# Patient Record
Sex: MALE | Race: BLACK OR AFRICAN AMERICAN | NOT HISPANIC OR LATINO | Employment: OTHER | URBAN - METROPOLITAN AREA
[De-identification: names, ages, dates, MRNs, and addresses within clinical notes are randomized per-mention and may not be internally consistent; named-entity substitution may affect disease eponyms.]

---

## 2017-01-04 ENCOUNTER — OFFICE VISIT (OUTPATIENT)
Dept: OPHTHALMOLOGY | Facility: CLINIC | Age: 72
End: 2017-01-04
Attending: OPHTHALMOLOGY
Payer: COMMERCIAL

## 2017-01-04 DIAGNOSIS — H26.9 CATARACTS, BILATERAL: ICD-10-CM

## 2017-01-04 DIAGNOSIS — H52.203 HYPEROPIC ASTIGMATISM OF BOTH EYES: Primary | ICD-10-CM

## 2017-01-04 DIAGNOSIS — H52.4 PRESBYOPIA: ICD-10-CM

## 2017-01-04 PROCEDURE — 99214 OFFICE O/P EST MOD 30 MIN: CPT | Mod: ZF

## 2017-01-04 PROCEDURE — 92015 DETERMINE REFRACTIVE STATE: CPT | Mod: ZF

## 2017-01-04 ASSESSMENT — TONOMETRY
OD_IOP_MMHG: 17
IOP_METHOD: TONOPEN
OS_IOP_MMHG: 18

## 2017-01-04 ASSESSMENT — REFRACTION_MANIFEST
OD_AXIS: 165
OS_ADD: +2.50
OD_SPHERE: +0.75
OD_ADD: +2.50
OS_SPHERE: +0.75
OS_CYLINDER: +0.50
OS_AXIS: 160
OD_CYLINDER: +0.75

## 2017-01-04 ASSESSMENT — VISUAL ACUITY
OS_SC: 20/25
OD_SC: 20/20
OS_SC+: -2
OS_PH_SC: 20/20-3
METHOD: SNELLEN - LINEAR
OD_SC+: -1

## 2017-01-04 ASSESSMENT — CUP TO DISC RATIO
OD_RATIO: 0.1
OS_RATIO: 0.2

## 2017-01-04 ASSESSMENT — EXTERNAL EXAM - LEFT EYE: OS_EXAM: NORMAL

## 2017-01-04 ASSESSMENT — SLIT LAMP EXAM - LIDS
COMMENTS: NORMAL
COMMENTS: NORMAL

## 2017-01-04 ASSESSMENT — CONF VISUAL FIELD
OS_NORMAL: 1
METHOD: COUNTING FINGERS
OD_NORMAL: 1

## 2017-01-04 ASSESSMENT — EXTERNAL EXAM - RIGHT EYE: OD_EXAM: NORMAL

## 2017-01-04 NOTE — MR AVS SNAPSHOT
After Visit Summary   2017    Jonathan Bishop    MRN: [id number]           Patient Information     Date Of Birth          1945        Visit Information        Provider Department      2017 9:15 AM [facility] Clark [facility]        Today's Diagnoses     Hyperopic astigmatism of both eyes    -  1     Presbyopia         Cataracts, bilateral            Follow-ups after your visit        Follow-up notes from your care team     Return in about 1 year (around 2018).      Who to contact     Please call your clinic at [phone] to:    Ask questions about your health    Make or cancel appointments    Discuss your medicines    Learn about your test results    Speak to your doctor   If you have compliments or concerns about an experience at your clinic, or if you wish to file a complaint, please contact [facility] Patient Relations at [phone] or email us at [email or web address]         Additional Information About Your Visit        MyChart Information     Vertical Acuityt is an electronic gateway that provides easy, online access to your medical records. With Data3Sixty, you can request a clinic appointment, read your test results, renew a prescription or communicate with your care team.     To sign up for Vertical Acuityt visit the website at [email or web address]   You will be asked to enter the access code listed below, as well as some personal information. Please follow the directions to create your username and password.     Your access code is: [email or web address]  Expires: 2017  9:00 AM     Your access code will  in 90 days. If you need help or a new code, please contact your [facility] or call [phone] for assistance.        Care EveryWhere ID     This is your Care EveryWhere ID. This could be used by other organizations to access your [facility] medical records  [id number]         Blood Pressure from Last 3  Encounters:   12/09/16 138/82   12/03/16 144/66   07/15/16 159/76    Weight from Last 3 Encounters:   12/09/16 104.826 kg (231 lb 1.6 oz)   11/30/16 104.327 kg (230 lb)   07/15/16 110.451 kg (243 lb 8 oz)              Today, you had the following     No orders found for display       Primary Care Provider Office Phone # Fax #    Buck Nascimento [phone]        PHYSICIANS [address] [facility] [address]  [facility] [address]        Thank you!     Thank you for choosing EYE CLINIC  for your care. Our goal is always to provide you with excellent care. Hearing back from our patients is one way we can continue to improve our services. Please take a few minutes to complete the written survey that you may receive in the mail after your visit with us. Thank you!             Your Updated Medication List - Protect others around you: Learn how to safely use, store and throw away your medicines at www.disposemymeds.org.          This list is accurate as of: 1/4/17 10:31 AM.  Always use your most recent med list.                   Brand Name Dispense Instructions for use    aspirin 325 MG EC tablet     90 tablet    Take 1 tablet (325 mg) by mouth daily       atorvastatin 40 MG tablet    LIPITOR    90 tablet    Take 1 tablet (40 mg) by mouth daily       docusate sodium 100 MG capsule    COLACE    60 capsule    Take 1 capsule (100 mg) by mouth 2 times daily       hydrochlorothiazide 25 MG tablet    HYDRODIURIL    90 tablet    Take 1 tablet (25 mg) by mouth every morning       ibuprofen 600 MG tablet    ADVIL/MOTRIN    90 tablet    Take 1 tablet (600 mg) by mouth 3 times daily as needed MUST BE SEEN IN CLINIC FOR ANY FURTHER REFILLS       multivitamin, therapeutic with minerals Tabs tablet     100 tablet    Take 1 tablet by mouth daily.       order for DME     1 Units    Equipment being ordered: Walker () Treatment Diagnosis: stroke       other medical supplies     4 each    Dispense knee high, medium compression,  medium size       oxyCODONE-acetaminophen 5-325 MG per tablet   Start taking on:  1/28/2017    PERCOCET    124 tablet    Take 1 tablet by mouth every 6 hours as needed for pain       sildenafil 100 MG cap/tab    VIAGRA    6 tablet    Take 1 tablet (100 mg) by mouth daily as needed for erectile dysfunction Take 30 min to 4 hours before intercourse.

## 2017-01-04 NOTE — PROGRESS NOTES
HPI  Jonathan Bishop is a 71 year old male here for full eye exam. He feels his vision is overall stable in both eyes at near and distance. He has occasional tearing when using the computer for long periods and uses ATs when needed which help some. No eye pain, redness, discharge. He has mild stable floaters. No flashes.    He had a small stroke on November 9th, 2016, and was hospitalized for 4 days. He states he has recovered well from the left leg weakness and denies vision changes related to the stroke.    Assessment & Plan       (H26.9) Cataracts, bilateral  (primary encounter diagnosis)  Comment: Mild, Not visually significant.  Plan: Observe.    (H52.213) Hyperopic astigmatism of both eyes/(H52.4) Presbyopia  Comment: Good vision with updated refraction  Plan: Ok to continue with OTC readers.      -----------------------------------------------------------------------------------    Patient disposition:   Return in about 1 year (around 1/4/2018). or sooner as needed.     I have confirmed the content of the chief complaint, history of present illness, review of systems, and past medical/surgical history sections and edited the information as needed.    Pascale Clark MD  Comprehensive Ophthalmology & Ocular Pathology  Department of Ophthalmology and Visual Neurosciences  [email or web address]  Pager [phone]

## 2017-01-04 NOTE — NURSING NOTE
Chief Complaints and History of Present Illnesses   Patient presents with     COMPREHENSIVE EYE EXAM     Routine eye exam.     HPI    Affected eye(s):  Both   Symptoms:     Decreased vision (Comment: Vision is down little distance BE for last 6 months.)   Floaters (Comment: floaters in both eyes for several years, unchanged.)   No flashes   No redness   Tearing (Comment: watering a lot BE.)   No itching         Do you have eye pain now?:  No      Comments:  Routine eye exam.    Michael GAUTHIER  8:48 AM01/04/2017

## 2017-01-27 ENCOUNTER — DOCUMENTATION ONLY (OUTPATIENT)
Dept: CARE COORDINATION | Facility: CLINIC | Age: 72
End: 2017-01-27

## 2017-01-27 NOTE — PROGRESS NOTES
[facility] now requests orders and shares plan of care/discharge summaries for some patients through [employer].  Please REPLY TO THIS MESSAGE in order to give authorization for orders when needed.  This is considered a verbal order, you will still receive a faxed copy of orders for signature.  Thank you for your assistance in improving collaboration for our patients.    ORDER  OT lymphedema therapy 1w1    MD SUMMARY/PLAN OF CARE  Plan to reduce BLE edema, fit into compression garment and provide long term management education        Addendum:  I agree with the above orders.  Buck Nascimento MD, FACP

## 2017-01-31 ENCOUNTER — DOCUMENTATION ONLY (OUTPATIENT)
Dept: CARE COORDINATION | Facility: CLINIC | Age: 72
End: 2017-01-31

## 2017-01-31 NOTE — PROGRESS NOTES
[facility] now requests orders and shares plan of care/discharge summaries for some patients through [employer].  Please REPLY TO THIS MESSAGE in order to give authorization for orders when needed.  This is considered a verbal order, you will still receive a faxed copy of orders for signature.  Thank you for your assistance in improving collaboration for our patients.    Requesting OK to continue homecare OT  5 visits,  for left UE strengthening, coordination and education on equipment     Addendum:  I agree with the above orders.  Buck Nascimento MD, FACP

## 2017-02-01 ENCOUNTER — DOCUMENTATION ONLY (OUTPATIENT)
Dept: CARE COORDINATION | Facility: CLINIC | Age: 72
End: 2017-02-01

## 2017-02-01 DIAGNOSIS — M54.50 ACUTE MIDLINE LOW BACK PAIN WITHOUT SCIATICA: Primary | ICD-10-CM

## 2017-02-01 NOTE — TELEPHONE ENCOUNTER
Reason For Call:   Chief Complaint   Patient presents with     Opioid Refill     percocet       Medication Name, Dose and Monthly Quantity:   Oxycodone with APAP (Percocet): Dose 5-325 Schedule 1 tablet by mouth every 6 hours prn Monthly Quantity 124   Diagnosis requiring opiates:   Acute midline low back pain without sciatica [M54.5]  - Primary     Problem List Updated:   No    Opioid Agreement On File - [facility] PAIN CONTRACT ID# [id number]:  No    Last Urine Drug Screen (at least once every 12 months) Date:   n/a  Unexpected Results:   n/a    [place]  Data Reviewed (at least once every 3 months) Date:   target organ damage     Unexpected Results:    No.    Last Fill Date:   1/28/2017    Due Date:   2/27/17    Last Visit with PCP:   12/9/16    Future Visits with PCP:   No.    Processing:   Mail to patient.    Jessica Hutchison LPN

## 2017-02-02 ENCOUNTER — MEDICAL CORRESPONDENCE (OUTPATIENT)
Dept: HEALTH INFORMATION MANAGEMENT | Facility: CLINIC | Age: 72
End: 2017-02-02

## 2017-02-06 RX ORDER — OXYCODONE AND ACETAMINOPHEN 5; 325 MG/1; MG/1
1 TABLET ORAL EVERY 6 HOURS PRN
Qty: 124 TABLET | Refills: 0 | Status: SHIPPED | OUTPATIENT
Start: 2017-02-27 | End: 2017-03-03

## 2017-02-27 ENCOUNTER — DOCUMENTATION ONLY (OUTPATIENT)
Dept: CARE COORDINATION | Facility: CLINIC | Age: 72
End: 2017-02-27

## 2017-02-27 ENCOUNTER — MEDICAL CORRESPONDENCE (OUTPATIENT)
Dept: HEALTH INFORMATION MANAGEMENT | Facility: CLINIC | Age: 72
End: 2017-02-27

## 2017-02-27 NOTE — PROGRESS NOTES
[facility] now requests orders and shares plan of care/discharge summaries for some patients through [facility].  Please REPLY TO THIS MESSAGE in order to give authorization for orders when needed.  This is considered a verbal order, you will still receive a faxed copy of orders for signature.  Thank you for your assistance in improving collaboration for our patients.    Planning to transition Jonathan from homecare to outpt therapy, PT and OT, requesting orders for this, if aproved  Please enter in EPIC 2 separate orders    PT outpt evaluation, tx due to CVA I63.521, Muscle weakness M62.81  OT outpt evaluation, tx due to CVA I63.542, Muscle weakness M62.81    He plans to attend outpt therapy in [place] at [facility]  Thank you    Addendum:  I agree with the above orders.  Buck Nascimento MD, FACP

## 2017-03-02 ENCOUNTER — TELEPHONE (OUTPATIENT)
Dept: INTERNAL MEDICINE | Facility: CLINIC | Age: 72
End: 2017-03-02

## 2017-03-02 ENCOUNTER — PRE VISIT (OUTPATIENT)
Dept: ORTHOPEDICS | Facility: CLINIC | Age: 72
End: 2017-03-02

## 2017-03-02 DIAGNOSIS — L60.0 INGROWN TOENAIL: Primary | ICD-10-CM

## 2017-03-02 NOTE — TELEPHONE ENCOUNTER
Pt is requesting podiatry referral for ingrown toenail on left big toe. Referral placed, pt will call for an appt.

## 2017-03-02 NOTE — TELEPHONE ENCOUNTER
1.  Date/reason for appt: 3/15/17 - Ingrown Toenails  2.  Referring provider: Dr. Nascimento  3.  Call to patient (Yes / No - short description): no, pt is referred  4.  Previous care at / records requested from: [facility] -- Records and referral in Epic

## 2017-03-03 ENCOUNTER — DOCUMENTATION ONLY (OUTPATIENT)
Dept: CARE COORDINATION | Facility: CLINIC | Age: 72
End: 2017-03-03

## 2017-03-03 DIAGNOSIS — M54.50 ACUTE MIDLINE LOW BACK PAIN WITHOUT SCIATICA: ICD-10-CM

## 2017-03-03 NOTE — TELEPHONE ENCOUNTER
Reason For Call:   Chief Complaint   Patient presents with     Opioid Refill     percocet       Medication Name, Dose and Monthly Quantity:   Oxycodone with APAP (Percocet): Dose 5-325 Schedule 1 tablet by mouth every 6 hours prn Monthly Quantity 124   Diagnosis requiring opiates:   Acute midline low back pain without sciatica [M54.5]  - Primary     Problem List Updated:   No    Opioid Agreement On File - [facility] PAIN CONTRACT ID# [id number]:  No    Last Urine Drug Screen (at least once every 12 months) Date:   n/a  Unexpected Results:   n/a    [place]  Data Reviewed (at least once every 3 months) Date:   target organ damage     Unexpected Results:    No.    Last Fill Date:   2/27/17  Due Date:   3/29/17    Last Visit with PCP:   12/9/16    Future Visits with PCP:   No.    Processing:   Mail to patient.    Jessica Hutchison LPN

## 2017-03-03 NOTE — PROGRESS NOTES
[facility] now requests orders and shares plan of care/discharge summaries for some patients through [employer].  Please REPLY TO THIS MESSAGE in order to give authorization for orders when needed.  This is considered a verbal order, you will still receive a faxed copy of orders for signature.  Thank you for your assistance in improving collaboration for our patients.    ORDER  OT lymphedema therapy 2w3    MD SUMMARY/PLAN OF CARE  Continued treatment to maintain/reduce BLE edema, transition into compression stockings and provide long term management education        Addendum:  I agree with the above orders.  Buck Nascimento MD, FACP

## 2017-03-06 ENCOUNTER — OFFICE VISIT (OUTPATIENT)
Dept: INTERNAL MEDICINE | Facility: CLINIC | Age: 72
End: 2017-03-06

## 2017-03-06 ENCOUNTER — TELEPHONE (OUTPATIENT)
Dept: INTERNAL MEDICINE | Facility: CLINIC | Age: 72
End: 2017-03-06

## 2017-03-06 VITALS — DIASTOLIC BLOOD PRESSURE: 77 MMHG | HEART RATE: 64 BPM | SYSTOLIC BLOOD PRESSURE: 127 MMHG

## 2017-03-06 DIAGNOSIS — E78.5 HYPERLIPIDEMIA LDL GOAL <130: ICD-10-CM

## 2017-03-06 DIAGNOSIS — H90.3 BILATERAL SENSORINEURAL HEARING LOSS: ICD-10-CM

## 2017-03-06 DIAGNOSIS — I10 ESSENTIAL HYPERTENSION, BENIGN: Primary | ICD-10-CM

## 2017-03-06 DIAGNOSIS — L60.0 INGROWN TOENAIL: Primary | ICD-10-CM

## 2017-03-06 DIAGNOSIS — Z12.11 COLON CANCER SCREENING: ICD-10-CM

## 2017-03-06 DIAGNOSIS — I10 ESSENTIAL HYPERTENSION, BENIGN: ICD-10-CM

## 2017-03-06 LAB
ANION GAP SERPL CALCULATED.3IONS-SCNC: 9 MMOL/L (ref 3–14)
BUN SERPL-MCNC: 14 MG/DL (ref 7–30)
CALCIUM SERPL-MCNC: 9.5 MG/DL (ref 8.5–10.1)
CHLORIDE SERPL-SCNC: 102 MMOL/L (ref 94–109)
CHOLEST SERPL-MCNC: 109 MG/DL
CO2 SERPL-SCNC: 30 MMOL/L (ref 20–32)
CREAT SERPL-MCNC: 0.66 MG/DL (ref 0.66–1.25)
GFR SERPL CREATININE-BSD FRML MDRD: NORMAL ML/MIN/1.7M2
GLUCOSE SERPL-MCNC: 94 MG/DL (ref 70–99)
HDLC SERPL-MCNC: 43 MG/DL
LDLC SERPL CALC-MCNC: 57 MG/DL
NONHDLC SERPL-MCNC: 66 MG/DL
POTASSIUM SERPL-SCNC: 3.8 MMOL/L (ref 3.4–5.3)
SODIUM SERPL-SCNC: 140 MMOL/L (ref 133–144)
TRIGL SERPL-MCNC: 48 MG/DL

## 2017-03-06 RX ORDER — HYDROCHLOROTHIAZIDE 12.5 MG/1
12.5 TABLET ORAL DAILY
Qty: 30 TABLET | Refills: 11 | Status: SHIPPED | OUTPATIENT
Start: 2017-03-06 | End: 2018-03-22

## 2017-03-06 RX ORDER — OXYCODONE AND ACETAMINOPHEN 5; 325 MG/1; MG/1
1 TABLET ORAL EVERY 6 HOURS PRN
Qty: 124 TABLET | Refills: 0 | Status: SHIPPED | OUTPATIENT
Start: 2017-03-29 | End: 2017-03-29

## 2017-03-06 ASSESSMENT — PAIN SCALES - GENERAL: PAINLEVEL: SEVERE PAIN (7)

## 2017-03-06 NOTE — MR AVS SNAPSHOT
After Visit Summary   3/6/2017    Jonathan Bishop    MRN: [id number]           Patient Information     Date Of Birth          1945        Visit Information        Provider Department      3/6/2017 8:30 AM Buck Nascimento MD [facility]        Today's Diagnoses     Essential hypertension, benign    -  1    Hyperlipidemia LDL goal <130        Bilateral sensorineural hearing loss        Colon cancer screening          Care Instructions    Primary Care Center Medication Refill Request Information:  * Please contact your pharmacy regarding ANY request for medication refills.  ** [facility] Prescription Fax = [phone]  * Please allow 3 business days for routine medication refills.  * Please allow 5 business days for controlled substance medication refills.     Primary Care Center Test Result notification information:  *You will be notified with in 7-10 days of your appointment day regarding the results of your test.  If you are on MyChart you will be notified as soon as the provider has reviewed the results and signed off on them.          Follow-ups after your visit        Additional Services     AUDIOLOGY ADULT REFERRAL       Your provider has referred you to: [facility]: Audiology and Aural Rehab Services - [place] [phone]   [email or web address]    Specialty Testing:  Audiogram w/Tymps and Reflexes (Comprehensive Audiology Evaluation)            GI Procedure Referral       Last Lab Result: Creatinine (mg/dL)       Date                     Value                 12/03/2016               0.59 (L)         ----------  There is no height or weight on file to calculate BMI.     Needed:  No  Language:  English    Patient will be contacted to schedule procedure.     Please be aware that coverage of these services is subject to the terms and limitations of your health insurance plan.  Call member services at your health plan  with any benefit or coverage questions.  Any procedures must be performed at a [facility] facility OR coordinated by your clinic's referral office.    Please bring the following with you to your appointment:    (1) Any X-Rays, CTs or MRIs which have been performed.  Contact the facility where they were done to arrange for  prior to your scheduled appointment.    (2) List of current medications   (3) This referral request   (4) Any documents/labs given to you for this referral                  Your next 10 appointments already scheduled     Mar 06, 2017 10:15 AM CST   LAB with  LAB   [facility] ([facility])    [address] [place] [address] [id number] [phone]           Patient must bring picture ID.  Patient should be prepared to give a urine specimen  Please do not eat 10-12 hours before your appointment if you are coming in fasting for labs on lipids, cholesterol, or glucose (sugar).  Pregnant women should follow their Care Team instructions. Water with medications is okay. Do not drink coffee or other fluids.   If you have concerns about taking  your medications, please ask at office or if scheduling via Shotlsthart, send a message by clicking on Secure Messaging, Message Your Care Team.            Mar 15, 2017  7:00 AM CDT   (Arrive by 6:45 AM)   NEW FOOT/ANKLE with Mamadou Gary DPM   [facility] ([facility])    [address] [place] [id number] [phone]            Apr 14, 2017  8:00 AM CDT   Walk In From ENT with Benjamin Paredes   [facility] ([facility])    [address] [place] [address] [id number] [phone]            Apr 14, 2017  9:00 AM CDT   (Arrive by 8:45 AM)   New Patient Visit with Tristen Reed MD   [facility] ([facility])    [address] [place] [address]  Floor  [facility] [address] [phone]            2017   Procedure with Toby Irvin MD   [facility] [place] ([facility])    [address] [id number] [phone]           The [facility] is located on the corner of [facility] and [address] [facility] on the [facility]. It is easily accessible from virtually any point in the [facility] area, via [address] and [address].              Future tests that were ordered for you today     Open Future Orders        Priority Expected Expires Ordered    Lipid Profile Routine 3/6/2017 3/6/2018 3/6/2017    Basic metabolic panel Routine 3/6/2017 3/20/2017 3/6/2017            Who to contact     Please call your clinic at [phone] to:    Ask questions about your health    Make or cancel appointments    Discuss your medicines    Learn about your test results    Speak to your doctor   If you have compliments or concerns about an experience at your clinic, or if you wish to file a complaint, please contact [facility] Patient Relations at [phone] or email us at [email or web address]         Additional Information About Your Visit        [employer]ad[employer] Information     Car Guy Nationt is an electronic gateway that provides easy, online access to your medical records. With SIPphone, you can request a clinic appointment, read your test results, renew a prescription or communicate with your care team.     To sign up for Car Guy Nationt visit the website at [email or web address]   You will be asked to enter the access code listed below, as well as some personal information. Please follow the directions to create your username and password.     Your access code is: [email or web address]  Expires: 2017  6:31 AM     Your access code will  in 90 days. If you need help or a new code, please contact your [facility] or  call [phone] for assistance.        Care EveryWhere ID     This is your Care EveryWhere ID. This could be used by other organizations to access your [facility] medical records  [id number]        Your Vitals Were     Pulse                   64            Blood Pressure from Last 3 Encounters:   03/06/17 127/77   12/09/16 138/82   12/03/16 144/66    Weight from Last 3 Encounters:   12/09/16 104.8 kg (231 lb 1.6 oz)   11/30/16 104.3 kg (230 lb)   07/15/16 110.5 kg (243 lb 8 oz)              We Performed the Following     AUDIOLOGY ADULT REFERRAL     GI Procedure Referral          Today's Medication Changes          These changes are accurate as of: 3/6/17 10:14 AM.  If you have any questions, ask your nurse or doctor.               These medicines have changed or have updated prescriptions.        Dose/Directions    hydrochlorothiazide 12.5 MG Tabs tablet   This may have changed:    - medication strength  - how much to take  - when to take this   Used for:  Essential hypertension, benign   Changed by:  Buck Nascimento MD        Dose:  12.5 mg   Take 1 tablet (12.5 mg) by mouth daily   Quantity:  30 tablet   Refills:  11            Where to get your medicines      These medications were sent to [facility] [address] [place] [address] [address] [address]     Phone:  [phone]     hydrochlorothiazide 12.5 MG Tabs tablet                Primary Care Provider Office Phone # Fax #    Buck Nascimento -[phone]        PHYSICIANS [address] [place] [address] [address]  [facility] [address]        Thank you!     Thank you for choosing [facility]  for your care. Our goal is always to provide you with excellent care. Hearing back from our patients is one way we can continue to improve our services. Please take a few minutes to complete the written survey that you may receive in the mail after your visit with us. Thank you!             Your Updated  Medication List - Protect others around you: Learn how to safely use, store and throw away your medicines at www.disposemymeds.org.          This list is accurate as of: 3/6/17 10:14 AM.  Always use your most recent med list.                   Brand Name Dispense Instructions for use    aspirin 325 MG EC tablet     90 tablet    Take 1 tablet (325 mg) by mouth daily       atorvastatin 40 MG tablet    LIPITOR    90 tablet    Take 1 tablet (40 mg) by mouth daily       docusate sodium 100 MG capsule    COLACE    60 capsule    Take 1 capsule (100 mg) by mouth 2 times daily       hydrochlorothiazide 12.5 MG Tabs tablet     30 tablet    Take 1 tablet (12.5 mg) by mouth daily       ibuprofen 600 MG tablet    ADVIL/MOTRIN    90 tablet    Take 1 tablet (600 mg) by mouth 3 times daily as needed MUST BE SEEN IN CLINIC FOR ANY FURTHER REFILLS       multivitamin, therapeutic with minerals Tabs tablet     100 tablet    Take 1 tablet by mouth daily.       order for DME     1 Units    Equipment being ordered: Walker () Treatment Diagnosis: stroke       other medical supplies     4 each    Dispense knee high, medium compression, medium size       oxyCODONE-acetaminophen 5-325 MG per tablet   Start taking on:  3/29/2017    PERCOCET    124 tablet    Take 1 tablet by mouth every 6 hours as needed for pain       sildenafil 100 MG cap/tab    VIAGRA    6 tablet    Take 1 tablet (100 mg) by mouth daily as needed for erectile dysfunction Take 30 min to 4 hours before intercourse.

## 2017-03-06 NOTE — PATIENT INSTRUCTIONS
Primary Care Center Medication Refill Request Information:  * Please contact your pharmacy regarding ANY request for medication refills.  ** [facility] Prescription Fax = [phone]  * Please allow 3 business days for routine medication refills.  * Please allow 5 business days for controlled substance medication refills.     Primary Care Center Test Result notification information:  *You will be notified with in 7-10 days of your appointment day regarding the results of your test.  If you are on MyChart you will be notified as soon as the provider has reviewed the results and signed off on them.

## 2017-03-06 NOTE — PROGRESS NOTES
ASSESSMENT/PLAN:  1. Essential hypertension  Patient's home readings are quite good, 110s-125/60s-70s. We will get a BMP today to check electrolytes, as this has not been done in about 3 months. We will also trial him on a lower dose of HCTZ. He should record his blood pressures for 2 weeks and then send them to us to make sure it doesn't get too high on the lower dose.   - HCTZ 12.5mg daily  - BMP    2. Hyperlipidemia LDL goal < 130  Will re-check lipids today now that patient has been taking his statin for 3 months.   - Lipid panel    3. Bilateral SNHL  - Audiology referral    4. Colon Cancer Screening  - GI procedure referral       I, Pretty Chacon, MS3 am acting as scribe for Dr. Buck Nascimento MD.    The student did the history and exam as above and then acted as scribe as I then completely performed the history and physical documented above again myself.  Supervising MD Buck Benjamin MD, FACP      Chief complaint:  Jonathan Bishop is a 71 year old male presents for   Chief Complaint   Patient presents with     Neurologic Problem     patient states he is here for a check on his stroke        SUBJECTIVE:  Patient has been taking blood pressures at home daily, usually 110s-125 over 60s-70s. No symptoms of being low. Has been taking HCTZ in the evening due to Jewish fasting. With this, has had more urination during the night. He often wakes 2 times per night. Feels like stream is slower during the night, like it takes longer to get everything out. No hematuria or dysuria.    Has been working with PT and OT at home since his stroke. Feels like he has better function in his left hand now. Does still have difficulty with fine motor movements in this hand. He has also been getting leg wrapping for lymphedema which has been helpful. Is planned to see podiatry later this month for ingrown toenails.     Back pain has been mostly stable, however some increased pain in left hip since stroke.     Patient has  had decreased hearing and ringing in his left ear for some time. He worked as a  for 40 years and has friends who have the same symptoms from occupational exposure. He was planned to see audiology in December, however this was postponed d/t his stroke.    Needs colonoscopy. Was scheduled for December, but again postponed d/t his stroke.         Medications and allergies were reviewed by me today.     Review Of Systems  Constitutional: no fevers, chills, night sweats or unintentional weight change   Eyes: no vision change, diplopia or red eyes   Ears, Nose, Mouth, Throat: ears- see HPI, no epistaxis or nasal discharge, no oral lesions, throat clear   Cardiovascular: no chest pain, palpitations, or pain with walking, no orthopnea or PND   Respiratory: no dyspnea, cough, shortness of breath or wheezing   GI: no nausea, vomiting, diarrhea or constipation, no abdominal pain   : see HPI, no dysuria or hematuria  Musculoskeletal: see HPI  Integumentary: no concerning lesions or moles   Neuro: see HPI  Endo: no polyuria or polydipsia, no temperature intolerance   Heme/Lymph: no concerning bumps, no bleeding problems   Psych: no depression or anxiety, no sleep problems    Patient Active Problem List    Diagnosis Date Noted     Cerebrovascular accident involving anterior circulation, right (H) 12/09/2016     Priority: Medium     Stroke (H) 11/30/2016     Priority: Medium     Medication refill- do not delete  11/13/2013     Priority: Medium     Low back pain 05/14/2014     Osteoarthritis of knee 05/14/2014     Essential hypertension, benign 03/09/2012     Hyperlipidemia with target LDL less than 130 03/09/2012     Diagnosis updated by automated process. Provider to review and confirm.       Anemia 03/09/2012     Problem list name updated by automated process. Provider to review       Wound, surgical, infected 06/10/2011     hernia         PHYSICAL EXAM:  /77  Pulse 64  Constitutional: no distress,  comfortable, pleasant. Mucous membranes moist.   Eyes: anicteric, normal extra-ocular movements   Ears, Nose and Throat: nose clear and free of lesions, throat clear, neck supple with full range of motion  Cardiovascular: regular rate and rhythm, normal S1 and S2, faint 1/6 systolic ejection murmur heard at right sternal border, rubs or gallops, peripheral pulses full and symmetric   Respiratory: clear to auscultation, no wheezes or crackles, normal breath sounds   Gastrointestinal: positive bowel sounds, nontender   Musculoskeletal: wearing compression stockings. trace edema   Skin: no concerning lesions, no jaundice   Neurological: speech is clear. Strength 5/5 on , biceps, and hips bilaterally. cranial nerves intact, normal strength and sensation. Mild dysmetria with finger-to-nose on left hand.   Psychological: appropriate mood

## 2017-03-06 NOTE — NURSING NOTE
Chief Complaint   Patient presents with     Neurologic Problem     patient states he is here for a check on his stroke     STEPH JACK at 8:26 AM on 3/6/2017.

## 2017-03-09 ENCOUNTER — DOCUMENTATION ONLY (OUTPATIENT)
Dept: CARE COORDINATION | Facility: CLINIC | Age: 72
End: 2017-03-09

## 2017-03-09 NOTE — PROGRESS NOTES
[facility] now requests orders and shares plan of care/discharge summaries for some patients through [employer].  Please REPLY TO THIS MESSAGE in order to give authorization for orders when needed.  This is considered a verbal order, you will still receive a faxed copy of orders for signature.  Thank you for your assistance in improving collaboration for our patients.    ORDER  OT lymphedema therapy 2w2    MD SUMMARY/PLAN OF CARE  Continued treatment to maintain/reduce BLE edema, transition into compression stockings and provide long term management education    Addendum:  I agree with the above orders.  Buck Nascimento MD, FACP

## 2017-03-10 ENCOUNTER — MEDICAL CORRESPONDENCE (OUTPATIENT)
Dept: HEALTH INFORMATION MANAGEMENT | Facility: CLINIC | Age: 72
End: 2017-03-10

## 2017-03-13 ENCOUNTER — MEDICAL CORRESPONDENCE (OUTPATIENT)
Dept: HEALTH INFORMATION MANAGEMENT | Facility: CLINIC | Age: 72
End: 2017-03-13

## 2017-03-27 ENCOUNTER — TELEPHONE (OUTPATIENT)
Dept: INTERNAL MEDICINE | Facility: CLINIC | Age: 72
End: 2017-03-27

## 2017-03-27 NOTE — TELEPHONE ENCOUNTER
----- Message from Warren Francisco sent at 3/27/2017  8:58 AM CDT -----  Regarding: HOME CARE ORDERS  Contact: [phone]  Home Care Nurse Arlene called regarding an order for Home Care Skilled Nursing service.    1 time per week for one week  1 as needed      Thank you!    Call Center

## 2017-03-29 DIAGNOSIS — M54.50 ACUTE MIDLINE LOW BACK PAIN WITHOUT SCIATICA: ICD-10-CM

## 2017-03-29 NOTE — TELEPHONE ENCOUNTER
Reason For Call:   Chief Complaint   Patient presents with     Opioid Refill     percocoet       Medication Name, Dose and Monthly Quantity:   Oxycodone with APAP (Percocet): Dose 5-325 Schedule 1 tablet by mouth every 6 hours prn Monthly Quantity 124   Diagnosis requiring opiates:   Acute midline low back pain without sciatica [M54.5]  - Primary     Problem List Updated:   No    Opioid Agreement On File - [facility] PAIN CONTRACT ID# [id number]:  No    Last Urine Drug Screen (at least once every 12 months) Date:   n/a  Unexpected Results:   n/a    [place]  Data Reviewed (at least once every 3 months) Date:   target organ damage     Unexpected Results:    No.    Last Fill Date:   3/29/17  Due Date:   4/28/17    Last Visit with PCP:   3/6/17    Future Visits with PCP:   No.    Processing:   Mail to patient.    Jessica Hutchison LPN

## 2017-03-30 ENCOUNTER — PRE VISIT (OUTPATIENT)
Dept: OTOLARYNGOLOGY | Facility: CLINIC | Age: 72
End: 2017-03-30

## 2017-03-30 NOTE — TELEPHONE ENCOUNTER
1. Date/reason for appt: 4/14/17 - hearing loss (audio prior)  2. Referring provider: Dr. Nascimento   3. Call to patient (Yes / No - short description): no   4. Previous care at:    - None for issue however PCP (Dr. Nascimento) records are in epic.

## 2017-04-03 RX ORDER — OXYCODONE AND ACETAMINOPHEN 5; 325 MG/1; MG/1
1 TABLET ORAL EVERY 6 HOURS PRN
Qty: 124 TABLET | Refills: 0 | Status: SHIPPED | OUTPATIENT
Start: 2017-04-28 | End: 2017-04-27

## 2017-04-05 ENCOUNTER — OFFICE VISIT (OUTPATIENT)
Dept: ORTHOPEDICS | Facility: CLINIC | Age: 72
End: 2017-04-05

## 2017-04-05 DIAGNOSIS — I73.9 PVD (PERIPHERAL VASCULAR DISEASE) (H): ICD-10-CM

## 2017-04-05 DIAGNOSIS — B35.1 DERMATOPHYTOSIS OF NAIL: Primary | ICD-10-CM

## 2017-04-05 NOTE — MR AVS SNAPSHOT
After Visit Summary   4/5/2017    Jonathan Bishop    MRN: [id number]           Patient Information     Date Of Birth          1945        Visit Information        Provider Department      4/5/2017 7:40 AM Mamadou Gary DPM [facility]        Today's Diagnoses     Dermatophytosis of nail    -  1    PVD (peripheral vascular disease) (H)           Follow-ups after your visit        Your next 10 appointments already scheduled     Apr 14, 2017  8:00 AM CDT   Walk In From ENT with Benjamin Paredes   [facility] Audiology ([facility])    [address] [place] [id number]   [phone]            Apr 14, 2017  9:00 AM CDT   (Arrive by 8:45 AM)   New Patient Visit with Tristen Reed MD   [facility] Ear Nose and Throat ([facility])    [address] [place] [id number] [phone]            Apr 14, 2017   Procedure with Toby Irvin MD   [facility] [place] Endoscopy ([facility] [place])    [address] [address]   [phone]           [facility] is located on the corner of [address] and [address] on the [facility]. It is easily accessible from virtually any point in the [facility] [place] area, via [address] and [address].            Jul 05, 2017  7:00 AM CDT   (Arrive by 6:45 AM)   RETURN FOOT/ANKLE with Mamadou Gary DPM   [facility] ([facility])    [address] [place] [id number] [phone]              Who to contact     Please call your clinic at [phone] to:    Ask questions about your health    Make or cancel appointments    Discuss your medicines    Learn about your test results    Speak to your doctor   If you have compliments or concerns about an experience at your clinic, or if  you wish to file a complaint, please contact [facility] Patient Relations at [phone] or email us at [email or web address]         Additional Information About Your Visit        [employer] Information     [employer] is an electronic gateway that provides easy, online access to your medical records. With [employer], you can request a clinic appointment, read your test results, renew a prescription or communicate with your care team.     To sign up for [employer] visit the website at [email or web address]   You will be asked to enter the access code listed below, as well as some personal information. Please follow the directions to create your username and password.     Your access code is: [email or web address]  Expires: 2017  7:31 AM     Your access code will  in 90 days. If you need help or a new code, please contact your [facility] or call [phone] for assistance.        Care EveryWhere ID     This is your Care EveryWhere ID. This could be used by other organizations to access your [place] medical records  [id number]         Blood Pressure from Last 3 Encounters:   17 127/77   16 138/82   16 144/66    Weight from Last 3 Encounters:   16 104.8 kg (231 lb 1.6 oz)   16 104.3 kg (230 lb)   07/15/16 110.5 kg (243 lb 8 oz)              We Performed the Following     DEBRIDEMENT OF NAILS, 6 OR MORE        Primary Care Provider Office Phone # Fax #    Buck Nascimento [phone]        [facility] [address] [place] [address] [phone]  [facility] [address]        Thank you!     Thank you for choosing [facility]  for your care. Our goal is always to provide you with excellent care. Hearing back from our patients is one way we can continue to improve our services. Please take a few minutes to complete the written survey that you may receive in the mail after your visit with us. Thank you!              Your Updated Medication List - Protect others around you: Learn how to safely use, store and throw away your medicines at www.disposemymeds.org.          This list is accurate as of: 4/5/17  7:54 AM.  Always use your most recent med list.                   Brand Name Dispense Instructions for use    aspirin 325 MG EC tablet     90 tablet    Take 1 tablet (325 mg) by mouth daily       atorvastatin 40 MG tablet    LIPITOR    90 tablet    Take 1 tablet (40 mg) by mouth daily       docusate sodium 100 MG capsule    COLACE    60 capsule    Take 1 capsule (100 mg) by mouth 2 times daily       hydrochlorothiazide 12.5 MG Tabs tablet     30 tablet    Take 1 tablet (12.5 mg) by mouth daily       ibuprofen 600 MG tablet    ADVIL/MOTRIN    90 tablet    Take 1 tablet (600 mg) by mouth 3 times daily as needed MUST BE SEEN IN CLINIC FOR ANY FURTHER REFILLS       multivitamin, therapeutic with minerals Tabs tablet     100 tablet    Take 1 tablet by mouth daily.       order for DME     1 Units    Equipment being ordered: Walker () Treatment Diagnosis: stroke       other medical supplies     4 each    Dispense knee high, medium compression, medium size       oxyCODONE-acetaminophen 5-325 MG per tablet   Start taking on:  4/28/2017    PERCOCET    124 tablet    Take 1 tablet by mouth every 6 hours as needed for pain       sildenafil 100 MG cap/tab    VIAGRA    6 tablet    Take 1 tablet (100 mg) by mouth daily as needed for erectile dysfunction Take 30 min to 4 hours before intercourse.

## 2017-04-05 NOTE — LETTER
4/5/2017       RE: Jonathan Bishop  [address]  [place] [address]     Dear Colleague,    Thank you for referring your patient, Jonathan Bishop, to the [facility] at [facility]. Please see a copy of my visit note below.    Date of Service: 4/5/2017    Chief Complaint:   Chief Complaint   Patient presents with     Consult     Ingrown toenail, Bilateral great toes. Pt stated that he had a stroke so he cant get down there to work on them.         HPI: Jonathan is a 71 year old male who presents today for further evaluation of elongated and painful toenails. He relates that he had a stroke 11/30/16 and was hospitalized at [facility]. He is recovering from the deficits that he exhibited during that stay. He is a very pleasant gentleman with long painful nails that haven't been cut since he was hospitalized. 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     Review of Systems: No N/V/D/F/C/NS/SOB/CP    PMH:   Past Medical History:   Diagnosis Date     Acute rheumatic carditis 1950     Coronary artery disease     murmur     Hip pain, bilateral      Hypercholesteremia      Hypertension      Low back pain      Nonsenile cataract      Obesity      Renal cyst      Stroke (cerebrum) (H) 11/30/16     Umbilical hernia 6/10/2011    s/p surgical repair     Wound, surgical, infected 6/10/2011    hernia       PSxH:   Past Surgical History:   Procedure Laterality Date     ARTHROPLASTY KNEE BILATERAL  7/22/2010     FUSION LUMBAR ANTERIOR TWO LEVELS       HERNIORRHAPHY UMBILICAL  6/10/2011    Procedure:HERNIORRHAPHY UMBILICAL; Open, with mesh placement; Surgeon:HO PARHAM; Location:[facility] OR     LAMINECTOMY LUMBAR TWO LEVELS         Allergies: Review of patient's allergies indicates no known allergies.    SH:   Social History     Social History     Marital status:      Spouse name: N/A     Number of children: N/A     Years of education: N/A      Occupational History     Not on file.     Social History Main Topics     Smoking status: Former Smoker     Quit date: 1/1/2005     Smokeless tobacco: Never Used      Comment: Non smoker. No 2nd hand exposure. CCX, RMA [facility] 7/28/2011     Alcohol use No     Drug use: No     Sexual activity: Not on file     Other Topics Concern     Not on file     Social History Narrative    He lives by himself in an apt in [place].  He has 2 goldfish, Lai and [place].       FH:   Family History   Problem Relation Age of Onset     C.A.D. Mother      Unknown/Adopted Father      Glaucoma No family hx of      Macular Degeneration No family hx of        Objective:      PT and DP pulses are non-palpable bilaterally. CRT is >3 seconds. Diminished pedal hair. Varicosities and edema are noted to BL LE with R>L.  Gross sensation is slightly decreased bilaterally.   Equinus is noted and mild bilaterally. No pain with active or passive ROM of the ankle, MTJ, 1st ray, or halluces bilaterally,.   Nails are thickened, discolored, dystrophic, elongated, painful and the left hallux is pincer bilaterally x 10. Nails have subungual debris consistent with onychomycosis. No open lesions are noted.     Assessment: PVD  Onychomycosis x 10    Plan:  - Pt seen and evaluated  - Nails were debrided x 10  - He has a pair of compression stockings, however they are very difficult for him to put on. I wrapped his legs in ACE with about 15 mmHg of pressure. I then aracely a line down the leg. He can wrap his legs in the ACE and match up the lines to achieve the right amount of compression.   - See again in 3 months.           Again, thank you for allowing me to participate in the care of your patient.      Sincerely,    Mamadou Gary DPM

## 2017-04-05 NOTE — PROGRESS NOTES
Date of Service: 4/5/2017    Chief Complaint:   Chief Complaint   Patient presents with     Consult     Ingrown toenail, Bilateral great toes. Pt stated that he had a stroke so he cant get down there to work on them.         HPI: Jonathan is a 71 year old male who presents today for further evaluation of elongated and painful toenails. He relates that he had a stroke 11/30/16 and was hospitalized at [facility]. He is recovering from the deficits that he exhibited during that stay. He is a very pleasant gentleman with long painful nails that haven't been cut since he was hospitalized. 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     Review of Systems: No N/V/D/F/C/NS/SOB/CP    PMH:   Past Medical History:   Diagnosis Date     Acute rheumatic carditis 1950     Coronary artery disease     murmur     Hip pain, bilateral      Hypercholesteremia      Hypertension      Low back pain      Nonsenile cataract      Obesity      Renal cyst      Stroke (cerebrum) (H) 11/30/16     Umbilical hernia 6/10/2011    s/p surgical repair     Wound, surgical, infected 6/10/2011    hernia       PSxH:   Past Surgical History:   Procedure Laterality Date     ARTHROPLASTY KNEE BILATERAL  7/22/2010     FUSION LUMBAR ANTERIOR TWO LEVELS       HERNIORRHAPHY UMBILICAL  6/10/2011    Procedure:HERNIORRHAPHY UMBILICAL; Open, with mesh placement; Surgeon:HO PARHAM; Location:[facility] OR     LAMINECTOMY LUMBAR TWO LEVELS         Allergies: Review of patient's allergies indicates no known allergies.    SH:   Social History     Social History     Marital status:      Spouse name: N/A     Number of children: N/A     Years of education: N/A     Occupational History     Not on file.     Social History Main Topics     Smoking status: Former Smoker     Quit date: 1/1/2005     Smokeless tobacco: Never Used      Comment: Non smoker. No 2nd hand exposure. CCX, RMA [facility] 7/28/2011     Alcohol use No     Drug use: No     Sexual activity:  Not on file     Other Topics Concern     Not on file     Social History Narrative    He lives by himself in an apt in [place].  He has 2 goldfish, Lai and [place].       FH:   Family History   Problem Relation Age of Onset     C.A.D. Mother      Unknown/Adopted Father      Glaucoma No family hx of      Macular Degeneration No family hx of        Objective:      PT and DP pulses are non-palpable bilaterally. CRT is >3 seconds. Diminished pedal hair. Varicosities and edema are noted to BL LE with R>L.  Gross sensation is slightly decreased bilaterally.   Equinus is noted and mild bilaterally. No pain with active or passive ROM of the ankle, MTJ, 1st ray, or halluces bilaterally,.   Nails are thickened, discolored, dystrophic, elongated, painful and the left hallux is pincer bilaterally x 10. Nails have subungual debris consistent with onychomycosis. No open lesions are noted.     Assessment: PVD  Onychomycosis x 10    Plan:  - Pt seen and evaluated  - Nails were debrided x 10  - He has a pair of compression stockings, however they are very difficult for him to put on. I wrapped his legs in ACE with about 15 mmHg of pressure. I then aracely a line down the leg. He can wrap his legs in the ACE and match up the lines to achieve the right amount of compression.   - See again in 3 months.

## 2017-04-05 NOTE — NURSING NOTE
Reason For Visit:   Chief Complaint   Patient presents with     Consult     Ingrown toenail, Bilateral great toes. Pt stated that he had a stroke so he cant get down there to work on them.        Pain Assessment  Patient Currently in Pain: Denies     Referring:   RAGHAV VELZAQUEZ ([phone])          Current Outpatient Prescriptions   Medication Sig Dispense Refill     [START ON 4/28/2017] oxyCODONE-acetaminophen (PERCOCET) 5-325 MG per tablet Take 1 tablet by mouth every 6 hours as needed for pain 124 tablet 0     hydrochlorothiazide 12.5 MG TABS tablet Take 1 tablet (12.5 mg) by mouth daily 30 tablet 11     docusate sodium (COLACE) 100 MG capsule Take 1 capsule (100 mg) by mouth 2 times daily 60 capsule 11     atorvastatin (LIPITOR) 40 MG tablet Take 1 tablet (40 mg) by mouth daily 90 tablet 3     aspirin 325 MG EC tablet Take 1 tablet (325 mg) by mouth daily 90 tablet 3     order for DME Equipment being ordered: Walker ()  Treatment Diagnosis: stroke 1 Units 0     ibuprofen (ADVIL,MOTRIN) 600 MG tablet Take 1 tablet (600 mg) by mouth 3 times daily as needed MUST BE SEEN IN CLINIC FOR ANY FURTHER REFILLS 90 tablet 1     sildenafil (VIAGRA) 100 MG tablet Take 1 tablet (100 mg) by mouth daily as needed for erectile dysfunction Take 30 min to 4 hours before intercourse. 6 tablet 11     other medical supplies Dispense knee high, medium compression, medium size 4 each 0     multivitamin, therapeutic with minerals (MULTI-VITAMIN) TABS Take 1 tablet by mouth daily. 100 tablet 3        No Known Allergies

## 2017-04-07 ENCOUNTER — TELEPHONE (OUTPATIENT)
Dept: GASTROENTEROLOGY | Facility: CLINIC | Age: 72
End: 2017-04-07

## 2017-04-07 DIAGNOSIS — Z12.11 ENCOUNTER FOR SCREENING COLONOSCOPY: Primary | ICD-10-CM

## 2017-04-07 NOTE — TELEPHONE ENCOUNTER
Patient scheduled for Colonoscopy    Indication for procedure. Colon cancer screening    Referring Provider. Buck Nascimento MD     ? Not Needed    Arrival time verified? Yes, 1000    Facility location verified? Yes, [address] [facility]    Instructions given regarding prep and procedure    Prep Type Golytely    Are you taking any anticoagulants or blood thinners? Aspirin    Instructions given? Stopped    Electronic implanted devices? No    Pre procedure teaching completed? Yes    Transportation from procedure? Nephew, Nephew will stay with patient after procedure    H&P / Pre op physical completed? N/A

## 2017-04-08 DIAGNOSIS — M54.50 LUMBAGO: ICD-10-CM

## 2017-04-08 RX ORDER — IBUPROFEN 600 MG/1
600 TABLET, FILM COATED ORAL 3 TIMES DAILY PRN
Qty: 90 TABLET | Refills: 10 | Status: SHIPPED | OUTPATIENT
Start: 2017-04-08 | End: 2017-07-05

## 2017-04-08 NOTE — TELEPHONE ENCOUNTER
ibuprofen (ADVIL,MOTRIN) 600 MG tablet  Last Written Prescription Date:  11/14/16  Last Fill Quantity: 90,   # refills: 1  Last Office Visit with G, P or [facility] prescribing provider: 3/16/17  Future Office visit:  None    BP Readings from Last 3 Encounters:   03/06/17 127/77   12/09/16 138/82   12/03/16 144/66     Creatinine   Date Value Ref Range Status   03/06/2017 0.66 0.66 - 1.25 mg/dL Final   ]

## 2017-04-13 DIAGNOSIS — H90.5 SNHL (SENSORINEURAL HEARING LOSS): Primary | ICD-10-CM

## 2017-04-14 ENCOUNTER — HOSPITAL ENCOUNTER (OUTPATIENT)
Facility: CLINIC | Age: 72
Discharge: HOME | End: 2017-04-14
Attending: INTERNAL MEDICINE | Admitting: INTERNAL MEDICINE
Payer: COMMERCIAL

## 2017-04-14 ENCOUNTER — OFFICE VISIT (OUTPATIENT)
Dept: OTOLARYNGOLOGY | Facility: CLINIC | Age: 72
End: 2017-04-14

## 2017-04-14 ENCOUNTER — OFFICE VISIT (OUTPATIENT)
Dept: AUDIOLOGY | Facility: CLINIC | Age: 72
End: 2017-04-14

## 2017-04-14 ENCOUNTER — SURGERY (OUTPATIENT)
Age: 72
End: 2017-04-14

## 2017-04-14 VITALS — BODY MASS INDEX: 42.51 KG/M2 | WEIGHT: 231 LBS | HEIGHT: 62 IN

## 2017-04-14 VITALS
DIASTOLIC BLOOD PRESSURE: 66 MMHG | RESPIRATION RATE: 7 BRPM | OXYGEN SATURATION: 96 % | SYSTOLIC BLOOD PRESSURE: 135 MMHG

## 2017-04-14 DIAGNOSIS — H90.5 SNHL (SENSORINEURAL HEARING LOSS): Primary | ICD-10-CM

## 2017-04-14 DIAGNOSIS — H90.3 SENSORY HEARING LOSS, BILATERAL: Primary | ICD-10-CM

## 2017-04-14 LAB — COLONOSCOPY: NORMAL

## 2017-04-14 PROCEDURE — 45378 DIAGNOSTIC COLONOSCOPY: CPT | Performed by: INTERNAL MEDICINE

## 2017-04-14 PROCEDURE — 88305 TISSUE EXAM BY PATHOLOGIST: CPT | Performed by: INTERNAL MEDICINE

## 2017-04-14 PROCEDURE — 25000125 ZZHC RX 250: Performed by: INTERNAL MEDICINE

## 2017-04-14 PROCEDURE — 45380 COLONOSCOPY AND BIOPSY: CPT | Mod: PT | Performed by: INTERNAL MEDICINE

## 2017-04-14 PROCEDURE — 99153 MOD SED SAME PHYS/QHP EA: CPT | Performed by: INTERNAL MEDICINE

## 2017-04-14 PROCEDURE — G0500 MOD SEDAT ENDO SERVICE >5YRS: HCPCS | Performed by: INTERNAL MEDICINE

## 2017-04-14 PROCEDURE — 25000128 H RX IP 250 OP 636: Performed by: INTERNAL MEDICINE

## 2017-04-14 PROCEDURE — 99152 MOD SED SAME PHYS/QHP 5/>YRS: CPT | Performed by: INTERNAL MEDICINE

## 2017-04-14 RX ORDER — LIDOCAINE 40 MG/G
CREAM TOPICAL
Status: DISCONTINUED | OUTPATIENT
Start: 2017-04-14 | End: 2017-04-14 | Stop reason: HOSPADM

## 2017-04-14 RX ORDER — FENTANYL CITRATE 50 UG/ML
INJECTION, SOLUTION INTRAMUSCULAR; INTRAVENOUS PRN
Status: DISCONTINUED | OUTPATIENT
Start: 2017-04-14 | End: 2017-04-14 | Stop reason: HOSPADM

## 2017-04-14 RX ORDER — ONDANSETRON 2 MG/ML
4 INJECTION INTRAMUSCULAR; INTRAVENOUS
Status: DISCONTINUED | OUTPATIENT
Start: 2017-04-14 | End: 2017-04-14 | Stop reason: HOSPADM

## 2017-04-14 RX ADMIN — MIDAZOLAM HYDROCHLORIDE 2 MG: 1 INJECTION, SOLUTION INTRAMUSCULAR; INTRAVENOUS at 10:36

## 2017-04-14 RX ADMIN — FENTANYL CITRATE 100 MCG: 50 INJECTION, SOLUTION INTRAMUSCULAR; INTRAVENOUS at 10:36

## 2017-04-14 ASSESSMENT — PAIN SCALES - GENERAL: PAINLEVEL: NO PAIN (0)

## 2017-04-14 NOTE — PATIENT INSTRUCTIONS
The patient presents with a history of bilateral sensorineural hearing loss. We have discussed options of hearing amplification. He will be seen again in one year for a repeat Audiogram and Tympanogram.

## 2017-04-14 NOTE — PROGRESS NOTES
AUDIOLOGY REPORT    SUMMARY: Audiology visit completed. See audiogram for results.      RECOMMENDATIONS: Follow-up with ENT.    Sekou Baugh.  Licensed Audiologist  [place] #[id number]

## 2017-04-14 NOTE — OR NURSING
Colonoscopy with polypectomies via biopsy forceps and pt tolerated well with conscious sedation and on 2L nc oxygen.

## 2017-04-14 NOTE — NURSING NOTE
Chief Complaint   Patient presents with     Consult     Bialteral hearing loss     Eb Robertson LPN

## 2017-04-14 NOTE — PROGRESS NOTES
The patient presents with a history of hearing loss.  The patient reports a progressive hearing loss in both ears over at least the past few years.  The patient denies ear infections, otalgia, otorrhea or dizziness, but he reports bilateral tinnitus. The patient denies sinusitis, rhinitis, facial pain, nasal obstruction or purulent nasal discharge. The patient denies chronic or recurrent tonsillitis, chronic or recurrent pharyngitis. The patient's Audiogram and Tympanogram are reviewed with him and they demonstrate bilateral symmetric sensorineural hearing loss that is sloping and moderate to severe. His word recognition scores are 100% bilaterally and his tympanograms are normal.     This patient is seen in consultation at the request of Dr. Buck Nascimento.    All other systems were reviewed and they are either negative or they are not directly pertinent to this Otolaryngology examination.      Past Medical History:    Past Medical History:   Diagnosis Date     Acute rheumatic carditis 1950     Coronary artery disease     murmur     Hip pain, bilateral      Hypercholesteremia      Hypertension      Low back pain      Nonsenile cataract      Obesity      Renal cyst      Stroke (cerebrum) (H) 11/30/16     Umbilical hernia 6/10/2011    s/p surgical repair     Wound, surgical, infected 6/10/2011    hernia       Past Surgical History:    Past Surgical History:   Procedure Laterality Date     ARTHROPLASTY KNEE BILATERAL  7/22/2010     FUSION LUMBAR ANTERIOR TWO LEVELS       HERNIORRHAPHY UMBILICAL  6/10/2011    Procedure:HERNIORRHAPHY UMBILICAL; Open, with mesh placement; Surgeon:HO PARHAM; Location:[facility] OR     LAMINECTOMY LUMBAR TWO LEVELS         Medications:      Current Outpatient Prescriptions:      ibuprofen (ADVIL/MOTRIN) 600 MG tablet, Take 1 tablet (600 mg) by mouth 3 times daily as needed, Disp: 90 tablet, Rfl: 10     [START ON 4/28/2017] oxyCODONE-acetaminophen (PERCOCET) 5-325 MG per tablet, Take 1  tablet by mouth every 6 hours as needed for pain, Disp: 124 tablet, Rfl: 0     hydrochlorothiazide 12.5 MG TABS tablet, Take 1 tablet (12.5 mg) by mouth daily, Disp: 30 tablet, Rfl: 11     docusate sodium (COLACE) 100 MG capsule, Take 1 capsule (100 mg) by mouth 2 times daily, Disp: 60 capsule, Rfl: 11     atorvastatin (LIPITOR) 40 MG tablet, Take 1 tablet (40 mg) by mouth daily, Disp: 90 tablet, Rfl: 3     aspirin 325 MG EC tablet, Take 1 tablet (325 mg) by mouth daily, Disp: 90 tablet, Rfl: 3     order for DME, Equipment being ordered: Walker () Treatment Diagnosis: stroke, Disp: 1 Units, Rfl: 0     sildenafil (VIAGRA) 100 MG tablet, Take 1 tablet (100 mg) by mouth daily as needed for erectile dysfunction Take 30 min to 4 hours before intercourse., Disp: 6 tablet, Rfl: 11     other medical supplies, Dispense knee high, medium compression, medium size, Disp: 4 each, Rfl: 0     multivitamin, therapeutic with minerals (MULTI-VITAMIN) TABS, Take 1 tablet by mouth daily., Disp: 100 tablet, Rfl: 3    Allergies:    Review of patient's allergies indicates no known allergies.    Physical Examination:    The patient is a well developed, well nourished male in no apparent distress.  He is normocephalic, atraumatic with pupils equally round and reactive to light.    Oral Cavity Examination:  Normal mucosa with no masses or lesions  Nasal Examination: Normal mucosa with no masses or lesions  Ear Examination: Ear canals clear, tympanic membranes and middle ear spaces normal  Neurological Examination: Facial nerve function intact and symmetric  Integumentary Examination: No lesions on the skin of the head and neck  Neck Examination: No masses or lesions, no lymphadenopathy  Endocrine Examination: Normal thyroid examination    Assessment and Plan:    The patient presents with a history of bilateral sensorineural hearing loss. We have discussed options of hearing amplification. He will be seen again in one year for a repeat  Audiogram and Tympanogram.     CC: Dr. Buck Nascimento

## 2017-04-14 NOTE — DISCHARGE INSTRUCTIONS
Discharge Instructions after Colonoscopy with polyp removal by Dr Howard    Activity and Diet  You were given medicine for pain. You may be dizzy or sleepy.  For 24 hours:    Do not drive or use heavy equipment.    Do not make important decisions.    Do not drink any alcohol.  You may return to your normal diet and medicines.    Discomfort    Air was placed in your colon during the exam in order to see it. Walking helps to pass the air.    You may take Tylenol (acetaminophen) for pain unless your doctor has told you not to.  Do not take aspirin or ibuprofen (Advil, Motrin, or other anti-inflammatory  Drugs). But may start tomorrow.    Follow-up  __X__ We took small polyps to study. Your doctor will send your information to MY CHART or send you a letter with the results  within two weeks.    When to call:    Call right away if you have:    Unusual pain in belly or chest pain not relieved with passing air.    More than 1 to 2 Tablespoons of bleeding from your rectum.    Fever above 100.6  F (37.5  C).    If you have severe pain, bleeding, or shortness of breath, go to an emergency room.    If you have questions, call:  Monday to Friday, 7 a.m. to 4:30 p.m.  Endoscopy: [phone] (We may have to call you back)    After hours  Hospital: [phone] (Ask for the GI fellow on call)

## 2017-04-14 NOTE — IP AVS SNAPSHOT
MRN:[id number]                      After Visit Summary   4/14/2017    Jonathan Bishop    MRN: [id number]           Thank you!     Thank you for choosing [facility] for your care. Our goal is always to provide you with excellent care. Hearing back from our patients is one way we can continue to improve our services. Please take a few minutes to complete the written survey that you may receive in the mail after you visit with us. Thank you!        Patient Information     Date Of Birth          1945        About your hospital stay     You were admitted on:  April 14, 2017 You last received care in the:  [facility], Endoscopy    You were discharged on:  April 14, 2017       Who to Call     For medical emergencies, please call 911.  For non-urgent questions about your medical care, please call your primary care provider or clinic, [phone]  For questions related to your surgery, please call your surgery clinic        Attending Provider     Provider Toby Lynne MD Gastroenterology       Primary Care Provider Office Phone # Fax #    Buck Nascimento [phone]        [facility] [address] [place] [address] [address]  [facility] [address]        Your next 10 appointments already scheduled     Jul 05, 2017  7:00 AM CDT   (Arrive by 6:45 AM)   RETURN FOOT/ANKLE with Mamadou Gary DPM   [facility] ([facility])    [address]  4th [address]  [facility] [address] [phone]              Further instructions from your care team       Discharge Instructions after Colonoscopy with polyp removal by Dr Howard    Activity and Diet  You were given medicine for pain. You may be dizzy or sleepy.  For 24 hours:    Do not drive or use heavy equipment.    Do not make important decisions.    Do not drink any alcohol.  You may return to your normal diet and medicines.    Discomfort    Air was placed in your colon during  the exam in order to see it. Walking helps to pass the air.    You may take Tylenol (acetaminophen) for pain unless your doctor has told you not to.  Do not take aspirin or ibuprofen (Advil, Motrin, or other anti-inflammatory  Drugs). But may start tomorrow.    Follow-up  __X__ We took small polyps to study. Your doctor will send your information to MY CHART or send you a letter with the results  within two weeks.    When to call:    Call right away if you have:    Unusual pain in belly or chest pain not relieved with passing air.    More than 1 to 2 Tablespoons of bleeding from your rectum.    Fever above 100.6  F (37.5  C).    If you have severe pain, bleeding, or shortness of breath, go to an emergency room.    If you have questions, call:  Monday to Friday, 7 a.m. to 4:30 p.m.  Endoscopy: [phone] (We may have to call you back)    After hours  Hospital: [phone] (Ask for the GI fellow on call)    Pending Results     No orders found from 4/12/2017 to 4/15/2017.            Admission Information     Date & Time Provider Department Dept. Phone    4/14/2017 Toby Bills MD [facility], [place], Endoscopy [phone]      Your Vitals Were     Blood Pressure Respirations Pulse Oximetry             124/57 12 94%         MyChart Information     "[employer] gives you secure access to your electronic health record. If you see a primary care provider, you can also send messages to your care team and make appointments. If you have questions, please call your primary care clinic.  If you do not have a primary care provider, please call [phone] and they will assist you.        Care EveryWhere ID     This is your Care EveryWhere ID. This could be used by other organizations to access your [facility] medical records  [id number]           Review of your medicines      UNREVIEWED medicines. Ask your doctor about these medicines        Dose / Directions    aspirin 325 MG EC tablet   Used for:  Cerebrovascular  accident (CVA) due to occlusion of right anterior cerebral artery (H)        Dose:  325 mg   Take 1 tablet (325 mg) by mouth daily   Quantity:  90 tablet   Refills:  3       atorvastatin 40 MG tablet   Commonly known as:  LIPITOR   Used for:  Cerebrovascular accident (CVA) due to occlusion of right anterior cerebral artery (H)        Dose:  40 mg   Take 1 tablet (40 mg) by mouth daily   Quantity:  90 tablet   Refills:  3       docusate sodium 100 MG capsule   Commonly known as:  COLACE   Used for:  Other constipation        Dose:  100 mg   Take 1 capsule (100 mg) by mouth 2 times daily   Quantity:  60 capsule   Refills:  11       hydrochlorothiazide 12.5 MG Tabs tablet   Used for:  Essential hypertension, benign        Dose:  12.5 mg   Take 1 tablet (12.5 mg) by mouth daily   Quantity:  30 tablet   Refills:  11       ibuprofen 600 MG tablet   Commonly known as:  ADVIL/MOTRIN   Used for:  Lumbago        Dose:  600 mg   Take 1 tablet (600 mg) by mouth 3 times daily as needed   Quantity:  90 tablet   Refills:  10       multivitamin, therapeutic with minerals Tabs tablet   Used for:  Routine general medical examination at a health care facility        Dose:  1 tablet   Take 1 tablet by mouth daily.   Quantity:  100 tablet   Refills:  3       oxyCODONE-acetaminophen 5-325 MG per tablet   Commonly known as:  PERCOCET   Used for:  Acute midline low back pain without sciatica        Dose:  1 tablet   Start taking on:  4/28/2017   Take 1 tablet by mouth every 6 hours as needed for pain   Quantity:  124 tablet   Refills:  0       sildenafil 100 MG cap/tab   Commonly known as:  VIAGRA   Used for:  Erectile dysfunction, unspecified erectile dysfunction type        Dose:  100 mg   Take 1 tablet (100 mg) by mouth daily as needed for erectile dysfunction Take 30 min to 4 hours before intercourse.   Quantity:  6 tablet   Refills:  11         CONTINUE these medicines which have NOT CHANGED        Dose / Directions    order for DME    Used for:  Cerebrovascular accident (CVA) due to occlusion of right anterior cerebral artery (H)        Equipment being ordered: Walker () Treatment Diagnosis: stroke   Quantity:  1 Units   Refills:  0       other medical supplies   Used for:  Peripheral edema        Dispense knee high, medium compression, medium size   Quantity:  4 each   Refills:  0                Protect others around you: Learn how to safely use, store and throw away your medicines at www.disposemymeds.org.             Medication List: This is a list of all your medications and when to take them. Check marks below indicate your daily home schedule. Keep this list as a reference.      Medications           Morning Afternoon Evening Bedtime As Needed    aspirin 325 MG EC tablet   Take 1 tablet (325 mg) by mouth daily                                atorvastatin 40 MG tablet   Commonly known as:  LIPITOR   Take 1 tablet (40 mg) by mouth daily                                docusate sodium 100 MG capsule   Commonly known as:  COLACE   Take 1 capsule (100 mg) by mouth 2 times daily                                hydrochlorothiazide 12.5 MG Tabs tablet   Take 1 tablet (12.5 mg) by mouth daily                                ibuprofen 600 MG tablet   Commonly known as:  ADVIL/MOTRIN   Take 1 tablet (600 mg) by mouth 3 times daily as needed                                multivitamin, therapeutic with minerals Tabs tablet   Take 1 tablet by mouth daily.                                order for DME   Equipment being ordered: Walker () Treatment Diagnosis: stroke                                other medical supplies   Dispense knee high, medium compression, medium size                                oxyCODONE-acetaminophen 5-325 MG per tablet   Commonly known as:  PERCOCET   Take 1 tablet by mouth every 6 hours as needed for pain   Start taking on:  4/28/2017                                sildenafil 100 MG cap/tab   Commonly known as:  VIAGRA    Take 1 tablet (100 mg) by mouth daily as needed for erectile dysfunction Take 30 min to 4 hours before intercourse.

## 2017-04-14 NOTE — IP AVS SNAPSHOT
[facility], [place], Endoscopy    [address] [address]    Phone:  [phone]                                       After Visit Summary   4/14/2017    Jonathan Bishop    MRN: [id number]           After Visit Summary Signature Page     I have received my discharge instructions, and my questions have been answered. I have discussed any challenges I see with this plan with the nurse or doctor.    ..........................................................................................................................................  Patient/Patient Representative Signature      ..........................................................................................................................................  Patient Representative Print Name and Relationship to Patient    ..................................................               ................................................  Date                                            Time    ..........................................................................................................................................  Reviewed by Signature/Title    ...................................................              ..............................................  Date                                                            Time

## 2017-04-14 NOTE — MR AVS SNAPSHOT
After Visit Summary   4/14/2017    Jonathan Bishop    MRN: [id number]           Patient Information     Date Of Birth          1945        Visit Information        Provider Department      4/14/2017 9:00 AM Tristen Reed MD [facility]        Today's Diagnoses     SNHL (sensorineural hearing loss)    -  1      Care Instructions    The patient presents with a history of bilateral sensorineural hearing loss. We have discussed options of hearing amplification. He will be seen again in one year for a repeat Audiogram and Tympanogram.         Follow-ups after your visit        Your next 10 appointments already scheduled     Apr 14, 2017   Procedure with Toby Irvin MD   [facility], [place], Endoscopy ([facility])    [address] [id number] [phone]           The [facility] is located on the corner of [address] and [address] on the [address]. It is easily accessible from virtually any point in the [place] area, via [address] and [address].            Jul 05, 2017  7:00 AM CDT   (Arrive by 6:45 AM)   RETURN FOOT/ANKLE with Mamadou Gary DPM   [facility] ([facility])    [address]  [address] [facility] [address] [phone]              Who to contact     Please call your clinic at [phone] to:    Ask questions about your health    Make or cancel appointments    Discuss your medicines    Learn about your test results    Speak to your doctor   If you have compliments or concerns about an experience at your clinic, or if you wish to file a complaint, please contact [facility] Patient Relations at [phone] or email us at [email or web address]         Additional Information About Your Visit        MyChart Information     [employer] gives you secure access  "to your electronic health record. If you see a primary care provider, you can also send messages to your care team and make appointments. If you have questions, please call your primary care clinic.  If you do not have a primary care provider, please call [phone] and they will assist you.      [employer] is an electronic gateway that provides easy, online access to your medical records. With [employer], you can request a clinic appointment, read your test results, renew a prescription or communicate with your care team.     To access your existing account, please contact your [facility] or call [phone] for assistance.        Care EveryWhere ID     This is your Care EveryWhere ID. This could be used by other organizations to access your [facility] medical records  [id number]        Your Vitals Were     Height BMI (Body Mass Index)                1.58 m (5' 2.21\") 41.97 kg/m2           Blood Pressure from Last 3 Encounters:   03/06/17 127/77   12/09/16 138/82   12/03/16 144/66    Weight from Last 3 Encounters:   04/14/17 104.8 kg (231 lb)   12/09/16 104.8 kg (231 lb 1.6 oz)   11/30/16 104.3 kg (230 lb)              We Performed the Following     AUDIO REVIEW/CONSULT        Primary Care Provider Office Phone # Fax #    Buck Nascimento [phone]        [facility] [address]  [facility] [address]        Thank you!     Thank you for choosing [facility]  for your care. Our goal is always to provide you with excellent care. Hearing back from our patients is one way we can continue to improve our services. Please take a few minutes to complete the written survey that you may receive in the mail after your visit with us. Thank you!             Your Updated Medication List - Protect others around you: Learn how to safely use, store and throw away your medicines at www.disposemymeds.org.          This list is accurate as of: 4/14/17  9:09 AM.  " Always use your most recent med list.                   Brand Name Dispense Instructions for use    aspirin 325 MG EC tablet     90 tablet    Take 1 tablet (325 mg) by mouth daily       atorvastatin 40 MG tablet    LIPITOR    90 tablet    Take 1 tablet (40 mg) by mouth daily       docusate sodium 100 MG capsule    COLACE    60 capsule    Take 1 capsule (100 mg) by mouth 2 times daily       hydrochlorothiazide 12.5 MG Tabs tablet     30 tablet    Take 1 tablet (12.5 mg) by mouth daily       ibuprofen 600 MG tablet    ADVIL/MOTRIN    90 tablet    Take 1 tablet (600 mg) by mouth 3 times daily as needed       multivitamin, therapeutic with minerals Tabs tablet     100 tablet    Take 1 tablet by mouth daily.       order for DME     1 Units    Equipment being ordered: Walker () Treatment Diagnosis: stroke       other medical supplies     4 each    Dispense knee high, medium compression, medium size       oxyCODONE-acetaminophen 5-325 MG per tablet   Start taking on:  4/28/2017    PERCOCET    124 tablet    Take 1 tablet by mouth every 6 hours as needed for pain       sildenafil 100 MG cap/tab    VIAGRA    6 tablet    Take 1 tablet (100 mg) by mouth daily as needed for erectile dysfunction Take 30 min to 4 hours before intercourse.

## 2017-04-14 NOTE — MR AVS SNAPSHOT
After Visit Summary   4/14/2017    Jonathan Bishop    MRN: [id number]           Patient Information     Date Of Birth          1945        Visit Information        Provider Department      4/14/2017 8:00 AM Madonna Bauer AuD [facility]        Today's Diagnoses     Sensory hearing loss, bilateral    -  1       Follow-ups after your visit        Your next 10 appointments already scheduled     Apr 14, 2017  9:00 AM CDT   (Arrive by 8:45 AM)   New Patient Visit with Tristen Reed MD   [facility] ([facility] [place])    [address] [place] [id number] [phone]            Apr 14, 2017   Procedure with Toby Irvin MD   [facility] [place], Endoscopy ([facility], [facility])    [address] [id number] [phone]           The [facility] is located on the corner of [address] and [address] on the [facility]. It is easily accessible from virtually any point in the [place] area, via [address] and [address].            Jul 05, 2017  7:00 AM CDT   (Arrive by 6:45 AM)   RETURN FOOT/ANKLE with Mamadou Gary DPM   [facility] ([facility] [place])    [address] [place] [address] [phone]              Who to contact     Please call your clinic at [phone] to:    Ask questions about your health    Make or cancel appointments    Discuss your medicines    Learn about your test results    Speak to your doctor   If you have compliments or concerns about an experience at your clinic, or if you wish to file a complaint, please contact [facility] Patient Relations at [phone] or email us at [email or web address]         Additional Information About Your Visit        MyChart Information     [employer] gives you secure  access to your electronic health record. If you see a primary care provider, you can also send messages to your care team and make appointments. If you have questions, please call your primary care clinic.  If you do not have a primary care provider, please call [phone] and they will assist you.      [email or web address] is an electronic gateway that provides easy, online access to your medical records. With [email or web address], you can request a clinic appointment, read your test results, renew a prescription or communicate with your care team.     To access your existing account, please contact your [facility] or call [phone] for assistance.        Care EveryWhere ID     This is your Care EveryWhere ID. This could be used by other organizations to access your [place] medical records  [id number]         Blood Pressure from Last 3 Encounters:   03/06/17 127/77   12/09/16 138/82   12/03/16 144/66    Weight from Last 3 Encounters:   12/09/16 104.8 kg (231 lb 1.6 oz)   11/30/16 104.3 kg (230 lb)   07/15/16 110.5 kg (243 lb 8 oz)              We Performed the Following     [facility] Audiometry Thrshld Eval & Speech Recog ([address])     Caron   ([id number])     Tymps / Reflex   ([id number])        Primary Care Provider Office Phone # Fax #    Buck Nascimento [phone]        PHYSICIANS [address] [place] [address] 741  [facility] [address]        Thank you!     Thank you for choosing [facility]  for your care. Our goal is always to provide you with excellent care. Hearing back from our patients is one way we can continue to improve our services. Please take a few minutes to complete the written survey that you may receive in the mail after your visit with us. Thank you!             Your Updated Medication List - Protect others around you: Learn how to safely use, store and throw away your medicines at www.disposemymeds.org.          This list is accurate as of: 4/14/17  8:42 AM.  Always use your  most recent med list.                   Brand Name Dispense Instructions for use    aspirin 325 MG EC tablet     90 tablet    Take 1 tablet (325 mg) by mouth daily       atorvastatin 40 MG tablet    LIPITOR    90 tablet    Take 1 tablet (40 mg) by mouth daily       docusate sodium 100 MG capsule    COLACE    60 capsule    Take 1 capsule (100 mg) by mouth 2 times daily       hydrochlorothiazide 12.5 MG Tabs tablet     30 tablet    Take 1 tablet (12.5 mg) by mouth daily       ibuprofen 600 MG tablet    ADVIL/MOTRIN    90 tablet    Take 1 tablet (600 mg) by mouth 3 times daily as needed       multivitamin, therapeutic with minerals Tabs tablet     100 tablet    Take 1 tablet by mouth daily.       order for DME     1 Units    Equipment being ordered: Walker () Treatment Diagnosis: stroke       other medical supplies     4 each    Dispense knee high, medium compression, medium size       oxyCODONE-acetaminophen 5-325 MG per tablet   Start taking on:  4/28/2017    PERCOCET    124 tablet    Take 1 tablet by mouth every 6 hours as needed for pain       sildenafil 100 MG cap/tab    VIAGRA    6 tablet    Take 1 tablet (100 mg) by mouth daily as needed for erectile dysfunction Take 30 min to 4 hours before intercourse.

## 2017-04-14 NOTE — LETTER
4/14/2017       RE: Jonathan Bishop  [address]  [place] [address]     Dear Colleague,    Thank you for referring your patient, Jonathan Bishop, to the [facility] at [facility]. Please see a copy of my visit note below.    The patient presents with a history of hearing loss.  The patient reports a progressive hearing loss in both ears over at least the past few years.  The patient denies ear infections, otalgia, otorrhea or dizziness, but he reports bilateral tinnitus. The patient denies sinusitis, rhinitis, facial pain, nasal obstruction or purulent nasal discharge. The patient denies chronic or recurrent tonsillitis, chronic or recurrent pharyngitis. The patient's Audiogram and Tympanogram are reviewed with him and they demonstrate bilateral symmetric sensorineural hearing loss that is sloping and moderate to severe. His word recognition scores are 100% bilaterally and his tympanograms are normal.     This patient is seen in consultation at the request of Dr. Buck Nascimento.    All other systems were reviewed and they are either negative or they are not directly pertinent to this Otolaryngology examination.      Past Medical History:    Past Medical History:   Diagnosis Date     Acute rheumatic carditis 1950     Coronary artery disease     murmur     Hip pain, bilateral      Hypercholesteremia      Hypertension      Low back pain      Nonsenile cataract      Obesity      Renal cyst      Stroke (cerebrum) (H) 11/30/16     Umbilical hernia 6/10/2011    s/p surgical repair     Wound, surgical, infected 6/10/2011    hernia       Past Surgical History:    Past Surgical History:   Procedure Laterality Date     ARTHROPLASTY KNEE BILATERAL  7/22/2010     FUSION LUMBAR ANTERIOR TWO LEVELS       HERNIORRHAPHY UMBILICAL  6/10/2011    Procedure:HERNIORRHAPHY UMBILICAL; Open, with mesh placement; Surgeon:HO PARHAM; Location:[facility] OR     LAMINECTOMY  LUMBAR TWO LEVELS         Medications:      Current Outpatient Prescriptions:      ibuprofen (ADVIL/MOTRIN) 600 MG tablet, Take 1 tablet (600 mg) by mouth 3 times daily as needed, Disp: 90 tablet, Rfl: 10     [START ON 4/28/2017] oxyCODONE-acetaminophen (PERCOCET) 5-325 MG per tablet, Take 1 tablet by mouth every 6 hours as needed for pain, Disp: 124 tablet, Rfl: 0     hydrochlorothiazide 12.5 MG TABS tablet, Take 1 tablet (12.5 mg) by mouth daily, Disp: 30 tablet, Rfl: 11     docusate sodium (COLACE) 100 MG capsule, Take 1 capsule (100 mg) by mouth 2 times daily, Disp: 60 capsule, Rfl: 11     atorvastatin (LIPITOR) 40 MG tablet, Take 1 tablet (40 mg) by mouth daily, Disp: 90 tablet, Rfl: 3     aspirin 325 MG EC tablet, Take 1 tablet (325 mg) by mouth daily, Disp: 90 tablet, Rfl: 3     order for DME, Equipment being ordered: Walker () Treatment Diagnosis: stroke, Disp: 1 Units, Rfl: 0     sildenafil (VIAGRA) 100 MG tablet, Take 1 tablet (100 mg) by mouth daily as needed for erectile dysfunction Take 30 min to 4 hours before intercourse., Disp: 6 tablet, Rfl: 11     other medical supplies, Dispense knee high, medium compression, medium size, Disp: 4 each, Rfl: 0     multivitamin, therapeutic with minerals (MULTI-VITAMIN) TABS, Take 1 tablet by mouth daily., Disp: 100 tablet, Rfl: 3    Allergies:    Review of patient's allergies indicates no known allergies.    Physical Examination:    The patient is a well developed, well nourished male in no apparent distress.  He is normocephalic, atraumatic with pupils equally round and reactive to light.    Oral Cavity Examination:  Normal mucosa with no masses or lesions  Nasal Examination: Normal mucosa with no masses or lesions  Ear Examination: Ear canals clear, tympanic membranes and middle ear spaces normal  Neurological Examination: Facial nerve function intact and symmetric  Integumentary Examination: No lesions on the skin of the head and neck  Neck Examination: No  masses or lesions, no lymphadenopathy  Endocrine Examination: Normal thyroid examination    Assessment and Plan:    The patient presents with a history of bilateral sensorineural hearing loss. We have discussed options of hearing amplification. He will be seen again in one year for a repeat Audiogram and Tympanogram.     CC: Dr. Buck Nascimento    Again, thank you for allowing me to participate in the care of your patient.      Sincerely,    Tristen Reed MD

## 2017-04-18 LAB — COPATH REPORT: NORMAL

## 2017-04-27 DIAGNOSIS — M54.50 ACUTE MIDLINE LOW BACK PAIN WITHOUT SCIATICA: ICD-10-CM

## 2017-04-27 RX ORDER — OXYCODONE AND ACETAMINOPHEN 5; 325 MG/1; MG/1
1 TABLET ORAL EVERY 6 HOURS PRN
Qty: 124 TABLET | Refills: 0 | Status: SHIPPED | OUTPATIENT
Start: 2017-05-28 | End: 2017-05-31

## 2017-04-27 NOTE — TELEPHONE ENCOUNTER
Reason For Call:   Chief Complaint   Patient presents with     Refill Request     percocoet       Medication Name, Dose and Monthly Quantity:   Oxycodone with APAP (Percocet): Dose 5-325 Schedule 1 tablet by mouth every 6 hours prn Monthly Quantity 124   Diagnosis requiring opiates:   Acute midline low back pain without sciatica [M54.5]  - Primary     Problem List Updated:   No    Opioid Agreement On File - [facility] PAIN CONTRACT ID# [id number]:  No    Last Urine Drug Screen (at least once every 12 months) Date:   n/a  Unexpected Results:   n/a    [place]  Data Reviewed (at least once every 3 months) Date:   target organ damage     Unexpected Results:    No.    Last Fill Date:   4/28/17    Due Date:   5/28/17    Last Visit with PCP:   3/6/17    Future Visits with PCP:   No.    Processing:   Mail to patient.    Jessica Hutchison LPN

## 2017-05-15 DIAGNOSIS — I10 ESSENTIAL HYPERTENSION, BENIGN: ICD-10-CM

## 2017-05-15 DIAGNOSIS — M54.50 LUMBAGO: ICD-10-CM

## 2017-05-15 DIAGNOSIS — R60.0 EDEMA, PERIPHERAL: ICD-10-CM

## 2017-05-17 RX ORDER — HYDROCHLOROTHIAZIDE 25 MG/1
TABLET ORAL
Status: SHIPPED
Start: 2017-05-17 | End: 2017-07-05

## 2017-05-17 RX ORDER — IBUPROFEN 600 MG/1
600 TABLET, FILM COATED ORAL 3 TIMES DAILY PRN
Qty: 180 TABLET | Refills: 2 | Status: SHIPPED | OUTPATIENT
Start: 2017-05-17 | End: 2018-02-05

## 2017-05-31 DIAGNOSIS — M54.50 ACUTE MIDLINE LOW BACK PAIN WITHOUT SCIATICA: ICD-10-CM

## 2017-05-31 NOTE — TELEPHONE ENCOUNTER
Reason For Call:   Chief Complaint   Patient presents with     Refill Request     percocoet       Medication Name, Dose and Monthly Quantity:   Oxycodone with APAP (Percocet): Dose 5-325 Schedule 1 tablet by mouth every 6 hours prn Monthly Quantity 124   Diagnosis requiring opiates:   Acute midline low back pain without sciatica [M54.5]  - Primary     Problem List Updated:   No    Opioid Agreement On File - [facility] PAIN CONTRACT ID# [id number]:  No    Last Urine Drug Screen (at least once every 12 months) Date:   n/a  Unexpected Results:   n/a    [place]  Data Reviewed (at least once every 3 months) Date:   target organ damage     Unexpected Results:    No.    Last Fill Date:   5/28/17  Due Date:   6/27/17    Last Visit with PCP:   3/6/17    Future Visits with PCP:   No.    Processing:   Mail to patient.    Jessica Hutchison LPN

## 2017-06-05 RX ORDER — OXYCODONE AND ACETAMINOPHEN 5; 325 MG/1; MG/1
1 TABLET ORAL EVERY 6 HOURS PRN
Qty: 124 TABLET | Refills: 0 | Status: SHIPPED | OUTPATIENT
Start: 2017-06-27 | End: 2017-06-30

## 2017-06-30 DIAGNOSIS — M54.50 ACUTE MIDLINE LOW BACK PAIN WITHOUT SCIATICA: ICD-10-CM

## 2017-06-30 NOTE — TELEPHONE ENCOUNTER
Reason For Call:   Chief Complaint   Patient presents with     Refill Request     oxycodone       Medication Name, Dose and Monthly Quantity:   Oxycodone with APAP (Percocet): Dose 5-325 Schedule 1 tablet by mouth every 6 hours prn Monthly Quantity 124   Diagnosis requiring opiates:   Acute midline low back pain without sciatica [M54.5]  - Primary     Problem List Updated:   No    Opioid Agreement On File - [facility] PAIN CONTRACT ID# [id number]:  No    Last Urine Drug Screen (at least once every 12 months) Date:   n/a  Unexpected Results:   n/a    [place]  Data Reviewed (at least once every 3 months) Date:   target organ damage     Unexpected Results:    No.    Last Fill Date:   6/27/17  Due Date:   7/27/17    Last Visit with PCP:   3/6/17    Future Visits with PCP:   No.    Processing:   Mail to patient.    Jessica Hutchison LPN

## 2017-07-01 RX ORDER — OXYCODONE AND ACETAMINOPHEN 5; 325 MG/1; MG/1
1 TABLET ORAL EVERY 6 HOURS PRN
Qty: 124 TABLET | Refills: 0 | Status: SHIPPED | OUTPATIENT
Start: 2017-07-27 | End: 2017-08-03

## 2017-07-05 ENCOUNTER — OFFICE VISIT (OUTPATIENT)
Dept: ORTHOPEDICS | Facility: CLINIC | Age: 72
End: 2017-07-05

## 2017-07-05 DIAGNOSIS — I73.9 PVD (PERIPHERAL VASCULAR DISEASE) (H): ICD-10-CM

## 2017-07-05 DIAGNOSIS — B35.1 DERMATOPHYTOSIS OF NAIL: Primary | ICD-10-CM

## 2017-07-05 NOTE — LETTER
7/5/2017       RE: Jonathan Bishop  [address]  [place] [address]     Dear Colleague,    Thank you for referring your patient, Jonathan Bishop, to the [facility] at [facility]. Please see a copy of my visit note below.    Chief Complaint:   Chief Complaint   Patient presents with     RECHECK     3 month follow up. Pt stated that he had swelling in his feet but it has moved into his ankles. Pt stated he feels good.        No Known Allergies    Subjective: Jonathan is a 71 year old male who presents to the clinic today for a follow up of elongated toenails and LE swelling. Jonathan is doing well, except is still concerned about the amount of swelling in his feet, ankles and lower legs. He notes that the right is usually worse than the left. He tries to elevate his legs when possible, which helps moderately. He tried using the ACE wraps given to him at last visit, but they were difficult to apply to the right compression. Wondering about strategies for compression stocking application. Would like his nails trimmed today. No other concerns. Denies skin concerns, foot pain, burning, tingling or numbness, and open sores.    Objective  PT and DP pulses are non-palpable bilaterally. CRT is >3 seconds. Diminished pedal hair. Moderate non-pitting edema noted to BL LE with R slightly greater than L.  Gross sensation is slightly decreased bilaterally.   Nails are thickened, discolored, dystrophic and elongated bilaterall y to varying degrees. Nails have subungual debris consistent with onychomycosis. No open lesions are noted. Skin is normal.     Assessment: PVD  Onychomycosis x 10     Plan:  - Pt seen and evaluated  - Nails were debrided x 10  - Suggested patient try applying compression stockings first thing in the morning before getting out of bed in a sequential manner (forefoot, heel, ankle, calf). He agreed to this plan and will attempt.   - Discussed  consult to vascular surgery and further testing if patient would like to pursue this route of therapy. He would like to hold off at this time.  - See again in 3 months.           Again, thank you for allowing me to participate in the care of your patient.      Sincerely,    Mamadou Gary DPM

## 2017-07-05 NOTE — LETTER
7/5/2017      RE: Jonathan Washington  [address]  [place] [address]       Chief Complaint:   Chief Complaint   Patient presents with     RECHECK     3 month follow up. Pt stated that he had swelling in his feet but it has moved into his ankles. Pt stated he feels good.        No Known Allergies    Subjective: Jonathan is a 71 year old male who presents to the clinic today for a follow up of elongated toenails and LE swelling. Jonathan is doing well, except is still concerned about the amount of swelling in his feet, ankles and lower legs. He notes that the right is usually worse than the left. He tries to elevate his legs when possible, which helps moderately. He tried using the ACE wraps given to him at last visit, but they were difficult to apply to the right compression. Wondering about strategies for compression stocking application. Would like his nails trimmed today. No other concerns. Denies skin concerns, foot pain, burning, tingling or numbness, and open sores.    Objective  PT and DP pulses are non-palpable bilaterally. CRT is >3 seconds. Diminished pedal hair. Moderate non-pitting edema noted to BL LE with R slightly greater than L.  Gross sensation is slightly decreased bilaterally.   Nails are thickened, discolored, dystrophic and elongated bilaterall y to varying degrees. Nails have subungual debris consistent with onychomycosis. No open lesions are noted. Skin is normal.     Assessment: PVD  Onychomycosis x 10     Plan:  - Pt seen and evaluated  - Nails were debrided x 10  - Suggested patient try applying compression stockings first thing in the morning before getting out of bed in a sequential manner (forefoot, heel, ankle, calf). He agreed to this plan and will attempt.   - Discussed consult to vascular surgery and further testing if patient would like to pursue this route of therapy. He would like to hold off at this time.  - See again in 3 months.           Mamadou Gary, ANASTASIYA

## 2017-07-05 NOTE — MR AVS SNAPSHOT
After Visit Summary   7/5/2017    Jonathan Bishop    MRN: [id number]           Patient Information     Date Of Birth          1945        Visit Information        Provider Department      7/5/2017 7:00 AM Mamadou Gary DPM [facility]        Today's Diagnoses     Dermatophytosis of nail    -  1    PVD (peripheral vascular disease) (H)           Follow-ups after your visit        Your next 10 appointments already scheduled     Oct 13, 2017  8:00 AM CDT   (Arrive by 7:45 AM)   RETURN FOOT/ANKLE with Mamadou Gary DPM   [facility] ([facility] [place])    [address] [facility] [address]  [address] [facility] [address] [phone]              Who to contact     Please call your clinic at [phone] to:    Ask questions about your health    Make or cancel appointments    Discuss your medicines    Learn about your test results    Speak to your doctor   If you have compliments or concerns about an experience at your clinic, or if you wish to file a complaint, please contact [facility] Patient Relations at [phone] or email us at [email or web address]         Additional Information About Your Visit        MyChart Information     [employer] gives you secure access to your electronic health record. If you see a primary care provider, you can also send messages to your care team and make appointments. If you have questions, please call your primary care clinic.  If you do not have a primary care provider, please call [phone] and they will assist you.      [employer] is an electronic gateway that provides easy, online access to your medical records. With [employer], you can request a clinic appointment, read your test results, renew a prescription or communicate with your care team.     To access your existing account, please contact your [facility] or call [phone]  for assistance.        Care EveryWhere ID     This is your Care EveryWhere ID. This could be used by other organizations to access your [place] medical records  [id number]         Blood Pressure from Last 3 Encounters:   04/14/17 135/66   03/06/17 127/77   12/09/16 138/82    Weight from Last 3 Encounters:   04/14/17 104.8 kg (231 lb)   12/09/16 104.8 kg (231 lb 1.6 oz)   11/30/16 104.3 kg (230 lb)              We Performed the Following     DEBRIDEMENT OF NAILS, 6 OR MORE        Primary Care Provider Office Phone # Fax #    Buck Nascimento [phone]        PHYSICIANS [address] [place] [address] 741  [facility] [address]        Equal Access to Services     TIP GALLEGOS : Hadii florencia tuttle hadasho Somarlee, waaxda luqadaha, qaybta kaalmada adeegyada, hans zapata . So [facility] [phone].    ATENCIÓN: Si habla español, tiene a aguilar disposición servicios gratuitos de asistencia lingüística. LlSt. [facility] [phone].    We comply with applicable federal civil rights laws and [place] laws. We do not discriminate on the basis of race, color, national origin, age, disability sex, sexual orientation or gender identity.            Thank you!     Thank you for choosing [facility]  for your care. Our goal is always to provide you with excellent care. Hearing back from our patients is one way we can continue to improve our services. Please take a few minutes to complete the written survey that you may receive in the mail after your visit with us. Thank you!             Your Updated Medication List - Protect others around you: Learn how to safely use, store and throw away your medicines at www.disposemymeds.org.          This list is accurate as of: 7/5/17  7:41 AM.  Always use your most recent med list.                   Brand Name Dispense Instructions for use Diagnosis    aspirin 325 MG EC tablet     90 tablet    Take 1 tablet (325 mg) by mouth daily    Cerebrovascular accident  (CVA) due to occlusion of right anterior cerebral artery (H)       atorvastatin 40 MG tablet    LIPITOR    90 tablet    Take 1 tablet (40 mg) by mouth daily    Cerebrovascular accident (CVA) due to occlusion of right anterior cerebral artery (H)       hydrochlorothiazide 12.5 MG Tabs tablet     30 tablet    Take 1 tablet (12.5 mg) by mouth daily    Essential hypertension, benign       ibuprofen 600 MG tablet    ADVIL/MOTRIN    180 tablet    Take 1 tablet (600 mg) by mouth 3 times daily as needed    Lumbago       multivitamin, therapeutic with minerals Tabs tablet     100 tablet    Take 1 tablet by mouth daily.    Routine general medical examination at a health care facility       order for DME     1 Units    Equipment being ordered: Walker () Treatment Diagnosis: stroke    Cerebrovascular accident (CVA) due to occlusion of right anterior cerebral artery (H)       other medical supplies     4 each    Dispense knee high, medium compression, medium size    Peripheral edema       oxyCODONE-acetaminophen 5-325 MG per tablet   Start taking on:  7/27/2017    PERCOCET    124 tablet    Take 1 tablet by mouth every 6 hours as needed for pain    Acute midline low back pain without sciatica       sildenafil 100 MG cap/tab    VIAGRA    6 tablet    Take 1 tablet (100 mg) by mouth daily as needed for erectile dysfunction Take 30 min to 4 hours before intercourse.    Erectile dysfunction, unspecified erectile dysfunction type

## 2017-07-05 NOTE — NURSING NOTE
Reason For Visit:   Chief Complaint   Patient presents with     RECHECK     3 month follow up. Pt stated that he had swelling in his feet but it has moved into his ankles. Pt stated he feels good.       Pain Assessment  Patient Currently in Pain: Yes  0-10 Pain Scale:  (3.75)  Primary Pain Location: Back  Pain Orientation: Lower  Other Pain Locations: Pt stated that he sometimes has pain in his heels.            Current Outpatient Prescriptions   Medication Sig Dispense Refill     [START ON 7/27/2017] oxyCODONE-acetaminophen (PERCOCET) 5-325 MG per tablet Take 1 tablet by mouth every 6 hours as needed for pain 124 tablet 0     ibuprofen (ADVIL/MOTRIN) 600 MG tablet Take 1 tablet (600 mg) by mouth 3 times daily as needed 180 tablet 2     hydrochlorothiazide 12.5 MG TABS tablet Take 1 tablet (12.5 mg) by mouth daily 30 tablet 11     atorvastatin (LIPITOR) 40 MG tablet Take 1 tablet (40 mg) by mouth daily 90 tablet 3     aspirin 325 MG EC tablet Take 1 tablet (325 mg) by mouth daily 90 tablet 3     order for DME Equipment being ordered: Walker ()  Treatment Diagnosis: stroke 1 Units 0     sildenafil (VIAGRA) 100 MG tablet Take 1 tablet (100 mg) by mouth daily as needed for erectile dysfunction Take 30 min to 4 hours before intercourse. 6 tablet 11     multivitamin, therapeutic with minerals (MULTI-VITAMIN) TABS Take 1 tablet by mouth daily. 100 tablet 3     [DISCONTINUED] hydrochlorothiazide (HYDRODIURIL) 25 MG tablet TAKE ONE TABLET BY MOUTH ONCE DAILY IN THE MORNING       other medical supplies Dispense knee high, medium compression, medium size (Patient not taking: Reported on 7/5/2017) 4 each 0        No Known Allergies

## 2017-07-05 NOTE — PROGRESS NOTES
Chief Complaint:   Chief Complaint   Patient presents with     RECHECK     3 month follow up. Pt stated that he had swelling in his feet but it has moved into his ankles. Pt stated he feels good.        No Known Allergies    Subjective: Jonathan is a 71 year old male who presents to the clinic today for a follow up of elongated toenails and LE swelling. Jonathan is doing well, except is still concerned about the amount of swelling in his feet, ankles and lower legs. He notes that the right is usually worse than the left. He tries to elevate his legs when possible, which helps moderately. He tried using the ACE wraps given to him at last visit, but they were difficult to apply to the right compression. Wondering about strategies for compression stocking application. Would like his nails trimmed today. No other concerns. Denies skin concerns, foot pain, burning, tingling or numbness, and open sores.    Objective  PT and DP pulses are non-palpable bilaterally. CRT is >3 seconds. Diminished pedal hair. Moderate non-pitting edema noted to BL LE with R slightly greater than L.  Gross sensation is slightly decreased bilaterally.   Nails are thickened, discolored, dystrophic and elongated bilaterally to varying degrees. Nails have subungual debris consistent with onychomycosis. No open lesions are noted. Skin is normal.     Assessment: PVD  Onychomycosis x 10     Plan:  - Pt seen and evaluated  - Nails were debrided x 10  - Suggested patient try applying compression stockings first thing in the morning before getting out of bed in a sequential manner (forefoot, heel, ankle, calf). He agreed to this plan and will attempt.   - Discussed consult to vascular surgery and further testing if patient would like to pursue this route of therapy. He would like to hold off at this time.  - See again in 3 months.

## 2017-08-03 DIAGNOSIS — M54.50 ACUTE MIDLINE LOW BACK PAIN WITHOUT SCIATICA: ICD-10-CM

## 2017-08-03 NOTE — TELEPHONE ENCOUNTER
Reason For Call:   Chief Complaint   Patient presents with     Refill Request     percocet       Medication Name, Dose and Monthly Quantity:   Oxycodone with APAP (Percocet): Dose 5-325 Schedule 1 tablet by mouth every 6 hours prn Monthly Quantity 124   Diagnosis requiring opiates:   Acute midline low back pain without sciatica [M54.5]  - Primary     Problem List Updated:   No    Opioid Agreement On File - [facility] PAIN CONTRACT ID# [id number]:  No    Last Urine Drug Screen (at least once every 12 months) Date:   n/a  Unexpected Results:   n/a    [place]  Data Reviewed (at least once every 3 months) Date:   target organ damage     Unexpected Results:    No.    Last Fill Date:   7/27/17  Due Date:   8/26/17    Last Visit with PCP:   3/6/17    Future Visits with PCP:   No.    Processing:   Mail to patient.    Jessica Hutchison LPN

## 2017-08-04 RX ORDER — OXYCODONE AND ACETAMINOPHEN 5; 325 MG/1; MG/1
1 TABLET ORAL EVERY 6 HOURS PRN
Qty: 124 TABLET | Refills: 0 | Status: SHIPPED | OUTPATIENT
Start: 2017-08-26 | End: 2017-08-30

## 2017-08-30 DIAGNOSIS — M54.50 ACUTE MIDLINE LOW BACK PAIN WITHOUT SCIATICA: ICD-10-CM

## 2017-08-30 NOTE — TELEPHONE ENCOUNTER
Reason For Call:   Chief Complaint   Patient presents with     Refill Request     percocet       Medication Name, Dose and Monthly Quantity:   Oxycodone with APAP (Percocet): Dose 5-325 Schedule 1 tablet by mouth every 6 hours prn Monthly Quantity 124   Diagnosis requiring opiates:   Acute midline low back pain without sciatica [M54.5]  - Primary     Problem List Updated:   No    Opioid Agreement On File - [facility] PAIN CONTRACT ID# [id number]:  No    Last Urine Drug Screen (at least once every 12 months) Date:   n/a  Unexpected Results:   n/a    [place]  Data Reviewed (at least once every 3 months) Date:   8/30/2017     Unexpected Results:    No.    Last Fill Date:   8/26/17  Due Date:   9/26/17    Last Visit with PCP:   3/6/17    Future Visits with PCP:   No.    Processing:   Mail to patient.    Jessica Hutchison LPN

## 2017-09-06 RX ORDER — OXYCODONE AND ACETAMINOPHEN 5; 325 MG/1; MG/1
1 TABLET ORAL EVERY 6 HOURS PRN
Qty: 124 TABLET | Refills: 0 | Status: SHIPPED | OUTPATIENT
Start: 2017-09-25 | End: 2017-09-27

## 2017-09-27 DIAGNOSIS — M54.50 ACUTE MIDLINE LOW BACK PAIN WITHOUT SCIATICA: ICD-10-CM

## 2017-09-27 NOTE — TELEPHONE ENCOUNTER
Reason For Call:   Chief Complaint   Patient presents with     Refill Request     percocet       Medication Name, Dose and Monthly Quantity:   Oxycodone with APAP (Percocet): Dose 5-325 Schedule 1 tablet by mouth every 6 hours prn Monthly Quantity 124   Diagnosis requiring opiates:   Acute midline low back pain without sciatica [M54.5]  - Primary     Problem List Updated:   No    Opioid Agreement On File - [facility] PAIN CONTRACT ID# [id number]:  No    Last Urine Drug Screen (at least once every 12 months) Date:   n/a  Unexpected Results:   n/a    [place]  Data Reviewed (at least once every 3 months) Date:   8/30/2017     Unexpected Results:    No.    Last Fill Date:   9/26/17  Due Date:   10/26/17    Last Visit with PCP:   3/6/17    Future Visits with PCP:   No.    Processing:   Mail to patient.    Jessica Hutchison LPN

## 2017-09-28 RX ORDER — OXYCODONE AND ACETAMINOPHEN 5; 325 MG/1; MG/1
1 TABLET ORAL EVERY 6 HOURS PRN
Qty: 124 TABLET | Refills: 0 | Status: SHIPPED | OUTPATIENT
Start: 2017-10-25 | End: 2017-10-19

## 2017-10-19 DIAGNOSIS — M54.50 ACUTE MIDLINE LOW BACK PAIN WITHOUT SCIATICA: ICD-10-CM

## 2017-10-19 NOTE — TELEPHONE ENCOUNTER
Reason For Call:   Chief Complaint   Patient presents with     Refill Request     oxycodone       Medication Name, Dose and Monthly Quantity:   Oxycodone with APAP (Percocet): Dose 5-325 Schedule 1 tablet by mouth every 6 hours prn Monthly Quantity 124   Diagnosis requiring opiates:   Acute midline low back pain without sciatica [M54.5]  - Primary     Problem List Updated:   No    Opioid Agreement On File - [facility] PAIN CONTRACT ID# [id number]:  No    Last Urine Drug Screen (at least once every 12 months) Date:   n/a  Unexpected Results:   n/a    [place]  Data Reviewed (at least once every 3 months) Date:   8/30/2017     Unexpected Results:    No.    Last Fill Date:   10/26/17  Due Date:   11/26/17    Last Visit with PCP:   3/6/17    Future Visits with PCP:   No.    Processing:   Mail to patient.    Jessica Hutchison LPN

## 2017-10-20 RX ORDER — OXYCODONE AND ACETAMINOPHEN 5; 325 MG/1; MG/1
1 TABLET ORAL EVERY 6 HOURS PRN
Qty: 124 TABLET | Refills: 0 | Status: SHIPPED | OUTPATIENT
Start: 2017-11-24 | End: 2017-11-24

## 2017-10-25 ENCOUNTER — OFFICE VISIT (OUTPATIENT)
Dept: ORTHOPEDICS | Facility: CLINIC | Age: 72
End: 2017-10-25

## 2017-10-25 DIAGNOSIS — I73.9 PVD (PERIPHERAL VASCULAR DISEASE) (H): ICD-10-CM

## 2017-10-25 DIAGNOSIS — B35.1 DERMATOPHYTOSIS OF NAIL: Primary | ICD-10-CM

## 2017-10-25 NOTE — MR AVS SNAPSHOT
After Visit Summary   10/25/2017    Jonathan Bishop    MRN: [id number]           Patient Information     Date Of Birth          1945        Visit Information        Provider Department      10/25/2017 7:00 AM Mamadou Gary DPM [facility]        Today's Diagnoses     Dermatophytosis of nail    -  1    PVD (peripheral vascular disease) (H)           Follow-ups after your visit        Your next 10 appointments already scheduled     Jan 24, 2018  7:00 AM CST   (Arrive by 6:45 AM)   RETURN FOOT/ANKLE with Mamadou Gary DPM   [facility] ([facility])    [address]  [address] [place] [address] [phone]              Who to contact     Please call your clinic at [phone] to:    Ask questions about your health    Make or cancel appointments    Discuss your medicines    Learn about your test results    Speak to your doctor   If you have compliments or concerns about an experience at your clinic, or if you wish to file a complaint, please contact [facility] Patient Relations at [phone] or email us at [email or web address]         Additional Information About Your Visit        MyChart Information     [email or web address] gives you secure access to your electronic health record. If you see a primary care provider, you can also send messages to your care team and make appointments. If you have questions, please call your primary care clinic.  If you do not have a primary care provider, please call [phone] and they will assist you.      C[employer]ia is an electronic gateway that provides easy, online access to your medical records. With [employer], you can request a clinic appointment, read your test results, renew a prescription or communicate with your care team.     To access your existing account, please contact your [facility] or call  [phone] for assistance.        Care EveryWhere ID     This is your Care EveryWhere ID. This could be used by other organizations to access your [facility] medical records  [id number]         Blood Pressure from Last 3 Encounters:   04/14/17 135/66   03/06/17 127/77   12/09/16 138/82    Weight from Last 3 Encounters:   04/14/17 104.8 kg (231 lb)   12/09/16 104.8 kg (231 lb 1.6 oz)   11/30/16 104.3 kg (230 lb)              We Performed the Following     DEBRIDEMENT OF NAILS, 6 OR MORE        Primary Care Provider    Buck Nascimento MD       PWB  MD [id number]        Equal Access to Services     [facility]: Hadii aad parag hadasho Somarlee, waaxda luqadaha, qaybta kaalmada adekunalyada, hans zapata . So [facility] [phone].    ATENCIÓN: Si habla español, tiene a aguilar disposición servicios gratuitos de asistencia lingüística. Llame al [phone].    We comply with applicable federal civil rights laws and [place] laws. We do not discriminate on the basis of race, color, national origin, age, disability, sex, sexual orientation, or gender identity.            Thank you!     Thank you for choosing [facility]  for your care. Our goal is always to provide you with excellent care. Hearing back from our patients is one way we can continue to improve our services. Please take a few minutes to complete the written survey that you may receive in the mail after your visit with us. Thank you!             Your Updated Medication List - Protect others around you: Learn how to safely use, store and throw away your medicines at www.disposemymeds.org.          This list is accurate as of: 10/25/17  7:24 AM.  Always use your most recent med list.                   Brand Name Dispense Instructions for use Diagnosis    aspirin 325 MG EC tablet     90 tablet    Take 1 tablet (325 mg) by mouth daily    Cerebrovascular accident (CVA) due to occlusion of right anterior cerebral artery (H)        atorvastatin 40 MG tablet    LIPITOR    90 tablet    Take 1 tablet (40 mg) by mouth daily    Cerebrovascular accident (CVA) due to occlusion of right anterior cerebral artery (H)       hydrochlorothiazide 12.5 MG Tabs tablet     30 tablet    Take 1 tablet (12.5 mg) by mouth daily    Essential hypertension, benign       ibuprofen 600 MG tablet    ADVIL/MOTRIN    180 tablet    Take 1 tablet (600 mg) by mouth 3 times daily as needed    Lumbago       multivitamin, therapeutic with minerals Tabs tablet     100 tablet    Take 1 tablet by mouth daily.    Routine general medical examination at a health care facility       order for DME     1 Units    Equipment being ordered: Walker () Treatment Diagnosis: stroke    Cerebrovascular accident (CVA) due to occlusion of right anterior cerebral artery (H)       other medical supplies     4 each    Dispense knee high, medium compression, medium size    Peripheral edema       oxyCODONE-acetaminophen 5-325 MG per tablet   Start taking on:  11/24/2017    PERCOCET    124 tablet    Take 1 tablet by mouth every 6 hours as needed for pain    Acute midline low back pain without sciatica       sildenafil 100 MG tablet    VIAGRA    6 tablet    Take 1 tablet (100 mg) by mouth daily as needed for erectile dysfunction Take 30 min to 4 hours before intercourse.    Erectile dysfunction, unspecified erectile dysfunction type

## 2017-10-25 NOTE — NURSING NOTE
Reason For Visit:   Chief Complaint   Patient presents with     RECHECK     3 month follow up.        Pain Assessment  Patient Currently in Pain: Yes (Pt stated that he has intermittent swelling in his feet. )  Primary Pain Location: Foot  Pain Orientation: Right, Left               Current Outpatient Prescriptions   Medication Sig Dispense Refill     [START ON 11/24/2017] oxyCODONE-acetaminophen (PERCOCET) 5-325 MG per tablet Take 1 tablet by mouth every 6 hours as needed for pain 124 tablet 0     ibuprofen (ADVIL/MOTRIN) 600 MG tablet Take 1 tablet (600 mg) by mouth 3 times daily as needed 180 tablet 2     hydrochlorothiazide 12.5 MG TABS tablet Take 1 tablet (12.5 mg) by mouth daily 30 tablet 11     atorvastatin (LIPITOR) 40 MG tablet Take 1 tablet (40 mg) by mouth daily 90 tablet 3     aspirin 325 MG EC tablet Take 1 tablet (325 mg) by mouth daily 90 tablet 3     order for DME Equipment being ordered: Walker ()  Treatment Diagnosis: stroke 1 Units 0     sildenafil (VIAGRA) 100 MG tablet Take 1 tablet (100 mg) by mouth daily as needed for erectile dysfunction Take 30 min to 4 hours before intercourse. 6 tablet 11     other medical supplies Dispense knee high, medium compression, medium size (Patient not taking: Reported on 7/5/2017) 4 each 0     multivitamin, therapeutic with minerals (MULTI-VITAMIN) TABS Take 1 tablet by mouth daily. 100 tablet 3        No Known Allergies

## 2017-10-25 NOTE — PROGRESS NOTES
Chief Complaints and History of Present Illnesses   Patient presents with     RECHECK     3 month follow up.           No Known Allergies      Subjective: Jonathan is a 72 year old male who presents to the clinic today for a follow up of elongated nails and swelling in his legs.   He relates that he can get the compression socks on a little better now. Relates that he has tried to eliminate all salt from his diet. No other complaints today.    Objective  PT and DP pulses are non-palpable bilaterally. CRT is >3 seconds. Diminished pedal hair. Mild non-pitting edema noted to BL LE.  Gross sensation is slightly decreased bilaterally.   Nails are thickened, discolored, dystrophic and elongated bilaterally to varying degrees. Nails have subungual debris consistent with onychomycosis. No open lesions are noted. Skin is normal.      Assessment: PVD  Onychomycosis x 10      Plan:  - Pt seen and evaluated.  - Nails were debrided x 10.  - Continue with the compression socks.   - See again in 3 months.

## 2017-10-25 NOTE — LETTER
10/25/2017       RE: Jonathan Bishop  [address]  [place] [address]     Dear Colleague,    Thank you for referring your patient, Jonathan Bishop, to the [facility] at [facility]. Please see a copy of my visit note below.    Chief Complaints and History of Present Illnesses   Patient presents with     RECHECK     3 month follow up.           No Known Allergies      Subjective: Jonathan is a 72 year old male who presents to the clinic today for a follow up of elongated nails and swelling in his legs.   He relates that he can get the compression socks on a little better now. Relates that he has tried to eliminate all salt from his diet. No other complaints today.    Objective  PT and DP pulses are non-palpable bilaterally. CRT is >3 seconds. Diminished pedal hair. Mild non-pitting edema noted to BL LE.  Gross sensation is slightly decreased bilaterally.   Nails are thickened, discolored, dystrophic and elongated bilaterally to varying degrees. Nails have subungual debris consistent with onychomycosis. No open lesions are noted. Skin is normal.      Assessment: PVD  Onychomycosis x 10      Plan:  - Pt seen and evaluated.  - Nails were debrided x 10.  - Continue with the compression socks.   - See again in 3 months.       Again, thank you for allowing me to participate in the care of your patient.      Sincerely,    Mamadou Gary DPM

## 2017-11-20 ENCOUNTER — MEDICAL CORRESPONDENCE (OUTPATIENT)
Dept: HEALTH INFORMATION MANAGEMENT | Facility: CLINIC | Age: 72
End: 2017-11-20

## 2017-11-24 DIAGNOSIS — M54.50 ACUTE MIDLINE LOW BACK PAIN WITHOUT SCIATICA: ICD-10-CM

## 2017-11-24 NOTE — TELEPHONE ENCOUNTER
Reason For Call:   Chief Complaint   Patient presents with     Refill Request     percocet       Medication Name, Dose and Monthly Quantity:   Oxycodone with APAP (Percocet): Dose 5-325 Schedule 1 tablet by mouth every 6 hours prn Monthly Quantity 124   Diagnosis requiring opiates:   Acute midline low back pain without sciatica [M54.5]  - Primary     Problem List Updated:   No    Opioid Agreement On File - [facility] PAIN CONTRACT ID# [id number]:  No    Last Urine Drug Screen (at least once every 12 months) Date:   n/a  Unexpected Results:   n/a    [place]  Data Reviewed (at least once every 3 months) Date:   11/24/2017     Unexpected Results:    No.    Last Fill Date:   11/26/17  Due Date:   12/26/17    Last Visit with PCP:   3/6/17    Future Visits with PCP:   No.    Processing:   Mail to patient.    Jessica Hutchison LPN

## 2017-11-27 RX ORDER — OXYCODONE AND ACETAMINOPHEN 5; 325 MG/1; MG/1
1 TABLET ORAL EVERY 6 HOURS PRN
Qty: 124 TABLET | Refills: 0 | Status: SHIPPED | OUTPATIENT
Start: 2017-12-24 | End: 2017-12-20

## 2017-12-20 DIAGNOSIS — M54.50 ACUTE MIDLINE LOW BACK PAIN WITHOUT SCIATICA: ICD-10-CM

## 2017-12-20 NOTE — TELEPHONE ENCOUNTER
Reason For Call:   Chief Complaint   Patient presents with     Refill Request     percocoet       Medication Name, Dose and Monthly Quantity:   Oxycodone with APAP (Percocet): Dose 5-325 Schedule 1 tablet by mouth every 6 hours prn Monthly Quantity 124   Diagnosis requiring opiates:   Acute midline low back pain without sciatica [M54.5]  - Primary     Problem List Updated:   No    Opioid Agreement On File - [facility] PAIN CONTRACT ID# [id number]:  No    Last Urine Drug Screen (at least once every 12 months) Date:   n/a  Unexpected Results:   n/a    [place]  Data Reviewed (at least once every 3 months) Date:   11/24/2017     Unexpected Results:    No.    Last Fill Date:   12/26/17  Due Date:   1/25/18    Last Visit with PCP:   3/6/17    Future Visits with PCP:   No.    Processing:   Mail to patient.    Jessica Hutchison LPN

## 2017-12-21 RX ORDER — OXYCODONE AND ACETAMINOPHEN 5; 325 MG/1; MG/1
1 TABLET ORAL EVERY 6 HOURS PRN
Qty: 124 TABLET | Refills: 0 | Status: SHIPPED | OUTPATIENT
Start: 2018-01-23 | End: 2018-02-01

## 2018-01-24 ENCOUNTER — OFFICE VISIT (OUTPATIENT)
Dept: ORTHOPEDICS | Facility: CLINIC | Age: 73
End: 2018-01-24
Payer: COMMERCIAL

## 2018-01-24 DIAGNOSIS — B35.1 DERMATOPHYTOSIS OF NAIL: ICD-10-CM

## 2018-01-24 DIAGNOSIS — I73.9 PVD (PERIPHERAL VASCULAR DISEASE) (H): Primary | ICD-10-CM

## 2018-01-24 NOTE — LETTER
1/24/2018       RE: Jonathan Bishop  [address]  [place] [address]     Dear Colleague,    Thank you for referring your patient, Jonathan Bishop, to the [facility] at [facility]. Please see a copy of my visit note below.    Chief Complaint:   Chief Complaint   Patient presents with     RECHECK     3 month follow up.         No Known Allergies      Subjective: Jonathan is a 72 year old male who presents to the clinic today for a follow up of bilateral fungal nails and swelling.  He relates that his nails are elongated today.  His swelling is much better.  He continues to use a compression socks as directed.    Objective  PT and DP pulses are non-palpable bilaterally. CRT is >3 seconds. Diminished pedal hair. Mild non-pitting edema noted to BL LE.  Gross sensation is slightly decreased bilaterally.   Nails are thickened, discolored, dystrophic and elongated bilaterally to varying degrees. Nails have subungual debris consistent with onychomycosis. No open lesions are noted. Skin is normal.      Assessment: PVD  Onychomycosis x 10      Plan:  - Pt seen and evaluated.  - Nails were debrided x 10.  - Continue with the compression socks.   - See again in 3 months.     Again, thank you for allowing me to participate in the care of your patient.      Sincerely,    Mamadou Gary DPM

## 2018-01-24 NOTE — PROGRESS NOTES
Chief Complaint:   Chief Complaint   Patient presents with     RECHECK     3 month follow up.         No Known Allergies      Subjective: Jonathan is a 72 year old male who presents to the clinic today for a follow up of bilateral fungal nails and swelling.  He relates that his nails are elongated today.  His swelling is much better.  He continues to use a compression socks as directed.    Objective  PT and DP pulses are non-palpable bilaterally. CRT is >3 seconds. Diminished pedal hair. Mild non-pitting edema noted to BL LE.  Gross sensation is slightly decreased bilaterally.   Nails are thickened, discolored, dystrophic and elongated bilaterally to varying degrees. Nails have subungual debris consistent with onychomycosis. No open lesions are noted. Skin is normal.      Assessment: PVD  Onychomycosis x 10      Plan:  - Pt seen and evaluated.  - Nails were debrided x 10.  - Continue with the compression socks.   - See again in 3 months.

## 2018-01-24 NOTE — MR AVS SNAPSHOT
After Visit Summary   1/24/2018    Jonathan Bishop    MRN: [id number]           Patient Information     Date Of Birth          1945        Visit Information        Provider Department      1/24/2018 7:00 AM Mamadou Gary DPM [facility]        Today's Diagnoses     PVD (peripheral vascular disease) (H)    -  1    Dermatophytosis of nail           Follow-ups after your visit        Your next 10 appointments already scheduled     Apr 30, 2018  7:00 AM CDT   (Arrive by 6:45 AM)   RETURN FOOT/ANKLE with Mamadou Gary DPM   [facility] ([facility] [place])    [address]  [address] [facility] [id number] [phone]              Who to contact     Please call your clinic at [phone] to:    Ask questions about your health    Make or cancel appointments    Discuss your medicines    Learn about your test results    Speak to your doctor   If you have compliments or concerns about an experience at your clinic, or if you wish to file a complaint, please contact [facility] Patient Relations at [phone] or email us at [email or web address]         Additional Information About Your Visit        MyChart Information     [employer] gives you secure access to your electronic health record. If you see a primary care provider, you can also send messages to your care team and make appointments. If you have questions, please call your primary care clinic.  If you do not have a primary care provider, please call [phone] and they will assist you.      [employer] is an electronic gateway that provides easy, online access to your medical records. With [employer], you can request a clinic appointment, read your test results, renew a prescription or communicate with your care team.     To access your existing account, please contact your [facility] or call [phone]  for assistance.        Care EveryWhere ID     This is your Care EveryWhere ID. This could be used by other organizations to access your [place] medical records  [id number]         Blood Pressure from Last 3 Encounters:   04/14/17 135/66   03/06/17 127/77   12/09/16 138/82    Weight from Last 3 Encounters:   04/14/17 104.8 kg (231 lb)   12/09/16 104.8 kg (231 lb 1.6 oz)   11/30/16 104.3 kg (230 lb)              We Performed the Following     DEBRIDEMENT OF NAILS, 6 OR MORE        Primary Care Provider Office Phone # Fax #    Buck Nascimento [phone]       [address] [place] [address] [address] [place] [address]        Equal Access to Services     TIP GALLEGOS : Pineda thomaso Somarlee, waaxda luqadaha, qaybta kaalmada adeegyada, hans zapata . So [facility] [phone].    ATENCIÓN: Si habla español, tiene a aguilar disposición servicios gratuitos de asistencia lingüística. [facility] [phone].    We comply with applicable federal civil rights laws and [place] laws. We do not discriminate on the basis of race, color, national origin, age, disability, sex, sexual orientation, or gender identity.            Thank you!     Thank you for choosing [facility]  for your care. Our goal is always to provide you with excellent care. Hearing back from our patients is one way we can continue to improve our services. Please take a few minutes to complete the written survey that you may receive in the mail after your visit with us. Thank you!             Your Updated Medication List - Protect others around you: Learn how to safely use, store and throw away your medicines at www.disposemymeds.org.          This list is accurate as of 1/24/18  7:22 AM.  Always use your most recent med list.                   Brand Name Dispense Instructions for use Diagnosis    aspirin 325 MG EC tablet     90 tablet    Take 1 tablet (325 mg) by mouth daily    Cerebrovascular accident (CVA) due  to occlusion of right anterior cerebral artery (H)       atorvastatin 40 MG tablet    LIPITOR    90 tablet    Take 1 tablet (40 mg) by mouth daily    Cerebrovascular accident (CVA) due to occlusion of right anterior cerebral artery (H)       hydrochlorothiazide 12.5 MG Tabs tablet     30 tablet    Take 1 tablet (12.5 mg) by mouth daily    Essential hypertension, benign       ibuprofen 600 MG tablet    ADVIL/MOTRIN    180 tablet    Take 1 tablet (600 mg) by mouth 3 times daily as needed    Lumbago       multivitamin, therapeutic with minerals Tabs tablet     100 tablet    Take 1 tablet by mouth daily.    Routine general medical examination at a health care facility       order for DME     1 Units    Equipment being ordered: Walker () Treatment Diagnosis: stroke    Cerebrovascular accident (CVA) due to occlusion of right anterior cerebral artery (H)       other medical supplies     4 each    Dispense knee high, medium compression, medium size    Peripheral edema       oxyCODONE-acetaminophen 5-325 MG per tablet    PERCOCET    124 tablet    Take 1 tablet by mouth every 6 hours as needed for pain    Acute midline low back pain without sciatica       sildenafil 100 MG tablet    VIAGRA    6 tablet    Take 1 tablet (100 mg) by mouth daily as needed for erectile dysfunction Take 30 min to 4 hours before intercourse.    Erectile dysfunction, unspecified erectile dysfunction type

## 2018-01-24 NOTE — NURSING NOTE
Reason For Visit:   Chief Complaint   Patient presents with     RECHECK     3 month follow up.        Pain Assessment  Patient Currently in Pain: Yes  Primary Pain Location: Foot  Pain Orientation: Right, Left  Pain Descriptors: Tingling               Current Outpatient Prescriptions   Medication Sig Dispense Refill     oxyCODONE-acetaminophen (PERCOCET) 5-325 MG per tablet Take 1 tablet by mouth every 6 hours as needed for pain 124 tablet 0     ibuprofen (ADVIL/MOTRIN) 600 MG tablet Take 1 tablet (600 mg) by mouth 3 times daily as needed 180 tablet 2     hydrochlorothiazide 12.5 MG TABS tablet Take 1 tablet (12.5 mg) by mouth daily 30 tablet 11     atorvastatin (LIPITOR) 40 MG tablet Take 1 tablet (40 mg) by mouth daily 90 tablet 3     aspirin 325 MG EC tablet Take 1 tablet (325 mg) by mouth daily 90 tablet 3     order for DME Equipment being ordered: Walker ()  Treatment Diagnosis: stroke 1 Units 0     sildenafil (VIAGRA) 100 MG tablet Take 1 tablet (100 mg) by mouth daily as needed for erectile dysfunction Take 30 min to 4 hours before intercourse. 6 tablet 11     other medical supplies Dispense knee high, medium compression, medium size (Patient not taking: Reported on 7/5/2017) 4 each 0     multivitamin, therapeutic with minerals (MULTI-VITAMIN) TABS Take 1 tablet by mouth daily. 100 tablet 3        No Known Allergies

## 2018-02-01 DIAGNOSIS — M54.50 ACUTE MIDLINE LOW BACK PAIN WITHOUT SCIATICA: ICD-10-CM

## 2018-02-01 NOTE — TELEPHONE ENCOUNTER
Reason For Call:   Chief Complaint   Patient presents with     Refill Request     percocet       Medication Name, Dose and Monthly Quantity:   Oxycodone with APAP (Percocet): Dose 5-325 Schedule 1 tablet by mouth every 6 hours prn Monthly Quantity 124   Diagnosis requiring opiates:   Acute midline low back pain without sciatica [M54.5]  - Primary     Problem List Updated:   No    Opioid Agreement On File - [facility] PAIN CONTRACT ID# [id number]:  No    Last Urine Drug Screen (at least once every 12 months) Date:   n/a  Unexpected Results:   n/a    [place]  Data Reviewed (at least once every 3 months) Date:   11/24/2017     Unexpected Results:    No.    Last Fill Date:   1/25/18  Due Date:   2/24/18    Last Visit with PCP:   3/6/17    Future Visits with PCP:   No.    Processing:   Mail to patient.    Jessica Hutchison LPN

## 2018-02-02 RX ORDER — OXYCODONE AND ACETAMINOPHEN 5; 325 MG/1; MG/1
1 TABLET ORAL EVERY 6 HOURS PRN
Qty: 124 TABLET | Refills: 0 | Status: SHIPPED | OUTPATIENT
Start: 2018-02-22 | End: 2018-03-01

## 2018-02-05 DIAGNOSIS — M54.50 LUMBAGO: ICD-10-CM

## 2018-02-05 NOTE — TELEPHONE ENCOUNTER
ibuprofen (ADVIL/MOTRIN   Last Written Prescription Date:  5/17/17  Last Fill Quantity: 180,   # refills: 2  Last Office Visit : 3/6/17  Future Office visit:  None

## 2018-02-07 RX ORDER — IBUPROFEN 600 MG/1
600 TABLET, FILM COATED ORAL 3 TIMES DAILY PRN
Qty: 90 TABLET | Refills: 0 | Status: SHIPPED | OUTPATIENT
Start: 2018-02-07 | End: 2018-04-08

## 2018-03-01 DIAGNOSIS — M54.50 ACUTE MIDLINE LOW BACK PAIN WITHOUT SCIATICA: ICD-10-CM

## 2018-03-01 NOTE — TELEPHONE ENCOUNTER
Reason For Call:   Chief Complaint   Patient presents with     Refill Request     percocet       Medication Name, Dose and Monthly Quantity:   Oxycodone with APAP (Percocet): Dose 5-325 Schedule 1 tablet by mouth every 6 hours prn Monthly Quantity 124   Diagnosis requiring opiates:   Acute midline low back pain without sciatica [M54.5]  - Primary     Problem List Updated:   No    Opioid Agreement On File - [facility] PAIN CONTRACT ID# [id number]:  No    Last Urine Drug Screen (at least once every 12 months) Date:   n/a  Unexpected Results:   n/a    [place]  Data Reviewed (at least once every 3 months) Date:   11/24/2017     Unexpected Results:    No.    Last Fill Date:   2/24/18  Due Date:   3/26/18    Last Visit with PCP:   3/6/17    Future Visits with PCP:   No.    Processing:   Mail to patient.    Jessica Hutchison LPN

## 2018-03-02 RX ORDER — OXYCODONE AND ACETAMINOPHEN 5; 325 MG/1; MG/1
1 TABLET ORAL EVERY 6 HOURS PRN
Qty: 124 TABLET | Refills: 0 | Status: SHIPPED | OUTPATIENT
Start: 2018-03-24 | End: 2018-03-30

## 2018-03-22 DIAGNOSIS — I10 ESSENTIAL HYPERTENSION, BENIGN: ICD-10-CM

## 2018-03-23 RX ORDER — HYDROCHLOROTHIAZIDE 12.5 MG/1
12.5 TABLET ORAL DAILY
Qty: 30 TABLET | Refills: 0 | Status: SHIPPED | OUTPATIENT
Start: 2018-03-23 | End: 2018-06-11

## 2018-03-23 NOTE — TELEPHONE ENCOUNTER
hydrochlorothiazide 12.5 MG TABS tablet  Last Written Prescription Date:  3/6/17  Last Fill Quantity: 30,   # refills: 11  Last Office Visit : 3/6/17  Future Office visit:  None    Scheduling has been notified to contact the pt for appointment.      Routing  Because: labs, appt

## 2018-03-30 DIAGNOSIS — M54.50 ACUTE MIDLINE LOW BACK PAIN WITHOUT SCIATICA: ICD-10-CM

## 2018-03-30 NOTE — TELEPHONE ENCOUNTER
Reason For Call:   Chief Complaint   Patient presents with     Refill Request     percocet       Medication Name, Dose and Monthly Quantity:   Oxycodone with APAP (Percocet): Dose 5-325 Schedule 1 tablet by mouth every 6 hours prn Monthly Quantity 124   Diagnosis requiring opiates:   Acute midline low back pain without sciatica [M54.5]  - Primary     Problem List Updated:   No    Opioid Agreement On File - [facility] PAIN CONTRACT ID# [id number]:  No    Last Urine Drug Screen (at least once every 12 months) Date:   n/a  Unexpected Results:   n/a    [place]  Data Reviewed (at least once every 3 months) Date:   11/24/2017     Unexpected Results:    No.    Last Fill Date:   3/26/18  Due Date:   4/25/18    Last Visit with PCP:   4/13/18    Future Visits with PCP:   No.    Processing:   Mail to patient.    Jessica Hutchison LPN

## 2018-04-02 RX ORDER — OXYCODONE AND ACETAMINOPHEN 5; 325 MG/1; MG/1
1 TABLET ORAL EVERY 6 HOURS PRN
Qty: 124 TABLET | Refills: 0 | Status: SHIPPED | OUTPATIENT
Start: 2018-04-23 | End: 2018-04-13

## 2018-04-08 DIAGNOSIS — M54.50 LUMBAGO: ICD-10-CM

## 2018-04-09 DIAGNOSIS — I63.521 CEREBROVASCULAR ACCIDENT (CVA) DUE TO OCCLUSION OF RIGHT ANTERIOR CEREBRAL ARTERY (H): ICD-10-CM

## 2018-04-09 NOTE — TELEPHONE ENCOUNTER
ibuprofen (ADVIL/MOTRIN) 600 MG    Last Written Prescription Date:  2/7/18  Last Fill Quantity: 90,   # refills: 0  Last Office Visit : 3/6/17  Future Office visit:  4/13/18    Routing refill request to provider for review/approval because:  LABS  OVERDUE

## 2018-04-10 RX ORDER — IBUPROFEN 600 MG/1
600 TABLET, FILM COATED ORAL 3 TIMES DAILY PRN
Qty: 90 TABLET | Refills: 0 | Status: SHIPPED | OUTPATIENT
Start: 2018-04-10 | End: 2018-05-23

## 2018-04-10 RX ORDER — ATORVASTATIN CALCIUM 40 MG/1
40 TABLET, FILM COATED ORAL DAILY
Qty: 90 TABLET | Refills: 0 | Status: SHIPPED | OUTPATIENT
Start: 2018-04-10 | End: 2018-04-13

## 2018-04-10 NOTE — TELEPHONE ENCOUNTER
Last Clinic Visit: 3/6/2017  [facility]  Future Visit: 4/13/18  Lab past due- LDL.  90 day odilia refill and FYI to clinic nurse.

## 2018-04-13 ENCOUNTER — OFFICE VISIT (OUTPATIENT)
Dept: INTERNAL MEDICINE | Facility: CLINIC | Age: 73
End: 2018-04-13
Payer: COMMERCIAL

## 2018-04-13 VITALS
RESPIRATION RATE: 20 BRPM | HEART RATE: 92 BPM | DIASTOLIC BLOOD PRESSURE: 72 MMHG | SYSTOLIC BLOOD PRESSURE: 152 MMHG | OXYGEN SATURATION: 97 % | BODY MASS INDEX: 43.97 KG/M2 | WEIGHT: 242 LBS

## 2018-04-13 DIAGNOSIS — E78.5 HYPERLIPIDEMIA WITH TARGET LDL LESS THAN 130: ICD-10-CM

## 2018-04-13 DIAGNOSIS — Z00.00 ROUTINE GENERAL MEDICAL EXAMINATION AT A HEALTH CARE FACILITY: Primary | ICD-10-CM

## 2018-04-13 DIAGNOSIS — M54.50 ACUTE MIDLINE LOW BACK PAIN WITHOUT SCIATICA: ICD-10-CM

## 2018-04-13 DIAGNOSIS — I10 ESSENTIAL HYPERTENSION, BENIGN: ICD-10-CM

## 2018-04-13 DIAGNOSIS — I63.521 CEREBROVASCULAR ACCIDENT (CVA) DUE TO OCCLUSION OF RIGHT ANTERIOR CEREBRAL ARTERY (H): ICD-10-CM

## 2018-04-13 LAB
ANION GAP SERPL CALCULATED.3IONS-SCNC: 6 MMOL/L (ref 3–14)
BUN SERPL-MCNC: 13 MG/DL (ref 7–30)
CALCIUM SERPL-MCNC: 9.4 MG/DL (ref 8.5–10.1)
CHLORIDE SERPL-SCNC: 105 MMOL/L (ref 94–109)
CHOLEST SERPL-MCNC: 119 MG/DL
CO2 SERPL-SCNC: 30 MMOL/L (ref 20–32)
CREAT SERPL-MCNC: 0.58 MG/DL (ref 0.66–1.25)
GFR SERPL CREATININE-BSD FRML MDRD: >90 ML/MIN/1.7M2
GLUCOSE SERPL-MCNC: 94 MG/DL (ref 70–99)
HDLC SERPL-MCNC: 46 MG/DL
LDLC SERPL CALC-MCNC: 63 MG/DL
NONHDLC SERPL-MCNC: 73 MG/DL
POTASSIUM SERPL-SCNC: 3.8 MMOL/L (ref 3.4–5.3)
SODIUM SERPL-SCNC: 140 MMOL/L (ref 133–144)
TRIGL SERPL-MCNC: 52 MG/DL

## 2018-04-13 RX ORDER — OXYCODONE AND ACETAMINOPHEN 5; 325 MG/1; MG/1
1 TABLET ORAL EVERY 6 HOURS PRN
Qty: 150 TABLET | Refills: 0 | Status: SHIPPED | OUTPATIENT
Start: 2018-04-23 | End: 2018-05-01

## 2018-04-13 RX ORDER — HYDROCHLOROTHIAZIDE 25 MG/1
25 TABLET ORAL DAILY
Qty: 90 TABLET | Refills: 3 | Status: SHIPPED | OUTPATIENT
Start: 2018-04-13 | End: 2019-06-16

## 2018-04-13 RX ORDER — ATORVASTATIN CALCIUM 40 MG/1
40 TABLET, FILM COATED ORAL DAILY
Qty: 90 TABLET | Refills: 3 | Status: SHIPPED | OUTPATIENT
Start: 2018-04-13 | End: 2019-05-27

## 2018-04-13 ASSESSMENT — PAIN SCALES - GENERAL: PAINLEVEL: EXTREME PAIN (9)

## 2018-04-13 NOTE — PROGRESS NOTES
"ASSESSMENT/PLAN:  Preventative - up to date per the Health Maintenance    Low back and hip pain - I worry that he has lumbar stenosis given the stooped forward posture by report (grocery store leaning over a cart) and his posture in clinic.  He has had surgery and would not consider surgery unless it was emergent.  I want to check the alignment of his spine post surgery, so I will have him get a lumbar x-ray.  I will also increase the oxycodone from 20mg to 25mg per day.  I warned him of constipation and respiratory depression and of development of tolerance.  I think this dose will give him a good balance of functionality and pain control with the side effects of pain medications.    Hypertension - last year we went from 25mg to 12.5mg but now BP is higher.  I will increase back to 25mg.  Check BMP.    Cholesterol - check lipids, continue atorvastatin    Buck Nascimento MD, FACP      SUBJECTIVE:    Preventative    He says that his back to hip to leg pain is worse it is mostly the low back to the back of the hip to the thigh.  It is excruciating pain especially in the morning.  To get out of the bed is painful.  He needs a cane or walker to get around in the morning.  It will interfere with him getting to the bathroom.  He gets up at 2am to go to the bathroom and then takes his oxycodone so that he can get up out of bed.  The intensity is worse but the quality and location are the same.  He has fallen a couple of times around Jan 2018 and wonder is he \"loosened up some screws\".  He says that the medication was not as effective as it was.    He accidentally went off of the lipitor - thought he was out but had some - so restarted a couple of months ago.    He denied all symptoms as I started listing them:  Gen: no fevers, night sweats or weight change  Eyes: no vision change, diplopia or red eyes  Ears, Noses, Mouth, Throat: no ringing in ears or hearing change, no epistaxis or nasal discharge, no oral lesions, throat " "clear  Cardiac: no chest pain, palpitations, or pain with walking  Lungs: no dyspnea, cough, or shortness of breath  GI: no nausea, vomiting, diarrhea or constipation, no abdominal pain  : no change in urine, hematuria  Musculoskeletal: he stands and leans forward to \"take off of the pressure\"  Skin: no concerning lesions or moles  Neuro: see above, no loss of strength or numbness  Endo: no polyuria or polydipsia, no temperature intolerance  Heme/Lymph: no concerning bumps, no bleeding problems  Allergy: no environmental or drug allergies  Psych: no depression or anxiety    Patient Active Problem List   Diagnosis     Wound, surgical, infected     Essential hypertension, benign     Hyperlipidemia with target LDL less than 130     Anemia     Medication refill- do not delete      Low back pain     Osteoarthritis of knee     Stroke (H)     Cerebrovascular accident involving anterior circulation, right (H)     Dermatophytosis of nail     PVD (peripheral vascular disease) (H)       Past Medical History:   Diagnosis Date     Acute rheumatic carditis 1950     Coronary artery disease     murmur     Hip pain, bilateral      Hypercholesteremia      Hypertension      Low back pain      Nonsenile cataract      Obesity      Renal cyst      Stroke (cerebrum) (H) 11/30/16     Umbilical hernia 6/10/2011    s/p surgical repair     Wound, surgical, infected 6/10/2011    hernia       Past Surgical History:   Procedure Laterality Date     ARTHROPLASTY KNEE BILATERAL  7/22/2010     COLONOSCOPY N/A 4/14/2017    Procedure: COLONOSCOPY;  Surgeon: Toby Bills MD;  Location: [facility]     FUSION LUMBAR ANTERIOR TWO LEVELS       HERNIORRHAPHY UMBILICAL  6/10/2011    Procedure:HERNIORRHAPHY UMBILICAL; Open, with mesh placement; Surgeon:HO PARHAM; Location:[facility]     LAMINECTOMY LUMBAR TWO LEVELS         Family History   Problem Relation Age of Onset     C.A.D. Mother      Unknown/Adopted Father      Glaucoma No family hx of  "     Macular Degeneration No family hx of        Social History     Social History     Marital status:      Spouse name: N/A     Number of children: N/A     Years of education: N/A     Occupational History     Not on file.     Social History Main Topics     Smoking status: Former Smoker     Quit date: 1/1/2005     Smokeless tobacco: Never Used      Comment: Non smoker. No 2nd hand exposure. CADE DEE PCC 7/28/2011     Alcohol use No     Drug use: No     Sexual activity: Not on file     Other Topics Concern     Not on file     Social History Narrative    He lives by himself in an apt in [place].  He has 2 goldfish, Lai and Benton.       Health Maintenance   Topic Date Due     ADVANCE DIRECTIVE PLANNING Q5 YRS  10/22/2000     FALL RISK ASSESSMENT  10/22/2010     TETANUS IMMUNIZATION (SYSTEM ASSIGNED)  04/22/2021     LIPID SCREEN Q5 YR MALE (SYSTEM ASSIGNED)  03/06/2022     COLONOSCOPY Q5 YR  04/14/2022     PNEUMOCOCCAL  Completed     AORTIC ANEURYSM SCREENING (SYSTEM ASSIGNED)  Completed     HEPATITIS C SCREENING  Completed       OBJECTIVE:  Physical Exam:  /82 (BP Location: Right arm, Patient Position: Sitting, Cuff Size: Adult Large)  Pulse 92  Resp 20  Wt 109.8 kg (242 lb)  SpO2 97%  BMI 43.97 kg/m2  Constitutional: no distress, comfortable, pleasant   Eyes: anicteric, normal extra-ocular movements   Cardiovascular: regular rate and rhythm, normal S1 and S2, 3/6 systolic ejection murmur at the right 2nd intercostal  Respiratory: clear to auscultation, no wheezes or crackles, normal breath sounds   Gastrointestinal: positive bowel sounds, nontender  Musculoskeletal: when he stands he tips forward naturally which he says helps, he has some pain with palpation of the lower back and the paraspinal muscles are very firm with some pain to palpation  Skin: no concerning lesions - back and chest seb keratosis  Neurological: normal light touch on the legs, 4/5 strength on the legs  bilaterally  Psychological: appropriate mood   Lymphatic: no cervical lymphadenopathy    MACI on 11/30/16:  The visual ejection fraction is estimated at 60-65%.  There is mild to moderate concentric left ventricular hypertrophy.  There is no atrial shunt seen.  A contrast injection (Bubble Study) was performed that was negative for flow  across the interatrial septum.  Aortic Valve  The aortic valve is trileaflet. No aortic regurgitation is present. Mild  valvular aortic stenosis.

## 2018-04-13 NOTE — MR AVS SNAPSHOT
After Visit Summary   4/13/2018    Jonathan Bishop    MRN: [id number]           Patient Information     Date Of Birth          1945        Visit Information        Provider Department      4/13/2018 7:00 AM Buck Nascimento MD [facility]        Today's Diagnoses     Hyperlipidemia with target LDL less than 130    -  1    Acute midline low back pain without sciatica        Cerebrovascular accident (CVA) due to occlusion of right anterior cerebral artery (H)        Essential hypertension, benign          Care Instructions    Primary Care Center: [phone]     Primary Care Center Medication Refill Request Information:  * Please contact your pharmacy regarding ANY request for medication refills.  ** [facility] Prescription Fax = [phone]  * Please allow 3 business days for routine medication refills.  * Please allow 5 business days for controlled substance medication refills.     Primary Care Center Test Result notification information:  *You will be notified with in 7-10 days of your appointment day regarding the results of your test.  If you are on MyChart you will be notified as soon as the provider has reviewed the results and signed off on them.          Follow-ups after your visit        Your next 10 appointments already scheduled     Apr 30, 2018  7:00 AM CDT   (Arrive by 6:45 AM)   RETURN FOOT/ANKLE with Mamadou Gary DPM   [facility] ([facility])    [address] [place] [address] [phone]              Future tests that were ordered for you today     Open Future Orders        Priority Expected Expires Ordered    XR Lumbar Spine G/E 4 Views Routine 4/13/2018 4/13/2019 4/13/2018    Basic metabolic panel Routine 4/13/2018 4/27/2018 4/13/2018    Lipid panel reflex to direct LDL Fasting Routine 4/13/2018 4/13/2019 4/13/2018            Who to contact     Please call your clinic at [phone]  to:    Ask questions about your health    Make or cancel appointments    Discuss your medicines    Learn about your test results    Speak to your doctor            Additional Information About Your Visit        [email or web address] Information     [employer] gives you secure access to your electronic health record. If you see a primary care provider, you can also send messages to your care team and make appointments. If you have questions, please call your primary care clinic.  If you do not have a primary care provider, please call [phone] and they will assist you.      [employer] is an electronic gateway that provides easy, online access to your medical records. With [employer], you can request a clinic appointment, read your test results, renew a prescription or communicate with your care team.     To access your existing account, please contact your [facility] or call [phone] for assistance.        Care EveryWhere ID     This is your Care EveryWhere ID. This could be used by other organizations to access your [place] medical records  [id number]        Your Vitals Were     Pulse Respirations Pulse Oximetry BMI (Body Mass Index)          92 20 97% 43.97 kg/m2         Blood Pressure from Last 3 Encounters:   04/13/18 143/82   04/14/17 135/66   03/06/17 127/77    Weight from Last 3 Encounters:   04/13/18 109.8 kg (242 lb)   04/14/17 104.8 kg (231 lb)   12/09/16 104.8 kg (231 lb 1.6 oz)                 Today's Medication Changes          These changes are accurate as of 4/13/18  7:30 AM.  If you have any questions, ask your nurse or doctor.               These medicines have changed or have updated prescriptions.        Dose/Directions    * hydrochlorothiazide 12.5 MG Tabs tablet   This may have changed:  Another medication with the same name was added. Make sure you understand how and when to take each.   Used for:  Essential hypertension, benign   Changed by:  Buck Nascimento MD        Dose:   12.5 mg   Take 1 tablet (12.5 mg) by mouth daily   Quantity:  30 tablet   Refills:  0       * hydrochlorothiazide 25 MG tablet   Commonly known as:  HYDRODIURIL   This may have changed:  You were already taking a medication with the same name, and this prescription was added. Make sure you understand how and when to take each.   Used for:  Essential hypertension, benign   Changed by:  Buck Nascimento MD        Dose:  25 mg   Take 1 tablet (25 mg) by mouth daily   Quantity:  90 tablet   Refills:  3       * Notice:  This list has 2 medication(s) that are the same as other medications prescribed for you. Read the directions carefully, and ask your doctor or other care provider to review them with you.         Where to get your medicines      These medications were sent to [facility] [address] [place] [address]     Phone:  [phone]     atorvastatin 40 MG tablet    hydrochlorothiazide 25 MG tablet         Some of these will need a paper prescription and others can be bought over the counter.  Ask your nurse if you have questions.     Bring a paper prescription for each of these medications     oxyCODONE-acetaminophen 5-325 MG per tablet                Primary Care Provider Office Phone # Fax #    Buck Nascimento [phone]       [address] [place] [address] [address] [place] [address]        Equal Access to Services     TIP GALLEGOS AH: Pineda ttutle hadasho Soomaali, waaxda luqadaha, qaybta kaalmada adeegyada, hans gabrielin haymarilynn gurinder flores. So [facility] [phone].    ATENCIÓN: Si habla español, tiene a aguilar disposición servicios gratuitos de asistencia lingüística. Llame al [phone].    We comply with applicable federal civil rights laws and [place] laws. We do not discriminate on the basis of race, color, national origin, age, disability, sex, sexual orientation, or gender identity.            Thank you!     Thank you for  [facility]  for your care. Our goal is always to provide you with excellent care. Hearing back from our patients is one way we can continue to improve our services. Please take a few minutes to complete the written survey that you may receive in the mail after your visit with us. Thank you!             Your Updated Medication List - Protect others around you: Learn how to safely use, store and throw away your medicines at www.disposemymeds.org.          This list is accurate as of 4/13/18  7:30 AM.  Always use your most recent med list.                   Brand Name Dispense Instructions for use Diagnosis    aspirin 325 MG EC tablet     90 tablet    Take 1 tablet (325 mg) by mouth daily    Cerebrovascular accident (CVA) due to occlusion of right anterior cerebral artery (H)       atorvastatin 40 MG tablet    LIPITOR    90 tablet    Take 1 tablet (40 mg) by mouth daily    Cerebrovascular accident (CVA) due to occlusion of right anterior cerebral artery (H)       * hydrochlorothiazide 12.5 MG Tabs tablet     30 tablet    Take 1 tablet (12.5 mg) by mouth daily    Essential hypertension, benign       * hydrochlorothiazide 25 MG tablet    HYDRODIURIL    90 tablet    Take 1 tablet (25 mg) by mouth daily    Essential hypertension, benign       ibuprofen 600 MG tablet    ADVIL/MOTRIN    90 tablet    Take 1 tablet (600 mg) by mouth 3 times daily as needed    Lumbago       multivitamin, therapeutic with minerals Tabs tablet     100 tablet    Take 1 tablet by mouth daily.    Routine general medical examination at a health care facility       order for DME     1 Units    Equipment being ordered: Walker () Treatment Diagnosis: stroke    Cerebrovascular accident (CVA) due to occlusion of right anterior cerebral artery (H)       other medical supplies     4 each    Dispense knee high, medium compression, medium size    Peripheral edema       oxyCODONE-acetaminophen 5-325 MG per tablet   Start taking  on:  4/23/2018    PERCOCET    150 tablet    Take 1 tablet by mouth every 6 hours as needed for pain    Acute midline low back pain without sciatica       sildenafil 100 MG tablet    VIAGRA    6 tablet    Take 1 tablet (100 mg) by mouth daily as needed for erectile dysfunction Take 30 min to 4 hours before intercourse.    Erectile dysfunction, unspecified erectile dysfunction type       * Notice:  This list has 2 medication(s) that are the same as other medications prescribed for you. Read the directions carefully, and ask your doctor or other care provider to review them with you.

## 2018-04-13 NOTE — NURSING NOTE
Chief Complaint   Patient presents with     Physical     established physical      Abdominal Pain     back, hips, legs   Kelly Yadav LPN 6:54 AM on 4/13/2018  Rooming Note  Health Maintenance   Health Maintenance Due   Topic Date Due     ADVANCE DIRECTIVE PLANNING Q5 YRS  10/22/2000     FALL RISK ASSESSMENT  10/22/2010    All health maintenance items discussed and pended.  Kelly Yadav LPN 6:56 AM on 4/13/2018  Rooming Note  Blood Pressure   BP Readings from Last 1 Encounters:   04/13/18 153/78    Single BP recheck started, 6:57 AM (4 minutes)

## 2018-04-13 NOTE — PATIENT INSTRUCTIONS
[facility]: [phone]     [facility] Medication Refill Request Information:  * Please contact your pharmacy regarding ANY request for medication refills.  ** [facility] Prescription Fax = [phone]  * Please allow 3 business days for routine medication refills.  * Please allow 5 business days for controlled substance medication refills.     [facility] Test Result notification information:  *You will be notified with in 7-10 days of your appointment day regarding the results of your test.  If you are on MyChart you will be notified as soon as the provider has reviewed the results and signed off on them.

## 2018-04-30 ENCOUNTER — OFFICE VISIT (OUTPATIENT)
Dept: ORTHOPEDICS | Facility: CLINIC | Age: 73
End: 2018-04-30
Payer: COMMERCIAL

## 2018-04-30 ENCOUNTER — RADIANT APPOINTMENT (OUTPATIENT)
Dept: GENERAL RADIOLOGY | Facility: CLINIC | Age: 73
End: 2018-04-30
Attending: INTERNAL MEDICINE
Payer: COMMERCIAL

## 2018-04-30 DIAGNOSIS — M54.50 ACUTE MIDLINE LOW BACK PAIN WITHOUT SCIATICA: ICD-10-CM

## 2018-04-30 DIAGNOSIS — I73.9 PVD (PERIPHERAL VASCULAR DISEASE) (H): Primary | ICD-10-CM

## 2018-04-30 DIAGNOSIS — B35.1 DERMATOPHYTOSIS OF NAIL: ICD-10-CM

## 2018-04-30 NOTE — NURSING NOTE
"Reason For Visit:   Chief Complaint   Patient presents with     RECHECK     3 month foot check.        Pain Assessment  Patient Currently in Pain: Yes  0-10 Pain Scale:  (\"8.374\")  Primary Pain Location: Back  Pain Orientation: Lower  Alleviating Factors:  (Percocet. )               Current Outpatient Prescriptions   Medication Sig Dispense Refill     aspirin 325 MG EC tablet Take 1 tablet (325 mg) by mouth daily 90 tablet 3     atorvastatin (LIPITOR) 40 MG tablet Take 1 tablet (40 mg) by mouth daily 90 tablet 3     hydrochlorothiazide (HYDRODIURIL) 25 MG tablet Take 1 tablet (25 mg) by mouth daily 90 tablet 3     hydrochlorothiazide 12.5 MG TABS tablet Take 1 tablet (12.5 mg) by mouth daily 30 tablet 0     ibuprofen (ADVIL/MOTRIN) 600 MG tablet Take 1 tablet (600 mg) by mouth 3 times daily as needed 90 tablet 0     multivitamin, therapeutic with minerals (MULTI-VITAMIN) TABS Take 1 tablet by mouth daily. 100 tablet 3     order for DME Equipment being ordered: Walker ()  Treatment Diagnosis: stroke (Patient not taking: Reported on 4/13/2018) 1 Units 0     other medical supplies Dispense knee high, medium compression, medium size (Patient not taking: Reported on 7/5/2017) 4 each 0     oxyCODONE-acetaminophen (PERCOCET) 5-325 MG per tablet Take 1 tablet by mouth every 6 hours as needed for pain 150 tablet 0     sildenafil (VIAGRA) 100 MG tablet Take 1 tablet (100 mg) by mouth daily as needed for erectile dysfunction Take 30 min to 4 hours before intercourse. (Patient not taking: Reported on 4/13/2018) 6 tablet 11        No Known Allergies            "

## 2018-04-30 NOTE — LETTER
4/30/2018       RE: Jonathan Bishop  [address]  [facility] [address]     Dear Colleague,    Thank you for referring your patient, Jonathan Bishop, to the [facility] at [facility]. Please see a copy of my visit note below.    Chief Complaints and History of Present Illnesses   Patient presents with     RECHECK     3 month foot check.           No Known Allergies      Subjective: Jonathan is a 72 year old male who presents to the clinic today for a follow up of bilateral fungal nails and swelling.  He relates that his nails are elongated today. They can get painful in shoes when they are long. He continues to use a compression socks as directed.     Objective  PT and DP pulses are non-palpable bilaterally. CRT is >3 seconds. Diminished pedal hair. Mild non-pitting edema noted to BL LE.  Gross sensation is slightly decreased bilaterally.   Nails are thickened, discolored, dystrophic and elongated bilaterally to varying degrees. Nails have subungual debris consistent with onychomycosis. No open lesions are noted. Skin is normal.      Assessment: PVD  Onychomycosis x 10      Plan:  - Pt seen and evaluated.  - Nails were debrided x 10.  - Continue with the compression socks.   - See again in 3 months.       Again, thank you for allowing me to participate in the care of your patient.      Sincerely,    Mamadou Gary DPM

## 2018-04-30 NOTE — PROGRESS NOTES
Chief Complaints and History of Present Illnesses   Patient presents with     RECHECK     3 month foot check.           No Known Allergies      Subjective: Jonathan is a 72 year old male who presents to the clinic today for a follow up of bilateral fungal nails and swelling.  He relates that his nails are elongated today. They can get painful in shoes when they are long. He continues to use a compression socks as directed.     Objective  PT and DP pulses are non-palpable bilaterally. CRT is >3 seconds. Diminished pedal hair. Mild non-pitting edema noted to BL LE.  Gross sensation is slightly decreased bilaterally.   Nails are thickened, discolored, dystrophic and elongated bilaterally to varying degrees. Nails have subungual debris consistent with onychomycosis. No open lesions are noted. Skin is normal.      Assessment: PVD  Onychomycosis x 10      Plan:  - Pt seen and evaluated.  - Nails were debrided x 10.  - Continue with the compression socks.   - See again in 3 months.

## 2018-04-30 NOTE — MR AVS SNAPSHOT
After Visit Summary   4/30/2018    Jonathan Bishop    MRN: [id number]           Patient Information     Date Of Birth          1945        Visit Information        Provider Department      4/30/2018 7:00 AM Mamadou Gary DPM [facility]        Today's Diagnoses     PVD (peripheral vascular disease) (H)    -  1    Dermatophytosis of nail           Follow-ups after your visit        Your next 10 appointments already scheduled     Apr 30, 2018  8:30 AM CDT   (Arrive by 8:15 AM)   XR LUMBAR SPINE G/E 4 VIEWS with UCXR1   [facility] ([facility])    [address]  1st Floor  [facility] [id number] [phone]           Please bring a list of your current medicines to your exam. (Include vitamins, minerals and over-thecounter medicines.) Leave your valuables at home.  Tell your doctor if there is a chance you may be pregnant.  You do not need to do anything special for this exam.            Jul 30, 2018  7:00 AM CDT   (Arrive by 6:45 AM)   RETURN FOOT/ANKLE with Mamadou Gary DPM   [facility] ([facility])    [address]  [address] [facility] [address] [phone]              Who to contact     Please call your clinic at [phone] to:    Ask questions about your health    Make or cancel appointments    Discuss your medicines    Learn about your test results    Speak to your doctor            Additional Information About Your Visit        [employer] Information     [employer] gives you secure access to your electronic health record. If you see a primary care provider, you can also send messages to your care team and make appointments. If you have questions, please call your primary care clinic.  If you do not have a primary care provider, please call [phone] and they will assist you.      [employer] is an electronic gateway that provides easy, online  access to your medical records. With [employer], you can request a clinic appointment, read your test results, renew a prescription or communicate with your care team.     To access your existing account, please contact your [facility] or call [phone] for assistance.        Care EveryWhere ID     This is your Care EveryWhere ID. This could be used by other organizations to access your [place] medical records  [id number]         Blood Pressure from Last 3 Encounters:   04/13/18 152/72   04/14/17 135/66   03/06/17 127/77    Weight from Last 3 Encounters:   04/13/18 109.8 kg (242 lb)   04/14/17 104.8 kg (231 lb)   12/09/16 104.8 kg (231 lb 1.6 oz)              We Performed the Following     DEBRIDEMENT OF NAILS, 6 OR MORE        Primary Care Provider Office Phone # Fax #    Buck Nascimento [phone]       [address] [place] [address] [address] [place] [address]        Equal Access to Services     TIP GALLEGOS : Hadii aad ku hadasho Soomaali, waaxda luqadaha, qaybta kaalmada adeegyada, waxay idiin hayaan gurinder zapata . So [facility] [phone].    ATENCIÓN: Si habla español, tiene a aguilar disposición servicios gratuitos de asistencia lingüística. Llame al [phone].    We comply with applicable federal civil rights laws and [place] laws. We do not discriminate on the basis of race, color, national origin, age, disability, sex, sexual orientation, or gender identity.            Thank you!     Thank you for choosing [facility]  for your care. Our goal is always to provide you with excellent care. Hearing back from our patients is one way we can continue to improve our services. Please take a few minutes to complete the written survey that you may receive in the mail after your visit with us. Thank you!             Your Updated Medication List - Protect others around you: Learn how to safely use, store and throw away your medicines at www.disposemymeds.org.           This list is accurate as of 4/30/18  7:19 AM.  Always use your most recent med list.                   Brand Name Dispense Instructions for use Diagnosis    aspirin 325 MG EC tablet     90 tablet    Take 1 tablet (325 mg) by mouth daily    Cerebrovascular accident (CVA) due to occlusion of right anterior cerebral artery (H)       atorvastatin 40 MG tablet    LIPITOR    90 tablet    Take 1 tablet (40 mg) by mouth daily    Cerebrovascular accident (CVA) due to occlusion of right anterior cerebral artery (H)       * hydrochlorothiazide 12.5 MG Tabs tablet     30 tablet    Take 1 tablet (12.5 mg) by mouth daily    Essential hypertension, benign       * hydrochlorothiazide 25 MG tablet    HYDRODIURIL    90 tablet    Take 1 tablet (25 mg) by mouth daily    Essential hypertension, benign       ibuprofen 600 MG tablet    ADVIL/MOTRIN    90 tablet    Take 1 tablet (600 mg) by mouth 3 times daily as needed    Lumbago       multivitamin, therapeutic with minerals Tabs tablet     100 tablet    Take 1 tablet by mouth daily.    Routine general medical examination at a health care facility       order for DME     1 Units    Equipment being ordered: Walker () Treatment Diagnosis: stroke    Cerebrovascular accident (CVA) due to occlusion of right anterior cerebral artery (H)       other medical supplies     4 each    Dispense knee high, medium compression, medium size    Peripheral edema       oxyCODONE-acetaminophen 5-325 MG per tablet    PERCOCET    150 tablet    Take 1 tablet by mouth every 6 hours as needed for pain    Acute midline low back pain without sciatica       sildenafil 100 MG tablet    VIAGRA    6 tablet    Take 1 tablet (100 mg) by mouth daily as needed for erectile dysfunction Take 30 min to 4 hours before intercourse.    Erectile dysfunction, unspecified erectile dysfunction type       * Notice:  This list has 2 medication(s) that are the same as other medications prescribed for you. Read the  directions carefully, and ask your doctor or other care provider to review them with you.

## 2018-05-01 DIAGNOSIS — M54.50 ACUTE MIDLINE LOW BACK PAIN WITHOUT SCIATICA: ICD-10-CM

## 2018-05-01 NOTE — TELEPHONE ENCOUNTER
Reason For Call:   Chief Complaint   Patient presents with     Refill Request     oxycodone       Medication Name, Dose and Monthly Quantity:   Oxycodone with APAP (Percocet): Dose 5-325 Schedule 1 tablet by mouth every 6 hours prn Monthly Quantity 124   Diagnosis requiring opiates:   Acute midline low back pain without sciatica [M54.5]  - Primary     Problem List Updated:   No    Opioid Agreement On File - [facility] PAIN CONTRACT ID# [id number]:  No    Last Urine Drug Screen (at least once every 12 months) Date:   n/a  Unexpected Results:   n/a    [place]  Data Reviewed (at least once every 3 months) Date:   11/24/2017     Unexpected Results:    No.    Last Fill Date:   4/26/18  Due Date:   5/26/18    Last Visit with PCP:   4/13/18    Future Visits with PCP:   No.    Processing:   Mail to patient.    Jessica Hutchison LPN

## 2018-05-02 RX ORDER — OXYCODONE AND ACETAMINOPHEN 5; 325 MG/1; MG/1
1 TABLET ORAL EVERY 6 HOURS PRN
Qty: 150 TABLET | Refills: 0 | Status: SHIPPED | OUTPATIENT
Start: 2018-05-23 | End: 2018-05-30

## 2018-05-07 ENCOUNTER — TELEPHONE (OUTPATIENT)
Dept: INTERNAL MEDICINE | Facility: CLINIC | Age: 73
End: 2018-05-07

## 2018-05-07 NOTE — TELEPHONE ENCOUNTER
[facility] Call Center    Phone Message    May a detailed message be left on voicemail: yes    Reason for Call: Other: PT is requesting a call back regarding questions about test results.     Action Taken: Message routed to:  [facility] ARASELI

## 2018-05-09 NOTE — TELEPHONE ENCOUNTER
I called him and talked to him about the narrowing of the disc spaces found on the x-ray of his low spine.  I explained that this is not surprising given he had surgery and bad arthritis.  I would not recommend surgery.  However, this probably does explain the pain he has and the need for the pain meds.  Buck Nascimento MD, FACP

## 2018-05-16 NOTE — TELEPHONE ENCOUNTER
Percocet mailed to [address]  [facility] [address]    Jessica Hutchison LPN

## 2018-05-23 ENCOUNTER — TELEPHONE (OUTPATIENT)
Dept: INTERNAL MEDICINE | Facility: CLINIC | Age: 73
End: 2018-05-23

## 2018-05-23 DIAGNOSIS — M54.50 LUMBAGO: ICD-10-CM

## 2018-05-23 NOTE — TELEPHONE ENCOUNTER
Health Call Center    Phone Message    May a detailed message be left on voicemail: yes    Reason for Call: Medication Question or concern regarding medication   Prescription Clarification  Name of Medication: oxyCODONE-acetaminophen (PERCOCET) 5-325 MG per tablet  Prescribing Provider: Dr. Nascimento   Pharmacy: [facility]   What on the order needs clarification? Change quanity or change the direction for oxyCODONE-acetaminophen (PERCOCET) 5-325 MG per tablet to match what insurance will pay for.          Action Taken: Message routed to:  [facility] ([facility]  pcc      Directions need to state-pt can take up to 5 tablets per day for RX to be ok'd for 30 day supply.  Dr Nascimento notified.  Marlys Lo RN 11:30 AM on 5/23/2018.

## 2018-05-25 NOTE — TELEPHONE ENCOUNTER
ibuprofen (ADVIL/MOTRIN) 600 MG tablet  Last Written Prescription Date:  4/10/18  Last Fill Quantity: 90,   # refills: 0  Last Office Visit : 4/13/18  Future Office visit: none    Routing because:  Labs. bp

## 2018-05-29 RX ORDER — IBUPROFEN 600 MG/1
600 TABLET, FILM COATED ORAL 3 TIMES DAILY PRN
Qty: 90 TABLET | Refills: 1 | Status: SHIPPED | OUTPATIENT
Start: 2018-05-29 | End: 2018-08-09

## 2018-05-30 ENCOUNTER — TELEPHONE (OUTPATIENT)
Dept: INTERNAL MEDICINE | Facility: CLINIC | Age: 73
End: 2018-05-30

## 2018-05-30 DIAGNOSIS — M54.50 ACUTE MIDLINE LOW BACK PAIN WITHOUT SCIATICA: ICD-10-CM

## 2018-05-30 NOTE — TELEPHONE ENCOUNTER
Reason For Call:   Chief Complaint   Patient presents with     Refill Request       Medication Name, Dose and Monthly Quantity:   Oxycodone with APAP (Percocet): Dose 5-325 Schedule 1 tablet by mouth every 6 hours prn Monthly Quantity 124   Diagnosis requiring opiates:   Acute midline low back pain without sciatica [M54.5]  - Primary     Problem List Updated:   No    Opioid Agreement On File - [facility] PAIN CONTRACT ID# [id number]:  No    Last Urine Drug Screen (at least once every 12 months) Date:   n/a  Unexpected Results:   n/a    [place]  Data Reviewed (at least once every 3 months) Date:   5/30/18     Unexpected Results:    No.    Last Fill Date:   5/23/18  Due Date:   6/22/18    Last Visit with PCP:   4/13/18    Future Visits with PCP:   No.    Processing:   Mail to patient.    Jessica Hutchison LPN

## 2018-06-04 ENCOUNTER — TELEPHONE (OUTPATIENT)
Dept: INTERNAL MEDICINE | Facility: CLINIC | Age: 73
End: 2018-06-04

## 2018-06-04 RX ORDER — OXYCODONE AND ACETAMINOPHEN 5; 325 MG/1; MG/1
1 TABLET ORAL EVERY 6 HOURS PRN
Qty: 150 TABLET | Refills: 0 | Status: SHIPPED | OUTPATIENT
Start: 2018-06-22 | End: 2018-06-29

## 2018-06-04 NOTE — TELEPHONE ENCOUNTER
[facility]    Phone Message    May a detailed message be left on voicemail: yes    Reason for Call: Other: Please follow up with pt, he has a medical question (pt didnt want to state what it was)     Action Taken: Message routed to:  [facility] claire [facility]

## 2018-06-05 ENCOUNTER — TELEPHONE (OUTPATIENT)
Dept: INTERNAL MEDICINE | Facility: CLINIC | Age: 73
End: 2018-06-05

## 2018-06-05 NOTE — TELEPHONE ENCOUNTER
[facility]    Phone Message    May a detailed message be left on voicemail: yes    Reason for Call: Other: Concerned about his prostate     Action Taken: Message routed to:  [facility] giovanni

## 2018-06-05 NOTE — TELEPHONE ENCOUNTER
Patient is having issue with low urine flow.  Denies pain, fever, odor.  He has been using an over the counter medication that has been working, but has been advised by a friend to update his primary care doctor.  Scheduled next available with Dr. Nascimento.  Patient verbalizes understanding.  Jessica Hutchison LPN

## 2018-06-11 ENCOUNTER — OFFICE VISIT (OUTPATIENT)
Dept: INTERNAL MEDICINE | Facility: CLINIC | Age: 73
End: 2018-06-11
Payer: COMMERCIAL

## 2018-06-11 VITALS
HEIGHT: 63 IN | OXYGEN SATURATION: 97 % | HEART RATE: 80 BPM | DIASTOLIC BLOOD PRESSURE: 76 MMHG | SYSTOLIC BLOOD PRESSURE: 134 MMHG | BODY MASS INDEX: 43.87 KG/M2 | WEIGHT: 247.6 LBS

## 2018-06-11 DIAGNOSIS — Z12.5 SPECIAL SCREENING FOR MALIGNANT NEOPLASM OF PROSTATE: ICD-10-CM

## 2018-06-11 DIAGNOSIS — N40.1 BENIGN PROSTATIC HYPERPLASIA WITH URINARY FREQUENCY: Primary | ICD-10-CM

## 2018-06-11 DIAGNOSIS — R35.0 BENIGN PROSTATIC HYPERPLASIA WITH URINARY FREQUENCY: Primary | ICD-10-CM

## 2018-06-11 LAB — PSA SERPL-ACNC: 4.28 UG/L (ref 0–4)

## 2018-06-11 ASSESSMENT — PAIN SCALES - GENERAL: PAINLEVEL: EXTREME PAIN (9)

## 2018-06-11 NOTE — MR AVS SNAPSHOT
After Visit Summary   6/11/2018    Jonathan Bishop    MRN: [id number]           Patient Information     Date Of Birth          1945        Visit Information        Provider Department      6/11/2018 9:30 AM Buck Nascimento MD [facility]        Today's Diagnoses     Benign prostatic hyperplasia with urinary frequency    -  1    Special screening for malignant neoplasm of prostate          Care Instructions    Please go to the lab on the first floor before you leave today.             Follow-ups after your visit        Your next 10 appointments already scheduled     Jun 11, 2018 10:45 AM CDT   LAB with  LAB   [facility] ([facility])    [address] [place] [address]  [address] [facility] [id number] [phone]           Please do not eat 10-12 hours before your appointment if you are coming in fasting for labs on lipids, cholesterol, or glucose (sugar). This does not apply to pregnant women. Water, hot tea and black coffee (with nothing added) are okay. Do not drink other fluids, diet soda or chew gum.            Jul 30, 2018  7:00 AM CDT   (Arrive by 6:45 AM)   RETURN FOOT/ANKLE with Mamadou Gary DPM   [facility] ([facility])    [address] [place] [address]  [address] [facility] [address] [phone]              Future tests that were ordered for you today     Open Future Orders        Priority Expected Expires Ordered    PSA screen Routine 6/11/2018 6/11/2019 6/11/2018            Who to contact     Please call your clinic at [phone] to:    Ask questions about your health    Make or cancel appointments    Discuss your medicines    Learn about your test results    Speak to your doctor            Additional Information About Your Visit        MyChart Information     [employer] gives you secure access to your electronic health record. If you see a primary care provider, you can also send messages to  "your care team and make appointments. If you have questions, please call your primary care clinic.  If you do not have a primary care provider, please call [phone] and they will assist you.      [employer] is an electronic gateway that provides easy, online access to your medical records. With [employer], you can request a clinic appointment, read your test results, renew a prescription or communicate with your care team.     To access your existing account, please contact your [facility] or call [phone] for assistance.        Care EveryWhere ID     This is your Care EveryWhere ID. This could be used by other organizations to access your [place] medical records  [id number]        Your Vitals Were     Pulse Height Pulse Oximetry BMI (Body Mass Index)          80 1.588 m (5' 2.5\") 97% 44.56 kg/m2         Blood Pressure from Last 3 Encounters:   06/11/18 134/76   04/13/18 152/72   04/14/17 135/66    Weight from Last 3 Encounters:   06/11/18 112.3 kg (247 lb 9.6 oz)   04/13/18 109.8 kg (242 lb)   04/14/17 104.8 kg (231 lb)                 Today's Medication Changes          These changes are accurate as of 6/11/18  9:50 AM.  If you have any questions, ask your nurse or doctor.               These medicines have changed or have updated prescriptions.        Dose/Directions    hydrochlorothiazide 25 MG tablet   Commonly known as:  HYDRODIURIL   This may have changed:  Another medication with the same name was removed. Continue taking this medication, and follow the directions you see here.   Used for:  Essential hypertension, benign   Changed by:  Buck Nascimento MD        Dose:  25 mg   Take 1 tablet (25 mg) by mouth daily   Quantity:  90 tablet   Refills:  3                Primary Care Provider Office Phone # Fax #    Buck Nascimento [phone]       [address] [place] [address]  [facility] [id number]        Equal Access to Services     TIP GALLEGOS AH: Pineda peacock " Somarlee, wafelyda luqadaha, qaybta kaalran ppoe, hans harveyin hayaan vineetkunal nehaopal lajosefcee mark. So [facility] [phone].    ATENCIÓN: Si gavino [place], tiene a aguilar disposición servicios gratuitos de asistencia lingüística. Amber al [phone].    We comply with applicable federal civil rights laws and [place] laws. We do not discriminate on the basis of race, color, national origin, age, disability, sex, sexual orientation, or gender identity.            Thank you!     Thank you for choosing [facility]  for your care. Our goal is always to provide you with excellent care. Hearing back from our patients is one way we can continue to improve our services. Please take a few minutes to complete the written survey that you may receive in the mail after your visit with us. Thank you!             Your Updated Medication List - Protect others around you: Learn how to safely use, store and throw away your medicines at www.disposemymeds.org.          This list is accurate as of 6/11/18  9:50 AM.  Always use your most recent med list.                   Brand Name Dispense Instructions for use Diagnosis    aspirin 325 MG EC tablet     90 tablet    Take 1 tablet (325 mg) by mouth daily    Cerebrovascular accident (CVA) due to occlusion of right anterior cerebral artery (H)       atorvastatin 40 MG tablet    LIPITOR    90 tablet    Take 1 tablet (40 mg) by mouth daily    Cerebrovascular accident (CVA) due to occlusion of right anterior cerebral artery (H)       hydrochlorothiazide 25 MG tablet    HYDRODIURIL    90 tablet    Take 1 tablet (25 mg) by mouth daily    Essential hypertension, benign       ibuprofen 600 MG tablet    ADVIL/MOTRIN    90 tablet    Take 1 tablet (600 mg) by mouth 3 times daily as needed for moderate pain    Lumbago       multivitamin, therapeutic with minerals Tabs tablet     100 tablet    Take 1 tablet by mouth daily.    Routine general medical examination at a health care facility        order for DME     1 Units    Equipment being ordered: Walker () Treatment Diagnosis: stroke    Cerebrovascular accident (CVA) due to occlusion of right anterior cerebral artery (H)       other medical supplies     4 each    Dispense knee high, medium compression, medium size    Peripheral edema       oxyCODONE-acetaminophen 5-325 MG per tablet   Start taking on:  6/22/2018    PERCOCET    150 tablet    Take 1 tablet by mouth every 6 hours as needed for pain    Acute midline low back pain without sciatica       sildenafil 100 MG tablet    VIAGRA    6 tablet    Take 1 tablet (100 mg) by mouth daily as needed for erectile dysfunction Take 30 min to 4 hours before intercourse.    Erectile dysfunction, unspecified erectile dysfunction type

## 2018-06-11 NOTE — NURSING NOTE
Rooming Note  Blood Pressure   BP Readings from Last 1 Encounters:   06/11/18 142/77    Single BP recheck started, 9:29 AM (2 minutes)

## 2018-06-11 NOTE — PROGRESS NOTES
ASSESSMENT/PLAN:  Likely BPH but have not checked the PSA.  If PSA normal, then send tamsulosin 0.4mg to [facility] in [place]  The low back pain is better with the change from 20mg to 25mg daily of oxycodone.  He has been very stable on this for years.    Total time spent 15 minutes.  More than 50% of the time spent with Mr. Bishop on counseling / coordinating his care    Buck Nascimento MD, FACP      Chief complaint:  Jonathan Bishop is a 72 year old male presents for   Chief Complaint   Patient presents with     Prostate Problem        SUBJECTIVE:  He has been problems with constant urination.  He will urinate every 2 hours with a lot that comes out and sometimes he could go more times (5 hours).  This will get in the way of his fishing.  No hesitancy.  He wonders about incomplete emptying - sometimes very soon afterwards he will have to go again.  The stream will be less too.    Medications and allergies were reviewed by me today.     Patient Active Problem List    Diagnosis Date Noted     Dermatophytosis of nail 04/05/2017     Priority: Medium     PVD (peripheral vascular disease) (H) 04/05/2017     Priority: Medium     Cerebrovascular accident involving anterior circulation, right (H) 12/09/2016     Priority: Medium     Stroke (H) 11/30/2016     Priority: Medium     Low back pain 05/14/2014     Priority: Medium     Osteoarthritis of knee 05/14/2014     Priority: Medium     Medication refill- do not delete  11/13/2013     Priority: Medium     Essential hypertension, benign 03/09/2012     Priority: Medium     Hyperlipidemia with target LDL less than 130 03/09/2012     Priority: Medium     Diagnosis updated by automated process. Provider to review and confirm.       Anemia 03/09/2012     Priority: Medium     Problem list name updated by automated process. Provider to review       Wound, surgical, infected 06/10/2011     Priority: Medium     hernia         PHYSICAL EXAM:  /76  Pulse 80  Ht 1.588 m (5'  "2.5\")  Wt 112.3 kg (247 lb 9.6 oz)  SpO2 97%  BMI 44.56 kg/m2  "

## 2018-06-14 DIAGNOSIS — N40.1 HYPERPLASIA OF PROSTATE WITH LOWER URINARY TRACT SYMPTOMS (LUTS): Primary | ICD-10-CM

## 2018-06-14 RX ORDER — TAMSULOSIN HYDROCHLORIDE 0.4 MG/1
0.4 CAPSULE ORAL DAILY
Qty: 90 CAPSULE | Refills: 3 | Status: SHIPPED | OUTPATIENT
Start: 2018-06-14 | End: 2019-06-16

## 2018-06-22 ENCOUNTER — TELEPHONE (OUTPATIENT)
Dept: INTERNAL MEDICINE | Facility: CLINIC | Age: 73
End: 2018-06-22

## 2018-06-22 NOTE — TELEPHONE ENCOUNTER
[facility] Call [facility]    Phone Message    May a detailed message be left on voicemail: yes    Reason for Call: Other: The [facility] pharmacist is calling about how the RX is written for the percocet. The pt's insurance allows a max of 30 days per RX, the way the RX is written it is for 38 days. The rx should be written for a max of 5 daily. Right now it is a max of 4 per day. The pharmacist states he called last month for this also to get a verbal approval.  Please call the pharmacy at [phone]. The pt is there are the pharmacy now. Thanks!     Action Taken: Message routed to:  [facility] claire [facility] med

## 2018-06-22 NOTE — TELEPHONE ENCOUNTER
[facility]    Phone Message    May a detailed message be left on voicemail: yes    Reason for Call: Medication Question or concern regarding medication   Prescription Clarification  Name of Medication: Percocet  Prescribing Provider: Dr. Buck Nascimento   Pharmacy: na   What on the order needs clarification? Insurance is limiting Rx to a 30 day supply.  Please call pt to discuss suggested quantity.          Action Taken: Message routed to:  [facility]

## 2018-06-29 DIAGNOSIS — M54.50 ACUTE MIDLINE LOW BACK PAIN WITHOUT SCIATICA: ICD-10-CM

## 2018-06-29 NOTE — TELEPHONE ENCOUNTER
Reason For Call:   Chief Complaint   Patient presents with     Refill Request       Medication Name, Dose and Monthly Quantity:   Oxycodone with APAP (Percocet): Dose 5-325 Schedule 1 tablet by mouth every 6 hours prn Monthly Quantity 124   Diagnosis requiring opiates:   Acute midline low back pain without sciatica [M54.5]  - Primary     Problem List Updated:   No    Opioid Agreement On File - [facility] PAIN CONTRACT ID# [id number]:  No    Last Urine Drug Screen (at least once every 12 months) Date:   n/a  Unexpected Results:   n/a    [place]  Data Reviewed (at least once every 3 months) Date:   5/30/18     Unexpected Results:    No.    Last Fill Date:   6/22/18  Due Date:   7/22/18    Last Visit with PCP:   4/13/18    Future Visits with PCP:   No.    Processing:   Mail to patient.    Jessica Hutchison LPN

## 2018-07-02 RX ORDER — OXYCODONE AND ACETAMINOPHEN 5; 325 MG/1; MG/1
1-2 TABLET ORAL EVERY 6 HOURS PRN
Qty: 150 TABLET | Refills: 0 | Status: SHIPPED | OUTPATIENT
Start: 2018-07-22 | End: 2018-08-08

## 2018-08-08 DIAGNOSIS — M54.50 ACUTE MIDLINE LOW BACK PAIN WITHOUT SCIATICA: ICD-10-CM

## 2018-08-08 NOTE — TELEPHONE ENCOUNTER
Reason For Call:   Chief Complaint   Patient presents with     Refill Request       Medication Name, Dose and Monthly Quantity:   Oxycodone with APAP (Percocet): Dose 5-325 Schedule 1 tablet by mouth every 6 hours prn Monthly Quantity 124   Diagnosis requiring opiates:   Acute midline low back pain without sciatica [M54.5]  - Primary     Problem List Updated:   No    Opioid Agreement On File - [facility] PAIN CONTRACT ID# [id number]:  No    Last Urine Drug Screen (at least once every 12 months) Date:   n/a  Unexpected Results:   n/a    [place]  Data Reviewed (at least once every 3 months) Date:   5/30/18     Unexpected Results:    No.    Last Fill Date:   07/22/18  Due Date:   08/21/2018    Last Visit with PCP:   4/13/18    Future Visits with PCP:   No.    Processing:   Mail to patient.    STEPH JACK [facility]

## 2018-08-09 DIAGNOSIS — M54.50 LUMBAGO: ICD-10-CM

## 2018-08-10 RX ORDER — IBUPROFEN 600 MG/1
600 TABLET, FILM COATED ORAL 3 TIMES DAILY PRN
Qty: 90 TABLET | Refills: 1 | Status: SHIPPED | OUTPATIENT
Start: 2018-08-10 | End: 2018-10-26

## 2018-08-10 RX ORDER — IBUPROFEN 600 MG/1
600 TABLET, FILM COATED ORAL 3 TIMES DAILY PRN
Qty: 270 TABLET | Refills: 3 | Status: SHIPPED | OUTPATIENT
Start: 2018-08-10 | End: 2018-08-10

## 2018-08-10 NOTE — TELEPHONE ENCOUNTER
IBUPROFEN 600MG TAB  Last Written Prescription Date:  5/29/2018  Last Fill Quantity: 90,   # refills: 1  Last Office Visit : 6/11/2018  Future Office visit:  None    Routing refill request to provider for review/approval because:  Labs per protocol: within one year: AST, ALT, CBC, CR

## 2018-08-13 RX ORDER — OXYCODONE AND ACETAMINOPHEN 5; 325 MG/1; MG/1
1-2 TABLET ORAL EVERY 6 HOURS PRN
Qty: 150 TABLET | Refills: 0 | Status: SHIPPED | OUTPATIENT
Start: 2018-08-21 | End: 2018-09-07

## 2018-08-14 ENCOUNTER — DOCUMENTATION ONLY (OUTPATIENT)
Dept: INTERNAL MEDICINE | Facility: CLINIC | Age: 73
End: 2018-08-14

## 2018-09-05 ENCOUNTER — TELEPHONE (OUTPATIENT)
Dept: ORTHOPEDICS | Facility: CLINIC | Age: 73
End: 2018-09-05

## 2018-09-05 DIAGNOSIS — M54.50 CHRONIC BILATERAL LOW BACK PAIN WITHOUT SCIATICA: ICD-10-CM

## 2018-09-05 DIAGNOSIS — G89.29 CHRONIC BILATERAL LOW BACK PAIN WITHOUT SCIATICA: ICD-10-CM

## 2018-09-05 DIAGNOSIS — M17.0 PRIMARY OSTEOARTHRITIS OF BOTH KNEES: Primary | ICD-10-CM

## 2018-09-05 NOTE — TELEPHONE ENCOUNTER
Patient has been using cane and has pain in hips, is getting harder to use only the cane. States that if he had the walker he would be able to sit and rest if he needed to. Order pended, awaiting provider approval. Patient will call back will where he would like DME sent to. Argentina Boyd LPN 9/5/2018 10:41 AM

## 2018-09-05 NOTE — TELEPHONE ENCOUNTER
[facility]    Phone Message    May a detailed message be left on voicemail: yes    Reason for Call: Other: Pt is requesting an order for a walker witha seat. Pt stated he is having trouble with walking. Please call pt back regarding this.     Action Taken: Message routed to:  [facility] ARASELI

## 2018-09-07 DIAGNOSIS — M54.50 ACUTE MIDLINE LOW BACK PAIN WITHOUT SCIATICA: ICD-10-CM

## 2018-09-07 NOTE — TELEPHONE ENCOUNTER
Reason For Call:   Chief Complaint   Patient presents with     Refill Request       Medication Name, Dose and Monthly Quantity:   Oxycodone with APAP (Percocet): Dose 5-325 Schedule 1 tablet by mouth every 6 hours prn Monthly Quantity 124   Diagnosis requiring opiates:   Acute midline low back pain without sciatica [M54.5]  - Primary     Problem List Updated:   No    Opioid Agreement On File - [facility] PAIN CONTRACT ID# [id number]:  No    Last Urine Drug Screen (at least once every 12 months) Date:   n/a  Unexpected Results:   n/a    [place]  Data Reviewed (at least once every 3 months) Date:   09/07/18    Unexpected Results:    No.    Last Fill Date:   08/21/18  Due Date:   09/20/18    Last Visit with PCP:   4/13/18    Future Visits with PCP:   No.    Processing:   Mail to patient.    STEPH JACK [facility]

## 2018-09-10 RX ORDER — OXYCODONE AND ACETAMINOPHEN 5; 325 MG/1; MG/1
1-2 TABLET ORAL EVERY 6 HOURS PRN
Qty: 150 TABLET | Refills: 0 | Status: SHIPPED | OUTPATIENT
Start: 2018-09-20 | End: 2018-10-03

## 2018-09-10 NOTE — TELEPHONE ENCOUNTER
Patient would like order sent to his home. Order was placed in mail. Argentina Boyd LPN 9/10/2018 10:10 AM

## 2018-09-14 ENCOUNTER — DOCUMENTATION ONLY (OUTPATIENT)
Dept: INTERNAL MEDICINE | Facility: CLINIC | Age: 73
End: 2018-09-14

## 2018-09-14 NOTE — PROGRESS NOTES
Percocet RX placed in mail.  My chart message sent notifying patient    STEPH JACK at 7:28 AM on 9/14/2018.

## 2018-10-03 DIAGNOSIS — M54.50 ACUTE MIDLINE LOW BACK PAIN WITHOUT SCIATICA: ICD-10-CM

## 2018-10-05 RX ORDER — OXYCODONE AND ACETAMINOPHEN 5; 325 MG/1; MG/1
1-2 TABLET ORAL EVERY 6 HOURS PRN
Qty: 150 TABLET | Refills: 0 | Status: SHIPPED | OUTPATIENT
Start: 2018-10-20 | End: 2018-11-02

## 2018-10-26 DIAGNOSIS — M54.50 LUMBAGO: ICD-10-CM

## 2018-10-29 NOTE — TELEPHONE ENCOUNTER
ibuprofen (ADVIL/MOTRIN) 600 MG tablet    Last Written Prescription Date:  8/10/18   Last Fill Quantity: 90,   # refills: 1  Last Office Visit : 6/11/18  Future Office visit:  None scheduled    Routing refill request to provider for review/approval because:  Failed protocol.  Due to age.  Missing labs in last year.    FYI also requesting 90 day supply if possible.

## 2018-10-31 RX ORDER — IBUPROFEN 600 MG/1
TABLET, FILM COATED ORAL
Qty: 270 TABLET | Refills: 1 | Status: SHIPPED | OUTPATIENT
Start: 2018-10-31 | End: 2019-08-07

## 2018-11-02 DIAGNOSIS — M54.50 ACUTE MIDLINE LOW BACK PAIN WITHOUT SCIATICA: ICD-10-CM

## 2018-11-02 NOTE — TELEPHONE ENCOUNTER
Reason For Call:   Chief Complaint   Patient presents with     Refill Request       Medication Name, Dose and Monthly Quantity:   Oxycodone with APAP (Percocet): Dose 5-325 Schedule 1 tablet by mouth every 6 hours prn Monthly Quantity 124   Diagnosis requiring opiates:   Acute midline low back pain without sciatica [M54.5]  - Primary     Problem List Updated:   No    Opioid Agreement On File - [facility] PAIN CONTRACT ID# [id number]:  No    Last Urine Drug Screen (at least once every 12 months) Date:   n/a  Unexpected Results:   n/a    [place]  Data Reviewed (at least once every 3 months) Date:   11/02/18    Unexpected Results:    No.    Last Fill Date:   10/20/18  Due Date:   11/19/18    Last Visit with PCP:   06/11/18    Future Visits with PCP:   No.    Processing:   Mail to patient.    STEPH JACK CMA      ----------------------------------

## 2018-11-06 RX ORDER — OXYCODONE AND ACETAMINOPHEN 5; 325 MG/1; MG/1
1-2 TABLET ORAL EVERY 6 HOURS PRN
Qty: 150 TABLET | Refills: 0 | Status: SHIPPED | OUTPATIENT
Start: 2018-11-19 | End: 2018-11-29

## 2018-11-29 DIAGNOSIS — M54.50 ACUTE MIDLINE LOW BACK PAIN WITHOUT SCIATICA: ICD-10-CM

## 2018-11-29 NOTE — TELEPHONE ENCOUNTER
-- Percocet RX mailed to patients home address.    STEPH JACK at 8:19 AM on 12/12/2018.          Reason For Call:   Chief Complaint   Patient presents with     Refill Request       Medication Name, Dose and Monthly Quantity:   Oxycodone with APAP (Percocet): Dose 5-325 Schedule 1 tablet by mouth every 6 hours prn Monthly Quantity 124   Diagnosis requiring opiates:   Acute midline low back pain without sciatica [M54.5]  - Primary     Problem List Updated:   No    Opioid Agreement On File - [facility] PAIN CONTRACT ID# [id number]:  No    Last Urine Drug Screen (at least once every 12 months) Date:   n/a  Unexpected Results:   n/a    [place]  Data Reviewed (at least once every 3 months) Date:   11/29/18    Unexpected Results:    No.    Last Fill Date:   11/19/18  Due Date:   12/19/18    Last Visit with PCP:   06/11/18    Future Visits with PCP:   No.    Processing:   Mail to patient.    STEPH JACK [facility]      ----------------------------------

## 2018-11-30 RX ORDER — OXYCODONE AND ACETAMINOPHEN 5; 325 MG/1; MG/1
1-2 TABLET ORAL EVERY 6 HOURS PRN
Qty: 150 TABLET | Refills: 0 | Status: SHIPPED | OUTPATIENT
Start: 2018-12-19 | End: 2019-01-02

## 2018-12-17 ENCOUNTER — OFFICE VISIT (OUTPATIENT)
Dept: ORTHOPEDICS | Facility: CLINIC | Age: 73
End: 2018-12-17
Payer: COMMERCIAL

## 2018-12-17 DIAGNOSIS — I73.9 PVD (PERIPHERAL VASCULAR DISEASE) (H): Primary | ICD-10-CM

## 2018-12-17 DIAGNOSIS — B35.1 DERMATOPHYTOSIS OF NAIL: ICD-10-CM

## 2018-12-17 NOTE — LETTER
12/17/2018       RE: Jonathan Bishop  [address]  [place] [facility] [place] [facility] [place] [address]     Dear Colleague,    Thank you for referring your patient, Jonathan Bishop, [facility]. Please see a copy of my visit note below.    Chief Complaint:   Chief Complaint   Patient presents with     RECHECK     6 month follow up.         No Known Allergies      Subjective: Jonathan is a 73 year old male who presents to the clinic today for a follow up of elongated nails. He relates that the nails are long and painful as he has not been able to get to clinic in 6 months.     Objective  PT and DP pulses are non-palpable bilaterally. CRT is >3 seconds. Diminished pedal hair. Mild non-pitting edema noted to BL LE.  Gross sensation is slightly decreased bilaterally.   Nails are thickened, discolored, dystrophic and elongated bilaterally to varying degrees. Nails have subungual debris consistent with onychomycosis. No open lesions are noted. Skin is normal.      Assessment: PVD  Onychomycosis x 10      Plan:  - Pt seen and evaluated.  - Nails were debrided x 10.  - Continue with the compression socks.   - See again in 3 months.     Again, thank you for allowing me to participate in the care of your patient.      Sincerely,    Mamadou Gary DPM

## 2018-12-17 NOTE — NURSING NOTE
Reason For Visit:   Chief Complaint   Patient presents with     RECHECK     6 month follow up.        Pain Assessment  Patient Currently in Pain: Yes  Primary Pain Location: Back  Pain Descriptors: Constant            Current Outpatient Medications   Medication Sig Dispense Refill     aspirin 325 MG EC tablet Take 1 tablet (325 mg) by mouth daily 90 tablet 3     atorvastatin (LIPITOR) 40 MG tablet Take 1 tablet (40 mg) by mouth daily 90 tablet 3     hydrochlorothiazide (HYDRODIURIL) 25 MG tablet Take 1 tablet (25 mg) by mouth daily 90 tablet 3     ibuprofen (ADVIL/MOTRIN) 600 MG tablet TAKE 1 TABLET BY MOUTH THREE TIMES DAILY AS NEEDED FOR MODERATE PAIN 270 tablet 1     multivitamin, therapeutic with minerals (MULTI-VITAMIN) TABS Take 1 tablet by mouth daily. 100 tablet 3     order for DME Equipment being ordered: Walker ()  Treatment Diagnosis: stroke (Patient not taking: Reported on 4/13/2018) 1 Units 0     other medical supplies Dispense knee high, medium compression, medium size (Patient not taking: Reported on 7/5/2017) 4 each 0     [START ON 12/19/2018] oxyCODONE-acetaminophen (PERCOCET) 5-325 MG tablet Take 1-2 tablets by mouth every 6 hours as needed for pain Max 5 per day. 150 tablet 0     sildenafil (VIAGRA) 100 MG tablet Take 1 tablet (100 mg) by mouth daily as needed for erectile dysfunction Take 30 min to 4 hours before intercourse. (Patient not taking: Reported on 4/13/2018) 6 tablet 11     tamsulosin (FLOMAX) 0.4 MG capsule Take 1 capsule (0.4 mg) by mouth daily 90 capsule 3        No Known Allergies

## 2018-12-17 NOTE — PROGRESS NOTES
Chief Complaint:   Chief Complaint   Patient presents with     RECHECK     6 month follow up.         No Known Allergies      Subjective: Jonathan is a 73 year old male who presents to the clinic today for a follow up of elongated nails. He relates that the nails are long and painful as he has not been able to get to clinic in 6 months.     Objective  PT and DP pulses are non-palpable bilaterally. CRT is >3 seconds. Diminished pedal hair. Mild non-pitting edema noted to BL LE.  Gross sensation is slightly decreased bilaterally.   Nails are thickened, discolored, dystrophic and elongated bilaterally to varying degrees. Nails have subungual debris consistent with onychomycosis. No open lesions are noted. Skin is normal.      Assessment: PVD  Onychomycosis x 10      Plan:  - Pt seen and evaluated.  - Nails were debrided x 10.  - Continue with the compression socks.   - See again in 3 months.

## 2019-01-02 DIAGNOSIS — M54.50 ACUTE MIDLINE LOW BACK PAIN WITHOUT SCIATICA: ICD-10-CM

## 2019-01-02 NOTE — TELEPHONE ENCOUNTER
Percocet mailed to patient home.    STEPH JACK at 7:44 AM on 1/14/2019.      Reason For Call:   Chief Complaint   Patient presents with     Refill Request       Medication Name, Dose and Monthly Quantity:   Oxycodone with APAP (Percocet): Dose 5-325 Schedule 1 tablet by mouth every 6 hours prn Monthly Quantity 124   Diagnosis requiring opiates:   Acute midline low back pain without sciatica [M54.5]  - Primary     Problem List Updated:   No    Opioid Agreement On File - [facility] PAIN CONTRACT ID# [id number]:  No    Last Urine Drug Screen (at least once every 12 months) Date:   n/a  Unexpected Results:   n/a    [place]  Data Reviewed (at least once every 3 months) Date:   01/18/19    Unexpected Results:    No.    Last Fill Date:   12/19/18  Due Date:   01/18/19    Last Visit with PCP:   06/11/18    Future Visits with PCP:   No.    Processing:   Mail to patient.    STEPH JACK [facility]      ----------------------------------

## 2019-01-07 RX ORDER — OXYCODONE AND ACETAMINOPHEN 5; 325 MG/1; MG/1
1-2 TABLET ORAL EVERY 6 HOURS PRN
Qty: 150 TABLET | Refills: 0 | Status: SHIPPED | OUTPATIENT
Start: 2019-01-18 | End: 2019-02-07

## 2019-02-07 DIAGNOSIS — M54.50 ACUTE MIDLINE LOW BACK PAIN WITHOUT SCIATICA: ICD-10-CM

## 2019-02-07 NOTE — TELEPHONE ENCOUNTER
Percocet RX mailed to patient's home address.    STEPH JACK at 10:33 AM on 2/11/2019.        Reason For Call:   Chief Complaint   Patient presents with     Refill Request       Medication Name, Dose and Monthly Quantity:   Oxycodone with APAP (Percocet): Dose 5-325 Schedule 1 tablet by mouth every 6 hours prn Monthly Quantity 124   Diagnosis requiring opiates:   Acute midline low back pain without sciatica [M54.5]  - Primary     Problem List Updated:   No    Opioid Agreement On File - [facility] PAIN CONTRACT ID# [id number]:  No    Last Urine Drug Screen (at least once every 12 months) Date:   n/a  Unexpected Results:   n/a    [place]  Data Reviewed (at least once every 3 months) Date:   02/07/2019    Unexpected Results:    No.    Last Fill Date:   01/18/19  Due Date:   02/17/19    Last Visit with PCP:   06/11/18    Future Visits with PCP:   No.    Processing:   Mail to patient.    STEPH JACK [facility]      ----------------------------------

## 2019-02-08 RX ORDER — OXYCODONE AND ACETAMINOPHEN 5; 325 MG/1; MG/1
1-2 TABLET ORAL EVERY 6 HOURS PRN
Qty: 150 TABLET | Refills: 0 | Status: SHIPPED | OUTPATIENT
Start: 2019-02-17 | End: 2019-03-04

## 2019-03-04 DIAGNOSIS — M54.50 ACUTE MIDLINE LOW BACK PAIN WITHOUT SCIATICA: ICD-10-CM

## 2019-03-04 RX ORDER — OXYCODONE AND ACETAMINOPHEN 5; 325 MG/1; MG/1
1-2 TABLET ORAL EVERY 6 HOURS PRN
Qty: 150 TABLET | Refills: 0 | Status: SHIPPED | OUTPATIENT
Start: 2019-03-20 | End: 2019-04-08

## 2019-03-04 NOTE — TELEPHONE ENCOUNTER
Reason For Call:   Chief Complaint   Patient presents with     Refill Request       Medication Name, Dose and Monthly Quantity:   Oxycodone with APAP (Percocet): Dose 5-325 Schedule 1 tablet by mouth every 6 hours prn Monthly Quantity 124   Diagnosis requiring opiates:   Acute midline low back pain without sciatica [M54.5]  - Primary     Problem List Updated:   No    Opioid Agreement On File - [facility] PAIN CONTRACT ID# [id number]:  No    Last Urine Drug Screen (at least once every 12 months) Date:   n/a  Unexpected Results:   n/a    [place]  Data Reviewed (at least once every 3 months) Date:   03/04/19    Unexpected Results:    No.    Last Fill Date:   02/17/19  Due Date:   03/20/19    Last Visit with PCP:   06/11/18    Future Visits with PCP:   No.    Processing:   Mail to patient.    STEPH JACK CMA      ----------------------------------

## 2019-03-25 ENCOUNTER — TELEPHONE (OUTPATIENT)
Dept: INTERNAL MEDICINE | Facility: CLINIC | Age: 74
End: 2019-03-25

## 2019-03-25 NOTE — TELEPHONE ENCOUNTER
[facility] Call Center    Phone Message    May a detailed message be left on voicemail: yes    Reason for Call: Form or Letter   Type or form/letter needing completion: Order for Lumbar and bilateral Knee support  Provider: RAGHAV VELAZQUEZ  Date form needed: ASAP  Once completed: Fax form to: [phone]      Action Taken: Message routed to:  [facility] ([facility]

## 2019-03-26 ENCOUNTER — MEDICAL CORRESPONDENCE (OUTPATIENT)
Dept: HEALTH INFORMATION MANAGEMENT | Facility: CLINIC | Age: 74
End: 2019-03-26

## 2019-04-03 ENCOUNTER — HOSPITAL ENCOUNTER (EMERGENCY)
Facility: CLINIC | Age: 74
Discharge: HOME | End: 2019-04-03
Attending: NURSE PRACTITIONER | Admitting: NURSE PRACTITIONER
Payer: COMMERCIAL

## 2019-04-03 ENCOUNTER — APPOINTMENT (OUTPATIENT)
Dept: GENERAL RADIOLOGY | Facility: CLINIC | Age: 74
End: 2019-04-03
Attending: NURSE PRACTITIONER
Payer: COMMERCIAL

## 2019-04-03 VITALS
BODY MASS INDEX: 40.18 KG/M2 | DIASTOLIC BLOOD PRESSURE: 67 MMHG | SYSTOLIC BLOOD PRESSURE: 129 MMHG | HEART RATE: 89 BPM | TEMPERATURE: 98.1 F | RESPIRATION RATE: 14 BRPM | HEIGHT: 66 IN | OXYGEN SATURATION: 95 % | WEIGHT: 250 LBS

## 2019-04-03 DIAGNOSIS — M25.551 HIP PAIN, RIGHT: ICD-10-CM

## 2019-04-03 PROCEDURE — 99284 EMERGENCY DEPT VISIT MOD MDM: CPT

## 2019-04-03 PROCEDURE — 72100 X-RAY EXAM L-S SPINE 2/3 VWS: CPT

## 2019-04-03 PROCEDURE — 73502 X-RAY EXAM HIP UNI 2-3 VIEWS: CPT

## 2019-04-03 PROCEDURE — 25000132 ZZH RX MED GY IP 250 OP 250 PS 637: Performed by: NURSE PRACTITIONER

## 2019-04-03 RX ORDER — ACETAMINOPHEN 500 MG
1000 TABLET ORAL ONCE
Status: COMPLETED | OUTPATIENT
Start: 2019-04-03 | End: 2019-04-03

## 2019-04-03 RX ORDER — LIDOCAINE 50 MG/G
1 PATCH TOPICAL EVERY 24 HOURS
Qty: 10 PATCH | Refills: 0 | Status: SHIPPED | OUTPATIENT
Start: 2019-04-03 | End: 2019-09-19

## 2019-04-03 RX ORDER — ACETAMINOPHEN 500 MG
500 TABLET ORAL EVERY 8 HOURS PRN
Qty: 60 TABLET | Refills: 0 | Status: SHIPPED | OUTPATIENT
Start: 2019-04-03 | End: 2019-09-19

## 2019-04-03 RX ADMIN — ACETAMINOPHEN 1000 MG: 500 TABLET, FILM COATED ORAL at 12:25

## 2019-04-03 ASSESSMENT — ENCOUNTER SYMPTOMS
ARTHRALGIAS: 1
NUMBNESS: 0
WEAKNESS: 0
SHORTNESS OF BREATH: 1
BACK PAIN: 0

## 2019-04-03 ASSESSMENT — MIFFLIN-ST. JEOR: SCORE: 1821.74

## 2019-04-03 NOTE — ED PROVIDER NOTES
"  History     Chief Complaint:  Right Hip Pain    HPI   Jonathan Bishop is a 73 year old male who presents for evaluation of right hip pain after a fall. Three weeks ago, he reports slipping on ice in a parking lot and falling onto his right side. Initially, he thought he was fine, however the pain in the right hip progressively worsened in the interim. Last night, he states the pain was \"excruciating\" and prompted today's presentation. Here, he states the pain is worse with movement of the leg. For pain management, he has been taking \"what he has been prescribed for his back;\" per chart review, this appears to be Percocet and ibuprofen. His last dose of medication was around 0800 today. He also notes bilateral lower extremity edema as well as lower back pain, however these are reportedly chronic concerns that he has discussed with his primary provider; he reports none of these concerns are worse or changed today. He denies any chest pain, shortness of breath, fever, abdominal or back pain, or other new concerns.     Allergies:  NKDA    Medications:    Aspirin (325 mg)  Atorvastatin  Hydrochlorothiazide  Flomax  Percocet    Past Medical History:    CAD  Hypercholesteremia  HTN  CVA  Umbilical hernia  PVD  Anemia    Past Surgical History:    Bilateral knee arthroplasty  Lumbar fusion  Umbilical herniorrhaphy  Lumbar laminectomy    Family History:    CAD    Social History:  Marital Status:   [4]  Former smoker.   Negative for alcohol use.     Review of Systems   Respiratory: Positive for shortness of breath.    Cardiovascular: Negative for chest pain and leg swelling.   Musculoskeletal: Positive for arthralgias (right hip). Negative for back pain (no change from chronic).   Neurological: Negative for weakness (no change from chronic) and numbness (no change from chronic).   All other systems reviewed and are negative.    Physical Exam     Patient Vitals for the past 24 hrs:   BP Temp Temp src Pulse Heart " "Rate Resp SpO2 Height Weight   04/03/19 1151 129/67 98.1  F (36.7  C) Temporal 89 89 14 95 % 1.676 m (5' 6\") 113.4 kg (250 lb)      Physical Exam  Nursing notes reviewed. Vitals reviewed.  General: Alert. Well kept.  Eyes:  Conjunctiva non-injected, non-icteric.  Neck/Throat: Moist mucous membranes. Normal voice.  Cardiac: Regular rhythm. Normal heart sounds.  Pulmonary: Clear and equal breath sounds bilaterally.   Abdomen: soft and non-tender.  Musculoskeletal:  No midline tenderness to the spine.  Tenderness over the lumbar paraspinal muscles to palapation without rash.  Tenderness to the right hip to palpation. Full strength flexion and extension at hip.  Minimal pain with varus and valgus stressing at the hip.  Normal movement at the knee/ankles. Bilateral knee replacements.   Skin:  Warm and dry without rashes.  Neuro:  Normal sensation throughout the legs.  5/5 strength at the hips/knees/ankles.  Normal gait.  Psych:  Normal affect.    Emergency Department Course   Imaging:  Radiographic findings were communicated with the patient who voiced understanding of the findings.  XR Pelvis w/ Hip Right, 1 view:   No fracture or dislocation. Minimal degenerative change.  Lumbosacral fusion hardware noted, as per radiology.     XR Lumbar Spine, 2-3 views:   Prior posterior fusion involving L4, L5, and S1.  Degenerative changes at L2-L3 and L3-L4 are similar to prior. No acute  osseous or alignment abnormality, as per radiology.    Interventions:  1225 Tylenol, 1,000 mg, PO    Emergency Department Course:  Nursing notes and vitals reviewed.   (1207) I performed an exam of the patient as documented above.      Medicine administered as documented above.      The patient was sent for lumbar spine and right hip x-rays while in the emergency department, findings above.      (1329) I rechecked the patient and discussed the results of his workup thus far.      Findings and plan explained to the Patient. Patient discharged home " with instructions regarding supportive care, medications, and reasons to return. The importance of close follow-up was reviewed. The patient was prescribed Tylenol and Lidoderm patches.      I personally reviewed the imaging results with the Patient and answered all related questions prior to discharge.     Impression & Plan      Medical Decision Making:  Jonathan Bishop is a 73 year old male who presents for evaluation after a fall. The patient notes his fall was approximately 3 weeks ago and he has had gradually worsening right hip pain, he has no focal neurologic deficits, but is tender to palpation over the right hip. There is no rash and he has 5/5 strength in the bilateral lower extremities. He does have bilateral lumbar spinal tenderness, but notes this is without change. X-ray of the lumbar spine shows degenerative changes with a prior posterior fusion with no acute osseous or alignment abnormality. X-ray of the right hip and pelvis shows no acute fracture or dislocation. The patient does take Percocet for chronic low back pain, and notes that his symptoms have not been dramatically improving with that. I will also encourage the patient to take Tylenol in appropriate dosing, in addition to the Percocet, and will give the patient Lidoderm patches. I will as lo have him follow-up with primary care in regards to these symptoms. Patient amenable to this plan and discharged home.     Diagnosis:    ICD-10-CM    1. Hip pain, right M25.551      Disposition:  discharged to home    Discharge Medications:     Medication List      Started    acetaminophen 500 MG tablet  Commonly known as:  TYLENOL  500 mg, Oral, EVERY 8 HOURS PRN     lidocaine 5 % patch  Commonly known as:  LIDODERM  1 patch, Transdermal, EVERY 24 HOURS          Scribe Disclosure:  I, Vandana Lopez, am serving as a scribe on 4/3/2019 at 12:07 PM to personally document services performed by Breann Uribe CNP based on my observations and the provider's  statements to me.     Vandana Lopez  4/3/2019    EMERGENCY DEPARTMENT       Breann Uribe, CNP  04/03/19 9594

## 2019-04-03 NOTE — ED AVS SNAPSHOT
Emergency Department  [address] [place] [id number]  Phone:  [phone]  Fax:  [phone]                                    Jonathan Bishop   MRN: [id number]    Department:   Emergency Department   Date of Visit:  4/3/2019           After Visit Summary Signature Page    I have received my discharge instructions, and my questions have been answered. I have discussed any challenges I see with this plan with the nurse or doctor.    ..........................................................................................................................................  Patient/Patient Representative Signature      ..........................................................................................................................................  Patient Representative Print Name and Relationship to Patient    ..................................................               ................................................  Date                                   Time    ..........................................................................................................................................  Reviewed by Signature/Title    ...................................................              ..............................................  Date                                               Time          22EPIC Rev 08/18

## 2019-04-08 DIAGNOSIS — M54.50 ACUTE MIDLINE LOW BACK PAIN WITHOUT SCIATICA: ICD-10-CM

## 2019-04-08 RX ORDER — OXYCODONE AND ACETAMINOPHEN 5; 325 MG/1; MG/1
1-2 TABLET ORAL EVERY 6 HOURS PRN
Qty: 150 TABLET | Refills: 0 | Status: SHIPPED | OUTPATIENT
Start: 2019-04-19 | End: 2019-05-06

## 2019-04-08 NOTE — TELEPHONE ENCOUNTER
RX for Percocet mailed to patient home address.    STEPH JACK at 10:27 AM on 4/12/2019.      Reason For Call:   Chief Complaint   Patient presents with     Refill Request       Medication Name, Dose and Monthly Quantity:   Oxycodone with APAP (Percocet): Dose 5-325 Schedule 1 tablet by mouth every 6 hours prn Monthly Quantity 124   Diagnosis requiring opiates:   Acute midline low back pain without sciatica [M54.5]  - Primary     Problem List Updated:   No    Opioid Agreement On File - [facility] PAIN CONTRACT ID# [id number]:  No    Last Urine Drug Screen (at least once every 12 months) Date:   n/a  Unexpected Results:   n/a    [place]  Data Reviewed (at least once every 3 months) Date:   04/08/19    Unexpected Results:    No.    Last Fill Date:   03/20/19  Due Date:   04/19/19    Last Visit with PCP:   06/11/18    Future Visits with PCP:   No.    Processing:   Mail to patient.    STEPH JACK [facility]      ----------------------------------

## 2019-05-06 DIAGNOSIS — M54.50 ACUTE MIDLINE LOW BACK PAIN WITHOUT SCIATICA: ICD-10-CM

## 2019-05-06 RX ORDER — OXYCODONE AND ACETAMINOPHEN 5; 325 MG/1; MG/1
1-2 TABLET ORAL EVERY 6 HOURS PRN
Qty: 150 TABLET | Refills: 0 | Status: SHIPPED | OUTPATIENT
Start: 2019-05-18 | End: 2019-06-07

## 2019-05-06 NOTE — TELEPHONE ENCOUNTER
RX for Percocet mailed to patient's home address.    STEPH JACK at 8:31 AM on 5/9/2019.      Reason For Call:   Chief Complaint   Patient presents with     Refill Request       Medication Name, Dose and Monthly Quantity:   Oxycodone with APAP (Percocet): Dose 5-325 Schedule 1 tablet by mouth every 6 hours prn Monthly Quantity 124   Diagnosis requiring opiates:   Acute midline low back pain without sciatica [M54.5]  - Primary     Problem List Updated:   No    Opioid Agreement On File - [facility] PAIN CONTRACT ID# [id number]:  No    Last Urine Drug Screen (at least once every 12 months) Date:   n/a  Unexpected Results:   n/a    [place]  Data Reviewed (at least once every 3 months) Date:   05/06/19    Unexpected Results:    No.    Last Fill Date:   04/19/19  Due Date:   05/19/19    Last Visit with PCP:   06/11/18    Future Visits with PCP:   No.    Processing:   Mail to patient.    STEPH JACK [facility]      ----------------------------------

## 2019-05-27 DIAGNOSIS — I63.521 CEREBROVASCULAR ACCIDENT (CVA) DUE TO OCCLUSION OF RIGHT ANTERIOR CEREBRAL ARTERY (H): ICD-10-CM

## 2019-05-28 RX ORDER — ATORVASTATIN CALCIUM 40 MG/1
TABLET, FILM COATED ORAL
Qty: 90 TABLET | Refills: 0 | Status: SHIPPED | OUTPATIENT
Start: 2019-05-28 | End: 2019-07-01

## 2019-05-28 NOTE — TELEPHONE ENCOUNTER
Last Clinic Visit: 6/11/2018 [facility]  Failed protocol for overdue LDL. Message to clinic CMA. No future apt scheduled, message to pharmacy and CMA to forward to clinic coordinators.

## 2019-06-07 DIAGNOSIS — M54.50 ACUTE MIDLINE LOW BACK PAIN WITHOUT SCIATICA: ICD-10-CM

## 2019-06-07 NOTE — TELEPHONE ENCOUNTER
RX for Percocet mailed to patient's home address.    STEPH JACK at 7:55 AM on 6/11/2019.        Reason For Call:   Chief Complaint   Patient presents with     Refill Request       Medication Name, Dose and Monthly Quantity:   Oxycodone with APAP (Percocet): Dose 5-325 Schedule 1 tablet by mouth every 6 hours prn Monthly Quantity 124   Diagnosis requiring opiates:   Acute midline low back pain without sciatica [M54.5]  - Primary     Problem List Updated:   No    Opioid Agreement On File - [facility] PAIN CONTRACT ID# [id number]:  No    Last Urine Drug Screen (at least once every 12 months) Date:   n/a  Unexpected Results:   n/a    [place]  Data Reviewed (at least once every 3 months) Date:   06/07/19    Unexpected Results:    No.    Last Fill Date:   05/18/19  Due Date:   06/17/19    Last Visit with PCP:   06/11/18    Future Visits with PCP:   No.    Processing:   Mail to patient.    STEPH JACK [facility]      ----------------------------------

## 2019-06-10 RX ORDER — OXYCODONE AND ACETAMINOPHEN 5; 325 MG/1; MG/1
1-2 TABLET ORAL EVERY 6 HOURS PRN
Qty: 150 TABLET | Refills: 0 | Status: SHIPPED | OUTPATIENT
Start: 2019-06-17 | End: 2019-07-08

## 2019-06-16 DIAGNOSIS — N40.1 HYPERPLASIA OF PROSTATE WITH LOWER URINARY TRACT SYMPTOMS (LUTS): ICD-10-CM

## 2019-06-16 DIAGNOSIS — I10 ESSENTIAL HYPERTENSION, BENIGN: ICD-10-CM

## 2019-06-18 RX ORDER — HYDROCHLOROTHIAZIDE 25 MG/1
TABLET ORAL
Qty: 90 TABLET | Refills: 0 | Status: SHIPPED | OUTPATIENT
Start: 2019-06-18 | End: 2019-07-01

## 2019-06-18 NOTE — TELEPHONE ENCOUNTER
tamsulosin (FLOMAX) 0.4 MG capsule      Last Written Prescription Date:  6/14/18  Last Fill Quantity: 90,   # refills: 3  Last Office Visit : 6/11/18  Future Office visit:  none    Routing refill request to provider for review/approval because:  Failed protocol.     taking sildenafil    Overdue appt.      hydrochlorothiazide (HYDRODIURIL) 25 MG tablet      Last Written Prescription Date:  4/13/18  Last Fill Quantity: 90,   # refills: 3    90 day to pharmacy. Patient instructed to call clinic to schedule appointment/labs.     FYI to [facility] for labs: creatinine, sodium and potassium (BMP)    Scheduling has been notified to contact the pt for appointment.

## 2019-06-19 RX ORDER — TAMSULOSIN HYDROCHLORIDE 0.4 MG/1
CAPSULE ORAL
Qty: 90 CAPSULE | Refills: 0 | Status: SHIPPED | OUTPATIENT
Start: 2019-06-19 | End: 2019-07-01

## 2019-07-01 ENCOUNTER — OFFICE VISIT (OUTPATIENT)
Dept: INTERNAL MEDICINE | Facility: CLINIC | Age: 74
End: 2019-07-01
Payer: COMMERCIAL

## 2019-07-01 VITALS
SYSTOLIC BLOOD PRESSURE: 127 MMHG | HEART RATE: 86 BPM | DIASTOLIC BLOOD PRESSURE: 83 MMHG | BODY MASS INDEX: 40.14 KG/M2 | WEIGHT: 248.7 LBS | RESPIRATION RATE: 16 BRPM

## 2019-07-01 DIAGNOSIS — I63.521 CEREBROVASCULAR ACCIDENT (CVA) DUE TO OCCLUSION OF RIGHT ANTERIOR CEREBRAL ARTERY (H): ICD-10-CM

## 2019-07-01 DIAGNOSIS — I35.0 NONRHEUMATIC AORTIC VALVE STENOSIS: ICD-10-CM

## 2019-07-01 DIAGNOSIS — N52.9 ERECTILE DYSFUNCTION, UNSPECIFIED ERECTILE DYSFUNCTION TYPE: ICD-10-CM

## 2019-07-01 DIAGNOSIS — N40.1 HYPERPLASIA OF PROSTATE WITH LOWER URINARY TRACT SYMPTOMS (LUTS): ICD-10-CM

## 2019-07-01 DIAGNOSIS — R60.0 PERIPHERAL EDEMA: ICD-10-CM

## 2019-07-01 DIAGNOSIS — I10 ESSENTIAL HYPERTENSION, BENIGN: ICD-10-CM

## 2019-07-01 DIAGNOSIS — Z00.01 ENCOUNTER FOR ROUTINE ADULT MEDICAL EXAM WITH ABNORMAL FINDINGS: Primary | ICD-10-CM

## 2019-07-01 DIAGNOSIS — M54.50 CHRONIC BILATERAL LOW BACK PAIN WITHOUT SCIATICA: ICD-10-CM

## 2019-07-01 DIAGNOSIS — Z12.5 SPECIAL SCREENING FOR MALIGNANT NEOPLASM OF PROSTATE: ICD-10-CM

## 2019-07-01 DIAGNOSIS — R01.1 SYSTOLIC MURMUR: ICD-10-CM

## 2019-07-01 DIAGNOSIS — G89.29 CHRONIC BILATERAL LOW BACK PAIN WITHOUT SCIATICA: ICD-10-CM

## 2019-07-01 LAB
ANION GAP SERPL CALCULATED.3IONS-SCNC: 5 MMOL/L (ref 3–14)
BUN SERPL-MCNC: 14 MG/DL (ref 7–30)
CALCIUM SERPL-MCNC: 9.1 MG/DL (ref 8.5–10.1)
CHLORIDE SERPL-SCNC: 104 MMOL/L (ref 94–109)
CHOLEST SERPL-MCNC: 112 MG/DL
CO2 SERPL-SCNC: 32 MMOL/L (ref 20–32)
CREAT SERPL-MCNC: 0.71 MG/DL (ref 0.66–1.25)
GFR SERPL CREATININE-BSD FRML MDRD: >90 ML/MIN/{1.73_M2}
GLUCOSE SERPL-MCNC: 101 MG/DL (ref 70–99)
HDLC SERPL-MCNC: 47 MG/DL
LDLC SERPL CALC-MCNC: 51 MG/DL
NONHDLC SERPL-MCNC: 65 MG/DL
POTASSIUM SERPL-SCNC: 3.2 MMOL/L (ref 3.4–5.3)
PSA SERPL-ACNC: 4.8 UG/L (ref 0–4)
SODIUM SERPL-SCNC: 141 MMOL/L (ref 133–144)
TRIGL SERPL-MCNC: 70 MG/DL

## 2019-07-01 RX ORDER — HYDROCHLOROTHIAZIDE 25 MG/1
25 TABLET ORAL DAILY
Qty: 90 TABLET | Refills: 3 | Status: SHIPPED | OUTPATIENT
Start: 2019-07-01 | End: 2020-11-09

## 2019-07-01 RX ORDER — SILDENAFIL 100 MG/1
100 TABLET, FILM COATED ORAL DAILY PRN
Qty: 6 TABLET | Refills: 11 | Status: SHIPPED | OUTPATIENT
Start: 2019-07-01 | End: 2019-09-11

## 2019-07-01 RX ORDER — TAMSULOSIN HYDROCHLORIDE 0.4 MG/1
CAPSULE ORAL
Qty: 90 CAPSULE | Refills: 3 | Status: SHIPPED | OUTPATIENT
Start: 2019-07-01 | End: 2020-11-09

## 2019-07-01 RX ORDER — ATORVASTATIN CALCIUM 40 MG/1
40 TABLET, FILM COATED ORAL DAILY
Qty: 90 TABLET | Refills: 3 | Status: SHIPPED | OUTPATIENT
Start: 2019-07-01 | End: 2020-11-09

## 2019-07-01 ASSESSMENT — PAIN SCALES - GENERAL: PAINLEVEL: EXTREME PAIN (9)

## 2019-07-01 NOTE — NURSING NOTE
"Chief Complaint   Patient presents with     Physical     pt is here for an annual physical       Vital signs:      BP: 127/83 Pulse: 86   Resp: 16         Weight: 112.8 kg (248 lb 11.2 oz)  Estimated body mass index is 40.14 kg/m  as calculated from the following:    Height as of 4/3/19: 1.676 m (5' 6\").    Weight as of this encounter: 112.8 kg (248 lb 11.2 oz).       Glory Andrea CMA at 6:53 AM on 7/1/2019  "

## 2019-07-01 NOTE — PROGRESS NOTES
ASSESSMENT/PLAN:  Jonathan was seen today for physical.    Diagnoses and all orders for this visit:    Encounter for routine adult medical exam with abnormal findings    Cerebrovascular accident (CVA) due to occlusion of right anterior cerebral artery (H)  History of CVA on statin.  No side effects.  Due to lipids, though he is not fasting today.  -     atorvastatin (LIPITOR) 40 MG tablet; Take 1 tablet (40 mg) by mouth daily  -     Lipid panel reflex to direct LDL Fasting; Future    Essential hypertension, benign  Systolic murmur  Aortic stenosis  His blood pressure appears to be managed on th current regimen, so we will continue this and check kidney function.  He reports he has had a murmur since he was a child.  The murmur is a S1 crescendo murmur and he has known mild aortic stenosis on ECHO.  -     hydrochlorothiazide (HYDRODIURIL) 25 MG tablet; Take 1 tablet (25 mg) by mouth daily  -     Basic metabolic panel; Future    Erectile dysfunction, unspecified erectile dysfunction type  He reports that he is not currently sexually active as he ran out of his Viagra and would like a refill.  -     sildenafil (VIAGRA) 100 MG tablet; Take 1 tablet (100 mg) by mouth daily as needed Take 30 min to 4 hours before intercourse as needed for erectile dysfunction    Hyperplasia of prostate with lower urinary tract symptoms (LUTS)  -     tamsulosin (FLOMAX) 0.4 MG capsule; TAKE 1 CAPSULE BY MOUTH ONCE DAILY    Peripheral edema   He was a device at home to help get the TEDs on, but he believes the old pair is too small.  I have provided a paper script for him to bring back to the [employer] to be refitted for which he was agreeable to doing.  His edema is 2+ and pitting in the lower extremities.  He and his friend report that it varies.  He is on a thiazide diuretic for HTN.  He was encouraged to walk, elevate legs, and utilize his compression socks once obtained.  -     other medical supplies; Dispense knee high,  medium compression, large size    Special screening for malignant neoplasm of prostate  His PSA was 4.28 in June 2018 and he was to have a follow up lab drawn, but did not.  We will recheck this today.  -     Prostate spec antigen screen; Future    Chronic bilateral low back pain without sciatica  Continues on Percocet    Pedro Hays RN, AGNP student, reviewed with Dr. Pily HIRSCH, Buck Nascimento, was present with the NP student who participated in the service and in the documentation of the note.  I have verified the history and personally performed the physical exam and medical decision making.  I agree with the assessment and plan of care as documented in the note.      Buck Nascimento MD, FACP      Chief complaint:  Jonathan Bishop is a 73 year old male presents for   Chief Complaint   Patient presents with     Physical     pt is here for an annual physical        SUBJECTIVE:  Conner presents with his friend for an annual physical.  His interval history includes:    Constant pain with resultant decreased motivation.  He has been dealing with chronic back, bilateral hip, and leg pain s/p laminectomy and fusion.  His pain is 8.75/10 at the worst, 3/10 at best best, and 8/10 right now.  He is taking Percocet 1-2 tabs Q6H and finds that it is helpful.    He endorses environmental allergies including hay fever and cotton wood and he gets watery eyes, stuffy head, and difficulty breathing as a result.  This started at the end of June and he found OTC Allegra helpful.    He hasn't been needing the walker.  He has not been wearing his compression socks because he can't get them on.  He does elevate his legs during the day.  If he goes for a walk, the swelling improves.    Medications and allergies were reviewed by me today.     Review Of Systems  Constitutional: no fevers, chills, night sweats or unintentional weight change   Eyes: no vision change, diplopia or red eyes   Ears, Nose, Mouth, Throat: no hearing change, no  epistaxis or nasal discharge, no oral lesions, throat clear and POSITIVE for bilateral tinnitus  Cardiovascular: no chest pain, palpitations, or pain with walking, no orthopnea or PND and POSITIVE for edema  Respiratory: no dyspnea, cough, shortness of breath or wheezing   GI: no nausea, vomiting, diarrhea or constipation, no abdominal pain   : no change in urine, no dysuria or hematuria  Musculoskeletal: no muscle pain or swelling and POSITIVE for arthralgias of back, hips, and legs  Integumentary: no concerning lesions or moles and POSITIVE for recent leg blisters from edema  Neuro: no loss of strength or sensation, no numbness or tingling, no tremor, no dizziness, no headache   Endo: no polydipsia, no temperature intolerance and POSITIVE for polyuria on diuretics  Heme/Lymph: no concerning bumps, no bleeding problems   Allergy: POSITIVE for environmental allergies to rag weed, cottonwood  Psych: no depression or anxiety, no sleep problems    Patient Active Problem List    Diagnosis Date Noted     Systolic murmur 07/01/2019     Priority: Medium     Peripheral edema 07/01/2019     Priority: Medium     Hyperplasia of prostate with lower urinary tract symptoms (LUTS) 07/01/2019     Priority: Medium     Erectile dysfunction, unspecified erectile dysfunction type 07/01/2019     Priority: Medium     Nonrheumatic aortic valve stenosis 07/01/2019     Priority: Medium     Dermatophytosis of nail 04/05/2017     Priority: Medium     PVD (peripheral vascular disease) (H) 04/05/2017     Priority: Medium     Cerebrovascular accident involving anterior circulation, right (H) 12/09/2016     Priority: Medium     Stroke (H) 11/30/2016     Priority: Medium     Low back pain 05/14/2014     Priority: Medium     Osteoarthritis of knee 05/14/2014     Priority: Medium     Medication refill- do not delete  11/13/2013     Priority: Medium     Essential hypertension, benign 03/09/2012     Priority: Medium     Hyperlipidemia with target  LDL less than 130 03/09/2012     Priority: Medium     Diagnosis updated by automated process. Provider to review and confirm.       Anemia 03/09/2012     Priority: Medium     Problem list name updated by automated process. Provider to review       PHYSICAL EXAM:  /83   Pulse 86   Resp 16   Wt 112.8 kg (248 lb 11.2 oz)   BMI 40.14 kg/m    Constitutional: no distress, comfortable, pleasant   Eyes: anicteric, normal extra-ocular movements   Ears, Nose and Throat: throat clear, neck supple with full range of motion, no thyromegaly.   Cardiovascular: regular rate and rhythm, S1 crescendo murmur, no rubs or gallops, 2+ pitting peripheral edema of bilateral lower extremities  Respiratory: clear to auscultation, no wheezes or crackles, normal breath sounds   Gastrointestinal: positive bowel sounds, nontender, obese  Musculoskeletal: movement is slow and purposeful  Skin: no concerning lesions, no jaundice   Neurological: cranial nerves intact, normal strength and sensation, slow gait and intentional gait, no tremor   Psychological: appropriate mood   Lymphatic: no cervical lymphadenopathy

## 2019-07-01 NOTE — PATIENT INSTRUCTIONS
Primary Care Center Phone Number [phone]  Primary Care Center Medication Refill Request Information:  * Please contact your pharmacy regarding ANY request for medication refills.  ** [facility] Prescription Fax = [phone]  * Please allow 3 business days for routine medication refills.  * Please allow 5 business days for controlled substance medication refills.     Primary Care Center Test Result notification information:  *You will be notified with in 7-10 days of your appointment day regarding the results of your test.  If you are on MyChart you will be notified as soon as the provider has reviewed the results and signed off on them.               [facility]         Internal Medicine Resident                   Continuity Clinic    Who We Are    Resident Continuity Clinic is a part of the [facility].  Resident physicians see patients independently and establish a relationship with them over the course of their three-year residency program.  As with the Primary Care Clinic, our Resident Continuity Clinic models a group practice.  If your doctor is not available, you will be able to see another resident physician.  At the end of a resident s training, patients will be transitioned to a new resident physician for ongoing care.     We treat patients with a wide array of medical needs from routine physicals, to acute illnesses, to diabetes and blood pressure management, to complex medical illness.  What is a Resident Physician?    Resident physicians hold medical degrees and are doctors. They are training to become specialists in Internal Medicine. They work under the supervision of board-certified faculty physicians.  Expectations for Your Care    We strive to provide accessible, quality care at all times.    In order to provide this care, it is best to see your primary care resident doctor consistently rather switching between providers.  In the event you do see another physician, you  should schedule a follow-up visit with your usual primary care doctor.    If you are transitioning your care from another clinic, it is helpful to have your records available for your doctor to review.    We do not prescribe controlled substances, such as ADD medications or narcotic pain medications, on your first visit.  We will review your health records and concerns prior to devising a treatment plan with you in order to provide the best care.      Clinic Services     Extended clinic hours; patient  to help navigate your visit;  parking; laboratory and imaging services with evening and weekend hours    Multiple medical and surgical specialties in one building    Complementary services, including Nutrition, Integrative Medicine, Pharmacy consultations, Mental and Behavioral Health, Sports Medicine and Physical Therapy    Thank You    We would like to thank you for choosing the [facility] Internal Medicine Resident Continuity Clinic for your primary care. You are making a priceless contribution to the training of the next generation of health care practitioners.     Contact us at [phone] for appointments or questions.    Resident Clinic Hours are Tuesdays and Thursdays, 7:30am-5:00pm    Residents   Shawn Toledo MD  (Male)   Bing Munson MD (Female)  Glory Resendiz MD   (Female)   Jessica Duckworth MD   (Female)   Bill Harmon MD  (Male)   Tristen Lopez MD  (Male)   Shanice Marquez MD    (Female)   Luis Castelan MD  (Male)   Cesar Vázquez MD  (Male)    Nori Lopez MD  (Female)   Neri Dumont MD  (Male)   Melissa Martin MD  (Female)   Miriam Scruggs MD   (Female)   Darrian Isbell MD  (Male)   Gilbert Sam MD  (Male)   Tristen Perry MD (Male)   Tony Rodriguez MD  (Male)   Ana Lilia Ni MD (Female)    Yesenia Burk MD (Female)   Yi Sheriff MD  (Male)   Gricelda Yoder MD    (Female)   Becky Wild MD  (Female)    Supervising  Physicians   MD Moira Burrell, MD Samy Lopez, MD Moe Cooley, MD Saray Campbell, MD Senia Ovalle, MD Augustus Coates, MD Carissa Garibay MD

## 2019-07-08 DIAGNOSIS — M54.50 ACUTE MIDLINE LOW BACK PAIN WITHOUT SCIATICA: ICD-10-CM

## 2019-07-08 NOTE — TELEPHONE ENCOUNTER
RX for Oxycodone mailed to patient's home address.    STEPH JACK at 11:11 AM on 7/10/2019.    Reason For Call:   Chief Complaint   Patient presents with     Refill Request       Medication Name, Dose and Monthly Quantity:   Oxycodone with APAP (Percocet): Dose 5-325 Schedule 1 tablet by mouth every 6 hours prn Monthly Quantity 124   Diagnosis requiring opiates:   Acute midline low back pain without sciatica [M54.5]  - Primary     Problem List Updated:   No    Opioid Agreement On File - [facility] PAIN CONTRACT ID# [id number]:  No    Last Urine Drug Screen (at least once every 12 months) Date:   n/a  Unexpected Results:   n/a    [place]  Data Reviewed (at least once every 3 months) Date:   07/08/2019    Unexpected Results:    No.    Last Fill Date:   06/17/2019  Due Date:   07/17/2019  Last Visit with PCP:   07/01/2019    Future Visits with PCP:   No.    Processing:   Mail to patient.    STEPH JACK [facility]      ----------------------------------

## 2019-07-09 RX ORDER — OXYCODONE AND ACETAMINOPHEN 5; 325 MG/1; MG/1
1-2 TABLET ORAL EVERY 6 HOURS PRN
Qty: 150 TABLET | Refills: 0 | Status: SHIPPED | OUTPATIENT
Start: 2019-07-17 | End: 2019-08-06

## 2019-07-18 ENCOUNTER — TELEPHONE (OUTPATIENT)
Dept: INTERNAL MEDICINE | Facility: CLINIC | Age: 74
End: 2019-07-18

## 2019-07-18 ENCOUNTER — TELEPHONE (OUTPATIENT)
Dept: CARDIOLOGY | Facility: CLINIC | Age: 74
End: 2019-07-18

## 2019-07-18 NOTE — TELEPHONE ENCOUNTER
Diana from Cardiology and the [facility] heart care in [place] called me and asked if we could maybe get him to be seen for his shortness of breath.  I will reach out to him and offer him an appointment to be seen for his SOB.

## 2019-07-18 NOTE — TELEPHONE ENCOUNTER
"Received patient on phone toTriage nurse via transfer from scheduling, to speak to him regarding complaints of shortness of breath. Pt has not been seen in cardiology previously. He states has had on and off shortness of breath for the past 2-3 mths. He denies having chest pain, or chest pressure at time of call, states it is just harder for him to walk and move around without having shortness of breath, but says I'm overweight and its been so hot outside. He states he saw his PCP (Dr Nascimento) approx 2 mths ago, and he had the same shortness of breath then along with edema in his right leg, but then states his leg is not swollen today, which he has had on and off for the past 5 yrs. Pt states he wants to see someone, and that's why he called here. He then states he is not happy about having to wait for an appt on 8/1/19 he was given prior to being transferred. Advised pt that appt was not finalized and offered to transfer him back to sched to get it sched, pt then stated \"just forget it, forget it. I'm tired of being transferred around and dont need to keep speaking to all you people. Patient then hung up. This nurse called scheduling back and asked them to contact pt to confirm appt they previously offered him 8/1/19.         "

## 2019-07-19 NOTE — TELEPHONE ENCOUNTER
Unable to contact him this am, I will reach out again later today.   Will route to CC to ask if they could reach out to schedule with any provider first available.  Lena Guadarrama Paramedic on 7/19/2019 at 4:56 PM

## 2019-08-06 DIAGNOSIS — M54.50 ACUTE MIDLINE LOW BACK PAIN WITHOUT SCIATICA: ICD-10-CM

## 2019-08-07 DIAGNOSIS — M54.50 LUMBAGO: ICD-10-CM

## 2019-08-07 NOTE — TELEPHONE ENCOUNTER
Reason For Call:   Chief Complaint   Patient presents with     Refill Request       Medication Name, Dose and Monthly Quantity:   Oxycodone with APAP (Percocet): Dose 5-325 Schedule 1 tablet by mouth every 6 hours prn Monthly Quantity 124   Diagnosis requiring opiates:   Acute midline low back pain without sciatica [M54.5]  - Primary     Problem List Updated:   No    Opioid Agreement On File - [facility] PAIN CONTRACT ID# [id number]:  No    Last Urine Drug Screen (at least once every 12 months) Date:   n/a  Unexpected Results:   n/a    [place]  Data Reviewed (at least once every 3 months) Date:   07/08/2019    Unexpected Results:    No.    Last Fill Date:   07/17/219  Due Date:   08/16/2019  Last Visit with PCP:   07/01/2019    Future Visits with PCP:   No.    Processing:   [facility] [place]    STEPH JACK CMA      ----------------------------------

## 2019-08-08 RX ORDER — OXYCODONE AND ACETAMINOPHEN 5; 325 MG/1; MG/1
1-2 TABLET ORAL EVERY 6 HOURS PRN
Qty: 150 TABLET | Refills: 0 | Status: SHIPPED | OUTPATIENT
Start: 2019-08-16 | End: 2019-09-11

## 2019-08-08 NOTE — TELEPHONE ENCOUNTER
ibuprofen (ADVIL/MOTRIN) 600 MG tablet   Last Written Prescription Date:  10/31/18  Last Fill Quantity: 270,   # refills: 1  Last Office Visit : 7/1/19  Future Office visit:  none  FYI to [facility] for labs.    Routing refill request to provider for review/approval because:  Failed protocol.    Overdue labs - ALT, AST and CBC    Age > 64 yrs

## 2019-08-12 RX ORDER — IBUPROFEN 600 MG/1
TABLET, FILM COATED ORAL
Qty: 270 TABLET | Refills: 1 | Status: SHIPPED | OUTPATIENT
Start: 2019-08-12 | End: 2020-03-20

## 2019-08-19 ENCOUNTER — TELEPHONE (OUTPATIENT)
Dept: INTERNAL MEDICINE | Facility: CLINIC | Age: 74
End: 2019-08-19

## 2019-08-19 NOTE — TELEPHONE ENCOUNTER
Checking on controled substance Rx Lena Guadarrama Paramedic on 8/19/2019 at 12:02 PM   Call center called regarding pt as it usually gets mailed to him. It was e prescribed. Lena Guadarrama Paramedic on 8/19/2019 at 12:02 PM

## 2019-08-19 NOTE — TELEPHONE ENCOUNTER
I tried to return patient's call and the number is disconnected.  Patient is not due for an appointment regarding refills of his narcotics, he is overdue for blood work for ibuprofen rx.    STEPH JACK at 12:48 PM on 8/19/2019.      [facility] Call Center    Phone Message    May a detailed message be left on voicemail: yes    Reason for Call: Other: The pt is picking up the percocet today. I told him he has to be seen before more refills but he said he just saw Dr Nascimento 7.17.19. Does he really need to come back in? Please call the pt to discuss. Thanks.     Action Taken: Message routed to:  [facility] ([facility] claire [facility] med

## 2019-09-11 ENCOUNTER — OFFICE VISIT (OUTPATIENT)
Dept: INTERNAL MEDICINE | Facility: CLINIC | Age: 74
End: 2019-09-11
Payer: COMMERCIAL

## 2019-09-11 VITALS
BODY MASS INDEX: 39.11 KG/M2 | WEIGHT: 242.3 LBS | OXYGEN SATURATION: 96 % | SYSTOLIC BLOOD PRESSURE: 144 MMHG | TEMPERATURE: 98.1 F | DIASTOLIC BLOOD PRESSURE: 78 MMHG | HEART RATE: 82 BPM

## 2019-09-11 DIAGNOSIS — D64.9 ANEMIA, UNSPECIFIED TYPE: Primary | ICD-10-CM

## 2019-09-11 DIAGNOSIS — M17.0 PRIMARY OSTEOARTHRITIS OF BOTH KNEES: ICD-10-CM

## 2019-09-11 DIAGNOSIS — N52.9 ERECTILE DYSFUNCTION, UNSPECIFIED ERECTILE DYSFUNCTION TYPE: ICD-10-CM

## 2019-09-11 DIAGNOSIS — M54.50 ACUTE MIDLINE LOW BACK PAIN WITHOUT SCIATICA: ICD-10-CM

## 2019-09-11 RX ORDER — SILDENAFIL 100 MG/1
100 TABLET, FILM COATED ORAL DAILY PRN
Qty: 25 TABLET | Refills: 11 | Status: SHIPPED | OUTPATIENT
Start: 2019-09-11 | End: 2020-09-21

## 2019-09-11 ASSESSMENT — PAIN SCALES - GENERAL: PAINLEVEL: EXTREME PAIN (8)

## 2019-09-11 NOTE — PROGRESS NOTES
ASSESSMENT/PLAN:  Erectile dysfunction - this is working well but he needs more in a month.  I have sent a refill for 25 per month.    Knee pain - he is taking his meds as prescribed with good maintenance of function on his chronic pain medications.  No changes needed given the functionality he has been able to achieve.    Total time spent 15 minutes.  More than 50% of the time spent with Mr. Bishop on counseling / coordinating his care      Buck Nascimento MD, FACP      Chief complaint:  Jonathan Bishop is a 73 year old male presents for   Chief Complaint   Patient presents with     Recheck Medication     pt here for medication check        SUBJECTIVE:  He was called to come in for a med check.    He is on sildenafil 100mg - takes 1/2 and then waits for the other 1/2.  He gets 6 per month but would like more - 25.  No headache.  No long duration of erection.   It works well    For his knee pain - he takes the ibuprofen as needed when the there is breakthrough pain.  He takes percocet 5 times a day which will moderate the pain to keep it a low level.  He uses a walker for long walks.      Medications and allergies were reviewed by me today.       Patient Active Problem List    Diagnosis Date Noted     Systolic murmur 07/01/2019     Priority: Medium     Peripheral edema 07/01/2019     Priority: Medium     Hyperplasia of prostate with lower urinary tract symptoms (LUTS) 07/01/2019     Priority: Medium     Erectile dysfunction, unspecified erectile dysfunction type 07/01/2019     Priority: Medium     Nonrheumatic aortic valve stenosis 07/01/2019     Priority: Medium     Dermatophytosis of nail 04/05/2017     Priority: Medium     PVD (peripheral vascular disease) (H) 04/05/2017     Priority: Medium     Cerebrovascular accident involving anterior circulation, right (H) 12/09/2016     Priority: Medium     Stroke (H) 11/30/2016     Priority: Medium     Low back pain 05/14/2014     Priority: Medium     Osteoarthritis of  knee 05/14/2014     Priority: Medium     Medication refill- do not delete  11/13/2013     Priority: Medium     Essential hypertension, benign 03/09/2012     Priority: Medium     Hyperlipidemia with target LDL less than 130 03/09/2012     Priority: Medium     Diagnosis updated by automated process. Provider to review and confirm.       Anemia 03/09/2012     Priority: Medium     Problem list name updated by automated process. Provider to review         PHYSICAL EXAM:  BP (P) 131/74 (BP Location: Right arm, Patient Position: Sitting, Cuff Size: Adult Large)   Pulse (P) 82   Temp 98.1  F (36.7  C) (Oral)   Wt 109.9 kg (242 lb 4.8 oz)   SpO2 96%   BMI 39.11 kg/m    Constitutional: no distress, comfortable, pleasant

## 2019-09-11 NOTE — TELEPHONE ENCOUNTER
Reason For Call:   Chief Complaint   Patient presents with     Refill Request       Medication Name, Dose and Monthly Quantity:   Oxycodone with APAP (Percocet): Dose 5-325 Schedule 1 tablet by mouth every 6 hours prn Monthly Quantity 124   Diagnosis requiring opiates:   Acute midline low back pain without sciatica [M54.5]  - Primary     Problem List Updated:   No    Opioid Agreement On File - [facility] PAIN CONTRACT ID# [id number]:  No    Last Urine Drug Screen (at least once every 12 months) Date:   n/a  Unexpected Results:   n/a    [place]  Data Reviewed (at least once every 3 months) Date:   07/08/2019    Unexpected Results:    No.    Last Fill Date:   08/19/2019  Due Date:   09/18/2019  Last Visit with PCP:   07/01/2019    Future Visits with PCP:   No.    Processing:   [facility] [place]    STEPH JACK CMA      ----------------------------------

## 2019-09-11 NOTE — PATIENT INSTRUCTIONS
[facility] Medication Refill Request Information:  * Please contact your pharmacy regarding ANY request for medication refills.  ** [facility] Prescription Fax = [phone]  * Please allow 3 business days for routine medication refills.  * Please allow 5 business days for controlled substance medication refills.     [facility] Test Result notification information:  *You will be notified with in 7-10 days of your appointment day regarding the results of your test.  If you are on MyChart you will be notified as soon as the provider has reviewed the results and signed off on them.    [facility]: [phone]

## 2019-09-11 NOTE — NURSING NOTE
Chief Complaint   Patient presents with     Recheck Medication     pt here for medication check       Luisa Martinez CMA at 2:02 PM on 9/11/2019.

## 2019-09-15 RX ORDER — OXYCODONE AND ACETAMINOPHEN 5; 325 MG/1; MG/1
1-2 TABLET ORAL EVERY 6 HOURS PRN
Qty: 150 TABLET | Refills: 0 | Status: SHIPPED | OUTPATIENT
Start: 2019-09-18 | End: 2019-10-19

## 2019-09-17 ENCOUNTER — TELEPHONE (OUTPATIENT)
Dept: INTERNAL MEDICINE | Facility: CLINIC | Age: 74
End: 2019-09-17

## 2019-09-19 ENCOUNTER — OFFICE VISIT (OUTPATIENT)
Dept: CARDIOLOGY | Facility: CLINIC | Age: 74
End: 2019-09-19
Payer: COMMERCIAL

## 2019-09-19 VITALS
DIASTOLIC BLOOD PRESSURE: 77 MMHG | SYSTOLIC BLOOD PRESSURE: 122 MMHG | BODY MASS INDEX: 42.89 KG/M2 | HEART RATE: 76 BPM | HEIGHT: 63 IN | WEIGHT: 242.1 LBS

## 2019-09-19 DIAGNOSIS — R01.1 SYSTOLIC MURMUR: ICD-10-CM

## 2019-09-19 DIAGNOSIS — R06.02 SOB (SHORTNESS OF BREATH): ICD-10-CM

## 2019-09-19 DIAGNOSIS — I35.0 NONRHEUMATIC AORTIC VALVE STENOSIS: ICD-10-CM

## 2019-09-19 DIAGNOSIS — I63.521 CEREBROVASCULAR ACCIDENT INVOLVING ANTERIOR CIRCULATION, RIGHT (H): ICD-10-CM

## 2019-09-19 DIAGNOSIS — R01.1 SYSTOLIC MURMUR: Primary | ICD-10-CM

## 2019-09-19 LAB
ALBUMIN SERPL-MCNC: 3.7 G/DL (ref 3.4–5)
ALP SERPL-CCNC: 100 U/L (ref 40–150)
ALT SERPL W P-5'-P-CCNC: 30 U/L (ref 0–70)
ANION GAP SERPL CALCULATED.3IONS-SCNC: 5 MMOL/L (ref 3–14)
AST SERPL W P-5'-P-CCNC: 22 U/L (ref 0–45)
BASOPHILS # BLD AUTO: 0 10E9/L (ref 0–0.2)
BASOPHILS NFR BLD AUTO: 0.4 %
BILIRUB SERPL-MCNC: 0.5 MG/DL (ref 0.2–1.3)
BUN SERPL-MCNC: 14 MG/DL (ref 7–30)
CALCIUM SERPL-MCNC: 9.1 MG/DL (ref 8.5–10.1)
CHLORIDE SERPL-SCNC: 106 MMOL/L (ref 94–109)
CO2 SERPL-SCNC: 28 MMOL/L (ref 20–32)
CREAT SERPL-MCNC: 0.7 MG/DL (ref 0.66–1.25)
DIFFERENTIAL METHOD BLD: ABNORMAL
EOSINOPHIL # BLD AUTO: 0.3 10E9/L (ref 0–0.7)
EOSINOPHIL NFR BLD AUTO: 4 %
ERYTHROCYTE [DISTWIDTH] IN BLOOD BY AUTOMATED COUNT: 16.3 % (ref 10–15)
GFR SERPL CREATININE-BSD FRML MDRD: >90 ML/MIN/{1.73_M2}
GLUCOSE SERPL-MCNC: 100 MG/DL (ref 70–99)
HCT VFR BLD AUTO: 37 % (ref 40–53)
HGB BLD-MCNC: 11.3 G/DL (ref 13.3–17.7)
IMM GRANULOCYTES # BLD: 0 10E9/L (ref 0–0.4)
IMM GRANULOCYTES NFR BLD: 0.1 %
LYMPHOCYTES # BLD AUTO: 2.1 10E9/L (ref 0.8–5.3)
LYMPHOCYTES NFR BLD AUTO: 28 %
MCH RBC QN AUTO: 24.8 PG (ref 26.5–33)
MCHC RBC AUTO-ENTMCNC: 30.5 G/DL (ref 31.5–36.5)
MCV RBC AUTO: 81 FL (ref 78–100)
MONOCYTES # BLD AUTO: 0.7 10E9/L (ref 0–1.3)
MONOCYTES NFR BLD AUTO: 9.5 %
NEUTROPHILS # BLD AUTO: 4.3 10E9/L (ref 1.6–8.3)
NEUTROPHILS NFR BLD AUTO: 58 %
NRBC # BLD AUTO: 0 10*3/UL
NRBC BLD AUTO-RTO: 0 /100
NT-PROBNP SERPL-MCNC: 104 PG/ML (ref 0–125)
PLATELET # BLD AUTO: 272 10E9/L (ref 150–450)
POTASSIUM SERPL-SCNC: 4.1 MMOL/L (ref 3.4–5.3)
PROT SERPL-MCNC: 7.3 G/DL (ref 6.8–8.8)
RBC # BLD AUTO: 4.55 10E12/L (ref 4.4–5.9)
SODIUM SERPL-SCNC: 139 MMOL/L (ref 133–144)
WBC # BLD AUTO: 7.4 10E9/L (ref 4–11)

## 2019-09-19 PROCEDURE — 83880 ASSAY OF NATRIURETIC PEPTIDE: CPT | Performed by: INTERNAL MEDICINE

## 2019-09-19 PROCEDURE — 36415 COLL VENOUS BLD VENIPUNCTURE: CPT | Performed by: INTERNAL MEDICINE

## 2019-09-19 PROCEDURE — 93000 ELECTROCARDIOGRAM COMPLETE: CPT | Performed by: INTERNAL MEDICINE

## 2019-09-19 PROCEDURE — 99205 OFFICE O/P NEW HI 60 MIN: CPT | Performed by: INTERNAL MEDICINE

## 2019-09-19 PROCEDURE — 85025 COMPLETE CBC W/AUTO DIFF WBC: CPT | Performed by: INTERNAL MEDICINE

## 2019-09-19 PROCEDURE — 80053 COMPREHEN METABOLIC PANEL: CPT | Performed by: INTERNAL MEDICINE

## 2019-09-19 RX ORDER — FUROSEMIDE 40 MG
40 TABLET ORAL DAILY
Qty: 30 TABLET | Refills: 3 | Status: SHIPPED | OUTPATIENT
Start: 2019-09-19 | End: 2019-12-02

## 2019-09-19 RX ORDER — POTASSIUM CHLORIDE 1500 MG/1
40 TABLET, EXTENDED RELEASE ORAL DAILY
Qty: 60 TABLET | Refills: 3 | Status: SHIPPED | OUTPATIENT
Start: 2019-09-19 | End: 2019-12-02

## 2019-09-19 ASSESSMENT — MIFFLIN-ST. JEOR: SCORE: 1730.35

## 2019-09-19 NOTE — LETTER
9/19/2019    Buck Nascimento MD  [address]  [place] [address]    RE: Jonathan Bishop       Dear Colleague,    I had the pleasure of seeing Jonathan Bishop in the [facility].    HPI and Plan:   See dictation [id number]    Orders Placed This Encounter   Procedures     Comprehensive metabolic panel     N terminal pro BNP outpatient     CBC with platelets differential     Follow-Up with [facility]- MARK Visit     Follow-Up with [facility] - Return MD visit     EKG 12-lead complete w/read - Clinics (performed today)     Zio Patch Holter Adult Pediatric Greater than 48 hrs     Echocardiogram Complete     Orders Placed This Encounter   Medications     furosemide (LASIX) 40 MG tablet     Sig: Take 1 tablet (40 mg) by mouth daily     Dispense:  30 tablet     Refill:  3     potassium chloride ER (K-DUR/KLOR-CON M) 20 MEQ CR tablet     Sig: Take 2 tablets (40 mEq) by mouth daily     Dispense:  60 tablet     Refill:  3     Medications Discontinued During This Encounter   Medication Reason     lidocaine (LIDODERM) 5 % patch Therapy completed     acetaminophen (TYLENOL) 500 MG tablet Stopped by Patient         Encounter Diagnoses   Name Primary?     Systolic murmur Yes     Cerebrovascular accident involving anterior circulation, right (H)      Nonrheumatic aortic valve stenosis      SOB (shortness of breath)        CURRENT MEDICATIONS:  Current Outpatient Medications   Medication Sig Dispense Refill     aspirin 325 MG EC tablet Take 1 tablet (325 mg) by mouth daily 90 tablet 3     atorvastatin (LIPITOR) 40 MG tablet Take 1 tablet (40 mg) by mouth daily 90 tablet 3     furosemide (LASIX) 40 MG tablet Take 1 tablet (40 mg) by mouth daily 30 tablet 3     hydrochlorothiazide (HYDRODIURIL) 25 MG tablet Take 1 tablet (25 mg) by mouth daily 90 tablet 3     ibuprofen (ADVIL/MOTRIN) 600 MG tablet TAKE 1 TABLET BY MOUTH THREE TIMES DAILY AS NEEDED FOR MODERATE PAIN 270 tablet 1      multivitamin, therapeutic with minerals (MULTI-VITAMIN) TABS Take 1 tablet by mouth daily. 100 tablet 3     oxyCODONE-acetaminophen (PERCOCET) 5-325 MG tablet Take 1-2 tablets by mouth every 6 hours as needed for pain Max 5 per day.  Must be seen before additional refills. 150 tablet 0     potassium chloride ER (K-DUR/KLOR-CON M) 20 MEQ CR tablet Take 2 tablets (40 mEq) by mouth daily 60 tablet 3     sildenafil (VIAGRA) 100 MG tablet Take 1 tablet (100 mg) by mouth daily as needed (intercourse) Take 30 min to 4 hours before intercourse as needed for erectile dysfunction 25 tablet 11     tamsulosin (FLOMAX) 0.4 MG capsule TAKE 1 CAPSULE BY MOUTH ONCE DAILY 90 capsule 3     order for DME Equipment being ordered: Walker ()  Treatment Diagnosis: stroke 1 Units 0     other medical supplies Dispense knee high, medium compression, large size 4 each 0       ALLERGIES   No Known Allergies    PAST MEDICAL HISTORY:  Past Medical History:   Diagnosis Date     Acute rheumatic carditis 1950     Coronary artery disease     murmur     Hip pain, bilateral      Hypercholesteremia      Hypertension      Low back pain      Nonsenile cataract      Obesity      Renal cyst      Stroke (cerebrum) (H) 11/30/16     Umbilical hernia 6/10/2011    s/p surgical repair     Wound, surgical, infected 6/10/2011    hernia       PAST SURGICAL HISTORY:  Past Surgical History:   Procedure Laterality Date     ARTHROPLASTY KNEE BILATERAL  7/22/2010     COLONOSCOPY N/A 4/14/2017    Procedure: COLONOSCOPY;  Surgeon: Toby Bills MD;  Location: [facility]     FUSION LUMBAR ANTERIOR TWO LEVELS       HERNIORRHAPHY UMBILICAL  6/10/2011    Procedure:HERNIORRHAPHY UMBILICAL; Open, with mesh placement; Surgeon:HO PARHAM; Location:[facility] OR     LAMINECTOMY LUMBAR TWO LEVELS         FAMILY HISTORY:  Family History   Problem Relation Age of Onset     C.A.D. Mother      Unknown/Adopted Father      Heart Failure Sister      Heart Failure Sister       Glaucoma No family hx of      Macular Degeneration No family hx of        SOCIAL HISTORY:  Social History     Socioeconomic History     Marital status:      Spouse name: None     Number of children: None     Years of education: None     Highest education level: None   Occupational History     None   Social Needs     Financial resource strain: None     Food insecurity:     Worry: None     Inability: None     Transportation needs:     Medical: None     Non-medical: None   Tobacco Use     Smoking status: Former Smoker     Last attempt to quit: 2005     Years since quittin.7     Smokeless tobacco: Never Used     Tobacco comment: Non smoker. No 2nd hand exposure. CCX, [facility] 2011   Substance and Sexual Activity     Alcohol use: No     Drug use: No     Sexual activity: Not Currently   Lifestyle     Physical activity:     Days per week: None     Minutes per session: None     Stress: None   Relationships     Social connections:     Talks on phone: None     Gets together: None     Attends Worship service: None     Active member of club or organization: None     Attends meetings of clubs or organizations: None     Relationship status: None     Intimate partner violence:     Fear of current or ex partner: None     Emotionally abused: None     Physically abused: None     Forced sexual activity: None   Other Topics Concern     Parent/sibling w/ CABG, MI or angioplasty before 65F 55M? Not Asked   Social History Narrative    He lives by himself in an apt in [place].  He has 2 goldfish, Lai and Ubaldo.       Review of Systems:  Skin:  Negative     Eyes:  Positive for glasses  ENT:  Negative    Respiratory:  Positive for shortness of breath;wheezing;cough  Cardiovascular:    Positive for;edema  Gastroenterology: Negative    Genitourinary:  Negative    Musculoskeletal:  Positive for joint pain  Neurologic:  Positive for headaches;stroke  Psychiatric:  Positive for excessive  "stress;anxiety;depression  Heme/Lymph/Imm:  Negative    Endocrine:  Negative      Physical Exam:  Vitals: /77   Pulse 76   Ht 1.588 m (5' 2.5\")   Wt 109.8 kg (242 lb 1.6 oz)   BMI 43.58 kg/m       Recent Lab Results:  LIPID RESULTS:  Lab Results   Component Value Date    CHOL 112 07/01/2019    HDL 47 07/01/2019    LDL 51 07/01/2019    TRIG 70 07/01/2019    CHOLHDLRATIO 4.5 10/07/2014       LIVER ENZYME RESULTS:  Lab Results   Component Value Date    AST 26 07/15/2016    ALT 35 07/15/2016       CBC RESULTS:  Lab Results   Component Value Date    WBC 7.2 11/30/2016    RBC 5.05 11/30/2016    HGB 12.9 (L) 11/30/2016    HCT 39.5 (L) 11/30/2016    MCV 78 11/30/2016    MCH 25.5 (L) 11/30/2016    MCHC 32.7 11/30/2016    RDW 15.7 (H) 11/30/2016     11/30/2016       BMP RESULTS:  Lab Results   Component Value Date     07/01/2019    POTASSIUM 3.2 (L) 07/01/2019    CHLORIDE 104 07/01/2019    CO2 32 07/01/2019    ANIONGAP 5 07/01/2019     (H) 07/01/2019    BUN 14 07/01/2019    CR 0.71 07/01/2019    GFRESTIMATED >90 07/01/2019    GFRESTBLACK >90 07/01/2019    WARREN 9.1 07/01/2019        A1C RESULTS:  Lab Results   Component Value Date    A1C 6.3 (H) 11/30/2016       INR RESULTS:  Lab Results   Component Value Date    INR 0.99 11/30/2016    INR 4.95 (H) 08/02/2010           CC  Buck Nascimento MD  [address] [facility] [address] [place] [id number]                    Thank you for allowing me to participate in the care of your patient.      Sincerely,     Jonny Mock MD     [facility]    cc:   Buck Nascimento MD  [address] [facility] [address]  [place] [address]        "

## 2019-09-19 NOTE — PROGRESS NOTES
HPI and Plan:   See dictation [id number]    Orders Placed This Encounter   Procedures     Comprehensive metabolic panel     N terminal pro BNP outpatient     CBC with platelets differential     [facility] with [facility]- MARK Visit     Follow-Up with [facility] - Return MD visit     EKG 12-lead complete w/read - Clinics (performed today)     Zio Patch Holter Adult Pediatric Greater than 48 hrs     Echocardiogram Complete     Orders Placed This Encounter   Medications     furosemide (LASIX) 40 MG tablet     Sig: Take 1 tablet (40 mg) by mouth daily     Dispense:  30 tablet     Refill:  3     potassium chloride ER (K-DUR/KLOR-CON M) 20 MEQ CR tablet     Sig: Take 2 tablets (40 mEq) by mouth daily     Dispense:  60 tablet     Refill:  3     Medications Discontinued During This Encounter   Medication Reason     lidocaine (LIDODERM) 5 % patch Therapy completed     acetaminophen (TYLENOL) 500 MG tablet Stopped by Patient         Encounter Diagnoses   Name Primary?     Systolic murmur Yes     Cerebrovascular accident involving anterior circulation, right (H)      Nonrheumatic aortic valve stenosis      SOB (shortness of breath)        CURRENT MEDICATIONS:  Current Outpatient Medications   Medication Sig Dispense Refill     aspirin 325 MG EC tablet Take 1 tablet (325 mg) by mouth daily 90 tablet 3     atorvastatin (LIPITOR) 40 MG tablet Take 1 tablet (40 mg) by mouth daily 90 tablet 3     furosemide (LASIX) 40 MG tablet Take 1 tablet (40 mg) by mouth daily 30 tablet 3     hydrochlorothiazide (HYDRODIURIL) 25 MG tablet Take 1 tablet (25 mg) by mouth daily 90 tablet 3     ibuprofen (ADVIL/MOTRIN) 600 MG tablet TAKE 1 TABLET BY MOUTH THREE TIMES DAILY AS NEEDED FOR MODERATE PAIN 270 tablet 1     multivitamin, therapeutic with minerals (MULTI-VITAMIN) TABS Take 1 tablet by mouth daily. 100 tablet 3     oxyCODONE-acetaminophen (PERCOCET) 5-325 MG tablet Take 1-2 tablets by mouth every 6 hours as needed for pain Max 5 per day.  Must  be seen before additional refills. 150 tablet 0     potassium chloride ER (K-DUR/KLOR-CON M) 20 MEQ CR tablet Take 2 tablets (40 mEq) by mouth daily 60 tablet 3     sildenafil (VIAGRA) 100 MG tablet Take 1 tablet (100 mg) by mouth daily as needed (intercourse) Take 30 min to 4 hours before intercourse as needed for erectile dysfunction 25 tablet 11     tamsulosin (FLOMAX) 0.4 MG capsule TAKE 1 CAPSULE BY MOUTH ONCE DAILY 90 capsule 3     order for DME Equipment being ordered: Walker ()  Treatment Diagnosis: stroke 1 Units 0     other medical supplies Dispense knee high, medium compression, large size 4 each 0       ALLERGIES   No Known Allergies    PAST MEDICAL HISTORY:  Past Medical History:   Diagnosis Date     Acute rheumatic carditis 1950     Coronary artery disease     murmur     Hip pain, bilateral      Hypercholesteremia      Hypertension      Low back pain      Nonsenile cataract      Obesity      Renal cyst      Stroke (cerebrum) (H) 11/30/16     Umbilical hernia 6/10/2011    s/p surgical repair     Wound, surgical, infected 6/10/2011    hernia       PAST SURGICAL HISTORY:  Past Surgical History:   Procedure Laterality Date     ARTHROPLASTY KNEE BILATERAL  7/22/2010     COLONOSCOPY N/A 4/14/2017    Procedure: COLONOSCOPY;  Surgeon: Toby Bills MD;  Location: [facility]     FUSION LUMBAR ANTERIOR TWO LEVELS       HERNIORRHAPHY UMBILICAL  6/10/2011    Procedure:HERNIORRHAPHY UMBILICAL; Open, with mesh placement; Surgeon:HO PARHAM; Location:[facility]     LAMINECTOMY LUMBAR TWO LEVELS         FAMILY HISTORY:  Family History   Problem Relation Age of Onset     C.A.D. Mother      Unknown/Adopted Father      Heart Failure Sister      Heart Failure Sister      Glaucoma No family hx of      Macular Degeneration No family hx of        SOCIAL HISTORY:  Social History     Socioeconomic History     Marital status:      Spouse name: None     Number of children: None     Years of education: None  "    Highest education level: None   Occupational History     None   Social Needs     Financial resource strain: None     Food insecurity:     Worry: None     Inability: None     Transportation needs:     Medical: None     Non-medical: None   Tobacco Use     Smoking status: Former Smoker     Last attempt to quit: 2005     Years since quittin.7     Smokeless tobacco: Never Used     Tobacco comment: Non smoker. No 2nd hand exposure. CCX, R[facility] 2011   Substance and Sexual Activity     Alcohol use: No     Drug use: No     Sexual activity: Not Currently   Lifestyle     Physical activity:     Days per week: None     Minutes per session: None     Stress: None   Relationships     Social connections:     Talks on phone: None     Gets together: None     Attends [employer] service: None     Active member of club or organization: None     Attends meetings of clubs or organizations: None     Relationship status: None     Intimate partner violence:     Fear of current or ex partner: None     Emotionally abused: None     Physically abused: None     Forced sexual activity: None   Other Topics Concern     Parent/sibling w/ CABG, MI or angioplasty before 65F 55M? Not Asked   Social History Narrative    He lives by himself in an apt in [place].  He has 2 goldfish, Lai and Ubaldo.       Review of Systems:  Skin:  Negative     Eyes:  Positive for glasses  ENT:  Negative    Respiratory:  Positive for shortness of breath;wheezing;cough  Cardiovascular:    Positive for;edema  Gastroenterology: Negative    Genitourinary:  Negative    Musculoskeletal:  Positive for joint pain  Neurologic:  Positive for headaches;stroke  Psychiatric:  Positive for excessive stress;anxiety;depression  Heme/Lymph/Imm:  Negative    Endocrine:  Negative      Physical Exam:  Vitals: /77   Pulse 76   Ht 1.588 m (5' 2.5\")   Wt 109.8 kg (242 lb 1.6 oz)   BMI 43.58 kg/m      Recent Lab Results:  LIPID RESULTS:  Lab Results   Component " Value Date    CHOL 112 07/01/2019    HDL 47 07/01/2019    LDL 51 07/01/2019    TRIG 70 07/01/2019    CHOLHDLRATIO 4.5 10/07/2014       LIVER ENZYME RESULTS:  Lab Results   Component Value Date    AST 26 07/15/2016    ALT 35 07/15/2016       CBC RESULTS:  Lab Results   Component Value Date    WBC 7.2 11/30/2016    RBC 5.05 11/30/2016    HGB 12.9 (L) 11/30/2016    HCT 39.5 (L) 11/30/2016    MCV 78 11/30/2016    MCH 25.5 (L) 11/30/2016    MCHC 32.7 11/30/2016    RDW 15.7 (H) 11/30/2016     11/30/2016       BMP RESULTS:  Lab Results   Component Value Date     07/01/2019    POTASSIUM 3.2 (L) 07/01/2019    CHLORIDE 104 07/01/2019    CO2 32 07/01/2019    ANIONGAP 5 07/01/2019     (H) 07/01/2019    BUN 14 07/01/2019    CR 0.71 07/01/2019    GFRESTIMATED >90 07/01/2019    GFRESTBLACK >90 07/01/2019    WARREN 9.1 07/01/2019        A1C RESULTS:  Lab Results   Component Value Date    A1C 6.3 (H) 11/30/2016       INR RESULTS:  Lab Results   Component Value Date    INR 0.99 11/30/2016    INR 4.95 (H) 08/02/2010           CC  Buck Nascimento MD  [address] [facility] [address]  [place] [address]

## 2019-09-19 NOTE — LETTER
9/19/2019      Buck Nascimento MD  [address] [facility] [address]  [facility] [address]      RE: Jonathan Bishop       Dear Colleague,    I had the pleasure of seeing Jonathan Bishop in the [facility].    Service Date: 09/19/2019      REASON FOR VISIT:  Shortness of breath.      HISTORY OF PRESENT ILLNESS:  This is a delightful 73-year-old gentleman who is here with his patient care assistant.  The patient has a history of hyperlipidemia and around 2016 his LDLs were in the 160s-170 range with a total cholesterol of 220s.  He has a history of a stroke many years ago without significant residual neurologic deficits.  He has a lot of orthopedic issues and knee surgeries.  He has a history of hypertension and peripheral vascular disease and a history of mild aortic stenosis back in 2016 that was not followed up subsequently.  He denies any cardiac history besides the ones mentioned above.      He is seeking consultation because of shortness of breath that started about 6 months ago that is worsening.  This is associated with bilateral lower extremity edema that is slowly getting worse.  He says that he was told that his lower extremity edema is because of immobility.  His breathing has gotten to a point where it is significantly worse, he is not dyspneic at rest but complains of NYHA class III symptoms.  He denies chest pain, but again he is not able to walk too much.  No arm pain, jaw pain or other classic symptoms of angina.  No syncope, presyncope.      CURRENT MEDICATIONS:   1.  Aspirin 325.   2.  Atorvastatin 40.   3.  Hydrochlorothiazide 25.   4.  Ibuprofen as needed.   5.  Viagra.   6.  Flomax 0.4.   7.  Lasix 40 mg daily starting today.   8.  Potassium chloride 40 mEq daily starting today.      PHYSICAL EXAMINATION:   VITAL SIGNS:  Blood pressure is 122/77 with a pulse of 76.   GENERAL:  Alert and oriented x3, in no acute distress.   NECK:  Supple.  Neck is thick, and JVP is difficult  to see but elevated to about 10-12 cm.   LUNGS:  Clear.   CARDIAC:  Cardiac sounds are regular.  There is a harsh systolic murmur.  I cannot hear an S2.   EXTREMITIES:  Warm.  There is 2+ bilateral pitting edema.   ABDOMEN:  Seems to be a little tight.  I do not see obvious signs of ascites, but it is possible.   NEUROLOGIC:  Deferred.   PSYCHIATRIC:  Appropriate affect.   HEENT:  No icterus, pallor.      PERTINENT DATA:  He had last labs in July of this year.  His potassium was 3.2 and creatinine is 0.7.  Last hemoglobin A1c was checked in 2016 at 6.3.  He has not had that checked after that.   PERTINENT DATA:  ECG from today shows sinus rhythm.  There is artifact but no obvious signs of ischemia or infarction.  Some nonspecific ST-T changes.  Last echocardiogram was done in 2016 that showed a normal LV size and function and a negative bubble study.  There was significant aortic sclerosis, and mild aortic stenosis was reported.  However, the Vmax on that was 2.9, almost nearing the moderate range.      ASSESSMENT AND PLAN:  Mr. Bishop is a very pleasant 73-year-old gentleman who is complaining of 6 months of lower extremity edema and shortness of breath.  Physical exam is consistent with aortic stenosis, potentially severe.  We will start off with getting a transthoracic echocardiogram and a Zio Patch for his symptoms.  My guess is he has severe aortic stenosis.  In either case, I would be interested in seeing if he has concomitant LV dysfunction as well.  He is going to need ischemic workup moving forward, but a lot will depend on what we see on the echocardiogram.  Given that he is markedly volume overloaded on exam, I will start him on 40 mg daily of Lasix.  I will continue his hydrochlorothiazide at baseline that he is taking.  Given his potassium was low, we will start 40 mEq of potassium.  Close followup is warranted.  I will have him set up with a C.O.R.E. MARK in the next 1-2 weeks, and I will personally  see him back in about 4-6 weeks depending on my clinic availability, but we will act peripherally once the echo results come back.  If indeed he has severe aortic stenosis, I recommend coronary angiography and have a referral.  I have told him to watch for signs and symptoms of lightheadedness, dizziness.  I have told him to cut down salt and water to 2 grams and 2 liters of liquid per day, respectively.  I have told him to write down his weights and get it at the next visit.      cc:   Buck Nascimento MD    [facility]    [address] [facility] [facility] [address]    [place]  [address]         RUBY TEE MD             D: 2019   T: 2019   MT: ALBERTO      Name:     RISA ROSAS   MRN:      [id number]        Account:      [id number]   :      1945           Service Date: 2019      Document: [id number]         Outpatient Encounter Medications as of 2019   Medication Sig Dispense Refill     aspirin 325 MG EC tablet Take 1 tablet (325 mg) by mouth daily 90 tablet 3     atorvastatin (LIPITOR) 40 MG tablet Take 1 tablet (40 mg) by mouth daily 90 tablet 3     furosemide (LASIX) 40 MG tablet Take 1 tablet (40 mg) by mouth daily 30 tablet 3     hydrochlorothiazide (HYDRODIURIL) 25 MG tablet Take 1 tablet (25 mg) by mouth daily 90 tablet 3     ibuprofen (ADVIL/MOTRIN) 600 MG tablet TAKE 1 TABLET BY MOUTH THREE TIMES DAILY AS NEEDED FOR MODERATE PAIN 270 tablet 1     multivitamin, therapeutic with minerals (MULTI-VITAMIN) TABS Take 1 tablet by mouth daily. 100 tablet 3     oxyCODONE-acetaminophen (PERCOCET) 5-325 MG tablet Take 1-2 tablets by mouth every 6 hours as needed for pain Max 5 per day.  Must be seen before additional refills. 150 tablet 0     potassium chloride ER (K-DUR/KLOR-CON M) 20 MEQ CR tablet Take 2 tablets (40 mEq) by mouth daily 60 tablet 3     sildenafil (VIAGRA) 100 MG tablet Take 1 tablet (100 mg) by mouth daily as needed (intercourse) Take 30 min to 4  hours before intercourse as needed for erectile dysfunction 25 tablet 11     tamsulosin (FLOMAX) 0.4 MG capsule TAKE 1 CAPSULE BY MOUTH ONCE DAILY 90 capsule 3     order for DME Equipment being ordered: Walker ()  Treatment Diagnosis: stroke 1 Units 0     other medical supplies Dispense knee high, medium compression, large size 4 each 0     [DISCONTINUED] acetaminophen (TYLENOL) 500 MG tablet Take 1 tablet (500 mg) by mouth every 8 hours as needed for fever or pain (DO NOT take more than 3000mg in a 24 hour period-may take one tablet with percocet up to 3 times daily) 60 tablet 0     [DISCONTINUED] lidocaine (LIDODERM) 5 % patch Place 1 patch onto the skin every 24 hours for 10 days 10 patch 0     No facility-administered encounter medications on file as of 9/19/2019.        Again, thank you for allowing me to participate in the care of your patient.      Sincerely,    Jonny Mock MD     [facility]

## 2019-09-19 NOTE — PROGRESS NOTES
Service Date: 09/19/2019      REASON FOR VISIT:  Shortness of breath.      HISTORY OF PRESENT ILLNESS:  This is a delightful 73-year-old gentleman who is here with his patient care assistant.  The patient has a history of hyperlipidemia and around 2016 his LDLs were in the 160s-170 range with a total cholesterol of 220s.  He has a history of a stroke many years ago without significant residual neurologic deficits.  He has a lot of orthopedic issues and knee surgeries.  He has a history of hypertension and peripheral vascular disease and a history of mild aortic stenosis back in 2016 that was not followed up subsequently.  He denies any cardiac history besides the ones mentioned above.      He is seeking consultation because of shortness of breath that started about 6 months ago that is worsening.  This is associated with bilateral lower extremity edema that is slowly getting worse.  He says that he was told that his lower extremity edema is because of immobility.  His breathing has gotten to a point where it is significantly worse, he is not dyspneic at rest but complains of NYHA class III symptoms.  He denies chest pain, but again he is not able to walk too much.  No arm pain, jaw pain or other classic symptoms of angina.  No syncope, presyncope.      CURRENT MEDICATIONS:   1.  Aspirin 325.   2.  Atorvastatin 40.   3.  Hydrochlorothiazide 25.   4.  Ibuprofen as needed.   5.  Viagra.   6.  Flomax 0.4.   7.  Lasix 40 mg daily starting today.   8.  Potassium chloride 40 mEq daily starting today.      PHYSICAL EXAMINATION:   VITAL SIGNS:  Blood pressure is 122/77 with a pulse of 76.   GENERAL:  Alert and oriented x3, in no acute distress.   NECK:  Supple.  Neck is thick, and JVP is difficult to see but elevated to about 10-12 cm.   LUNGS:  Clear.   CARDIAC:  Cardiac sounds are regular.  There is a harsh systolic murmur.  I cannot hear an S2.   EXTREMITIES:  Warm.  There is 2+ bilateral pitting edema.   ABDOMEN:  Seems  to be a little tight.  I do not see obvious signs of ascites, but it is possible.   NEUROLOGIC:  Deferred.   PSYCHIATRIC:  Appropriate affect.   HEENT:  No icterus, pallor.      PERTINENT DATA:  He had last labs in July of this year.  His potassium was 3.2 and creatinine is 0.7.  Last hemoglobin A1c was checked in 2016 at 6.3.  He has not had that checked after that.   PERTINENT DATA:  ECG from today shows sinus rhythm.  There is artifact but no obvious signs of ischemia or infarction.  Some nonspecific ST-T changes.  Last echocardiogram was done in 2016 that showed a normal LV size and function and a negative bubble study.  There was significant aortic sclerosis, and mild aortic stenosis was reported.  However, the Vmax on that was 2.9, almost nearing the moderate range.      ASSESSMENT AND PLAN:  Mr. Bishop is a very pleasant 73-year-old gentleman who is complaining of 6 months of lower extremity edema and shortness of breath.  Physical exam is consistent with aortic stenosis, potentially severe.  We will start off with getting a transthoracic echocardiogram and a Zio Patch for his symptoms.  My guess is he has severe aortic stenosis.  In either case, I would be interested in seeing if he has concomitant LV dysfunction as well.  He is going to need ischemic workup moving forward, but a lot will depend on what we see on the echocardiogram.  Given that he is markedly volume overloaded on exam, I will start him on 40 mg daily of Lasix.  I will continue his hydrochlorothiazide at baseline that he is taking.  Given his potassium was low, we will start 40 mEq of potassium.  Close followup is warranted.  I will have him set up with a C.O.R.E. MARK in the next 1-2 weeks, and I will personally see him back in about 4-6 weeks depending on my clinic availability, but we will act peripherally once the echo results come back.  If indeed he has severe aortic stenosis, I recommend coronary angiography and have a referral.  I  have told him to watch for signs and symptoms of lightheadedness, dizziness.  I have told him to cut down salt and water to 2 grams and 2 liters of liquid per day, respectively.  I have told him to write down his weights and get it at the next visit.      cc:   Buck Nascimento MD    [facility]    [address] [place] [address], [address]  [address]         RUBY TEE MD             D: 2019   T: 2019   MT: ALBERTO      Name:     RISA ROSAS   MRN:      [id number]        Account:      [id number]   :      1945           Service Date: 2019      Document: [id number]

## 2019-09-20 ENCOUNTER — CARE COORDINATION (OUTPATIENT)
Dept: CARDIOLOGY | Facility: CLINIC | Age: 74
End: 2019-09-20

## 2019-09-20 NOTE — PROGRESS NOTES
I placed pt on shadow schedule for echo on 9/25 @ 4:00 PM. I tried to call pt again to discuss, but could not reach him. His cell phone number isn't working. I sent pt a my chart message requesting a call back to move up his echo and labs. If he can come 9/25@ 4:00, will need to let Mary Carmen or Latisha in scheduling know. Leslie LARKIN September 20, 2019, 10:38 AM

## 2019-09-23 NOTE — PROGRESS NOTES
I called pt back and reviewed that  would like him to come earlier for his echo, labs, and follow up. I also got an update on his weight and symptoms.  Pt said his weight 9/#, 9/#, 9/#, and 9/#    Pt said that his LE edema has improved. He still notices that he feels winded with activity. Pt reports that he feels more energy today. I moved pt's echo up to 9/25@ 4:00, labs @ 3:40 prior to echo, and [facility] enrollment with Shelby on 9/26 @ 2:30, and mirella 9/26@ 1:30. I told pt that the timing of his next follow up appt will depend on his echo and lab results, and how he is doing at his visit with Shelby. Pt had no further questions at this time. I told him to continue to weigh himself, and bring his list of weights to his visit with Shelby. Leslie LARKIN September 23, 2019, 9:55 AM

## 2019-09-24 ENCOUNTER — CARE COORDINATION (OUTPATIENT)
Dept: CARDIOLOGY | Facility: CLINIC | Age: 74
End: 2019-09-24

## 2019-09-24 DIAGNOSIS — I50.9 CHF (CONGESTIVE HEART FAILURE) (H): Primary | ICD-10-CM

## 2019-09-25 ENCOUNTER — HOSPITAL ENCOUNTER (OUTPATIENT)
Dept: CARDIOLOGY | Facility: CLINIC | Age: 74
Discharge: HOME | End: 2019-09-25
Attending: INTERNAL MEDICINE | Admitting: INTERNAL MEDICINE
Payer: COMMERCIAL

## 2019-09-25 DIAGNOSIS — R06.02 SOB (SHORTNESS OF BREATH): ICD-10-CM

## 2019-09-25 DIAGNOSIS — I50.9 CHF (CONGESTIVE HEART FAILURE) (H): ICD-10-CM

## 2019-09-25 DIAGNOSIS — R01.1 SYSTOLIC MURMUR: ICD-10-CM

## 2019-09-25 DIAGNOSIS — I35.0 NONRHEUMATIC AORTIC VALVE STENOSIS: ICD-10-CM

## 2019-09-25 DIAGNOSIS — I63.521 CEREBROVASCULAR ACCIDENT INVOLVING ANTERIOR CIRCULATION, RIGHT (H): ICD-10-CM

## 2019-09-25 LAB
ANION GAP SERPL CALCULATED.3IONS-SCNC: 14.5 MMOL/L (ref 6–17)
BUN SERPL-MCNC: 16 MG/DL (ref 7–30)
CALCIUM SERPL-MCNC: 10.4 MG/DL (ref 8.5–10.5)
CHLORIDE SERPL-SCNC: 100 MMOL/L (ref 98–107)
CO2 SERPL-SCNC: 27 MMOL/L (ref 23–29)
CREAT SERPL-MCNC: 0.83 MG/DL (ref 0.7–1.3)
GFR SERPL CREATININE-BSD FRML MDRD: >90 ML/MIN/{1.73_M2}
GLUCOSE SERPL-MCNC: 98 MG/DL (ref 70–105)
POTASSIUM SERPL-SCNC: 4.5 MMOL/L (ref 3.5–5.1)
SODIUM SERPL-SCNC: 137 MMOL/L (ref 136–145)

## 2019-09-25 PROCEDURE — 80048 BASIC METABOLIC PNL TOTAL CA: CPT | Performed by: INTERNAL MEDICINE

## 2019-09-25 PROCEDURE — 40000264 ECHOCARDIOGRAM COMPLETE

## 2019-09-25 PROCEDURE — 36415 COLL VENOUS BLD VENIPUNCTURE: CPT | Performed by: INTERNAL MEDICINE

## 2019-09-25 PROCEDURE — 93306 TTE W/DOPPLER COMPLETE: CPT | Mod: 26 | Performed by: INTERNAL MEDICINE

## 2019-09-25 PROCEDURE — 25500064 ZZH RX 255 OP 636: Performed by: INTERNAL MEDICINE

## 2019-09-25 RX ADMIN — HUMAN ALBUMIN MICROSPHERES AND PERFLUTREN 9 ML: 10; .22 INJECTION, SOLUTION INTRAVENOUS at 17:00

## 2019-09-26 ENCOUNTER — HOSPITAL ENCOUNTER (OUTPATIENT)
Dept: CARDIOLOGY | Facility: CLINIC | Age: 74
Discharge: HOME | End: 2019-09-26
Attending: INTERNAL MEDICINE | Admitting: INTERNAL MEDICINE
Payer: COMMERCIAL

## 2019-09-26 ENCOUNTER — OFFICE VISIT (OUTPATIENT)
Dept: CARDIOLOGY | Facility: CLINIC | Age: 74
End: 2019-09-26
Payer: COMMERCIAL

## 2019-09-26 VITALS
WEIGHT: 237 LBS | SYSTOLIC BLOOD PRESSURE: 136 MMHG | HEIGHT: 63 IN | DIASTOLIC BLOOD PRESSURE: 76 MMHG | HEART RATE: 72 BPM | BODY MASS INDEX: 41.99 KG/M2 | OXYGEN SATURATION: 95 %

## 2019-09-26 DIAGNOSIS — I63.521 CEREBROVASCULAR ACCIDENT INVOLVING ANTERIOR CIRCULATION, RIGHT (H): ICD-10-CM

## 2019-09-26 DIAGNOSIS — I35.0 NONRHEUMATIC AORTIC VALVE STENOSIS: ICD-10-CM

## 2019-09-26 DIAGNOSIS — I35.0 SEVERE AORTIC STENOSIS: Primary | ICD-10-CM

## 2019-09-26 DIAGNOSIS — R01.1 SYSTOLIC MURMUR: ICD-10-CM

## 2019-09-26 DIAGNOSIS — R06.02 SOB (SHORTNESS OF BREATH): ICD-10-CM

## 2019-09-26 PROCEDURE — 0298T ZIO PATCH HOLTER ADULT PEDIATRIC GREATER THAN 48 HRS: CPT | Performed by: INTERNAL MEDICINE

## 2019-09-26 PROCEDURE — 0296T ZIO PATCH HOLTER ADULT PEDIATRIC GREATER THAN 48 HRS: CPT

## 2019-09-26 PROCEDURE — 99214 OFFICE O/P EST MOD 30 MIN: CPT | Performed by: PHYSICIAN ASSISTANT

## 2019-09-26 ASSESSMENT — MIFFLIN-ST. JEOR: SCORE: 1707.21

## 2019-09-26 NOTE — LETTER
9/26/2019    Buck Nascimento MD  [address] [facility] [address]  [facility] [address]    RE: Jonathan Bishop       Dear Colleague,    I had the pleasure of seeing Jonathan Bishop in the [facility].      Cardiology Progress Note    Date of Service: 09/27/2019  Patient seen today in follow up of: CORE enrollment  Primary cardiologist: Dr. 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 This has not been followed up since then.    He recently was evaluated by Dr. Mock due to complaints of worsening shortness of breath.  This started about 6 months ago and has been progressive since then.  He also has associated peripheral edema which is slowly getting worse.  He has been dyspneic with little exertion.  He denies chest discomfort but his activity has been limited.  Dr. Mock was concerned about progression of his aortic stenosis based on his exam and recommended getting a transthoracic echocardiogram as well as a Ziopatch for further evaluation of his symptoms.  He was started on Lasix 40 mg daily as well as a potassium supplement in addition to the hydrochlorthiazide he has been on given his volume overload.    He is back today for follow-up with his PCA Ana.  He had an echocardiogram done yesterday which showed a preserved LV function with an LVEF of 60 to 65%.  There was in fact severe aortic valve stenosis with an aortic valve area of 0.91 cm .  The mean aortic valve gradient was 40 mmHg.  Grade 2 diastolic dysfunction was noted.  His Ziopatch monitor was placed at this afternoon.  Follow-up labs done yesterday showed stable renal function and normal electrolytes despite the initiation of Lasix.    He notes improvement in his peripheral edema since starting the Lasix.  His  exercise tolerance is a bit better as well.  His weight has trended down about 5 pounds since his visit with Dr. Mock.  Despite this, he still has significant pitting edema.  He denies trip shortness of breath but reports more decreased activity tolerance.  He continues to deny chest discomfort.  No dizziness, lightheadedness, syncope or syncope.      He lives alone but has help from his PCA, Ana.  They appear to have a very good relationship.  He cooks his meals at home and has been cautious with his sodium.  He lives to spend his time fishing and does so almost every day that he can.  His goal is to be able to continue to do this.    ASSESSMENT/PLAN:  1.  Severe aortic stenosis. As noted by recent echocardiogram. His echo shows preserved LV function. I discussed this result with him today in detail.  We reviewed aortic stenosis. As recommended by Dr. Mock, we reviewed proceeding with left and right heart catheterizations and referral to the structural heart team for consideration of TAVR.  He is agreeable to proceed with both of these things.  The risks and benefits of right and left heart catheterization today including, bleeding, bruising, infection, allergic reaction, kidney damage (including need for dialysis), stroke, heart attack, vascular damage, need for transfusion, pneumothorax, heart block, emergency open heart surgery, up to and including death.  He understands and wishes to proceed.    2.  HFpEF. In the setting of #1.  He is currently on 40 mg of Lasix daily in addition to his usual hydrochlorothiazide 25 mg daily.  He is down 5 pounds since his last visit and his symptoms have improved although on exam he still remains to be hypervolemic.  I suggested we cautiously titrate his Lasix a little bit further to 60 mg daily but he prefers to discontinue on the 40 for now and see how things continue to go.  We agreed to leave things the same for now and I will have the core nurses follow-up with him in  next week.  If his weights have not continue to trend down, then I would increase his Lasix further to 60 mg daily.  He seems to be tolerating diuresis thus far but will be cautious given his severe aortic stenosis.  Labs today show stable renal function, normal electrolytes and BUN. We again reviewed sodium and fluid restriction today.   3.  History of prior CVA.  4.  Hypertension.  Well-controlled.  5.  Peripheral vascular disease.    I will have Conner return for CORE clinic follow-up in 2 weeks.  As noted above, we will follow-up by phone next week to ensure he has continued to diuresis.  Follow-up in the structural heart clinic and coronary on angiography will be arranged in the near future as well.    Orders this Visit:  Orders Placed This Encounter   Procedures     Basic metabolic panel     Cardiac Structural Heart Clinic     Follow-Up with CORE Clinic - MARK visit     No orders of the defined types were placed in this encounter.    There are no discontinued medications.    CURRENT MEDICATIONS:  Current Outpatient Medications   Medication Sig Dispense Refill     aspirin 325 MG EC tablet Take 1 tablet (325 mg) by mouth daily 90 tablet 3     atorvastatin (LIPITOR) 40 MG tablet Take 1 tablet (40 mg) by mouth daily 90 tablet 3     furosemide (LASIX) 40 MG tablet Take 1 tablet (40 mg) by mouth daily 30 tablet 3     hydrochlorothiazide (HYDRODIURIL) 25 MG tablet Take 1 tablet (25 mg) by mouth daily 90 tablet 3     ibuprofen (ADVIL/MOTRIN) 600 MG tablet TAKE 1 TABLET BY MOUTH THREE TIMES DAILY AS NEEDED FOR MODERATE PAIN 270 tablet 1     multivitamin, therapeutic with minerals (MULTI-VITAMIN) TABS Take 1 tablet by mouth daily. 100 tablet 3     order for DME Equipment being ordered: Walker ()  Treatment Diagnosis: stroke 1 Units 0     other medical supplies Dispense knee high, medium compression, large size 4 each 0     oxyCODONE-acetaminophen (PERCOCET) 5-325 MG tablet Take 1-2 tablets by mouth every 6 hours as  needed for pain Max 5 per day.  Must be seen before additional refills. 150 tablet 0     potassium chloride ER (K-DUR/KLOR-CON M) 20 MEQ CR tablet Take 2 tablets (40 mEq) by mouth daily 60 tablet 3     sildenafil (VIAGRA) 100 MG tablet Take 1 tablet (100 mg) by mouth daily as needed (intercourse) Take 30 min to 4 hours before intercourse as needed for erectile dysfunction 25 tablet 11     tamsulosin (FLOMAX) 0.4 MG capsule TAKE 1 CAPSULE BY MOUTH ONCE DAILY 90 capsule 3     ALLERGIES  No Known Allergies  PAST MEDICAL HISTORY:  Past Medical History:   Diagnosis Date     Acute rheumatic carditis 1950     Coronary artery disease     murmur     Hip pain, bilateral      Hypercholesteremia      Hypertension      Low back pain      Nonsenile cataract      Obesity      Renal cyst      Stroke (cerebrum) (H) 11/30/16     Umbilical hernia 6/10/2011    s/p surgical repair     Wound, surgical, infected 6/10/2011    hernia     PAST SURGICAL HISTORY:  Past Surgical History:   Procedure Laterality Date     ARTHROPLASTY KNEE BILATERAL  7/22/2010     COLONOSCOPY N/A 4/14/2017    Procedure: COLONOSCOPY;  Surgeon: Toby Bills MD;  Location: [facility]     FUSION LUMBAR ANTERIOR TWO LEVELS       HERNIORRHAPHY UMBILICAL  6/10/2011    Procedure:HERNIORRHAPHY UMBILICAL; Open, with mesh placement; Surgeon:HO PARHAM; Location:[facility]     LAMINECTOMY LUMBAR TWO LEVELS       FAMILY HISTORY:  Family History   Problem Relation Age of Onset     C.A.D. Mother      Unknown/Adopted Father      Heart Failure Sister      Heart Failure Sister      Glaucoma No family hx of      Macular Degeneration No family hx of      SOCIAL HISTORY:  Social History     Socioeconomic History     Marital status:      Spouse name: None     Number of children: None     Years of education: None     Highest education level: None   Occupational History     None   Social Needs     Financial resource strain: None     Food insecurity:     Worry: None      "Inability: None     Transportation needs:     Medical: None     Non-medical: None   Tobacco Use     Smoking status: Former Smoker     Last attempt to quit: 2005     Years since quittin.7     Smokeless tobacco: Never Used     Tobacco comment: Non smoker. No 2nd hand exposure. CCX [facility] 2011   Substance and Sexual Activity     Alcohol use: No     Drug use: No     Sexual activity: Not Currently   Lifestyle     Physical activity:     Days per week: None     Minutes per session: None     Stress: None   Relationships     Social connections:     Talks on phone: None     Gets together: None     Attends [employer] service: None     Active member of club or organization: None     Attends meetings of clubs or organizations: None     Relationship status: None     Intimate partner violence:     Fear of current or ex partner: None     Emotionally abused: None     Physically abused: None     Forced sexual activity: None   Other Topics Concern     Parent/sibling w/ CABG, MI or angioplasty before 65F 55M? Not Asked   Social History Narrative    He lives by himself in an apt in [place].  He has 2 goldfish, Lai and Ubaldo.     Review of Systems:  Skin:  Negative     Eyes:  Positive for glasses  ENT:  Negative    Respiratory:  Positive for dyspnea on exertion;cough;wheezing  Cardiovascular:    Positive for;edema  Gastroenterology: Negative    Genitourinary:  Negative    Musculoskeletal:  Positive for joint pain  Neurologic:  Positive for headaches;stroke;numbness or tingling of feet  Psychiatric:  Positive for excessive stress;anxiety;depression  Heme/Lymph/Imm:  Negative    Endocrine:  Negative       Physical Exam:  Vitals: /76   Pulse 72   Ht 1.588 m (5' 2.5\")   Wt 107.5 kg (237 lb)   SpO2 95%   BMI 42.66 kg/m      Wt Readings from Last 4 Encounters:   19 107.5 kg (237 lb)   19 109.8 kg (242 lb 1.6 oz)   19 109.9 kg (242 lb 4.8 oz)   19 112.8 kg (248 lb 11.2 oz)     GEN: " well nourished, in no acute distress.  HEENT:  Pupils equal, round. Sclerae nonicteric.   C/V:  Regular rate and rhythm, III/VI ARACELI  RESP: Respirations are unlabored. Clear to auscultation bilaterally without wheezing, rales, or rhonchi.  GI: Abdomen soft, nontender.  EXTREM: 2+ pitting edema to the shin  NEURO: Alert and oriented, cooperative.  SKIN: Warm and dry.    Recent Lab Results:  LIPID RESULTS:  Lab Results   Component Value Date    CHOL 112 07/01/2019    HDL 47 07/01/2019    LDL 51 07/01/2019    TRIG 70 07/01/2019    CHOLHDLRATIO 4.5 10/07/2014     LIVER ENZYME RESULTS:  Lab Results   Component Value Date    AST 22 09/19/2019    ALT 30 09/19/2019     CBC RESULTS:  Lab Results   Component Value Date    WBC 7.4 09/19/2019    RBC 4.55 09/19/2019    HGB 11.3 (L) 09/19/2019    HCT 37.0 (L) 09/19/2019    MCV 81 09/19/2019    MCH 24.8 (L) 09/19/2019    MCHC 30.5 (L) 09/19/2019    RDW 16.3 (H) 09/19/2019     09/19/2019     BMP RESULTS:  Lab Results   Component Value Date     09/25/2019    POTASSIUM 4.5 09/25/2019    CHLORIDE 100 09/25/2019    CO2 27 09/25/2019    ANIONGAP 14.5 09/25/2019    GLC 98 09/25/2019    BUN 16 09/25/2019    CR 0.83 09/25/2019    GFRESTIMATED >90 09/25/2019    GFRESTBLACK >90 09/25/2019    WARREN 10.4 09/25/2019      A1C RESULTS:  Lab Results   Component Value Date    A1C 6.3 (H) 11/30/2016     INR RESULTS:  Lab Results   Component Value Date    INR 0.99 11/30/2016    INR 4.95 (H) 08/02/2010       New/Pertinent imaging results since last visit:  Echocardiogram 9/25/19:  Interpretation Summary     Severe valvular aortic stenosis.  There has beeen significant progression of aortic stenosis since 2016.  The visual ejection fraction is estimated at 60-65%.  Left ventricular systolic function is normal.  The study was technically difficult.    Cristal Jean PA-C  [facility]      Thank you for allowing me to participate in the care of your patient.    Sincerely,     Cristal Jean,  PAJacquelineC     [facility]

## 2019-09-26 NOTE — PROGRESS NOTES
Cardiology Progress Note    Date of Service: 09/27/2019  Patient seen today in follow up of: CORE enrollment  Primary cardiologist: Dr. 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 This has not been followed up since then.    He recently was evaluated by Dr. Mock due to complaints of worsening shortness of breath.  This started about 6 months ago and has been progressive since then.  He also has associated peripheral edema which is slowly getting worse.  He has been dyspneic with little exertion.  He denies chest discomfort but his activity has been limited.  Dr. Mock was concerned about progression of his aortic stenosis based on his exam and recommended getting a transthoracic echocardiogram as well as a Ziopatch for further evaluation of his symptoms.  He was started on Lasix 40 mg daily as well as a potassium supplement in addition to the hydrochlorthiazide he has been on given his volume overload.    He is back today for follow-up with his PCA Hamneymar.  He had an echocardiogram done yesterday which showed a preserved LV function with an LVEF of 60 to 65%.  There was in fact severe aortic valve stenosis with an aortic valve area of 0.91 cm .  The mean aortic valve gradient was 40 mmHg.  Grade 2 diastolic dysfunction was noted.  His Ziopatch monitor was placed at this afternoon.  Follow-up labs done yesterday showed stable renal function and normal electrolytes despite the initiation of Lasix.    He notes improvement in his peripheral edema since starting the Lasix.  His exercise tolerance is a bit better as well.  His weight has trended down about 5 pounds since his visit with Dr. Mock.  Despite this, he still has significant pitting edema.  He denies trip shortness of breath but reports  more decreased activity tolerance.  He continues to deny chest discomfort.  No dizziness, lightheadedness, syncope or syncope.      He lives alone but has help from his PCA, Ana.  They appear to have a very good relationship.  He cooks his meals at home and has been cautious with his sodium.  He lives to spend his time fishing and does so almost every day that he can.  His goal is to be able to continue to do this.    ASSESSMENT/PLAN:  1.  Severe aortic stenosis. As noted by recent echocardiogram. His echo shows preserved LV function. I discussed this result with him today in detail.  We reviewed aortic stenosis. As recommended by Dr. Mock, we reviewed proceeding with left and right heart catheterizations and referral to the structural heart team for consideration of TAVR.  He is agreeable to proceed with both of these things.  The risks and benefits of right and left heart catheterization today including, bleeding, bruising, infection, allergic reaction, kidney damage (including need for dialysis), stroke, heart attack, vascular damage, need for transfusion, pneumothorax, heart block, emergency open heart surgery, up to and including death.  He understands and wishes to proceed.    2.  HFpEF. In the setting of #1.  He is currently on 40 mg of Lasix daily in addition to his usual hydrochlorothiazide 25 mg daily.  He is down 5 pounds since his last visit and his symptoms have improved although on exam he still remains to be hypervolemic.  I suggested we cautiously titrate his Lasix a little bit further to 60 mg daily but he prefers to discontinue on the 40 for now and see how things continue to go.  We agreed to leave things the same for now and I will have the core nurses follow-up with him in next week.  If his weights have not continue to trend down, then I would increase his Lasix further to 60 mg daily.  He seems to be tolerating diuresis thus far but will be cautious given his severe aortic stenosis.  Labs  today show stable renal function, normal electrolytes and BUN. We again reviewed sodium and fluid restriction today.   3.  History of prior CVA.  4.  Hypertension.  Well-controlled.  5.  Peripheral vascular disease.    I will have Conner return for CORE clinic follow-up in 2 weeks.  As noted above, we will follow-up by phone next week to ensure he has continued to diuresis.  Follow-up in the structural heart clinic and coronary on angiography will be arranged in the near future as well.    Orders this Visit:  Orders Placed This Encounter   Procedures     Basic metabolic panel     Cardiac Structural Heart Clinic     Follow-Up with CORE Clinic - MARK visit     No orders of the defined types were placed in this encounter.    There are no discontinued medications.    CURRENT MEDICATIONS:  Current Outpatient Medications   Medication Sig Dispense Refill     aspirin 325 MG EC tablet Take 1 tablet (325 mg) by mouth daily 90 tablet 3     atorvastatin (LIPITOR) 40 MG tablet Take 1 tablet (40 mg) by mouth daily 90 tablet 3     furosemide (LASIX) 40 MG tablet Take 1 tablet (40 mg) by mouth daily 30 tablet 3     hydrochlorothiazide (HYDRODIURIL) 25 MG tablet Take 1 tablet (25 mg) by mouth daily 90 tablet 3     ibuprofen (ADVIL/MOTRIN) 600 MG tablet TAKE 1 TABLET BY MOUTH THREE TIMES DAILY AS NEEDED FOR MODERATE PAIN 270 tablet 1     multivitamin, therapeutic with minerals (MULTI-VITAMIN) TABS Take 1 tablet by mouth daily. 100 tablet 3     order for DME Equipment being ordered: Walker ()  Treatment Diagnosis: stroke 1 Units 0     other medical supplies Dispense knee high, medium compression, large size 4 each 0     oxyCODONE-acetaminophen (PERCOCET) 5-325 MG tablet Take 1-2 tablets by mouth every 6 hours as needed for pain Max 5 per day.  Must be seen before additional refills. 150 tablet 0     potassium chloride ER (K-DUR/KLOR-CON M) 20 MEQ CR tablet Take 2 tablets (40 mEq) by mouth daily 60 tablet 3     sildenafil (VIAGRA) 100  MG tablet Take 1 tablet (100 mg) by mouth daily as needed (intercourse) Take 30 min to 4 hours before intercourse as needed for erectile dysfunction 25 tablet 11     tamsulosin (FLOMAX) 0.4 MG capsule TAKE 1 CAPSULE BY MOUTH ONCE DAILY 90 capsule 3     ALLERGIES  No Known Allergies  PAST MEDICAL HISTORY:  Past Medical History:   Diagnosis Date     Acute rheumatic carditis 1950     Coronary artery disease     murmur     Hip pain, bilateral      Hypercholesteremia      Hypertension      Low back pain      Nonsenile cataract      Obesity      Renal cyst      Stroke (cerebrum) (H) 16     Umbilical hernia 6/10/2011    s/p surgical repair     Wound, surgical, infected 6/10/2011    hernia     PAST SURGICAL HISTORY:  Past Surgical History:   Procedure Laterality Date     ARTHROPLASTY KNEE BILATERAL  2010     COLONOSCOPY N/A 2017    Procedure: COLONOSCOPY;  Surgeon: Toby Bills MD;  Location: [facility]     FUSION LUMBAR ANTERIOR TWO LEVELS       HERNIORRHAPHY UMBILICAL  6/10/2011    Procedure:HERNIORRHAPHY UMBILICAL; Open, with mesh placement; Surgeon:HO PARHAM; Location:[facility]     LAMINECTOMY LUMBAR TWO LEVELS       FAMILY HISTORY:  Family History   Problem Relation Age of Onset     C.A.D. Mother      Unknown/Adopted Father      Heart Failure Sister      Heart Failure Sister      Glaucoma No family hx of      Macular Degeneration No family hx of      SOCIAL HISTORY:  Social History     Socioeconomic History     Marital status:      Spouse name: None     Number of children: None     Years of education: None     Highest education level: None   Occupational History     None   Social Needs     Financial resource strain: None     Food insecurity:     Worry: None     Inability: None     Transportation needs:     Medical: None     Non-medical: None   Tobacco Use     Smoking status: Former Smoker     Last attempt to quit: 2005     Years since quittin.7     Smokeless tobacco: Never  "Used     Tobacco comment: Non smoker. No 2nd hand exposure. CCX, [facility] 7/28/2011   Substance and Sexual Activity     Alcohol use: No     Drug use: No     Sexual activity: Not Currently   Lifestyle     Physical activity:     Days per week: None     Minutes per session: None     Stress: None   Relationships     Social connections:     Talks on phone: None     Gets together: None     Attends Qu[employer] service: None     Active member of club or organization: None     Attends meetings of clubs or organizations: None     Relationship status: None     Intimate partner violence:     Fear of current or ex partner: None     Emotionally abused: None     Physically abused: None     Forced sexual activity: None   Other Topics Concern     Parent/sibling w/ CABG, MI or angioplasty before 65F 55M? Not Asked   Social History Narrative    He lives by himself in an apt in [place].  He has 2 goldfish, Lai and Ubaldo.     Review of Systems:  Skin:  Negative     Eyes:  Positive for glasses  ENT:  Negative    Respiratory:  Positive for dyspnea on exertion;cough;wheezing  Cardiovascular:    Positive for;edema  Gastroenterology: Negative    Genitourinary:  Negative    Musculoskeletal:  Positive for joint pain  Neurologic:  Positive for headaches;stroke;numbness or tingling of feet  Psychiatric:  Positive for excessive stress;anxiety;depression  Heme/Lymph/Imm:  Negative    Endocrine:  Negative       Physical Exam:  Vitals: /76   Pulse 72   Ht 1.588 m (5' 2.5\")   Wt 107.5 kg (237 lb)   SpO2 95%   BMI 42.66 kg/m     Wt Readings from Last 4 Encounters:   09/26/19 107.5 kg (237 lb)   09/19/19 109.8 kg (242 lb 1.6 oz)   09/11/19 109.9 kg (242 lb 4.8 oz)   07/01/19 112.8 kg (248 lb 11.2 oz)     GEN: well nourished, in no acute distress.  HEENT:  Pupils equal, round. Sclerae nonicteric.   C/V:  Regular rate and rhythm, III/VI ARACELI  RESP: Respirations are unlabored. Clear to auscultation bilaterally without wheezing, rales, or " rhonchi.  GI: Abdomen soft, nontender.  EXTREM: 2+ pitting edema to the shin  NEURO: Alert and oriented, cooperative.  SKIN: Warm and dry.    Recent Lab Results:  LIPID RESULTS:  Lab Results   Component Value Date    CHOL 112 07/01/2019    HDL 47 07/01/2019    LDL 51 07/01/2019    TRIG 70 07/01/2019    CHOLHDLRATIO 4.5 10/07/2014     LIVER ENZYME RESULTS:  Lab Results   Component Value Date    AST 22 09/19/2019    ALT 30 09/19/2019     CBC RESULTS:  Lab Results   Component Value Date    WBC 7.4 09/19/2019    RBC 4.55 09/19/2019    HGB 11.3 (L) 09/19/2019    HCT 37.0 (L) 09/19/2019    MCV 81 09/19/2019    MCH 24.8 (L) 09/19/2019    MCHC 30.5 (L) 09/19/2019    RDW 16.3 (H) 09/19/2019     09/19/2019     BMP RESULTS:  Lab Results   Component Value Date     09/25/2019    POTASSIUM 4.5 09/25/2019    CHLORIDE 100 09/25/2019    CO2 27 09/25/2019    ANIONGAP 14.5 09/25/2019    GLC 98 09/25/2019    BUN 16 09/25/2019    CR 0.83 09/25/2019    GFRESTIMATED >90 09/25/2019    GFRESTBLACK >90 09/25/2019    WARREN 10.4 09/25/2019      A1C RESULTS:  Lab Results   Component Value Date    A1C 6.3 (H) 11/30/2016     INR RESULTS:  Lab Results   Component Value Date    INR 0.99 11/30/2016    INR 4.95 (H) 08/02/2010       New/Pertinent imaging results since last visit:  Echocardiogram 9/25/19:  Interpretation Summary     Severe valvular aortic stenosis.  There has beeen significant progression of aortic stenosis since 2016.  The visual ejection fraction is estimated at 60-65%.  Left ventricular systolic function is normal.  The study was technically difficult.    Cristal Jean PA-C  [facility]

## 2019-09-26 NOTE — PATIENT INSTRUCTIONS
Call [facility] nurse for any questions or concerns Mon-Fri 8am-4pm:                                                #[phone]                                       For concerns after hours:                                               #[phone]     1: Medication changes: no medication changes today. If your weight is not continuing to go down or is going up - please call us!  2: Plan from today: We'll schedule the heart catheterization and follow up with the clinic to discuss possibly replacing your valve.  3: Lab results: see attached; labs today looks great.  Component      Latest Ref Rng & Units 9/19/2019 9/25/2019   Sodium      136 - 145 mmol/L 139 137   Potassium      3.5 - 5.1 mmol/L 4.1 4.5   Chloride      98 - 107 mmol/L 106 100   Carbon Dioxide      23 - 29 mmol/L 28 27   Anion Gap      6 - 17 mmol/L 5 14.5   Glucose      70 - 105 mg/dL 100 (H) 98   Urea Nitrogen      7 - 30 mg/dL 14 16   Creatinine      0.70 - 1.30 mg/dL 0.70 0.83   GFR Estimate      >60 mL/min/1.73:m2 >90 >90   GFR Estimate If Black      >60 mL/min/1.73:m2 >90 >90   Calcium      8.5 - 10.5 mg/dL 9.1 10.4   Bilirubin Total      0.2 - 1.3 mg/dL 0.5    Albumin      3.4 - 5.0 g/dL 3.7    Protein Total      6.8 - 8.8 g/dL 7.3    Alkaline Phosphatase      40 - 150 U/L 100    ALT      0 - 70 U/L 30    AST      0 - 45 U/L 22        Patient Education     Understanding Transcatheter Aortic Valve Replacement (TAVR)    Transcatheter aortic valve replacement (TAVR) is a procedure to replace a diseased aortic valve with a new tissue or biologic valve. The old heart valve is not removed but acts like an anchor for the new heart valve. This procedure is done through small incisions using a long thin tube (catheter), X-rays, and ultrasound.  The aortic valve controls blood flow from the heart to the body. In some people, the valve becomes scarred and stiff and has trouble opening. This is a condition called aortic stenosis. The heart then has to  work harder to push blood through the narrowed heart valve to the rest of the body. Over time, the extra work can cause the heart muscle to weaken. This may lead to symptoms such as tiredness, shortness of breath, chest pain, and fainting. It can lead to heart failure.  In TAVR, the catheter is usually placed in the femoral artery in your groin. Sometimes, the catheter is placed through a small incision in your chest underneath the collarbone or an incision between the ribs. The doctor uses the catheter to bring the new valve to your heart. The new valve is made of cow or pig heart tissue and is mounted on a metal frame. The new valve helps improve blood flow from the heart to the rest of the body.  Reasons for TAVR  TAVR is an option if you are at higher risk traditional open-heart surgery to replace a valve. TAVR may be an option if you have:    Advanced age    A weaker heart    Previous heart surgery    A history of stroke    Chronic obstructive lung disease    Coronary artery disease    Kidney disease    Diabetes    Previous radiation treatment to the chest    Porcelain aorta, extensive calcium around the aorta that would make open heart surgery impossible or difficult    Frailty or physical disability that would make recovery from an open heart surgery challenging  TAVR may also be a choice for you for other reasons. A team of doctors makes the decision to do this surgery on a case-by-case basis.  Risks and possible complications  TAVR is very effective in most people. However, all medical procedures carry some risks and possible complications. Some common risks of TAVR include:    Bleeding or the need for a transfusion    Anemia (not enough red blood cells in the blood)    Blood clots    Infection    Collection of fluid around your heart    Confusion or memory problems    Damage to the heart    Damage to your blood vessels    Failure of the new valve    Heart attack    Heart rhythm problems that may require a  pacemaker    Kidney damage or failure    Lung puncture    Stroke    Risks of anesthesia (including death)    Rarely, the new heart valve can move out of position after it has been implanted    Leaking of blood in or around the new heart valve  You may have other risks, depending on your medical condition. Be sure to talk with your doctor if you have any concerns before the procedure.  Life after a heart valve replacement    A new heart valve can help ease symptoms you may have had. These include pain or pressure in your chest, shortness of breath, and tiredness.    After the surgery, you will need to take aspirin or other blood-thinning medicine every day. You will likely also need to take an anti-platelet medication such as clopidogrel  for a certain period of time. These medicines help prevent blood clots in your new valve.    You will need to take antibiotics before you have dental work, as prescribed by your healthcare provider. This is to help prevent bacteria from harming your new heart valve.    Your healthcare provider may tell you to make some lifestyle changes to protect your heart and make it stronger. These include exercise, quitting smoking, and keeping a healthy weight.    After your recovery from TAVR, you may be able to return to regular activities with a noticeable improvement in the symptoms from your heart valve disease. Be sure to talk to your doctor before starting any exercise program.  Date Last Reviewed: 5/1/2016 2000-2018 [employer]. [address] [place] [address], [place] [address]. All rights reserved. This information is not intended as a substitute for professional medical care. Always follow your healthcare professional's instructions.

## 2019-09-30 ENCOUNTER — CARE COORDINATION (OUTPATIENT)
Dept: CARDIOLOGY | Facility: CLINIC | Age: 74
End: 2019-09-30

## 2019-09-30 NOTE — PROGRESS NOTES
Called patient to follow up after diuretic increase last week. Left voice mail for call back.     TRAE Beach September 30, 2019 11:04 AM

## 2019-10-01 NOTE — PROGRESS NOTES
Called and spoke with patient after recent visit with Rishabh on . Patient is continuing to take 40 mg of Furosemide daily plus 25 mg of hydrochlorothiazide. His weights are followin-28: 233.2  : 230  : 231  10-1: 231.8    Patient states that his swelling in his LE have gone down considerably. He's not sleeping well during the night, up frequently to use the bathroom. He also states that he doesn't sleep well unless he's sitting in a reclining chair. He feels fatigued, but unsure if it's due to HF symptoms or the fact that he's not sleeping much at night. He has been trying to abide by a low salt diet and not going overboard on the fluids during the day. He does have enough energy to perform normal activities around his home, but he does note he needs to take frequent breaks. He notes that he does feel considerably better and has more energy.     Future Appointments   Date Time Provider Department Center   10/11/2019  7:30 AM Cristal Jean PA-C [facility]   2019  8:30 AM Jonny Mock MD [facility]   2019  9:15 AM Camila Begum MD [facility]            TRAE Beach 2019 2:29 PM

## 2019-10-01 NOTE — PROGRESS NOTES
Thanks for the update. If he feels things are improving, he can continue on his current dose of lasix. We'll reassess at his next visit. Thanks. Laura

## 2019-10-01 NOTE — PROGRESS NOTES
Called patient back and told him ALiegl's recommendations. Instructed patient to call if he experiences symptoms or his weight increases. TRAE Beach October 1, 2019 2:53 PM

## 2019-10-03 ENCOUNTER — HEALTH MAINTENANCE LETTER (OUTPATIENT)
Age: 74
End: 2019-10-03

## 2019-10-09 ENCOUNTER — CARE COORDINATION (OUTPATIENT)
Dept: CARDIOLOGY | Facility: CLINIC | Age: 74
End: 2019-10-09

## 2019-10-09 ENCOUNTER — OFFICE VISIT (OUTPATIENT)
Dept: CARDIOLOGY | Facility: CLINIC | Age: 74
End: 2019-10-09
Payer: COMMERCIAL

## 2019-10-09 VITALS
SYSTOLIC BLOOD PRESSURE: 111 MMHG | HEIGHT: 63 IN | BODY MASS INDEX: 42.17 KG/M2 | WEIGHT: 238 LBS | HEART RATE: 80 BPM | DIASTOLIC BLOOD PRESSURE: 70 MMHG

## 2019-10-09 DIAGNOSIS — I35.0 SEVERE AORTIC STENOSIS: Primary | ICD-10-CM

## 2019-10-09 PROCEDURE — 99214 OFFICE O/P EST MOD 30 MIN: CPT | Performed by: INTERNAL MEDICINE

## 2019-10-09 ASSESSMENT — MIFFLIN-ST. JEOR: SCORE: 1711.75

## 2019-10-09 NOTE — LETTER
10/9/2019    Buck Nascimento MD  [address] [facility] [address]  [facility] [address]    RE: Jonathan Bishop       Dear Colleague,    I had the pleasure of seeing Jonathan Bishop in the [facility].    HPI and Plan:   See dictation  [id number]  No orders of the defined types were placed in this encounter.      No orders of the defined types were placed in this encounter.      There are no discontinued medications.      No diagnosis found.    CURRENT MEDICATIONS:  Current Outpatient Medications   Medication Sig Dispense Refill     aspirin 325 MG EC tablet Take 1 tablet (325 mg) by mouth daily 90 tablet 3     atorvastatin (LIPITOR) 40 MG tablet Take 1 tablet (40 mg) by mouth daily 90 tablet 3     furosemide (LASIX) 40 MG tablet Take 1 tablet (40 mg) by mouth daily 30 tablet 3     hydrochlorothiazide (HYDRODIURIL) 25 MG tablet Take 1 tablet (25 mg) by mouth daily 90 tablet 3     ibuprofen (ADVIL/MOTRIN) 600 MG tablet TAKE 1 TABLET BY MOUTH THREE TIMES DAILY AS NEEDED FOR MODERATE PAIN 270 tablet 1     multivitamin, therapeutic with minerals (MULTI-VITAMIN) TABS Take 1 tablet by mouth daily. 100 tablet 3     order for DME Equipment being ordered: Walker ()  Treatment Diagnosis: stroke 1 Units 0     other medical supplies Dispense knee high, medium compression, large size 4 each 0     oxyCODONE-acetaminophen (PERCOCET) 5-325 MG tablet Take 1-2 tablets by mouth every 6 hours as needed for pain Max 5 per day.  Must be seen before additional refills. 150 tablet 0     potassium chloride ER (K-DUR/KLOR-CON M) 20 MEQ CR tablet Take 2 tablets (40 mEq) by mouth daily 60 tablet 3     sildenafil (VIAGRA) 100 MG tablet Take 1 tablet (100 mg) by mouth daily as needed (intercourse) Take 30 min to 4 hours before intercourse as needed for erectile dysfunction 25 tablet 11     tamsulosin (FLOMAX) 0.4 MG capsule TAKE 1 CAPSULE BY MOUTH ONCE DAILY 90 capsule 3       ALLERGIES   No Known Allergies    PAST MEDICAL  HISTORY:  Past Medical History:   Diagnosis Date     Acute rheumatic carditis 1950     Coronary artery disease     murmur     Hip pain, bilateral      Hypercholesteremia      Hypertension      Low back pain      Nonsenile cataract      Obesity      Renal cyst      Stroke (cerebrum) (H) 16     Umbilical hernia 6/10/2011    s/p surgical repair     Wound, surgical, infected 6/10/2011    hernia       PAST SURGICAL HISTORY:  Past Surgical History:   Procedure Laterality Date     ARTHROPLASTY KNEE BILATERAL  2010     COLONOSCOPY N/A 2017    Procedure: COLONOSCOPY;  Surgeon: Toby Bills MD;  Location: [facility]     FUSION LUMBAR ANTERIOR TWO LEVELS       HERNIORRHAPHY UMBILICAL  6/10/2011    Procedure:HERNIORRHAPHY UMBILICAL; Open, with mesh placement; Surgeon:HO PARHAM; Location:[facility]     LAMINECTOMY LUMBAR TWO LEVELS         FAMILY HISTORY:  Family History   Problem Relation Age of Onset     C.A.D. Mother      Unknown/Adopted Father      Heart Failure Sister      Heart Failure Sister      Glaucoma No family hx of      Macular Degeneration No family hx of        SOCIAL HISTORY:  Social History     Socioeconomic History     Marital status:      Spouse name: None     Number of children: None     Years of education: None     Highest education level: None   Occupational History     None   Social Needs     Financial resource strain: None     Food insecurity:     Worry: None     Inability: None     Transportation needs:     Medical: None     Non-medical: None   Tobacco Use     Smoking status: Former Smoker     Last attempt to quit: 2005     Years since quittin.7     Smokeless tobacco: Never Used     Tobacco comment: Non smoker. No 2nd hand exposure. CCX, RMA PCC 2011   Substance and Sexual Activity     Alcohol use: No     Drug use: No     Sexual activity: Not Currently   Lifestyle     Physical activity:     Days per week: None     Minutes per session: None     Stress:  "None   Relationships     Social connections:     Talks on phone: None     Gets together: None     Attends Confucianist service: None     Active member of club or organization: None     Attends meetings of clubs or organizations: None     Relationship status: None     Intimate partner violence:     Fear of current or ex partner: None     Emotionally abused: None     Physically abused: None     Forced sexual activity: None   Other Topics Concern     Parent/sibling w/ CABG, MI or angioplasty before 65F 55M? Not Asked   Social History Narrative    He lives by himself in an apt in [place].  He has 2 goldfish, Lai and [place].       Review of Systems:  Skin:  Negative       Eyes:  Positive for glasses    ENT:  Negative      Respiratory:  Positive for dyspnea on exertion;cough;wheezing;shortness of breath improved from LOV.  Dry cough on Occ   Cardiovascular:    Positive for    Gastroenterology: Negative      Genitourinary:  Negative      Musculoskeletal:  Positive for joint pain;back pain constant back pain pt. rates at an 8  Neurologic:  Positive for stroke;numbness or tingling of feet both legs, states this isn't new  Psychiatric:  Positive for      Heme/Lymph/Imm:  Negative      Endocrine:  Negative        Physical Exam:  Vitals: /70   Pulse 80   Ht 1.588 m (5' 2.5\")   Wt 108 kg (238 lb)   BMI 42.84 kg/m       Constitutional:  cooperative, alert and oriented, well developed, well nourished, in no acute distress        Skin:  warm and dry to the touch, no apparent skin lesions or masses noted          Head:  normocephalic, no masses or lesions        Eyes:  pupils equal and round, conjunctivae and lids unremarkable, sclera white, no xanthalasma, EOMS intact, no nystagmus        Lymph:No Cervical lymphadenopathy present     ENT:  no pallor or cyanosis, dentition good        Neck:  carotid pulses are full and equal bilaterally, JVP normal, no carotid bruit        Respiratory:            Cardiac: regular " rhythm;normal S1 and S2       systolic murmur;radiation to the carotid                                                 GI:  abdomen soft, non-tender, BS normoactive, no mass, no HSM, no bruits        Extremities and Muscular Skeletal:  no deformities, clubbing, cyanosis, erythema observed              Neurological:  no gross motor deficits        Psych:  Alert and Oriented x 3        CC  No referring provider defined for this encounter.                Thank you for allowing me to participate in the care of your patient.      Sincerely,     Venkatesh Altamirano MD     [facility]    cc:   No referring provider defined for this encounter.

## 2019-10-09 NOTE — LETTER
10/9/2019      Buck Nascimento MD  [address] [facility] [address]  [facility] [address]      RE: Jonathan Bishop       Dear Colleague,    I had the pleasure of seeing Jonathan Bishop in the [facility].    Service Date: 10/09/2019      HISTORY OF PRESENT ILLNESS:  Thank you for allowing me to participate in the care of this very delightful patient.  As you know, Conner is a 73-year-old gentleman with history of rheumatic fever as a child, according to the patient, and a remote history of CVA of unclear cause without neurological deficits who recently saw my partner, Dr. Mock, for evaluation of worsening shortness of breath.  The patient was found to have severe aortic stenosis with a valve area of 0.9 cm2 and a mean gradient about 40.  He is scheduled to see a TAVR team soon.  In the meantime, as part of evaluation, Dr. Mock did recommend a Zio Patch monitor and the patient is here to discuss its findings.      Since last visit with Dr. Mock and Shelby, one of our advanced practice providers, the patient feels much better with less shortness of breath.  He stated, for example, today he went out fishing.  He did not have to take a break while walking because of breathing difficulties, whereas before he had to take multiple breaks prior to reaching his destination.     I did review the Zio Patch monitor, for which the patient did not have any symptoms.  In particular, I noted that he had some sinus pauses with the longest run about 4.7 seconds around 6:30 in the morning.  The patient is unsure whether he was sleeping at that time or was awake because usually he would wake up around 6:00, but he denies having near-syncope or syncope or lightheadedness.  There was also a transient nonconducted sinus P-wave around 2:00 a.m. when he was sleeping as well.  He is not on any AV eric blocking agent at this point in time.  The patient is known to have preserved LV systolic function, and baseline EKG  showed no evidence of AV conduction abnormalities aside from a very mild first-degree AV block with the FL measured about 206 milliseconds.      The patient denies having any symptoms suggestive of sleep apnea.  The sinus pause noted is not too uncommon, especially at night when a patient is sleeping or with untreated sleep apnea.  Having said that, he had no symptoms, and the fact that in the waking hours there were no pauses noted aside from some PACs, a pacemaker is not indicated at this point in time.  Even though the patient reports no symptoms suggestive of sleep apnea, I think it is not unreasonable to consider a sleep study in the future.  I did explain to the patient should he experience near-syncope or syncope in the waking hours, then a pacemaker may be needed at that point in time.     cc:   Buck Nascimento MD    [facility]    [address] [facility], [address]  [address]         SAILAJA COPE MD             D: 10/09/2019   T: 10/09/2019   MT: ALBERTO      Name:     RISA ROSAS   MRN:      [id number]        Account:      [id number]   :      1945           Service Date: 10/09/2019      Document: [id number]           Outpatient Encounter Medications as of 10/9/2019   Medication Sig Dispense Refill     aspirin 325 MG EC tablet Take 1 tablet (325 mg) by mouth daily 90 tablet 3     atorvastatin (LIPITOR) 40 MG tablet Take 1 tablet (40 mg) by mouth daily 90 tablet 3     furosemide (LASIX) 40 MG tablet Take 1 tablet (40 mg) by mouth daily 30 tablet 3     hydrochlorothiazide (HYDRODIURIL) 25 MG tablet Take 1 tablet (25 mg) by mouth daily 90 tablet 3     ibuprofen (ADVIL/MOTRIN) 600 MG tablet TAKE 1 TABLET BY MOUTH THREE TIMES DAILY AS NEEDED FOR MODERATE PAIN 270 tablet 1     multivitamin, therapeutic with minerals (MULTI-VITAMIN) TABS Take 1 tablet by mouth daily. 100 tablet 3     order for DME Equipment being ordered: Walker ()  Treatment Diagnosis: stroke 1  Units 0     other medical supplies Dispense knee high, medium compression, large size 4 each 0     oxyCODONE-acetaminophen (PERCOCET) 5-325 MG tablet Take 1-2 tablets by mouth every 6 hours as needed for pain Max 5 per day.  Must be seen before additional refills. 150 tablet 0     potassium chloride ER (K-DUR/KLOR-CON M) 20 MEQ CR tablet Take 2 tablets (40 mEq) by mouth daily 60 tablet 3     sildenafil (VIAGRA) 100 MG tablet Take 1 tablet (100 mg) by mouth daily as needed (intercourse) Take 30 min to 4 hours before intercourse as needed for erectile dysfunction 25 tablet 11     tamsulosin (FLOMAX) 0.4 MG capsule TAKE 1 CAPSULE BY MOUTH ONCE DAILY 90 capsule 3     No facility-administered encounter medications on file as of 10/9/2019.        Again, thank you for allowing me to participate in the care of your patient.      Sincerely,    Venkatesh Altamirano MD     [facility]

## 2019-10-09 NOTE — PROGRESS NOTES
HPI and Plan:   See dictation  [id number]  No orders of the defined types were placed in this encounter.      No orders of the defined types were placed in this encounter.      There are no discontinued medications.      No diagnosis found.    CURRENT MEDICATIONS:  Current Outpatient Medications   Medication Sig Dispense Refill     aspirin 325 MG EC tablet Take 1 tablet (325 mg) by mouth daily 90 tablet 3     atorvastatin (LIPITOR) 40 MG tablet Take 1 tablet (40 mg) by mouth daily 90 tablet 3     furosemide (LASIX) 40 MG tablet Take 1 tablet (40 mg) by mouth daily 30 tablet 3     hydrochlorothiazide (HYDRODIURIL) 25 MG tablet Take 1 tablet (25 mg) by mouth daily 90 tablet 3     ibuprofen (ADVIL/MOTRIN) 600 MG tablet TAKE 1 TABLET BY MOUTH THREE TIMES DAILY AS NEEDED FOR MODERATE PAIN 270 tablet 1     multivitamin, therapeutic with minerals (MULTI-VITAMIN) TABS Take 1 tablet by mouth daily. 100 tablet 3     order for DME Equipment being ordered: Walker ()  Treatment Diagnosis: stroke 1 Units 0     other medical supplies Dispense knee high, medium compression, large size 4 each 0     oxyCODONE-acetaminophen (PERCOCET) 5-325 MG tablet Take 1-2 tablets by mouth every 6 hours as needed for pain Max 5 per day.  Must be seen before additional refills. 150 tablet 0     potassium chloride ER (K-DUR/KLOR-CON M) 20 MEQ CR tablet Take 2 tablets (40 mEq) by mouth daily 60 tablet 3     sildenafil (VIAGRA) 100 MG tablet Take 1 tablet (100 mg) by mouth daily as needed (intercourse) Take 30 min to 4 hours before intercourse as needed for erectile dysfunction 25 tablet 11     tamsulosin (FLOMAX) 0.4 MG capsule TAKE 1 CAPSULE BY MOUTH ONCE DAILY 90 capsule 3       ALLERGIES   No Known Allergies    PAST MEDICAL HISTORY:  Past Medical History:   Diagnosis Date     Acute rheumatic carditis 1950     Coronary artery disease     murmur     Hip pain, bilateral      Hypercholesteremia      Hypertension      Low back pain      Nonsenile  cataract      Obesity      Renal cyst      Stroke (cerebrum) (H) 16     Umbilical hernia 6/10/2011    s/p surgical repair     Wound, surgical, infected 6/10/2011    hernia       PAST SURGICAL HISTORY:  Past Surgical History:   Procedure Laterality Date     ARTHROPLASTY KNEE BILATERAL  2010     COLONOSCOPY N/A 2017    Procedure: COLONOSCOPY;  Surgeon: Toby Bills MD;  Location: [facility]     FUSION LUMBAR ANTERIOR TWO LEVELS       HERNIORRHAPHY UMBILICAL  6/10/2011    Procedure:HERNIORRHAPHY UMBILICAL; Open, with mesh placement; Surgeon:HO PARHAM; Location:[facility]     LAMINECTOMY LUMBAR TWO LEVELS         FAMILY HISTORY:  Family History   Problem Relation Age of Onset     C.A.D. Mother      Unknown/Adopted Father      Heart Failure Sister      Heart Failure Sister      Glaucoma No family hx of      Macular Degeneration No family hx of        SOCIAL HISTORY:  Social History     Socioeconomic History     Marital status:      Spouse name: None     Number of children: None     Years of education: None     Highest education level: None   Occupational History     None   Social Needs     Financial resource strain: None     Food insecurity:     Worry: None     Inability: None     Transportation needs:     Medical: None     Non-medical: None   Tobacco Use     Smoking status: Former Smoker     Last attempt to quit: 2005     Years since quittin.7     Smokeless tobacco: Never Used     Tobacco comment: Non smoker. No 2nd hand exposure. CCX, RMA PCC 2011   Substance and Sexual Activity     Alcohol use: No     Drug use: No     Sexual activity: Not Currently   Lifestyle     Physical activity:     Days per week: None     Minutes per session: None     Stress: None   Relationships     Social connections:     Talks on phone: None     Gets together: None     Attends Yazidism service: None     Active member of club or organization: None     Attends meetings of clubs or organizations:  "None     Relationship status: None     Intimate partner violence:     Fear of current or ex partner: None     Emotionally abused: None     Physically abused: None     Forced sexual activity: None   Other Topics Concern     Parent/sibling w/ CABG, MI or angioplasty before 65F 55M? Not Asked   Social History Narrative    He lives by himself in an apt in [place].  He has 2 goldfish, Lai and Ubaldo.       Review of Systems:  Skin:  Negative       Eyes:  Positive for glasses    ENT:  Negative      Respiratory:  Positive for dyspnea on exertion;cough;wheezing;shortness of breath improved from LOV.  Dry cough on Occ   Cardiovascular:    Positive for    Gastroenterology: Negative      Genitourinary:  Negative      Musculoskeletal:  Positive for joint pain;back pain constant back pain pt. rates at an 8  Neurologic:  Positive for stroke;numbness or tingling of feet both legs, states this isn't new  Psychiatric:  Positive for      Heme/Lymph/Imm:  Negative      Endocrine:  Negative        Physical Exam:  Vitals: /70   Pulse 80   Ht 1.588 m (5' 2.5\")   Wt 108 kg (238 lb)   BMI 42.84 kg/m      Constitutional:  cooperative, alert and oriented, well developed, well nourished, in no acute distress        Skin:  warm and dry to the touch, no apparent skin lesions or masses noted          Head:  normocephalic, no masses or lesions        Eyes:  pupils equal and round, conjunctivae and lids unremarkable, sclera white, no xanthalasma, EOMS intact, no nystagmus        Lymph:No Cervical lymphadenopathy present     ENT:  no pallor or cyanosis, dentition good        Neck:  carotid pulses are full and equal bilaterally, JVP normal, no carotid bruit        Respiratory:            Cardiac: regular rhythm;normal S1 and S2       systolic murmur;radiation to the carotid                                                 GI:  abdomen soft, non-tender, BS normoactive, no mass, no HSM, no bruits        Extremities and Muscular " Skeletal:  no deformities, clubbing, cyanosis, erythema observed              Neurological:  no gross motor deficits        Psych:  Alert and Oriented x 3        CC  No referring provider defined for this encounter.

## 2019-10-09 NOTE — PROGRESS NOTES
Service Date: 10/09/2019      HISTORY OF PRESENT ILLNESS:  Thank you for allowing me to participate in the care of this very delightful patient.  As you know, Conner is a 73-year-old gentleman with history of rheumatic fever as a child, according to the patient, and a remote history of CVA of unclear cause without neurological deficits who recently saw my partner, Dr. Mock, for evaluation of worsening shortness of breath.  The patient was found to have severe aortic stenosis with a valve area of 0.9 cm2 and a mean gradient about 40.  He is scheduled to see a TAVR team soon.  In the meantime, as part of evaluation, Dr. Mock did recommend a Zio Patch monitor and the patient is here to discuss its findings.      Since last visit with Dr. Mock and Shelby, one of our advanced practice providers, the patient feels much better with less shortness of breath.  He stated, for example, today he went out fishing.  He did not have to take a break while walking because of breathing difficulties, whereas before he had to take multiple breaks prior to reaching his destination.     I did review the Zio Patch monitor, for which the patient did not have any symptoms.  In particular, I noted that he had some sinus pauses with the longest run about 4.7 seconds around 6:30 in the morning.  The patient is unsure whether he was sleeping at that time or was awake because usually he would wake up around 6:00, but he denies having near-syncope or syncope or lightheadedness.  There was also a transient nonconducted sinus P-wave around 2:00 a.m. when he was sleeping as well.  He is not on any AV reic blocking agent at this point in time.  The patient is known to have preserved LV systolic function, and baseline EKG showed no evidence of AV conduction abnormalities aside from a very mild first-degree AV block with the NC measured about 206 milliseconds.      The patient denies having any symptoms suggestive of sleep apnea.  The sinus pause noted  is not too uncommon, especially at night when a patient is sleeping or with untreated sleep apnea.  Having said that, he had no symptoms, and the fact that in the waking hours there were no pauses noted aside from some PACs, a pacemaker is not indicated at this point in time.  Even though the patient reports no symptoms suggestive of sleep apnea, I think it is not unreasonable to consider a sleep study in the future.  I did explain to the patient should he experience near-syncope or syncope in the waking hours, then a pacemaker may be needed at that point in time.     cc:   Buck Nascimento MD    [facility]    [address] [place] [address], [address]  [id number]         SAILAJA COPE MD             D: 10/09/2019   T: 10/09/2019   MT: ALBERTO      Name:     RISA ROSAS   MRN:      [id number]        Account:      [id number]   :      1945           Service Date: 10/09/2019      Document: [id number]

## 2019-10-09 NOTE — PROGRESS NOTES
I reviewed Shelby's most recent OV note, and it sounds like pt is supposed to be set up for right and left heart cath. It looks like order is in [facility], but pt has not been scheduled yet. I reviewed with  and he said pt needs to have a right/left heart cath soon. I called pt and he said he does not remember having a discussion about right/left heart cath. Pt has 10/11 OV with Shelby, so risks/benefits of cath will need to be discussed at that time. Will watch to see if  recommends procedure such as pacemaker as his CORE follow up may be canceled if that is the case. Leslie LARKIN October 9, 2019, 10:49 AM

## 2019-10-09 NOTE — PROGRESS NOTES
updated on ziopatch results. He recommended pt have EP MD appt ASAP. I called pt again this morning to discuss results and get an update on his symptoms. Pt updated on results. I told pt that I will speak to EP team to get pt scheduled for an appt.     Pt denied issues with lightheadedness, pre-syncopal, or syncopal spells. Pt said he has been weighing himself daily, and today his weight was 227#. Pt said he does still get some shortness of breath with activity, but his LE edema has improved quite a bit. I will call pt back once I hear back from the EP team. Leslie LARKIN October 9, 2019, 8:37 AM      I received call from TRAE arciniega.  can see pt @ 2:45 today. I called pt and confirmed appt time and location with him. Leslie LARKIN October 9, 2019, 8:43 AM

## 2019-10-11 ENCOUNTER — OFFICE VISIT (OUTPATIENT)
Dept: CARDIOLOGY | Facility: CLINIC | Age: 74
End: 2019-10-11
Payer: COMMERCIAL

## 2019-10-11 ENCOUNTER — CARE COORDINATION (OUTPATIENT)
Dept: CARDIOLOGY | Facility: CLINIC | Age: 74
End: 2019-10-11

## 2019-10-11 VITALS
SYSTOLIC BLOOD PRESSURE: 134 MMHG | HEIGHT: 63 IN | BODY MASS INDEX: 42.17 KG/M2 | OXYGEN SATURATION: 95 % | WEIGHT: 238 LBS | DIASTOLIC BLOOD PRESSURE: 79 MMHG | HEART RATE: 78 BPM

## 2019-10-11 DIAGNOSIS — E78.5 HYPERLIPIDEMIA WITH TARGET LDL LESS THAN 130: ICD-10-CM

## 2019-10-11 DIAGNOSIS — F11.90 CHRONIC, CONTINUOUS USE OF OPIOIDS: Primary | ICD-10-CM

## 2019-10-11 DIAGNOSIS — I35.0 SEVERE AORTIC STENOSIS: Primary | ICD-10-CM

## 2019-10-11 DIAGNOSIS — I73.9 PVD (PERIPHERAL VASCULAR DISEASE) (H): ICD-10-CM

## 2019-10-11 DIAGNOSIS — R01.1 SYSTOLIC MURMUR: ICD-10-CM

## 2019-10-11 DIAGNOSIS — I35.0 SEVERE AORTIC STENOSIS: ICD-10-CM

## 2019-10-11 DIAGNOSIS — I10 ESSENTIAL HYPERTENSION, BENIGN: ICD-10-CM

## 2019-10-11 DIAGNOSIS — M54.50 ACUTE MIDLINE LOW BACK PAIN WITHOUT SCIATICA: ICD-10-CM

## 2019-10-11 DIAGNOSIS — I63.521 CEREBROVASCULAR ACCIDENT INVOLVING ANTERIOR CIRCULATION, RIGHT (H): ICD-10-CM

## 2019-10-11 LAB
ANION GAP SERPL CALCULATED.3IONS-SCNC: 11.6 MMOL/L (ref 6–17)
BUN SERPL-MCNC: 28 MG/DL (ref 7–30)
CALCIUM SERPL-MCNC: 10.1 MG/DL (ref 8.5–10.5)
CHLORIDE SERPL-SCNC: 104 MMOL/L (ref 98–107)
CO2 SERPL-SCNC: 31 MMOL/L (ref 23–29)
CREAT SERPL-MCNC: 1.12 MG/DL (ref 0.7–1.3)
GFR SERPL CREATININE-BSD FRML MDRD: 64 ML/MIN/{1.73_M2}
GLUCOSE SERPL-MCNC: 109 MG/DL (ref 70–105)
POTASSIUM SERPL-SCNC: 3.6 MMOL/L (ref 3.5–5.1)
SODIUM SERPL-SCNC: 143 MMOL/L (ref 136–145)

## 2019-10-11 PROCEDURE — 80048 BASIC METABOLIC PNL TOTAL CA: CPT | Performed by: INTERNAL MEDICINE

## 2019-10-11 PROCEDURE — 36415 COLL VENOUS BLD VENIPUNCTURE: CPT | Performed by: INTERNAL MEDICINE

## 2019-10-11 PROCEDURE — 99214 OFFICE O/P EST MOD 30 MIN: CPT | Performed by: PHYSICIAN ASSISTANT

## 2019-10-11 ASSESSMENT — MIFFLIN-ST. JEOR: SCORE: 1711.75

## 2019-10-11 NOTE — PATIENT INSTRUCTIONS
Thank you for your [facility] visit today. Your provider has recommended the following:    Medication Changes:  No medication changes for now.  Recommendations:  We'll schedule the right and left heart catheterizations to look at your heart arteries.  Labs today to recheck your kidney function.   Reminder:  Please bring in all current medications, over the counter supplements and vitamin bottles to your next appointment.            [facility]

## 2019-10-11 NOTE — PROGRESS NOTES
"I confirmed that pt was scheduled for a \"heart cath with possible intervention\" on 10/21/19. I called scheduling to confirm that this has been set up as a right and left heart cath and was told that it was. Leslie LARKIN October 11, 2019, 10:39 AM        "

## 2019-10-11 NOTE — TELEPHONE ENCOUNTER
Reason For Call:   Chief Complaint   Patient presents with     Refill Request       Medication Name, Dose and Monthly Quantity:   Oxycodone with APAP (Percocet): Dose 5-325 Schedule 1 tablet by mouth every 6 hours prn Monthly Quantity 124   Diagnosis requiring opiates:   Acute midline low back pain without sciatica [M54.5]  - Primary     Problem List Updated:   No    Opioid Agreement On File - [facility] PAIN CONTRACT ID# [id number]:  No    Last Urine Drug Screen (at least once every 12 months) Date:   n/a  Unexpected Results:   n/a    [place]  Data Reviewed (at least once every 3 months) Date:   10/11/2019      Unexpected Results:    No.    Last Fill Date:   09/18/2019  Due Date:   10/18/2019  Last Visit with PCP:   07/01/2019    Future Visits with PCP:   No.    Processing:   [facility] [place]    STEPH JACK CMA      ----------------------------------

## 2019-10-11 NOTE — PROGRESS NOTES
Cardiology Progress Note    Date of Service: 10/11/2019  Patient seen today in follow up of: CORE enrollment  Primary cardiologist: Dr. 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 He has mild aortic stenosis in 2016 which was not follow up on until recently.    He was evaluated by Dr. Mock on 9/19 with a 6 month history of progressive dyspnea on exertion and peripheral edema. An echocardiogram was done which showed severe aortic stenosis with an aortic valve area of 0.91 cm  and a mean aortic gradient of 40 mmHg. He was started on lasix 40 mg daily in addition to his usual hydrochlorothiazide for diuresis.     He is here today for CORE clinic follow up with a his PCA, Ana. He still has some residual peripheral edema but overall this is much better than when he saw Dr. Mock. His dyspnea on exertion has improved, but he is still limited with his activity and can only walk about 50 years. He continues to deny chest discomfort, lightheadedness, presyncope or syncope.     He recently wore a Ziopatch which showed some almost 5 second long pauses. He saw Dr. Melara for this. No further work up was recommended as he was asymptomatic other than perhaps a sleep study.     ASSESSMENT/PLAN:  1.  Severe symptomatic aortic stenosis.   2.  Acute on chronic heart failure with preserved LVEF. In the setting of #1.  3.  Sinus pauses. Noted on recent Ziopatch.  4.  Hx CVA.    Conner is here today for CORE clinic follow up. He was recently found to have severe symptomatic aortic stenosis. Fortunately, his LV function is preserved at this point. Today, we again reviewed aortic stenosis and it's usual course. We discussed TAVR and he has been referred to the TAVR clinic. He is scheduled to see Dr. Begum in December. Today, we discussed  proceeding with right and left heart catheterization. Risks and benefits of this was discussed today including, bleeding, bruising, infection, allergic reaction, kidney damage (including need for dialysis), stroke, heart attack, vascular damage, emergency open heart surgery, up to and including death. He understands and is willing to proceed. We will schedule this in the next week or two.     He continues to have dyspnea on exertion which overall has improved the last few weeks. He remains volume overloaded on exam but has been hesitant to increase his diuretic further. We will see what his right heart catheterization shows and go from there. I will see him back for follow up after that. I did ask him to get labs done today to re-evaluate his renal function. In the meantime, I asked him to contact us and seek evaluation for symptoms of chest pain, worsening dyspnea, presyncope or syncope.     Orders this Visit:  Orders Placed This Encounter   Procedures     Basic metabolic panel     Follow-Up with [facility] Clinic - MARK visit     No orders of the defined types were placed in this encounter.    There are no discontinued medications.    CURRENT MEDICATIONS:  Current Outpatient Medications   Medication Sig Dispense Refill     aspirin 325 MG EC tablet Take 1 tablet (325 mg) by mouth daily 90 tablet 3     atorvastatin (LIPITOR) 40 MG tablet Take 1 tablet (40 mg) by mouth daily 90 tablet 3     furosemide (LASIX) 40 MG tablet Take 1 tablet (40 mg) by mouth daily 30 tablet 3     hydrochlorothiazide (HYDRODIURIL) 25 MG tablet Take 1 tablet (25 mg) by mouth daily 90 tablet 3     ibuprofen (ADVIL/MOTRIN) 600 MG tablet TAKE 1 TABLET BY MOUTH THREE TIMES DAILY AS NEEDED FOR MODERATE PAIN 270 tablet 1     multivitamin, therapeutic with minerals (MULTI-VITAMIN) TABS Take 1 tablet by mouth daily. 100 tablet 3     oxyCODONE-acetaminophen (PERCOCET) 5-325 MG tablet Take 1-2 tablets by mouth every 6 hours as needed for pain Max 5 per day.   Must be seen before additional refills. 150 tablet 0     potassium chloride ER (K-DUR/KLOR-CON M) 20 MEQ CR tablet Take 2 tablets (40 mEq) by mouth daily 60 tablet 3     sildenafil (VIAGRA) 100 MG tablet Take 1 tablet (100 mg) by mouth daily as needed (intercourse) Take 30 min to 4 hours before intercourse as needed for erectile dysfunction 25 tablet 11     tamsulosin (FLOMAX) 0.4 MG capsule TAKE 1 CAPSULE BY MOUTH ONCE DAILY 90 capsule 3     order for DME Equipment being ordered: Walker ()  Treatment Diagnosis: stroke 1 Units 0     other medical supplies Dispense knee high, medium compression, large size 4 each 0     ALLERGIES  No Known Allergies  PAST MEDICAL HISTORY:  Past Medical History:   Diagnosis Date     Acute rheumatic carditis 1950     Coronary artery disease     murmur     Hip pain, bilateral      Hypercholesteremia      Hypertension      Low back pain      Nonsenile cataract      Obesity      Renal cyst      Stroke (cerebrum) (H) 11/30/16     Umbilical hernia 6/10/2011    s/p surgical repair     Wound, surgical, infected 6/10/2011    hernia     PAST SURGICAL HISTORY:  Past Surgical History:   Procedure Laterality Date     ARTHROPLASTY KNEE BILATERAL  7/22/2010     COLONOSCOPY N/A 4/14/2017    Procedure: COLONOSCOPY;  Surgeon: Toby Bills MD;  Location: [facility]     FUSION LUMBAR ANTERIOR TWO LEVELS       HERNIORRHAPHY UMBILICAL  6/10/2011    Procedure:HERNIORRHAPHY UMBILICAL; Open, with mesh placement; Surgeon:HO PARHAM; Location:[facility]     LAMINECTOMY LUMBAR TWO LEVELS       FAMILY HISTORY:  Family History   Problem Relation Age of Onset     C.A.D. Mother      Unknown/Adopted Father      Heart Failure Sister      Heart Failure Sister      Glaucoma No family hx of      Macular Degeneration No family hx of      SOCIAL HISTORY:  Social History     Socioeconomic History     Marital status:      Spouse name: None     Number of children: None     Years of education: None      "Highest education level: None   Occupational History     None   Social Needs     Financial resource strain: None     Food insecurity:     Worry: None     Inability: None     Transportation needs:     Medical: None     Non-medical: None   Tobacco Use     Smoking status: Former Smoker     Last attempt to quit: 2005     Years since quittin.7     Smokeless tobacco: Never Used     Tobacco comment: Non smoker. No 2nd hand exposure. CCX, RMA PCC 2011   Substance and Sexual Activity     Alcohol use: No     Drug use: No     Sexual activity: Not Currently   Lifestyle     Physical activity:     Days per week: None     Minutes per session: None     Stress: None   Relationships     Social connections:     Talks on phone: None     Gets together: None     Attends Orthodox service: None     Active member of club or organization: None     Attends meetings of clubs or organizations: None     Relationship status: None     Intimate partner violence:     Fear of current or ex partner: None     Emotionally abused: None     Physically abused: None     Forced sexual activity: None   Other Topics Concern     Parent/sibling w/ CABG, MI or angioplasty before 65F 55M? Not Asked   Social History Narrative    He lives by himself in an apt in [place].  He has 2 goldfish, Lai and Ubaldo.     Review of Systems:  Skin:  Negative     Eyes:  Positive for glasses  ENT:  Negative    Respiratory:  Positive for dyspnea on exertion;cough;wheezing;shortness of breath  Cardiovascular:    Positive for  Gastroenterology: Negative    Genitourinary:  not assessed    Musculoskeletal:  Positive for joint pain;back pain  Neurologic:  Positive for stroke;numbness or tingling of feet  Psychiatric:  Positive for excessive stress;anxiety;depression  Heme/Lymph/Imm:  Negative    Endocrine:  Negative       Physical Exam:  Vitals: /79   Pulse 78   Ht 1.588 m (5' 2.5\")   Wt 108 kg (238 lb)   SpO2 95%   BMI 42.84 kg/m     Wt Readings from " Last 4 Encounters:   10/11/19 108 kg (238 lb)   10/09/19 108 kg (238 lb)   09/26/19 107.5 kg (237 lb)   09/19/19 109.8 kg (242 lb 1.6 oz)     GEN: well nourished, in no acute distress.  HEENT:  Pupils equal, round. Sclerae nonicteric.   C/V:  Regular rate and rhythm, II/VI systolic murmur heard best at the RUSB  RESP: Respirations are unlabored. Clear to auscultation bilaterally without wheezing, rales, or rhonchi.  GI: Abdomen soft, nontender.  EXTREM: 1-+ LE edema to the mid shin.  NEURO: Alert and oriented, cooperative.  SKIN: Warm and dry.     Recent Lab Results:  LIPID RESULTS:  Lab Results   Component Value Date    CHOL 112 07/01/2019    HDL 47 07/01/2019    LDL 51 07/01/2019    TRIG 70 07/01/2019    CHOLHDLRATIO 4.5 10/07/2014     LIVER ENZYME RESULTS:  Lab Results   Component Value Date    AST 22 09/19/2019    ALT 30 09/19/2019     CBC RESULTS:  Lab Results   Component Value Date    WBC 7.4 09/19/2019    RBC 4.55 09/19/2019    HGB 11.3 (L) 09/19/2019    HCT 37.0 (L) 09/19/2019    MCV 81 09/19/2019    MCH 24.8 (L) 09/19/2019    MCHC 30.5 (L) 09/19/2019    RDW 16.3 (H) 09/19/2019     09/19/2019     BMP RESULTS:  Lab Results   Component Value Date     09/25/2019    POTASSIUM 4.5 09/25/2019    CHLORIDE 100 09/25/2019    CO2 27 09/25/2019    ANIONGAP 14.5 09/25/2019    GLC 98 09/25/2019    BUN 16 09/25/2019    CR 0.83 09/25/2019    GFRESTIMATED >90 09/25/2019    GFRESTBLACK >90 09/25/2019    WARREN 10.4 09/25/2019      A1C RESULTS:  Lab Results   Component Value Date    A1C 6.3 (H) 11/30/2016     INR RESULTS:  Lab Results   Component Value Date    INR 0.99 11/30/2016    INR 4.95 (H) 08/02/2010       New/Pertinent imaging results since last visit:  None    Cristal Liegl, PA-C  [facility]

## 2019-10-11 NOTE — LETTER
10/11/2019    Buck Nascimento MD  [address] [facility] [place] [address]  [facility] [address]    RE: Jonathan Bishop       Dear Colleague,    I had the pleasure of seeing Jonathan Bishop in the [facility].      Cardiology Progress Note    Date of Service: 10/11/2019  Patient seen today in follow up of: CORE enrollment  Primary cardiologist: Dr. 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 He has mild aortic stenosis in 2016 which was not follow up on until recently.    He was evaluated by Dr. Mock on 9/19 with a 6 month history of progressive dyspnea on exertion and peripheral edema. An echocardiogram was done which showed severe aortic stenosis with an aortic valve area of 0.91 cm   and a mean aortic gradient of 40 mmHg. He was started on lasix 40 mg daily in addition to his usual hydrochlorothiazide for diuresis.     He is here today for CORE clinic follow up with a his PCA, Ana. He still has some residual peripheral edema but overall this is much better than when he saw Dr. Mock. His dyspnea on exertion has improved, but he is still limited with his activity and can only walk about 50 years. He continues to deny chest discomfort, lightheadedness, presyncope or syncope.     He recently wore a Ziopatch which showed some almost 5 second long pauses. He saw Dr. Melara for this. No further work up was recommended as he was asymptomatic other than perhaps a sleep study.     ASSESSMENT/PLAN:  1.  Severe symptomatic aortic stenosis.   2.  Acute on chronic heart failure with preserved LVEF. In the setting of #1.  3.  Sinus pauses. Noted on recent Ziopatch.  4.  Hx CVA.    Conner is here today for CORE clinic follow up. He was recently found to have severe symptomatic aortic stenosis. Fortunately, his LV function  is preserved at this point. Today, we again reviewed aortic stenosis and it's usual course. We discussed TAVR and he has been referred to the TAVR clinic. He is scheduled to see Dr. Begum in December. Today, we discussed proceeding with right and left heart catheterization. Risks and benefits of this was discussed today including, bleeding, bruising, infection, allergic reaction, kidney damage (including need for dialysis), stroke, heart attack, vascular damage, emergency open heart surgery, up to and including death. He understands and is willing to proceed. We will schedule this in the next week or two.     He continues to have dyspnea on exertion which overall has improved the last few weeks. He remains volume overloaded on exam but has been hesitant to increase his diuretic further. We will see what his right heart catheterization shows and go from there. I will see him back for follow up after that. I did ask him to get labs done today to re-evaluate his renal function. In the meantime, I asked him to contact us and seek evaluation for symptoms of chest pain, worsening dyspnea, presyncope or syncope.     Orders this Visit:  Orders Placed This Encounter   Procedures     Basic metabolic panel     Follow-Up with [facility] Clinic - MARK visit     No orders of the defined types were placed in this encounter.    There are no discontinued medications.    CURRENT MEDICATIONS:  Current Outpatient Medications   Medication Sig Dispense Refill     aspirin 325 MG EC tablet Take 1 tablet (325 mg) by mouth daily 90 tablet 3     atorvastatin (LIPITOR) 40 MG tablet Take 1 tablet (40 mg) by mouth daily 90 tablet 3     furosemide (LASIX) 40 MG tablet Take 1 tablet (40 mg) by mouth daily 30 tablet 3     hydrochlorothiazide (HYDRODIURIL) 25 MG tablet Take 1 tablet (25 mg) by mouth daily 90 tablet 3     ibuprofen (ADVIL/MOTRIN) 600 MG tablet TAKE 1 TABLET BY MOUTH THREE TIMES DAILY AS NEEDED FOR MODERATE PAIN 270 tablet 1      multivitamin, therapeutic with minerals (MULTI-VITAMIN) TABS Take 1 tablet by mouth daily. 100 tablet 3     oxyCODONE-acetaminophen (PERCOCET) 5-325 MG tablet Take 1-2 tablets by mouth every 6 hours as needed for pain Max 5 per day.  Must be seen before additional refills. 150 tablet 0     potassium chloride ER (K-DUR/KLOR-CON M) 20 MEQ CR tablet Take 2 tablets (40 mEq) by mouth daily 60 tablet 3     sildenafil (VIAGRA) 100 MG tablet Take 1 tablet (100 mg) by mouth daily as needed (intercourse) Take 30 min to 4 hours before intercourse as needed for erectile dysfunction 25 tablet 11     tamsulosin (FLOMAX) 0.4 MG capsule TAKE 1 CAPSULE BY MOUTH ONCE DAILY 90 capsule 3     order for DME Equipment being ordered: Walker ()  Treatment Diagnosis: stroke 1 Units 0     other medical supplies Dispense knee high, medium compression, large size 4 each 0     ALLERGIES  No Known Allergies  PAST MEDICAL HISTORY:  Past Medical History:   Diagnosis Date     Acute rheumatic carditis 1950     Coronary artery disease     murmur     Hip pain, bilateral      Hypercholesteremia      Hypertension      Low back pain      Nonsenile cataract      Obesity      Renal cyst      Stroke (cerebrum) (H) 11/30/16     Umbilical hernia 6/10/2011    s/p surgical repair     Wound, surgical, infected 6/10/2011    hernia     PAST SURGICAL HISTORY:  Past Surgical History:   Procedure Laterality Date     ARTHROPLASTY KNEE BILATERAL  7/22/2010     COLONOSCOPY N/A 4/14/2017    Procedure: COLONOSCOPY;  Surgeon: Toby Bills MD;  Location: [facility]     FUSION LUMBAR ANTERIOR TWO LEVELS       HERNIORRHAPHY UMBILICAL  6/10/2011    Procedure:HERNIORRHAPHY UMBILICAL; Open, with mesh placement; Surgeon:HO PARHAM; Location:[facility]     LAMINECTOMY LUMBAR TWO LEVELS       FAMILY HISTORY:  Family History   Problem Relation Age of Onset     C.A.D. Mother      Unknown/Adopted Father      Heart Failure Sister      Heart Failure Sister      Glaucoma  No family hx of      Macular Degeneration No family hx of      SOCIAL HISTORY:  Social History     Socioeconomic History     Marital status:      Spouse name: None     Number of children: None     Years of education: None     Highest education level: None   Occupational History     None   Social Needs     Financial resource strain: None     Food insecurity:     Worry: None     Inability: None     Transportation needs:     Medical: None     Non-medical: None   Tobacco Use     Smoking status: Former Smoker     Last attempt to quit: 2005     Years since quittin.7     Smokeless tobacco: Never Used     Tobacco comment: Non smoker. No 2nd hand exposure. CCX, RMA [facility] 2011   Substance and Sexual Activity     Alcohol use: No     Drug use: No     Sexual activity: Not Currently   Lifestyle     Physical activity:     Days per week: None     Minutes per session: None     Stress: None   Relationships     Social connections:     Talks on phone: None     Gets together: None     Attends Zoroastrianism service: None     Active member of club or organization: None     Attends meetings of clubs or organizations: None     Relationship status: None     Intimate partner violence:     Fear of current or ex partner: None     Emotionally abused: None     Physically abused: None     Forced sexual activity: None   Other Topics Concern     Parent/sibling w/ CABG, MI or angioplasty before 65F 55M? Not Asked   Social History Narrative    He lives by himself in an apt in [place].  He has 2 goldfish, Lai and Ubaldo.     Review of Systems:  Skin:  Negative     Eyes:  Positive for glasses  ENT:  Negative    Respiratory:  Positive for dyspnea on exertion;cough;wheezing;shortness of breath  Cardiovascular:    Positive for  Gastroenterology: Negative    Genitourinary:  not assessed    Musculoskeletal:  Positive for joint pain;back pain  Neurologic:  Positive for stroke;numbness or tingling of feet  Psychiatric:  Positive for  "excessive stress;anxiety;depression  Heme/Lymph/Imm:  Negative    Endocrine:  Negative       Physical Exam:  Vitals: /79   Pulse 78   Ht 1.588 m (5' 2.5\")   Wt 108 kg (238 lb)   SpO2 95%   BMI 42.84 kg/m      Wt Readings from Last 4 Encounters:   10/11/19 108 kg (238 lb)   10/09/19 108 kg (238 lb)   09/26/19 107.5 kg (237 lb)   09/19/19 109.8 kg (242 lb 1.6 oz)     GEN: well nourished, in no acute distress.  HEENT:  Pupils equal, round. Sclerae nonicteric.   C/V:  Regular rate and rhythm, II/VI systolic murmur heard best at the RUSB  RESP: Respirations are unlabored. Clear to auscultation bilaterally without wheezing, rales, or rhonchi.  GI: Abdomen soft, nontender.  EXTREM: 1-+ LE edema to the mid shin.  NEURO: Alert and oriented, cooperative.  SKIN: Warm and dry.     Recent Lab Results:  LIPID RESULTS:  Lab Results   Component Value Date    CHOL 112 07/01/2019    HDL 47 07/01/2019    LDL 51 07/01/2019    TRIG 70 07/01/2019    CHOLHDLRATIO 4.5 10/07/2014     LIVER ENZYME RESULTS:  Lab Results   Component Value Date    AST 22 09/19/2019    ALT 30 09/19/2019     CBC RESULTS:  Lab Results   Component Value Date    WBC 7.4 09/19/2019    RBC 4.55 09/19/2019    HGB 11.3 (L) 09/19/2019    HCT 37.0 (L) 09/19/2019    MCV 81 09/19/2019    MCH 24.8 (L) 09/19/2019    MCHC 30.5 (L) 09/19/2019    RDW 16.3 (H) 09/19/2019     09/19/2019     BMP RESULTS:  Lab Results   Component Value Date     09/25/2019    POTASSIUM 4.5 09/25/2019    CHLORIDE 100 09/25/2019    CO2 27 09/25/2019    ANIONGAP 14.5 09/25/2019    GLC 98 09/25/2019    BUN 16 09/25/2019    CR 0.83 09/25/2019    GFRESTIMATED >90 09/25/2019    GFRESTBLACK >90 09/25/2019    WARREN 10.4 09/25/2019      A1C RESULTS:  Lab Results   Component Value Date    A1C 6.3 (H) 11/30/2016     INR RESULTS:  Lab Results   Component Value Date    INR 0.99 11/30/2016    INR 4.95 (H) 08/02/2010       New/Pertinent imaging results since last visit:  None        Thank you " for allowing me to participate in the care of your patient.    Sincerely,     Cristal Jean PA-C     [facility]

## 2019-10-18 NOTE — TELEPHONE ENCOUNTER
Health Call Center    Phone Message    May a detailed message be left on voicemail: yes    Reason for Call: Other: Patient requesting a call back TODAY.  He is OUT of medication now and needs this prior to the weekend.       Action Taken: Message routed to:  [facility] ARASELI

## 2019-10-18 NOTE — TELEPHONE ENCOUNTER
Health Call Center    Phone Message    May a detailed message be left on voicemail: yes    Reason for Call: Medication Question or concern regarding medication   Prescription Clarification  Name of Medication: oxyCODONE-acetaminophen (PERCOCET) 5-325 MG tablet     Prescribing Provider: Buck Nascimento MD   Pharmacy: [facility] [address] [place] [address]   What on the order needs clarification?   The patient wants to speak to care team about this Rx, he's confused as to why its not ready for  yet I told him the Turn Around Time Frame for this medication, we wants a nurse to call him with updates its urgent.          Action Taken: Message routed to:  [facility] pcc

## 2019-10-19 RX ORDER — OXYCODONE AND ACETAMINOPHEN 5; 325 MG/1; MG/1
1-2 TABLET ORAL EVERY 6 HOURS PRN
Qty: 150 TABLET | Refills: 0 | Status: SHIPPED | OUTPATIENT
Start: 2019-10-19 | End: 2019-11-13

## 2019-10-21 ENCOUNTER — TELEPHONE (OUTPATIENT)
Dept: INTERNAL MEDICINE | Facility: CLINIC | Age: 74
End: 2019-10-21

## 2019-10-21 NOTE — TELEPHONE ENCOUNTER
Health Call Center    Phone Message    May a detailed message be left on voicemail: yes    Reason for Call: Medication Question or concern regarding medication   Prescription Clarification  Name of Medication: oxyCODONE-acetaminophen (PERCOCET) 5-325 MG tablet  Prescribing Provider: Pily Jane   Pharmacy: [facility] [address] [place] [address]   What on the order needs clarification? Per Pt is wanting to get a call back in regards to what is going on with his medication. Pt states he is out of medication and has been since 10/19/2019. Pt states the Pharmacy is waiting on the MD.           Action Taken: Message routed to:  [facility]

## 2019-10-25 ENCOUNTER — CARE COORDINATION (OUTPATIENT)
Dept: CARDIOLOGY | Facility: CLINIC | Age: 74
End: 2019-10-25

## 2019-10-25 DIAGNOSIS — I35.0 SEVERE AORTIC STENOSIS: ICD-10-CM

## 2019-10-25 DIAGNOSIS — I35.0 SEVERE AORTIC STENOSIS: Primary | ICD-10-CM

## 2019-10-25 LAB
ANION GAP SERPL CALCULATED.3IONS-SCNC: 9.3 MMOL/L (ref 6–17)
BUN SERPL-MCNC: 20 MG/DL (ref 7–30)
CALCIUM SERPL-MCNC: 9.7 MG/DL (ref 8.5–10.5)
CHLORIDE SERPL-SCNC: 102 MMOL/L (ref 98–107)
CO2 SERPL-SCNC: 34 MMOL/L (ref 23–29)
CREAT SERPL-MCNC: 0.92 MG/DL (ref 0.7–1.3)
GFR SERPL CREATININE-BSD FRML MDRD: 80 ML/MIN/{1.73_M2}
GLUCOSE SERPL-MCNC: 112 MG/DL (ref 70–105)
POTASSIUM SERPL-SCNC: 3.3 MMOL/L (ref 3.5–5.1)
SODIUM SERPL-SCNC: 142 MMOL/L (ref 136–145)

## 2019-10-25 PROCEDURE — 36415 COLL VENOUS BLD VENIPUNCTURE: CPT | Performed by: INTERNAL MEDICINE

## 2019-10-25 PROCEDURE — 80048 BASIC METABOLIC PNL TOTAL CA: CPT | Performed by: INTERNAL MEDICINE

## 2019-10-25 RX ORDER — POTASSIUM CHLORIDE 1500 MG/1
20 TABLET, EXTENDED RELEASE ORAL
Status: CANCELLED | OUTPATIENT
Start: 2019-10-25

## 2019-10-25 RX ORDER — LIDOCAINE 40 MG/G
CREAM TOPICAL
Status: CANCELLED | OUTPATIENT
Start: 2019-10-25

## 2019-10-25 RX ORDER — SODIUM CHLORIDE 9 MG/ML
INJECTION, SOLUTION INTRAVENOUS CONTINUOUS
Status: CANCELLED | OUTPATIENT
Start: 2019-10-25

## 2019-10-25 NOTE — PROGRESS NOTES
Pt to have left/right heart cath 10/28/19 per Dr. Mock. Call to pt to review pre-procedure instructions. Reviewed meds. Pt states he cannot find potassium chloride supplement. Pt reports he does not think he is taking this. Pt's last BMP was 10/11/19 and K+ was  3.6 mmol/L that day. Pt reports he has been experiencing severe cramps in his feet over the past couple of days. He also thinks possibly experiencing some tingling. I added pt onto lab schedule to do a BMP today at 12:20pm and re-sent his potassium Rx to his pharmacy. Pt will  his potassium after the lab is drawn. Will call pt once lab results received today. Update sent to Dr. Mock. Cath orders entered into Epic. Pt verbalized understanding of instructions. Tiera Coffman RN on 10/25/2019 at 11:43 AM      [facility]  Arrival Time: 0730   to/from procedure? Yes  Responsible adult with patient 24hrs post procedure? Yes  NPO starting at midnight (except allowable meds morning of procedure with small sip of water) reviewed with patient? Yes  On Anticoagulant? No  Diabetic? No  On Aspirin? Yes, pt instructed to continue same daily dose including the morning of the procedure.  Any other Meds to Hold (Such as phosphodiesterase type 5 inhibitors (Viagra/Cialis/Levitra)? YES - pt instructed not to take any Viagra x 48 hours prior to cath. Pt verbalized understanding.  Contrast Dye Allergy? NKDA

## 2019-10-28 ENCOUNTER — HOSPITAL ENCOUNTER (OUTPATIENT)
Facility: CLINIC | Age: 74
Discharge: HOME | End: 2019-10-28
Admitting: INTERNAL MEDICINE
Payer: COMMERCIAL

## 2019-10-28 ENCOUNTER — SURGERY (OUTPATIENT)
Age: 74
End: 2019-10-28
Payer: COMMERCIAL

## 2019-10-28 VITALS
DIASTOLIC BLOOD PRESSURE: 72 MMHG | WEIGHT: 242 LBS | SYSTOLIC BLOOD PRESSURE: 129 MMHG | RESPIRATION RATE: 16 BRPM | HEIGHT: 63 IN | HEART RATE: 81 BPM | BODY MASS INDEX: 42.88 KG/M2 | OXYGEN SATURATION: 97 % | TEMPERATURE: 97.6 F

## 2019-10-28 DIAGNOSIS — I35.0 SEVERE AORTIC STENOSIS: ICD-10-CM

## 2019-10-28 PROBLEM — Z98.890 STATUS POST CORONARY ANGIOGRAM: Status: ACTIVE | Noted: 2019-10-28

## 2019-10-28 LAB
ANION GAP SERPL CALCULATED.3IONS-SCNC: 5 MMOL/L (ref 3–14)
APTT PPP: 27 SEC (ref 22–37)
BUN SERPL-MCNC: 21 MG/DL (ref 7–30)
CALCIUM SERPL-MCNC: 9.1 MG/DL (ref 8.5–10.1)
CHLORIDE SERPL-SCNC: 106 MMOL/L (ref 94–109)
CO2 BLD-SCNC: 30 MMOL/L (ref 21–28)
CO2 BLDCOV-SCNC: 31 MMOL/L (ref 21–28)
CO2 SERPL-SCNC: 32 MMOL/L (ref 20–32)
CREAT SERPL-MCNC: 0.79 MG/DL (ref 0.66–1.25)
ERYTHROCYTE [DISTWIDTH] IN BLOOD BY AUTOMATED COUNT: 16.2 % (ref 10–15)
GFR SERPL CREATININE-BSD FRML MDRD: 88 ML/MIN/{1.73_M2}
GLUCOSE SERPL-MCNC: 93 MG/DL (ref 70–99)
HCT VFR BLD AUTO: 37.4 % (ref 40–53)
HGB BLD-MCNC: 11.4 G/DL (ref 13.3–17.7)
INR PPP: 1.01 (ref 0.86–1.14)
MCH RBC QN AUTO: 24.9 PG (ref 26.5–33)
MCHC RBC AUTO-ENTMCNC: 30.5 G/DL (ref 31.5–36.5)
MCV RBC AUTO: 82 FL (ref 78–100)
PCO2 BLD: 43 MM HG (ref 35–45)
PCO2 BLDV: 48 MM HG (ref 40–50)
PH BLD: 7.45 PH (ref 7.35–7.45)
PH BLDV: 7.42 PH (ref 7.32–7.43)
PLATELET # BLD AUTO: 259 10E9/L (ref 150–450)
PO2 BLD: 67 MM HG (ref 80–105)
PO2 BLDV: 39 MM HG (ref 25–47)
POTASSIUM SERPL-SCNC: 3.6 MMOL/L (ref 3.4–5.3)
RBC # BLD AUTO: 4.57 10E12/L (ref 4.4–5.9)
SAO2 % BLDA FROM PO2: 94 % (ref 92–100)
SAO2 % BLDV FROM PO2: 74 %
SODIUM SERPL-SCNC: 143 MMOL/L (ref 133–144)
WBC # BLD AUTO: 6.8 10E9/L (ref 4–11)

## 2019-10-28 PROCEDURE — 93456 R HRT CORONARY ARTERY ANGIO: CPT | Performed by: INTERNAL MEDICINE

## 2019-10-28 PROCEDURE — 25000125 ZZHC RX 250: Performed by: INTERNAL MEDICINE

## 2019-10-28 PROCEDURE — 82803 BLOOD GASES ANY COMBINATION: CPT | Mod: 91

## 2019-10-28 PROCEDURE — 99152 MOD SED SAME PHYS/QHP 5/>YRS: CPT | Mod: GC | Performed by: INTERNAL MEDICINE

## 2019-10-28 PROCEDURE — C1769 GUIDE WIRE: HCPCS | Performed by: INTERNAL MEDICINE

## 2019-10-28 PROCEDURE — 85730 THROMBOPLASTIN TIME PARTIAL: CPT | Performed by: INTERNAL MEDICINE

## 2019-10-28 PROCEDURE — 93010 ELECTROCARDIOGRAM REPORT: CPT | Performed by: INTERNAL MEDICINE

## 2019-10-28 PROCEDURE — 40000852 ZZH STATISTIC HEART CATH LAB OR EP LAB

## 2019-10-28 PROCEDURE — 99152 MOD SED SAME PHYS/QHP 5/>YRS: CPT | Performed by: INTERNAL MEDICINE

## 2019-10-28 PROCEDURE — 25000128 H RX IP 250 OP 636: Performed by: INTERNAL MEDICINE

## 2019-10-28 PROCEDURE — 85610 PROTHROMBIN TIME: CPT | Performed by: INTERNAL MEDICINE

## 2019-10-28 PROCEDURE — 25000132 ZZH RX MED GY IP 250 OP 250 PS 637: Performed by: INTERNAL MEDICINE

## 2019-10-28 PROCEDURE — 93456 R HRT CORONARY ARTERY ANGIO: CPT | Mod: 26 | Performed by: INTERNAL MEDICINE

## 2019-10-28 PROCEDURE — C1894 INTRO/SHEATH, NON-LASER: HCPCS | Performed by: INTERNAL MEDICINE

## 2019-10-28 PROCEDURE — 40000065 ZZH STATISTIC EKG NON-CHARGEABLE

## 2019-10-28 PROCEDURE — 85027 COMPLETE CBC AUTOMATED: CPT | Performed by: INTERNAL MEDICINE

## 2019-10-28 PROCEDURE — 93005 ELECTROCARDIOGRAM TRACING: CPT

## 2019-10-28 PROCEDURE — C1887 CATHETER, GUIDING: HCPCS | Performed by: INTERNAL MEDICINE

## 2019-10-28 PROCEDURE — 40000235 ZZH STATISTIC TELEMETRY

## 2019-10-28 PROCEDURE — 99153 MOD SED SAME PHYS/QHP EA: CPT | Performed by: INTERNAL MEDICINE

## 2019-10-28 PROCEDURE — 80048 BASIC METABOLIC PNL TOTAL CA: CPT | Performed by: INTERNAL MEDICINE

## 2019-10-28 PROCEDURE — 25800030 ZZH RX IP 258 OP 636: Performed by: INTERNAL MEDICINE

## 2019-10-28 PROCEDURE — 27210794 ZZH OR GENERAL SUPPLY STERILE: Performed by: INTERNAL MEDICINE

## 2019-10-28 RX ORDER — METOPROLOL TARTRATE 1 MG/ML
INJECTION, SOLUTION INTRAVENOUS
Status: DISCONTINUED | OUTPATIENT
Start: 2019-10-28 | End: 2019-10-28 | Stop reason: HOSPADM

## 2019-10-28 RX ORDER — ATROPINE SULFATE 0.1 MG/ML
0.5 INJECTION INTRAVENOUS EVERY 5 MIN PRN
Status: DISCONTINUED | OUTPATIENT
Start: 2019-10-28 | End: 2019-10-28 | Stop reason: HOSPADM

## 2019-10-28 RX ORDER — NALOXONE HYDROCHLORIDE 0.4 MG/ML
.2-.4 INJECTION, SOLUTION INTRAMUSCULAR; INTRAVENOUS; SUBCUTANEOUS
Status: DISCONTINUED | OUTPATIENT
Start: 2019-10-28 | End: 2019-10-28 | Stop reason: HOSPADM

## 2019-10-28 RX ORDER — ACETAMINOPHEN 325 MG/1
650 TABLET ORAL EVERY 4 HOURS PRN
Status: DISCONTINUED | OUTPATIENT
Start: 2019-10-28 | End: 2019-10-28 | Stop reason: HOSPADM

## 2019-10-28 RX ORDER — HEPARIN SODIUM 1000 [USP'U]/ML
INJECTION, SOLUTION INTRAVENOUS; SUBCUTANEOUS
Status: DISCONTINUED | OUTPATIENT
Start: 2019-10-28 | End: 2019-10-28 | Stop reason: HOSPADM

## 2019-10-28 RX ORDER — VERAPAMIL HYDROCHLORIDE 2.5 MG/ML
INJECTION, SOLUTION INTRAVENOUS
Status: DISCONTINUED | OUTPATIENT
Start: 2019-10-28 | End: 2019-10-28 | Stop reason: HOSPADM

## 2019-10-28 RX ORDER — HYDRALAZINE HYDROCHLORIDE 20 MG/ML
INJECTION INTRAMUSCULAR; INTRAVENOUS
Status: DISCONTINUED | OUTPATIENT
Start: 2019-10-28 | End: 2019-10-28 | Stop reason: HOSPADM

## 2019-10-28 RX ORDER — FLUMAZENIL 0.1 MG/ML
0.2 INJECTION, SOLUTION INTRAVENOUS
Status: DISCONTINUED | OUTPATIENT
Start: 2019-10-28 | End: 2019-10-28 | Stop reason: HOSPADM

## 2019-10-28 RX ORDER — SODIUM CHLORIDE 9 MG/ML
INJECTION, SOLUTION INTRAVENOUS CONTINUOUS
Status: DISCONTINUED | OUTPATIENT
Start: 2019-10-28 | End: 2019-10-28 | Stop reason: HOSPADM

## 2019-10-28 RX ORDER — NALOXONE HYDROCHLORIDE 0.4 MG/ML
.1-.4 INJECTION, SOLUTION INTRAMUSCULAR; INTRAVENOUS; SUBCUTANEOUS
Status: DISCONTINUED | OUTPATIENT
Start: 2019-10-28 | End: 2019-10-28 | Stop reason: HOSPADM

## 2019-10-28 RX ORDER — NITROGLYCERIN 5 MG/ML
VIAL (ML) INTRAVENOUS
Status: DISCONTINUED | OUTPATIENT
Start: 2019-10-28 | End: 2019-10-28 | Stop reason: HOSPADM

## 2019-10-28 RX ORDER — IOPAMIDOL 755 MG/ML
INJECTION, SOLUTION INTRAVASCULAR
Status: DISCONTINUED | OUTPATIENT
Start: 2019-10-28 | End: 2019-10-28 | Stop reason: HOSPADM

## 2019-10-28 RX ORDER — FENTANYL CITRATE 50 UG/ML
INJECTION, SOLUTION INTRAMUSCULAR; INTRAVENOUS
Status: DISCONTINUED | OUTPATIENT
Start: 2019-10-28 | End: 2019-10-28 | Stop reason: HOSPADM

## 2019-10-28 RX ORDER — LIDOCAINE 40 MG/G
CREAM TOPICAL
Status: DISCONTINUED | OUTPATIENT
Start: 2019-10-28 | End: 2019-10-28 | Stop reason: HOSPADM

## 2019-10-28 RX ORDER — FENTANYL CITRATE 50 UG/ML
25-50 INJECTION, SOLUTION INTRAMUSCULAR; INTRAVENOUS
Status: DISCONTINUED | OUTPATIENT
Start: 2019-10-28 | End: 2019-10-28 | Stop reason: HOSPADM

## 2019-10-28 RX ORDER — POTASSIUM CHLORIDE 1500 MG/1
20 TABLET, EXTENDED RELEASE ORAL
Status: COMPLETED | OUTPATIENT
Start: 2019-10-28 | End: 2019-10-28

## 2019-10-28 RX ADMIN — SODIUM CHLORIDE: 9 INJECTION, SOLUTION INTRAVENOUS at 09:06

## 2019-10-28 RX ADMIN — FENTANYL CITRATE 50 MCG: 50 INJECTION, SOLUTION INTRAMUSCULAR; INTRAVENOUS at 11:46

## 2019-10-28 RX ADMIN — VERAPAMIL HYDROCHLORIDE 2.5 MG: 2.5 INJECTION, SOLUTION INTRAVENOUS at 11:31

## 2019-10-28 RX ADMIN — FENTANYL CITRATE 25 MCG: 50 INJECTION, SOLUTION INTRAMUSCULAR; INTRAVENOUS at 11:04

## 2019-10-28 RX ADMIN — FENTANYL CITRATE 50 MCG: 50 INJECTION, SOLUTION INTRAMUSCULAR; INTRAVENOUS at 11:30

## 2019-10-28 RX ADMIN — MIDAZOLAM 1 MG: 1 INJECTION INTRAMUSCULAR; INTRAVENOUS at 11:16

## 2019-10-28 RX ADMIN — MIDAZOLAM 1 MG: 1 INJECTION INTRAMUSCULAR; INTRAVENOUS at 11:46

## 2019-10-28 RX ADMIN — HYDRALAZINE HYDROCHLORIDE 10 MG: 20 INJECTION INTRAMUSCULAR; INTRAVENOUS at 11:15

## 2019-10-28 RX ADMIN — HEPARIN SODIUM 5000 UNITS: 1000 INJECTION, SOLUTION INTRAVENOUS; SUBCUTANEOUS at 11:34

## 2019-10-28 RX ADMIN — FENTANYL CITRATE 25 MCG: 50 INJECTION, SOLUTION INTRAMUSCULAR; INTRAVENOUS at 11:16

## 2019-10-28 RX ADMIN — POTASSIUM CHLORIDE 20 MEQ: 1500 TABLET, EXTENDED RELEASE ORAL at 10:19

## 2019-10-28 RX ADMIN — LIDOCAINE HYDROCHLORIDE 6 ML: 10 INJECTION, SOLUTION EPIDURAL; INFILTRATION; INTRACAUDAL; PERINEURAL at 11:20

## 2019-10-28 RX ADMIN — IOPAMIDOL 110 ML: 755 INJECTION, SOLUTION INTRAVENOUS at 11:47

## 2019-10-28 RX ADMIN — LIDOCAINE HYDROCHLORIDE 2 ML: 10 INJECTION, SOLUTION EPIDURAL; INFILTRATION; INTRACAUDAL; PERINEURAL at 11:30

## 2019-10-28 RX ADMIN — METOPROLOL TARTRATE 5 MG: 1 INJECTION, SOLUTION INTRAVENOUS at 11:09

## 2019-10-28 RX ADMIN — NITROGLYCERIN 200 MCG: 5 INJECTION, SOLUTION INTRAVENOUS at 11:32

## 2019-10-28 RX ADMIN — MIDAZOLAM 1 MG: 1 INJECTION INTRAMUSCULAR; INTRAVENOUS at 11:30

## 2019-10-28 ASSESSMENT — MIFFLIN-ST. JEOR: SCORE: 1732.83

## 2019-10-28 NOTE — PROGRESS NOTES
Care Suites Admission Nursing Note    Reason for admission: Right and left heart cath. Denies pain. PIV started in left hand. Labs drawn. IV fluids begun infusing. Pt voiding without issue. Was told to take two of his normal lasix doses today. Patient denies skin issues.  CS arrival time: 0755  Accompanied by: PCA  Name/phone of DC : Ana [phone]  Medications held: no  Consent signed: with MD prior to ambulating to cath lab  Abnormal assessment/labs: Potassium noted to be below ordered parameter, replacement dose given.  If abnormal, provider notified: N/A  Education/questions answered: Contrast D/C instructions reviewed with pt with verbal understanding received. All questions & concerns addressed.   Plan: Pt resting in bed, denies additional needs at this time, call light within reach. PCA at bedside. Dr Huitron here to explain procedure to patient and PCA and obtain consent. Pt up to void then taken ambulatory to cath lab with RN

## 2019-10-28 NOTE — PROGRESS NOTES
Care Suites Post-Procedure Note    Procedure: post heart cath  CS arrival time: 1200  Accompanied by: cath lab staff  Concerns/abnormal assessment after procedure: Denies pain. VSS. Right internal jugular D/I. Right tr band on with 10ml air. No bleeding/swelling at site. Instructed on activity restrictions. Using uninal.   Plan: continue to monitor.

## 2019-10-28 NOTE — DISCHARGE INSTRUCTIONS
Cardiac Angiogram Discharge Instructions - Neck & Radial    After you go home:      Have an adult stay with you until tomorrow.    Drink extra fluids for 2 days.    You may resume your normal diet.    No smoking       For 24 hours - due to the sedation you received:    Relax and take it easy.    Do NOT make any important or legal decisions.    Do NOT drive or operate machines at home or at work.    Do NOT drink alcohol.    Care of Neck & Wrist Puncture Sites:      For the first 24 hrs - check the puncture site every 1-2 hours while awake.    It is normal to have soreness at the puncture site and mild tingling in your hand for up to 3 days.    Remove the bandaid after 24 hours. If there is minor oozing, apply another bandaid and remove it after 12 hours.    You may shower tomorrow. Do NOT take a bath, or use a hot tub or pool for at least 3 days. Do NOT scrub the site. Do not use lotion or powder near the puncture site.           Activity - For 2 days:    Neck site:    Avoid heavy lifting or the overuse of your shoulder.        Wrist site:    do not use your hand or arm to support your weight (such as rising from a chair)     do not bend your wrist (such as lifting a garage door).    do not lift more than 5 pounds or exercise your arm (such as tennis, golf or bowling).    Do NOT do any heavy activity such as exercise, lifting, or straining.     Bleeding:     Neck site:    If you start bleeding from the site in your neck, sit down and press gently on the site for 10 minutes.     Once bleeding stops, sit still for 2 hours.     Call [facility] as soon as you can    Wrist site:    If you start bleeding from the site in your wrist, sit down and press firmly on/above the site for 10 minutes.     Once bleeding stops, keep arm still for 2 hours.     Call [facility] as soon as you can.    Call 911 right away if you have heavy bleeding or bleeding that does not stop.      Medicines:          Take your medications, including  blood thinners, unless your provider tells you not to.      If you have stopped any medicines, check with your provider about when to restart them.    Follow Up Appointments:      Follow up with [facility] Nurse Practitioner at [facility] of patient preference in 7-10 days.    Call the clinic if:      You have increased pain or a large or growing hard lump around the site.    The site is red, swollen, hot or tender.    Blood or fluid is draining from the site.    You have chills or a fever greater than 101 F (38 C).    Your arm feels numb, cool or changes color.    You have hives, a rash or unusual itching.    Any questions or concerns.      [facility] [place]:    [phone] [facility] (7 days a week)

## 2019-10-28 NOTE — PROGRESS NOTES
Care Suites Discharge Nursing Note    Education/questions answered: Discharge instructions reviewed with pt and caregiver, all questions answered.   Patient DC location: home  Accompanied by:  Caregiver Ana RODRIGUEZ discharge time: 3917

## 2019-10-31 LAB — INTERPRETATION ECG - MUSE: NORMAL

## 2019-11-08 ENCOUNTER — OFFICE VISIT (OUTPATIENT)
Dept: CARDIOLOGY | Facility: CLINIC | Age: 74
End: 2019-11-08
Attending: PHYSICIAN ASSISTANT
Payer: COMMERCIAL

## 2019-11-08 VITALS
SYSTOLIC BLOOD PRESSURE: 132 MMHG | HEIGHT: 63 IN | HEART RATE: 66 BPM | DIASTOLIC BLOOD PRESSURE: 72 MMHG | OXYGEN SATURATION: 94 % | WEIGHT: 241.6 LBS | BODY MASS INDEX: 42.81 KG/M2

## 2019-11-08 DIAGNOSIS — R01.1 SYSTOLIC MURMUR: ICD-10-CM

## 2019-11-08 DIAGNOSIS — I35.0 SEVERE AORTIC STENOSIS: ICD-10-CM

## 2019-11-08 LAB
ANION GAP SERPL CALCULATED.3IONS-SCNC: 12.8 MMOL/L (ref 6–17)
BUN SERPL-MCNC: 21 MG/DL (ref 7–30)
CALCIUM SERPL-MCNC: 9.6 MG/DL (ref 8.5–10.5)
CHLORIDE SERPL-SCNC: 104 MMOL/L (ref 98–107)
CO2 SERPL-SCNC: 28 MMOL/L (ref 23–29)
CREAT SERPL-MCNC: 0.92 MG/DL (ref 0.7–1.3)
GFR SERPL CREATININE-BSD FRML MDRD: 80 ML/MIN/{1.73_M2}
GLUCOSE SERPL-MCNC: 106 MG/DL (ref 70–105)
POTASSIUM SERPL-SCNC: 3.8 MMOL/L (ref 3.5–5.1)
SODIUM SERPL-SCNC: 141 MMOL/L (ref 136–145)

## 2019-11-08 PROCEDURE — 99214 OFFICE O/P EST MOD 30 MIN: CPT | Performed by: PHYSICIAN ASSISTANT

## 2019-11-08 PROCEDURE — 80048 BASIC METABOLIC PNL TOTAL CA: CPT | Performed by: INTERNAL MEDICINE

## 2019-11-08 PROCEDURE — 36415 COLL VENOUS BLD VENIPUNCTURE: CPT | Performed by: INTERNAL MEDICINE

## 2019-11-08 ASSESSMENT — MIFFLIN-ST. JEOR: SCORE: 1723.08

## 2019-11-08 NOTE — LETTER
[employer] Customer Service  [facility]  [address] [place] [address], [address]   [phone]  Fax: [phone]  Phone: [phone]      2019      Conner Washington  [address] [place] [id number]        Dear Conner,    Thank you for your interest in becoming a [employer] user!    Your access code is: [email or web address]  Expires: 2020  8:11 AM     Please access the [employer] website at [email or web address].  Below the ID and password fields, select the  Sign Up Now  as New User.  You will be prompted to enter the access code listed above as well as additional personal information.  Please follow the directions carefully when creating your username and password.    If you allow your access code to , or if you have any questions please call a [employer] Representative at [phone] during normal clinic hours.     Sincerely,      [employer]  [facility]

## 2019-11-08 NOTE — PATIENT INSTRUCTIONS
Call CORE nurse for any questions or concerns Mon-Fri 8am-4pm:                                                #[phone]                                       For concerns after hours:                                               #[phone]     1: Medication changes: INCREASE lasix to 60 mg daily (1 and 1/2 tablets) for the next four days then resume 40 mg daily.   2: Plan from today: See Dr. Begum next month as scheduled about the TAVR procedure. I'll talk to Dr. Mock about whether or not he'd like to see you before you see Dr. Begum as scheduled.   -  Please call us with chest pain, worsening shortness of breath, leg swelling, or feeling like you are doing to pass our or passing out.   3: Lab results: labs are pending. We'll call you with the results.

## 2019-11-08 NOTE — LETTER
11/8/2019    Buck Nascimento MD  [address]  [facility] [address]    RE: Jonathan Bishop       Dear Colleague,    I had the pleasure of seeing Jonathan Bishop in the [facility].      Cardiology Progress Note    Date of Service: 11/12/2019  Patient seen today in follow up of: CORE follow up  Primary cardiologist: Dr. 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 He has mild aortic stenosis in 2016 which was not follow up on until this year.      He was evaluated by Dr. Mock on 9/19 with a 6 month history of progressive dyspnea on exertion and peripheral edema. An echocardiogram was done which showed severe aortic stenosis with an aortic valve area of 0.91 cm  and a mean aortic gradient of 40 mmHg. He was started on lasix 40 mg daily in addition to his usual hydrochlorothiazide for diuresis. He has been referred to the TAVR clinic for evaluation.    He recently wore a Ziopatch which showed some almost 5 second long pauses. He saw Dr. Melara for this. No further work up was recommended as he was asymptomatic other than perhaps a sleep study.     He is here today for CORE clinic follow up. He had a right and left heart cath done on 10/28/19 which showed minimal non-obstructive coronary artery disease. Right and left sided pressures were mildly elevated.  Mean PCWP was 22 mmHg. Mean PA pressure was 34 mmHg. RA pressure was 9 mmHg. Cardiac output was normal.     He is back today for follow up. He continues to report stable dyspnea on exertion. He otherwise denies chest discomfort, orthopnea, PND, lightheadedness, presyncope or syncope. His radial access site is well healed. His weight has home was 234 lbs today.     ASSESSMENT/PLAN:  1.  Severe symptomatic aortic stenosis.  2.  Heart failure with  preserved ejection fraction.  In the setting of 1.  3.  Hypertension.  4.  History of CVA.  5.  Peripheral vascular disease.    Conner is here today for routine CORE clinic follow up. He was recently found to have severe symptomatic aortic stenosis and is scheduled to meet with the TAVR team next month. He has symptoms of dyspnea on exertion but for now continues to deny chest pain, presyncope or syncope. His recent right and left heart catheterization showed minimal coronary disease. Right and left sided pressures were mildly elevated. He continues to appear hypervolemic today and his weight is up a few pounds in clinic today. Labs show stable renal function and normal electrolytes. He is currently on lasix 40 mg daily and hydrochlorothiazide 25 mg daily.  I am going to cautiously increase his Lasix and have him take 60 mg for the next few days and return to his usual dose.    He is scheduled to see Dr. Mock on 12/2 and will see Dr. Begum to discuss TAVR on 12/12. In the meantime, I asked him to contact the clinic with weight gain, worsening swelling, shortness of breath, chest pain or presyncope. We also discussed signs/symptoms that would require emergent evaluation.      Orders this Visit:  No orders of the defined types were placed in this encounter.    No orders of the defined types were placed in this encounter.    There are no discontinued medications.    CURRENT MEDICATIONS:  Current Outpatient Medications   Medication Sig Dispense Refill     aspirin 325 MG EC tablet Take 1 tablet (325 mg) by mouth daily 90 tablet 3     atorvastatin (LIPITOR) 40 MG tablet Take 1 tablet (40 mg) by mouth daily 90 tablet 3     furosemide (LASIX) 40 MG tablet Take 1 tablet (40 mg) by mouth daily 30 tablet 3     hydrochlorothiazide (HYDRODIURIL) 25 MG tablet Take 1 tablet (25 mg) by mouth daily 90 tablet 3     ibuprofen (ADVIL/MOTRIN) 600 MG tablet TAKE 1 TABLET BY MOUTH THREE TIMES DAILY AS NEEDED FOR MODERATE PAIN 270 tablet 1      multivitamin, therapeutic with minerals (MULTI-VITAMIN) TABS Take 1 tablet by mouth daily. 100 tablet 3     naloxone (NARCAN) 4 MG/0.1ML nasal spray Spray 1 spray (4 mg) into one nostril alternating nostrils once as needed for opioid reversal Every 2-3 min until responsive or EMS arrives 0.2 mL 0     order for DME Equipment being ordered: Walker ()  Treatment Diagnosis: stroke 1 Units 0     other medical supplies Dispense knee high, medium compression, large size 4 each 0     oxyCODONE-acetaminophen (PERCOCET) 5-325 MG tablet Take 1-2 tablets by mouth every 6 hours as needed for pain Max 5 per day.  Must be seen before additional refills. 150 tablet 0     potassium chloride ER (K-DUR/KLOR-CON M) 20 MEQ CR tablet Take 2 tablets (40 mEq) by mouth daily 60 tablet 3     sildenafil (VIAGRA) 100 MG tablet Take 1 tablet (100 mg) by mouth daily as needed (intercourse) Take 30 min to 4 hours before intercourse as needed for erectile dysfunction 25 tablet 11     tamsulosin (FLOMAX) 0.4 MG capsule TAKE 1 CAPSULE BY MOUTH ONCE DAILY 90 capsule 3     ALLERGIES  No Known Allergies  PAST MEDICAL HISTORY:  Past Medical History:   Diagnosis Date     Acute rheumatic carditis 1950     Coronary artery disease     murmur     Hip pain, bilateral      Hypercholesteremia      Hypertension      Low back pain      Nonsenile cataract      Obesity      Renal cyst      Stroke (cerebrum) (H) 11/30/16     Umbilical hernia 6/10/2011    s/p surgical repair     Wound, surgical, infected 6/10/2011    hernia     PAST SURGICAL HISTORY:  Past Surgical History:   Procedure Laterality Date     ARTHROPLASTY KNEE BILATERAL  7/22/2010     COLONOSCOPY N/A 4/14/2017    Procedure: COLONOSCOPY;  Surgeon: Toby Bills MD;  Location: [facility]     CV CORONARY ANGIOGRAM N/A 10/28/2019    Procedure: Coronary Angiogram;  Surgeon: Guerrero Huitron MD;  Location:  HEART CARDIAC CATH LAB     CV RIGHT HEART CATH N/A 10/28/2019    Procedure: Right Heart  Cath;  Surgeon: Guerrero Huitron MD;  Location:  HEART CARDIAC CATH LAB     FUSION LUMBAR ANTERIOR TWO LEVELS       HERNIORRHAPHY UMBILICAL  6/10/2011    Procedure:HERNIORRHAPHY UMBILICAL; Open, with mesh placement; Surgeon:HO PARHAM; Location:[facility]     LAMINECTOMY LUMBAR TWO LEVELS       FAMILY HISTORY:  Family History   Problem Relation Age of Onset     C.A.D. Mother      Unknown/Adopted Father      Heart Failure Sister      Heart Failure Sister      Glaucoma No family hx of      Macular Degeneration No family hx of      SOCIAL HISTORY:  Social History     Socioeconomic History     Marital status:      Spouse name: None     Number of children: None     Years of education: None     Highest education level: None   Occupational History     None   Social Needs     Financial resource strain: None     Food insecurity:     Worry: None     Inability: None     Transportation needs:     Medical: None     Non-medical: None   Tobacco Use     Smoking status: Former Smoker     Last attempt to quit: 2005     Years since quittin.8     Smokeless tobacco: Never Used     Tobacco comment: Non smoker. No 2nd hand exposure. CCX, RMA PCC 2011   Substance and Sexual Activity     Alcohol use: No     Drug use: No     Sexual activity: Not Currently   Lifestyle     Physical activity:     Days per week: None     Minutes per session: None     Stress: None   Relationships     Social connections:     Talks on phone: None     Gets together: None     Attends Synagogue service: None     Active member of club or organization: None     Attends meetings of clubs or organizations: None     Relationship status: None     Intimate partner violence:     Fear of current or ex partner: None     Emotionally abused: None     Physically abused: None     Forced sexual activity: None   Other Topics Concern     Parent/sibling w/ CABG, MI or angioplasty before 65F 55M? Not Asked   Social History Narrative    He lives by himself  "in an apt in [place].  He has 2 goldfish, Lai and [place].     Review of Systems:  Skin:  Negative     Eyes:       ENT:       Respiratory:       Cardiovascular:       Gastroenterology:      Genitourinary:       Musculoskeletal:       Neurologic:       Psychiatric:       Heme/Lymph/Imm:       Endocrine:          Physical Exam:  Vitals: /72   Pulse 66   Ht 1.588 m (5' 2.5\")   Wt 109.6 kg (241 lb 9.6 oz)   SpO2 94%   BMI 43.49 kg/m      Wt Readings from Last 4 Encounters:   11/08/19 109.6 kg (241 lb 9.6 oz)   10/28/19 109.8 kg (242 lb)   10/11/19 108 kg (238 lb)   10/09/19 108 kg (238 lb)     GEN: well nourished, in no acute distress.  HEENT:  Pupils equal, round. Sclerae nonicteric.   Neck: JVP ~ 10 cm  C/V:  Regular rate and rhythm, + ARACELI  RESP: Respirations are unlabored.  GI: Abdomen soft, nontender.  EXTREM: 2+ bilateral LE edema   NEURO: Alert and oriented, cooperative.  SKIN: Warm and dry.     Recent Lab Results:  LIPID RESULTS:  Lab Results   Component Value Date    CHOL 112 07/01/2019    HDL 47 07/01/2019    LDL 51 07/01/2019    TRIG 70 07/01/2019    CHOLHDLRATIO 4.5 10/07/2014     LIVER ENZYME RESULTS:  Lab Results   Component Value Date    AST 22 09/19/2019    ALT 30 09/19/2019     CBC RESULTS:  Lab Results   Component Value Date    WBC 6.8 10/28/2019    RBC 4.57 10/28/2019    HGB 11.4 (L) 10/28/2019    HCT 37.4 (L) 10/28/2019    MCV 82 10/28/2019    MCH 24.9 (L) 10/28/2019    MCHC 30.5 (L) 10/28/2019    RDW 16.2 (H) 10/28/2019     10/28/2019     BMP RESULTS:  Lab Results   Component Value Date     11/08/2019    POTASSIUM 3.8 11/08/2019    CHLORIDE 104 11/08/2019    CO2 28 11/08/2019    ANIONGAP 12.8 11/08/2019     (H) 11/08/2019    BUN 21 11/08/2019    CR 0.92 11/08/2019    GFRESTIMATED 80 11/08/2019    GFRESTBLACK >90 11/08/2019    WARREN 9.6 11/08/2019      A1C RESULTS:  Lab Results   Component Value Date    A1C 6.3 (H) 11/30/2016     INR RESULTS:  Lab Results "   Component Value Date    INR 1.01 10/28/2019    INR 0.99 11/30/2016       New/Pertinent imaging results since last visit:  Right and left heart cath 10/28/19:    Left-dominant system with minimal non-obstructive coronary artery disease    Right sided filling pressures are mildly elevated.    Mild pulmonary artery hypertension.    Left sided filling pressures are mildly elevated.    Normal cardiac output level.      Thank you for allowing me to participate in the care of your patient.    Sincerely,     Cristal Jean PA-C     [facility]

## 2019-11-08 NOTE — RESULT ENCOUNTER NOTE
Called pt, no answer. LVM letting him know BMP today 11/8 shows stable kidney function and electrolytes, keep plan set at visit today with RENEE Keating and follow up as scheduled per RENEE Keating. Left call back number for questions.

## 2019-11-08 NOTE — PROGRESS NOTES
Cardiology Progress Note    Date of Service: 11/12/2019  Patient seen today in follow up of: CORE follow up  Primary cardiologist: Dr. 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 He has mild aortic stenosis in 2016 which was not follow up on until this year.      He was evaluated by Dr. Mock on 9/19 with a 6 month history of progressive dyspnea on exertion and peripheral edema. An echocardiogram was done which showed severe aortic stenosis with an aortic valve area of 0.91 cm  and a mean aortic gradient of 40 mmHg. He was started on lasix 40 mg daily in addition to his usual hydrochlorothiazide for diuresis. He has been referred to the TAVR clinic for evaluation.    He recently wore a Ziopatch which showed some almost 5 second long pauses. He saw Dr. Melara for this. No further work up was recommended as he was asymptomatic other than perhaps a sleep study.     He is here today for CORE clinic follow up. He had a right and left heart cath done on 10/28/19 which showed minimal non-obstructive coronary artery disease. Right and left sided pressures were mildly elevated.  Mean PCWP was 22 mmHg. Mean PA pressure was 34 mmHg. RA pressure was 9 mmHg. Cardiac output was normal.     He is back today for follow up. He continues to report stable dyspnea on exertion. He otherwise denies chest discomfort, orthopnea, PND, lightheadedness, presyncope or syncope. His radial access site is well healed. His weight has home was 234 lbs today.     ASSESSMENT/PLAN:  1.  Severe symptomatic aortic stenosis.  2.  Heart failure with preserved ejection fraction.  In the setting of 1.  3.  Hypertension.  4.  History of CVA.  5.  Peripheral vascular disease.    Conner is here today for routine CORE clinic follow up. He was recently found to have severe symptomatic  aortic stenosis and is scheduled to meet with the TAVR team next month. He has symptoms of dyspnea on exertion but for now continues to deny chest pain, presyncope or syncope. His recent right and left heart catheterization showed minimal coronary disease. Right and left sided pressures were mildly elevated. He continues to appear hypervolemic today and his weight is up a few pounds in clinic today. Labs show stable renal function and normal electrolytes. He is currently on lasix 40 mg daily and hydrochlorothiazide 25 mg daily.  I am going to cautiously increase his Lasix and have him take 60 mg for the next few days and return to his usual dose.    He is scheduled to see Dr. Mock on 12/2 and will see Dr. Begum to discuss TAVR on 12/12. In the meantime, I asked him to contact the clinic with weight gain, worsening swelling, shortness of breath, chest pain or presyncope. We also discussed signs/symptoms that would require emergent evaluation.      Orders this Visit:  No orders of the defined types were placed in this encounter.    No orders of the defined types were placed in this encounter.    There are no discontinued medications.    CURRENT MEDICATIONS:  Current Outpatient Medications   Medication Sig Dispense Refill     aspirin 325 MG EC tablet Take 1 tablet (325 mg) by mouth daily 90 tablet 3     atorvastatin (LIPITOR) 40 MG tablet Take 1 tablet (40 mg) by mouth daily 90 tablet 3     furosemide (LASIX) 40 MG tablet Take 1 tablet (40 mg) by mouth daily 30 tablet 3     hydrochlorothiazide (HYDRODIURIL) 25 MG tablet Take 1 tablet (25 mg) by mouth daily 90 tablet 3     ibuprofen (ADVIL/MOTRIN) 600 MG tablet TAKE 1 TABLET BY MOUTH THREE TIMES DAILY AS NEEDED FOR MODERATE PAIN 270 tablet 1     multivitamin, therapeutic with minerals (MULTI-VITAMIN) TABS Take 1 tablet by mouth daily. 100 tablet 3     naloxone (NARCAN) 4 MG/0.1ML nasal spray Spray 1 spray (4 mg) into one nostril alternating nostrils once as needed for  opioid reversal Every 2-3 min until responsive or EMS arrives 0.2 mL 0     order for DME Equipment being ordered: Walker ()  Treatment Diagnosis: stroke 1 Units 0     other medical supplies Dispense knee high, medium compression, large size 4 each 0     oxyCODONE-acetaminophen (PERCOCET) 5-325 MG tablet Take 1-2 tablets by mouth every 6 hours as needed for pain Max 5 per day.  Must be seen before additional refills. 150 tablet 0     potassium chloride ER (K-DUR/KLOR-CON M) 20 MEQ CR tablet Take 2 tablets (40 mEq) by mouth daily 60 tablet 3     sildenafil (VIAGRA) 100 MG tablet Take 1 tablet (100 mg) by mouth daily as needed (intercourse) Take 30 min to 4 hours before intercourse as needed for erectile dysfunction 25 tablet 11     tamsulosin (FLOMAX) 0.4 MG capsule TAKE 1 CAPSULE BY MOUTH ONCE DAILY 90 capsule 3     ALLERGIES  No Known Allergies  PAST MEDICAL HISTORY:  Past Medical History:   Diagnosis Date     Acute rheumatic carditis 1950     Coronary artery disease     murmur     Hip pain, bilateral      Hypercholesteremia      Hypertension      Low back pain      Nonsenile cataract      Obesity      Renal cyst      Stroke (cerebrum) (H) 11/30/16     Umbilical hernia 6/10/2011    s/p surgical repair     Wound, surgical, infected 6/10/2011    hernia     PAST SURGICAL HISTORY:  Past Surgical History:   Procedure Laterality Date     ARTHROPLASTY KNEE BILATERAL  7/22/2010     COLONOSCOPY N/A 4/14/2017    Procedure: COLONOSCOPY;  Surgeon: Toby Bills MD;  Location:  GI     CV CORONARY ANGIOGRAM N/A 10/28/2019    Procedure: Coronary Angiogram;  Surgeon: Guerrero Huitron MD;  Location:  HEART CARDIAC CATH LAB     CV RIGHT HEART CATH N/A 10/28/2019    Procedure: Right Heart Cath;  Surgeon: Guerrero Huitron MD;  Location:  HEART CARDIAC CATH LAB     FUSION LUMBAR ANTERIOR TWO LEVELS       HERNIORRHAPHY UMBILICAL  6/10/2011    Procedure:HERNIORRHAPHY UMBILICAL; Open, with mesh placement;  Surgeon:HO PARHAM; Location:[facility] OR     LAMINECTOMY LUMBAR TWO LEVELS       FAMILY HISTORY:  Family History   Problem Relation Age of Onset     C.A.D. Mother      Unknown/Adopted Father      Heart Failure Sister      Heart Failure Sister      Glaucoma No family hx of      Macular Degeneration No family hx of      SOCIAL HISTORY:  Social History     Socioeconomic History     Marital status:      Spouse name: None     Number of children: None     Years of education: None     Highest education level: None   Occupational History     None   Social Needs     Financial resource strain: None     Food insecurity:     Worry: None     Inability: None     Transportation needs:     Medical: None     Non-medical: None   Tobacco Use     Smoking status: Former Smoker     Last attempt to quit: 2005     Years since quittin.8     Smokeless tobacco: Never Used     Tobacco comment: Non smoker. No 2nd hand exposure. CCX, RMA PCC 2011   Substance and Sexual Activity     Alcohol use: No     Drug use: No     Sexual activity: Not Currently   Lifestyle     Physical activity:     Days per week: None     Minutes per session: None     Stress: None   Relationships     Social connections:     Talks on phone: None     Gets together: None     Attends Bahai service: None     Active member of club or organization: None     Attends meetings of clubs or organizations: None     Relationship status: None     Intimate partner violence:     Fear of current or ex partner: None     Emotionally abused: None     Physically abused: None     Forced sexual activity: None   Other Topics Concern     Parent/sibling w/ CABG, MI or angioplasty before 65F 55M? Not Asked   Social History Narrative    He lives by himself in an apt in [place].  He has 2 goldfish, Lai and [place].     Review of Systems:  Skin:  Negative     Eyes:       ENT:       Respiratory:       Cardiovascular:       Gastroenterology:      Genitourinary:      "  Musculoskeletal:       Neurologic:       Psychiatric:       Heme/Lymph/Imm:       Endocrine:          Physical Exam:  Vitals: /72   Pulse 66   Ht 1.588 m (5' 2.5\")   Wt 109.6 kg (241 lb 9.6 oz)   SpO2 94%   BMI 43.49 kg/m     Wt Readings from Last 4 Encounters:   11/08/19 109.6 kg (241 lb 9.6 oz)   10/28/19 109.8 kg (242 lb)   10/11/19 108 kg (238 lb)   10/09/19 108 kg (238 lb)     GEN: well nourished, in no acute distress.  HEENT:  Pupils equal, round. Sclerae nonicteric.   Neck: JVP ~ 10 cm  C/V:  Regular rate and rhythm, + ARACELI  RESP: Respirations are unlabored.  GI: Abdomen soft, nontender.  EXTREM: 2+ bilateral LE edema   NEURO: Alert and oriented, cooperative.  SKIN: Warm and dry.     Recent Lab Results:  LIPID RESULTS:  Lab Results   Component Value Date    CHOL 112 07/01/2019    HDL 47 07/01/2019    LDL 51 07/01/2019    TRIG 70 07/01/2019    CHOLHDLRATIO 4.5 10/07/2014     LIVER ENZYME RESULTS:  Lab Results   Component Value Date    AST 22 09/19/2019    ALT 30 09/19/2019     CBC RESULTS:  Lab Results   Component Value Date    WBC 6.8 10/28/2019    RBC 4.57 10/28/2019    HGB 11.4 (L) 10/28/2019    HCT 37.4 (L) 10/28/2019    MCV 82 10/28/2019    MCH 24.9 (L) 10/28/2019    MCHC 30.5 (L) 10/28/2019    RDW 16.2 (H) 10/28/2019     10/28/2019     BMP RESULTS:  Lab Results   Component Value Date     11/08/2019    POTASSIUM 3.8 11/08/2019    CHLORIDE 104 11/08/2019    CO2 28 11/08/2019    ANIONGAP 12.8 11/08/2019     (H) 11/08/2019    BUN 21 11/08/2019    CR 0.92 11/08/2019    GFRESTIMATED 80 11/08/2019    GFRESTBLACK >90 11/08/2019    WARREN 9.6 11/08/2019      A1C RESULTS:  Lab Results   Component Value Date    A1C 6.3 (H) 11/30/2016     INR RESULTS:  Lab Results   Component Value Date    INR 1.01 10/28/2019    INR 0.99 11/30/2016       New/Pertinent imaging results since last visit:  Right and left heart cath 10/28/19:    Left-dominant system with minimal non-obstructive coronary " artery disease    Right sided filling pressures are mildly elevated.    Mild pulmonary artery hypertension.    Left sided filling pressures are mildly elevated.    Normal cardiac output level.  Cristal PHU Jean  [facility]

## 2019-11-13 DIAGNOSIS — M54.50 ACUTE MIDLINE LOW BACK PAIN WITHOUT SCIATICA: ICD-10-CM

## 2019-11-13 RX ORDER — OXYCODONE AND ACETAMINOPHEN 5; 325 MG/1; MG/1
1-2 TABLET ORAL EVERY 6 HOURS PRN
Qty: 150 TABLET | Refills: 0 | Status: SHIPPED | OUTPATIENT
Start: 2019-11-18 | End: 2019-12-20

## 2019-11-13 NOTE — TELEPHONE ENCOUNTER
Reason For Call:   Chief Complaint   Patient presents with     Refill Request       Medication Name, Dose and Monthly Quantity:   Oxycodone with APAP (Percocet): Dose 5-325 Schedule 1 tablet by mouth every 6 hours prn Monthly Quantity 124   Diagnosis requiring opiates:   Acute midline low back pain without sciatica [M54.5]  - Primary     Problem List Updated:   No    Opioid Agreement On File - [facility] PAIN CONTRACT ID# [id number]:  No    Last Urine Drug Screen (at least once every 12 months) Date:   n/a  Unexpected Results:   n/a    [place]  Data Reviewed (at least once every 3 months) Date:   11/13/2019      Unexpected Results:    No.    Last Fill Date:   10/19/2019  Due Date:   11/18/2019  Last Visit with PCP:   09/11/2019    Future Visits with PCP:   No.    Processing:   [facility] [place]    STEPH JACK CMA      ----------------------------------

## 2019-11-22 ENCOUNTER — PRE VISIT (OUTPATIENT)
Dept: CARDIOLOGY | Facility: CLINIC | Age: 74
End: 2019-11-22

## 2019-11-22 DIAGNOSIS — R09.89 ABNORMAL CHEST SOUNDS: ICD-10-CM

## 2019-11-22 DIAGNOSIS — I63.521 CEREBROVASCULAR ACCIDENT INVOLVING ANTERIOR CIRCULATION, RIGHT (H): ICD-10-CM

## 2019-11-22 DIAGNOSIS — I35.0 SEVERE AORTIC STENOSIS: Primary | ICD-10-CM

## 2019-11-22 NOTE — TELEPHONE ENCOUNTER
"Telephoned patient to introduce self and valve team. Explained work-up process and testing. Pt not familiar with TAVR, education about aortic stenosis and TAVR provided. Inquired about dental cleanings, pt stated he has dentures, but \"I have two bad teeth, the two that hold the partials in.\" When asked about getting those treated, pt states he does not yet have an appointment and he working with his  to secure funds for dental treatment. Testing reviewed with patient, denies contrast allergy. Scheduling to call patient to set up. Pt verbalized understanding. Contact information provided. Olivia Pro RN     "

## 2019-12-02 ENCOUNTER — OFFICE VISIT (OUTPATIENT)
Dept: CARDIOLOGY | Facility: CLINIC | Age: 74
End: 2019-12-02
Attending: INTERNAL MEDICINE
Payer: COMMERCIAL

## 2019-12-02 VITALS
BODY MASS INDEX: 46.6 KG/M2 | WEIGHT: 263 LBS | HEART RATE: 75 BPM | DIASTOLIC BLOOD PRESSURE: 78 MMHG | SYSTOLIC BLOOD PRESSURE: 132 MMHG | HEIGHT: 63 IN

## 2019-12-02 DIAGNOSIS — R01.1 SYSTOLIC MURMUR: ICD-10-CM

## 2019-12-02 DIAGNOSIS — I35.0 NONRHEUMATIC AORTIC VALVE STENOSIS: ICD-10-CM

## 2019-12-02 DIAGNOSIS — R06.02 SOB (SHORTNESS OF BREATH): ICD-10-CM

## 2019-12-02 DIAGNOSIS — I63.521 CEREBROVASCULAR ACCIDENT INVOLVING ANTERIOR CIRCULATION, RIGHT (H): ICD-10-CM

## 2019-12-02 PROCEDURE — 99214 OFFICE O/P EST MOD 30 MIN: CPT | Performed by: INTERNAL MEDICINE

## 2019-12-02 RX ORDER — POTASSIUM CHLORIDE 1500 MG/1
40 TABLET, EXTENDED RELEASE ORAL DAILY
Qty: 60 TABLET | Refills: 3 | Status: SHIPPED | OUTPATIENT
Start: 2019-12-02 | End: 2020-07-22

## 2019-12-02 RX ORDER — FUROSEMIDE 40 MG
40 TABLET ORAL DAILY
Qty: 30 TABLET | Refills: 3 | Status: SHIPPED | OUTPATIENT
Start: 2019-12-02 | End: 2020-07-27

## 2019-12-02 ASSESSMENT — MIFFLIN-ST. JEOR: SCORE: 1820.15

## 2019-12-02 NOTE — PROGRESS NOTES
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 He had mild aortic stenosis in 2016 which was not follow up on until this year. He was evaluated by me on 9/19 with a 6 month history of progressive dyspnea on exertion and peripheral edema. An echocardiogram was done which showed severe aortic stenosis with an aortic valve area of 0.91 cm  and a mean aortic gradient of 40 mmHg. He was started on lasix 40 mg daily in addition to his usual hydrochlorothiazide for diuresis. He had a right and left heart cath done on 10/28/19 which showed minimal non-obstructive coronary artery disease. Right and left sided pressures were mildly elevated.  Mean PCWP was 22 mmHg. Mean PA pressure was 34 mmHg. RA pressure was 9 mmHg. Cardiac output was normal. He has been referred to the TAVR clinic for evaluation and going to see Dr. Begum next week.      He recently wore a Ziopatch which showed some almost 5 second long pauses. He saw Dr. Melara for this. No further work up was recommended as he was asymptomatic other than perhaps a sleep study. He has not done that yet.      He is here today for CORE clinic follow up.  He continues to report stable dyspnea on exertion. He otherwise denies chest discomfort, orthopnea, PND, lightheadedness, presyncope or syncope. He ran out of lasix as such not taken this am.    PAST MEDICAL HISTORY:  Past Medical History:   Diagnosis Date     Acute rheumatic carditis 1950     Coronary artery disease     murmur     Hip pain, bilateral      Hypercholesteremia      Hypertension      Low back pain      Nonsenile cataract      Obesity      Renal cyst      Stroke (cerebrum) (H) 11/30/16     Umbilical hernia  6/10/2011    s/p surgical repair     Wound, surgical, infected 6/10/2011    hernia       MEDICATIONS:  Current Outpatient Medications   Medication     aspirin 325 MG EC tablet     atorvastatin (LIPITOR) 40 MG tablet     furosemide (LASIX) 40 MG tablet     hydrochlorothiazide (HYDRODIURIL) 25 MG tablet     ibuprofen (ADVIL/MOTRIN) 600 MG tablet     order for DME     other medical supplies     oxyCODONE-acetaminophen (PERCOCET) 5-325 MG tablet     potassium chloride ER (K-DUR/KLOR-CON M) 20 MEQ CR tablet     sildenafil (VIAGRA) 100 MG tablet     tamsulosin (FLOMAX) 0.4 MG capsule     multivitamin, therapeutic with minerals (MULTI-VITAMIN) TABS     naloxone (NARCAN) 4 MG/0.1ML nasal spray     No current facility-administered medications for this visit.        ALLERGIES:  No Known Allergies    SOCIAL HISTORY:  I have reviewed this patient's social history and updated it with pertinent information if needed. Jonathan Bishop  reports that he quit smoking about 14 years ago. He has never used smokeless tobacco. He reports that he does not drink alcohol or use drugs.    FAMILY HISTORY:  I have reviewed this patient's family history and updated it with pertinent information if needed.   Family History   Problem Relation Age of Onset     C.A.D. Mother      Unknown/Adopted Father      Heart Failure Sister      Heart Failure Sister      Glaucoma No family hx of      Macular Degeneration No family hx of        REVIEW OF SYSTEMS:  Skin:  Negative     Eyes:  Positive for glasses  ENT:  Negative    Respiratory:  Positive for dyspnea on exertion;cough;wheezing;shortness of breath  Cardiovascular:    Positive for;edema  Gastroenterology: Negative    Genitourinary:  Negative    Musculoskeletal:  Positive for joint pain;back pain  Neurologic:  Positive for stroke;numbness or tingling of feet  Psychiatric:  Positive for excessive stress;anxiety;depression  Heme/Lymph/Imm:  Negative    Endocrine:  Negative        PHYSICAL EXAM:       BP: 132/78 Pulse: 75            Vital Signs with Ranges  Pulse:  [75] 75  BP: (132)/(78) 132/78  263 lbs 0 oz    Constitutional: awake, alert, no distress  Eyes: PERRL, sclera nonicteric  ENT: trachea midline  Respiratory: CTAB  Cardiovascular: Harsh ESM, diminished S2  GI: nondistended, nontender, bowel sounds present  Lymph/Hematologic: no bleeding signs  Skin: dry, no rash    DATA:  Labs: Pertinent cardiac labs reviewed    ASSESSMENT:  1.  Severe symptomatic aortic stenosis.  2.  Heart failure with preserved ejection fraction.  In the setting of 1.  3.  Hypertension.  4.  History of CVA.  5.  Peripheral vascular disease.    RECOMMENDATIONS:  1. Follow up in TAVR clinic this month with CT  2. No change to medications.  3. See me back in 6 months. Informed to not over exert and immediate medical attention for any change in symptoms especially syncope.   4. Refilled lasix and K  5. PCP apt for sleep study. He will call and scheduled  6. Dental work    VINCENZO Faye, [facility]  Cardiology - [facility]  December 2, 2019

## 2019-12-02 NOTE — LETTER
12/2/2019    Buck Nascimento MD  [address]  [facility] [address]    RE: Jonathan Bishop       Dear Colleague,    I had the pleasure of seeing Jonathan Bishop in the [facility].    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 He had mild aortic stenosis in 2016 which was not follow up on until this year. He was evaluated by me on 9/19 with a 6 month history of progressive dyspnea on exertion and peripheral edema. An echocardiogram was done which showed severe aortic stenosis with an aortic valve area of 0.91 cm  and a mean aortic gradient of 40 mmHg. He was started on lasix 40 mg daily in addition to his usual hydrochlorothiazide for diuresis. He had a right and left heart cath done on 10/28/19 which showed minimal non-obstructive coronary artery disease. Right and left sided pressures were mildly elevated.  Mean PCWP was 22 mmHg. Mean PA pressure was 34 mmHg. RA pressure was 9 mmHg. Cardiac output was normal. He has been referred to the TAVR clinic for evaluation and going to see Dr. Begum next week.      He recently wore a Ziopatch which showed some almost 5 second long pauses. He saw Dr. Melara for this. No further work up was recommended as he was asymptomatic other than perhaps a sleep study. He has not done that yet.      He is here today for CORE clinic follow up.  He continues to report stable dyspnea on exertion. He otherwise denies chest discomfort, orthopnea, PND, lightheadedness, presyncope or syncope. He ran out of lasix as such not taken this am.    PAST MEDICAL HISTORY:  Past Medical History:   Diagnosis Date     Acute rheumatic carditis 1950      Coronary artery disease     murmur     Hip pain, bilateral      Hypercholesteremia      Hypertension      Low back pain      Nonsenile cataract      Obesity      Renal cyst      Stroke (cerebrum) (H) 11/30/16     Umbilical hernia 6/10/2011    s/p surgical repair     Wound, surgical, infected 6/10/2011    hernia       MEDICATIONS:  Current Outpatient Medications   Medication     aspirin 325 MG EC tablet     atorvastatin (LIPITOR) 40 MG tablet     furosemide (LASIX) 40 MG tablet     hydrochlorothiazide (HYDRODIURIL) 25 MG tablet     ibuprofen (ADVIL/MOTRIN) 600 MG tablet     order for DME     other medical supplies     oxyCODONE-acetaminophen (PERCOCET) 5-325 MG tablet     potassium chloride ER (K-DUR/KLOR-CON M) 20 MEQ CR tablet     sildenafil (VIAGRA) 100 MG tablet     tamsulosin (FLOMAX) 0.4 MG capsule     multivitamin, therapeutic with minerals (MULTI-VITAMIN) TABS     naloxone (NARCAN) 4 MG/0.1ML nasal spray     No current facility-administered medications for this visit.        ALLERGIES:  No Known Allergies    SOCIAL HISTORY:  I have reviewed this patient's social history and updated it with pertinent information if needed. Jonathan Bishop  reports that he quit smoking about 14 years ago. He has never used smokeless tobacco. He reports that he does not drink alcohol or use drugs.    FAMILY HISTORY:  I have reviewed this patient's family history and updated it with pertinent information if needed.   Family History   Problem Relation Age of Onset     C.A.D. Mother      Unknown/Adopted Father      Heart Failure Sister      Heart Failure Sister      Glaucoma No family hx of      Macular Degeneration No family hx of        REVIEW OF SYSTEMS:  Skin:  Negative     Eyes:  Positive for glasses  ENT:  Negative    Respiratory:  Positive for dyspnea on exertion;cough;wheezing;shortness of breath  Cardiovascular:    Positive for;edema  Gastroenterology: Negative    Genitourinary:  Negative    Musculoskeletal:  Positive  for joint pain;back pain  Neurologic:  Positive for stroke;numbness or tingling of feet  Psychiatric:  Positive for excessive stress;anxiety;depression  Heme/Lymph/Imm:  Negative    Endocrine:  Negative        PHYSICAL EXAM:      BP: 132/78 Pulse: 75            Vital Signs with Ranges  Pulse:  [75] 75  BP: (132)/(78) 132/78  263 lbs 0 oz    Constitutional: awake, alert, no distress  Eyes: PERRL, sclera nonicteric  ENT: trachea midline  Respiratory: CTAB  Cardiovascular: Harsh ESM, diminished S2  GI: nondistended, nontender, bowel sounds present  Lymph/Hematologic: no bleeding signs  Skin: dry, no rash    DATA:  Labs: Pertinent cardiac labs reviewed    ASSESSMENT:  1.  Severe symptomatic aortic stenosis.  2.  Heart failure with preserved ejection fraction.  In the setting of 1.  3.  Hypertension.  4.  History of CVA.  5.  Peripheral vascular disease.    RECOMMENDATIONS:  1. Follow up in TAVR clinic this month with CT  2. No change to medications.  3. See me back in 6 months. Informed to not over exert and immediate medical attention for any change in symptoms especially syncope.   4. Refilled lasix and K  5. PCP apt for sleep study. He will call and scheduled  6. Dental work    VINCENZO Faye, [facility]  Cardiology - [facility] Heart  December 2, 2019      Thank you for allowing me to participate in the care of your patient.    Sincerely,     Jonny Mock MD     [facility]

## 2019-12-12 ENCOUNTER — OFFICE VISIT (OUTPATIENT)
Dept: CARDIOLOGY | Facility: CLINIC | Age: 74
End: 2019-12-12
Attending: PHYSICIAN ASSISTANT
Payer: COMMERCIAL

## 2019-12-12 VITALS
OXYGEN SATURATION: 95 % | BODY MASS INDEX: 42.7 KG/M2 | SYSTOLIC BLOOD PRESSURE: 128 MMHG | HEIGHT: 63 IN | HEART RATE: 84 BPM | DIASTOLIC BLOOD PRESSURE: 72 MMHG | WEIGHT: 241 LBS

## 2019-12-12 DIAGNOSIS — I35.0 SEVERE AORTIC STENOSIS: ICD-10-CM

## 2019-12-12 PROCEDURE — 99214 OFFICE O/P EST MOD 30 MIN: CPT | Performed by: INTERNAL MEDICINE

## 2019-12-12 ASSESSMENT — MIFFLIN-ST. JEOR: SCORE: 1720.36

## 2019-12-12 NOTE — PROGRESS NOTES
TAVR Cardiology Consultation       Assessment & Plan     Severe symptomatic aortic stenosis  Remote history of CVA with residual weakness  PAD  Hyperlipidemia  Hypertension     Recommendations    We discussed the pathophysiology of aortic stenosis and its implications.  Following that we discussed the risk, benefits and potential complications of the procedure.  Patient would like to proceed with work-up and a TAVR directed CT has been ordered.  Given his recovery and BMI and prior CVA I feel that he would be a better TAVR candidate provided the anatomy is suitable.  Following that he will be presented in our TAVR conference for a consensus opinion.    Thank you kindly for consult I look forward to following up with him shortly.      Camila Begum MD      HPI:    Patient is a 74-year-old male who had the pleasure meeting in the TAVR clinic.  He is seen by my colleague Dr. Roy and initially presented to him in September 2019.  At that time he had noticed progressive shortness of breath that occurred over a 6-month timeframe.  During this period he also noticed lower extremity edema.  He was classified as NYHA class III on evaluation.  A cardiac murmur was auscultated and an echocardiogram subsequently demonstrated severe aortic stenosis.  He was placed on diuretic therapy with improvement in his symptoms and underwent coronary angiography.  This did not demonstrate any significant obstructive disease.  He has a TAVR directed CT ordered however pending at time of my dictation.  Since that timeframe he reports of some improvement although still has shortness of breath with activities.  Here to discuss findings and plan for possible TAVR procedure if indicated.  We discussed the pathophysiology of aortic stenosis and      Primary Care Physician   Buck OLIVIA Sick      Patient Active Problem List   Diagnosis     Essential hypertension, benign     Hyperlipidemia with target LDL less than 130     Anemia      Medication refill- do not delete      Low back pain     Osteoarthritis of knee     Stroke (H)     Cerebrovascular accident involving anterior circulation, right (H)     Dermatophytosis of nail     PVD (peripheral vascular disease) (H)     Systolic murmur     Peripheral edema     Hyperplasia of prostate with lower urinary tract symptoms (LUTS)     Erectile dysfunction, unspecified erectile dysfunction type     Severe aortic stenosis     Status post coronary angiogram       Past Medical History   I have reviewed this patient's medical history and updated it with pertinent information if needed.   Past Medical History:   Diagnosis Date     Acute rheumatic carditis 1950     Coronary artery disease     murmur     Hip pain, bilateral      Hypercholesteremia      Hypertension      Low back pain      Nonsenile cataract      Obesity      Renal cyst      Stroke (cerebrum) (H) 11/30/16     Umbilical hernia 6/10/2011    s/p surgical repair     Wound, surgical, infected 6/10/2011    hernia       Past Surgical History   I have reviewed this patient's surgical history and updated it with pertinent information if needed.  Past Surgical History:   Procedure Laterality Date     ARTHROPLASTY KNEE BILATERAL  7/22/2010     COLONOSCOPY N/A 4/14/2017    Procedure: COLONOSCOPY;  Surgeon: Toby Bills MD;  Location: [facility] GI     CV CORONARY ANGIOGRAM N/A 10/28/2019    Procedure: Coronary Angiogram;  Surgeon: Guerrero Huitron MD;  Location: [facility] CARDIAC CATH LAB     CV RIGHT HEART CATH N/A 10/28/2019    Procedure: Right Heart Cath;  Surgeon: Guerrero Huitron MD;  Location:  HEART CARDIAC CATH LAB     FUSION LUMBAR ANTERIOR TWO LEVELS       HERNIORRHAPHY UMBILICAL  6/10/2011    Procedure:HERNIORRHAPHY UMBILICAL; Open, with mesh placement; Surgeon:HO PARHAM; Location:[facility] OR     LAMINECTOMY LUMBAR TWO LEVELS         Prior to Admission Medications   Cannot display prior to admission medications because the patient has  "not been admitted in this contact.     [unfilled]  [unfilled]  Allergies   No Known Allergies    Social History    reports that he quit smoking about 14 years ago. He has never used smokeless tobacco. He reports that he does not drink alcohol or use drugs.    Family History   Family History   Problem Relation Age of Onset     C.A.D. Mother      Unknown/Adopted Father      Heart Failure Sister      Heart Failure Sister      Glaucoma No family hx of      Macular Degeneration No family hx of        Review of Systems   The comprehensive 10 point Review of Systems is negative other than noted in the HPI or here.     Physical Exam   Vital Signs with Ranges  Pulse:  [84] 84  BP: (128)/(72) 128/72  SpO2:  [95 %] 95 %  Wt Readings from Last 4 Encounters:   12/12/19 109.3 kg (241 lb)   12/02/19 119.3 kg (263 lb)   11/08/19 109.6 kg (241 lb 9.6 oz)   10/28/19 109.8 kg (242 lb)     [unfilled]      Vitals: /72   Pulse 84   Ht 1.588 m (5' 2.5\")   Wt 109.3 kg (241 lb)   SpO2 95%   BMI 43.38 kg/m      GENERAL:  Alert and oriented x3, in no acute distress.   NECK:  Supple.  Neck is thick, and JVP is difficult to see but elevated to about 10-12 cm.   LUNGS:  Clear.   CARDIAC:  Cardiac sounds are regular.  There is a harsh systolic murmur.  I cannot hear an S2.   EXTREMITIES:  Warm.  There is 2+ bilateral pitting edema.   ABDOMEN:  Seems to be a little tight.  I do not see obvious signs of ascites, but it is possible.   NEUROLOGIC:  Deferred.   PSYCHIATRIC:  Appropriate affect.   HEENT:  No icterus, pallor.     @LABRCNTIPR(tropi:5,troponinies:5)@    @LABRCNTIPR(wbc:3,hgb:3,mcv:3,plt:3,inr:3,na:3,potassium:3,chloride:3,co2:3,bun:3,cr:3,gfrestimated:3,gfrestblack:3,aniongap:3,shanel:3,glc:3,albumin:2,prottotal:2,bilitotal:2,alkphos:2,alt:2,ast:2,lipase:2,tropi:3)@  Recent Labs   Lab Test 07/01/19  0805 04/13/18  0755  10/07/14  0849 05/15/13  0759   CHOL 112 119   < > 204* 237*   HDL 47 46   < > 45 41   LDL 51 63   < > " 142* 180*   TRIG 70 52   < > 86 79   CHOLHDLRATIO  --   --   --  4.5 5.7*    < > = values in this interval not displayed.     @LABRCNTIP(wbc:3,hgb:3,hct:3,mcv:3,plt:3,iron:3,ironsat:3,reticabsct:3,retp:3,feb:3,miri:3,b12:3,folic:3,epoe:3,morph:3)@  @LABRCNTIP(PH:3,PHV:3,PO2:3,PO2V:3,sat:3,PCO2:3,PCO2V:3,HCO3:3,HCO3V:3)@  @LABRCNTIP(NTBNPI:3,NTBNP:3)@  @LABRCNTIP(DD:1)@  @LABRCNTIP(sed:3,crp:3)@  @LABRCNTIP(PLT:3)@  @LABRCNTIP(TSH:3)@  @LABRCNTIP(color:1,appearance:1,urineg,urinebili:1,urineketone:1,s,ubld:1,urineph:1,protein:1,urobilinogen:1,nitrite:1,leukest:1,rbcu:1,wbcu:1)@    Imaging:  No results found for this or any previous visit (from the past 48 hour(s)).    Echo:  No results found for this or any previous visit (from the past 4320 hour(s)).

## 2019-12-12 NOTE — LETTER
12/12/2019    Buck Nascimento MD  [address]  [facility] [address]    RE: Jonathan Bishop       Dear Colleague,    I had the pleasure of seeing Jonathan Bishop in the [facility].      TAVR Cardiology Consultation       Assessment & Plan     Severe symptomatic aortic stenosis  Remote history of CVA with residual weakness  PAD  Hyperlipidemia  Hypertension     Recommendations    We discussed the pathophysiology of aortic stenosis and its implications.  Following that we discussed the risk, benefits and potential complications of the procedure.  Patient would like to proceed with work-up and a TAVR directed CT has been ordered.  Given his recovery and BMI and prior CVA I feel that he would be a better TAVR candidate provided the anatomy is suitable.  Following that he will be presented in our TAVR conference for a consensus opinion.    Thank you kindly for consult I look forward to following up with him shortly.      Camila Begum MD      HPI:    Patient is a 74-year-old male who had the pleasure meeting in the TAVR clinic.  He is seen by my colleague Dr. Roy and initially presented to him in September 2019.  At that time he had noticed progressive shortness of breath that occurred over a 6-month timeframe.  During this period he also noticed lower extremity edema.  He was classified as NYHA class III on evaluation.  A cardiac murmur was auscultated and an echocardiogram subsequently demonstrated severe aortic stenosis.  He was placed on diuretic therapy with improvement in his symptoms and underwent coronary angiography.  This did not demonstrate any significant obstructive disease.  He has a TAVR directed CT ordered however pending at time of my dictation.  Since that timeframe he reports of some improvement although still has shortness of breath with activities.  Here to discuss findings and plan for possible TAVR procedure if indicated.  We discussed the  pathophysiology of aortic stenosis and      Primary Care Physician   Buck OLIVIA Sick      Patient Active Problem List   Diagnosis     Essential hypertension, benign     Hyperlipidemia with target LDL less than 130     Anemia     Medication refill- do not delete      Low back pain     Osteoarthritis of knee     Stroke (H)     Cerebrovascular accident involving anterior circulation, right (H)     Dermatophytosis of nail     PVD (peripheral vascular disease) (H)     Systolic murmur     Peripheral edema     Hyperplasia of prostate with lower urinary tract symptoms (LUTS)     Erectile dysfunction, unspecified erectile dysfunction type     Severe aortic stenosis     Status post coronary angiogram       Past Medical History   I have reviewed this patient's medical history and updated it with pertinent information if needed.   Past Medical History:   Diagnosis Date     Acute rheumatic carditis 1950     Coronary artery disease     murmur     Hip pain, bilateral      Hypercholesteremia      Hypertension      Low back pain      Nonsenile cataract      Obesity      Renal cyst      Stroke (cerebrum) (H) 11/30/16     Umbilical hernia 6/10/2011    s/p surgical repair     Wound, surgical, infected 6/10/2011    hernia       Past Surgical History   I have reviewed this patient's surgical history and updated it with pertinent information if needed.  Past Surgical History:   Procedure Laterality Date     ARTHROPLASTY KNEE BILATERAL  7/22/2010     COLONOSCOPY N/A 4/14/2017    Procedure: COLONOSCOPY;  Surgeon: Toby Bills MD;  [facility]     CV CORONARY ANGIOGRAM N/A 10/28/2019    Procedure: Coronary Angiogram;  Surgeon: Guerrero Huitron MD;  Location:  HEART CARDIAC CATH LAB     CV RIGHT HEART CATH N/A 10/28/2019    Procedure: Right Heart Cath;  Surgeon: Guerrero Huitron MD;  Location:  HEART CARDIAC CATH LAB     FUSION LUMBAR ANTERIOR TWO LEVELS       HERNIORRHAPHY UMBILICAL  6/10/2011    Procedure:HERNIORRHAPHY  "UMBILICAL; Open, with mesh placement; Surgeon:HO PARHAM; [facility]     LAMINECTOMY LUMBAR TWO LEVELS         Prior to Admission Medications   Cannot display prior to admission medications because the patient has not been admitted in this contact.     [unfilled]  [unfilled]  Allergies   No Known Allergies    Social History    reports that he quit smoking about 14 years ago. He has never used smokeless tobacco. He reports that he does not drink alcohol or use drugs.    Family History   Family History   Problem Relation Age of Onset     C.A.D. Mother      Unknown/Adopted Father      Heart Failure Sister      Heart Failure Sister      Glaucoma No family hx of      Macular Degeneration No family hx of        Review of Systems   The comprehensive 10 point Review of Systems is negative other than noted in the HPI or here.     Physical Exam   Vital Signs with Ranges  Pulse:  [84] 84  BP: (128)/(72) 128/72  SpO2:  [95 %] 95 %  Wt Readings from Last 4 Encounters:   12/12/19 109.3 kg (241 lb)   12/02/19 119.3 kg (263 lb)   11/08/19 109.6 kg (241 lb 9.6 oz)   10/28/19 109.8 kg (242 lb)     [unfilled]      Vitals: /72   Pulse 84   Ht 1.588 m (5' 2.5\")   Wt 109.3 kg (241 lb)   SpO2 95%   BMI 43.38 kg/m       GENERAL:  Alert and oriented x3, in no acute distress.   NECK:  Supple.  Neck is thick, and JVP is difficult to see but elevated to about 10-12 cm.   LUNGS:  Clear.   CARDIAC:  Cardiac sounds are regular.  There is a harsh systolic murmur.  I cannot hear an S2.   EXTREMITIES:  Warm.  There is 2+ bilateral pitting edema.   ABDOMEN:  Seems to be a little tight.  I do not see obvious signs of ascites, but it is possible.   NEUROLOGIC:  Deferred.   PSYCHIATRIC:  Appropriate affect.   HEENT:  No icterus, pallor. "     @LABRCNTIPR(tropi:5,troponinies:5)@    @LABRCNTIPR(wbc:3,hgb:3,mcv:3,plt:3,inr:3,na:3,potassium:3,chloride:3,co2:3,bun:3,cr:3,gfrestimated:3,gfrestblack:3,aniongap:3,shanel:3,glc:3,albumin:2,prottotal:2,bilitotal:2,alkphos:2,alt:2,ast:2,lipase:2,tropi:3)@  Recent Labs   Lab Test 19  0805 18  0755  10/07/14  0849 05/15/13  0759   CHOL 112 119   < > 204* 237*   HDL 47 46   < > 45 41   LDL 51 63   < > 142* 180*   TRIG 70 52   < > 86 79   CHOLHDLRATIO  --   --   --  4.5 5.7*    < > = values in this interval not displayed.     @LABRCNTIP(wbc:3,hgb:3,hct:3,mcv:3,plt:3,iron:3,ironsat:3,reticabsct:3,retp:3,feb:3,miri:3,b12:3,folic:3,epoe:3,morph:3)@  @LABRCNTIP(PH:3,PHV:3,PO2:3,PO2V:3,sat:3,PCO2:3,PCO2V:3,HCO3:3,HCO3V:3)@  @LABRCNTIP(NTBNPI:3,NTBNP:3)@  @LABRCNTIP(DD:1)@  @LABRCNTIP(sed:3,crp:3)@  @LABRCNTIP(PLT:3)@  @LABRCNTIP(TSH:3)@  @LABRCNTIP(color:1,appearance:1,urineg,urinebili:1,urineketone:1,s,ubld:1,urineph:1,protein:1,urobilinogen:1,nitrite:1,leukest:1,rbcu:1,wbcu:1)@    Imaging:  No results found for this or any previous visit (from the past 48 hour(s)).    Echo:  No results found for this or any previous visit (from the past 4320 hour(s)).             TAVR Coordinator visit:  Provided additional education regarding TAVR procedure, after being present for discussion with physician. Explained the work-up process and next steps for patient. Patient provided our direct contact number and instructed to call with any questions. Completed frailty testing and KCCQ.     5 meter walk: 8.0, 8.5    KCCQ Results:   1a. 4  1b. 3  1c. 3  2. 1  3. 4  4. 4  5. 3  6. 4  7. 3  8a. 4  8b. 4  8c. 4  Pt only as his CTA left to be done next week and we will present and forward with getting his aortic valve replaced.Diane Nelson RN        Thank you for allowing me to participate in the care of your patient.      Sincerely,     Camila Begum MD     [facility]    cc:   Buck CARY  MD Pily  [address]  [place] [address]

## 2019-12-12 NOTE — LETTER
12/12/2019    Buck Nascimento MD  [address]  [facility] [address]    RE: Jonathan Bishop       Dear Colleague,    I had the pleasure of seeing Jonathan Bishop in the [facility].      TAVR Cardiology Consultation       Assessment & Plan     Severe symptomatic aortic stenosis  Remote history of CVA with residual weakness  PAD  Hyperlipidemia  Hypertension     Recommendations    We discussed the pathophysiology of aortic stenosis and its implications.  Following that we discussed the risk, benefits and potential complications of the procedure.  Patient would like to proceed with work-up and a TAVR directed CT has been ordered.  Given his recovery and BMI and prior CVA I feel that he would be a better TAVR candidate provided the anatomy is suitable.  Following that he will be presented in our TAVR conference for a consensus opinion.    Thank you kindly for consult I look forward to following up with him shortly.      Camila Begum MD      HPI:    Patient is a 74-year-old male who had the pleasure meeting in the TAVR clinic.  He is seen by my colleague Dr. Roy and initially presented to him in September 2019.  At that time he had noticed progressive shortness of breath that occurred over a 6-month timeframe.  During this period he also noticed lower extremity edema.  He was classified as NYHA class III on evaluation.  A cardiac murmur was auscultated and an echocardiogram subsequently demonstrated severe aortic stenosis.  He was placed on diuretic therapy with improvement in his symptoms and underwent coronary angiography.  This did not demonstrate any significant obstructive disease.  He has a TAVR directed CT ordered however pending at time of my dictation.  Since that timeframe he reports of some improvement although still has shortness of breath with activities.  Here to discuss findings and plan for possible TAVR procedure if indicated.  We discussed the  pathophysiology of aortic stenosis and      Primary Care Physician   Buck OLIVIA Sick      Patient Active Problem List   Diagnosis     Essential hypertension, benign     Hyperlipidemia with target LDL less than 130     Anemia     Medication refill- do not delete      Low back pain     Osteoarthritis of knee     Stroke (H)     Cerebrovascular accident involving anterior circulation, right (H)     Dermatophytosis of nail     PVD (peripheral vascular disease) (H)     Systolic murmur     Peripheral edema     Hyperplasia of prostate with lower urinary tract symptoms (LUTS)     Erectile dysfunction, unspecified erectile dysfunction type     Severe aortic stenosis     Status post coronary angiogram       Past Medical History   I have reviewed this patient's medical history and updated it with pertinent information if needed.   Past Medical History:   Diagnosis Date     Acute rheumatic carditis 1950     Coronary artery disease     murmur     Hip pain, bilateral      Hypercholesteremia      Hypertension      Low back pain      Nonsenile cataract      Obesity      Renal cyst      Stroke (cerebrum) (H) 11/30/16     Umbilical hernia 6/10/2011    s/p surgical repair     Wound, surgical, infected 6/10/2011    hernia       Past Surgical History   I have reviewed this patient's surgical history and updated it with pertinent information if needed.  Past Surgical History:   Procedure Laterality Date     ARTHROPLASTY KNEE BILATERAL  7/22/2010     COLONOSCOPY N/A 4/14/2017    Procedure: COLONOSCOPY;  Surgeon: Toby Bills MD;  [facility]     CV CORONARY ANGIOGRAM N/A 10/28/2019    Procedure: Coronary Angiogram;  Surgeon: Guerrero Huitron MD;  Location:  HEART CARDIAC CATH LAB     CV RIGHT HEART CATH N/A 10/28/2019    Procedure: Right Heart Cath;  Surgeon: Guerrero Huitron MD;  Location:  HEART CARDIAC CATH LAB     FUSION LUMBAR ANTERIOR TWO LEVELS       HERNIORRHAPHY UMBILICAL  6/10/2011    Procedure:HERNIORRHAPHY  "UMBILICAL; Open, with mesh placement; Surgeon:HO PARHAM; Location:[facility]     LAMINECTOMY LUMBAR TWO LEVELS         Prior to Admission Medications   Cannot display prior to admission medications because the patient has not been admitted in this contact.     [unfilled]  [unfilled]  Allergies   No Known Allergies    Social History    reports that he quit smoking about 14 years ago. He has never used smokeless tobacco. He reports that he does not drink alcohol or use drugs.    Family History   Family History   Problem Relation Age of Onset     C.A.D. Mother      Unknown/Adopted Father      Heart Failure Sister      Heart Failure Sister      Glaucoma No family hx of      Macular Degeneration No family hx of        Review of Systems   The comprehensive 10 point Review of Systems is negative other than noted in the HPI or here.     Physical Exam   Vital Signs with Ranges  Pulse:  [84] 84  BP: (128)/(72) 128/72  SpO2:  [95 %] 95 %  Wt Readings from Last 4 Encounters:   12/12/19 109.3 kg (241 lb)   12/02/19 119.3 kg (263 lb)   11/08/19 109.6 kg (241 lb 9.6 oz)   10/28/19 109.8 kg (242 lb)     [unfilled]      Vitals: /72   Pulse 84   Ht 1.588 m (5' 2.5\")   Wt 109.3 kg (241 lb)   SpO2 95%   BMI 43.38 kg/m       GENERAL:  Alert and oriented x3, in no acute distress.   NECK:  Supple.  Neck is thick, and JVP is difficult to see but elevated to about 10-12 cm.   LUNGS:  Clear.   CARDIAC:  Cardiac sounds are regular.  There is a harsh systolic murmur.  I cannot hear an S2.   EXTREMITIES:  Warm.  There is 2+ bilateral pitting edema.   ABDOMEN:  Seems to be a little tight.  I do not see obvious signs of ascites, but it is possible.   NEUROLOGIC:  Deferred.   PSYCHIATRIC:  Appropriate affect.   HEENT:  No icterus, pallor. "     @LABRCNTIPR(tropi:5,troponinies:5)@    @LABRCNTIPR(wbc:3,hgb:3,mcv:3,plt:3,inr:3,na:3,potassium:3,chloride:3,co2:3,bun:3,cr:3,gfrestimated:3,gfrestblack:3,aniongap:3,shanel:3,glc:3,albumin:2,prottotal:2,bilitotal:2,alkphos:2,alt:2,ast:2,lipase:2,tropi:3)@  Recent Labs   Lab Test 19  0805 18  0755  10/07/14  0849 05/15/13  0759   CHOL 112 119   < > 204* 237*   HDL 47 46   < > 45 41   LDL 51 63   < > 142* 180*   TRIG 70 52   < > 86 79   CHOLHDLRATIO  --   --   --  4.5 5.7*    < > = values in this interval not displayed.     @LABRCNTIP(wbc:3,hgb:3,hct:3,mcv:3,plt:3,iron:3,ironsat:3,reticabsct:3,retp:3,feb:3,miri:3,b12:3,folic:3,epoe:3,morph:3)@  @LABRCNTIP(PH:3,PHV:3,PO2:3,PO2V:3,sat:3,PCO2:3,PCO2V:3,HCO3:3,HCO3V:3)@  @LABRCNTIP(NTBNPI:3,NTBNP:3)@  @LABRCNTIP(DD:1)@  @LABRCNTIP(sed:3,crp:3)@  @LABRCNTIP(PLT:3)@  @LABRCNTIP(TSH:3)@  @LABRCNTIP(color:1,appearance:1,urineg,urinebili:1,urineketone:1,s,ubld:1,urineph:1,protein:1,urobilinogen:1,nitrite:1,leukest:1,rbcu:1,wbcu:1)@    Imaging:  No results found for this or any previous visit (from the past 48 hour(s)).    Echo:  No results found for this or any previous visit (from the past 4320 hour(s)).             TAVR Coordinator visit:  Provided additional education regarding TAVR procedure, after being present for discussion with physician. Explained the work-up process and next steps for patient. Patient provided our direct contact number and instructed to call with any questions. Completed frailty testing and KCCQ.     5 meter walk: 8.0, 8.5    KCCQ Results:   1a. 4  1b. 3  1c. 3  2. 1  3. 4  4. 4  5. 3  6. 4  7. 3  8a. 4  8b. 4  8c. 4  Pt only as his CTA left to be done next week and we will present and forward with getting his aortic valve replaced.Diane Nelson RN        Thank you for allowing me to participate in the care of your patient.    Sincerely,     Camila Begum MD     [facility]

## 2019-12-12 NOTE — PROGRESS NOTES
TAVR Coordinator visit:  Provided additional education regarding TAVR procedure, after being present for discussion with physician. Explained the work-up process and next steps for patient. Patient provided our direct contact number and instructed to call with any questions. Completed frailty testing and KCCQ.     5 meter walk: 8.0, 8.5    KCCQ Results:   1a. 4  1b. 3  1c. 3  2. 1  3. 4  4. 4  5. 3  6. 4  7. 3  8a. 4  8b. 4  8c. 4  Pt only as his CTA left to be done next week and we will present and forward with getting his aortic valve replaced.Diane Nelson RN

## 2019-12-17 ENCOUNTER — HOSPITAL ENCOUNTER (OUTPATIENT)
Dept: ULTRASOUND IMAGING | Facility: CLINIC | Age: 74
Discharge: HOME | End: 2019-12-17
Attending: INTERNAL MEDICINE | Admitting: INTERNAL MEDICINE
Payer: COMMERCIAL

## 2019-12-17 ENCOUNTER — HOSPITAL ENCOUNTER (OUTPATIENT)
Dept: CARDIOLOGY | Facility: CLINIC | Age: 74
Discharge: HOME | End: 2019-12-17
Attending: INTERNAL MEDICINE | Admitting: INTERNAL MEDICINE
Payer: COMMERCIAL

## 2019-12-17 VITALS — DIASTOLIC BLOOD PRESSURE: 66 MMHG | SYSTOLIC BLOOD PRESSURE: 137 MMHG | HEART RATE: 77 BPM

## 2019-12-17 DIAGNOSIS — R09.89 ABNORMAL CHEST SOUNDS: ICD-10-CM

## 2019-12-17 DIAGNOSIS — I35.0 SEVERE AORTIC STENOSIS: ICD-10-CM

## 2019-12-17 LAB
CREAT BLD-MCNC: 0.7 MG/DL (ref 0.66–1.25)
GFR SERPL CREATININE-BSD FRML MDRD: >90 ML/MIN/{1.73_M2}

## 2019-12-17 PROCEDURE — 93880 EXTRACRANIAL BILAT STUDY: CPT | Mod: 26 | Performed by: INTERNAL MEDICINE

## 2019-12-17 PROCEDURE — 25000128 H RX IP 250 OP 636: Performed by: INTERNAL MEDICINE

## 2019-12-17 PROCEDURE — 74174 CTA ABD&PLVS W/CONTRAST: CPT

## 2019-12-17 PROCEDURE — 82565 ASSAY OF CREATININE: CPT

## 2019-12-17 PROCEDURE — 93880 EXTRACRANIAL BILAT STUDY: CPT

## 2019-12-17 PROCEDURE — 74174 CTA ABD&PLVS W/CONTRAST: CPT | Mod: 26 | Performed by: INTERNAL MEDICINE

## 2019-12-17 PROCEDURE — 71275 CT ANGIOGRAPHY CHEST: CPT | Mod: 26 | Performed by: INTERNAL MEDICINE

## 2019-12-17 RX ORDER — IOPAMIDOL 755 MG/ML
500 INJECTION, SOLUTION INTRAVASCULAR ONCE
Status: COMPLETED | OUTPATIENT
Start: 2019-12-17 | End: 2019-12-17

## 2019-12-17 RX ORDER — IOPAMIDOL 755 MG/ML
500 INJECTION, SOLUTION INTRAVASCULAR ONCE
Status: DISCONTINUED | OUTPATIENT
Start: 2019-12-17 | End: 2019-12-17

## 2019-12-17 RX ADMIN — IOPAMIDOL 115 ML: 755 INJECTION, SOLUTION INTRAVENOUS at 11:14

## 2019-12-20 DIAGNOSIS — M54.50 ACUTE MIDLINE LOW BACK PAIN WITHOUT SCIATICA: ICD-10-CM

## 2019-12-20 RX ORDER — OXYCODONE AND ACETAMINOPHEN 5; 325 MG/1; MG/1
1-2 TABLET ORAL EVERY 6 HOURS PRN
Qty: 150 TABLET | Refills: 0 | Status: SHIPPED | OUTPATIENT
Start: 2019-12-20 | End: 2020-01-10

## 2019-12-20 NOTE — TELEPHONE ENCOUNTER
Reason For Call:   Chief Complaint   Patient presents with     Refill Request     Medication        Medication Name, Dose and Monthly Quantity:   Oxycodone with APAP (Percocet): Dose 5-325 Schedule 1 tablet by mouth every 6 hours prn Monthly Quantity 124   Diagnosis requiring opiates:   Acute midline low back pain without sciatica [M54.5]  - Primary     Problem List Updated:   No    Opioid Agreement On File - [facility] PAIN CONTRACT ID# [id number]:  No    Last Urine Drug Screen (at least once every 12 months) Date:   n/a  Unexpected Results:   n/a    [place]  Data Reviewed (at least once every 3 months) Date:   12/20/2019      Unexpected Results:    No.    Last Fill Date:   11/19/2019  Due Date:   12/20/2019  Last Visit with PCP:   09/11/2019    Future Visits with PCP:   No.    Processing:   [facility] [place]    STEPH JACK CMA      ----------------------------------

## 2019-12-20 NOTE — TELEPHONE ENCOUNTER
[facility] Call Center    Phone Message    May a detailed message be left on voicemail: yes    Reason for Call: Medication Refill Request    Has the patient contacted the pharmacy for the refill? Yes   Name of medication being requested: oxyCODONE-acetaminophen (PERCOCET) 5-325 MG tablet  Provider who prescribed the medication: Buck Nascimento  Pharmacy: [facility] [address] [place] [address]  Date medication is needed: 12/202019, Pt is out of medication, Needing ASAP          Action Taken: Message routed to:  [facility]

## 2019-12-26 ENCOUNTER — TELEPHONE (OUTPATIENT)
Dept: CARDIOLOGY | Facility: CLINIC | Age: 74
End: 2019-12-26

## 2019-12-26 NOTE — TELEPHONE ENCOUNTER
Called and spoke with Conner and gave him an update on when can get his valve replaced. I told him that it would probably be the middle of February before we could get him scheduled. He stated that he is leaving for [facility] on the 18th of February and would be back on the 26th and he would like to wait until after that to get his valve replaced. He will call  If anything changes.Diane Nelson RN

## 2020-01-10 DIAGNOSIS — M54.50 ACUTE MIDLINE LOW BACK PAIN WITHOUT SCIATICA: ICD-10-CM

## 2020-01-10 RX ORDER — OXYCODONE AND ACETAMINOPHEN 5; 325 MG/1; MG/1
1-2 TABLET ORAL EVERY 6 HOURS PRN
Qty: 150 TABLET | Refills: 0 | Status: SHIPPED | OUTPATIENT
Start: 2020-01-18 | End: 2020-02-10

## 2020-01-10 NOTE — TELEPHONE ENCOUNTER
Reason For Call:   Chief Complaint   Patient presents with     Refill Request       Medication Name, Dose and Monthly Quantity:   Oxycodone with APAP (Percocet): Dose 5-325 Schedule 1 tablet by mouth every 6 hours prn Monthly Quantity 124   Diagnosis requiring opiates:   Acute midline low back pain without sciatica [M54.5]  - Primary     Problem List Updated:   No    Opioid Agreement On File - [facility] PAIN CONTRACT ID# [id number]:  No    Last Urine Drug Screen (at least once every 12 months) Date:   n/a  Unexpected Results:   n/a    [place]  Data Reviewed (at least once every 3 months) Date:   01/10/2020      Unexpected Results:    No.    Last Fill Date:   12/20/2019  Due Date:   01/19/2020  Last Visit with PCP:   09/11/2019    Future Visits with PCP:   No.    Processing:   [facility] [place]    STEPH JACK CMA      ----------------------------------

## 2020-02-07 ENCOUNTER — TELEPHONE (OUTPATIENT)
Dept: INTERNAL MEDICINE | Facility: CLINIC | Age: 75
End: 2020-02-07

## 2020-02-07 NOTE — TELEPHONE ENCOUNTER
[facility]    Phone Message    May a detailed message be left on voicemail: yes     Reason for Call: Medication Question or concern regarding medication   Prescription Clarification  Name of Medication: oxyCODONE-acetaminophen (PERCOCET) 5-325 MG tablet  Prescribing Provider: Buck Nascimento   Pharmacy: [facility] [address] [place] [address]   What on the order needs clarification? PT is wondering if he can  his RX earlier (on 2/17/20) because he is going on vacation.  Please follow up with the PT.           Action Taken: Message routed to:  [facility]    Travel Screening: Not Applicable

## 2020-02-10 DIAGNOSIS — M54.50 ACUTE MIDLINE LOW BACK PAIN WITHOUT SCIATICA: ICD-10-CM

## 2020-02-10 NOTE — TELEPHONE ENCOUNTER
Message left for patient that 02/17/2020 is okay but next fill will be 03/21/2020.    STEPH JACK CMA at 11:59 AM on 2/10/2020.

## 2020-02-10 NOTE — TELEPHONE ENCOUNTER
Reason For Call:   No chief complaint on file.      Medication Name, Dose and Monthly Quantity:   Oxycodone with APAP (Percocet): Dose 5-325 Schedule 1 tablet by mouth every 6 hours prn Monthly Quantity 124   Diagnosis requiring opiates:   Acute midline low back pain without sciatica [M54.5]  - Primary     Problem List Updated:   No    Opioid Agreement On File - [facility] PAIN CONTRACT ID# [id number]:  No    Last Urine Drug Screen (at least once every 12 months) Date:   n/a  Unexpected Results:   n/a    [place]  Data Reviewed (at least once every 3 months) Date:   02/10/2020      Unexpected Results:    No.    Last Fill Date:   01/21/2020  Due Date:   02/17/2020 (Next fill March 21st)  Last Visit with PCP:   09/11/2019    Future Visits with PCP:   No.    Processing:   [facility] [place]    STEPH JACK CMA      ----------------------------------

## 2020-02-12 ENCOUNTER — TELEPHONE (OUTPATIENT)
Dept: CARDIOLOGY | Facility: CLINIC | Age: 75
End: 2020-02-12

## 2020-02-12 NOTE — TELEPHONE ENCOUNTER
Ron called to inquire about TAVR. Conner is going to the [facility] next week. We discussed possible TAVR date in late March with Dr. Begum. Pt agreeable to this. We will speak after he returns from the [facility]. Olivia Pro RN

## 2020-02-14 RX ORDER — OXYCODONE AND ACETAMINOPHEN 5; 325 MG/1; MG/1
1-2 TABLET ORAL EVERY 6 HOURS PRN
Qty: 150 TABLET | Refills: 0 | Status: SHIPPED | OUTPATIENT
Start: 2020-02-17 | End: 2020-03-18

## 2020-02-18 ENCOUNTER — DOCUMENTATION ONLY (OUTPATIENT)
Dept: CARDIOLOGY | Facility: CLINIC | Age: 75
End: 2020-02-18

## 2020-02-20 ENCOUNTER — CARE COORDINATION (OUTPATIENT)
Dept: CARDIOLOGY | Facility: CLINIC | Age: 75
End: 2020-02-20

## 2020-02-20 NOTE — PROGRESS NOTES
Patient was presented this morning at the multidisciplinary TAVR conference..   Plan is to proceed with TAVR procedure.    Valve: Anton  Approach: TF  [place]: Yes  Sedation: MAC    Above information was called out to the patient. Diane Nelson RN

## 2020-03-09 ENCOUNTER — OFFICE VISIT (OUTPATIENT)
Dept: CARDIOLOGY | Facility: CLINIC | Age: 75
End: 2020-03-09
Payer: MEDICAID

## 2020-03-09 VITALS
SYSTOLIC BLOOD PRESSURE: 160 MMHG | DIASTOLIC BLOOD PRESSURE: 81 MMHG | WEIGHT: 231 LBS | BODY MASS INDEX: 41.58 KG/M2 | HEART RATE: 76 BPM

## 2020-03-09 DIAGNOSIS — I35.0 SEVERE AORTIC STENOSIS: Primary | ICD-10-CM

## 2020-03-09 NOTE — LETTER
3/9/2020      RE: Jonathan Bishop  [address]  [facility] [address]       Dear Colleague,    Thank you for the opportunity to participate in the care of your patient, Jonathan Bishop, at the [facility] at [facility]. Please see a copy of my visit note below.    CV Surgery    Patient seen, clinic note dictated #[id number].    Alyssa Gimenez MD    Please do not hesitate to contact me if you have any questions/concerns.     Sincerely,     Alyssa Gimenez MD

## 2020-03-09 NOTE — LETTER
3/9/2020       RE: Jonathan Bishop  [address]  [facility] [address]     Dear Colleague,    Thank you for referring your patient, Jonathan Bishop, to the [facility] CARDIOTHORACIC at [facility]. Please see a copy of my visit note below.    CV Surgery    Patient seen, clinic note dictated #[id number].    Alyssa Gimenez MD    Service Date: 03/09/2020      REFERRING CARDIOLOGIST:  Camila Begum MD      REASON FOR CONSULTATION:  Evaluation for severe symptomatic aortic stenosis.      HISTORY OF PRESENT ILLNESS:  Mr. Bishop is a very pleasant 74-year-old gentleman with multiple medical comorbidities who was recently seen in the Structural [facility] to be evaluated for transcatheter aortic valve replacement by my Interventional Cardiology colleague, Dr. Luis Begum.  This was back in December.  I was asked to evaluate the patient from a surgical standpoint as well.      PAST MEDICAL HISTORY:  Hypertension, previous acute rheumatic carditis, coronary artery disease, hypertension, hyperlipidemia, lower back pain, obesity, renal cyst, prior stroke.      PAST SURGICAL HISTORY:  Laminectomy, umbilical hernia repair, bilateral knee surgeries.      ALLERGIES:  No known drug allergies.      CURRENT MEDICATIONS:  Reviewed in Epic chart.      FAMILY HISTORY:  Significant for coronary artery disease, heart failure.      SOCIAL HISTORY:  He quit smoking about 14 years ago.  No history of alcoholism.      REVIEW OF SYSTEMS:  As per HPI.  All other 10-point review of systems are completed and were otherwise negative unless stated above.      PHYSICAL EXAMINATION:   VITAL SIGNS:  Blood pressure is 160/81, pulse is 76, 105 kg, BMI of 41, BSA 2.15.   GENERAL:  He appears well, in no acute distress.   HEENT:  Within normal limits.   NECK:  Supple, no lymphadenopathy.   CARDIOVASCULAR:  Regular rate and rhythm, normal S1, S2, grade 3/6 systolic murmur at the right upper sternal  border.   LUNGS:  Clear bilaterally.   ABDOMEN:  Soft, nontender, nondistended.   EXTREMITIES:  Positive for 1+ bilateral lower extremity edema.   NEUROLOGIC:  Fully intact.      IMPRESSION AND PLAN:  Mr. Rosas is a very pleasant 74-year-old gentleman with severe symptomatic aortic stenosis.  Based on the imaging studies, he appears to be a good transcatheter aortic valve replacement candidate via transfemoral approach.  Pros and cons of TAVR versus surgical AVR were discussed and all questions were answered.  The case will be discussed at our upcoming multidisciplinary TAVR conference for group consensus.      It was a pleasure meeting Mr. Rosas.         ALYSSA SAHU MD             D: 2020   T: 2020   MT: justin      Name:     RISA ROSAS   MRN:      [id number]        Account:      [id number]   :      1945           Service Date: 2020      Document: [id number]       Again, thank you for allowing me to participate in the care of your patient.      Sincerely,    Alyssa Sahu MD

## 2020-03-11 DIAGNOSIS — I35.0 SEVERE AORTIC STENOSIS: Primary | ICD-10-CM

## 2020-03-16 NOTE — PROGRESS NOTES
Service Date: 03/09/2020      REFERRING CARDIOLOGIST:  Camila Begum MD      REASON FOR CONSULTATION:  Evaluation for severe symptomatic aortic stenosis.      HISTORY OF PRESENT ILLNESS:  Mr. Bishop is a very pleasant 74-year-old gentleman with multiple medical comorbidities who was recently seen in the [facility] to be evaluated for transcatheter aortic valve replacement by my Interventional Cardiology colleague, Dr. Luis Begum.  This was back in December.  I was asked to evaluate the patient from a surgical standpoint as well.      PAST MEDICAL HISTORY:  Hypertension, previous acute rheumatic carditis, coronary artery disease, hypertension, hyperlipidemia, lower back pain, obesity, renal cyst, prior stroke.      PAST SURGICAL HISTORY:  Laminectomy, umbilical hernia repair, bilateral knee surgeries.      ALLERGIES:  No known drug allergies.      CURRENT MEDICATIONS:  Reviewed in Epic chart.      FAMILY HISTORY:  Significant for coronary artery disease, heart failure.      SOCIAL HISTORY:  He quit smoking about 14 years ago.  No history of alcoholism.      REVIEW OF SYSTEMS:  As per HPI.  All other 10-point review of systems are completed and were otherwise negative unless stated above.      PHYSICAL EXAMINATION:   VITAL SIGNS:  Blood pressure is 160/81, pulse is 76, 105 kg, BMI of 41, BSA 2.15.   GENERAL:  He appears well, in no acute distress.   HEENT:  Within normal limits.   NECK:  Supple, no lymphadenopathy.   CARDIOVASCULAR:  Regular rate and rhythm, normal S1, S2, grade 3/6 systolic murmur at the right upper sternal border.   LUNGS:  Clear bilaterally.   ABDOMEN:  Soft, nontender, nondistended.   EXTREMITIES:  Positive for 1+ bilateral lower extremity edema.   NEUROLOGIC:  Fully intact.      IMPRESSION AND PLAN:  Mr. Bishop is a very pleasant 74-year-old gentleman with severe symptomatic aortic stenosis.  Based on the imaging studies, he appears to be a good transcatheter aortic  valve replacement candidate via transfemoral approach.  Pros and cons of TAVR versus surgical AVR were discussed and all questions were answered.  The case will be discussed at our upcoming multidisciplinary TAVR conference for group consensus.      It was a pleasure meeting  [place].         SHASTA SAHU MD             D: 2020   T: 2020   MT: justin      Name:     RISA ROSAS   MRN:      [id number]        Account:      [id number]   :      1945           Service Date: 2020      Document: [id number]

## 2020-03-17 ENCOUNTER — TELEPHONE (OUTPATIENT)
Dept: CARDIOLOGY | Facility: CLINIC | Age: 75
End: 2020-03-17

## 2020-03-17 NOTE — TELEPHONE ENCOUNTER
Called Conner to let him now of our plan with his TAVR next week and the ever changing developments with the Covid 19. I informed him that we are going to cancel all elective and non-urgent cases at this time. Dr Begum has reviewed his chart and feels he is stable enough that we do not need to bring him in and expose him to any unneeded illnesses. He was very disappointed by understands and I told him we will keep him informed as we move forward. I also encouraged him to call if anything with him changes.Diane Nelson RN

## 2020-03-18 DIAGNOSIS — M54.50 ACUTE MIDLINE LOW BACK PAIN WITHOUT SCIATICA: ICD-10-CM

## 2020-03-18 RX ORDER — OXYCODONE AND ACETAMINOPHEN 5; 325 MG/1; MG/1
1-2 TABLET ORAL EVERY 6 HOURS PRN
Qty: 150 TABLET | Refills: 0 | Status: SHIPPED | OUTPATIENT
Start: 2020-03-21 | End: 2020-04-16

## 2020-03-18 NOTE — TELEPHONE ENCOUNTER
Reason For Call:   No chief complaint on file.      Medication Name, Dose and Monthly Quantity:   Oxycodone with APAP (Percocet): Dose 5-325 Schedule 1 tablet by mouth every 6 hours prn Monthly Quantity 124   Diagnosis requiring opiates:   Acute midline low back pain without sciatica [M54.5]  - Primary     Problem List Updated:   No    Opioid Agreement On File - [facility] PAIN CONTRACT ID# [id number]:  No    Last Urine Drug Screen (at least once every 12 months) Date:   n/a  Unexpected Results:   n/a    [place]  Data Reviewed (at least once every 3 months) Date:   03/18/2020      Unexpected Results:    No.    Last Fill Date:   02/17/2020  Due Date:   03/21/2020  Last Visit with PCP:   09/11/2019    Future Visits with PCP:   No.    Processing:   [facility] [place]    STEPH JACK CMA      ----------------------------------

## 2020-03-20 DIAGNOSIS — M54.50 LUMBAGO: ICD-10-CM

## 2020-03-22 NOTE — TELEPHONE ENCOUNTER
ibuprofen (ADVIL/MOTRIN) 600 MG tablet       Last Written Prescription Date:  8-12-19  Last Fill Quantity: 270,   # refills: 1  Last Office Visit : 9-11-19  Future Office visit:  none    Routing refill request to provider for review/approval because:  Failed protocol: Age  FYI abnormal CBC- not ordered/reviewed by PCP

## 2020-03-24 RX ORDER — IBUPROFEN 600 MG/1
TABLET, FILM COATED ORAL
Qty: 270 TABLET | Refills: 0 | Status: SHIPPED | OUTPATIENT
Start: 2020-03-24 | End: 2020-09-22

## 2020-03-31 ENCOUNTER — TELEPHONE (OUTPATIENT)
Dept: CARDIOLOGY | Facility: CLINIC | Age: 75
End: 2020-03-31

## 2020-03-31 NOTE — TELEPHONE ENCOUNTER
Called Conner to check in on how is was during. He states he is getting along fine, but is anxious for us to start back up doing our TAVRs. I assured him that we will be in touch with him and he will call me if anything changes with him.Diane Nelson RN

## 2020-04-15 ENCOUNTER — TELEPHONE (OUTPATIENT)
Dept: CARDIOLOGY | Facility: CLINIC | Age: 75
End: 2020-04-15

## 2020-04-15 NOTE — TELEPHONE ENCOUNTER
Called to check in on Conner and I let him know that we are back to getting TAVRs done. He now states that he is fine and doesn't want to have his TAVR done. He states that he just doesn't have a good feeling about it and doesn't want to talk about it. I did not press him and made sure he has my number if he needs anything or if he changes his mind. Diane Nelson RN

## 2020-04-16 DIAGNOSIS — M54.50 ACUTE MIDLINE LOW BACK PAIN WITHOUT SCIATICA: ICD-10-CM

## 2020-04-16 NOTE — TELEPHONE ENCOUNTER
Reason For Call:   No chief complaint on file.      Medication Name, Dose and Monthly Quantity:   Oxycodone with APAP (Percocet): Dose 5-325 Schedule 1 tablet by mouth every 6 hours prn Monthly Quantity 124   Diagnosis requiring opiates:   Acute midline low back pain without sciatica [M54.5]  - Primary     Problem List Updated:   No    Opioid Agreement On File - [facility] PAIN CONTRACT ID# [id number]:  No    Last Urine Drug Screen (at least once every 12 months) Date:   n/a  Unexpected Results:   n/a    [place]  Data Reviewed (at least once every 3 months) Date:   04/16/2020      Unexpected Results:    No.    Last Fill Date:   03/21/2020  Due Date:   04/20/2020  Last Visit with PCP:   09/11/2019    Future Visits with PCP:   No.    Processing:   [facility] [place]    STEPH JACK CMA      ----------------------------------

## 2020-04-17 RX ORDER — OXYCODONE AND ACETAMINOPHEN 5; 325 MG/1; MG/1
1-2 TABLET ORAL EVERY 6 HOURS PRN
Qty: 150 TABLET | Refills: 0 | Status: SHIPPED | OUTPATIENT
Start: 2020-04-20 | End: 2020-05-13

## 2020-04-23 ENCOUNTER — VIRTUAL VISIT (OUTPATIENT)
Dept: CARDIOLOGY | Facility: CLINIC | Age: 75
End: 2020-04-23
Attending: INTERNAL MEDICINE
Payer: COMMERCIAL

## 2020-04-23 VITALS — WEIGHT: 223 LBS | BODY MASS INDEX: 40.14 KG/M2 | SYSTOLIC BLOOD PRESSURE: 134 MMHG | DIASTOLIC BLOOD PRESSURE: 78 MMHG

## 2020-04-23 DIAGNOSIS — I35.0 NONRHEUMATIC AORTIC VALVE STENOSIS: Primary | ICD-10-CM

## 2020-04-23 PROCEDURE — 99213 OFFICE O/P EST LOW 20 MIN: CPT | Mod: 95 | Performed by: INTERNAL MEDICINE

## 2020-04-23 NOTE — PROGRESS NOTES
Service Date: 04/23/2020      TELEPHONE VISIT       REASON FOR VISIT:  Aortic stenosis and heart failure with preserved ejection fraction.      HISTORY OF PRESENTING ILLNESS:  I am doing this telephone interview visit with Mr. Conner Bishop in the setting of the coronavirus pandemic.  He has been seeing me since the fall of 2019 when he presented with progressive heart failure with preserved EF-like symptoms and volume overload and was noted to have progressive severe symptomatic aortic stenosis with a valve area of 0.9.  He underwent coronary angiography that showed mild nonobstructive luminal irregularities.  He was then seen in consultation by Dr. Begum and Dr. Gimenez and was scheduled for a TAVR in 03/2020, which had to be held off electively because of the COVID-19 issue.  Subsequently, last week the TAVR coordinators called the patient to get the TAVR scheduled; however, he had been hesitant about it because of the virus issue in terms of coming into the hospital, but he eventually does want to get it done, he says.      Today he says he feels great.  From a heart failure standpoint, he has still NYHA class III symptoms.  He gets winded after walking 2 blocks.  He denies any lower extremity edema, syncope, presyncope or angina.  He says overall compared to 6 months ago, he feels way better.  He is compliant with his medications and denies any bleeding problems or syncope.      PHYSICAL EXAMINATION:  Deferred.      ASSESSMENT AND PLAN:  Mr. Conner Bishop is a very pleasant gentleman with heart failure, preserved EF, minimal nonobstructive coronary artery disease and severe symptomatic aortic stenosis.  At this point, he is willing to get the TAVR done, but he would like to wait about 3-4 months once this virus issue settles up.  So far he is doing okay, and I am okay with waiting, but I have told him to seek immediate medical attention should things get worse.  I plan to see him back in clinic in about 3  months with an echocardiogram, and we will have further discussions then.  Hopefully, we can get the TAVR scheduled before the fall of this year.  From a cardiac standpoint, baby aspirin is okay.  Continue statin.  LDL goal less than 100 for minimal coronary artery disease.      cc:   Buck Nascimento MD   [facility]    [address] [place] [address]       Camila Begum MD   [facility]   [address], [address] [place] [address]         RUBY TEE MD             D: 2020   T: 2020   MT: jay      Name:     RISA ROSAS   MRN:      [id number]        Account:      [id number]   :      1945           Service Date: 2020      Document: [id number]

## 2020-04-23 NOTE — PROGRESS NOTES
"Jonathan Bishop is a 74 year old male who is being evaluated via a billable telephone visit.      The patient has been notified of following:     \"This telephone visit will be conducted via a call between you and your physician/provider. We have found that certain health care needs can be provided without the need for a physical exam.  This service lets us provide the care you need with a short phone conversation.  If a prescription is necessary we can send it directly to your pharmacy.  If lab work is needed we can place an order for that and you can then stop by our lab to have the test done at a later time.    Telephone visits are billed at different rates depending on your insurance coverage. During this emergency period, for some insurers they may be billed the same as an in-person visit.  Please reach out to your insurance provider with any questions.    If during the course of the call the physician/provider feels a telephone visit is not appropriate, you will not be charged for this service.\"    Patient has given verbal consent for Telephone visit?  Yes    How would you like to obtain your AVS? E-Mail (inform patient AVS not encrypted)  Email:  [email or web address]    Phone call duration: 10 minutes    Jonny Mock MD    HPI and Plan:   See dictation [id number]    No orders of the defined types were placed in this encounter.    No orders of the defined types were placed in this encounter.    There are no discontinued medications.      No diagnosis found.    CURRENT MEDICATIONS:  Current Outpatient Medications   Medication Sig Dispense Refill     aspirin 325 MG EC tablet Take 1 tablet (325 mg) by mouth daily 90 tablet 3     atorvastatin (LIPITOR) 40 MG tablet Take 1 tablet (40 mg) by mouth daily 90 tablet 3     furosemide (LASIX) 40 MG tablet Take 1 tablet (40 mg) by mouth daily 30 tablet 3     hydrochlorothiazide (HYDRODIURIL) 25 MG tablet Take 1 tablet (25 mg) by mouth daily 90 tablet 3     ibuprofen " (ADVIL/MOTRIN) 600 MG tablet TAKE 1 TABLET BY MOUTH THREE TIMES DAILY AS NEEDED FOR MODERATE PAIN 270 tablet 0     multivitamin, therapeutic with minerals (MULTI-VITAMIN) TABS Take 1 tablet by mouth daily. 100 tablet 3     oxyCODONE-acetaminophen (PERCOCET) 5-325 MG tablet Take 1-2 tablets by mouth every 6 hours as needed for pain Max 5 per day. Next Fill 03/21/2020 150 tablet 0     potassium chloride ER (K-DUR/KLOR-CON M) 20 MEQ CR tablet Take 2 tablets (40 mEq) by mouth daily 60 tablet 3     sildenafil (VIAGRA) 100 MG tablet Take 1 tablet (100 mg) by mouth daily as needed (intercourse) Take 30 min to 4 hours before intercourse as needed for erectile dysfunction 25 tablet 11     naloxone (NARCAN) 4 MG/0.1ML nasal spray Spray 1 spray (4 mg) into one nostril alternating nostrils once as needed for opioid reversal Every 2-3 min until responsive or EMS arrives (Patient not taking: Reported on 12/2/2019) 0.2 mL 0     order for DME Equipment being ordered: Walker ()  Treatment Diagnosis: stroke 1 Units 0     other medical supplies Dispense knee high, medium compression, large size 4 each 0     tamsulosin (FLOMAX) 0.4 MG capsule TAKE 1 CAPSULE BY MOUTH ONCE DAILY 90 capsule 3       ALLERGIES   No Known Allergies    PAST MEDICAL HISTORY:  Past Medical History:   Diagnosis Date     Acute rheumatic carditis 1950     Coronary artery disease     murmur     Erectile dysfunction     Sildenafil     Hip pain, bilateral      Hypercholesteremia      Hypertension      Low back pain      Nonsenile cataract      Obesity      Renal cyst      Stroke (cerebrum) (H) 11/30/16     Umbilical hernia 6/10/2011    s/p surgical repair     Wound, surgical, infected 6/10/2011    hernia       PAST SURGICAL HISTORY:  Past Surgical History:   Procedure Laterality Date     ARTHROPLASTY KNEE BILATERAL  7/22/2010     COLONOSCOPY N/A 4/14/2017    Procedure: COLONOSCOPY;  Surgeon: Toby Bills MD;  Location: [facility]     CV CORONARY ANGIOGRAM  N/A 10/28/2019    Procedure: Coronary Angiogram;  Surgeon: Guerrero Huitron MD;  Location:  HEART CARDIAC CATH LAB     CV RIGHT HEART CATH N/A 10/28/2019    Procedure: Right Heart Cath;  Surgeon: Guerrero Huitron MD;  Location:  HEART CARDIAC CATH LAB     FUSION LUMBAR ANTERIOR TWO LEVELS       HERNIORRHAPHY UMBILICAL  6/10/2011    Procedure:HERNIORRHAPHY UMBILICAL; Open, with mesh placement; Surgeon:HO PARHAM; Location:[facility] OR     LAMINECTOMY LUMBAR TWO LEVELS         FAMILY HISTORY:  Family History   Problem Relation Age of Onset     C.A.D. Mother      Unknown/Adopted Father      Heart Failure Sister      Heart Failure Sister      Glaucoma No family hx of      Macular Degeneration No family hx of        SOCIAL HISTORY:  Social History     Socioeconomic History     Marital status:      Spouse name: Not on file     Number of children: Not on file     Years of education: Not on file     Highest education level: Not on file   Occupational History     Not on file   Social Needs     Financial resource strain: Not on file     Food insecurity     Worry: Not on file     Inability: Not on file     Transportation needs     Medical: Not on file     Non-medical: Not on file   Tobacco Use     Smoking status: Former Smoker     Last attempt to quit: 1/1/2005     Years since quitting: 15.3     Smokeless tobacco: Never Used     Tobacco comment: Non smoker. No 2nd hand exposure. CCX, RMA [facility] 7/28/2011   Substance and Sexual Activity     Alcohol use: No     Drug use: No     Sexual activity: Not Currently   Lifestyle     Physical activity     Days per week: Not on file     Minutes per session: Not on file     Stress: Not on file   Relationships     Social connections     Talks on phone: Not on file     Gets together: Not on file     Attends Restoration service: Not on file     Active member of club or organization: Not on file     Attends meetings of clubs or organizations: Not on file     Relationship status: Not  on file     Intimate partner violence     Fear of current or ex partner: Not on file     Emotionally abused: Not on file     Physically abused: Not on file     Forced sexual activity: Not on file   Other Topics Concern     Parent/sibling w/ CABG, MI or angioplasty before 65F 55M? Not Asked   Social History Narrative    He lives by himself in an apt in [place].  He has 2 goldfish, Lai and Union.       Review of Systems:  Skin:        Eyes:       ENT:       Respiratory:       Cardiovascular:       Gastroenterology:      Genitourinary:       Musculoskeletal:       Neurologic:       Psychiatric:       Heme/Lymph/Imm:       Endocrine:         Physical Exam:  Vitals: /78   Wt 101.2 kg (223 lb)   BMI 40.14 kg/m      Recent Lab Results:  LIPID RESULTS:  Lab Results   Component Value Date    CHOL 112 07/01/2019    HDL 47 07/01/2019    LDL 51 07/01/2019    TRIG 70 07/01/2019    CHOLHDLRATIO 4.5 10/07/2014       LIVER ENZYME RESULTS:  Lab Results   Component Value Date    AST 22 09/19/2019    ALT 30 09/19/2019       CBC RESULTS:  Lab Results   Component Value Date    WBC 6.8 10/28/2019    RBC 4.57 10/28/2019    HGB 11.4 (L) 10/28/2019    HCT 37.4 (L) 10/28/2019    MCV 82 10/28/2019    MCH 24.9 (L) 10/28/2019    MCHC 30.5 (L) 10/28/2019    RDW 16.2 (H) 10/28/2019     10/28/2019       BMP RESULTS:  Lab Results   Component Value Date     11/08/2019    POTASSIUM 3.8 11/08/2019    CHLORIDE 104 11/08/2019    CO2 28 11/08/2019    ANIONGAP 12.8 11/08/2019     (H) 11/08/2019    BUN 21 11/08/2019    CR 0.92 11/08/2019    GFRESTIMATED >90 12/17/2019    GFRESTBLACK >90 12/17/2019    WARREN 9.6 11/08/2019        A1C RESULTS:  Lab Results   Component Value Date    A1C 6.3 (H) 11/30/2016       INR RESULTS:  Lab Results   Component Value Date    INR 1.01 10/28/2019    INR 0.99 11/30/2016           CC  Jonny Mock MD  [address]  [place] [id number]

## 2020-05-13 DIAGNOSIS — M54.50 ACUTE MIDLINE LOW BACK PAIN WITHOUT SCIATICA: ICD-10-CM

## 2020-05-13 RX ORDER — OXYCODONE AND ACETAMINOPHEN 5; 325 MG/1; MG/1
1-2 TABLET ORAL EVERY 6 HOURS PRN
Qty: 150 TABLET | Refills: 0 | Status: SHIPPED | OUTPATIENT
Start: 2020-05-20 | End: 2020-06-19

## 2020-05-13 NOTE — TELEPHONE ENCOUNTER
Reason For Call:   No chief complaint on file.      Medication Name, Dose and Monthly Quantity:   Oxycodone with APAP (Percocet): Dose 5-325 Schedule 1 tablet by mouth every 6 hours prn Monthly Quantity 124   Diagnosis requiring opiates:   Acute midline low back pain without sciatica [M54.5]  - Primary     Problem List Updated:   No    Opioid Agreement On File - [facility] PAIN CONTRACT ID# [id number]:  No    Last Urine Drug Screen (at least once every 12 months) Date:   n/a  Unexpected Results:   n/a    [place]  Data Reviewed (at least once every 3 months) Date:   05/13/2020      Unexpected Results:    No.    Last Fill Date:   04/20/2020  Due Date:   05/20/2020  Last Visit with PCP:   09/11/2019    Future Visits with PCP:   No.    Processing:   [facility] [place]    STEPH JACK CMA      ----------------------------------

## 2020-06-15 DIAGNOSIS — M54.50 ACUTE MIDLINE LOW BACK PAIN WITHOUT SCIATICA: ICD-10-CM

## 2020-06-15 RX ORDER — OXYCODONE AND ACETAMINOPHEN 5; 325 MG/1; MG/1
1-2 TABLET ORAL EVERY 6 HOURS PRN
Qty: 150 TABLET | Refills: 0 | Status: CANCELLED | OUTPATIENT
Start: 2020-06-15

## 2020-06-15 NOTE — TELEPHONE ENCOUNTER
[facility] Call Center    Phone Message    May a detailed message be left on voicemail: yes     Reason for Call: Medication Refill Request    Has the patient contacted the pharmacy for the refill? Yes   Name of medication being requested: oxyCODONE-acetaminophen (PERCOCET) 5-325 MG tablet  Provider who prescribed the medication: Dr. Buck Nascimento  Pharmacy: [facility] [address] [place] [address]  Date medication is needed: 06/15/20. Pt states that he fell and is in a lot of pain.      Action Taken: Message routed to:  [facility]  pcc    Travel Screening: Not Applicable

## 2020-06-17 ENCOUNTER — OFFICE VISIT (OUTPATIENT)
Dept: OPHTHALMOLOGY | Facility: CLINIC | Age: 75
End: 2020-06-17
Attending: OPHTHALMOLOGY
Payer: COMMERCIAL

## 2020-06-17 DIAGNOSIS — H52.203 HYPEROPIC ASTIGMATISM OF BOTH EYES: ICD-10-CM

## 2020-06-17 DIAGNOSIS — H25.13 NUCLEAR SCLEROTIC CATARACT OF BOTH EYES: Primary | ICD-10-CM

## 2020-06-17 DIAGNOSIS — M54.50 ACUTE MIDLINE LOW BACK PAIN WITHOUT SCIATICA: ICD-10-CM

## 2020-06-17 DIAGNOSIS — H52.4 PRESBYOPIA: ICD-10-CM

## 2020-06-17 DIAGNOSIS — Z86.73 HISTORY OF STROKE: ICD-10-CM

## 2020-06-17 PROCEDURE — G0463 HOSPITAL OUTPT CLINIC VISIT: HCPCS | Mod: ZF

## 2020-06-17 ASSESSMENT — EXTERNAL EXAM - RIGHT EYE: OD_EXAM: NORMAL

## 2020-06-17 ASSESSMENT — CUP TO DISC RATIO
OS_RATIO: 0.2
OD_RATIO: 0.1

## 2020-06-17 ASSESSMENT — VISUAL ACUITY
METHOD: SNELLEN - LINEAR
OS_SC+: -2
OD_CC: J1+
OS_SC: 20/20
OS_CC: J1+
OD_SC+: -2
OD_SC: 20/20
METHOD_MR: DEFERRED BY PT

## 2020-06-17 ASSESSMENT — CONF VISUAL FIELD
OS_NORMAL: 1
METHOD: COUNTING FINGERS
OD_NORMAL: 1

## 2020-06-17 ASSESSMENT — SLIT LAMP EXAM - LIDS
COMMENTS: NORMAL
COMMENTS: NORMAL

## 2020-06-17 ASSESSMENT — EXTERNAL EXAM - LEFT EYE: OS_EXAM: NORMAL

## 2020-06-17 ASSESSMENT — TONOMETRY
OS_IOP_MMHG: 17
OD_IOP_MMHG: 16
IOP_METHOD: ICARE

## 2020-06-17 NOTE — NURSING NOTE
"Chief Complaints and History of Present Illnesses   Patient presents with     Annual Eye Exam     Chief Complaint(s) and History of Present Illness(es)     Annual Eye Exam     Laterality: both eyes    Severity: moderate    Course: stable    Associated symptoms: discharge, tearing, itching and eye pain.  Negative for floaters, flashes, glare and haloes    Treatments tried: no treatments    Response to treatment: mild improvement    Pain scale: 0/10              Comments     Comprehensive Eye Examination    Vision \"mostly stable\" since LV.     C/o seasonal (summer) allergies  w/ocular involvement of itchiness/mattering/tearing/redness/irritation/burning. Symptoms start the \"1st of June\" and typically are done by early/mid July.     Tried otc allergy gtts for symptom mgmt but \"had a hard time getting the gtts in.\" Does cold compresses. Would like additional tx options.     PMH: small stroke on 11/09/2016    Debbie Herrmann COT 9:58 AM June 17, 2020                   "

## 2020-06-17 NOTE — TELEPHONE ENCOUNTER
Reason For Call:   No chief complaint on file.      Medication Name, Dose and Monthly Quantity:   Oxycodone with APAP (Percocet): Dose 5-325 Schedule 1 tablet by mouth every 6 hours prn Monthly Quantity 124   Diagnosis requiring opiates:   Acute midline low back pain without sciatica [M54.5]  - Primary     Problem List Updated:   No    Opioid Agreement On File - [facility] PAIN CONTRACT ID# [id number]:  No    Last Urine Drug Screen (at least once every 12 months) Date:   n/a  Unexpected Results:   n/a    [place]  Data Reviewed (at least once every 3 months) Date:   06/17/2020      Unexpected Results:    No.    Last Fill Date:   05/20/2020  Due Date:   06/19/2020  Last Visit with PCP:   09/11/2019    Future Visits with PCP:   No.    Processing:   [facility] [place]    STEPH JACK CMA      ----------------------------------

## 2020-06-17 NOTE — PROGRESS NOTES
HPI  Jonathan Bishop is a 74 year old male here for full eye exam. He feels his vision is overall stable in both eyes at near and distance. He has some itching and tearing of the eyes which he attributes to allergies. He uses OTC allergy eyedrops and cool compresses which help with his symptoms. When his allergies are really bad, he takes oral allergy medications. No other concerns.      PMH: He had a small stroke on November 9th, 2016, and was hospitalized for 4 days. He recovered well from the left leg weakness and did not have vision changes related to the stroke.     Assessment & Plan   Assessment & Plan      (H25.13) Nuclear sclerotic cataract of both eyes  (primary encounter diagnosis)  Comment: Mild, Not visually significant.  Plan: Observe.    (Z86.73) History of stroke  Comment: History of small stroke Nov 9, 2016 with left leg weakness which has now recovered. No vision changes related to the stroke.  Plan: Monitor.    (H52.203) Hyperopic astigmatism of both eyes  (H52.4) Presbyopia  Comment: Good distance vision without correction  Plan: Ok to continue with OTC readers.      -----------------------------------------------------------------------------------    Return to clinic in on eye for annual exam or sooner LAWANDA Hooper MD  Ophthalmology PGY-2    Teaching statement:  Complete documentation of historical and exam elements from today's encounter can be found in the full encounter summary report (not reduplicated in this progress note). I personally obtained the chief complaint(s) and history of present illness.  I confirmed and edited as necessary the review of systems, past medical/surgical history, family history, social history, and examination findings as documented by others; and I examined the patient myself. I personally reviewed the relevant tests, images, and reports as documented above.     I formulated and edited as necessary the assessment and plan and discussed the findings and  management plan with the patient and family.    Pascale Clark MD  Comprehensive Ophthalmology & Ocular Pathology  Department of Ophthalmology and Visual Neurosciences  [email or web address]  Pager [phone]

## 2020-06-19 RX ORDER — OXYCODONE AND ACETAMINOPHEN 5; 325 MG/1; MG/1
1-2 TABLET ORAL EVERY 6 HOURS PRN
Qty: 150 TABLET | Refills: 0 | Status: SHIPPED | OUTPATIENT
Start: 2020-06-19 | End: 2020-07-15

## 2020-06-19 NOTE — TELEPHONE ENCOUNTER
[facility] Call Center    Phone Message    May a detailed message be left on voicemail: yes     Reason for Call: Other: Pt called checking on status of refill     Action Taken: Message routed to:  [facility] ([facility]    Travel Screening: Not Applicable

## 2020-07-15 DIAGNOSIS — M54.50 ACUTE MIDLINE LOW BACK PAIN WITHOUT SCIATICA: ICD-10-CM

## 2020-07-15 RX ORDER — OXYCODONE AND ACETAMINOPHEN 5; 325 MG/1; MG/1
1-2 TABLET ORAL EVERY 6 HOURS PRN
Qty: 150 TABLET | Refills: 0 | Status: SHIPPED | OUTPATIENT
Start: 2020-07-18 | End: 2020-08-14

## 2020-07-15 NOTE — TELEPHONE ENCOUNTER
Reason For Call:   No chief complaint on file.      Medication Name, Dose and Monthly Quantity:   Oxycodone with APAP (Percocet): Dose 5-325 Schedule 1 tablet by mouth every 6 hours prn Monthly Quantity 124   Diagnosis requiring opiates:   Acute midline low back pain without sciatica [M54.5]  - Primary     Problem List Updated:   No    Opioid Agreement On File - [facility] PAIN CONTRACT ID# [id number]:  No    Last Urine Drug Screen (at least once every 12 months) Date:   n/a  Unexpected Results:   n/a    [place]  Data Reviewed (at least once every 3 months) Date:   07/15/2020      Unexpected Results:    No.    Last Fill Date:   06/19/2020  Due Date:   07/19/2020  Last Visit with PCP:   09/11/2019    Future Visits with PCP:   No.    Processing:   [facility] [place]    STEPH JACK CMA      ----------------------------------

## 2020-07-20 ENCOUNTER — CARE COORDINATION (OUTPATIENT)
Dept: CARDIOLOGY | Facility: CLINIC | Age: 75
End: 2020-07-20

## 2020-07-20 ENCOUNTER — OFFICE VISIT (OUTPATIENT)
Dept: ORTHOPEDICS | Facility: CLINIC | Age: 75
End: 2020-07-20
Payer: MEDICAID

## 2020-07-20 ENCOUNTER — TELEPHONE (OUTPATIENT)
Dept: CARDIOLOGY | Facility: CLINIC | Age: 75
End: 2020-07-20

## 2020-07-20 DIAGNOSIS — I73.9 PVD (PERIPHERAL VASCULAR DISEASE) (H): ICD-10-CM

## 2020-07-20 DIAGNOSIS — I73.89 OTHER SPECIFIED PERIPHERAL VASCULAR DISEASES (H): Primary | ICD-10-CM

## 2020-07-20 DIAGNOSIS — B35.1 OM (ONYCHOMYCOSIS): ICD-10-CM

## 2020-07-20 NOTE — PROGRESS NOTES
Saw note from Conner's last visit with Dr. Mock and it states that Conner wants to move ahead with TAVR. Scheduling was able to get a hold of Conner and set up an echo in October and a repeat visit with Dr. Mock. Conner does not want to come anywhere near the hospital until Covid has slowed down. Will follow up with Conner and Dr. Mock after his next echo and visit.Diane Nelson RN

## 2020-07-20 NOTE — NURSING NOTE
Reason For Visit:   Chief Complaint   Patient presents with     Follow Up     Toenail trimming. Swelling, bilateral lower extremities. Patient stated that the swelling is doiwn today.        Pain Assessment  Patient Currently in Pain: Denies        No Known Allergies        Blanca Davis LPN

## 2020-07-20 NOTE — TELEPHONE ENCOUNTER
----- Message from Jonny Mock MD sent at 7/20/2020  2:13 PM CDT -----  He may need more diuresis I think. Let us set him up for a visit with us soon.     Team, could he see me or CORE MARK soon please. Thanks  K  ----- Message -----  From: Mamadou Gary DPM  Sent: 7/20/2020   7:31 AM CDT  To: Jonny Mock MD    Hi there -    I'm the podiatrist treating Conner. He is going to have a TAVR with you in a few months.He has a preserved EF but severe aortic stenosis.     He is having LE edema. He's on 40mg of Lasix. Cannot get his compression socks on at home by himself.     Wondering your thought on me ordering him pneumatic compression pumps s/p TAVR?     Thanks for your input!    AC

## 2020-07-20 NOTE — LETTER
[employer]  [facility]  [address] , [address]  [place] [address]  Fax: [phone]  Phone: [phone]      2020      Conner Bishop  [address] [place] [address] [address]   [facility] [address]        Dear Conner,    Thank you for your interest in becoming a [employer] user!    Your access code is: Activation code not generated  Current [employer] Status: Active     Please access the [employer] website at [email or web address].  Below the ID and password fields, select the  Sign Up Now  as New User.  You will be prompted to enter the access code listed above as well as additional personal information.  Please follow the directions carefully when creating your username and password.    If you allow your access code to , or if you have any questions please call a [employer] Representative at [phone] during normal clinic hours.     Sincerely,      [employer]  [facility]

## 2020-07-20 NOTE — TELEPHONE ENCOUNTER
I called pt to discuss his symptoms and set him up for a CORE MARK video visit. Pt said he does have LE edema, but thinks it has improved. He said he has not seen his podiatrist in awhile so that may be why he thought his legs looked more swollen. Pt said he has occasional SOB w/exertion, but it has not worsened recently. He has not been weighing himself daily, but thinks he has lost weight. Pt did report occasional pain/numbness in his legs, and wonders if he has circulation issues. Pt said he would be ok with having a virtual visit with Gertrudis. It looks like pt has deferred his TAVR work up at this time as he is fearful of getting COVID. Pt scheduled for CORE video visit on 7/22 @ @ 2:30 w/Gertrudis. I recommended pt start weighing himself daily, and to check BP/HR prior to visit. Leslie LARKIN July 20, 2020, 4:48 PM

## 2020-07-20 NOTE — LETTER
7/20/2020         RE: Jonathan Bishop  [address]  [facility] [address]        Dear Colleague,    Thank you for referring your patient, Jonathan Bishop, to the [facility]. Please see a copy of my visit note below.    Chief Complaint   Patient presents with     Follow Up     Toenail trimming. Swelling, bilateral lower extremities. Patient stated that the swelling is doiwn today.           No Known Allergies      Subjective: Jonathan is a 73 year old male who presents to the clinic today for a follow up of elongated nails. He relates that the nails are long and painful. He was last seen in December.  Relates that he does get swelling in his legs, but he cannot get compression socks on. He is on a diuretic. He is currently being worked up for TAVR.     Objective  PT and DP pulses are non-palpable bilaterally. CRT is >3 seconds. Diminished pedal hair. Moderate  non-pitting edema noted to BL LE.  Gross sensation is slightly decreased bilaterally.   Nails are thickened, discolored, dystrophic and elongated bilaterally to varying degrees. Nails have subungual debris consistent with onychomycosis. No open lesions are noted. Skin is normal.      Assessment: PVD  Onychomycosis x 10      Plan:  - Pt seen and evaluated.  - Nails were debrided x 10.  - He cannot get his compression socks on, as he does not have help at home. I think that compression pumps at this point may be too much for him. I will touch base with Dr. Mock in cardio to discuss. For now, he can try tubigrip.   - See again in 3 months.       Mamadou Gary, ANASTASIYA

## 2020-07-20 NOTE — PROGRESS NOTES
Chief Complaint   Patient presents with     Follow Up     Toenail trimming. Swelling, bilateral lower extremities. Patient stated that the swelling is doiwn today.           No Known Allergies      Subjective: Jonathan is a 73 year old male who presents to the clinic today for a follow up of elongated nails. He relates that the nails are long and painful. He was last seen in December.  Relates that he does get swelling in his legs, but he cannot get compression socks on. He is on a diuretic. He is currently being worked up for TAVR.     Objective  PT and DP pulses are non-palpable bilaterally. CRT is >3 seconds. Diminished pedal hair. Moderate  non-pitting edema noted to BL LE.  Gross sensation is slightly decreased bilaterally.   Nails are thickened, discolored, dystrophic and elongated bilaterally to varying degrees. Nails have subungual debris consistent with onychomycosis. No open lesions are noted. Skin is normal.      Assessment: PVD  Onychomycosis x 10      Plan:  - Pt seen and evaluated.  - Nails were debrided x 10.  - He cannot get his compression socks on, as he does not have help at home. I think that compression pumps at this point may be too much for him. I will touch base with Dr. Mock in cardio to discuss. For now, he can try tubigrip.   - See again in 3 months.

## 2020-07-22 ENCOUNTER — VIRTUAL VISIT (OUTPATIENT)
Dept: CARDIOLOGY | Facility: CLINIC | Age: 75
End: 2020-07-22
Payer: COMMERCIAL

## 2020-07-22 DIAGNOSIS — R06.02 SOB (SHORTNESS OF BREATH): ICD-10-CM

## 2020-07-22 DIAGNOSIS — I35.0 NONRHEUMATIC AORTIC VALVE STENOSIS: ICD-10-CM

## 2020-07-22 PROCEDURE — 99442 ZZC PHYSICIAN TELEPHONE EVALUATION 11-20 MIN: CPT | Performed by: PHYSICIAN ASSISTANT

## 2020-07-22 RX ORDER — POTASSIUM CHLORIDE 1500 MG/1
40 TABLET, EXTENDED RELEASE ORAL DAILY
Qty: 60 TABLET | Refills: 3 | Status: SHIPPED | OUTPATIENT
Start: 2020-07-22 | End: 2020-11-09

## 2020-07-22 NOTE — PATIENT INSTRUCTIONS
Call CORE nurse for any questions or concerns Mon-Fri 8am-4pm:                                                 #[phone]                                       For concerns after hours:                                               #[phone], option 2     1: Medication changes: take 60 mg of lasix (1.5 tablets ) of lasix for the next two or three days.   2: Plan from today: follow up in October of with Dr. Mock.

## 2020-07-22 NOTE — PROGRESS NOTES
"Jonathan Bishop is a 74 year old male who is being evaluated via a billable video visit.      The patient has been notified of following:     \"This video visit will be conducted via a call between you and your physician/provider. We have found that certain health care needs can be provided without the need for an in-person physical exam.  This service lets us provide the care you need with a video conversation.  If a prescription is necessary we can send it directly to your pharmacy.  If lab work is needed we can place an order for that and you can then stop by our lab to have the test done at a later time.    Video visits are billed at different rates depending on your insurance coverage.  Please reach out to your insurance provider with any questions.    If during the course of the call the physician/provider feels a video visit is not appropriate, you will not be charged for this service.\"    Patient has given verbal consent for Video visit? Yes  How would you like to obtain your AVS? MyChart  If you are dropped from the video visit, the video invite should be resent to: Text to cell phone: [phone]  Will anyone else be joining your video visit? 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     He was noted to have mild aortic stenosis in 2016 which was not followed up on until last year.  He had several months of progressive dyspnea on exertion " and peripheral edema and eventually was evaluated by Dr. Mock in September 2019.  An echocardiogram showed severe aortic stenosis with an aortic valve area of 0.91 cm  and a mean aortic gradient of 40 mmHg.  He was started on Lasix 40 mg daily and additionally to his usual hydrochlorothiazide for diuresis.    He had a left and right heart catheterization in October 2019 which showed minimal nonobstructive coronary disease.  A right and left-sided filling pressures were mildly elevated.  Mean pulmonary capillary wedge pressure was 22 mmHg and mean PA pressure was 34 mmHg.  His cardiac output was normal.  He was referred to the TAVR clinic for further work-up and was evaluated by Dr. Begum.  He was felt to be a good candidate for TAVR.  This was scheduled in March but unfortunately was delayed due to the COVID-19 pandemic. The TAVR coordinators then followed up with him to get his TAVR subsequently scheduled but he has declined to schedule for now as he does not want to be in the hospital with the ongoing COVID-19 pandemic.    He had a visit with Dr. Mock in April and had ongoing NYHA class III symptoms at that time.  He noted a he plan to proceed with TAVR but wanted to wait until the fall.  It appears the TAVR team recently did touch base with Conner.  He has an echo and repeat visit with Dr. Mock scheduled in October and the plan is to wait to do his TAVR until after that visit as he does not want to come to the hospital.    A visit was made with me today as his podiatrist recently reached out to Dr. Mock.  He recently saw Conner and noted he was having more lower extremity edema.     I spoke with him today for follow-up.  Overall, he notes that he has been fairly stable in the last several months.  He does have some ongoing leg swelling which he tells me fluctuates at times.  At this point, he is mainly having pedal edema.  He has trouble getting on his shoes due to the swelling.    He has chronic dyspnea on  exertion in the setting of a severe aortic stenosis but this has not changed or worsened in the last few weeks.  He does not have orthopnea or PND.  His weight if anything has trended down since this spring.  Today he was 215 pounds.  He has been compliant with his diuretics.     Assessment/plan:  Jonathan Bishop is a delightful 74-year-old male with a history of severe aortic stenosis awaiting TAVR, prior CVA, hypertension, and hyperlipidemia.    He was scheduled to undergo TAVR earlier this year but this was delayed in March due to the COVID-19 pandemic.  He does not want to come into the hospital at this point admits to ongoing COVID-19 pandemic and has deferred his TAVR until this fall per his preference.    At this point, he seems to be relatively stable.  He has chronic shortness of breath which I suspect is related to his his severe aortic stenosis and this has not changed.  He does have some fluctuating lower extremity edema.  Unfortunately, he was unable to connect to our video visit today thus I could not assess his legs at all.  He currently is taking Lasix 40 mg daily in addition to hydrochlorothiazide 25 mg daily.  We discussed having him take a little extra Lasix for a few days and he was agreeable.  He will take 60 mg of Lasix for the next 2 or 3 days and will send me an update via my chart on how he does with that.  It certainly does not sound at this point like he needs ongoing aggressive diuresis.    He has an appointment with Dr. Mock set up in October as well as an echocardiogram and will consider proceeding with TAVR at that time.  I asked him to contact the TAVR team in the meantime if he decides he would like to move forward sooner.  I am certainly happy to see him prior to his appointment in October if needed as well.    Of note, this was a scheduled visit video visit but he was unable to join our visit today and thus this was changed to a telephone visit.    Total call time: 13  vito Jean PA-C

## 2020-07-22 NOTE — PROGRESS NOTES
"Jonathan Bishop is a 74 year old male who is being evaluated via a billable video visit.      The patient has been notified of following:     \"This video visit will be conducted via a call between you and your physician/provider. We have found that certain health care needs can be provided without the need for an in-person physical exam.  This service lets us provide the care you need with a video conversation.  If a prescription is necessary we can send it directly to your pharmacy.  If lab work is needed we can place an order for that and you can then stop by our lab to have the test done at a later time.    Video visits are billed at different rates depending on your insurance coverage.  Please reach out to your insurance provider with any questions.    If during the course of the call the physician/provider feels a video visit is not appropriate, you will not be charged for this service.\"    Patient has given verbal consent for Video visit? Yes  How would you like to obtain your AVS? MyChart  If you are dropped from the video visit, the video invite should be resent to: Text to cell phone: [phone]  Will anyone else be joining your video visit? No  {If patient encounters technical issues they should call [phone] :906610}  Pt reported vitals 126/69 p89  97.75 kg  Review Of Systems  Skin:   Eyes: glasses  Ears/Nose/Throat:   Respiratory: SOb with exertion  Cardiovascular: edema is getting better   Gastrointestinal:   Genitourinary:   Musculoskeletal: back pain   Neurologic: stroke, numbness in feet   Psychiatric: sleep issues  Hematologic/Lymphatic/Immunologic:neg  Endo neg   CADE Hung          Video-Visit Details    Type of service:  Video Visit    Video Start Time: {video visit start/end time:152948}  Video End Time: {video visit start/end time:152948}    Originating Location (pt. 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     He was noted to have mild aortic stenosis in 2016 which was not followed up on until last year.  He had several months of progressive dyspnea on exertion and peripheral edema and eventually was evaluated by Dr. Mock in September 2019.  An echocardiogram showed severe aortic stenosis with an aortic valve area of 0.91 cm  and a mean aortic gradient of 40 mmHg.  He was started on Lasix 40 mg daily and additionally to his usual hydrochlorothiazide for diuresis.    He had a left and right heart catheterization in October 2019 which showed minimal nonobstructive coronary disease.  A right and left-sided filling pressures were mildly elevated.  Mean pulmonary capillary wedge pressure was 22 mmHg and mean PA pressure was 34 mmHg.  His cardiac output was normal.  He was referred to the TAVR clinic for further work-up and was evaluated by Dr. Begum.  He was felt to be a good candidate for TAVR.  This was scheduled in March but unfortunately was delayed due to the COVID-19 pandemic. The TAVR coordinators then followed up with him to get his TAVR subsequently scheduled but he has declined to schedule for now as he does not want to be in the hospital with the ongoing COVID-19 pandemic.    He had a visit with Dr. Mock in April and had ongoing NYHA class III symptoms at that time.  He noted a he plan to proceed with TAVR but wanted to wait until the fall.  It appears the TAVR team recently did touch base with Conner.  He has an echo and repeat visit with Dr. Mock scheduled in October and the plan is to wait to do his TAVR until after that visit as he does not want to come " to the hospital.    A visit was made with me today as his podiatrist recently reached out to Dr. Mock.  He recently saw Conner and noted he was having more lower extremity edema.     I spoke with him today for follow-up.

## 2020-07-22 NOTE — LETTER
7/22/2020    Buck Nascimento MD  [address]  [facility] [address]    RE: Jonathan Bishop       Dear Colleague,    I had the pleasure of seeing Jonathan Bishop in the [facility].    Jonathan Bishop is a 74 year old male who is being evaluated via a billable video visit.      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     He was noted to have mild aortic stenosis in 2016 which was not followed up on until last year.  He had several months of progressive dyspnea on exertion and peripheral edema and eventually was evaluated by Dr. Mock in September 2019.  An echocardiogram showed severe aortic stenosis with an aortic valve area of 0.91 cm  and a mean aortic gradient of 40 mmHg.  He was started on Lasix 40 mg daily and additionally to his usual hydrochlorothiazide for diuresis.    He had a left and right heart catheterization in October 2019 which showed minimal nonobstructive coronary disease.  A right and left-sided filling pressures were mildly elevated.  Mean pulmonary capillary wedge pressure was 22 mmHg and mean PA pressure was 34 mmHg.  His cardiac output was normal.  He was referred to the TAVR clinic for further work-up and was evaluated by Dr. Begum.  He was felt to be a good candidate for TAVR.  This was scheduled in March but unfortunately was delayed due to the COVID-19 pandemic. The TAVR coordinators then followed up with him to get his TAVR subsequently scheduled but he has declined to schedule for now as he does not want to be in the hospital with the ongoing COVID-19 pandemic.    He had a visit with Dr. Mock in April and had ongoing NYHA class III symptoms at that time.  He noted a he plan to proceed with TAVR but wanted to wait until the fall.  It appears the TAVR team  recently did touch base with Conner.  He has an echo and repeat visit with Dr. Mock scheduled in October and the plan is to wait to do his TAVR until after that visit as he does not want to come to the hospital.    A visit was made with me today as his podiatrist recently reached out to Dr. Mock.  He recently saw Conner and noted he was having more lower extremity edema.     I spoke with him today for follow-up.  Overall, he notes that he has been fairly stable in the last several months.  He does have some ongoing leg swelling which he tells me fluctuates at times.  At this point, he is mainly having pedal edema.  He has trouble getting on his shoes due to the swelling.    He has chronic dyspnea on exertion in the setting of a severe aortic stenosis but this has not changed or worsened in the last few weeks.  He does not have orthopnea or PND.  His weight if anything has trended down since this spring.  Today he was 215 pounds.  He has been compliant with his diuretics.     Assessment/plan:  Jonathan Bishop is a delightful 74-year-old male with a history of severe aortic stenosis awaiting TAVR, prior CVA, hypertension, and hyperlipidemia.    He was scheduled to undergo TAVR earlier this year but this was delayed in March due to the COVID-19 pandemic.  He does not want to come into the hospital at this point admits to ongoing COVID-19 pandemic and has deferred his TAVR until this fall per his preference.    At this point, he seems to be relatively stable.  He has chronic shortness of breath which I suspect is related to his his severe aortic stenosis and this has not changed.  He does have some fluctuating lower extremity edema.  Unfortunately, he was unable to connect to our video visit today thus I could not assess his legs at all.  He currently is taking Lasix 40 mg daily in addition to hydrochlorothiazide 25 mg daily.  We discussed having him take a little extra Lasix for a few days and he was agreeable.  He will  take 60 mg of Lasix for the next 2 or 3 days and will send me an update via my chart on how he does with that.  It certainly does not sound at this point like he needs ongoing aggressive diuresis.    He has an appointment with Dr. Mock set up in October as well as an echocardiogram and will consider proceeding with TAVR at that time.  I asked him to contact the TAVR team in the meantime if he decides he would like to move forward sooner.  I am certainly happy to see him prior to his appointment in October if needed as well.    Of note, this was a scheduled visit video visit but he was unable to join our visit today and thus this was changed to a telephone visit.    Total call time: 13 minutes    Thank you for allowing me to participate in the care of your patient.    Sincerely,     Cristal Jean PA-C     [facility]

## 2020-07-27 RX ORDER — FUROSEMIDE 40 MG
40 TABLET ORAL DAILY
Qty: 30 TABLET | Refills: 6 | Status: SHIPPED | OUTPATIENT
Start: 2020-07-27 | End: 2021-01-07

## 2020-08-14 DIAGNOSIS — M54.50 ACUTE MIDLINE LOW BACK PAIN WITHOUT SCIATICA: ICD-10-CM

## 2020-08-14 NOTE — TELEPHONE ENCOUNTER
Reason For Call:   No chief complaint on file.      Medication Name, Dose and Monthly Quantity:   Oxycodone with APAP (Percocet): Dose 5-325 Schedule 1 tablet by mouth every 6 hours prn Monthly Quantity 124   Diagnosis requiring opiates:   Acute midline low back pain without sciatica [M54.5]  - Primary     Problem List Updated:   No    Opioid Agreement On File - [facility] PAIN CONTRACT ID# [id number]:  No    Last Urine Drug Screen (at least once every 12 months) Date:   n/a  Unexpected Results:   n/a    [place]  Data Reviewed (at least once every 3 months) Date:   08/14/2020      Unexpected Results:    No.    Last Fill Date:   07/19/2020  Due Date:   08/18/2020  Last Visit with PCP:   09/11/2019    Future Visits with PCP:   No.    Processing:   [facility] [place]    STEPH JACK CMA      ----------------------------------

## 2020-08-16 RX ORDER — OXYCODONE AND ACETAMINOPHEN 5; 325 MG/1; MG/1
1-2 TABLET ORAL EVERY 6 HOURS PRN
Qty: 150 TABLET | Refills: 0 | Status: SHIPPED | OUTPATIENT
Start: 2020-08-18 | End: 2020-09-11

## 2020-09-11 DIAGNOSIS — M54.50 ACUTE MIDLINE LOW BACK PAIN WITHOUT SCIATICA: ICD-10-CM

## 2020-09-11 RX ORDER — OXYCODONE AND ACETAMINOPHEN 5; 325 MG/1; MG/1
1-2 TABLET ORAL EVERY 6 HOURS PRN
Qty: 150 TABLET | Refills: 0 | Status: SHIPPED | OUTPATIENT
Start: 2020-09-17 | End: 2020-10-15

## 2020-09-11 NOTE — TELEPHONE ENCOUNTER
Reason For Call:   No chief complaint on file.      Medication Name, Dose and Monthly Quantity:   Oxycodone with APAP (Percocet): Dose 5-325 Schedule 1 tablet by mouth every 6 hours prn Monthly Quantity 124   Diagnosis requiring opiates:   Acute midline low back pain without sciatica [M54.5]  - Primary     Problem List Updated:   No    Opioid Agreement On File - [facility] PAIN CONTRACT ID# [id number]:  No    Last Urine Drug Screen (at least once every 12 months) Date:   n/a  Unexpected Results:   n/a    [place]  Data Reviewed (at least once every 3 months) Date:   09/11/2020      Unexpected Results:    No.    Last Fill Date:   08/18/2020  Due Date:   09/17/2020  Last Visit with PCP:   09/11/2019    Future Visits with PCP:   No.    Processing:   [facility] [place]    STEPH JACK CMA      ----------------------------------

## 2020-09-18 DIAGNOSIS — M54.50 LUMBAGO: ICD-10-CM

## 2020-09-18 DIAGNOSIS — N52.9 ERECTILE DYSFUNCTION, UNSPECIFIED ERECTILE DYSFUNCTION TYPE: ICD-10-CM

## 2020-09-21 NOTE — TELEPHONE ENCOUNTER
sildenafil (VIAGRA) 100 MG tablet       Last Written Prescription Date:  9/11/19  Last Fill Quantity: 25,   # refills: 11  Last Office Visit : 9/11/19  Future Office visit:  None scheduled    Routing refill request to provider for review/approval because:  Erectile Dysfuction Protocol Failed   Absence of Alpha Blockers on Med list

## 2020-09-22 NOTE — TELEPHONE ENCOUNTER
ibuprofen (ADVIL/MOTRIN) 600 MG tablet      Last Written Prescription Date:  3/24/2020  Last Fill Quantity: 270 tab,   # refills: 0  Last Office Visit : 9/11/2019  Future Office visit:  11/09/2020    Routing refill request to provider for review/approval because:  Failed PCC medication protocol: age and abnormal lab results.  - > 64 years old.  -  CBC  CBC RESULTS:   Recent Labs   Lab Test 10/28/19  0900   WBC 6.8   RBC 4.57   HGB 11.4*   HCT 37.4*   MCV 82   MCH 24.9*   MCHC 30.5*   RDW 16.2*

## 2020-09-22 NOTE — TELEPHONE ENCOUNTER
Spoke to patient to schedule annual physical with PCP Dr Nascimento on Monday November 9 at 8:00 AM confirmed by patient.

## 2020-09-24 RX ORDER — SILDENAFIL 100 MG/1
TABLET, FILM COATED ORAL
Qty: 25 TABLET | Refills: 2 | Status: SHIPPED | OUTPATIENT
Start: 2020-09-24 | End: 2020-09-25

## 2020-09-24 RX ORDER — IBUPROFEN 600 MG/1
600 TABLET, FILM COATED ORAL 3 TIMES DAILY PRN
Qty: 270 TABLET | Refills: 0 | Status: ON HOLD | OUTPATIENT
Start: 2020-09-24 | End: 2020-12-09

## 2020-09-25 DIAGNOSIS — N52.9 ERECTILE DYSFUNCTION, UNSPECIFIED ERECTILE DYSFUNCTION TYPE: ICD-10-CM

## 2020-09-25 RX ORDER — SILDENAFIL 100 MG/1
TABLET, FILM COATED ORAL
Qty: 25 TABLET | Refills: 2 | Status: SHIPPED | OUTPATIENT
Start: 2020-09-25 | End: 2021-07-20

## 2020-10-15 DIAGNOSIS — M54.50 ACUTE MIDLINE LOW BACK PAIN WITHOUT SCIATICA: ICD-10-CM

## 2020-10-15 RX ORDER — OXYCODONE AND ACETAMINOPHEN 5; 325 MG/1; MG/1
1-2 TABLET ORAL EVERY 6 HOURS PRN
Qty: 150 TABLET | Refills: 0 | Status: SHIPPED | OUTPATIENT
Start: 2020-10-17 | End: 2020-11-09

## 2020-10-15 NOTE — TELEPHONE ENCOUNTER
Reason For Call:   No chief complaint on file.      Medication Name, Dose and Monthly Quantity:   Oxycodone with APAP (Percocet): Dose 5-325 Schedule 1 tablet by mouth every 6 hours prn Monthly Quantity 124   Diagnosis requiring opiates:   Acute midline low back pain without sciatica [M54.5]  - Primary     Problem List Updated:   No    Opioid Agreement On File - [facility] PAIN CONTRACT ID# [id number]:  No    Last Urine Drug Screen (at least once every 12 months) Date:   n/a  Unexpected Results:   n/a    [place]  Data Reviewed (at least once every 3 months) Date:   10/15/2020      Unexpected Results:    No.    Last Fill Date:   09/17/2020  Due Date:   10/17/2020  Last Visit with PCP:   09/11/2019    Future Visits with PCP:   No.    Processing:   [facility] [place]    STEPH JACK CMA      ----------------------------------

## 2020-10-27 ENCOUNTER — HOSPITAL ENCOUNTER (OUTPATIENT)
Dept: CARDIOLOGY | Facility: CLINIC | Age: 75
Discharge: HOME | End: 2020-10-27
Attending: INTERNAL MEDICINE | Admitting: INTERNAL MEDICINE
Payer: COMMERCIAL

## 2020-10-27 DIAGNOSIS — I35.0 NONRHEUMATIC AORTIC VALVE STENOSIS: ICD-10-CM

## 2020-10-27 PROCEDURE — 93306 TTE W/DOPPLER COMPLETE: CPT | Mod: 26 | Performed by: INTERNAL MEDICINE

## 2020-10-27 PROCEDURE — 93306 TTE W/DOPPLER COMPLETE: CPT

## 2020-10-28 DIAGNOSIS — I35.0 SEVERE AORTIC STENOSIS: ICD-10-CM

## 2020-10-28 DIAGNOSIS — I35.0 NONRHEUMATIC AORTIC VALVE STENOSIS: ICD-10-CM

## 2020-10-28 LAB
ALBUMIN SERPL-MCNC: 3.7 G/DL (ref 3.4–5)
ALP SERPL-CCNC: 99 U/L (ref 40–150)
ALT SERPL W P-5'-P-CCNC: 32 U/L (ref 0–70)
ANION GAP SERPL CALCULATED.3IONS-SCNC: 7 MMOL/L (ref 3–14)
AST SERPL W P-5'-P-CCNC: 24 U/L (ref 0–45)
BASOPHILS # BLD AUTO: 0 10E9/L (ref 0–0.2)
BASOPHILS NFR BLD AUTO: 0.3 %
BILIRUB SERPL-MCNC: 0.4 MG/DL (ref 0.2–1.3)
BUN SERPL-MCNC: 21 MG/DL (ref 7–30)
CALCIUM SERPL-MCNC: 9.1 MG/DL (ref 8.5–10.1)
CHLORIDE SERPL-SCNC: 106 MMOL/L (ref 94–109)
CO2 SERPL-SCNC: 27 MMOL/L (ref 20–32)
CREAT SERPL-MCNC: 0.87 MG/DL (ref 0.66–1.25)
DIFFERENTIAL METHOD BLD: ABNORMAL
EOSINOPHIL # BLD AUTO: 0.3 10E9/L (ref 0–0.7)
EOSINOPHIL NFR BLD AUTO: 3.5 %
ERYTHROCYTE [DISTWIDTH] IN BLOOD BY AUTOMATED COUNT: 16.6 % (ref 10–15)
GFR SERPL CREATININE-BSD FRML MDRD: 85 ML/MIN/{1.73_M2}
GLUCOSE SERPL-MCNC: 91 MG/DL (ref 70–99)
HCT VFR BLD AUTO: 38.3 % (ref 40–53)
HGB BLD-MCNC: 11.9 G/DL (ref 13.3–17.7)
IMM GRANULOCYTES # BLD: 0 10E9/L (ref 0–0.4)
IMM GRANULOCYTES NFR BLD: 0.1 %
LYMPHOCYTES # BLD AUTO: 2.6 10E9/L (ref 0.8–5.3)
LYMPHOCYTES NFR BLD AUTO: 34.3 %
MCH RBC QN AUTO: 25.4 PG (ref 26.5–33)
MCHC RBC AUTO-ENTMCNC: 31.1 G/DL (ref 31.5–36.5)
MCV RBC AUTO: 82 FL (ref 78–100)
MONOCYTES # BLD AUTO: 0.7 10E9/L (ref 0–1.3)
MONOCYTES NFR BLD AUTO: 9.9 %
NEUTROPHILS # BLD AUTO: 3.9 10E9/L (ref 1.6–8.3)
NEUTROPHILS NFR BLD AUTO: 51.9 %
NRBC # BLD AUTO: 0 10*3/UL
NRBC BLD AUTO-RTO: 0 /100
PLATELET # BLD AUTO: 298 10E9/L (ref 150–450)
POTASSIUM SERPL-SCNC: 3.7 MMOL/L (ref 3.4–5.3)
PROT SERPL-MCNC: 7.5 G/DL (ref 6.8–8.8)
RBC # BLD AUTO: 4.69 10E12/L (ref 4.4–5.9)
SODIUM SERPL-SCNC: 140 MMOL/L (ref 133–144)
WBC # BLD AUTO: 7.5 10E9/L (ref 4–11)

## 2020-10-28 PROCEDURE — 85025 COMPLETE CBC W/AUTO DIFF WBC: CPT | Performed by: INTERNAL MEDICINE

## 2020-10-28 PROCEDURE — 36415 COLL VENOUS BLD VENIPUNCTURE: CPT | Performed by: INTERNAL MEDICINE

## 2020-10-28 PROCEDURE — 80053 COMPREHEN METABOLIC PANEL: CPT | Performed by: INTERNAL MEDICINE

## 2020-10-30 ENCOUNTER — VIRTUAL VISIT (OUTPATIENT)
Dept: CARDIOLOGY | Facility: CLINIC | Age: 75
End: 2020-10-30
Payer: COMMERCIAL

## 2020-10-30 DIAGNOSIS — I35.0 NONRHEUMATIC AORTIC VALVE STENOSIS: Primary | ICD-10-CM

## 2020-10-30 PROCEDURE — 99213 OFFICE O/P EST LOW 20 MIN: CPT | Mod: 95 | Performed by: INTERNAL MEDICINE

## 2020-10-30 NOTE — LETTER
"10/30/2020    Buck Nascimento MD  [address]  [facility] [address]    RE: Jonathan Bishop       Dear Colleague,    I had the pleasure of seeing Jonathan Bishop in the [facility].    Jonathan Bishop is a 75 year old male who is being evaluated via a billable video visit.      The patient has been notified of following:     \"This video visit will be conducted via a call between you and your physician/provider. We have found that certain health care needs can be provided without the need for an in-person physical exam.  This service lets us provide the care you need with a video conversation.  If a prescription is necessary we can send it directly to your pharmacy.  If lab work is needed we can place an order for that and you can then stop by our lab to have the test done at a later time.    Video visits are billed at different rates depending on your insurance coverage.  Please reach out to your insurance provider with any questions.    If during the course of the call the physician/provider feels a video visit is not appropriate, you will not be charged for this service.\"  Vitals - Patient Reported  Systolic (Patient Reported): 124  Diastolic (Patient Reported): 63  Weight (Patient Reported): 97.1 kg (214 lb)  Height (Patient Reported): 64.5 cm (2' 1.39\")  BMI (Based on Pt Reported Ht/Wt): 233.32  Pulse (Patient Reported): 85    Review Of Systems  Skin: NEGATIVE  Eyes:Ears/Nose/Throat: NEGATIVE  Respiratory: NEGATIVE  Cardiovascular: energy level low, edema in LE, palpitations  Gastrointestinal: NEGATIVE  Genitourinary:NEGATIVE   Musculoskeletal: back pain  Neurologic: NEGATIVE  Psychiatric: NEGATIVE  Hematologic/Lymphatic/Immunologic: NEGATIVE  Endocrine:  NEGATIVE    Rochelle Boles LPN    Patient has given verbal consent for Video visit? Yes  How would you like to obtain your AVS? Mail a copy  If you are dropped from the video visit, the video invite should be " resent to: [phone]  Will anyone else be joining your video visit? No        Video-Visit Details    Type of service:  Video Visit  Could not connect, had to change to phone. Total time 10 minutes    Originating Location (pt. Location): Home    Distant Location (provider location):  [facility]     Platform used for Video Visit:TashiPollfish    Jonny Mock MD    HPI and Plan:   See dictation    No orders of the defined types were placed in this encounter.    No orders of the defined types were placed in this encounter.    There are no discontinued medications.      No diagnosis found.    CURRENT MEDICATIONS:  Current Outpatient Medications   Medication Sig Dispense Refill     aspirin 325 MG EC tablet Take 1 tablet (325 mg) by mouth daily 90 tablet 3     atorvastatin (LIPITOR) 40 MG tablet Take 1 tablet (40 mg) by mouth daily 90 tablet 3     furosemide (LASIX) 40 MG tablet Take 1 tablet (40 mg) by mouth daily 30 tablet 6     hydrochlorothiazide (HYDRODIURIL) 25 MG tablet Take 1 tablet (25 mg) by mouth daily 90 tablet 3     ibuprofen (ADVIL/MOTRIN) 600 MG tablet Take 1 tablet (600 mg) by mouth 3 times daily as needed for moderate pain 270 tablet 0     multivitamin, therapeutic with minerals (MULTI-VITAMIN) TABS Take 1 tablet by mouth daily. 100 tablet 3     naloxone (NARCAN) 4 MG/0.1ML nasal spray Spray 1 spray (4 mg) into one nostril alternating nostrils once as needed for opioid reversal Every 2-3 min until responsive or EMS arrives 0.2 mL 0     order for DME Equipment being ordered: Walker ()  Treatment Diagnosis: stroke 1 Units 0     other medical supplies Dispense knee high, medium compression, large size 4 each 0     oxyCODONE-acetaminophen (PERCOCET) 5-325 MG tablet Take 1-2 tablets by mouth every 6 hours as needed for pain Max 5 per day. 150 tablet 0     potassium chloride ER (KLOR-CON M) 20 MEQ CR tablet Take 2 tablets (40 mEq) by mouth daily 60 tablet 3     sildenafil (VIAGRA)  100 MG tablet Take 30 min to 4 hours before intercourse as needed for erectile dysfunction Has appt with Dr Nascimento 11/9/2020 25 tablet 2     tamsulosin (FLOMAX) 0.4 MG capsule TAKE 1 CAPSULE BY MOUTH ONCE DAILY 90 capsule 3       ALLERGIES   No Known Allergies    PAST MEDICAL HISTORY:  Past Medical History:   Diagnosis Date     Acute rheumatic carditis 1950     Coronary artery disease     murmur     Erectile dysfunction     Sildenafil     Hip pain, bilateral      Hypercholesteremia      Hypertension      Low back pain      Nonsenile cataract      Obesity      Renal cyst      Stroke (cerebrum) (H) 11/30/16     Umbilical hernia 6/10/2011    s/p surgical repair     Wound, surgical, infected 6/10/2011    hernia       PAST SURGICAL HISTORY:  Past Surgical History:   Procedure Laterality Date     ARTHROPLASTY KNEE BILATERAL  7/22/2010     COLONOSCOPY N/A 4/14/2017    Procedure: COLONOSCOPY;  Surgeon: Toby Bills MD;  Location: [facility]     CV CORONARY ANGIOGRAM N/A 10/28/2019    Procedure: Coronary Angiogram;  Surgeon: Guerrero Huitron MD;  Location:  HEART CARDIAC CATH LAB     CV RIGHT HEART CATH N/A 10/28/2019    Procedure: Right Heart Cath;  Surgeon: Guerrero Huitron MD;  Location:  HEART CARDIAC CATH LAB     FUSION LUMBAR ANTERIOR TWO LEVELS       HERNIORRHAPHY UMBILICAL  6/10/2011    Procedure:HERNIORRHAPHY UMBILICAL; Open, with mesh placement; Surgeon:HO PARHAM; Location:[facility]     LAMINECTOMY LUMBAR TWO LEVELS         FAMILY HISTORY:  Family History   Problem Relation Age of Onset     C.A.D. Mother      Unknown/Adopted Father      Heart Failure Sister      Heart Failure Sister      Glaucoma No family hx of      Macular Degeneration No family hx of        SOCIAL HISTORY:  Social History     Socioeconomic History     Marital status:      Spouse name: None     Number of children: None     Years of education: None     Highest education level: None   Occupational History     None   Social  Needs     Financial resource strain: None     Food insecurity     Worry: None     Inability: None     Transportation needs     Medical: None     Non-medical: None   Tobacco Use     Smoking status: Former Smoker     Quit date: 1/1/2005     Years since quitting: 15.8     Smokeless tobacco: Never Used     Tobacco comment: Non smoker. No 2nd hand exposure. CCX, RMA [facility] 7/28/2011   Substance and Sexual Activity     Alcohol use: No     Drug use: No     Sexual activity: Not Currently   Lifestyle     Physical activity     Days per week: None     Minutes per session: None     Stress: None   Relationships     Social connections     Talks on phone: None     Gets together: None     Attends [employer] service: None     Active member of club or organization: None     Attends meetings of clubs or organizations: None     Relationship status: None     Intimate partner violence     Fear of current or ex partner: None     Emotionally abused: None     Physically abused: None     Forced sexual activity: None   Other Topics Concern     Parent/sibling w/ CABG, MI or angioplasty before 65F 55M? Not Asked   Social History Narrative    He lives by himself in an apt in [place].  He has 2 goldfish, Lai and Telfair.       Review of Systems:  Skin:        Eyes:       ENT:       Respiratory:       Cardiovascular:       Gastroenterology:      Genitourinary:       Musculoskeletal:       Neurologic:       Psychiatric:       Heme/Lymph/Imm:       Endocrine:         Physical Exam:  Vitals: There were no vitals taken for this visit.    Constitutional:           Skin:             Head:           Eyes:           Lymph:      ENT:           Neck:           Respiratory:            Cardiac:                                                           GI:           Extremities and Muscular Skeletal:                 Neurological:           Psych:           Recent Lab Results:  LIPID RESULTS:  Lab Results   Component Value Date    CHOL 112 07/01/2019    HDL  47 07/01/2019    LDL 51 07/01/2019    TRIG 70 07/01/2019    CHOLHDLRATIO 4.5 10/07/2014       LIVER ENZYME RESULTS:  Lab Results   Component Value Date    AST 24 10/28/2020    ALT 32 10/28/2020       CBC RESULTS:  Lab Results   Component Value Date    WBC 7.5 10/28/2020    RBC 4.69 10/28/2020    HGB 11.9 (L) 10/28/2020    HCT 38.3 (L) 10/28/2020    MCV 82 10/28/2020    MCH 25.4 (L) 10/28/2020    MCHC 31.1 (L) 10/28/2020    RDW 16.6 (H) 10/28/2020     10/28/2020       BMP RESULTS:  Lab Results   Component Value Date     10/28/2020    POTASSIUM 3.7 10/28/2020    CHLORIDE 106 10/28/2020    CO2 27 10/28/2020    ANIONGAP 7 10/28/2020    GLC 91 10/28/2020    BUN 21 10/28/2020    CR 0.87 10/28/2020    GFRESTIMATED 85 10/28/2020    GFRESTBLACK >90 10/28/2020    WARREN 9.1 10/28/2020        A1C RESULTS:  Lab Results   Component Value Date    A1C 6.3 (H) 11/30/2016       INR RESULTS:  Lab Results   Component Value Date    INR 1.01 10/28/2019    INR 0.99 11/30/2016           REASON FOR VISIT:  Aortic stenosis and heart failure with preserved ejection fraction.      HISTORY OF PRESENTING ILLNESS:  I am doing this telephone interview visit with Mr. Conner Bishop in the setting of the coronavirus pandemic.  He has been seeing me since the fall of 2019 when he presented with progressive heart failure with preserved EF-like symptoms and volume overload and was noted to have progressive severe symptomatic aortic stenosis with a valve area of 0.9.  He underwent coronary angiography that showed mild nonobstructive luminal irregularities.  He was then seen in consultation by Dr. Begum and Dr. Gimneez and was scheduled for a TAVR in 03/2020, which had to be held off electively because of the COVID-19 issue.  He has continued to push this procedure out, but now edema is worsening and he feels he is slowing down too.      PHYSICAL EXAMINATION:  Deferred.      ASSESSMENT AND PLAN:  Mr. Conner Bishop is a very pleasant  gentleman with heart failure, preserved EF, minimal nonobstructive coronary artery disease and severe symptomatic aortic stenosis.  At this point, he is willing to get the TAVR done and his echo is showing significant progression. I have alerted Apryl with TAVR team to contact the patient so we can proceed.     From a cardiac standpoint, baby aspirin is okay.  Continue statin.  LDL goal less than 100 for minimal coronary artery disease.     I have informed him to seek medical attention with any clinical changes.     RTC in 6 months      Thank you for allowing me to participate in the care of your patient.    Sincerely,     Jonny Mock MD     [facility]

## 2020-10-30 NOTE — PROGRESS NOTES
"Jonathan Bishop is a 75 year old male who is being evaluated via a billable video visit.      The patient has been notified of following:     \"This video visit will be conducted via a call between you and your physician/provider. We have found that certain health care needs can be provided without the need for an in-person physical exam.  This service lets us provide the care you need with a video conversation.  If a prescription is necessary we can send it directly to your pharmacy.  If lab work is needed we can place an order for that and you can then stop by our lab to have the test done at a later time.    Video visits are billed at different rates depending on your insurance coverage.  Please reach out to your insurance provider with any questions.    If during the course of the call the physician/provider feels a video visit is not appropriate, you will not be charged for this service.\"  Vitals - Patient Reported  Systolic (Patient Reported): 124  Diastolic (Patient Reported): 63  Weight (Patient Reported): 97.1 kg (214 lb)  Height (Patient Reported): 64.5 cm (2' 1.39\")  BMI (Based on Pt Reported Ht/Wt): 233.32  Pulse (Patient Reported): 85    Review Of Systems  Skin: NEGATIVE  Eyes:Ears/Nose/Throat: NEGATIVE  Respiratory: NEGATIVE  Cardiovascular: energy level low, edema in LE, palpitations  Gastrointestinal: NEGATIVE  Genitourinary:NEGATIVE   Musculoskeletal: back pain  Neurologic: NEGATIVE  Psychiatric: NEGATIVE  Hematologic/Lymphatic/Immunologic: NEGATIVE  Endocrine:  NEGATIVE    Rochelle Boles LPN    Patient has given verbal consent for Video visit? Yes  How would you like to obtain your AVS? Mail a copy  If you are dropped from the video visit, the video invite should be resent to: [phone]  Will anyone else be joining your video visit? No        Video-Visit Details    Type of service:  Video Visit  Could not connect, had to change to phone. Total time 10 minutes    Originating Location (pt. " Location): Home    Distant Location (provider location):  [facility]     Platform used for Video Visit:Alondra Mock MD    HPI and Plan:   See dictation    No orders of the defined types were placed in this encounter.    No orders of the defined types were placed in this encounter.    There are no discontinued medications.      No diagnosis found.    CURRENT MEDICATIONS:  Current Outpatient Medications   Medication Sig Dispense Refill     aspirin 325 MG EC tablet Take 1 tablet (325 mg) by mouth daily 90 tablet 3     atorvastatin (LIPITOR) 40 MG tablet Take 1 tablet (40 mg) by mouth daily 90 tablet 3     furosemide (LASIX) 40 MG tablet Take 1 tablet (40 mg) by mouth daily 30 tablet 6     hydrochlorothiazide (HYDRODIURIL) 25 MG tablet Take 1 tablet (25 mg) by mouth daily 90 tablet 3     ibuprofen (ADVIL/MOTRIN) 600 MG tablet Take 1 tablet (600 mg) by mouth 3 times daily as needed for moderate pain 270 tablet 0     multivitamin, therapeutic with minerals (MULTI-VITAMIN) TABS Take 1 tablet by mouth daily. 100 tablet 3     naloxone (NARCAN) 4 MG/0.1ML nasal spray Spray 1 spray (4 mg) into one nostril alternating nostrils once as needed for opioid reversal Every 2-3 min until responsive or EMS arrives 0.2 mL 0     order for DME Equipment being ordered: Walker ()  Treatment Diagnosis: stroke 1 Units 0     other medical supplies Dispense knee high, medium compression, large size 4 each 0     oxyCODONE-acetaminophen (PERCOCET) 5-325 MG tablet Take 1-2 tablets by mouth every 6 hours as needed for pain Max 5 per day. 150 tablet 0     potassium chloride ER (KLOR-CON M) 20 MEQ CR tablet Take 2 tablets (40 mEq) by mouth daily 60 tablet 3     sildenafil (VIAGRA) 100 MG tablet Take 30 min to 4 hours before intercourse as needed for erectile dysfunction Has appt with Dr Nascimento 11/9/2020 25 tablet 2     tamsulosin (FLOMAX) 0.4 MG capsule TAKE 1 CAPSULE BY MOUTH ONCE DAILY 90 capsule 3        ALLERGIES   No Known Allergies    PAST MEDICAL HISTORY:  Past Medical History:   Diagnosis Date     Acute rheumatic carditis 1950     Coronary artery disease     murmur     Erectile dysfunction     Sildenafil     Hip pain, bilateral      Hypercholesteremia      Hypertension      Low back pain      Nonsenile cataract      Obesity      Renal cyst      Stroke (cerebrum) (H) 11/30/16     Umbilical hernia 6/10/2011    s/p surgical repair     Wound, surgical, infected 6/10/2011    hernia       PAST SURGICAL HISTORY:  Past Surgical History:   Procedure Laterality Date     ARTHROPLASTY KNEE BILATERAL  7/22/2010     COLONOSCOPY N/A 4/14/2017    Procedure: COLONOSCOPY;  Surgeon: Toby Bills MD;  Location: [facility]     CV CORONARY ANGIOGRAM N/A 10/28/2019    Procedure: Coronary Angiogram;  Surgeon: Guerrero Huitron MD;  Location:  HEART CARDIAC CATH LAB     CV RIGHT HEART CATH N/A 10/28/2019    Procedure: Right Heart Cath;  Surgeon: Guerrero Huitron MD;  Location:  HEART CARDIAC CATH LAB     FUSION LUMBAR ANTERIOR TWO LEVELS       HERNIORRHAPHY UMBILICAL  6/10/2011    Procedure:HERNIORRHAPHY UMBILICAL; Open, with mesh placement; Surgeon:HO PARHAM; Location:[facility]     LAMINECTOMY LUMBAR TWO LEVELS         FAMILY HISTORY:  Family History   Problem Relation Age of Onset     C.A.D. Mother      Unknown/Adopted Father      Heart Failure Sister      Heart Failure Sister      Glaucoma No family hx of      Macular Degeneration No family hx of        SOCIAL HISTORY:  Social History     Socioeconomic History     Marital status:      Spouse name: None     Number of children: None     Years of education: None     Highest education level: None   Occupational History     None   Social Needs     Financial resource strain: None     Food insecurity     Worry: None     Inability: None     Transportation needs     Medical: None     Non-medical: None   Tobacco Use     Smoking status: Former Smoker     Quit  date: 1/1/2005     Years since quitting: 15.8     Smokeless tobacco: Never Used     Tobacco comment: Non smoker. No 2nd hand exposure. CCX, [facility] 7/28/2011   Substance and Sexual Activity     Alcohol use: No     Drug use: No     Sexual activity: Not Currently   Lifestyle     Physical activity     Days per week: None     Minutes per session: None     Stress: None   Relationships     Social connections     Talks on phone: None     Gets together: None     Attends Anglican service: None     Active member of club or organization: None     Attends meetings of clubs or organizations: None     Relationship status: None     Intimate partner violence     Fear of current or ex partner: None     Emotionally abused: None     Physically abused: None     Forced sexual activity: None   Other Topics Concern     Parent/sibling w/ CABG, MI or angioplasty before 65F 55M? Not Asked   Social History Narrative    He lives by himself in an apt in [place].  He has 2 goldfish, Lai and Ubaldo.       Review of Systems:  Skin:        Eyes:       ENT:       Respiratory:       Cardiovascular:       Gastroenterology:      Genitourinary:       Musculoskeletal:       Neurologic:       Psychiatric:       Heme/Lymph/Imm:       Endocrine:         Physical Exam:  Vitals: There were no vitals taken for this visit.    Constitutional:           Skin:             Head:           Eyes:           Lymph:      ENT:           Neck:           Respiratory:            Cardiac:                                                           GI:           Extremities and Muscular Skeletal:                 Neurological:           Psych:           Recent Lab Results:  LIPID RESULTS:  Lab Results   Component Value Date    CHOL 112 07/01/2019    HDL 47 07/01/2019    LDL 51 07/01/2019    TRIG 70 07/01/2019    CHOLHDLRATIO 4.5 10/07/2014       LIVER ENZYME RESULTS:  Lab Results   Component Value Date    AST 24 10/28/2020    ALT 32 10/28/2020       CBC RESULTS:  Lab  Results   Component Value Date    WBC 7.5 10/28/2020    RBC 4.69 10/28/2020    HGB 11.9 (L) 10/28/2020    HCT 38.3 (L) 10/28/2020    MCV 82 10/28/2020    MCH 25.4 (L) 10/28/2020    MCHC 31.1 (L) 10/28/2020    RDW 16.6 (H) 10/28/2020     10/28/2020       BMP RESULTS:  Lab Results   Component Value Date     10/28/2020    POTASSIUM 3.7 10/28/2020    CHLORIDE 106 10/28/2020    CO2 27 10/28/2020    ANIONGAP 7 10/28/2020    GLC 91 10/28/2020    BUN 21 10/28/2020    CR 0.87 10/28/2020    GFRESTIMATED 85 10/28/2020    GFRESTBLACK >90 10/28/2020    WARREN 9.1 10/28/2020        A1C RESULTS:  Lab Results   Component Value Date    A1C 6.3 (H) 11/30/2016       INR RESULTS:  Lab Results   Component Value Date    INR 1.01 10/28/2019    INR 0.99 11/30/2016           CC  No referring provider defined for this encounter.      REASON FOR VISIT:  Aortic stenosis and heart failure with preserved ejection fraction.      HISTORY OF PRESENTING ILLNESS:  I am doing this telephone interview visit with Mr. Conner Bishop in the setting of the coronavirus pandemic.  He has been seeing me since the fall of 2019 when he presented with progressive heart failure with preserved EF-like symptoms and volume overload and was noted to have progressive severe symptomatic aortic stenosis with a valve area of 0.9.  He underwent coronary angiography that showed mild nonobstructive luminal irregularities.  He was then seen in consultation by Dr. Begum and Dr. Gimenez and was scheduled for a TAVR in 03/2020, which had to be held off electively because of the COVID-19 issue.  He has continued to push this procedure out, but now edema is worsening and he feels he is slowing down too.      PHYSICAL EXAMINATION:  Deferred.      ASSESSMENT AND PLAN:  Mr. Conner Bishop is a very pleasant gentleman with heart failure, preserved EF, minimal nonobstructive coronary artery disease and severe symptomatic aortic stenosis.  At this point, he is willing to get  the TAVR done and his echo is showing significant progression. I have alerted Apryl with TAVR team to contact the patient so we can proceed.     From a cardiac standpoint, baby aspirin is okay.  Continue statin.  LDL goal less than 100 for minimal coronary artery disease.     I have informed him to seek medical attention with any clinical changes.     RTC in 6 months

## 2020-11-07 ENCOUNTER — HEALTH MAINTENANCE LETTER (OUTPATIENT)
Age: 75
End: 2020-11-07

## 2020-11-09 ENCOUNTER — OFFICE VISIT (OUTPATIENT)
Dept: INTERNAL MEDICINE | Facility: CLINIC | Age: 75
End: 2020-11-09
Payer: COMMERCIAL

## 2020-11-09 VITALS
HEART RATE: 89 BPM | HEIGHT: 63 IN | RESPIRATION RATE: 16 BRPM | SYSTOLIC BLOOD PRESSURE: 126 MMHG | TEMPERATURE: 98.1 F | WEIGHT: 228.1 LBS | DIASTOLIC BLOOD PRESSURE: 74 MMHG | BODY MASS INDEX: 40.41 KG/M2 | OXYGEN SATURATION: 98 %

## 2020-11-09 DIAGNOSIS — Z12.5 SPECIAL SCREENING, PROSTATE CANCER: ICD-10-CM

## 2020-11-09 DIAGNOSIS — E78.5 HYPERLIPIDEMIA WITH TARGET LDL LESS THAN 130: ICD-10-CM

## 2020-11-09 DIAGNOSIS — M54.50 ACUTE MIDLINE LOW BACK PAIN WITHOUT SCIATICA: ICD-10-CM

## 2020-11-09 DIAGNOSIS — N40.1 HYPERPLASIA OF PROSTATE WITH LOWER URINARY TRACT SYMPTOMS (LUTS): ICD-10-CM

## 2020-11-09 DIAGNOSIS — I35.0 NONRHEUMATIC AORTIC VALVE STENOSIS: ICD-10-CM

## 2020-11-09 DIAGNOSIS — I35.0 SEVERE AORTIC STENOSIS: ICD-10-CM

## 2020-11-09 DIAGNOSIS — I10 ESSENTIAL HYPERTENSION, BENIGN: ICD-10-CM

## 2020-11-09 DIAGNOSIS — I63.521 CEREBROVASCULAR ACCIDENT (CVA) DUE TO OCCLUSION OF RIGHT ANTERIOR CEREBRAL ARTERY (H): ICD-10-CM

## 2020-11-09 DIAGNOSIS — E66.01 MORBID OBESITY (H): ICD-10-CM

## 2020-11-09 DIAGNOSIS — R45.84 ANHEDONIA: ICD-10-CM

## 2020-11-09 DIAGNOSIS — R06.02 SOB (SHORTNESS OF BREATH): ICD-10-CM

## 2020-11-09 PROCEDURE — 99214 OFFICE O/P EST MOD 30 MIN: CPT | Performed by: INTERNAL MEDICINE

## 2020-11-09 RX ORDER — TAMSULOSIN HYDROCHLORIDE 0.4 MG/1
CAPSULE ORAL
Qty: 90 CAPSULE | Refills: 3 | Status: SHIPPED | OUTPATIENT
Start: 2020-11-09 | End: 2021-11-29

## 2020-11-09 RX ORDER — HYDROCHLOROTHIAZIDE 25 MG/1
25 TABLET ORAL DAILY
Qty: 90 TABLET | Refills: 3 | Status: ON HOLD | OUTPATIENT
Start: 2020-11-09 | End: 2020-12-09

## 2020-11-09 RX ORDER — ATORVASTATIN CALCIUM 40 MG/1
40 TABLET, FILM COATED ORAL DAILY
Qty: 90 TABLET | Refills: 3 | Status: SHIPPED | OUTPATIENT
Start: 2020-11-09 | End: 2021-11-29

## 2020-11-09 RX ORDER — OXYCODONE AND ACETAMINOPHEN 5; 325 MG/1; MG/1
1-2 TABLET ORAL EVERY 6 HOURS PRN
Qty: 150 TABLET | Refills: 0 | Status: SHIPPED | OUTPATIENT
Start: 2020-11-16 | End: 2020-12-15

## 2020-11-09 RX ORDER — POTASSIUM CHLORIDE 1500 MG/1
40 TABLET, EXTENDED RELEASE ORAL DAILY
Qty: 60 TABLET | Refills: 3 | Status: SHIPPED | OUTPATIENT
Start: 2020-11-09 | End: 2021-04-19

## 2020-11-09 ASSESSMENT — MIFFLIN-ST. JEOR: SCORE: 1656.84

## 2020-11-09 ASSESSMENT — PAIN SCALES - GENERAL: PAINLEVEL: NO PAIN (0)

## 2020-11-09 NOTE — PROGRESS NOTES
SUBJECTIVE:  Conner is a 75 year old male with past history of CVA, hypertension, hyperlipidemia, severe aortic stenosis, BPH, and hyperlipidemia who presents today for an annual visit and medication follow-up.     Conner presents with the following concerns:     1. Decreased Mood   Conner states that he has been really struggling with his mood since the start of the pandemic. He lives in an elderly living facility and has mostly been confined to his room where he lives alone. In the past, he was an avid fisherman, but has been unable to do this. Furthermore, he has a strong Oriental orthodox community, but has not been able to attend. He does attend virtually. He endorses passive suicidal ideation but states he would never harm himself. To help with his mood, Conner states he will pray. To occupy his time, he reads, play games, and watches TV. He has never been on medications for depression or seen a therapist. At this time, he is interested in a support animal for companionship. He needs a note from Dr. Nascimento, as animals are not usually allowed at his complex.     2. Aortic Valve   Conner has severe aortic stenosis. He endorses severely decreased energy. He has SOB that occurs at rest, but is worse with exertion. Conner has chest pain at rest as well, which he describes as a pressure. Due to the chest pain and shortness of breath, Conner states he has been having trouble sleeping and often sleeps upright in a recliner. Conner also endorses exertional syncope and lower extremity edema. His most recent echo showed an aortic valve area of 0.73 cm squared with a mean graident of 60 mmmHg, which is much worsened from his echo one year earlier. He has surgery scheduled at [facility] on 12/08 for a TAVR.     Medications and allergies were reviewed by me today.     Review Of Systems  Constitutional: fatigue   Cardiovascular: chest pain, exertional syncope, orthopnea, bilateral lower extremity edema   Respiratory: exertional dyspnea, cough   Psych:  "depression and increased stress     Patient Active Problem List    Diagnosis Date Noted     Morbid obesity (H) 11/09/2020     Priority: Medium     Status post coronary angiogram 10/28/2019     Priority: Medium     Systolic murmur 07/01/2019     Priority: Medium     Peripheral edema 07/01/2019     Priority: Medium     Hyperplasia of prostate with lower urinary tract symptoms (LUTS) 07/01/2019     Priority: Medium     Erectile dysfunction, unspecified erectile dysfunction type 07/01/2019     Priority: Medium     Severe aortic stenosis 07/01/2019     Priority: Medium     Dermatophytosis of nail 04/05/2017     Priority: Medium     PVD (peripheral vascular disease) (H) 04/05/2017     Priority: Medium     Cerebrovascular accident involving anterior circulation, right (H) 12/09/2016     Priority: Medium     Stroke (H) 11/30/2016     Priority: Medium     Low back pain 05/14/2014     Priority: Medium     Osteoarthritis of knee 05/14/2014     Priority: Medium     Medication refill- do not delete  11/13/2013     Priority: Medium     Essential hypertension, benign 03/09/2012     Priority: Medium     Hyperlipidemia with target LDL less than 130 03/09/2012     Priority: Medium     Diagnosis updated by automated process. Provider to review and confirm.       Anemia 03/09/2012     Priority: Medium     Problem list name updated by automated process. Provider to review         PHYSICAL EXAM:  /74 (BP Location: Right arm, Patient Position: Sitting, Cuff Size: Adult Large)   Pulse 89   Temp 98.1  F (36.7  C) (Oral)   Resp 16   Ht 1.588 m (5' 2.5\")   Wt 103.5 kg (228 lb 1.6 oz)   SpO2 98%   BMI 41.06 kg/m    Constitutional: no distress, comfortable, pleasant   Cardiovascular: systolic crescendo-decrscendo murmur, regular rate and rhythm, no carotid bruit   Respiratory: clear to auscultation, no wheezes or crackles, normal breath sounds   Musculoskeletal: 3+ bilateral lower extremity edema up to level of knee on right and " mid-shin on right    Skin: no concerning lesions, no jaundice   Psychological: appropriate mood     ASSESSMENT/PLAN:  Jonathan was seen today for physical.    Diagnoses and all orders for this visit:    Decreased Mood   At this time, Conner's depressed mood is most likely the result of adjustment disorder secondary to isolation from COVID. He appears safe at home, as he denies any self-harm or potential of self-harm. Wrote letter to apartment complex to support getting an animal for companionship.     Severe aortic stenosis  His history is concerning for unstable angina, secondary for his severe aortic stenosis. At this time, do not feel he requires urgent evaluation due to the chronic nature of his symptoms and known etiology. Surgery should improve his symptoms of SOB, chest pain, and LE edema.  Discussed discontinuing Viagra until after surgery due to contraindication with nitrates.   -PAC Visit Referral (For [facility] Only); Future  Addendum: I received notice that he will have preop done at [facility].    Health Maintenance   Advised flu shot today, but Conner declined despite our strong recommendation that he get it. Encouraged to maintain social distancing, especially from people who are ill.     Hyperplasia of prostate with lower urinary tract symptoms (LUTS)  -     tamsulosin (FLOMAX) 0.4 MG capsule; TAKE 1 CAPSULE BY MOUTH ONCE DAILY     -        PSA, screen; Future    Hyperlipidemia with target LDL less than 130  -     Lipid panel reflex to direct LDL Fasting; Future    Essential hypertension, benign   -     hydrochlorothiazide (HYDRODIURIL) 25 MG  tablet; Take 1 tablet (25 mg) by mouth daily   -     potassium chloride ER (KLOR-CON M) 20  MEQ CR tablet; Take 2 tablets (40 mEq) by  mouth daily    Cerebrovascular accident (CVA) due to occlusion of right anterior cerebral artery (H)  -     atorvastatin (LIPITOR) 40 MG tablet; Take 1 tablet (40 mg) by mouth daily       Svetlana Guerra, MS3     I, Buck Nascimento, was  present with the medical student who participated in the service and in the documentation of the note.  I have verified the history and personally performed the physical exam and medical decision making.  I agree with the assessment and plan of care as documented in the note.      Buck Nascimento MD, FACP

## 2020-11-09 NOTE — NURSING NOTE
Chief Complaint   Patient presents with     Physical     Patient comes in for an annual exam.          Miguel Tavares MA on 11/9/2020 at 7:51 AM

## 2020-11-09 NOTE — LETTER
Jonathan Bishop  [address]   [place] [address]  : 1945  MRN:  [id number]      2020          To Whom It May Concern,    Jonathan Bishop is a patient at the [facility].  I suggest that he get a  animal for his mental and physical wellbeing.      If you have any questions, please do not hesitate to contact me.    Sincerely,            Buck Nascimento MD

## 2020-11-09 NOTE — TELEPHONE ENCOUNTER
Reason For Call:   No chief complaint on file.      Medication Name, Dose and Monthly Quantity:   Oxycodone with APAP (Percocet): Dose 5-325 Schedule 1 tablet by mouth every 6 hours prn Monthly Quantity 124   Diagnosis requiring opiates:   Acute midline low back pain without sciatica [M54.5]  - Primary     Problem List Updated:   No    Opioid Agreement On File - [facility] PAIN CONTRACT ID# [id number]:  No    Last Urine Drug Screen (at least once every 12 months) Date:   n/a  Unexpected Results:   n/a    [place]  Data Reviewed (at least once every 3 months) Date:   11/09/2020      Unexpected Results:    No.    Last Fill Date:   10/17/2020  Due Date:   11/16/2020  Last Visit with PCP:   09/11/2019    Future Visits with PCP:   No.    Processing:   [facility]    STEPH JACK CMA      ----------------------------------

## 2020-11-09 NOTE — PATIENT INSTRUCTIONS
[facility] Medication Refill Request Information:  * Please contact your pharmacy regarding ANY request for medication refills.  ** [facility] Prescription Fax = [phone]  * Please allow 3 business days for routine medication refills.  * Please allow 5 business days for controlled substance medication refills.     [facility] Test Result notification information:  *You will be notified with in 7-10 days of your appointment day regarding the results of your test.  If you are on MyChart you will be notified as soon as the provider has reviewed the results and signed off on them.    [facility]: [phone]

## 2020-11-10 NOTE — TELEPHONE ENCOUNTER
FUTURE VISIT INFORMATION      SURGERY INFORMATION:    Heart valve replacement.    Dr. Jonny Mock.    12/8/20    [facility].    Consult: virtual visit 10/30    RECORDS REQUESTED FROM:       Primary Care Provider: Buck Nascimento MD- [facility]     Most recent ECHO: 10/27/20    Most recent Coronary Angiogram: 10/28/19

## 2020-11-16 ENCOUNTER — OFFICE VISIT (OUTPATIENT)
Dept: ORTHOPEDICS | Facility: CLINIC | Age: 75
End: 2020-11-16
Payer: COMMERCIAL

## 2020-11-16 DIAGNOSIS — I73.9 PVD (PERIPHERAL VASCULAR DISEASE) (H): ICD-10-CM

## 2020-11-16 DIAGNOSIS — I73.89 OTHER SPECIFIED PERIPHERAL VASCULAR DISEASES (H): Primary | ICD-10-CM

## 2020-11-16 DIAGNOSIS — B35.1 OM (ONYCHOMYCOSIS): ICD-10-CM

## 2020-11-16 PROCEDURE — 99213 OFFICE O/P EST LOW 20 MIN: CPT | Performed by: PODIATRIST

## 2020-11-16 NOTE — LETTER
11/16/2020         RE: Jonathan Bishop  [address]  [place] [address]        Dear Colleague,    Thank you for referring your patient, Jonathan Bishop, to the [facility] [place]. Please see a copy of my visit note below.    Chief Complaint   Patient presents with     RECHECK     nails   '         No Known Allergies      Subjective: Jonathan is a 73 year old male who presents to the clinic today for a follow up of elongated nails. He relates that the nails are long and painful. He is having TAVR 12/8/20    Objective  PT and DP pulses are non-palpable bilaterally. CRT is >3 seconds. Diminished pedal hair. Moderate  non-pitting edema noted to BL LE.  Gross sensation is slightly decreased bilaterally.   Nails are thickened, discolored, dystrophic and elongated bilaterally to varying degrees. Nails have subungual debris consistent with onychomycosis. No open lesions are noted. Skin is normal.      Assessment: PVD  Onychomycosis x 10      Plan:  - Pt seen and evaluated.  - Nails were debrided x 10.  - See again in 3 months.       Mamadou Gary DPM

## 2020-11-16 NOTE — PROGRESS NOTES
Chief Complaint   Patient presents with     RECHECK     nails   '         No Known Allergies      Subjective: Jonathan is a 73 year old male who presents to the clinic today for a follow up of elongated nails. He relates that the nails are long and painful. He is having TAVR 12/8/20    Objective  PT and DP pulses are non-palpable bilaterally. CRT is >3 seconds. Diminished pedal hair. Moderate  non-pitting edema noted to BL LE.  Gross sensation is slightly decreased bilaterally.   Nails are thickened, discolored, dystrophic and elongated bilaterally to varying degrees. Nails have subungual debris consistent with onychomycosis. No open lesions are noted. Skin is normal.      Assessment: PVD  Onychomycosis x 10      Plan:  - Pt seen and evaluated.  - Nails were debrided x 10.  - See again in 3 months.

## 2020-11-19 ENCOUNTER — PRE VISIT (OUTPATIENT)
Dept: SURGERY | Facility: CLINIC | Age: 75
End: 2020-11-19

## 2020-11-30 DIAGNOSIS — I35.0 AORTIC VALVE STENOSIS, ETIOLOGY OF CARDIAC VALVE DISEASE UNSPECIFIED: Primary | ICD-10-CM

## 2020-12-02 ENCOUNTER — OFFICE VISIT (OUTPATIENT)
Dept: CARDIOLOGY | Facility: CLINIC | Age: 75
End: 2020-12-02
Payer: COMMERCIAL

## 2020-12-02 ENCOUNTER — HOSPITAL ENCOUNTER (OUTPATIENT)
Dept: LAB | Facility: CLINIC | Age: 75
Discharge: HOME | End: 2020-12-02
Attending: INTERNAL MEDICINE | Admitting: INTERNAL MEDICINE
Payer: COMMERCIAL

## 2020-12-02 ENCOUNTER — CARE COORDINATION (OUTPATIENT)
Dept: CARDIOLOGY | Facility: CLINIC | Age: 75
End: 2020-12-02

## 2020-12-02 VITALS
HEART RATE: 80 BPM | DIASTOLIC BLOOD PRESSURE: 69 MMHG | SYSTOLIC BLOOD PRESSURE: 111 MMHG | BODY MASS INDEX: 38.41 KG/M2 | HEIGHT: 64 IN | WEIGHT: 225 LBS

## 2020-12-02 DIAGNOSIS — E78.5 HYPERLIPIDEMIA WITH TARGET LDL LESS THAN 130: ICD-10-CM

## 2020-12-02 DIAGNOSIS — I35.0 AORTIC VALVE STENOSIS, ETIOLOGY OF CARDIAC VALVE DISEASE UNSPECIFIED: Primary | ICD-10-CM

## 2020-12-02 DIAGNOSIS — I10 ESSENTIAL HYPERTENSION: ICD-10-CM

## 2020-12-02 DIAGNOSIS — I35.0 SEVERE AORTIC STENOSIS: Primary | ICD-10-CM

## 2020-12-02 DIAGNOSIS — I63.521 CEREBROVASCULAR ACCIDENT (CVA) DUE TO OCCLUSION OF RIGHT ANTERIOR CEREBRAL ARTERY (H): ICD-10-CM

## 2020-12-02 DIAGNOSIS — I35.0 AORTIC VALVE STENOSIS, ETIOLOGY OF CARDIAC VALVE DISEASE UNSPECIFIED: ICD-10-CM

## 2020-12-02 LAB
ALBUMIN SERPL-MCNC: 3.7 G/DL (ref 3.4–5)
ALP SERPL-CCNC: 105 U/L (ref 40–150)
ALT SERPL W P-5'-P-CCNC: 38 U/L (ref 0–70)
ANION GAP SERPL CALCULATED.3IONS-SCNC: 4 MMOL/L (ref 3–14)
AST SERPL W P-5'-P-CCNC: 26 U/L (ref 0–45)
BILIRUB SERPL-MCNC: 0.4 MG/DL (ref 0.2–1.3)
BUN SERPL-MCNC: 22 MG/DL (ref 7–30)
CALCIUM SERPL-MCNC: 9.5 MG/DL (ref 8.5–10.1)
CHLORIDE SERPL-SCNC: 104 MMOL/L (ref 94–109)
CO2 SERPL-SCNC: 32 MMOL/L (ref 20–32)
CREAT SERPL-MCNC: 0.85 MG/DL (ref 0.66–1.25)
ERYTHROCYTE [DISTWIDTH] IN BLOOD BY AUTOMATED COUNT: 16.5 % (ref 10–15)
GFR SERPL CREATININE-BSD FRML MDRD: 85 ML/MIN/{1.73_M2}
GLUCOSE SERPL-MCNC: 85 MG/DL (ref 70–99)
HCT VFR BLD AUTO: 39.6 % (ref 40–53)
HGB BLD-MCNC: 12 G/DL (ref 13.3–17.7)
MCH RBC QN AUTO: 25.1 PG (ref 26.5–33)
MCHC RBC AUTO-ENTMCNC: 30.3 G/DL (ref 31.5–36.5)
MCV RBC AUTO: 83 FL (ref 78–100)
PLATELET # BLD AUTO: 269 10E9/L (ref 150–450)
POTASSIUM SERPL-SCNC: 3.6 MMOL/L (ref 3.4–5.3)
PROT SERPL-MCNC: 7.5 G/DL (ref 6.8–8.8)
RBC # BLD AUTO: 4.79 10E12/L (ref 4.4–5.9)
SODIUM SERPL-SCNC: 140 MMOL/L (ref 133–144)
WBC # BLD AUTO: 7.5 10E9/L (ref 4–11)

## 2020-12-02 PROCEDURE — 86900 BLOOD TYPING SEROLOGIC ABO: CPT | Performed by: INTERNAL MEDICINE

## 2020-12-02 PROCEDURE — 86850 RBC ANTIBODY SCREEN: CPT | Performed by: INTERNAL MEDICINE

## 2020-12-02 PROCEDURE — 80053 COMPREHEN METABOLIC PANEL: CPT | Performed by: INTERNAL MEDICINE

## 2020-12-02 PROCEDURE — 99214 OFFICE O/P EST MOD 30 MIN: CPT | Performed by: NURSE PRACTITIONER

## 2020-12-02 PROCEDURE — 86923 COMPATIBILITY TEST ELECTRIC: CPT | Performed by: INTERNAL MEDICINE

## 2020-12-02 PROCEDURE — 36415 COLL VENOUS BLD VENIPUNCTURE: CPT | Performed by: INTERNAL MEDICINE

## 2020-12-02 PROCEDURE — 85027 COMPLETE CBC AUTOMATED: CPT | Performed by: INTERNAL MEDICINE

## 2020-12-02 PROCEDURE — 86901 BLOOD TYPING SEROLOGIC RH(D): CPT | Performed by: INTERNAL MEDICINE

## 2020-12-02 RX ORDER — ASPIRIN 81 MG/1
81 TABLET ORAL DAILY
Status: CANCELLED | OUTPATIENT
Start: 2020-12-02

## 2020-12-02 RX ORDER — LIDOCAINE 40 MG/G
CREAM TOPICAL
Status: CANCELLED | OUTPATIENT
Start: 2020-12-02

## 2020-12-02 RX ORDER — SODIUM CHLORIDE 9 MG/ML
INJECTION, SOLUTION INTRAVENOUS CONTINUOUS
Status: CANCELLED | OUTPATIENT
Start: 2020-12-02

## 2020-12-02 RX ORDER — CEFAZOLIN SODIUM 2 G/100ML
2 INJECTION, SOLUTION INTRAVENOUS
Status: CANCELLED | OUTPATIENT
Start: 2020-12-02

## 2020-12-02 ASSESSMENT — MIFFLIN-ST. JEOR: SCORE: 1658.65

## 2020-12-02 NOTE — PATIENT INSTRUCTIONS
Thanks for participating in a office visit with the [facility] clinic today.    Scheduled for elective TAVR on 12/8/2020  Reviewed risks and benefits of procedure with verbal understanding   Hold hydrochlorothiazide and lasix the morning of procedure.  Nothing to eat or drink after midnight the evening prior to procedure.   Continue on all medications  Seek ER evaluation if symptoms become severe.     Please call my nurse at [phone] with any questions or concerns.    Scheduling phone number: [phone]  Reminder: Please bring in all current medications, over the counter supplements and vitamin bottles to your next appointment.

## 2020-12-02 NOTE — PROGRESS NOTES
STRUCTURAL HEART CARE   pre-TAVR H&P     Primary Cardiologist: Dr. Nish CLARK   Jonathan Bsihop is a very pleasant 75 year old male who presents for pre-operative H&P in preparation for elective TAVR on 12/8/2020 at [facility].     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    He was initially evaluated in structural heart clinic in December 2019 for symptoms of progressive dyspnea, chest pain, and lower extremity edema.  Echocardiogram at that time showed severe aortic stenosis with a calculated aortic valve area of 0.91 cm  and a mean AOV pressure gradient of 40.3.  He subsequently underwent completion of TAVR work-up and felt to be an acceptable candidate to undergo elective TAVR.  Unfortunately, due to the COVID-19 pandemic this was deferred per patient request. He is now showing worsening dyspnea and lower extremity edema. Follow up echocardiogram on 10/27/2020 shows a normal EF estimated at 60-65%, grade II diastolic dysfunction, normal RV size and function, and severe aortic stenosis with a Vmax 4.9 m/s, mean gradient of 60 mmHg, COLIN of 0.73 cm2, and DI of 0.23.     Today he reports exertional shortness of breath, orthopnea ( now sleeping in a recliner), occasional chest pain, and bilateral leg edema. He denies lightheadedness, dizziness, palpitations, or syncope.  Upon exam, lungs are clear bilaterally, heart rate and rhythm are regular, audible systolic murmur, difficult to assess JVD, obese abdomen, and 1 + bilateral LE edema.      Blood pressure is well controlled at 111/69, managed on hydrochlorothiazide and Lasix.  Labs today showed a sodium of 140, potassium 3.6, BUN 22, creatinine 0.85, GFR 85  Hemoglobin 12.0, WBC 7.5 and platelet 269  BMI 39.23    Activity is limited.  Walks with the assistance of a cane  Eating and sleeping well  Compliant with all  medications.    Assessment and Plan     1.  Severe Aortic Stenosis -symptomatic with worsening exertional shortness of breath, chest discomfort, and bilateral lower extremity edema.  Echocardiogram on 10/27/2020 shows a normal EF estimated at 60-65%, grade II diastolic dysfunction, normal RV size and function, and severe aortic stenosis with a Vmax 4.9 m/s, mean gradient of 60 mmHg, COLIN of 0.73 cm2, and DI of 0.23.  TAVR work-up is complete.  He was presented in our multidisciplinary TAVR conference and felt to be in an acceptable candidate to undergo elective TAVR. Risks and benefits of transcatheter aortic valve replacement procedure were discussed today including, bleeding at access site, bruising, infection, allergic reaction, kidney damage (including need for dialysis), stroke (3-4%), heart attack, vascular damage, arrhythmic abnormalities including high degree AV block (requiring Pacemaker implant- 10%) or atrial fibrillation,  emergency open heart surgery, up to and including death.  All questions were answered with verbal understanding.  He wishes to proceed with scheduled procedure.  N.p.o. after midnight the evening prior to procedure.  Hold Lasix and hydrochlorothiazide on the day of procedure.  Further instructions per valve coordinator.    History of blood transfusions/reactions: No  History of abnormal bleeding/anti-platelet use: No  Steroid use in the last year: No  Personal or family history with difficulty with anesthesia: No     Type and screen orders completed. Supplies for scrubbing provided. Patient is cleared by their dentist.   Patient has no fever, chills, or urinary symptoms. Patient does not have contrast dye allergy.   Patient is optimized and is acceptable candidate for the proposed procedure.  No further diagnostic evaluation is needed. Pre-TAVR instructions provided in written format.     2.  Diastolic heart failure -1+ pitting bilateral lower extremity edema, weight stable at 225  pounds.  Lasix and hydrochlorothiazide.  3. Hypertension - Well controlled   4. Hyperlipidemia - On statin  5. CVA- On statin and Aspirin       PORFIRIO Mena, CNP  12/2/2020    Current Medications:  Current Outpatient Medications   Medication Sig Dispense Refill     aspirin 325 MG EC tablet Take 1 tablet (325 mg) by mouth daily 90 tablet 3     atorvastatin (LIPITOR) 40 MG tablet Take 1 tablet (40 mg) by mouth daily 90 tablet 3     furosemide (LASIX) 40 MG tablet Take 1 tablet (40 mg) by mouth daily 30 tablet 6     hydrochlorothiazide (HYDRODIURIL) 25 MG tablet Take 1 tablet (25 mg) by mouth daily 90 tablet 3     ibuprofen (ADVIL/MOTRIN) 600 MG tablet Take 1 tablet (600 mg) by mouth 3 times daily as needed for moderate pain 270 tablet 0     multivitamin, therapeutic with minerals (MULTI-VITAMIN) TABS Take 1 tablet by mouth daily. 100 tablet 3     naloxone (NARCAN) 4 MG/0.1ML nasal spray Spray 1 spray (4 mg) into one nostril alternating nostrils once as needed for opioid reversal Every 2-3 min until responsive or EMS arrives 0.2 mL 0     order for DME Equipment being ordered: Walker ()  Treatment Diagnosis: stroke 1 Units 0     other medical supplies Dispense knee high, medium compression, large size 4 each 0     oxyCODONE-acetaminophen (PERCOCET) 5-325 MG tablet Take 1-2 tablets by mouth every 6 hours as needed for pain Max 5 per day. 150 tablet 0     potassium chloride ER (KLOR-CON M) 20 MEQ CR tablet Take 2 tablets (40 mEq) by mouth daily 60 tablet 3     sildenafil (VIAGRA) 100 MG tablet Take 30 min to 4 hours before intercourse as needed for erectile dysfunction Has appt with Dr Nascimento 11/9/2020 25 tablet 2     tamsulosin (FLOMAX) 0.4 MG capsule TAKE 1 CAPSULE BY MOUTH ONCE DAILY 90 capsule 3       Allergies:   No Known Allergies    Past Medical History:  Past Medical History:   Diagnosis Date     Acute rheumatic carditis 1950     Coronary artery disease     murmur     Erectile dysfunction      Sildenafil     Hip pain, bilateral      Hypercholesteremia      Hypertension      Low back pain      Nonsenile cataract      Obesity      Renal cyst      Stroke (cerebrum) (H) 11/30/16     Umbilical hernia 6/10/2011    s/p surgical repair     Wound, surgical, infected 6/10/2011    hernia       Past Surgical History:  Past Surgical History:   Procedure Laterality Date     ARTHROPLASTY KNEE BILATERAL  7/22/2010     COLONOSCOPY N/A 4/14/2017    Procedure: COLONOSCOPY;  Surgeon: Toby Bills MD;  Location: [facility]     CV CORONARY ANGIOGRAM N/A 10/28/2019    Procedure: Coronary Angiogram;  Surgeon: Guerrero Huitron MD;  Location:  HEART CARDIAC CATH LAB     CV RIGHT HEART CATH N/A 10/28/2019    Procedure: Right Heart Cath;  Surgeon: Guerrero Huitron MD;  Location:  HEART CARDIAC CATH LAB     FUSION LUMBAR ANTERIOR TWO LEVELS       HERNIORRHAPHY UMBILICAL  6/10/2011    Procedure:HERNIORRHAPHY UMBILICAL; Open, with mesh placement; Surgeon:HO PARHAM; Location:[facility]     LAMINECTOMY LUMBAR TWO LEVELS         Family History:  Family History   Problem Relation Age of Onset     C.A.D. Mother      Unknown/Adopted Father      Heart Failure Sister      Heart Failure Sister      Glaucoma No family hx of      Macular Degeneration No family hx of        Social History:  Social History     Socioeconomic History     Marital status:      Spouse name: None     Number of children: None     Years of education: None     Highest education level: None   Occupational History     None   Social Needs     Financial resource strain: None     Food insecurity     Worry: None     Inability: None     Transportation needs     Medical: None     Non-medical: None   Tobacco Use     Smoking status: Former Smoker     Quit date: 1/1/2005     Years since quitting: 15.9     Smokeless tobacco: Never Used     Tobacco comment: Non smoker. No 2nd hand exposure. CCX, RMA [facility] 7/28/2011   Substance and Sexual Activity     Alcohol use:  "No     Drug use: No     Sexual activity: Not Currently   Lifestyle     Physical activity     Days per week: None     Minutes per session: None     Stress: None   Relationships     Social connections     Talks on phone: None     Gets together: None     Attends Druze service: None     Active member of club or organization: None     Attends meetings of clubs or organizations: None     Relationship status: None     Intimate partner violence     Fear of current or ex partner: None     Emotionally abused: None     Physically abused: None     Forced sexual activity: None   Other Topics Concern     Parent/sibling w/ CABG, MI or angioplasty before 65F 55M? Not Asked   Social History Narrative    He lives by himself in an apt in [place].  He has 2 goldfish, Lai and [place].       Review of Systems:  Constitutional: No fever, chills, or sweats. No weight gain/loss   ENT: No visual disturbance, ear ache, epistaxis, sore throat  Allergies/Immunologic: Negative.   Respiratory: No cough, hemoptysia  Cardiovascular: As per HPI  GI: No nausea, vomiting, hematemesis, melena, or hematochezia  : No urinary frequency, dysuria, or hematuria  Integument: Negative  Psychiatric: Negative  Neuro: Negative  Endocrinology: Negative   Musculoskeletal: Negative      Physical Exam:  Vitals: /69   Pulse 80   Ht 1.613 m (5' 3.5\")   Wt 102.1 kg (225 lb)   BMI 39.23 kg/m     In general, the patient is a pleasant male in no apparent distress.    HEENT: NC/AT.  PERRLA.  EOMI.  Sclerae white, not injected.  Nares clear.  Pharynx without erythema or exudate.  Dentition intact.    Neck: No adenopathy.  No thyromegaly. Carotids +4/4 bilaterally without bruits.  Difficult to assess jugular venous distension.   Heart: RRR. Normal S1, S2 splits physiologically. Systolic murmur, rub, click, or gallop. The PMI is in the 5th ICS in the midclavicular line. There is no heave.    Lungs: CTA.  No ronchi, wheezes, rales.  No dullness to " percussion.   Abdomen: Obese, Soft, nontender, nondistended. No organomegaly.  No bruits.   Extremities: No clubbing, cyanosis, 1+ bilateral pitting edema.  The pulses are +4/4 at the radial, brachial, femoral, popliteal, DP, and PT sites bilaterally.  No bruits are noted.  Neurologic: Alert and oriented to person/place/time, normal speech. Left sided weakness. Walks with the assistance of a cane.   Skin: No petechiae, purpura or rash.      Laboratory Studies:  LIPID RESULTS:  Lab Results   Component Value Date    CHOL 112 07/01/2019    HDL 47 07/01/2019    LDL 51 07/01/2019    TRIG 70 07/01/2019    CHOLHDLRATIO 4.5 10/07/2014       LIVER ENZYME RESULTS:  Lab Results   Component Value Date    AST 26 12/02/2020    ALT 38 12/02/2020       CBC RESULTS:  Lab Results   Component Value Date    WBC 7.5 12/02/2020    RBC 4.79 12/02/2020    HGB 12.0 (L) 12/02/2020    HCT 39.6 (L) 12/02/2020    MCV 83 12/02/2020    MCH 25.1 (L) 12/02/2020    MCHC 30.3 (L) 12/02/2020    RDW 16.5 (H) 12/02/2020     12/02/2020       BMP RESULTS:  Lab Results   Component Value Date     12/02/2020    POTASSIUM 3.6 12/02/2020    CHLORIDE 104 12/02/2020    CO2 32 12/02/2020    ANIONGAP 4 12/02/2020    GLC 85 12/02/2020    BUN 22 12/02/2020    CR 0.85 12/02/2020    GFRESTIMATED 85 12/02/2020    GFRESTBLACK >90 12/02/2020    WARREN 9.5 12/02/2020        A1C RESULTS:  Lab Results   Component Value Date    A1C 6.3 (H) 11/30/2016       INR RESULTS:  Lab Results   Component Value Date    INR 1.01 10/28/2019    INR 0.99 11/30/2016

## 2020-12-02 NOTE — LETTER
12/2/2020    Buck Nascimento MD  [address]  [facility] [address]    RE: Jonathan Bishop       Dear Colleague,    I had the pleasure of seeing Jonathan Bishop in the [facility].      STRUCTURAL HEART CARE   pre-TAVR H&P     Primary Cardiologist: Dr. Nish CLARK   Jonathan Bishop is a very pleasant 75 year old male who presents for pre-operative H&P in preparation for elective TAVR on 12/8/2020 at [facility].     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    He was initially evaluated in structural heart clinic in December 2019 for symptoms of progressive dyspnea, chest pain, and lower extremity edema.  Echocardiogram at that time showed severe aortic stenosis with a calculated aortic valve area of 0.91 cm  and a mean AOV pressure gradient of 40.3.  He subsequently underwent completion of TAVR work-up and felt to be an acceptable candidate to undergo elective TAVR.  Unfortunately, due to the COVID-19 pandemic this was deferred per patient request. He is now showing worsening dyspnea and lower extremity edema. Follow up echocardiogram on 10/27/2020 shows a normal EF estimated at 60-65%, grade II diastolic dysfunction, normal RV size and function, and severe aortic stenosis with a Vmax 4.9 m/s, mean gradient of 60 mmHg, COLIN of 0.73 cm2, and DI of 0.23.     Today he reports exertional shortness of breath, orthopnea ( now sleeping in a recliner), occasional chest pain, and bilateral leg edema. He denies lightheadedness, dizziness, palpitations, or syncope.  Upon exam, lungs are clear bilaterally, heart rate and rhythm are regular, audible systolic murmur, difficult to assess JVD, obese abdomen, and 1 + bilateral LE edema.      Blood pressure is well controlled at 111/69, managed on hydrochlorothiazide and Lasix.  Labs today  showed a sodium of 140, potassium 3.6, BUN 22, creatinine 0.85, GFR 85  Hemoglobin 12.0, WBC 7.5 and platelet 269  BMI 39.23    Activity is limited.  Walks with the assistance of a cane  Eating and sleeping well  Compliant with all medications.    Assessment and Plan     1.  Severe Aortic Stenosis -symptomatic with worsening exertional shortness of breath, chest discomfort, and bilateral lower extremity edema.  Echocardiogram on 10/27/2020 shows a normal EF estimated at 60-65%, grade II diastolic dysfunction, normal RV size and function, and severe aortic stenosis with a Vmax 4.9 m/s, mean gradient of 60 mmHg, COLIN of 0.73 cm2, and DI of 0.23.  TAVR work-up is complete.  He was presented in our multidisciplinary TAVR conference and felt to be in an acceptable candidate to undergo elective TAVR. Risks and benefits of transcatheter aortic valve replacement procedure were discussed today including, bleeding at access site, bruising, infection, allergic reaction, kidney damage (including need for dialysis), stroke (3-4%), heart attack, vascular damage, arrhythmic abnormalities including high degree AV block (requiring Pacemaker implant- 10%) or atrial fibrillation,  emergency open heart surgery, up to and including death.  All questions were answered with verbal understanding.  He wishes to proceed with scheduled procedure.  N.p.o. after midnight the evening prior to procedure.  Hold Lasix and hydrochlorothiazide on the day of procedure.  Further instructions per valve coordinator.    History of blood transfusions/reactions: No  History of abnormal bleeding/anti-platelet use: No  Steroid use in the last year: No  Personal or family history with difficulty with anesthesia: No     Type and screen orders completed. Supplies for scrubbing provided. Patient is cleared by their dentist.   Patient has no fever, chills, or urinary symptoms. Patient does not have contrast dye allergy.   Patient is optimized and is acceptable  candidate for the proposed procedure.  No further diagnostic evaluation is needed. Pre-TAVR instructions provided in written format.     2.  Diastolic heart failure -1+ pitting bilateral lower extremity edema, weight stable at 225 pounds.  Lasix and hydrochlorothiazide.  3. Hypertension - Well controlled   4. Hyperlipidemia - On statin  5. CVA- On statin and Aspirin       PORFIRIO Mena, CNP  12/2/2020    Current Medications:  Current Outpatient Medications   Medication Sig Dispense Refill     aspirin 325 MG EC tablet Take 1 tablet (325 mg) by mouth daily 90 tablet 3     atorvastatin (LIPITOR) 40 MG tablet Take 1 tablet (40 mg) by mouth daily 90 tablet 3     furosemide (LASIX) 40 MG tablet Take 1 tablet (40 mg) by mouth daily 30 tablet 6     hydrochlorothiazide (HYDRODIURIL) 25 MG tablet Take 1 tablet (25 mg) by mouth daily 90 tablet 3     ibuprofen (ADVIL/MOTRIN) 600 MG tablet Take 1 tablet (600 mg) by mouth 3 times daily as needed for moderate pain 270 tablet 0     multivitamin, therapeutic with minerals (MULTI-VITAMIN) TABS Take 1 tablet by mouth daily. 100 tablet 3     naloxone (NARCAN) 4 MG/0.1ML nasal spray Spray 1 spray (4 mg) into one nostril alternating nostrils once as needed for opioid reversal Every 2-3 min until responsive or EMS arrives 0.2 mL 0     order for DME Equipment being ordered: Walker ()  Treatment Diagnosis: stroke 1 Units 0     other medical supplies Dispense knee high, medium compression, large size 4 each 0     oxyCODONE-acetaminophen (PERCOCET) 5-325 MG tablet Take 1-2 tablets by mouth every 6 hours as needed for pain Max 5 per day. 150 tablet 0     potassium chloride ER (KLOR-CON M) 20 MEQ CR tablet Take 2 tablets (40 mEq) by mouth daily 60 tablet 3     sildenafil (VIAGRA) 100 MG tablet Take 30 min to 4 hours before intercourse as needed for erectile dysfunction Has appt with Dr Nascimento 11/9/2020 25 tablet 2     tamsulosin (FLOMAX) 0.4 MG capsule TAKE 1 CAPSULE BY MOUTH ONCE  DAILY 90 capsule 3       Allergies:   No Known Allergies    Past Medical History:  Past Medical History:   Diagnosis Date     Acute rheumatic carditis 1950     Coronary artery disease     murmur     Erectile dysfunction     Sildenafil     Hip pain, bilateral      Hypercholesteremia      Hypertension      Low back pain      Nonsenile cataract      Obesity      Renal cyst      Stroke (cerebrum) (H) 11/30/16     Umbilical hernia 6/10/2011    s/p surgical repair     Wound, surgical, infected 6/10/2011    hernia       Past Surgical History:  Past Surgical History:   Procedure Laterality Date     ARTHROPLASTY KNEE BILATERAL  7/22/2010     COLONOSCOPY N/A 4/14/2017    Procedure: COLONOSCOPY;  Surgeon: Toby Bills MD;  [facility]     CV CORONARY ANGIOGRAM N/A 10/28/2019    Procedure: Coronary Angiogram;  Surgeon: Guerrero Huitron MD;  Location:  HEART CARDIAC CATH LAB     CV RIGHT HEART CATH N/A 10/28/2019    Procedure: Right Heart Cath;  Surgeon: Guerrero Huitron MD;  Location:  HEART CARDIAC CATH LAB     FUSION LUMBAR ANTERIOR TWO LEVELS       HERNIORRHAPHY UMBILICAL  6/10/2011    Procedure:HERNIORRHAPHY UMBILICAL; Open, with mesh placement; Surgeon:HO PARHAM; Location:[facility]     LAMINECTOMY LUMBAR TWO LEVELS         Family History:  Family History   Problem Relation Age of Onset     C.A.D. Mother      Unknown/Adopted Father      Heart Failure Sister      Heart Failure Sister      Glaucoma No family hx of      Macular Degeneration No family hx of        Social History:  Social History     Socioeconomic History     Marital status:      Spouse name: None     Number of children: None     Years of education: None     Highest education level: None   Occupational History     None   Social Needs     Financial resource strain: None     Food insecurity     Worry: None     Inability: None     Transportation needs     Medical: None     Non-medical: None   Tobacco Use     Smoking status:  "Former Smoker     Quit date: 1/1/2005     Years since quitting: 15.9     Smokeless tobacco: Never Used     Tobacco comment: Non smoker. No 2nd hand exposure. CCX, RMA [facility] 7/28/2011   Substance and Sexual Activity     Alcohol use: No     Drug use: No     Sexual activity: Not Currently   Lifestyle     Physical activity     Days per week: None     Minutes per session: None     Stress: None   Relationships     Social connections     Talks on phone: None     Gets together: None     Attends Druze service: None     Active member of club or organization: None     Attends meetings of clubs or organizations: None     Relationship status: None     Intimate partner violence     Fear of current or ex partner: None     Emotionally abused: None     Physically abused: None     Forced sexual activity: None   Other Topics Concern     Parent/sibling w/ CABG, MI or angioplasty before 65F 55M? Not Asked   Social History Narrative    He lives by himself in an apt in [place].  He has 2 goldfish, Lai and [place].       Review of Systems:  Constitutional: No fever, chills, or sweats. No weight gain/loss   ENT: No visual disturbance, ear ache, epistaxis, sore throat  Allergies/Immunologic: Negative.   Respiratory: No cough, hemoptysia  Cardiovascular: As per HPI  GI: No nausea, vomiting, hematemesis, melena, or hematochezia  : No urinary frequency, dysuria, or hematuria  Integument: Negative  Psychiatric: Negative  Neuro: Negative  Endocrinology: Negative   Musculoskeletal: Negative      Physical Exam:  Vitals: /69   Pulse 80   Ht 1.613 m (5' 3.5\")   Wt 102.1 kg (225 lb)   BMI 39.23 kg/m     In general, the patient is a pleasant male in no apparent distress.    HEENT: NC/AT.  PERRLA.  EOMI.  Sclerae white, not injected.  Nares clear.  Pharynx without erythema or exudate.  Dentition intact.    Neck: No adenopathy.  No thyromegaly. Carotids +4/4 bilaterally without bruits.  Difficult to assess jugular venous distension. "   Heart: RRR. Normal S1, S2 splits physiologically. Systolic murmur, rub, click, or gallop. The PMI is in the 5th ICS in the midclavicular line. There is no heave.    Lungs: CTA.  No ronchi, wheezes, rales.  No dullness to percussion.   Abdomen: Obese, Soft, nontender, nondistended. No organomegaly.  No bruits.   Extremities: No clubbing, cyanosis, 1+ bilateral pitting edema.  The pulses are +4/4 at the radial, brachial, femoral, popliteal, DP, and PT sites bilaterally.  No bruits are noted.  Neurologic: Alert and oriented to person/place/time, normal speech. Left sided weakness. Walks with the assistance of a cane.   Skin: No petechiae, purpura or rash.      Laboratory Studies:  LIPID RESULTS:  Lab Results   Component Value Date    CHOL 112 07/01/2019    HDL 47 07/01/2019    LDL 51 07/01/2019    TRIG 70 07/01/2019    CHOLHDLRATIO 4.5 10/07/2014       LIVER ENZYME RESULTS:  Lab Results   Component Value Date    AST 26 12/02/2020    ALT 38 12/02/2020       CBC RESULTS:  Lab Results   Component Value Date    WBC 7.5 12/02/2020    RBC 4.79 12/02/2020    HGB 12.0 (L) 12/02/2020    HCT 39.6 (L) 12/02/2020    MCV 83 12/02/2020    MCH 25.1 (L) 12/02/2020    MCHC 30.3 (L) 12/02/2020    RDW 16.5 (H) 12/02/2020     12/02/2020       BMP RESULTS:  Lab Results   Component Value Date     12/02/2020    POTASSIUM 3.6 12/02/2020    CHLORIDE 104 12/02/2020    CO2 32 12/02/2020    ANIONGAP 4 12/02/2020    GLC 85 12/02/2020    BUN 22 12/02/2020    CR 0.85 12/02/2020    GFRESTIMATED 85 12/02/2020    GFRESTBLACK >90 12/02/2020    WARREN 9.5 12/02/2020        A1C RESULTS:  Lab Results   Component Value Date    A1C 6.3 (H) 11/30/2016       INR RESULTS:  Lab Results   Component Value Date    INR 1.01 10/28/2019    INR 0.99 11/30/2016         Thank you for allowing me to participate in the care of your patient.    Sincerely,     PORFIRIO Mena [facility]

## 2020-12-04 ENCOUNTER — TELEPHONE (OUTPATIENT)
Dept: CARDIOLOGY | Facility: CLINIC | Age: 75
End: 2020-12-04

## 2020-12-04 NOTE — TELEPHONE ENCOUNTER
Called to update Conner on our plans for Tuesday. He is going to be the only TAVR we do that day due to the bed situation so we will need him to be here at 6 am for a 8 am start time. He agrees and we will see him Tuesday.Diane Nelson RN

## 2020-12-05 DIAGNOSIS — I35.0 AORTIC VALVE STENOSIS, ETIOLOGY OF CARDIAC VALVE DISEASE UNSPECIFIED: ICD-10-CM

## 2020-12-05 LAB
SARS-COV-2 RNA SPEC QL NAA+PROBE: NORMAL
SPECIMEN SOURCE: NORMAL

## 2020-12-05 PROCEDURE — U0003 INFECTIOUS AGENT DETECTION BY NUCLEIC ACID (DNA OR RNA); SEVERE ACUTE RESPIRATORY SYNDROME CORONAVIRUS 2 (SARS-COV-2) (CORONAVIRUS DISEASE [COVID-19]), AMPLIFIED PROBE TECHNIQUE, MAKING USE OF HIGH THROUGHPUT TECHNOLOGIES AS DESCRIBED BY CMS-2020-01-R: HCPCS | Performed by: INTERNAL MEDICINE

## 2020-12-06 LAB
LABORATORY COMMENT REPORT: NORMAL
SARS-COV-2 RNA SPEC QL NAA+PROBE: NEGATIVE
SPECIMEN SOURCE: NORMAL

## 2020-12-07 ENCOUNTER — ANESTHESIA EVENT (OUTPATIENT)
Dept: CARDIOLOGY | Facility: CLINIC | Age: 75
DRG: 267 | End: 2020-12-07
Payer: COMMERCIAL

## 2020-12-07 RX ORDER — CHOLECALCIFEROL (VITAMIN D3) 50 MCG
1 TABLET ORAL DAILY
COMMUNITY

## 2020-12-07 NOTE — ANESTHESIA PREPROCEDURE EVALUATION
Anesthesia Pre-Procedure Evaluation    Patient: Jonathan Bishop   MRN: [id number] : 1945          Preoperative Diagnosis: heart valve condition    Procedure(s):  Transcatheter Aortic Valve Replacement    Past Medical History:   Diagnosis Date     Acute rheumatic carditis 1950     Coronary artery disease     murmur     Erectile dysfunction     Sildenafil     Hip pain, bilateral      Hypercholesteremia      Hypertension      Low back pain      Nonsenile cataract      Obesity      Renal cyst      Stroke (cerebrum) (H) 16     Umbilical hernia 6/10/2011    s/p surgical repair     Wound, surgical, infected 6/10/2011    hernia     Past Surgical History:   Procedure Laterality Date     ARTHROPLASTY KNEE BILATERAL  2010     COLONOSCOPY N/A 2017    Procedure: COLONOSCOPY;  Surgeon: Toby Bills MD;  Location: [facility]     CV CORONARY ANGIOGRAM N/A 10/28/2019    Procedure: Coronary Angiogram;  Surgeon: Guerrero Huitron MD;  Location:  [facility]     CV RIGHT HEART CATH N/A 10/28/2019    Procedure: Right Heart Cath;  Surgeon: Guerrero Huitron MD;  Location:  [facility]ATH [facility]     FUSION LUMBAR ANTERIOR TWO LEVELS       HERNIORRHAPHY UMBILICAL  6/10/2011    Procedure:HERNIORRHAPHY UMBILICAL; Open, with mesh placement; Surgeon:HO PARHAM; Location:[facility]     LAMINECTOMY LUMBAR TWO LEVELS         Anesthesia Evaluation     . Pt has had prior anesthetic.     No history of anesthetic complications          ROS/MED HX    ENT/Pulmonary:      (-) sleep apnea   Neurologic:     (+)CVA with deficits- Left sided weakness,     Cardiovascular:     (+) Dyslipidemia, hypertension--CAD, --. : . CHF etiology: Diastolic Last EF: 60-65% . . :. valvular problems/murmurs type: AS .       METS/Exercise Tolerance:     Hematologic:         Musculoskeletal:         GI/Hepatic:        (-) GERD   Renal/Genitourinary:         Endo:     (+) Obesity, .      Psychiatric:         Infectious  "Disease:         Malignancy:         Other:                          Physical Exam  Normal systems: cardiovascular, pulmonary and dental    Airway   Mallampati: I  TM distance: >3 FB  Neck ROM: full    Dental     Cardiovascular   Rhythm and rate: regular and normal      Pulmonary    breath sounds clear to auscultation            Lab Results   Component Value Date    WBC 7.5 12/02/2020    HGB 12.0 (L) 12/02/2020    HCT 39.6 (L) 12/02/2020     12/02/2020    CRP 7.2 11/30/2016    SED 17 08/05/2011     12/02/2020    POTASSIUM 3.6 12/02/2020    CHLORIDE 104 12/02/2020    CO2 32 12/02/2020    BUN 22 12/02/2020    CR 0.85 12/02/2020    GLC 85 12/02/2020    WARREN 9.5 12/02/2020    ALBUMIN 3.7 12/02/2020    PROTTOTAL 7.5 12/02/2020    ALT 38 12/02/2020    AST 26 12/02/2020    ALKPHOS 105 12/02/2020    BILITOTAL 0.4 12/02/2020    PTT 27 10/28/2019    INR 1.01 10/28/2019    TSH 0.90 11/30/2016       Preop Vitals  BP Readings from Last 3 Encounters:   12/02/20 111/69   11/09/20 126/74   04/23/20 134/78    Pulse Readings from Last 3 Encounters:   12/02/20 80   11/09/20 89   03/09/20 76      Resp Readings from Last 3 Encounters:   11/09/20 16   10/28/19 16   07/01/19 16    SpO2 Readings from Last 3 Encounters:   11/09/20 98%   12/12/19 95%   11/08/19 94%      Temp Readings from Last 1 Encounters:   11/09/20 36.7  C (98.1  F) (Oral)    Ht Readings from Last 1 Encounters:   12/02/20 1.613 m (5' 3.5\")      Wt Readings from Last 1 Encounters:   12/02/20 102.1 kg (225 lb)    Estimated body mass index is 39.23 kg/m  as calculated from the following:    Height as of 12/2/20: 1.613 m (5' 3.5\").    Weight as of 12/2/20: 102.1 kg (225 lb).       Anesthesia Plan      History & Physical Review  History and physical reviewed and following examination; no interval change.    ASA Status:  4 .    NPO Status:  > 8 hours    Plan for MAC Reason for MAC:  Deep or markedly invasive procedure (G8)  PONV prophylaxis:  Ondansetron (or other " 5HT-3) and Dexamethasone or Solumedrol  Propofol and precedex gtt        Postoperative Care  Postoperative pain management:  Multi-modal analgesia.      Consents  Anesthetic plan, risks, benefits and alternatives discussed with:  Patient..                 Nori Buitrago MD, MD

## 2020-12-07 NOTE — PROGRESS NOTES
PTA medications updated by Medication Scribe prior to surgery via phone call with patient      -LAST DOSES ENTERED BY NURSE-    Comments:    Medication history sources: Patient, Surescripts and H&P  Medication history source reliability: Good  Adherence assessment: N/A Not Observed    Significant changes made to the medication list:  None      Additional medication history information:   None        Prior to Admission medications    Medication Sig Last Dose Taking? Auth Provider   aspirin 325 MG EC tablet Take 1 tablet (325 mg) by mouth daily 12/7/2020 at AM Yes Buck Nascimento MD   atorvastatin (LIPITOR) 40 MG tablet Take 1 tablet (40 mg) by mouth daily 12/7/2020 at AM Yes Buck Nascimento MD   furosemide (LASIX) 40 MG tablet Take 1 tablet (40 mg) by mouth daily 12/7/2020 at AM Yes Cristal Jean PA-C   hydrochlorothiazide (HYDRODIURIL) 25 MG tablet Take 1 tablet (25 mg) by mouth daily 12/7/2020 at AM Yes Buck Nascimento MD   ibuprofen (ADVIL/MOTRIN) 600 MG tablet Take 1 tablet (600 mg) by mouth 3 times daily as needed for moderate pain  at PRN Yes Buck Nascimento MD   multivitamin, therapeutic with minerals (MULTI-VITAMIN) TABS Take 1 tablet by mouth daily. 12/6/2020 at AM Yes Buck Nascimento MD   naloxone (NARCAN) 4 MG/0.1ML nasal spray Spray 1 spray (4 mg) into one nostril alternating nostrils once as needed for opioid reversal Every 2-3 min until responsive or EMS arrives  at PRN Yes Buck Nascimento MD   oxyCODONE-acetaminophen (PERCOCET) 5-325 MG tablet Take 1-2 tablets by mouth every 6 hours as needed for pain Max 5 per day.  at PRN Yes Buck Nascimento MD   potassium chloride ER (KLOR-CON M) 20 MEQ CR tablet Take 2 tablets (40 mEq) by mouth daily 12/7/2020 at AM Yes Buck Nascimento MD   sildenafil (VIAGRA) 100 MG tablet Take 30 min to 4 hours before intercourse as needed for erectile dysfunction Has appt with Dr Nascimento 11/9/2020  at PRN Yes Buck Nascimento MD   tamsulosin (FLOMAX) 0.4 MG capsule TAKE 1 CAPSULE  BY MOUTH ONCE DAILY  Patient taking differently: Take 0.4 mg by mouth daily TAKE 1 CAPSULE BY MOUTH ONCE DAILY 12/7/2020 at AM Yes Buck Nascimento MD   vitamin D3 (CHOLECALCIFEROL) 50 mcg (2000 units) tablet Take 1 tablet by mouth daily 12/6/2020 at AM Yes Reported, Patient   order for DME Equipment being ordered: Walker ()  Treatment Diagnosis: stroke   Vlad Rajput MD   other medical supplies Dispense knee high, medium compression, large size   Buck Nascimento MD

## 2020-12-08 ENCOUNTER — HOSPITAL ENCOUNTER (OUTPATIENT)
Dept: CARDIOLOGY | Facility: CLINIC | Age: 75
DRG: 267 | End: 2020-12-08
Attending: INTERNAL MEDICINE | Admitting: INTERNAL MEDICINE
Payer: COMMERCIAL

## 2020-12-08 ENCOUNTER — ANESTHESIA (OUTPATIENT)
Dept: CARDIOLOGY | Facility: CLINIC | Age: 75
DRG: 267 | End: 2020-12-08
Payer: COMMERCIAL

## 2020-12-08 ENCOUNTER — HOSPITAL ENCOUNTER (INPATIENT)
Facility: CLINIC | Age: 75
LOS: 1 days | Discharge: HOME | DRG: 267 | End: 2020-12-09
Attending: INTERNAL MEDICINE | Admitting: THORACIC SURGERY (CARDIOTHORACIC VASCULAR SURGERY)
Payer: COMMERCIAL

## 2020-12-08 DIAGNOSIS — I35.0 AORTIC VALVE STENOSIS, ETIOLOGY OF CARDIAC VALVE DISEASE UNSPECIFIED: ICD-10-CM

## 2020-12-08 DIAGNOSIS — I35.0 AORTIC VALVE STENOSIS, ETIOLOGY OF CARDIAC VALVE DISEASE UNSPECIFIED: Primary | ICD-10-CM

## 2020-12-08 LAB
ABO + RH BLD: NORMAL
ABO + RH BLD: NORMAL
ALBUMIN SERPL-MCNC: 3 G/DL (ref 3.4–5)
ALP SERPL-CCNC: 92 U/L (ref 40–150)
ALT SERPL W P-5'-P-CCNC: 28 U/L (ref 0–70)
ANION GAP SERPL CALCULATED.3IONS-SCNC: 4 MMOL/L (ref 3–14)
AST SERPL W P-5'-P-CCNC: 27 U/L (ref 0–45)
BILIRUB SERPL-MCNC: 0.3 MG/DL (ref 0.2–1.3)
BLD GP AB SCN SERPL QL: NORMAL
BLD PROD TYP BPU: NORMAL
BLD UNIT ID BPU: 0
BLD UNIT ID BPU: 0
BLOOD BANK CMNT PATIENT-IMP: NORMAL
BLOOD PRODUCT CODE: NORMAL
BLOOD PRODUCT CODE: NORMAL
BPU ID: NORMAL
BPU ID: NORMAL
BUN SERPL-MCNC: 23 MG/DL (ref 7–30)
CALCIUM SERPL-MCNC: 8.3 MG/DL (ref 8.5–10.1)
CHLORIDE SERPL-SCNC: 107 MMOL/L (ref 94–109)
CO2 SERPL-SCNC: 29 MMOL/L (ref 20–32)
CREAT SERPL-MCNC: 0.69 MG/DL (ref 0.66–1.25)
ERYTHROCYTE [DISTWIDTH] IN BLOOD BY AUTOMATED COUNT: 16.1 % (ref 10–15)
GFR SERPL CREATININE-BSD FRML MDRD: >90 ML/MIN/{1.73_M2}
GLUCOSE SERPL-MCNC: 96 MG/DL (ref 70–99)
HCT VFR BLD AUTO: 33.7 % (ref 40–53)
HGB BLD-MCNC: 10.3 G/DL (ref 13.3–17.7)
KCT BLD-ACNC: 241 SEC (ref 75–150)
KCT BLD-ACNC: 270 SEC (ref 75–150)
KCT BLD-ACNC: 95 SEC (ref 75–150)
MAGNESIUM SERPL-MCNC: 2.1 MG/DL (ref 1.6–2.3)
MCH RBC QN AUTO: 25.4 PG (ref 26.5–33)
MCHC RBC AUTO-ENTMCNC: 30.6 G/DL (ref 31.5–36.5)
MCV RBC AUTO: 83 FL (ref 78–100)
NUM BPU REQUESTED: 2
PHOSPHATE SERPL-MCNC: 3.9 MG/DL (ref 2.5–4.5)
PLATELET # BLD AUTO: 101 10E9/L (ref 150–450)
POTASSIUM SERPL-SCNC: 3 MMOL/L (ref 3.4–5.3)
POTASSIUM SERPL-SCNC: 3.9 MMOL/L (ref 3.4–5.3)
PROT SERPL-MCNC: 6.3 G/DL (ref 6.8–8.8)
RBC # BLD AUTO: 4.06 10E12/L (ref 4.4–5.9)
SODIUM SERPL-SCNC: 140 MMOL/L (ref 133–144)
SPECIMEN EXP DATE BLD: NORMAL
TRANSFUSION STATUS PATIENT QL: NORMAL
WBC # BLD AUTO: 8 10E9/L (ref 4–11)

## 2020-12-08 PROCEDURE — 93308 TTE F-UP OR LMTD: CPT | Mod: 26 | Performed by: INTERNAL MEDICINE

## 2020-12-08 PROCEDURE — C1760 CLOSURE DEV, VASC: HCPCS | Performed by: INTERNAL MEDICINE

## 2020-12-08 PROCEDURE — 33361 REPLACE AORTIC VALVE PERQ: CPT | Mod: 62 | Performed by: INTERNAL MEDICINE

## 2020-12-08 PROCEDURE — 84132 ASSAY OF SERUM POTASSIUM: CPT | Performed by: ANESTHESIOLOGY

## 2020-12-08 PROCEDURE — C1769 GUIDE WIRE: HCPCS | Performed by: INTERNAL MEDICINE

## 2020-12-08 PROCEDURE — X2A5312 CEREBRAL EMBOLIC FILTRATION, DUAL FILTER IN INNOMINATE ARTERY AND LEFT COMMON CAROTID ARTERY, PERCUTANEOUS APPROACH, NEW TECHNOLOGY GROUP 2: ICD-10-PCS | Performed by: INTERNAL MEDICINE

## 2020-12-08 PROCEDURE — 99223 1ST HOSP IP/OBS HIGH 75: CPT | Performed by: PHYSICIAN ASSISTANT

## 2020-12-08 PROCEDURE — 36415 COLL VENOUS BLD VENIPUNCTURE: CPT | Performed by: INTERNAL MEDICINE

## 2020-12-08 PROCEDURE — 250N000011 HC RX IP 250 OP 636: Performed by: NURSE ANESTHETIST, CERTIFIED REGISTERED

## 2020-12-08 PROCEDURE — 80053 COMPREHEN METABOLIC PANEL: CPT | Performed by: INTERNAL MEDICINE

## 2020-12-08 PROCEDURE — 258N000003 HC RX IP 258 OP 636: Performed by: INTERNAL MEDICINE

## 2020-12-08 PROCEDURE — 93005 ELECTROCARDIOGRAM TRACING: CPT

## 2020-12-08 PROCEDURE — 33361 REPLACE AORTIC VALVE PERQ: CPT | Mod: 62 | Performed by: THORACIC SURGERY (CARDIOTHORACIC VASCULAR SURGERY)

## 2020-12-08 PROCEDURE — 250N000009 HC RX 250: Performed by: INTERNAL MEDICINE

## 2020-12-08 PROCEDURE — 02RF38Z REPLACEMENT OF AORTIC VALVE WITH ZOOPLASTIC TISSUE, PERCUTANEOUS APPROACH: ICD-10-PCS | Performed by: INTERNAL MEDICINE

## 2020-12-08 PROCEDURE — 250N000013 HC RX MED GY IP 250 OP 250 PS 637: Performed by: PHYSICIAN ASSISTANT

## 2020-12-08 PROCEDURE — 33361 REPLACE AORTIC VALVE PERQ: CPT | Mod: Q0 | Performed by: INTERNAL MEDICINE

## 2020-12-08 PROCEDURE — 250N000011 HC RX IP 250 OP 636: Performed by: ANESTHESIOLOGY

## 2020-12-08 PROCEDURE — 84100 ASSAY OF PHOSPHORUS: CPT | Performed by: INTERNAL MEDICINE

## 2020-12-08 PROCEDURE — 250N000009 HC RX 250: Performed by: NURSE ANESTHETIST, CERTIFIED REGISTERED

## 2020-12-08 PROCEDURE — C1894 INTRO/SHEATH, NON-LASER: HCPCS | Performed by: INTERNAL MEDICINE

## 2020-12-08 PROCEDURE — C1730 CATH, EP, 19 OR FEW ELECT: HCPCS | Performed by: INTERNAL MEDICINE

## 2020-12-08 PROCEDURE — 36415 COLL VENOUS BLD VENIPUNCTURE: CPT | Performed by: ANESTHESIOLOGY

## 2020-12-08 PROCEDURE — 250N000011 HC RX IP 250 OP 636: Performed by: INTERNAL MEDICINE

## 2020-12-08 PROCEDURE — 250N000013 HC RX MED GY IP 250 OP 250 PS 637: Performed by: INTERNAL MEDICINE

## 2020-12-08 PROCEDURE — 258N000003 HC RX IP 258 OP 636: Performed by: NURSE ANESTHETIST, CERTIFIED REGISTERED

## 2020-12-08 PROCEDURE — 85027 COMPLETE CBC AUTOMATED: CPT | Performed by: INTERNAL MEDICINE

## 2020-12-08 PROCEDURE — 210N000001 HC R&B IMCU HEART CARE

## 2020-12-08 PROCEDURE — 93321 DOPPLER ECHO F-UP/LMTD STD: CPT | Mod: 26 | Performed by: INTERNAL MEDICINE

## 2020-12-08 PROCEDURE — 93308 TTE F-UP OR LMTD: CPT

## 2020-12-08 PROCEDURE — 370N000001 HC ANESTHESIA TECHNICAL FEE, 1ST 30 MIN: Performed by: INTERNAL MEDICINE

## 2020-12-08 PROCEDURE — 85347 COAGULATION TIME ACTIVATED: CPT

## 2020-12-08 PROCEDURE — 370N000002 HC ANESTHESIA TECHNICAL FEE, EACH ADDTL 15 MIN: Performed by: INTERNAL MEDICINE

## 2020-12-08 PROCEDURE — 93325 DOPPLER ECHO COLOR FLOW MAPG: CPT | Mod: 26 | Performed by: INTERNAL MEDICINE

## 2020-12-08 PROCEDURE — 272N000001 HC OR GENERAL SUPPLY STERILE: Performed by: INTERNAL MEDICINE

## 2020-12-08 PROCEDURE — 83735 ASSAY OF MAGNESIUM: CPT | Performed by: INTERNAL MEDICINE

## 2020-12-08 PROCEDURE — 93010 ELECTROCARDIOGRAM REPORT: CPT | Mod: 76 | Performed by: INTERNAL MEDICINE

## 2020-12-08 RX ORDER — HYDRALAZINE HYDROCHLORIDE 20 MG/ML
2.5-5 INJECTION INTRAMUSCULAR; INTRAVENOUS EVERY 10 MIN PRN
Status: DISCONTINUED | OUTPATIENT
Start: 2020-12-08 | End: 2020-12-08

## 2020-12-08 RX ORDER — ONDANSETRON 2 MG/ML
4 INJECTION INTRAMUSCULAR; INTRAVENOUS EVERY 30 MIN PRN
Status: DISCONTINUED | OUTPATIENT
Start: 2020-12-08 | End: 2020-12-08

## 2020-12-08 RX ORDER — LIDOCAINE 40 MG/G
CREAM TOPICAL
Status: DISCONTINUED | OUTPATIENT
Start: 2020-12-08 | End: 2020-12-08 | Stop reason: HOSPADM

## 2020-12-08 RX ORDER — CEFAZOLIN SODIUM 2 G/100ML
2 INJECTION, SOLUTION INTRAVENOUS
Status: COMPLETED | OUTPATIENT
Start: 2020-12-08 | End: 2020-12-08

## 2020-12-08 RX ORDER — NALOXONE HYDROCHLORIDE 0.4 MG/ML
0.4 INJECTION, SOLUTION INTRAMUSCULAR; INTRAVENOUS; SUBCUTANEOUS
Status: ACTIVE | OUTPATIENT
Start: 2020-12-08 | End: 2020-12-09

## 2020-12-08 RX ORDER — SODIUM CHLORIDE, SODIUM LACTATE, POTASSIUM CHLORIDE, CALCIUM CHLORIDE 600; 310; 30; 20 MG/100ML; MG/100ML; MG/100ML; MG/100ML
INJECTION, SOLUTION INTRAVENOUS CONTINUOUS PRN
Status: DISCONTINUED | OUTPATIENT
Start: 2020-12-08 | End: 2020-12-08

## 2020-12-08 RX ORDER — NALOXONE HYDROCHLORIDE 0.4 MG/ML
0.2 INJECTION, SOLUTION INTRAMUSCULAR; INTRAVENOUS; SUBCUTANEOUS
Status: ACTIVE | OUTPATIENT
Start: 2020-12-08 | End: 2020-12-09

## 2020-12-08 RX ORDER — HYDROMORPHONE HYDROCHLORIDE 1 MG/ML
.3-.5 INJECTION, SOLUTION INTRAMUSCULAR; INTRAVENOUS; SUBCUTANEOUS EVERY 5 MIN PRN
Status: DISCONTINUED | OUTPATIENT
Start: 2020-12-08 | End: 2020-12-08

## 2020-12-08 RX ORDER — DEXAMETHASONE SODIUM PHOSPHATE 10 MG/ML
4 INJECTION, SOLUTION INTRAMUSCULAR; INTRAVENOUS
Status: DISCONTINUED | OUTPATIENT
Start: 2020-12-08 | End: 2020-12-08

## 2020-12-08 RX ORDER — POTASSIUM CHLORIDE 7.45 MG/ML
INJECTION INTRAVENOUS PRN
Status: DISCONTINUED | OUTPATIENT
Start: 2020-12-08 | End: 2020-12-08

## 2020-12-08 RX ORDER — SODIUM CHLORIDE, SODIUM LACTATE, POTASSIUM CHLORIDE, CALCIUM CHLORIDE 600; 310; 30; 20 MG/100ML; MG/100ML; MG/100ML; MG/100ML
INJECTION, SOLUTION INTRAVENOUS CONTINUOUS
Status: DISCONTINUED | OUTPATIENT
Start: 2020-12-08 | End: 2020-12-09 | Stop reason: HOSPADM

## 2020-12-08 RX ORDER — ONDANSETRON 4 MG/1
4 TABLET, ORALLY DISINTEGRATING ORAL EVERY 30 MIN PRN
Status: DISCONTINUED | OUTPATIENT
Start: 2020-12-08 | End: 2020-12-08

## 2020-12-08 RX ORDER — FENTANYL CITRATE 50 UG/ML
INJECTION, SOLUTION INTRAMUSCULAR; INTRAVENOUS PRN
Status: DISCONTINUED | OUTPATIENT
Start: 2020-12-08 | End: 2020-12-08

## 2020-12-08 RX ORDER — METOCLOPRAMIDE HYDROCHLORIDE 5 MG/ML
5 INJECTION INTRAMUSCULAR; INTRAVENOUS EVERY 6 HOURS PRN
Status: DISCONTINUED | OUTPATIENT
Start: 2020-12-08 | End: 2020-12-09 | Stop reason: HOSPADM

## 2020-12-08 RX ORDER — PROPOFOL 10 MG/ML
INJECTION, EMULSION INTRAVENOUS PRN
Status: DISCONTINUED | OUTPATIENT
Start: 2020-12-08 | End: 2020-12-08

## 2020-12-08 RX ORDER — LORAZEPAM 2 MG/ML
.5-1 INJECTION INTRAMUSCULAR
Status: DISCONTINUED | OUTPATIENT
Start: 2020-12-08 | End: 2020-12-08

## 2020-12-08 RX ORDER — ASPIRIN 81 MG/1
81 TABLET ORAL DAILY
Status: DISCONTINUED | OUTPATIENT
Start: 2020-12-09 | End: 2020-12-09 | Stop reason: HOSPADM

## 2020-12-08 RX ORDER — METOCLOPRAMIDE 5 MG/1
5 TABLET ORAL EVERY 6 HOURS PRN
Status: DISCONTINUED | OUTPATIENT
Start: 2020-12-08 | End: 2020-12-09 | Stop reason: HOSPADM

## 2020-12-08 RX ORDER — HYDRALAZINE HYDROCHLORIDE 20 MG/ML
10 INJECTION INTRAMUSCULAR; INTRAVENOUS
Status: DISCONTINUED | OUTPATIENT
Start: 2020-12-08 | End: 2020-12-09 | Stop reason: HOSPADM

## 2020-12-08 RX ORDER — ACETAMINOPHEN 325 MG/1
325-650 TABLET ORAL EVERY 4 HOURS PRN
Status: DISCONTINUED | OUTPATIENT
Start: 2020-12-08 | End: 2020-12-09 | Stop reason: HOSPADM

## 2020-12-08 RX ORDER — ATORVASTATIN CALCIUM 40 MG/1
40 TABLET, FILM COATED ORAL DAILY
Status: DISCONTINUED | OUTPATIENT
Start: 2020-12-08 | End: 2020-12-09 | Stop reason: HOSPADM

## 2020-12-08 RX ORDER — MEPERIDINE HYDROCHLORIDE 25 MG/ML
12.5 INJECTION INTRAMUSCULAR; INTRAVENOUS; SUBCUTANEOUS EVERY 5 MIN PRN
Status: DISCONTINUED | OUTPATIENT
Start: 2020-12-08 | End: 2020-12-08

## 2020-12-08 RX ORDER — TAMSULOSIN HYDROCHLORIDE 0.4 MG/1
0.4 CAPSULE ORAL DAILY
Status: DISCONTINUED | OUTPATIENT
Start: 2020-12-08 | End: 2020-12-09 | Stop reason: HOSPADM

## 2020-12-08 RX ORDER — PROPOFOL 10 MG/ML
INJECTION, EMULSION INTRAVENOUS CONTINUOUS PRN
Status: DISCONTINUED | OUTPATIENT
Start: 2020-12-08 | End: 2020-12-08

## 2020-12-08 RX ORDER — PROTAMINE SULFATE 10 MG/ML
INJECTION, SOLUTION INTRAVENOUS PRN
Status: DISCONTINUED | OUTPATIENT
Start: 2020-12-08 | End: 2020-12-08

## 2020-12-08 RX ORDER — ALBUTEROL SULFATE 0.83 MG/ML
2.5 SOLUTION RESPIRATORY (INHALATION) EVERY 4 HOURS PRN
Status: DISCONTINUED | OUTPATIENT
Start: 2020-12-08 | End: 2020-12-08

## 2020-12-08 RX ORDER — FENTANYL CITRATE 50 UG/ML
25-50 INJECTION, SOLUTION INTRAMUSCULAR; INTRAVENOUS
Status: DISCONTINUED | OUTPATIENT
Start: 2020-12-08 | End: 2020-12-08

## 2020-12-08 RX ORDER — OXYCODONE AND ACETAMINOPHEN 5; 325 MG/1; MG/1
1-2 TABLET ORAL EVERY 6 HOURS PRN
Status: DISCONTINUED | OUTPATIENT
Start: 2020-12-08 | End: 2020-12-09 | Stop reason: HOSPADM

## 2020-12-08 RX ORDER — OXYCODONE HCL 5 MG/5 ML
5 SOLUTION, ORAL ORAL EVERY 4 HOURS PRN
Status: DISCONTINUED | OUTPATIENT
Start: 2020-12-08 | End: 2020-12-08

## 2020-12-08 RX ORDER — LIDOCAINE HYDROCHLORIDE 20 MG/ML
INJECTION, SOLUTION INFILTRATION; PERINEURAL PRN
Status: DISCONTINUED | OUTPATIENT
Start: 2020-12-08 | End: 2020-12-08

## 2020-12-08 RX ORDER — NITROGLYCERIN 0.4 MG/1
0.4 TABLET SUBLINGUAL EVERY 5 MIN PRN
Status: DISCONTINUED | OUTPATIENT
Start: 2020-12-08 | End: 2020-12-09 | Stop reason: HOSPADM

## 2020-12-08 RX ORDER — SODIUM CHLORIDE 9 MG/ML
INJECTION, SOLUTION INTRAVENOUS CONTINUOUS
Status: DISCONTINUED | OUTPATIENT
Start: 2020-12-08 | End: 2020-12-08 | Stop reason: HOSPADM

## 2020-12-08 RX ORDER — HEPARIN SODIUM 1000 [USP'U]/ML
INJECTION, SOLUTION INTRAVENOUS; SUBCUTANEOUS PRN
Status: DISCONTINUED | OUTPATIENT
Start: 2020-12-08 | End: 2020-12-08

## 2020-12-08 RX ORDER — CLOPIDOGREL BISULFATE 75 MG/1
75 TABLET ORAL DAILY
Status: DISCONTINUED | OUTPATIENT
Start: 2020-12-09 | End: 2020-12-09 | Stop reason: HOSPADM

## 2020-12-08 RX ORDER — ASPIRIN 81 MG/1
81 TABLET ORAL DAILY
Status: DISCONTINUED | OUTPATIENT
Start: 2020-12-08 | End: 2020-12-08 | Stop reason: HOSPADM

## 2020-12-08 RX ORDER — POTASSIUM CHLORIDE 7.45 MG/ML
10 INJECTION INTRAVENOUS
Status: DISPENSED | OUTPATIENT
Start: 2020-12-08 | End: 2020-12-08

## 2020-12-08 RX ORDER — NITROGLYCERIN 20 MG/100ML
INJECTION INTRAVENOUS PRN
Status: DISCONTINUED | OUTPATIENT
Start: 2020-12-08 | End: 2020-12-08

## 2020-12-08 RX ORDER — METOPROLOL TARTRATE 1 MG/ML
1-2 INJECTION, SOLUTION INTRAVENOUS EVERY 5 MIN PRN
Status: DISCONTINUED | OUTPATIENT
Start: 2020-12-08 | End: 2020-12-08

## 2020-12-08 RX ADMIN — PROPOFOL: 10 INJECTION, EMULSION INTRAVENOUS at 09:54

## 2020-12-08 RX ADMIN — OXYCODONE HYDROCHLORIDE AND ACETAMINOPHEN 1 TABLET: 5; 325 TABLET ORAL at 20:15

## 2020-12-08 RX ADMIN — Medication 1 LOZENGE: at 17:48

## 2020-12-08 RX ADMIN — MIDAZOLAM 1 MG: 1 INJECTION INTRAMUSCULAR; INTRAVENOUS at 09:03

## 2020-12-08 RX ADMIN — POTASSIUM CHLORIDE 10 MEQ: 7.46 INJECTION, SOLUTION INTRAVENOUS at 09:24

## 2020-12-08 RX ADMIN — SODIUM CHLORIDE: 9 INJECTION, SOLUTION INTRAVENOUS at 08:22

## 2020-12-08 RX ADMIN — PHENYLEPHRINE HYDROCHLORIDE 0.25 MCG/KG/MIN: 10 INJECTION INTRAVENOUS at 09:15

## 2020-12-08 RX ADMIN — HEPARIN SODIUM 15000 UNITS: 1000 INJECTION INTRAVENOUS; SUBCUTANEOUS at 09:45

## 2020-12-08 RX ADMIN — LIDOCAINE HYDROCHLORIDE 60 MG: 20 INJECTION, SOLUTION INFILTRATION; PERINEURAL at 09:05

## 2020-12-08 RX ADMIN — NITROGLYCERIN 20 MCG: 20 INJECTION INTRAVENOUS at 10:12

## 2020-12-08 RX ADMIN — FENTANYL CITRATE 25 MCG: 50 INJECTION, SOLUTION INTRAMUSCULAR; INTRAVENOUS at 09:28

## 2020-12-08 RX ADMIN — HEPARIN SODIUM 3000 UNITS: 1000 INJECTION INTRAVENOUS; SUBCUTANEOUS at 10:00

## 2020-12-08 RX ADMIN — DEXMEDETOMIDINE HYDROCHLORIDE 1 MCG/KG/HR: 100 INJECTION, SOLUTION INTRAVENOUS at 08:23

## 2020-12-08 RX ADMIN — PHENYLEPHRINE HYDROCHLORIDE 50 MCG: 10 INJECTION INTRAVENOUS at 10:06

## 2020-12-08 RX ADMIN — PROTAMINE SULFATE 110 MG: 10 INJECTION, SOLUTION INTRAVENOUS at 10:22

## 2020-12-08 RX ADMIN — PROPOFOL 20 MCG/KG/MIN: 10 INJECTION, EMULSION INTRAVENOUS at 08:23

## 2020-12-08 RX ADMIN — PROPOFOL 40 MG: 10 INJECTION, EMULSION INTRAVENOUS at 09:06

## 2020-12-08 RX ADMIN — SODIUM CHLORIDE: 9 INJECTION, SOLUTION INTRAVENOUS at 07:47

## 2020-12-08 RX ADMIN — PHENYLEPHRINE HYDROCHLORIDE 100 MCG: 10 INJECTION INTRAVENOUS at 10:09

## 2020-12-08 RX ADMIN — CEFAZOLIN SODIUM 2 G: 2 INJECTION, SOLUTION INTRAVENOUS at 08:30

## 2020-12-08 RX ADMIN — SODIUM CHLORIDE: 9 INJECTION, SOLUTION INTRAVENOUS at 10:10

## 2020-12-08 RX ADMIN — ATORVASTATIN CALCIUM 40 MG: 40 TABLET, FILM COATED ORAL at 17:48

## 2020-12-08 RX ADMIN — NITROGLYCERIN 20 MCG: 20 INJECTION INTRAVENOUS at 10:13

## 2020-12-08 RX ADMIN — POTASSIUM CHLORIDE 10 MEQ: 7.46 INJECTION, SOLUTION INTRAVENOUS at 08:02

## 2020-12-08 RX ADMIN — PHENYLEPHRINE HYDROCHLORIDE 50 MCG: 10 INJECTION INTRAVENOUS at 10:07

## 2020-12-08 RX ADMIN — HYDROMORPHONE HYDROCHLORIDE 0.5 MG: 1 INJECTION, SOLUTION INTRAMUSCULAR; INTRAVENOUS; SUBCUTANEOUS at 12:19

## 2020-12-08 RX ADMIN — TAMSULOSIN HYDROCHLORIDE 0.4 MG: 0.4 CAPSULE ORAL at 17:48

## 2020-12-08 RX ADMIN — POTASSIUM CHLORIDE 10 MEQ: 7.46 INJECTION, SOLUTION INTRAVENOUS at 10:18

## 2020-12-08 ASSESSMENT — MIFFLIN-ST. JEOR: SCORE: 1678.61

## 2020-12-08 NOTE — PROGRESS NOTES
CVTS Staff Note    75 M here today for TAVR. Felt to be low risk surgical candidate when seen by Dr Gimenez.    I discussed the very low incidence of surgical bailout, and the attendant substantial increase in anticipated morbidity/mortality in the setting of an emergent surgery. Given his risk profile, I explained that bailout would be our default plan unless he desired otherwise. All of his questions were answered, and he states that he DOES desire emergency open heart surgery if necessary.      Tristen Moscoso MD

## 2020-12-08 NOTE — OP NOTE
Procedure Date: 12/08/2020      PREOPERATIVE DIAGNOSES:   1.  Symptomatic severe aortic valve stenosis.   2.  Hypertension.   3.  Hyperlipidemia.   4.  Obesity.   5.  History of cerebrovascular accident with residual left-sided weakness.      POSTOPERATIVE DIAGNOSES:   1.  Symptomatic severe aortic valve stenosis.   2.  Hypertension.   3.  Hyperlipidemia.   4.  Obesity  5.  History of cerebrovascular accident with residual left-sided weakness.      OPERATION:  Right transfemoral implantation of a 26 mm Anton Tawana 3 Ultra bioprosthesis.      INTERVENTIONAL CARDIOLOGIST:  Luis Begum MD; Dayana Redd MD      CARDIAC SURGEON:  Tristen Moscoso MD      ANESTHESIA:  Monitored anesthesia care with local.      INDICATIONS FOR PROCEDURE:  The patient is a 75-year-old gentleman with known aortic stenosis that has progressed into the severe range over the past year.  He has been having increasing heart failure symptoms with lower extremity edema and dyspnea and was worked up in the spring of this year.  Given the COVID pandemic, he elected to delay the procedure for several months; however, his symptoms have worsened and his valve disease has progressed further.  He is deemed to be at probably low risk for surgical valve replacement; however, given his comorbid conditions and age, he preferred to proceed with TAVR.  Following discussion of risks and benefits, he elected to proceed.      DESCRIPTION OF PROCEDURE:  The patient was brought to the Cath Lab and placed supine on the table.  Appropriate monitoring lines were placed and monitored anesthesia care was delivered.  The patient was sterilely prepped and draped in the usual fashion and timeout was performed to confirm patient identity, procedure to be performed and administration of preoperative antibiotics.      Combination of ultrasound and fluoroscopy was utilized to obtain bilateral common femoral arterial access and left common femoral venous access.  This was  exchanged for sheath access.  Temporary pacemaker was advanced in the right ventricle.  The pigtail catheter was advanced from the left-sided arterial access into the aortic root.  The large bore sheath was then placed on the right over a stiff wire.  The patient was systemically heparinized.      Right radial access was obtained and exchanged for a sheath.  The sentinel device was advanced from that position with the proximal filter in the innominate artery and the distal filter in the left common carotid.      The native valve was crossed with an AL1 catheter and a straight wire.  This was exchanged for a J wire followed by a pigtail followed by a Safari.  The valve delivery system was advanced over the Safari wire into position within the aortic root.  Under rapid pacing, the valve was deployed with excellent fluoroscopic result.  Upon cessation of pacing, the patient's native rhythm and hemodynamics recovered promptly.      The valve delivery system was withdrawn from within the aortic root and removed.  Root aortography and echocardiography revealed excellent valve position without any perivalvular insufficiency.      Safari wire was recaptured within the pigtail removed.  The left-sided pigtail was withdrawn to the level of the iliac bifurcation.  The sentinel device was recaptured and removed.  Protamine was administered to reverse the patient's heparinization.  The temporary pacemaker was removed.      The large bore sheath was then removed over an access wire and the existing Perclose tightened.  There appeared to be appropriate hemostasis and this wire was removed.  The Perclose were fully deployed and cut and manual pressure maintained at that site.  Completion iliofemoral angiography revealed no extravasation or stenosis.      The left-sided arterial access was managed with an Angio-Seal.  Left-sided venous access site was managed with manual pressure.  The right radial access site was managed with a TR  band.      At the end of the procedure, all counts were correct.  The patient was taken to recovery in stable condition.         CARLOS BULLOCK MD             D: 2020   T: 2020   MT: GRACE      Name:     RISA ROSAS   MRN:      [id number]        Account:        [id number]   :      1945           Procedure Date: 2020      Document: [id number]

## 2020-12-08 NOTE — PLAN OF CARE
VSS. Tele SR 1AVB. Bedrest until 1630 s/p TAVR. Bilateral groin sites CDI, no bruit. Right radial site CDI, CMS intact to all extremeties. Off bedrest, able to eat per TAVR team. Neuros intact. Pulses palpable. Denies CP, SOB. SaO2 % on RA. Plan for probable discharge home tomorrow.

## 2020-12-08 NOTE — H&P
[facility]    History and Physical - Hospitalist Service       Date of Admission:  12/8/2020  PRIMARY CARE PROVIDER:    Buck Nascimento    Assessment & Plan   Jonathan Bishop is a 75 year old male admitted on 12/8/2020.    Past medical history significant for severe aortic stenosis, HTN, HLP, HFpEF, Iatrogenic Hypokalemia, PVC's, BPH, ED, PVD, Chronic low back pain on chronic opioids, Obesity and history of CVA with residual left sided weakness who underwent an elective TAVR today.      Severe symptomatic aortic stenosis s/p TAVR:  Noted worsening shortness of breath, chest discomfort and bilateral lower extremity edema.  Underwent TAVR work-up that included an echocardiogram (10/27/2020) with a preserved EF (60-65%), grade II diastolic dysfunction, normal RV size and function, severe aortic stenosis with a Vmax 4.9 m/s, mean gradient of 60 mmhg, COLIN of 0.73 cm2, and DI of 0.23.  --Appreciate Cardiology consult.    --Continue ASA therapy; decrease to 81 mg daily as patient will be starting Plavix.    --Start Plavix 75 mg/d per Cardiology.    --Telemetry.    --Echocardiogram.      HTN:  PTA medications include hydrochlorothiazide 25 mg/d and Lasix 40 mg/d.   --Hold PTA medications at this time.  Likely resume tomorrow pending BMP and assessment of renal function.    --PRN IV hydralazine available (to maintain SBP < 140 and DD+BP < 90).      HLP:  --Resume PTA atorvastatin 40 mg/d.      HFpEF:  ECHO from 10/2020 with preserved EF and grade II diastolic dysfunction.    --Hold PTA Lasix 40 mg/d and hydrochlorothiazide 25 mg/d  --Gentle IVF.    --Monitor daily weights and I's & O's.  --When diet cn advance recommend cardiac low salt and low fat diet.        Iatrogenic Hypokalemia:  Likely due to diuretic therapy and on replacement PTA.    --Hold PTA replacement.    --Monitor and replete per protocol.      PVC's:  --Monitor on telemetry.      BPH:  --Resume PTA Flomax 0.4 mg/d.      ED:  --Hold  PTA PRN sildenafil and resume at discharge.      Chronic low back pain on chronic opioids:  Noted opioid agreement with PCP (Dr. Nascimento) for ongoing use of PRN Percocet.    --Resume PTA PRN Percocet.      Obesity:  BMI calculated at 40.      History of CVA with residual left sided weakness  PVD (peripheral vascular disease)  --ASA therapy and statin continued as above.      Asymptomatic COVID-19 screening:  Performed pre-operatively.  Negative PCR on 12/5/2020.         Diet: NPO for Medical/Clinical Reasons Except for: Other; Specify: NPO for 6 hours then release Advance Diet as Tolerated AFTER patient passes Dysphagia Screen performed by the nurse.  Advance Diet as Tolerated: Clear Liquid Diet    DVT Prophylaxis: Pneumatic Compression Devices  Onofre Catheter: not present  Code Status: Full Code       Disposition Plan   Expected discharge: 1-2 days, recommended to prior living arrangement once recovered from TAVR and tolerated any adjustment of medications.  Entered: Toribio Vargas PA-C 12/08/2020, 1:47 PM     The patient's care was discussed with the Bedside Nurse and Patient.    The patient has been discussed with Dr. Weiss, who agrees with the assessment and plan at this time. Dr. Weiss will evaluate the patient independently.     Toribio Vargas PA-C  [facility]    ______________________________________________________________________    Chief Complaint   Elective TAVR.    History is obtained from the patient and EMR.      History of Present Illness   Jonathan Bishop is a 75 year old male with a past medical history significant for severe aortic stenosis, HTN, HLP, HFpEF, Iatrogenic Hypokalemia, PVC's, BPH, ED, PVD, Chronic low back pain on chronic opioids, Obesity, and history of CVA with residual left sided weakness who underwent an elective TAVR today.      Patient was seen by Cardiology (Structural heart clinic) as an outpatient on 12/2/2020.  Initial work-up for  TAVR was started in 12/2019 and was felt to be a candidate for TAVR procedure.  Unfortunately, due to COVID-19 pandemic this procedure was deferred per patient's request.  Patient has since undergone ongoing TAVR work-up with repeat ECHO (10/2020) and would like to proceed with TAVR.  Per Cardiology documentation on 12/2/2020  he was reporting experiencing exertional shortness of breath, orthopnea (was having to sleep in a recliner), occasional chest pain, and bilateral leg edema.      Patient was resting in bed upon arrival.  He indicated that he has been feeling weaker lately.  He has had on-off chest pain for the last few weeks.  Patient stated his legs and feet are always swollen.  He has been dealing with shortness of breath that worsens with activity.  He can not lay flat (orthopnic).  Patient fell about 8 months ago but no injuries recently.  He has chronic back and bilateral knee pain.  He has chronic numbness in his legs bilaterally from his hips to his feet and believes it is due to his chronic back pain.      Review of Systems    The 10 point Review of Systems is negative other than noted in the HPI.      Past Medical History    I have reviewed this patient's medical history and updated it with pertinent information if needed.   Past Medical History:   Diagnosis Date     Acute rheumatic carditis 1950     Complication of anesthesia     pt once woke up during back surgery     Coronary artery disease     murmur     Erectile dysfunction     Sildenafil     Hip pain, bilateral      Hypercholesteremia      Hypertension      Low back pain      Nonsenile cataract      Obesity      Renal cyst      Stroke (cerebrum) (H) 11/30/16     Umbilical hernia 6/10/2011    s/p surgical repair     Wound, surgical, infected 6/10/2011    hernia   severe aortic stenosis, HTN, HLP, HFpEF, Iatrogenic Hypokalemia, PVC's, BPH, ED, PVD, Chronic low back pain on chronic opioids, Obesity, and history of CVA with residual left sided  weakness    Past Surgical History   I have reviewed this patient's surgical history and updated it with pertinent information if needed.  Past Surgical History:   Procedure Laterality Date     ARTHROPLASTY KNEE BILATERAL  7/22/2010     COLONOSCOPY N/A 4/14/2017    Procedure: COLONOSCOPY;  Surgeon: Toby Bills MD;  Location: [facility]     CV CORONARY ANGIOGRAM N/A 10/28/2019    Procedure: Coronary Angiogram;  Surgeon: Guerrero Huitron MD;  Location:  HEART CARDIAC CATH LAB     CV RIGHT HEART CATH N/A 10/28/2019    Procedure: Right Heart Cath;  Surgeon: Guerrero Huitron MD;  Location:  HEART CARDIAC CATH LAB     FUSION LUMBAR ANTERIOR TWO LEVELS       HERNIORRHAPHY UMBILICAL  6/10/2011    Procedure:HERNIORRHAPHY UMBILICAL; Open, with mesh placement; Surgeon:HO PARHAM; Location:[facility]     LAMINECTOMY LUMBAR TWO LEVELS     Bilateral TKA, 2 level lumbar laminectomy and fusion, Umbilical hernia repair, per patient he had head surgery to remove blood.      Social History   I have reviewed this patient's social history and updated it with pertinent information if needed.  Patient resides in an apartment in [place].  Patient is a former smoker who quit in 2005.  He denied alcohol consumption.  He does not use illicit drugs.  He ambulates with the aid of both a cane and walker.    Social History     Tobacco Use     Smoking status: Former Smoker     Quit date: 1/1/2005     Years since quitting: 15.9     Smokeless tobacco: Never Used     Tobacco comment: Non smoker. No 2nd hand exposure. CCX, RMA PCC 7/28/2011   Substance Use Topics     Alcohol use: No     Drug use: No        Family History   I have reviewed this patient's family history and updated it with pertinent information if needed.   Family History   Problem Relation Age of Onset     C.A.D. Mother      Unknown/Adopted Father      Heart Failure Sister      Heart Failure Sister      Glaucoma No family hx of      Macular  Degeneration No family hx of        Prior to Admission Medications   Prior to Admission Medications   Prescriptions Last Dose Informant Patient Reported? Taking?   aspirin 325 MG EC tablet 12/7/2020 at AM Self No Yes   Sig: Take 1 tablet (325 mg) by mouth daily   atorvastatin (LIPITOR) 40 MG tablet 12/7/2020 at AM Self No Yes   Sig: Take 1 tablet (40 mg) by mouth daily   furosemide (LASIX) 40 MG tablet 12/7/2020 at AM Self No Yes   Sig: Take 1 tablet (40 mg) by mouth daily   hydrochlorothiazide (HYDRODIURIL) 25 MG tablet 12/7/2020 at AM Self No Yes   Sig: Take 1 tablet (25 mg) by mouth daily   ibuprofen (ADVIL/MOTRIN) 600 MG tablet  at PRN Self No Yes   Sig: Take 1 tablet (600 mg) by mouth 3 times daily as needed for moderate pain   multivitamin, therapeutic with minerals (MULTI-VITAMIN) TABS 12/6/2020 at AM Self No Yes   Sig: Take 1 tablet by mouth daily.   naloxone (NARCAN) 4 MG/0.1ML nasal spray  at PRN Self No Yes   Sig: Spray 1 spray (4 mg) into one nostril alternating nostrils once as needed for opioid reversal Every 2-3 min until responsive or EMS arrives   order for DME   No No   Sig: Equipment being ordered: Walker ()  Treatment Diagnosis: stroke   other medical supplies   No No   Sig: Dispense knee high, medium compression, large size   oxyCODONE-acetaminophen (PERCOCET) 5-325 MG tablet  at PRN Self No Yes   Sig: Take 1-2 tablets by mouth every 6 hours as needed for pain Max 5 per day.   potassium chloride ER (KLOR-CON M) 20 MEQ CR tablet 12/7/2020 at AM Self No Yes   Sig: Take 2 tablets (40 mEq) by mouth daily   sildenafil (VIAGRA) 100 MG tablet  at PRN Self No Yes   Sig: Take 30 min to 4 hours before intercourse as needed for erectile dysfunction Has appt with Dr Nascimento 11/9/2020   tamsulosin (FLOMAX) 0.4 MG capsule 12/7/2020 at AM Self No Yes   Sig: TAKE 1 CAPSULE BY MOUTH ONCE DAILY   Patient taking differently: Take 0.4 mg by mouth daily TAKE 1 CAPSULE BY MOUTH ONCE DAILY   vitamin D3  (CHOLECALCIFEROL) 50 mcg (2000 units) tablet 12/6/2020 at AM Self Yes Yes   Sig: Take 1 tablet by mouth daily      Facility-Administered Medications: None     Allergies   No Known Allergies    Physical Exam   Vital Signs: Temp: 97.9  F (36.6  C) Temp src: Temporal BP: 138/74 Pulse: 87   Resp: 18 SpO2: 95 % O2 Device: Simple face mask    Weight: 229 lbs 6.4 oz    Constitutional: Awake, alert, cooperative, no apparent distress.    ENT: Normocephalic, without obvious abnormality, atraumatic, oral pharynx with moist mucus membranes, tonsils without erythema or exudates.  Eyes pupils are equal, round and reactive to light; extra occular movements intact.  Normal sclera.    Neck: Supple, symmetrical, trachea midline, no adenopathy.  Pulmonary: No increased work of breathing, good air exchange, clear to auscultation bilaterally, no crackles or wheezing.  Cardiovascular: Regular rate and rhythm, normal S1 and S2, no S3 or S4, and no murmur noted.  GI: Normal bowel sounds, soft, non-distended, non-tender.  Obese.  Skin/Integumen: Clear.  Neuro: CN II-XII grossly intact.  Upper and lower extremities strength, coordination and sensation intact bilaterally.    Psych:  Alert and oriented x 3. Normal affect.  Extremities: 1+ lower extremity edema noted, and calves are non-tender to palpation bilaterally.     Data   Data reviewed today: I reviewed all medications, new labs and imaging results over the last 24 hours. I personally reviewed no images or EKG's today.    Recent Labs   Lab 12/08/20  1137 12/08/20  0657 12/02/20  1246   WBC 8.0  --  7.5   HGB 10.3*  --  12.0*   MCV 83  --  83   *  --  269     --  140   POTASSIUM 3.9 3.0* 3.6   CHLORIDE 107  --  104   CO2 29  --  32   BUN 23  --  22   CR 0.69  --  0.85   ANIONGAP 4  --  4   WARREN 8.3*  --  9.5   GLC 96  --  85   ALBUMIN 3.0*  --  3.7   PROTTOTAL 6.3*  --  7.5   BILITOTAL 0.3  --  0.4   ALKPHOS 92  --  105   ALT 28  --  38   AST 27  --  26     Recent Results  (from the past 24 hour(s))   Cardiac Catheterization    Narrative    1.  Continue post recovery care per protocol  2.  Aspirin and Plavix, bedrest per protocol   Echocardiogram Limited    Narrative    [id number]  [id number]  [id number]  [id number]^REUBEN^VARSHA^JA           [facility]  Echocardiography Laboratory  [address] [place] [address]        Name: RISA ROSAS  MRN: [id number]  : 1945  Study Date: 2020 07:08 AM  Age: 75 yrs  Gender: Male  Patient Location: [facility]  Reason For Study: 26 mm MENJIVAR TIO 3 TAVR  Ordering Physician: VARSHA ALEXIS  Referring Physician: Buck Nascimento  Performed By: Blake Rossi RDCS     BSA: 2.1 m2  Height: 64 in  Weight: 229 lb  HR: 82  BP: 138/74 mmHg  _____________________________________________________________________________  __        Procedure  Limited Portable Echo Adult.  _____________________________________________________________________________  __        Interpretation Summary     This was a procedural TTE to aid TAVR.     Preprocedure  1. Normal LV function  2. Normal RV function  3. Critical AS with MG of 63 mm Hg  4. No pericardial effusion.     Post procedure  1. Normal LV and RV function  2. Mild MS with MG of 4 mm Hg  3. Successful TAVR with 26 mm ES3 valve with no AI and MG of 6-8 mm Hg.  4. No pericardial effusion.  _____________________________________________________________________________  __        Left Ventricle  The left ventricle is normal in size. Left ventricular systolic function is  normal. The visual ejection fraction is estimated at 60-65%.     Right Ventricle  The right ventricle is normal in size and function.     Atria  The left atrium is mildly dilated. Right atrial size is normal.     Mitral Valve  Thickened mitral valve posterior leaflet. There is moderate to severe mitral  annular calcification.        Tricuspid Valve  The tricuspid valve is not well visualized. Right ventricular  systolic  pressure could not be approximated due to inadequate tricuspid regurgitation.     Aortic Valve  The aortic valve is trileaflet. No aortic regurgitation is present. Severe  valvular aortic stenosis.     Pulmonic Valve  The pulmonic valve is not well visualized.     Vessels  The aortic root is not well visualized.     Pericardium  There is no pericardial effusion.     _____________________________________________________________________________  __  MMode/2D Measurements & Calculations  LVOT diam: 2.1 cm  LVOT area: 3.5 cm2        Doppler Measurements & Calculations  MV max PG: 10.1 mmHg  MV mean P.3 mmHg  MV V2 VTI: 33.3 cm  MVA(VTI): 2.8 cm2     Ao V2 max: 213.9 cm/sec  Ao max P.0 mmHg  Ao V2 mean: 129.6 cm/sec  Ao mean P.2 mmHg  Ao V2 VTI: 40.2 cm  COLIN(I,D): 2.3 cm2  COLIN(V,D): 2.1 cm2  LV V1 max P.7 mmHg  LV V1 max: 129.1 cm/sec  LV V1 VTI: 26.3 cm  SV(LVOT): 92.0 ml  SI(LVOT): 44.4 ml/m2  AV Neville Ratio (DI): 0.60  COLIN Index (cm2/m2): 1.1           _____________________________________________________________________________  __           Report approved by: Andria Faye 2020 11:39 AM

## 2020-12-08 NOTE — PROGRESS NOTES
Checked in on Conner along with Dr. Begum and he looks great. His sites look clean, dry and intact. His rhythm is a stabel SR and a BP in the 130's sys. He is now off bedrest and the Rn was going to get him up and see how he does. I let Conner know that we are starting him on plavix and he will be on it for 6 months. I also reminded him that he will need antibiotics before any dental work. I let him know that we will await his echo results in the morning and hopefully we will discharge him tomorrow afternoon. I will go over his discharge instructions at that time.Diane Nelson RN

## 2020-12-08 NOTE — DISCHARGE INSTRUCTIONS
TAVR Discharge Instructions  You have just had your aortic valve replaced with a new biological tissue valve. You should feel better after your surgery, but complete recovery may take several weeks. Please follow these instructions carefully and please call the [facility] ([phone]) with any questions or concerns.      AFTER YOU GO HOME:    Drink extra fluids for 2 days.    You may resume your normal diet.    Do not smoke.    Do not drink alcohol.    Relax and take it easy.    Do not make any important or legal decisions.    FOR 1 WEEK AFTER TAVR:    Do not drive or operate machines at home or at work. No driving until you return for your 1 week follow-up appointment. You and the provider will discuss this at the appointment.     Refrain from sexual intercourse for 1 week.    You may shower the day after your procedure. Do NOT take a bath, or use a hot tub or pool for at least 1 week. Do NOT scrub the site. Do not use lotion or powder near the puncture site.    Do not lift more than 10 pounds.     Do not do any heavy activity such as exercise, lifting, or straining.  No housework, yard work, or any activities that make you sweat.    CARE OF WRIST AND GROIN SITES:    For 2 days, when you cough, sneeze, laugh or move your bowels, hold your hand over the puncture site and press firmly on/above the site.    Remove the bandage after 24 hours. If there is minor oozing, apply another bandage and remove it after 12 hours.    It is normal to have bruising or pea size lump at the site.    If you have a wrist site puncture: Do not use your hand or arm to support your weight (such as rising from a chair)or bend your wrist (such as lifting a garage door) for 1 week.    No stooping or squatting for 1 week.     BLEEDING:  If you start bleeding from the site in your wrist:    Sit down and press firmly on/above the site with your fingers for 10 minutes.     Once bleeding stops, keep your arm still  for 2 hours.     Call [facility] ([phone]) as soon as you can.  If you start bleeding from the site in your groin:    Lie down flat and press firmly on/above the site for 10 minutes.    Once bleeding stops lay flat for 2 hours.     Call [facility] ([phone]) as soon as you can.  Call 911 right away if you have heavy bleeding or bleeding that does not stop.    MEDICINES:    You will likely be started on an anti-platelet medication, such as Plavix. Please call the [facility] with any questions regarding this medication.    If you have stopped any medicines, check with your provider about when to restart them.    You will require lifetime prophylactic antibiotics for dental cleanings and dental work after your TAVR, please inquire with your provider prior to your scheduled cleanings.     FOLLOW-UP APPOINTMENTS:    Follow-up with a Structural Heart Nurse Practitioner at [facility] in 7-10 days after your procedure.    You will also follow-up at [facility] 1 month after your procedure which will include a visit with a nurse practitioner, an echocardiogram (Echo), and electrocardiogram (ECG), and lab work. We will also want to see you back in the clinic 1 year after your procedure.    Cardiac Rehab will contact you for follow up care. You can enroll at a site convenient to you.     CALL THE CLINIC IF:     You have increased pain or a large or growing hard lump around the site.    The site is red, swollen, hot, or tender.    Blood or fluid is draining from the site.    You have chills or a fever greater than 101 F (38 C).    Your arm or leg feels numb, cool, or changes color.    You have hives, a rash, or unusual itching.    New pain in the back or belly that you cannot control with Tylenol.    Any questions or concerns.    OTHER INSTRUCTIONS:    You will receive a card with your new valve information in the  mail, directly from the .     CONTACT INFORMATION:   [facility]:  [phone] (7 days a week)  Valve Coordinators (Apryl RN, Olivia RN, and Diane LARKIN) can be reached at:  [phone]

## 2020-12-08 NOTE — ANESTHESIA CARE TRANSFER NOTE
Patient: Jonathan Bishop    Procedure(s):  Transcatheter Aortic Valve Replacement    Diagnosis: heart valve condition  Diagnosis Additional Information: No value filed.    Anesthesia Type:   MAC     Note:  Airway :Face Mask  Patient transferred to:PACU  Comments: VSS. Airway and IV patent. Patient comfortable. Report to RN. Stable care transfer.Handoff Report: Identifed the Patient, Identified the Reponsible Provider, Reviewed the pertinent medical history, Discussed the surgical course, Reviewed Intra-OP anesthesia mangement and issues during anesthesia, Set expectations for post-procedure period and Allowed opportunity for questions and acknowledgement of understanding      Vitals: (Last set prior to Anesthesia Care Transfer)    CRNA VITALS  12/8/2020 1020 - 12/8/2020 1107      12/8/2020             Ht Rate:  82    Resp Rate (set):  10                Electronically Signed By: PORFIRIO Diego CRNA  December 8, 2020  11:07 AM

## 2020-12-08 NOTE — OR NURSING
Potassium IV stopped at 11:30 due to severe IV pain, K  3.9     Sign out from Dr Buitrago to floor

## 2020-12-08 NOTE — ANESTHESIA POSTPROCEDURE EVALUATION
Patient: Jonathan Bishop    Procedure(s):  Transcatheter Aortic Valve Replacement    Diagnosis:heart valve condition  Diagnosis Additional Information: No value filed.    Anesthesia Type:  MAC    Note:  Anesthesia Post Evaluation    Patient location during evaluation: PACU  Patient participation: Able to fully participate in evaluation  Level of consciousness: awake and alert  Pain management: adequate  Airway patency: patent  Cardiovascular status: acceptable  Respiratory status: acceptable  Hydration status: acceptable  PONV: none     Anesthetic complications: None          Last vitals:  Vitals:    12/08/20 1305 12/08/20 1330 12/08/20 1400   BP: 120/63 125/67 (!) 124/98   Pulse: 72 71 71   Resp: 14 14 12   Temp:      SpO2:  100% 99%         Electronically Signed By: Nori Buitrago MD, MD  December 8, 2020  2:31 PM

## 2020-12-09 ENCOUNTER — APPOINTMENT (OUTPATIENT)
Dept: CARDIOLOGY | Facility: CLINIC | Age: 75
DRG: 267 | End: 2020-12-09
Attending: INTERNAL MEDICINE
Payer: COMMERCIAL

## 2020-12-09 ENCOUNTER — APPOINTMENT (OUTPATIENT)
Dept: GENERAL RADIOLOGY | Facility: CLINIC | Age: 75
DRG: 267 | End: 2020-12-09
Attending: INTERNAL MEDICINE
Payer: COMMERCIAL

## 2020-12-09 ENCOUNTER — APPOINTMENT (OUTPATIENT)
Dept: PHYSICAL THERAPY | Facility: CLINIC | Age: 75
DRG: 267 | End: 2020-12-09
Attending: INTERNAL MEDICINE
Payer: COMMERCIAL

## 2020-12-09 VITALS
BODY MASS INDEX: 39.05 KG/M2 | HEIGHT: 64 IN | TEMPERATURE: 98.6 F | HEART RATE: 96 BPM | DIASTOLIC BLOOD PRESSURE: 62 MMHG | OXYGEN SATURATION: 96 % | SYSTOLIC BLOOD PRESSURE: 129 MMHG | RESPIRATION RATE: 18 BRPM | WEIGHT: 228.7 LBS

## 2020-12-09 LAB
ANION GAP SERPL CALCULATED.3IONS-SCNC: 4 MMOL/L (ref 3–14)
BUN SERPL-MCNC: 20 MG/DL (ref 7–30)
CALCIUM SERPL-MCNC: 8.4 MG/DL (ref 8.5–10.1)
CHLORIDE SERPL-SCNC: 106 MMOL/L (ref 94–109)
CO2 SERPL-SCNC: 31 MMOL/L (ref 20–32)
CREAT SERPL-MCNC: 0.71 MG/DL (ref 0.66–1.25)
ERYTHROCYTE [DISTWIDTH] IN BLOOD BY AUTOMATED COUNT: 15.9 % (ref 10–15)
GFR SERPL CREATININE-BSD FRML MDRD: >90 ML/MIN/{1.73_M2}
GLUCOSE SERPL-MCNC: 106 MG/DL (ref 70–99)
HCT VFR BLD AUTO: 32.8 % (ref 40–53)
HGB BLD-MCNC: 9.9 G/DL (ref 13.3–17.7)
MAGNESIUM SERPL-MCNC: 2 MG/DL (ref 1.6–2.3)
MCH RBC QN AUTO: 24.7 PG (ref 26.5–33)
MCHC RBC AUTO-ENTMCNC: 30.2 G/DL (ref 31.5–36.5)
MCV RBC AUTO: 82 FL (ref 78–100)
PHOSPHATE SERPL-MCNC: 2.4 MG/DL (ref 2.5–4.5)
PLATELET # BLD AUTO: 184 10E9/L (ref 150–450)
POTASSIUM SERPL-SCNC: 3.2 MMOL/L (ref 3.4–5.3)
POTASSIUM SERPL-SCNC: 3.6 MMOL/L (ref 3.4–5.3)
RBC # BLD AUTO: 4.01 10E12/L (ref 4.4–5.9)
SODIUM SERPL-SCNC: 141 MMOL/L (ref 133–144)
WBC # BLD AUTO: 10.4 10E9/L (ref 4–11)

## 2020-12-09 PROCEDURE — 36415 COLL VENOUS BLD VENIPUNCTURE: CPT | Performed by: INTERNAL MEDICINE

## 2020-12-09 PROCEDURE — 97530 THERAPEUTIC ACTIVITIES: CPT | Mod: GP

## 2020-12-09 PROCEDURE — 97161 PT EVAL LOW COMPLEX 20 MIN: CPT | Mod: GP

## 2020-12-09 PROCEDURE — 93321 DOPPLER ECHO F-UP/LMTD STD: CPT | Mod: 26 | Performed by: INTERNAL MEDICINE

## 2020-12-09 PROCEDURE — 84100 ASSAY OF PHOSPHORUS: CPT | Performed by: INTERNAL MEDICINE

## 2020-12-09 PROCEDURE — 80048 BASIC METABOLIC PNL TOTAL CA: CPT | Performed by: INTERNAL MEDICINE

## 2020-12-09 PROCEDURE — 250N000013 HC RX MED GY IP 250 OP 250 PS 637: Performed by: INTERNAL MEDICINE

## 2020-12-09 PROCEDURE — 71045 X-RAY EXAM CHEST 1 VIEW: CPT

## 2020-12-09 PROCEDURE — 83735 ASSAY OF MAGNESIUM: CPT | Performed by: INTERNAL MEDICINE

## 2020-12-09 PROCEDURE — 99238 HOSP IP/OBS DSCHRG MGMT 30/<: CPT | Performed by: INTERNAL MEDICINE

## 2020-12-09 PROCEDURE — 250N000013 HC RX MED GY IP 250 OP 250 PS 637: Performed by: PHYSICIAN ASSISTANT

## 2020-12-09 PROCEDURE — 99222 1ST HOSP IP/OBS MODERATE 55: CPT | Performed by: INTERNAL MEDICINE

## 2020-12-09 PROCEDURE — 85027 COMPLETE CBC AUTOMATED: CPT | Performed by: INTERNAL MEDICINE

## 2020-12-09 PROCEDURE — 93005 ELECTROCARDIOGRAM TRACING: CPT

## 2020-12-09 PROCEDURE — 93325 DOPPLER ECHO COLOR FLOW MAPG: CPT | Mod: 26 | Performed by: INTERNAL MEDICINE

## 2020-12-09 PROCEDURE — 84132 ASSAY OF SERUM POTASSIUM: CPT | Performed by: INTERNAL MEDICINE

## 2020-12-09 PROCEDURE — 255N000002 HC RX 255 OP 636: Performed by: INTERNAL MEDICINE

## 2020-12-09 PROCEDURE — 93010 ELECTROCARDIOGRAM REPORT: CPT | Performed by: INTERNAL MEDICINE

## 2020-12-09 PROCEDURE — 93325 DOPPLER ECHO COLOR FLOW MAPG: CPT

## 2020-12-09 PROCEDURE — 97110 THERAPEUTIC EXERCISES: CPT | Mod: GP

## 2020-12-09 PROCEDURE — 93308 TTE F-UP OR LMTD: CPT | Mod: 26 | Performed by: INTERNAL MEDICINE

## 2020-12-09 RX ORDER — CLOPIDOGREL BISULFATE 75 MG/1
75 TABLET ORAL DAILY
Qty: 30 TABLET | Refills: 0 | Status: SHIPPED | OUTPATIENT
Start: 2020-12-10 | End: 2021-01-18

## 2020-12-09 RX ORDER — ACETAMINOPHEN 325 MG/1
325-650 TABLET ORAL EVERY 4 HOURS PRN
Start: 2020-12-09 | End: 2023-02-28

## 2020-12-09 RX ORDER — POTASSIUM CHLORIDE 1500 MG/1
40 TABLET, EXTENDED RELEASE ORAL ONCE
Status: COMPLETED | OUTPATIENT
Start: 2020-12-09 | End: 2020-12-09

## 2020-12-09 RX ORDER — FUROSEMIDE 40 MG
40 TABLET ORAL DAILY
Status: DISCONTINUED | OUTPATIENT
Start: 2020-12-09 | End: 2020-12-09 | Stop reason: HOSPADM

## 2020-12-09 RX ORDER — POTASSIUM CHLORIDE 1500 MG/1
40 TABLET, EXTENDED RELEASE ORAL DAILY
Status: DISCONTINUED | OUTPATIENT
Start: 2020-12-10 | End: 2020-12-09 | Stop reason: HOSPADM

## 2020-12-09 RX ORDER — HYDROCHLOROTHIAZIDE 12.5 MG/1
25 CAPSULE ORAL DAILY
Status: DISCONTINUED | OUTPATIENT
Start: 2020-12-09 | End: 2020-12-09

## 2020-12-09 RX ADMIN — TAMSULOSIN HYDROCHLORIDE 0.4 MG: 0.4 CAPSULE ORAL at 08:11

## 2020-12-09 RX ADMIN — CLOPIDOGREL BISULFATE 75 MG: 75 TABLET, FILM COATED ORAL at 08:11

## 2020-12-09 RX ADMIN — ASPIRIN 81 MG: 81 TABLET, DELAYED RELEASE ORAL at 08:11

## 2020-12-09 RX ADMIN — ATORVASTATIN CALCIUM 40 MG: 40 TABLET, FILM COATED ORAL at 08:11

## 2020-12-09 RX ADMIN — OXYCODONE HYDROCHLORIDE AND ACETAMINOPHEN 1 TABLET: 5; 325 TABLET ORAL at 08:18

## 2020-12-09 RX ADMIN — HUMAN ALBUMIN MICROSPHERES AND PERFLUTREN 9 ML: 10; .22 INJECTION, SOLUTION INTRAVENOUS at 09:00

## 2020-12-09 RX ADMIN — POTASSIUM CHLORIDE 40 MEQ: 1500 TABLET, EXTENDED RELEASE ORAL at 08:11

## 2020-12-09 ASSESSMENT — MIFFLIN-ST. JEOR: SCORE: 1675.44

## 2020-12-09 NOTE — PLAN OF CARE
A/Ox4. VSS. Denies pain. Lung sounds are clear. +2 edema to BLE. Up with assist x1. Right groin site, soft, with dried drainage. Left groin CDI, soft with CMS intact. Dorsal and Tibial pulses doppler. Plan for cardiac rehab and possible discharge tomorrow afternoon.

## 2020-12-09 NOTE — PROGRESS NOTES
"[facility]- TAVR Progress Note     Date of Service: 12/09/20     HPI  Jonathan Bishop is a 75 year old male who was admitted on 12/8/2020 for elective Transfemoral transcatheter Aortic Valve Replacement (TAVR). 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 AM limited echo                        pending.    - Bilateral groin access site, SFD w/ no bruit or swelling present.    - Brief neuro check normal    - Plavix and aspirin x 6 months uninterrupted.    - K 3.2, replaced per protocol. Rest of BMP unremarkable              - Hgb stable 9.9 (10.3 PTA), Plt 184, WBC 10.4              - Low grade temp 99.6   - Voiding, + BM   - Cardiac rehab/PT/OT today              - If echo ok, anticipate discharge today.               - Follow up with TAVR MARK in 1 week as scheduled     2.  Diastolic heart failure - EF normal.  Weight 228 ( baseline 225). Restart PTA Lasix 40 mg daily  and hydrochlorothiazide 25 mg daily. PTA potassium 40 meq daily. Follow up BMP in 1 week.   3. Hypertension - Well controlled   4. Hyperlipidemia - On statin  5. CVA- On statin and Aspirin        Interval History:   Up at the side with bed without cardiac complaint \" Best I've felt in 2 year\". Denies chest pain, shortness of breath, palpitations, PND, orthopnea, presyncope, syncope or LE edema. Euvolemic upon exam. Post deployment echo LVEDP 23, restart PTA Lasix and hydrochlorothiazide. Vitals and EKG stable. K mildly low, replaced per protocol. Echo and cardiac rehab today, if stable, " likely discharge today.       Physical Exam   Temp: 99.6  F (37.6  C) Temp src: Oral BP: 129/62 Pulse: 96   Resp: 18 SpO2: 96 % O2 Device: None (Room air) Oxygen Delivery: 2 LPM  Vitals:    12/08/20 0650 12/09/20 0305   Weight: 104.1 kg (229 lb 6.4 oz) 103.7 kg (228 lb 11.2 oz)     Vital Signs with Ranges  Temp:  [96.8  F (36  C)-99.6  F (37.6  C)] 99.6  F (37.6  C)  Pulse:  [66-96] 96  Resp:  [8-25] 18  BP: (105-139)/(52-98) 129/62  SpO2:  [91 %-100 %] 96 %  I/O last 3 completed shifts:  In: 1860 [P.O.:360; I.V.:1500]  Out: 475 [Urine:475]    Constitutional: awake, alert, cooperative, no apparent distress, and appears stated age  ENT: normocepalic, without obvious abnormality  Hematologic / Lymphatic: no cervical lymphadenopathy  Respiratory: No increased work of breathing, good air exchange, clear to auscultation bilaterally, no crackles or wheezing  Cardiovascular: regular rate and rhythm, normal S1 and S2 and Systolic murmur  GI: No scars, normal bowel sounds, soft, non-distended, non-tender, no masses palpated, no hepatosplenomegally  Genitounirinary: Voiding   Skin: no bruising or bleeding  Musculoskeletal: no lower extremity pitting edema present  Neurologic: Awake, alert, oriented to name, place and time.  Residual left sided weakness, remote CVA  Neuropsychiatric: General: normal, calm and normal eye contact    Medications     lactated ringers         aspirin  81 mg Oral Daily     atorvastatin  40 mg Oral Daily     clopidogrel  75 mg Oral Daily     potassium chloride  40 mEq Oral Once     tamsulosin  0.4 mg Oral Daily       Data   Reviewed        Ariadna Wing, APRN, CNP  Pager [phone]  12/9/2020

## 2020-12-09 NOTE — CONSULTS
SW aware of consult. However, there are no social work needs at this time. SW  Will still review chart, should needs arise.    MUSTAPHA Mathews

## 2020-12-09 NOTE — DISCHARGE SUMMARY
[facility]  Hospitalist Discharge Summary      Date of Admission:  12/8/2020  Date of Discharge:  12/9/2020  Discharging Provider: Roderick Millan,       Discharge Diagnoses   Severe symptomatic aortic stenosis s/p TAVR   HTN  HLP   HFpEF, not decompensated   Iatrogenic Hypokalemia   PVC's  BPH  Chronic low back pain on chronic opioids   Obesity  History of CVA with residual left sided weakness  PVD (peripheral vascular disease)    Follow-ups Needed After Discharge   Follow-up Appointments     Follow-up and recommended labs and tests       Follow up with primary care provider, Buck Nascimento, within 2-4 weeks,   for hospital follow- up. No follow up labs or test are needed.  Follow up with cardiology as planned           Unresulted Labs Ordered in the Past 30 Days of this Admission     Date and Time Order Name Status Description    12/9/2020 1213 Potassium In process       These results will be followed up by PCP     Discharge Disposition   Discharged to home  Condition at discharge: Stable    Hospital Course   Severe symptomatic aortic stenosis s/p TAVR:  Noted worsening shortness of breath, chest discomfort and bilateral lower extremity edema.  Underwent TAVR work-up that included an echocardiogram (10/27/2020) with a preserved EF (60-65%), grade II diastolic dysfunction, normal RV size and function, severe aortic stenosis with a Vmax 4.9 m/s, mean gradient of 60 mmhg, COLIN of 0.73 cm2, and DI of 0.23.  --Appreciate Cardiology consult.    --Continue ASA therapy; decrease to 81 mg daily as patient will be starting Plavix.    --Start Plavix 75 mg/d per Cardiology.    --Telemetry.    --Post TAVR echocardiogram is unremarkable per cardiology and they are okay for discharge      HTN:  PTA medications include hydrochlorothiazide 25 mg/d and Lasix 40 mg/d.   --Cardiology stopped PTA hydrochlorothiazide for now.  May resume at follow up   --PRN IV hydralazine available (to maintain SBP < 140  and DD+BP < 90).       HLP:  --Resume PTA atorvastatin 40 mg/d.       HFpEF:  ECHO from 10/2020 with preserved EF and grade II diastolic dysfunction.    --Hold PTA Lasix 40 mg/d and hydrochlorothiazide 25 mg/d  --Gentle IVF.    --Monitor daily weights and I's & O's.  --When diet cn advance recommend cardiac low salt and low fat diet.         Iatrogenic Hypokalemia:  Likely due to diuretic therapy and on replacement PTA.    --Hold PTA replacement.    --Monitor and replete per protocol.       PVC's:  --Monitor on telemetry.       BPH:  --Resume PTA Flomax 0.4 mg/d.       ED:  --Hold PTA PRN sildenafil and resume at discharge.       Chronic low back pain on chronic opioids:  Noted opioid agreement with PCP (Dr. Nascimento) for ongoing use of PRN Percocet.    --Resume PTA PRN Percocet.       Obesity:  BMI calculated at 40.         Consultations This Hospital Stay   CARDIAC REHAB IP CONSULT  SOCIAL WORK IP CONSULT  HOSPITALIST IP CONSULT    Code Status   Full Code    Time Spent on this Encounter   IRoderick DO, personally saw the patient today and spent less than or equal to 30 minutes discharging this patient.       Roderick Millan DO  [facility]  [address]  [facility] [address]  Phone: [phone]  ______________________________________________________________________    Physical Exam   Vital Signs: Temp: 98.6  F (37  C) Temp src: Oral BP: 129/62 Pulse: 96   Resp: 18 SpO2: 96 % O2 Device: None (Room air) Oxygen Delivery: 2 LPM  Weight: 228 lbs 11.2 oz  General Appearance: Resting comfortably. NAD   Respiratory: Clear to auscultation.  No respiratory distress  Cardiovascular: RRR.  No obvious murmurs  GI: Bowel sounds present.  Non-tender  Skin: No rashes.  No cyanosis  Other: No edema. No calf tenderness         Primary Care Physician   Buck T. Sick    Discharge Orders      Basic metabolic panel     Discharge Order: F/U with Cardiac  MARK      Reason for  your hospital stay    TAVR     Follow-up and recommended labs and tests     Follow up with primary care provider, Buck Nascimento, within 2-4 weeks, for hospital follow- up. No follow up labs or test are needed.  Follow up with cardiology as planned     Activity    Your activity upon discharge: activity as tolerated     Diet    Follow this diet upon discharge: Orders Placed This Encounter      Low Saturated Fat Na <2400 mg       Significant Results and Procedures   Most Recent 3 CBC's:  Recent Labs   Lab Test 20  0603 20  1137 20  1246   WBC 10.4 8.0 7.5   HGB 9.9* 10.3* 12.0*   MCV 82 83 83    101* 269     Most Recent 3 BMP's:  Recent Labs   Lab Test 20  1309 20  0603 20  1137 20  1246 20  1246   NA  --  141 140  --  140   POTASSIUM 3.6 3.2* 3.9   < > 3.6   CHLORIDE  --  106 107  --  104   CO2  --  31 29  --  32   BUN  --  20 23  --  22   CR  --  0.71 0.69  --  0.85   ANIONGAP  --  4 4  --  4   WARREN  --  8.4* 8.3*  --  9.5   GLC  --  106* 96  --  85    < > = values in this interval not displayed.   ,   Results for orders placed or performed during the hospital encounter of 20   XR Chest Port 1 View    Narrative    CHEST ONE VIEW  2020 5:36 AM     HISTORY: Status post TAVR.    COMPARISON: 2013      Impression    IMPRESSION: The lungs remain clear. No consolidation or effusion.  Cardiac silhouette is enlarged and increased from the prior  examination. Central pulmonary vasculature is prominent without  significant interstitial edema. New TAVR valve. Osseous structures and  soft tissues appear unremarkable.    ABBEY FINNEY MD   Echo Limited    Narrative    [id number]  [id number]  [id number]  [id number]^DANY^SHIRA^TATYANA           [facility]  Echocardiography Laboratory  [address] [place] [id number]        Name: RISA ROSAS  MRN: [id number]  : 1945  Study Date: 2020 08:11 AM  Age: 75 yrs  Gender:  Male  Patient Location: [facility]  Reason For Study: Aortic Valve Replacement  Ordering Physician: SHIRA SAENZ  Referring Physician: VARSHA ALEXIS  Performed By: Emani Moffett     BSA: 2.0 m2  Height: 63 in  Weight: 228 lb  HR: 92  BP: 129/62 mmHg  _____________________________________________________________________________  __        Procedure  Limited Portable Echo Adult. Optison (NDC #[id number]) given intravenously.  _____________________________________________________________________________  __        Interpretation Summary     1. The left ventricle is normal in structure, function and size. The visual  ejection fraction is estimated at 65%.  2. The right ventricle is normal in structure, function and size.  3. s/p TAVR (26mm Edward Tawana 3, implanted 12-8-20). Mean 17mmHg, Vmax  2.8m/s, DI 0.71. No AI or PVL.     Echo from 12-8-20 showed EF 60%, TAVR with mean 8mmHg.  _____________________________________________________________________________  __        Left Ventricle  The left ventricle is normal in structure, function and size. The visual  ejection fraction is estimated at 65%. Normal left ventricular wall motion.     Right Ventricle  The right ventricle is normal in structure, function and size.     Mitral Valve  There is moderate mitral annular calcification. There is no mitral  regurgitation noted.     Aortic Valve  s/p TAVR (26mm Edward Tawana 3, implanted 12-8-20). Mean 17mmHg, Vmax 2.8m/s,  DI 0.71. There is a bioprosthetic aortic valve.        Pulmonic Valve  The pulmonic valve is normal in structure and function.     Vessels  The inferior vena cava was normal in size with preserved respiratory  variability.     Pericardium  There is no pericardial effusion.     Rhythm  Sinus rhythm was noted. Possible 1st degree AV block.     _____________________________________________________________________________  __  MMode/2D Measurements & Calculations  asc Aorta Diam: 3.5 cm  LVOT diam: 2.1  cm  LVOT area: 3.4 cm2        Doppler Measurements & Calculations  Ao V2 max: 285.0 cm/sec  Ao max P.0 mmHg  Ao V2 mean: 186.6 cm/sec  Ao mean P.8 mmHg  Ao V2 VTI: 48.0 cm  COLIN(I,D): 1.4 cm2  COLIN(V,D): 1.3 cm2  LV V1 max P.9 mmHg  LV V1 max: 110.6 cm/sec  LV V1 VTI: 20.6 cm  SV(LVOT): 69.5 ml  SI(LVOT): 34.0 ml/m2     AV Neville Ratio (DI): 0.39  COLIN Index (cm2/m2): 0.71           _____________________________________________________________________________  __           Report approved by: Andria Chatman 2020 01:53 PM      Cardiac Catheterization    Narrative    1.  Continue post recovery care per protocol  2.  Aspirin and Plavix, bedrest per protocol       Discharge Medications   Current Discharge Medication List      START taking these medications    Details   acetaminophen (TYLENOL) 325 MG tablet Take 1-2 tablets (325-650 mg) by mouth every 4 hours as needed for mild pain or headaches  Qty:      Associated Diagnoses: Aortic valve stenosis, etiology of cardiac valve disease unspecified      clopidogrel (PLAVIX) 75 MG tablet Take 1 tablet (75 mg) by mouth daily  Qty: 30 tablet, Refills: 0    Comments: Future refills by PCP Dr. Buck Nascimento with phone number [phone].  Associated Diagnoses: Aortic valve stenosis, etiology of cardiac valve disease unspecified         CONTINUE these medications which have CHANGED    Details   aspirin (ASA) 81 MG EC tablet Take 1 tablet (81 mg) by mouth daily  Qty: 30 tablet, Refills: 0    Comments: Future refills by PCP Dr. Buck Nascimento with phone number [phone].  Associated Diagnoses: Aortic valve stenosis, etiology of cardiac valve disease unspecified         CONTINUE these medications which have NOT CHANGED    Details   atorvastatin (LIPITOR) 40 MG tablet Take 1 tablet (40 mg) by mouth daily  Qty: 90 tablet, Refills: 3    Associated Diagnoses: Cerebrovascular accident (CVA) due to occlusion of right anterior cerebral artery (H)      furosemide  (LASIX) 40 MG tablet Take 1 tablet (40 mg) by mouth daily  Qty: 30 tablet, Refills: 6    Associated Diagnoses: Nonrheumatic aortic valve stenosis; SOB (shortness of breath)      multivitamin, therapeutic with minerals (MULTI-VITAMIN) TABS Take 1 tablet by mouth daily.  Qty: 100 tablet, Refills: 3    Associated Diagnoses: Routine general medical examination at a health care facility      naloxone (NARCAN) 4 MG/0.1ML nasal spray Spray 1 spray (4 mg) into one nostril alternating nostrils once as needed for opioid reversal Every 2-3 min until responsive or EMS arrives  Qty: 0.2 mL, Refills: 0    Associated Diagnoses: Acute midline low back pain without sciatica      oxyCODONE-acetaminophen (PERCOCET) 5-325 MG tablet Take 1-2 tablets by mouth every 6 hours as needed for pain Max 5 per day.  Qty: 150 tablet, Refills: 0    Associated Diagnoses: Acute midline low back pain without sciatica      potassium chloride ER (KLOR-CON M) 20 MEQ CR tablet Take 2 tablets (40 mEq) by mouth daily  Qty: 60 tablet, Refills: 3    Associated Diagnoses: Nonrheumatic aortic valve stenosis; SOB (shortness of breath)      sildenafil (VIAGRA) 100 MG tablet Take 30 min to 4 hours before intercourse as needed for erectile dysfunction Has appt with Dr Nascimento 11/9/2020  Qty: 25 tablet, Refills: 2    Comments: ** DUPLICATE**  ORIG RF 9/24/20  25:2  Associated Diagnoses: Erectile dysfunction, unspecified erectile dysfunction type      tamsulosin (FLOMAX) 0.4 MG capsule TAKE 1 CAPSULE BY MOUTH ONCE DAILY  Qty: 90 capsule, Refills: 3    Associated Diagnoses: Hyperplasia of prostate with lower urinary tract symptoms (LUTS)      vitamin D3 (CHOLECALCIFEROL) 50 mcg (2000 units) tablet Take 1 tablet by mouth daily      order for DME Equipment being ordered: Walker ()  Treatment Diagnosis: stroke  Qty: 1 Units, Refills: 0    Associated Diagnoses: Cerebrovascular accident (CVA) due to occlusion of right anterior cerebral artery (H)      other medical  supplies Dispense knee high, medium compression, large size  Qty: 4 each, Refills: 0    Associated Diagnoses: Peripheral edema         STOP taking these medications       hydrochlorothiazide (HYDRODIURIL) 25 MG tablet Comments:   Reason for Stopping:         ibuprofen (ADVIL/MOTRIN) 600 MG tablet Comments:   Reason for Stopping:             Allergies   No Known Allergies

## 2020-12-09 NOTE — PLAN OF CARE
"TAVR yesterday. R wrist and R and L groins wdl, cms intact. Neuros  Intact; slight left sided facial droop when patient smiles: baseline, hx of stroke. Pt also has bilateral upper extremity tremors: \"because I'm cold\" per patient; tremors lasted all night. LE pulses with a doppler. 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 VSS on RA. Tele SR. Up with assist of 1, walker. Chest XR  done this  am. Will continue to monitor.   "

## 2020-12-09 NOTE — PROGRESS NOTES
12/09/20 1144   Quick Adds   Type of Visit Initial PT Evaluation   Living Environment   People in home alone   Current Living Arrangements apartment   Home Accessibility no concerns   Transportation Anticipated family or friend will provide   Living Environment Comments Pt lives in 5th floor apartment, has elevator access.   Self-Care   Usual Activity Tolerance moderate   Current Activity Tolerance fair   Regular Exercise No   Equipment Currently Used at Home cane, straight;walker, rolling   Activity/Exercise/Self-Care Comment Pt reports he uses a cane or 4WW prior to admission. Pt reports decreasing activity tolerance, had difficulty ambulating more than 50' at a time due to SOB/fatigue. Pt does have PCA care at home.   Disability/Function   Fall history within last six months yes   Number of times patient has fallen within last six months 1   General Information   Onset of Illness/Injury or Date of Surgery 12/08/20   Referring Physician Camila Begum MD   Patient/Family Therapy Goals Statement (PT) To get back to fishing   Pertinent History of Current Problem (include personal factors and/or comorbidities that impact the POC) Pt is a 75 year old male who was admitted on 12/8/2020 for elective Transfemoral transcatheter Aortic Valve Replacement (TAVR). 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), hypertension, PVCs, and hyperlipidemia   Existing Precautions/Restrictions fall   Cognition   Orientation Status (Cognition) oriented x 3   Affect/Mental Status (Cognition) WNL   Follows Commands (Cognition) WNL   Pain Assessment   Patient Currently in Pain No   Posture    Posture Forward head position   Range of Motion (ROM)   ROM Comment B LE ROM WFL   Strength   Strength Comments B LE strength WFL, L LE is weaker than R from previous CVA but not impacting functional mobility at this time   Bed Mobility   Comment (Bed Mobility)  Independent   Transfers   Transfer Safety Comments Mod I with FWW   Gait/Stairs (Locomotion)   Comment (Gait/Stairs) SBA with FWW   Balance   Balance Comments Slightly unsteady but no overt LOB, anticipate this is pt's baseline.   Sensory Examination   Sensory Perception Comments C/o decreased sensation L hand due to previous CVA   Clinical Impression   Criteria for Skilled Therapeutic Intervention yes, treatment indicated   PT Diagnosis (PT) Impaired activity tolerance   Influenced by the following impairments Decreased strength, decreased activity tolerance   Functional limitations due to impairments Decreased ability to participate in daily tasks   Clinical Presentation Stable/Uncomplicated   Clinical Presentation Rationale Current presentation, [facility]   Clinical Decision Making (Complexity) low complexity   Therapy Frequency (PT) 2x/day   Predicted Duration of Therapy Intervention (days/wks) 2 days   Planned Therapy Interventions (PT) balance training;gait training;home exercise program;strengthening;patient/family education   Risk & Benefits of therapy have been explained evaluation/treatment results reviewed;care plan/treatment goals reviewed;risks/benefits reviewed;current/potential barriers reviewed;participants voiced agreement with care plan;participants included;patient   Clinical Impression Comments Pt appears to be near baseline level of function, anticipate discharge to home with increased PCA support initially   PT Discharge Planning    PT Discharge Recommendation (DC Rec) home with assist;home with outpatient cardiac rehab   PT Rationale for DC Rec Pt appears to be near baseline level of function but does demonstrate decreased activity tolerance, would benefit from increased support initially which pt states he already has arranged. Would benefit from outpatient cardiac rehab phase II to continue to increase aerobic activity tolerance in monitored setting and for education to promote optimal heart  health.   PT Brief overview of current status  Independent with bed mobility, mod I with transfers using FWW, amb with SBA x400' no overt LOB.   Total Evaluation Time   Total Evaluation Time (Minutes) 10

## 2020-12-09 NOTE — PLAN OF CARE
Cardiac Rehab Discharge Summary    Reason for therapy discharge:    Discharged to home.    Progress towards therapy goal(s). See goals on Care Plan in [facility] electronic health record for goal details.  Goals not met.  Barriers to achieving goals:   discharge from facility and discharge on same date as initial evaluation.    Therapy recommendation(s):    Continued therapy is recommended.  Rationale/Recommendations:  outpatient cardiac rehab phase II.

## 2020-12-09 NOTE — PLAN OF CARE
A/Ox4. VSS. C/o chronic low back pain. PRN oxycodone given with relief. Up with SBA using walker and gait belt. Right and left groin site CDI, soft with CMS intact. Right radial site CDI, with known cyst and CMS intact. Tele SR with 1st deg AVB. Echo and xray completed. Potassium replaced. Discharge instructions given to patient along with discharge medications. All questions and concerns addressed. Patient verbalized understanding. Patient discharged home

## 2020-12-11 LAB
INTERPRETATION ECG - MUSE: NORMAL

## 2020-12-15 DIAGNOSIS — M54.50 ACUTE MIDLINE LOW BACK PAIN WITHOUT SCIATICA: ICD-10-CM

## 2020-12-15 DIAGNOSIS — I35.0 AORTIC VALVE STENOSIS, ETIOLOGY OF CARDIAC VALVE DISEASE UNSPECIFIED: ICD-10-CM

## 2020-12-15 LAB
ANION GAP SERPL CALCULATED.3IONS-SCNC: 3 MMOL/L (ref 3–14)
BUN SERPL-MCNC: 10 MG/DL (ref 7–30)
CALCIUM SERPL-MCNC: 9 MG/DL (ref 8.5–10.1)
CHLORIDE SERPL-SCNC: 110 MMOL/L (ref 94–109)
CO2 SERPL-SCNC: 31 MMOL/L (ref 20–32)
CREAT SERPL-MCNC: 0.73 MG/DL (ref 0.66–1.25)
GFR SERPL CREATININE-BSD FRML MDRD: >90 ML/MIN/{1.73_M2}
GLUCOSE SERPL-MCNC: 89 MG/DL (ref 70–99)
POTASSIUM SERPL-SCNC: 3.8 MMOL/L (ref 3.4–5.3)
SODIUM SERPL-SCNC: 144 MMOL/L (ref 133–144)

## 2020-12-15 PROCEDURE — 80048 BASIC METABOLIC PNL TOTAL CA: CPT | Performed by: NURSE PRACTITIONER

## 2020-12-15 PROCEDURE — 36415 COLL VENOUS BLD VENIPUNCTURE: CPT | Performed by: NURSE PRACTITIONER

## 2020-12-15 RX ORDER — OXYCODONE AND ACETAMINOPHEN 5; 325 MG/1; MG/1
1-2 TABLET ORAL EVERY 6 HOURS PRN
Qty: 150 TABLET | Refills: 0 | Status: SHIPPED | OUTPATIENT
Start: 2020-12-15 | End: 2021-01-15

## 2020-12-15 NOTE — TELEPHONE ENCOUNTER
Reason For Call:   No chief complaint on file.      Medication Name, Dose and Monthly Quantity:   Oxycodone with APAP (Percocet): Dose 5-325 Schedule 1 tablet by mouth every 6 hours prn Monthly Quantity 124   Diagnosis requiring opiates:   Acute midline low back pain without sciatica [M54.5]  - Primary     Problem List Updated:   No    Opioid Agreement On File - [facility] PAIN CONTRACT ID# [id number]:  No    Last Urine Drug Screen (at least once every 12 months) Date:   n/a  Unexpected Results:   n/a    [place]  Data Reviewed (at least once every 3 months) Date:   12/15/2020      Unexpected Results:    No.    Last Fill Date:   11/16/2020  Due Date:   12/16/2020  Last Visit with PCP:   09/11/2019    Future Visits with PCP:   No.    Processing:   [facility] [place]    STEPH JACK CMA      ----------------------------------

## 2020-12-16 ENCOUNTER — TELEPHONE (OUTPATIENT)
Dept: CARDIOLOGY | Facility: CLINIC | Age: 75
End: 2020-12-16

## 2020-12-16 NOTE — TELEPHONE ENCOUNTER
Got a call from [facility] and they had cut his lasix back to once a day when he was in the hospital and his legs have swollen up again. I told him it is ok to take his second dose today as his kidney function looks good and his potassium is normal. He has follow up tomorrow with Ariadna so it can be further discussed then.Diane Nelson RN

## 2020-12-17 ENCOUNTER — VIRTUAL VISIT (OUTPATIENT)
Dept: INTERNAL MEDICINE | Facility: CLINIC | Age: 75
End: 2020-12-17
Payer: COMMERCIAL

## 2020-12-17 ENCOUNTER — VIRTUAL VISIT (OUTPATIENT)
Dept: CARDIOLOGY | Facility: CLINIC | Age: 75
End: 2020-12-17
Attending: INTERNAL MEDICINE
Payer: COMMERCIAL

## 2020-12-17 DIAGNOSIS — R60.0 LOCALIZED EDEMA: ICD-10-CM

## 2020-12-17 DIAGNOSIS — I10 ESSENTIAL HYPERTENSION, BENIGN: ICD-10-CM

## 2020-12-17 DIAGNOSIS — I63.521 CEREBROVASCULAR ACCIDENT INVOLVING ANTERIOR CIRCULATION, RIGHT (H): ICD-10-CM

## 2020-12-17 DIAGNOSIS — I35.0 AORTIC VALVE STENOSIS, ETIOLOGY OF CARDIAC VALVE DISEASE UNSPECIFIED: Primary | ICD-10-CM

## 2020-12-17 DIAGNOSIS — I35.0 SEVERE AORTIC STENOSIS: Primary | ICD-10-CM

## 2020-12-17 DIAGNOSIS — E78.5 HYPERLIPIDEMIA WITH TARGET LDL LESS THAN 130: ICD-10-CM

## 2020-12-17 PROCEDURE — 99213 OFFICE O/P EST LOW 20 MIN: CPT | Mod: 95 | Performed by: INTERNAL MEDICINE

## 2020-12-17 PROCEDURE — 99214 OFFICE O/P EST MOD 30 MIN: CPT | Mod: 95 | Performed by: NURSE PRACTITIONER

## 2020-12-17 RX ORDER — HYDROCHLOROTHIAZIDE 25 MG/1
25 TABLET ORAL DAILY
Qty: 90 TABLET | Refills: 1 | Status: SHIPPED | OUTPATIENT
Start: 2020-12-17 | End: 2021-04-19

## 2020-12-17 NOTE — PROGRESS NOTES
"Jonathan Bishop is a 75 year old male who is being evaluated via a billable video visit.      The patient has been notified of following:     \"This video visit will be conducted via a call between you and your physician/provider. We have found that certain health care needs can be provided without the need for an in-person physical exam.  This service lets us provide the care you need with a video conversation.  If a prescription is necessary we can send it directly to your pharmacy.  If lab work is needed we can place an order for that and you can then stop by our lab to have the test done at a later time.    Video visits are billed at different rates depending on your insurance coverage.  Please reach out to your insurance provider with any questions.    If during the course of the call the physician/provider feels a video visit is not appropriate, you will not be charged for this service.\"    Patient has given verbal consent for Video visit? Yes  How would you like to obtain your AVS? MyChart  If you are dropped from the video visit, the video invite should be resent to: Text to cell phone: [phone]  Will anyone else be joining your video visit? No      Vitals - Patient Reported 7/22/2020 10/30/2020 12/17/2020   Height (Patient Reported) - 2' 1.394\" 5' 3.5\"   Weight (Patient Reported) 215 lb 8 oz 214 lb 222 lb   BMI (Based on Pt Reported Ht/Wt) - 233.32 kg/m2 38.71 kg/m2   Systolic (Patient Reported) 126 124 116   Diastolic (Patient Reported) 69 63 68   Pulse (Patient Reported) 89 85 107         Review Of Systems  Skin: NEGATIVE  Eyes:Ears/Nose/Throat: Glasses  Respiratory: NEGATIVE  Cardiovascular:Edema  Gastrointestinal: NEGATIVE  Genitourinary:NEGATIVE   Musculoskeletal: Arthritis  Neurologic: NEGATIVE  Psychiatric: NEGATIVE  Hematologic/Lymphatic/Immunologic: NEGATIVE  Endocrine:  NEGATIVE    Madelyn MOHAMUD     CARDIOLOGY CLINIC VIDEO VISIT    I have reviewed and updated the patient's Past Medical " History, Social History, Family History and Medication List.    MEDICATIONS:  Current Outpatient Medications   Medication Sig Dispense Refill     acetaminophen (TYLENOL) 325 MG tablet Take 1-2 tablets (325-650 mg) by mouth every 4 hours as needed for mild pain or headaches       aspirin (ASA) 81 MG EC tablet Take 1 tablet (81 mg) by mouth daily 30 tablet 0     atorvastatin (LIPITOR) 40 MG tablet Take 1 tablet (40 mg) by mouth daily 90 tablet 3     clopidogrel (PLAVIX) 75 MG tablet Take 1 tablet (75 mg) by mouth daily 30 tablet 0     furosemide (LASIX) 40 MG tablet Take 1 tablet (40 mg) by mouth daily 30 tablet 6     multivitamin, therapeutic with minerals (MULTI-VITAMIN) TABS Take 1 tablet by mouth daily. 100 tablet 3     naloxone (NARCAN) 4 MG/0.1ML nasal spray Spray 1 spray (4 mg) into one nostril alternating nostrils once as needed for opioid reversal Every 2-3 min until responsive or EMS arrives 0.2 mL 0     order for DME Equipment being ordered: Walker ()  Treatment Diagnosis: stroke 1 Units 0     other medical supplies Dispense knee high, medium compression, large size 4 each 0     oxyCODONE-acetaminophen (PERCOCET) 5-325 MG tablet Take 1-2 tablets by mouth every 6 hours as needed for pain Max 5 per day. 150 tablet 0     potassium chloride ER (KLOR-CON M) 20 MEQ CR tablet Take 2 tablets (40 mEq) by mouth daily 60 tablet 3     tamsulosin (FLOMAX) 0.4 MG capsule TAKE 1 CAPSULE BY MOUTH ONCE DAILY (Patient taking differently: Take 0.4 mg by mouth daily TAKE 1 CAPSULE BY MOUTH ONCE DAILY) 90 capsule 3     vitamin D3 (CHOLECALCIFEROL) 50 mcg (2000 units) tablet Take 1 tablet by mouth daily       sildenafil (VIAGRA) 100 MG tablet Take 30 min to 4 hours before intercourse as needed for erectile dysfunction Has appt with Dr Nascimento 11/9/2020 (Patient not taking: Reported on 12/17/2020) 25 tablet 2       ALLERGIES  Patient has no known allergies.    Brief physical exam:  General: In no acute distress, upright and  calm.  Eyes: No apparent redness or discharge.   Chest: No labored breathing, no cough during exam or audible wheezing.   Neuro: No obvious focal defects or tremors.   Psych: Alert and oriented. Does not appear anxious.     The rest of a comprehensive physical examination is deferred due to public health emergency video visit restrictions.     Primary cardiologist: Dr. Mock      HPI:  Jonathan Bishop is a very pleasant 75 year old male who carries a PMH significant for severe aortic stenosis, CVA with residual left sided weakness, hypertension, PVC's and hyperlipidemia.     On 12/8/2020, he underwent elective transcatheter aortic valve replacement using a 26 mm Anton S3 valve. Post procedural echocardiogram demonstrated a normal LV function estimated at 65%, normal RV size and function, and a well seated TAVR valve with a mean AoV pressure gradient of 17 mmHg, Vmax of 2.8 m/s, and no AI or PVL. His admission was uncomplicated and he was discharged the following day on Plavix, aspirin, statin, and lasix. PTA hydrochlorothiazide was held at the time of discharge due to mildly reduced potassium level and restarted a few days post op after reporting recurrent leg swelling. Today he is participating in a 1 week post TAVR telephone visit. Due to the inability to connect to video, this visit was converted to telephone.     He is feeling well on a cardiac standpoint with a noticeable improvement in shortness of breath immediately following TAVR implant. He denies chest pain, shortness of breath, palpitations, PND, orthopnea, presyncope or syncope.  He has noticed a return in leg swelling since discharge, likely due to the discontinuation of hydrochlorothiazide at the time of discharge. He has since resumed hydrochlorothiazide and increased Lasix to 40 mg daily. Leg edema is near baseline. Weight today is 222 lbs, down 6 lbs from discharge.     Blood pressures is well controlled at 116/68  Follow up BMP showed a sodium  of 144, potassium 3.8, BUN 10, Creatinine 0.73, and GFR > 90  Hgb 9.9, WBC 10.4, Plt 184, denies any evidence of bleeding on DAPT.     He plans to enroll in cardiac rehab in the next week.   Follows a heart healthy diet  Compliant with all medications.     Assessment/Plan:    1. Severe Aortic Stenosis - s/p TAVR using a 26 mm Naton S3 valve. Post procedure echocardiogram demonstrated a normal LV function estimated at 65%, normal RV size and function, and a well seated TAVR valve with a mean AoV pressure gradient of 17 mmHg, Vmax of 2.8 m/s, and no AI or PVL. Plavix and aspirin x 6 months uninterrupted. Encourage cardiac rehab, referral sent. Follow up echocardiogram, labs, and EKG in 3 weeks.     2. Diastolic heart failure - Weights stable at 222 lbs, down 6 lbs since discharge. Leg edema improving after resuming lasix 40 mg daily and hydrochlorothiazide 25 mg daily. Monitor weights daily and notify office with a weight gain of 3 lbs overnight or 5 lbs in a week. Strict low sodium diet.     3. Hypertension - Well controlled  4. Hyperlipidemia - On statin  5. CVA - on DAPT and statin.       Follow up plan: Follow up in 3 weeks with TAVR MARK with echo, labs, and EKG prior.     Telephone time - 20 min    PORFIRIO Mena, CNP  Pager [phone]  12/17/2020

## 2020-12-17 NOTE — PROGRESS NOTES
"Video Visit Technology for this patient: No video technology available to patient, please call patient over the phone     Jonathan Bishop is a 75 year old male who is being evaluated via a billable telephone visit.      The patient has been notified of following:     \"This telephone visit will be conducted via a call between you and your physician/provider. We have found that certain health care needs can be provided without the need for a physical exam.  This service lets us provide the care you need with a short phone conversation.  If a prescription is necessary we can send it directly to your pharmacy.  If lab work is needed we can place an order for that and you can then stop by our lab to have the test done at a later time.    Telephone visits are billed at different rates depending on your insurance coverage. During this emergency period, for some insurers they may be billed the same as an in-person visit.  Please reach out to your insurance provider with any questions.    If during the course of the call the physician/provider feels a telephone visit is not appropriate, you will not be charged for this service.\"    Patient has given verbal consent for Telephone visit?  Yes    What phone number would you like to be contacted at? [phone]    How would you like to obtain your AVS? MyChart    Subjective     Jonathan Bishop is a 75 year old male who presents via phone visit today for the following health issues:    AMBER Prieto is a 75 year old male with history of stroke, hypertension, hyperlipidemia, and severe aortic stenosis who presents for hospital follow-up post  TAVR procedure. Conner states that since discharge, his health has been \"wonderful.\" He denies any orthopnea or chest pain. His energy is much better and he endorses increased exertional capacity. He has not had any post-op fevers. Taking Plavix as prescribed. Recent labs within normal limits. Denies any mucosal bleeding.  He had follow-up with " cardiology today and will follow-up again in about one month. Plans to start cardiac rehab next week.       Review of Systems   Constitutional, HEENT, cardiovascular, pulmonary, gi and gu systems are negative, except as otherwise noted.     Objective      PSYCH: Alert and oriented times 3; coherent speech, normal   rate and volume, able to articulate logical thoughts, able   to abstract reason, no tangential thoughts, no hallucinations   or delusions  His affect is pleasant  RESP: No cough, no audible wheezing, able to talk in full sentences  Remainder of exam unable to be completed due to telephone visits      Assessment/Plan:  Jonathan was seen today for hospital f/u.    Diagnoses and all orders for this visit:    Severe aortic stenosis  Conner is doing extremely well post TAVR. Many of his symptoms secondary to severe aortic stenosis have resolved. Will begin cardiac rehab in the next week and follow-up as cardiology as needed.       Svetlana Guerra, MS3     I, Buck Nascimento, was present with the medical student who participated in the service and in the documentation of the note.  I have verified the history and personally performed the physical exam and medical decision making.  I agree with the assessment and plan of care as documented in the note.        Phone call duration: 12 minutes

## 2020-12-17 NOTE — PATIENT INSTRUCTIONS
Thanks for participating in a telephone visit with the [facility] today.    Doing well 1 week post TAVR  Swelling returned a few days post TAVR, now improving on Lasix and hydrochlorothiazide.   Continue to monitor weights daily, call office with a weight gain of 3 lbs overnight or 5 lbs in a week.   Plavix and aspirin 6 months uninterrupted.   Cardiac rehab- referral sent.   Encourage exercise, weight loss, and low sodium diet      Follow up in 3 weeks with TAVR MARK with echo and labs prior.     Please call my nurse at [phone] with any questions or concerns.    Scheduling phone number: [phone]  Reminder: Please bring in all current medications, over the counter supplements and vitamin bottles to your next appointment.

## 2020-12-17 NOTE — LETTER
"12/17/2020    Buck Nascimento MD  [address]  [place] [address]    RE: Jonathan Bishop       Dear Colleague,    I had the pleasure of seeing Jonathan Bishop in the [facility].    Jonathan Bishop is a 75 year old male who is being evaluated via a billable video visit.      The patient has been notified of following:     \"This video visit will be conducted via a call between you and your physician/provider. We have found that certain health care needs can be provided without the need for an in-person physical exam.  This service lets us provide the care you need with a video conversation.  If a prescription is necessary we can send it directly to your pharmacy.  If lab work is needed we can place an order for that and you can then stop by our lab to have the test done at a later time.    Video visits are billed at different rates depending on your insurance coverage.  Please reach out to your insurance provider with any questions.    If during the course of the call the physician/provider feels a video visit is not appropriate, you will not be charged for this service.\"    Patient has given verbal consent for Video visit? Yes  How would you like to obtain your AVS? MyChart  If you are dropped from the video visit, the video invite should be resent to: Text to cell phone: [phone]  Will anyone else be joining your video visit? No      Vitals - Patient Reported 7/22/2020 10/30/2020 12/17/2020   Height (Patient Reported) - 2' 1.394\" 5' 3.5\"   Weight (Patient Reported) 215 lb 8 oz 214 lb 222 lb   BMI (Based on Pt Reported Ht/Wt) - 233.32 kg/m2 38.71 kg/m2   Systolic (Patient Reported) 126 124 116   Diastolic (Patient Reported) 69 63 68   Pulse (Patient Reported) 89 85 107         Review Of Systems  Skin: NEGATIVE  Eyes:Ears/Nose/Throat: Glasses  Respiratory: NEGATIVE  Cardiovascular:Edema  Gastrointestinal: NEGATIVE  Genitourinary:NEGATIVE   Musculoskeletal: " Arthritis  Neurologic: NEGATIVE  Psychiatric: NEGATIVE  Hematologic/Lymphatic/Immunologic: NEGATIVE  Endocrine:  NEGATIVE    Madelyn Kothari SAIDA     CARDIOLOGY CLINIC VIDEO VISIT    I have reviewed and updated the patient's Past Medical History, Social History, Family History and Medication List.    MEDICATIONS:  Current Outpatient Medications   Medication Sig Dispense Refill     acetaminophen (TYLENOL) 325 MG tablet Take 1-2 tablets (325-650 mg) by mouth every 4 hours as needed for mild pain or headaches       aspirin (ASA) 81 MG EC tablet Take 1 tablet (81 mg) by mouth daily 30 tablet 0     atorvastatin (LIPITOR) 40 MG tablet Take 1 tablet (40 mg) by mouth daily 90 tablet 3     clopidogrel (PLAVIX) 75 MG tablet Take 1 tablet (75 mg) by mouth daily 30 tablet 0     furosemide (LASIX) 40 MG tablet Take 1 tablet (40 mg) by mouth daily 30 tablet 6     multivitamin, therapeutic with minerals (MULTI-VITAMIN) TABS Take 1 tablet by mouth daily. 100 tablet 3     naloxone (NARCAN) 4 MG/0.1ML nasal spray Spray 1 spray (4 mg) into one nostril alternating nostrils once as needed for opioid reversal Every 2-3 min until responsive or EMS arrives 0.2 mL 0     order for DME Equipment being ordered: Walker ()  Treatment Diagnosis: stroke 1 Units 0     other medical supplies Dispense knee high, medium compression, large size 4 each 0     oxyCODONE-acetaminophen (PERCOCET) 5-325 MG tablet Take 1-2 tablets by mouth every 6 hours as needed for pain Max 5 per day. 150 tablet 0     potassium chloride ER (KLOR-CON M) 20 MEQ CR tablet Take 2 tablets (40 mEq) by mouth daily 60 tablet 3     tamsulosin (FLOMAX) 0.4 MG capsule TAKE 1 CAPSULE BY MOUTH ONCE DAILY (Patient taking differently: Take 0.4 mg by mouth daily TAKE 1 CAPSULE BY MOUTH ONCE DAILY) 90 capsule 3     vitamin D3 (CHOLECALCIFEROL) 50 mcg (2000 units) tablet Take 1 tablet by mouth daily       sildenafil (VIAGRA) 100 MG tablet Take 30 min to 4 hours before intercourse as  needed for erectile dysfunction Has appt with Dr Nascimento 11/9/2020 (Patient not taking: Reported on 12/17/2020) 25 tablet 2       ALLERGIES  Patient has no known allergies.    Brief physical exam:  General: In no acute distress, upright and calm.  Eyes: No apparent redness or discharge.   Chest: No labored breathing, no cough during exam or audible wheezing.   Neuro: No obvious focal defects or tremors.   Psych: Alert and oriented. Does not appear anxious.     The rest of a comprehensive physical examination is deferred due to public health emergency video visit restrictions.     Primary cardiologist: Dr. Mock      HPI:  Jonathan Bishop is a very pleasant 75 year old male who carries a PMH significant for severe aortic stenosis, CVA with residual left sided weakness, hypertension, PVC's and hyperlipidemia.     On 12/8/2020, he underwent elective transcatheter aortic valve replacement using a 26 mm Anton S3 valve. Post procedural echocardiogram demonstrated a normal LV function estimated at 65%, normal RV size and function, and a well seated TAVR valve with a mean AoV pressure gradient of 17 mmHg, Vmax of 2.8 m/s, and no AI or PVL. His admission was uncomplicated and he was discharged the following day on Plavix, aspirin, statin, and lasix. PTA hydrochlorothiazide was held at the time of discharge due to mildly reduced potassium level and restarted a few days post op after reporting recurrent leg swelling. Today he is participating in a 1 week post TAVR telephone visit. Due to the inability to connect to video, this visit was converted to telephone.     He is feeling well on a cardiac standpoint with a noticeable improvement in shortness of breath immediately following TAVR implant. He denies chest pain, shortness of breath, palpitations, PND, orthopnea, presyncope or syncope.  He has noticed a return in leg swelling since discharge, likely due to the discontinuation of hydrochlorothiazide at the time of discharge.  He has since resumed hydrochlorothiazide and increased Lasix to 40 mg daily. Leg edema is near baseline. Weight today is 222 lbs, down 6 lbs from discharge.     Blood pressures is well controlled at 116/68  Follow up BMP showed a sodium of 144, potassium 3.8, BUN 10, Creatinine 0.73, and GFR > 90  Hgb 9.9, WBC 10.4, Plt 184, denies any evidence of bleeding on DAPT.     He plans to enroll in cardiac rehab in the next week.   Follows a heart healthy diet  Compliant with all medications.     Assessment/Plan:    1. Severe Aortic Stenosis - s/p TAVR using a 26 mm Anton S3 valve. Post procedure echocardiogram demonstrated a normal LV function estimated at 65%, normal RV size and function, and a well seated TAVR valve with a mean AoV pressure gradient of 17 mmHg, Vmax of 2.8 m/s, and no AI or PVL. Plavix and aspirin x 6 months uninterrupted. Encourage cardiac rehab, referral sent. Follow up echocardiogram, labs, and EKG in 3 weeks.     2. Diastolic heart failure - Weights stable at 222 lbs, down 6 lbs since discharge. Leg edema improving after resuming lasix 40 mg daily and hydrochlorothiazide 25 mg daily. Monitor weights daily and notify office with a weight gain of 3 lbs overnight or 5 lbs in a week. Strict low sodium diet.     3. Hypertension - Well controlled  4. Hyperlipidemia - On statin  5. CVA - on DAPT and statin.       Follow up plan: Follow up in 3 weeks with TAVR MARK with echo, labs, and EKG prior.     Telephone time - 20 min    PORFIRIO Mena, CNP  Pager [phone]  12/17/2020         Thank you for allowing me to participate in the care of your patient.    Sincerely,     PORFIRIO Mena CNP     [facility]

## 2020-12-17 NOTE — NURSING NOTE
Chief Complaint   Patient presents with     Hospital F/U     Pt would like to discuss hospital follow up      Sarah Kulkarni EMT at 1:54 PM sign on 12/17/2020

## 2020-12-28 ENCOUNTER — TELEPHONE (OUTPATIENT)
Dept: CARDIOLOGY | Facility: CLINIC | Age: 75
End: 2020-12-28

## 2020-12-28 DIAGNOSIS — I35.0 AORTIC VALVE STENOSIS, ETIOLOGY OF CARDIAC VALVE DISEASE UNSPECIFIED: Primary | ICD-10-CM

## 2020-12-28 NOTE — TELEPHONE ENCOUNTER
Got a call from Conner that he has been having double vision since Saturday. I told him he needs to go to either an urgent care or an ER to get it checked out. He agreed and will head in.Diane Nelson RN

## 2020-12-30 NOTE — ADDENDUM NOTE
Addendum  created 12/30/20 1654 by Iftikhar, Nori Peguero MD    Clinical Note Signed, Intraprocedure Event edited, Intraprocedure Staff edited

## 2021-01-05 ENCOUNTER — HOSPITAL ENCOUNTER (OUTPATIENT)
Dept: CARDIOLOGY | Facility: CLINIC | Age: 76
Discharge: HOME | End: 2021-01-05
Attending: INTERNAL MEDICINE | Admitting: INTERNAL MEDICINE
Payer: COMMERCIAL

## 2021-01-05 DIAGNOSIS — I35.0 AORTIC VALVE STENOSIS, ETIOLOGY OF CARDIAC VALVE DISEASE UNSPECIFIED: ICD-10-CM

## 2021-01-05 LAB
ANION GAP SERPL CALCULATED.3IONS-SCNC: 2 MMOL/L (ref 3–14)
BUN SERPL-MCNC: 13 MG/DL (ref 7–30)
CALCIUM SERPL-MCNC: 9.1 MG/DL (ref 8.5–10.1)
CHLORIDE SERPL-SCNC: 111 MMOL/L (ref 94–109)
CO2 SERPL-SCNC: 27 MMOL/L (ref 20–32)
CREAT SERPL-MCNC: 0.65 MG/DL (ref 0.66–1.25)
ERYTHROCYTE [DISTWIDTH] IN BLOOD BY AUTOMATED COUNT: 15.7 % (ref 10–15)
GFR SERPL CREATININE-BSD FRML MDRD: >90 ML/MIN/{1.73_M2}
GLUCOSE SERPL-MCNC: 86 MG/DL (ref 70–99)
HCT VFR BLD AUTO: 38 % (ref 40–53)
HGB BLD-MCNC: 11.1 G/DL (ref 13.3–17.7)
MCH RBC QN AUTO: 24.7 PG (ref 26.5–33)
MCHC RBC AUTO-ENTMCNC: 29.2 G/DL (ref 31.5–36.5)
MCV RBC AUTO: 84 FL (ref 78–100)
PLATELET # BLD AUTO: 214 10E9/L (ref 150–450)
POTASSIUM SERPL-SCNC: 3.7 MMOL/L (ref 3.4–5.3)
RBC # BLD AUTO: 4.5 10E12/L (ref 4.4–5.9)
SODIUM SERPL-SCNC: 140 MMOL/L (ref 133–144)
WBC # BLD AUTO: 7.2 10E9/L (ref 4–11)

## 2021-01-05 PROCEDURE — 85027 COMPLETE CBC AUTOMATED: CPT | Performed by: INTERNAL MEDICINE

## 2021-01-05 PROCEDURE — 36415 COLL VENOUS BLD VENIPUNCTURE: CPT | Performed by: INTERNAL MEDICINE

## 2021-01-05 PROCEDURE — 93308 TTE F-UP OR LMTD: CPT | Mod: 26 | Performed by: INTERNAL MEDICINE

## 2021-01-05 PROCEDURE — 80048 BASIC METABOLIC PNL TOTAL CA: CPT | Performed by: INTERNAL MEDICINE

## 2021-01-05 PROCEDURE — 93321 DOPPLER ECHO F-UP/LMTD STD: CPT | Mod: 26 | Performed by: INTERNAL MEDICINE

## 2021-01-05 PROCEDURE — 93325 DOPPLER ECHO COLOR FLOW MAPG: CPT | Mod: 26 | Performed by: INTERNAL MEDICINE

## 2021-01-05 PROCEDURE — 93325 DOPPLER ECHO COLOR FLOW MAPG: CPT

## 2021-01-07 ENCOUNTER — OFFICE VISIT (OUTPATIENT)
Dept: CARDIOLOGY | Facility: CLINIC | Age: 76
End: 2021-01-07
Attending: INTERNAL MEDICINE
Payer: COMMERCIAL

## 2021-01-07 VITALS
DIASTOLIC BLOOD PRESSURE: 69 MMHG | WEIGHT: 234 LBS | HEART RATE: 82 BPM | HEIGHT: 64 IN | BODY MASS INDEX: 39.95 KG/M2 | SYSTOLIC BLOOD PRESSURE: 129 MMHG

## 2021-01-07 DIAGNOSIS — I63.521 CEREBROVASCULAR ACCIDENT INVOLVING ANTERIOR CIRCULATION, RIGHT (H): ICD-10-CM

## 2021-01-07 DIAGNOSIS — I50.33 ACUTE ON CHRONIC DIASTOLIC HEART FAILURE (H): ICD-10-CM

## 2021-01-07 DIAGNOSIS — I10 ESSENTIAL HYPERTENSION, BENIGN: ICD-10-CM

## 2021-01-07 DIAGNOSIS — E78.5 HYPERLIPIDEMIA WITH TARGET LDL LESS THAN 130: ICD-10-CM

## 2021-01-07 DIAGNOSIS — I35.0 AORTIC VALVE STENOSIS, ETIOLOGY OF CARDIAC VALVE DISEASE UNSPECIFIED: Primary | ICD-10-CM

## 2021-01-07 DIAGNOSIS — Z95.2 S/P TAVR (TRANSCATHETER AORTIC VALVE REPLACEMENT): ICD-10-CM

## 2021-01-07 DIAGNOSIS — R06.02 SOB (SHORTNESS OF BREATH): ICD-10-CM

## 2021-01-07 PROBLEM — I50.30 DIASTOLIC HEART FAILURE (H): Status: ACTIVE | Noted: 2021-01-07

## 2021-01-07 PROCEDURE — 93000 ELECTROCARDIOGRAM COMPLETE: CPT | Performed by: NURSE PRACTITIONER

## 2021-01-07 PROCEDURE — 99214 OFFICE O/P EST MOD 30 MIN: CPT | Performed by: NURSE PRACTITIONER

## 2021-01-07 RX ORDER — FUROSEMIDE 40 MG
40 TABLET ORAL DAILY
Qty: 90 TABLET | Refills: 1 | Status: SHIPPED | OUTPATIENT
Start: 2021-01-07 | End: 2021-01-21

## 2021-01-07 ASSESSMENT — MIFFLIN-ST. JEOR: SCORE: 1699.48

## 2021-01-07 NOTE — LETTER
1/7/2021    Buck Nascimento MD  [address] [facility] [address]  [facility] [address]    RE: Jonathan Bishop       Dear Colleague,    I had the pleasure of seeing Jonathan Bishop in the [facility].    STRUCTURAL HEART CARE  Post-TAVR Clinic Visit      HPI  Jonathan Bishop is a very pleasant 75 year old male who carries a PMH significant for severe aortic stenosis, CVA with residual left sided weakness, hypertension, PVC's and hyperlipidemia.      On 12/8/2020, he underwent elective transcatheter aortic valve replacement using a 26 mm Anton S3 valve.  He returns to the office today for a 1 month post TAVR follow-up.    He reports worsening shortness of breath, weight gain, and bilateral lower extremity edema over the last week. He states he ran out of Lasix ~ 5 days ago and admits to dietary indiscretions over the holiday.  He is up 12 lbs over the last 3 weeks. He denies chest pain, palpitations, PND, orthopnea, presyncope, or syncope.  NYHA CLASS: Class 3    One month post TAVR echocardiogram demonstrates a well-seated 26 mm Anton S3 valve with no paravalvular regurgitation.  Mean systolic gradient is 18 mmHg (similar to previous study), LV function is normal with EF estimated at 60 to 65%, and normal RV size and function.    EKG demonstrates sinus rhythm with first-degree AV block  Blood pressures well controlled at 129/69, managed on hydrochlorothiazide.  Recent BMP showed a sodium of 140, potassium 3.7, BUN 13, creatinine 0.65, GFR greater than 90  Hemoglobin 11.1, WBC 7.2, and platelet 214    He is currently not enrolled in cardiac rehab due to change of insurance.  He is scheduled to begin February 1.    Assessment and Plan  1. Severe Aortic Stenosis - s/p TAVR using a 26 mm Anton S3 valve.  1 month post TAVR echo demonstrates a well-seated TAVR valve with no paravalvular regurgitation, mean AOV pressure gradient of 18 mmHg, similar to previous study, normal LV function  estimated at 60 to 65%, and normal RV size and function.  Plavix and aspirin x 6 months uninterrupted.  Encourage cardiac rehab.    2.  Diastolic heart failure -worsening shortness of breath over the last week.  Up approximately 12 pounds in the last 3 weeks, likely due to running out of Lasix and dietary indiscretions.  Resume Lasix 40 mg twice daily x3 days then reduce to 40 mg daily.  Daily weights.  Call office with a weight gain of 3 pounds overnight or 5 pounds in 1 week.  Strict low-sodium diet.    3.  Hypertension - Well controlled on hydrochlorothiazide  4. Hyperlipidemia - On statin  5. H/O CVA - on DAPT and statin.     Follow up plan - RN to call patient on Monday 1/11/2021 for update in symptoms. Follow up with MARK in 1 month or sooner if needed.     Ariadna Wing APRN, CNP  Pager [phone]  1/7/2021      PAST MEDICAL HISTORY:  Past Medical History:   Diagnosis Date     * * * SBE PROPHYLAXIS * * *     s/p TAVR     Acute rheumatic carditis 1950     Complication of anesthesia     pt once woke up during back surgery     Coronary artery disease     murmur     Erectile dysfunction     Sildenafil     Hip pain, bilateral      Hypercholesteremia      Hypertension      Low back pain      Nonsenile cataract      Obesity      Renal cyst      S/P 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     Stroke (cerebrum) (H) 11/30/2016     Umbilical hernia 06/10/2011    s/p surgical repair     Wound, surgical, infected 06/10/2011    hernia       CURRENT MEDICATIONS:  Current Outpatient Medications   Medication Sig Dispense Refill     acetaminophen (TYLENOL) 325 MG tablet Take 1-2 tablets (325-650 mg) by mouth every 4 hours as needed for mild pain or headaches       aspirin (ASA) 81 MG EC tablet Take 1 tablet (81 mg) by mouth daily 30 tablet 0     atorvastatin (LIPITOR) 40 MG tablet Take 1 tablet (40 mg) by mouth daily 90 tablet 3     clopidogrel (PLAVIX) 75 MG tablet Take 1  tablet (75 mg) by mouth daily 30 tablet 0     furosemide (LASIX) 40 MG tablet Take 1 tablet (40 mg) by mouth daily 90 tablet 1     hydrochlorothiazide (HYDRODIURIL) 25 MG tablet Take 1 tablet (25 mg) by mouth daily 90 tablet 1     multivitamin, therapeutic with minerals (MULTI-VITAMIN) TABS Take 1 tablet by mouth daily. 100 tablet 3     naloxone (NARCAN) 4 MG/0.1ML nasal spray Spray 1 spray (4 mg) into one nostril alternating nostrils once as needed for opioid reversal Every 2-3 min until responsive or EMS arrives 0.2 mL 0     order for DME Equipment being ordered: Walker ()  Treatment Diagnosis: stroke 1 Units 0     other medical supplies Dispense knee high, medium compression, large size 4 each 0     oxyCODONE-acetaminophen (PERCOCET) 5-325 MG tablet Take 1-2 tablets by mouth every 6 hours as needed for pain Max 5 per day. 150 tablet 0     potassium chloride ER (KLOR-CON M) 20 MEQ CR tablet Take 2 tablets (40 mEq) by mouth daily 60 tablet 3     sildenafil (VIAGRA) 100 MG tablet Take 30 min to 4 hours before intercourse as needed for erectile dysfunction Has appt with Dr Nascimento 11/9/2020 25 tablet 2     tamsulosin (FLOMAX) 0.4 MG capsule TAKE 1 CAPSULE BY MOUTH ONCE DAILY (Patient taking differently: Take 0.4 mg by mouth daily TAKE 1 CAPSULE BY MOUTH ONCE DAILY) 90 capsule 3     vitamin D3 (CHOLECALCIFEROL) 50 mcg (2000 units) tablet Take 1 tablet by mouth daily         PAST SURGICAL HISTORY:  Past Surgical History:   Procedure Laterality Date     ARTHROPLASTY KNEE BILATERAL  7/22/2010     COLONOSCOPY N/A 4/14/2017    Procedure: COLONOSCOPY;  Surgeon: Toby Bills MD;  Location:  GI     CV CORONARY ANGIOGRAM N/A 10/28/2019    Procedure: Coronary Angiogram;  Surgeon: Guerrero Huitron MD;  Location:  HEART CARDIAC CATH LAB     CV RIGHT HEART CATH N/A 10/28/2019    Procedure: Right Heart Cath;  Surgeon: Guerrero Huitron MD;  Location:  HEART CARDIAC CATH LAB     CV TRANSCATHETER AORTIC VALVE  REPLACEMENT N/A 2020    Procedure: Transcatheter Aortic Valve Replacement;  Surgeon: Camila Begum MD;  Location:  HEART CARDIAC CATH LAB     FUSION LUMBAR ANTERIOR TWO LEVELS       HERNIORRHAPHY UMBILICAL  6/10/2011    Procedure:HERNIORRHAPHY UMBILICAL; Open, with mesh placement; Surgeon:HO PARHAM; Location:[facility] OR     LAMINECTOMY LUMBAR TWO LEVELS         ALLERGIES   No Known Allergies    FAMILY HISTORY:  Family History   Problem Relation Age of Onset     C.A.D. Mother      Unknown/Adopted Father      Heart Failure Sister      Heart Failure Sister      Glaucoma No family hx of      Macular Degeneration No family hx of        SOCIAL HISTORY:  Social History     Socioeconomic History     Marital status:      Spouse name: None     Number of children: None     Years of education: None     Highest education level: None   Occupational History     None   Social Needs     Financial resource strain: None     Food insecurity     Worry: None     Inability: None     Transportation needs     Medical: None     Non-medical: None   Tobacco Use     Smoking status: Former Smoker     Quit date: 2005     Years since quittin.0     Smokeless tobacco: Never Used     Tobacco comment: Non smoker. No 2nd hand exposure. CCX, RMA PCC 2011   Substance and Sexual Activity     Alcohol use: No     Drug use: No     Sexual activity: Not Currently   Lifestyle     Physical activity     Days per week: None     Minutes per session: None     Stress: None   Relationships     Social connections     Talks on phone: None     Gets together: None     Attends Jewish service: None     Active member of club or organization: None     Attends meetings of clubs or organizations: None     Relationship status: None     Intimate partner violence     Fear of current or ex partner: None     Emotionally abused: None     Physically abused: None     Forced sexual activity: None   Other Topics Concern     Parent/sibling w/  "CABG, MI or angioplasty before 65F 55M? Not Asked   Social History Narrative    He lives by himself in an apt in [place].  He has 2 goldfish, Lai and Ubaldo.         Review of Systems:  Skin:  Negative     Eyes:  Positive for glasses  ENT:  Negative    Respiratory:  Positive for dyspnea on exertion;cough;wheezing;shortness of breath  Cardiovascular:    Positive for;edema;lightheadedness;dizziness;fatigue  Gastroenterology: Negative    Genitourinary:  Negative    Musculoskeletal:  Positive for joint pain  Neurologic:  Positive for stroke;numbness or tingling of feet;numbness or tingling of hands  Psychiatric:  Negative    Heme/Lymph/Imm:  Negative    Endocrine:  Negative      EXAM:  /69   Pulse 82   Ht 1.613 m (5' 3.5\")   Wt 106.1 kg (234 lb)   BMI 40.80 kg/m    Physical Exam:  Vitals: /69   Pulse 82   Ht 1.613 m (5' 3.5\")   Wt 106.1 kg (234 lb)   BMI 40.80 kg/m      Constitutional:  cooperative, alert and oriented, well developed, well nourished, in no acute distress obese      Skin:  warm and dry to the touch, no apparent skin lesions or masses noted        Head:  normocephalic, no masses or lesions        Eyes:  pupils equal and round        ENT:  no pallor or cyanosis, dentition good        Neck:  carotid pulses are full and equal bilaterally        Chest:    diminished breath sounds bilaterally      Cardiac: normal S1 and S2       systolic murmur          Abdomen:    obese      Extremities and Back:       Bilateral pitting LE edema 1+    Neurological:    limb weakness;ataxia          Labs:  LIPID RESULTS:  Lab Results   Component Value Date    CHOL 112 07/01/2019    HDL 47 07/01/2019    LDL 51 07/01/2019    TRIG 70 07/01/2019    CHOLHDLRATIO 4.5 10/07/2014       LIVER ENZYME RESULTS:  Lab Results   Component Value Date    AST 27 12/08/2020    ALT 28 12/08/2020       CBC RESULTS:  Lab Results   Component Value Date    WBC 7.2 01/05/2021    RBC 4.50 01/05/2021    HGB 11.1 (L) " 01/05/2021    HCT 38.0 (L) 01/05/2021    MCV 84 01/05/2021    MCH 24.7 (L) 01/05/2021    MCHC 29.2 (L) 01/05/2021    RDW 15.7 (H) 01/05/2021     01/05/2021       BMP RESULTS:  Lab Results   Component Value Date     01/05/2021    POTASSIUM 3.7 01/05/2021    CHLORIDE 111 (H) 01/05/2021    CO2 27 01/05/2021    ANIONGAP 2 (L) 01/05/2021    GLC 86 01/05/2021    BUN 13 01/05/2021    CR 0.65 (L) 01/05/2021    GFRESTIMATED >90 01/05/2021    GFRESTBLACK >90 01/05/2021    WARREN 9.1 01/05/2021        A1C RESULTS:  Lab Results   Component Value Date    A1C 6.3 (H) 11/30/2016       INR RESULTS:  Lab Results   Component Value Date    INR 1.01 10/28/2019    INR 0.99 11/30/2016       KCCQ  Q1A 5  Q1B 3  Q1C 6  Q2 1  Q3 1  Q4 3  Q5 5  Q6 1  Q7 2  Q8A 1  Q8B 1  Q8C 1         Thank you for allowing me to participate in the care of your patient.    Sincerely,     PORFIRIO Mena [facility]

## 2021-01-07 NOTE — PATIENT INSTRUCTIONS
Thanks for participating in a office visit with the [facility] clinic today.    Shortness of breath over the last week. Ran out of lasix.   Resume Lasix today. Take 40 mg twice a day x 3 days, then reduce to 40 mg daily.   Follow up call by our office on Monday for update.   Weight daily and call office with a weight gain of 3 lbs overnight or 5 lbs in a week.   Continue with Plavix x 6 months.   Strict low sodium diet  Encourage cardiac rehab, scheduled to begin on February 1st     Follow up in 1 month with MARK    Please call my nurse at [phone] with any questions or concerns.    Scheduling phone number: [phone]  Reminder: Please bring in all current medications, over the counter supplements and vitamin bottles to your next appointment.

## 2021-01-07 NOTE — PROGRESS NOTES
STRUCTURAL HEART CARE  Post-TAVR Clinic Visit      HPI  Jonathan Bishop is a very pleasant 75 year old male who carries a PMH significant for severe aortic stenosis, CVA with residual left sided weakness, hypertension, PVC's and hyperlipidemia.      On 12/8/2020, he underwent elective transcatheter aortic valve replacement using a 26 mm Anton S3 valve.  He returns to the office today for a 1 month post TAVR follow-up.    He reports worsening shortness of breath, weight gain, and bilateral lower extremity edema over the last week. He states he ran out of Lasix ~ 5 days ago and admits to dietary indiscretions over the holiday.  He is up 12 lbs over the last 3 weeks. He denies chest pain, palpitations, PND, orthopnea, presyncope, or syncope.  NYHA CLASS: Class 3    One month post TAVR echocardiogram demonstrates a well-seated 26 mm Anton S3 valve with no paravalvular regurgitation.  Mean systolic gradient is 18 mmHg (similar to previous study), LV function is normal with EF estimated at 60 to 65%, and normal RV size and function.    EKG demonstrates sinus rhythm with first-degree AV block  Blood pressures well controlled at 129/69, managed on hydrochlorothiazide.  Recent BMP showed a sodium of 140, potassium 3.7, BUN 13, creatinine 0.65, GFR greater than 90  Hemoglobin 11.1, WBC 7.2, and platelet 214    He is currently not enrolled in cardiac rehab due to change of insurance.  He is scheduled to begin February 1.    Assessment and Plan  1. Severe Aortic Stenosis - s/p TAVR using a 26 mm Anton S3 valve.  1 month post TAVR echo demonstrates a well-seated TAVR valve with no paravalvular regurgitation, mean AOV pressure gradient of 18 mmHg, similar to previous study, normal LV function estimated at 60 to 65%, and normal RV size and function.  Plavix and aspirin x 6 months uninterrupted.  Encourage cardiac rehab.    2.  Diastolic heart failure -worsening shortness of breath over the last week.  Up approximately  12 pounds in the last 3 weeks, likely due to running out of Lasix and dietary indiscretions.  Resume Lasix 40 mg twice daily x3 days then reduce to 40 mg daily.  Daily weights.  Call office with a weight gain of 3 pounds overnight or 5 pounds in 1 week.  Strict low-sodium diet.    3.  Hypertension - Well controlled on hydrochlorothiazide  4. Hyperlipidemia - On statin  5. H/O CVA - on DAPT and statin.     Follow up plan - RN to call patient on Monday 1/11/2021 for update in symptoms. Follow up with MARK in 1 month or sooner if needed.     Ariadna Wing, APRN, CNP  Pager [phone]  1/7/2021      PAST MEDICAL HISTORY:  Past Medical History:   Diagnosis Date     * * * SBE PROPHYLAXIS * * *     s/p TAVR     Acute rheumatic carditis 1950     Complication of anesthesia     pt once woke up during back surgery     Coronary artery disease     murmur     Erectile dysfunction     Sildenafil     Hip pain, bilateral      Hypercholesteremia      Hypertension      Low back pain      Nonsenile cataract      Obesity      Renal cyst      S/P 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     Stroke (cerebrum) (H) 11/30/2016     Umbilical hernia 06/10/2011    s/p surgical repair     Wound, surgical, infected 06/10/2011    hernia       CURRENT MEDICATIONS:  Current Outpatient Medications   Medication Sig Dispense Refill     acetaminophen (TYLENOL) 325 MG tablet Take 1-2 tablets (325-650 mg) by mouth every 4 hours as needed for mild pain or headaches       aspirin (ASA) 81 MG EC tablet Take 1 tablet (81 mg) by mouth daily 30 tablet 0     atorvastatin (LIPITOR) 40 MG tablet Take 1 tablet (40 mg) by mouth daily 90 tablet 3     clopidogrel (PLAVIX) 75 MG tablet Take 1 tablet (75 mg) by mouth daily 30 tablet 0     furosemide (LASIX) 40 MG tablet Take 1 tablet (40 mg) by mouth daily 90 tablet 1     hydrochlorothiazide (HYDRODIURIL) 25 MG tablet Take 1 tablet (25 mg) by mouth daily 90 tablet 1      multivitamin, therapeutic with minerals (MULTI-VITAMIN) TABS Take 1 tablet by mouth daily. 100 tablet 3     naloxone (NARCAN) 4 MG/0.1ML nasal spray Spray 1 spray (4 mg) into one nostril alternating nostrils once as needed for opioid reversal Every 2-3 min until responsive or EMS arrives 0.2 mL 0     order for DME Equipment being ordered: Walker ()  Treatment Diagnosis: stroke 1 Units 0     other medical supplies Dispense knee high, medium compression, large size 4 each 0     oxyCODONE-acetaminophen (PERCOCET) 5-325 MG tablet Take 1-2 tablets by mouth every 6 hours as needed for pain Max 5 per day. 150 tablet 0     potassium chloride ER (KLOR-CON M) 20 MEQ CR tablet Take 2 tablets (40 mEq) by mouth daily 60 tablet 3     sildenafil (VIAGRA) 100 MG tablet Take 30 min to 4 hours before intercourse as needed for erectile dysfunction Has appt with Dr Nascimento 11/9/2020 25 tablet 2     tamsulosin (FLOMAX) 0.4 MG capsule TAKE 1 CAPSULE BY MOUTH ONCE DAILY (Patient taking differently: Take 0.4 mg by mouth daily TAKE 1 CAPSULE BY MOUTH ONCE DAILY) 90 capsule 3     vitamin D3 (CHOLECALCIFEROL) 50 mcg (2000 units) tablet Take 1 tablet by mouth daily         PAST SURGICAL HISTORY:  Past Surgical History:   Procedure Laterality Date     ARTHROPLASTY KNEE BILATERAL  7/22/2010     COLONOSCOPY N/A 4/14/2017    Procedure: COLONOSCOPY;  Surgeon: Toby Bills MD;  Location:  GI     CV CORONARY ANGIOGRAM N/A 10/28/2019    Procedure: Coronary Angiogram;  Surgeon: Guerrero Huitron MD;  Location:  HEART CARDIAC CATH LAB     CV RIGHT HEART CATH N/A 10/28/2019    Procedure: Right Heart Cath;  Surgeon: Guerrero Huitron MD;  Location:  HEART CARDIAC CATH LAB     CV TRANSCATHETER AORTIC VALVE REPLACEMENT N/A 12/8/2020    Procedure: Transcatheter Aortic Valve Replacement;  Surgeon: Camila Begum MD;  Location:  HEART CARDIAC CATH LAB     FUSION LUMBAR ANTERIOR TWO LEVELS       HERNIORRHAPHY UMBILICAL   6/10/2011    Procedure:HERNIORRHAPHY UMBILICAL; Open, with mesh placement; Surgeon:HO PARHAM; Location:[facility] OR     LAMINECTOMY LUMBAR TWO LEVELS         ALLERGIES   No Known Allergies    FAMILY HISTORY:  Family History   Problem Relation Age of Onset     C.A.D. Mother      Unknown/Adopted Father      Heart Failure Sister      Heart Failure Sister      Glaucoma No family hx of      Macular Degeneration No family hx of        SOCIAL HISTORY:  Social History     Socioeconomic History     Marital status:      Spouse name: None     Number of children: None     Years of education: None     Highest education level: None   Occupational History     None   Social Needs     Financial resource strain: None     Food insecurity     Worry: None     Inability: None     Transportation needs     Medical: None     Non-medical: None   Tobacco Use     Smoking status: Former Smoker     Quit date: 2005     Years since quittin.0     Smokeless tobacco: Never Used     Tobacco comment: Non smoker. No 2nd hand exposure. CCX, RMA PCC 2011   Substance and Sexual Activity     Alcohol use: No     Drug use: No     Sexual activity: Not Currently   Lifestyle     Physical activity     Days per week: None     Minutes per session: None     Stress: None   Relationships     Social connections     Talks on phone: None     Gets together: None     Attends Religion service: None     Active member of club or organization: None     Attends meetings of clubs or organizations: None     Relationship status: None     Intimate partner violence     Fear of current or ex partner: None     Emotionally abused: None     Physically abused: None     Forced sexual activity: None   Other Topics Concern     Parent/sibling w/ CABG, MI or angioplasty before 65F 55M? Not Asked   Social History Narrative    He lives by himself in an apt in [place].  He has 2 goldfish, Lai and Ubaldo.         Review of Systems:  Skin:  Negative     Eyes:   "Positive for glasses  ENT:  Negative    Respiratory:  Positive for dyspnea on exertion;cough;wheezing;shortness of breath  Cardiovascular:    Positive for;edema;lightheadedness;dizziness;fatigue  Gastroenterology: Negative    Genitourinary:  Negative    Musculoskeletal:  Positive for joint pain  Neurologic:  Positive for stroke;numbness or tingling of feet;numbness or tingling of hands  Psychiatric:  Negative    Heme/Lymph/Imm:  Negative    Endocrine:  Negative      EXAM:  /69   Pulse 82   Ht 1.613 m (5' 3.5\")   Wt 106.1 kg (234 lb)   BMI 40.80 kg/m    Physical Exam:  Vitals: /69   Pulse 82   Ht 1.613 m (5' 3.5\")   Wt 106.1 kg (234 lb)   BMI 40.80 kg/m      Constitutional:  cooperative, alert and oriented, well developed, well nourished, in no acute distress obese      Skin:  warm and dry to the touch, no apparent skin lesions or masses noted        Head:  normocephalic, no masses or lesions        Eyes:  pupils equal and round        ENT:  no pallor or cyanosis, dentition good        Neck:  carotid pulses are full and equal bilaterally        Chest:    diminished breath sounds bilaterally      Cardiac: normal S1 and S2       systolic murmur          Abdomen:    obese      Extremities and Back:       Bilateral pitting LE edema 1+    Neurological:    limb weakness;ataxia          Labs:  LIPID RESULTS:  Lab Results   Component Value Date    CHOL 112 07/01/2019    HDL 47 07/01/2019    LDL 51 07/01/2019    TRIG 70 07/01/2019    CHOLHDLRATIO 4.5 10/07/2014       LIVER ENZYME RESULTS:  Lab Results   Component Value Date    AST 27 12/08/2020    ALT 28 12/08/2020       CBC RESULTS:  Lab Results   Component Value Date    WBC 7.2 01/05/2021    RBC 4.50 01/05/2021    HGB 11.1 (L) 01/05/2021    HCT 38.0 (L) 01/05/2021    MCV 84 01/05/2021    MCH 24.7 (L) 01/05/2021    MCHC 29.2 (L) 01/05/2021    RDW 15.7 (H) 01/05/2021     01/05/2021       BMP RESULTS:  Lab Results   Component Value Date     " 01/05/2021    POTASSIUM 3.7 01/05/2021    CHLORIDE 111 (H) 01/05/2021    CO2 27 01/05/2021    ANIONGAP 2 (L) 01/05/2021    GLC 86 01/05/2021    BUN 13 01/05/2021    CR 0.65 (L) 01/05/2021    GFRESTIMATED >90 01/05/2021    GFRESTBLACK >90 01/05/2021    WARREN 9.1 01/05/2021        A1C RESULTS:  Lab Results   Component Value Date    A1C 6.3 (H) 11/30/2016       INR RESULTS:  Lab Results   Component Value Date    INR 1.01 10/28/2019    INR 0.99 11/30/2016       KCCQ  Q1A 5  Q1B 3  Q1C 6  Q2 1  Q3 1  Q4 3  Q5 5  Q6 1  Q7 2  Q8A 1  Q8B 1  Q8C 1

## 2021-01-11 ENCOUNTER — TELEPHONE (OUTPATIENT)
Dept: CARDIOLOGY | Facility: CLINIC | Age: 76
End: 2021-01-11

## 2021-01-11 DIAGNOSIS — I50.33 ACUTE ON CHRONIC DIASTOLIC HEART FAILURE (H): ICD-10-CM

## 2021-01-11 DIAGNOSIS — R06.02 SOB (SHORTNESS OF BREATH): Primary | ICD-10-CM

## 2021-01-11 NOTE — TELEPHONE ENCOUNTER
Recommend he continue on Lasix 40 mg twice daily. Follow up in 1 week for clinical reassessment with BMP prior.

## 2021-01-11 NOTE — TELEPHONE ENCOUNTER
Called pt to assess symptoms as requested by Ariadna Wing NP. Pt states his shortness of breath is a little better, he has not weighed himself yet but thinks his weight is down some and he states the swelling in his ankles is about the same as it was when he saw Ariadna Wing NP but the swelling in his legs is better. Pt advised he should weigh himself first thing in the morning after using the restroom. Pt verbalized understanding. Pt states he has more questions but wanted to write them out first so pt advised he could sent a My Chart message to Ariadna Wing NP or call me back. Pt was given my direct number to call back. Will update Ariadna Wing NP. Ariane LARKIN

## 2021-01-11 NOTE — TELEPHONE ENCOUNTER
----- Message from PORFIRIO Mena CNP sent at 1/7/2021  4:13 PM CST -----  Hi,   I placed a RN follow up order for Conner to be called on Monday for update in his symptoms.   Just want you aware so he doesn't fall thru the cracks. Sometimes I order that and it doesn't get scheduled.     Thanks,   Ariadna

## 2021-01-14 DIAGNOSIS — M54.50 ACUTE MIDLINE LOW BACK PAIN WITHOUT SCIATICA: ICD-10-CM

## 2021-01-14 NOTE — TELEPHONE ENCOUNTER
Reason For Call:   No chief complaint on file.      Medication Name, Dose and Monthly Quantity:   Oxycodone with APAP (Percocet): Dose 5-325 Schedule 1 tablet by mouth every 6 hours prn Monthly Quantity 124   Diagnosis requiring opiates:   Acute midline low back pain without sciatica [M54.5]  - Primary     Problem List Updated:   No    Opioid Agreement On File - [facility] PAIN CONTRACT ID# [id number]:  No    Last Urine Drug Screen (at least once every 12 months) Date:   n/a  Unexpected Results:   n/a    [place]  Data Reviewed (at least once every 3 months) Date:   01/14/2021      Unexpected Results:    No.    Last Fill Date:   12/15/2020  Due Date:   01/15/2021  Last Visit with PCP:   09/11/2019    Future Visits with PCP:   No.    Processing:   E-scribe [facility] [place]    STEPH JACK CMA      ----------------------------------

## 2021-01-15 RX ORDER — OXYCODONE AND ACETAMINOPHEN 5; 325 MG/1; MG/1
1-2 TABLET ORAL EVERY 6 HOURS PRN
Qty: 150 TABLET | Refills: 0 | Status: SHIPPED | OUTPATIENT
Start: 2021-01-15 | End: 2021-02-05

## 2021-01-15 NOTE — TELEPHONE ENCOUNTER
[facility]    Phone Message    May a detailed message be left on voicemail: yes     Reason for Call: Medication Refill Request    Has the patient contacted the pharmacy for the refill? Yes     Name of medication being requested:   * oxyCODONE-acetaminophen (PERCOCET) 5-325 MG tablet    Provider who prescribed the medication: Buck Nascimento    Pharmacy: [facility] [address] [place] [address]    Date medication is needed: 1/15/2021, Patient is out of medication.          Action Taken: Message routed to:  [facility] Pcc    Travel Screening: Not Applicable

## 2021-01-18 DIAGNOSIS — I35.0 AORTIC VALVE STENOSIS, ETIOLOGY OF CARDIAC VALVE DISEASE UNSPECIFIED: ICD-10-CM

## 2021-01-18 RX ORDER — CLOPIDOGREL BISULFATE 75 MG/1
75 TABLET ORAL DAILY
Qty: 30 TABLET | Refills: 3 | Status: SHIPPED | OUTPATIENT
Start: 2021-01-18 | End: 2021-02-17

## 2021-01-21 ENCOUNTER — OFFICE VISIT (OUTPATIENT)
Dept: CARDIOLOGY | Facility: CLINIC | Age: 76
End: 2021-01-21
Payer: COMMERCIAL

## 2021-01-21 VITALS
BODY MASS INDEX: 39.49 KG/M2 | HEIGHT: 64 IN | WEIGHT: 231.3 LBS | HEART RATE: 66 BPM | DIASTOLIC BLOOD PRESSURE: 71 MMHG | SYSTOLIC BLOOD PRESSURE: 131 MMHG

## 2021-01-21 DIAGNOSIS — I35.0 AORTIC VALVE STENOSIS, ETIOLOGY OF CARDIAC VALVE DISEASE UNSPECIFIED: ICD-10-CM

## 2021-01-21 DIAGNOSIS — I50.33 ACUTE ON CHRONIC DIASTOLIC HEART FAILURE (H): ICD-10-CM

## 2021-01-21 DIAGNOSIS — Z95.2 S/P TAVR (TRANSCATHETER AORTIC VALVE REPLACEMENT): ICD-10-CM

## 2021-01-21 DIAGNOSIS — R06.02 SOB (SHORTNESS OF BREATH): ICD-10-CM

## 2021-01-21 DIAGNOSIS — I10 ESSENTIAL HYPERTENSION, BENIGN: ICD-10-CM

## 2021-01-21 DIAGNOSIS — R06.02 SOB (SHORTNESS OF BREATH): Primary | ICD-10-CM

## 2021-01-21 DIAGNOSIS — R60.0 BILATERAL LEG EDEMA: ICD-10-CM

## 2021-01-21 LAB
ANION GAP SERPL CALCULATED.3IONS-SCNC: 6 MMOL/L (ref 3–14)
BUN SERPL-MCNC: 25 MG/DL (ref 7–30)
CALCIUM SERPL-MCNC: 9.7 MG/DL (ref 8.5–10.1)
CHLORIDE SERPL-SCNC: 106 MMOL/L (ref 94–109)
CO2 SERPL-SCNC: 30 MMOL/L (ref 20–32)
CREAT SERPL-MCNC: 0.96 MG/DL (ref 0.66–1.25)
GFR SERPL CREATININE-BSD FRML MDRD: 76 ML/MIN/{1.73_M2}
GLUCOSE SERPL-MCNC: 103 MG/DL (ref 70–99)
POTASSIUM SERPL-SCNC: 3.5 MMOL/L (ref 3.4–5.3)
SODIUM SERPL-SCNC: 142 MMOL/L (ref 133–144)

## 2021-01-21 PROCEDURE — 80048 BASIC METABOLIC PNL TOTAL CA: CPT | Performed by: NURSE PRACTITIONER

## 2021-01-21 PROCEDURE — 36415 COLL VENOUS BLD VENIPUNCTURE: CPT | Performed by: NURSE PRACTITIONER

## 2021-01-21 PROCEDURE — 99213 OFFICE O/P EST LOW 20 MIN: CPT | Performed by: NURSE PRACTITIONER

## 2021-01-21 RX ORDER — FUROSEMIDE 40 MG
40 TABLET ORAL 2 TIMES DAILY
Qty: 180 TABLET | Refills: 1 | Status: SHIPPED | OUTPATIENT
Start: 2021-01-21 | End: 2021-04-19

## 2021-01-21 ASSESSMENT — MIFFLIN-ST. JEOR: SCORE: 1687.23

## 2021-01-21 NOTE — PROGRESS NOTES
Cardiology Clinic Progress Note  Jonathan Bishop MRN# [id number]   YOB: 1945 Age: 75 year old     Reason For Visit:  2 week follow up   Primary Cardiologist:   Dr. Mock           History of Presenting Illness:      Jonathan Bishop is a pleasant 75 year old patient who carries a past medical history significant for  severe aortic stenosis s/p TAVR using a 26 mm Anton S3 valve, CVA with residual left sided weakness, hypertension, PVC's, Diastolic heart failure and hyperlipidemia.     He was last seen on 1/7/2021 for a 1 month post TAVR follow up. Please refer to that office visit for a more complete HPI. He was visibly fluid overloaded (12 lbs up, LE edema) due to running out of Lasix and dietary indiscretions over the holidays. He was restarted on diuretics and returns to the office today for a 2 week clinical evaluation.      He reports significant improvement in shortness of breath after restarting diuretics. He denies chest pain, shortness of breath, PND, orthopnea, presyncope, syncope, heart racing, or palpitations. Upon exam, lungs are clear bilaterally, heart rate and rhythm are regular, no JVD, obese abdomen and 1+ bilateral LE edema. He is down ~ 3 lbs. BMI 40.33.     Blood pressure is well controlled at 131/71, heart rate 66, managed on Lasix and hydrochlorothiazide,  Last BMP showed a sodium of 142, potassium 3.5, BUN 25, creatinine 0.96, GFR 76    Activity consists of walking and ice fishing.  Order has been sent over for cardiac rehab now that his insurance would not cover.  Follows a low-sodium diet  Remains compliant with all medications.                   Assessment and Plan:       Jonathan Bishop is a pleasant 75 year old patient who carries a past medical history significant for  severe aortic stenosis s/p TAVR using a 26 mm Anton S3 valve, CVA with residual left sided weakness, hypertension, PVC's, Diastolic heart failure and hyperlipidemia.  He has shown a noticeable  improvement in symptoms over the last 2 weeks after restarting Lasix.  Upon exam, diuresis is not complete showing 1+ bilateral lower extremity edema, up 10 lbs from dry weight. Creatinine stable.   I will continue on current medical therapy.  I have asked him to weigh himself daily and call office with a 3 pound weight gain overnight or 5 pound weight gain in a week.  Monitor for worsening shortness of breath, abdominal distention, or worsening lower extremity edema.  We discussed strict low-sodium diet.  He is now ready to start cardiac rehab due to a change in insurance, referral sent.  I will have him follow-up in 1 month for clinical evaluation.                Thank you for allowing me to participate in this delightful patient's care.        Ariadna Wing, APRN CNP         Review of Systems:     Review of Systems:  Skin:  Negative     Eyes:  Negative    ENT:  Negative    Respiratory:  Negative    Cardiovascular:    Positive for;edema  Gastroenterology: Negative    Genitourinary:  Negative    Musculoskeletal:  Negative    Neurologic:  Negative    Psychiatric:  Negative    Heme/Lymph/Imm:  Negative    Endocrine:  Negative                Physical Exam:     GEN:  In general, this is a obese male in no acute distress.  HEENT:  Pupils equal, round. Sclerae nonicteric. Clear oropharynx. Mucous membranes moist.  NECK: Supple, no masses appreciated. Trachea midline.  No JVD   C/V:  Regular rate and rhythm, soft systolic murmur, no rub or gallop. No S3 or RV heave.   RESP: Respirations are unlabored. No use of accessory muscles. Clear to auscultation bilaterally without wheezing, rales, or rhonchi.  GI: Obese abdomen soft, nontender, nondistended. No HSM appreciated.   EXTREM: 1+ pitting bilateral LE edema. No cyanosis or clubbing.  NEURO: Alert and oriented, cooperative. Gait steady with wheeled walker. No obvious focal deficits.   PSYCH: Normal affect.  SKIN: Warm and dry. No rashes or petechiae appreciated.           Past Medical History:     Past Medical History:   Diagnosis Date     * * * SBE PROPHYLAXIS * * *     s/p TAVR     Acute rheumatic carditis 1950     Complication of anesthesia     pt once woke up during back surgery     Coronary artery disease     murmur     Erectile dysfunction     Sildenafil     Hip pain, bilateral      Hypercholesteremia      Hypertension      Low back pain      Nonsenile cataract      Obesity      Renal cyst      S/P 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     Stroke (cerebrum) (H) 11/30/2016     Umbilical hernia 06/10/2011    s/p surgical repair     Wound, surgical, infected 06/10/2011    hernia              Past Surgical History:     Past Surgical History:   Procedure Laterality Date     ARTHROPLASTY KNEE BILATERAL  7/22/2010     COLONOSCOPY N/A 4/14/2017    Procedure: COLONOSCOPY;  Surgeon: Toby Bills MD;  [facility]     CV CORONARY ANGIOGRAM N/A 10/28/2019    Procedure: Coronary Angiogram;  Surgeon: Guerrero Huitron MD;  Location:  HEART CARDIAC CATH LAB     CV RIGHT HEART CATH MEASUREMENTS RECORDED N/A 10/28/2019    Procedure: Right Heart Cath;  Surgeon: Guerrero Huitron MD;  Location:  HEART CARDIAC CATH LAB     CV TRANSCATHETER AORTIC VALVE REPLACEMENT N/A 12/8/2020    Procedure: Transcatheter Aortic Valve Replacement;  Surgeon: Camila Begum MD;  Location:  HEART CARDIAC CATH LAB     FUSION LUMBAR ANTERIOR TWO LEVELS       HERNIORRHAPHY UMBILICAL  6/10/2011    Procedure:HERNIORRHAPHY UMBILICAL; Open, with mesh placement; Surgeon:HO PARHAM; Location:[facility]     LAMINECTOMY LUMBAR TWO LEVELS                Allergies:   Patient has no known allergies.       Data:   All laboratory data reviewed:    LAST CHOLESTEROL:  Lab Results   Component Value Date    CHOL 112 07/01/2019     Lab Results   Component Value Date    HDL 47 07/01/2019     Lab Results   Component Value Date    LDL 51 07/01/2019     Lab  Results   Component Value Date    TRIG 70 07/01/2019     Lab Results   Component Value Date    CHOLHDLRATIO 4.5 10/07/2014       LAST BMP:  Lab Results   Component Value Date     01/05/2021      Lab Results   Component Value Date    POTASSIUM 3.7 01/05/2021     Lab Results   Component Value Date    CHLORIDE 111 01/05/2021     Lab Results   Component Value Date    WARREN 9.1 01/05/2021     Lab Results   Component Value Date    CO2 27 01/05/2021     Lab Results   Component Value Date    BUN 13 01/05/2021     Lab Results   Component Value Date    CR 0.65 01/05/2021     Lab Results   Component Value Date    GLC 86 01/05/2021       LAST CBC:  Lab Results   Component Value Date    WBC 7.2 01/05/2021     Lab Results   Component Value Date    RBC 4.50 01/05/2021     Lab Results   Component Value Date    HGB 11.1 01/05/2021     Lab Results   Component Value Date    HCT 38.0 01/05/2021     Lab Results   Component Value Date    MCV 84 01/05/2021     Lab Results   Component Value Date    MCH 24.7 01/05/2021     Lab Results   Component Value Date    MCHC 29.2 01/05/2021     Lab Results   Component Value Date    RDW 15.7 01/05/2021     Lab Results   Component Value Date     01/05/2021

## 2021-01-21 NOTE — LETTER
1/21/2021    Buck Nascimento MD  [address] [facility] [address] [facility] [address]  [facility] [address]    RE: Jonathan Bishop       Dear Colleague,    I had the [facility] of seeing [facility] in [facility].    Cardiology Clinic Progress Note  Jonathan Bishop MRN# [id number]   YOB: 1945 Age: 75 year old     Reason For Visit:  2 week follow up   Primary Cardiologist:   Dr. Mock           History of Presenting Illness:      Jonathan Bishop is a pleasant 75 year old patient who carries a past medical history significant for  severe aortic stenosis s/p TAVR using a 26 mm Anton S3 valve, CVA with residual left sided weakness, hypertension, PVC's, Diastolic heart failure and hyperlipidemia.     He was last seen on 1/7/2021 for a 1 month post TAVR follow up. Please refer to that office visit for a more complete HPI. He was visibly fluid overloaded (12 lbs up, LE edema) due to running out of Lasix and dietary indiscretions over the holidays. He was restarted on diuretics and returns to the office today for a 2 week clinical evaluation.      He reports significant improvement in shortness of breath after restarting diuretics. He denies chest pain, shortness of breath, PND, orthopnea, presyncope, syncope, heart racing, or palpitations. Upon exam, lungs are clear bilaterally, heart rate and rhythm are regular, no JVD, obese abdomen and 1+ bilateral LE edema. He is down ~ 3 lbs. BMI 40.33.     Blood pressure is well controlled at 131/71, heart rate 66, managed on Lasix and hydrochlorothiazide,  Last BMP showed a sodium of 142, potassium 3.5, BUN 25, creatinine 0.96, GFR 76    Activity consists of walking and ice fishing.  Order has been sent over for cardiac rehab now that his insurance would not cover.  Follows a low-sodium diet  Remains compliant with all medications.                   Assessment and Plan:       Jonathan Bishop is a pleasant 75 year old patient who carries a past  medical history significant for  severe aortic stenosis s/p TAVR using a 26 mm Anton S3 valve, CVA with residual left sided weakness, hypertension, PVC's, Diastolic heart failure and hyperlipidemia.  He has shown a noticeable improvement in symptoms over the last 2 weeks after restarting Lasix.  Upon exam, diuresis is not complete showing 1+ bilateral lower extremity edema, up 10 lbs from dry weight. Creatinine stable.   I will continue on current medical therapy.  I have asked him to weigh himself daily and call office with a 3 pound weight gain overnight or 5 pound weight gain in a week.  Monitor for worsening shortness of breath, abdominal distention, or worsening lower extremity edema.  We discussed strict low-sodium diet.  He is now ready to start cardiac rehab due to a change in insurance, referral sent.  I will have him follow-up in 1 month for clinical evaluation.                Thank you for allowing me to participate in this delightful patient's care.        Ariadna Wing, PORFIRIO CNP         Review of Systems:     Review of Systems:  Skin:  Negative     Eyes:  Negative    ENT:  Negative    Respiratory:  Negative    Cardiovascular:    Positive for;edema  Gastroenterology: Negative    Genitourinary:  Negative    Musculoskeletal:  Negative    Neurologic:  Negative    Psychiatric:  Negative    Heme/Lymph/Imm:  Negative    Endocrine:  Negative                Physical Exam:     GEN:  In general, this is a obese male in no acute distress.  HEENT:  Pupils equal, round. Sclerae nonicteric. Clear oropharynx. Mucous membranes moist.  NECK: Supple, no masses appreciated. Trachea midline.  No JVD   C/V:  Regular rate and rhythm, soft systolic murmur, no rub or gallop. No S3 or RV heave.   RESP: Respirations are unlabored. No use of accessory muscles. Clear to auscultation bilaterally without wheezing, rales, or rhonchi.  GI: Obese abdomen soft, nontender, nondistended. No HSM appreciated.   EXTREM: 1+ pitting  bilateral LE edema. No cyanosis or clubbing.  NEURO: Alert and oriented, cooperative. Gait steady with wheeled walker. No obvious focal deficits.   PSYCH: Normal affect.  SKIN: Warm and dry. No rashes or petechiae appreciated.          Past Medical History:     Past Medical History:   Diagnosis Date     * * * SBE PROPHYLAXIS * * *     s/p TAVR     Acute rheumatic carditis 1950     Complication of anesthesia     pt once woke up during back surgery     Coronary artery disease     murmur     Erectile dysfunction     Sildenafil     Hip pain, bilateral      Hypercholesteremia      Hypertension      Low back pain      Nonsenile cataract      Obesity      Renal cyst      S/P 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     Stroke (cerebrum) (H) 11/30/2016     Umbilical hernia 06/10/2011    s/p surgical repair     Wound, surgical, infected 06/10/2011    hernia              Past Surgical History:     Past Surgical History:   Procedure Laterality Date     ARTHROPLASTY KNEE BILATERAL  7/22/2010     COLONOSCOPY N/A 4/14/2017    Procedure: COLONOSCOPY;  Surgeon: Toby Bills MD;  Location: [facility]     CV CORONARY ANGIOGRAM N/A 10/28/2019    Procedure: Coronary Angiogram;  Surgeon: Guerrero Huitron MD;  Location:  HEART CARDIAC CATH LAB     CV RIGHT HEART CATH MEASUREMENTS RECORDED N/A 10/28/2019    Procedure: Right Heart Cath;  Surgeon: Guerrero Huitron MD;  Location: [facility] CARDIAC CATH LAB     CV TRANSCATHETER AORTIC VALVE REPLACEMENT N/A 12/8/2020    Procedure: Transcatheter Aortic Valve Replacement;  Surgeon: Camila Beugm MD;  Location:  HEART CARDIAC CATH LAB     FUSION LUMBAR ANTERIOR TWO LEVELS       HERNIORRHAPHY UMBILICAL  6/10/2011    Procedure:HERNIORRHAPHY UMBILICAL; Open, with mesh placement; Surgeon:HO PARHAM; Location:[facility]     LAMINECTOMY LUMBAR TWO LEVELS                Allergies:   Patient has no known allergies.       Data:    All laboratory data reviewed:    LAST CHOLESTEROL:  Lab Results   Component Value Date    CHOL 112 07/01/2019     Lab Results   Component Value Date    HDL 47 07/01/2019     Lab Results   Component Value Date    LDL 51 07/01/2019     Lab Results   Component Value Date    TRIG 70 07/01/2019     Lab Results   Component Value Date    CHOLHDLRATIO 4.5 10/07/2014       LAST BMP:  Lab Results   Component Value Date     01/05/2021      Lab Results   Component Value Date    POTASSIUM 3.7 01/05/2021     Lab Results   Component Value Date    CHLORIDE 111 01/05/2021     Lab Results   Component Value Date    WARREN 9.1 01/05/2021     Lab Results   Component Value Date    CO2 27 01/05/2021     Lab Results   Component Value Date    BUN 13 01/05/2021     Lab Results   Component Value Date    CR 0.65 01/05/2021     Lab Results   Component Value Date    GLC 86 01/05/2021       LAST CBC:  Lab Results   Component Value Date    WBC 7.2 01/05/2021     Lab Results   Component Value Date    RBC 4.50 01/05/2021     Lab Results   Component Value Date    HGB 11.1 01/05/2021     Lab Results   Component Value Date    HCT 38.0 01/05/2021     Lab Results   Component Value Date    MCV 84 01/05/2021     Lab Results   Component Value Date    MCH 24.7 01/05/2021     Lab Results   Component Value Date    MCHC 29.2 01/05/2021     Lab Results   Component Value Date    RDW 15.7 01/05/2021     Lab Results   Component Value Date     01/05/2021       Thank you for allowing me to participate in the care of your patient.    Sincerely,     PORFIRIO Mena [facility]

## 2021-01-21 NOTE — PATIENT INSTRUCTIONS
Thanks for participating in a office visit with the [facility] today.    Doing well after twice a day dosing of lasix, continue on current medial therapy  Dry weight 220 lb, currently 230 lbs. Weigh daily and call office with a weight gain of 3 lbs overnight or 5 lb in a week.   Encourage exercise, weight loss, and strict low sodium diet.   Start cardiac rehab.      Follow up in 1 month with MARK     Please call my nurse at [phone] with any questions or concerns.    Scheduling phone number: [phone]  Reminder: Please bring in all current medications, over the counter supplements and vitamin bottles to your next appointment.

## 2021-01-21 NOTE — LETTER
1/21/2021    Buck Nascimento MD  [address] [facility] [address]  [facility] [address]    RE: Jonathan Bishop       Dear Colleague,    I had the pleasure of seeing Jonathan Bishop in the [facility].    Cardiology Clinic Progress Note  Jonathan Bishop MRN# [id number]   YOB: 1945 Age: 75 year old     Reason For Visit:  2 week follow up   Primary Cardiologist:   Dr. Mock           History of Presenting Illness:      Jonathan Bishop is a pleasant 75 year old patient who carries a past medical history significant for  severe aortic stenosis s/p TAVR using a 26 mm Anton S3 valve, CVA with residual left sided weakness, hypertension, PVC's, Diastolic heart failure and hyperlipidemia.     He was last seen on 1/7/2021 for a 1 month post TAVR follow up. Please refer to that office visit for a more complete HPI. He was visibly fluid overloaded (12 lbs up, LE edema) due to running out of Lasix and dietary indiscretions over the holidays. He was restarted on diuretics and returns to the office today for a 2 week clinical evaluation.      He reports significant improvement in shortness of breath after restarting diuretics. He denies chest pain, shortness of breath, PND, orthopnea, presyncope, syncope, heart racing, or palpitations. Upon exam, lungs are clear bilaterally, heart rate and rhythm are regular, no JVD, obese abdomen and 1+ bilateral LE edema. He is down ~ 3 lbs. BMI 40.33.     Blood pressure is well controlled at 131/71, heart rate 66, managed on Lasix and hydrochlorothiazide,  Last BMP showed a sodium of 142, potassium 3.5, BUN 25, creatinine 0.96, GFR 76    Activity consists of walking and ice fishing.  Order has been sent over for cardiac rehab now that his insurance would not cover.  Follows a low-sodium diet  Remains compliant with all medications.                   Assessment and Plan:       Jonathan Bishop is a pleasant 75 year old patient who carries a past  medical history significant for  severe aortic stenosis s/p TAVR using a 26 mm Anton S3 valve, CVA with residual left sided weakness, hypertension, PVC's, Diastolic heart failure and hyperlipidemia.  He has shown a noticeable improvement in symptoms over the last 2 weeks after restarting Lasix.  Upon exam, diuresis is not complete showing 1+ bilateral lower extremity edema, up 10 lbs from dry weight. Creatinine stable.   I will continue on current medical therapy.  I have asked him to weigh himself daily and call office with a 3 pound weight gain overnight or 5 pound weight gain in a week.  Monitor for worsening shortness of breath, abdominal distention, or worsening lower extremity edema.  We discussed strict low-sodium diet.  He is now ready to start cardiac rehab due to a change in insurance, referral sent.  I will have him follow-up in 1 month for clinical evaluation.                Thank you for allowing me to participate in this delightful patient's care.        Ariadna Wing, PORFIRIO CNP         Review of Systems:     Review of Systems:  Skin:  Negative     Eyes:  Negative    ENT:  Negative    Respiratory:  Negative    Cardiovascular:    Positive for;edema  Gastroenterology: Negative    Genitourinary:  Negative    Musculoskeletal:  Negative    Neurologic:  Negative    Psychiatric:  Negative    Heme/Lymph/Imm:  Negative    Endocrine:  Negative                Physical Exam:     GEN:  In general, this is a obese male in no acute distress.  HEENT:  Pupils equal, round. Sclerae nonicteric. Clear oropharynx. Mucous membranes moist.  NECK: Supple, no masses appreciated. Trachea midline.  No JVD   C/V:  Regular rate and rhythm, soft systolic murmur, no rub or gallop. No S3 or RV heave.   RESP: Respirations are unlabored. No use of accessory muscles. Clear to auscultation bilaterally without wheezing, rales, or rhonchi.  GI: Obese abdomen soft, nontender, nondistended. No HSM appreciated.   EXTREM: 1+ pitting  bilateral LE edema. No cyanosis or clubbing.  NEURO: Alert and oriented, cooperative. Gait steady with wheeled walker. No obvious focal deficits.   PSYCH: Normal affect.  SKIN: Warm and dry. No rashes or petechiae appreciated.          Past Medical History:     Past Medical History:   Diagnosis Date     * * * SBE PROPHYLAXIS * * *     s/p TAVR     Acute rheumatic carditis 1950     Complication of anesthesia     pt once woke up during back surgery     Coronary artery disease     murmur     Erectile dysfunction     Sildenafil     Hip pain, bilateral      Hypercholesteremia      Hypertension      Low back pain      Nonsenile cataract      Obesity      Renal cyst      S/P 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     Stroke (cerebrum) (H) 11/30/2016     Umbilical hernia 06/10/2011    s/p surgical repair     Wound, surgical, infected 06/10/2011    hernia              Past Surgical History:     Past Surgical History:   Procedure Laterality Date     ARTHROPLASTY KNEE BILATERAL  7/22/2010     COLONOSCOPY N/A 4/14/2017    Procedure: COLONOSCOPY;  Surgeon: Toby Bills MD;  Location: [facility]     CV CORONARY ANGIOGRAM N/A 10/28/2019    Procedure: Coronary Angiogram;  Surgeon: Guerrero Huitron MD;  Location:  HEART CARDIAC CATH LAB     CV RIGHT HEART CATH MEASUREMENTS RECORDED N/A 10/28/2019    Procedure: Right Heart Cath;  Surgeon: Guerrero Huitron MD;  Location: [facility] CARDIAC C[facility]     CV TRANSCATHETER AORTIC VALVE REPLACEMENT N/A 12/8/2020    Procedure: Transcatheter Aortic Valve Replacement;  Surgeon: Camila Begum MD;  Location:  HEART CARDIAC CATH LAB     FUSION LUMBAR ANTERIOR TWO LEVELS       HERNIORRHAPHY UMBILICAL  6/10/2011    Procedure:HERNIORRHAPHY UMBILICAL; Open, with mesh placement; Surgeon:HO PARHAM; Location:[facility]     LAMINECTOMY LUMBAR TWO LEVELS                Allergies:   Patient has no known allergies.       Data:    All laboratory data reviewed:    LAST CHOLESTEROL:  Lab Results   Component Value Date    CHOL 112 07/01/2019     Lab Results   Component Value Date    HDL 47 07/01/2019     Lab Results   Component Value Date    LDL 51 07/01/2019     Lab Results   Component Value Date    TRIG 70 07/01/2019     Lab Results   Component Value Date    CHOLHDLRATIO 4.5 10/07/2014       LAST BMP:  Lab Results   Component Value Date     01/05/2021      Lab Results   Component Value Date    POTASSIUM 3.7 01/05/2021     Lab Results   Component Value Date    CHLORIDE 111 01/05/2021     Lab Results   Component Value Date    WARREN 9.1 01/05/2021     Lab Results   Component Value Date    CO2 27 01/05/2021     Lab Results   Component Value Date    BUN 13 01/05/2021     Lab Results   Component Value Date    CR 0.65 01/05/2021     Lab Results   Component Value Date    GLC 86 01/05/2021       LAST CBC:  Lab Results   Component Value Date    WBC 7.2 01/05/2021     Lab Results   Component Value Date    RBC 4.50 01/05/2021     Lab Results   Component Value Date    HGB 11.1 01/05/2021     Lab Results   Component Value Date    HCT 38.0 01/05/2021     Lab Results   Component Value Date    MCV 84 01/05/2021     Lab Results   Component Value Date    MCH 24.7 01/05/2021     Lab Results   Component Value Date    MCHC 29.2 01/05/2021     Lab Results   Component Value Date    RDW 15.7 01/05/2021     Lab Results   Component Value Date     01/05/2021               Thank you for allowing me to participate in the care of your patient.      Sincerely,     PORFIRIO Mena [facility]    cc:   No referring provider defined for this encounter.

## 2021-01-28 ENCOUNTER — HOSPITAL ENCOUNTER (OUTPATIENT)
Dept: CARDIAC REHAB | Facility: CLINIC | Age: 76
End: 2021-01-28
Attending: NURSE PRACTITIONER
Payer: COMMERCIAL

## 2021-01-28 DIAGNOSIS — R06.02 SOB (SHORTNESS OF BREATH): ICD-10-CM

## 2021-01-28 DIAGNOSIS — I50.33 ACUTE ON CHRONIC DIASTOLIC HEART FAILURE (H): ICD-10-CM

## 2021-01-28 DIAGNOSIS — I35.0 AORTIC VALVE STENOSIS, ETIOLOGY OF CARDIAC VALVE DISEASE UNSPECIFIED: ICD-10-CM

## 2021-01-28 DIAGNOSIS — I10 ESSENTIAL HYPERTENSION, BENIGN: ICD-10-CM

## 2021-01-28 DIAGNOSIS — Z95.2 S/P TAVR (TRANSCATHETER AORTIC VALVE REPLACEMENT): ICD-10-CM

## 2021-01-28 PROCEDURE — 999N000108 HC STATISTIC OP CARDIAC VISIT #2

## 2021-01-28 PROCEDURE — 93797 PHYS/QHP OP CAR RHAB WO ECG: CPT | Mod: 59

## 2021-01-28 PROCEDURE — 999N000109 HC STATISTIC OP CR VISIT

## 2021-01-28 PROCEDURE — 93798 PHYS/QHP OP CAR RHAB W/ECG: CPT

## 2021-02-01 ENCOUNTER — HOSPITAL ENCOUNTER (OUTPATIENT)
Dept: CARDIAC REHAB | Facility: CLINIC | Age: 76
End: 2021-02-01
Attending: INTERNAL MEDICINE
Payer: COMMERCIAL

## 2021-02-01 PROCEDURE — 999N000109 HC STATISTIC OP CR VISIT: Performed by: OCCUPATIONAL THERAPIST

## 2021-02-01 PROCEDURE — 93798 PHYS/QHP OP CAR RHAB W/ECG: CPT | Performed by: OCCUPATIONAL THERAPIST

## 2021-02-05 DIAGNOSIS — M54.50 ACUTE MIDLINE LOW BACK PAIN WITHOUT SCIATICA: ICD-10-CM

## 2021-02-05 NOTE — TELEPHONE ENCOUNTER
Reason For Call:   No chief complaint on file.      Medication Name, Dose and Monthly Quantity:   Oxycodone with APAP (Percocet): Dose 5-325 Schedule 1 tablet by mouth every 6 hours prn Monthly Quantity 124   Diagnosis requiring opiates:   Acute midline low back pain without sciatica [M54.5]  - Primary     Problem List Updated:   No    Opioid Agreement On File - [facility] PAIN CONTRACT ID# [id number]:  No    Last Urine Drug Screen (at least once every 12 months) Date:   n/a  Unexpected Results:   n/a    [place]  Data Reviewed (at least once every 3 months) Date:   02/05/2021      Unexpected Results:    No.    Last Fill Date:   01/15/2021  Due Date:   02/13/2021  Last Visit with PCP:   12/17/2020    Future Visits with PCP:   No.    [facility]      ----------------------------------

## 2021-02-08 RX ORDER — OXYCODONE AND ACETAMINOPHEN 5; 325 MG/1; MG/1
1-2 TABLET ORAL EVERY 6 HOURS PRN
Qty: 150 TABLET | Refills: 0 | Status: SHIPPED | OUTPATIENT
Start: 2021-02-13 | End: 2021-02-17

## 2021-02-15 ENCOUNTER — OFFICE VISIT (OUTPATIENT)
Dept: ORTHOPEDICS | Facility: CLINIC | Age: 76
End: 2021-02-15
Payer: COMMERCIAL

## 2021-02-15 ENCOUNTER — TELEPHONE (OUTPATIENT)
Dept: INTERNAL MEDICINE | Facility: CLINIC | Age: 76
End: 2021-02-15

## 2021-02-15 DIAGNOSIS — B35.1 OM (ONYCHOMYCOSIS): ICD-10-CM

## 2021-02-15 DIAGNOSIS — I73.9 PVD (PERIPHERAL VASCULAR DISEASE) (H): ICD-10-CM

## 2021-02-15 DIAGNOSIS — I73.89 OTHER SPECIFIED PERIPHERAL VASCULAR DISEASES (H): Primary | ICD-10-CM

## 2021-02-15 PROCEDURE — 99212 OFFICE O/P EST SF 10 MIN: CPT | Performed by: PODIATRIST

## 2021-02-15 NOTE — PROGRESS NOTES
Chief Complaint   Patient presents with     Follow Up              No Known Allergies      Subjective: Jonathan is a 73 year old male who presents to the clinic today for a follow up of elongated nails. He relates that the nails are long and painful. He had TAVR 12/8/20. Relates that he can get around much easier now. Edema in the legs and feet has improved.     Objective  PT and DP pulses are non-palpable bilaterally. CRT is >3 seconds. Diminished pedal hair. Mild non-pitting edema noted to BL LE.  Gross sensation is slightly decreased bilaterally.   Nails are thickened, discolored, dystrophic and elongated bilaterally to varying degrees. Nails have subungual debris consistent with onychomycosis. No open lesions are noted. Skin is normal.      Assessment: PVD  Onychomycosis x 10      Plan:  - Pt seen and evaluated.  - Nails were debrided x 10.  - See again in 3 months.

## 2021-02-15 NOTE — TELEPHONE ENCOUNTER
[facility] Call Center    Phone Message    May a detailed message be left on voicemail: yes     Reason for Call: Other: Patient called to say that since he changed from [employer] to [employer], his oxyCODONE-acetaminophen (PERCOCET) 5-325 MG tablet is not being covered without doctor approval. Patient is in pain and needs the refill asap. Please follow up with patient with a phone call per patient request. Thank you!      Action Taken: Message routed to:  [facility] ([facility]    Travel Screening: Not Applicable

## 2021-02-15 NOTE — LETTER
2/15/2021       RE: Jonathan Bishop  [address]  [facility] [address]    Dear Colleague,    Thank you for referring your patient, Jonathan Bishop, to the [facility] [place]. Please see a copy of my visit note below.    Chief Complaint   Patient presents with     Follow Up        No Known Allergies    Subjective: Jonathan is a 73 year old male who presents to the clinic today for a follow up of elongated nails. He relates that the nails are long and painful. He had TAVR 12/8/20. Relates that he can get around much easier now. Edema in the legs and feet has improved.     Objective  PT and DP pulses are non-palpable bilaterally. CRT is >3 seconds. Diminished pedal hair. Mild non-pitting edema noted to BL LE.  Gross sensation is slightly decreased bilaterally.   Nails are thickened, discolored, dystrophic and elongated bilaterally to varying degrees. Nails have subungual debris consistent with onychomycosis. No open lesions are noted. Skin is normal.      Assessment: PVD  Onychomycosis x 10      Plan:  - Pt seen and evaluated.  - Nails were debrided x 10.  - See again in 3 months.       Mamadou Gary DPM

## 2021-02-15 NOTE — TELEPHONE ENCOUNTER
Prior Authorization Retail Medication Request    Medication/Dose: oxyCODONE-acetaminophen (PERCOCET) 5-325 MG tablet  ICD code (if different than what is on RX):  Acute midline low back pain without sciatica [M54.5]  Previously Tried and Failed:    Rationale:      Insurance Name:  [facility]  Insurance ID:  [id number]    Pharmacy Information (if different than what is on RX)  Name:  [facility] [address] [place] [address]  Phone:  [phone]

## 2021-02-16 DIAGNOSIS — M54.50 ACUTE MIDLINE LOW BACK PAIN WITHOUT SCIATICA: ICD-10-CM

## 2021-02-17 ENCOUNTER — OFFICE VISIT (OUTPATIENT)
Dept: CARDIOLOGY | Facility: CLINIC | Age: 76
End: 2021-02-17
Attending: NURSE PRACTITIONER
Payer: COMMERCIAL

## 2021-02-17 VITALS
BODY MASS INDEX: 40.63 KG/M2 | WEIGHT: 233 LBS | DIASTOLIC BLOOD PRESSURE: 75 MMHG | OXYGEN SATURATION: 100 % | HEART RATE: 86 BPM | SYSTOLIC BLOOD PRESSURE: 145 MMHG

## 2021-02-17 DIAGNOSIS — E66.01 MORBID OBESITY (H): ICD-10-CM

## 2021-02-17 DIAGNOSIS — I50.32 CHRONIC DIASTOLIC HEART FAILURE (H): Primary | ICD-10-CM

## 2021-02-17 DIAGNOSIS — I10 ESSENTIAL HYPERTENSION, BENIGN: ICD-10-CM

## 2021-02-17 DIAGNOSIS — Z95.2 S/P TAVR (TRANSCATHETER AORTIC VALVE REPLACEMENT): ICD-10-CM

## 2021-02-17 DIAGNOSIS — I35.0 AORTIC VALVE STENOSIS, ETIOLOGY OF CARDIAC VALVE DISEASE UNSPECIFIED: ICD-10-CM

## 2021-02-17 PROCEDURE — 99214 OFFICE O/P EST MOD 30 MIN: CPT | Performed by: NURSE PRACTITIONER

## 2021-02-17 RX ORDER — OXYCODONE AND ACETAMINOPHEN 5; 325 MG/1; MG/1
1-2 TABLET ORAL EVERY 6 HOURS PRN
Qty: 150 TABLET | Refills: 0 | Status: SHIPPED | OUTPATIENT
Start: 2021-02-17 | End: 2021-03-11

## 2021-02-17 RX ORDER — CLOPIDOGREL BISULFATE 75 MG/1
75 TABLET ORAL DAILY
Qty: 90 TABLET | Refills: 0 | Status: SHIPPED | OUTPATIENT
Start: 2021-02-17 | End: 2023-02-28

## 2021-02-17 NOTE — PATIENT INSTRUCTIONS
Thanks for participating in a office visit with the [facility] today.    Doing well on a cardiac standpoint  Breathing and leg swelling better controlled with higher dose lasix  Weight still up ~8 lbs from baseline  Weight daily, should you have a 3 lb weight gain overnight or 5 lb weight gain in 1 week call office  Continue on all other medications  Encourage exercise, weight loss, and heart healthy diet.    Follow up in [facility] 1-2 months     Please call my nurse at [phone] with any questions or concerns.    Scheduling phone number: [phone]  Reminder: Please bring in all current medications, over the counter supplements and vitamin bottles to your next appointment.

## 2021-02-17 NOTE — PROGRESS NOTES
Cardiology Clinic Progress Note  Jonathan Bishop MRN# [id number]   YOB: 1945 Age: 75 year old     Reason For Visit:  1 month follow up   Primary Cardiologist:   Dr. Mock           History of Presenting Illness:      Jonathan Bishop is a pleasant 75 year old patient who carries a past medical history significant for  severe aortic stenosis s/p TAVR using a 26 mm Anton S3 valve, CVA with residual left sided weakness, hypertension, PVC's, Diastolic heart failure and hyperlipidemia. He returns to the office today for a 1 month follow up     He is feeling well on a cardiac standpoint, denies chest pain, PND, orthopnea, presyncope, syncope, heart racing, or palpitations. Shortness of breath and LE edema well managed on higher dose lasix. Upon exam, lungs are clear bilaterally, heart rate and rhythm are regular, no JVD, obese abdomen and trace bilateral LE edema. Weight is up ~ 2 lbs over the last month. Weight today 233, Dry weight 225 lbs.     Last echocardiogram demonstrated a well seated TAVR valve with a mean systolic gradient of 18 mmHg, no paravalvular regurgitation. EF normal at 60-65%, normal RV size and function.     Blood pressure is mildly elevated today at 145/75, home pressures are consistently in the low 130's systolic.   Last BMP showed a sodium of 142, potassium 3.5, BUN 25, creatinine 0.96, GFR 76    He was enrolled in cardiac rehab. However, stopped due to the cold. He remains active walking in the house with the assistance of a cane.    Follows a strict low-sodium diet  Remains compliant with all medications.                   Assessment and Plan:       Jonathan Bishop is a pleasant 75 year old patient who carries a past medical history significant for  severe aortic stenosis s/p TAVR using a 26 mm Anton S3 valve, CVA with residual left sided weakness, hypertension, PVC's, Diastolic heart failure and hyperlipidemia.  Overall, he is doing much better on a cardiac standpoint.  Shortness of breath and LE edema have significantly improved on higher dose diuretics.  Upon exam, legs have trace to 1+ pitting LE edema. Weight is up ~ 2 lbs, still up 8 lbs from baseline. I will continue on current medical therapy. Creatinine remains stable at 0.96. Weight daily and notify office with a weight gain of 3 lbs overnight or 5 lbs in a week. Encourage resuming cardiac rehab now that the weather is getting warmer, exercise, weight loss, and strict low sodium diet.            Thank you for allowing me to participate in this delightful patient's care. I recommend he follow up with [facility] in 2 months and 4 month follow up with Dr. Nish Wing, APRN CNP         Review of Systems:     Review of Systems:  Skin:  Negative     Eyes:  Negative    ENT:  Negative    Respiratory:  Positive for dyspnea on exertion  Cardiovascular:    Positive for;edema  Gastroenterology: Negative    Genitourinary:  Negative    Musculoskeletal:  Negative    Neurologic:  Negative    Psychiatric:  Negative    Heme/Lymph/Imm:  Negative    Endocrine:  Negative                Physical Exam:     GEN:  In general, this is a obese male in no acute distress.  HEENT:  Pupils equal, round. Sclerae nonicteric. Clear oropharynx. Mucous membranes moist.  NECK: Supple, no masses appreciated. Trachea midline.  No JVD   C/V:  Regular rate and rhythm, soft systolic murmur, no rub or gallop. No S3 or RV heave.   RESP: Respirations are unlabored. No use of accessory muscles. Clear to auscultation bilaterally without wheezing, rales, or rhonchi.  GI: Obese abdomen soft, nontender, nondistended. No HSM appreciated.   EXTREM: Trace- 1+ pitting bilateral LE edema. No cyanosis or clubbing.  NEURO: Alert and oriented, cooperative. Gait steady with wheeled walker. No obvious focal deficits.   PSYCH: Normal affect.  SKIN: Warm and dry. No rashes or petechiae appreciated.          Past Medical History:     Past Medical History:   Diagnosis  Date     * * * SBE PROPHYLAXIS * * *     s/p TAVR     Acute rheumatic carditis 1950     Complication of anesthesia     pt once woke up during back surgery     Coronary artery disease     murmur     Erectile dysfunction     Sildenafil     Hip pain, bilateral      Hypercholesteremia      Hypertension      Low back pain      Nonsenile cataract      Obesity      Renal cyst      S/P 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     Stroke (cerebrum) (H) 11/30/2016     Umbilical hernia 06/10/2011    s/p surgical repair     Wound, surgical, infected 06/10/2011    hernia              Past Surgical History:     Past Surgical History:   Procedure Laterality Date     ARTHROPLASTY KNEE BILATERAL  7/22/2010     COLONOSCOPY N/A 4/14/2017    Procedure: COLONOSCOPY;  Surgeon: Toby Bills MD;  [facility]     CV CORONARY ANGIOGRAM N/A 10/28/2019    Procedure: Coronary Angiogram;  Surgeon: Guerrero Huitron MD;  Location:  HEART CARDIAC CATH LAB     CV RIGHT HEART CATH MEASUREMENTS RECORDED N/A 10/28/2019    Procedure: Right Heart Cath;  Surgeon: Guerrero Huitron MD;  Location:  HEART CARDIAC CATH LAB     CV TRANSCATHETER AORTIC VALVE REPLACEMENT N/A 12/8/2020    Procedure: Transcatheter Aortic Valve Replacement;  Surgeon: Camila Begum MD;  Location:  HEART CARDIAC CATH LAB     FUSION LUMBAR ANTERIOR TWO LEVELS       HERNIORRHAPHY UMBILICAL  6/10/2011    Procedure:HERNIORRHAPHY UMBILICAL; Open, with mesh placement; Surgeon:HO PARHAM; Location:[facility]     LAMINECTOMY LUMBAR TWO LEVELS                Allergies:   Patient has no known allergies.       Data:   All laboratory data reviewed:    LAST CHOLESTEROL:  Lab Results   Component Value Date    CHOL 112 07/01/2019     Lab Results   Component Value Date    HDL 47 07/01/2019     Lab Results   Component Value Date    LDL 51 07/01/2019     Lab Results   Component Value Date    TRIG 70 07/01/2019     Lab  Results   Component Value Date    CHOLHDLRATIO 4.5 10/07/2014       LAST BMP:  Lab Results   Component Value Date     01/05/2021      Lab Results   Component Value Date    POTASSIUM 3.7 01/05/2021     Lab Results   Component Value Date    CHLORIDE 111 01/05/2021     Lab Results   Component Value Date    WARREN 9.1 01/05/2021     Lab Results   Component Value Date    CO2 27 01/05/2021     Lab Results   Component Value Date    BUN 13 01/05/2021     Lab Results   Component Value Date    CR 0.65 01/05/2021     Lab Results   Component Value Date    GLC 86 01/05/2021       LAST CBC:  Lab Results   Component Value Date    WBC 7.2 01/05/2021     Lab Results   Component Value Date    RBC 4.50 01/05/2021     Lab Results   Component Value Date    HGB 11.1 01/05/2021     Lab Results   Component Value Date    HCT 38.0 01/05/2021     Lab Results   Component Value Date    MCV 84 01/05/2021     Lab Results   Component Value Date    MCH 24.7 01/05/2021     Lab Results   Component Value Date    MCHC 29.2 01/05/2021     Lab Results   Component Value Date    RDW 15.7 01/05/2021     Lab Results   Component Value Date     01/05/2021

## 2021-02-17 NOTE — LETTER
2/17/2021    Buck Nascimento MD  [address]  [facility] [address]    RE: Jonathan Bishop       Dear Colleague,    I had the pleasure of seeing Jonathan Bishop in the [facility].    Cardiology Clinic Progress Note  Jonathan Bishop MRN# [id number]   YOB: 1945 Age: 75 year old     Reason For Visit:  1 month follow up   Primary Cardiologist:   Dr. Mock           History of Presenting Illness:      Jonathan Bishop is a pleasant 75 year old patient who carries a past medical history significant for  severe aortic stenosis s/p TAVR using a 26 mm Anton S3 valve, CVA with residual left sided weakness, hypertension, PVC's, Diastolic heart failure and hyperlipidemia. He returns to the office today for a 1 month follow up     He is feeling well on a cardiac standpoint, denies chest pain, PND, orthopnea, presyncope, syncope, heart racing, or palpitations. Shortness of breath and LE edema well managed on higher dose lasix. Upon exam, lungs are clear bilaterally, heart rate and rhythm are regular, no JVD, obese abdomen and trace bilateral LE edema. Weight is up ~ 2 lbs over the last month. Weight today 233, Dry weight 225 lbs.     Last echocardiogram demonstrated a well seated TAVR valve with a mean systolic gradient of 18 mmHg, no paravalvular regurgitation. EF normal at 60-65%, normal RV size and function.     Blood pressure is mildly elevated today at 145/75, home pressures are consistently in the low 130's systolic.   Last BMP showed a sodium of 142, potassium 3.5, BUN 25, creatinine 0.96, GFR 76    He was enrolled in cardiac rehab. However, stopped due to the cold. He remains active walking in the house with the assistance of a cane.    Follows a strict low-sodium diet  Remains compliant with all medications.                   Assessment and Plan:       Jonathan Bishop is a pleasant 75 year old patient who carries a past medical  history significant for  severe aortic stenosis s/p TAVR using a 26 mm Anton S3 valve, CVA with residual left sided weakness, hypertension, PVC's, Diastolic heart failure and hyperlipidemia.  Overall, he is doing much better on a cardiac standpoint. Shortness of breath and LE edema have significantly improved on higher dose diuretics.  Upon exam, legs have trace to 1+ pitting LE edema. Weight is up ~ 2 lbs, still up 8 lbs from baseline. I will continue on current medical therapy. Creatinine remains stable at 0.96. Weight daily and notify office with a weight gain of 3 lbs overnight or 5 lbs in a week. Encourage resuming cardiac rehab now that the weather is getting warmer, exercise, weight loss, and strict low sodium diet.            Thank you for allowing me to participate in this delightful patient's care. I recommend he follow up with [facility] in 2 months and 4 month follow up with Dr. Nish Wing, APRN CNP         Review of Systems:     Review of Systems:  Skin:  Negative     Eyes:  Negative    ENT:  Negative    Respiratory:  Positive for dyspnea on exertion  Cardiovascular:    Positive for;edema  Gastroenterology: Negative    Genitourinary:  Negative    Musculoskeletal:  Negative    Neurologic:  Negative    Psychiatric:  Negative    Heme/Lymph/Imm:  Negative    Endocrine:  Negative                Physical Exam:     GEN:  In general, this is a obese male in no acute distress.  HEENT:  Pupils equal, round. Sclerae nonicteric. Clear oropharynx. Mucous membranes moist.  NECK: Supple, no masses appreciated. Trachea midline.  No JVD   C/V:  Regular rate and rhythm, soft systolic murmur, no rub or gallop. No S3 or RV heave.   RESP: Respirations are unlabored. No use of accessory muscles. Clear to auscultation bilaterally without wheezing, rales, or rhonchi.  GI: Obese abdomen soft, nontender, nondistended. No HSM appreciated.   EXTREM: Trace- 1+ pitting bilateral LE edema. No cyanosis or  clubbing.  NEURO: Alert and oriented, cooperative. Gait steady with wheeled walker. No obvious focal deficits.   PSYCH: Normal affect.  SKIN: Warm and dry. No rashes or petechiae appreciated.          Past Medical History:     Past Medical History:   Diagnosis Date     * * * SBE PROPHYLAXIS * * *     s/p TAVR     Acute rheumatic carditis 1950     Complication of anesthesia     pt once woke up during back surgery     Coronary artery disease     murmur     Erectile dysfunction     Sildenafil     Hip pain, bilateral      Hypercholesteremia      Hypertension      Low back pain      Nonsenile cataract      Obesity      Renal cyst      S/P 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     Stroke (cerebrum) (H) 11/30/2016     Umbilical hernia 06/10/2011    s/p surgical repair     Wound, surgical, infected 06/10/2011    hernia              Past Surgical History:     Past Surgical History:   Procedure Laterality Date     ARTHROPLASTY KNEE BILATERAL  7/22/2010     COLONOSCOPY N/A 4/14/2017    Procedure: COLONOSCOPY;  Surgeon: Toby Bills MD;  [facility]     CV CORONARY ANGIOGRAM N/A 10/28/2019    Procedure: Coronary Angiogram;  Surgeon: Guerrero Huitron MD;  Location:  HEART CARDIAC CATH LAB     CV RIGHT HEART CATH MEASUREMENTS RECORDED N/A 10/28/2019    Procedure: Right Heart Cath;  Surgeon: Guerrero Huitron MD;  Location:  HEART CARDIAC CATH LAB     CV TRANSCATHETER AORTIC VALVE REPLACEMENT N/A 12/8/2020    Procedure: Transcatheter Aortic Valve Replacement;  Surgeon: Camila Begum MD;  Location:  HEART CARDIAC CATH LAB     FUSION LUMBAR ANTERIOR TWO LEVELS       HERNIORRHAPHY UMBILICAL  6/10/2011    Procedure:HERNIORRHAPHY UMBILICAL; Open, with mesh placement; Surgeon:HO PARHAM; Location[facility]     LAMINECTOMY LUMBAR TWO LEVELS                Allergies:   Patient has no known allergies.       Data:   All laboratory data reviewed:    LAST  CHOLESTEROL:  Lab Results   Component Value Date    CHOL 112 07/01/2019     Lab Results   Component Value Date    HDL 47 07/01/2019     Lab Results   Component Value Date    LDL 51 07/01/2019     Lab Results   Component Value Date    TRIG 70 07/01/2019     Lab Results   Component Value Date    CHOLHDLRATIO 4.5 10/07/2014       LAST BMP:  Lab Results   Component Value Date     01/05/2021      Lab Results   Component Value Date    POTASSIUM 3.7 01/05/2021     Lab Results   Component Value Date    CHLORIDE 111 01/05/2021     Lab Results   Component Value Date    WARREN 9.1 01/05/2021     Lab Results   Component Value Date    CO2 27 01/05/2021     Lab Results   Component Value Date    BUN 13 01/05/2021     Lab Results   Component Value Date    CR 0.65 01/05/2021     Lab Results   Component Value Date    GLC 86 01/05/2021       LAST CBC:  Lab Results   Component Value Date    WBC 7.2 01/05/2021     Lab Results   Component Value Date    RBC 4.50 01/05/2021     Lab Results   Component Value Date    HGB 11.1 01/05/2021     Lab Results   Component Value Date    HCT 38.0 01/05/2021     Lab Results   Component Value Date    MCV 84 01/05/2021     Lab Results   Component Value Date    MCH 24.7 01/05/2021     Lab Results   Component Value Date    MCHC 29.2 01/05/2021     Lab Results   Component Value Date    RDW 15.7 01/05/2021     Lab Results   Component Value Date     01/05/2021           Thank you for allowing me to participate in the care of your patient.    Sincerely,     PORFIRIO Mena CNP     [facility]

## 2021-02-18 NOTE — TELEPHONE ENCOUNTER
Prior Authorization Approval    Authorization Effective Date: 2/1/2021  Authorization Expiration Date: 2/18/2022  Medication: oxyCODONE-acetaminophen (PERCOCET) 5-325 MG tablet - APPROVED  Insurance Company: [employer] [place] - Phone [phone] Fax [phone]  Which Pharmacy is filling the prescription (Not needed for infusion/clinic administered): [facility] [address] [place] [address]  Pharmacy Notified: Yes  Patient Notified: Yes

## 2021-02-18 NOTE — TELEPHONE ENCOUNTER
PA Initiation    Medication: oxyCODONE-acetaminophen (PERCOCET) 5-325 MG tablet -   Insurance Company: [employer] - Phone [phone] Fax [phone]  Pharmacy Filling the Rx: [facility] [address] [place] [address]  Filling Pharmacy Phone: [phone]  Filling Pharmacy Fax: [phone]  Start Date: 2/18/2021

## 2021-02-23 NOTE — TELEPHONE ENCOUNTER
Prior Authorization Approval    Medication: oxyCODONE-acetaminophen (PERCOCET) 5-325 MG tablet - APPROVED was approved on 2/19/2021  Effective: 2/1/2021 to 2/18/2022  Reference #:    Approved Dose/Quantity:   Insurance Company: [employer] - Phone [phone] Fax [phone]  Expected CoPay:    Pharmacy Filling the Rx: [address]  Pharmacy Notified: Yes  Patient Notified: Comment:  Pt already picked up

## 2021-03-11 DIAGNOSIS — M54.50 ACUTE MIDLINE LOW BACK PAIN WITHOUT SCIATICA: ICD-10-CM

## 2021-03-11 RX ORDER — OXYCODONE AND ACETAMINOPHEN 5; 325 MG/1; MG/1
1-2 TABLET ORAL EVERY 6 HOURS PRN
Qty: 150 TABLET | Refills: 0 | Status: SHIPPED | OUTPATIENT
Start: 2021-03-23 | End: 2021-04-19

## 2021-03-11 NOTE — TELEPHONE ENCOUNTER
Reason For Call:   No chief complaint on file.      Medication Name, Dose and Monthly Quantity:   Oxycodone with APAP (Percocet): Dose 5-325 Schedule 1 tablet by mouth every 6 hours prn Monthly Quantity 124   Diagnosis requiring opiates:   Acute midline low back pain without sciatica [M54.5]  - Primary     Problem List Updated:   No    Opioid Agreement On File - [facility] PAIN CONTRACT ID# [id number]:  No    Last Urine Drug Screen (at least once every 12 months) Date:   n/a  Unexpected Results:   n/a    [place]  Data Reviewed (at least once every 3 months) Date:   03/11/2021      Unexpected Results:    No.    Last Fill Date:   02/21/2021  Due Date:   03/23/2021  Last Visit with PCP:   12/17/2020    Future Visits with PCP:   No.    [facility]      ----------------------------------

## 2021-03-19 DIAGNOSIS — M54.50 LUMBAGO: ICD-10-CM

## 2021-03-24 NOTE — TELEPHONE ENCOUNTER
Ibuprofen 600 MG Oral Tablet    Last Written Prescription Date:  9/24/20  Last Fill Quantity: 270,   # refills: 0  Last Office Visit : 12/17/20  Future Office visit:  none    Routing refill request to provider for review/approval because:  Drug not active on patient's medication list  Discontinued on 12/9/20 at hospital discharge  Thank-you, Saray ALLISON RN Medication Refill Team

## 2021-03-29 RX ORDER — IBUPROFEN 600 MG/1
600 TABLET, FILM COATED ORAL EVERY 8 HOURS PRN
Qty: 270 TABLET | Refills: 3 | Status: SHIPPED | OUTPATIENT
Start: 2021-03-29 | End: 2022-10-10

## 2021-04-13 DIAGNOSIS — I50.32 CHRONIC DIASTOLIC HEART FAILURE (H): Primary | ICD-10-CM

## 2021-04-16 DIAGNOSIS — I50.32 CHRONIC DIASTOLIC HEART FAILURE (H): ICD-10-CM

## 2021-04-16 LAB
ANION GAP SERPL CALCULATED.3IONS-SCNC: 5 MMOL/L (ref 3–14)
BUN SERPL-MCNC: 24 MG/DL (ref 7–30)
CALCIUM SERPL-MCNC: 9.5 MG/DL (ref 8.5–10.1)
CHLORIDE SERPL-SCNC: 106 MMOL/L (ref 94–109)
CO2 SERPL-SCNC: 32 MMOL/L (ref 20–32)
CREAT SERPL-MCNC: 0.96 MG/DL (ref 0.66–1.25)
GFR SERPL CREATININE-BSD FRML MDRD: 77 ML/MIN/{1.73_M2}
GLUCOSE SERPL-MCNC: 86 MG/DL (ref 70–99)
HGB BLD-MCNC: 10.9 G/DL (ref 13.3–17.7)
NT-PROBNP SERPL-MCNC: 52 PG/ML (ref 0–450)
POTASSIUM SERPL-SCNC: 3.7 MMOL/L (ref 3.4–5.3)
SODIUM SERPL-SCNC: 143 MMOL/L (ref 133–144)
TSH SERPL DL<=0.005 MIU/L-ACNC: 1.41 MU/L (ref 0.4–4)

## 2021-04-16 PROCEDURE — 83880 ASSAY OF NATRIURETIC PEPTIDE: CPT | Performed by: PHYSICIAN ASSISTANT

## 2021-04-16 PROCEDURE — 84443 ASSAY THYROID STIM HORMONE: CPT | Performed by: PHYSICIAN ASSISTANT

## 2021-04-16 PROCEDURE — 80048 BASIC METABOLIC PNL TOTAL CA: CPT | Performed by: PHYSICIAN ASSISTANT

## 2021-04-16 PROCEDURE — 36415 COLL VENOUS BLD VENIPUNCTURE: CPT | Performed by: PHYSICIAN ASSISTANT

## 2021-04-16 PROCEDURE — 85018 HEMOGLOBIN: CPT | Performed by: PHYSICIAN ASSISTANT

## 2021-04-18 NOTE — PROGRESS NOTES
Cardiology Clinic Progress Note    Jonathan Bishop MRN# [id number]   YOB: 1945 Age: 75 year old   Primary cardiologist: Dr. Mock         Assessment and Plan:     In summary, Jonathan Bishop presents today for a CORE clinic follow up visit.    1. Chronic HFpEF, with issues in volume fluctuation prior to TAVR in December 2020. Current dry weight felt to be around 225-230 pounds.  He currently appears volume up at a weight of 239 pounds, with bothersome peripheral edema.  This is in the setting of diastolic dysfunction and sedentary nature.  2. Severe aortic stenosis status post TAVR summer 2020.  Most recent echocardiogram in January 2021 shows a mean systolic gradient of 18.  Gradient is elevated from 8 immediately postop, however unchanged compared with 1 day postop.  This is followed by the TAVR team and Dr. Begum.  3. Longstanding history of anemia.  Unclear if this is been worked up sufficiently.  The patient himself states he is unaware of this diagnosis.    Plan:  -Stop furosemide 40 twice daily, hydrochlorothiazide 25, and potassium supplementation.  Start torsemide 40 mg daily, and spironolactone 25 mg daily.  -I recommended he see his PCP when able for work-up and evaluation of persistent mild anemia.  -I encouraged him to call if he does not begin to note improvement in his symptoms in about a week. He will return to see me in the core clinic in 2 to 3 weeks, with a repeat BMP prior.  -I encouraged him to get back to the [facility], I think that reinitiation of routine exercise will do a lot for his peripheral edema.    It was a pleasure meeting Conner today.        History of Presenting Illness:      Jonathan Bishop is a pleasant 75 year old patient who presents today for a CORE clinic follow up.    The patient has a history of the following - severe aortic stenosis s/p TAVR using a 26 mm Anton S3 valve, CVA with residual left sided weakness, hypertension, PVC's, Diastolic heart failure  and hyperlipidemia.     He had issues with volume overload prior to his TAVR and was last seen in the CORE clinic by my colleague Shelby Jean in July of last year.  Since that time, he has been followed closely by the TAVR team including my colleague Ariadna Howell.  He did develop some fluid overload post TAVR, after he ran out of his Lasix back in January, but after reinitiating it and escalating his dose to 40 mg twice daily, his weight has been stable around 230 lbs. His dry wt is felt to be around 225-230 lbs.     Last echocardiogram in Janiary demonstrated a well seated TAVR valve with a mean systolic gradient of 18 mmHg, no paravalvular regurgitation. EF normal at 60-65%, normal RV size and function.     Today, I am meeting Conner for the first time in clinic. He uses a cane to walk in to clinic. He tells me he feels miserable. His legs are becoming swollen and uncomfortable again (always R>L) and are very edematous in clinic. He is confused as to why this is still bothering him after his valve was fixed, and why he is still needing to take diuretics. He also really dislikes taking the potassium pills. We discussed the etiology of diastolic dysfunction, which inevitably forms in the setting of longstanding aortic stenosis.  I explained that, even though the valve has been fixed, an element of his diastolic dysfunction persists, and therefore he may still require diuretics for some time.  Fortunately, he tells me his breathing feels fine.  His ability to exercise is mainly limited by his leg discomfort.  His weight is up 6pounds to 239 pounds, compared with 233 pounds back in February.  We reviewed his recent labs which show normal renal function, no evidence of thyroid dysfunction, and stable anemia with hemoglobin around 11.  It appears he had some evidence of anemia since 2010.  He has been on iron supplementation in the past.  I do not see that there is been any substantial work-up for this however, since  "back in 2010 is felt to be acute blood loss anemia related to orthopedic surgery however as I mentioned it has persisted.  Conner tells me he is not aware of this diagnosis.  His blood pressure is well controlled. He mostly cooks, eats out rarely. He admits that he's been much more sedentary lately. His insurance didn't cover cardiac rehab, unfortunately. He used to go to the  routinely, doing mostly pool exercises, and generally felt a lot better when he had a routine exercise regimen.         Review of Systems:     12-pt ROS is negative except for as noted in the HPI.          Physical Exam:     Vitals: /69   Pulse 87   Ht 1.613 m (5' 3.5\")   Wt 108.8 kg (239 lb 14.4 oz)   SpO2 96%   BMI 41.82 kg/m    Wt Readings from Last 10 Encounters:   04/19/21 108.8 kg (239 lb 14.4 oz)   02/17/21 105.7 kg (233 lb)   01/21/21 104.9 kg (231 lb 4.8 oz)   01/07/21 106.1 kg (234 lb)   12/09/20 103.7 kg (228 lb 11.2 oz)   12/02/20 102.1 kg (225 lb)   11/09/20 103.5 kg (228 lb 1.6 oz)   04/23/20 101.2 kg (223 lb)   03/09/20 104.8 kg (231 lb)   12/12/19 109.3 kg (241 lb)       Constitutional:  Patient is pleasant, alert, cooperative, and in NAD.  HEENT:  NCAT. PERRLA. EOM's intact.   Neck:  CVP appears normal. No carotid bruits.   Pulmonary: Normal respiratory effort. CTAB.   Cardiac: RRR, normal S1/S2, no S3/S4, no murmur or rub.   Abdomen:  Non-tender abdomen, no hepatosplenomegaly appreciated.   Vascular: Pulses in the upper and lower extremities are 2+ and equal bilaterally.  Extremities: 2+ pitting edema from the pedal to pretibial region, greater on the right than the left.  No erythema, cyanosis or tenderness appreciated.  Skin:  No rashes or lesions appreciated.   Neurological:  No gross motor or sensory deficits.   Psych: Appropriate affect.        Data:     Labs reviewed:  Recent Labs   Lab Test 04/16/21  1317 09/19/19  0858 07/01/19  0805 04/13/18  0755 03/06/17  1100 11/30/16  1930 11/30/16  1930 10/07/14  0849 " 10/07/14  0849   LDL  --   --  51 63 57   < > 168*   < > 142*   HDL  --   --  47 46 43   < > 46   < > 45   NHDL  --   --  65 73 66   < > 182*   < >  --    CHOL  --   --  112 119 109   < > 228*   < > 204*   TRIG  --   --  70 52 48   < > 71   < > 86   TSH 1.41  --   --   --   --   --  0.90  --   --    NTBNP 52 104  --   --   --   --   --   --   --    IRON  --   --   --   --   --   --   --   --  103   FEB  --   --   --   --   --   --   --   --  362   IRONSAT  --   --   --   --   --   --   --   --  28   YASMANI  --   --   --   --   --   --   --   --  114    < > = values in this interval not displayed.       Lab Results   Component Value Date    WBC 7.2 01/05/2021    RBC 4.50 01/05/2021    HGB 10.9 (L) 04/16/2021    HCT 38.0 (L) 01/05/2021    MCV 84 01/05/2021    MCH 24.7 (L) 01/05/2021    MCHC 29.2 (L) 01/05/2021    RDW 15.7 (H) 01/05/2021     01/05/2021       Lab Results   Component Value Date     04/16/2021    POTASSIUM 3.7 04/16/2021    CHLORIDE 106 04/16/2021    CO2 32 04/16/2021    ANIONGAP 5 04/16/2021    GLC 86 04/16/2021    BUN 24 04/16/2021    CR 0.96 04/16/2021    GFRESTIMATED 77 04/16/2021    GFRESTBLACK 89 04/16/2021    WARREN 9.5 04/16/2021      Lab Results   Component Value Date    AST 27 12/08/2020    ALT 28 12/08/2020       Lab Results   Component Value Date    A1C 6.3 (H) 11/30/2016       Lab Results   Component Value Date    INR 1.01 10/28/2019    INR 0.99 11/30/2016           Problem List:     Patient Active Problem List   Diagnosis     Essential hypertension, benign     Hyperlipidemia with target LDL less than 130     Anemia     Medication refill- do not delete      Low back pain     Osteoarthritis of knee     Stroke (H)     Cerebrovascular accident involving anterior circulation, right (H)     Dermatophytosis of nail     PVD (peripheral vascular disease) (H)     Systolic murmur     Peripheral edema     Hyperplasia of prostate with lower urinary tract symptoms (LUTS)     Erectile dysfunction,  unspecified erectile dysfunction type     Severe aortic stenosis     Status post coronary angiogram     Morbid obesity (H)     Aortic valve stenosis, etiology of cardiac valve disease unspecified     Aortic stenosis, severe     Diastolic heart failure (H)     S/P TAVR (transcatheter aortic valve replacement)     Bilateral leg edema           Medications:     Current Outpatient Medications   Medication Sig Dispense Refill     acetaminophen (TYLENOL) 325 MG tablet Take 1-2 tablets (325-650 mg) by mouth every 4 hours as needed for mild pain or headaches       atorvastatin (LIPITOR) 40 MG tablet Take 1 tablet (40 mg) by mouth daily 90 tablet 3     clopidogrel (PLAVIX) 75 MG tablet Take 1 tablet (75 mg) by mouth daily 90 tablet 0     furosemide (LASIX) 40 MG tablet Take 1 tablet (40 mg) by mouth 2 times daily 180 tablet 1     hydrochlorothiazide (HYDRODIURIL) 25 MG tablet Take 1 tablet (25 mg) by mouth daily 90 tablet 1     ibuprofen (ADVIL/MOTRIN) 600 MG tablet Take 1 tablet (600 mg) by mouth every 8 hours as needed for moderate pain 270 tablet 3     multivitamin, therapeutic with minerals (MULTI-VITAMIN) TABS Take 1 tablet by mouth daily. 100 tablet 3     naloxone (NARCAN) 4 MG/0.1ML nasal spray Spray 1 spray (4 mg) into one nostril alternating nostrils once as needed for opioid reversal Every 2-3 min until responsive or EMS arrives 0.2 mL 0     potassium chloride ER (KLOR-CON M) 20 MEQ CR tablet Take 2 tablets (40 mEq) by mouth daily 60 tablet 3     sildenafil (VIAGRA) 100 MG tablet Take 30 min to 4 hours before intercourse as needed for erectile dysfunction Has appt with Dr Nascimento 11/9/2020 25 tablet 2     tamsulosin (FLOMAX) 0.4 MG capsule TAKE 1 CAPSULE BY MOUTH ONCE DAILY (Patient taking differently: Take 0.4 mg by mouth daily TAKE 1 CAPSULE BY MOUTH ONCE DAILY) 90 capsule 3     vitamin D3 (CHOLECALCIFEROL) 50 mcg (2000 units) tablet Take 1 tablet by mouth daily       aspirin (ASA) 81 MG EC tablet Take 1 tablet (81  mg) by mouth daily (Patient not taking: Reported on 4/19/2021) 30 tablet 0     order for DME Equipment being ordered: Walker ()  Treatment Diagnosis: stroke 1 Units 0     other medical supplies Dispense knee high, medium compression, large size 4 each 0     oxyCODONE-acetaminophen (PERCOCET) 5-325 MG tablet Take 1-2 tablets by mouth every 6 hours as needed for pain Max 5 per day. 150 tablet 0           Past Medical History:     Past Medical History:   Diagnosis Date     * * * SBE PROPHYLAXIS * * *     s/p TAVR     Acute rheumatic carditis 1950     Complication of anesthesia     pt once woke up during back surgery     Coronary artery disease     murmur     Erectile dysfunction     Sildenafil     Hip pain, bilateral      Hypercholesteremia      Hypertension      Low back pain      Nonsenile cataract      Obesity      Renal cyst      S/P 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     Stroke (cerebrum) (H) 11/30/2016     Umbilical hernia 06/10/2011    s/p surgical repair     Wound, surgical, infected 06/10/2011    hernia     Past Surgical History:   Procedure Laterality Date     ARTHROPLASTY KNEE BILATERAL  7/22/2010     COLONOSCOPY N/A 4/14/2017    Procedure: COLONOSCOPY;  Surgeon: Toby Bills MD;  Location:  GI     CV CORONARY ANGIOGRAM N/A 10/28/2019    Procedure: Coronary Angiogram;  Surgeon: Guerrero Huitron MD;  Location:  HEART CARDIAC CATH LAB     CV RIGHT HEART CATH MEASUREMENTS RECORDED N/A 10/28/2019    Procedure: Right Heart Cath;  Surgeon: Guerrero Huitron MD;  Location: [facility] CARDIAC CATH LAB     CV TRANSCATHETER AORTIC VALVE REPLACEMENT N/A 12/8/2020    Procedure: Transcatheter Aortic Valve Replacement;  Surgeon: Camila Begum MD;  Location:  HEART CARDIAC CATH LAB     FUSION LUMBAR ANTERIOR TWO LEVELS       HERNIORRHAPHY UMBILICAL  6/10/2011    Procedure:HERNIORRHAPHY UMBILICAL; Open, with mesh placement; Surgeon:DIONE  HO MICHELLE; Location:[facility] OR     LAMINECTOMY LUMBAR TWO LEVELS       Family History   Problem Relation Age of Onset     C.A.D. Mother      Unknown/Adopted Father      Heart Failure Sister      Heart Failure Sister      Glaucoma No family hx of      Macular Degeneration No family hx of      Social History     Socioeconomic History     Marital status:      Spouse name: Not on file     Number of children: Not on file     Years of education: Not on file     Highest education level: Not on file   Occupational History     Not on file   Social Needs     Financial resource strain: Not on file     Food insecurity     Worry: Not on file     Inability: Not on file     Transportation needs     Medical: Not on file     Non-medical: Not on file   Tobacco Use     Smoking status: Former Smoker     Quit date: 2005     Years since quittin.3     Smokeless tobacco: Never Used     Tobacco comment: Non smoker. No 2nd hand exposure. CCX, RMA PCC 2011   Substance and Sexual Activity     Alcohol use: No     Drug use: No     Sexual activity: Not Currently   Lifestyle     Physical activity     Days per week: Not on file     Minutes per session: Not on file     Stress: Not on file   Relationships     Social connections     Talks on phone: Not on file     Gets together: Not on file     Attends [employer] service: Not on file     Active member of club or organization: Not on file     Attends meetings of clubs or organizations: Not on file     Relationship status: Not on file     Intimate partner violence     Fear of current or ex partner: Not on file     Emotionally abused: Not on file     Physically abused: Not on file     Forced sexual activity: Not on file   Other Topics Concern     Parent/sibling w/ CABG, MI or angioplasty before 65F 55M? Not Asked   Social History Narrative    He lives by himself in an apt in [place].  He has 2 goldfish, Lai and Ubaldo.           Allergies:   Patient has no known  allergies.      Vandana Fournier PA-C  [facility] - Heart Clinic  Pager: [phone]

## 2021-04-19 ENCOUNTER — OFFICE VISIT (OUTPATIENT)
Dept: CARDIOLOGY | Facility: CLINIC | Age: 76
End: 2021-04-19
Attending: NURSE PRACTITIONER
Payer: COMMERCIAL

## 2021-04-19 VITALS
DIASTOLIC BLOOD PRESSURE: 69 MMHG | SYSTOLIC BLOOD PRESSURE: 122 MMHG | OXYGEN SATURATION: 96 % | BODY MASS INDEX: 40.96 KG/M2 | WEIGHT: 239.9 LBS | HEART RATE: 87 BPM | HEIGHT: 64 IN

## 2021-04-19 DIAGNOSIS — I50.32 CHRONIC DIASTOLIC HEART FAILURE (H): ICD-10-CM

## 2021-04-19 DIAGNOSIS — M54.50 ACUTE MIDLINE LOW BACK PAIN WITHOUT SCIATICA: ICD-10-CM

## 2021-04-19 DIAGNOSIS — I35.0 AORTIC VALVE STENOSIS, ETIOLOGY OF CARDIAC VALVE DISEASE UNSPECIFIED: ICD-10-CM

## 2021-04-19 PROCEDURE — 99214 OFFICE O/P EST MOD 30 MIN: CPT | Performed by: PHYSICIAN ASSISTANT

## 2021-04-19 RX ORDER — OXYCODONE AND ACETAMINOPHEN 5; 325 MG/1; MG/1
1-2 TABLET ORAL EVERY 6 HOURS PRN
Qty: 150 TABLET | Refills: 0 | Status: SHIPPED | OUTPATIENT
Start: 2021-04-19 | End: 2021-05-11

## 2021-04-19 RX ORDER — TORSEMIDE 20 MG/1
40 TABLET ORAL DAILY
Qty: 180 TABLET | Refills: 3 | Status: SHIPPED | OUTPATIENT
Start: 2021-04-19 | End: 2022-08-01

## 2021-04-19 RX ORDER — SPIRONOLACTONE 25 MG/1
25 TABLET ORAL DAILY
Qty: 90 TABLET | Refills: 3 | Status: SHIPPED | OUTPATIENT
Start: 2021-04-19 | End: 2021-05-04

## 2021-04-19 ASSESSMENT — MIFFLIN-ST. JEOR: SCORE: 1726.31

## 2021-04-19 NOTE — TELEPHONE ENCOUNTER
Reason For Call:   No chief complaint on file.      Medication Name, Dose and Monthly Quantity:   Oxycodone with APAP (Percocet): Dose 5-325 Schedule 1 tablet by mouth every 6 hours prn Monthly Quantity 124   Diagnosis requiring opiates:   Acute midline low back pain without sciatica [M54.5]  - Primary     Problem List Updated:   No    Opioid Agreement On File - [facility] PAIN CONTRACT ID# [id number]:  No    Last Urine Drug Screen (at least once every 12 months) Date:   n/a  Unexpected Results:   n/a    [place]  Data Reviewed (at least once every 3 months) Date:   04/19/2021      Unexpected Results:    No.    Last Fill Date:   03/23/2021  Due Date:   04/22/2021  Last Visit with PCP:   12/17/2020    Future Visits with PCP:   No.    Processing:   [facility] C[facility]      ----------------------------------

## 2021-04-19 NOTE — PATIENT INSTRUCTIONS
Today, we discussed the following:   - Results: Your recent blood tests show that the kidney function is stable, and your thyroid levels are normal.  You do have chronic anemia, which I think warrants further work-up by your primary care doctor.  Please call him this week to arrange a visit to discuss this further.  - Medication changes:     STOP furosemide (Lasix), hydrochlorothiazide and potassium.   START torsemide 40 mg daily and Spironolactone 25 mg daily. Take these together in the morning.   - Follow up:    With me in 2-3 weeks with repeat labs beforehand.    With Dr. Mock in 3 months.      [facility]    Please, remember to continue the followin.  Weigh yourself daily. Call if your weight is up > than 2 pounds in one day, or 5 pounds in one week; if you feel more short of breath or have worsening swelling in your legs or abdomen.  2.  Eat a low sodium diet (less than 2,000mg or 2g daily). If you eat less salt, you will retain less fluid.   3.  Avoid alcohol, as this can worsen heart failure.   4.  Avoid NSAIDs as able (For example, Ibuprofen / aleve / advil / naprosen / diclofenac).    Please call CORE nurse (Shae) for any questions or concerns Mon-Fri 8am-4pm:   [phone]  For concerns after hours:   [phone] option 2   Scheduling phone number:   [phone]    Thank you for visiting with us today.   Vandana Fournier PA-C  ______________________________________________________________

## 2021-04-19 NOTE — LETTER
4/19/2021    Buck Nascimento MD  [address]  [facility] [address]    RE: Jonathan Bishop       Dear Colleague,    I had the pleasure of seeing Jonathan Bishop in the [facility].  Cardiology Clinic Progress Note    Jonathan Bishop MRN# [id number]   YOB: 1945 Age: 75 year old   Primary cardiologist: Dr. Mock         Assessment and Plan:     In summary, Jonathan Bishop presents today for a CORE clinic follow up visit.    1. Chronic HFpEF, with issues in volume fluctuation prior to TAVR in December 2020. Current dry weight felt to be around 225-230 pounds.  He currently appears volume up at a weight of 239 pounds, with bothersome peripheral edema.  This is in the setting of diastolic dysfunction and sedentary nature.  2. Severe aortic stenosis status post TAVR summer 2020.  Most recent echocardiogram in January 2021 shows a mean systolic gradient of 18.  Gradient is elevated from 8 immediately postop, however unchanged compared with 1 day postop.  This is followed by the TAVR team and Dr. Begum.  3. Longstanding history of anemia.  Unclear if this is been worked up sufficiently.  The patient himself states he is unaware of this diagnosis.    Plan:  -Stop furosemide 40 twice daily, hydrochlorothiazide 25, and potassium supplementation.  Start torsemide 40 mg daily, and spironolactone 25 mg daily.  -I recommended he see his PCP when able for work-up and evaluation of persistent mild anemia.  -I encouraged him to call if he does not begin to note improvement in his symptoms in about a week. He will return to see me in the core clinic in 2 to 3 weeks, with a repeat BMP prior.  -I encouraged him to get back to the [facility], I think that reinitiation of routine exercise will do a lot for his peripheral edema.    It was a pleasure meeting Conner today.        History of Presenting Illness:      Jonathan Bishop is a pleasant 75 year old  patient who presents today for a CORE clinic follow up.    The patient has a history of the following - severe aortic stenosis s/p TAVR using a 26 mm Anton S3 valve, CVA with residual left sided weakness, hypertension, PVC's, Diastolic heart failure and hyperlipidemia.     He had issues with volume overload prior to his TAVR and was last seen in the CORE clinic by my colleague Shelby Jean in July of last year.  Since that time, he has been followed closely by the TAVR team including my colleague Ariadna Howell.  He did develop some fluid overload post TAVR, after he ran out of his Lasix back in January, but after reinitiating it and escalating his dose to 40 mg twice daily, his weight has been stable around 230 lbs. His dry wt is felt to be around 225-230 lbs.     Last echocardiogram in Janiary demonstrated a well seated TAVR valve with a mean systolic gradient of 18 mmHg, no paravalvular regurgitation. EF normal at 60-65%, normal RV size and function.     Today, I am meeting Conner for the first time in clinic. He uses a cane to walk in to clinic. He tells me he feels miserable. His legs are becoming swollen and uncomfortable again (always R>L) and are very edematous in clinic. He is confused as to why this is still bothering him after his valve was fixed, and why he is still needing to take diuretics. He also really dislikes taking the potassium pills. We discussed the etiology of diastolic dysfunction, which inevitably forms in the setting of longstanding aortic stenosis.  I explained that, even though the valve has been fixed, an element of his diastolic dysfunction persists, and therefore he may still require diuretics for some time.  Fortunately, he tells me his breathing feels fine.  His ability to exercise is mainly limited by his leg discomfort.  His weight is up 6pounds to 239 pounds, compared with 233 pounds back in February.  We reviewed his recent labs which show normal renal function, no evidence of  "thyroid dysfunction, and stable anemia with hemoglobin around 11.  It appears he had some evidence of anemia since 2010.  He has been on iron supplementation in the past.  I do not see that there is been any substantial work-up for this however, since back in 2010 is felt to be acute blood loss anemia related to orthopedic surgery however as I mentioned it has persisted.  Conner tells me he is not aware of this diagnosis.  His blood pressure is well controlled. He mostly cooks, eats out rarely. He admits that he's been much more sedentary lately. His insurance didn't cover cardiac rehab, unfortunately. He used to go to the  routinely, doing mostly pool exercises, and generally felt a lot better when he had a routine exercise regimen.         Review of Systems:     12-pt ROS is negative except for as noted in the HPI.          Physical Exam:     Vitals: /69   Pulse 87   Ht 1.613 m (5' 3.5\")   Wt 108.8 kg (239 lb 14.4 oz)   SpO2 96%   BMI 41.82 kg/m    Wt Readings from Last 10 Encounters:   04/19/21 108.8 kg (239 lb 14.4 oz)   02/17/21 105.7 kg (233 lb)   01/21/21 104.9 kg (231 lb 4.8 oz)   01/07/21 106.1 kg (234 lb)   12/09/20 103.7 kg (228 lb 11.2 oz)   12/02/20 102.1 kg (225 lb)   11/09/20 103.5 kg (228 lb 1.6 oz)   04/23/20 101.2 kg (223 lb)   03/09/20 104.8 kg (231 lb)   12/12/19 109.3 kg (241 lb)       Constitutional:  Patient is pleasant, alert, cooperative, and in NAD.  HEENT:  NCAT. PERRLA. EOM's intact.   Neck:  CVP appears normal. No carotid bruits.   Pulmonary: Normal respiratory effort. CTAB.   Cardiac: RRR, normal S1/S2, no S3/S4, no murmur or rub.   Abdomen:  Non-tender abdomen, no hepatosplenomegaly appreciated.   Vascular: Pulses in the upper and lower extremities are 2+ and equal bilaterally.  Extremities: 2+ pitting edema from the pedal to pretibial region, greater on the right than the left.  No erythema, cyanosis or tenderness appreciated.  Skin:  No rashes or lesions appreciated. "   Neurological:  No gross motor or sensory deficits.   Psych: Appropriate affect.        Data:     Labs reviewed:  Recent Labs   Lab Test 04/16/21  1317 09/19/19  0858 07/01/19  0805 04/13/18  0755 03/06/17  1100 11/30/16  1930 11/30/16  1930 10/07/14  0849 10/07/14  0849   LDL  --   --  51 63 57   < > 168*   < > 142*   HDL  --   --  47 46 43   < > 46   < > 45   NHDL  --   --  65 73 66   < > 182*   < >  --    CHOL  --   --  112 119 109   < > 228*   < > 204*   TRIG  --   --  70 52 48   < > 71   < > 86   TSH 1.41  --   --   --   --   --  0.90  --   --    NTBNP 52 104  --   --   --   --   --   --   --    IRON  --   --   --   --   --   --   --   --  103   FEB  --   --   --   --   --   --   --   --  362   IRONSAT  --   --   --   --   --   --   --   --  28   YASMANI  --   --   --   --   --   --   --   --  114    < > = values in this interval not displayed.       Lab Results   Component Value Date    WBC 7.2 01/05/2021    RBC 4.50 01/05/2021    HGB 10.9 (L) 04/16/2021    HCT 38.0 (L) 01/05/2021    MCV 84 01/05/2021    MCH 24.7 (L) 01/05/2021    MCHC 29.2 (L) 01/05/2021    RDW 15.7 (H) 01/05/2021     01/05/2021       Lab Results   Component Value Date     04/16/2021    POTASSIUM 3.7 04/16/2021    CHLORIDE 106 04/16/2021    CO2 32 04/16/2021    ANIONGAP 5 04/16/2021    GLC 86 04/16/2021    BUN 24 04/16/2021    CR 0.96 04/16/2021    GFRESTIMATED 77 04/16/2021    GFRESTBLACK 89 04/16/2021    WARREN 9.5 04/16/2021      Lab Results   Component Value Date    AST 27 12/08/2020    ALT 28 12/08/2020       Lab Results   Component Value Date    A1C 6.3 (H) 11/30/2016       Lab Results   Component Value Date    INR 1.01 10/28/2019    INR 0.99 11/30/2016           Problem List:     Patient Active Problem List   Diagnosis     Essential hypertension, benign     Hyperlipidemia with target LDL less than 130     Anemia     Medication refill- do not delete      Low back pain     Osteoarthritis of knee     Stroke (H)     Cerebrovascular  accident involving anterior circulation, right (H)     Dermatophytosis of nail     PVD (peripheral vascular disease) (H)     Systolic murmur     Peripheral edema     Hyperplasia of prostate with lower urinary tract symptoms (LUTS)     Erectile dysfunction, unspecified erectile dysfunction type     Severe aortic stenosis     Status post coronary angiogram     Morbid obesity (H)     Aortic valve stenosis, etiology of cardiac valve disease unspecified     Aortic stenosis, severe     Diastolic heart failure (H)     S/P TAVR (transcatheter aortic valve replacement)     Bilateral leg edema           Medications:     Current Outpatient Medications   Medication Sig Dispense Refill     acetaminophen (TYLENOL) 325 MG tablet Take 1-2 tablets (325-650 mg) by mouth every 4 hours as needed for mild pain or headaches       atorvastatin (LIPITOR) 40 MG tablet Take 1 tablet (40 mg) by mouth daily 90 tablet 3     clopidogrel (PLAVIX) 75 MG tablet Take 1 tablet (75 mg) by mouth daily 90 tablet 0     furosemide (LASIX) 40 MG tablet Take 1 tablet (40 mg) by mouth 2 times daily 180 tablet 1     hydrochlorothiazide (HYDRODIURIL) 25 MG tablet Take 1 tablet (25 mg) by mouth daily 90 tablet 1     ibuprofen (ADVIL/MOTRIN) 600 MG tablet Take 1 tablet (600 mg) by mouth every 8 hours as needed for moderate pain 270 tablet 3     multivitamin, therapeutic with minerals (MULTI-VITAMIN) TABS Take 1 tablet by mouth daily. 100 tablet 3     naloxone (NARCAN) 4 MG/0.1ML nasal spray Spray 1 spray (4 mg) into one nostril alternating nostrils once as needed for opioid reversal Every 2-3 min until responsive or EMS arrives 0.2 mL 0     potassium chloride ER (KLOR-CON M) 20 MEQ CR tablet Take 2 tablets (40 mEq) by mouth daily 60 tablet 3     sildenafil (VIAGRA) 100 MG tablet Take 30 min to 4 hours before intercourse as needed for erectile dysfunction Has appt with Dr Nascimento 11/9/2020 25 tablet 2     tamsulosin (FLOMAX) 0.4 MG capsule TAKE 1 CAPSULE BY MOUTH  ONCE DAILY (Patient taking differently: Take 0.4 mg by mouth daily TAKE 1 CAPSULE BY MOUTH ONCE DAILY) 90 capsule 3     vitamin D3 (CHOLECALCIFEROL) 50 mcg (2000 units) tablet Take 1 tablet by mouth daily       aspirin (ASA) 81 MG EC tablet Take 1 tablet (81 mg) by mouth daily (Patient not taking: Reported on 4/19/2021) 30 tablet 0     order for DME Equipment being ordered: Walker ()  Treatment Diagnosis: stroke 1 Units 0     other medical supplies Dispense knee high, medium compression, large size 4 each 0     oxyCODONE-acetaminophen (PERCOCET) 5-325 MG tablet Take 1-2 tablets by mouth every 6 hours as needed for pain Max 5 per day. 150 tablet 0           Past Medical History:     Past Medical History:   Diagnosis Date     * * * SBE PROPHYLAXIS * * *     s/p TAVR     Acute rheumatic carditis 1950     Complication of anesthesia     pt once woke up during back surgery     Coronary artery disease     murmur     Erectile dysfunction     Sildenafil     Hip pain, bilateral      Hypercholesteremia      Hypertension      Low back pain      Nonsenile cataract      Obesity      Renal cyst      S/P 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     Stroke (cerebrum) (H) 11/30/2016     Umbilical hernia 06/10/2011    s/p surgical repair     Wound, surgical, infected 06/10/2011    hernia     Past Surgical History:   Procedure Laterality Date     ARTHROPLASTY KNEE BILATERAL  7/22/2010     COLONOSCOPY N/A 4/14/2017    Procedure: COLONOSCOPY;  Surgeon: Toby Bills MD;  Location:  GI     CV CORONARY ANGIOGRAM N/A 10/28/2019    Procedure: Coronary Angiogram;  Surgeon: Guerrero Huitron MD;  Location:  HEART CARDIAC CATH LAB     CV RIGHT HEART CATH MEASUREMENTS RECORDED N/A 10/28/2019    Procedure: Right Heart Cath;  Surgeon: Guerrero Huitron MD;  Location:  HEART CARDIAC CATH LAB     CV TRANSCATHETER AORTIC VALVE REPLACEMENT N/A 12/8/2020    Procedure: Transcatheter  Aortic Valve Replacement;  Surgeon: Camila Begum MD;  Location:  HEART CARDIAC CATH LAB     FUSION LUMBAR ANTERIOR TWO LEVELS       HERNIORRHAPHY UMBILICAL  6/10/2011    Procedure:HERNIORRHAPHY UMBILICAL; Open, with mesh placement; Surgeon:HO PARHAM; Location:[facility] OR     LAMINECTOMY LUMBAR TWO LEVELS       Family History   Problem Relation Age of Onset     C.A.D. Mother      Unknown/Adopted Father      Heart Failure Sister      Heart Failure Sister      Glaucoma No family hx of      Macular Degeneration No family hx of      Social History     Socioeconomic History     Marital status:      Spouse name: Not on file     Number of children: Not on file     Years of education: Not on file     Highest education level: Not on file   Occupational History     Not on file   Social Needs     Financial resource strain: Not on file     Food insecurity     Worry: Not on file     Inability: Not on file     Transportation needs     Medical: Not on file     Non-medical: Not on file   Tobacco Use     Smoking status: Former Smoker     Quit date: 2005     Years since quittin.3     Smokeless tobacco: Never Used     Tobacco comment: Non smoker. No 2nd hand exposure. CCX, RMA PCC 2011   Substance and Sexual Activity     Alcohol use: No     Drug use: No     Sexual activity: Not Currently   Lifestyle     Physical activity     Days per week: Not on file     Minutes per session: Not on file     Stress: Not on file   Relationships     Social connections     Talks on phone: Not on file     Gets together: Not on file     Attends Rastafarian service: Not on file     Active member of club or organization: Not on file     Attends meetings of clubs or organizations: Not on file     Relationship status: Not on file     Intimate partner violence     Fear of current or ex partner: Not on file     Emotionally abused: Not on file     Physically abused: Not on file     Forced sexual activity: Not on file   Other  Topics Concern     Parent/sibling w/ CABG, MI or angioplasty before 65F 55M? Not Asked   Social History Narrative    He lives by himself in an apt in [place].  He has 2 goldfish, Lai and Ubaldo.           Allergies:   Patient has no known allergies.      Vandana Fournier PA-C  [facility]  Pager: [phone]    cc:   Buck Nascimento MD  [address] [place] [address] [id number]  [place] [address]

## 2021-04-30 ENCOUNTER — CARE COORDINATION (OUTPATIENT)
Dept: CARDIOLOGY | Facility: CLINIC | Age: 76
End: 2021-04-30

## 2021-04-30 DIAGNOSIS — I50.32 CHRONIC DIASTOLIC HEART FAILURE (H): ICD-10-CM

## 2021-04-30 DIAGNOSIS — I50.32 CHRONIC DIASTOLIC HEART FAILURE (H): Primary | ICD-10-CM

## 2021-04-30 LAB
ANION GAP SERPL CALCULATED.3IONS-SCNC: 4 MMOL/L (ref 3–14)
BUN SERPL-MCNC: 21 MG/DL (ref 7–30)
CALCIUM SERPL-MCNC: 8.7 MG/DL (ref 8.5–10.1)
CHLORIDE SERPL-SCNC: 105 MMOL/L (ref 94–109)
CO2 SERPL-SCNC: 32 MMOL/L (ref 20–32)
CREAT SERPL-MCNC: 0.84 MG/DL (ref 0.66–1.25)
GFR SERPL CREATININE-BSD FRML MDRD: 85 ML/MIN/{1.73_M2}
GLUCOSE SERPL-MCNC: 110 MG/DL (ref 70–99)
POTASSIUM SERPL-SCNC: 3.2 MMOL/L (ref 3.4–5.3)
SODIUM SERPL-SCNC: 141 MMOL/L (ref 133–144)

## 2021-04-30 PROCEDURE — 80048 BASIC METABOLIC PNL TOTAL CA: CPT | Performed by: PHYSICIAN ASSISTANT

## 2021-04-30 PROCEDURE — 36415 COLL VENOUS BLD VENIPUNCTURE: CPT | Performed by: PHYSICIAN ASSISTANT

## 2021-04-30 RX ORDER — POTASSIUM CHLORIDE 1500 MG/1
40 TABLET, EXTENDED RELEASE ORAL DAILY
Qty: 180 TABLET | Refills: 1 | Status: SHIPPED | OUTPATIENT
Start: 2021-04-30 | End: 2021-05-04

## 2021-04-30 NOTE — PROGRESS NOTES
[facility]  Vandana Fournier PA-C Mueller, Tiffany M RN             Unfortunately, we're going to have to put him back on a little potassium supplementation. Let's do 40 Meq daily for now. Thanks.      Component      Latest Ref Rng & Units 4/30/2021   Sodium      133 - 144 mmol/L 141   Potassium      3.4 - 5.3 mmol/L 3.2 (L)   Chloride      94 - 109 mmol/L 105   Carbon Dioxide      20 - 32 mmol/L 32   Anion Gap      3 - 14 mmol/L 4   Glucose      70 - 99 mg/dL 110 (H)   Urea Nitrogen      7 - 30 mg/dL 21   Creatinine      0.66 - 1.25 mg/dL 0.84   GFR Estimate      >60 mL/min/1.73:m2 85   GFR Estimate If Black      >60 mL/min/1.73:m2 >90   Calcium      8.5 - 10.1 mg/dL 8.7       Called pt, reviewed BMP results and recommendations to restart KCL at 40meq daily. Pt stated understanding. Confirmed pt stopped Lasix and hydrochlorothiazide, and KCL at last visit and started Torsemide and Spironolactone as instructed. He has KCL at home, but will also send in new Rx. Pt asked if we checked his iron level. Reviewed we did not draw iron today, pt asked why we wouldn't check iron level if he is anemic and why we wouldn't put him on something. Reviewed RENEE Hutton's note:  1. Longstanding history of anemia.  Unclear if this is been worked up sufficiently.  The patient himself states he is unaware of this diagnosis.    Recommended pt see and discuss with PCP about anemia diagnosis and also discuss further with RENEE Hutton at visit next week. Pt stated understanding. He will follow up as planned on 5/4.     Shae Garrison, RN, BSN, CHFN  04/30/21 at 2:45 PM

## 2021-05-04 ENCOUNTER — OFFICE VISIT (OUTPATIENT)
Dept: CARDIOLOGY | Facility: CLINIC | Age: 76
End: 2021-05-04
Attending: PHYSICIAN ASSISTANT
Payer: COMMERCIAL

## 2021-05-04 VITALS
BODY MASS INDEX: 39.64 KG/M2 | OXYGEN SATURATION: 97 % | DIASTOLIC BLOOD PRESSURE: 70 MMHG | HEIGHT: 64 IN | HEART RATE: 80 BPM | SYSTOLIC BLOOD PRESSURE: 134 MMHG | WEIGHT: 232.2 LBS

## 2021-05-04 DIAGNOSIS — I50.32 CHRONIC DIASTOLIC HEART FAILURE (H): ICD-10-CM

## 2021-05-04 DIAGNOSIS — D64.9 ANEMIA, UNSPECIFIED TYPE: Primary | ICD-10-CM

## 2021-05-04 PROCEDURE — 99214 OFFICE O/P EST MOD 30 MIN: CPT | Performed by: PHYSICIAN ASSISTANT

## 2021-05-04 RX ORDER — SPIRONOLACTONE 50 MG/1
50 TABLET, FILM COATED ORAL DAILY
Qty: 90 TABLET | Refills: 3 | Status: SHIPPED | OUTPATIENT
Start: 2021-05-04 | End: 2022-08-01

## 2021-05-04 ASSESSMENT — MIFFLIN-ST. JEOR: SCORE: 1691.31

## 2021-05-04 NOTE — PATIENT INSTRUCTIONS
Today, we discussed the following:   - Medication changes:     Increase the spironolactone to 2 tabs (50 mg) every morning.   Eat two bananas every day.   You can set aside the potassium supplement.   - Follow up:    Blood draw for your potassium level in 2 weeks.    Come back to see me in 6 weeks.    I placed a hematology consult today - someone should call you to arrange this.    Ask Dr. Nascimento about the imbalance issue.     Please, remember to continue the followin.  Weigh yourself daily. Call if your weight is up > than 2 pounds in one day, or 5 pounds in one week; if you feel more short of breath or have worsening swelling in your legs or abdomen.  2.  Eat a low sodium diet (less than 2,000mg or 2g daily). If you eat less salt, you will retain less fluid.   3.  Avoid alcohol, as this can worsen heart failure.   4.  Avoid NSAIDs as able (For example, Ibuprofen / aleve / advil / naprosen / diclofenac).    Please call CORE nurse (Shae) for any questions or concerns Mon-Fri 8am-4pm:   [phone]  For concerns after hours:   [phone] option 2   Scheduling phone number:   [phone]    Thank you for visiting with us today.   Vandana Fournier PA-C  ______________________________________________________________    Some foods that are high in potassium  Fruits Vegetables Proteins Other   Avocado Artichokes Black beans Chocolate   Bananas Baked beans Clams Dairy products   Coconut Beets Ground beef Granola   Cantaloupe and honeydew melons Broccoli Kidney beans Milk   Dates [place] sprouts Lobster Peanut butter   Dried fruits Cabbage (raw) [place] beans Soups that are salt-free or low-sodium   Figs Carrots (raw) Cano beans Soy milk   Kiwi Chard [place] Sports drinks   [place] Olives Sardines Tomato sauce   Nectarines Potatoes (white and sweet) Scallops Wheat bran and bran products   Oranges and orange juice Pickles Steak Whole-grain bread   Prunes and prune juice Pumpkin [place] Yogurt   John Rucker          Squash (acorn, butternut, hodges)      Tomatoes and tomato juice

## 2021-05-04 NOTE — LETTER
5/4/2021    Buck Nascimento MD  [address] [facility] [address]  [facility] [address]    RE: Jonathan Bishop       Dear Colleague,    I had the pleasure of seeing Jonathan Bishop in the [facility].  Cardiology Clinic Progress Note    Jonathan Bishop MRN# [id number]   YOB: 1945 Age: 75 year old   Primary cardiologist: Dr. Mock         Assessment and Plan:     In summary, Jonathan Bishop presents today for a CORE clinic follow up visit.    1. Chronic HFpEF, with issues in volume fluctuation prior to TAVR in December 2020. Last visit was volume-up at 239 lbs, in the setting of diastolic dysfunction and sedentary nature. Furosemide and hydrochlorothiazide were stopped, torsemide and spironolactone were initiated with a 7 lb wt loss and symptomatic improvement. Still notable peripheral edema. Current dry weight felt to be around 225-230 pounds.   2. Hypokalemia, difficulty with taking potassium supplements.  3. Severe aortic stenosis status post TAVR summer 2020.  Most recent echocardiogram in January 2021 shows a mean systolic gradient of 18.  Gradient is elevated from 8 immediately postop, however unchanged compared with 1 day postop.  This is followed by the TAVR team and Dr. Begum.  4. Longstanding history of anemia.  Unclear if this is been worked up sufficiently.  The patient himself was unaware of this diagnosis prior to our meeting. Notes some new issues with equilibrium.    Plan:  - Increase Hannibal to 50 mg daily. Eat 2 bananas daily. We'll try stopping the potassium supplement.  - BMP + Fe studies in 2 weeks.   - Hematology referral.   - Encouraged him to discuss imbalance issues with Dr. Nascimento.   - Encouraged to start exercise (Plans to start back at [facility] later this week).  - Return to clinic with me in six weeks.   - Dr. Mock in 3 months.         History of Presenting Illness:      Jonathan Bishop is a pleasant 75 year old patient who  presents today for a CORE clinic follow up.    The patient has a history of the following - severe aortic stenosis s/p TAVR using a 26 mm Anton S3 valve, CVA with residual left sided weakness, hypertension, PVC's, Diastolic heart failure and hyperlipidemia.     He had issues with volume overload prior to his TAVR and was last seen in the CORE clinic by my colleague Shelby Jean in July of last year.  Since that time, he has been followed closely by the TAVR team including my colleague Ariadna Howell.  He did develop some fluid overload post TAVR, after he ran out of his Lasix back in January, but after reinitiating it and escalating his dose to 40 mg twice daily, his weight has been stable around 230 lbs. His dry wt is felt to be around 225-230 lbs.     Last echocardiogram in Janiary demonstrated a well seated TAVR valve with a mean systolic gradient of 18 mmHg, no paravalvular regurgitation. EF normal at 60-65%, normal RV size and function.     I met Conner for the first time in April: He uses a cane to walk in to clinic. He tells me he feels miserable. His legs are becoming swollen and uncomfortable again (always R>L) and are very edematous in clinic. He is confused as to why this is still bothering him after his valve was fixed, and why he is still needing to take diuretics. He also really dislikes taking the potassium pills. We discussed the etiology of diastolic dysfunction, which inevitably forms in the setting of longstanding aortic stenosis.  I explained that, even though the valve has been fixed, an element of his diastolic dysfunction persists, and therefore he may still require diuretics for some time.  Fortunately, he tells me his breathing feels fine.  His ability to exercise is mainly limited by his leg discomfort.  His weight is up 6pounds to 239 pounds, compared with 233 pounds back in February.  We reviewed his recent labs which show normal renal function, no evidence of thyroid dysfunction, and  "stable anemia with hemoglobin around 11.  It appears he had some evidence of anemia since 2010.  He has been on iron supplementation in the past.  I do not see that there is been any substantial work-up for this however, since back in 2010 is felt to be acute blood loss anemia related to orthopedic surgery however as I mentioned it has persisted.  Conner tells me he is not aware of this diagnosis.  His blood pressure is well controlled. He mostly cooks, eats out rarely. He admits that he's been much more sedentary lately. His insurance didn't cover cardiac rehab, unfortunately. He used to go to the  routinely, doing mostly pool exercises, and generally felt a lot better when he had a routine exercise regimen.  -Stop furosemide 40 twice daily, hydrochlorothiazide 25, and potassium supplementation.  Start torsemide 40 mg daily, and spironolactone 25 mg daily.  -I recommended he see his PCP when able for work-up and evaluation of persistent mild anemia.  -I encouraged him to call if he does not begin to note improvement in his symptoms in about a week. He will return to see me in the core clinic in 2 to 3 weeks, with a repeat BMP prior.  -I encouraged him to get back to the [facility], I think that reinitiation of routine exercise will do a lot for his peripheral edema.    Today, weight is down 7 lbs. His breathing and swelling feels significantly better. Still 1+ pitting edema bilaterally but he's thrilled with the improvement. Starts back at the [facility] this week. His BMP last week showed hypokelamia (3.2) and I had him re-start potassium supplementation but he tells me he wants to discontinue it again - tells me it \"burns his skin.\" Feels his equilibrium is off since the TAVR and wonders why this is. No lightheadedness or near-syncope.          Review of Systems:     12-pt ROS is negative except for as noted in the HPI.          Physical Exam:     Vitals: /70   Pulse 80   Ht 1.613 m (5' 3.5\")   Wt 105.3 kg (232 lb 3.2 " oz)   SpO2 97%   BMI 40.49 kg/m    Wt Readings from Last 10 Encounters:   05/04/21 105.3 kg (232 lb 3.2 oz)   04/19/21 108.8 kg (239 lb 14.4 oz)   02/17/21 105.7 kg (233 lb)   01/21/21 104.9 kg (231 lb 4.8 oz)   01/07/21 106.1 kg (234 lb)   12/09/20 103.7 kg (228 lb 11.2 oz)   12/02/20 102.1 kg (225 lb)   11/09/20 103.5 kg (228 lb 1.6 oz)   04/23/20 101.2 kg (223 lb)   03/09/20 104.8 kg (231 lb)       Constitutional:  Patient is pleasant, alert, cooperative, and in NAD.  HEENT:  NCAT. PERRLA. EOM's intact.   Neck:  CVP appears normal. No carotid bruits.   Pulmonary: Normal respiratory effort. CTAB.   Cardiac: RRR, normal S1/S2, no S3/S4, no murmur or rub.   Abdomen:  Non-tender abdomen, no hepatosplenomegaly appreciated.   Vascular: Pulses in the upper and lower extremities are 2+ and equal bilaterally.  Extremities: 1+ pitting edema from the pedal to pretibial region, greater on the right than the left.  No erythema, cyanosis or tenderness appreciated.  Skin:  No rashes or lesions appreciated.   Neurological:  No gross motor or sensory deficits.   Psych: Appropriate affect.        Data:     Labs reviewed:  Recent Labs   Lab Test 04/16/21  1317 09/19/19  0858 07/01/19  0805 04/13/18  0755 03/06/17  1100 11/30/16  1930 11/30/16  1930 10/07/14  0849 10/07/14  0849   LDL  --   --  51 63 57   < > 168*   < > 142*   HDL  --   --  47 46 43   < > 46   < > 45   NHDL  --   --  65 73 66   < > 182*   < >  --    CHOL  --   --  112 119 109   < > 228*   < > 204*   TRIG  --   --  70 52 48   < > 71   < > 86   TSH 1.41  --   --   --   --   --  0.90  --   --    NTBNP 52 104  --   --   --   --   --   --   --    IRON  --   --   --   --   --   --   --   --  103   FEB  --   --   --   --   --   --   --   --  362   IRONSAT  --   --   --   --   --   --   --   --  28   YASMANI  --   --   --   --   --   --   --   --  114    < > = values in this interval not displayed.       Lab Results   Component Value Date    WBC 7.2 01/05/2021    RBC 4.50  01/05/2021    HGB 10.9 (L) 04/16/2021    HCT 38.0 (L) 01/05/2021    MCV 84 01/05/2021    MCH 24.7 (L) 01/05/2021    MCHC 29.2 (L) 01/05/2021    RDW 15.7 (H) 01/05/2021     01/05/2021       Lab Results   Component Value Date     04/30/2021    POTASSIUM 3.2 (L) 04/30/2021    CHLORIDE 105 04/30/2021    CO2 32 04/30/2021    ANIONGAP 4 04/30/2021     (H) 04/30/2021    BUN 21 04/30/2021    CR 0.84 04/30/2021    GFRESTIMATED 85 04/30/2021    GFRESTBLACK >90 04/30/2021    WARREN 8.7 04/30/2021      Lab Results   Component Value Date    AST 27 12/08/2020    ALT 28 12/08/2020       Lab Results   Component Value Date    A1C 6.3 (H) 11/30/2016       Lab Results   Component Value Date    INR 1.01 10/28/2019    INR 0.99 11/30/2016           Problem List:     Patient Active Problem List   Diagnosis     Essential hypertension, benign     Hyperlipidemia with target LDL less than 130     Anemia     Medication refill- do not delete      Low back pain     Osteoarthritis of knee     Stroke (H)     Cerebrovascular accident involving anterior circulation, right (H)     Dermatophytosis of nail     PVD (peripheral vascular disease) (H)     Systolic murmur     Peripheral edema     Hyperplasia of prostate with lower urinary tract symptoms (LUTS)     Erectile dysfunction, unspecified erectile dysfunction type     Severe aortic stenosis     Status post coronary angiogram     Morbid obesity (H)     Aortic valve stenosis, etiology of cardiac valve disease unspecified     Aortic stenosis, severe     Diastolic heart failure (H)     S/P TAVR (transcatheter aortic valve replacement)     Bilateral leg edema           Medications:     Current Outpatient Medications   Medication Sig Dispense Refill     acetaminophen (TYLENOL) 325 MG tablet Take 1-2 tablets (325-650 mg) by mouth every 4 hours as needed for mild pain or headaches       atorvastatin (LIPITOR) 40 MG tablet Take 1 tablet (40 mg) by mouth daily 90 tablet 3     clopidogrel  (PLAVIX) 75 MG tablet Take 1 tablet (75 mg) by mouth daily 90 tablet 0     ibuprofen (ADVIL/MOTRIN) 600 MG tablet Take 1 tablet (600 mg) by mouth every 8 hours as needed for moderate pain 270 tablet 3     multivitamin, therapeutic with minerals (MULTI-VITAMIN) TABS Take 1 tablet by mouth daily. 100 tablet 3     order for DME Equipment being ordered: Walker ()  Treatment Diagnosis: stroke 1 Units 0     other medical supplies Dispense knee high, medium compression, large size 4 each 0     oxyCODONE-acetaminophen (PERCOCET) 5-325 MG tablet Take 1-2 tablets by mouth every 6 hours as needed for pain Max 5 per day. 150 tablet 0     potassium chloride ER (K-TAB) 20 MEQ CR tablet Take 2 tablets (40 mEq) by mouth daily (Patient taking differently: Take 20 mEq by mouth daily ) 180 tablet 1     sildenafil (VIAGRA) 100 MG tablet Take 30 min to 4 hours before intercourse as needed for erectile dysfunction Has appt with Dr Nascimento 11/9/2020 25 tablet 2     spironolactone (ALDACTONE) 25 MG tablet Take 1 tablet (25 mg) by mouth daily 90 tablet 3     tamsulosin (FLOMAX) 0.4 MG capsule TAKE 1 CAPSULE BY MOUTH ONCE DAILY (Patient taking differently: Take 0.4 mg by mouth daily TAKE 1 CAPSULE BY MOUTH ONCE DAILY) 90 capsule 3     torsemide (DEMADEX) 20 MG tablet Take 2 tablets (40 mg) by mouth daily 180 tablet 3     vitamin D3 (CHOLECALCIFEROL) 50 mcg (2000 units) tablet Take 1 tablet by mouth daily       aspirin (ASA) 81 MG EC tablet Take 1 tablet (81 mg) by mouth daily (Patient not taking: Reported on 4/19/2021) 30 tablet 0     naloxone (NARCAN) 4 MG/0.1ML nasal spray Spray 1 spray (4 mg) into one nostril alternating nostrils once as needed for opioid reversal Every 2-3 min until responsive or EMS arrives (Patient not taking: Reported on 5/4/2021) 0.2 mL 0           Past Medical History:     Past Medical History:   Diagnosis Date     * * * SBE PROPHYLAXIS * * *     s/p TAVR     Acute rheumatic carditis 1950     Complication of  anesthesia     pt once woke up during back surgery     Coronary artery disease     murmur     Erectile dysfunction     Sildenafil     Hip pain, bilateral      Hypercholesteremia      Hypertension      Low back pain      Nonsenile cataract      Obesity      Renal cyst      S/P 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     Stroke (cerebrum) (H) 11/30/2016     Umbilical hernia 06/10/2011    s/p surgical repair     Wound, surgical, infected 06/10/2011    hernia     Past Surgical History:   Procedure Laterality Date     ARTHROPLASTY KNEE BILATERAL  7/22/2010     COLONOSCOPY N/A 4/14/2017    Procedure: COLONOSCOPY;  Surgeon: Toby Bills MD;  Location: [facility]     CV CORONARY ANGIOGRAM N/A 10/28/2019    Procedure: Coronary Angiogram;  Surgeon: Guerrero Huitron MD;  Location:  HEART CARDIAC CATH LAB     CV RIGHT HEART CATH MEASUREMENTS RECORDED N/A 10/28/2019    Procedure: Right Heart Cath;  Surgeon: Guerrero Huitron MD;  Location:  HEART CARDIAC CATH LAB     CV TRANSCATHETER AORTIC VALVE REPLACEMENT N/A 12/8/2020    Procedure: Transcatheter Aortic Valve Replacement;  Surgeon: Camila Begum MD;  Location:  HEART CARDIAC CATH LAB     FUSION LUMBAR ANTERIOR TWO LEVELS       HERNIORRHAPHY UMBILICAL  6/10/2011    Procedure:HERNIORRHAPHY UMBILICAL; Open, with mesh placement; Surgeon:HO PARHAM; Location:[facility]     LAMINECTOMY LUMBAR TWO LEVELS       Family History   Problem Relation Age of Onset     C.A.D. Mother      Unknown/Adopted Father      Heart Failure Sister      Heart Failure Sister      Glaucoma No family hx of      Macular Degeneration No family hx of      Social History     Socioeconomic History     Marital status:      Spouse name: Not on file     Number of children: Not on file     Years of education: Not on file     Highest education level: Not on file   Occupational History     Not on file   Social Needs     Financial  resource strain: Not on file     Food insecurity     Worry: Not on file     Inability: Not on file     Transportation needs     Medical: Not on file     Non-medical: Not on file   Tobacco Use     Smoking status: Former Smoker     Quit date: 2005     Years since quittin.3     Smokeless tobacco: Never Used     Tobacco comment: Non smoker. No 2nd hand exposure. LUIZ ALHAJISAIDA [facility] 2011   Substance and Sexual Activity     Alcohol use: No     Drug use: No     Sexual activity: Not Currently   Lifestyle     Physical activity     Days per week: Not on file     Minutes per session: Not on file     Stress: Not on file   Relationships     Social connections     Talks on phone: Not on file     Gets together: Not on file     Attends Christian service: Not on file     Active member of club or organization: Not on file     Attends meetings of clubs or organizations: Not on file     Relationship status: Not on file     Intimate partner violence     Fear of current or ex partner: Not on file     Emotionally abused: Not on file     Physically abused: Not on file     Forced sexual activity: Not on file   Other Topics Concern     Parent/sibling w/ CABG, MI or angioplasty before 65F 55M? Not Asked   Social History Narrative    He lives by himself in an apt in [place].  He has 2 goldfish, Lai and Ubaldo.           Allergies:   Patient has no known allergies.      Vandana Fournier PA-C  [facility]  Pager: [phone]    cc:   Vandana Fournier PA-C  [address] [address]

## 2021-05-04 NOTE — PROGRESS NOTES
Cardiology Clinic Progress Note    Jonathan Bishop MRN# [id number]   YOB: 1945 Age: 75 year old   Primary cardiologist: Dr. Mock         Assessment and Plan:     In summary, Jonathan Bishop presents today for a CORE clinic follow up visit.    1. Chronic HFpEF, with issues in volume fluctuation prior to TAVR in December 2020. Last visit was volume-up at 239 lbs, in the setting of diastolic dysfunction and sedentary nature. Furosemide and hydrochlorothiazide were stopped, torsemide and spironolactone were initiated with a 7 lb wt loss and symptomatic improvement. Still notable peripheral edema. Current dry weight felt to be around 225-230 pounds.   2. Hypokalemia, difficulty with taking potassium supplements.  3. Severe aortic stenosis status post TAVR summer 2020.  Most recent echocardiogram in January 2021 shows a mean systolic gradient of 18.  Gradient is elevated from 8 immediately postop, however unchanged compared with 1 day postop.  This is followed by the TAVR team and Dr. Begum.  4. Longstanding history of anemia.  Unclear if this is been worked up sufficiently.  The patient himself was unaware of this diagnosis prior to our meeting. Notes some new issues with equilibrium.    Plan:  - Increase Ivan to 50 mg daily. Eat 2 bananas daily. We'll try stopping the potassium supplement.  - BMP + Fe studies in 2 weeks.   - Hematology referral.   - Encouraged him to discuss imbalance issues with Dr. Nascimento.   - Encouraged to start exercise (Plans to start back at [facility] later this week).  - Return to clinic with me in six weeks.   - Dr. Mock in 3 months.         History of Presenting Illness:      Jonathan Bishop is a pleasant 75 year old patient who presents today for a CORE clinic follow up.    The patient has a history of the following - severe aortic stenosis s/p TAVR using a 26 mm Anton S3 valve, CVA with residual left sided weakness, hypertension, PVC's, Diastolic heart failure and  hyperlipidemia.     He had issues with volume overload prior to his TAVR and was last seen in the CORE clinic by my colleague Shelby Jean in July of last year.  Since that time, he has been followed closely by the TAVR team including my colleague Ariadna Howell.  He did develop some fluid overload post TAVR, after he ran out of his Lasix back in January, but after reinitiating it and escalating his dose to 40 mg twice daily, his weight has been stable around 230 lbs. His dry wt is felt to be around 225-230 lbs.     Last echocardiogram in Janiary demonstrated a well seated TAVR valve with a mean systolic gradient of 18 mmHg, no paravalvular regurgitation. EF normal at 60-65%, normal RV size and function.     I met Conner for the first time in April: He uses a cane to walk in to clinic. He tells me he feels miserable. His legs are becoming swollen and uncomfortable again (always R>L) and are very edematous in clinic. He is confused as to why this is still bothering him after his valve was fixed, and why he is still needing to take diuretics. He also really dislikes taking the potassium pills. We discussed the etiology of diastolic dysfunction, which inevitably forms in the setting of longstanding aortic stenosis.  I explained that, even though the valve has been fixed, an element of his diastolic dysfunction persists, and therefore he may still require diuretics for some time.  Fortunately, he tells me his breathing feels fine.  His ability to exercise is mainly limited by his leg discomfort.  His weight is up 6pounds to 239 pounds, compared with 233 pounds back in February.  We reviewed his recent labs which show normal renal function, no evidence of thyroid dysfunction, and stable anemia with hemoglobin around 11.  It appears he had some evidence of anemia since 2010.  He has been on iron supplementation in the past.  I do not see that there is been any substantial work-up for this however, since back in 2010 is felt  "to be acute blood loss anemia related to orthopedic surgery however as I mentioned it has persisted.  Conner tells me he is not aware of this diagnosis.  His blood pressure is well controlled. He mostly cooks, eats out rarely. He admits that he's been much more sedentary lately. His insurance didn't cover cardiac rehab, unfortunately. He used to go to the  routinely, doing mostly pool exercises, and generally felt a lot better when he had a routine exercise regimen.  -Stop furosemide 40 twice daily, hydrochlorothiazide 25, and potassium supplementation.  Start torsemide 40 mg daily, and spironolactone 25 mg daily.  -I recommended he see his PCP when able for work-up and evaluation of persistent mild anemia.  -I encouraged him to call if he does not begin to note improvement in his symptoms in about a week. He will return to see me in the core clinic in 2 to 3 weeks, with a repeat BMP prior.  -I encouraged him to get back to the [facility], I think that reinitiation of routine exercise will do a lot for his peripheral edema.    Today, weight is down 7 lbs. His breathing and swelling feels significantly better. Still 1+ pitting edema bilaterally but he's thrilled with the improvement. Starts back at the [facility] this week. His BMP last week showed hypokelamia (3.2) and I had him re-start potassium supplementation but he tells me he wants to discontinue it again - tells me it \"burns his skin.\" Feels his equilibrium is off since the TAVR and wonders why this is. No lightheadedness or near-syncope.          Review of Systems:     12-pt ROS is negative except for as noted in the HPI.          Physical Exam:     Vitals: /70   Pulse 80   Ht 1.613 m (5' 3.5\")   Wt 105.3 kg (232 lb 3.2 oz)   SpO2 97%   BMI 40.49 kg/m    Wt Readings from Last 10 Encounters:   05/04/21 105.3 kg (232 lb 3.2 oz)   04/19/21 108.8 kg (239 lb 14.4 oz)   02/17/21 105.7 kg (233 lb)   01/21/21 104.9 kg (231 lb 4.8 oz)   01/07/21 106.1 kg (234 lb) "   12/09/20 103.7 kg (228 lb 11.2 oz)   12/02/20 102.1 kg (225 lb)   11/09/20 103.5 kg (228 lb 1.6 oz)   04/23/20 101.2 kg (223 lb)   03/09/20 104.8 kg (231 lb)       Constitutional:  Patient is pleasant, alert, cooperative, and in NAD.  HEENT:  NCAT. PERRLA. EOM's intact.   Neck:  CVP appears normal. No carotid bruits.   Pulmonary: Normal respiratory effort. CTAB.   Cardiac: RRR, normal S1/S2, no S3/S4, no murmur or rub.   Abdomen:  Non-tender abdomen, no hepatosplenomegaly appreciated.   Vascular: Pulses in the upper and lower extremities are 2+ and equal bilaterally.  Extremities: 1+ pitting edema from the pedal to pretibial region, greater on the right than the left.  No erythema, cyanosis or tenderness appreciated.  Skin:  No rashes or lesions appreciated.   Neurological:  No gross motor or sensory deficits.   Psych: Appropriate affect.        Data:     Labs reviewed:  Recent Labs   Lab Test 04/16/21  1317 09/19/19  0858 07/01/19  0805 04/13/18  0755 03/06/17  1100 11/30/16  1930 11/30/16  1930 10/07/14  0849 10/07/14  0849   LDL  --   --  51 63 57   < > 168*   < > 142*   HDL  --   --  47 46 43   < > 46   < > 45   NHDL  --   --  65 73 66   < > 182*   < >  --    CHOL  --   --  112 119 109   < > 228*   < > 204*   TRIG  --   --  70 52 48   < > 71   < > 86   TSH 1.41  --   --   --   --   --  0.90  --   --    NTBNP 52 104  --   --   --   --   --   --   --    IRON  --   --   --   --   --   --   --   --  103   FEB  --   --   --   --   --   --   --   --  362   IRONSAT  --   --   --   --   --   --   --   --  28   YASMANI  --   --   --   --   --   --   --   --  114    < > = values in this interval not displayed.       Lab Results   Component Value Date    WBC 7.2 01/05/2021    RBC 4.50 01/05/2021    HGB 10.9 (L) 04/16/2021    HCT 38.0 (L) 01/05/2021    MCV 84 01/05/2021    MCH 24.7 (L) 01/05/2021    MCHC 29.2 (L) 01/05/2021    RDW 15.7 (H) 01/05/2021     01/05/2021       Lab Results   Component Value Date      04/30/2021    POTASSIUM 3.2 (L) 04/30/2021    CHLORIDE 105 04/30/2021    CO2 32 04/30/2021    ANIONGAP 4 04/30/2021     (H) 04/30/2021    BUN 21 04/30/2021    CR 0.84 04/30/2021    GFRESTIMATED 85 04/30/2021    GFRESTBLACK >90 04/30/2021    WARREN 8.7 04/30/2021      Lab Results   Component Value Date    AST 27 12/08/2020    ALT 28 12/08/2020       Lab Results   Component Value Date    A1C 6.3 (H) 11/30/2016       Lab Results   Component Value Date    INR 1.01 10/28/2019    INR 0.99 11/30/2016           Problem List:     Patient Active Problem List   Diagnosis     Essential hypertension, benign     Hyperlipidemia with target LDL less than 130     Anemia     Medication refill- do not delete      Low back pain     Osteoarthritis of knee     Stroke (H)     Cerebrovascular accident involving anterior circulation, right (H)     Dermatophytosis of nail     PVD (peripheral vascular disease) (H)     Systolic murmur     Peripheral edema     Hyperplasia of prostate with lower urinary tract symptoms (LUTS)     Erectile dysfunction, unspecified erectile dysfunction type     Severe aortic stenosis     Status post coronary angiogram     Morbid obesity (H)     Aortic valve stenosis, etiology of cardiac valve disease unspecified     Aortic stenosis, severe     Diastolic heart failure (H)     S/P TAVR (transcatheter aortic valve replacement)     Bilateral leg edema           Medications:     Current Outpatient Medications   Medication Sig Dispense Refill     acetaminophen (TYLENOL) 325 MG tablet Take 1-2 tablets (325-650 mg) by mouth every 4 hours as needed for mild pain or headaches       atorvastatin (LIPITOR) 40 MG tablet Take 1 tablet (40 mg) by mouth daily 90 tablet 3     clopidogrel (PLAVIX) 75 MG tablet Take 1 tablet (75 mg) by mouth daily 90 tablet 0     ibuprofen (ADVIL/MOTRIN) 600 MG tablet Take 1 tablet (600 mg) by mouth every 8 hours as needed for moderate pain 270 tablet 3     multivitamin, therapeutic with minerals  (MULTI-VITAMIN) TABS Take 1 tablet by mouth daily. 100 tablet 3     order for DME Equipment being ordered: Walker ()  Treatment Diagnosis: stroke 1 Units 0     other medical supplies Dispense knee high, medium compression, large size 4 each 0     oxyCODONE-acetaminophen (PERCOCET) 5-325 MG tablet Take 1-2 tablets by mouth every 6 hours as needed for pain Max 5 per day. 150 tablet 0     potassium chloride ER (K-TAB) 20 MEQ CR tablet Take 2 tablets (40 mEq) by mouth daily (Patient taking differently: Take 20 mEq by mouth daily ) 180 tablet 1     sildenafil (VIAGRA) 100 MG tablet Take 30 min to 4 hours before intercourse as needed for erectile dysfunction Has appt with Dr Nascimento 11/9/2020 25 tablet 2     spironolactone (ALDACTONE) 25 MG tablet Take 1 tablet (25 mg) by mouth daily 90 tablet 3     tamsulosin (FLOMAX) 0.4 MG capsule TAKE 1 CAPSULE BY MOUTH ONCE DAILY (Patient taking differently: Take 0.4 mg by mouth daily TAKE 1 CAPSULE BY MOUTH ONCE DAILY) 90 capsule 3     torsemide (DEMADEX) 20 MG tablet Take 2 tablets (40 mg) by mouth daily 180 tablet 3     vitamin D3 (CHOLECALCIFEROL) 50 mcg (2000 units) tablet Take 1 tablet by mouth daily       aspirin (ASA) 81 MG EC tablet Take 1 tablet (81 mg) by mouth daily (Patient not taking: Reported on 4/19/2021) 30 tablet 0     naloxone (NARCAN) 4 MG/0.1ML nasal spray Spray 1 spray (4 mg) into one nostril alternating nostrils once as needed for opioid reversal Every 2-3 min until responsive or EMS arrives (Patient not taking: Reported on 5/4/2021) 0.2 mL 0           Past Medical History:     Past Medical History:   Diagnosis Date     * * * SBE PROPHYLAXIS * * *     s/p TAVR     Acute rheumatic carditis 1950     Complication of anesthesia     pt once woke up during back surgery     Coronary artery disease     murmur     Erectile dysfunction     Sildenafil     Hip pain, bilateral      Hypercholesteremia      Hypertension      Low back pain      Nonsenile cataract       Obesity      Renal cyst      S/P 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     Stroke (cerebrum) (H) 11/30/2016     Umbilical hernia 06/10/2011    s/p surgical repair     Wound, surgical, infected 06/10/2011    hernia     Past Surgical History:   Procedure Laterality Date     ARTHROPLASTY KNEE BILATERAL  7/22/2010     COLONOSCOPY N/A 4/14/2017    Procedure: COLONOSCOPY;  Surgeon: Toby Bills MD;  Location: [facility]     CV CORONARY ANGIOGRAM N/A 10/28/2019    Procedure: Coronary Angiogram;  Surgeon: Guerrero Huitron MD;  Location:  HEART CARDIAC CATH LAB     CV RIGHT HEART CATH MEASUREMENTS RECORDED N/A 10/28/2019    Procedure: Right Heart Cath;  Surgeon: Guerrero Huitron MD;  Location:  HEART CARDIAC CATH LAB     CV TRANSCATHETER AORTIC VALVE REPLACEMENT N/A 12/8/2020    Procedure: Transcatheter Aortic Valve Replacement;  Surgeon: Camila Begum MD;  Location:  HEART CARDIAC CATH LAB     FUSION LUMBAR ANTERIOR TWO LEVELS       HERNIORRHAPHY UMBILICAL  6/10/2011    Procedure:HERNIORRHAPHY UMBILICAL; Open, with mesh placement; Surgeon:HO PARHAM; Location:[facility]     LAMINECTOMY LUMBAR TWO LEVELS       Family History   Problem Relation Age of Onset     C.A.D. Mother      Unknown/Adopted Father      Heart Failure Sister      Heart Failure Sister      Glaucoma No family hx of      Macular Degeneration No family hx of      Social History     Socioeconomic History     Marital status:      Spouse name: Not on file     Number of children: Not on file     Years of education: Not on file     Highest education level: Not on file   Occupational History     Not on file   Social Needs     Financial resource strain: Not on file     Food insecurity     Worry: Not on file     Inability: Not on file     Transportation needs     Medical: Not on file     Non-medical: Not on file   Tobacco Use     Smoking status: Former Smoker     Quit date:  2005     Years since quittin.3     Smokeless tobacco: Never Used     Tobacco comment: Non smoker. No 2nd hand exposure. CCX, RMA PCC 2011   Substance and Sexual Activity     Alcohol use: No     Drug use: No     Sexual activity: Not Currently   Lifestyle     Physical activity     Days per week: Not on file     Minutes per session: Not on file     Stress: Not on file   Relationships     Social connections     Talks on phone: Not on file     Gets together: Not on file     Attends Mandaeism service: Not on file     Active member of club or organization: Not on file     Attends meetings of clubs or organizations: Not on file     Relationship status: Not on file     Intimate partner violence     Fear of current or ex partner: Not on file     Emotionally abused: Not on file     Physically abused: Not on file     Forced sexual activity: Not on file   Other Topics Concern     Parent/sibling w/ CABG, MI or angioplasty before 65F 55M? Not Asked   Social History Narrative    He lives by himself in an apt in [place].  He has 2 goldfish, Lai and [place].           Allergies:   Patient has no known allergies.      Vandana Fournier PA-C  [facility]  Pager: [phone]

## 2021-05-07 ENCOUNTER — TELEPHONE (OUTPATIENT)
Dept: ONCOLOGY | Facility: CLINIC | Age: 76
End: 2021-05-07

## 2021-05-11 DIAGNOSIS — M54.50 ACUTE MIDLINE LOW BACK PAIN WITHOUT SCIATICA: ICD-10-CM

## 2021-05-11 RX ORDER — OXYCODONE AND ACETAMINOPHEN 5; 325 MG/1; MG/1
1-2 TABLET ORAL EVERY 6 HOURS PRN
Qty: 150 TABLET | Refills: 0 | Status: SHIPPED | OUTPATIENT
Start: 2021-05-19 | End: 2021-06-17

## 2021-05-11 NOTE — TELEPHONE ENCOUNTER
Reason For Call:   No chief complaint on file.      Medication Name, Dose and Monthly Quantity:   Oxycodone with APAP (Percocet): Dose 5-325 Schedule 1 tablet by mouth every 6 hours prn Monthly Quantity 124   Diagnosis requiring opiates:   Acute midline low back pain without sciatica [M54.5]  - Primary     Problem List Updated:   No    Opioid Agreement On File - [facility] PAIN CONTRACT ID# [id number]:  No    Last Urine Drug Screen (at least once every 12 months) Date:   n/a  Unexpected Results:   n/a    [place]  Data Reviewed (at least once every 3 months) Date:   05/11/2021      Unexpected Results:    No.    Last Fill Date:   04/19/2021  Due Date:   05/19/2021  Last Visit with PCP:   12/17/2020    Future Visits with PCP:   No.    Processing:   [facility]      ----------------------------------

## 2021-05-17 ENCOUNTER — OFFICE VISIT (OUTPATIENT)
Dept: ORTHOPEDICS | Facility: CLINIC | Age: 76
End: 2021-05-17
Payer: COMMERCIAL

## 2021-05-17 DIAGNOSIS — I73.89 OTHER SPECIFIED PERIPHERAL VASCULAR DISEASES (H): Primary | ICD-10-CM

## 2021-05-17 DIAGNOSIS — B35.1 OM (ONYCHOMYCOSIS): ICD-10-CM

## 2021-05-17 DIAGNOSIS — I73.9 PVD (PERIPHERAL VASCULAR DISEASE) (H): ICD-10-CM

## 2021-05-17 PROCEDURE — 99213 OFFICE O/P EST LOW 20 MIN: CPT | Performed by: PODIATRIST

## 2021-05-17 NOTE — PROGRESS NOTES
Chief Complaint   Patient presents with     RECHECK     3 months followup toe nail trimming             No Known Allergies      Subjective: Jonathan is a 73 year old male who presents to the clinic today for a follow up of elongated nails. He relates that the nails are long and painful. Edema in the legs and feet continues to improve.    Objective  PT and DP pulses are non-palpable bilaterally. CRT is >3 seconds. Diminished pedal hair. Mild non-pitting edema noted to BL LE.  Gross sensation is slightly decreased bilaterally.   Nails are thickened, discolored, dystrophic and elongated bilaterally to varying degrees. Nails have subungual debris consistent with onychomycosis. No open lesions are noted. Skin is normal.      Assessment: PVD  Onychomycosis x 10      Plan:  - Pt seen and evaluated.  - Nails were debrided x 10.  - See again in 3 months.

## 2021-05-17 NOTE — NURSING NOTE
Reason For Visit:   Chief Complaint   Patient presents with     RECHECK     3 months followup toe nail trimming        There were no vitals taken for this visit.    Pain Assessment  Patient Currently in Pain: Yes  0-10 Pain Scale: 9  Primary Pain Location: Back(back and legs)    Sarai Leal ATC

## 2021-05-17 NOTE — LETTER
5/17/2021         RE: Jonathan Bishop  [address]  [place] [facility] [place] [facility] [address]        Dear Colleague,    Thank you for referring your patient, Jonathan Bishop, to the [facility] [place]. Please see a copy of my visit note below.    Chief Complaint   Patient presents with     RECHECK     3 months followup toe nail trimming             No Known Allergies      Subjective: Jonathan is a 73 year old male who presents to the clinic today for a follow up of elongated nails. He relates that the nails are long and painful. Edema in the legs and feet continues to improve.    Objective  PT and DP pulses are non-palpable bilaterally. CRT is >3 seconds. Diminished pedal hair. Mild non-pitting edema noted to BL LE.  Gross sensation is slightly decreased bilaterally.   Nails are thickened, discolored, dystrophic and elongated bilaterally to varying degrees. Nails have subungual debris consistent with onychomycosis. No open lesions are noted. Skin is normal.      Assessment: PVD  Onychomycosis x 10      Plan:  - Pt seen and evaluated.  - Nails were debrided x 10.  - See again in 3 months.         Mamadou Gary, ANASTASIYA

## 2021-05-18 DIAGNOSIS — I50.32 CHRONIC DIASTOLIC HEART FAILURE (H): Primary | ICD-10-CM

## 2021-06-15 DIAGNOSIS — M54.50 ACUTE MIDLINE LOW BACK PAIN WITHOUT SCIATICA: ICD-10-CM

## 2021-06-17 RX ORDER — OXYCODONE AND ACETAMINOPHEN 5; 325 MG/1; MG/1
1-2 TABLET ORAL EVERY 6 HOURS PRN
Qty: 150 TABLET | Refills: 0 | Status: SHIPPED | OUTPATIENT
Start: 2021-06-17 | End: 2021-07-15

## 2021-06-21 ENCOUNTER — OFFICE VISIT (OUTPATIENT)
Dept: CARDIOLOGY | Facility: CLINIC | Age: 76
End: 2021-06-21
Attending: PHYSICIAN ASSISTANT
Payer: COMMERCIAL

## 2021-06-21 VITALS
HEIGHT: 64 IN | DIASTOLIC BLOOD PRESSURE: 80 MMHG | WEIGHT: 224.3 LBS | OXYGEN SATURATION: 97 % | BODY MASS INDEX: 38.29 KG/M2 | HEART RATE: 68 BPM | SYSTOLIC BLOOD PRESSURE: 120 MMHG

## 2021-06-21 DIAGNOSIS — I50.32 CHRONIC DIASTOLIC HEART FAILURE (H): ICD-10-CM

## 2021-06-21 LAB
ANION GAP SERPL CALCULATED.3IONS-SCNC: 3 MMOL/L (ref 3–14)
BUN SERPL-MCNC: 24 MG/DL (ref 7–30)
CALCIUM SERPL-MCNC: 9.7 MG/DL (ref 8.5–10.1)
CHLORIDE SERPL-SCNC: 106 MMOL/L (ref 94–109)
CO2 SERPL-SCNC: 29 MMOL/L (ref 20–32)
CREAT SERPL-MCNC: 1.08 MG/DL (ref 0.66–1.25)
FERRITIN SERPL-MCNC: 136 NG/ML (ref 26–388)
GFR SERPL CREATININE-BSD FRML MDRD: 66 ML/MIN/{1.73_M2}
GLUCOSE SERPL-MCNC: 111 MG/DL (ref 70–99)
IRON SATN MFR SERPL: 24 % (ref 15–46)
IRON SERPL-MCNC: 89 UG/DL (ref 35–180)
POTASSIUM SERPL-SCNC: 4.4 MMOL/L (ref 3.4–5.3)
SODIUM SERPL-SCNC: 138 MMOL/L (ref 133–144)
TIBC SERPL-MCNC: 369 UG/DL (ref 240–430)

## 2021-06-21 PROCEDURE — 80048 BASIC METABOLIC PNL TOTAL CA: CPT | Performed by: PHYSICIAN ASSISTANT

## 2021-06-21 PROCEDURE — 82728 ASSAY OF FERRITIN: CPT | Performed by: PHYSICIAN ASSISTANT

## 2021-06-21 PROCEDURE — 36415 COLL VENOUS BLD VENIPUNCTURE: CPT | Performed by: PHYSICIAN ASSISTANT

## 2021-06-21 PROCEDURE — 83540 ASSAY OF IRON: CPT | Performed by: PHYSICIAN ASSISTANT

## 2021-06-21 PROCEDURE — 99214 OFFICE O/P EST MOD 30 MIN: CPT | Performed by: PHYSICIAN ASSISTANT

## 2021-06-21 PROCEDURE — 83550 IRON BINDING TEST: CPT | Performed by: PHYSICIAN ASSISTANT

## 2021-06-21 ASSESSMENT — MIFFLIN-ST. JEOR: SCORE: 1655.48

## 2021-06-21 NOTE — PROGRESS NOTES
Cardiology Clinic Progress Note    Jonathan Bishop MRN# [id number]   YOB: 1945 Age: 75 year old   Primary cardiologist: Dr. Mock         Assessment and Plan:     In summary, Jonathan Bishop presents today for a [facility] clinic follow up visit.    1. Chronic HFpEF, with issues in volume fluctuation prior to TAVR in December 2020.  Significant symptomatic improvement with diuretic adjustment and a 15 pound weight loss over the past 2 months.    2. Hypokalemia, difficulty with taking potassium supplements. Still taking 40 meq daily erroneously.   3. Severe aortic stenosis status post TAVR summer 2020.  Most recent echocardiogram in January 2021 shows a mean systolic gradient of 18.  Gradient is elevated from 8 immediately postop, however unchanged compared with 1 day postop.   4. Longstanding history of anemia.  Referred to heme/onc, whom he sees in August.  5. Hypertension, controlled.     Plan:  - Labs today including BMP and iron studies pending - will review and let Conner know what changes are needed based on this.   - Encouraged continued monitoring of weights and BP's at home, notify us with fluctuations.  - Maintain follow-up as scheduled with Dr. Mock in August.     It was a pleasure seeing Conner again!        History of Presenting Illness:      Jonathan Bishop is a pleasant 75 year old patient who presents today for a [facility] clinic follow up.    The patient has a history of the following - severe aortic stenosis s/p TAVR using a 26 mm Anton S3 valve, CVA with residual left sided weakness, hypertension, PVC's, chronic diastolic heart failure and hyperlipidemia.     He had issues with volume overload prior to his TAVR and was last seen in the [facility] clinic by my colleague Shelby Jean in July of last year.  Since that time, he has been followed closely by the TAVR team including my colleague Ariadna Howell.  He did develop some fluid overload post TAVR, after he ran out of his Lasix back in  January, but after reinitiating it and escalating his dose to 40 mg twice daily, his weight has been stable around 230 lbs. His dry wt is felt to be around 225-230 lbs.     Last echocardiogram in Janiary demonstrated a well seated TAVR valve with a mean systolic gradient of 18 mmHg, no paravalvular regurgitation. EF normal at 60-65%, normal RV size and function.     I met with him for the first time in April.  He was feeling fairly uncomfortable, with notable peripheral edema and a weight of 239 pounds.  Fortunately, his breathing felt okay.  His anemia was stable.  We reviewed his history of anemia, which appeared to initially be related to orthopedic surgery back in 2010, however his hemoglobin never really normalized.  His blood pressure is well controlled.  We discussed his diet, which was not bad, as he mostly cooked and would eat out rarely.  Does admit to being more sedentary lately.  Unfortunately, his insurance did not cover cardiac rehab, and he was hoping to get back to the [facility] in the near future, but at that point was just too limited by leg swelling.  I stopped his furosemide 40 twice daily, and hydrochlorothiazide 25, and potassium supplementation.  I initiated torsemide 40 mg daily and spironolactone 25 mg daily.  I referred him to hematology for work-up of anemia.  He sees them in August.     When I saw him back in May, he was down 7 pounds, and reported that his breathing and swelling felt significantly better.  It was still 1+ bilaterally on exam, however he was thrilled with the improvement.  He was hypokalemic, and strongly wished to stay off of his potassium supplementation.  I increased his spironolactone to 50 mg daily and we also discussed increasing his dietary intake.    Today, Conner returns to clinic with his PCA and tells me he feels great - even better than when I saw him last. Edema has improved further, and no dyspnea. He goes fishing in [place] and typically has to rest at 3  "different benches when he walks from the parking lot to the dock. This past week he could walk the whole way without stopping. Still has chronic issues with imbalance following his CVA. Weight is down another 8 lbs. BP slightly elevated at 145/80 mmHg. On my re-check it is lower, at 120/80 mmHg. Labs today are pending. Tells me he misunderstood last directions and is still taking K-dur 40 daily but still wishes to stop it.          Review of Systems:     12-pt ROS is negative except for as noted in the HPI.          Physical Exam:     Vitals: BP (!) 145/80   Pulse 68   Ht 1.613 m (5' 3.5\")   Wt 101.7 kg (224 lb 4.8 oz)   SpO2 97%   BMI 39.11 kg/m    Wt Readings from Last 10 Encounters:   06/21/21 101.7 kg (224 lb 4.8 oz)   05/04/21 105.3 kg (232 lb 3.2 oz)   04/19/21 108.8 kg (239 lb 14.4 oz)   02/17/21 105.7 kg (233 lb)   01/21/21 104.9 kg (231 lb 4.8 oz)   01/07/21 106.1 kg (234 lb)   12/09/20 103.7 kg (228 lb 11.2 oz)   12/02/20 102.1 kg (225 lb)   11/09/20 103.5 kg (228 lb 1.6 oz)   04/23/20 101.2 kg (223 lb)       Constitutional:  Patient is pleasant, alert, cooperative, and in NAD.  HEENT:  NCAT. PERRLA. EOM's intact.   Neck:  CVP appears normal. No carotid bruits.   Pulmonary: Normal respiratory effort. CTAB.   Cardiac: RRR, normal S1/S2, no S3/S4, no murmur or rub.   Abdomen:  Non-tender abdomen, no hepatosplenomegaly appreciated.   Vascular: Pulses in the upper and lower extremities are 2+ and equal bilaterally.  Extremities: 1+ pitting ankle edema bilaterally, R>L.  No erythema, cyanosis or tenderness appreciated.  Skin:  No rashes or lesions appreciated.   Neurological:  No gross motor or sensory deficits.   Psych: Appropriate affect.        Data:     Labs reviewed:  Recent Labs   Lab Test 04/16/21  1317 09/19/19  0858 07/01/19  0805 04/13/18  0755 03/06/17  1100 11/30/16  1930 11/30/16  1930 10/07/14  0849 10/07/14  0849   LDL  --   --  51 63 57   < > 168*   < > 142*   HDL  --   --  47 46 43   < > " 46   < > 45   NHDL  --   --  65 73 66   < > 182*   < >  --    CHOL  --   --  112 119 109   < > 228*   < > 204*   TRIG  --   --  70 52 48   < > 71   < > 86   TSH 1.41  --   --   --   --   --  0.90  --   --    NTBNP 52 104  --   --   --   --   --   --   --    IRON  --   --   --   --   --   --   --   --  103   FEB  --   --   --   --   --   --   --   --  362   IRONSAT  --   --   --   --   --   --   --   --  28   YASMANI  --   --   --   --   --   --   --   --  114    < > = values in this interval not displayed.       Lab Results   Component Value Date    WBC 7.2 01/05/2021    RBC 4.50 01/05/2021    HGB 10.9 (L) 04/16/2021    HCT 38.0 (L) 01/05/2021    MCV 84 01/05/2021    MCH 24.7 (L) 01/05/2021    MCHC 29.2 (L) 01/05/2021    RDW 15.7 (H) 01/05/2021     01/05/2021       Lab Results   Component Value Date     04/30/2021    POTASSIUM 3.2 (L) 04/30/2021    CHLORIDE 105 04/30/2021    CO2 32 04/30/2021    ANIONGAP 4 04/30/2021     (H) 04/30/2021    BUN 21 04/30/2021    CR 0.84 04/30/2021    GFRESTIMATED 85 04/30/2021    GFRESTBLACK >90 04/30/2021    WARREN 8.7 04/30/2021      Lab Results   Component Value Date    AST 27 12/08/2020    ALT 28 12/08/2020       Lab Results   Component Value Date    A1C 6.3 (H) 11/30/2016       Lab Results   Component Value Date    INR 1.01 10/28/2019    INR 0.99 11/30/2016           Problem List:     Patient Active Problem List   Diagnosis     Essential hypertension, benign     Hyperlipidemia with target LDL less than 130     Anemia     Medication refill- do not delete      Low back pain     Osteoarthritis of knee     Stroke (H)     Cerebrovascular accident involving anterior circulation, right (H)     Dermatophytosis of nail     PVD (peripheral vascular disease) (H)     Systolic murmur     Peripheral edema     Hyperplasia of prostate with lower urinary tract symptoms (LUTS)     Erectile dysfunction, unspecified erectile dysfunction type     Severe aortic stenosis     Status post  coronary angiogram     Morbid obesity (H)     Aortic valve stenosis, etiology of cardiac valve disease unspecified     Aortic stenosis, severe     Diastolic heart failure (H)     S/P TAVR (transcatheter aortic valve replacement)     Bilateral leg edema           Medications:     Current Outpatient Medications   Medication Sig Dispense Refill     acetaminophen (TYLENOL) 325 MG tablet Take 1-2 tablets (325-650 mg) by mouth every 4 hours as needed for mild pain or headaches       aspirin (ASA) 81 MG EC tablet Take 1 tablet (81 mg) by mouth daily 30 tablet 0     atorvastatin (LIPITOR) 40 MG tablet Take 1 tablet (40 mg) by mouth daily 90 tablet 3     clopidogrel (PLAVIX) 75 MG tablet Take 1 tablet (75 mg) by mouth daily 90 tablet 0     ibuprofen (ADVIL/MOTRIN) 600 MG tablet Take 1 tablet (600 mg) by mouth every 8 hours as needed for moderate pain 270 tablet 3     multivitamin, therapeutic with minerals (MULTI-VITAMIN) TABS Take 1 tablet by mouth daily. 100 tablet 3     naloxone (NARCAN) 4 MG/0.1ML nasal spray Spray 1 spray (4 mg) into one nostril alternating nostrils once as needed for opioid reversal Every 2-3 min until responsive or EMS arrives 0.2 mL 0     order for DME Equipment being ordered: Walker ()  Treatment Diagnosis: stroke 1 Units 0     other medical supplies Dispense knee high, medium compression, large size 4 each 0     oxyCODONE-acetaminophen (PERCOCET) 5-325 MG tablet Take 1-2 tablets by mouth every 6 hours as needed for pain Max 5 per day. 150 tablet 0     sildenafil (VIAGRA) 100 MG tablet Take 30 min to 4 hours before intercourse as needed for erectile dysfunction Has appt with Dr Nascimento 11/9/2020 25 tablet 2     spironolactone (ALDACTONE) 50 MG tablet Take 1 tablet (50 mg) by mouth daily 90 tablet 3     tamsulosin (FLOMAX) 0.4 MG capsule TAKE 1 CAPSULE BY MOUTH ONCE DAILY (Patient taking differently: Take 0.4 mg by mouth daily TAKE 1 CAPSULE BY MOUTH ONCE DAILY) 90 capsule 3     torsemide (DEMADEX)  20 MG tablet Take 2 tablets (40 mg) by mouth daily 180 tablet 3     vitamin D3 (CHOLECALCIFEROL) 50 mcg (2000 units) tablet Take 1 tablet by mouth daily             Past Medical History:     Past Medical History:   Diagnosis Date     * * * SBE PROPHYLAXIS * * *     s/p TAVR     Acute rheumatic carditis 1950     Complication of anesthesia     pt once woke up during back surgery     Coronary artery disease     murmur     Erectile dysfunction     Sildenafil     Hip pain, bilateral      Hypercholesteremia      Hypertension      Low back pain      Nonsenile cataract      Obesity      Renal cyst      S/P 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     Stroke (cerebrum) (H) 11/30/2016     Umbilical hernia 06/10/2011    s/p surgical repair     Wound, surgical, infected 06/10/2011    hernia     Past Surgical History:   Procedure Laterality Date     ARTHROPLASTY KNEE BILATERAL  7/22/2010     COLONOSCOPY N/A 4/14/2017    Procedure: COLONOSCOPY;  Surgeon: Toby Bills MD;  Location: [facility]     CV CORONARY ANGIOGRAM N/A 10/28/2019    Procedure: Coronary Angiogram;  Surgeon: Guerrero Huitron MD;  Location:  HEART CARDIAC CATH LAB     CV RIGHT HEART CATH MEASUREMENTS RECORDED N/A 10/28/2019    Procedure: Right Heart Cath;  Surgeon: Guerrero Huitron MD;  Location:  HE[facility] CARD[facility] CATH LAB     CV TRANSCATHETER AORTIC VALVE REPLACEMENT N/A 12/8/2020    Procedure: Transcatheter Aortic Valve Replacement;  Surgeon: Camila Begum MD;  Location:  HEART CARDIAC CATH LAB     FUSION LUMBAR ANTERIOR TWO LEVELS       HERNIORRHAPHY UMBILICAL  6/10/2011    Procedure:HERNIORRHAPHY UMBILICAL; Open, with mesh placement; Surgeon:HO PARHAM; Location:[facility]     LAMINECTOMY LUMBAR TWO LEVELS       Family History   Problem Relation Age of Onset     C.A.D. Mother      Unknown/Adopted Father      Heart Failure Sister      Heart Failure Sister      Glaucoma No family hx of       Macular Degeneration No family hx of      Social History     Socioeconomic History     Marital status:      Spouse name: Not on file     Number of children: Not on file     Years of education: Not on file     Highest education level: Not on file   Occupational History     Not on file   Social Needs     Financial resource strain: Not on file     Food insecurity     Worry: Not on file     Inability: Not on file     Transportation needs     Medical: Not on file     Non-medical: Not on file   Tobacco Use     Smoking status: Former Smoker     Quit date: 2005     Years since quittin.4     Smokeless tobacco: Never Used     Tobacco comment: Non smoker. No 2nd hand exposure. CCX, RMA PCC 2011   Substance and Sexual Activity     Alcohol use: No     Drug use: No     Sexual activity: Not Currently   Lifestyle     Physical activity     Days per week: Not on file     Minutes per session: Not on file     Stress: Not on file   Relationships     Social connections     Talks on phone: Not on file     Gets together: Not on file     Attends [employer] service: Not on file     Active member of club or organization: Not on file     Attends meetings of clubs or organizations: Not on file     Relationship status: Not on file     Intimate partner violence     Fear of current or ex partner: Not on file     Emotionally abused: Not on file     Physically abused: Not on file     Forced sexual activity: Not on file   Other Topics Concern     Parent/sibling w/ CABG, MI or angioplasty before 65F 55M? Not Asked   Social History Narrative    He lives by himself in an apt in [place].  He has 2 goldfish, Lai and Ubaldo.           Allergies:   Patient has no known allergies.      [facility]  Pager: [phone]

## 2021-06-21 NOTE — PATIENT INSTRUCTIONS
Today, we discussed the following:   - Results: Your lab results from today are still in process - I will let you know if any changes are needed based on those.   - Follow up: With Dr. Mock as scheduled in August. Please let me know in the meantime if you experience any further weight loss - I will need to cut back on your torsemide in that case.     Please, remember to continue the followin.  Weigh yourself daily. Call if your weight is up > than 2 pounds in one day, or 5 pounds in one week; if you feel more short of breath or have worsening swelling in your legs or abdomen.  2.  Eat a low sodium diet (less than 2,000mg or 2g daily). If you eat less salt, you will retain less fluid.   3.  Avoid alcohol, as this can worsen heart failure.   4.  Avoid NSAIDs as able (For example, Ibuprofen / aleve / advil / naprosen / diclofenac).    Please call [facility] nurse for any questions or concerns Mon-Fri 8am-4pm:   [phone]  For concerns after hours:   [phone] option 2   Scheduling phone number:   [phone]    Thank you for visiting with us today.   Vandana Fournier PA-C  ______________________________________________________________

## 2021-06-21 NOTE — LETTER
6/21/2021    Buck Nascimento MD  [address] [facility] [facility] [address]  [facility] [address]    RE: Jonathan Bishop       Dear Colleague,    I had the pleasure of seeing Jonathan Bishop in the [facility].  Cardiology Clinic Progress Note    Jonathan Bishop MRN# [id number]   YOB: 1945 Age: 75 year old   Primary cardiologist: Dr. Mock         Assessment and Plan:     In summary, Jonathan Bishop presents today for a CORE clinic follow up visit.    1. Chronic HFpEF, with issues in volume fluctuation prior to TAVR in December 2020.  Significant symptomatic improvement with diuretic adjustment and a 15 pound weight loss over the past 2 months.    2. Hypokalemia, difficulty with taking potassium supplements. Still taking 40 meq daily erroneously.   3. Severe aortic stenosis status post TAVR summer 2020.  Most recent echocardiogram in January 2021 shows a mean systolic gradient of 18.  Gradient is elevated from 8 immediately postop, however unchanged compared with 1 day postop.   4. Longstanding history of anemia.  Referred to heme/onc, whom he sees in August.  5. Hypertension, controlled.     Plan:  - Labs today including BMP and iron studies pending - will review and let Conner know what changes are needed based on this.   - Encouraged continued monitoring of weights and BP's at home, notify us with fluctuations.  - Maintain follow-up as scheduled with Dr. Mock in August.     It was a pleasure seeing Conner again!        History of Presenting Illness:      Jonathan Bishop is a pleasant 75 year old patient who presents today for a CORE clinic follow up.    The patient has a history of the following - severe aortic stenosis s/p TAVR using a 26 mm Anton S3 valve, CVA with residual left sided weakness, hypertension, PVC's, chronic diastolic heart failure and hyperlipidemia.     He had issues with volume overload prior to his TAVR and was last seen in the CORE  clinic by my colleague Shelby Jean in July of last year.  Since that time, he has been followed closely by the TAVR team including my colleague Ariadna Howell.  He did develop some fluid overload post TAVR, after he ran out of his Lasix back in January, but after reinitiating it and escalating his dose to 40 mg twice daily, his weight has been stable around 230 lbs. His dry wt is felt to be around 225-230 lbs.     Last echocardiogram in Janiary demonstrated a well seated TAVR valve with a mean systolic gradient of 18 mmHg, no paravalvular regurgitation. EF normal at 60-65%, normal RV size and function.     I met with him for the first time in April.  He was feeling fairly uncomfortable, with notable peripheral edema and a weight of 239 pounds.  Fortunately, his breathing felt okay.  His anemia was stable.  We reviewed his history of anemia, which appeared to initially be related to orthopedic surgery back in 2010, however his hemoglobin never really normalized.  His blood pressure is well controlled.  We discussed his diet, which was not bad, as he mostly cooked and would eat out rarely.  Does admit to being more sedentary lately.  Unfortunately, his insurance did not cover cardiac rehab, and he was hoping to get back to the [facility] in the near future, but at that point was just too limited by leg swelling.  I stopped his furosemide 40 twice daily, and hydrochlorothiazide 25, and potassium supplementation.  I initiated torsemide 40 mg daily and spironolactone 25 mg daily.  I referred him to hematology for work-up of anemia.  He sees them in August.     When I saw him back in May, he was down 7 pounds, and reported that his breathing and swelling felt significantly better.  It was still 1+ bilaterally on exam, however he was thrilled with the improvement.  He was hypokalemic, and strongly wished to stay off of his potassium supplementation.  I increased his spironolactone to 50 mg daily and we also discussed increasing  "his dietary intake.    Today, Conner returns to clinic with his PCA and tells me he feels great - even better than when I saw him last. Edema has improved further, and no dyspnea. He goes fishing in [place] and typically has to rest at 3 different benches when he walks from the parking lot to the dock. This past week he could walk the whole way without stopping. Still has chronic issues with imbalance following his CVA. Weight is down another 8 lbs. BP slightly elevated at 145/80 mmHg. On my re-check it is lower, at 120/80 mmHg. Labs today are pending. Tells me he misunderstood last directions and is still taking K-dur 40 daily but still wishes to stop it.          Review of Systems:     12-pt ROS is negative except for as noted in the HPI.          Physical Exam:     Vitals: BP (!) 145/80   Pulse 68   Ht 1.613 m (5' 3.5\")   Wt 101.7 kg (224 lb 4.8 oz)   SpO2 97%   BMI 39.11 kg/m    Wt Readings from Last 10 Encounters:   06/21/21 101.7 kg (224 lb 4.8 oz)   05/04/21 105.3 kg (232 lb 3.2 oz)   04/19/21 108.8 kg (239 lb 14.4 oz)   02/17/21 105.7 kg (233 lb)   01/21/21 104.9 kg (231 lb 4.8 oz)   01/07/21 106.1 kg (234 lb)   12/09/20 103.7 kg (228 lb 11.2 oz)   12/02/20 102.1 kg (225 lb)   11/09/20 103.5 kg (228 lb 1.6 oz)   04/23/20 101.2 kg (223 lb)       Constitutional:  Patient is pleasant, alert, cooperative, and in NAD.  HEENT:  NCAT. PERRLA. EOM's intact.   Neck:  CVP appears normal. No carotid bruits.   Pulmonary: Normal respiratory effort. CTAB.   Cardiac: RRR, normal S1/S2, no S3/S4, no murmur or rub.   Abdomen:  Non-tender abdomen, no hepatosplenomegaly appreciated.   Vascular: Pulses in the upper and lower extremities are 2+ and equal bilaterally.  Extremities: 1+ pitting ankle edema bilaterally, R>L.  No erythema, cyanosis or tenderness appreciated.  Skin:  No rashes or lesions appreciated.   Neurological:  No gross motor or sensory deficits.   Psych: Appropriate affect.        Data:     Labs " reviewed:  Recent Labs   Lab Test 04/16/21  1317 09/19/19  0858 07/01/19  0805 04/13/18  0755 03/06/17  1100 11/30/16  1930 11/30/16  1930 10/07/14  0849 10/07/14  0849   LDL  --   --  51 63 57   < > 168*   < > 142*   HDL  --   --  47 46 43   < > 46   < > 45   NHDL  --   --  65 73 66   < > 182*   < >  --    CHOL  --   --  112 119 109   < > 228*   < > 204*   TRIG  --   --  70 52 48   < > 71   < > 86   TSH 1.41  --   --   --   --   --  0.90  --   --    NTBNP 52 104  --   --   --   --   --   --   --    IRON  --   --   --   --   --   --   --   --  103   FEB  --   --   --   --   --   --   --   --  362   IRONSAT  --   --   --   --   --   --   --   --  28   YASMANI  --   --   --   --   --   --   --   --  114    < > = values in this interval not displayed.       Lab Results   Component Value Date    WBC 7.2 01/05/2021    RBC 4.50 01/05/2021    HGB 10.9 (L) 04/16/2021    HCT 38.0 (L) 01/05/2021    MCV 84 01/05/2021    MCH 24.7 (L) 01/05/2021    MCHC 29.2 (L) 01/05/2021    RDW 15.7 (H) 01/05/2021     01/05/2021       Lab Results   Component Value Date     04/30/2021    POTASSIUM 3.2 (L) 04/30/2021    CHLORIDE 105 04/30/2021    CO2 32 04/30/2021    ANIONGAP 4 04/30/2021     (H) 04/30/2021    BUN 21 04/30/2021    CR 0.84 04/30/2021    GFRESTIMATED 85 04/30/2021    GFRESTBLACK >90 04/30/2021    WARREN 8.7 04/30/2021      Lab Results   Component Value Date    AST 27 12/08/2020    ALT 28 12/08/2020       Lab Results   Component Value Date    A1C 6.3 (H) 11/30/2016       Lab Results   Component Value Date    INR 1.01 10/28/2019    INR 0.99 11/30/2016           Problem List:     Patient Active Problem List   Diagnosis     Essential hypertension, benign     Hyperlipidemia with target LDL less than 130     Anemia     Medication refill- do not delete      Low back pain     Osteoarthritis of knee     Stroke (H)     Cerebrovascular accident involving anterior circulation, right (H)     Dermatophytosis of nail     PVD  (peripheral vascular disease) (H)     Systolic murmur     Peripheral edema     Hyperplasia of prostate with lower urinary tract symptoms (LUTS)     Erectile dysfunction, unspecified erectile dysfunction type     Severe aortic stenosis     Status post coronary angiogram     Morbid obesity (H)     Aortic valve stenosis, etiology of cardiac valve disease unspecified     Aortic stenosis, severe     Diastolic heart failure (H)     S/P TAVR (transcatheter aortic valve replacement)     Bilateral leg edema           Medications:     Current Outpatient Medications   Medication Sig Dispense Refill     acetaminophen (TYLENOL) 325 MG tablet Take 1-2 tablets (325-650 mg) by mouth every 4 hours as needed for mild pain or headaches       aspirin (ASA) 81 MG EC tablet Take 1 tablet (81 mg) by mouth daily 30 tablet 0     atorvastatin (LIPITOR) 40 MG tablet Take 1 tablet (40 mg) by mouth daily 90 tablet 3     clopidogrel (PLAVIX) 75 MG tablet Take 1 tablet (75 mg) by mouth daily 90 tablet 0     ibuprofen (ADVIL/MOTRIN) 600 MG tablet Take 1 tablet (600 mg) by mouth every 8 hours as needed for moderate pain 270 tablet 3     multivitamin, therapeutic with minerals (MULTI-VITAMIN) TABS Take 1 tablet by mouth daily. 100 tablet 3     naloxone (NARCAN) 4 MG/0.1ML nasal spray Spray 1 spray (4 mg) into one nostril alternating nostrils once as needed for opioid reversal Every 2-3 min until responsive or EMS arrives 0.2 mL 0     order for DME Equipment being ordered: Walker ()  Treatment Diagnosis: stroke 1 Units 0     other medical supplies Dispense knee high, medium compression, large size 4 each 0     oxyCODONE-acetaminophen (PERCOCET) 5-325 MG tablet Take 1-2 tablets by mouth every 6 hours as needed for pain Max 5 per day. 150 tablet 0     sildenafil (VIAGRA) 100 MG tablet Take 30 min to 4 hours before intercourse as needed for erectile dysfunction Has appt with Dr Nascimento 11/9/2020 25 tablet 2     spironolactone (ALDACTONE) 50 MG tablet  Take 1 tablet (50 mg) by mouth daily 90 tablet 3     tamsulosin (FLOMAX) 0.4 MG capsule TAKE 1 CAPSULE BY MOUTH ONCE DAILY (Patient taking differently: Take 0.4 mg by mouth daily TAKE 1 CAPSULE BY MOUTH ONCE DAILY) 90 capsule 3     torsemide (DEMADEX) 20 MG tablet Take 2 tablets (40 mg) by mouth daily 180 tablet 3     vitamin D3 (CHOLECALCIFEROL) 50 mcg (2000 units) tablet Take 1 tablet by mouth daily             Past Medical History:     Past Medical History:   Diagnosis Date     * * * SBE PROPHYLAXIS * * *     s/p TAVR     Acute rheumatic carditis 1950     Complication of anesthesia     pt once woke up during back surgery     Coronary artery disease     murmur     Erectile dysfunction     Sildenafil     Hip pain, bilateral      Hypercholesteremia      Hypertension      Low back pain      Nonsenile cataract      Obesity      Renal cyst      S/P 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     Stroke (cerebrum) (H) 11/30/2016     Umbilical hernia 06/10/2011    s/p surgical repair     Wound, surgical, infected 06/10/2011    hernia     Past Surgical History:   Procedure Laterality Date     ARTHROPLASTY KNEE BILATERAL  7/22/2010     COLONOSCOPY N/A 4/14/2017    Procedure: COLONOSCOPY;  Surgeon: Toby Bills MD;  Location:  GI     CV CORONARY ANGIOGRAM N/A 10/28/2019    Procedure: Coronary Angiogram;  Surgeon: Guerrero Huitron MD;  Location:  HEART CARDIAC CATH LAB     CV RIGHT HEART CATH MEASUREMENTS RECORDED N/A 10/28/2019    Procedure: Right Heart Cath;  Surgeon: Guerrero Huitron MD;  Location: [facility] CARDIAC CATH LAB     CV TRANSCATHETER AORTIC VALVE REPLACEMENT N/A 12/8/2020    Procedure: Transcatheter Aortic Valve Replacement;  Surgeon: Camila Begum MD;  Location:  HEART CARDIAC CATH LAB     FUSION LUMBAR ANTERIOR TWO LEVELS       HERNIORRHAPHY UMBILICAL  6/10/2011    Procedure:HERNIORRHAPHY UMBILICAL; Open, with mesh placement;  Surgeon:HO PARHAM; Location:[facility] OR     LAMINECTOMY LUMBAR TWO LEVELS       Family History   Problem Relation Age of Onset     C.A.D. Mother      Unknown/Adopted Father      Heart Failure Sister      Heart Failure Sister      Glaucoma No family hx of      Macular Degeneration No family hx of      Social History     Socioeconomic History     Marital status:      Spouse name: Not on file     Number of children: Not on file     Years of education: Not on file     Highest education level: Not on file   Occupational History     Not on file   Social Needs     Financial resource strain: Not on file     Food insecurity     Worry: Not on file     Inability: Not on file     Transportation needs     Medical: Not on file     Non-medical: Not on file   Tobacco Use     Smoking status: Former Smoker     Quit date: 2005     Years since quittin.4     Smokeless tobacco: Never Used     Tobacco comment: Non smoker. No 2nd hand exposure. CCX, RMA [facility] 2011   Substance and Sexual Activity     Alcohol use: No     Drug use: No     Sexual activity: Not Currently   Lifestyle     Physical activity     Days per week: Not on file     Minutes per session: Not on file     Stress: Not on file   Relationships     Social connections     Talks on phone: Not on file     Gets together: Not on file     Attends Faith service: Not on file     Active member of club or organization: Not on file     Attends meetings of clubs or organizations: Not on file     Relationship status: Not on file     Intimate partner violence     Fear of current or ex partner: Not on file     Emotionally abused: Not on file     Physically abused: Not on file     Forced sexual activity: Not on file   Other Topics Concern     Parent/sibling w/ CABG, MI or angioplasty before 65F 55M? Not Asked   Social History Narrative    He lives by himself in an apt in [place].  He has 2 goldfish, Lai and Cidra.           Allergies:   Patient has no  known allergies.      Vandana Fournier PA-C  [facility]  Pager: [phone]    cc:   Vandana Fournier PA-C  [address]  [place] [address]

## 2021-06-22 RX ORDER — POTASSIUM CHLORIDE 1500 MG/1
20 TABLET, EXTENDED RELEASE ORAL DAILY
Qty: 90 TABLET | Refills: 3
Start: 2021-06-22 | End: 2022-08-01

## 2021-06-22 NOTE — PROGRESS NOTES
[facility]        Called and reviewed above message with Conner.    Epic med list does not show potassium supplement, however, he and Vandana both noted he's been taking 40 meq daily.    He agreed to decrease dose to 20 meq daily.    He has follow-up with Dr. Mock arranged. I asked him to call nurse line with questions/concerns in the interim.    Future Appointments   Date Time Provider Department Center   8/2/2021  9:15 AM Jonny Mock MD [facility] CLIN   8/20/2021 11:00 AM Gaviota Ac MD [facility]   8/23/2021  7:00 AM Mamadou Gary DPM [facility]     Marielena Jhaveri RN BSN   11:23 AM 06/22/21

## 2021-07-15 DIAGNOSIS — M54.50 ACUTE MIDLINE LOW BACK PAIN WITHOUT SCIATICA: ICD-10-CM

## 2021-07-15 RX ORDER — OXYCODONE AND ACETAMINOPHEN 5; 325 MG/1; MG/1
1-2 TABLET ORAL EVERY 6 HOURS PRN
Qty: 150 TABLET | Refills: 0 | Status: SHIPPED | OUTPATIENT
Start: 2021-07-17 | End: 2021-08-13

## 2021-07-15 NOTE — TELEPHONE ENCOUNTER
Reason For Call:   No chief complaint on file.      Medication Name, Dose and Monthly Quantity:   Oxycodone with APAP (Percocet): Dose 5-325 Schedule 1 tablet by mouth every 6 hours prn Monthly Quantity 124   Diagnosis requiring opiates:   Acute midline low back pain without sciatica [M54.5]  - Primary     Problem List Updated:   No    Opioid Agreement On File - [facility] PAIN CONTRACT ID# [id number]:  No    Last Urine Drug Screen (at least once every 12 months) Date:   n/a  Unexpected Results:   n/a    [place]  Data Reviewed (at least once every 3 months) Date:   07/15/2021      Unexpected Results:    No.    Last Fill Date:   06/18/2021  Due Date:   07/17/2021    Last Visit with PCP:   12/17/2020    Future Visits with PCP:   No.    [facility]      ----------------------------------

## 2021-07-16 DIAGNOSIS — N52.9 ERECTILE DYSFUNCTION, UNSPECIFIED ERECTILE DYSFUNCTION TYPE: ICD-10-CM

## 2021-07-20 NOTE — TELEPHONE ENCOUNTER
sildenafil (VIAGRA) 100 MG tablet      Last Written Prescription Date:  9/25/2020  Last Fill Quantity: 25 tab,   # refills: 2  Last Office Visit : 12/17/2020  Future Office visit:  none    Routing refill request to provider for review/approval because:  Failed medication protocol: alpha blocker medication on medication list

## 2021-07-21 RX ORDER — SILDENAFIL 100 MG/1
TABLET, FILM COATED ORAL
Qty: 30 TABLET | Refills: 5 | Status: SHIPPED | OUTPATIENT
Start: 2021-07-21 | End: 2022-08-01

## 2021-07-27 NOTE — PROGRESS NOTES
RECORDS STATUS - ALL OTHER DIAGNOSIS      RECORDS RECEIVED FROM: [facility]   DATE RECEIVED: 8/20/2021   NOTES STATUS DETAILS   OFFICE NOTE from referring provider Complete Vandana Fournier PA-C   OFFICE NOTE from medical oncologist N/A    DISCHARGE SUMMARY from hospital N/A    DISCHARGE REPORT from the ER     OPERATIVE REPORT N/A    MEDICATION LIST Complete [facility]   CLINICAL TRIAL TREATMENTS TO DATE     LABS     PATHOLOGY REPORTS NA    ANYTHING RELATED TO DIAGNOSIS Complete Labs last updated on 6/21/2021    GENONOMIC TESTING     TYPE:     IMAGING (NEED IMAGES & REPORT)     CT SCANS     MRI     MAMMO     ULTRASOUND     PET

## 2021-08-02 ENCOUNTER — VIRTUAL VISIT (OUTPATIENT)
Dept: CARDIOLOGY | Facility: CLINIC | Age: 76
End: 2021-08-02
Attending: PHYSICIAN ASSISTANT
Payer: COMMERCIAL

## 2021-08-02 VITALS
BODY MASS INDEX: 37.94 KG/M2 | WEIGHT: 222.2 LBS | HEART RATE: 97 BPM | SYSTOLIC BLOOD PRESSURE: 128 MMHG | DIASTOLIC BLOOD PRESSURE: 74 MMHG | HEIGHT: 64 IN

## 2021-08-02 DIAGNOSIS — E78.5 HYPERLIPIDEMIA WITH TARGET LDL LESS THAN 130: ICD-10-CM

## 2021-08-02 DIAGNOSIS — I10 ESSENTIAL HYPERTENSION, BENIGN: ICD-10-CM

## 2021-08-02 DIAGNOSIS — I50.32 CHRONIC DIASTOLIC HEART FAILURE (H): Primary | ICD-10-CM

## 2021-08-02 DIAGNOSIS — I35.0 AORTIC VALVE STENOSIS, ETIOLOGY OF CARDIAC VALVE DISEASE UNSPECIFIED: ICD-10-CM

## 2021-08-02 PROCEDURE — 99214 OFFICE O/P EST MOD 30 MIN: CPT | Mod: 95 | Performed by: INTERNAL MEDICINE

## 2021-08-02 ASSESSMENT — MIFFLIN-ST. JEOR: SCORE: 1645.95

## 2021-08-02 NOTE — PROGRESS NOTES
Connre is a 75 year old who is being evaluated via a billable telephone visit.      What phone number would you like to be contacted at? [phone]    How would you like to obtain your AVS? MyChart     Review Of Systems  Skin: NEGATIVE  Eyes:Ears/Nose/Throat: NEGATIVE  Respiratory: NEGATIVE  Cardiovascular: feels off balance  Gastrointestinal: NEGATIVE  Genitourinary:NEGATIVE  Musculoskeletal: joint pain -maybe arthritis  Neurologic: numbness and tingling in the feet  Psychiatric: anxiety  Hematologic/Lymphatic/Immunologic: NEGATIVE  Endocrine:  NEGATIVE      Vitals - Patient Reported  Systolic (Patient Reported): 128  Diastolic (Patient Reported): 74  Weight (Patient Reported): 100.8 kg (222 lb 3.2 oz)  SpO2 (Patient Reported): 97    Patient is not taking ASA, Atorvastatin or Clopidogrel as he states Vandana Fournier PA-C took him off of them.      CADE Pedraza    Telephone number of patient: [phone]  Phone call duration: 10 minutes    REASON FOR VISIT:  Aortic stenosis and heart failure with preserved ejection fraction.      HISTORY OF PRESENTING ILLNESS:  I am doing this telephone interview visit with Mr. Conner Bishop in the setting of the coronavirus pandemic.  He has been seeing me since the fall of 2019 when he presented with progressive heart failure with preserved EF-like symptoms and volume overload and was noted to have progressive severe symptomatic aortic stenosis.  He underwent coronary angiography that showed mild nonobstructive luminal irregularities.  He was then seen in consultation by Dr. Begum and Dr. Gimenez and underwent 26 mm ES3 in Dec 2020.  Since the TAVR, he has been feeling well. He says his edema has completely resolved. He has no orthopnea or PND. He has no CP. No syncope presyncope. He is limited with much walking due to MSK issues he says. He has been having some mild cramping in his muscles of late. Lastly, he has stopped taking his DAPT and statin because he says he was  'told to do so'.      PHYSICAL EXAMINATION:  Deferred.      ASSESSMENT AND PLAN:  Mr. Conner Bishop is a very pleasant gentleman with heart failure, preserved EF, minimal nonobstructive coronary artery disease and severe symptomatic aortic stenosis now s/p TAVR.      He denies any cardiovascular symptoms. His home weight is stable at 222 he says today.     Given that he has some cramping, I am going to add on CBC and BMP for next week to ensure he is not worse on his K Cr and Hgb. Please alert me if labs are worsening.      From a cardiac standpoint, baby aspirin is okay.  Continue statin.  LDL goal less than 100 for minimal coronary artery disease.      I have informed him to seek medical attention with any clinical changes. Otherwise I will see him in person in clinic in 6 months from in the heart clinic. I think he can discharge from CORE clinic.

## 2021-08-02 NOTE — LETTER
8/2/2021    Buck Nascimento MD  [address]  [facility] [address]    RE: Jonathan Bishop       Dear Colleague,    I had the pleasure of seeing Jonathan Bishop in the [facility].    Conner is a 75 year old who is being evaluated via a billable telephone visit.      What phone number would you like to be contacted at? [phone]    How would you like to obtain your AVS? MyChart     Review Of Systems  Skin: NEGATIVE  Eyes:Ears/Nose/Throat: NEGATIVE  Respiratory: NEGATIVE  Cardiovascular: feels off balance  Gastrointestinal: NEGATIVE  Genitourinary:NEGATIVE  Musculoskeletal: joint pain -maybe arthritis  Neurologic: numbness and tingling in the feet  Psychiatric: anxiety  Hematologic/Lymphatic/Immunologic: NEGATIVE  Endocrine:  NEGATIVE      Vitals - Patient Reported  Systolic (Patient Reported): 128  Diastolic (Patient Reported): 74  Weight (Patient Reported): 100.8 kg (222 lb 3.2 oz)  SpO2 (Patient Reported): 97    Patient is not taking ASA, Atorvastatin or Clopidogrel as he states Vandana Fournier PA-C took him off of them.      ACDE Pedraza    Telephone number of patient: [phone]  Phone call duration: 10 minutes    REASON FOR VISIT:  Aortic stenosis and heart failure with preserved ejection fraction.      HISTORY OF PRESENTING ILLNESS:  I am doing this telephone interview visit with Mr. Conner Bishop in the setting of the coronavirus pandemic.  He has been seeing me since the fall of 2019 when he presented with progressive heart failure with preserved EF-like symptoms and volume overload and was noted to have progressive severe symptomatic aortic stenosis.  He underwent coronary angiography that showed mild nonobstructive luminal irregularities.  He was then seen in consultation by Dr. Begum and Dr. Gimenez and underwent 26 mm ES3 in Dec 2020.  Since the TAVR, he has been feeling well. He says his edema has completely resolved. He has  no orthopnea or PND. He has no CP. No syncope presyncope. He is limited with much walking due to MSK issues he says. He has been having some mild cramping in his muscles of late. Lastly, he has stopped taking his DAPT and statin because he says he was 'told to do so'.      PHYSICAL EXAMINATION:  Deferred.      ASSESSMENT AND PLAN:  Mr. Conner Bishop is a very pleasant gentleman with heart failure, preserved EF, minimal nonobstructive coronary artery disease and severe symptomatic aortic stenosis now s/p TAVR.      He denies any cardiovascular symptoms. His home weight is stable at 222 he says today.     Given that he has some cramping, I am going to add on CBC and BMP for next week to ensure he is not worse on his K Cr and Hgb. Please alert me if labs are worsening.      From a cardiac standpoint, baby aspirin is okay.  Continue statin.  LDL goal less than 100 for minimal coronary artery disease.      I have informed him to seek medical attention with any clinical changes. Otherwise I will see him in person in clinic in 6 months from in the heart clinic. I think he can discharge from CORE clinic.       Thank you for allowing me to participate in the care of your patient.      Sincerely,     Jonny Mock MD     [facility]  cc:   Vandana Fournier PA-C  [address]  [place] [address]

## 2021-08-13 DIAGNOSIS — M54.50 ACUTE MIDLINE LOW BACK PAIN WITHOUT SCIATICA: ICD-10-CM

## 2021-08-13 NOTE — TELEPHONE ENCOUNTER
Reason For Call:   No chief complaint on file.      Medication Name, Dose and Monthly Quantity:   Oxycodone with APAP (Percocet): Dose 5-325 Schedule 1 tablet by mouth every 6 hours prn Monthly Quantity 124   Diagnosis requiring opiates:   Acute midline low back pain without sciatica [M54.5]  - Primary     Problem List Updated:   No    Opioid Agreement On File - [facility] PAIN CONTRACT ID# [id number]:  No    Last Urine Drug Screen (at least once every 12 months) Date:   n/a  Unexpected Results:   n/a    [place]  Data Reviewed (at least once every 3 months) Date:   08/13/2021      Unexpected Results:    No.    Last Fill Date:   07/17/2021  Due Date:   08/16/2021    Last Visit with PCP:   12/17/2020    Future Visits with PCP:   No.    Processing:   [facility] candy Perez RN      ----------------------------------

## 2021-08-15 RX ORDER — OXYCODONE AND ACETAMINOPHEN 5; 325 MG/1; MG/1
1-2 TABLET ORAL EVERY 6 HOURS PRN
Qty: 150 TABLET | Refills: 0 | Status: SHIPPED | OUTPATIENT
Start: 2021-08-16 | End: 2021-09-13

## 2021-08-20 ENCOUNTER — PRE VISIT (OUTPATIENT)
Dept: ONCOLOGY | Facility: CLINIC | Age: 76
End: 2021-08-20

## 2021-09-13 DIAGNOSIS — M54.50 ACUTE MIDLINE LOW BACK PAIN WITHOUT SCIATICA: ICD-10-CM

## 2021-09-13 RX ORDER — OXYCODONE AND ACETAMINOPHEN 5; 325 MG/1; MG/1
1-2 TABLET ORAL EVERY 6 HOURS PRN
Qty: 150 TABLET | Refills: 0 | Status: SHIPPED | OUTPATIENT
Start: 2021-09-15 | End: 2021-10-08

## 2021-09-13 NOTE — TELEPHONE ENCOUNTER
Reason For Call:   No chief complaint on file.      Medication Name, Dose and Monthly Quantity:   Oxycodone with APAP (Percocet): Dose 5-325 Schedule 1 tablet by mouth every 6 hours prn Monthly Quantity 124   Diagnosis requiring opiates:   Acute midline low back pain without sciatica [M54.5]  - Primary     Problem List Updated:   No    Opioid Agreement On File - [facility] PAIN CONTRACT ID# [id number]:  No    Last Urine Drug Screen (at least once every 12 months) Date:   n/a  Unexpected Results:   n/a    [place]  Data Reviewed (at least once every 3 months) Date:   09/13/2021      Unexpected Results:    No.    Last Fill Date:   08/16/2021  Due Date:   09/15/2021    Last Visit with PCP:   12/17/2020    Future Visits with PCP:   No.    Processing:   [facility] candy Perez RN      ----------------------------------

## 2021-10-08 DIAGNOSIS — M54.50 ACUTE MIDLINE LOW BACK PAIN WITHOUT SCIATICA: ICD-10-CM

## 2021-10-08 NOTE — TELEPHONE ENCOUNTER
Reason For Call:   No chief complaint on file.      Medication Name, Dose and Monthly Quantity:   Oxycodone with APAP (Percocet): Dose 5-325 Schedule 1 tablet by mouth every 6 hours prn Monthly Quantity 124   Diagnosis requiring opiates:   Acute midline low back pain without sciatica [M54.5]  - Primary     Problem List Updated:   No    Opioid Agreement On File - [facility] PAIN CONTRACT ID# [id number]:  No    Last Urine Drug Screen (at least once every 12 months) Date:   n/a  Unexpected Results:   n/a    [place]  Data Reviewed (at least once every 3 months) Date:   10/08/2021      Unexpected Results:    No.    Last Fill Date:   09/15/2021  Due Date:   10/15/2021    Last Visit with PCP:   12/17/2020    Future Visits with PCP:   No.    Processing:   [facility] candy Perez RN      ----------------------------------

## 2021-10-11 RX ORDER — OXYCODONE AND ACETAMINOPHEN 5; 325 MG/1; MG/1
1-2 TABLET ORAL EVERY 6 HOURS PRN
Qty: 150 TABLET | Refills: 0 | Status: SHIPPED | OUTPATIENT
Start: 2021-10-15 | End: 2021-11-03

## 2021-11-03 DIAGNOSIS — M54.50 ACUTE MIDLINE LOW BACK PAIN WITHOUT SCIATICA: ICD-10-CM

## 2021-11-03 RX ORDER — OXYCODONE AND ACETAMINOPHEN 5; 325 MG/1; MG/1
1-2 TABLET ORAL EVERY 6 HOURS PRN
Qty: 150 TABLET | Refills: 0 | Status: SHIPPED | OUTPATIENT
Start: 2021-11-13 | End: 2021-12-13

## 2021-11-29 ENCOUNTER — OFFICE VISIT (OUTPATIENT)
Dept: INTERNAL MEDICINE | Facility: CLINIC | Age: 76
End: 2021-11-29
Payer: COMMERCIAL

## 2021-11-29 ENCOUNTER — LAB (OUTPATIENT)
Dept: LAB | Facility: CLINIC | Age: 76
End: 2021-11-29
Payer: COMMERCIAL

## 2021-11-29 VITALS
DIASTOLIC BLOOD PRESSURE: 75 MMHG | BODY MASS INDEX: 39.61 KG/M2 | SYSTOLIC BLOOD PRESSURE: 153 MMHG | OXYGEN SATURATION: 96 % | RESPIRATION RATE: 16 BRPM | HEART RATE: 70 BPM | WEIGHT: 232 LBS | HEIGHT: 64 IN

## 2021-11-29 DIAGNOSIS — N40.1 HYPERPLASIA OF PROSTATE WITH LOWER URINARY TRACT SYMPTOMS (LUTS): ICD-10-CM

## 2021-11-29 DIAGNOSIS — I35.0 AORTIC VALVE STENOSIS, ETIOLOGY OF CARDIAC VALVE DISEASE UNSPECIFIED: ICD-10-CM

## 2021-11-29 DIAGNOSIS — G89.29 CHRONIC BILATERAL LOW BACK PAIN WITHOUT SCIATICA: Primary | ICD-10-CM

## 2021-11-29 DIAGNOSIS — I63.521 CEREBROVASCULAR ACCIDENT (CVA) DUE TO OCCLUSION OF RIGHT ANTERIOR CEREBRAL ARTERY (H): ICD-10-CM

## 2021-11-29 DIAGNOSIS — Z00.00 ROUTINE ADULT HEALTH MAINTENANCE: ICD-10-CM

## 2021-11-29 DIAGNOSIS — I50.32 CHRONIC DIASTOLIC HEART FAILURE (H): ICD-10-CM

## 2021-11-29 DIAGNOSIS — I10 ESSENTIAL HYPERTENSION, BENIGN: ICD-10-CM

## 2021-11-29 DIAGNOSIS — E78.5 HYPERLIPIDEMIA WITH TARGET LDL LESS THAN 130: ICD-10-CM

## 2021-11-29 DIAGNOSIS — M54.50 CHRONIC BILATERAL LOW BACK PAIN WITHOUT SCIATICA: Primary | ICD-10-CM

## 2021-11-29 LAB
ALBUMIN SERPL-MCNC: 3.6 G/DL (ref 3.4–5)
ALP SERPL-CCNC: 108 U/L (ref 40–150)
ALT SERPL W P-5'-P-CCNC: 34 U/L (ref 0–70)
ANION GAP SERPL CALCULATED.3IONS-SCNC: 8 MMOL/L (ref 3–14)
AST SERPL W P-5'-P-CCNC: 23 U/L (ref 0–45)
BILIRUB SERPL-MCNC: 0.4 MG/DL (ref 0.2–1.3)
BUN SERPL-MCNC: 29 MG/DL (ref 7–30)
CALCIUM SERPL-MCNC: 9.8 MG/DL (ref 8.5–10.1)
CHLORIDE BLD-SCNC: 106 MMOL/L (ref 94–109)
CHOLEST SERPL-MCNC: 220 MG/DL
CO2 SERPL-SCNC: 29 MMOL/L (ref 20–32)
CREAT SERPL-MCNC: 1.08 MG/DL (ref 0.66–1.25)
ERYTHROCYTE [DISTWIDTH] IN BLOOD BY AUTOMATED COUNT: 15.7 % (ref 10–15)
FASTING STATUS PATIENT QL REPORTED: YES
GFR SERPL CREATININE-BSD FRML MDRD: 66 ML/MIN/1.73M2
GLUCOSE BLD-MCNC: 97 MG/DL (ref 70–99)
HBA1C MFR BLD: 6 % (ref 0–5.6)
HCT VFR BLD AUTO: 35.9 % (ref 40–53)
HDLC SERPL-MCNC: 39 MG/DL
HGB BLD-MCNC: 11 G/DL (ref 13.3–17.7)
LDLC SERPL CALC-MCNC: 153 MG/DL
MCH RBC QN AUTO: 24.7 PG (ref 26.5–33)
MCHC RBC AUTO-ENTMCNC: 30.6 G/DL (ref 31.5–36.5)
MCV RBC AUTO: 81 FL (ref 78–100)
NONHDLC SERPL-MCNC: 181 MG/DL
NT-PROBNP SERPL-MCNC: 46 PG/ML (ref 0–450)
PLATELET # BLD AUTO: 250 10E3/UL (ref 150–450)
POTASSIUM BLD-SCNC: 3.7 MMOL/L (ref 3.4–5.3)
PROT SERPL-MCNC: 7.6 G/DL (ref 6.8–8.8)
RBC # BLD AUTO: 4.46 10E6/UL (ref 4.4–5.9)
SODIUM SERPL-SCNC: 143 MMOL/L (ref 133–144)
TRIGL SERPL-MCNC: 141 MG/DL
WBC # BLD AUTO: 8.8 10E3/UL (ref 4–11)

## 2021-11-29 PROCEDURE — 80061 LIPID PANEL: CPT | Performed by: PATHOLOGY

## 2021-11-29 PROCEDURE — 83880 ASSAY OF NATRIURETIC PEPTIDE: CPT | Performed by: PATHOLOGY

## 2021-11-29 PROCEDURE — 80053 COMPREHEN METABOLIC PANEL: CPT | Performed by: PATHOLOGY

## 2021-11-29 PROCEDURE — 36415 COLL VENOUS BLD VENIPUNCTURE: CPT | Performed by: PATHOLOGY

## 2021-11-29 PROCEDURE — 83036 HEMOGLOBIN GLYCOSYLATED A1C: CPT | Performed by: PATHOLOGY

## 2021-11-29 PROCEDURE — 99214 OFFICE O/P EST MOD 30 MIN: CPT | Performed by: INTERNAL MEDICINE

## 2021-11-29 PROCEDURE — 85027 COMPLETE CBC AUTOMATED: CPT | Performed by: PATHOLOGY

## 2021-11-29 RX ORDER — TAMSULOSIN HYDROCHLORIDE 0.4 MG/1
CAPSULE ORAL
Qty: 90 CAPSULE | Refills: 3 | Status: SHIPPED | OUTPATIENT
Start: 2021-11-29 | End: 2022-03-29

## 2021-11-29 RX ORDER — ATORVASTATIN CALCIUM 40 MG/1
40 TABLET, FILM COATED ORAL DAILY
Qty: 90 TABLET | Refills: 3 | Status: SHIPPED | OUTPATIENT
Start: 2021-11-29 | End: 2022-08-01

## 2021-11-29 ASSESSMENT — MIFFLIN-ST. JEOR: SCORE: 1693.35

## 2021-11-29 ASSESSMENT — PAIN SCALES - GENERAL: PAINLEVEL: EXTREME PAIN (8)

## 2021-11-29 NOTE — PROGRESS NOTES
Medicare Wellness Health Risk Assessment Questionnaire    What is your marital status?  Other    Who lives in your household?  Myself    Does your home have loose rugs in the hallway:     No    Does your home have grab bars in the bathroom:    Yes     Does your home have handrails on the stairs?  Yes     Does your home have poorly lit areas?    No    How many times have you fallen in the last year?  3    How many times have you been to the Emergency Room in the last year?  1    How many times have you been hospitalized in the last year?  0    Do you feel threatened or controlled by a partner, ex-partner or anyone in your life?   No    Has anyone hurt you physically, for example by pushing, hitting, slapping or kicking you   or forcing you to have sex?   No    Are you sexually active?    Yes     If yes, with men, women, or both?  Women    If yes, do you more than one current partner?   N/A    If yes, are you using condoms?    No    Have you had any sexually transmitted infections in the last year?   No    Do you have any sexual concerns?    No    Do you need help with the phone, transportation, shopping, preparing meals, housework, laundry, medications or managing money?   Yes     Have you noticed any hearing difficulties?   Yes    Do you wear hearing aids?   No    Have you seen a hearing professional such as an audiologist in the last 1 year?   No    Do you have vision difficulty?    No    Do you wear glasses or contacts?   No    Have you seen an eye doctor in the last 1 year?   No    How many servings of fruits and vegetables do you eat a day?  1 cup    How often do you exercise in a week?  N/A    What kind of exercise do you do?  Walk    Do you wear a seatbelt when driving or riding in a vehicle?     Yes     Do you use tobacco?    No    Do you use e-cigarettes?    No    Do you use any other drugs?   No         Do you drink alcohol?   No    If you drink alcohol, how many drinks per week?  0    Over the last 2  weeks, how often have you been bothered by feeling down, depressed, or hopeless?  N/A    Over the last 2 weeks, how often have you had little interest or pleasure in doing things?  N/A    Have you completed an Advance Directives document?  No    If yes, have you given a copy to the clinic?   No    Do you need information on Advance Directives?   N/A      DORCAS Kingsley, at 10:35 AM on 11/29/2021.

## 2021-11-29 NOTE — PROGRESS NOTES
ASSESSMENT/PLAN:  Conner is a 76 year old male with history of CVA, HF, aortic stenosis s/p TAVR 12/2020, BPH and osteoarthritis who presents for annual physical however the majority of the visit was about his pain.    1. Routine adult health maintenance  Colonoscopy due in 2022.   Previously received Tdap vaccine in 2021. Patient declined flu, covid booster, shingrix vaccines today.    - Hemoglobin A1c; Future  - Lipid panel reflex to direct LDL Non-fasting; Future  - Comprehensive metabolic panel; Future    2. Chronic bilateral low back pain without sciatica   Pain likely due to osteoarthritis of hips, back, and knees. Encouraged patient to only take percocet when he is in pain. Try taking 4 if he is not having much pain and can take 6 if he is having a lot of pain. No changes to pain medication today. Pain is impacting his mobility and making it difficult for him to get around. Placed wheelchair order, he is hoping for electric scooter. According to patient, [employer] worker believes he may be able to pay with waiver. Sent patient with letter of need. Patient will reach out if he needs additional documentation,   - Wheelchair Order for DME - ONLY FOR DME    3. Refill of Medications   Will restart atorvastatin today based on cardiologist recommendations. Did not experience any side effects with this medication.   - tamsulosin (FLOMAX) 0.4 MG capsule; TAKE 1 CAPSULE BY MOUTH ONCE DAILY  Dispense: 90 capsule; Refill: 3  - atorvastatin (LIPITOR) 40 MG tablet; Take 1 tablet (40 mg) by mouth daily  Dispense: 90 tablet; Refill: 3    Number and complexity of problems:  1 unstable chronic illness or with exacerbation (low back pain)  1 stable chronic illnesses (BPH, heart disease)    Amount and Complexity of Data Reviewed/Analyzed:  1 Note reviewed  1 Unique test ordered    Risk of complication/morbidity of patient management:  Patient receiving prescription management    Patient seen and discussed with attending physician   "Pily Perasud   MS3, [facility]     I, Buck Nascimento, was present with the medical student who participated in the service and in the documentation of the note.  I have verified the history and personally performed the physical exam and medical decision making.  I agree with the assessment and plan of care as documented in the note.         Chief complaint:  Jonathan Bishop is a 76 year old male presents for   Chief Complaint   Patient presents with     Physical     No specific concerns        SUBJECTIVE:  Conner's main concern today is pain management. He has pain in his legs, back, and hips due to osteoarthritis. Worst in the morning, improves by about noon each day. Prescribed percocet q6rs. Takes 4-6 percocet per day. Feels that 6 percocet improves pain compared to 4. Usially runs out of percocet by the end of the month and has to take tylenol for a few days. Does not feel that tylenol helps with pain at all.   Also reports increasing difficulty with mobility. He has fallen 3 times in the last year. All happened outside of his home. He did not injure himself during any of these falls. Uses cane and walker. Does not have trouble getting around his home. Is interested in an electric scooter to help him get around outside his home. He talked to this  from the [facility], who said he may be able to use a waiver to pay for scooter.     Following TAVR procedure about 1 year ago, Conner feels much better. He can walk down five flights of stairs, across the street, and to the grocery store without any shortness of breath. No chest pain. LE edema has improved following surgery and with use of compression stockings. He reports not taking atorvastatin recommended by cardiologist, possibly because he ran out.     Not having symptoms of BPH after starting flomax.     Reports that his mood is \"not good\". Worse mood in winter, he belives because he cannot go fishing. Plays chess in the winter instead. " No suicidal ideation or thoughts of self harm at this time.   Reports some problem with short term memory. Especially in the last year, he notices that hs forgets pin numbers, forgets where he puts things, forgets what he was planning to do when we went into another room or why he went on the computer.     Received Tdap earlier this year while in [place].   Not interested in flu , covid booster, shingrix vaccines.   Is eating a balanced diet. [facility] but is has been closed recently. Thinks it should be open again soon.  He did take his BP at home one day before his clinic appointment. At home, BP around 120/70.    Medications and allergies were reviewed by me today.     Review Of Systems  Constitutional: no fevers, chills, night sweats or unintentional weight change   Eyes: no vision change  Ears, Nose, Mouth, Throat: no tinnitus or hearing change  Cardiovascular: no chest pain, palpitations, or pain with walking, no orthopnea or PND   Respiratory: no dyspnea, cough, shortness of breath or wheezing   GI: no nausea, vomiting, diarrhea or constipation, no abdominal pain   : no change in urine,  Musculoskeletal: Joint pain as above.    Psych: Low mood as above    Patient Active Problem List    Diagnosis Date Noted     Bilateral leg edema 01/21/2021     Priority: Medium     Diastolic heart failure (H) 01/07/2021     Priority: Medium     S/P TAVR (transcatheter aortic valve replacement) 01/07/2021     Priority: Medium     Aortic stenosis, severe 12/08/2020     Priority: Medium     Aortic valve stenosis, etiology of cardiac valve disease unspecified 12/02/2020     Priority: Medium     Added automatically from request for surgery [id number]       Morbid obesity (H) 11/09/2020     Priority: Medium     Status post coronary angiogram 10/28/2019     Priority: Medium     Systolic murmur 07/01/2019     Priority: Medium     Peripheral edema 07/01/2019     Priority: Medium     Hyperplasia of prostate with lower  "urinary tract symptoms (LUTS) 07/01/2019     Priority: Medium     Erectile dysfunction, unspecified erectile dysfunction type 07/01/2019     Priority: Medium     Severe aortic stenosis 07/01/2019     Priority: Medium     Dermatophytosis of nail 04/05/2017     Priority: Medium     PVD (peripheral vascular disease) (H) 04/05/2017     Priority: Medium     Cerebrovascular accident involving anterior circulation, right (H) 12/09/2016     Priority: Medium     Stroke (H) 11/30/2016     Priority: Medium     Low back pain 05/14/2014     Priority: Medium     Osteoarthritis of knee 05/14/2014     Priority: Medium     Medication refill- do not delete  11/13/2013     Priority: Medium     Essential hypertension, benign 03/09/2012     Priority: Medium     Hyperlipidemia with target LDL less than 130 03/09/2012     Priority: Medium     Diagnosis updated by automated process. Provider to review and confirm.       Anemia 03/09/2012     Priority: Medium     Problem list name updated by automated process. Provider to review         PHYSICAL EXAM:  BP (!) 153/75 (BP Location: Right arm, Patient Position: Sitting, Cuff Size: Adult Regular)   Pulse 70   Resp 16   Ht 1.626 m (5' 4\")   Wt 105.2 kg (232 lb)   SpO2 96%   BMI 39.82 kg/m    Constitutional: no distress, comfortable, pleasant   Eyes: anicteric, normal extra-ocular movements   Ears, Nose and Throat: tympanic membranes clear, nose clear and free of lesions, throat clear, neck supple.    Cardiovascular: regular rate and rhythm, normal S1 and S2, Systolic murmur. no rubs or gallops, peripheral pulses full and symmetric   Respiratory: clear to auscultation, no wheezes or crackles, normal breath sounds   Gastrointestinal: positive bowel sounds, nontender, no hepatosplenomegaly, no masses   Musculoskeletal: full range of motion. 1+ edema to mid shin bilaterally.   Skin: no concerning lesions, no jaundice   Neurological: cranial nerves intact, slow gait, walking with cane in " office, Tremor of left arm and hand.   Psychological: appropriate mood

## 2021-11-29 NOTE — NURSING NOTE
Jonathan Bishop is a 76 year old male patient that presents today in clinic for the following:    Chief Complaint   Patient presents with     Physical     No specific concerns     The patient's allergies and medications were reviewed as noted. A set of vitals were recorded as noted without incident. The patient does not have any other questions for the provider.    Stacey Quinn, EMT at 8:20 AM on 11/29/2021

## 2021-11-29 NOTE — LETTER
Jonathan Bishop  [address]   [place] [address]  : 1945      2021          To Whom It May Concern,    Jonathan Bishop is a patient at the [facility].  He has had both of his knees replaced and therefore has metal in his body.  Therefore, please take this into consideration with screening at the airport.      If you have any questions, please do not hesitate to contact me.    Sincerely,          Buck Nascimento MD

## 2021-12-08 ENCOUNTER — LAB (OUTPATIENT)
Dept: LAB | Facility: CLINIC | Age: 76
End: 2021-12-08
Attending: INTERNAL MEDICINE
Payer: COMMERCIAL

## 2021-12-08 ENCOUNTER — HOSPITAL ENCOUNTER (OUTPATIENT)
Dept: CARDIOLOGY | Facility: CLINIC | Age: 76
Discharge: HOME | End: 2021-12-08
Attending: INTERNAL MEDICINE | Admitting: INTERNAL MEDICINE
Payer: COMMERCIAL

## 2021-12-08 DIAGNOSIS — I35.0 AORTIC VALVE STENOSIS, ETIOLOGY OF CARDIAC VALVE DISEASE UNSPECIFIED: Primary | ICD-10-CM

## 2021-12-08 DIAGNOSIS — I35.0 AORTIC VALVE STENOSIS, ETIOLOGY OF CARDIAC VALVE DISEASE UNSPECIFIED: ICD-10-CM

## 2021-12-08 LAB
ANION GAP SERPL CALCULATED.3IONS-SCNC: 6 MMOL/L (ref 3–14)
BUN SERPL-MCNC: 20 MG/DL (ref 7–30)
CALCIUM SERPL-MCNC: 9.5 MG/DL (ref 8.5–10.1)
CHLORIDE BLD-SCNC: 110 MMOL/L (ref 94–109)
CO2 SERPL-SCNC: 26 MMOL/L (ref 20–32)
CREAT SERPL-MCNC: 0.77 MG/DL (ref 0.66–1.25)
ERYTHROCYTE [DISTWIDTH] IN BLOOD BY AUTOMATED COUNT: 15.8 % (ref 10–15)
GFR SERPL CREATININE-BSD FRML MDRD: 88 ML/MIN/1.73M2
GLUCOSE BLD-MCNC: 104 MG/DL (ref 70–99)
HCT VFR BLD AUTO: 37.7 % (ref 40–53)
HGB BLD-MCNC: 11 G/DL (ref 13.3–17.7)
LVEF ECHO: NORMAL
MCH RBC QN AUTO: 24.4 PG (ref 26.5–33)
MCHC RBC AUTO-ENTMCNC: 29.2 G/DL (ref 31.5–36.5)
MCV RBC AUTO: 84 FL (ref 78–100)
PLATELET # BLD AUTO: 280 10E3/UL (ref 150–450)
POTASSIUM BLD-SCNC: 4.4 MMOL/L (ref 3.4–5.3)
RBC # BLD AUTO: 4.51 10E6/UL (ref 4.4–5.9)
SODIUM SERPL-SCNC: 142 MMOL/L (ref 133–144)
WBC # BLD AUTO: 8.9 10E3/UL (ref 4–11)

## 2021-12-08 PROCEDURE — 80048 BASIC METABOLIC PNL TOTAL CA: CPT | Performed by: INTERNAL MEDICINE

## 2021-12-08 PROCEDURE — 36415 COLL VENOUS BLD VENIPUNCTURE: CPT | Performed by: INTERNAL MEDICINE

## 2021-12-08 PROCEDURE — 85027 COMPLETE CBC AUTOMATED: CPT | Performed by: INTERNAL MEDICINE

## 2021-12-08 PROCEDURE — 93306 TTE W/DOPPLER COMPLETE: CPT | Mod: 26 | Performed by: INTERNAL MEDICINE

## 2021-12-08 PROCEDURE — 999N000208 ECHOCARDIOGRAM COMPLETE

## 2021-12-08 PROCEDURE — 255N000002 HC RX 255 OP 636: Performed by: INTERNAL MEDICINE

## 2021-12-08 RX ADMIN — HUMAN ALBUMIN MICROSPHERES AND PERFLUTREN 3 ML: 10; .22 INJECTION, SOLUTION INTRAVENOUS at 09:29

## 2021-12-13 DIAGNOSIS — M54.50 ACUTE MIDLINE LOW BACK PAIN WITHOUT SCIATICA: ICD-10-CM

## 2021-12-13 NOTE — TELEPHONE ENCOUNTER
Reason For Call:   Refill Of Medication    Medication Name, Dose and Monthly Quantity:   oxyCODONE-acetaminophen (PERCOCET) 5-325 MG tablet Take 1-2 tablets by mouth every 6 hours as needed for pain Max 5 per day.      Diagnosis requiring opiates:   Acute midline low back pain without sciatica [M54.5]  - Primary     Problem List Updated:   Yes    Opioid Agreement On File - [facility] PAIN CONTRACT ID# [id number]:  No    Last Urine Drug Screen (at least once every 12 months) Date:   N/A  Unexpected Results:   N/A    [place]  Data Reviewed (at least once every 3 months) Date:   12/13/21   Unexpected Results:    No.    Last Fill Date:   11/16/2021    Due Date:   12/16/21    Last Visit with PCP:   11/29/2021    Future Visits with PCP:   No.    Processing:   Emmett Oconnor LPN

## 2021-12-14 RX ORDER — OXYCODONE AND ACETAMINOPHEN 5; 325 MG/1; MG/1
1-2 TABLET ORAL EVERY 6 HOURS PRN
Qty: 150 TABLET | Refills: 0 | Status: SHIPPED | OUTPATIENT
Start: 2021-12-16 | End: 2022-01-17

## 2021-12-26 ENCOUNTER — HEALTH MAINTENANCE LETTER (OUTPATIENT)
Age: 76
End: 2021-12-26

## 2022-01-17 ENCOUNTER — TELEPHONE (OUTPATIENT)
Dept: INTERNAL MEDICINE | Facility: CLINIC | Age: 77
End: 2022-01-17
Payer: COMMERCIAL

## 2022-01-17 DIAGNOSIS — M54.50 ACUTE MIDLINE LOW BACK PAIN WITHOUT SCIATICA: ICD-10-CM

## 2022-01-17 RX ORDER — OXYCODONE AND ACETAMINOPHEN 5; 325 MG/1; MG/1
1-2 TABLET ORAL EVERY 6 HOURS PRN
Qty: 150 TABLET | Refills: 0 | Status: SHIPPED | OUTPATIENT
Start: 2022-01-17 | End: 2022-02-21

## 2022-01-17 NOTE — TELEPHONE ENCOUNTER
Duplicate encounter.  Med refill request sent to Dr. Nascimento today.      Please allow up to 5 business day for controlled med refills.      Michael Weldon CMA ([facility]) at 3:50 PM on 1/17/2022

## 2022-01-17 NOTE — TELEPHONE ENCOUNTER
[facility] Call Center    Phone Message    May a detailed message be left on voicemail: yes     Reason for Call: Medication Question or concern regarding medication   Prescription Clarification  Name of Medication: oxyCODONE-acetaminophen (PERCOCET) 5-325 MG tablet   Prescribing Provider: Buck Nascimento MD   Pharmacy: [facility] [address] [place] [address]   What on the order needs clarification? Patient requesting a call back from Dr. Nascimento's care team to provide a status update on his meds. Patient reported he needs them today.    Please call back to discuss.          Action Taken: Message routed to:  [facility]    Travel Screening: Not Applicable

## 2022-01-17 NOTE — TELEPHONE ENCOUNTER
Health Call Center    Phone Message    May a detailed message be left on voicemail: yes     Reason for Call: Medication Refill Request    Has the patient contacted the pharmacy for the refill? Yes   Name of medication being requested: oxyCODONE-acetaminophen (PERCOCET) 5-325 MG tablet     Provider who prescribed the medication: Dr. Sick  Pharmacy: [facility]  Date medication is needed: 1/17/22   Pt called and went to his pharmacy and this script was not there, requesting script asap      Action Taken: Message routed to:  [facility] ([facility]): giovanni LINCOLN  12/17/2021.  Marlys Lo RN 2:11 PM on 1/17/2022.

## 2022-02-20 DIAGNOSIS — M54.50 ACUTE MIDLINE LOW BACK PAIN WITHOUT SCIATICA: ICD-10-CM

## 2022-02-20 RX ORDER — OXYCODONE AND ACETAMINOPHEN 5; 325 MG/1; MG/1
1-2 TABLET ORAL EVERY 6 HOURS PRN
Qty: 150 TABLET | Refills: 0 | Status: CANCELLED | OUTPATIENT
Start: 2022-02-20

## 2022-02-21 DIAGNOSIS — M54.50 ACUTE MIDLINE LOW BACK PAIN WITHOUT SCIATICA: ICD-10-CM

## 2022-02-21 RX ORDER — OXYCODONE AND ACETAMINOPHEN 5; 325 MG/1; MG/1
1-2 TABLET ORAL EVERY 6 HOURS PRN
Qty: 150 TABLET | Refills: 0 | Status: SHIPPED | OUTPATIENT
Start: 2022-02-21 | End: 2022-03-28

## 2022-02-21 NOTE — TELEPHONE ENCOUNTER
[facility] Call Center    Phone Message    May a detailed message be left on voicemail: yes     Reason for Call: Medication Refill Request    Has the patient contacted the pharmacy for the refill? Yes   Name of medication being requested: oxyCODONE-acetaminophen (PERCOCET) 5-325 MG tablet   Provider who prescribed the medication: Buck Nascimento MD  Pharmacy: [facility] [address] [place] [address]  Date medication is needed: asap    Lianne from Pharmacy reporting they have not received Rx yet. Requesting meds are filled.    Action Taken: Message routed to:  [facility]    Travel Screening: Not Applicable

## 2022-03-23 DIAGNOSIS — M54.50 ACUTE MIDLINE LOW BACK PAIN WITHOUT SCIATICA: ICD-10-CM

## 2022-03-24 NOTE — TELEPHONE ENCOUNTER
[facility] Call Center    Phone Message    May a detailed message be left on voicemail: yes     Reason for Call: Other: Patient is wondering the status on his medication. Please call when this has been sent over.      Action Taken: Message routed to:  [facility]    Travel Screening: Not Applicable

## 2022-03-25 DIAGNOSIS — N40.1 HYPERPLASIA OF PROSTATE WITH LOWER URINARY TRACT SYMPTOMS (LUTS): ICD-10-CM

## 2022-03-25 NOTE — TELEPHONE ENCOUNTER
[facility] Call Center    Phone Message    May a detailed message be left on voicemail: yes     Reason for Call: Other: yoel and patient on speaker phone was calling on the status of his medications. Please let patient know when it is complete.      Action Taken: Message routed to:  [facility]): [facility]    Travel Screening: Not Applicable

## 2022-03-28 RX ORDER — OXYCODONE AND ACETAMINOPHEN 5; 325 MG/1; MG/1
1-2 TABLET ORAL EVERY 6 HOURS PRN
Qty: 150 TABLET | Refills: 0 | Status: SHIPPED | OUTPATIENT
Start: 2022-03-28 | End: 2022-04-19

## 2022-03-28 NOTE — TELEPHONE ENCOUNTER
[facility] Call Center    Phone Message    May a detailed message be left on voicemail: yes     Reason for Call: Medication Question or concern regarding medication   Prescription Clarification  Name of Medication: oxyCODONE-acetaminophen (PERCOCET) 5-325 MG tablet   Prescribing Provider: Buck Nascimento MD     Pharmacy: [facility] [address] [place] [address]   What on the order needs clarification?   The patient would like a call back from the care team to understand when the refill request will be complete thank you.          Action Taken: Message routed to:  [facility]): [facility]    Travel Screening: Not Applicable                      02/21/2022 02/21/2022 02/22/2022 1   Oxycodone-Acetaminophen 5-325  150.00 30 [facility] [id number] [facility] ([facility]) 0/0 37.50 [place]  01/17/2022 01/17/2022 01/18/2022 1   Oxycodone-Acetaminophen 5-325  150.00 30 Br Sic [id number] [facility] ([facility]127) 0/0 37.50 [place]  12/16/2021 12/14/2021 12/17/2021 1   Oxycodone-Acetaminophen 5-325  150.00 30 Br Sic [id number] [facility] ([facility]127) 0/0 37.50 [place]  11/16/2021 11/03/2021 11/16/2021 1   Oxycodone-Acetaminophen 5-325  150.00 30 Br Sic    Last clinic visit-11/29/2021  Next clinic visit-none   Marlys Lo RN 12:24 PM on 3/28/2022.

## 2022-03-29 NOTE — TELEPHONE ENCOUNTER
Tamsulosin HCl 0.4 MG Oral Capsule   Last Written Prescription Date:  11/29/2021  Last Fill Quantity: 90,   # refills: 3  Last Office Visit :  11/29/2021  Future Office visit:  None    Routing refill request to provider for review/approval because:  Abnormal B/P   Change med?  Change dose?  Add med?   Follow up B/P check?  Refer to clinic for review   BP Readings from Last 3 Encounters:   11/29/21 (!) 153/75   08/02/21 128/74   06/21/21 120/80         Mayda Holland RN  Central Triage Red Flags/Med Refills

## 2022-03-30 RX ORDER — TAMSULOSIN HYDROCHLORIDE 0.4 MG/1
CAPSULE ORAL
Qty: 90 CAPSULE | Refills: 0 | Status: SHIPPED | OUTPATIENT
Start: 2022-03-30 | End: 2022-08-01

## 2022-04-19 ENCOUNTER — TELEPHONE (OUTPATIENT)
Dept: INTERNAL MEDICINE | Facility: CLINIC | Age: 77
End: 2022-04-19
Payer: COMMERCIAL

## 2022-04-19 DIAGNOSIS — M54.50 ACUTE MIDLINE LOW BACK PAIN WITHOUT SCIATICA: ICD-10-CM

## 2022-04-19 RX ORDER — OXYCODONE AND ACETAMINOPHEN 5; 325 MG/1; MG/1
1-2 TABLET ORAL EVERY 6 HOURS PRN
Qty: 150 TABLET | Refills: 0 | Status: SHIPPED | OUTPATIENT
Start: 2022-04-19 | End: 2022-05-18

## 2022-04-19 NOTE — TELEPHONE ENCOUNTER
[facility] Call Center    Phone Message    May a detailed message be left on voicemail: yes     Reason for Call: Medication Question or concern regarding medication   Prescription Clarification  Name of Medication: oxyCODONE-acetaminophen (PERCOCET) 5-325 MG tablet  Prescribing Provider: Buck Nascimento MD   Pharmacy: [facility] [address] [place] [address]   What on the order needs clarification? Patient wondering about when he should call in for his meds. Informed patient that controlled Rx requires calling in 5-7 business days prior to fill date. Patient is wondering about upcoming months and the dates he will need to call in by so he does not run out while waiting. Patient went without meds for 11 days recently because he didn't know he had to contact the clinic. Patient wants to shay call in dates on his calender and is requesting a call back to discuss.          Action Taken: Message routed to:  [facility]    Travel Screening: Not Applicable                      03/28/2022 03/28/2022 03/29/2022 1   Oxycodone-Acetaminophen 5-325  150.00 30 Re Mar                      Last clinic visit-1945  Future clinic visit-none

## 2022-04-19 NOTE — TELEPHONE ENCOUNTER
Health Call Center    Phone Message    May a detailed message be left on voicemail: yes     Reason for Call: Medication Refill Request    Has the patient contacted the pharmacy for the refill? Yes   Name of medication being requested: oxyCODONE-acetaminophen (PERCOCET) 5-325 MG tablet  Provider who prescribed the medication: Buck Nascimento MD  Pharmacy: [facility] [address] [place] [address]  Date medication is needed: asap    Patient is requesting meds to be filled.      Action Taken: Message routed to:  [facility]    Travel Screening: Not Applicable     Filled  Written  Sold  ID  Drug  QTY  Days  Prescriber  RX #  Dispenser  Refill  Daily Dose*  Pymt Type     03/28/2022 03/28/2022 03/29/2022 1   Oxycodone-Acetaminophen 5-325  150.00 30 Re Mar [id number] M[facility]ed with [facility] (2127) 0/0 37.50 [place]    Pt called and mailbox is full.  Marlys Lo RN 12:38 PM on 4/21/2022.

## 2022-06-20 NOTE — TELEPHONE ENCOUNTER
oxyCODONE-acetaminophen (PERCOCET) 5-325 MG tablet          The original prescription was discontinued on 2/21/2022 by Buck Nascimento MD for the following reason: Reorder. Renewing this prescription may not be appropriate.

## 2022-07-25 DIAGNOSIS — M54.50 ACUTE MIDLINE LOW BACK PAIN WITHOUT SCIATICA: ICD-10-CM

## 2022-07-25 NOTE — TELEPHONE ENCOUNTER
Health Call Center    Phone Message    May a detailed message be left on voicemail: yes     Reason for Call: Medication Refill Request    Has the patient contacted the pharmacy for the refill? Yes   Name of medication being requested: oxyCODONE-acetaminophen (PERCOCET) 5-325 MG tablet   Provider who prescribed the medication: Dr. Buck Nascimento  Pharmacy: [facility] [address] [place] [address]  Date medication is needed: asap    Patient requesting meds to be filled. Patient went to the pharmacy to  meds and they were not there. Requesting call back from Dr. Nascimento s nurse about status of meds.      Action Taken: Message routed to:  [facility]): [facility]    Travel Screening: Not Applicable

## 2022-07-26 RX ORDER — OXYCODONE AND ACETAMINOPHEN 5; 325 MG/1; MG/1
1-2 TABLET ORAL EVERY 6 HOURS PRN
Qty: 150 TABLET | Refills: 0 | Status: SHIPPED | OUTPATIENT
Start: 2022-07-26 | End: 2022-08-26

## 2022-08-01 ENCOUNTER — OFFICE VISIT (OUTPATIENT)
Dept: INTERNAL MEDICINE | Facility: CLINIC | Age: 77
End: 2022-08-01
Payer: COMMERCIAL

## 2022-08-01 VITALS
HEART RATE: 81 BPM | HEIGHT: 64 IN | OXYGEN SATURATION: 96 % | SYSTOLIC BLOOD PRESSURE: 133 MMHG | DIASTOLIC BLOOD PRESSURE: 77 MMHG | RESPIRATION RATE: 18 BRPM | BODY MASS INDEX: 39.82 KG/M2

## 2022-08-01 DIAGNOSIS — N40.1 HYPERPLASIA OF PROSTATE WITH LOWER URINARY TRACT SYMPTOMS (LUTS): ICD-10-CM

## 2022-08-01 DIAGNOSIS — Z00.00 ROUTINE GENERAL MEDICAL EXAMINATION AT A HEALTH CARE FACILITY: ICD-10-CM

## 2022-08-01 DIAGNOSIS — N52.9 ERECTILE DYSFUNCTION, UNSPECIFIED ERECTILE DYSFUNCTION TYPE: ICD-10-CM

## 2022-08-01 DIAGNOSIS — I50.32 CHRONIC DIASTOLIC HEART FAILURE (H): ICD-10-CM

## 2022-08-01 DIAGNOSIS — I63.521 CEREBROVASCULAR ACCIDENT (CVA) DUE TO OCCLUSION OF RIGHT ANTERIOR CEREBRAL ARTERY (H): ICD-10-CM

## 2022-08-01 DIAGNOSIS — Z23 NEED FOR VACCINATION: ICD-10-CM

## 2022-08-01 DIAGNOSIS — I35.0 AORTIC VALVE STENOSIS, ETIOLOGY OF CARDIAC VALVE DISEASE UNSPECIFIED: ICD-10-CM

## 2022-08-01 DIAGNOSIS — E66.01 MORBID OBESITY (H): ICD-10-CM

## 2022-08-01 DIAGNOSIS — Z12.11 SCREENING FOR COLON CANCER: ICD-10-CM

## 2022-08-01 DIAGNOSIS — I10 ESSENTIAL HYPERTENSION, BENIGN: ICD-10-CM

## 2022-08-01 DIAGNOSIS — M17.0 PRIMARY OSTEOARTHRITIS OF BOTH KNEES: ICD-10-CM

## 2022-08-01 DIAGNOSIS — I63.521 CEREBROVASCULAR ACCIDENT INVOLVING ANTERIOR CIRCULATION, RIGHT (H): ICD-10-CM

## 2022-08-01 DIAGNOSIS — D64.9 ANEMIA, UNSPECIFIED TYPE: ICD-10-CM

## 2022-08-01 DIAGNOSIS — I73.9 PVD (PERIPHERAL VASCULAR DISEASE) (H): ICD-10-CM

## 2022-08-01 DIAGNOSIS — Z95.2 S/P TAVR (TRANSCATHETER AORTIC VALVE REPLACEMENT): ICD-10-CM

## 2022-08-01 DIAGNOSIS — G89.29 CHRONIC BILATERAL LOW BACK PAIN WITHOUT SCIATICA: Primary | ICD-10-CM

## 2022-08-01 DIAGNOSIS — E78.5 HYPERLIPIDEMIA WITH TARGET LDL LESS THAN 130: ICD-10-CM

## 2022-08-01 DIAGNOSIS — M54.50 CHRONIC BILATERAL LOW BACK PAIN WITHOUT SCIATICA: Primary | ICD-10-CM

## 2022-08-01 DIAGNOSIS — H53.9 VISION CHANGES: ICD-10-CM

## 2022-08-01 PROCEDURE — 90715 TDAP VACCINE 7 YRS/> IM: CPT | Performed by: INTERNAL MEDICINE

## 2022-08-01 PROCEDURE — 90471 IMMUNIZATION ADMIN: CPT | Performed by: INTERNAL MEDICINE

## 2022-08-01 PROCEDURE — 99397 PER PM REEVAL EST PAT 65+ YR: CPT | Mod: 25 | Performed by: INTERNAL MEDICINE

## 2022-08-01 RX ORDER — TAMSULOSIN HYDROCHLORIDE 0.4 MG/1
CAPSULE ORAL
Qty: 90 CAPSULE | Refills: 4 | Status: SHIPPED | OUTPATIENT
Start: 2022-08-01 | End: 2023-08-16

## 2022-08-01 RX ORDER — SILDENAFIL 100 MG/1
TABLET, FILM COATED ORAL
Qty: 30 TABLET | Refills: 5 | Status: SHIPPED | OUTPATIENT
Start: 2022-08-01 | End: 2023-02-28

## 2022-08-01 RX ORDER — SPIRONOLACTONE 50 MG/1
50 TABLET, FILM COATED ORAL DAILY
Qty: 90 TABLET | Refills: 3 | Status: SHIPPED | OUTPATIENT
Start: 2022-08-01 | End: 2023-08-24

## 2022-08-01 RX ORDER — TORSEMIDE 20 MG/1
40 TABLET ORAL DAILY
Qty: 180 TABLET | Refills: 3 | Status: SHIPPED | OUTPATIENT
Start: 2022-08-01 | End: 2023-02-28

## 2022-08-01 RX ORDER — POTASSIUM CHLORIDE 1500 MG/1
20 TABLET, EXTENDED RELEASE ORAL DAILY
Qty: 90 TABLET | Refills: 3 | Status: SHIPPED | OUTPATIENT
Start: 2022-08-01 | End: 2023-02-28

## 2022-08-01 RX ORDER — ATORVASTATIN CALCIUM 40 MG/1
40 TABLET, FILM COATED ORAL DAILY
Qty: 90 TABLET | Refills: 3 | Status: ON HOLD | OUTPATIENT
Start: 2022-08-01 | End: 2023-03-03

## 2022-08-01 NOTE — LETTER
Jonathan Bishop  [address]   [place] [address]  : 1945      2022          To Whom It May Concern,    Jonathan Bishop is a patient at the [facility] the [facility].  He has had both of his knees replaced and therefore has metal in his body.  Therefore, please take this into consideration with screening at the airport.      If you have any questions, please do not hesitate to contact me.    Sincerely,          Buck Nascimento MD

## 2022-08-01 NOTE — NURSING NOTE
Chief Complaint   Patient presents with     Physical     Annual visit per patient       DORCAS Clark at 7:02 AM on 8/1/2022.

## 2022-08-01 NOTE — PROGRESS NOTES
ASSESSMENT/PLAN:    Chronic bilateral low back pain without sciatica  Primary osteoarthritis of both knees  -     Occupational Therapy Referral; Future  Pain likely due to osteoarthritis of hips, back, and knees. Encouraged patient to only take percocet when he is in pain. Try taking 4 if he is not having much pain and can take 6 if he is having a lot of pain. No changes to pain medication today. Pain is impacting his mobility and making it difficult for him to get around. Placed wheelchair order, he is hoping for electric [employer]. According to patient, [employer] worker believes he may be able to pay with waiver. Sent patient with letter of need. Patient will reach out if he needs additional documentation,   - Wheelchair Order for DME - ONLY FOR DME    Routine general medical examination at a health care facility  Need for vaccination  -     TDAP VACCINE (Adacel, Boostrix)  [[id number]]  Screening for colon cancer  -     Colonscopy Screening  Referral; Future    S/P TAVR (transcatheter aortic valve replacement) -he has not followed up with them in more than a year so would be good for him to be seen  Hyperlipidemia with target LDL less than 130  -     Adult Cardiology Eval  Referral    Anemia, unspecified type  -     CBC with Platelets & Differential; Future  -     Iron and iron binding capacity; Future  -     Ferritin; Future    Cerebrovascular accident involving anterior circulation, right (H)  -     Lipid panel reflex to direct LDL Fasting; Future    Essential hypertension, benign  -     Comprehensive metabolic panel; Future    Hyperplasia of prostate with lower urinary tract symptoms (LUTS)  -     tamsulosin (FLOMAX) 0.4 MG capsule; TAKE 1 CAPSULE BY MOUTH ONCE DAILY    Cerebrovascular accident (CVA) due to occlusion of right anterior cerebral artery (H)  -     atorvastatin (LIPITOR) 40 MG tablet; Take 1 tablet (40 mg) by mouth daily    Erectile dysfunction, unspecified erectile dysfunction type  -      sildenafil (VIAGRA) 100 MG tablet; TAKE 30MIN TO 4 HOURS BEFORE INTERCOURSE AS NEEDED FOR ERECTILE DYSFUNCTION    Chronic diastolic heart failure (H)  -     potassium chloride ER (KLOR-CON M) 20 MEQ CR tablet; Take 1 tablet (20 mEq) by mouth daily  -     spironolactone (ALDACTONE) 50 MG tablet; Take 1 tablet (50 mg) by mouth daily  -     torsemide (DEMADEX) 20 MG tablet; Take 2 tablets (40 mg) by mouth daily    Vision changes  -     Adult Eye Referral; Future        Number and complexity of problems:  3 unstable chronic illness or with exacerbation (osteoarthrisi, CHF, anemia)  2 stable chronic illnesses (BPH, CVA)    Amount and Complexity of Data Reviewed/Analyzed:  1 External notes reviewed  >3 Unique test ordered    Risk of complication/morbidity of patient management:  Patient receiving prescription management    Buck Nascimento MD, FACP      Chief complaint:  Jonathan Bishop is a 76 year old male presents for   Chief Complaint   Patient presents with     Physical     Annual visit per patient        SUBJECTIVE:  Here for his annual review of conditions.  He has minimal concerns but does want to review his health.      He needs a wheeled mobility device because of all of the pain he has with his back and legs.    He has been told he has anemia before.      Medications and allergies were reviewed by me today.     Patient Active Problem List    Diagnosis Date Noted     Bilateral leg edema 01/21/2021     Priority: Medium     Diastolic heart failure (H) 01/07/2021     Priority: Medium     S/P TAVR (transcatheter aortic valve replacement) 01/07/2021     Priority: Medium     Aortic stenosis, severe 12/08/2020     Priority: Medium     Aortic valve stenosis, etiology of cardiac valve disease unspecified 12/02/2020     Priority: Medium     Added automatically from request for surgery [id number]       Morbid obesity (H) 11/09/2020     Priority: Medium     Status post coronary angiogram 10/28/2019     Priority: Medium      "Systolic murmur 07/01/2019     Priority: Medium     Peripheral edema 07/01/2019     Priority: Medium     Hyperplasia of prostate with lower urinary tract symptoms (LUTS) 07/01/2019     Priority: Medium     Erectile dysfunction, unspecified erectile dysfunction type 07/01/2019     Priority: Medium     Severe aortic stenosis 07/01/2019     Priority: Medium     Dermatophytosis of nail 04/05/2017     Priority: Medium     PVD (peripheral vascular disease) (H) 04/05/2017     Priority: Medium     Cerebrovascular accident involving anterior circulation, right (H) 12/09/2016     Priority: Medium     Stroke (H) 11/30/2016     Priority: Medium     Low back pain 05/14/2014     Priority: Medium     Osteoarthritis of knee 05/14/2014     Priority: Medium     Medication refill- do not delete  11/13/2013     Priority: Medium     Essential hypertension, benign 03/09/2012     Priority: Medium     Hyperlipidemia with target LDL less than 130 03/09/2012     Priority: Medium     Diagnosis updated by automated process. Provider to review and confirm.       Anemia 03/09/2012     Priority: Medium     Problem list name updated by automated process. Provider to review         PHYSICAL EXAM:  /77 (BP Location: Right arm, Patient Position: Sitting, Cuff Size: Adult Regular)   Pulse 81   Resp 18   Ht 1.626 m (5' 4\")   SpO2 96%   BMI 39.82 kg/m    Constitutional: no distress, comfortable, pleasant   Cardiovascular: regular rate and rhythm, normal S1 and S2, soft sounds with 1/6 systolic murmur, rubs or gallops, peripheral pulses full and symmetric   Respiratory: clear to auscultation, no wheezes or crackles, normal breath sounds   Musculoskeletal: 3+ pitting edema to below the knee            "

## 2022-08-02 ENCOUNTER — TELEPHONE (OUTPATIENT)
Dept: GASTROENTEROLOGY | Facility: CLINIC | Age: 77
End: 2022-08-02

## 2022-08-02 ENCOUNTER — HOSPITAL ENCOUNTER (OUTPATIENT)
Facility: AMBULATORY SURGERY CENTER | Age: 77
End: 2022-08-02
Attending: INTERNAL MEDICINE
Payer: COMMERCIAL

## 2022-08-02 NOTE — TELEPHONE ENCOUNTER
Screening Questions    BlueKIND OF PREP RedLOCATION [review exclusion criteria] GreenSEDATION TYPE    Have you had a positive covid test in the last 90 days? N  If yes, what date?     Do you have a legal guardian or medical Power of ?  Are you able to give consent for your medical care?Y (Sedation review/consideration needed)    1. Are you active on mychart? Y    2. What insurance is in the chart? BCBS MEDICARE     3.   Ordering/Referring Provider: RAGHAV VELAZQUEZ    4. BMI 39.8 [BMI OVER 40-EXTENDED PREP]  If greater than 40 review exclusion criteria [PAC APPT IF (MAC) @ [facility]]      5.  Respiratory Screening:  [If yes to any of the following HOSPITAL setting only]     Do you use daily home oxygen? N    Do you have mod to severe Obstructive Sleep Apnea? N   [OKAY @ [facility] SH PH RI]   Do you have Pulmonary Hypertension? N     Do you have UNCONTROLLED asthma? N        6.   Have you had a heart or lung transplant? N      7.   Are you currently on dialysis? N [ If yes, G-PREP & HOSPITAL setting only]     8.   Do you have chronic kidney disease? N [ If yes, G-PREP ]    9.   Have you had a stroke or Transient ischemic attack (TIA - aka  mini stroke ) within 6 months?  N (If yes, please review exclusion criteria)         In the past 6 months, have you had any heart related issues including cardiomyopathy or heart attack? N           If yes, did it require cardiac stenting or other implantable device? N      10   Do you have any implantable devices in your body (pacemaker, defib, LVAD)? N (If yes, please review exclusion criteria)    11.   Do you take nitroglycerin? N            If yes, how often? N  (if yes, HOSPITAL setting ONLY)    12.   Are you currently taking any blood thinners? N           [IF YES, INFORM PATIENT TO FOLLOW UP W/ ORDERING PROVIDER FOR BRIDGING INSTRUCTIONS]     13.  22.  Do you take Phentermine? N     Yes-> Hold for 7 days before procedure.  Please consult your prescribing provider if you  have questions about holding this medication.    14.   Do you have a diagnosis of diabetes? N   [ If yes, G-PREP ]    15.   [FEMALES] Are you currently pregnant?     If yes, how many weeks?     16.   Are you taking any prescription pain medications on a routine schedule?  Y  [ If yes, EXTENDED PREP.] [If yes, MAC]    17.   Do you have any chemical dependencies such as alcohol, street drugs, or methadone?  N [If yes, MAC]    18.   Do you have any history of post-traumatic stress syndrome, severe anxiety or history of psychosis?  N  [If yes, MAC]    19.   Do you transfer independently? (If NO, please HOSPITAL setting  only)  Y    20.  On a regular basis do you go 3-5 days between bowel movements? N   [ If yes, EXTENDED PREP.]    21.   Preferred LOCAL Pharmacy for Pre Prescription:                         [facility] [address] [place] [address]    Scheduling Details      Caller:   (Please ask for phone number if not scheduled by patient)    Type of Procedure Scheduled: Lower Endoscopy [Colonoscopy]    Which Colonoscopy Prep was Sent?: EXTENDED  KHORUTS CF PATIENTS & GROEN'S PATIENTS NEEDS EXTENDED PREP    Surgeon: OJ  Date of Procedure: 9/27  Location: [facility]    Sedation Type: MAC    Conscious Sedation- Needs  for 6 hours after the procedure  MAC/General-Needs  for 24 hours after procedure    Pre-op Required at [facility] [place] and OR for MAC sedation:  N  (advise patient they will need a pre-op WITH IN 30 DAYS prior to procedure -)      Informed patient they will need an adult  Y  Cannot take any type of public or medical transportation alone    Pre-Procedure Cov[facility] or Externally: HOME    Confirmed Nurse will call to complete assessment Y    Additional comments:

## 2022-08-03 ENCOUNTER — LAB (OUTPATIENT)
Dept: LAB | Facility: CLINIC | Age: 77
End: 2022-08-03
Payer: COMMERCIAL

## 2022-08-03 DIAGNOSIS — I10 ESSENTIAL HYPERTENSION, BENIGN: ICD-10-CM

## 2022-08-03 DIAGNOSIS — I63.521 CEREBROVASCULAR ACCIDENT INVOLVING ANTERIOR CIRCULATION, RIGHT (H): ICD-10-CM

## 2022-08-03 DIAGNOSIS — D64.9 ANEMIA, UNSPECIFIED TYPE: ICD-10-CM

## 2022-08-03 LAB
ALBUMIN SERPL-MCNC: 3.5 G/DL (ref 3.4–5)
ALP SERPL-CCNC: 100 U/L (ref 40–150)
ALT SERPL W P-5'-P-CCNC: 22 U/L (ref 0–70)
ANION GAP SERPL CALCULATED.3IONS-SCNC: 6 MMOL/L (ref 3–14)
AST SERPL W P-5'-P-CCNC: 18 U/L (ref 0–45)
BASOPHILS # BLD AUTO: 0 10E3/UL (ref 0–0.2)
BASOPHILS NFR BLD AUTO: 0 %
BILIRUB SERPL-MCNC: 0.3 MG/DL (ref 0.2–1.3)
BUN SERPL-MCNC: 21 MG/DL (ref 7–30)
CALCIUM SERPL-MCNC: 9.3 MG/DL (ref 8.5–10.1)
CHLORIDE BLD-SCNC: 107 MMOL/L (ref 94–109)
CHOLEST SERPL-MCNC: 193 MG/DL
CO2 SERPL-SCNC: 27 MMOL/L (ref 20–32)
CREAT SERPL-MCNC: 0.88 MG/DL (ref 0.66–1.25)
EOSINOPHIL # BLD AUTO: 0.5 10E3/UL (ref 0–0.7)
EOSINOPHIL NFR BLD AUTO: 6 %
ERYTHROCYTE [DISTWIDTH] IN BLOOD BY AUTOMATED COUNT: 15.9 % (ref 10–15)
FASTING STATUS PATIENT QL REPORTED: YES
FERRITIN SERPL-MCNC: 85 NG/ML (ref 26–388)
GFR SERPL CREATININE-BSD FRML MDRD: 89 ML/MIN/1.73M2
GLUCOSE BLD-MCNC: 96 MG/DL (ref 70–99)
HCT VFR BLD AUTO: 35.8 % (ref 40–53)
HDLC SERPL-MCNC: 40 MG/DL
HGB BLD-MCNC: 10.6 G/DL (ref 13.3–17.7)
IMM GRANULOCYTES # BLD: 0 10E3/UL
IMM GRANULOCYTES NFR BLD: 0 %
IRON SATN MFR SERPL: 22 % (ref 15–46)
IRON SERPL-MCNC: 74 UG/DL (ref 35–180)
LDLC SERPL CALC-MCNC: 136 MG/DL
LYMPHOCYTES # BLD AUTO: 2.5 10E3/UL (ref 0.8–5.3)
LYMPHOCYTES NFR BLD AUTO: 32 %
MCH RBC QN AUTO: 24.7 PG (ref 26.5–33)
MCHC RBC AUTO-ENTMCNC: 29.6 G/DL (ref 31.5–36.5)
MCV RBC AUTO: 83 FL (ref 78–100)
MONOCYTES # BLD AUTO: 0.8 10E3/UL (ref 0–1.3)
MONOCYTES NFR BLD AUTO: 10 %
NEUTROPHILS # BLD AUTO: 4 10E3/UL (ref 1.6–8.3)
NEUTROPHILS NFR BLD AUTO: 52 %
NONHDLC SERPL-MCNC: 153 MG/DL
NRBC # BLD AUTO: 0 10E3/UL
NRBC BLD AUTO-RTO: 0 /100
PLATELET # BLD AUTO: 227 10E3/UL (ref 150–450)
POTASSIUM BLD-SCNC: 3.5 MMOL/L (ref 3.4–5.3)
PROT SERPL-MCNC: 7.4 G/DL (ref 6.8–8.8)
RBC # BLD AUTO: 4.3 10E6/UL (ref 4.4–5.9)
SODIUM SERPL-SCNC: 140 MMOL/L (ref 133–144)
TIBC SERPL-MCNC: 336 UG/DL (ref 240–430)
TRIGL SERPL-MCNC: 83 MG/DL
WBC # BLD AUTO: 7.7 10E3/UL (ref 4–11)

## 2022-08-03 PROCEDURE — 83550 IRON BINDING TEST: CPT

## 2022-08-03 PROCEDURE — 82040 ASSAY OF SERUM ALBUMIN: CPT

## 2022-08-03 PROCEDURE — 80053 COMPREHEN METABOLIC PANEL: CPT

## 2022-08-03 PROCEDURE — 36415 COLL VENOUS BLD VENIPUNCTURE: CPT

## 2022-08-03 PROCEDURE — 85025 COMPLETE CBC W/AUTO DIFF WBC: CPT

## 2022-08-03 PROCEDURE — 80061 LIPID PANEL: CPT

## 2022-08-03 PROCEDURE — 82728 ASSAY OF FERRITIN: CPT

## 2022-08-19 ENCOUNTER — OFFICE VISIT (OUTPATIENT)
Dept: OPHTHALMOLOGY | Facility: CLINIC | Age: 77
End: 2022-08-19
Attending: OPTOMETRIST
Payer: COMMERCIAL

## 2022-08-19 DIAGNOSIS — H25.13 AGE-RELATED NUCLEAR CATARACT OF BOTH EYES: ICD-10-CM

## 2022-08-19 DIAGNOSIS — H52.03 HYPEROPIA OF BOTH EYES WITH REGULAR ASTIGMATISM AND PRESBYOPIA: Primary | ICD-10-CM

## 2022-08-19 DIAGNOSIS — H53.9 VISION CHANGES: ICD-10-CM

## 2022-08-19 DIAGNOSIS — H52.223 HYPEROPIA OF BOTH EYES WITH REGULAR ASTIGMATISM AND PRESBYOPIA: Primary | ICD-10-CM

## 2022-08-19 DIAGNOSIS — H52.4 HYPEROPIA OF BOTH EYES WITH REGULAR ASTIGMATISM AND PRESBYOPIA: Primary | ICD-10-CM

## 2022-08-19 PROCEDURE — 92015 DETERMINE REFRACTIVE STATE: CPT

## 2022-08-19 PROCEDURE — G0463 HOSPITAL OUTPT CLINIC VISIT: HCPCS | Mod: 25

## 2022-08-19 PROCEDURE — 92004 COMPRE OPH EXAM NEW PT 1/>: CPT | Performed by: OPTOMETRIST

## 2022-08-19 ASSESSMENT — REFRACTION_MANIFEST
OD_CYLINDER: +1.00
OD_ADD: +2.75
OS_SPHERE: -1.25
OS_ADD: +2.75
OS_AXIS: 135
OD_SPHERE: -0.50
OS_CYLINDER: +1.50
OD_AXIS: 173

## 2022-08-19 ASSESSMENT — VISUAL ACUITY
OS_SC: 20/40
OD_PH_SC+: -1
OD_SC: 20/40
OS_PH_SC+: +2
OS_PH_SC: 20/40
METHOD: SNELLEN - LINEAR
OS_SC+: -2
OD_SC+: -2
OD_PH_SC: 20/30

## 2022-08-19 ASSESSMENT — TONOMETRY
IOP_METHOD: ICARE
OS_IOP_MMHG: 17
OD_IOP_MMHG: 18

## 2022-08-19 ASSESSMENT — CUP TO DISC RATIO
OD_RATIO: 0.2
OS_RATIO: 0.2

## 2022-08-19 ASSESSMENT — SLIT LAMP EXAM - LIDS
COMMENTS: NORMAL
COMMENTS: NORMAL

## 2022-08-19 ASSESSMENT — CONF VISUAL FIELD
OD_NORMAL: 1
OS_NORMAL: 1
METHOD: COUNTING FINGERS

## 2022-08-19 ASSESSMENT — EXTERNAL EXAM - LEFT EYE: OS_EXAM: NORMAL

## 2022-08-19 ASSESSMENT — EXTERNAL EXAM - RIGHT EYE: OD_EXAM: NORMAL

## 2022-08-19 NOTE — PROGRESS NOTES
Assessment & Plan       Jonathan Bishop is a 76 year old male with the following diagnoses:   1. 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 Vision changes - Both Eyes    3. Age-related nuclear cataract of both eyes - Both Eyes          New prescription for glasses   cataracts follow   Talked about glare at night  Yearly exam     Patient disposition:   No follow-ups on file.          Complete documentation of historical and exam elements from today's encounter can be found in the full encounter summary report (not reduplicated in this progress note). I personally obtained the chief complaint(s) and history of present illness.  I confirmed and edited as necessary the review of systems, past medical/surgical history, family history, social history, and examination findings as documented by others; and I examined the patient myself. I personally reviewed the relevant tests, images, and reports as documented above. I formulated and edited as necessary the assessment and plan and discussed the findings and management plan with the patient and family.  Dr. Maurice Kilgore

## 2022-08-19 NOTE — NURSING NOTE
Chief Complaints and History of Present Illnesses   Patient presents with     COMPREHENSIVE EYE EXAM     Pt here for annual CEE.     Chief Complaint(s) and History of Present Illness(es)     COMPREHENSIVE EYE EXAM     Laterality: both eyes    Onset: unknown    Onset: years ago    Course: stable    Associated symptoms: glare, haloes, tearing and floaters.  Negative for dryness, eye pain, photophobia and flashes    Treatments tried: no treatments    Pain scale: 0/10    Comments: Pt here for annual CEE.              Comments     Pt states vision has gotten a little worse.  No change to floaters.  No ocular medications.    SEAN Muñoz August 19, 2022 9:16 AM

## 2022-08-22 ENCOUNTER — APPOINTMENT (OUTPATIENT)
Dept: OPTOMETRY | Facility: CLINIC | Age: 77
End: 2022-08-22
Payer: COMMERCIAL

## 2022-08-22 PROCEDURE — V2020 VISION SVCS FRAMES PURCHASES: HCPCS | Performed by: OPTOMETRIST

## 2022-08-22 PROCEDURE — V2200 LENS SPHER BIFOC PLANO 4.00D: HCPCS | Mod: LT | Performed by: OPTOMETRIST

## 2022-08-26 DIAGNOSIS — M54.50 ACUTE MIDLINE LOW BACK PAIN WITHOUT SCIATICA: ICD-10-CM

## 2022-08-26 RX ORDER — OXYCODONE AND ACETAMINOPHEN 5; 325 MG/1; MG/1
1-2 TABLET ORAL EVERY 6 HOURS PRN
Qty: 150 TABLET | Refills: 0 | Status: SHIPPED | OUTPATIENT
Start: 2022-08-26 | End: 2022-09-27

## 2022-08-26 NOTE — TELEPHONE ENCOUNTER
[facility] Call Center    Phone Message    May a detailed message be left on voicemail: yes     Reason for Call: Other: Please call the pt back about this Rx, he said he has sent multiple messages and really needs this filled before the weekend     Action Taken: Message routed to:  [facility]    Travel Screening: Not Applicable

## 2022-08-26 NOTE — TELEPHONE ENCOUNTER
[facility] Call Center    Phone Message    May a detailed message be left on voicemail: yes     Reason for Call: Medication Refill Request    Has the patient contacted the pharmacy for the refill? Yes   Name of medication being requested: oxyCODONE-acetaminophen (PERCOCET) 5-325 MG tablet     Provider who prescribed the medication: Dr. Sick  Pharmacy: [facility]  Date medication is needed: 8/26/22   Pt needing this today

## 2022-09-06 ENCOUNTER — APPOINTMENT (OUTPATIENT)
Dept: OPTOMETRY | Facility: CLINIC | Age: 77
End: 2022-09-06
Payer: COMMERCIAL

## 2022-09-06 PROCEDURE — 92341 FIT SPECTACLES BIFOCAL: CPT | Performed by: OPTOMETRIST

## 2022-09-12 ENCOUNTER — MYC MEDICAL ADVICE (OUTPATIENT)
Dept: INTERNAL MEDICINE | Facility: CLINIC | Age: 77
End: 2022-09-12

## 2022-09-13 NOTE — TELEPHONE ENCOUNTER
Referral order with contact emailed to patient.        Michael Weldon CMA ([facility]) at 6:15 AM on 9/13/2022

## 2022-09-19 ENCOUNTER — TELEPHONE (OUTPATIENT)
Dept: GASTROENTEROLOGY | Facility: CLINIC | Age: 77
End: 2022-09-19

## 2022-09-19 DIAGNOSIS — Z12.11 ENCOUNTER FOR SCREENING COLONOSCOPY: Primary | ICD-10-CM

## 2022-09-19 RX ORDER — BISACODYL 5 MG/1
TABLET, DELAYED RELEASE ORAL
Qty: 4 TABLET | Refills: 0 | Status: SHIPPED | OUTPATIENT
Start: 2022-09-19 | End: 2023-02-28

## 2022-09-19 NOTE — TELEPHONE ENCOUNTER
----- Message from Tati Lezama RN sent at 9/19/2022  2:11 PM CDT -----  Regarding: reschedule  Patient scheduled for colonoscopy on 9/27/22 at [facility]    Attempted to complete pre assessment but patient stated that they would need to be rescheduled as Tuesdays do not work for them. Was about to offer to transfer call to scheduling but patient ended the call.     1) Can someone reach out to patient regarding rescheduling colonoscopy. I sent out Extended prep to pharmacy due to fair prep in previous colonoscopy.     2) Please verify if needs assistance with transfers. Upon review patient with decreasing mobility. If they answer yes - please schedule at hospital setting.     3) Patient is also on Plavix. Ensure if rescheduling to allow for holding interval (typically 5 days prior)    Thank you!  Manju

## 2022-09-19 NOTE — TELEPHONE ENCOUNTER
Outbound Call made to: Jonathan Washington     Procedure: COLONOSCOPY     Date, Location, and Surgeon of Procedure Cancelled:   09/27/2022 - [facility]       Reason for call (please be detailed, any staff messages or encounters to note?):      PER PT -- TUESDAYS DO NOT WORK WELL.         Rescheduled:     NO -- UNABLE TO LEAVE VM -- MAILBOX WAS FULL -- WILL DEFER AND CALL AGAIN IN (48 HOURS0     NH

## 2022-09-21 ENCOUNTER — TELEPHONE (OUTPATIENT)
Dept: GASTROENTEROLOGY | Facility: CLINIC | Age: 77
End: 2022-09-21

## 2022-09-21 NOTE — CONFIDENTIAL NOTE
2nd att to R/S: 09/21/2022    Outbound Call made to: Jonathan Bishop      Procedure: COLONOSCOPY      Date, Location, and Surgeon of Procedure Cancelled:   09/27/2022 - [facility]         Reason for call (please be detailed, any staff messages or encounters to note?):       PER PT -- TUESDAYS DO NOT WORK WELL.            Rescheduled:      NO -- UNABLE TO LEAVE  -- MAILBOX WAS FULL -- WILL CX & (OPEN) order for return to [facility].       NH

## 2022-09-21 NOTE — TELEPHONE ENCOUNTER
----- Message from Maryan Singh sent at 9/19/2022  4:30 PM CDT -----  Regarding: FW: reschedule  NEED TO CALL AGAIN TO R/S   ----- Message -----  From: Tati Lezama RN  Sent: 9/19/2022   2:16 PM CDT  To: Endoscopy Scheduling Pool  Subject: reschedule                                       Patient scheduled for colonoscopy on 9/27/22 at [facility]    Attempted to complete pre assessment but patient stated that they would need to be rescheduled as Tuesdays do not work for them. Was about to offer to transfer call to scheduling but patient ended the call.     1) Can someone reach out to patient regarding rescheduling colonoscopy. I sent out Extended prep to pharmacy due to fair prep in previous colonoscopy.     2) Please verify if needs assistance with transfers. Upon review patient with decreasing mobility. If they answer yes - please schedule at hospital setting.     3) Patient is also on Plavix. Ensure if rescheduling to allow for holding interval (typically 5 days prior)    Thank you!  Manju

## 2022-09-27 ENCOUNTER — TELEPHONE (OUTPATIENT)
Dept: INTERNAL MEDICINE | Facility: CLINIC | Age: 77
End: 2022-09-27

## 2022-09-27 DIAGNOSIS — M54.50 ACUTE MIDLINE LOW BACK PAIN WITHOUT SCIATICA: ICD-10-CM

## 2022-09-27 RX ORDER — OXYCODONE AND ACETAMINOPHEN 5; 325 MG/1; MG/1
1-2 TABLET ORAL EVERY 6 HOURS PRN
Qty: 150 TABLET | Refills: 0 | Status: SHIPPED | OUTPATIENT
Start: 2022-09-27 | End: 2022-10-28

## 2022-09-27 NOTE — TELEPHONE ENCOUNTER
oxyCODONE-acetaminophen (PERCOCET) 5-325 MG tablet 150 tablet 0 8/26/2022         Last Written Prescription Date:  8-  Last Fill Quantity: 150,   # refills: 0  Last Office Visit : 8-1-2022  Future Office visit:  none    Routing refill request to provider for review/approval because:  CONTROLLED MEDICATION      Kathleen M Doege RN

## 2022-09-27 NOTE — TELEPHONE ENCOUNTER
[facility] Call [facility]    Phone Message    May a detailed message be left on voicemail: yes     Reason for Call: Medication Refill Request    Has the patient contacted the pharmacy for the refill? Yes   Name of medication being requested: oxyCODONE-acetaminophen (PERCOCET) 5-325 MG tablet  Provider who prescribed the medication: Dr. Sick  Pharmacy: [facility] [address] [place] [address]  Date medication is needed: as soon as possible per patient he is out         Action Taken: Message routed to:  [facility] med refill    Travel Screening: Not Applicable

## 2022-10-04 DIAGNOSIS — M54.50 LUMBAGO: Primary | ICD-10-CM

## 2022-10-10 RX ORDER — IBUPROFEN 600 MG/1
600 TABLET, FILM COATED ORAL EVERY 8 HOURS PRN
Qty: 270 TABLET | Refills: 3 | Status: SHIPPED | OUTPATIENT
Start: 2022-10-10 | End: 2023-02-28

## 2022-10-21 ENCOUNTER — TELEPHONE (OUTPATIENT)
Dept: INTERNAL MEDICINE | Facility: CLINIC | Age: 77
End: 2022-10-21

## 2022-10-21 DIAGNOSIS — R60.0 PERIPHERAL EDEMA: ICD-10-CM

## 2022-10-21 DIAGNOSIS — I50.32 CHRONIC DIASTOLIC HEART FAILURE (H): Primary | ICD-10-CM

## 2022-10-21 NOTE — TELEPHONE ENCOUNTER
Patient would like home care services/ PCA services renewed to help with adl's.     Would also like all of his meds sent over to [facility] when they are next filled.     Darien Jang RN on 10/21/2022 at 3:57 PM

## 2022-10-21 NOTE — TELEPHONE ENCOUNTER
[facility] Call Center    Phone Message    May a detailed message be left on voicemail: yes     Reason for Call: Other: Patient calling needs help with putting his compression stockings on and help washing his feet and oil on his feet. His feet are starting to blister and crack. His feet and legs swell up and he can't put his shoes on or walk.  Please call to discuss further.     Action Taken: Message routed to:  [facility] PCC    Travel Screening: Not Applicable

## 2022-10-26 ENCOUNTER — TELEPHONE (OUTPATIENT)
Dept: INTERNAL MEDICINE | Facility: CLINIC | Age: 77
End: 2022-10-26

## 2022-10-26 DIAGNOSIS — M54.50 ACUTE MIDLINE LOW BACK PAIN WITHOUT SCIATICA: ICD-10-CM

## 2022-10-26 NOTE — TELEPHONE ENCOUNTER
[facility]    Phone Message    May a detailed message be left on voicemail: yes     Reason for Call: Order(s): Home Care Orders: Skilled Nursing:   [facility] calling stating that referral for skilled nursing and home health care was placed on the 23rd, they are needing a new referral since it is over the 48hr shay, Patient is set to get these services tomorrow, 10/27.   Action Taken: Message routed to:  [facility] pcp    Travel Screening: Not Applicable                                                                              Extension [phone]

## 2022-10-26 NOTE — TELEPHONE ENCOUNTER
[facility] Call [facility]    Phone Message    May a detailed message be left on voicemail: yes     Reason for Call: Medication Refill Request    Has the patient contacted the pharmacy for the refill? Yes   Name of medication being requested: oxyCODONE-acetaminophen (PERCOCET) 5-325 MG tablet  Provider who prescribed the medication: Buck Nascimento MD  Pharmacy: [facility] [address] [place] [address]  Date medication is needed: asap   The patient would also like a call from the nurse to discuss other matters, patient didn't want to provide details at the time of our call, please review and follow up thank you.      Action Taken: Message routed to:  [facility] pcc    Travel Screening: Not Applicable

## 2022-10-27 NOTE — TELEPHONE ENCOUNTER
"Patient would like refill on his oxy. He is out and expressed to the nurse he has trouble getting things filled on time due to memory issues. RN recommended that if he knows when he needs meds filled, he can request a week in advance as long as the fill date is correct and we can \"pend\" the order. (Have correct fill date)     Darien Jang RN on 10/27/2022 at 1:43 PM    "

## 2022-10-27 NOTE — TELEPHONE ENCOUNTER
Verbal orders given to [facility]na from [facility], per Dr. Nascimento, for Order(s): Home Care Orders: Skilled Nursing:   [facility] calling stating that referral for skilled nursing and home health care was placed on the 23rd, they are needing a new referral since it is over the 48hr shay, Patient is set to get these services tomorrow, 10/27. Argentina Oconnor LPN 10/27/2022 8:03 AM

## 2022-10-27 NOTE — TELEPHONE ENCOUNTER
[facility] is calling stating that they are not able to give him the skilled nursing, and that the orders for skilled nursing should be made for elsewhere. Please call Henna #[phone] Ex [phone] to get kaylie of Henna

## 2022-10-27 NOTE — TELEPHONE ENCOUNTER
Called Carolee Aguillon determined that patient needs more PCA services vs skilled services. Alarmingly, patient had his compression stockings on for 3 weeks when nurse got there. She recommended [employer] for a PCA. Gave this info to patient.    Darien Jang RN on 10/27/2022 at 2:27 PM

## 2022-10-28 RX ORDER — OXYCODONE AND ACETAMINOPHEN 5; 325 MG/1; MG/1
1-2 TABLET ORAL EVERY 6 HOURS PRN
Qty: 150 TABLET | Refills: 0 | Status: SHIPPED | OUTPATIENT
Start: 2022-10-28 | End: 2022-11-28

## 2022-11-12 ENCOUNTER — OFFICE VISIT (OUTPATIENT)
Dept: ORTHOPEDICS | Facility: CLINIC | Age: 77
End: 2022-11-12
Payer: COMMERCIAL

## 2022-11-12 DIAGNOSIS — I73.9 PVD (PERIPHERAL VASCULAR DISEASE) (H): ICD-10-CM

## 2022-11-12 DIAGNOSIS — I73.89 OTHER SPECIFIED PERIPHERAL VASCULAR DISEASES (H): Primary | ICD-10-CM

## 2022-11-12 DIAGNOSIS — B35.1 OM (ONYCHOMYCOSIS): ICD-10-CM

## 2022-11-12 PROCEDURE — 99213 OFFICE O/P EST LOW 20 MIN: CPT | Performed by: PODIATRIST

## 2022-11-12 NOTE — LETTER
11/12/2022         RE: Jonathan Bishop  [address]  [place] [address]        Dear Colleague,    Thank you for referring your patient, Jonathan Bishop, to the [facility]. Please see a copy of my visit note below.    Chief Complaint   Patient presents with     Follow Up     Toe nail trimming.       No Known Allergies    Subjective: Jonathan is a 77 year old male who presents to the clinic today for a follow up of elongated nails. He relates that the nails are long and painful. Edema in the legs and feet continues to improve.  He is now wearing compression socks.  He was last seen by me May 17, 2021.    Objective  PT and DP pulses are non-palpable bilaterally. CRT is >3 seconds. Diminished pedal hair. Mild non-pitting edema noted to BL LE.  Gross sensation is slightly decreased bilaterally.   Nails are thickened, discolored, dystrophic and elongated bilaterally to varying degrees. Nails have subungual debris consistent with onychomycosis. No open lesions are noted. Skin is normal.      Assessment: PVD  Onychomycosis x 10      Plan:  - Pt seen and evaluated.  - Nails were debrided x 10..  -Continue compression socks.  - See again in 3 months.   Mamadou Gary DPM  
stated

## 2022-11-12 NOTE — NURSING NOTE
Reason For Visit:   Chief Complaint   Patient presents with     Follow Up     Toe nail trimming.        Pain Assessment  Patient Currently in Pain: Yes  Primary Pain Location: Foot (Toe pain)        No Known Allergies        Blanca Davis LPN

## 2022-11-12 NOTE — PROGRESS NOTES
Chief Complaint   Patient presents with     Follow Up     Toe nail trimming.             No Known Allergies      Subjective: Jonathan is a 77 year old male who presents to the clinic today for a follow up of elongated nails. He relates that the nails are long and painful. Edema in the legs and feet continues to improve.  He is now wearing compression socks.  He was last seen by me May 17, 2021.    Objective  PT and DP pulses are non-palpable bilaterally. CRT is >3 seconds. Diminished pedal hair. Mild non-pitting edema noted to BL LE.  Gross sensation is slightly decreased bilaterally.   Nails are thickened, discolored, dystrophic and elongated bilaterally to varying degrees. Nails have subungual debris consistent with onychomycosis. No open lesions are noted. Skin is normal.      Assessment: PVD  Onychomycosis x 10      Plan:  - Pt seen and evaluated.  - Nails were debrided x 10..  -Continue compression socks.  - See again in 3 months.

## 2022-11-26 DIAGNOSIS — M54.50 ACUTE MIDLINE LOW BACK PAIN WITHOUT SCIATICA: ICD-10-CM

## 2022-11-28 NOTE — TELEPHONE ENCOUNTER
oxyCODONE-acetaminophen (PERCOCET) 5-325 MG tablet 150 tablet 0 10/28/2022         Last Written Prescription Date:  10-  Last Fill Quantity: 150,   # refills: 0  Last Office Visit : 8-1-2022  Future Office visit:  NONE    Routing refill request to provider for review/approval because:  CONTROLLED MEDICATION      Kathleen M Doege RN

## 2022-11-29 ENCOUNTER — NURSE TRIAGE (OUTPATIENT)
Dept: NURSING | Facility: CLINIC | Age: 77
End: 2022-11-29

## 2022-11-29 NOTE — TELEPHONE ENCOUNTER
Called patient- his legs are feeling numb and compression stockings have been on for another month. Gave patient info again on pca services since he never set that up, lvm with his  Kasia @ [phone] to call back to see if she needs assistance. Told patient to call EMS if he does not hear from us this afternoon.    Darien Jang RN on 11/29/2022 at 11:27 AM

## 2022-11-29 NOTE — TELEPHONE ENCOUNTER
"Nurse Triage SBAR    Is this a 2nd Level Triage? YES, LICENSED PRACTITIONER REVIEW IS REQUIRED    Situation: Patient has had compression socks onx 3 weeks  without removal or assitance.  -\"The agency put them on,she never came back, she quit.\"  -County worker is trying to find home care per patient.   -he does not have transport to come into clinic or urgent care for assistance  -the socks are up to his knee.No edema beyond knee.  Socks do not have a hole at toe  -\"My legs are getting numb\" this began about a week ago and is now continuous.  Hx back surgeries- doesn't have range of motion so cannot remove independently.  He lives alone in his apartment, not assisted living.  He has no one to help him remove or replace the stockings. His county worker asked him to call his doctor.  He was last seen in Podiatry for nail trim 11/12/22  Last Dr Nascimento 8/1/22    Background: PMH includes anemia, osteoarthritis,CVA, HTN, heart failure    Assessment: patient with urgent need to remove compression socks , requesting home care visit today.  He does not want to call emergency services, he would like help removing his compression socks which he has had on for 3 weeks.       Recommendation: Welfare check and  Home health referral was in place for [facility] home Care  with services to begin 10/27/22 per chart. Patient has had compression socks on x 21 days, States home care\" quit, did not return\"    Routed to provider team    Does the patient meet one of the following criteria for ADS visit consideration? No  "

## 2022-11-30 ENCOUNTER — TELEPHONE (OUTPATIENT)
Dept: INTERNAL MEDICINE | Facility: CLINIC | Age: 77
End: 2022-11-30

## 2022-11-30 ENCOUNTER — MYC MEDICAL ADVICE (OUTPATIENT)
Dept: INTERNAL MEDICINE | Facility: CLINIC | Age: 77
End: 2022-11-30

## 2022-11-30 RX ORDER — OXYCODONE AND ACETAMINOPHEN 5; 325 MG/1; MG/1
1-2 TABLET ORAL EVERY 6 HOURS PRN
Qty: 150 TABLET | Refills: 0 | Status: SHIPPED | OUTPATIENT
Start: 2022-11-30 | End: 2022-12-11

## 2022-11-30 NOTE — TELEPHONE ENCOUNTER
[facility] Call Center    Phone Message    May a detailed message be left on voicemail: yes     Reason for Call: Other: patient is still waiting for the prescription. Please call when the prescription has been sent over to the pharmacy.     Action Taken: Message routed to:  [facility]    Travel Screening: Not Applicable

## 2022-11-30 NOTE — TELEPHONE ENCOUNTER
[facility] Call Center    Phone Message    May a detailed message be left on voicemail: yes     Reason for Call: Other: Pt requesting call back from Dr. Nascimento's nurse. Pt stated he is needing to discuss his compression stockings and his pain medication

## 2022-11-30 NOTE — TELEPHONE ENCOUNTER
[facility] Call [facility]    Phone Message    May a detailed message be left on voicemail: yes     Reason for Call: Other: patient is having pain his legs and would like a call back to discuss further along with the compression stockings.     Action Taken: Message routed to:  [facility] ARASELI    Travel Screening: Not Applicable

## 2022-11-30 NOTE — TELEPHONE ENCOUNTER
Called patient, gave him same info for PCA service, recommended he call ems to help if his legs are going numb. Called  again, rogelio Jang RN on 11/30/2022 at 1:10 PM

## 2022-12-02 RX ORDER — OXYCODONE AND ACETAMINOPHEN 5; 325 MG/1; MG/1
1-2 TABLET ORAL EVERY 6 HOURS PRN
Qty: 150 TABLET | Refills: 0 | Status: CANCELLED | OUTPATIENT
Start: 2022-12-02

## 2022-12-02 NOTE — TELEPHONE ENCOUNTER
Called patient- home care service is currently out there to assess him, he will give them our number if they need referrals. Picked up his oxycodone but in the future needs it from [facility].     Darien Jang RN on 12/2/2022 at 12:02 PM

## 2022-12-11 DIAGNOSIS — M54.50 ACUTE MIDLINE LOW BACK PAIN WITHOUT SCIATICA: ICD-10-CM

## 2022-12-11 RX ORDER — OXYCODONE AND ACETAMINOPHEN 5; 325 MG/1; MG/1
1-2 TABLET ORAL EVERY 6 HOURS PRN
Qty: 150 TABLET | Refills: 0 | Status: SHIPPED | OUTPATIENT
Start: 2022-12-30 | End: 2023-01-26

## 2023-01-09 ENCOUNTER — TELEPHONE (OUTPATIENT)
Dept: INTERNAL MEDICINE | Facility: CLINIC | Age: 78
End: 2023-01-09

## 2023-01-09 NOTE — TELEPHONE ENCOUNTER
"Got a call from patient thru call center that he has coughed up some blood 3 times last night- first time was with muscus or a \"glob\", second it was more liquid and last time was mucousy. Is not throwing up or feels nauseous. No other symptoms. Last event was 2 hours ago, patient recommended to visit ED or UC if this happens again. Patient will call 911 if dizzy, sob, or vomiting blood. Patient wondering if it could be an M &M he thought might have gotten stuck a month ago.     Darien Jang RN on 1/9/2023 at 9:31 AM            "

## 2023-01-16 ENCOUNTER — OFFICE VISIT (OUTPATIENT)
Dept: FAMILY MEDICINE | Facility: CLINIC | Age: 78
End: 2023-01-16
Payer: COMMERCIAL

## 2023-01-16 VITALS
RESPIRATION RATE: 18 BRPM | HEART RATE: 85 BPM | SYSTOLIC BLOOD PRESSURE: 148 MMHG | DIASTOLIC BLOOD PRESSURE: 56 MMHG | TEMPERATURE: 97.9 F | OXYGEN SATURATION: 97 %

## 2023-01-16 DIAGNOSIS — E66.01 MORBID OBESITY (H): ICD-10-CM

## 2023-01-16 DIAGNOSIS — J39.2 THROAT MASS: Primary | ICD-10-CM

## 2023-01-16 DIAGNOSIS — I73.9 PVD (PERIPHERAL VASCULAR DISEASE) (H): ICD-10-CM

## 2023-01-16 DIAGNOSIS — Z79.899 ENCOUNTER FOR LONG-TERM (CURRENT) USE OF MEDICATIONS: ICD-10-CM

## 2023-01-16 DIAGNOSIS — J02.9 SORE THROAT: ICD-10-CM

## 2023-01-16 DIAGNOSIS — I50.32 CHRONIC DIASTOLIC HEART FAILURE (H): ICD-10-CM

## 2023-01-16 LAB
DEPRECATED S PYO AG THROAT QL EIA: NEGATIVE
GROUP A STREP BY PCR: NOT DETECTED

## 2023-01-16 PROCEDURE — 87651 STREP A DNA AMP PROBE: CPT | Performed by: FAMILY MEDICINE

## 2023-01-16 PROCEDURE — 99204 OFFICE O/P NEW MOD 45 MIN: CPT | Performed by: FAMILY MEDICINE

## 2023-01-16 ASSESSMENT — PAIN SCALES - GENERAL: PAINLEVEL: NO PAIN (0)

## 2023-01-16 NOTE — TELEPHONE ENCOUNTER
FUTURE VISIT INFORMATION      FUTURE VISIT INFORMATION:    Date:   1/30/23    Time:  8:20 AM    Location: [facility]  REFERRAL INFORMATION:    Referring provider:   Jaya Quiñones MD    Referring providers clinic:  [facility]    Reason for visit/diagnosis  Throat mass [J39.2] referred by Jaya Quiñones MD    RECORDS REQUESTED FROM:       Clinic name Comments Records Status Imaging Status   [facility] 1/16/23 OV with  Jaya Quiñones MD [facility]    Imaging MRA neck + head 11/30/16 [facility] Pacs   [facility] ENT -[facility] 4/14/17 Ov with Tristen Reed MD Epic

## 2023-01-16 NOTE — PROGRESS NOTES
Assessment & Plan     Encounter for long-term (current) use of medications      Sore throat  RST is negative   - Streptococcus A Rapid Screen w/Reflex to PCR - Clinic Collect  - Group A Streptococcus PCR Throat Swab    Chronic diastolic heart failure (H)      PVD (peripheral vascular disease) (H)      Morbid obesity (H)      Throat mass  Has huge mass like lesion on uvula, has swelling cervical glands, based on the clinical finding, will have him to see ENT for further evaluation   - Adult ENT  Referral; Future           FUTURE APPOINTMENTS:       - Follow-up visit as needed with PCP    No follow-ups on file.    Jaya Quiñones MD  [facility] Prieto is a 77 year old presenting for the following health issues:  No chief complaint on file.      History of Present Illness       Reason for visit:  Sore throat  Symptom onset:  3-4 weeks ago  Symptoms include:  Sourness  Symptom intensity:  Moderate  Symptom progression:  Staying the same  Had these symptoms before:  No  What makes it worse:  Eating  What makes it better:  Water    He eats 0-1 servings of fruits and vegetables daily.He consumes 1 sweetened beverage(s) daily.He exercises with enough effort to increase his heart rate 9 or less minutes per day.  He exercises with enough effort to increase his heart rate 3 or less days per week.        Review of Systems   Constitutional, HEENT, cardiovascular, pulmonary, gi and gu systems are negative, except as otherwise noted.      Objective    BP (!) 148/56   Pulse 85   Temp 97.9  F (36.6  C) (Temporal)   Resp 18   SpO2 97%   There is no height or weight on file to calculate BMI.  Physical Exam   GENERAL: healthy, alert and no distress  HENT: normal cephalic/atraumatic, ear canals and TM's normal and mass like area on uvula(3 x 3 cm), anterior cervical lymphadenopathy on left side   NECK: no adenopathy, no asymmetry, masses, or scars and thyroid normal to palpation  RESP:  lungs clear to auscultation - no rales, rhonchi or wheezes  CV: regular rate and rhythm, normal S1 S2, no S3 or S4, no murmur, click or rub, no peripheral edema and peripheral pulses strong  ABDOMEN: soft, nontender, no hepatosplenomegaly, no masses and bowel sounds normal  MS: no gross musculoskeletal defects noted, no edema

## 2023-01-25 DIAGNOSIS — M54.50 ACUTE MIDLINE LOW BACK PAIN WITHOUT SCIATICA: ICD-10-CM

## 2023-01-25 NOTE — TELEPHONE ENCOUNTER
oxyCODONE-acetaminophen (PERCOCET) 5-325 MG tablet    Last Written Prescription Date:  12/11/22  Last Fill Quantity: 150,   # refills: 0  Last Office Visit : 8/1/22  Future Office visit:  None    Routing refill request to provider for review/approval because:  Controlled substance     Provider who prescribed the medication: Dr. Sick  Pharmacy: [facility] [address] [place] [id number] [facility] sent to patient : Your refill request has been entered and sent to your physician for authorization.

## 2023-01-25 NOTE — TELEPHONE ENCOUNTER
[facility] Call Center    Phone Message    May a detailed message be left on voicemail: yes     Reason for Call: Medication Refill Request    Has the patient contacted the pharmacy for the refill? Yes   Name of medication being requested:    oxyCODONE-acetaminophen (PERCOCET) 5-325 MG tablet     Provider who prescribed the medication: Dr. Sick  Pharmacy: [facility] [address] [place] [id number] [facility]  Date medication is needed: as soon as possible per patient,          Action Taken: Message routed to:  [facility]    Travel Screening: Not Applicable

## 2023-01-26 RX ORDER — OXYCODONE AND ACETAMINOPHEN 5; 325 MG/1; MG/1
1-2 TABLET ORAL EVERY 6 HOURS PRN
Qty: 150 TABLET | Refills: 0 | Status: SHIPPED | OUTPATIENT
Start: 2023-01-29 | End: 2023-02-23

## 2023-02-04 NOTE — PROGRESS NOTES
Dear Dr. Quiñones:    I had the pleasure of meeting Jonathan Bishop in consultation today at the [facility] at your request.     History of Present Illness:   Jonathan Bishop is a 77 year old man referred for evaluation of an oropharyngeal lesion. He saw his PCP on 1/16/2023 with a 3-4 week history of sore throat. On exam at that time he was noted to have a 3 cm mass on the uvula and cervical lymphadenopathy. He was referred here for further evaluation.    He says that he was eating some peanut m&ms and felt like he got a cut on his throat causing some swelling about a month ago. He thought the area would heal but then he started noticing some hemoptysis. He saw his PCP. He has throat pain that is 8.75/10. The pain comes and goes. He says at times it is better, helped when he drinks tea. He describes it as a cut healing type of pain. He says the pain interferes with his eating, has pain with swallowing. The pain is right in the middle. He tries to limit his eating because of the pain. He avoids things that are not soft. He feels like things get stuck with swallowing. He has a hard time with swallowing pills. He can get them down with water. He has no weight loss. He has pain in his ears for a few weeks, left side primarily. The hemoptysis is intermittent. He has noticed some changes in his voice in the last 6 weeks.       Past medical history: hyperlipidemia, heart failure, peripheral vascular disease, hypertension, history of CVA    Past surgical history: TAVR, drainage of hematoma ?scalp    Social history: Uses a walker. No smoking. No chewing tobacco use. No alcohol use. Retired - previously a cook. Live alone.     Family history: Negative for H&N cancer    MEDICATIONS:     Current Outpatient Medications   Medication Sig Dispense Refill     acetaminophen (TYLENOL) 325 MG tablet Take 1-2 tablets (325-650 mg) by mouth every 4 hours as needed for mild pain or headaches        aspirin (ASA) 81 MG EC tablet Take 1 tablet (81 mg) by mouth daily 30 tablet 0     atorvastatin (LIPITOR) 40 MG tablet Take 1 tablet (40 mg) by mouth daily 90 tablet 3     bisacodyl (DULCOLAX) 5 MG EC tablet Two days prior to exam take two (2) tablets at 4pm. One day prior to exam take two (2) tablets at 4pm 4 tablet 0     clopidogrel (PLAVIX) 75 MG tablet Take 1 tablet (75 mg) by mouth daily 90 tablet 0     ibuprofen (ADVIL/MOTRIN) 600 MG tablet Take 1 tablet (600 mg) by mouth every 8 hours as needed for moderate pain 270 tablet 3     multivitamin, therapeutic with minerals (MULTI-VITAMIN) TABS Take 1 tablet by mouth daily. 100 tablet 3     naloxone (NARCAN) 4 MG/0.1ML nasal spray Spray 1 spray (4 mg) into one nostril alternating nostrils once as needed for opioid reversal Every 2-3 min until responsive or EMS arrives 0.2 mL 0     order for DME Equipment being ordered: Walker ()  Treatment Diagnosis: stroke 1 Units 0     other medical supplies Dispense knee high, medium compression, large size 4 each 0     oxyCODONE-acetaminophen (PERCOCET) 5-325 MG tablet Take 1-2 tablets by mouth every 6 hours as needed for pain Max 5 per day. 150 tablet 0     polyethylene glycol (GOLYTELY) 236 g suspension Take as directed. Two days before your exam fill the first container with water. Cover and shake until mixed well. At 5:00pm drink one 8oz glass every 10-15 minutes until half (1/2) of the first container is empty. Store the remainder in the refrigerator. One day before your exam at 5:00pm drink the second half of the first container until it is gone. Before you go to bed mix the second container with water and put in refrigerator. Six hours before your check in time drink one 8oz glass every 10-15 minutes until half of container is empty. Discard the remainder of solution. 8000 mL 0     potassium chloride ER (KLOR-CON M) 20 MEQ CR tablet Take 1 tablet (20 mEq) by mouth daily 90 tablet 3     sildenafil (VIAGRA) 100 MG  tablet TAKE 30MIN TO 4 HOURS BEFORE INTERCOURSE AS NEEDED FOR ERECTILE DYSFUNCTION 30 tablet 5     spironolactone (ALDACTONE) 50 MG tablet Take 1 tablet (50 mg) by mouth daily 90 tablet 3     tamsulosin (FLOMAX) 0.4 MG capsule TAKE 1 CAPSULE BY MOUTH ONCE DAILY 90 capsule 4     torsemide (DEMADEX) 20 MG tablet Take 2 tablets (40 mg) by mouth daily 180 tablet 3     vitamin D3 (CHOLECALCIFEROL) 50 mcg (2000 units) tablet Take 1 tablet by mouth daily         ALLERGIES:  No Known Allergies    HABITS/SOCIAL HISTORY:     Social History     Socioeconomic History     Marital status:      Spouse name: Not on file     Number of children: Not on file     Years of education: Not on file     Highest education level: Not on file   Occupational History     Not on file   Tobacco Use     Smoking status: Former     Types: Cigarettes     Quit date: 2005     Years since quittin.1     Smokeless tobacco: Never     Tobacco comments:     Non smoker. No 2nd hand exposure. CCX, [facility]MA [facility] 2011   Substance and Sexual Activity     Alcohol use: No     Drug use: No     Sexual activity: Not Currently   Other Topics Concern     Parent/sibling w/ CABG, MI or angioplasty before 65F 55M? Not Asked   Social History Narrative    He lives by himself in an apt in [place].  He has 2 goldfish, Lai and Ubaldo.     Social Determinants of Health     Financial Resource Strain: Not on file   Food Insecurity: Not on file   Transportation Needs: Not on file   Physical Activity: Not on file   Stress: Not on file   Social Connections: Not on file   Intimate Partner Violence: Not on file   Housing Stability: Not on file       PAST MEDICAL HISTORY:   Past Medical History:   Diagnosis Date     * * * SBE PROPHYLAXIS * * *     s/p TAVR     Acute rheumatic carditis 1950     Complication of anesthesia     pt once woke up during back surgery     Coronary artery disease     murmur     Erectile dysfunction     Sildenafil     Hip pain, bilateral       Hypercholesteremia      Hypertension      Low back pain      Nonsenile cataract      Obesity      Renal cyst      S/P 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     Stroke (cerebrum) (H) 11/30/2016     Umbilical hernia 06/10/2011    s/p surgical repair     Wound, surgical, infected 06/10/2011    hernia        PAST SURGICAL HISTORY:   Past Surgical History:   Procedure Laterality Date     ARTHROPLASTY KNEE BILATERAL  7/22/2010     COLONOSCOPY N/A 4/14/2017    Procedure: COLONOSCOPY;  Surgeon: Toby Bills MD;  Location: [facility]     CV CORONARY ANGIOGRAM N/A 10/28/2019    Procedure: Coronary Angiogram;  Surgeon: Guerrero Huitron MD;  Location:  HEART CARDIAC CATH LAB     CV RIGHT HEART CATH MEASUREMENTS RECORDED N/A 10/28/2019    Procedure: Right Heart Cath;  Surgeon: Guerrero Huitron MD;  Location:  HEART CARDIAC CATH LAB     CV TRANSCATHETER AORTIC VALVE REPLACEMENT N/A 12/8/2020    Procedure: Transcatheter Aortic Valve Replacement;  Surgeon: Camila Begum MD;  Location:  HEART CARDIAC CATH LAB     FUSION LUMBAR ANTERIOR TWO LEVELS       HERNIORRHAPHY UMBILICAL  6/10/2011    Procedure:HERNIORRHAPHY UMBILICAL; Open, with mesh placement; Surgeon:HO PARHAM; Location:[facility]     LAMINECTOMY LUMBAR TWO LEVELS         FAMILY HISTORY:    Family History   Problem Relation Age of Onset     C.A.D. Mother      Unknown/Adopted Father      Heart Failure Sister      Heart Failure Sister      Glaucoma No family hx of      Macular Degeneration No family hx of      Diabetes No family hx of      Hypertension No family hx of        REVIEW OF SYSTEMS:  12 point ROS was negative other than the symptoms noted above in the HPI.  Patient Supplied Answers to Review of Systems   ENT ROS 2/6/2023   Constitutional: Problems with sleep   Neurology: Numbness   Psychology: Frequently feeling depressed or sad   Ears, Nose, Throat: Ear pain, Sore throat, Trouble  "swallowing, Coughing up blood   Musculoskeletal: Sore or stiff joints, Swollen joints, Back pain, Swollen legs/feet         PHYSICAL EXAMINATION:   /77 (BP Location: Right arm, Patient Position: Sitting, Cuff Size: Adult Large)   Pulse 79   Temp 97.6  F (36.4  C) (Temporal)   Ht 1.626 m (5' 4\")   Wt 105.5 kg (232 lb 9.6 oz)   SpO2 97%   BMI 39.93 kg/m     Appearance:   normal; NAD, age-appropriate appearance, well-developed, obese   Communication:   normal; communicates verbally, normal voice quality   Head/Face:   inspection -  Normal; no scars or visible lesions   Salivary glands -  Normal size, no tenderness, swelling, or palpable masses   Facial strength -  Normal and symmetric    Skin:  normal, no rash   Ears:  auricle (AD) -  normal  EAC (AD) -  normal  TM (AD) -  Normal, no effusion  auricle (AS) -  normal  EAC (AS) -  normal  TM (AS) -  Normal, no effusion  Normal clinical speech reception   Nose:  Ext. inspection -  Normal  Internal Inspection -  Normal mucosa, septum, and turbinates   Nasopharynx:  submucosal tumor extending to inferior left nasopharynx/posterior soft palate   Oral Cavity:  lips -  Normal mucosa, oral competence, and stoma size   No trismus   Edentulous, healthy gingival mucosa   Hard palate, buccal, floor of mouth mucosa normal   Tongue - normal movement, no lesions   Oropharynx:  large about 3 cm tumor of the left tonsil, extends to the soft palate/hard palate junction, crosses midline, deviates uvula, extends to the posterior soft palate, involves left lateral pharyngeal wall, does not appear to involve vallecula, left base of tongue cannot be visualized due to tumor; significant narrowing of superior posterior oropharynx   Hypopharynx:  Normal pyriform sinus and pharyngeal wall mucosa   No pooled secretions   Larynx:  Epiglottis, AE folds, false vocal cords, true vocal cords, arytenoids normal in appearance, bilaterally mobile cords    Neck: No visible mass or asymmetry "   Normal palpation  Normal range of motion   Lymphatic:  no abnormal nodes   Cardiovascular:  warm, pink, well-perfused extremities without swelling, tenderness, or edema   Respiratory:  Normal respiratory effort, no stridor   Neuro/Psych.:  mood/affect -  normal  mental status -  normal        PROCEDURES:   Flexible fiberoptic laryngoscopy: Scope exam was indicated due to oropharyngeal cancer. Verbal consent was obtained. The nasal cavity was prepped with an aerosolized solution of topical anesthetic and vasoconstrictive agent. The scope was passed through the anterior nasal cavity and advanced. Inspection of the nasopharynx revealed no gross abnormality. There is submucosal tumor of the left posterior soft palate, left lateral pharyngeal wall, significantly narrows the posterior oropharynx. Obscures view of the base of tongue on left. Vallecula appears clear. The epiglottis, AE folds, false cords, true cords, arytenoids are normal. Inspection of the larynx revealed bilaterally mobile vocal cords. Pyriform sinuses are symmetric. The airway is patent. Procedure tolerated well with no immediate complications noted.    Left tonsil biopsy: After consent was obtained, the left tonsil mass was injected with 1% lidocaine with 1:100,000 epinephrine. A cup forceps was used to sample the superior aspect of the tumor. Specimen was placed in formalin. Hemostasis was achieved with silver nitrate. Patient tolerated the procedure with no immediate complications.                        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     We performed a biopsy today to verify the diagnosis. However, I discussed with him that based on history and exam this is certainly a cancer.     We discussed that the next step would be a PET scan once we have a confirmed diagnosis. We will get  this scheduled once the pathology is finalized.    We discussed that treatment of this cancer will be with chemoradiation pending the PET scan results. We discussed the time course of the radiation and chemotherapy. We discussed some of the side effects of treatment.     He was seen by SLP today. We will order a baseline video swallow study.    I discussed PEG tube placement with him, will consider prophylactic vs reactive PEG pending his swallow study.     I discussed possible port with him, not a hard stick per his report.     If he requires port/PEG he should be done at the [facility], slightly difficult airway given the bulky obstructive tumor anteriorly along the tonsil and lateral pharyngeal wall, distally the airway is clear. Tumor is friable and care should be taken when manipulating around the tumor to avoid causing bleed.     He is edentulous, has no teeth.    Will need audiogram pending need for chemotherapy.    We will review his case at tumor board once imaging is completed and refer appropriately to med onc and rad onc.    A social work referral was placed since he lives alone, drives himself and anticipate that he will need assistance particularly with rides during treatment.    He asked about no treatment. We discussed that the tumor will grow, anticipate need for trachesotomy for airway obstruction, worsening swallowing, worsening hemoptysis.    I will see him back 6 weeks following completion of treatment.       Thank you very much for the opportunity to participate in the care of your patient.      Karishma Eng MD  Otolaryngology- Head & Neck Surgery      This note was dictated with voice recognition software and then edited. Please excuse any unintentional errors.

## 2023-02-06 ENCOUNTER — THERAPY VISIT (OUTPATIENT)
Dept: SPEECH THERAPY | Facility: CLINIC | Age: 78
End: 2023-02-06
Payer: COMMERCIAL

## 2023-02-06 ENCOUNTER — PRE VISIT (OUTPATIENT)
Dept: OTOLARYNGOLOGY | Facility: CLINIC | Age: 78
End: 2023-02-06

## 2023-02-06 ENCOUNTER — OFFICE VISIT (OUTPATIENT)
Dept: OTOLARYNGOLOGY | Facility: CLINIC | Age: 78
End: 2023-02-06
Payer: COMMERCIAL

## 2023-02-06 VITALS
TEMPERATURE: 97.6 F | SYSTOLIC BLOOD PRESSURE: 136 MMHG | BODY MASS INDEX: 39.71 KG/M2 | HEIGHT: 64 IN | OXYGEN SATURATION: 97 % | HEART RATE: 79 BPM | DIASTOLIC BLOOD PRESSURE: 77 MMHG | WEIGHT: 232.6 LBS

## 2023-02-06 DIAGNOSIS — J39.2 OROPHARYNGEAL LESION: Primary | ICD-10-CM

## 2023-02-06 DIAGNOSIS — C10.9 MALIGNANT NEOPLASM OF OROPHARYNX (H): ICD-10-CM

## 2023-02-06 PROCEDURE — 99207 PR NO CHARGE LOS: CPT | Performed by: SPEECH-LANGUAGE PATHOLOGIST

## 2023-02-06 PROCEDURE — 88341 IMHCHEM/IMCYTCHM EA ADD ANTB: CPT | Mod: 26 | Performed by: PATHOLOGY

## 2023-02-06 PROCEDURE — 31575 DIAGNOSTIC LARYNGOSCOPY: CPT | Mod: 51 | Performed by: OTOLARYNGOLOGY

## 2023-02-06 PROCEDURE — 88305 TISSUE EXAM BY PATHOLOGIST: CPT | Mod: 26 | Performed by: PATHOLOGY

## 2023-02-06 PROCEDURE — 99205 OFFICE O/P NEW HI 60 MIN: CPT | Mod: 25 | Performed by: OTOLARYNGOLOGY

## 2023-02-06 PROCEDURE — 42800 BIOPSY OF THROAT: CPT | Performed by: OTOLARYNGOLOGY

## 2023-02-06 PROCEDURE — 88305 TISSUE EXAM BY PATHOLOGIST: CPT | Mod: TC | Performed by: OTOLARYNGOLOGY

## 2023-02-06 PROCEDURE — 88342 IMHCHEM/IMCYTCHM 1ST ANTB: CPT | Mod: 26 | Performed by: PATHOLOGY

## 2023-02-06 ASSESSMENT — PAIN SCALES - GENERAL: PAINLEVEL: EXTREME PAIN (9)

## 2023-02-06 NOTE — NURSING NOTE
"Blood pressure 136/77, pulse 79, temperature 97.6  F (36.4  C), temperature source Temporal, height 1.626 m (5' 4\"), weight 105.5 kg (232 lb 9.6 oz), SpO2 97 %.    Katlin Tan LPN    "

## 2023-02-06 NOTE — PROGRESS NOTES
Jonathan Bishop was seen for allied health care provider visit for introduction of self and SLP services. Pt has a newly found left oropharyngeal mass, bx pending. Pt will likely undergo chemoXRT. Provided information on trajectory of care and benefits of SLP services during treatment.. Speaking is WFL. Swallowing is impaired; pt reports difficulty swallowing pills. He is edentulous and has dentures in place. Formal baseline swallow evaluation indicated before beginning treatment. He will schedule this on the way out today. All questions answered within scope of practice for pt. Encouraged pt to reach out with any questions or concerns. Time spent with patient 6 minutes.       BITA Freed MA (music), CCC-SLP   Speech Language Pathologist  [facility] Trained Vocologist   [facility]  Dept. of Otolaryngology  Department of Rehabilitation Services  [address] [place] [id number]  Email: [email or web address]   Phone: [phone]  Pronouns: she/her/hers

## 2023-02-06 NOTE — LETTER
2/6/2023       RE: Jonathan Bishop  [address]  [facility] [address]     Dear Colleague,    Thank you for referring your patient, Jonathan Bishop, to the [facility] [place] at [facility]. Please see a copy of my visit note below.    Dear Dr. Quiñones:    I had the pleasure of meeting Jonathan Bishop in consultation today at the [facility] at your request.     History of Present Illness:   Jonathan Bishop is a 77 year old man referred for evaluation of an oropharyngeal lesion. He saw his PCP on 1/16/2023 with a 3-4 week history of sore throat. On exam at that time he was noted to have a 3 cm mass on the uvula and cervical lymphadenopathy. He was referred here for further evaluation.    He says that he was eating some peanut m&ms and felt like he got a cut on his throat causing some swelling about a month ago. He thought the area would heal but then he started noticing some hemoptysis. He saw his PCP. He has throat pain that is 8.75/10. The pain comes and goes. He says at times it is better, helped when he drinks tea. He describes it as a cut healing type of pain. He says the pain interferes with his eating, has pain with swallowing. The pain is right in the middle. He tries to limit his eating because of the pain. He avoids things that are not soft. He feels like things get stuck with swallowing. He has a hard time with swallowing pills. He can get them down with water. He has no weight loss. He has pain in his ears for a few weeks, left side primarily. The hemoptysis is intermittent. He has noticed some changes in his voice in the last 6 weeks.       Past medical history: hyperlipidemia, heart failure, peripheral vascular disease, hypertension, history of CVA    Past surgical history: TAVR, drainage of hematoma ?scalp    Social history: Uses a walker. No smoking. No chewing  tobacco use. No alcohol use. Retired - previously a cook. Live alone.     Family history: Negative for H&N cancer    MEDICATIONS:     Current Outpatient Medications   Medication Sig Dispense Refill     acetaminophen (TYLENOL) 325 MG tablet Take 1-2 tablets (325-650 mg) by mouth every 4 hours as needed for mild pain or headaches       aspirin (ASA) 81 MG EC tablet Take 1 tablet (81 mg) by mouth daily 30 tablet 0     atorvastatin (LIPITOR) 40 MG tablet Take 1 tablet (40 mg) by mouth daily 90 tablet 3     bisacodyl (DULCOLAX) 5 MG EC tablet Two days prior to exam take two (2) tablets at 4pm. One day prior to exam take two (2) tablets at 4pm 4 tablet 0     clopidogrel (PLAVIX) 75 MG tablet Take 1 tablet (75 mg) by mouth daily 90 tablet 0     ibuprofen (ADVIL/MOTRIN) 600 MG tablet Take 1 tablet (600 mg) by mouth every 8 hours as needed for moderate pain 270 tablet 3     multivitamin, therapeutic with minerals (MULTI-VITAMIN) TABS Take 1 tablet by mouth daily. 100 tablet 3     naloxone (NARCAN) 4 MG/0.1ML nasal spray Spray 1 spray (4 mg) into one nostril alternating nostrils once as needed for opioid reversal Every 2-3 min until responsive or EMS arrives 0.2 mL 0     order for DME Equipment being ordered: Walker ()  Treatment Diagnosis: stroke 1 Units 0     other medical supplies Dispense knee high, medium compression, large size 4 each 0     oxyCODONE-acetaminophen (PERCOCET) 5-325 MG tablet Take 1-2 tablets by mouth every 6 hours as needed for pain Max 5 per day. 150 tablet 0     polyethylene glycol (GOLYTELY) 236 g suspension Take as directed. Two days before your exam fill the first container with water. Cover and shake until mixed well. At 5:00pm drink one 8oz glass every 10-15 minutes until half (1/2) of the first container is empty. Store the remainder in the refrigerator. One day before your exam at 5:00pm drink the second half of the first container until it is gone. Before you go to bed mix the second  container with water and put in refrigerator. Six hours before your check in time drink one 8oz glass every 10-15 minutes until half of container is empty. Discard the remainder of solution. 8000 mL 0     potassium chloride ER (KLOR-CON M) 20 MEQ CR tablet Take 1 tablet (20 mEq) by mouth daily 90 tablet 3     sildenafil (VIAGRA) 100 MG tablet TAKE 30MIN TO 4 HOURS BEFORE INTERCOURSE AS NEEDED FOR ERECTILE DYSFUNCTION 30 tablet 5     spironolactone (ALDACTONE) 50 MG tablet Take 1 tablet (50 mg) by mouth daily 90 tablet 3     tamsulosin (FLOMAX) 0.4 MG capsule TAKE 1 CAPSULE BY MOUTH ONCE DAILY 90 capsule 4     torsemide (DEMADEX) 20 MG tablet Take 2 tablets (40 mg) by mouth daily 180 tablet 3     vitamin D3 (CHOLECALCIFEROL) 50 mcg (2000 units) tablet Take 1 tablet by mouth daily         ALLERGIES:  No Known Allergies    HABITS/SOCIAL HISTORY:     Social History     Socioeconomic History     Marital status:      Spouse name: Not on file     Number of children: Not on file     Years of education: Not on file     Highest education level: Not on file   Occupational History     Not on file   Tobacco Use     Smoking status: Former     Types: Cigarettes     Quit date: 2005     Years since quittin.1     Smokeless tobacco: Never     Tobacco comments:     Non smoker. No 2nd hand exposure. CCX, RMA PCC 2011   Substance and Sexual Activity     Alcohol use: No     Drug use: No     Sexual activity: Not Currently   Other Topics Concern     Parent/sibling w/ CABG, MI or angioplasty before 65F 55M? Not Asked   Social History Narrative    He lives by himself in an apt in [place].  He has 2 goldfish, Lai and [place].     Social Determinants of Health     Financial Resource Strain: Not on file   Food Insecurity: Not on file   Transportation Needs: Not on file   Physical Activity: Not on file   Stress: Not on file   Social Connections: Not on file   Intimate Partner Violence: Not on file   Housing  Stability: Not on file       PAST MEDICAL HISTORY:   Past Medical History:   Diagnosis Date     * * * SBE PROPHYLAXIS * * *     s/p TAVR     Acute rheumatic carditis 1950     Complication of anesthesia     pt once woke up during back surgery     Coronary artery disease     murmur     Erectile dysfunction     Sildenafil     Hip pain, bilateral      Hypercholesteremia      Hypertension      Low back pain      Nonsenile cataract      Obesity      Renal cyst      S/P 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     Stroke (cerebrum) (H) 11/30/2016     Umbilical hernia 06/10/2011    s/p surgical repair     Wound, surgical, infected 06/10/2011    hernia        PAST SURGICAL HISTORY:   Past Surgical History:   Procedure Laterality Date     ARTHROPLASTY KNEE BILATERAL  7/22/2010     COLONOSCOPY N/A 4/14/2017    Procedure: COLONOSCOPY;  Surgeon: Toby Bills MD;  Location:  GI     CV CORONARY ANGIOGRAM N/A 10/28/2019    Procedure: Coronary Angiogram;  Surgeon: Guerrero Huitron MD;  Location:  HEART CARDIAC CATH LAB     CV RIGHT HEART CATH MEASUREMENTS RECORDED N/A 10/28/2019    Procedure: Right Heart Cath;  Surgeon: Guerrero Huitron MD;  Location:  HEART CARDIAC CATH LAB     CV TRANSCATHETER AORTIC VALVE REPLACEMENT N/A 12/8/2020    Procedure: Transcatheter Aortic Valve Replacement;  Surgeon: Camila Begum MD;  Location:  HEART CARDIAC CATH LAB     FUSION LUMBAR ANTERIOR TWO LEVELS       HERNIORRHAPHY UMBILICAL  6/10/2011    Procedure:HERNIORRHAPHY UMBILICAL; Open, with mesh placement; Surgeon:HO PARHAM; Location:[facility] OR     LAMINECTOMY LUMBAR TWO LEVELS         FAMILY HISTORY:    Family History   Problem Relation Age of Onset     C.A.D. Mother      Unknown/Adopted Father      Heart Failure Sister      Heart Failure Sister      Glaucoma No family hx of      Macular Degeneration No family hx of      Diabetes No family hx of      Hypertension  "No family hx of        REVIEW OF SYSTEMS:  12 point ROS was negative other than the symptoms noted above in the HPI.  Patient Supplied Answers to Review of Systems  [facility] ROS 2/6/2023   Constitutional: Problems with sleep   Neurology: Numbness   Psychology: Frequently feeling depressed or sad   Ears, Nose, Throat: Ear pain, Sore throat, Trouble swallowing, Coughing up blood   Musculoskeletal: Sore or stiff joints, Swollen joints, Back pain, Swollen legs/feet         PHYSICAL EXAMINATION:   /77 (BP Location: Right arm, Patient Position: Sitting, Cuff Size: Adult Large)   Pulse 79   Temp 97.6  F (36.4  C) (Temporal)   Ht 1.626 m (5' 4\")   Wt 105.5 kg (232 lb 9.6 oz)   SpO2 97%   BMI 39.93 kg/m     Appearance:   normal; NAD, age-appropriate appearance, well-developed, obese   Communication:   normal; communicates verbally, normal voice quality   Head/Face:   inspection -  Normal; no scars or visible lesions   Salivary glands -  Normal size, no tenderness, swelling, or palpable masses   Facial strength -  Normal and symmetric    Skin:  normal, no rash   Ears:  auricle (AD) -  normal  EAC (AD) -  normal  TM (AD) -  Normal, no effusion  auricle (AS) -  normal  EAC (AS) -  normal  TM (AS) -  Normal, no effusion  Normal clinical speech reception   Nose:  Ext. inspection -  Normal  Internal Inspection -  Normal mucosa, septum, and turbinates   Nasopharynx:  submucosal tumor extending to inferior left nasopharynx/posterior soft palate   Oral Cavity:  lips -  Normal mucosa, oral competence, and stoma size   No trismus   Edentulous, healthy gingival mucosa   Hard palate, buccal, floor of mouth mucosa normal   Tongue - normal movement, no lesions   Oropharynx:  large about 3 cm tumor of the left tonsil, extends to the soft palate/hard palate junction, crosses midline, deviates uvula, extends to the posterior soft palate, involves left lateral pharyngeal wall, does not appear to involve vallecula, left base of tongue " cannot be visualized due to tumor; significant narrowing of superior posterior oropharynx   Hypopharynx:  Normal pyriform sinus and pharyngeal wall mucosa   No pooled secretions   Larynx:  Epiglottis, AE folds, false vocal cords, true vocal cords, arytenoids normal in appearance, bilaterally mobile cords    Neck: No visible mass or asymmetry   Normal palpation  Normal range of motion   Lymphatic:  no abnormal nodes   Cardiovascular:  warm, pink, well-perfused extremities without swelling, tenderness, or edema   Respiratory:  Normal respiratory effort, no stridor   Neuro/Psych.:  mood/affect -  normal  mental status -  normal        PROCEDURES:   Flexible fiberoptic laryngoscopy: Scope exam was indicated due to oropharyngeal cancer. Verbal consent was obtained. The nasal cavity was prepped with an aerosolized solution of topical anesthetic and vasoconstrictive agent. The scope was passed through the anterior nasal cavity and advanced. Inspection of the nasopharynx revealed no gross abnormality. There is submucosal tumor of the left posterior soft palate, left lateral pharyngeal wall, significantly narrows the posterior oropharynx. Obscures view of the base of tongue on left. Vallecula appears clear. The epiglottis, AE folds, false cords, true cords, arytenoids are normal. Inspection of the larynx revealed bilaterally mobile vocal cords. Pyriform sinuses are symmetric. The airway is patent. Procedure tolerated well with no immediate complications noted.    Left tonsil biopsy: After consent was obtained, the left tonsil mass was injected with 1% lidocaine with 1:100,000 epinephrine. A cup forceps was used to sample the superior aspect of the tumor. Specimen was placed in formalin. Hemostasis was achieved with silver nitrate. Patient tolerated the procedure with no immediate complications.                        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     We performed a biopsy today to verify the diagnosis. However, I discussed with him that based on history and exam this is certainly a cancer.     We discussed that the next step would be a PET scan once we have a confirmed diagnosis. We will get this scheduled once the pathology is finalized.    We discussed that treatment of this cancer will be with chemoradiation pending the PET scan results. We discussed the time course of the radiation and chemotherapy. We discussed some of the side effects of treatment.     He was seen by SLP today. We will order a baseline video swallow study.    I discussed PEG tube placement with him, will consider prophylactic vs reactive PEG pending his swallow study.     I discussed possible port with him, not a hard stick per his report.     If he requires port/PEG he should be done at the [place], slightly difficult airway given the bulky obstructive tumor anteriorly along the tonsil and lateral pharyngeal wall, distally the airway is clear. Tumor is friable and care should be taken when manipulating around the tumor to avoid causing bleed.     He is edentulous, has no teeth.    Will need audiogram pending need for chemotherapy.    We will review his case at tumor board once imaging is completed and refer appropriately to med onc and rad onc.    A social work referral was placed since he lives alone, drives himself and anticipate that he will need assistance particularly with rides during treatment.    He asked about no treatment. We discussed that the tumor will grow, anticipate need for trachesotomy for airway obstruction, worsening swallowing, worsening hemoptysis.    I will see him back 6 weeks following completion of treatment.       Thank you very much for the opportunity to participate in the care of your patient.      Karishma Eng MD  Otolaryngology- Head & Neck  Surgery      This note was dictated with voice recognition software and then edited. Please excuse any unintentional errors.

## 2023-02-07 ENCOUNTER — DOCUMENTATION ONLY (OUTPATIENT)
Dept: CARE COORDINATION | Facility: CLINIC | Age: 78
End: 2023-02-07
Payer: COMMERCIAL

## 2023-02-07 NOTE — PROGRESS NOTES
Social Work Follow-Up  [facility]    Data/Intervention:  Patient Name:  Jonathan Bishop  /Age:  1945 (77 year old)    Chart opened in review of referral. After review of chart and conversation with Oncology SW Earlene Hou, it was determined this referral is an Oncology SW referral. Referral was forwarded to Earlene Hou.     SASHA Luna, [facility]    [facility]  Ph: [phone], Pgr: [phone]  2023

## 2023-02-10 ENCOUNTER — PATIENT OUTREACH (OUTPATIENT)
Dept: OTOLARYNGOLOGY | Facility: CLINIC | Age: 78
End: 2023-02-10
Payer: COMMERCIAL

## 2023-02-10 DIAGNOSIS — C09.9 SQUAMOUS CELL CARCINOMA OF TONSIL (H): ICD-10-CM

## 2023-02-10 DIAGNOSIS — C10.9 MALIGNANT NEOPLASM OF OROPHARYNX (H): Primary | ICD-10-CM

## 2023-02-10 NOTE — PROGRESS NOTES
Called patient to review results from biopsy.    Final Diagnosis   A.  LEFT TONSIL, BIOPSY:  -HPV-ASSOCIATED SQUAMOUS CELL CARCINOMA.       Plan for PET scan. Patient verbalized understanding. All questions answered. Patient will call with further questions or concerns.

## 2023-02-13 ENCOUNTER — MYC MEDICAL ADVICE (OUTPATIENT)
Dept: INTERNAL MEDICINE | Facility: CLINIC | Age: 78
End: 2023-02-13
Payer: COMMERCIAL

## 2023-02-13 ENCOUNTER — PATIENT OUTREACH (OUTPATIENT)
Dept: CARE COORDINATION | Facility: CLINIC | Age: 78
End: 2023-02-13
Payer: COMMERCIAL

## 2023-02-13 ENCOUNTER — TELEPHONE (OUTPATIENT)
Dept: OTOLARYNGOLOGY | Facility: CLINIC | Age: 78
End: 2023-02-13
Payer: COMMERCIAL

## 2023-02-13 ENCOUNTER — TRANSFERRED RECORDS (OUTPATIENT)
Dept: HEALTH INFORMATION MANAGEMENT | Facility: CLINIC | Age: 78
End: 2023-02-13
Payer: COMMERCIAL

## 2023-02-13 NOTE — TELEPHONE ENCOUNTER
[facility] Call Center    Phone Message    May a detailed message be left on voicemail: yes     Reason for Call: Other: Marialuisa , pts home care nurse calling in regards to getting more info on pts plan of care and what all he needs ect ., looks like they are not to sure of what his next steps are. Please reach out to Marialuisa to discuss. Pt gave verbal permission to call her . Thank you      Action Taken: Message routed to:  [facility] ENT     Travel Screening: Not Applicable

## 2023-02-13 NOTE — TELEPHONE ENCOUNTER
Returned call to patient's home care nurse. Reviewed plan for PET scan and will have next steps after imaging is reviewed. Verbalized understanding and will discuss with patient.

## 2023-02-15 ENCOUNTER — MYC MEDICAL ADVICE (OUTPATIENT)
Dept: INTERNAL MEDICINE | Facility: CLINIC | Age: 78
End: 2023-02-15

## 2023-02-20 ENCOUNTER — HOSPITAL ENCOUNTER (OUTPATIENT)
Dept: PET IMAGING | Facility: CLINIC | Age: 78
Discharge: HOME | End: 2023-02-20
Attending: OTOLARYNGOLOGY
Payer: COMMERCIAL

## 2023-02-20 DIAGNOSIS — C10.9 MALIGNANT NEOPLASM OF OROPHARYNX (H): ICD-10-CM

## 2023-02-20 DIAGNOSIS — C09.9 SQUAMOUS CELL CARCINOMA OF TONSIL (H): ICD-10-CM

## 2023-02-20 PROCEDURE — 78815 PET IMAGE W/CT SKULL-THIGH: CPT | Mod: 26 | Performed by: RADIOLOGY

## 2023-02-20 PROCEDURE — 70491 CT SOFT TISSUE NECK W/DYE: CPT

## 2023-02-20 PROCEDURE — 250N000011 HC RX IP 250 OP 636: Performed by: OTOLARYNGOLOGY

## 2023-02-20 PROCEDURE — 71260 CT THORAX DX C+: CPT | Mod: 26 | Performed by: RADIOLOGY

## 2023-02-20 PROCEDURE — 74177 CT ABD & PELVIS W/CONTRAST: CPT

## 2023-02-20 PROCEDURE — 343N000001 HC RX 343: Performed by: OTOLARYNGOLOGY

## 2023-02-20 PROCEDURE — A9552 F18 FDG: HCPCS | Performed by: OTOLARYNGOLOGY

## 2023-02-20 PROCEDURE — 74177 CT ABD & PELVIS W/CONTRAST: CPT | Mod: 26 | Performed by: RADIOLOGY

## 2023-02-20 PROCEDURE — 70491 CT SOFT TISSUE NECK W/DYE: CPT | Mod: 26 | Performed by: RADIOLOGY

## 2023-02-20 PROCEDURE — 78815 PET IMAGE W/CT SKULL-THIGH: CPT | Mod: PI

## 2023-02-20 RX ORDER — IOPAMIDOL 755 MG/ML
0-135 INJECTION, SOLUTION INTRAVASCULAR ONCE
Status: COMPLETED | OUTPATIENT
Start: 2023-02-20 | End: 2023-02-20

## 2023-02-20 RX ADMIN — IOPAMIDOL 123 ML: 755 INJECTION, SOLUTION INTRAVENOUS at 11:11

## 2023-02-20 RX ADMIN — FLUDEOXYGLUCOSE F-18 13.81 MILLICURIE: 500 INJECTION, SOLUTION INTRAVENOUS at 10:10

## 2023-02-22 DIAGNOSIS — M54.50 ACUTE MIDLINE LOW BACK PAIN WITHOUT SCIATICA: ICD-10-CM

## 2023-02-22 NOTE — TELEPHONE ENCOUNTER
[facility] Call Center    Phone Message    May a detailed message be left on voicemail: yes     Reason for Call: Medication Refill Request    Has the patient contacted the pharmacy for the refill? Yes   Name of medication being requested: oxyCODONE-acetaminophen (PERCOCET) 5-325 MG tablet  Provider who prescribed the medication: Dr. Sick   Pharmacy: [facility] [address] [place] [id number] [facility]  Date medication is needed: asap       Action Taken: Message routed to:  [facility]    Travel Screening: Not Applicable

## 2023-02-22 NOTE — TELEPHONE ENCOUNTER
oxyCODONE-acetaminophen (PERCOCET) 5-325 MG tablet  Last Written Prescription Date:  1/29/23  Last Fill Quantity: 150,   # refills: 0   Last Office Visit : 8/1/22  Future Office visit:  None     Routing refill request to provider for review/approval because:  Controlled substance     Provider who prescribed the medication: Dr. Sick   Pharmacy: [facility] [address] [place] [id number] [facility]

## 2023-02-23 RX ORDER — OXYCODONE AND ACETAMINOPHEN 5; 325 MG/1; MG/1
1-2 TABLET ORAL EVERY 6 HOURS PRN
Qty: 150 TABLET | Refills: 0 | Status: ON HOLD | OUTPATIENT
Start: 2023-02-23 | End: 2023-03-03

## 2023-02-24 ENCOUNTER — THERAPY VISIT (OUTPATIENT)
Dept: SPEECH THERAPY | Facility: CLINIC | Age: 78
End: 2023-02-24
Payer: COMMERCIAL

## 2023-02-24 ENCOUNTER — TUMOR CONFERENCE (OUTPATIENT)
Dept: ONCOLOGY | Facility: CLINIC | Age: 78
End: 2023-02-24
Payer: COMMERCIAL

## 2023-02-24 ENCOUNTER — ANCILLARY PROCEDURE (OUTPATIENT)
Dept: GENERAL RADIOLOGY | Facility: CLINIC | Age: 78
End: 2023-02-24
Attending: OTOLARYNGOLOGY
Payer: COMMERCIAL

## 2023-02-24 ENCOUNTER — PATIENT OUTREACH (OUTPATIENT)
Dept: ONCOLOGY | Facility: CLINIC | Age: 78
End: 2023-02-24

## 2023-02-24 DIAGNOSIS — R91.8 PULMONARY NODULES: ICD-10-CM

## 2023-02-24 DIAGNOSIS — R13.12 OROPHARYNGEAL DYSPHAGIA: ICD-10-CM

## 2023-02-24 DIAGNOSIS — C10.9 SQUAMOUS CELL CARCINOMA OF OROPHARYNX (H): Primary | ICD-10-CM

## 2023-02-24 DIAGNOSIS — C10.9 MALIGNANT NEOPLASM OF OROPHARYNX (H): ICD-10-CM

## 2023-02-24 DIAGNOSIS — J39.2 OROPHARYNGEAL LESION: Primary | ICD-10-CM

## 2023-02-24 DIAGNOSIS — R91.8 PULMONARY NODULES: Primary | ICD-10-CM

## 2023-02-24 PROCEDURE — 74230 X-RAY XM SWLNG FUNCJ C+: CPT | Mod: GC | Performed by: RADIOLOGY

## 2023-02-24 PROCEDURE — 92611 MOTION FLUOROSCOPY/SWALLOW: CPT | Mod: GN | Performed by: SPEECH-LANGUAGE PATHOLOGIST

## 2023-02-24 RX ORDER — BARIUM SULFATE 400 MG/ML
SUSPENSION ORAL ONCE
Status: COMPLETED | OUTPATIENT
Start: 2023-02-24 | End: 2023-02-24

## 2023-02-24 RX ADMIN — BARIUM SULFATE: 400 SUSPENSION ORAL at 10:12

## 2023-02-24 NOTE — TUMOR CONFERENCE
Called patient to review recommendations from tumor conference. Reviewed plan for med onc, rad onc, MRI spine and thoracic. Patient verbalized understanding of plan of care. Patient denied additional questions at this time.

## 2023-02-24 NOTE — PROGRESS NOTES
New Patient Oncology Nurse Navigator Note     Referring provider: Dr. Karishma Eng    Referring Clinic/Organization: [facility]     Referred to: Medical Oncology    Requested provider (if applicable): First available - did not specify     Referral Received: 02/24/23       Evaluation for :   Diagnosis   C10.9 (ICD-10-CM) - Squamous cell carcinoma of oropharynx (H)     Clinical History (per Nurse review of records provided):      02/20/23 PET (bookmarked) showed:   IMPRESSION:      1. Hypermetabolic 0.8 cm solid right lower lobe pulmonary nodule is  highly concerning for metastasis.     2. Mild focal uptake in the right iliac bone and L1 vertebral body  without definite CT correlate is suspicious for metastasis. Consider  further characterization with MRI at clinical discretion.     3. Refer to separately dictated neuroradiology PET/CT for findings in  the head and neck.    02/06/2023 Surgical pathology (bookmarked) showed:   Final Diagnosis   A.  LEFT TONSIL, BIOPSY:  -HPV-ASSOCIATED SQUAMOUS CELL CARCINOMA.     Case discussed at Tumor Conference 2/24/23 (bookmarked)    Records Location: [facility]     Referral updates and Plan:   Writer called Conner and discussed the referral with him. He agreed to an in person appt with Dr. Majano in [facility] on 2/28. He will look on My Chart once the appt has been made for the clinic address and phone number. All questions answered.     Addendum 2/24/23: Thoracic surgery referral also received for Conner. Conner agreed to an in person appt with Dr. Jones at [facility] on Monday 2/27. PFTs needed prior or shortly after appt.    RONNIE Khalil, TAWANNAN, RN, LAc, OCN  [facility] Oncology Nurse Navigator  [phone] / [phone]

## 2023-02-24 NOTE — TUMOR CONFERENCE
Head & Neck Tumor Conference Note   Status: New  Staff: Dr. Eng     Tumor Site: left oropharynx   Tumor Pathology: p16+ SCC   Tumor Stage: cT3N1  Tumor Treatment: TBD    Reason for Review: Review imaging, path, and POC    Brief History: This is a 77 year old male referred for evaluation of an oropharyngeal lesion. He saw his PCP on 1/16/2023 with a 3-4 week history of sore throat. On exam at that time he was noted to have a 3 cm mass on the uvula and cervical lymphadenopathy. He says that he was eating some peanut m&ms and felt like he got a cut on his throat causing some swelling about a month ago. He thought the area would heal but then he started noticing some hemoptysis. He saw his PCP. He has throat pain that is 8.75/10. The pain comes and goes. He says at times it is better, helped when he drinks tea. He describes it as a cut healing type of pain. He says the pain interferes with his eating, has pain with swallowing. The pain is right in the middle. He tries to limit his eating because of the pain. He avoids things that are not soft. He feels like things get stuck with swallowing. He has a hard time with swallowing pills. He can get them down with water. He has no weight loss. He has pain in his ears for a few weeks, left side primarily. The hemoptysis is intermittent. He has noticed some changes in his voice in the last 6 weeks.       Here to review PET scan and discuss POC     Past medical history: hyperlipidemia, heart failure, peripheral vascular disease, hypertension, history of CVA  Social history: Uses a walker. No smoking. No chewing tobacco use. No alcohol use. Retired - previously a cook. Live alone.     Imaging:   PET/CT 2/20/2023  Impression:  1. F-18 FDG PET/CT demonstrates a 3.7 x 4.0 x 5.1 cm FDG avid enhancing mass centered in the left palatine tonsil compatible with primary squamosal cancer. This causes narrowing of the oropharyngeal lumen.    2. Left level 2 FDG avid 2 lymphadenopathy.  An FDG avid left retropharyngeal nodular density might represent posterior extension of the primary mass versus a retropharyngeal node.  1. Hypermetabolic 0.8 cm solid right lower lobe pulmonary nodule is highly concerning for metastasis.  2. Mild focal uptake in the right iliac bone and L1 vertebral body without definite CT correlate is suspicious for metastasis. Consider further characterization with MRI at clinical discretion.    Pathology:   Left tonsil biopsy 2/6/2023  A.  LEFT TONSIL, BIOPSY:  -HPV-ASSOCIATED SQUAMOUS CELL CARCINOMA.    Tumor Board Recommendation:   Reviewed recent PET/CT demonstrating a large primary oropharyngeal mass involving the left tonsil and lateral pharyngeal wall. There is some posterior extension into the retropharyngeal space that correlates with a fingerlike tumor extension appreciated on physical exam. Within the left neck there are multiple necrotic lymph nodes in level 2 suspicious for metastases. On the whole body PET/CT he was noted to have FDG avid lesions in the lumbar spine, right iliac bone as well as right lower lobe. With the chest there is a single, solid nodule in the right lower lobe that is FDG avid and suspicious for malignancy. The FDG avidity of the lumbar spine and iliac bone are less intense and thus less suspicious for malignancy. However, may be worthwhile to get spine MRI to further evaluate these areas as multifocal metastases would . From a oropharynx treatment standpoint, recommended nonsurgical therapy with definitive chemoradiation. Addressed impact of lung nodule biopsy and treatment timing, particularly inquiring his chemotherapy regimen would change if evidence of pulmonary metastasis. Given the multiple imaging studies and consultations that need to be arranged, would favor biopsying lung nodule now and re-evaluate once all information has been obtained.     - MRI of lumbar spine  - Referral to thoracic surgery, radiation oncology,  medical oncology   - Complete work-up and then will re-discuss to further delineate POC.       Belen Dick MD  Otolaryngology Head and Neck Surgery Resident, PGY-3    Documentation / Disclaimer Cancer Tumor Board Note: Cancer tumor board recommendations do not override what is determined to be reasonable care and treatment, which is dependent on the circumstances of a patient's case; the patient's medical, social, and personal concerns; and the clinical judgment of the oncologist [physician].

## 2023-02-27 ENCOUNTER — ONCOLOGY VISIT (OUTPATIENT)
Dept: ONCOLOGY | Facility: CLINIC | Age: 78
End: 2023-02-27
Attending: CLINICAL NURSE SPECIALIST
Payer: COMMERCIAL

## 2023-02-27 ENCOUNTER — PRE VISIT (OUTPATIENT)
Dept: ONCOLOGY | Facility: CLINIC | Age: 78
End: 2023-02-27
Payer: COMMERCIAL

## 2023-02-27 ENCOUNTER — PREP FOR PROCEDURE (OUTPATIENT)
Dept: SURGERY | Facility: CLINIC | Age: 78
End: 2023-02-27
Payer: COMMERCIAL

## 2023-02-27 ENCOUNTER — TELEPHONE (OUTPATIENT)
Dept: SURGERY | Facility: CLINIC | Age: 78
End: 2023-02-27

## 2023-02-27 ENCOUNTER — TELEPHONE (OUTPATIENT)
Dept: OTOLARYNGOLOGY | Facility: CLINIC | Age: 78
End: 2023-02-27
Payer: COMMERCIAL

## 2023-02-27 VITALS
RESPIRATION RATE: 16 BRPM | HEIGHT: 64 IN | HEART RATE: 89 BPM | BODY MASS INDEX: 41.04 KG/M2 | SYSTOLIC BLOOD PRESSURE: 136 MMHG | WEIGHT: 240.4 LBS | DIASTOLIC BLOOD PRESSURE: 77 MMHG | OXYGEN SATURATION: 97 %

## 2023-02-27 DIAGNOSIS — R91.8 PULMONARY NODULES: Primary | ICD-10-CM

## 2023-02-27 DIAGNOSIS — Z01.818 PRE-OP EXAM: ICD-10-CM

## 2023-02-27 DIAGNOSIS — C10.9 MALIGNANT NEOPLASM OF OROPHARYNX (H): ICD-10-CM

## 2023-02-27 DIAGNOSIS — R91.1 LUNG NODULE: Primary | ICD-10-CM

## 2023-02-27 LAB
ABO/RH(D): NORMAL
ALBUMIN SERPL BCG-MCNC: 4 G/DL (ref 3.5–5.2)
ALP SERPL-CCNC: 105 U/L (ref 40–129)
ALT SERPL W P-5'-P-CCNC: 15 U/L (ref 10–50)
ANION GAP SERPL CALCULATED.3IONS-SCNC: 10 MMOL/L (ref 7–15)
ANTIBODY SCREEN: NEGATIVE
AST SERPL W P-5'-P-CCNC: 27 U/L (ref 10–50)
BASOPHILS # BLD AUTO: 0 10E3/UL (ref 0–0.2)
BASOPHILS NFR BLD AUTO: 1 %
BILIRUB SERPL-MCNC: 0.2 MG/DL
BUN SERPL-MCNC: 19.2 MG/DL (ref 8–23)
CALCIUM SERPL-MCNC: 9.6 MG/DL (ref 8.8–10.2)
CHLORIDE SERPL-SCNC: 106 MMOL/L (ref 98–107)
CREAT SERPL-MCNC: 0.94 MG/DL (ref 0.67–1.17)
DEPRECATED HCO3 PLAS-SCNC: 25 MMOL/L (ref 22–29)
EOSINOPHIL # BLD AUTO: 0.4 10E3/UL (ref 0–0.7)
EOSINOPHIL NFR BLD AUTO: 4 %
ERYTHROCYTE [DISTWIDTH] IN BLOOD BY AUTOMATED COUNT: 16 % (ref 10–15)
GFR SERPL CREATININE-BSD FRML MDRD: 83 ML/MIN/1.73M2
GLUCOSE SERPL-MCNC: 92 MG/DL (ref 70–99)
HCT VFR BLD AUTO: 30.8 % (ref 40–53)
HGB BLD-MCNC: 9.4 G/DL (ref 13.3–17.7)
IMM GRANULOCYTES # BLD: 0 10E3/UL
IMM GRANULOCYTES NFR BLD: 0 %
LYMPHOCYTES # BLD AUTO: 2 10E3/UL (ref 0.8–5.3)
LYMPHOCYTES NFR BLD AUTO: 23 %
MCH RBC QN AUTO: 24.8 PG (ref 26.5–33)
MCHC RBC AUTO-ENTMCNC: 30.5 G/DL (ref 31.5–36.5)
MCV RBC AUTO: 81 FL (ref 78–100)
MONOCYTES # BLD AUTO: 0.8 10E3/UL (ref 0–1.3)
MONOCYTES NFR BLD AUTO: 9 %
NEUTROPHILS # BLD AUTO: 5.6 10E3/UL (ref 1.6–8.3)
NEUTROPHILS NFR BLD AUTO: 63 %
NRBC # BLD AUTO: 0 10E3/UL
NRBC BLD AUTO-RTO: 0 /100
PLATELET # BLD AUTO: 226 10E3/UL (ref 150–450)
POTASSIUM SERPL-SCNC: 4.2 MMOL/L (ref 3.4–5.3)
PROT SERPL-MCNC: 7 G/DL (ref 6.4–8.3)
RBC # BLD AUTO: 3.79 10E6/UL (ref 4.4–5.9)
SARS-COV-2 RNA RESP QL NAA+PROBE: NEGATIVE
SODIUM SERPL-SCNC: 141 MMOL/L (ref 136–145)
SPECIMEN EXPIRATION DATE: NORMAL
WBC # BLD AUTO: 8.7 10E3/UL (ref 4–11)

## 2023-02-27 PROCEDURE — 85025 COMPLETE CBC W/AUTO DIFF WBC: CPT | Performed by: THORACIC SURGERY (CARDIOTHORACIC VASCULAR SURGERY)

## 2023-02-27 PROCEDURE — 99204 OFFICE O/P NEW MOD 45 MIN: CPT | Performed by: THORACIC SURGERY (CARDIOTHORACIC VASCULAR SURGERY)

## 2023-02-27 PROCEDURE — 86850 RBC ANTIBODY SCREEN: CPT | Performed by: THORACIC SURGERY (CARDIOTHORACIC VASCULAR SURGERY)

## 2023-02-27 PROCEDURE — 80053 COMPREHEN METABOLIC PANEL: CPT | Performed by: THORACIC SURGERY (CARDIOTHORACIC VASCULAR SURGERY)

## 2023-02-27 PROCEDURE — G0463 HOSPITAL OUTPT CLINIC VISIT: HCPCS | Performed by: THORACIC SURGERY (CARDIOTHORACIC VASCULAR SURGERY)

## 2023-02-27 PROCEDURE — 86901 BLOOD TYPING SEROLOGIC RH(D): CPT | Performed by: THORACIC SURGERY (CARDIOTHORACIC VASCULAR SURGERY)

## 2023-02-27 PROCEDURE — U0003 INFECTIOUS AGENT DETECTION BY NUCLEIC ACID (DNA OR RNA); SEVERE ACUTE RESPIRATORY SYNDROME CORONAVIRUS 2 (SARS-COV-2) (CORONAVIRUS DISEASE [COVID-19]), AMPLIFIED PROBE TECHNIQUE, MAKING USE OF HIGH THROUGHPUT TECHNOLOGIES AS DESCRIBED BY CMS-2020-01-R: HCPCS | Performed by: THORACIC SURGERY (CARDIOTHORACIC VASCULAR SURGERY)

## 2023-02-27 PROCEDURE — 36415 COLL VENOUS BLD VENIPUNCTURE: CPT | Performed by: THORACIC SURGERY (CARDIOTHORACIC VASCULAR SURGERY)

## 2023-02-27 RX ORDER — HEPARIN SODIUM 5000 [USP'U]/.5ML
5000 INJECTION, SOLUTION INTRAVENOUS; SUBCUTANEOUS
Status: CANCELLED | OUTPATIENT
Start: 2023-02-27

## 2023-02-27 RX ORDER — CEFAZOLIN SODIUM 2 G/50ML
2 SOLUTION INTRAVENOUS
Status: CANCELLED | OUTPATIENT
Start: 2023-02-27

## 2023-02-27 RX ORDER — CEFAZOLIN SODIUM 2 G/50ML
2 SOLUTION INTRAVENOUS SEE ADMIN INSTRUCTIONS
Status: CANCELLED | OUTPATIENT
Start: 2023-02-27

## 2023-02-27 ASSESSMENT — PAIN SCALES - GENERAL: PAINLEVEL: EXTREME PAIN (8)

## 2023-02-27 NOTE — PROGRESS NOTES
"Oncology Rooming Note    February 27, 2023 3:08 PM   Jonathan Bishop is a 77 year old male who presents for:    Chief Complaint   Patient presents with     Oncology Clinic Visit     New Patient     Initial Vitals: /77   Pulse 89   Resp 16   Ht 1.626 m (5' 4\")   Wt 109 kg (240 lb 6.4 oz)   SpO2 97%   BMI 41.26 kg/m   Estimated body mass index is 41.26 kg/m  as calculated from the following:    Height as of this encounter: 1.626 m (5' 4\").    Weight as of this encounter: 109 kg (240 lb 6.4 oz). Body surface area is 2.22 meters squared.  Extreme Pain (8) Comment: Data Unavailable   No LMP for male patient.  Allergies reviewed: Yes  Medications reviewed: Yes    Medications: Medication refills not needed today.  Pharmacy name entered into [facility]: [id number] - [place], [place] - [address] [address]    Clinical concerns: Patient in pain in neck, back and both knees.       Jenni Carmona MA            "

## 2023-02-27 NOTE — PROGRESS NOTES
THORACIC SURGERY - NEW PATIENT OFFICE VISIT      Dear Dr. Nascimento,    I saw Conner Bishop at Dr. nEg's request in consultation for the evaluation and treatment of a nodule of the RIGHT lower lobe.    HPI  Conner Bishop is a 77 year old male patient who presents with a hypermetabolic right lower lobe lung nodule in the setting of squamous cell carcinoma of the left tonsil.     ECOG performance status  1- Mild physical restriction, sedentary  He uses a walker.              Previsit Tests   Pathology (2/62023): Left tonsil biopsy: HPV-associated carcinoma    PET scan (2/20/2023): FDG avid 0.8 cm RIGHT lower lobe Nodule (Max SUV 5.3). 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     Stroke (cerebrum) (H) 11/30/2016     Umbilical hernia 06/10/2011    s/p surgical repair     Wound, surgical, infected 06/10/2011    hernia        PSH  Reviewed, as below    Past Surgical History:   Procedure Laterality Date     ARTHROPLASTY KNEE BILATERAL  7/22/2010     COLONOSCOPY N/A 4/14/2017    Procedure: COLONOSCOPY;  Surgeon: Toby Bills MD;  Location: [facility]     CV CORONARY ANGIOGRAM N/A 10/28/2019    Procedure: Coronary Angiogram;  Surgeon: Guerrero Huitron MD;  Location:  HEART CARDIAC CATH LAB     CV RIGHT HEART CATH MEASUREMENTS RECORDED N/A 10/28/2019    Procedure: Right Heart Cath;  Surgeon: Guerrero Huitron MD;  Location:  HEART CARDIAC CATH  "LAB     CV TRANSCATHETER AORTIC VALVE REPLACEMENT N/A 12/8/2020    Procedure: Transcatheter Aortic Valve Replacement;  Surgeon: Camila Begum MD;  Location:  HEART CARDIAC CATH LAB     FUSION LUMBAR ANTERIOR TWO LEVELS       HERNIORRHAPHY UMBILICAL  6/10/2011    Procedure:HERNIORRHAPHY UMBILICAL; Open, with mesh placement; Surgeon:HO PARHAM; Location:[facility]     LAMINECTOMY LUMBAR TWO LEVELS          No Known Allergies    Current Outpatient Medications   Medication     acetaminophen (TYLENOL) 325 MG tablet     aspirin (ASA) 81 MG EC tablet     atorvastatin (LIPITOR) 40 MG tablet     bisacodyl (DULCOLAX) 5 MG EC tablet     clopidogrel (PLAVIX) 75 MG tablet     diazepam (VALIUM) 5 MG tablet     ibuprofen (ADVIL/MOTRIN) 600 MG tablet     multivitamin, therapeutic with minerals (MULTI-VITAMIN) TABS     naloxone (NARCAN) 4 MG/0.1ML nasal spray     order for DME     other medical supplies     oxyCODONE-acetaminophen (PERCOCET) 5-325 MG tablet     polyethylene glycol (GOLYTELY) 236 g suspension     potassium chloride ER (KLOR-CON M) 20 MEQ CR tablet     sildenafil (VIAGRA) 100 MG tablet     spironolactone (ALDACTONE) 50 MG tablet     tamsulosin (FLOMAX) 0.4 MG capsule     torsemide (DEMADEX) 20 MG tablet     vitamin D3 (CHOLECALCIFEROL) 50 mcg (2000 units) tablet     No current facility-administered medications for this visit.       ETOH: Rare  TOBACCO: age 30 - 47. 1 pack per day. Quit 30 years ago.   OTHER DRUGS:  None    Physical examination  /77   Pulse 89   Resp 16   Ht 1.626 m (5' 4\")   Wt 109 kg (240 lb 6.4 oz)   SpO2 97%   BMI 41.26 kg/m     Physical Exam  Constitutional:       Appearance: Normal appearance. He is obese.   Eyes:      Conjunctiva/sclera: Conjunctivae normal.   Cardiovascular:      Rate and Rhythm: Normal rate.   Pulmonary:      Effort: Pulmonary effort is normal.   Skin:     General: Skin is warm and dry.   Neurological:      Mental Status: He is alert and oriented " to person, place, and time.   Psychiatric:         Mood and Affect: Mood normal.         Behavior: Behavior normal.         Thought Content: Thought content normal.         Judgment: Judgment normal.          From a personal perspective, he lives alone in an apartment. 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     IMPRESSION   77 year old male patient with RIGHT lower lobe nodule.    Stage: Indeterminate pulmonary lesion, IF this were a cancer, clinical stage IV    PLAN  I spent 32 min on the date of the encounter in chart review, patient visit, review of tests, documentation and/or discussion with other providers about the issues documented above. I reviewed the plan as follows:  Procedure planned: Right thoracoscopic wedge resection  I discussed the risks and benefits of the operation, including obtaining a diagnosis, resecting and staging the cancer. The risks include bleeding, infection, prolonged air leak, deep venous thrombosis and pulmonary embolism, and death.  There is also a risk of prolonged pain, which could require further treatment.  Prolonged air leak could be treated with bronchoscopy and endobronchial valve insertion  or going home with a chest tube.  Postoperative bleeding (rare) could require a return to the OR. .  Necessary Preop Tests & Appointments: Preoperative assessment clinic and Pulmonary function testing  Regional Anesthesia Plan: Intraoperative intercostal nerve block  Anticoagulation Plan: Prophylactic Heparin     I appreciate the opportunity to participate in the care of your patient and will keep you updated.  Sincerely,    Hebert Jones MD

## 2023-02-27 NOTE — TELEPHONE ENCOUNTER
Spoke with patient to schedule procedure with Dr. Jones   Procedure was scheduled on 03/02/23 at  [facility]  Patient will have H&P with Pre op nurse clinic    Patient is aware a COVID-19 test is needed before their procedure.   (Patient is aware IP/Thoracic are still requiring COVID-19 test)     Patient will complete a PCR COVID-19 test due to inpatient status, patient will schedule at:     Patient is aware a / is needed day of surgery.   Surgery Letter was sent via [employer],     Patient has my direct contact information for any further questions.     Feb 28, 2023 10:30 AM  (Arrive by 10:15 AM)  FULL PULMONARY FUNCTION with UC PFL B  [facility] [place] ([facility] ) [phone]   Feb 28, 2023  1:40 PM  (Arrive by 1:25 PM)  PRE-OP with Barb Lees NP  [facility] ([facility]) [phone]      Mar 27, 2023  2:00 PM  Return Patient with Hebert Jones MD  [facility] ([facility] ) [phone]

## 2023-02-27 NOTE — PROGRESS NOTES
RECORDS STATUS - ALL OTHER DIAGNOSIS    Malignant neoplasm of oropharynx (H), Pulmonary nodules  RECORDS RECEIVED FROM:[facility]   DATE RECEIVED: 2/28/2023   NOTES STATUS DETAILS   OFFICE NOTE from referring provider Complete   ref by PORFIRIO Saenz   OFFICE NOTE from medical oncologist Complete 2/24/2023 Tumor Conference    OPERATIVE REPORT Complete See Biopsy in EPIC   MEDICATION LIST Complete [facility]   CLINICAL TRIAL TREATMENTS TO DATE     LABS     PATHOLOGY REPORTS See internal biopsy in [facility] 2/6/2023   A.  LEFT TONSIL, BIOPSY:  -HPV-ASSOCIATED SQUAMOUS CELL CARCINOMA   ANYTHING RELATED TO DIAGNOSIS     GENONOMIC TESTING     TYPE:     IMAGING (NEED IMAGES & REPORT)     CT SCANS Complete CT Soft Tissue Neck 2/20/2023    CT Chest Abdomen Pelvis 2/20/2023   MRI     Xray Video Swallow  Complete  2/24/2023   Xray Chest Complete 12/9/2020    MAMMO     ULTRASOUND     PET Complete 2/20/2023

## 2023-02-27 NOTE — PROGRESS NOTES
RECORDS STATUS - ALL OTHER DIAGNOSIS    Malignant neoplasm of oropharynx (H), Pulmonary nodules  RECORDS RECEIVED FROM:[facility]   DATE RECEIVED: 2/27/2023   NOTES STATUS DETAILS   OFFICE NOTE from referring provider Complete   ref by PORFIRIO Saenz   OFFICE NOTE from medical oncologist Complete 2/24/2023 Tumor Conference    OPERATIVE REPORT Complete See Biopsy in EPIC   MEDICATION LIST Complete [facility]   CLINICAL TRIAL TREATMENTS TO DATE     LABS     PATHOLOGY REPORTS See internal biopsy in [facility] 2/6/2023   A.  LEFT TONSIL, BIOPSY:  -HPV-ASSOCIATED SQUAMOUS CELL CARCINOMA   ANYTHING RELATED TO DIAGNOSIS     GENONOMIC TESTING     TYPE:     IMAGING (NEED IMAGES & REPORT)     CT SCANS Complete CT Soft Tissue Neck 2/20/2023    CT Chest Abdomen Pelvis 2/20/2023   MRI     Xray Video Swallow  Complete  2/24/2023   Xray Chest Complete 12/9/2020    MAMMO     ULTRASOUND     PET Complete 2/20/2023

## 2023-02-27 NOTE — LETTER
2/27/2023         RE: Jonathan Bishop  [address]  [place] [address]        Dear Colleague,    Thank you for referring your patient, Jonathan Bishop, to the [facility]. Please see a copy of my visit note below.    THORACIC SURGERY - NEW PATIENT OFFICE VISIT      Dear Dr. Nascimento,    I saw Conner Bishop at Dr. Eng's request in consultation for the evaluation and treatment of a nodule of the RIGHT lower lobe.    HPI  Conner Bishop is a 77 year old male patient who presents with a hypermetabolic right lower lobe lung nodule in the setting of squamous cell carcinoma of the left tonsil.     ECOG performance status  1- Mild physical restriction, sedentary  He uses a walker.              Previsit Tests   Pathology (2/62023): Left tonsil biopsy: HPV-associated carcinoma    PET scan (2/20/2023): FDG avid 0.8 cm RIGHT lower lobe Nodule (Max SUV 5.3). 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     Stroke (cerebrum) (H) 11/30/2016     Umbilical hernia 06/10/2011    s/p surgical repair     Wound, surgical, infected 06/10/2011    hernia        PSH  Reviewed, as below    Past Surgical History:   Procedure Laterality Date     ARTHROPLASTY KNEE BILATERAL  7/22/2010     COLONOSCOPY N/A 4/14/2017    Procedure: COLONOSCOPY;  Surgeon: Toby Bills MD;  Location: [facility]     CV CORONARY ANGIOGRAM N/A  "10/28/2019    Procedure: Coronary Angiogram;  Surgeon: Guerrero Huitron MD;  Location:  HEART CARDIAC CATH LAB     CV RIGHT HEART CATH MEASUREMENTS RECORDED N/A 10/28/2019    Procedure: Right Heart Cath;  Surgeon: Guerrero Huitron MD;  Location:  HEART CARDIAC CATH LAB     CV TRANSCATHETER AORTIC VALVE REPLACEMENT N/A 12/8/2020    Procedure: Transcatheter Aortic Valve Replacement;  Surgeon: Camila Begum MD;  Location:  HEART CARDIAC CATH LAB     FUSION LUMBAR ANTERIOR TWO LEVELS       HERNIORRHAPHY UMBILICAL  6/10/2011    Procedure:HERNIORRHAPHY UMBILICAL; Open, with mesh placement; Surgeon:HO PARHAM; Location:[facility]     LAMINECTOMY LUMBAR TWO LEVELS          No Known Allergies    Current Outpatient Medications   Medication     acetaminophen (TYLENOL) 325 MG tablet     aspirin (ASA) 81 MG EC tablet     atorvastatin (LIPITOR) 40 MG tablet     bisacodyl (DULCOLAX) 5 MG EC tablet     clopidogrel (PLAVIX) 75 MG tablet     diazepam (VALIUM) 5 MG tablet     ibuprofen (ADVIL/MOTRIN) 600 MG tablet     multivitamin, therapeutic with minerals (MULTI-VITAMIN) TABS     naloxone (NARCAN) 4 MG/0.1ML nasal spray     order for DME     other medical supplies     oxyCODONE-acetaminophen (PERCOCET) 5-325 MG tablet     polyethylene glycol (GOLYTELY) 236 g suspension     potassium chloride ER (KLOR-CON M) 20 MEQ CR tablet     sildenafil (VIAGRA) 100 MG tablet     spironolactone (ALDACTONE) 50 MG tablet     tamsulosin (FLOMAX) 0.4 MG capsule     torsemide (DEMADEX) 20 MG tablet     vitamin D3 (CHOLECALCIFEROL) 50 mcg (2000 units) tablet     No current facility-administered medications for this visit.       ETOH: Rare  TOBACCO: age 30 - 47. 1 pack per day. Quit 30 years ago.   OTHER DRUGS:  None    Physical examination  /77   Pulse 89   Resp 16   Ht 1.626 m (5' 4\")   Wt 109 kg (240 lb 6.4 oz)   SpO2 97%   BMI 41.26 kg/m     Physical Exam  Constitutional:       Appearance: Normal appearance. He is " obese.   Eyes:      Conjunctiva/sclera: Conjunctivae normal.   Cardiovascular:      Rate and Rhythm: Normal rate.   Pulmonary:      Effort: Pulmonary effort is normal.   Skin:     General: Skin is warm and dry.   Neurological:      Mental Status: He is alert and oriented to person, place, and time.   Psychiatric:         Mood and Affect: Mood normal.         Behavior: Behavior normal.         Thought Content: Thought content normal.         Judgment: Judgment normal.          From a personal perspective, he lives alone in an apartment. 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     IMPRESSION   77 year old male patient with RIGHT lower lobe nodule.    Stage: Indeterminate pulmonary lesion, IF this were a cancer, clinical stage IV    PLAN  I spent 32 min on the date of the encounter in chart review, patient visit, review of tests, documentation and/or discussion with other providers about the issues documented above. I reviewed the plan as follows:  Procedure planned: Right thoracoscopic wedge resection  I discussed the risks and benefits of the operation, including obtaining a diagnosis, resecting and staging the cancer. The risks include bleeding, infection, prolonged air leak, deep venous thrombosis and pulmonary embolism, and death.  There is also a risk of prolonged pain, which could require further treatment.  Prolonged air leak could be treated with bronchoscopy and endobronchial valve insertion  or going home with a chest tube.  Postoperative bleeding (rare) could require a return to the OR. .  Necessary Preop Tests & Appointments: Preoperative assessment clinic and Pulmonary function testing  Regional Anesthesia Plan: Intraoperative intercostal nerve block  Anticoagulation Plan: Prophylactic Heparin     I appreciate the opportunity to participate in the care of your patient and will keep you updated.  Sincerely,    Hebert Jones MD          Oncology Rooming  "Note    February 27, 2023 3:08 PM   Jonathan Bishop is a 77 year old male who presents for:    Chief Complaint   Patient presents with     Oncology Clinic Visit     New Patient     Initial Vitals: /77   Pulse 89   Resp 16   Ht 1.626 m (5' 4\")   Wt 109 kg (240 lb 6.4 oz)   SpO2 97%   BMI 41.26 kg/m   Estimated body mass index is 41.26 kg/m  as calculated from the following:    Height as of this encounter: 1.626 m (5' 4\").    Weight as of this encounter: 109 kg (240 lb 6.4 oz). Body surface area is 2.22 meters squared.  Extreme Pain (8) Comment: Data Unavailable   No LMP for male patient.  Allergies reviewed: Yes  Medications reviewed: Yes    Medications: Medication refills not needed today.  Pharmacy name entered into [facility]: [id number] - [place], [place] - [address] [facility]    Clinical concerns: Patient in pain in neck, back and both knees.       Jenni Carmona MA                Again, thank you for allowing me to participate in the care of your patient.        Sincerely,        Hebert Jones MD    "

## 2023-02-27 NOTE — PROGRESS NOTES
[facility]          OUTPATIENT SWALLOW  EVALUATION  PLAN OF TREATMENT FOR OUTPATIENT REHABILITATION  (COMPLETE FOR INITIAL CLAIMS ONLY)  Patient's Last Name, First Name, M.I.  YOB: 1945  Jonathan Bishop        Provider's Name   [facility]   Medical Record No.  [id number]     Start of Care Date:  02/24/23   Onset Date:   2/24/23   Type:     ___PT   ____OT  ___X_SLP Medical Diagnosis:   SCC oropharynx     Treatment Diagnosis:  Oropharyngeal swallow within functional limits however will likely become severely impaired due to radiation therapy. Pt also at high risk for long term dysphagia from radiation induced fibrosis. Visits from SOC:  1     _________________________________________________________________________________  Plan of Treatment/Functional Goals:  Planned Therapy Interventions: Dysphagia Treatment  Dysphagia treatment: Oropharyngeal exercise training, Modified diet education, Instruction of safe swallow strategies, Compensatory strategies for swallowing           Goals   1. Goal Identifier: PO       Goal Description: Pt will tolerate least restrictive diet while adhering to safe swallow precautions independently 100% of the time.       Target Date: 05/25/23           2. Goal Identifier: Exericses       Goal Description: Pt will improve and/or maintain ROM and strength of BOT, jaw, and pharynx by completing 10 repetitions of 5/5 exercises 3 times daily with minimal written or verbal cues.       Target Date: 05/25/23                               Predicted Duration of Therapy Intervention (days/wks): 2-4x/month x 6 months    Sherri Carrillo, SLP       I CERTIFY THE NEED FOR THESE SERVICES FURNISHED UNDER        THIS PLAN OF TREATMENT AND WHILE UNDER MY CARE     (Physician attestation of this document indicates review and certification of the therapy plan).                                Referring Physician: Dr. Karishma Eng    Initial Assessment        See Epic Evaluation Start Of Care Date: 02/24/23

## 2023-02-27 NOTE — PROGRESS NOTES
Speech-Language Pathology Department   EVALUATION  [facility]  Video swallow study      02/24/23 1000   General Information   Type Of Visit Initial   Start Of Care Date 02/24/23   Referring Physician Dr. 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 He was recommended to undergo chemoradiation therapy and thus referred to participate in baseline video swallow study. He reports today trouble with pain when swallowing. He denies coughing/choking when eating/drinking. He does take pills with applesauce as they are difficult for him to swallow.   Respiratory Status Room air   Prior Level Of Function Swallowing   Prior Level Of Function Comment Regular solids and thin liquids   Home/community Accessibility Comments (flowsheet Row) Pt uses 4 wheel walker to ambulate   General Observations Pt highly pleasant and cooperative throughout evaluation   Patient/family Goals Pt would like to continue eating and drinking.   Clinical Swallow Evaluation   Oral Musculature generally intact   Structural Abnormalities   (large oropharynx tumor visable when pt opens his mouth wide)   Dentition upper and lower dentures   Mucosal Quality good   Mandibular Strength and Mobility intact   Oral Labial Strength and Mobility WFL   Lingual Strength and Mobility WFL   Velar Elevation intact   Buccal Strength and Mobility intact   Laryngeal Function Cough;Throat clear;Swallow;Voicing initiated   VFSS Eval: Radiology   Radiologist Resident   Views Taken left lateral;A/P   Physical Location of Procedure [facility]   VFSS Eval: Thin Liquid Texture  Trial   Mode of Presentation, Thin Liquid cup;self-fed   Order of Presentation Series 1, 2, 12 (A/P)   Preparatory Phase WFL   Oral Phase, Thin Liquid WFL   Pharyngeal Phase, Thin Liquid WFL   Rosenbek's Penetration Aspiration Scale: Thin Liquid Trial Results 1 - no aspiration, contrast does not enter airway   Diagnostic Statement No aspriation/penetration noted on thin liquid trials.   VFSS Eval: Slightly Thick Liquids   Mode of Presentation cup;self-fed   Order of Presentation Series 3, 4   Preparatory Phase WFL   Oral Phase WFL   Pharyngeal Phase WFL   Rosenbek's Penetration Aspiration Scale 1 - no aspiration, contrast does not enter airway   Diagnostic Statement No aspiration/penetration noted on slightly thick liquid trials. No pharyngeal residue after completed swallow.   VFSS Eval: Mildly Thick Liquids    Mode of Presentation self-fed;cup   Order of Presentation Series 5, 6   Preparatory Phase WFL   Oral Phase WFL   Pharyngeal Phase WFL   Rosenbek's Penetration Aspiration Scale 1 - no aspiration, contrast does not enter airway   Diagnostic Statement No aspiration/penetration noted on mildly thick liquid trials. No pharyngeal residue after the completed swallow. Timely swallow.   VFSS Eval: Puree Solid Texture Trial   Mode of Presentation, Puree self-fed;spoon   Order of Presentation Series 7, 8, 12 (A/P)   Preparatory Phase WFL   Oral Phase, Puree WFL   Pharyngeal Phase, Puree WFL   Rosenbek's Penetration Aspiration Scale: Puree Food Trial Results 1 - no aspiration, contrast does not enter airway   Diagnostic Statement No aspiration/penetration noted on puree consistency trials. No pharyngeal residue noted. Adequate oral manipulation demonstrated. Pt given barium tablet in applesauce which passed through oral and pharyngeal phases without difficulty. Unfortunately, this series wasn't recorded.   VFSS Eval: Regular Texture Trial (Solid)   Mode of Presentation self-fed;spoon   Order of Presentation Series 9, 10    Preparatory Phase WFL   Oral Phase WFL   Pharyngeal Phase Residue in valleculae   Rosenbek's Penetration Aspiration Scale 1 - no aspiration, contrast does not enter airway   Diagnostic Statement No aspiration/penetration noted. Pt demonstrates bolus fractionation. He swallowed all of the initial amount but then passed the rest from his mouth into his pharynx. He did not trigger a swallow on this smaller amount until after a long pause.   Swallow Compensations   Swallow Compensations No compensations were used   Educational Assessment   Barriers to Learning No barriers   Esophageal Phase of Swallow   Patient reports or presents with symptoms of esophageal dysphagia No   Esophageal sweep performed during today s vidofluoroscopic exam  No  (Unable to complete as pt had to remain sitting during exam.)   General Therapy Interventions   Planned Therapy Interventions Dysphagia Treatment   Dysphagia treatment Oropharyngeal exercise training;Modified diet education;Instruction of safe swallow strategies;Compensatory strategies for swallowing   Swallow Eval: Clinical Impressions   Skilled Criteria for Therapy Intervention Skilled criteria met.  Treatment indicated.   Dysphagia Outcome Severity Scale (MOHINDER) Level 6 - MOHINDER   Treatment Diagnosis Oropharyngeal swallow within functional limits however will likely become severely impaired due to radiation therapy. Pt also at high risk for long term dysphagia from radiation induced fibrosis.   Diet texture recommendations Regular diet;Thin liquids (level 0)   Recommended Feeding/Eating Techniques maintain upright posture during/after eating for 30 mins;small sips/bites   Rehab Potential good, to achieve stated therapy goals   Predicted Duration of Therapy Intervention (days/wks) 2-4x/month x 6 months   Anticipated Discharge Disposition home w/ outpatient services   Risks and Benefits of Treatment have been explained. Yes   Patient, family and/or staff in agreement with Plan of  Care Yes   Clinical Impression Comments Overall, pt demonstrates safe functional oropharyngeal swallow. 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 There is no residual in the oral or pharyngeal phases when swallow is elicited. He does not have any episodes of aspiration/penetration. Adequate hyolaryngeal excursion and epiglottic inversion noted. UES opening is functional. Pt demonstrates bolus fractionation with cookie consistency. He swallowed the initial portion easily then advanced the remainder into his valleculae with very delayed second swallow. Once he triggers a swallow, the valleculae is cleared. He was able to swallow barium tablet in applesauce without difficulty. Unfortunately, this image wasn't saved. Recommend pt continue with regular consistency solids and thin liquids. He will eat slowly and alternate bites/sips. He will benefit from speech pathology services during his probable chemoradiation therapy.   Swallow Goal 1   Goal Identifier PO   Goal Description Pt will tolerate least restrictive diet while adhering to safe swallow precautions independently 100% of the time.   Target Date 05/25/23   Swallow Goal 2   Goal Identifier Exericses   Goal Description Pt will improve and/or maintain ROM and strength of BOT, jaw, and pharynx by completing 10 repetitions of 5/5 exercises 3 times daily with minimal written or verbal cues.   Target Date 05/25/23   Total Session Time   SLP Eval: VideoFluoroscopic Swallow function Minutes (04755) 20   Total Evaluation Time 20     Thank you for the referral of Jonathan Bishop. If you have any questions about this report, please contact me using the information below.      Sherri Carrillo MS, CCC-SLP  Speech-Language Pathology  [facility]  Department of Otolaryngology/D&T - 4th floor  Phone: [phone]  Email: [email or web address]

## 2023-02-28 ENCOUNTER — PATIENT OUTREACH (OUTPATIENT)
Dept: ONCOLOGY | Facility: CLINIC | Age: 78
End: 2023-02-28

## 2023-02-28 ENCOUNTER — OFFICE VISIT (OUTPATIENT)
Dept: FAMILY MEDICINE | Facility: CLINIC | Age: 78
End: 2023-02-28

## 2023-02-28 ENCOUNTER — PRE VISIT (OUTPATIENT)
Dept: ONCOLOGY | Facility: CLINIC | Age: 78
End: 2023-02-28
Payer: COMMERCIAL

## 2023-02-28 VITALS
WEIGHT: 240 LBS | SYSTOLIC BLOOD PRESSURE: 134 MMHG | TEMPERATURE: 98.1 F | BODY MASS INDEX: 44.16 KG/M2 | DIASTOLIC BLOOD PRESSURE: 75 MMHG | OXYGEN SATURATION: 94 % | HEIGHT: 62 IN | HEART RATE: 90 BPM

## 2023-02-28 DIAGNOSIS — R91.8 PULMONARY NODULES: ICD-10-CM

## 2023-02-28 DIAGNOSIS — R91.8 PULMONARY NODULES: Primary | ICD-10-CM

## 2023-02-28 DIAGNOSIS — Z01.818 PRE-OP EXAM: Primary | ICD-10-CM

## 2023-02-28 LAB — HBA1C MFR BLD: 6 %

## 2023-02-28 RX ORDER — IBUPROFEN 600 MG/1
600 TABLET, FILM COATED ORAL EVERY 8 HOURS PRN
Qty: 270 TABLET | Refills: 3 | Status: ON HOLD | OUTPATIENT
Start: 2023-02-28 | End: 2024-03-17

## 2023-02-28 ASSESSMENT — ENCOUNTER SYMPTOMS
ENDOCRINE NEGATIVE: 1
WEAKNESS: 1
ALLERGIC/IMMUNOLOGIC NEGATIVE: 1
COUGH: 1
ACTIVITY CHANGE: 1
PSYCHIATRIC NEGATIVE: 1
FATIGUE: 1
SORE THROAT: 1
TROUBLE SWALLOWING: 1
GASTROINTESTINAL NEGATIVE: 1
SHORTNESS OF BREATH: 1
PARESTHESIAS: 1
ARTHRALGIAS: 1

## 2023-02-28 NOTE — PROGRESS NOTES
[facility]  [address] [place] [id number]  Phone: [phone]  Fax: [phone]  Primary Provider: Buck Nascimento  Pre-op Performing Provider: MANGO RODRÍGUEZ   :[id number]}  PREOPERATIVE EVALUATION:  Today's date: 2/28/2023    Jonathan Bishop is a 77 year old male who presents for a preoperative evaluation.    Surgical Information:  Surgery/Procedure: Right thoracoscopic wedge resection  Surgery Location:  OR  Surgeon: Hebert Jones MD  Surgery Date: 3/2/2023  Time of Surgery: 3:00 PM  Where patient plans to recover: At home with family  Fax number for surgical facility: Note does not need to be faxed, will be available electronically in Epic.    Type of Anesthesia Anticipated: General      Subjective     HPI related to upcoming procedure: Mr. Bishop is here, accompanied by his daughter Fanny), for pre-op clearance for the above procedure.  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  A PET scan was performed, the report:      Hypermetabolic 0.8 cm solid right lower lobe pulmonary nodule is  highly concerning for metastasis.    According to Mr. Bishop and his daughter, the decision was made to resect this area of the lung to determine if this is a metastatic nodule vs other, then treatment for his cancer will be based on this information.     Mr. Bishop has multiple health issues well documented in his patient chart.         Preop Questions 2/28/2023   1. Have you ever had a heart attack or stroke? YES: CVA 17-18 years ago, left sided weakness   2. Have you ever had surgery on your heart or blood vessels, such as a stent placement, a coronary artery bypass, or surgery on an artery in your head, neck, heart, or legs? YES - S/P TAVR (transcatherter aortic valve replacement 12/08/2020   3. Do you have chest pain with activity? No   4. Do you have a history of  heart failure? No   5. Do you currently have a cold, bronchitis or symptoms of other  infection? No   6. Do you have a cough, shortness of breath, or wheezing? YES - this is recent and associated with current condition being treated.  PFT's scheduled for 2/28/23.   7. Do you or anyone in your family have previous history of blood clots? No   8. Do you or does anyone in your family have a serious bleeding problem such as prolonged bleeding following surgeries or cuts? No   9. Have you ever had problems with anemia or been told to take iron pills? No   10. Have you had any abnormal blood loss such as black, tarry or bloody stools? No   11. Have you ever had a blood transfusion? No   12. Are you willing to have a blood transfusion if it is medically needed before, during, or after your surgery? NO- patient stated DNR vs no transfusion, was asked multiple times but was not clear if he meant transfusion vs DNR   13. Have you or any of your relatives ever had problems with anesthesia? No   14. Do you have sleep apnea, excessive snoring or daytime drowsiness? No   15. Do you have any artifical heart valves or other implanted medical devices like a pacemaker, defibrillator, or continuous glucose monitor? No   16. Do you have artificial joints? YES - both knees   17. Are you allergic to latex? No     Health Care Directive:  Patient does not have a Health Care Directive or Living Will: Discussed advance care planning with patient; information given to patient to review.  Mr. Bishop stated to daughter that he wanted to be DNR.  There is no other documentation for that.    Preoperative Review of :   reviewed - controlled substances reflected in medication list.     Chronic health concerns:  See PMH in chart.      Review of Systems   Constitutional: Positive for activity change and fatigue.        More fatigued then usual.     HENT: Positive for sore throat and trouble swallowing.         Symptoms associated with squamous cell carcinoma.  Coughs up blood at times.   Respiratory: Positive for cough and  shortness of breath.         As noted above.  Hemoptysis    Cardiovascular:        As above     Gastrointestinal: Negative.    Endocrine: Negative.    Genitourinary:        Denies but has a history of using Viagra which he does not use now   Musculoskeletal: Positive for arthralgias and gait problem.   Skin: Negative.    Allergic/Immunologic: Negative.    Neurological: Positive for weakness and paresthesias.        Left sided weakness post CVA of 17-18 years ago   Psychiatric/Behavioral: Negative.         Alert, oriented, in good spirits, good memory of events.       Patient Active Problem List    Diagnosis Date Noted     Bilateral leg edema 01/21/2021     Priority: Medium     Diastolic heart failure (H) 01/07/2021     Priority: Medium     S/P TAVR (transcatheter aortic valve replacement) 01/07/2021     Priority: Medium     Aortic stenosis, severe 12/08/2020     Priority: Medium     Aortic valve stenosis, etiology of cardiac valve disease unspecified 12/02/2020     Priority: Medium     Added automatically from request for surgery [id number]       Morbid obesity (H) 11/09/2020     Priority: Medium     Status post coronary angiogram 10/28/2019     Priority: Medium     Systolic murmur 07/01/2019     Priority: Medium     Peripheral edema 07/01/2019     Priority: Medium     Hyperplasia of prostate with lower urinary tract symptoms (LUTS) 07/01/2019     Priority: Medium     Erectile dysfunction, unspecified erectile dysfunction type 07/01/2019     Priority: Medium     Severe aortic stenosis 07/01/2019     Priority: Medium     Dermatophytosis of nail 04/05/2017     Priority: Medium     PVD (peripheral vascular disease) (H) 04/05/2017     Priority: Medium     Cerebrovascular accident involving anterior circulation, right (H) 12/09/2016     Priority: Medium     Stroke (H) 11/30/2016     Priority: Medium     Low back pain 05/14/2014     Priority: Medium     Osteoarthritis of knee 05/14/2014     Priority: Medium     Medication  refill- do not delete  11/13/2013     Priority: Medium     Essential hypertension, benign 03/09/2012     Priority: Medium     Hyperlipidemia with target LDL less than 130 03/09/2012     Priority: Medium     Diagnosis updated by automated process. Provider to review and confirm.       Anemia 03/09/2012     Priority: Medium     Problem list name updated by automated process. Provider to review        Past Medical History:   Diagnosis Date     * * * SBE PROPHYLAXIS * * *     s/p TAVR     Acute rheumatic carditis 1950     Complication of anesthesia     pt once woke up during back surgery     Coronary artery disease     murmur     Erectile dysfunction     Sildenafil     Hip pain, bilateral      Hypercholesteremia      Hypertension      Low back pain      Nonsenile cataract      Obesity      Renal cyst      S/P 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     Stroke (cerebrum) (H) 11/30/2016     Umbilical hernia 06/10/2011    s/p surgical repair     Wound, surgical, infected 06/10/2011    hernia     Past Surgical History:   Procedure Laterality Date     ARTHROPLASTY KNEE BILATERAL  7/22/2010     COLONOSCOPY N/A 4/14/2017    Procedure: COLONOSCOPY;  Surgeon: Toby Bills MD;  Location: [facility]     CV CORONARY ANGIOGRAM N/A 10/28/2019    Procedure: Coronary Angiogram;  Surgeon: Guerrero Huitron MD;  Location:  HEART CARDIAC CATH LAB     CV RIGHT HEART CATH MEASUREMENTS RECORDED N/A 10/28/2019    Procedure: Right Heart Cath;  Surgeon: Guerrero Huitron MD;  Location:  HEART CARDIAC CATH LAB     CV TRANSCATHETER AORTIC VALVE REPLACEMENT N/A 12/8/2020    Procedure: Transcatheter Aortic Valve Replacement;  Surgeon: Camila Begum MD;  Location:  HEART CARDIAC CATH LAB     FUSION LUMBAR ANTERIOR TWO LEVELS       HERNIORRHAPHY UMBILICAL  6/10/2011    Procedure:HERNIORRHAPHY UMBILICAL; Open, with mesh placement; Surgeon:HO PARHAM; Location:[facility]      LAMINECTOMY LUMBAR TWO LEVELS       Current Outpatient Medications   Medication Sig Dispense Refill     acetaminophen (TYLENOL) 325 MG tablet Take 1-2 tablets (325-650 mg) by mouth every 4 hours as needed for mild pain or headaches       aspirin (ASA) 81 MG EC tablet Take 1 tablet (81 mg) by mouth daily 30 tablet 0     atorvastatin (LIPITOR) 40 MG tablet Take 1 tablet (40 mg) by mouth daily 90 tablet 3     bisacodyl (DULCOLAX) 5 MG EC tablet Two days prior to exam take two (2) tablets at 4pm. One day prior to exam take two (2) tablets at 4pm 4 tablet 0     clopidogrel (PLAVIX) 75 MG tablet Take 1 tablet (75 mg) by mouth daily 90 tablet 0     diazepam (VALIUM) 5 MG tablet Take 1 tablet (5 mg) by mouth once as needed for anxiety May repeat one time if needed. 2 tablet 0     ibuprofen (ADVIL/MOTRIN) 600 MG tablet Take 1 tablet (600 mg) by mouth every 8 hours as needed for moderate pain 270 tablet 3     multivitamin, therapeutic with minerals (MULTI-VITAMIN) TABS Take 1 tablet by mouth daily. 100 tablet 3     naloxone (NARCAN) 4 MG/0.1ML nasal spray Spray 1 spray (4 mg) into one nostril alternating nostrils once as needed for opioid reversal Every 2-3 min until responsive or EMS arrives 0.2 mL 0     order for DME Equipment being ordered: Walker ()  Treatment Diagnosis: stroke 1 Units 0     other medical supplies Dispense knee high, medium compression, large size 4 each 0     oxyCODONE-acetaminophen (PERCOCET) 5-325 MG tablet Take 1-2 tablets by mouth every 6 hours as needed for pain Max 5 per day. 150 tablet 0     polyethylene glycol (GOLYTELY) 236 g suspension Take as directed. Two days before your exam fill the first container with water. Cover and shake until mixed well. At 5:00pm drink one 8oz glass every 10-15 minutes until half (1/2) of the first container is empty. Store the remainder in the refrigerator. One day before your exam at 5:00pm drink the second half of the first container until it is gone. Before  you go to bed mix the second container with water and put in refrigerator. Six hours before your check in time drink one 8oz glass every 10-15 minutes until half of container is empty. Discard the remainder of solution. 8000 mL 0     potassium chloride ER (KLOR-CON M) 20 MEQ CR tablet Take 1 tablet (20 mEq) by mouth daily 90 tablet 3     sildenafil (VIAGRA) 100 MG tablet TAKE 30MIN TO 4 HOURS BEFORE INTERCOURSE AS NEEDED FOR ERECTILE DYSFUNCTION 30 tablet 5     spironolactone (ALDACTONE) 50 MG tablet Take 1 tablet (50 mg) by mouth daily 90 tablet 3     tamsulosin (FLOMAX) 0.4 MG capsule TAKE 1 CAPSULE BY MOUTH ONCE DAILY 90 capsule 4     torsemide (DEMADEX) 20 MG tablet Take 2 tablets (40 mg) by mouth daily 180 tablet 3     vitamin D3 (CHOLECALCIFEROL) 50 mcg (2000 units) tablet Take 1 tablet by mouth daily         No Known Allergies     Social History     Tobacco Use     Smoking status: Former     Types: Cigarettes     Quit date: 2005     Years since quittin.1     Smokeless tobacco: Never     Tobacco comments:     Non smoker. No 2nd hand exposure. CCX, RMA PCC 2011   Substance Use Topics     Alcohol use: No     Family History   Problem Relation Age of Onset     C.A.D. Mother      Unknown/Adopted Father      Heart Failure Sister      Heart Failure Sister      Glaucoma No family hx of      Macular Degeneration No family hx of      Diabetes No family hx of      Hypertension No family hx of      History   Drug Use No         Objective     There were no vitals taken for this visit.    Physical Exam  Constitutional:       Appearance: Normal appearance. He is obese.   HENT:      Head: Normocephalic and atraumatic.      Right Ear: Tympanic membrane, ear canal and external ear normal.      Left Ear: Tympanic membrane, ear canal and external ear normal.      Nose: Nose normal.      Mouth/Throat:      Mouth: Mucous membranes are moist.      Pharynx: Oropharynx is clear.      Comments: UPPER and LOWER  DENTURES  Eyes:      Extraocular Movements: Extraocular movements intact.   Cardiovascular:      Rate and Rhythm: Normal rate and regular rhythm.      Heart sounds: Murmur heard.      Comments: EKG:  FIRST DEGREE HEART BLOCK  Pulmonary:      Comments: Breath sounds distant bilaterally  Abdominal:      General: Bowel sounds are normal. There is distension.      Palpations: Abdomen is soft.   Musculoskeletal:      Cervical back: Normal range of motion and neck supple.      Comments: Uses walker for mobility, gait slow   Skin:     General: Skin is warm and dry.      Capillary Refill: Capillary refill takes less than 2 seconds.   Neurological:      Mental Status: He is alert and oriented to person, place, and time. Mental status is at baseline.      Motor: Weakness present.      Gait: Gait abnormal.      Comments: Weakness left side post CVA (17-18 years ago.)   Psychiatric:         Mood and Affect: Mood normal.         Behavior: Behavior normal.         Thought Content: Thought content normal.         Judgment: Judgment normal.         Recent Labs   Lab Test 02/27/23  1603 08/03/22  1240 12/08/21  0942 11/29/21  1019   HGB 9.4* 10.6*   < > 11.0*    227   < > 250    140   < > 143   POTASSIUM 4.2 3.5   < > 3.7   CR 0.94 0.88   < > 1.08   A1C  --   --   --  6.0*    < > = values in this interval not displayed.        Diagnostics:  Recent Results (from the past 168 hour(s))   Asymptomatic COVID-19 Virus (Coronavirus) by PCR Nose    Collection Time: 02/27/23  4:03 PM    Specimen: Nose; Swab   Result Value Ref Range    SARS CoV2 PCR Negative Negative   Comprehensive metabolic panel    Collection Time: 02/27/23  4:03 PM   Result Value Ref Range    Sodium 141 136 - 145 mmol/L    Potassium 4.2 3.4 - 5.3 mmol/L    Chloride 106 98 - 107 mmol/L    Carbon Dioxide (CO2) 25 22 - 29 mmol/L    Anion Gap 10 7 - 15 mmol/L    Urea Nitrogen 19.2 8.0 - 23.0 mg/dL    Creatinine 0.94 0.67 - 1.17 mg/dL    Calcium 9.6 8.8 - 10.2  mg/dL    Glucose 92 70 - 99 mg/dL    Alkaline Phosphatase 105 40 - 129 U/L    AST 27 10 - 50 U/L    ALT 15 10 - 50 U/L    Protein Total 7.0 6.4 - 8.3 g/dL    Albumin 4.0 3.5 - 5.2 g/dL    Bilirubin Total 0.2 <=1.2 mg/dL    GFR Estimate 83 >60 mL/min/1.73m2   CBC with platelets and differential    Collection Time: 02/27/23  4:03 PM   Result Value Ref Range    WBC Count 8.7 4.0 - 11.0 10e3/uL    RBC Count 3.79 (L) 4.40 - 5.90 10e6/uL    Hemoglobin 9.4 (L) 13.3 - 17.7 g/dL    Hematocrit 30.8 (L) 40.0 - 53.0 %    MCV 81 78 - 100 fL    MCH 24.8 (L) 26.5 - 33.0 pg    MCHC 30.5 (L) 31.5 - 36.5 g/dL    RDW 16.0 (H) 10.0 - 15.0 %    Platelet Count 226 150 - 450 10e3/uL    % Neutrophils 63 %    % Lymphocytes 23 %    % Monocytes 9 %    % Eosinophils 4 %    % Basophils 1 %    % Immature Granulocytes 0 %    NRBCs per 100 WBC 0 <1 /100    Absolute Neutrophils 5.6 1.6 - 8.3 10e3/uL    Absolute Lymphocytes 2.0 0.8 - 5.3 10e3/uL    Absolute Monocytes 0.8 0.0 - 1.3 10e3/uL    Absolute Eosinophils 0.4 0.0 - 0.7 10e3/uL    Absolute Basophils 0.0 0.0 - 0.2 10e3/uL    Absolute Immature Granulocytes 0.0 <=0.4 10e3/uL    Absolute NRBCs 0.0 10e3/uL   Adult Type and Screen    Collection Time: 02/27/23  4:03 PM   Result Value Ref Range    ABO/RH(D) O POS     Antibody Screen Negative Negative    SPECIMEN EXPIRATION DATE [id number]    Hemoglobin A1c    Collection Time: 02/27/23  4:03 PM   Result Value Ref Range    Hemoglobin A1C 6.0 (H) <5.7 %      EKG:  First degree AV block- compared to EKG from 1/7/2021, no change    Revised Cardiac Risk Index (RCRI):  The patient has the following serious cardiovascular risks for perioperative complications:   - Coronary Artery Disease (MI, positive stress test, angina, Qs on EKG) = 1 point   - Cerebrovascular Disease (TIA or CVA) = 1 point     RCRI Interpretation: 2 points: Class III (moderate risk - 6.6% complication rate)     Estimated Functional Capacity: CANNOT perform 4 METS without symptoms   Related to mobility issues as well         Signed Electronically by: Barb Lees NP  Copy of this evaluation report is provided to requesting physician.

## 2023-02-28 NOTE — H&P (VIEW-ONLY)
[facility]  [address] [place] [id number]  Phone: [phone]  Fax: [phone]  Primary Provider: Buck Nascimento  Pre-op Performing Provider: MANGO RODRÍGUEZ   :[id number]}  PREOPERATIVE EVALUATION:  Today's date: 2/28/2023    Jonathan Bishop is a 77 year old male who presents for a preoperative evaluation.    Surgical Information:  Surgery/Procedure: Right thoracoscopic wedge resection  Surgery Location:  OR  Surgeon: Hebert Jones MD  Surgery Date: 3/2/2023  Time of Surgery: 3:00 PM  Where patient plans to recover: At home with family  Fax number for surgical facility: Note does not need to be faxed, will be available electronically in Epic.    Type of Anesthesia Anticipated: General      Subjective     HPI related to upcoming procedure: Mr. Bishop is here, accompanied by his daughter Fanny), for pre-op clearance for the above procedure.  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  A PET scan was performed, the report:      Hypermetabolic 0.8 cm solid right lower lobe pulmonary nodule is  highly concerning for metastasis.    According to Mr. Bishop and his daughter, the decision was made to resect this area of the lung to determine if this is a metastatic nodule vs other, then treatment for his cancer will be based on this information.     Mr. Bishop has multiple health issues well documented in his patient chart.         Preop Questions 2/28/2023   1. Have you ever had a heart attack or stroke? YES: CVA 17-18 years ago, left sided weakness   2. Have you ever had surgery on your heart or blood vessels, such as a stent placement, a coronary artery bypass, or surgery on an artery in your head, neck, heart, or legs? YES - S/P TAVR (transcatherter aortic valve replacement 12/08/2020   3. Do you have chest pain with activity? No   4. Do you have a history of  heart failure? No   5. Do you currently have a cold, bronchitis or symptoms of other  infection? No   6. Do you have a cough, shortness of breath, or wheezing? YES - this is recent and associated with current condition being treated.  PFT's scheduled for 2/28/23.   7. Do you or anyone in your family have previous history of blood clots? No   8. Do you or does anyone in your family have a serious bleeding problem such as prolonged bleeding following surgeries or cuts? No   9. Have you ever had problems with anemia or been told to take iron pills? No   10. Have you had any abnormal blood loss such as black, tarry or bloody stools? No   11. Have you ever had a blood transfusion? No   12. Are you willing to have a blood transfusion if it is medically needed before, during, or after your surgery? NO- patient stated DNR vs no transfusion, was asked multiple times but was not clear if he meant transfusion vs DNR   13. Have you or any of your relatives ever had problems with anesthesia? No   14. Do you have sleep apnea, excessive snoring or daytime drowsiness? No   15. Do you have any artifical heart valves or other implanted medical devices like a pacemaker, defibrillator, or continuous glucose monitor? No   16. Do you have artificial joints? YES - both knees   17. Are you allergic to latex? No     Health Care Directive:  Patient does not have a Health Care Directive or Living Will: Discussed advance care planning with patient; information given to patient to review.  Mr. Bishop stated to daughter that he wanted to be DNR.  There is no other documentation for that.    Preoperative Review of :   reviewed - controlled substances reflected in medication list.     Chronic health concerns:  See PMH in chart.      Review of Systems   Constitutional: Positive for activity change and fatigue.        More fatigued then usual.     HENT: Positive for sore throat and trouble swallowing.         Symptoms associated with squamous cell carcinoma.  Coughs up blood at times.   Respiratory: Positive for cough and  shortness of breath.         As noted above.  Hemoptysis    Cardiovascular:        As above     Gastrointestinal: Negative.    Endocrine: Negative.    Genitourinary:        Denies but has a history of using Viagra which he does not use now   Musculoskeletal: Positive for arthralgias and gait problem.   Skin: Negative.    Allergic/Immunologic: Negative.    Neurological: Positive for weakness and paresthesias.        Left sided weakness post CVA of 17-18 years ago   Psychiatric/Behavioral: Negative.         Alert, oriented, in good spirits, good memory of events.       Patient Active Problem List    Diagnosis Date Noted     Bilateral leg edema 01/21/2021     Priority: Medium     Diastolic heart failure (H) 01/07/2021     Priority: Medium     S/P TAVR (transcatheter aortic valve replacement) 01/07/2021     Priority: Medium     Aortic stenosis, severe 12/08/2020     Priority: Medium     Aortic valve stenosis, etiology of cardiac valve disease unspecified 12/02/2020     Priority: Medium     Added automatically from request for surgery [id number]       Morbid obesity (H) 11/09/2020     Priority: Medium     Status post coronary angiogram 10/28/2019     Priority: Medium     Systolic murmur 07/01/2019     Priority: Medium     Peripheral edema 07/01/2019     Priority: Medium     Hyperplasia of prostate with lower urinary tract symptoms (LUTS) 07/01/2019     Priority: Medium     Erectile dysfunction, unspecified erectile dysfunction type 07/01/2019     Priority: Medium     Severe aortic stenosis 07/01/2019     Priority: Medium     Dermatophytosis of nail 04/05/2017     Priority: Medium     PVD (peripheral vascular disease) (H) 04/05/2017     Priority: Medium     Cerebrovascular accident involving anterior circulation, right (H) 12/09/2016     Priority: Medium     Stroke (H) 11/30/2016     Priority: Medium     Low back pain 05/14/2014     Priority: Medium     Osteoarthritis of knee 05/14/2014     Priority: Medium     Medication  refill- do not delete  11/13/2013     Priority: Medium     Essential hypertension, benign 03/09/2012     Priority: Medium     Hyperlipidemia with target LDL less than 130 03/09/2012     Priority: Medium     Diagnosis updated by automated process. Provider to review and confirm.       Anemia 03/09/2012     Priority: Medium     Problem list name updated by automated process. Provider to review        Past Medical History:   Diagnosis Date     * * * SBE PROPHYLAXIS * * *     s/p TAVR     Acute rheumatic carditis 1950     Complication of anesthesia     pt once woke up during back surgery     Coronary artery disease     murmur     Erectile dysfunction     Sildenafil     Hip pain, bilateral      Hypercholesteremia      Hypertension      Low back pain      Nonsenile cataract      Obesity      Renal cyst      S/P 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     Stroke (cerebrum) (H) 11/30/2016     Umbilical hernia 06/10/2011    s/p surgical repair     Wound, surgical, infected 06/10/2011    hernia     Past Surgical History:   Procedure Laterality Date     ARTHROPLASTY KNEE BILATERAL  7/22/2010     COLONOSCOPY N/A 4/14/2017    Procedure: COLONOSCOPY;  Surgeon: Toby Bills MD;  Location: [facility]     CV CORONARY ANGIOGRAM N/A 10/28/2019    Procedure: Coronary Angiogram;  Surgeon: Guerrero Huitron MD;  Location:  HEART CARDIAC CATH LAB     CV RIGHT HEART CATH MEASUREMENTS RECORDED N/A 10/28/2019    Procedure: Right Heart Cath;  Surgeon: Guerrero Huitron MD;  Location:  HEART CARDIAC CATH LAB     CV TRANSCATHETER AORTIC VALVE REPLACEMENT N/A 12/8/2020    Procedure: Transcatheter Aortic Valve Replacement;  Surgeon: Camila Begum MD;  Location:  HEART CARDIAC CATH LAB     FUSION LUMBAR ANTERIOR TWO LEVELS       HERNIORRHAPHY UMBILICAL  6/10/2011    Procedure:HERNIORRHAPHY UMBILICAL; Open, with mesh placement; Surgeon:HO PARHAM; Location:[facility]      LAMINECTOMY LUMBAR TWO LEVELS       Current Outpatient Medications   Medication Sig Dispense Refill     acetaminophen (TYLENOL) 325 MG tablet Take 1-2 tablets (325-650 mg) by mouth every 4 hours as needed for mild pain or headaches       aspirin (ASA) 81 MG EC tablet Take 1 tablet (81 mg) by mouth daily 30 tablet 0     atorvastatin (LIPITOR) 40 MG tablet Take 1 tablet (40 mg) by mouth daily 90 tablet 3     bisacodyl (DULCOLAX) 5 MG EC tablet Two days prior to exam take two (2) tablets at 4pm. One day prior to exam take two (2) tablets at 4pm 4 tablet 0     clopidogrel (PLAVIX) 75 MG tablet Take 1 tablet (75 mg) by mouth daily 90 tablet 0     diazepam (VALIUM) 5 MG tablet Take 1 tablet (5 mg) by mouth once as needed for anxiety May repeat one time if needed. 2 tablet 0     ibuprofen (ADVIL/MOTRIN) 600 MG tablet Take 1 tablet (600 mg) by mouth every 8 hours as needed for moderate pain 270 tablet 3     multivitamin, therapeutic with minerals (MULTI-VITAMIN) TABS Take 1 tablet by mouth daily. 100 tablet 3     naloxone (NARCAN) 4 MG/0.1ML nasal spray Spray 1 spray (4 mg) into one nostril alternating nostrils once as needed for opioid reversal Every 2-3 min until responsive or EMS arrives 0.2 mL 0     order for DME Equipment being ordered: Walker ()  Treatment Diagnosis: stroke 1 Units 0     other medical supplies Dispense knee high, medium compression, large size 4 each 0     oxyCODONE-acetaminophen (PERCOCET) 5-325 MG tablet Take 1-2 tablets by mouth every 6 hours as needed for pain Max 5 per day. 150 tablet 0     polyethylene glycol (GOLYTELY) 236 g suspension Take as directed. Two days before your exam fill the first container with water. Cover and shake until mixed well. At 5:00pm drink one 8oz glass every 10-15 minutes until half (1/2) of the first container is empty. Store the remainder in the refrigerator. One day before your exam at 5:00pm drink the second half of the first container until it is gone. Before  you go to bed mix the second container with water and put in refrigerator. Six hours before your check in time drink one 8oz glass every 10-15 minutes until half of container is empty. Discard the remainder of solution. 8000 mL 0     potassium chloride ER (KLOR-CON M) 20 MEQ CR tablet Take 1 tablet (20 mEq) by mouth daily 90 tablet 3     sildenafil (VIAGRA) 100 MG tablet TAKE 30MIN TO 4 HOURS BEFORE INTERCOURSE AS NEEDED FOR ERECTILE DYSFUNCTION 30 tablet 5     spironolactone (ALDACTONE) 50 MG tablet Take 1 tablet (50 mg) by mouth daily 90 tablet 3     tamsulosin (FLOMAX) 0.4 MG capsule TAKE 1 CAPSULE BY MOUTH ONCE DAILY 90 capsule 4     torsemide (DEMADEX) 20 MG tablet Take 2 tablets (40 mg) by mouth daily 180 tablet 3     vitamin D3 (CHOLECALCIFEROL) 50 mcg (2000 units) tablet Take 1 tablet by mouth daily         No Known Allergies     Social History     Tobacco Use     Smoking status: Former     Types: Cigarettes     Quit date: 2005     Years since quittin.1     Smokeless tobacco: Never     Tobacco comments:     Non smoker. No 2nd hand exposure. CCX, RMA PCC 2011   Substance Use Topics     Alcohol use: No     Family History   Problem Relation Age of Onset     C.A.D. Mother      Unknown/Adopted Father      Heart Failure Sister      Heart Failure Sister      Glaucoma No family hx of      Macular Degeneration No family hx of      Diabetes No family hx of      Hypertension No family hx of      History   Drug Use No         Objective     There were no vitals taken for this visit.    Physical Exam  Constitutional:       Appearance: Normal appearance. He is obese.   HENT:      Head: Normocephalic and atraumatic.      Right Ear: Tympanic membrane, ear canal and external ear normal.      Left Ear: Tympanic membrane, ear canal and external ear normal.      Nose: Nose normal.      Mouth/Throat:      Mouth: Mucous membranes are moist.      Pharynx: Oropharynx is clear.      Comments: UPPER and LOWER  DENTURES  Eyes:      Extraocular Movements: Extraocular movements intact.   Cardiovascular:      Rate and Rhythm: Normal rate and regular rhythm.      Heart sounds: Murmur heard.      Comments: EKG:  FIRST DEGREE HEART BLOCK  Pulmonary:      Comments: Breath sounds distant bilaterally  Abdominal:      General: Bowel sounds are normal. There is distension.      Palpations: Abdomen is soft.   Musculoskeletal:      Cervical back: Normal range of motion and neck supple.      Comments: Uses walker for mobility, gait slow   Skin:     General: Skin is warm and dry.      Capillary Refill: Capillary refill takes less than 2 seconds.   Neurological:      Mental Status: He is alert and oriented to person, place, and time. Mental status is at baseline.      Motor: Weakness present.      Gait: Gait abnormal.      Comments: Weakness left side post CVA (17-18 years ago.)   Psychiatric:         Mood and Affect: Mood normal.         Behavior: Behavior normal.         Thought Content: Thought content normal.         Judgment: Judgment normal.         Recent Labs   Lab Test 02/27/23  1603 08/03/22  1240 12/08/21  0942 11/29/21  1019   HGB 9.4* 10.6*   < > 11.0*    227   < > 250    140   < > 143   POTASSIUM 4.2 3.5   < > 3.7   CR 0.94 0.88   < > 1.08   A1C  --   --   --  6.0*    < > = values in this interval not displayed.        Diagnostics:  Recent Results (from the past 168 hour(s))   Asymptomatic COVID-19 Virus (Coronavirus) by PCR Nose    Collection Time: 02/27/23  4:03 PM    Specimen: Nose; Swab   Result Value Ref Range    SARS CoV2 PCR Negative Negative   Comprehensive metabolic panel    Collection Time: 02/27/23  4:03 PM   Result Value Ref Range    Sodium 141 136 - 145 mmol/L    Potassium 4.2 3.4 - 5.3 mmol/L    Chloride 106 98 - 107 mmol/L    Carbon Dioxide (CO2) 25 22 - 29 mmol/L    Anion Gap 10 7 - 15 mmol/L    Urea Nitrogen 19.2 8.0 - 23.0 mg/dL    Creatinine 0.94 0.67 - 1.17 mg/dL    Calcium 9.6 8.8 - 10.2  mg/dL    Glucose 92 70 - 99 mg/dL    Alkaline Phosphatase 105 40 - 129 U/L    AST 27 10 - 50 U/L    ALT 15 10 - 50 U/L    Protein Total 7.0 6.4 - 8.3 g/dL    Albumin 4.0 3.5 - 5.2 g/dL    Bilirubin Total 0.2 <=1.2 mg/dL    GFR Estimate 83 >60 mL/min/1.73m2   CBC with platelets and differential    Collection Time: 02/27/23  4:03 PM   Result Value Ref Range    WBC Count 8.7 4.0 - 11.0 10e3/uL    RBC Count 3.79 (L) 4.40 - 5.90 10e6/uL    Hemoglobin 9.4 (L) 13.3 - 17.7 g/dL    Hematocrit 30.8 (L) 40.0 - 53.0 %    MCV 81 78 - 100 fL    MCH 24.8 (L) 26.5 - 33.0 pg    MCHC 30.5 (L) 31.5 - 36.5 g/dL    RDW 16.0 (H) 10.0 - 15.0 %    Platelet Count 226 150 - 450 10e3/uL    % Neutrophils 63 %    % Lymphocytes 23 %    % Monocytes 9 %    % Eosinophils 4 %    % Basophils 1 %    % Immature Granulocytes 0 %    NRBCs per 100 WBC 0 <1 /100    Absolute Neutrophils 5.6 1.6 - 8.3 10e3/uL    Absolute Lymphocytes 2.0 0.8 - 5.3 10e3/uL    Absolute Monocytes 0.8 0.0 - 1.3 10e3/uL    Absolute Eosinophils 0.4 0.0 - 0.7 10e3/uL    Absolute Basophils 0.0 0.0 - 0.2 10e3/uL    Absolute Immature Granulocytes 0.0 <=0.4 10e3/uL    Absolute NRBCs 0.0 10e3/uL   Adult Type and Screen    Collection Time: 02/27/23  4:03 PM   Result Value Ref Range    ABO/RH(D) O POS     Antibody Screen Negative Negative    SPECIMEN EXPIRATION DATE [id number]    Hemoglobin A1c    Collection Time: 02/27/23  4:03 PM   Result Value Ref Range    Hemoglobin A1C 6.0 (H) <5.7 %      EKG:  First degree AV block- compared to EKG from 1/7/2021, no change    Revised Cardiac Risk Index (RCRI):  The patient has the following serious cardiovascular risks for perioperative complications:   - Coronary Artery Disease (MI, positive stress test, angina, Qs on EKG) = 1 point   - Cerebrovascular Disease (TIA or CVA) = 1 point     RCRI Interpretation: 2 points: Class III (moderate risk - 6.6% complication rate)     Estimated Functional Capacity: CANNOT perform 4 METS without symptoms   Related to mobility issues as well         Signed Electronically by: Barb Lees NP  Copy of this evaluation report is provided to requesting physician.

## 2023-02-28 NOTE — PROGRESS NOTES
[facility]: Cancer Care Plan of Care Education Note                                    Discussion with Patient:                                                      Intro to RNCC     Education concerns: surgery, pre-op procedure, post-op procedure.        Assessment:                                                      Assessment completed with:: Patient    Current living arrangement  I live alone    Plan of Care Education   Yearly learning assessment completed?: Yes (see Education tab)  Diagnosis:: Right pulmonary nodules  Does patient understand diagnosis?: Yes  Tx plan/regimen:: Right thoracoscopic wedge resection  Does patient understand treatment plan/regimen?: Yes  Transportation means:: Friend  Safety/self care at home reviewed with patient:: Yes  Informal Support system:: Friends  Coping - concerns/fears reviewed with patient:: Yes  Plan of Care:: Treatment schedule;MD follow-up appointment;Lab appointment  When to call provider:: Bleeding;Uncontrolled nausea/vomiting;Increased shortness of breath;New/worsening pain;Shaking chills;Temperature >100.4F;Uncontrolled diarrhea/constipation  Reasons for deferring treatment reviewed with patient:: Yes    Evaluation of Learning  Patient Education Provided: Yes  Readiness:: Acceptance  Method:: Explanation  Response:: Demonstrates understanding      Intervention/Education provided during outreach:                                                       Patient will check if he takes Plavix or not    February 28, 2023  Received phone call that patient does not take Plavix daily - notified Dr. Jones    Patient to follow up as scheduled at next appt  Patient to call/[employer]t message with updates  Confirmed patient has clinic and triage numbers    Signature:  Olivia Morales RN

## 2023-02-28 NOTE — NURSING NOTE
"ROOM:1  MANGO RODRÍGUEZ    Preferred Name: Conner     How did you hear about us?  Other - Call Center    77 year old  Chief Complaint   Patient presents with     Pre-Op Exam       Blood pressure 134/75, pulse 90, temperature 98.1  F (36.7  C), temperature source Oral, height 1.565 m (5' 1.6\"), weight 108.9 kg (240 lb), SpO2 94 %. Body mass index is 44.47 kg/m .  BP completed using cuff size:        Patient Active Problem List   Diagnosis     Essential hypertension, benign     Hyperlipidemia with target LDL less than 130     Anemia     Medication refill- do not delete      Low back pain     Osteoarthritis of knee     Stroke (H)     Cerebrovascular accident involving anterior circulation, right (H)     Dermatophytosis of nail     PVD (peripheral vascular disease) (H)     Systolic murmur     Peripheral edema     Hyperplasia of prostate with lower urinary tract symptoms (LUTS)     Erectile dysfunction, unspecified erectile dysfunction type     Severe aortic stenosis     Status post coronary angiogram     Morbid obesity (H)     Aortic valve stenosis, etiology of cardiac valve disease unspecified     Aortic stenosis, severe     Diastolic heart failure (H)     S/P TAVR (transcatheter aortic valve replacement)     Bilateral leg edema       Wt Readings from Last 2 Encounters:   02/28/23 108.9 kg (240 lb)   02/27/23 109 kg (240 lb 6.4 oz)     BP Readings from Last 3 Encounters:   02/28/23 134/75   02/27/23 136/77   02/06/23 136/77       No Known Allergies    Current Outpatient Medications   Medication     atorvastatin (LIPITOR) 40 MG tablet     order for DME     oxyCODONE-acetaminophen (PERCOCET) 5-325 MG tablet     spironolactone (ALDACTONE) 50 MG tablet     tamsulosin (FLOMAX) 0.4 MG capsule     vitamin D3 (CHOLECALCIFEROL) 50 mcg (2000 units) tablet     acetaminophen (TYLENOL) 325 MG tablet     aspirin (ASA) 81 MG EC tablet     bisacodyl (DULCOLAX) 5 MG EC tablet     clopidogrel (PLAVIX) 75 MG tablet     diazepam (VALIUM) 5 " MG tablet     ibuprofen (ADVIL/MOTRIN) 600 MG tablet     multivitamin, therapeutic with minerals (MULTI-VITAMIN) TABS     naloxone (NARCAN) 4 MG/0.1ML nasal spray     other medical supplies     polyethylene glycol (GOLYTELY) 236 g suspension     potassium chloride ER (KLOR-CON M) 20 MEQ CR tablet     sildenafil (VIAGRA) 100 MG tablet     torsemide (DEMADEX) 20 MG tablet     No current facility-administered medications for this visit.       Social History     Tobacco Use     Smoking status: Former     Types: Cigarettes     Quit date: 2005     Years since quittin.1     Smokeless tobacco: Never     Tobacco comments:     Non smoker. No 2nd hand exposure. CCX, RMA PCC 2011   Substance Use Topics     Alcohol use: No     Drug use: No       Honoring Choices - Health Care Directive Guide offered to patient at time of visit.    Health Maintenance Due   Topic Date Due     URINE DRUG SCREEN  Never done     ADVANCE CARE PLANNING  Never done     ZOSTER IMMUNIZATION (2 of 3) 2014     MEDICARE ANNUAL WELLNESS VISIT  2020     COVID-19 Vaccine (3 - Booster for Moderna series) 2021     COLORECTAL CANCER SCREENING  2022       Immunization History   Administered Date(s) Administered     COVID-19 Vaccine 18+ (Moderna) 2021, 2021     Pneumo Conj 13-V (2010&after) 2015     Pneumococcal 23 valent 2011     Tdap (Adacel,Boostrix) 2011, 2022     Zoster vaccine, live 10/07/2014       No results found for: PAP    Recent Labs   Lab Test 23  1603 22  1240 21  0942 21  1019 21  1009 21  1041 21  1317 19  0858 19  0805 16  0300 16  1930   0000   A1C  --   --   --  6.0*  --   --   --   --   --   --  6.3*  --    LDL  --  136*  --  153*  --   --   --   --  51   < > 168*  --    HDL  --  40  --  39*  --   --   --   --  47   < > 46  --    TRIG  --  83  --  141  --   --   --   --  70   < > 71  --    ALT 15 22  --  34   --   --   --    < >  --   --   --   --    CR 0.94 0.88   < > 1.08 1.08 0.84 0.96   < > 0.71   < >  --   --    GFRESTIMATED 83 89   < > 66 66 85 77   < > >90   < >  --   --    GFRESTBLACK  --   --   --   --  77 >90 89   < > >90   < >  --   --    ALBUMIN 4.0 3.5  --  3.6  --   --   --    < >  --   --   --    < >   POTASSIUM 4.2 3.5   < > 3.7 4.4 3.2* 3.7   < > 3.2*   < >  --   --    TSH  --   --   --   --   --   --  1.41  --   --   --  0.90  --     < > = values in this interval not displayed.       PHQ-2 ( 1999 Pfizer) 1/16/2023 8/19/2022   Q1: Little interest or pleasure in doing things 2 0   Q2: Feeling down, depressed or hopeless 0 0   PHQ-2 Score 2 0   PHQ-2 Total Score (12-17 Years)- Positive if 3 or more points; Administer PHQ-A if positive - -   Q1: Little interest or pleasure in doing things More than half the days -   Q2: Feeling down, depressed or hopeless Not at all -   PHQ-2 Score 2 -       No flowsheet data found.    No flowsheet data found.    No flowsheet data found.    Tyree Jordan    February 28, 2023 2:09 PM

## 2023-03-01 ENCOUNTER — OFFICE VISIT (OUTPATIENT)
Dept: ORTHOPEDICS | Facility: CLINIC | Age: 78
End: 2023-03-01
Payer: COMMERCIAL

## 2023-03-01 ENCOUNTER — ANESTHESIA EVENT (OUTPATIENT)
Dept: SURGERY | Facility: CLINIC | Age: 78
DRG: 164 | End: 2023-03-01
Payer: COMMERCIAL

## 2023-03-01 DIAGNOSIS — B35.1 OM (ONYCHOMYCOSIS): ICD-10-CM

## 2023-03-01 DIAGNOSIS — R91.8 PULMONARY NODULES: Primary | ICD-10-CM

## 2023-03-01 DIAGNOSIS — I73.9 PVD (PERIPHERAL VASCULAR DISEASE) (H): Primary | ICD-10-CM

## 2023-03-01 DIAGNOSIS — I73.89 OTHER SPECIFIED PERIPHERAL VASCULAR DISEASES (H): ICD-10-CM

## 2023-03-01 PROCEDURE — 99213 OFFICE O/P EST LOW 20 MIN: CPT | Performed by: PODIATRIST

## 2023-03-01 PROCEDURE — 94375 RESPIRATORY FLOW VOLUME LOOP: CPT | Performed by: INTERNAL MEDICINE

## 2023-03-01 PROCEDURE — 94729 DIFFUSING CAPACITY: CPT | Performed by: INTERNAL MEDICINE

## 2023-03-01 NOTE — PROGRESS NOTES
Chief Complaint   Patient presents with     Follow Up     4 month follow up.             No Known Allergies      Subjective: Jonathan is a 77 year old male who presents to the clinic today for a follow up of elongated nails. He relates that the nails are long and painful. Edema in the legs and feet continues to improve.  He is now wearing compression socks.  He was last seen by me May 17, 2021.    Objective  PT and DP pulses are non-palpable bilaterally. CRT is >3 seconds. Diminished pedal hair. Mild non-pitting edema noted to BL LE.  Gross sensation is slightly decreased bilaterally.   Nails are thickened, discolored, dystrophic and elongated bilaterally to varying degrees. Nails have subungual debris consistent with onychomycosis. No open lesions are noted. Skin is normal.      Assessment: PVD  Onychomycosis x 10      Plan:  - Pt seen and evaluated.  - Nails were debrided x 10..  -Continue compression socks.  - See again in 3 months.

## 2023-03-01 NOTE — PROGRESS NOTES
PTA medications updated by Medication Scribe prior to surgery via phone call with patient (last doses completed by Nurse)     Medication history sources: Patient, Surescripts and H&P  In the past week, patient estimated taking medication this percent of the time: Greater than 90%  Adherence assessment: N/A Not Observed    Significant changes made to the medication list:  None      Additional medication history information:   None    Medication reconciliation completed by provider prior to medication history? No    Time spent in this activity: 40 minutes    The information provided in this note is only as accurate as the sources available at the time of update(s)      Prior to Admission medications    Medication Sig Last Dose Taking? Auth Provider Long Term End Date   atorvastatin (LIPITOR) 40 MG tablet Take 1 tablet (40 mg) by mouth daily  at AM Yes Buck Nascimento MD Yes    ibuprofen (ADVIL/MOTRIN) 600 MG tablet Take 1 tablet (600 mg) by mouth every 8 hours as needed for moderate pain (4-6) Past Month at PRN Yes Barb Lees, NP No    oxyCODONE-acetaminophen (PERCOCET) 5-325 MG tablet Take 1-2 tablets by mouth every 6 hours as needed for pain Max 5 per day. Unknown at PRN Yes Buck Nascimento MD     spironolactone (ALDACTONE) 50 MG tablet Take 1 tablet (50 mg) by mouth daily  at AM Yes Buck Nascimento MD Yes    tamsulosin (FLOMAX) 0.4 MG capsule TAKE 1 CAPSULE BY MOUTH ONCE DAILY  at AM Yes Buck Nascimento MD     vitamin D3 (CHOLECALCIFEROL) 50 mcg (2000 units) tablet Take 1 tablet by mouth daily More than a month at AM Yes Reported, Patient     order for DME Equipment being ordered: Walker ()  Treatment Diagnosis: stroke   Vlad Rajput MD

## 2023-03-01 NOTE — LETTER
3/1/2023         RE: Jonathan Bishop  [address]  [facility] [address]        Dear Colleague,    Thank you for referring your patient, Jonathan Bishop, to the [facility] [place]. Please see a copy of my visit note below.    Chief Complaint   Patient presents with     Follow Up     4 month follow up.             No Known Allergies      Subjective: Jonathan is a 77 year old male who presents to the clinic today for a follow up of elongated nails. He relates that the nails are long and painful. Edema in the legs and feet continues to improve.  He is now wearing compression socks.  He was last seen by me May 17, 2021.    Objective  PT and DP pulses are non-palpable bilaterally. CRT is >3 seconds. Diminished pedal hair. Mild non-pitting edema noted to BL LE.  Gross sensation is slightly decreased bilaterally.   Nails are thickened, discolored, dystrophic and elongated bilaterally to varying degrees. Nails have subungual debris consistent with onychomycosis. No open lesions are noted. Skin is normal.      Assessment: PVD  Onychomycosis x 10      Plan:  - Pt seen and evaluated.  - Nails were debrided x 10..  -Continue compression socks.  - See again in 3 months.       Mamadou Gary DPM

## 2023-03-02 ENCOUNTER — MYC MEDICAL ADVICE (OUTPATIENT)
Dept: INTERNAL MEDICINE | Facility: CLINIC | Age: 78
End: 2023-03-02

## 2023-03-02 ENCOUNTER — ANESTHESIA (OUTPATIENT)
Dept: SURGERY | Facility: CLINIC | Age: 78
DRG: 164 | End: 2023-03-02
Payer: COMMERCIAL

## 2023-03-02 ENCOUNTER — APPOINTMENT (OUTPATIENT)
Dept: GENERAL RADIOLOGY | Facility: CLINIC | Age: 78
DRG: 164 | End: 2023-03-02
Attending: THORACIC SURGERY (CARDIOTHORACIC VASCULAR SURGERY)
Payer: COMMERCIAL

## 2023-03-02 ENCOUNTER — HOSPITAL ENCOUNTER (INPATIENT)
Facility: CLINIC | Age: 78
LOS: 1 days | Discharge: HOME | DRG: 164 | End: 2023-03-03
Attending: THORACIC SURGERY (CARDIOTHORACIC VASCULAR SURGERY) | Admitting: THORACIC SURGERY (CARDIOTHORACIC VASCULAR SURGERY)
Payer: COMMERCIAL

## 2023-03-02 ENCOUNTER — PATIENT OUTREACH (OUTPATIENT)
Dept: SURGERY | Facility: CLINIC | Age: 78
End: 2023-03-02

## 2023-03-02 DIAGNOSIS — R91.8 PULMONARY NODULES: ICD-10-CM

## 2023-03-02 DIAGNOSIS — R91.1 PULMONARY NODULE: Primary | ICD-10-CM

## 2023-03-02 LAB
ABO/RH(D): NORMAL
ANTIBODY SCREEN: NEGATIVE
CREAT SERPL-MCNC: 0.86 MG/DL (ref 0.67–1.17)
DLCOCOR-%PRED-PRE: 141 %
DLCOCOR-PRE: 28.97 ML/MIN/MMHG
DLCOUNC-%PRED-PRE: 115 %
DLCOUNC-PRE: 23.59 ML/MIN/MMHG
DLCOUNC-PRED: 20.48 ML/MIN/MMHG
ERV-%PRED-PRE: -213 %
ERV-PRE: 0.16 L
ERV-PRED: -0.08 L
EXPTIME-PRE: 6.62 SEC
FEF2575-%PRED-PRE: 94 %
FEF2575-PRE: 1.65 L/SEC
FEF2575-PRED: 1.75 L/SEC
FEFMAX-%PRED-PRE: 97 %
FEFMAX-PRE: 6.15 L/SEC
FEFMAX-PRED: 6.3 L/SEC
FEV1-%PRED-PRE: 71 %
FEV1-PRE: 1.62 L
FEV1FEV6-PRE: 81 %
FEV1FEV6-PRED: 77 %
FEV1FVC-PRE: 81 %
FEV1FVC-PRED: 77 %
FEV1SVC-PRE: 69 %
FEV1SVC-PRED: 64 %
FIFMAX-PRE: 3.12 L/SEC
FVC-%PRED-PRE: 67 %
FVC-PRE: 2.01 L
FVC-PRED: 2.96 L
GFR SERPL CREATININE-BSD FRML MDRD: 89 ML/MIN/1.73M2
IC-%PRED-PRE: 59 %
IC-PRE: 2.17 L
IC-PRED: 3.62 L
POTASSIUM SERPL-SCNC: 4.1 MMOL/L (ref 3.4–5.3)
SPECIMEN EXPIRATION DATE: NORMAL
VA-%PRED-PRE: 75 %
VA-PRE: 3.84 L
VC-%PRED-PRE: 65 %
VC-PRE: 2.33 L
VC-PRED: 3.55 L

## 2023-03-02 PROCEDURE — 250N000025 HC SEVOFLURANE, PER MIN: Performed by: THORACIC SURGERY (CARDIOTHORACIC VASCULAR SURGERY)

## 2023-03-02 PROCEDURE — 88307 TISSUE EXAM BY PATHOLOGIST: CPT | Mod: TC | Performed by: THORACIC SURGERY (CARDIOTHORACIC VASCULAR SURGERY)

## 2023-03-02 PROCEDURE — 258N000003 HC RX IP 258 OP 636: Performed by: THORACIC SURGERY (CARDIOTHORACIC VASCULAR SURGERY)

## 2023-03-02 PROCEDURE — 272N000001 HC OR GENERAL SUPPLY STERILE: Performed by: THORACIC SURGERY (CARDIOTHORACIC VASCULAR SURGERY)

## 2023-03-02 PROCEDURE — 250N000009 HC RX 250: Performed by: THORACIC SURGERY (CARDIOTHORACIC VASCULAR SURGERY)

## 2023-03-02 PROCEDURE — 250N000011 HC RX IP 250 OP 636: Performed by: THORACIC SURGERY (CARDIOTHORACIC VASCULAR SURGERY)

## 2023-03-02 PROCEDURE — 250N000009 HC RX 250

## 2023-03-02 PROCEDURE — 32666 THORACOSCOPY W/WEDGE RESECT: CPT | Mod: RT | Performed by: THORACIC SURGERY (CARDIOTHORACIC VASCULAR SURGERY)

## 2023-03-02 PROCEDURE — 84132 ASSAY OF SERUM POTASSIUM: CPT | Performed by: ANESTHESIOLOGY

## 2023-03-02 PROCEDURE — 36415 COLL VENOUS BLD VENIPUNCTURE: CPT | Performed by: THORACIC SURGERY (CARDIOTHORACIC VASCULAR SURGERY)

## 2023-03-02 PROCEDURE — 258N000003 HC RX IP 258 OP 636

## 2023-03-02 PROCEDURE — 86850 RBC ANTIBODY SCREEN: CPT | Performed by: THORACIC SURGERY (CARDIOTHORACIC VASCULAR SURGERY)

## 2023-03-02 PROCEDURE — 88331 PATH CONSLTJ SURG 1 BLK 1SPC: CPT | Mod: TC | Performed by: THORACIC SURGERY (CARDIOTHORACIC VASCULAR SURGERY)

## 2023-03-02 PROCEDURE — 82565 ASSAY OF CREATININE: CPT | Performed by: THORACIC SURGERY (CARDIOTHORACIC VASCULAR SURGERY)

## 2023-03-02 PROCEDURE — 710N000009 HC RECOVERY PHASE 1, LEVEL 1, PER MIN: Performed by: THORACIC SURGERY (CARDIOTHORACIC VASCULAR SURGERY)

## 2023-03-02 PROCEDURE — 360N000077 HC SURGERY LEVEL 4, PER MIN: Performed by: THORACIC SURGERY (CARDIOTHORACIC VASCULAR SURGERY)

## 2023-03-02 PROCEDURE — 0BBF4ZZ EXCISION OF RIGHT LOWER LUNG LOBE, PERCUTANEOUS ENDOSCOPIC APPROACH: ICD-10-PCS | Performed by: THORACIC SURGERY (CARDIOTHORACIC VASCULAR SURGERY)

## 2023-03-02 PROCEDURE — 999N000063 XR CHEST PORT 1 VIEW

## 2023-03-02 PROCEDURE — 250N000013 HC RX MED GY IP 250 OP 250 PS 637: Performed by: THORACIC SURGERY (CARDIOTHORACIC VASCULAR SURGERY)

## 2023-03-02 PROCEDURE — 120N000001 HC R&B MED SURG/OB

## 2023-03-02 PROCEDURE — 370N000017 HC ANESTHESIA TECHNICAL FEE, PER MIN: Performed by: THORACIC SURGERY (CARDIOTHORACIC VASCULAR SURGERY)

## 2023-03-02 PROCEDURE — 999N000141 HC STATISTIC PRE-PROCEDURE NURSING ASSESSMENT: Performed by: THORACIC SURGERY (CARDIOTHORACIC VASCULAR SURGERY)

## 2023-03-02 PROCEDURE — 250N000011 HC RX IP 250 OP 636

## 2023-03-02 RX ORDER — PROPOFOL 10 MG/ML
INJECTION, EMULSION INTRAVENOUS PRN
Status: DISCONTINUED | OUTPATIENT
Start: 2023-03-02 | End: 2023-03-02

## 2023-03-02 RX ORDER — DEXTROSE MONOHYDRATE, SODIUM CHLORIDE, AND POTASSIUM CHLORIDE 50; 1.49; 4.5 G/1000ML; G/1000ML; G/1000ML
INJECTION, SOLUTION INTRAVENOUS CONTINUOUS
Status: DISCONTINUED | OUTPATIENT
Start: 2023-03-02 | End: 2023-03-03

## 2023-03-02 RX ORDER — DEXAMETHASONE SODIUM PHOSPHATE 4 MG/ML
INJECTION, SOLUTION INTRA-ARTICULAR; INTRALESIONAL; INTRAMUSCULAR; INTRAVENOUS; SOFT TISSUE PRN
Status: DISCONTINUED | OUTPATIENT
Start: 2023-03-02 | End: 2023-03-02

## 2023-03-02 RX ORDER — POLYETHYLENE GLYCOL 3350 17 G/17G
17 POWDER, FOR SOLUTION ORAL DAILY
Status: DISCONTINUED | OUTPATIENT
Start: 2023-03-03 | End: 2023-03-03 | Stop reason: HOSPADM

## 2023-03-02 RX ORDER — CEFAZOLIN SODIUM/WATER 2 G/20 ML
2 SYRINGE (ML) INTRAVENOUS SEE ADMIN INSTRUCTIONS
Status: DISCONTINUED | OUTPATIENT
Start: 2023-03-02 | End: 2023-03-02 | Stop reason: HOSPADM

## 2023-03-02 RX ORDER — DEXMEDETOMIDINE HYDROCHLORIDE 4 UG/ML
INJECTION, SOLUTION INTRAVENOUS PRN
Status: DISCONTINUED | OUTPATIENT
Start: 2023-03-02 | End: 2023-03-02

## 2023-03-02 RX ORDER — HYDROMORPHONE HYDROCHLORIDE 1 MG/ML
0.5 INJECTION, SOLUTION INTRAMUSCULAR; INTRAVENOUS; SUBCUTANEOUS
Status: DISCONTINUED | OUTPATIENT
Start: 2023-03-02 | End: 2023-03-03 | Stop reason: HOSPADM

## 2023-03-02 RX ORDER — OXYCODONE HYDROCHLORIDE 5 MG/1
5 TABLET ORAL EVERY 4 HOURS PRN
Status: DISCONTINUED | OUTPATIENT
Start: 2023-03-02 | End: 2023-03-03 | Stop reason: HOSPADM

## 2023-03-02 RX ORDER — NALOXONE HYDROCHLORIDE 0.4 MG/ML
0.2 INJECTION, SOLUTION INTRAMUSCULAR; INTRAVENOUS; SUBCUTANEOUS
Status: DISCONTINUED | OUTPATIENT
Start: 2023-03-02 | End: 2023-03-03 | Stop reason: HOSPADM

## 2023-03-02 RX ORDER — LIDOCAINE HYDROCHLORIDE 20 MG/ML
INJECTION, SOLUTION INFILTRATION; PERINEURAL PRN
Status: DISCONTINUED | OUTPATIENT
Start: 2023-03-02 | End: 2023-03-02

## 2023-03-02 RX ORDER — DEXMEDETOMIDINE HYDROCHLORIDE 100 UG/ML
INJECTION, SOLUTION INTRAVENOUS PRN
Status: DISCONTINUED | OUTPATIENT
Start: 2023-03-02 | End: 2023-03-02 | Stop reason: HOSPADM

## 2023-03-02 RX ORDER — ONDANSETRON 2 MG/ML
INJECTION INTRAMUSCULAR; INTRAVENOUS PRN
Status: DISCONTINUED | OUTPATIENT
Start: 2023-03-02 | End: 2023-03-02

## 2023-03-02 RX ORDER — DIPHENHYDRAMINE HCL 12.5MG/5ML
12.5 LIQUID (ML) ORAL EVERY 6 HOURS PRN
Status: DISCONTINUED | OUTPATIENT
Start: 2023-03-02 | End: 2023-03-03 | Stop reason: HOSPADM

## 2023-03-02 RX ORDER — AMOXICILLIN 250 MG
1 CAPSULE ORAL 2 TIMES DAILY
Status: DISCONTINUED | OUTPATIENT
Start: 2023-03-02 | End: 2023-03-03 | Stop reason: HOSPADM

## 2023-03-02 RX ORDER — FENTANYL CITRATE 50 UG/ML
INJECTION, SOLUTION INTRAMUSCULAR; INTRAVENOUS PRN
Status: DISCONTINUED | OUTPATIENT
Start: 2023-03-02 | End: 2023-03-02

## 2023-03-02 RX ORDER — BUPIVACAINE HYDROCHLORIDE 2.5 MG/ML
INJECTION, SOLUTION EPIDURAL; INFILTRATION; INTRACAUDAL PRN
Status: DISCONTINUED | OUTPATIENT
Start: 2023-03-02 | End: 2023-03-02 | Stop reason: HOSPADM

## 2023-03-02 RX ORDER — DIPHENHYDRAMINE HYDROCHLORIDE 50 MG/ML
12.5 INJECTION INTRAMUSCULAR; INTRAVENOUS EVERY 6 HOURS PRN
Status: DISCONTINUED | OUTPATIENT
Start: 2023-03-02 | End: 2023-03-03 | Stop reason: HOSPADM

## 2023-03-02 RX ORDER — HYDROMORPHONE HCL IN WATER/PF 6 MG/30 ML
0.2 PATIENT CONTROLLED ANALGESIA SYRINGE INTRAVENOUS
Status: DISCONTINUED | OUTPATIENT
Start: 2023-03-02 | End: 2023-03-03 | Stop reason: HOSPADM

## 2023-03-02 RX ORDER — MAGNESIUM HYDROXIDE 1200 MG/15ML
LIQUID ORAL PRN
Status: DISCONTINUED | OUTPATIENT
Start: 2023-03-02 | End: 2023-03-02 | Stop reason: HOSPADM

## 2023-03-02 RX ORDER — BISACODYL 10 MG
10 SUPPOSITORY, RECTAL RECTAL DAILY PRN
Status: DISCONTINUED | OUTPATIENT
Start: 2023-03-02 | End: 2023-03-03 | Stop reason: HOSPADM

## 2023-03-02 RX ORDER — NALOXONE HYDROCHLORIDE 0.4 MG/ML
0.4 INJECTION, SOLUTION INTRAMUSCULAR; INTRAVENOUS; SUBCUTANEOUS
Status: DISCONTINUED | OUTPATIENT
Start: 2023-03-02 | End: 2023-03-03 | Stop reason: HOSPADM

## 2023-03-02 RX ORDER — HEPARIN SODIUM 5000 [USP'U]/.5ML
5000 INJECTION, SOLUTION INTRAVENOUS; SUBCUTANEOUS
Status: COMPLETED | OUTPATIENT
Start: 2023-03-02 | End: 2023-03-02

## 2023-03-02 RX ORDER — ALBUTEROL SULFATE 90 UG/1
4 AEROSOL, METERED RESPIRATORY (INHALATION)
Status: DISCONTINUED | OUTPATIENT
Start: 2023-03-02 | End: 2023-03-03 | Stop reason: HOSPADM

## 2023-03-02 RX ORDER — OXYCODONE HYDROCHLORIDE 5 MG/1
10 TABLET ORAL EVERY 4 HOURS PRN
Status: DISCONTINUED | OUTPATIENT
Start: 2023-03-02 | End: 2023-03-03 | Stop reason: HOSPADM

## 2023-03-02 RX ORDER — ACETAMINOPHEN 325 MG/1
650 TABLET ORAL EVERY 4 HOURS PRN
Status: DISCONTINUED | OUTPATIENT
Start: 2023-03-05 | End: 2023-03-03 | Stop reason: HOSPADM

## 2023-03-02 RX ORDER — PROCHLORPERAZINE MALEATE 5 MG
5 TABLET ORAL EVERY 6 HOURS PRN
Status: DISCONTINUED | OUTPATIENT
Start: 2023-03-02 | End: 2023-03-03 | Stop reason: HOSPADM

## 2023-03-02 RX ORDER — FAMOTIDINE 20 MG/1
20 TABLET, FILM COATED ORAL 2 TIMES DAILY
Status: DISCONTINUED | OUTPATIENT
Start: 2023-03-02 | End: 2023-03-03 | Stop reason: HOSPADM

## 2023-03-02 RX ORDER — SODIUM CHLORIDE, SODIUM LACTATE, POTASSIUM CHLORIDE, CALCIUM CHLORIDE 600; 310; 30; 20 MG/100ML; MG/100ML; MG/100ML; MG/100ML
INJECTION, SOLUTION INTRAVENOUS CONTINUOUS
Status: DISCONTINUED | OUTPATIENT
Start: 2023-03-02 | End: 2023-03-02 | Stop reason: HOSPADM

## 2023-03-02 RX ORDER — CEFAZOLIN SODIUM/WATER 2 G/20 ML
2 SYRINGE (ML) INTRAVENOUS
Status: DISCONTINUED | OUTPATIENT
Start: 2023-03-02 | End: 2023-03-02 | Stop reason: HOSPADM

## 2023-03-02 RX ORDER — ACETAMINOPHEN 325 MG/1
975 TABLET ORAL EVERY 8 HOURS
Status: DISCONTINUED | OUTPATIENT
Start: 2023-03-02 | End: 2023-03-03 | Stop reason: HOSPADM

## 2023-03-02 RX ORDER — METHOCARBAMOL 500 MG/1
500 TABLET, FILM COATED ORAL EVERY 6 HOURS PRN
Status: DISCONTINUED | OUTPATIENT
Start: 2023-03-02 | End: 2023-03-03 | Stop reason: HOSPADM

## 2023-03-02 RX ORDER — ENOXAPARIN SODIUM 100 MG/ML
40 INJECTION SUBCUTANEOUS EVERY 24 HOURS
Status: DISCONTINUED | OUTPATIENT
Start: 2023-03-03 | End: 2023-03-03 | Stop reason: HOSPADM

## 2023-03-02 RX ORDER — SODIUM CHLORIDE, SODIUM LACTATE, POTASSIUM CHLORIDE, CALCIUM CHLORIDE 600; 310; 30; 20 MG/100ML; MG/100ML; MG/100ML; MG/100ML
INJECTION, SOLUTION INTRAVENOUS CONTINUOUS PRN
Status: DISCONTINUED | OUTPATIENT
Start: 2023-03-02 | End: 2023-03-02

## 2023-03-02 RX ORDER — ONDANSETRON 4 MG/1
4 TABLET, ORALLY DISINTEGRATING ORAL EVERY 6 HOURS PRN
Status: DISCONTINUED | OUTPATIENT
Start: 2023-03-02 | End: 2023-03-03 | Stop reason: HOSPADM

## 2023-03-02 RX ORDER — TAMSULOSIN HYDROCHLORIDE 0.4 MG/1
0.4 CAPSULE ORAL DAILY
Status: DISCONTINUED | OUTPATIENT
Start: 2023-03-02 | End: 2023-03-03 | Stop reason: HOSPADM

## 2023-03-02 RX ORDER — ONDANSETRON 2 MG/ML
4 INJECTION INTRAMUSCULAR; INTRAVENOUS EVERY 6 HOURS PRN
Status: DISCONTINUED | OUTPATIENT
Start: 2023-03-02 | End: 2023-03-03 | Stop reason: HOSPADM

## 2023-03-02 RX ADMIN — Medication 2 G: at 11:23

## 2023-03-02 RX ADMIN — SUGAMMADEX 200 MG: 100 INJECTION, SOLUTION INTRAVENOUS at 12:45

## 2023-03-02 RX ADMIN — FAMOTIDINE 20 MG: 20 TABLET ORAL at 20:30

## 2023-03-02 RX ADMIN — HEPARIN SODIUM 5000 UNITS: 5000 INJECTION, SOLUTION INTRAVENOUS; SUBCUTANEOUS at 09:45

## 2023-03-02 RX ADMIN — ROCURONIUM BROMIDE 50 MG: 50 INJECTION, SOLUTION INTRAVENOUS at 11:19

## 2023-03-02 RX ADMIN — PHENYLEPHRINE HYDROCHLORIDE 0.4 MCG/KG/MIN: 10 INJECTION INTRAVENOUS at 11:30

## 2023-03-02 RX ADMIN — ACETAMINOPHEN 975 MG: 325 TABLET, FILM COATED ORAL at 23:10

## 2023-03-02 RX ADMIN — SODIUM CHLORIDE, POTASSIUM CHLORIDE, SODIUM LACTATE AND CALCIUM CHLORIDE: 600; 310; 30; 20 INJECTION, SOLUTION INTRAVENOUS at 11:08

## 2023-03-02 RX ADMIN — LIDOCAINE HYDROCHLORIDE 40 MG: 20 INJECTION, SOLUTION INFILTRATION; PERINEURAL at 11:18

## 2023-03-02 RX ADMIN — POTASSIUM CHLORIDE, DEXTROSE MONOHYDRATE AND SODIUM CHLORIDE: 150; 5; 450 INJECTION, SOLUTION INTRAVENOUS at 15:10

## 2023-03-02 RX ADMIN — TAMSULOSIN HYDROCHLORIDE 0.4 MG: 0.4 CAPSULE ORAL at 20:30

## 2023-03-02 RX ADMIN — FENTANYL CITRATE 50 MCG: 50 INJECTION, SOLUTION INTRAMUSCULAR; INTRAVENOUS at 11:54

## 2023-03-02 RX ADMIN — OXYCODONE HYDROCHLORIDE 10 MG: 5 TABLET ORAL at 20:40

## 2023-03-02 RX ADMIN — PHENYLEPHRINE HYDROCHLORIDE 100 MCG: 10 INJECTION INTRAVENOUS at 11:18

## 2023-03-02 RX ADMIN — DEXMEDETOMIDINE HYDROCHLORIDE 8 MCG: 200 INJECTION INTRAVENOUS at 12:27

## 2023-03-02 RX ADMIN — PROPOFOL 200 MG: 10 INJECTION, EMULSION INTRAVENOUS at 11:18

## 2023-03-02 RX ADMIN — OXYCODONE HYDROCHLORIDE 5 MG: 5 TABLET ORAL at 14:59

## 2023-03-02 RX ADMIN — SENNOSIDES AND DOCUSATE SODIUM 1 TABLET: 50; 8.6 TABLET ORAL at 20:30

## 2023-03-02 RX ADMIN — FENTANYL CITRATE 50 MCG: 50 INJECTION, SOLUTION INTRAMUSCULAR; INTRAVENOUS at 11:18

## 2023-03-02 RX ADMIN — ONDANSETRON 4 MG: 2 INJECTION INTRAMUSCULAR; INTRAVENOUS at 12:26

## 2023-03-02 RX ADMIN — ACETAMINOPHEN 975 MG: 325 TABLET, FILM COATED ORAL at 14:59

## 2023-03-02 RX ADMIN — DEXAMETHASONE SODIUM PHOSPHATE 10 MG: 4 INJECTION, SOLUTION INTRA-ARTICULAR; INTRALESIONAL; INTRAMUSCULAR; INTRAVENOUS; SOFT TISSUE at 11:30

## 2023-03-02 RX ADMIN — ROCURONIUM BROMIDE 20 MG: 50 INJECTION, SOLUTION INTRAVENOUS at 11:48

## 2023-03-02 ASSESSMENT — ACTIVITIES OF DAILY LIVING (ADL)
ADLS_ACUITY_SCORE: 37
ADLS_ACUITY_SCORE: 33

## 2023-03-02 ASSESSMENT — LIFESTYLE VARIABLES: TOBACCO_USE: 1

## 2023-03-02 NOTE — ANESTHESIA PREPROCEDURE EVALUATION
Anesthesia Pre-Procedure Evaluation    Patient: Jonathan Bishop   MRN: [id number] : 1945        Procedure : Procedure(s):  Right thoracoscopic wedge resection          Past Medical History:   Diagnosis Date     * * * SBE PROPHYLAXIS * * *     s/p TAVR     Acute rheumatic carditis 1950     Complication of anesthesia     pt once woke up during back surgery     Coronary artery disease     murmur     Erectile dysfunction     Sildenafil     Hip pain, bilateral      Hypercholesteremia      Hypertension      Low back pain      Nonsenile cataract      Obesity      Renal cyst      S/P TAVR (transcatheter aortic valve replacement) 2020    26mm Anton Tawana 3 valve.     Stroke (cerebrum) (H) 2016     Umbilical hernia 06/10/2011    s/p surgical repair     Wound, surgical, infected 06/10/2011    hernia      Past Surgical History:   Procedure Laterality Date     ARTHROPLASTY KNEE BILATERAL  2010     COLONOSCOPY N/A 2017    Procedure: COLONOSCOPY;  Surgeon: Toby Bills MD;  Location: [facility] GI     CV CORONARY ANGIOGRAM N/A 10/28/2019    Procedure: Coronary Angiogram;  Surgeon: Guerrero Huitron MD;  Location:  HEART CARDIAC CATH LAB     CV RIGHT HEART CATH MEASUREMENTS RECORDED N/A 10/28/2019    Procedure: Right Heart Cath;  Surgeon: Guerrero Huitron MD;  Location: [facility] CARDIAC CATH LAB     CV TRANSCATHETER AORTIC VALVE REPLACEMENT N/A 2020    Procedure: Transcatheter Aortic Valve Replacement;  Surgeon: Camila Begum MD;  Location: [facility] CARDIAC CATH LAB     FUSION LUMBAR ANTERIOR TWO LEVELS       HERNIORRHAPHY UMBILICAL  6/10/2011    Procedure:HERNIORRHAPHY UMBILICAL; Open, with mesh placement; Surgeon:HO PARHAM; Location:[facility] OR     LAMINECTOMY LUMBAR TWO LEVELS        No Known Allergies   Social History     Tobacco Use     Smoking status: Former     Types: Cigarettes     Quit date: 2005     Years since quittin.1     Smokeless tobacco: Never      Tobacco comments:     Non smoker. No 2nd hand exposure. CCX, RMA PCC 7/28/2011   Substance Use Topics     Alcohol use: No      Wt Readings from Last 1 Encounters:   02/28/23 108.9 kg (240 lb)        Anesthesia Evaluation   Pt has had prior anesthetic.     History of anesthetic complications       ROS/MED HX  ENT/Pulmonary:     (+) tobacco use (Quit in '05), Past use,     Neurologic:     (+) CVA, TIA,     Cardiovascular: Comment: Rheumatic carditis    (+) Dyslipidemia hypertension-Peripheral Vascular Disease-CAD ---valvular problems/murmurs AS s/p TAVR. Previous cardiac testing   Echo: Date: 12/8/21 Results:  Interpretation Summary  s/p TAVR (26mm Edward Tawana 3, implanted 12-8-20). Gradient similar to previous studies. There is trace aortic regurgitation. Left ventricular systolic function is normal. The visual ejection fraction is 60-65%.      Left Ventricle  The left ventricle is normal in size. Left ventricular systolic function is normal. Grade I or early diastolic dysfunction. The visual ejection fraction is 60-65%. No regional wall motion abnormalities noted.    Right Ventricle  The right ventricle is grossly normal size. The right ventricle is not well visualized. The right ventricular systolic function is normal.    Atria  The left atrium is not well visualized. Right atrium not well visualized.    Mitral Valve  There is mild mitral annular calcification. There is no mitral regurgitation noted. There is no mitral valve stenosis.    Tricuspid Valve  The tricuspid valve is not well visualized. Right ventricular systolic pressure could not be approximated due to inadequate tricuspid regurgitation.    Aortic Valve  There is trace aortic regurgitation. There is a prosthetic aortic valve.    Pulmonic Valve  The pulmonic valve is not well visualized. Normal pulmonic valve velocity.    Vessels  Normal size aorta. The inferior vena cava is normal.    Pericardium  The pericardium is not well visualized.  Stress  Test: Date: Results:    ECG Reviewed: Date: 1/7/21 Results:  SR w/ 1st deg block  Cath: Date: Results:      METS/Exercise Tolerance:     Hematologic:       Musculoskeletal:   (+) arthritis,     GI/Hepatic:       Renal/Genitourinary:     (+) BPH,     Endo:     (+) Obesity (Class 3),     Psychiatric/Substance Use:     (+) H/O chronic opiod use  (Percocet 5's q6 prn).     Infectious Disease:       Malignancy:       Other:      (+) , H/O Chronic Pain,        Physical Exam    Airway      Comment: Large ulcerated mass in posterior left pharyngeal wall    Mallampati: IV   TM distance: > 3 FB   Neck ROM: full   Mouth opening: > 3 cm    Respiratory Devices and Support         Dental       (+) Edentulous      Cardiovascular          Rhythm and rate: regular and normal     Pulmonary           breath sounds clear to auscultation           OUTSIDE LABS:  CBC:   Lab Results   Component Value Date    WBC 8.7 02/27/2023    WBC 7.7 08/03/2022    HGB 9.4 (L) 02/27/2023    HGB 10.6 (L) 08/03/2022    HCT 30.8 (L) 02/27/2023    HCT 35.8 (L) 08/03/2022     02/27/2023     08/03/2022     BMP:   Lab Results   Component Value Date     02/27/2023     08/03/2022    POTASSIUM 4.2 02/27/2023    POTASSIUM 3.5 08/03/2022    CHLORIDE 106 02/27/2023    CHLORIDE 107 08/03/2022    CO2 25 02/27/2023    CO2 27 08/03/2022    BUN 19.2 02/27/2023    BUN 21 08/03/2022    CR 0.94 02/27/2023    CR 0.88 08/03/2022    GLC 92 02/27/2023    GLC 96 08/03/2022     COAGS:   Lab Results   Component Value Date    PTT 27 10/28/2019    INR 1.01 10/28/2019     POC:   Lab Results   Component Value Date    BGM 81 12/01/2016     HEPATIC:   Lab Results   Component Value Date    ALBUMIN 4.0 02/27/2023    PROTTOTAL 7.0 02/27/2023    ALT 15 02/27/2023    AST 27 02/27/2023    ALKPHOS 105 02/27/2023    BILITOTAL 0.2 02/27/2023     OTHER:   Lab Results   Component Value Date    PH 7.45 10/28/2019    A1C 6.0 (H) 02/27/2023    WARREN 9.6 02/27/2023    PHOS 2.4  (L) 12/09/2020    MAG 2.0 12/09/2020    TSH 1.41 04/16/2021    CRP 7.2 11/30/2016    SED 17 08/05/2011       Anesthesia Plan    ASA Status:  3   NPO Status:  NPO Appropriate    Anesthesia Type: General.     - Airway: ETT   Induction: Intravenous.   Maintenance: Balanced.   Techniques and Equipment:     - Airway: Fiberoptic Bronchoscope, Video-Laryngoscope, Double lumen ETT     - Lines/Monitors: 2nd IV     Consents    Anesthesia Plan(s) and associated risks, benefits, and realistic alternatives discussed. Questions answered and patient/representative(s) expressed understanding.    - Discussed:     - Discussed with:  Patient         Postoperative Care    Pain management: IV analgesics, Oral pain medications, Multi-modal analgesia.   PONV prophylaxis: Ondansetron (or other 5HT-3)     Comments:                Kwan Morales MD

## 2023-03-02 NOTE — TELEPHONE ENCOUNTER
"Received call from patient's sister, Mariann. Mariann informed writer that she missed a call from Dr Jones today regarding her brother's surgery and she was returning his call. Reports that she has \"spam blockers on her phone so unknown numbers don't always ring or go through\". Writer sent message to Dr Jones who will attempt to call Mariann again. Mariann appreciated writer's assistance.     Jeri Major RN, BSN  Thoracic Surgery RN Care Coordinator    "

## 2023-03-02 NOTE — PROGRESS NOTES
MEDICAL RECORDS REQUEST   Radiation Oncology  [address] [place] [address]  PHONE: [phone]  Fax: [phone]        FUTURE VISIT INFORMATION                                                   Jonathan Bishop, : 1945 scheduled for future visit at [facility] Radiation Oncology    APPOINTMENT INFORMATION:    Date: 3/20/2023    Provider:  Dr. Mills    Reason for Visit/Diagnosis: Squamous cell carcinoma of oropharynx , per ptVelasquez MD    REFERRAL INFORMATION:    Referring provider:  Karishma Eng MD    RECORDS REQUESTED FOR VISIT                                                     Squamous cell carcinoma of oropharynx    CT, MRI, PET/CT AND REPORTS yes   ANY RECENT LABS yes   SURGICAL REPORTS yes     2023  A.  LEFT TONSIL, BIOPSY:  -HPV-ASSOCIATED SQUAMOUS CELL CARCINOMA.   PATHOLOGY REPORTS yes   CHEMO & MEDICAL ONCOLOGY NOTES Yes   CURRENT MEDICATION LIST yes   PREVIOUS RADIATION  DATE REQUESTED DATE RECEIVED   WHAT HEALTHCARE FACILITY    INITIAL HEALTH PROGRESS INTAKE     RADIATION ONCOLOGIST NOTES     RADIATION TREATMENT SUMMARY NOTES     RAD-TREATMENT PLAN     DVH = DOSE VOLUME HISTOGRAM     DICOM CD (TX PLANNING CT, TX PLAN, STRUCTURE SET)     *If no DICOM avail ask for DRCelia          *Send all radiation Prior radiation records for both [facility] and [facility] Radiation Oncology patients to [facility] with  ATTN: RICK/Dayna Dosi/Physics

## 2023-03-02 NOTE — BRIEF OP NOTE
[facility]    Brief Operative Note    Pre-operative diagnosis: Lung nodule [R91.1]  Post-operative diagnosis Squamous cell carcinoma    Procedure: Procedure(s):  Right thoracoscopic wedge resection  Surgeon: Surgeon(s) and Role:     * Hebert Jones MD - Primary  Anesthesia: General   Estimated Blood Loss: Minimal    Drains: 28 Fr chest tube  Specimens:   ID Type Source Tests Collected by Time Destination   1 : RIGHT LOWER LOBE WEDGE  Tissue Lung, Lower Lobe, Right SURGICAL PATHOLOGY EXAM Hebert Jones MD 3/2/2023 12:18 PM      Findings:   None.  Complications: None.  Implants: * No implants in log *

## 2023-03-02 NOTE — INTERVAL H&P NOTE
"I have reviewed the surgical (or preoperative) H&P that is linked to this encounter, and examined the patient. There are no significant changes    Clinical Conditions Present on Arrival:  Clinically Significant Risk Factors Present on Admission                    # Severe Obesity: Estimated body mass index is 41.02 kg/m  as calculated from the following:    Height as of this encounter: 1.626 m (5' 4\").    Weight as of this encounter: 108.4 kg (239 lb).       "

## 2023-03-02 NOTE — ANESTHESIA PROCEDURE NOTES
Airway       Patient location during procedure: OR       Procedure Start/Stop Times: 3/2/2023 11:21 AM  Staff -        Anesthesiologist:  Kwan Morales MD       CRNA: Gilbert Cochran APRN CRNA       Other Anesthesia Staff: Srikanth Contreras       Performed By: anesthesiologist  Consent for Airway        Urgency: elective  Indications and Patient Condition       Indications for airway management: walker-procedural       Induction type:intravenous       Mask difficulty assessment: 1 - vent by mask    Final Airway Details       Final airway type: endotracheal airway       Successful airway: ETT - double lumen left  Endotracheal Airway Details        Cuffed: yes       Successful intubation technique: video laryngoscopy       VL Blade Size: Glidescope 3       Grade View of Cords: 1       Adjucts: stylet       Position: Center       Measured from: gums/teeth       Secured at (cm): 27       Bite block used: None       ETT Double lumen (fr): 35    Post intubation assessment        Placement verified by: capnometry, equal breath sounds and chest rise        Number of attempts at approach: 1       Number of other approaches attempted: 0       Secured with: pink tape       Ease of procedure: easy       Dentition: Intact and Unchanged    Medication(s) Administered   Medication Administration Time: 3/2/2023 11:21 AM

## 2023-03-02 NOTE — ANESTHESIA POSTPROCEDURE EVALUATION
Patient: Jonathan Bishop    Procedure: Procedure(s):  Right thoracoscopic wedge resection       Anesthesia Type:  General    Note:     Postop Pain Control: Uneventful            Sign Out: Well controlled pain   PONV: No   Neuro/Psych: Uneventful            Sign Out: Acceptable/Baseline neuro status   Airway/Respiratory: Uneventful            Sign Out: Acceptable/Baseline resp. status   CV/Hemodynamics: Uneventful            Sign Out: Acceptable CV status   Other NRE: NONE   DID A NON-ROUTINE EVENT OCCUR?            Last vitals:  Vitals Value Taken Time   /76 03/02/23 1400   Temp 36.3  C (97.3  F) 03/02/23 1345   Pulse 72 03/02/23 1414   Resp 56 03/02/23 1414   SpO2 99 % 03/02/23 1415   Vitals shown include unvalidated device data.    Electronically Signed By: Kwan Morales MD  March 2, 2023  4:21 PM

## 2023-03-02 NOTE — PROGRESS NOTES
Social Work Follow-Up Encounter Visit  Oncology Clinic    Data/Intervention:  Patient Name:  Jonathan Bishop  /Age:  1945 (77 year old)    Reason for Follow-Up:  Social supports    Collaborated With:    -patient  -  Assessment:  Sw called with the intention of introducing patient to supportive resources and assess supportive needs. There was no answer. SW left a message with reason for call and provided contact information for patient to reach SW.     Plan:  Previously provided patient/family with writer's contact information and availability.   SW will await patient's return call and remain available as needed.     Earlene BAEZ, [facility]  - Oncology  Phone : [phone]  Pager: [phone]

## 2023-03-02 NOTE — ANESTHESIA CARE TRANSFER NOTE
Patient: Jonathan Bishop    Procedure: Procedure(s):  Right thoracoscopic wedge resection       Diagnosis: Lung nodule [R91.1]  Diagnosis Additional Information: No value filed.    Anesthesia Type:   General     Note:    Oropharynx: oropharynx clear of all foreign objects and spontaneously breathing  Level of Consciousness: awake  Oxygen Supplementation: face mask  Level of Supplemental Oxygen (L/min / FiO2): 6  Independent Airway: airway patency satisfactory and stable  Dentition: dentition unchanged  Vital Signs Stable: post-procedure vital signs reviewed and stable  Report to RN Given: handoff report given  Patient transferred to: PACU    Handoff Report: Identifed the Patient, Identified the Reponsible Provider, Reviewed the pertinent medical history, Discussed the surgical course, Reviewed Intra-OP anesthesia mangement and issues during anesthesia, Set expectations for post-procedure period and Allowed opportunity for questions and acknowledgement of understanding      Vitals:  Vitals Value Taken Time   /62 03/02/23 1256   Temp 36.7    Pulse 76 03/02/23 1258   Resp 14 03/02/23 1258   SpO2 100 % 03/02/23 1258   Vitals shown include unvalidated device data.    Electronically Signed By: PORFIRIO Begum CRNA  March 2, 2023  1:00 PM

## 2023-03-03 ENCOUNTER — APPOINTMENT (OUTPATIENT)
Dept: GENERAL RADIOLOGY | Facility: CLINIC | Age: 78
DRG: 164 | End: 2023-03-03
Attending: THORACIC SURGERY (CARDIOTHORACIC VASCULAR SURGERY)
Payer: COMMERCIAL

## 2023-03-03 ENCOUNTER — APPOINTMENT (OUTPATIENT)
Dept: OCCUPATIONAL THERAPY | Facility: CLINIC | Age: 78
DRG: 164 | End: 2023-03-03
Attending: THORACIC SURGERY (CARDIOTHORACIC VASCULAR SURGERY)
Payer: COMMERCIAL

## 2023-03-03 ENCOUNTER — APPOINTMENT (OUTPATIENT)
Dept: GENERAL RADIOLOGY | Facility: CLINIC | Age: 78
DRG: 164 | End: 2023-03-03
Attending: PHYSICIAN ASSISTANT
Payer: COMMERCIAL

## 2023-03-03 VITALS
DIASTOLIC BLOOD PRESSURE: 52 MMHG | WEIGHT: 239 LBS | BODY MASS INDEX: 40.8 KG/M2 | SYSTOLIC BLOOD PRESSURE: 115 MMHG | HEIGHT: 64 IN | HEART RATE: 60 BPM | RESPIRATION RATE: 16 BRPM | TEMPERATURE: 97.7 F | OXYGEN SATURATION: 96 %

## 2023-03-03 LAB
ANION GAP SERPL CALCULATED.3IONS-SCNC: 8 MMOL/L (ref 7–15)
BUN SERPL-MCNC: 18.1 MG/DL (ref 8–23)
CALCIUM SERPL-MCNC: 9.1 MG/DL (ref 8.8–10.2)
CHLORIDE SERPL-SCNC: 108 MMOL/L (ref 98–107)
CREAT SERPL-MCNC: 0.85 MG/DL (ref 0.67–1.17)
DEPRECATED HCO3 PLAS-SCNC: 25 MMOL/L (ref 22–29)
ERYTHROCYTE [DISTWIDTH] IN BLOOD BY AUTOMATED COUNT: 15.9 % (ref 10–15)
GFR SERPL CREATININE-BSD FRML MDRD: 89 ML/MIN/1.73M2
GLUCOSE BLDC GLUCOMTR-MCNC: 176 MG/DL (ref 70–99)
GLUCOSE SERPL-MCNC: 199 MG/DL (ref 70–99)
HCT VFR BLD AUTO: 30.3 % (ref 40–53)
HGB BLD-MCNC: 9.3 G/DL (ref 13.3–17.7)
MCH RBC QN AUTO: 24.6 PG (ref 26.5–33)
MCHC RBC AUTO-ENTMCNC: 30.7 G/DL (ref 31.5–36.5)
MCV RBC AUTO: 80 FL (ref 78–100)
PLATELET # BLD AUTO: 195 10E3/UL (ref 150–450)
POTASSIUM SERPL-SCNC: 4.1 MMOL/L (ref 3.4–5.3)
RBC # BLD AUTO: 3.78 10E6/UL (ref 4.4–5.9)
SODIUM SERPL-SCNC: 141 MMOL/L (ref 136–145)
WBC # BLD AUTO: 13.2 10E3/UL (ref 4–11)

## 2023-03-03 PROCEDURE — 71045 X-RAY EXAM CHEST 1 VIEW: CPT

## 2023-03-03 PROCEDURE — 999N000065 XR CHEST 2 VIEWS

## 2023-03-03 PROCEDURE — 85027 COMPLETE CBC AUTOMATED: CPT | Performed by: THORACIC SURGERY (CARDIOTHORACIC VASCULAR SURGERY)

## 2023-03-03 PROCEDURE — 97530 THERAPEUTIC ACTIVITIES: CPT | Mod: GO

## 2023-03-03 PROCEDURE — 97165 OT EVAL LOW COMPLEX 30 MIN: CPT | Mod: GO

## 2023-03-03 PROCEDURE — 82310 ASSAY OF CALCIUM: CPT | Performed by: THORACIC SURGERY (CARDIOTHORACIC VASCULAR SURGERY)

## 2023-03-03 PROCEDURE — 36415 COLL VENOUS BLD VENIPUNCTURE: CPT | Performed by: THORACIC SURGERY (CARDIOTHORACIC VASCULAR SURGERY)

## 2023-03-03 PROCEDURE — 250N000013 HC RX MED GY IP 250 OP 250 PS 637: Performed by: THORACIC SURGERY (CARDIOTHORACIC VASCULAR SURGERY)

## 2023-03-03 PROCEDURE — 250N000011 HC RX IP 250 OP 636: Performed by: THORACIC SURGERY (CARDIOTHORACIC VASCULAR SURGERY)

## 2023-03-03 PROCEDURE — 97110 THERAPEUTIC EXERCISES: CPT | Mod: GO

## 2023-03-03 PROCEDURE — 97535 SELF CARE MNGMENT TRAINING: CPT | Mod: GO

## 2023-03-03 RX ORDER — AMOXICILLIN 250 MG
1 CAPSULE ORAL 2 TIMES DAILY
Qty: 50 TABLET | Refills: 0 | Status: ON HOLD | OUTPATIENT
Start: 2023-03-03 | End: 2023-12-02

## 2023-03-03 RX ORDER — ENOXAPARIN SODIUM 100 MG/ML
40 INJECTION SUBCUTANEOUS EVERY 24 HOURS
Qty: 2.4 ML | Refills: 0 | Status: SHIPPED | OUTPATIENT
Start: 2023-03-04 | End: 2023-03-10

## 2023-03-03 RX ORDER — METHOCARBAMOL 500 MG/1
500 TABLET, FILM COATED ORAL EVERY 6 HOURS PRN
Qty: 30 TABLET | Refills: 0 | Status: ON HOLD | OUTPATIENT
Start: 2023-03-03 | End: 2023-12-02

## 2023-03-03 RX ORDER — OXYCODONE HYDROCHLORIDE 5 MG/1
5-10 TABLET ORAL EVERY 4 HOURS PRN
Qty: 50 TABLET | Refills: 0 | Status: SHIPPED | OUTPATIENT
Start: 2023-03-03 | End: 2023-03-20

## 2023-03-03 RX ORDER — ACETAMINOPHEN 325 MG/1
975 TABLET ORAL EVERY 6 HOURS
COMMUNITY
Start: 2023-03-03 | End: 2023-11-21

## 2023-03-03 RX ADMIN — POLYETHYLENE GLYCOL 3350 17 G: 17 POWDER, FOR SOLUTION ORAL at 08:27

## 2023-03-03 RX ADMIN — OXYCODONE HYDROCHLORIDE 10 MG: 5 TABLET ORAL at 01:53

## 2023-03-03 RX ADMIN — ACETAMINOPHEN 975 MG: 325 TABLET, FILM COATED ORAL at 15:57

## 2023-03-03 RX ADMIN — FAMOTIDINE 20 MG: 20 TABLET ORAL at 08:27

## 2023-03-03 RX ADMIN — ENOXAPARIN SODIUM 40 MG: 40 INJECTION SUBCUTANEOUS at 10:56

## 2023-03-03 RX ADMIN — OXYCODONE HYDROCHLORIDE 10 MG: 5 TABLET ORAL at 10:56

## 2023-03-03 RX ADMIN — SENNOSIDES AND DOCUSATE SODIUM 1 TABLET: 50; 8.6 TABLET ORAL at 08:27

## 2023-03-03 RX ADMIN — OXYCODONE HYDROCHLORIDE 5 MG: 5 TABLET ORAL at 15:59

## 2023-03-03 RX ADMIN — ACETAMINOPHEN 975 MG: 325 TABLET, FILM COATED ORAL at 06:42

## 2023-03-03 RX ADMIN — OXYCODONE HYDROCHLORIDE 10 MG: 5 TABLET ORAL at 06:45

## 2023-03-03 ASSESSMENT — ACTIVITIES OF DAILY LIVING (ADL)
ADLS_ACUITY_SCORE: 36
ADLS_ACUITY_SCORE: 37
ADLS_ACUITY_SCORE: 37
ADLS_ACUITY_SCORE: 36
ADLS_ACUITY_SCORE: 37

## 2023-03-03 NOTE — CARE PLAN
PT consult received. Chart reviewed. Spoke with OT, pt moving well, mod IND with his walker. OT reports pt does not have mobility need for discharge and understanding of education given  exercises. Will complete PT order and sign off.

## 2023-03-03 NOTE — OP NOTE
Preoperative diagnosis:                         Lung nodule  Postoperative diagnosis:                       Metastatic squamous cell carcinoma    Procedure:   1. Right thoracoscopic wedge resection    Anesthesia: General   Surgeon: Hebert Jones   Resident surgeon: Charles Bobo MD  EBL: 5 mL    Complications: none immediate  Findings:  Consistent with metastatic squamous cell carcinoma     Description of procedure  The patient was brought to the room and placed supine upon the table.  After confirming the patient's identity and verifying the consent, appropriate monitoring devices were placed, as well as SCD boots. General anesthesia was administered.  The patient was intubated with a double-lumen endotracheal tube.  Proper position was confirmed by fiberoptic bronchoscopy.  Intravenous antibiotic was administered within 1 hour prior to the incision. The patient was turned to the left lateral decubitus position and all pressure points were padded. The bed was flexed, and the patient was secured to the table and the right arm was placed on an airplane board.  The right chest was prepped with chlorhexidine and  draped in the standard surgical fashion.      A 10 mm incision was made in the seventh intercostal space in line with the anterior superior iliac spine.  This was carried down to the pleural cavity.  A 10 mm port was placed and a 10 mm, 30-degree thoracoscope was inserted into the pleural cavity.  There were no adhesions.  A 3 cm utility incision was made opposite the hilum.  A wound protector was placed.  The nodule was located in the right lower lobe and a wedge resection was performed with serial fires of the powered stapler with blue loads.   Hemostasis was noted. A multiple level intercostal nerve block was administered.      A 28 Salvadorean chest tube was then placed going posteriorly to the apex  through the initial camera port site.  This was secured to the skin using 0 silk suture.  The lung was observed to  inflate appropriately.  The utility incision was closed in layers, irrigating each layer prior to closure.  The areas were cleaned and dried and exofin was applied.     At  the end of the case, sponge, needle, and instrument counts were correct.

## 2023-03-03 NOTE — PLAN OF CARE
Occupational Therapy Discharge Summary    Reason for therapy discharge:    All goals and outcomes met, no further needs identified.    Progress towards therapy goal(s). See goals on Care Plan in [facility] electronic health record for goal details.  Goals met    Therapy recommendation(s):    No further therapy is recommended. Pt demonstrating ability to complete self-cares, ambulation in basurto with 4WW independently. Pt has sufficient support from PCAs and homemaker services. Pt appears to have a good understanding of thoracotomy exercises. No adaptive equipment needs. No further therapy needs. Will sign off

## 2023-03-03 NOTE — PROGRESS NOTES
03/03/23 1102   Appointment Info   Signing Clinician's Name / Credentials (OT) Mary Yadav OTR/L   Living Environment   People in Home alone   Current Living Arrangements apartment   Home Accessibility no concerns   Transportation Anticipated car, drives self   Self-Care   Usual Activity Tolerance good   Current Activity Tolerance moderate   Equipment Currently Used at Home grab bar, toilet;grab bar, tub/shower;raised toilet seat;shower chair;walker, rolling  (reacher. medical alert pendant. Step in shower)   Fall history within last six months no   Activity/Exercise/Self-Care Comment At baseline pt is independent in self-cares without gait aid except has assist with donning compression socks. Uses 4WW for longer distances. Primarily sleeps in lift chair   Instrumental Activities of Daily Living (IADL)   IADL Comments Has PCA for approximately 20 hrs and homemaker. Has assist with cooking, cleaing, laundry, shopping. Does own med management   General Information   Onset of Illness/Injury or Date of Surgery 03/02/23   Referring Physician Hebert Jones MD   Patient/Family Therapy Goal Statement (OT) Return home   Additional Occupational Profile Info/Pertinent History of Current Problem POD1 Right thoracoscopic wedge resection. History of carcinoma. Refer to EMR for additional details   Existing Precautions/Restrictions fall;thoracotomy   Cognitive Status Examination   Affect/Mental Status (Cognitive) WFL   Visual Perception   Visual Impairment/Limitations corrective lenses for reading   Sensory   Sensory Comments Pt reports baseline numbness and tingling in bilateral feet   Pain Assessment   Patient Currently in Pain Yes, see Vital Sign flowsheet   Transfers   Transfers sit-stand transfer;toilet transfer   Sit-Stand Transfer   Sit-Stand [place] (Transfers) modified independence   Toilet Transfer   [place] Level (Toilet Transfer) modified independence   Activities of Daily Living   BADL  Assessment/Intervention toileting;grooming   Grooming Assessment/Training   [place] Level (Grooming) modified independence   Toileting   [place] Level (Toileting) modified independence   Clinical Impression   Criteria for Skilled Therapeutic Interventions Met (OT) Yes, treatment indicated   OT Diagnosis Decline function   OT Problem List-Impairments impacting ADL activity tolerance impaired;post-surgical precautions;pain   Assessment of Occupational Performance 1-3 Performance Deficits   Identified Performance Deficits Endurance for standing ADLs. Driving.   Planned Therapy Interventions (OT) home program guidelines;progressive activity/exercise;risk factor education;ROM   Clinical Decision Making Complexity (OT) low complexity   Anticipated Equipment Needs Upon Discharge (OT)   (Pt has)   Risk & Benefits of therapy have been explained evaluation/treatment results reviewed;care plan/treatment goals reviewed;risks/benefits reviewed;current/potential barriers reviewed;participants voiced agreement with care plan;participants included;patient   OT Total Evaluation Time   OT Eval, Low Complexity Minutes (51842) 9   OT Goals   Therapy Frequency (OT) One time eval and treatment   OT Goals OT Goal 1;OT Goal 2   OT: Goal 1 Pt will tolerate engaging in at least 15 minutes of activities in standing to progress activity tolerance towards baseline level   OT: Goal 2 Pt will demonstrate independence in thoracotomy exercises to maintain thoracic ROM needed for future self-care cares.   Interventions   Interventions Quick Adds Therapeutic Activity;Therapeutic Procedures/Exercise;Self-Care/Home Management   Self-Care/Home Management   Self-Care/Home Mgmt/ADL, Compensatory, Meal Prep Minutes (21027) 9   Treatment Detail/Skilled Intervention Pt seen for a focus on enhancing activity tolerance for future ADL tasks at home. Pt stood and engaged in grooming tasks and completed toileting with distant supervision. Pt  participated in education regarding how to progress activity tolerance while hospitalized and while at home   Therapeutic Procedures/Exercise   Therapeutic Procedure: strength, endurance, ROM, flexibillity minutes (33196) 8   Symptoms Noted During/After Treatment increased pain   Treatment Detail/Skilled Intervention Pt issued thoracotomy home exercise program. Pt engaged in exercises per protocol with good tolerance. Pt verbalized understanding and was issued instruction handout.   Therapeutic Activities   Therapeutic Activity Minutes (56864) 8   Symptoms noted during/after treatment fatigue   Treatment Detail/Skilled Intervention Upon arrival pt very agreeable to therapy. Pt ambulated for 5 minutes in basurto with 4WW with modified independence.   OT Discharge Planning   OT Plan DC   OT Discharge Recommendation (DC Rec) home   OT Rationale for DC Rec Pt demonstrating ability to complete self-cares, ambulation in basurto with 4WW independently. Pt has sufficient support from PCAs and homemaker services. Pt appears to have a good understanding of thoracotomy exercises. No adaptive equipment needs. No further therapy needs. Will sign off   Total Session Time   Timed Code Treatment Minutes 25   Total Session Time (sum of timed and untimed services) 34

## 2023-03-03 NOTE — DISCHARGE SUMMARY
NAME: Jonathan Bishop   MRN: [id number]   : 1945     DATE OF ADMISSION: 3/2/2023     Preoperative diagnosis:                         Lung nodule  Postoperative diagnosis:                       Metastatic squamous cell carcinoma     Procedure:   1. Right thoracoscopic wedge resection    PATHOLOGY RESULTS: Final pathology pending at time of discharge.      CULTURE RESULTS: None     INTRAOPERATIVE COMPLICATIONS: None     POSTOPERATIVE MEDICAL ISSUES: None     DRAINS/TUBES PRESENT AT DISCHARGE: None    DATE OF DISCHARGE:  March 3, 2023     HOSPITAL COURSE: Jonathan Bishop is a 77 year old male who on 3/2/2023 underwent the above-named procedures.  He tolerated the operation well and postoperatively was transferred to the general post-surgical unit.  The remainder of his course was essentially uncomplicated.  Prior to discharge, his pain was controlled well, he was able to perform ADLs and ambulate independently without difficulty, and had full return of bowel and bladder function.  On March 3, 2023, he was discharged to home in stable condition.    DISCHARGE EXAM:   A&O, NAD  Resp non-labored  Distal extremities warm    Incisions healthy appearing     DISCHARGE INSTRUCTIONS:  Discharge Procedure Orders   Reason for your hospital stay   Order Comments: Surgery     Follow-up and recommended labs and tests    Order Comments: 1.) Follow up with primary care physician, Buck Nascimento, in 1-2 weeks.  2.) Follow up with Dr. Hebert Jones, in Thoracic Surgery clinic in 1 month, prior to which a CXR should be performed (CXR should be on the same day as clinic appointment).     Activity   Order Comments: As in discharge instruction section     Order Specific Question Answer Comments   Is discharge order? Yes      Discharge Instructions   Order Comments: THORACIC SURGERY DISCHARGE INSTRUCTIONS    DIET: Regular diet - as prior to admission     If your plans upon discharge include prolonged periods of sitting (i.e a  "lengthy car or plane ride), it is highly beneficial to get up and walk at least once per hour to help prevent swelling and blood clots.     You may remove chest tube dressing 48 hours after tube removal and bandage the site at your own discretion thereafter.  Small amounts of leakage are normal for 2-3 days after removal.  Feel free to call with questions.    You may get incision wet 2 days after operation. Do not submerge, soak, or scrub incision or swim until seen in follow-up.    Take incentive spirometer home for continued frequent use    Activity as tolerated, no strenous activity until seen in follow-up, no lifting greater than 20 pounds for the next 1-2 weeks.    Stay hydrated. Take over the counter fiber (metamucil or benefiber) and stool softeners (Miralax, docusate or senna) if becoming constipated.     Call for fever greater than 101.5, chills, increased size of incision, red skin around incision, vision changes, muscle strength changes, sensation changes, shortness of breath, or other concerns.    No driving while taking narcotic pain medication.    Transition to ibuprofen or tylenol/acetaminophen for pain control. Do not take tylenol/acetaminophen and acetaminophen containing narcotic (e.g., percocet or vicodin) at the same time. If you have known ulcer problems, or kidney trouble (elevated creatinine) do not take the ibuprofen.    In emergencies, call 911    For other Questions or Concerns;   A.) During weekday working hours (Monday through Friday 8am to 4:30pm)   call [phone] ([phone] and ask to speak to a thoracic surgery nurse (RN or LPN).     B.) At nights (after 4:30pm), on weekends, or if urgent call [phone] and   tell the  \"I would like to page job code [phone], the thoracic surgery   fellow on call, please.\"     Diet   Order Comments: As in discharge instruction section     Order Specific Question Answer Comments   Is discharge order? Yes        DISCHARGE MEDICATIONS:   Current " Discharge Medication List      START taking these medications    Details   acetaminophen (TYLENOL) 325 MG tablet Take 3 tablets (975 mg) by mouth every 6 hours    Associated Diagnoses: Pulmonary nodule      enoxaparin ANTICOAGULANT (LOVENOX) 40 MG/0.4ML syringe Inject 0.4 mLs (40 mg) Subcutaneous every 24 hours for 6 days  Qty: 2.4 mL, Refills: 0    Associated Diagnoses: Pulmonary nodule      methocarbamol (ROBAXIN) 500 MG tablet Take 1 tablet (500 mg) by mouth every 6 hours as needed for muscle spasms  Qty: 30 tablet, Refills: 0    Associated Diagnoses: Pulmonary nodule      oxyCODONE (ROXICODONE) 5 MG tablet Take 1-2 tablets (5-10 mg) by mouth every 4 hours as needed for moderate pain (4-6)  Qty: 50 tablet, Refills: 0    Associated Diagnoses: Pulmonary nodule      senna-docusate (SENOKOT-S/PERICOLACE) 8.6-50 MG tablet Take 1 tablet by mouth 2 times daily Reduce dose or temporarily discontinue if having loose stools.  Qty: 50 tablet, Refills: 0    Associated Diagnoses: Pulmonary nodule         CONTINUE these medications which have NOT CHANGED    Details   ibuprofen (ADVIL/MOTRIN) 600 MG tablet Take 1 tablet (600 mg) by mouth every 8 hours as needed for moderate pain (4-6)  Qty: 270 tablet, Refills: 3    Associated Diagnoses: Pre-op exam      spironolactone (ALDACTONE) 50 MG tablet Take 1 tablet (50 mg) by mouth daily  Qty: 90 tablet, Refills: 3    Associated Diagnoses: Chronic diastolic heart failure (H)      tamsulosin (FLOMAX) 0.4 MG capsule TAKE 1 CAPSULE BY MOUTH ONCE DAILY  Qty: 90 capsule, Refills: 4    Associated Diagnoses: Hyperplasia of prostate with lower urinary tract symptoms (LUTS)      vitamin D3 (CHOLECALCIFEROL) 50 mcg (2000 units) tablet Take 1 tablet by mouth daily      order for DME Equipment being ordered: Walker ()  Treatment Diagnosis: stroke  Qty: 1 Units, Refills: 0    Associated Diagnoses: Cerebrovascular accident (CVA) due to occlusion of right anterior cerebral artery (H)          STOP taking these medications       atorvastatin (LIPITOR) 40 MG tablet Comments:   Reason for Stopping:         oxyCODONE-acetaminophen (PERCOCET) 5-325 MG tablet Comments:   Reason for Stopping:

## 2023-03-03 NOTE — PLAN OF CARE
Goal Outcome Evaluation:    Patient is alert and oriented. Vital signs are stable on room air, afebrile. Pain has managed with PRN oxycodone and scheduled tylenol for chronic back pain. Right chest tube on water seal in place, dressing had moderate drainage. Assist of 1-2 gait belt and walker.  Tolerated regular diet. Continent and using bedside urinal. Patient wants to discharge tomorrow morning.

## 2023-03-04 ENCOUNTER — NURSE TRIAGE (OUTPATIENT)
Dept: NURSING | Facility: CLINIC | Age: 78
End: 2023-03-04
Payer: COMMERCIAL

## 2023-03-04 NOTE — TELEPHONE ENCOUNTER
Pt had a nodule removed from lung on 3/2 by Dr Jones of [facility] - Thoracic Surgery. Pt states he was told someone would come to his home today to change his surgical dressing but he has heard nothing about anyone coming. Declined triage. Wants someone to change his dressing. Paged on-call Dr Espinoza @3:52pm to call pt directly at [phone]. Advised pt to call back if no call from provider within 30 min. Pt voiced understanding and agreement.

## 2023-03-04 NOTE — TELEPHONE ENCOUNTER
Reason for Disposition    [1] Follow-up call from patient regarding patient's clinical status AND [2] information urgent    Additional Information    Negative: Lab calling with strep throat test results and triager can call in prescription    Negative: Lab calling with urinalysis test results and triager can call in prescription    Negative: Medication questions    Negative: Medication renewal and refill questions    Negative: Pre-operative or pre-procedural questions    Negative: ED call to PCP (i.e., primary care provider; doctor, NP, or PA)    Negative: Doctor (or NP/PA) call to PCP    Negative: Call about patient who is currently hospitalized    Negative: Lab or radiology calling with CRITICAL test results    Protocols used: PCP CALL - NO TRIAGE-A-

## 2023-03-04 NOTE — PLAN OF CARE
Goal Outcome Evaluation:      Plan of Care Reviewed With: patient, caregiver    Overall Patient Progress: improvingOverall Patient Progress: improving    A&OX4, VSS, pain controlled with oxycodone and tylenol.  CT discontinued by PHU at approx 1000.  Dressing CD&I.  Lungs clear, diminished, BOYLE.  Up with SBA, gait belt and walker.  Using IS to 1250.  Encouraged use.  Voiding well.  Tolerating regular diet.  PCA here and discharge instructions reviewed and well received by both.  Pt states being comfortable with administering lovenox injections to self.  Discharge to home with PCA.

## 2023-03-06 ENCOUNTER — PATIENT OUTREACH (OUTPATIENT)
Dept: ONCOLOGY | Facility: CLINIC | Age: 78
End: 2023-03-06

## 2023-03-06 LAB
PATH REPORT.COMMENTS IMP SPEC: ABNORMAL
PATH REPORT.COMMENTS IMP SPEC: YES
PATH REPORT.FINAL DX SPEC: ABNORMAL
PATH REPORT.GROSS SPEC: ABNORMAL
PATH REPORT.INTRAOP OBS SPEC DOC: ABNORMAL
PATH REPORT.MICROSCOPIC SPEC OTHER STN: ABNORMAL
PATH REPORT.RELEVANT HX SPEC: ABNORMAL
PHOTO IMAGE: ABNORMAL

## 2023-03-06 PROCEDURE — 88342 IMHCHEM/IMCYTCHM 1ST ANTB: CPT | Mod: 26 | Performed by: PATHOLOGY

## 2023-03-06 PROCEDURE — 88307 TISSUE EXAM BY PATHOLOGIST: CPT | Mod: 26 | Performed by: PATHOLOGY

## 2023-03-06 PROCEDURE — 88331 PATH CONSLTJ SURG 1 BLK 1SPC: CPT | Mod: 26 | Performed by: PATHOLOGY

## 2023-03-06 NOTE — PROGRESS NOTES
"[facility]: Post-Discharge Note  SITUATION                                                      Admission:    Admission Date: 03/02/23   Reason for Admission: Right thoracoscopic wedge resection (Right: Chest  Discharge:   Discharge Date: 03/03/23  Discharge Diagnosis: non small cell    BACKGROUND                                                      Per hospital discharge summary and inpatient provider notes.  \"Jonathan Bishop is a 77 year old male who on 3/2/2023 underwent the above-named procedures.  He tolerated the operation well and postoperatively was transferred to the general post-surgical unit.  The remainder of his course was essentially uncomplicated.  Prior to discharge, his pain was controlled well, he was able to perform ADLs and ambulate independently without difficulty, and had full return of bowel and bladder function.  On March 3, 2023, he was discharged to home in stable condition.\"    ASSESSMENT           Discharge Assessment  How are you doing now that you are home?: doing good  How are your symptoms? (Red Flag symptoms escalate to triage hotline per guidelines): Improved  Do you feel your condition is stable enough to be safe at home until your provider visit?: Yes  Does the patient have their discharge instructions? : No - Review discharge instructions  Does the patient have questions regarding their discharge instructions? : No  Were you started on any new medications or were there changes to any of your previous medications? : Yes  Does the patient have all of their medications?: Yes  Do you have questions regarding any of your medications? : No  Do you have all of your needed medical supplies or equipment (DME)?  (i.e. oxygen tank, CPAP, cane, etc.): No - What equipment or supplies are needed?  Discharge follow-up appointment scheduled within 14 calendar days? : Yes  Discharge Follow Up Appointment Date: 03/16/23  Discharge Follow Up Appointment Scheduled with?: Specialty Care " Provider    Post-op ([facility] Only)  If the patient had a surgery or procedure, do they have any questions for a nurse?: No    Post-op (Clinicians Only)  Did the patient have surgery or a procedure: Yes  Incision: open  Drainage: No  Fever: No  Chills: No  Redness: No  Warmth: No  Swelling: No  Incision site pain: No  Closure: none  Eating & Drinking: eating and drinking without complaints/concerns  PO Intake: regular diet  Bowel Function: normal  Urinary Status: voiding without complaint/concerns      PLAN                                                      Outpatient Plan:  Follow ups in place. No complaints. Pain controlled    Future Appointments   Date Time Provider Department Center   3/16/2023 12:45 PM Dominique Mueller MD [facility]   3/20/2023  7:30 AM Jenni Mills MD [facility]   3/27/2023  1:20 PM [facility]   3/27/2023  2:00 PM Hebert Jones MD [facility]   4/10/2023  9:00 AM Buck Nascimento MD [facility]   6/7/2023  9:00 AM Mamadou Gary DPM [facility]         For any urgent concerns, please contact our 24 hour clinic line:   [place]: [phone]       Olivia Morales RN, BSN  Specialty Care Coordinator  [facility]  [phone]

## 2023-03-16 ENCOUNTER — ONCOLOGY VISIT (OUTPATIENT)
Dept: ONCOLOGY | Facility: CLINIC | Age: 78
End: 2023-03-16
Attending: OTOLARYNGOLOGY
Payer: COMMERCIAL

## 2023-03-16 VITALS
DIASTOLIC BLOOD PRESSURE: 77 MMHG | OXYGEN SATURATION: 98 % | SYSTOLIC BLOOD PRESSURE: 138 MMHG | TEMPERATURE: 97.7 F | WEIGHT: 228.5 LBS | HEART RATE: 101 BPM | BODY MASS INDEX: 40.49 KG/M2 | HEIGHT: 63 IN

## 2023-03-16 DIAGNOSIS — C10.9 MALIGNANT NEOPLASM OF OROPHARYNX (H): Primary | ICD-10-CM

## 2023-03-16 DIAGNOSIS — C10.9 SQUAMOUS CELL CARCINOMA OF OROPHARYNX (H): ICD-10-CM

## 2023-03-16 DIAGNOSIS — C79.51 BONE METASTASIS: ICD-10-CM

## 2023-03-16 PROCEDURE — 88360 TUMOR IMMUNOHISTOCHEM/MANUAL: CPT | Mod: TC | Performed by: OTOLARYNGOLOGY

## 2023-03-16 PROCEDURE — 99417 PROLNG OP E/M EACH 15 MIN: CPT | Performed by: INTERNAL MEDICINE

## 2023-03-16 PROCEDURE — 99205 OFFICE O/P NEW HI 60 MIN: CPT | Performed by: INTERNAL MEDICINE

## 2023-03-16 PROCEDURE — G0463 HOSPITAL OUTPT CLINIC VISIT: HCPCS | Mod: 25 | Performed by: INTERNAL MEDICINE

## 2023-03-16 PROCEDURE — 88360 TUMOR IMMUNOHISTOCHEM/MANUAL: CPT | Mod: 26 | Performed by: PATHOLOGY

## 2023-03-16 ASSESSMENT — PAIN SCALES - GENERAL: PAINLEVEL: EXTREME PAIN (8)

## 2023-03-16 NOTE — NURSING NOTE
"Oncology Rooming Note    March 16, 2023 1:06 PM   Jonathan Bishop is a 77 year old male who presents for:    Chief Complaint   Patient presents with     Oncology Clinic Visit     New - Squamous Cell Carcinoma of Oropharynx     Initial Vitals: /77   Pulse 101   Temp 97.7  F (36.5  C)   Ht 1.603 m (5' 3.11\")   Wt 103.6 kg (228 lb 8 oz)   SpO2 98%   BMI 40.34 kg/m   Estimated body mass index is 40.34 kg/m  as calculated from the following:    Height as of this encounter: 1.603 m (5' 3.11\").    Weight as of this encounter: 103.6 kg (228 lb 8 oz). Body surface area is 2.15 meters squared.  Extreme Pain (8) Comment: Data Unavailable   No LMP for male patient.  Allergies reviewed: Yes  Medications reviewed: Yes    Medications: Medication refills not needed today.  Pharmacy name entered into [facility]: [id number] - [place], [place] - [address]    Clinical concerns: No new clinical concerns other than reason for visit today.       Haley Crenshaw            "

## 2023-03-16 NOTE — PROGRESS NOTES
[facility] CANCER [facility]    PATIENT NAME: Jonathan Bishop  MRN # [id number]   DATE OF VISIT: March 15, 2023  YOB: 1945     Referring Provider: Dr. Karishma Eng  Otolaryngology: Dr. Karishma Eng  Radiation Oncology: Dr. Jenni Mills  PCP: Dr. Buck Nascimento    CANCER TYPE: SCC L tonsil, p16 +lety  STAGE: [id number] (IVC)  ECOG PS: 1    PD-L1: pending  NGS:     SUMMARY    2/26/23 L tonsil mass bx in clinic (Dr. Eng).   2/20/23 PET/CT. 3.7 x 4.0 x 5.1 cm mass L palatine tonsil causing narrowing of the oropharyngeal lumen, L level 2 node, L retropharyngeal nodular density, extension of primary mass vs retropharyngeal node, 0.8 cm RLL nodule concerning for met, mild focal uptake R iliac bone and L1 without CT correlate suspicious for mets.   3/2/23 R VATS, wedge resection. Path: SCC, poorly differentiated, associated with necrosis, 1 cm, negative margins, p16 +lety    ASSESSMENT AND PLAN  SCC L tonsil, p16 +lety, PD-L1 pending, +/- oligometastatic vs multiple mets: Large primary mass and at risk for airway compromise in the near future. Staging PET/CT showed 2 bone lesions concerning for mets, MRI L spine scheduled next week. I'm more worried about the R iliac met - the spine lesion could be related to DJD and the prior surgery. Lung nodule pathologically proven to be SCC met. Dr. Mills and I can discuss once she has seen him and the MRIs are done. He probably needs aggressive management of the primary disease. We can consider systemic therapy first; I explained that by the time he gets going on chemoradiation, it's likely 3-4 weeks will pass and we might run into the situation where he'll need a prophylactic trach, etc., whereas we can get systemic therapy going relatively quickly. If the MRIs confirm metastatic disease in the bones, I'd favor being ready to come in with palliative radiation to stave off airway compromise vs definitive chemoradiation, but starting with systemic therapy first. If MRIs are  negative, I'd favor being aggressive, starting with systemic therapy, then chemoradiation, depending on how the logistics work out   - add MRI pelvis, await study results next week   - sees Dr. Mills on Monday, will discuss with her after MRIs    - await PD-L1      Microcytic anemia: Iron studies 8/2022 showed perhaps very mild LEW to normal iron studies. 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    H/o CVA: Residual L weakness.     Peripheral neuropathy: I suspect from prior nerve impingement rather than DM. Can double check B12, TSH when we do labs.     90 minutes spent on the date of the encounter doing chart review, history and exam, documentation and further activities per the note     Dominique Mueller MD  Associate Professor of Medicine  Hematology, Oncology and Transplantation      SUBJECTIVE  Mr. 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     Back pain is the main issue - chronic - no changes recently  Uses walker when he goes out  Sometimes just cane - shorter distances   No further hemoptysis  No weight loss subjectively  Says he's eating ok  Bowels and bladder ok  Has some tingling in both feet, R spasms more than left     End of march to [place] for fishing - goes to [employer] a lot  Doesn't swim though. Wears a life jacket  4/13 - [place] for fishing and to see family  [place]   No family here - has Maria R, sounds like PCA vs aide, helps him regularly.     PAST MEDICAL HISTORY  SCC as above  Chronic LBP  TAVR 2020  H/o rheumatic carditis 1950  CHF. 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 Nocturia once nightly   ED  Cataracts  Umbilical hernia repair 2011  Knee arthroplasty B 2010  Lumbar spine fusion, laminectomy, sciatic pain R >  L  Peripheral neuropathy - from pinched nerves?  Hearing loss    Numbness/tingling in both feet - bilaterally, symmetric, R spasms more than left     CURRENT OUTPATIENT MEDICATIONS  Current Outpatient Medications   Medication Sig Dispense Refill     acetaminophen (TYLENOL) 325 MG tablet Take 3 tablets (975 mg) by mouth every 6 hours       methocarbamol (ROBAXIN) 500 MG tablet Take 1 tablet (500 mg) by mouth every 6 hours as needed for muscle spasms 30 tablet 0     order for DME Equipment being ordered: Walker ()  Treatment Diagnosis: stroke 1 Units 0     oxyCODONE (ROXICODONE) 5 MG tablet Take 1-2 tablets (5-10 mg) by mouth every 4 hours as needed for moderate pain (4-6) 50 tablet 0     senna-docusate (SENOKOT-S/PERICOLACE) 8.6-50 MG tablet Take 1 tablet by mouth 2 times daily Reduce dose or temporarily discontinue if having loose stools. 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 . Former smoker, quit about 2005. No tobacco. Uses a walker. Has an aide/worker [employer] who helps - cleaning, getting around. He still drives. Sandie is a friend who lives in [place]. Loves fishing - has a boat, likes to travel for fishing - [place], [facility], [place], [place], etc. Had 8 siblings - 4 living. One brother lives in [place].     FAMILY HISTORY:   Family History   Problem Relation Age of Onset     C.A.D. Mother      Unknown/Adopted Father       "Heart Failure Sister      Heart Failure Sister      Glaucoma No family hx of      Macular Degeneration No family hx of      Diabetes No family hx of      Hypertension No family hx of       REVIEW OF SYSTEMS  As above in the HPI, o/w complete 12-point ROS was negative.    PHYSICAL EXAM  /77   Pulse 101   Temp 97.7  F (36.5  C)   Ht 1.603 m (5' 3.11\")   Wt 103.6 kg (228 lb 8 oz)   SpO2 98%   BMI 40.34 kg/m    GEN: NAD  HEENT: EOMI, no icterus, injection or pallor. Oropharynx shows obviously visible large tumor in the posterior oropharynx  EXT: no edema  NEURO: alert  SKIN: no rashes    LABORATORY AND IMAGING STUDIES   03/03/23 07:20   Sodium 141   Potassium 4.1   Chloride 108 (H)   Carbon Dioxide (CO2) 25   Urea Nitrogen 18.1   Creatinine 0.85   GFR Estimate 89   Calcium 9.1   Anion Gap 8   Glucose 199 (H)   WBC 13.2 (H)   Hemoglobin 9.3 (L)   Hematocrit 30.3 (L)   Platelet Count 195   RBC Count 3.78 (L)   MCV 80   MCH 24.6 (L)   MCHC 30.7 (L)   RDW 15.9 (H)     Labs were independently reviewed and interpreted by me  Microcytic anemia as above     PET/CT 2/20/23 was personally reviewed and interpreted by me  I extensively reviewed multiple notes in his chart going back to 2008     "

## 2023-03-16 NOTE — LETTER
3/16/2023         RE: Jonathan Bishop  [address]  [facility] [address]        Dear Colleague,    Thank you for referring your patient, Jonathan Bishop, to the [facility] CANCER [facility]. Please see a copy of my visit note below.       [facility] CANCER [facility]    PATIENT NAME: Jonathan Bishop  MRN # [id number]   DATE OF VISIT: March 15, 2023  YOB: 1945     Referring Provider: Dr. Karishma Eng  Otolaryngology: Dr. Karishma Eng  Radiation Oncology: Dr. Jenni Mills  PCP: Dr. Buck Nascimento    CANCER TYPE: SCC L tonsil, p16 +lety  STAGE: [id number] (IVC)  ECOG PS: 1    PD-L1: pending  NGS:     SUMMARY    2/26/23 L tonsil mass bx in clinic (Dr. Eng).   2/20/23 PET/CT. 3.7 x 4.0 x 5.1 cm mass L palatine tonsil causing narrowing of the oropharyngeal lumen, L level 2 node, L retropharyngeal nodular density, extension of primary mass vs retropharyngeal node, 0.8 cm RLL nodule concerning for met, mild focal uptake R iliac bone and L1 without CT correlate suspicious for mets.   3/2/23 R VATS, wedge resection. Path: SCC, poorly differentiated, associated with necrosis, 1 cm, negative margins, p16 +lety    ASSESSMENT AND PLAN  SCC L tonsil, p16 +lety, PD-L1 pending, +/- oligometastatic vs multiple mets: Large primary mass and at risk for airway compromise in the near future. Staging PET/CT showed 2 bone lesions concerning for mets, MRI L spine scheduled next week. I'm more worried about the R iliac met - the spine lesion could be related to DJD and the prior surgery. Lung nodule pathologically proven to be SCC met. Dr. Mills and I can discuss once she has seen him and the MRIs are done. He probably needs aggressive management of the primary disease. We can consider systemic therapy first; I explained that by the time he gets going on chemoradiation, it's likely 3-4 weeks will pass and we might run into the situation where he'll need a prophylactic trach, etc., whereas we can get  systemic therapy going relatively quickly. If the MRIs confirm metastatic disease in the bones, I'd favor being ready to come in with palliative radiation to stave off airway compromise vs definitive chemoradiation, but starting with systemic therapy first. If MRIs are negative, I'd favor being aggressive, starting with systemic therapy, then chemoradiation, depending on how the logistics work out   - add MRI pelvis, await study results next week   - sees Dr. Mills on Monday, will discuss with her after MRIs    - await PD-L1      Microcytic anemia: Iron studies 8/2022 showed perhaps very mild LEW to normal iron studies. 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    H/o CVA: Residual L weakness.     Peripheral neuropathy: I suspect from prior nerve impingement rather than DM. Can double check B12, TSH when we do labs.     90 minutes spent on the date of the encounter doing chart review, history and exam, documentation and further activities per the note     Dominique Mueller MD  Associate Professor of Medicine  Hematology, Oncology and Transplantation      SUBJECTIVE  Mr. 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     Back pain is the main issue - chronic - no changes recently  Uses walker when he goes out  Sometimes just cane - shorter distances   No further hemoptysis  No weight loss subjectively  Says he's eating ok  Bowels and bladder ok  Has some tingling in both feet, R spasms more than left     End of march to [place] for fishing - goes to [place] a lot  Doesn't swim though. Wears a life jacket  4/13 - [place] for fishing and to see family  May - [place]   No family here - has Maria R, sounds like PCA vs aide, helps him regularly.     PAST MEDICAL HISTORY  SCC as above  Chronic LBP  TAVR  2020  H/o rheumatic carditis 1950  CHF. 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 Nocturia once nightly   ED  Cataracts  Umbilical hernia repair 2011  Knee arthroplasty B 2010  Lumbar spine fusion, laminectomy, sciatic pain R > L  Peripheral neuropathy - from pinched nerves?  Hearing loss    Numbness/tingling in both feet - bilaterally, symmetric, R spasms more than left     CURRENT OUTPATIENT MEDICATIONS  Current Outpatient Medications   Medication Sig Dispense Refill     acetaminophen (TYLENOL) 325 MG tablet Take 3 tablets (975 mg) by mouth every 6 hours       methocarbamol (ROBAXIN) 500 MG tablet Take 1 tablet (500 mg) by mouth every 6 hours as needed for muscle spasms 30 tablet 0     order for DME Equipment being ordered: Walker ()  Treatment Diagnosis: stroke 1 Units 0     oxyCODONE (ROXICODONE) 5 MG tablet Take 1-2 tablets (5-10 mg) by mouth every 4 hours as needed for moderate pain (4-6) 50 tablet 0     senna-docusate (SENOKOT-S/PERICOLACE) 8.6-50 MG tablet Take 1 tablet by mouth 2 times daily Reduce dose or temporarily discontinue if having loose stools. 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 . Former smoker, quit about 2005. No tobacco. Uses a walker. Has an aide/worker [employer] who helps - cleaning, getting around. He still drives. Sandie is a friend who lives in [place]. Loves fishing -  "has a boat, likes to travel for fishing - [place], [employer] [facility], [place], [place], etc. Had 8 siblings - 4 living. One brother lives in [place].     FAMILY HISTORY:   Family History   Problem Relation Age of Onset     C.A.D. Mother      Unknown/Adopted Father      Heart Failure Sister      Heart Failure Sister      Glaucoma No family hx of      Macular Degeneration No family hx of      Diabetes No family hx of      Hypertension No family hx of       REVIEW OF SYSTEMS  As above in the HPI, o/w complete 12-point ROS was negative.    PHYSICAL EXAM  /77   Pulse 101   Temp 97.7  F (36.5  C)   Ht 1.603 m (5' 3.11\")   Wt 103.6 kg (228 lb 8 oz)   SpO2 98%   BMI 40.34 kg/m    GEN: NAD  HEENT: EOMI, no icterus, injection or pallor. Oropharynx shows obviously visible large tumor in the posterior oropharynx  EXT: no edema  NEURO: alert  SKIN: no rashes    LABORATORY AND IMAGING STUDIES   03/03/23 07:20   Sodium 141   Potassium 4.1   Chloride 108 (H)   Carbon Dioxide (CO2) 25   Urea Nitrogen 18.1   Creatinine 0.85   GFR Estimate 89   Calcium 9.1   Anion Gap 8   Glucose 199 (H)   WBC 13.2 (H)   Hemoglobin 9.3 (L)   Hematocrit 30.3 (L)   Platelet Count 195   RBC Count 3.78 (L)   MCV 80   MCH 24.6 (L)   MCHC 30.7 (L)   RDW 15.9 (H)     Labs were independently reviewed and interpreted by me  Microcytic anemia as above     PET/CT 2/20/23 was personally reviewed and interpreted by me  I extensively reviewed multiple notes in his chart going back to 2008     Dominique Mueller MD    "

## 2023-03-16 NOTE — PROGRESS NOTES
RADIATION ONCOLOGY CONSULTATION  DATE OF VISIT: 3/16/2023    Jonathan Bishop  MRN: [id number]    PROBLEM:   Mr. Bishop was seen for initial consultation in the Department of Radiation Oncology on 03/20/2023 at the request of Karishma Eng MD.    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    HISTORY OF PRESENT ILLNESS:    1/16/2023: Patient sees his PCP due to a 3/4-week history of a sore throat.  Physical exam at this visit was noted to have a 3 cm mass in the uvula with cervical lymphadenopathy.  Patient was subsequently referred to ENT at [facility].    1/30/2023: Patient initially scheduled to see Dr. Eng but had to cancel visit    2/6/2023, initial consultation with Dr. Eng: Flexible fiberoptic laryngoscopy at the time showed submucosal tumor of the left posterior soft palate, left lateral pharyngeal wall.    2/6/2023: Left tonsil biopsy with Dr. Eng    2/6/2023, surgical path exam: Left tonsil biopsy shows HPV associated squamous cell carcinoma.  p40(+), p16 (+), PD-L1 pending    2/20/2023, PET CT: Hypermetabolic 0.8 cm solid right lower lobe pulmonary nodule.  PET avid (SUV max 29.2) 3.7 x 4.0 x 5.1 cm enhancing mass in the left palatine tonsil compatible with the primary squamous cell carcinoma.  Multiple left level 2A lymph nodes that are centrally necrotic.  Solid left level 2A lymph node measuring 2.3 x 1.2 x 1.5 cm with increased FDG avidity (SUV max 21.1).   Avid (SUV max 27.2) left retropharyngeal nodes, measuring 1.7 x 1.4 cm.  Mild FDG uptake in the right iliac bone and L1 vertebral bodies without any definite CT correlate but suspicious for  metastasis.    2/24/2023, ENT tumor conference: Tumor board recommends a lumbar spine MRI, referral to thoracic surgery, medical oncology and radiation oncology    3/2/2023: Right VATS wedge resection    3/2/2023, surgical path exam: Wedge resection of the right lower lobe nodule shows poorly differentiated squamous cell carcinoma with negative margins for malignancy. p16 (+) and is favored to be metastasis from the primary    3/16/2023, surgical pathology: PD-L1 status still in process as of 3/20/2023    On interview:    The patient reports using a combination of oxycodone, ibuprofen, and acetaminophen for pain    The patient reports being able to chew and swallow    The patient denies any nasal regurgitation with swallowing    The patient denies any signs or has any symptoms of a fistula or soft palate insufficiency    The patient has a scheduled fishing trip in [place] and would like to proceed with any radiotherapy after he returns in April    PAST MEDICAL HISTORY:   Past Medical History:   Diagnosis Date     * * * SBE PROPHYLAXIS * * *     s/p TAVR     Acute rheumatic carditis 1950     Complication of anesthesia     pt once woke up during back surgery     Coronary artery disease     murmur     Erectile dysfunction     Sildenafil     Hip pain, bilateral      Hypercholesteremia      Hypertension      Low back pain      Nonsenile cataract      Obesity      Renal cyst      S/P 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     Stroke (cerebrum) (H) 11/30/2016     Umbilical hernia 06/10/2011    s/p surgical repair     Wound, surgical, infected 06/10/2011    hernia       PAST SURGICAL HISTORY:   Past Surgical History:   Procedure Laterality Date     ARTHROPLASTY KNEE BILATERAL  7/22/2010     COLONOSCOPY N/A 4/14/2017    Procedure: COLONOSCOPY;  Surgeon: Toby Bills MD;  Location: [facility]     CV CORONARY ANGIOGRAM N/A 10/28/2019    Procedure: Coronary Angiogram;   Surgeon: Guerrero Huitron MD;  Location:  HEART CARDIAC CATH LAB     CV RIGHT HEART CATH MEASUREMENTS RECORDED N/A 10/28/2019    Procedure: Right Heart Cath;  Surgeon: Guerrero Huitron MD;  Location:  HEART CARDIAC CATH LAB     CV TRANSCATHETER AORTIC VALVE REPLACEMENT N/A 12/8/2020    Procedure: Transcatheter Aortic Valve Replacement;  Surgeon: Camila Begum MD;  Location:  HEART CARDIAC CATH LAB     FUSION LUMBAR ANTERIOR TWO LEVELS       HERNIORRHAPHY UMBILICAL  6/10/2011    Procedure:HERNIORRHAPHY UMBILICAL; Open, with mesh placement; Surgeon:HO PARHAM; Location:[facility] OR     LAMINECTOMY LUMBAR TWO LEVELS       THORACOSCOPIC WEDGE RESECTION LUNG Right 3/2/2023    Procedure: Right thoracoscopic wedge resection;  Surgeon: Hebert Jones MD;  Location:  OR       CHEMOTHERAPY HISTORY: None  PAST RADIATION THERAPY HISTORY: None  IMPLANTABLE CARDIAC DEVICE:  None    MEDICATIONS:   Current Outpatient Medications   Medication Sig Dispense Refill     acetaminophen (TYLENOL) 325 MG tablet Take 3 tablets (975 mg) by mouth every 6 hours       ibuprofen (ADVIL/MOTRIN) 600 MG tablet Take 1 tablet (600 mg) by mouth every 8 hours as needed for moderate pain (4-6) 270 tablet 3     methocarbamol (ROBAXIN) 500 MG tablet Take 1 tablet (500 mg) by mouth every 6 hours as needed for muscle spasms 30 tablet 0     senna-docusate (SENOKOT-S/PERICOLACE) 8.6-50 MG tablet Take 1 tablet by mouth 2 times daily Reduce dose or temporarily discontinue if having loose stools. 50 tablet 0     tamsulosin (FLOMAX) 0.4 MG capsule TAKE 1 CAPSULE BY MOUTH ONCE DAILY 90 capsule 4     diazepam (VALIUM) 5 MG tablet Take 1 tablet (5 mg) by mouth once as needed for anxiety (may repeat one time if needed.) 2 tablet 0     order for DME Equipment being ordered: Walker ()  Treatment Diagnosis: stroke 1 Units 0     oxyCODONE (ROXICODONE) 5 MG tablet Take 1-2 tablets (5-10 mg) by mouth every 4 hours as needed for moderate pain  (4-6) 50 tablet 0     spironolactone (ALDACTONE) 50 MG tablet Take 1 tablet (50 mg) by mouth daily (Patient not taking: Reported on 3/16/2023) 90 tablet 3     vitamin D3 (CHOLECALCIFEROL) 50 mcg (2000 units) tablet Take 1 tablet by mouth daily (Patient not taking: Reported on 3/16/2023)          ALLERGIES:   Allergies as of 2023     (No Known Allergies)       SOCIAL HISTORY:   Social History     Socioeconomic History     Marital status:      Spouse name: Not on file     Number of children: Not on file     Years of education: Not on file     Highest education level: Not on file   Occupational History     Not on file   Tobacco Use     Smoking status: Former     Types: Cigarettes     Quit date: 2005     Years since quittin.2     Smokeless tobacco: Never     Tobacco comments:     Non smoker. No 2nd hand exposure. CCX, RMA PCC 2011   Substance and Sexual Activity     Alcohol use: No     Drug use: No     Sexual activity: Not Currently   Other Topics Concern     Parent/sibling w/ CABG, MI or angioplasty before 65F 55M? Not Asked   Social History Narrative    He lives by himself in an apt in [place].  He has 2 goldfish, Lai and [place].     Social Determinants of Health     Financial Resource Strain: Not on file   Food Insecurity: Not on file   Transportation Needs: Not on file   Physical Activity: Not on file   Stress: Not on file   Social Connections: Not on file   Intimate Partner Violence: Not on file   Housing Stability: Not on file       FAMILY HISTORY:   Family History   Problem Relation Age of Onset     C.A.D. Mother      Unknown/Adopted Father      Heart Failure Sister      Heart Failure Sister      Glaucoma No family hx of      Macular Degeneration No family hx of      Diabetes No family hx of      Hypertension No family hx of        REVIEW OF SYMPTOMS:  A full 14-point review of systems was performed. All pertinent positives and negatives are noted in HPI.    FUNCTIONAL  STATUS:  ECO    PHYSICAL EXAMINATION:    /64   Pulse 78   Wt 103.4 kg (228 lb)   BMI 40.25 kg/m    Exam:  Constitutional: healthy, alert and no distress  Head: Normocephalic. No masses, lesions, tenderness or abnormalities  Neck: Neck supple. No palpable adenopathy.  ENT: Exophytic lesion on the midline palate extending to the left tonsillar fossa and lateral pharyngeal wall.  Lesion crosses midline to involve the entire uvula.  No signs of bleeding.  Edentulous  Cardiovascular: Regular rate, extremities warm, well-perfused  Respiratory: No increased work of breathing, no stridor  Gastrointestinal: Nondistended  Musculoskeletal: Bilateral lower extremity edema, low back pain, left-sided weakness from stroke  Skin: no suspicious lesions or rashes  Neurologic: Ambulates with the assistance of a walker.  Cranial nerves II through XII are grossly intact.  Psychiatric: mentation appears normal and affect normal/bright    IMAGING/PATH: Please refer to history of present illness    ASSESSMENT AND PLAN:    Jonathan Bishop is a 77 year old male with oropharyngeal HPV associated squamous cell carcinoma T3 (3.7 x 4.0 x 5 1 cm) N2b (suspected metastases in multiple and so lateral lymph nodes, all less than 6 cm) M1 (p16 positive squamous cell carcinoma found in the right lower lobe nodule) who presents to the Radiation Oncology clinic to discuss radiotherapy.     Plan to treat the patient's primary tumor and associated lymph nodes with 7000 cGy in 35 fractions, with dose painting of lymph node levels 3, 4, and 7 (retropharyngeal) to 6600 cGy. Will plan for CT simulation in April after the patient returns from his fishing trip in [place]. We are also awaiting the patient's PDL1 levels, and if significantly elevated, the patient can start systemic therapy in the interim.    The risks and benefits of radiotherapy were discussed in great detail with this patient.  The patient expressed understanding of all side  effects incurred with radiotherapy.  All questions were answered.  Informed consent obtained.    Patient was seen and discussed with my attending physician, Dr. Mills.    Sahil Key MD, MS PGY-2  PGY-2 Radiation Oncology  Department of Radiation Oncology  [facility]  Phone: [phone]    PHYSICIAN ATTESTATION:  Mr. Bishop was seen and examined by me. Note above by Dr. Key was reviewed and edited by me and reflects our mutual findings and plan of care.  Mr. 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  He has biopsy-proven metastasis in his lung.  He is scheduled for an MRI of the lumbar spine later this week to assess for the possibility of bone metastasis.  I reviewed with the patient the rationale for recommending chemoradiation to the head and neck encompassing the primary tumor, metastatic adenopathy and eric groups.  I reviewed the anticipated course of therapy, expected acute toxicities and their management, potential long-term risks and expected outcome from therapy.    We are waiting on results of the MRI of the lumbar spine and PD-L1 status.  While chemoradiation to the head and neck will play a role in his care, I am as yet unsure of the sequencing of systemic therapy prior to starting chemoradiation.  This will likely need to be scheduled around his planned fishing trips later this month and in early April.  In discussion of acute side effects, I did review that discussed that there may be a need for feeding tube if he is not able to maintain adequate caloric and fluid intake.    He is edentulous and does not require a dental referral.    110 minutes spent on the date of the encounter doing chart review, history and exam, documentation and further activities per the note.  I personally reviewed patient's PET CT scan from 2/20/2023, operative notes of lung  biopsy as well as pathology reports pertaining to his new diagnosis of squamous cell carcinoma.     Jenni Mills MD  Department of Radiation Oncology  [facility]    CC  Patient Care Team:  Buck Nascimento MD as PCP - General (Internal Medicine)  Pascale Clark MD as MD (Ophthalmology)  Mamadou Gary DPM as MD (Podiatry)  Buck Nascimento MD as MD (Internal Medicine)  Tristen Reed MD as MD (Otolaryngology)  Mamadou Gary DPM as Assigned Musculoskeletal Provider  Buck Nascimento MD as Assigned PCP  Maurice Kilgore OD (Optometry)  Buck Nascimento MD as Assigned Pain Medication Provider  Karishma Eng MD as Assigned Surgical Provider  Karishma Eng MD as MD (Otolaryngology)  Jenni Mills MD as MD (Radiation Oncology)  Yoseph Majano MD as MD (Hematology & Oncology)  Hebert Jones MD as MD (Cardiovascular & Thoracic Surgery)  Hebert Jones MD as MD (Cardiovascular & Thoracic Surgery)  Olivia Morales, RN as Specialty Care Coordinator (Hematology & Oncology)

## 2023-03-17 ENCOUNTER — TELEPHONE (OUTPATIENT)
Dept: INTERNAL MEDICINE | Facility: CLINIC | Age: 78
End: 2023-03-17
Payer: COMMERCIAL

## 2023-03-17 ENCOUNTER — MYC MEDICAL ADVICE (OUTPATIENT)
Dept: INTERNAL MEDICINE | Facility: CLINIC | Age: 78
End: 2023-03-17
Payer: COMMERCIAL

## 2023-03-17 DIAGNOSIS — R91.1 PULMONARY NODULE: ICD-10-CM

## 2023-03-17 DIAGNOSIS — F40.240 CLAUSTROPHOBIA: Primary | ICD-10-CM

## 2023-03-17 NOTE — TELEPHONE ENCOUNTER
RN left  with callback number, also informed pt that would be sending a [employer] message as well that pt can respond to.     CHELSEA VIVEROS RN on 3/17/2023 at 1:00 PM

## 2023-03-17 NOTE — TELEPHONE ENCOUNTER
[facility] Call Center    Phone Message    May a detailed message be left on voicemail: yes     Reason for Call: Other: Patient requesting a call back from Dr. Nascimento RN, about medications he is able take. Please call him at [phone] to discuss options. Thank you     Action Taken: Message routed to:  [facility] pcc    Travel Screening: Not Applicable

## 2023-03-20 ENCOUNTER — OFFICE VISIT (OUTPATIENT)
Dept: RADIATION ONCOLOGY | Facility: CLINIC | Age: 78
End: 2023-03-20
Attending: OTOLARYNGOLOGY
Payer: COMMERCIAL

## 2023-03-20 ENCOUNTER — PRE VISIT (OUTPATIENT)
Dept: RADIATION ONCOLOGY | Facility: CLINIC | Age: 78
End: 2023-03-20
Payer: COMMERCIAL

## 2023-03-20 VITALS
HEART RATE: 78 BPM | DIASTOLIC BLOOD PRESSURE: 64 MMHG | SYSTOLIC BLOOD PRESSURE: 132 MMHG | BODY MASS INDEX: 40.25 KG/M2 | WEIGHT: 228 LBS

## 2023-03-20 DIAGNOSIS — C10.9 SQUAMOUS CELL CARCINOMA OF OROPHARYNX (H): ICD-10-CM

## 2023-03-20 LAB
PATH REPORT.ADDENDUM SPEC: ABNORMAL
PATH REPORT.COMMENTS IMP SPEC: ABNORMAL
PATH REPORT.COMMENTS IMP SPEC: ABNORMAL
PATH REPORT.COMMENTS IMP SPEC: YES
PATH REPORT.FINAL DX SPEC: ABNORMAL
PATH REPORT.GROSS SPEC: ABNORMAL
PATH REPORT.MICROSCOPIC SPEC OTHER STN: ABNORMAL
PATH REPORT.RELEVANT HX SPEC: ABNORMAL
PHOTO IMAGE: ABNORMAL

## 2023-03-20 PROCEDURE — 99205 OFFICE O/P NEW HI 60 MIN: CPT | Mod: 25 | Performed by: RADIOLOGY

## 2023-03-20 PROCEDURE — G0463 HOSPITAL OUTPT CLINIC VISIT: HCPCS | Performed by: RADIOLOGY

## 2023-03-20 PROCEDURE — 77263 THER RADIOLOGY TX PLNG CPLX: CPT | Performed by: RADIOLOGY

## 2023-03-20 PROCEDURE — 99417 PROLNG OP E/M EACH 15 MIN: CPT | Performed by: RADIOLOGY

## 2023-03-20 RX ORDER — OXYCODONE HYDROCHLORIDE 5 MG/1
5-10 TABLET ORAL EVERY 4 HOURS PRN
Qty: 50 TABLET | Refills: 0 | Status: SHIPPED | OUTPATIENT
Start: 2023-03-20 | End: 2023-03-29

## 2023-03-20 RX ORDER — DIAZEPAM 5 MG
5 TABLET ORAL
Qty: 2 TABLET | Refills: 0 | Status: SHIPPED | OUTPATIENT
Start: 2023-03-20 | End: 2023-04-27

## 2023-03-20 ASSESSMENT — ENCOUNTER SYMPTOMS
CONSTITUTIONAL NEGATIVE: 1
GASTROINTESTINAL NEGATIVE: 1
PSYCHIATRIC NEGATIVE: 1
BACK PAIN: 1
TINGLING: 1
SHORTNESS OF BREATH: 1
EYES NEGATIVE: 1
COUGH: 1

## 2023-03-20 NOTE — LETTER
3/20/2023         RE: Jonatahn Bishop  [address]  [facility] [address]        Dear Colleague,    Thank you for referring your patient, Jonathan Bishop, to the [facility] RADIATION ONCOLOGY. Please see a copy of my visit note below.    RADIATION ONCOLOGY CONSULTATION  DATE OF VISIT: 3/16/2023    Jonathan Bishop  MRN: [id number]    PROBLEM:   Mr. Bishop was seen for initial consultation in the Department of Radiation Oncology on 03/20/2023 at the request of Karishma Eng MD.    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    HISTORY OF PRESENT ILLNESS:    1/16/2023: Patient sees his PCP due to a 3/4-week history of a sore throat.  Physical exam at this visit was noted to have a 3 cm mass in the uvula with cervical lymphadenopathy.  Patient was subsequently referred to ENT at [facility].    1/30/2023: Patient initially scheduled to see Dr. Eng but had to cancel visit    2/6/2023, initial consultation with Dr. Eng: Flexible fiberoptic laryngoscopy at the time showed submucosal tumor of the left posterior soft palate, left lateral pharyngeal wall.    2/6/2023: Left tonsil biopsy with Dr. Eng    2/6/2023, surgical path exam: Left tonsil biopsy shows HPV associated squamous cell carcinoma.  p40(+), p16 (+), PD-L1 pending    2/20/2023, PET CT: Hypermetabolic 0.8 cm solid right lower lobe pulmonary nodule.  PET avid (SUV max 29.2) 3.7 x 4.0 x 5.1 cm enhancing mass in the left palatine tonsil compatible with the primary squamous cell carcinoma.  Multiple left level 2A lymph nodes that are centrally necrotic.  Solid left level 2A  lymph node measuring 2.3 x 1.2 x 1.5 cm with increased FDG avidity (SUV max 21.1).   Avid (SUV max 27.2) left retropharyngeal nodes, measuring 1.7 x 1.4 cm.  Mild FDG uptake in the right iliac bone and L1 vertebral bodies without any definite CT correlate but suspicious for metastasis.    2/24/2023, ENT tumor conference: Tumor board recommends a lumbar spine MRI, referral to thoracic surgery, medical oncology and radiation oncology    3/2/2023: Right VATS wedge resection    3/2/2023, surgical path exam: Wedge resection of the right lower lobe nodule shows poorly differentiated squamous cell carcinoma with negative margins for malignancy. p16 (+) and is favored to be metastasis from the primary    3/16/2023, surgical pathology: PD-L1 status still in process as of 3/20/2023    On interview:    The patient reports using a combination of oxycodone, ibuprofen, and acetaminophen for pain    The patient reports being able to chew and swallow    The patient denies any nasal regurgitation with swallowing    The patient denies any signs or has any symptoms of a fistula or soft palate insufficiency    The patient has a scheduled fishing trip in [place] and would like to proceed with any radiotherapy after he returns in April    PAST MEDICAL HISTORY:   Past Medical History:   Diagnosis Date     * * * SBE PROPHYLAXIS * * *     s/p TAVR     Acute rheumatic carditis 1950     Complication of anesthesia     pt once woke up during back surgery     Coronary artery disease     murmur     Erectile dysfunction     Sildenafil     Hip pain, bilateral      Hypercholesteremia      Hypertension      Low back pain      Nonsenile cataract      Obesity      Renal cyst      S/P 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     Stroke (cerebrum) (H) 11/30/2016     Umbilical hernia 06/10/2011    s/p surgical repair     Wound, surgical, infected 06/10/2011    hernia       PAST SURGICAL HISTORY:   Past  Surgical History:   Procedure Laterality Date     ARTHROPLASTY KNEE BILATERAL  7/22/2010     COLONOSCOPY N/A 4/14/2017    Procedure: COLONOSCOPY;  Surgeon: Toby Bills MD;  Location:  GI     CV CORONARY ANGIOGRAM N/A 10/28/2019    Procedure: Coronary Angiogram;  Surgeon: Guerrero Huitron MD;  Location:  HEART CARDIAC CATH LAB     CV RIGHT HEART CATH MEASUREMENTS RECORDED N/A 10/28/2019    Procedure: Right Heart Cath;  Surgeon: Guerrero Huitron MD;  Location:  HEART CARDIAC CATH LAB     CV TRANSCATHETER AORTIC VALVE REPLACEMENT N/A 12/8/2020    Procedure: Transcatheter Aortic Valve Replacement;  Surgeon: Camila Begum MD;  Location:  HEART CARDIAC CATH LAB     FUSION LUMBAR ANTERIOR TWO LEVELS       HERNIORRHAPHY UMBILICAL  6/10/2011    Procedure:HERNIORRHAPHY UMBILICAL; Open, with mesh placement; Surgeon:HO PARHAM; Location:[facility] OR     LAMINECTOMY LUMBAR TWO LEVELS       THORACOSCOPIC WEDGE RESECTION LUNG Right 3/2/2023    Procedure: Right thoracoscopic wedge resection;  Surgeon: Hebert Jones MD;  Location:  OR       CHEMOTHERAPY HISTORY: None  PAST RADIATION THERAPY HISTORY: None  IMPLANTABLE CARDIAC DEVICE:  None    MEDICATIONS:   Current Outpatient Medications   Medication Sig Dispense Refill     acetaminophen (TYLENOL) 325 MG tablet Take 3 tablets (975 mg) by mouth every 6 hours       ibuprofen (ADVIL/MOTRIN) 600 MG tablet Take 1 tablet (600 mg) by mouth every 8 hours as needed for moderate pain (4-6) 270 tablet 3     methocarbamol (ROBAXIN) 500 MG tablet Take 1 tablet (500 mg) by mouth every 6 hours as needed for muscle spasms 30 tablet 0     senna-docusate (SENOKOT-S/PERICOLACE) 8.6-50 MG tablet Take 1 tablet by mouth 2 times daily Reduce dose or temporarily discontinue if having loose stools. 50 tablet 0     tamsulosin (FLOMAX) 0.4 MG capsule TAKE 1 CAPSULE BY MOUTH ONCE DAILY 90 capsule 4     diazepam (VALIUM) 5 MG tablet Take 1 tablet (5 mg) by mouth once as  needed for anxiety (may repeat one time if needed.) 2 tablet 0     order for DME Equipment being ordered: Walker ()  Treatment Diagnosis: stroke 1 Units 0     oxyCODONE (ROXICODONE) 5 MG tablet Take 1-2 tablets (5-10 mg) by mouth every 4 hours as needed for moderate pain (4-6) 50 tablet 0     spironolactone (ALDACTONE) 50 MG tablet Take 1 tablet (50 mg) by mouth daily (Patient not taking: Reported on 3/16/2023) 90 tablet 3     vitamin D3 (CHOLECALCIFEROL) 50 mcg (2000 units) tablet Take 1 tablet by mouth daily (Patient not taking: Reported on 3/16/2023)          ALLERGIES:   Allergies as of 2023     (No Known Allergies)       SOCIAL HISTORY:   Social History     Socioeconomic History     Marital status:      Spouse name: Not on file     Number of children: Not on file     Years of education: Not on file     Highest education level: Not on file   Occupational History     Not on file   Tobacco Use     Smoking status: Former     Types: Cigarettes     Quit date: 2005     Years since quittin.2     Smokeless tobacco: Never     Tobacco comments:     Non smoker. No 2nd hand exposure. CCX, RMA [facility] 2011   Substance and Sexual Activity     Alcohol use: No     Drug use: No     Sexual activity: Not Currently   Other Topics Concern     Parent/sibling w/ CABG, MI or angioplasty before 65F 55M? Not Asked   Social History Narrative    He lives by himself in an apt in [place].  He has 2 goldfish, Lai and Ubaldo.     Social Determinants of Health     Financial Resource Strain: Not on file   Food Insecurity: Not on file   Transportation Needs: Not on file   Physical Activity: Not on file   Stress: Not on file   Social Connections: Not on file   Intimate Partner Violence: Not on file   Housing Stability: Not on file       FAMILY HISTORY:   Family History   Problem Relation Age of Onset     C.A.D. Mother      Unknown/Adopted Father      Heart Failure Sister      Heart Failure Sister      Glaucoma  No family hx of      Macular Degeneration No family hx of      Diabetes No family hx of      Hypertension No family hx of        REVIEW OF SYMPTOMS:  A full 14-point review of systems was performed. All pertinent positives and negatives are noted in HPI.    FUNCTIONAL STATUS:  ECO    PHYSICAL EXAMINATION:    /64   Pulse 78   Wt 103.4 kg (228 lb)   BMI 40.25 kg/m    Exam:  Constitutional: healthy, alert and no distress  Head: Normocephalic. No masses, lesions, tenderness or abnormalities  Neck: Neck supple. No palpable adenopathy.  ENT: Exophytic lesion on the midline palate extending to the left tonsillar fossa and lateral pharyngeal wall.  Lesion crosses midline to involve the entire uvula.  No signs of bleeding.  Edentulous  Cardiovascular: Regular rate, extremities warm, well-perfused  Respiratory: No increased work of breathing, no stridor  Gastrointestinal: Nondistended  Musculoskeletal: Bilateral lower extremity edema, low back pain, left-sided weakness from stroke  Skin: no suspicious lesions or rashes  Neurologic: Ambulates with the assistance of a walker.  Cranial nerves II through XII are grossly intact.  Psychiatric: mentation appears normal and affect normal/bright    IMAGING/PATH: Please refer to history of present illness    ASSESSMENT AND PLAN:    Jonathan Bishop is a 77 year old male with oropharyngeal HPV associated squamous cell carcinoma T3 (3.7 x 4.0 x 5 1 cm) N2b (suspected metastases in multiple and so lateral lymph nodes, all less than 6 cm) M1 (p16 positive squamous cell carcinoma found in the right lower lobe nodule) who presents to the Radiation Oncology clinic to discuss radiotherapy.     Plan to treat the patient's primary tumor and associated lymph nodes with 7000 cGy in 35 fractions, with dose painting of lymph node levels 3, 4, and 7 (retropharyngeal) to 6600 cGy. Will plan for CT simulation in April after the patient returns from his fishing trip in [place]. We are also  awaiting the patient's PDL1 levels, and if significantly elevated, the patient can start systemic therapy in the interim.    The risks and benefits of radiotherapy were discussed in great detail with this patient.  The patient expressed understanding of all side effects incurred with radiotherapy.  All questions were answered.  Informed consent obtained.    Patient was seen and discussed with my attending physician, Dr. Mills.    Sahil Key MD, MS PGY-2  PGY-2 Radiation Oncology  Department of Radiation Oncology  [facility], [place]  Phone: [phone]    PHYSICIAN ATTESTATION:  Mr. Bishop was seen and examined by me. Note above by Dr. Key was reviewed and edited by me and reflects our mutual findings and plan of care.  Mr. 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  He has biopsy-proven metastasis in his lung.  He is scheduled for an MRI of the lumbar spine later this week to assess for the possibility of bone metastasis.  I reviewed with the patient the rationale for recommending chemoradiation to the head and neck encompassing the primary tumor, metastatic adenopathy and eric groups.  I reviewed the anticipated course of therapy, expected acute toxicities and their management, potential long-term risks and expected outcome from therapy.    We are waiting on results of the MRI of the lumbar spine and PD-L1 status.  While chemoradiation to the head and neck will play a role in his care, I am as yet unsure of the sequencing of systemic therapy prior to starting chemoradiation.  This will likely need to be scheduled around his planned fishing trips later this month and in early April.  In discussion of acute side effects, I did review that discussed that there may be a need for feeding tube if he is not able to maintain adequate caloric and fluid intake.    He is edentulous and  does not require a dental referral.    110 minutes spent on the date of the encounter doing chart review, history and exam, documentation and further activities per the note.  I personally reviewed patient's PET CT scan from 2/20/2023, operative notes of lung biopsy as well as pathology reports pertaining to his new diagnosis of squamous cell carcinoma.     Jenni Mills MD  Department of Radiation Oncology  [facility]    CC  Patient Care Team:  Buck Nascimento MD as PCP - General (Internal Medicine)  Pascale Clark MD as MD (Ophthalmology)  Mamadou Gary DPM as MD (Podiatry)  Tristen Reed MD as MD (Otolaryngology)  Mamadou Gary DPM as Assigned Musculoskeletal Provider  Maurice Kilgore OD (Optometry)  Karishma Eng MD as Assigned Surgical Provider  Yoseph Majano MD as MD (Hematology & Oncology)  Hebert Jones MD as MD (Cardiovascular & Thoracic Surgery)  Olivia Morales, TRAE as Specialty Care Coordinator (Hematology & Oncology)

## 2023-03-20 NOTE — PROGRESS NOTES
HPI  INITIAL PATIENT ASSESSMENT    Diagnosis: Cancer    Prior radiation therapy: None    Prior chemotherapy: None    Prior hormonal therapy:No    Pain Eval:  Denies    Psychosocial  Living arrangements: Lives alone, very little support  Fall Risk: ambulates with assistive device   referral needs: Yes: Will contact     Advanced Directive: Yes - Location: On file  Implantable Cardiac Device? No      Review of Systems   Constitutional: Negative.    HENT: Positive for ear pain and hearing loss.         Ear aches every other day   Eyes: Negative.    Respiratory: Positive for cough and shortness of breath.    Cardiovascular: Positive for leg swelling.   Gastrointestinal: Negative.    Genitourinary: Negative.    Musculoskeletal: Positive for back pain.        Chronic back pain   Skin: Negative.    Neurological: Positive for tingling.        Legs and feet neuropathy   Endo/Heme/Allergies: Negative.    Psychiatric/Behavioral: Negative.

## 2023-03-20 NOTE — TELEPHONE ENCOUNTER
Pt requesting valium refill for MRI on 3/24/23.     Per  data, pt last had diazepam (VALIUM) 5 MG tablet refilled for a 2 day supply on 2/15/23. Pt was last seen by provider on 8/1/22. Medication routed to provider for refill approval.     Per  data, pt last had oxyCODONE (ROXICODONE) 5 MG tablet refilled for a 5 day supply on 3/6/23 and a 30 day supply on 1/30/23. Medication is due for refill on 3/7/23. Medication routed to provider for refill approval.     CHELSEA VIVEROS RN on 3/20/2023 at 8:53 AM

## 2023-03-21 ENCOUNTER — TELEPHONE (OUTPATIENT)
Dept: ONCOLOGY | Facility: CLINIC | Age: 78
End: 2023-03-21
Payer: COMMERCIAL

## 2023-03-21 NOTE — TELEPHONE ENCOUNTER
Called Conner to follow up on our visit last week. He met with Dr. Mills yesterday and she and I spoke afterward.   Discussed starting with systemic therapy - we discussed the rationale at our visit and his airway seems ok at the moment. Would come in with chemorads to the primary site, etc., but timing depends on how the cancer responds and how he tolerates. Outlined possibilities - PD, SD, TX, etc.   TPS is a little over 20%  If MRIs show the bone lesions are indeed worrisome for mets, then that's more than oligometastatic disease and changes how we think about this cancer a little bit.   Recommedned carboplatin AUC 5, paclitaxel potentially starting weekly due to comorbidities, and pembrolizumab. Briefly outlined potential side effects, and will ask Erik to do teaching.   I will see him April 6 as scheduled. He goes on an important fishing trip with family, etc., 4/11~4/15 and strongly wants to wait until afterward to considering starting. Discussed he needs a contingency plan and discussed I don't recommend it, but will support him in his decision. Most worried about his airway   Will try to review MRIs on Fri prior to my leaving the office in the afternoon. Will be [place] the week of 3/27 and discussed Erik might be the one calling to review with him.    Dominique Mueller MD

## 2023-03-22 ENCOUNTER — PATIENT OUTREACH (OUTPATIENT)
Dept: ONCOLOGY | Facility: CLINIC | Age: 78
End: 2023-03-22
Payer: COMMERCIAL

## 2023-03-22 DIAGNOSIS — K12.30 MUCOSITIS: Primary | ICD-10-CM

## 2023-03-22 NOTE — PROGRESS NOTES
[facility]: Cancer Care Initial Note                                    Discussion with Patient:                                                      I called and introduced myself to the patient and reviewed my role as the RNCC with Dr Mueller. Provided contact information for the clinic and review MyChart as an option for non-urgent needs.    He did mention issues with soreness in his mouth from the tumor. He is wondering if there is a oral rinse/mouthwash he could use. Discuss Magic Mouthwash - will review with Dr. Mueller.    No other questions or concerns at this time.    Assessment:                                                      Initial  Current living arrangement:: I live in a private home  Informal Support system:: Family  Bed or wheelchair confined:: No  Mobility Status: Independent  Transportation means:: Regular car  Medication adherence problem (GOAL):: No  Knowledgeable about how to use meds:: Yes  Medication side effects suspected:: Yes  Referrals Placed: None    No assessment indicated    Intervention/Education provided during outreach:                                                       PLAN    MRI spine this Friday- Dr. Mueller will follow via phone on results.    Patient will be on fishing trip in beginning of April    Will plan to start systemic therapy on 4/17. I will call him next week to provide education and answer question.    Patient to follow up as scheduled at next appt  Patient to call/[employer] message with updates  Confirmed patient has clinic and triage numbers    Signature:  Erik Felipe DNP, RN, OCN  RN Care Coordinator   Dr. Jami Amos and Dr. Dominique Mueller  [facility]

## 2023-03-23 ENCOUNTER — TELEPHONE (OUTPATIENT)
Dept: INTERNAL MEDICINE | Facility: CLINIC | Age: 78
End: 2023-03-23
Payer: COMMERCIAL

## 2023-03-23 NOTE — TELEPHONE ENCOUNTER
[facility] Call Center    Phone Message    May a detailed message be left on voicemail: yes     Reason for Call:   Patient calling to talk to the nurse about the medication he picked up from the pharmacy. He states it is not the same medication. It is not working for him. Writer tried telling him that he should call the pharmacy and that it could be a different . Patient stated he didn t understand and wanted the nurse to call him back to discuss further.    Action Taken: Message routed to:  [facility]    Travel Screening: Not Applicable

## 2023-03-23 NOTE — TELEPHONE ENCOUNTER
Per , pt was taking percocet (oxycodone-acetaminophen) at discharge from hospital on 3/3 that was discontinued and pt was given roxicodone. Recent refill from Dr. Nascimento is roxicodone as well. Routed to Dr. Nascimento to see if would like to re-write a prescription for Percocet instead of Roxicodone.     CHELSEA VIVEROS RN on 3/23/2023 at 11:39 AM

## 2023-03-24 ENCOUNTER — HOSPITAL ENCOUNTER (OUTPATIENT)
Dept: MRI IMAGING | Facility: CLINIC | Age: 78
Discharge: HOME | End: 2023-03-24
Attending: OTOLARYNGOLOGY
Payer: COMMERCIAL

## 2023-03-24 ENCOUNTER — HOSPITAL ENCOUNTER (OUTPATIENT)
Dept: MRI IMAGING | Facility: CLINIC | Age: 78
Discharge: HOME | End: 2023-03-24
Attending: INTERNAL MEDICINE
Payer: COMMERCIAL

## 2023-03-24 DIAGNOSIS — C10.9 MALIGNANT NEOPLASM OF OROPHARYNX (H): ICD-10-CM

## 2023-03-24 DIAGNOSIS — C79.51 BONE METASTASIS: ICD-10-CM

## 2023-03-24 DIAGNOSIS — C10.9 SQUAMOUS CELL CARCINOMA OF OROPHARYNX (H): ICD-10-CM

## 2023-03-24 PROCEDURE — A9585 GADOBUTROL INJECTION: HCPCS | Performed by: INTERNAL MEDICINE

## 2023-03-24 PROCEDURE — 72158 MRI LUMBAR SPINE W/O & W/DYE: CPT

## 2023-03-24 PROCEDURE — 72197 MRI PELVIS W/O & W/DYE: CPT

## 2023-03-24 PROCEDURE — 72197 MRI PELVIS W/O & W/DYE: CPT | Mod: 26 | Performed by: RADIOLOGY

## 2023-03-24 PROCEDURE — 255N000002 HC RX 255 OP 636: Performed by: INTERNAL MEDICINE

## 2023-03-24 RX ORDER — GADOBUTROL 604.72 MG/ML
10 INJECTION INTRAVENOUS ONCE
Status: COMPLETED | OUTPATIENT
Start: 2023-03-24 | End: 2023-03-24

## 2023-03-24 RX ADMIN — GADOBUTROL 10 ML: 604.72 INJECTION INTRAVENOUS at 10:51

## 2023-03-26 PROBLEM — C78.00 MALIGNANT NEOPLASM METASTATIC TO LUNG, UNSPECIFIED LATERALITY (H): Status: ACTIVE | Noted: 2023-03-02

## 2023-03-27 ENCOUNTER — ONCOLOGY VISIT (OUTPATIENT)
Dept: ONCOLOGY | Facility: CLINIC | Age: 78
End: 2023-03-27
Attending: THORACIC SURGERY (CARDIOTHORACIC VASCULAR SURGERY)
Payer: COMMERCIAL

## 2023-03-27 VITALS
RESPIRATION RATE: 16 BRPM | WEIGHT: 230.6 LBS | BODY MASS INDEX: 40.86 KG/M2 | SYSTOLIC BLOOD PRESSURE: 134 MMHG | DIASTOLIC BLOOD PRESSURE: 68 MMHG | OXYGEN SATURATION: 99 % | HEIGHT: 63 IN | HEART RATE: 82 BPM

## 2023-03-27 DIAGNOSIS — C78.00 MALIGNANT NEOPLASM METASTATIC TO LUNG, UNSPECIFIED LATERALITY (H): Primary | ICD-10-CM

## 2023-03-27 LAB — BKR LAB AP ADDL TEST(S) ADDED: NORMAL

## 2023-03-27 PROCEDURE — 99024 POSTOP FOLLOW-UP VISIT: CPT | Performed by: THORACIC SURGERY (CARDIOTHORACIC VASCULAR SURGERY)

## 2023-03-27 PROCEDURE — G0463 HOSPITAL OUTPT CLINIC VISIT: HCPCS | Performed by: THORACIC SURGERY (CARDIOTHORACIC VASCULAR SURGERY)

## 2023-03-27 RX ORDER — OXYCODONE AND ACETAMINOPHEN 5; 325 MG/1; MG/1
1 TABLET ORAL EVERY 6 HOURS PRN
Qty: 28 TABLET | Refills: 0 | Status: SHIPPED | OUTPATIENT
Start: 2023-03-27 | End: 2023-04-03

## 2023-03-27 ASSESSMENT — PAIN SCALES - GENERAL: PAINLEVEL: EXTREME PAIN (8)

## 2023-03-27 NOTE — PROGRESS NOTES
"Oncology Rooming Note    March 27, 2023 3:07 PM   Jonathan Bishop is a 77 year old male who presents for:    Chief Complaint   Patient presents with     Oncology Clinic Visit     Malignant neoplasm metastatic to lung     Initial Vitals: /68   Pulse 82   Resp 16   Ht 1.603 m (5' 3.11\")   Wt 104.6 kg (230 lb 9.6 oz)   SpO2 99%   BMI 40.71 kg/m   Estimated body mass index is 40.71 kg/m  as calculated from the following:    Height as of this encounter: 1.603 m (5' 3.11\").    Weight as of this encounter: 104.6 kg (230 lb 9.6 oz). Body surface area is 2.16 meters squared.  Extreme Pain (8) Comment: Data Unavailable   No LMP for male patient.  Allergies reviewed: Yes  Medications reviewed: Yes    Medications: Medication refills not needed today.  Pharmacy name entered into "[employer]: [id number] - [place], [place] - [address] [address]    Clinical concerns: None       Jenni Carmona MA            "

## 2023-03-27 NOTE — PROGRESS NOTES
"THORACIC SURGERY FOLLOW UP VISIT    Dear Dr. Nascimento,    I saw Mr. Bishop in follow-up today. The clinical summary follows:     PREOP DIAGNOSIS   Lung nodule    PROCEDURE   Right thoracoscopic wedge resection    DATE OF PROCEDURE  3/2/2023    HISTOPATHOLOGY   Metastatic squamous cell carcinoma     COMPLICATIONS  None    INTERVAL STUDIES  Chest x-ray 3/27/2023:    /68   Pulse 82   Resp 16   Ht 1.603 m (5' 3.11\")   Wt 104.6 kg (230 lb 9.6 oz)   SpO2 99%   BMI 40.71 kg/m     Physical Exam  Constitutional:       Appearance: He is obese.   Eyes:      Conjunctiva/sclera: Conjunctivae normal.   Cardiovascular:      Rate and Rhythm: Normal rate.   Pulmonary:      Effort: Pulmonary effort is normal.   Skin:     General: Skin is warm and dry.      Comments: Incisions healing well   Neurological:      Mental Status: He is alert and oriented to person, place, and time.   Psychiatric:         Mood and Affect: Mood normal.         Behavior: Behavior normal.         Thought Content: Thought content normal.         Judgment: Judgment normal.          SUBJECTIVE   Mr. Bishop is doing largely well.  His breathing is fine. He still has some pain and is using oxycodone though he normally uses percocet for pain and thinks that will be better for his pain and swelling.      IMPRESSION (C78.00) Malignant neoplasm metastatic to lung  (primary encounter diagnosis)     This is a 77 year old man who is recovering well after undergoing a right thoracoscopic wedge resection of a lung metastasis from a primary head and neck cancer.    PLAN  I spent 15 min on the date of the encounter in chart review, patient visit, review of tests, documentation and/or discussion with other providers about the issues documented above. I reviewed the plan as follows:  He will increase his activity as tolerated and follow up with Dr. Mueller.  I provided him with a short 7 day prescription for Percocet and he will follow up with Dr. Nascimento for his " chronic pain needs.     All questions were answered and the patient and present family were in agreement with the plan.  I appreciate the opportunity to participate in the care of your patient and will keep you updated.  Sincerely,    Hebert Jones MD

## 2023-03-27 NOTE — LETTER
"    3/27/2023         RE: Jonathan Bishop  [address]  [facility] [address]        Dear Colleague,    Thank you for referring your patient, Jonathan Bishop, to the [facility]. Please see a copy of my visit note below.    THORACIC SURGERY FOLLOW UP VISIT    Dear Dr. Nascimento,    I saw Mr. Bishop in follow-up today. The clinical summary follows:     PREOP DIAGNOSIS   Lung nodule    PROCEDURE   Right thoracoscopic wedge resection    DATE OF PROCEDURE  3/2/2023    HISTOPATHOLOGY   Metastatic squamous cell carcinoma     COMPLICATIONS  None    INTERVAL STUDIES  Chest x-ray 3/27/2023:    /68   Pulse 82   Resp 16   Ht 1.603 m (5' 3.11\")   Wt 104.6 kg (230 lb 9.6 oz)   SpO2 99%   BMI 40.71 kg/m     Physical Exam  Constitutional:       Appearance: He is obese.   Eyes:      Conjunctiva/sclera: Conjunctivae normal.   Cardiovascular:      Rate and Rhythm: Normal rate.   Pulmonary:      Effort: Pulmonary effort is normal.   Skin:     General: Skin is warm and dry.      Comments: Incisions healing well   Neurological:      Mental Status: He is alert and oriented to person, place, and time.   Psychiatric:         Mood and Affect: Mood normal.         Behavior: Behavior normal.         Thought Content: Thought content normal.         Judgment: Judgment normal.          SUBJECTIVE   Mr. Bishop is doing largely well.  His breathing is fine. He still has some pain and is using oxycodone though he normally uses percocet for pain and thinks that will be better for his pain and swelling.      IMPRESSION (C78.00) Malignant neoplasm metastatic to lung  (primary encounter diagnosis)     This is a 77 year old man who is recovering well after undergoing a right thoracoscopic wedge resection of a lung metastasis from a primary head and neck cancer.    PLAN  I spent 15 min on the date of the encounter in chart review, patient visit, review of tests, documentation and/or discussion " "with other providers about the issues documented above. I reviewed the plan as follows:  He will increase his activity as tolerated and follow up with Dr. Mueller.  I provided him with a short 7 day prescription for Percocet and he will follow up with Dr. Nascimento for his chronic pain needs.     All questions were answered and the patient and present family were in agreement with the plan.  I appreciate the opportunity to participate in the care of your patient and will keep you updated.  Sincerely,    Hebert Jones MD         Oncology Rooming Note    March 27, 2023 3:07 PM   Jonathan Bishop is a 77 year old male who presents for:    Chief Complaint   Patient presents with     Oncology Clinic Visit     Malignant neoplasm metastatic to lung     Initial Vitals: /68   Pulse 82   Resp 16   Ht 1.603 m (5' 3.11\")   Wt 104.6 kg (230 lb 9.6 oz)   SpO2 99%   BMI 40.71 kg/m   Estimated body mass index is 40.71 kg/m  as calculated from the following:    Height as of this encounter: 1.603 m (5' 3.11\").    Weight as of this encounter: 104.6 kg (230 lb 9.6 oz). Body surface area is 2.16 meters squared.  Extreme Pain (8) Comment: Data Unavailable   No LMP for male patient.  Allergies reviewed: Yes  Medications reviewed: Yes    Medications: Medication refills not needed today.  Pharmacy name entered into [facility]: [id number] - [place], [place] - [address]    Clinical concerns: None       Jenni Carmona MA                Again, thank you for allowing me to participate in the care of your patient.        Sincerely,        Hebert Jones MD    "

## 2023-03-29 ENCOUNTER — PATIENT OUTREACH (OUTPATIENT)
Dept: ONCOLOGY | Facility: CLINIC | Age: 78
End: 2023-03-29
Payer: COMMERCIAL

## 2023-03-29 NOTE — PROGRESS NOTES
[facility]: Cancer Care                                                                                          I called the patient to review the plan of care:    - Dr. Mueller and Dr. Mills have reviewed the MR from last week. No sign of cancer spread to the spine or pelvis, however, additional details from the report will be reviewed by Dr. Mueller at next week's visit.  - Plan to proceed with systemic therapy first and chemoradiation after a few cycles of therapy. He would still like to start therapy after his fishing trip.    No other questions or concerns at this time.    Signature:  Erik Felipe DNP, RN, OCN  RN Care Coordinator   Dr. Jami Amos and Dr. Dominique Mueller  [facility]

## 2023-04-02 ENCOUNTER — HEALTH MAINTENANCE LETTER (OUTPATIENT)
Age: 78
End: 2023-04-02

## 2023-04-03 ENCOUNTER — MYC MEDICAL ADVICE (OUTPATIENT)
Dept: ONCOLOGY | Facility: CLINIC | Age: 78
End: 2023-04-03
Payer: COMMERCIAL

## 2023-04-03 DIAGNOSIS — K12.30 MUCOSITIS: ICD-10-CM

## 2023-04-03 NOTE — TELEPHONE ENCOUNTER
"Oncology Nurse Triage  Situation:   Jonathan reporting the following symptoms: mouth and throat pain/bleeding    Background:   Treating Provider:  Dr. Mueller    Date of last office visit: 3/16/23     Recent Treatments: 3/2/23 Right thoracoscopic wedge resection    Assessment:     Pt reporting sores in mouth and pain with swallowing. He has been using Magic Mouthwash and says it is helpful, but not for long, and says \"they give you so little and it costs so much\".   He reports about 1x/week, he has a mouth full of blood that he has to spit out, says it wakes him from sleep to spit, said last episode was 3 days ago.He said some \"gum\" chunk comes up with the blood, says he is having less difficulty swallowing, said \"it doesn't feel as large\".  Pt unsure if he has told Dr. Mueller about this blood. He states blood is coming from area in his throat that he can feel, but cannot see. Pt denies white patches on throat. He states when he eats, he feels he has to \"cross razor blades\" to swallow.   Pt denies fever, chest pain, shortness of breath, other signs of bleeding (apart from what is mentioned above). He says overall, he feels pretty good, apart from his throat.    Recommendations:   Pt is wondering if there is anything else he can use OTC for pain in throat or if the provider can increase quantity of Magic Mouth Wash (was prescribed 120mL)?  Writer reviewed oral mucositis protocol, suggested pt try the 1/4 tsp salt, 1/2 tsp baking soda, with 4 ounces of water up to q2h PRN, instructed pt if burning or discomfort it present, to stop. Pt voiced understanding.    Paged Dr. Mueller at [phone] return call from Dr. Mueller, who states if pt has blood episode again, he should go to the ED. She states pt should see ENT, but surgeon is likely booked out. She recommended pt try salt water and baking soda rinses and gave okay to increase quantity of Magic Mouth Wash at next refill.   [phone] return call to pt, reviewed if bleeding " occurs again, he should go to ED to be evaluated. Pt voiced understanding. He states he tried the salt water and baking soda rinse and was surprised how much that helped. He said the throbbing is still there, but significantly less. Writer reminded pt he can do these rinses q2h PRN. He voiced understanding. Pt states he called in refill to pharmacy of Magic Mouth Wash today, but will cancel it so a larger quantity can be ordered. Writer pended up new prescription in this encounter and sent to provider for signature.    Encounter routed to Dr. Mueller and Erik, RNCC.

## 2023-04-06 ENCOUNTER — VIRTUAL VISIT (OUTPATIENT)
Dept: ONCOLOGY | Facility: CLINIC | Age: 78
End: 2023-04-06
Attending: INTERNAL MEDICINE
Payer: COMMERCIAL

## 2023-04-06 VITALS — WEIGHT: 220 LBS | HEIGHT: 64 IN | BODY MASS INDEX: 37.56 KG/M2

## 2023-04-06 DIAGNOSIS — Z13.29 SCREENING FOR HYPOTHYROIDISM: ICD-10-CM

## 2023-04-06 DIAGNOSIS — D50.9 MICROCYTIC ANEMIA: ICD-10-CM

## 2023-04-06 DIAGNOSIS — C09.9 TONSIL CANCER (H): ICD-10-CM

## 2023-04-06 DIAGNOSIS — C10.9 SQUAMOUS CELL CARCINOMA OF OROPHARYNX (H): ICD-10-CM

## 2023-04-06 DIAGNOSIS — G60.9 IDIOPATHIC PERIPHERAL NEUROPATHY: ICD-10-CM

## 2023-04-06 DIAGNOSIS — C78.00 MALIGNANT NEOPLASM METASTATIC TO LUNG, UNSPECIFIED LATERALITY (H): Primary | ICD-10-CM

## 2023-04-06 PROCEDURE — 99215 OFFICE O/P EST HI 40 MIN: CPT | Mod: VID | Performed by: INTERNAL MEDICINE

## 2023-04-06 ASSESSMENT — PAIN SCALES - GENERAL: PAINLEVEL: NO PAIN (0)

## 2023-04-06 NOTE — NURSING NOTE
Is the patient currently in the state of [place]? YES    Visit mode:VIDEO    If the visit is dropped, the patient can be reconnected by: VIDEO VISIT: Send to e-mail at: [email or web address]    Will anyone else be joining the visit? NO      How would you like to obtain your AVS? MyChart    Are changes needed to the allergy or medication list? NO    [facility] for visit[facility]          Vesta Braun

## 2023-04-06 NOTE — PROGRESS NOTES
[facility]    PATIENT NAME: Jonathan Bishop  MRN # [id number]   DATE OF VISIT: April 6, 2023  YOB: 1945     Referring Provider: Dr. Karishma Eng  Otolaryngology: Dr. Karishma Eng  Radiation Oncology: Dr. Jenni Mills  PCP: Dr. Buck Nascimento    CANCER TYPE: SCC L tonsil, p16 +lety  STAGE: [id number] (IVC)  ECOG PS: 1    PD-L1: TPS 20%, CPS 25% on [id number] tonsil bx  NGS: N/A    SUMMARY  2/26/23 L tonsil mass bx in clinic (Dr. Eng).   2/20/23 PET/CT. 3.7 x 4.0 x 5.1 cm mass L palatine tonsil causing narrowing of the oropharyngeal lumen, L level 2 node, L retropharyngeal nodular density, extension of primary mass vs retropharyngeal node, 0.8 cm RLL nodule concerning for met, mild focal uptake R iliac bone and L1 without CT correlate suspicious for mets.   3/2/23 R VATS, wedge resection. Path: SCC, poorly differentiated, associated with necrosis, 1 cm, negative margins, p16 +lety  3/24/23 MRI L spine and pelvis. Diffusely heterogeneous marrow signal throughout without corresponding abnormal signal on sagittal STIR sequence and no abnormal enhancement. Nonspecific but could be benign process such as red marrow hyperplasia, cannot completely exclude metastatic disease or infiltrative pathologic marrow process. No lesions corresponding to FDG avid spots on PET/CT.     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 Discussed with Dr. Mills. Both she and I agree with starting with systemic therapy. Discussed rationale with Conner. Cytotoxic chemo would be too tough with his comorbidities. Recommended pembrolizumab alone. Would do 3-6 months. In the absence of PD, would then consider definitive chemoradiation - best case scenario - with some hope of long-term disease control. Discussed possibility of PD, chances of radiographic and pathologic response, potential discordance between the two, and long-term control  based on Keynote 048 with pembro, for which we now have 5 year survival data. Discussed also in terms of 1 year survival rates, where just over half of people are alive at the 1 year shay, which also means alternatively, a little less than half have not survived. However, in Conner's case, would anticipate being on the better side of that given the low burden of metastatic disease. If PD on pembro, would consider platinum vs platinum doublet as a second line and SQZ trial as a third line, or vice versa.     Microcytic anemia: Iron studies 8/2022 showed perhaps very mild LEW to normal iron studies. 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    H/o CVA: Residual L weakness. Uses cane and walker at baseline, but independent and functional. Stable    Peripheral neuropathy: MRI shows a lot of foramenal stenosis and spinal canal stenosis, so more likely related to that than DM2. Can double check B12, TSH when we do labs.     40 minutes spent by me on the date of the encounter doing chart review, history and exam, documentation and further activities per the note     Dominique Mueller MD  Associate Professor of Medicine  Hematology, Oncology and Transplantation      SUBJECTIVE  Mr. Bishop returns for follow up  Started having hemoptysis again last week, has subsided. At it's worst, would have a mouthful of blood and chunks. Then subsequent coughs the same day might be clear. No trouble swallowing, no trouble breathing. Some clotted blood at times too. Similar to what happened last month at the time of dx.   O/w ok and no change since last visit.   M-W-F are best days for scheduling    4/11~4/15 - [place] for fishing and to see family  [place] - [place]   No family here - has Maria R, sounds like PCA vs aide, helps him regularly.     PAST MEDICAL HISTORY  SCC as above  Chronic LBP  TAVR 2020  H/o  rheumatic carditis 1950  CHF. 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 Nocturia once nightly   ED  Cataracts  Umbilical hernia repair 2011  Knee arthroplasty B 2010  Lumbar spine fusion, laminectomy, sciatic pain R > L  Peripheral neuropathy - from pinched nerves?  Hearing loss    Numbness/tingling in both feet - bilaterally, symmetric, R spasms more than left     CURRENT OUTPATIENT MEDICATIONS  Current Outpatient Medications   Medication Sig Dispense Refill     acetaminophen (TYLENOL) 325 MG tablet Take 3 tablets (975 mg) by mouth every 6 hours       diazepam (VALIUM) 5 MG tablet Take 1 tablet (5 mg) by mouth once as needed for anxiety (may repeat one time if needed.) 2 tablet 0     ibuprofen (ADVIL/MOTRIN) 600 MG tablet Take 1 tablet (600 mg) by mouth every 8 hours as needed for moderate pain (4-6) 270 tablet 3     magic mouthwash suspension (diphenhydrAMINE, lidocaine, aluminum-magnesium & simethicone) Swish and spit 10 mLs in mouth every 6 hours as needed for mouth sores 200 mL 3     methocarbamol (ROBAXIN) 500 MG tablet Take 1 tablet (500 mg) by mouth every 6 hours as needed for muscle spasms 30 tablet 0     order for DME Equipment being ordered: Walker ()  Treatment Diagnosis: stroke 1 Units 0     oxyCODONE-acetaminophen (PERCOCET) 5-325 MG tablet Take 1-2 tablets by mouth every 6 hours as needed for pain Max 5 per day. 150 tablet 0     senna-docusate (SENOKOT-S/PERICOLACE) 8.6-50 MG tablet Take 1 tablet by mouth 2 times daily Reduce dose or temporarily discontinue if having loose stools. 50 tablet 0     spironolactone (ALDACTONE) 50 MG tablet Take 1 tablet (50 mg) by mouth daily 90 tablet 3     tamsulosin (FLOMAX) 0.4 MG capsule TAKE 1 CAPSULE BY MOUTH ONCE DAILY 90 capsule 4     vitamin D3 (CHOLECALCIFEROL) 50 mcg (2000 units) tablet Take 1 tablet by mouth daily       ALLERGIES  No Known Allergies     REVIEW OF SYSTEMS  As above in the HPI, o/w complete  12-point ROS was negative.    PHYSICAL EXAM  There were no vitals taken for this visit.  GEN: NAD  HEENT: EOMI, no icterus, injection or pallor  NEURO: alert    Remainder of physical exam deferred due to public health emergency and limitations of video visit.    LABORATORY AND IMAGING STUDIES  MR Lumbar Spine w/o & w Contrast  Narrative: MRI LUMBAR SPINE WITHOUT AND WITH CONTRAST   3/24/2023 12:42 PM     HISTORY: Low back pain. Rule out cancer. No known/automatically  detected potential contraindications to imaging. Squamous cell  carcinoma of oropharynx (H).    TECHNIQUE: Multiplanar multisequence MRI of the lumbar spine without  and with contrast.    COMPARISON: Whole body PET CT 2/20/2023. CTA chest/abdomen/pelvis  12/17/2019. Lumbar spine radiographs 4/3/2019.     FINDINGS: Nomenclature is based on 5 lumbar vertebral bodies. Minimal  chronic-appearing anterior wedging/height loss of the L1 an L3  vertebral bodies unchanged. Otherwise normal vertebral body heights.  Minimal retrolisthesis of L3 on L4 is unchanged. Sagittal alignment  otherwise normal. Diffuse heterogeneity of the bone marrow signal  throughout the visualized thoracal lumbar spine and bony pelvis with  predominantly fatty appearing marrow, but there are relatively  extensive scattered small foci of T1 and T2 speckled hypointense  signal throughout the lumbar and visualized bony pelvis. Normal marrow  signal on the sagittal STIR sequence. No abnormal bone marrow  enhancement is identified. Diffuse intervertebral disc desiccation.    Solid-appearing interbody fusion at L4-L5. There also is likely some  degree of interbody osseous fusion across L5-S1 disc space with severe  disc space narrowing at that level. Bilateral posterior fusion  instrumentation spanning L4-S1, as before, with some associated  susceptibility artifact that mildly limits evaluation of the mid to  lower lumbar spine and lumbosacral junction.    Normal appearance of the distal  spinal cord with the conus terminating  at L1. Small probable cyst of the anterior right kidney noted on the  axial T2-weighted sequence. This is not well evaluated on the  postcontrast sequence due to some artifact in that area on the  postcontrast series. Degenerative changes of sacroiliac joints. Fatty  atrophy of the lower most posterior paraspinous musculature.  Visualized paraspinal soft tissues otherwise appear unremarkable.    Segmental analysis:  T11-T12: Mild disc height loss. Symmetric disc bulge. Posterior  endplate osteophytic ridging. Moderate bilateral facet arthropathy.  Mild to moderate right neural foraminal stenosis. Mild left neural  foraminal stenosis.    T12-L1: Normal disc height. Symmetric disc bulge. Mild facet  arthropathy. No spinal canal stenosis. No significant neural foraminal  stenosis.    L1-L2: Normal disc height. Symmetric disc bulge. Mild facet  arthropathy. Ligamentum flavum thickening. Mild spinal canal stenosis.  Mild to moderate/moderate right and mild left neural foraminal  stenosis.    Moderate to severe disc height loss. Symmetric disc bulge. Posterior  endplate osteophytic ridging. Moderate facet arthropathy. Ligamentum  flavum thickening. Severe spinal canal stenosis. Moderate right knee  and mild-to-moderate left neural foraminal stenosis.    L3-L4: Severe disc height loss. Symmetric disc bulge. Posterior  endplate osteophytic ridging. Moderate facet arthropathy. Ligamentum  flavum thickening. Severe spinal canal stenosis. Moderate to severe  bilateral neural foraminal stenosis.    L4-L5: Postoperative level. Borderline mild spinal canal narrowing.  Mild mild to moderate bilateral neural foraminal stenosis.    L5-S1: Postoperative level. No significant spinal canal stenosis.  Evaluation of the neural foramina is somewhat limited by artifacts.  Mild to moderate right and mild left neural foraminal stenosis.  Impression: IMPRESSION:  1. Diffusely heterogeneous marrow signal  throughout the visualized  thoracolumbar spine and bony pelvis, as described, without  corresponding abnormal signal on the sagittal STIR sequence and no  obvious abnormal enhancement. Findings are nonspecific, but could be  related to a benign process such as red marrow hyperplasia; however,  infiltrating pathologic marrow replacing or marrow proliferative  process such as myeloma/myeloproliferative or lymphoproliferative  disorder or metastatic disease cannot be completely excluded. Given  lack of STIR signal abnormality and enhancement, as well as relative  lack of FDG activity throughout the marrow on the previous PET/CT,  this could possibly be a more indolent or nonactive process.  Clinical/laboratory correlation is recommended.  2. Postsurgical changes L4-S1, as described.  3. Degenerative changes, as described.  4. Severe spinal canal stenosis at L2-L3 and L3-L4.  5. Varying degrees of multilevel degenerative neural foraminal  stenosis, as described.    BALWINDER HICKS MD         SYSTEM ID:  [id number]  MR Pelvis Bone wo & w Contrast  Narrative: MR pelvis with contrast 3/24/2023 12:43 PM    Techniques: Multiplanar multisequence imaging of the pelvis was  obtained with administration of intravenous contrast using routing MSK  protocol.    History: SCC tonsil, newly diagnosed, bone lesions on PET/CT  concerning for mets but not definitive. Evaluate R iliac lesion. See  separate spine MRI for L spine lesion; Malignant neoplasm of  oropharynx (H); Bone metastasis     Comparison: PET/CT 2/20/2023    Findings:    Osseous structures  Osseous structures: No fracture, stress reaction, avascular necrosis,  or focal osseous lesion is seen. No suspicious osseous lesion in the  visualized pelvic bones to suggest metastasis when correlated with  PET/CT from 2/20/2023.     Diffuse patchy marrow signal alteration with mild T1 hypointensity  with relative sparing of the epiphysis, most consistent with diffuse  red marrow  reconversion.    Posterior fusion involving L4, L5, and S1.    Internal derangement of joints are not well assessed owing to chosen  field of view.    Joint and Periarticular soft tissue:    Sacroiliac joints and pubic symphysis are congruent.    Joint effusion: A physiologic amount of joint fluid in bilateral hip.    Bursal effusion: Minimal nonspecific edema over the greater  trochanter. No substantial iliopsoas or trochanteric bursal effusion.    Muscles and tendons  Muscles and tendons: Proximal hamstrings, rectus femoris, sartorius,  and iliopsoas tendons intact bilaterally. The hip abductors are intact  bilaterally. The visualized adductor muscles are unremarkable  bilaterally.     Nerves:  The visualized course of the sciatic nerves are unremarkable  bilaterally.    Other Findings:  None.  Impression: Impression:  1. No suspicious osseous lesion in the visualized pelvic bones to  suggest metastasis when correlated with PET/CT from 2/20/2023.   2. Diffuse patchy marrow signal alteration with mild T1 hypointensity  with relative sparing of the epiphysis, most consistent with diffuse  red marrow reconversion, likely related to the chronic disease or  chemotherapy.     I have personally reviewed the examination and initial interpretation  and I agree with the findings.    JUTTA ELLERMANN, MD         SYSTEM ID:  [id number]     Imaging was personally reviewed and interpreted by me    Virtual Visit Details  Type of service:  Video Visit   Video Start Time: 8:53 AM  Video End Time: 9:25 AM   Originating Location (pt. Location): Home  Distant Location (provider location):  On-site  Platform used for Video Visit: Gen

## 2023-04-06 NOTE — LETTER
4/6/2023         RE: Jonathan Bishop  [address]  [facility] [address]        Dear Colleague,    Thank you for referring your patient, Jonathan Bishop, to the [facility]. Please see a copy of my visit note below.       [facility]    PATIENT NAME: Jonathan Bishop  MRN # [id number]   DATE OF VISIT: April 6, 2023  YOB: 1945     Referring Provider: Dr. Karishma Eng  Otolaryngology: Dr. Karishma Eng  Radiation Oncology: Dr. Jenni Mills  PCP: Dr. Buck Nascimento    CANCER TYPE: SCC L tonsil, p16 +lety  STAGE: [id number] (IVC)  ECOG PS: 1    PD-L1: TPS 20%, CPS 25% on [id number] tonsil bx  NGS: N/A    SUMMARY  2/26/23 L tonsil mass bx in clinic (Dr. Eng).   2/20/23 PET/CT. 3.7 x 4.0 x 5.1 cm mass L palatine tonsil causing narrowing of the oropharyngeal lumen, L level 2 node, L retropharyngeal nodular density, extension of primary mass vs retropharyngeal node, 0.8 cm RLL nodule concerning for met, mild focal uptake R iliac bone and L1 without CT correlate suspicious for mets.   3/2/23 R VATS, wedge resection. Path: SCC, poorly differentiated, associated with necrosis, 1 cm, negative margins, p16 +lety  3/24/23 MRI L spine and pelvis. Diffusely heterogeneous marrow signal throughout without corresponding abnormal signal on sagittal STIR sequence and no abnormal enhancement. Nonspecific but could be benign process such as red marrow hyperplasia, cannot completely exclude metastatic disease or infiltrative pathologic marrow process. No lesions corresponding to FDG avid spots on PET/CT.     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 Discussed with Dr. Mills. Both she and I agree with starting with systemic therapy. Discussed rationale with Conner. Cytotoxic chemo would be too tough with his comorbidities. Recommended pembrolizumab alone. Would do 3-6 months. In the  absence of PD, would then consider definitive chemoradiation - best case scenario - with some hope of long-term disease control. Discussed possibility of PD, chances of radiographic and pathologic response, potential discordance between the two, and long-term control based on Keynote 048 with pembro, for which we now have 5 year survival data. Discussed also in terms of 1 year survival rates, where just over half of people are alive at the 1 year shay, which also means alternatively, a little less than half have not survived. However, in Conner's case, would anticipate being on the better side of that given the low burden of metastatic disease. If PD on pembro, would consider platinum vs platinum doublet as a second line and SQZ trial as a third line, or vice versa.     Microcytic anemia: Iron studies 8/2022 showed perhaps very mild LEW to normal iron studies. 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    H/o CVA: Residual L weakness. Uses cane and walker at baseline, but independent and functional. Stable    Peripheral neuropathy: MRI shows a lot of foramenal stenosis and spinal canal stenosis, so more likely related to that than DM2. Can double check B12, TSH when we do labs.     40 minutes spent by me on the date of the encounter doing chart review, history and exam, documentation and further activities per the note     Dominique Mueller MD  Associate Professor of Medicine  Hematology, Oncology and Transplantation      SUBJECTIVE  Mr. Bishop returns for follow up  Started having hemoptysis again last week, has subsided. At it's worst, would have a mouthful of blood and chunks. Then subsequent coughs the same day might be clear. No trouble swallowing, no trouble breathing. Some clotted blood at times too. Similar to what happened last month at the time of dx.   O/w ok and no change since last visit.    M-W-F are best days for scheduling    4/11~4/15 - [place] for fishing and to see family  [place] - [place]   No family here - has Maria R, sounds like PCA vs aide, helps him regularly.     PAST MEDICAL HISTORY  SCC as above  Chronic LBP  TAVR 2020  H/o rheumatic carditis 1950  CHF. 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 Nocturia once nightly   ED  Cataracts  Umbilical hernia repair 2011  Knee arthroplasty B 2010  Lumbar spine fusion, laminectomy, sciatic pain R > L  Peripheral neuropathy - from pinched nerves?  Hearing loss    Numbness/tingling in both feet - bilaterally, symmetric, R spasms more than left     CURRENT OUTPATIENT MEDICATIONS  Current Outpatient Medications   Medication Sig Dispense Refill    acetaminophen (TYLENOL) 325 MG tablet Take 3 tablets (975 mg) by mouth every 6 hours      diazepam (VALIUM) 5 MG tablet Take 1 tablet (5 mg) by mouth once as needed for anxiety (may repeat one time if needed.) 2 tablet 0    ibuprofen (ADVIL/MOTRIN) 600 MG tablet Take 1 tablet (600 mg) by mouth every 8 hours as needed for moderate pain (4-6) 270 tablet 3    magic mouthwash suspension (diphenhydrAMINE, lidocaine, aluminum-magnesium & simethicone) Swish and spit 10 mLs in mouth every 6 hours as needed for mouth sores 200 mL 3    methocarbamol (ROBAXIN) 500 MG tablet Take 1 tablet (500 mg) by mouth every 6 hours as needed for muscle spasms 30 tablet 0    order for DME Equipment being ordered: Walker ()  Treatment Diagnosis: stroke 1 Units 0    oxyCODONE-acetaminophen (PERCOCET) 5-325 MG tablet Take 1-2 tablets by mouth every 6 hours as needed for pain Max 5 per day. 150 tablet 0    senna-docusate (SENOKOT-S/PERICOLACE) 8.6-50 MG tablet Take 1 tablet by mouth 2 times daily Reduce dose or temporarily discontinue if having loose stools. 50 tablet 0    spironolactone (ALDACTONE) 50 MG tablet Take 1 tablet (50 mg) by mouth daily 90 tablet 3    tamsulosin  (FLOMAX) 0.4 MG capsule TAKE 1 CAPSULE BY MOUTH ONCE DAILY 90 capsule 4    vitamin D3 (CHOLECALCIFEROL) 50 mcg (2000 units) tablet Take 1 tablet by mouth daily       ALLERGIES  No Known Allergies     REVIEW OF SYSTEMS  As above in the HPI, o/w complete 12-point ROS was negative.    PHYSICAL EXAM  There were no vitals taken for this visit.  GEN: NAD  HEENT: EOMI, no icterus, injection or pallor  NEURO: alert    Remainder of physical exam deferred due to public health emergency and limitations of video visit.    LABORATORY AND IMAGING STUDIES  MR Lumbar Spine w/o & w Contrast  Narrative: MRI LUMBAR SPINE WITHOUT AND WITH CONTRAST   3/24/2023 12:42 PM     HISTORY: Low back pain. Rule out cancer. No known/automatically  detected potential contraindications to imaging. Squamous cell  carcinoma of oropharynx (H).    TECHNIQUE: Multiplanar multisequence MRI of the lumbar spine without  and with contrast.    COMPARISON: Whole body PET CT 2/20/2023. CTA chest/abdomen/pelvis  12/17/2019. Lumbar spine radiographs 4/3/2019.     FINDINGS: Nomenclature is based on 5 lumbar vertebral bodies. Minimal  chronic-appearing anterior wedging/height loss of the L1 an L3  vertebral bodies unchanged. Otherwise normal vertebral body heights.  Minimal retrolisthesis of L3 on L4 is unchanged. Sagittal alignment  otherwise normal. Diffuse heterogeneity of the bone marrow signal  throughout the visualized thoracal lumbar spine and bony pelvis with  predominantly fatty appearing marrow, but there are relatively  extensive scattered small foci of T1 and T2 speckled hypointense  signal throughout the lumbar and visualized bony pelvis. Normal marrow  signal on the sagittal STIR sequence. No abnormal bone marrow  enhancement is identified. Diffuse intervertebral disc desiccation.    Solid-appearing interbody fusion at L4-L5. There also is likely some  degree of interbody osseous fusion across L5-S1 disc space with severe  disc space narrowing at that  level. Bilateral posterior fusion  instrumentation spanning L4-S1, as before, with some associated  susceptibility artifact that mildly limits evaluation of the mid to  lower lumbar spine and lumbosacral junction.    Normal appearance of the distal spinal cord with the conus terminating  at L1. Small probable cyst of the anterior right kidney noted on the  axial T2-weighted sequence. This is not well evaluated on the  postcontrast sequence due to some artifact in that area on the  postcontrast series. Degenerative changes of sacroiliac joints. Fatty  atrophy of the lower most posterior paraspinous musculature.  Visualized paraspinal soft tissues otherwise appear unremarkable.    Segmental analysis:  T11-T12: Mild disc height loss. Symmetric disc bulge. Posterior  endplate osteophytic ridging. Moderate bilateral facet arthropathy.  Mild to moderate right neural foraminal stenosis. Mild left neural  foraminal stenosis.    T12-L1: Normal disc height. Symmetric disc bulge. Mild facet  arthropathy. No spinal canal stenosis. No significant neural foraminal  stenosis.    L1-L2: Normal disc height. Symmetric disc bulge. Mild facet  arthropathy. Ligamentum flavum thickening. Mild spinal canal stenosis.  Mild to moderate/moderate right and mild left neural foraminal  stenosis.    Moderate to severe disc height loss. Symmetric disc bulge. Posterior  endplate osteophytic ridging. Moderate facet arthropathy. Ligamentum  flavum thickening. Severe spinal canal stenosis. Moderate right knee  and mild-to-moderate left neural foraminal stenosis.    L3-L4: Severe disc height loss. Symmetric disc bulge. Posterior  endplate osteophytic ridging. Moderate facet arthropathy. Ligamentum  flavum thickening. Severe spinal canal stenosis. Moderate to severe  bilateral neural foraminal stenosis.    L4-L5: Postoperative level. Borderline mild spinal canal narrowing.  Mild mild to moderate bilateral neural foraminal stenosis.    L5-S1:  Postoperative level. No significant spinal canal stenosis.  Evaluation of the neural foramina is somewhat limited by artifacts.  Mild to moderate right and mild left neural foraminal stenosis.  Impression: IMPRESSION:  1. Diffusely heterogeneous marrow signal throughout the visualized  thoracolumbar spine and bony pelvis, as described, without  corresponding abnormal signal on the sagittal STIR sequence and no  obvious abnormal enhancement. Findings are nonspecific, but could be  related to a benign process such as red marrow hyperplasia; however,  infiltrating pathologic marrow replacing or marrow proliferative  process such as myeloma/myeloproliferative or lymphoproliferative  disorder or metastatic disease cannot be completely excluded. Given  lack of STIR signal abnormality and enhancement, as well as relative  lack of FDG activity throughout the marrow on the previous PET/CT,  this could possibly be a more indolent or nonactive process.  Clinical/laboratory correlation is recommended.  2. Postsurgical changes L4-S1, as described.  3. Degenerative changes, as described.  4. Severe spinal canal stenosis at L2-L3 and L3-L4.  5. Varying degrees of multilevel degenerative neural foraminal  stenosis, as described.    BALWINDER HICKS MD         SYSTEM ID:  [id number]  MR Pelvis Bone wo & w Contrast  Narrative: MR pelvis with contrast 3/24/2023 12:43 PM    Techniques: Multiplanar multisequence imaging of the pelvis was  obtained with administration of intravenous contrast using routing MS  protocol.    History: SCC tonsil, newly diagnosed, bone lesions on PET/CT  concerning for mets but not definitive. Evaluate R iliac lesion. See  separate spine MRI for L spine lesion; Malignant neoplasm of  oropharynx (H); Bone metastasis     Comparison: PET/CT 2/20/2023    Findings:    Osseous structures  Osseous structures: No fracture, stress reaction, avascular necrosis,  or focal osseous lesion is seen. No suspicious osseous  lesion in the  visualized pelvic bones to suggest metastasis when correlated with  PET/CT from 2/20/2023.     Diffuse patchy marrow signal alteration with mild T1 hypointensity  with relative sparing of the epiphysis, most consistent with diffuse  red marrow reconversion.    Posterior fusion involving L4, L5, and S1.    Internal derangement of joints are not well assessed owing to chosen  field of view.    Joint and Periarticular soft tissue:    Sacroiliac joints and pubic symphysis are congruent.    Joint effusion: A physiologic amount of joint fluid in bilateral hip.    Bursal effusion: Minimal nonspecific edema over the greater  trochanter. No substantial iliopsoas or trochanteric bursal effusion.    Muscles and tendons  Muscles and tendons: Proximal hamstrings, rectus femoris, sartorius,  and iliopsoas tendons intact bilaterally. The hip abductors are intact  bilaterally. The visualized adductor muscles are unremarkable  bilaterally.     Nerves:  The visualized course of the sciatic nerves are unremarkable  bilaterally.    Other Findings:  None.  Impression: Impression:  1. No suspicious osseous lesion in the visualized pelvic bones to  suggest metastasis when correlated with PET/CT from 2/20/2023.   2. Diffuse patchy marrow signal alteration with mild T1 hypointensity  with relative sparing of the epiphysis, most consistent with diffuse  red marrow reconversion, likely related to the chronic disease or  chemotherapy.     I have personally reviewed the examination and initial interpretation  and I agree with the findings.    JUTTA ELLERMANN, MD         SYSTEM ID:  [id number]     Imaging was personally reviewed and interpreted by me    Virtual Visit Details  Type of service:  Video Visit   Video Start Time: 8:53 AM  Video End Time: 9:25 AM   Originating Location (pt. Location): Home  Distant Location (provider location):  On-site  Platform used for Video Visit: Gen Mueller MD

## 2023-04-07 ENCOUNTER — PATIENT OUTREACH (OUTPATIENT)
Dept: ONCOLOGY | Facility: CLINIC | Age: 78
End: 2023-04-07
Payer: COMMERCIAL

## 2023-04-07 NOTE — PROGRESS NOTES
[facility]: Cancer Care Plan of Care Education Note                                    Discussion with Patient:                                                      I called the patient to provide education on Keytruda and review his upcoming appointments.    Assessment:                                                      Assessment completed with:: Patient    Current living arrangement  I live alone    Plan of Care Education   Yearly learning assessment completed?: Yes (see Education tab)  Diagnosis:: Squamous cell carcinoma of the oropharynx  Does patient understand diagnosis?: Yes  Tx plan/regimen:: Pembrolizumab  Does patient understand treatment plan/regimen?: Yes  Preparing for treatment:: Reviewed treatment preparation information with patient (vascular access, day of chemo, visitor policy, what to bring, etc.)  Vascular access:: Peripheral IV  Side effect education:: Diarrhea/Constipation;Endocrine therapy (myalgias, arthralgias, hot flashes, vaginal dryness, mood disorder, and thinning of the bones);Fatigue;Lab value monitoring (anemia, neutropenia, thrombocytopenia);Mouth sores;Nausea/Vomiting;Skin changes  Transportation means:: Regular car  Safety/self care at home reviewed with patient:: Yes  Informal Support system:: Family  Coping - concerns/fears reviewed with patient:: Yes  Plan of Care:: Treatment schedule;MARK follow-up appointment;Lab appointment  When to call provider:: Bleeding;Increased shortness of breath;New/worsening pain;Shaking chills;Temperature >100.4F;Uncontrolled diarrhea/constipation;Uncontrolled nausea/vomiting  Reasons for deferring treatment reviewed with patient:: Yes    Evaluation of Learning  Patient Education Provided: Yes  Readiness:: Acceptance  Method:: Literature;Explanation  Response:: Verbalizes understanding    No assessment indicated    Intervention/Education provided during outreach:                                                       I reviewed the common  side effects, logistics, and when to contact the care team while receiving Keytruda. Written information also send via [employer].    Patient to follow up as scheduled at next appt  Patient to call/"[employer]"t message with updates  Confirmed patient has clinic and triage numbers    Signature:  Erik Felipe DNP, RN, OCN  RN Care Coordinator   Dr. Jami Amos and Dr. Dominique Mueller  [facility]

## 2023-04-10 ENCOUNTER — PATIENT OUTREACH (OUTPATIENT)
Dept: OTOLARYNGOLOGY | Facility: CLINIC | Age: 78
End: 2023-04-10

## 2023-04-10 ENCOUNTER — OFFICE VISIT (OUTPATIENT)
Dept: INTERNAL MEDICINE | Facility: CLINIC | Age: 78
End: 2023-04-10
Payer: COMMERCIAL

## 2023-04-10 VITALS
OXYGEN SATURATION: 97 % | DIASTOLIC BLOOD PRESSURE: 69 MMHG | BODY MASS INDEX: 38.36 KG/M2 | HEART RATE: 78 BPM | HEIGHT: 64 IN | SYSTOLIC BLOOD PRESSURE: 162 MMHG

## 2023-04-10 DIAGNOSIS — C10.9 SQUAMOUS CELL CARCINOMA OF OROPHARYNX (H): ICD-10-CM

## 2023-04-10 DIAGNOSIS — G89.29 CHRONIC BILATERAL LOW BACK PAIN WITHOUT SCIATICA: Primary | ICD-10-CM

## 2023-04-10 DIAGNOSIS — M54.50 CHRONIC BILATERAL LOW BACK PAIN WITHOUT SCIATICA: Primary | ICD-10-CM

## 2023-04-10 PROCEDURE — 99213 OFFICE O/P EST LOW 20 MIN: CPT | Performed by: INTERNAL MEDICINE

## 2023-04-10 NOTE — TELEPHONE ENCOUNTER
Scheduled follow up appointment with Dr. Eng for scope exam. Scheduled on 4/19 due to patient being on vacation previous week.   Confirmed appointment details with patient.

## 2023-04-10 NOTE — PROGRESS NOTES
ASSESSMENT/PLAN:  Squamous cell carcinoma of the oropharynx -he is being followed by oncology who according to their note has recommended a new chemotherapy medication called pembrolizumab.  He had a fair number of questions for me about this.  I told him that I am not familiar with its prescribing however I did offer to look up its common side effects on up-to-date.  We went through all of the most common side effects recognizing that most of them were fairly benign.  He is still trying to decide whether he wants to do any treatment at all.  He has my opinion and I told him I would recommend at least starting the treatment to see how he feels and if he feels fine then he should continue but if he does not feel good with the treatment then he needs to decide based on quality of life.    Important to him is the ability to continue to fish.  He has 3 trips coming up and this is an important part of his life.  I am suspecting that if he is unable to continue fishing that he may declined to keep taking chemotherapy.      Weakness and fatigue because of the cancer but also because of chronic low back pain.  We had previously spoken about getting a scooter but I am not clear why this did not proceed.  I did make a referral to the mobility clinic so he can get the best electric mobility for him determined.  I am certainly willing to sign any prescriptions he needs.    Buck Nascimento MD, FACP      Chief complaint:  Jonathan Bishop is a 77 year old male presents for   Chief Complaint   Patient presents with     Follow Up     Medication Request     Pt wishing to discuss a new medication for cancer     Dme     Pt needing to restart order for mobilized scooter        SUBJECTIVE:  He wonders about the medication    - pembrolizumab  He worries about the side effects of this.  He will weigh this out for his own.  He wants at one more season of fishing - they are working around his fishing trips ([place], [place], [place])    He  wants to get a scooter.  He needs help getting this started.      Medications and allergies were reviewed by me today.         Patient Active Problem List    Diagnosis Date Noted     Tonsil cancer (H) 04/06/2023     Priority: Medium     Squamous cell carcinoma of oropharynx (H) 04/06/2023     Priority: Medium     Malignant neoplasm metastatic to lung 03/02/2023     Priority: Medium     Bilateral leg edema 01/21/2021     Priority: Medium     Diastolic heart failure (H) 01/07/2021     Priority: Medium     S/P TAVR (transcatheter aortic valve replacement) 01/07/2021     Priority: Medium     Aortic stenosis, severe 12/08/2020     Priority: Medium     Aortic valve stenosis, etiology of cardiac valve disease unspecified 12/02/2020     Priority: Medium     Added automatically from request for surgery [id number]       Morbid obesity (H) 11/09/2020     Priority: Medium     Status post coronary angiogram 10/28/2019     Priority: Medium     Systolic murmur 07/01/2019     Priority: Medium     Peripheral edema 07/01/2019     Priority: Medium     Hyperplasia of prostate with lower urinary tract symptoms (LUTS) 07/01/2019     Priority: Medium     Erectile dysfunction, unspecified erectile dysfunction type 07/01/2019     Priority: Medium     Severe aortic stenosis 07/01/2019     Priority: Medium     Dermatophytosis of nail 04/05/2017     Priority: Medium     PVD (peripheral vascular disease) (H) 04/05/2017     Priority: Medium     Cerebrovascular accident involving anterior circulation, right (H) 12/09/2016     Priority: Medium     Stroke (H) 11/30/2016     Priority: Medium     Low back pain 05/14/2014     Priority: Medium     Osteoarthritis of knee 05/14/2014     Priority: Medium     Medication refill- do not delete  11/13/2013     Priority: Medium     Essential hypertension, benign 03/09/2012     Priority: Medium     Hyperlipidemia with target LDL less than 130 03/09/2012     Priority: Medium     Diagnosis updated by automated  "process. Provider to review and confirm.       Anemia 03/09/2012     Priority: Medium     Problem list name updated by automated process. Provider to review         PHYSICAL EXAM:  BP (!) 162/69 (BP Location: Right arm, Patient Position: Sitting, Cuff Size: Adult Regular)   Pulse 78   Ht 1.613 m (5' 3.5\")   SpO2 97%   BMI 38.36 kg/m    Constitutional: no distress, comfortable, pleasant   Psychological: appropriate mood               "

## 2023-04-10 NOTE — NURSING NOTE
"Jonathan Bishop is a 77 year old male patient that presents today in clinic for the following:    Chief Complaint   Patient presents with     Follow Up     Medication Request     Pt wishing to discuss a new medication for cancer     The patient's allergies and medications were reviewed as noted. A set of vitals were recorded as noted without incident: BP (!) 162/69 (BP Location: Right arm, Patient Position: Sitting, Cuff Size: Adult Regular)   Pulse 78   Ht 1.613 m (5' 3.5\")   SpO2 97%   BMI 38.36 kg/m  . The patient does not have any other questions for the provider.    [place], EMT 9:06 AM on 4/10/2023   "

## 2023-04-17 NOTE — PROGRESS NOTES
[facility] CANCER [facility]    PATIENT NAME: Jonathan Bishop  MRN # [id number]   DATE OF VISIT: April 18, 2023  YOB: 1945     Referring Provider: Dr. Karishma Eng  Otolaryngology: Dr. Karishma Eng  Radiation Oncology: Dr. Jenni Mills  PCP: Dr. Buck Nascimento    CANCER TYPE: SCC L tonsil, p16 +lety  STAGE: [id number] (IVC)  ECOG PS: 1    PD-L1: TPS 20%, CPS 25% on [id number] tonsil bx  NGS: N/A    SUMMARY  2/26/23 L tonsil mass bx in clinic (Dr. Eng).   2/20/23 PET/CT. 3.7 x 4.0 x 5.1 cm mass L palatine tonsil causing narrowing of the oropharyngeal lumen, L level 2 node, L retropharyngeal nodular density, extension of primary mass vs retropharyngeal node, 0.8 cm RLL nodule concerning for met, mild focal uptake R iliac bone and L1 without CT correlate suspicious for mets.   3/2/23 R VATS, wedge resection. Path: SCC, poorly differentiated, associated with necrosis, 1 cm, negative margins, p16 +lety  3/24/23 MRI L spine and pelvis. Diffusely heterogeneous marrow signal throughout without corresponding abnormal signal on sagittal STIR sequence and no abnormal enhancement. Nonspecific but could be benign process such as red marrow hyperplasia, cannot completely exclude metastatic disease or infiltrative pathologic marrow process. No lesions corresponding to FDG avid spots on PET/CT.   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 Systemic therapy with pembrolizumab alone recommended. Less ideal candidate for cytotoxic chemo given comorbidities. Anticipate 3-6 months of treatment in absence of PD then consider definitive chemoradiation. Reviewed potential side effects of pembrolizumab and CPIs, handout given in clinic. Baseline labs and exam today acceptable to proceed with cycle 1 tomorrow (4/19) as scheduled.  -RTC 4/19 for C1 pembrolizumab  -RTC in 3 weeks for C2  -Repeat CT neck and  CT-CAP prior to cycle 3, will see Dr. Mueller to review    Microcytic anemia: Iron studies 8/2022 showed perhaps very mild LEW to normal iron studies. Hgb slightly improved today to 9.8.  Iron studies, ferritin, B12 pending. Likely ACD.     Hyperglyemia, pre-diabetes: A1c 6.0-6.3 for years now.     H/o CVA: Residual L weakness. Uses cane and walker at baseline, but independent and functional.    Peripheral neuropathy: MRI shows a lot of foramenal stenosis and spinal canal stenosis, so more likely related to that than DM2. B12, TSH pending.    40 minutes spent on the date of the encounter doing chart review, review of test results, interpretation of tests, patient visit and documentation     Vandana Bertrand, CNP  [facility] [address] [id number] [phone]    UBALDO  Conner is seen today for evaluation prior to C1 pembrolizumab scheduled Wed 4/19.   Cough is decreasing in frequency, usually twice a day  Still bringing up blood-tinged sputum when cough is more aggressive  Denies difficulty swallowing, coughing while eating  No shortness of breath, fever, chills  Appetite slightly decreased     PAST MEDICAL HISTORY  SCC as above  Chronic LBP  TAVR 2020  H/o rheumatic carditis 1950  CHF. 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 Nocturia once nightly   ED  Cataracts  Umbilical hernia repair 2011  Knee arthroplasty B 2010  Lumbar spine fusion, laminectomy, sciatic pain R > L  Peripheral neuropathy - from pinched nerves?  Hearing loss    Numbness/tingling in both feet - bilaterally, symmetric, R spasms more than left     CURRENT OUTPATIENT MEDICATIONS  Current Outpatient Medications   Medication Sig Dispense Refill     acetaminophen (TYLENOL) 325 MG tablet Take 3 tablets (975 mg) by mouth every 6 hours       diazepam (VALIUM) 5 MG tablet Take 1 tablet (5 mg) by mouth once as needed for anxiety (may repeat one time if needed.) 2 tablet 0     magic  "mouthwash suspension (diphenhydrAMINE, lidocaine, aluminum-magnesium & simethicone) Swish and spit 10 mLs in mouth every 6 hours as needed for mouth sores 200 mL 3     methocarbamol (ROBAXIN) 500 MG tablet Take 1 tablet (500 mg) by mouth every 6 hours as needed for muscle spasms 30 tablet 0     order for DME Equipment being ordered: Walker ()  Treatment Diagnosis: stroke 1 Units 0     oxyCODONE-acetaminophen (PERCOCET) 5-325 MG tablet Take 1-2 tablets by mouth every 6 hours as needed for pain Max 5 per day. 150 tablet 0     senna-docusate (SENOKOT-S/PERICOLACE) 8.6-50 MG tablet Take 1 tablet by mouth 2 times daily Reduce dose or temporarily discontinue if having loose stools. 50 tablet 0     ibuprofen (ADVIL/MOTRIN) 600 MG tablet Take 1 tablet (600 mg) by mouth every 8 hours as needed for moderate pain (4-6) (Patient not taking: Reported on 4/18/2023) 270 tablet 3     spironolactone (ALDACTONE) 50 MG tablet Take 1 tablet (50 mg) by mouth daily (Patient not taking: Reported on 4/18/2023) 90 tablet 3     tamsulosin (FLOMAX) 0.4 MG capsule TAKE 1 CAPSULE BY MOUTH ONCE DAILY (Patient not taking: Reported on 4/18/2023) 90 capsule 4     vitamin D3 (CHOLECALCIFEROL) 50 mcg (2000 units) tablet Take 1 tablet by mouth daily (Patient not taking: Reported on 4/18/2023)       ALLERGIES  No Known Allergies     REVIEW OF SYSTEMS  As above in the HPI, o/w complete 12-point ROS was negative.    PHYSICAL EXAM  /57   Pulse 82   Temp 97.8  F (36.6  C) (Oral)   Resp 16   Ht 1.613 m (5' 3.5\")   Wt 102.4 kg (225 lb 12.8 oz)   SpO2 98%   BMI 39.37 kg/m      General: Well-appearing male, NAD  Eyes: EOMI, PERRL. No scleral icterus.  ENT: Posterior OP tumor  Cardiovascular: RRR, no m/g/r. No peripheral edema.  Respiratory: CTA bilaterally. No wheezes or crackles.  Neurologic: Grossly nonfocal  Skin: No rashes, petechiae, or bruising noted on exposed skin.    LABORATORY AND IMAGING STUDIES   Most Recent 3 CBC's:  Recent Labs "   Lab Test 04/18/23  0911 03/03/23  0720 02/27/23  1603 08/03/22  1240   WBC 7.9 13.2* 8.7 7.7   HGB 9.8* 9.3* 9.4* 10.6*   MCV 80 80 81 83    195 226 227   ANEUTAUTO 4.6  --  5.6 4.0    Most Recent 3 BMP's:  Recent Labs   Lab Test 03/03/23  0720 03/03/23  0557 03/02/23  0942 02/27/23  1603 08/03/22  1240 12/08/21  0942 11/29/21  1019     --   --  141 140   < > 143   POTASSIUM 4.1  --  4.1 4.2 3.5   < > 3.7   CHLORIDE 108*  --   --  106 107   < > 106   CO2 25  --   --  25 27   < > 29   BUN 18.1  --   --  19.2 21   < > 29   CR 0.85  --  0.86 0.94 0.88   < > 1.08   ANIONGAP 8  --   --  10 6   < > 8   WARREN 9.1  --   --  9.6 9.3   < > 9.8   * 176*  --  92 96   < > 97   PROTTOTAL  --   --   --  7.0 7.4  --  7.6   ALBUMIN  --   --   --  4.0 3.5  --  3.6    < > = values in this interval not displayed.    Most Recent 2 LFT's:  Recent Labs   Lab Test 02/27/23  1603 08/03/22  1240   AST 27 18   ALT 15 22   ALKPHOS 105 100   BILITOTAL 0.2 0.3    Most Recent TSH and T4:  Recent Labs   Lab Test 04/16/21  1317   TSH 1.41     Phos/Mag:  Lab Results   Component Value Date    PHOS 2.4 (L) 12/09/2020    PHOS 3.9 12/08/2020    MAG 2.0 12/09/2020    MAG 2.1 12/08/2020      I reviewed the above labs today.

## 2023-04-18 ENCOUNTER — ONCOLOGY VISIT (OUTPATIENT)
Dept: ONCOLOGY | Facility: CLINIC | Age: 78
End: 2023-04-18
Attending: REGISTERED NURSE
Payer: COMMERCIAL

## 2023-04-18 ENCOUNTER — APPOINTMENT (OUTPATIENT)
Dept: LAB | Facility: CLINIC | Age: 78
End: 2023-04-18
Attending: REGISTERED NURSE
Payer: COMMERCIAL

## 2023-04-18 VITALS
OXYGEN SATURATION: 98 % | WEIGHT: 225.8 LBS | SYSTOLIC BLOOD PRESSURE: 124 MMHG | HEIGHT: 64 IN | RESPIRATION RATE: 16 BRPM | TEMPERATURE: 97.8 F | BODY MASS INDEX: 38.55 KG/M2 | DIASTOLIC BLOOD PRESSURE: 57 MMHG | HEART RATE: 82 BPM

## 2023-04-18 DIAGNOSIS — G60.9 IDIOPATHIC PERIPHERAL NEUROPATHY: ICD-10-CM

## 2023-04-18 DIAGNOSIS — D50.9 MICROCYTIC ANEMIA: ICD-10-CM

## 2023-04-18 DIAGNOSIS — C10.9 SQUAMOUS CELL CARCINOMA OF OROPHARYNX (H): Primary | ICD-10-CM

## 2023-04-18 DIAGNOSIS — Z13.29 SCREENING FOR HYPOTHYROIDISM: ICD-10-CM

## 2023-04-18 LAB
ALBUMIN SERPL BCG-MCNC: 4 G/DL (ref 3.5–5.2)
ALP SERPL-CCNC: 97 U/L (ref 40–129)
ALT SERPL W P-5'-P-CCNC: 18 U/L (ref 10–50)
ANION GAP SERPL CALCULATED.3IONS-SCNC: 12 MMOL/L (ref 7–15)
AST SERPL W P-5'-P-CCNC: 28 U/L (ref 10–50)
BASOPHILS # BLD AUTO: 0 10E3/UL (ref 0–0.2)
BASOPHILS NFR BLD AUTO: 1 %
BILIRUB SERPL-MCNC: 0.2 MG/DL
BUN SERPL-MCNC: 20 MG/DL (ref 8–23)
CALCIUM SERPL-MCNC: 9.9 MG/DL (ref 8.8–10.2)
CHLORIDE SERPL-SCNC: 106 MMOL/L (ref 98–107)
CREAT SERPL-MCNC: 0.98 MG/DL (ref 0.67–1.17)
DEPRECATED HCO3 PLAS-SCNC: 23 MMOL/L (ref 22–29)
EOSINOPHIL # BLD AUTO: 0.3 10E3/UL (ref 0–0.7)
EOSINOPHIL NFR BLD AUTO: 4 %
ERYTHROCYTE [DISTWIDTH] IN BLOOD BY AUTOMATED COUNT: 16.5 % (ref 10–15)
FERRITIN SERPL-MCNC: 117 NG/ML (ref 31–409)
GFR SERPL CREATININE-BSD FRML MDRD: 79 ML/MIN/1.73M2
GLUCOSE SERPL-MCNC: 124 MG/DL (ref 70–99)
HCT VFR BLD AUTO: 31.8 % (ref 40–53)
HGB BLD-MCNC: 9.8 G/DL (ref 13.3–17.7)
IMM GRANULOCYTES # BLD: 0 10E3/UL
IMM GRANULOCYTES NFR BLD: 0 %
IRON BINDING CAPACITY (ROCHE): 263 UG/DL (ref 240–430)
IRON SATN MFR SERPL: 21 % (ref 15–46)
IRON SERPL-MCNC: 55 UG/DL (ref 61–157)
LYMPHOCYTES # BLD AUTO: 2.3 10E3/UL (ref 0.8–5.3)
LYMPHOCYTES NFR BLD AUTO: 28 %
MCH RBC QN AUTO: 24.6 PG (ref 26.5–33)
MCHC RBC AUTO-ENTMCNC: 30.8 G/DL (ref 31.5–36.5)
MCV RBC AUTO: 80 FL (ref 78–100)
MONOCYTES # BLD AUTO: 0.7 10E3/UL (ref 0–1.3)
MONOCYTES NFR BLD AUTO: 9 %
NEUTROPHILS # BLD AUTO: 4.6 10E3/UL (ref 1.6–8.3)
NEUTROPHILS NFR BLD AUTO: 58 %
NRBC # BLD AUTO: 0 10E3/UL
NRBC BLD AUTO-RTO: 0 /100
PLATELET # BLD AUTO: 240 10E3/UL (ref 150–450)
POTASSIUM SERPL-SCNC: 4.3 MMOL/L (ref 3.4–5.3)
PROT SERPL-MCNC: 7.1 G/DL (ref 6.4–8.3)
RBC # BLD AUTO: 3.98 10E6/UL (ref 4.4–5.9)
SODIUM SERPL-SCNC: 141 MMOL/L (ref 136–145)
T4 FREE SERPL-MCNC: 1.33 NG/DL (ref 0.9–1.7)
TSH SERPL DL<=0.005 MIU/L-ACNC: 0.85 UIU/ML (ref 0.3–4.2)
VIT B12 SERPL-MCNC: 1131 PG/ML (ref 232–1245)
WBC # BLD AUTO: 7.9 10E3/UL (ref 4–11)

## 2023-04-18 PROCEDURE — 84443 ASSAY THYROID STIM HORMONE: CPT | Performed by: REGISTERED NURSE

## 2023-04-18 PROCEDURE — 82607 VITAMIN B-12: CPT | Performed by: REGISTERED NURSE

## 2023-04-18 PROCEDURE — 82728 ASSAY OF FERRITIN: CPT | Performed by: REGISTERED NURSE

## 2023-04-18 PROCEDURE — 36415 COLL VENOUS BLD VENIPUNCTURE: CPT | Performed by: REGISTERED NURSE

## 2023-04-18 PROCEDURE — 84439 ASSAY OF FREE THYROXINE: CPT | Performed by: REGISTERED NURSE

## 2023-04-18 PROCEDURE — G0463 HOSPITAL OUTPT CLINIC VISIT: HCPCS | Performed by: REGISTERED NURSE

## 2023-04-18 PROCEDURE — 83550 IRON BINDING TEST: CPT | Performed by: REGISTERED NURSE

## 2023-04-18 PROCEDURE — 99215 OFFICE O/P EST HI 40 MIN: CPT | Mod: 24 | Performed by: REGISTERED NURSE

## 2023-04-18 PROCEDURE — 85025 COMPLETE CBC W/AUTO DIFF WBC: CPT | Performed by: REGISTERED NURSE

## 2023-04-18 PROCEDURE — 80053 COMPREHEN METABOLIC PANEL: CPT | Performed by: REGISTERED NURSE

## 2023-04-18 ASSESSMENT — PAIN SCALES - GENERAL: PAINLEVEL: EXTREME PAIN (8)

## 2023-04-18 NOTE — NURSING NOTE
Chief Complaint   Patient presents with     Blood Draw     VPT blood draw by lab RN. Vitals taken and appointment arrived     Vandana Olivier RN

## 2023-04-18 NOTE — NURSING NOTE
"Oncology Rooming Note    April 18, 2023 9:33 AM   Jonathan Bishop is a 77 year old male who presents for:    Chief Complaint   Patient presents with     Blood Draw     VPT blood draw by lab RN. Vitals taken and appointment arrived     Oncology Clinic Visit     [facility] RETURN - SQUAMOUS CELL CARCINOMA OF OROPHARYNX     Initial Vitals: /57   Pulse 82   Temp 97.8  F (36.6  C) (Oral)   Resp 16   Ht 1.613 m (5' 3.5\")   Wt 102.4 kg (225 lb 12.8 oz)   SpO2 98%   BMI 39.37 kg/m   Estimated body mass index is 39.37 kg/m  as calculated from the following:    Height as of this encounter: 1.613 m (5' 3.5\").    Weight as of this encounter: 102.4 kg (225 lb 12.8 oz). Body surface area is 2.14 meters squared.  Extreme Pain (8) Comment: back and legs   No LMP for male patient.  Allergies reviewed: Yes  Medications reviewed: Yes    Medications: Medication refills not needed today.  Pharmacy name entered into [facility]: [id number] - [place], [place] - [address] [facility]    Marcos Junior LPN            "

## 2023-04-18 NOTE — Clinical Note
4/18/2023         RE: Jonathan Bishop  [address]  [facility] [address]        Dear Colleague,    Thank you for referring your patient, Jonathan Bishop, to the [facility]. Please see a copy of my visit note below.       [facility]    PATIENT NAME: Jonathan Bishop  MRN # [id number]   DATE OF VISIT: April 17, 2023  YOB: 1945     Referring Provider: Dr. Karishma Eng  Otolaryngology: Dr. Karishma Eng  Radiation Oncology: Dr. Jenni Mills  PCP: Dr. Buck Nascimento    CANCER TYPE: SCC L tonsil, p16 +lety  STAGE: [id number] (IVC)  ECOG PS: 1    PD-L1: TPS 20%, CPS 25% on [id number] tonsil bx  NGS: N/A    SUMMARY  2/26/23 L tonsil mass bx in clinic (Dr. Eng).   2/20/23 PET/CT. 3.7 x 4.0 x 5.1 cm mass L palatine tonsil causing narrowing of the oropharyngeal lumen, L level 2 node, L retropharyngeal nodular density, extension of primary mass vs retropharyngeal node, 0.8 cm RLL nodule concerning for met, mild focal uptake R iliac bone and L1 without CT correlate suspicious for mets.   3/2/23 R VATS, wedge resection. Path: SCC, poorly differentiated, associated with necrosis, 1 cm, negative margins, p16 +lety  3/24/23 MRI L spine and pelvis. Diffusely heterogeneous marrow signal throughout without corresponding abnormal signal on sagittal STIR sequence and no abnormal enhancement. Nonspecific but could be benign process such as red marrow hyperplasia, cannot completely exclude metastatic disease or infiltrative pathologic marrow process. No lesions corresponding to FDG avid spots on PET/CT.   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       Discussed with Dr. Mills. Both she and I agree with starting with systemic therapy. Discussed rationale with Conner. Cytotoxic chemo would be too tough with his comorbidities. Recommended  pembrolizumab alone. Would do 3-6 months. In the absence of PD, would then consider definitive chemoradiation - best case scenario - with some hope of long-term disease control. Discussed possibility of PD, chances of radiographic and pathologic response, potential discordance between the two, and long-term control based on Keynote 048 with pembro, for which we now have 5 year survival data. Discussed also in terms of 1 year survival rates, where just over half of people are alive at the 1 year shay, which also means alternatively, a little less than half have not survived. However, in Conner's case, would anticipate being on the better side of that given the low burden of metastatic disease. If PD on pembro, would consider platinum vs platinum doublet as a second line and SQZ trial as a third line, or vice versa.     Microcytic anemia: Iron studies 8/2022 showed perhaps very mild LEW to normal iron studies. 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     H/o CVA: Residual L weakness. Uses cane and walker at baseline, but independent and functional. Stable    Peripheral neuropathy: MRI shows a lot of foramenal stenosis and spinal canal stenosis, so more likely related to that than DM2. Can double check B12, TSH when we do labs.     *** minutes spent on the date of the encounter doing {2021 E&M time in:666289}     Vandana Bertrand, CNP  [facility]  [address] [place] [id number]  [phone]    SUBJECTIVE  Mr. Bishop returns for follow up  Started having hemoptysis again last week, has subsided. At it's worst, would have a mouthful of blood and chunks. Then subsequent coughs the same day might be clear. No trouble swallowing, no trouble breathing. Some clotted blood at times too. Similar to what happened last month at the time of dx.   O/w ok and no change since last visit.   M-W-F are  best days for scheduling    4/11~4/15 - [place] for fishing and to see family  May - [place]   No family here - has Maria R, sounds like PCA vs aide, helps him regularly.     PAST MEDICAL HISTORY  SCC as above  Chronic LBP  TAVR 2020  H/o rheumatic carditis 1950  CHF. 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 Nocturia once nightly   ED  Cataracts  Umbilical hernia repair 2011  Knee arthroplasty B 2010  Lumbar spine fusion, laminectomy, sciatic pain R > L  Peripheral neuropathy - from pinched nerves?  Hearing loss    Numbness/tingling in both feet - bilaterally, symmetric, R spasms more than left     CURRENT OUTPATIENT MEDICATIONS  Current Outpatient Medications   Medication Sig Dispense Refill     acetaminophen (TYLENOL) 325 MG tablet Take 3 tablets (975 mg) by mouth every 6 hours       diazepam (VALIUM) 5 MG tablet Take 1 tablet (5 mg) by mouth once as needed for anxiety (may repeat one time if needed.) 2 tablet 0     ibuprofen (ADVIL/MOTRIN) 600 MG tablet Take 1 tablet (600 mg) by mouth every 8 hours as needed for moderate pain (4-6) 270 tablet 3     magic mouthwash suspension (diphenhydrAMINE, lidocaine, aluminum-magnesium & simethicone) Swish and spit 10 mLs in mouth every 6 hours as needed for mouth sores 200 mL 3     methocarbamol (ROBAXIN) 500 MG tablet Take 1 tablet (500 mg) by mouth every 6 hours as needed for muscle spasms 30 tablet 0     order for DME Equipment being ordered: Walker ()  Treatment Diagnosis: stroke 1 Units 0     oxyCODONE-acetaminophen (PERCOCET) 5-325 MG tablet Take 1-2 tablets by mouth every 6 hours as needed for pain Max 5 per day. 150 tablet 0     senna-docusate (SENOKOT-S/PERICOLACE) 8.6-50 MG tablet Take 1 tablet by mouth 2 times daily Reduce dose or temporarily discontinue if having loose stools. 50 tablet 0     spironolactone (ALDACTONE) 50 MG tablet Take 1 tablet (50 mg) by mouth daily 90 tablet 3     tamsulosin  (FLOMAX) 0.4 MG capsule TAKE 1 CAPSULE BY MOUTH ONCE DAILY 90 capsule 4     vitamin D3 (CHOLECALCIFEROL) 50 mcg (2000 units) tablet Take 1 tablet by mouth daily       ALLERGIES  No Known Allergies     REVIEW OF SYSTEMS  As above in the HPI, o/w complete 12-point ROS was negative.    PHYSICAL EXAM  There were no vitals taken for this visit.    General: Well-appearing male in no acute distress.  Eyes: EOMI, PERRL. No scleral icterus.  ENT: Oral mucosa is moist without lesions or thrush.   Lymphatic: Neck is supple without cervical or supraclavicular lymphadenopathy.   Cardiovascular: RRR No murmurs, gallops, or rubs. No peripheral edema.  Respiratory: CTA bilaterally. No wheezes or crackles.  Gastrointestinal: BS +. Abdomen soft, non-tender. No palpable hepatosplenomegaly or masses.   Neurologic: Cranial nerves II through XII are grossly intact.  Skin: No rashes, petechiae, or bruising noted on exposed skin.    LABORATORY AND IMAGING STUDIES  MR Lumbar Spine w/o & w Contrast  Narrative: MRI LUMBAR SPINE WITHOUT AND WITH CONTRAST   3/24/2023 12:42 PM     HISTORY: Low back pain. Rule out cancer. No known/automatically  detected potential contraindications to imaging. Squamous cell  carcinoma of oropharynx (H).    TECHNIQUE: Multiplanar multisequence MRI of the lumbar spine without  and with contrast.    COMPARISON: Whole body PET CT 2/20/2023. CTA chest/abdomen/pelvis  12/17/2019. Lumbar spine radiographs 4/3/2019.     FINDINGS: Nomenclature is based on 5 lumbar vertebral bodies. Minimal  chronic-appearing anterior wedging/height loss of the L1 an L3  vertebral bodies unchanged. Otherwise normal vertebral body heights.  Minimal retrolisthesis of L3 on L4 is unchanged. Sagittal alignment  otherwise normal. Diffuse heterogeneity of the bone marrow signal  throughout the visualized thoracal lumbar spine and bony pelvis with  predominantly fatty appearing marrow, but there are relatively  extensive scattered small foci of  T1 and T2 speckled hypointense  signal throughout the lumbar and visualized bony pelvis. Normal marrow  signal on the sagittal STIR sequence. No abnormal bone marrow  enhancement is identified. Diffuse intervertebral disc desiccation.    Solid-appearing interbody fusion at L4-L5. There also is likely some  degree of interbody osseous fusion across L5-S1 disc space with severe  disc space narrowing at that level. Bilateral posterior fusion  instrumentation spanning L4-S1, as before, with some associated  susceptibility artifact that mildly limits evaluation of the mid to  lower lumbar spine and lumbosacral junction.    Normal appearance of the distal spinal cord with the conus terminating  at L1. Small probable cyst of the anterior right kidney noted on the  axial T2-weighted sequence. This is not well evaluated on the  postcontrast sequence due to some artifact in that area on the  postcontrast series. Degenerative changes of sacroiliac joints. Fatty  atrophy of the lower most posterior paraspinous musculature.  Visualized paraspinal soft tissues otherwise appear unremarkable.    Segmental analysis:  T11-T12: Mild disc height loss. Symmetric disc bulge. Posterior  endplate osteophytic ridging. Moderate bilateral facet arthropathy.  Mild to moderate right neural foraminal stenosis. Mild left neural  foraminal stenosis.    T12-L1: Normal disc height. Symmetric disc bulge. Mild facet  arthropathy. No spinal canal stenosis. No significant neural foraminal  stenosis.    L1-L2: Normal disc height. Symmetric disc bulge. Mild facet  arthropathy. Ligamentum flavum thickening. Mild spinal canal stenosis.  Mild to moderate/moderate right and mild left neural foraminal  stenosis.    Moderate to severe disc height loss. Symmetric disc bulge. Posterior  endplate osteophytic ridging. Moderate facet arthropathy. Ligamentum  flavum thickening. Severe spinal canal stenosis. Moderate right knee  and mild-to-moderate left neural  foraminal stenosis.    L3-L4: Severe disc height loss. Symmetric disc bulge. Posterior  endplate osteophytic ridging. Moderate facet arthropathy. Ligamentum  flavum thickening. Severe spinal canal stenosis. Moderate to severe  bilateral neural foraminal stenosis.    L4-L5: Postoperative level. Borderline mild spinal canal narrowing.  Mild mild to moderate bilateral neural foraminal stenosis.    L5-S1: Postoperative level. No significant spinal canal stenosis.  Evaluation of the neural foramina is somewhat limited by artifacts.  Mild to moderate right and mild left neural foraminal stenosis.  Impression: IMPRESSION:  1. Diffusely heterogeneous marrow signal throughout the visualized  thoracolumbar spine and bony pelvis, as described, without  corresponding abnormal signal on the sagittal STIR sequence and no  obvious abnormal enhancement. Findings are nonspecific, but could be  related to a benign process such as red marrow hyperplasia; however,  infiltrating pathologic marrow replacing or marrow proliferative  process such as myeloma/myeloproliferative or lymphoproliferative  disorder or metastatic disease cannot be completely excluded. Given  lack of STIR signal abnormality and enhancement, as well as relative  lack of FDG activity throughout the marrow on the previous PET/CT,  this could possibly be a more indolent or nonactive process.  Clinical/laboratory correlation is recommended.  2. Postsurgical changes L4-S1, as described.  3. Degenerative changes, as described.  4. Severe spinal canal stenosis at L2-L3 and L3-L4.  5. Varying degrees of multilevel degenerative neural foraminal  stenosis, as described.    BALWINDER HICKS MD         SYSTEM ID:  [id number]  MR Pelvis Bone wo & w Contrast  Narrative: MR pelvis with contrast 3/24/2023 12:43 PM    Techniques: Multiplanar multisequence imaging of the pelvis was  obtained with administration of intravenous contrast using routing MS  protocol.    History: SCC tonsil,  newly diagnosed, bone lesions on PET/CT  concerning for mets but not definitive. Evaluate R iliac lesion. See  separate spine MRI for L spine lesion; Malignant neoplasm of  oropharynx (H); Bone metastasis     Comparison: PET/CT 2/20/2023    Findings:    Osseous structures  Osseous structures: No fracture, stress reaction, avascular necrosis,  or focal osseous lesion is seen. No suspicious osseous lesion in the  visualized pelvic bones to suggest metastasis when correlated with  PET/CT from 2/20/2023.     Diffuse patchy marrow signal alteration with mild T1 hypointensity  with relative sparing of the epiphysis, most consistent with diffuse  red marrow reconversion.    Posterior fusion involving L4, L5, and S1.    Internal derangement of joints are not well assessed owing to chosen  field of view.    Joint and Periarticular soft tissue:    Sacroiliac joints and pubic symphysis are congruent.    Joint effusion: A physiologic amount of joint fluid in bilateral hip.    Bursal effusion: Minimal nonspecific edema over the greater  trochanter. No substantial iliopsoas or trochanteric bursal effusion.    Muscles and tendons  Muscles and tendons: Proximal hamstrings, rectus femoris, sartorius,  and iliopsoas tendons intact bilaterally. The hip abductors are intact  bilaterally. The visualized adductor muscles are unremarkable  bilaterally.     Nerves:  The visualized course of the sciatic nerves are unremarkable  bilaterally.    Other Findings:  None.  Impression: Impression:  1. No suspicious osseous lesion in the visualized pelvic bones to  suggest metastasis when correlated with PET/CT from 2/20/2023.   2. Diffuse patchy marrow signal alteration with mild T1 hypointensity  with relative sparing of the epiphysis, most consistent with diffuse  red marrow reconversion, likely related to the chronic disease or  chemotherapy.     I have personally reviewed the examination and initial interpretation  and I agree with the  findings.    JUTTA ELLERMANN, MD         SYSTEM ID:  [id number]               [facility]    PATIENT NAME: Jonathan Bishop  MRN # [id number]   DATE OF VISIT: April 18, 2023  YOB: 1945     Referring Provider: Dr. Karishma Eng  Otolaryngology: Dr. Karishma Eng  Radiation Oncology: Dr. Jenni Mills  PCP: Dr. Buck Nascimento    CANCER TYPE: SCC L tonsil, p16 +lety  STAGE: [id number] (IVC)  ECOG PS: 1    PD-L1: TPS 20%, CPS 25% on [id number] tonsil bx  NGS: N/A    SUMMARY  2/26/23 L tonsil mass bx in clinic (Dr. Eng).   2/20/23 PET/CT. 3.7 x 4.0 x 5.1 cm mass L palatine tonsil causing narrowing of the oropharyngeal lumen, L level 2 node, L retropharyngeal nodular density, extension of primary mass vs retropharyngeal node, 0.8 cm RLL nodule concerning for met, mild focal uptake R iliac bone and L1 without CT correlate suspicious for mets.   3/2/23 R VATS, wedge resection. Path: SCC, poorly differentiated, associated with necrosis, 1 cm, negative margins, p16 +lety  3/24/23 MRI L spine and pelvis. Diffusely heterogeneous marrow signal throughout without corresponding abnormal signal on sagittal STIR sequence and no abnormal enhancement. Nonspecific but could be benign process such as red marrow hyperplasia, cannot completely exclude metastatic disease or infiltrative pathologic marrow process. No lesions corresponding to FDG avid spots on PET/CT.   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 Systemic therapy with pembrolizumab alone recommended. Less ideal candidate for cytotoxic chemo given comorbidities. Anticipate 3-6 months of treatment in absence of PD then consider definitive chemoradiation. Reviewed potential side effects of pembrolizumab and CPIs, handout given in clinic. Baseline labs and exam today acceptable to proceed with cycle 1 tomorrow (4/19) as scheduled.  -RTC  4/19 for C1 pembrolizumab  -RTC in 3 weeks for C2  -Repeat CT neck and CT-CAP prior to cycle 3, will see Dr. Mueller to review    Microcytic anemia: Iron studies 8/2022 showed perhaps very mild LEW to normal iron studies. Hgb slightly improved today to 9.8.  Iron studies, ferritin, B12 pending. Likely ACD.     Hyperglyemia, pre-diabetes: A1c 6.0-6.3 for years now.     H/o CVA: Residual L weakness. Uses cane and walker at baseline, but independent and functional.    Peripheral neuropathy: MRI shows a lot of foramenal stenosis and spinal canal stenosis, so more likely related to that than DM2. B12, TSH pending.    *** minutes spent on the date of the encounter doing {2021 E&M time in:223075}     Vandana Bertrand, CNP  [facility]  [address] [place] [id number]  [phone]    UBALDO  Conner is seen today for evaluation prior to C1 pembrolizumab scheduled Wed 4/19. Overall feels quite well.     Mr. Bishop returns for follow up  Started having hemoptysis again last week, has subsided. At it's worst, would have a mouthful of blood and chunks. Then subsequent coughs the same day might be clear. No trouble swallowing, no trouble breathing. Some clotted blood at times too. Similar to what happened last month at the time of dx.   O/w ok and no change since last visit.   M-W-F are best days for scheduling    4/11~4/15 - [place] for fishing and to see family  May - [place]   No family here - has Maria R, sounds like PCA vs aide, helps him regularly.     PAST MEDICAL HISTORY  SCC as above  Chronic LBP  TAVR 2020  H/o rheumatic carditis 1950  CHF. 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 Nocturia once nightly   ED  Cataracts  Umbilical hernia repair 2011  Knee arthroplasty B 2010  Lumbar spine fusion, laminectomy, sciatic pain R > L  Peripheral neuropathy - from pinched nerves?  Hearing loss    Numbness/tingling in both feet - bilaterally,  "symmetric, R spasms more than left     CURRENT OUTPATIENT MEDICATIONS  Current Outpatient Medications   Medication Sig Dispense Refill     acetaminophen (TYLENOL) 325 MG tablet Take 3 tablets (975 mg) by mouth every 6 hours       diazepam (VALIUM) 5 MG tablet Take 1 tablet (5 mg) by mouth once as needed for anxiety (may repeat one time if needed.) 2 tablet 0     magic mouthwash suspension (diphenhydrAMINE, lidocaine, aluminum-magnesium & simethicone) Swish and spit 10 mLs in mouth every 6 hours as needed for mouth sores 200 mL 3     methocarbamol (ROBAXIN) 500 MG tablet Take 1 tablet (500 mg) by mouth every 6 hours as needed for muscle spasms 30 tablet 0     order for DME Equipment being ordered: Walker ()  Treatment Diagnosis: stroke 1 Units 0     oxyCODONE-acetaminophen (PERCOCET) 5-325 MG tablet Take 1-2 tablets by mouth every 6 hours as needed for pain Max 5 per day. 150 tablet 0     senna-docusate (SENOKOT-S/PERICOLACE) 8.6-50 MG tablet Take 1 tablet by mouth 2 times daily Reduce dose or temporarily discontinue if having loose stools. 50 tablet 0     ibuprofen (ADVIL/MOTRIN) 600 MG tablet Take 1 tablet (600 mg) by mouth every 8 hours as needed for moderate pain (4-6) (Patient not taking: Reported on 4/18/2023) 270 tablet 3     spironolactone (ALDACTONE) 50 MG tablet Take 1 tablet (50 mg) by mouth daily (Patient not taking: Reported on 4/18/2023) 90 tablet 3     tamsulosin (FLOMAX) 0.4 MG capsule TAKE 1 CAPSULE BY MOUTH ONCE DAILY (Patient not taking: Reported on 4/18/2023) 90 capsule 4     vitamin D3 (CHOLECALCIFEROL) 50 mcg (2000 units) tablet Take 1 tablet by mouth daily (Patient not taking: Reported on 4/18/2023)       ALLERGIES  No Known Allergies     REVIEW OF SYSTEMS  As above in the HPI, o/w complete 12-point ROS was negative.    PHYSICAL EXAM  /57   Pulse 82   Temp 97.8  F (36.6  C) (Oral)   Resp 16   Ht 1.613 m (5' 3.5\")   Wt 102.4 kg (225 lb 12.8 oz)   SpO2 98%   BMI 39.37 kg/m  "     General: Well-appearing male, NAD  Eyes: EOMI, PERRL. No scleral icterus.  ENT: Posterior OP tumor  Cardiovascular: RRR, no m/g/r. No peripheral edema.  Respiratory: CTA bilaterally. No wheezes or crackles.  Neurologic: Grossly nonfocal  Skin: No rashes, petechiae, or bruising noted on exposed skin.    LABORATORY AND IMAGING STUDIES   Most Recent 3 CBC's:  Recent Labs   Lab Test 04/18/23  0911 03/03/23  0720 02/27/23  1603 08/03/22  1240   WBC 7.9 13.2* 8.7 7.7   HGB 9.8* 9.3* 9.4* 10.6*   MCV 80 80 81 83    195 226 227   ANEUTAUTO 4.6  --  5.6 4.0    Most Recent 3 BMP's:  Recent Labs   Lab Test 03/03/23  0720 03/03/23  0557 03/02/23  0942 02/27/23  1603 08/03/22  1240 12/08/21  0942 11/29/21  1019     --   --  141 140   < > 143   POTASSIUM 4.1  --  4.1 4.2 3.5   < > 3.7   CHLORIDE 108*  --   --  106 107   < > 106   CO2 25  --   --  25 27   < > 29   BUN 18.1  --   --  19.2 21   < > 29   CR 0.85  --  0.86 0.94 0.88   < > 1.08   ANIONGAP 8  --   --  10 6   < > 8   WARREN 9.1  --   --  9.6 9.3   < > 9.8   * 176*  --  92 96   < > 97   PROTTOTAL  --   --   --  7.0 7.4  --  7.6   ALBUMIN  --   --   --  4.0 3.5  --  3.6    < > = values in this interval not displayed.    Most Recent 2 LFT's:  Recent Labs   Lab Test 02/27/23  1603 08/03/22  1240   AST 27 18   ALT 15 22   ALKPHOS 105 100   BILITOTAL 0.2 0.3    Most Recent TSH and T4:  Recent Labs   Lab Test 04/16/21  1317   TSH 1.41     Phos/Mag:  Lab Results   Component Value Date    PHOS 2.4 (L) 12/09/2020    PHOS 3.9 12/08/2020    MAG 2.0 12/09/2020    MAG 2.1 12/08/2020      I reviewed the above labs today.             Again, thank you for allowing me to participate in the care of your patient.        Sincerely,        Vandana Bertrand, CNP

## 2023-04-19 ENCOUNTER — INFUSION THERAPY VISIT (OUTPATIENT)
Dept: ONCOLOGY | Facility: CLINIC | Age: 78
End: 2023-04-19
Attending: REGISTERED NURSE
Payer: COMMERCIAL

## 2023-04-19 VITALS
HEART RATE: 82 BPM | SYSTOLIC BLOOD PRESSURE: 122 MMHG | RESPIRATION RATE: 16 BRPM | DIASTOLIC BLOOD PRESSURE: 73 MMHG | TEMPERATURE: 97.5 F | OXYGEN SATURATION: 98 %

## 2023-04-19 DIAGNOSIS — C10.9 SQUAMOUS CELL CARCINOMA OF OROPHARYNX (H): ICD-10-CM

## 2023-04-19 DIAGNOSIS — C78.00 MALIGNANT NEOPLASM METASTATIC TO LUNG, UNSPECIFIED LATERALITY (H): ICD-10-CM

## 2023-04-19 DIAGNOSIS — C09.9 TONSIL CANCER (H): Primary | ICD-10-CM

## 2023-04-19 PROCEDURE — 250N000011 HC RX IP 250 OP 636: Performed by: INTERNAL MEDICINE

## 2023-04-19 PROCEDURE — 96413 CHEMO IV INFUSION 1 HR: CPT

## 2023-04-19 PROCEDURE — 999N000248 HC STATISTIC IV INSERT WITH US BY RN

## 2023-04-19 PROCEDURE — 258N000003 HC RX IP 258 OP 636: Performed by: INTERNAL MEDICINE

## 2023-04-19 RX ORDER — MEPERIDINE HYDROCHLORIDE 25 MG/ML
25 INJECTION INTRAMUSCULAR; INTRAVENOUS; SUBCUTANEOUS EVERY 30 MIN PRN
Status: CANCELLED | OUTPATIENT
Start: 2023-04-19

## 2023-04-19 RX ORDER — EPINEPHRINE 1 MG/ML
0.3 INJECTION, SOLUTION, CONCENTRATE INTRAVENOUS EVERY 5 MIN PRN
Status: CANCELLED | OUTPATIENT
Start: 2023-04-19

## 2023-04-19 RX ORDER — DIPHENHYDRAMINE HYDROCHLORIDE 50 MG/ML
50 INJECTION INTRAMUSCULAR; INTRAVENOUS
Status: CANCELLED
Start: 2023-04-19

## 2023-04-19 RX ORDER — ALBUTEROL SULFATE 0.83 MG/ML
2.5 SOLUTION RESPIRATORY (INHALATION)
Status: CANCELLED | OUTPATIENT
Start: 2023-04-19

## 2023-04-19 RX ORDER — LORAZEPAM 2 MG/ML
0.5 INJECTION INTRAMUSCULAR EVERY 4 HOURS PRN
Status: CANCELLED | OUTPATIENT
Start: 2023-04-19

## 2023-04-19 RX ORDER — ALBUTEROL SULFATE 90 UG/1
1-2 AEROSOL, METERED RESPIRATORY (INHALATION)
Status: CANCELLED
Start: 2023-04-19

## 2023-04-19 RX ORDER — HEPARIN SODIUM (PORCINE) LOCK FLUSH IV SOLN 100 UNIT/ML 100 UNIT/ML
5 SOLUTION INTRAVENOUS
Status: CANCELLED | OUTPATIENT
Start: 2023-04-19

## 2023-04-19 RX ORDER — HEPARIN SODIUM,PORCINE 10 UNIT/ML
5 VIAL (ML) INTRAVENOUS
Status: CANCELLED | OUTPATIENT
Start: 2023-04-19

## 2023-04-19 RX ADMIN — SODIUM CHLORIDE 250 ML: 9 INJECTION, SOLUTION INTRAVENOUS at 12:02

## 2023-04-19 RX ADMIN — SODIUM CHLORIDE 200 MG: 9 INJECTION, SOLUTION INTRAVENOUS at 12:02

## 2023-04-19 ASSESSMENT — PAIN SCALES - GENERAL: PAINLEVEL: EXTREME PAIN (8)

## 2023-04-19 NOTE — PROGRESS NOTES
Infusion Nursing Note:  Jonathan Bishop presents today for Cycle 1, Day 1 Keytruda.    Patient seen by provider today: No   present during visit today: Not Applicable.    Note: Pt assessed upon arrival to infusion suite. Denies fever, chills, cough, SOB or other signs/symptoms of infection. Pt is receiving Keytruda today for the first time. Verified that patient already received information from Erik Felipe [facility]. Reviewed side effects and symptom management prior to initiating treatment. Pt had visit with Vandana Bertrand CNP yesterday and denies any new issues, concerns or changes since this visit.     Intravenous Access:  Peripheral IV placed.    Treatment Conditions:  Lab Results   Component Value Date    HGB 9.8 (L) 04/18/2023    WBC 7.9 04/18/2023    ANEU 3.9 10/28/2020    ANEUTAUTO 4.6 04/18/2023     04/18/2023      Lab Results   Component Value Date     04/18/2023    POTASSIUM 4.3 04/18/2023    MAG 2.0 12/09/2020    CR 0.98 04/18/2023    WARREN 9.9 04/18/2023    BILITOTAL 0.2 04/18/2023    ALBUMIN 4.0 04/18/2023    ALT 18 04/18/2023    AST 28 04/18/2023     Results reviewed, labs MET treatment parameters, ok to proceed with treatment.    Post Infusion Assessment:  Patient tolerated infusion without incident.  Blood return noted pre and post infusion.  Site patent and intact, free from redness, edema or discomfort.  No evidence of extravasations.  Access discontinued per protocol.     Discharge Plan:   Prescription refills given for Magic Mouthwash.  AVS to patient via [employer].  Patient will return 5/8/23 for next appointment.   Patient discharged in stable condition accompanied by: Caregiver.  Departure Mode: Ambulatory.      Tati Pierson RN

## 2023-04-25 ENCOUNTER — HOSPITAL ENCOUNTER (OUTPATIENT)
Dept: OCCUPATIONAL THERAPY | Facility: CLINIC | Age: 78
Setting detail: THERAPIES SERIES
Discharge: HOME | End: 2023-04-25
Attending: INTERNAL MEDICINE
Payer: COMMERCIAL

## 2023-04-25 DIAGNOSIS — G89.29 CHRONIC BILATERAL LOW BACK PAIN WITHOUT SCIATICA: ICD-10-CM

## 2023-04-25 DIAGNOSIS — M54.50 CHRONIC BILATERAL LOW BACK PAIN WITHOUT SCIATICA: ICD-10-CM

## 2023-04-25 DIAGNOSIS — C10.9 SQUAMOUS CELL CARCINOMA OF OROPHARYNX (H): ICD-10-CM

## 2023-04-25 PROCEDURE — 97542 WHEELCHAIR MNGMENT TRAINING: CPT | Mod: GO | Performed by: OCCUPATIONAL THERAPIST

## 2023-04-25 NOTE — PROGRESS NOTES
"   SEATING AND WHEELED MOBILITY ASSESSMENT  04/25/23 1100   General Information    Rehab Discipline OT   Funding [facility] Medicare/[place] Limited   Service Outpatient;Occupational Therapy;Seating/Wheeled Mobility Evaluation   Height 5'4\"   Weight 225   Start Of Care Date 04/25/23   Referring Physician Buck Nascimento   Orders Evaluate And Treat As Indicated;Per Therapist Evaluation   Orders Date 04/10/23   Patient/Caregiver Goals scooter   Rehabilitation Technology Supplier [facility] medical   Current Community Support Personal Care Attendant;Meals On Wheels;Emergency Call System  (PCA 15 hrs- noone currently available)   Patient role/Employment history Disabled   Fall Risk Screen   Fall screen completed by OT   Have you fallen 2 or more times in the past year? Yes   Have you fallen and had an injury in the past year? Yes   Is patient a fall risk? Yes   Fall screen comments 3 times has falled   Medical History   Onset Of Illness/injury Or Date Of Surgery 4/10/23  (order)   Medical Diagnosis Nervous and Auditory  Low back pain  Stroke (H)  Cerebrovascular accident involving anterior circulation, right (H)     Respiratory  Malignant neoplasm metastatic to lung  Tonsil cancer (H)  Squamous cell carcinoma of oropharynx (H)     Digestive  Morbid obesity (H)     Endocrine  Hyperlipidemia with target LDL less than 130     Circulatory  Essential hypertension, benign  PVD (peripheral vascular disease) (H)  Systolic murmur  Severe aortic stenosis  Aortic valve stenosis, etiology of cardiac valve disease unspecified  Aortic stenosis, severe  Diastolic heart failure (H)  S/P TAVR (transcatheter aortic valve replacement)     Musculoskeletal and Integumentary  Osteoarthritis of knee  Dermatophytosis of nail  Peripheral edema  Bilateral leg edema     Urinary  Hyperplasia of prostate with lower urinary tract symptoms (LUTS)   Home Accessibility   Living Environment Apartment/condo   Primary Entrance Level;Elevator   Community ADL   Transportation " Car  (Truck)   Cognitive/Visual/Hearing Status   Observations No Problems Observed During Evaluation   Vision Intact   Hearing Intact   ADL Status   Feeding Independent   Grooming/Hygiene Independent   Dressing Requires Assist  (LB assist)   Toileting Independent  (grab bars and RTS)   Bathing Independent;Uses Equipment  (grab bars and shower chair)   Meal Preparation Requires Assist   Home Management Requires Assist   Balance   Unsupported Sitting Balance Uses Upper Extremities for Balance   Sitting Balance in Chair Uses Upper Extremities for Balance   Standing Balance Uses Upper Extremities for Balance   Ambulation   Ambulation Ambulatory   Ambulation Assist Independent   Ambulation Equipment 4 Wheeled Walker with Seat   Ambulation Comments 30.5 seconds for 25 ft in clinic with slow, labored, shuffled gait   Transfers   Transfer Assist Independent   Neuromuscular   Pain Yes   Pain Location Back and knee pain   Pain Scale  8  (pain pills)   Coordination LE Impaired;UE Intact   Sensory Deficits Reported Tingling in feet and L hand   Head and Neck   Head and Neck Position Functional   Head Control Good   Upper Extremities   UE ROM Limited end ranges   UE Strength 3-/5   Dominance Left   Pelvis   Anterior/Posterior Pelvis Position Posterior Tilt   Trunk   Anterior/Posterior Trunk Position Increased Thoracic Kyphosis   Lower Extremities   LE ROM inital ranges only due to pain and weakness   LE Strength 2-/5   Foot Positioning Plantar flexed   LE Comments Dependent edema   Education Assessment   Barriers to Learning Physical   Preferred Learning Style Listening;Demonstration   Assessment/Plan   Criteria for Skilled Interventions Met Yes, Treatment Indicated   Treatment Diagnosis impaired participaiton in MRADLS and IADLS   Therapy Frequency once   Planned Therapy Interventions Wheelchair Management/Propulsion Training   Planned Therapy Interventions Comments Determined need for scooter to assist wtih safe and  independent participaito in adls and iadls including safely getting out of his building.  Safe trials in clinic today.   Risks and benefits of treatment have been explained Yes   Patient/family & other staff in agreement with plan of care Yes   Session Time   OT Wheelchair Management Minutes (33422) 40   Adult OT Eval Goals   OT Eval Goals (Adult) 1    OT Goal 1   Goal Identifier scooter   Goal Description Patient/family demonstrates understanding of equipment for independent mobility, including benefits/limitations;   Goal Progress met today   Target Date 04/25/23   Date Met 04/25/23   Pride go go  Power Operated Vehicle / Scooter -  This device is being requested for this patient with mobility impairments to allow him to be able to complete all of his mobility related activities of daily living in a safe fashion, without risk of injury from falling, and in a reasonable time frame. He demonstrated during the home evaluation that he was able to transfer to/from the requested scooter, operate the tiller steering system, able to maintain postural stability and position while operating the POV, and operate the on/off mechanism and the speed dial appropriately and safely. They are very willing and physically / cognitively able to use the recommended equipment to assist with mobility related activities of daily living and general mobility. There is a mobility limitation that cannot be sufficiently and safely resolved by the use of an appropriately fitted cane or walker and they do not have sufficient upper extremity function to self-propel an optimally-configured manual wheelchair in their home to perform MRADLs during a typical day due to limitations in strength, endurance, range of motion, and coordination. This equipment is reasonable and necessary with reference to accepted standards of medical practice and treatment of this patient's condition and is not being recommended as a convenience item. Without this  recommended equipment, he is highly likely to sustain injuries from falls, develop pressure sores or postural compensation, and/or be bed confined, which those costs far exceed the cost of the requested equipment.    This therapist has no financial connection to the equipment being requested or vendor being used.  Electronically signed by:  DIANNE Avalos      Occupational Therapist, Assistive   [phone]      fax: [phone]      [email or web address] [address]    April 25, 2023  I have read and concur with the above recommendations.  Physician Printed Name __________________________________________  Physician Signature  _____________________________________________  Date of Signature ______________________________  Physician Phone  ______________________________

## 2023-04-25 NOTE — PROGRESS NOTES
"   SEATING AND WHEELED MOBILITY ASSESSMENT  04/25/23 1100   General Information    Rehab Discipline OT   Funding [facility] Medicare/[place] Limited   Service Outpatient;Occupational Therapy;Seating/Wheeled Mobility Evaluation   Height 5'4\"   Weight 225   Start Of Care Date 04/25/23   Referring Physician Buck Nascimento   Orders Evaluate And Treat As Indicated;Per Therapist Evaluation   Orders Date 04/10/23   Patient/Caregiver Goals scooter   Rehabilitation Technology Supplier [facility] medical   Current Community Support Personal Care Attendant;Meals On Wheels;Emergency Call System  (PCA 15 hrs- noone currently available)   Patient role/Employment history Disabled   Fall Risk Screen   Fall screen completed by OT   Have you fallen 2 or more times in the past year? Yes   Have you fallen and had an injury in the past year? Yes   Is patient a fall risk? Yes   Fall screen comments 3 times has falled   Medical History   Onset Of Illness/injury Or Date Of Surgery 4/10/23  (order)   Medical Diagnosis Nervous and Auditory  Low back pain  Stroke (H)  Cerebrovascular accident involving anterior circulation, right (H)     Respiratory  Malignant neoplasm metastatic to lung  Tonsil cancer (H)  Squamous cell carcinoma of oropharynx (H)     Digestive  Morbid obesity (H)     Endocrine  Hyperlipidemia with target LDL less than 130     Circulatory  Essential hypertension, benign  PVD (peripheral vascular disease) (H)  Systolic murmur  Severe aortic stenosis  Aortic valve stenosis, etiology of cardiac valve disease unspecified  Aortic stenosis, severe  Diastolic heart failure (H)  S/P TAVR (transcatheter aortic valve replacement)     Musculoskeletal and Integumentary  Osteoarthritis of knee  Dermatophytosis of nail  Peripheral edema  Bilateral leg edema     Urinary  Hyperplasia of prostate with lower urinary tract symptoms (LUTS)   Home Accessibility   Living Environment Apartment/condo   Primary Entrance Level;Elevator   Community ADL   Transportation " Car  (Truck)   Cognitive/Visual/Hearing Status   Observations No Problems Observed During Evaluation   Vision Intact   Hearing Intact   ADL Status   Feeding Independent   Grooming/Hygiene Independent   Dressing Requires Assist  (LB assist)   Toileting Independent  (grab bars and RTS)   Bathing Independent;Uses Equipment  (grab bars and shower chair)   Meal Preparation Requires Assist   Home Management Requires Assist   Balance   Unsupported Sitting Balance Uses Upper Extremities for Balance   Sitting Balance in Chair Uses Upper Extremities for Balance   Standing Balance Uses Upper Extremities for Balance   Ambulation   Ambulation Ambulatory   Ambulation Assist Independent   Ambulation Equipment 4 Wheeled Walker with Seat   Ambulation Comments 30.5 seconds for 25 ft in clinic with slow, labored, shuffled gait   Transfers   Transfer Assist Independent   Neuromuscular   Pain Yes   Pain Location Back and knee pain   Pain Scale  8  (pain pills)   Coordination LE Impaired;UE Intact   Sensory Deficits Reported Tingling in feet and L hand   Head and Neck   Head and Neck Position Functional   Head Control Good   Upper Extremities   UE ROM Limited end ranges   UE Strength 3-/5   Dominance Left   Pelvis   Anterior/Posterior Pelvis Position Posterior Tilt   Trunk   Anterior/Posterior Trunk Position Increased Thoracic Kyphosis   Lower Extremities   LE ROM inital ranges only due to pain and weakness   LE Strength 2-/5   Foot Positioning Plantar flexed   LE Comments Dependent edema   Education Assessment   Barriers to Learning Physical   Preferred Learning Style Listening;Demonstration   Assessment/Plan   Criteria for Skilled Interventions Met Yes, Treatment Indicated   Treatment Diagnosis impaired participaiton in MRADLS and IADLS   Therapy Frequency once   Planned Therapy Interventions Wheelchair Management/Propulsion Training   Planned Therapy Interventions Comments Determined need for scooter to assist wtih safe and  independent participaito in adls and iadls including safely getting out of his building.  Safe trials in clinic today.   Risks and benefits of treatment have been explained Yes   Patient/family & other staff in agreement with plan of care Yes   Session Time   OT Wheelchair Management Minutes (46365) 40   Adult OT Eval Goals   OT Eval Goals (Adult) 1    OT Goal 1   Goal Identifier scooter   Goal Description Patient/family demonstrates understanding of equipment for independent mobility, including benefits/limitations;   Goal Progress met today   Target Date 04/25/23   Date Met 04/25/23   Electronically signed by:  Karen RANDOLPH/LO, ATP      Occupational Therapist, Assistive   [phone]      fax: [phone]      [email or web address]  Seating Clinic- [facility], [address]  [address]  [place]   [address]

## 2023-04-26 ENCOUNTER — TELEPHONE (OUTPATIENT)
Dept: INTERNAL MEDICINE | Facility: CLINIC | Age: 78
End: 2023-04-26
Payer: COMMERCIAL

## 2023-04-26 DIAGNOSIS — M54.50 ACUTE MIDLINE LOW BACK PAIN WITHOUT SCIATICA: ICD-10-CM

## 2023-04-26 DIAGNOSIS — F40.240 CLAUSTROPHOBIA: ICD-10-CM

## 2023-04-26 NOTE — TELEPHONE ENCOUNTER
oxyCODONE-acetaminophen (PERCOCET) 5-325 MG tablet   Last Written Prescription Date:  3/29/23  Last Fill Quantity: 150,   # refills: 0  Last Office Visit : 4/10/23  Future Office visit:  5/8/23    Routing refill request to provider for review/approval because:  Controlled substance         Provider who prescribed the medication: Dr. Sick   Pharmacy: [facility] [address] [place] [id number] [address]  Date medication is needed: asap

## 2023-04-26 NOTE — TELEPHONE ENCOUNTER
[facility] Call Center    Phone Message    May a detailed message be left on voicemail: yes     Reason for Call: Medication Question or concern regarding medication   Prescription Clarification  Name of Medication: diazepam (VALIUM) 5 MG tablet  Prescribing Provider: Dr. Nascimento    Pharmacy[facility] [id number] [address] [facility]   What on the order needs clarification? Patient states he is having another MRI next month and would like a refill on this, but to increase the dosage amount because he didn't feel as if it was helping much.       Action Taken: Message routed to:  [facility]    Travel Screening: Not Applicable

## 2023-04-26 NOTE — TELEPHONE ENCOUNTER
"Oncology Nurse Triage-Mucositis:    Situation: [facility] Telephone Triage Note  Conner reporting the following symptoms: Mucositis (side effect of chemotherapy, radiation therapy, bone marrow transplant, or poor oral hygiene)    Background:  Treating Provider:   Dominique Mueller MD, Vandana Bertrand CNP, Erik Felipe, RNCC    DX: squamous cell carcinoma of oropharynx, tonsil cancer, mets to lung    Date of last office visit: 4/18/23 with Vandana Bertrand CNP    Recent Treatments:4/19: C1 pembrolizumab scheduled Wed 4/19    Assessment:     Onset Started before got first magic mouthwash, before chemotherapy  Duration/Frequency of pain: constant  Location of ulcerations/white patches(if present) Roof of mouth, can't see if has white patches, very painful; back of mouth, where tonsils is, looks like a big hole in it about 2\" long and 1.5\" tall. Three white patches; no bleeding.  Rates: 10/10 when eating, pain there constantly, sometimes subsides.  Any difficulty swallowing?:Use magic mouthwash and try to eat vegetables, real small portions, after three spoonfuls, like never used MM.  Hoarseness?:No  Current interventions tried?: Not using soda/salt rinses, last night used peroxide, that helped, but it's hard to do because it is painful to gargles with it.  Other associated symptoms (decrease oral intake, xerostomia, bleeding gums etc)?: Bleeding coming from tonsil, not bad, but sporadic    Denies fevers/chills, cough, SOB, n/v,  bladder issues or other acute symptoms of concerns    Recommendations:   This writer educated on home care for mucositis including:  -Practice good oral hygiene  -Rinse mouth with 1tsp salt, 1tsp baking soda, 1qt of water, swish and spit up to every 2hours while awake depending on pain level  -Brush teeth after every meal with a soft toothbrush and a gentle touch  -Do not use mouthwash with alcohol  -OTC Glutamine rinse will help sores heal. Mix 10g glutamine powder with 1/2 cup of water. Swish " for 1-2minutes, then swallow. Do this four times/day (after meals and before bedtime)  -Use water-based moisturizer  -Avoid spicy, acidic foods  -Chew food longer, use a straw if needed, puree foods if needed  -Encouraged increase in fluid intake as tolerated    Routed/Paged provider Messaged Vandana Bertrand CNP [id number]  Per Vandana: He is on immunotherapy so there is not a risk for mucositis. He has a known tumor in this area. I would add Tylenol 1000 mg every 8 hours to help with the pain and see if he gets improvement with that plus the mouth rinses. You can offer him an appointment with me at 1:45 Monday to take a further look. Pt had ENT eval last week but left without being seen.    Follow-Up Plan:  Called pt and advised per Vandana TSAI; pt asking what ENT looks at. Explained that ENT is Ear, Nose and Throat and they would look in his throat; explained that pt missed apt last week. Pt would like to r/s that apt and would like to see Vandana on Monday.  Reviewed recommendations for managing mouth pain and pt voiced understanding of this information.     Instructed patient to seek care immediately for worsening symptoms, including: fever 100.4F or above with suspected neutropenia, chest pain, shortness of breath, severe ulceration and unable to tolerate oral intake, uncontrolled oral bleeding    Message sent as IB to scheduling to schedule with Vandana on Monday and to schedule apt with ENT provider too.    Routed to Vandana Bertrand CNP and Erik Felipe RNCC

## 2023-04-26 NOTE — TELEPHONE ENCOUNTER
[facility] Call Center    Phone Message    May a detailed message be left on voicemail: yes     Reason for Call: Medication Refill Request    Has the patient contacted the pharmacy for the refill? Yes   Name of medication being requested:    oxyCODONE-acetaminophen (PERCOCET) 5-325 MG tablet     Provider who prescribed the medication: Dr. Sick   Pharmacy: [facility] [address] [place] [id number] [facility]  Date medication is needed: asap       Action Taken: Message routed to:  [facility]    Travel Screening: Not Applicable

## 2023-04-27 RX ORDER — OXYCODONE AND ACETAMINOPHEN 5; 325 MG/1; MG/1
1-2 TABLET ORAL EVERY 6 HOURS PRN
Qty: 150 TABLET | Refills: 0 | Status: SHIPPED | OUTPATIENT
Start: 2023-04-27 | End: 2023-05-30

## 2023-04-27 RX ORDER — DIAZEPAM 5 MG
5-10 TABLET ORAL
Qty: 4 TABLET | Refills: 0 | Status: SHIPPED | OUTPATIENT
Start: 2023-04-27 | End: 2023-07-18

## 2023-04-30 NOTE — PROGRESS NOTES
[facility] CANCER [facility]    PATIENT NAME: Jonathan Bishop  MRN # [id number]   DATE OF VISIT: May 1, 2023  YOB: 1945     Referring Provider: Dr. Karishma Eng  Otolaryngology: Dr. Karishma Eng  Radiation Oncology: Dr. Jenni Mills  PCP: Dr. Buck Nascimento    CANCER TYPE: SCC L tonsil, p16 +lety  STAGE: [id number] (IVC)  ECOG PS: 1    PD-L1: TPS 20%, CPS 25% on [id number] tonsil bx  NGS: N/A    SUMMARY  2/26/23 L tonsil mass bx in clinic (Dr. Eng).   2/20/23 PET/CT. 3.7 x 4.0 x 5.1 cm mass L palatine tonsil causing narrowing of the oropharyngeal lumen, L level 2 node, L retropharyngeal nodular density, extension of primary mass vs retropharyngeal node, 0.8 cm RLL nodule concerning for met, mild focal uptake R iliac bone and L1 without CT correlate suspicious for mets.   3/2/23 R VATS, wedge resection. Path: SCC, poorly differentiated, associated with necrosis, 1 cm, negative margins, p16 +lety  3/24/23 MRI L spine and pelvis. Diffusely heterogeneous marrow signal throughout without corresponding abnormal signal on sagittal STIR sequence and no abnormal enhancement. Nonspecific but could be benign process such as red marrow hyperplasia, cannot completely exclude metastatic disease or infiltrative pathologic marrow process. No lesions corresponding to FDG avid spots on PET/CT.   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 Systemic therapy with pembrolizumab alone recommended, began cycle 1 4/19/23. Less ideal candidate for cytotoxic chemo given comorbidities. Anticipate 3-6 months of treatment in absence of PD then consider definitive chemoradiation. Discussed today changes to tumor may represent treatment response although it is too soon to tell. No concern for tumor or rapid, local progression based on appearance. Will watch closely clinically and with repeat imaging prior to cycle  "3.   -RTC 5/8 for C2 pembrolizumab  -Repeat CT neck and CT-CAP prior to cycle 3, will see Dr. Mueller to review    Microcytic anemia: Iron studies 8/2022 showed perhaps very mild LEW to normal iron studies. Hgb 4/18 slightly improved at 9.8. Vitamin B12 and TFTs unremarkable.    Hyperglyemia, pre-diabetes: A1c 6.0-6.3 for years now.     H/o CVA: Residual L weakness. Uses cane and walker at baseline, but independent and functional. Observed gait today stable. Is pursuing scooter with his PCP.    Peripheral neuropathy: MRI shows a lot of foramenal stenosis and spinal canal stenosis, so more likely related to that than DM2. B12, TSH normal.    30 minutes spent on the date of the encounter doing chart review, review of test results, interpretation of tests, patient visit and documentation     YOANNA Aiken  Conner is seen today due to concern for changes to his tumor. He feels and visualizes a \"hole\" in his tonsil. Overall he is feeling symptomatic relief since receiving just once cycle Keytruda 2 weeks ago. Throat pain has decreased. It has been easier for him to eat. He still sticks to soft foods and things that don't get stuck. He is coughing and clearing his throat less frequently, does have some blood-tinged mucus with this occasionally.     PAST MEDICAL HISTORY  SCC as above  Chronic LBP  TAVR 2020  H/o rheumatic carditis 1950  CHF. 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 Nocturia once nightly   ED  Cataracts  Umbilical hernia repair 2011  Knee arthroplasty B 2010  Lumbar spine fusion, laminectomy, sciatic pain R > L  Peripheral neuropathy - from pinched nerves?  Hearing loss    Numbness/tingling in both feet - bilaterally, symmetric, R spasms more than left     CURRENT OUTPATIENT MEDICATIONS  Current Outpatient Medications   Medication Sig Dispense Refill     acetaminophen (TYLENOL) 325 MG tablet Take 3 tablets (975 mg) by mouth every 6 hours       " diazepam (VALIUM) 5 MG tablet Take 1-2 tablets (5-10 mg) by mouth once as needed for anxiety (may repeat one time if needed.) 4 tablet 0     magic mouthwash suspension (diphenhydrAMINE, lidocaine, aluminum-magnesium & simethicone) Swish and spit 10 mLs in mouth every 6 hours as needed for mouth sores 200 mL 3     methocarbamol (ROBAXIN) 500 MG tablet Take 1 tablet (500 mg) by mouth every 6 hours as needed for muscle spasms 30 tablet 0     oxyCODONE-acetaminophen (PERCOCET) 5-325 MG tablet Take 1-2 tablets by mouth every 6 hours as needed for pain Max 5 per day. 150 tablet 0     senna-docusate (SENOKOT-S/PERICOLACE) 8.6-50 MG tablet Take 1 tablet by mouth 2 times daily Reduce dose or temporarily discontinue if having loose stools. 50 tablet 0     spironolactone (ALDACTONE) 50 MG tablet Take 1 tablet (50 mg) by mouth daily 90 tablet 3     tamsulosin (FLOMAX) 0.4 MG capsule TAKE 1 CAPSULE BY MOUTH ONCE DAILY 90 capsule 4     vitamin D3 (CHOLECALCIFEROL) 50 mcg (2000 units) tablet Take 1 tablet by mouth daily       ibuprofen (ADVIL/MOTRIN) 600 MG tablet Take 1 tablet (600 mg) by mouth every 8 hours as needed for moderate pain (4-6) (Patient not taking: Reported on 4/18/2023) 270 tablet 3     order for DME Equipment being ordered: Walker ()  Treatment Diagnosis: stroke 1 Units 0     ALLERGIES  No Known Allergies     REVIEW OF SYSTEMS  As above in the HPI, o/w complete 12-point ROS was negative.    PHYSICAL EXAM  BP (!) 142/76 (BP Location: Left arm, Patient Position: Sitting, Cuff Size: Adult Large)   Pulse 74   Temp 97.9  F (36.6  C) (Oral)   Resp 16   Wt 103.2 kg (227 lb 8 oz)   SpO2 97%   BMI 39.66 kg/m       BP recheck following visit 110/58 on right arm manually    General: Well-appearing male, NAD  Eyes: EOMI, PERRL. No scleral icterus.  ENT: Posterior OP tumor, tissue appears pink without erosive edges, inflammation or bleeding.   Neurologic: Grossly nonfocal  Skin: No rashes, petechiae, or bruising  noted on exposed skin.    LABORATORY AND IMAGING STUDIES   Most Recent 3 CBC's:  Recent Labs   Lab Test 04/18/23  0911 03/03/23  0720 02/27/23  1603 08/03/22  1240   WBC 7.9 13.2* 8.7 7.7   HGB 9.8* 9.3* 9.4* 10.6*   MCV 80 80 81 83    195 226 227   ANEUTAUTO 4.6  --  5.6 4.0    Most Recent 3 BMP's:  Recent Labs   Lab Test 04/18/23  0911 03/03/23  0720 03/03/23  0557 03/02/23  0942 02/27/23  1603 08/03/22  1240 08/03/22  1240    141  --   --  141  --  140   POTASSIUM 4.3 4.1  --  4.1 4.2   < > 3.5   CHLORIDE 106 108*  --   --  106  --  107   CO2 23 25  --   --  25  --  27   BUN 20.0 18.1  --   --  19.2  --  21   CR 0.98 0.85  --  0.86 0.94  --  0.88   ANIONGAP 12 8  --   --  10  --  6   WARREN 9.9 9.1  --   --  9.6  --  9.3   * 199* 176*  --  92  --  96   PROTTOTAL 7.1  --   --   --  7.0  --  7.4   ALBUMIN 4.0  --   --   --  4.0  --  3.5    < > = values in this interval not displayed.    Most Recent 2 LFT's:  Recent Labs   Lab Test 04/18/23  0911 02/27/23  1603   AST 28 27   ALT 18 15   ALKPHOS 97 105   BILITOTAL 0.2 0.2    Most Recent TSH and T4:  Recent Labs   Lab Test 04/18/23 0911   TSH 0.85   T4 1.33     Phos/Mag:  Lab Results   Component Value Date    PHOS 2.4 (L) 12/09/2020    PHOS 3.9 12/08/2020    MAG 2.0 12/09/2020    MAG 2.1 12/08/2020      I reviewed the above labs today.

## 2023-05-01 ENCOUNTER — ONCOLOGY VISIT (OUTPATIENT)
Dept: ONCOLOGY | Facility: CLINIC | Age: 78
End: 2023-05-01
Attending: REGISTERED NURSE
Payer: COMMERCIAL

## 2023-05-01 VITALS
HEART RATE: 74 BPM | SYSTOLIC BLOOD PRESSURE: 142 MMHG | DIASTOLIC BLOOD PRESSURE: 76 MMHG | BODY MASS INDEX: 39.66 KG/M2 | OXYGEN SATURATION: 97 % | RESPIRATION RATE: 16 BRPM | WEIGHT: 227.5 LBS | TEMPERATURE: 97.9 F

## 2023-05-01 DIAGNOSIS — G60.9 IDIOPATHIC PERIPHERAL NEUROPATHY: ICD-10-CM

## 2023-05-01 DIAGNOSIS — C09.9 TONSIL CANCER (H): Primary | ICD-10-CM

## 2023-05-01 DIAGNOSIS — D50.9 MICROCYTIC ANEMIA: ICD-10-CM

## 2023-05-01 PROCEDURE — 99214 OFFICE O/P EST MOD 30 MIN: CPT | Mod: 24 | Performed by: REGISTERED NURSE

## 2023-05-01 PROCEDURE — G0463 HOSPITAL OUTPT CLINIC VISIT: HCPCS | Performed by: REGISTERED NURSE

## 2023-05-01 NOTE — NURSING NOTE
"Oncology Rooming Note    May 1, 2023 2:01 PM   Jonathan Bishop is a 77 year old male who presents for:    Chief Complaint   Patient presents with     Oncology Clinic Visit     Squamous Cell Carcinoma of the Orophyarynx     Initial Vitals: BP (!) 142/76 (BP Location: Left arm, Patient Position: Sitting, Cuff Size: Adult Large)   Pulse 74   Temp 97.9  F (36.6  C) (Oral)   Resp 16   Wt 103.2 kg (227 lb 8 oz)   SpO2 97%   BMI 39.66 kg/m   Estimated body mass index is 39.66 kg/m  as calculated from the following:    Height as of 4/18/23: 1.613 m (5' 3.5\").    Weight as of this encounter: 103.2 kg (227 lb 8 oz). Body surface area is 2.15 meters squared.  Data Unavailable Comment: Data Unavailable   No LMP for male patient.  Allergies reviewed: Yes  Medications reviewed: Yes    Medications: Medication refills not needed today.  Pharmacy name entered into [employer]: [facility] [place] [facility] [id number] [address]    Clinical concerns: Pt presents today for f/u.       Maryan Linda LPN  5/1/2023              "

## 2023-05-01 NOTE — Clinical Note
5/1/2023         RE: Jonathan Bishop  [address]  [place] [facility] [address]        Dear Colleague,    Thank you for referring your patient, Jonathan Bishop, to the [facility] CANCER [facility]. Please see a copy of my visit note below.       [facility] CANCER [facility]    PATIENT NAME: Jonathan Bishop  MRN # [id number]   DATE OF VISIT: May 1, 2023  YOB: 1945     Referring Provider: Dr. Karishma Eng  Otolaryngology: Dr. Karishma Eng  Radiation Oncology: Dr. Jenni Mills  PCP: Dr. Buck Nascimento    CANCER TYPE: SCC L tonsil, p16 +lety  STAGE: wC2O3E7 (IVC)  ECOG PS: 1    PD-L1: TPS 20%, CPS 25% on [id number] tonsil bx  NGS: N/A    SUMMARY  2/26/23 L tonsil mass bx in clinic (Dr. Eng).   2/20/23 PET/CT. 3.7 x 4.0 x 5.1 cm mass L palatine tonsil causing narrowing of the oropharyngeal lumen, L level 2 node, L retropharyngeal nodular density, extension of primary mass vs retropharyngeal node, 0.8 cm RLL nodule concerning for met, mild focal uptake R iliac bone and L1 without CT correlate suspicious for mets.   3/2/23 R VATS, wedge resection. Path: SCC, poorly differentiated, associated with necrosis, 1 cm, negative margins, p16 +lety  3/24/23 MRI L spine and pelvis. Diffusely heterogeneous marrow signal throughout without corresponding abnormal signal on sagittal STIR sequence and no abnormal enhancement. Nonspecific but could be benign process such as red marrow hyperplasia, cannot completely exclude metastatic disease or infiltrative pathologic marrow process. No lesions corresponding to FDG avid spots on PET/CT.   4/18/23 Pembrolizumab 200 mg [id number]    ASSESSMENT AND PLAN  SCC L tonsil, p16 +lety, CPS 25%/TPS 20%, oligometastatic lung met: MRI L spine and pelvis negative for mets. Systemic therapy with pembrolizumab alone recommended. Less ideal candidate for cytotoxic chemo given comorbidities. Anticipate 3-6 months of treatment in absence of PD then consider definitive  chemoradiation. Began C1 4/19/23.   -Repeat CT neck and CT-CAP prior to cycle 3, will see Dr. Mueller to review    Microcytic anemia: Iron studies 8/2022 showed perhaps very mild LEW to normal iron studies. Hgb slightly improved today to 9.8.  Iron studies, ferritin, B12 pending. Likely ACD.     Hyperglyemia, pre-diabetes: A1c 6.0-6.3 for years now.     H/o CVA: Residual L weakness. Uses cane and walker at baseline, but independent and functional.    Peripheral neuropathy: MRI shows a lot of foramenal stenosis and spinal canal stenosis, so more likely related to that than DM2. B12, TSH pending.    *** minutes spent on the date of the encounter doing {2021 E&M time in:140253}     Vandana Bertrand, YOANNA    SUBJECTIVE  ***    PAST MEDICAL HISTORY  SCC as above  Chronic LBP  TAVR 2020  H/o rheumatic carditis 1950  CHF. 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 Nocturia once nightly   ED  Cataracts  Umbilical hernia repair 2011  Knee arthroplasty B 2010  Lumbar spine fusion, laminectomy, sciatic pain R > L  Peripheral neuropathy - from pinched nerves?  Hearing loss    Numbness/tingling in both feet - bilaterally, symmetric, R spasms more than left     CURRENT OUTPATIENT MEDICATIONS  Current Outpatient Medications   Medication Sig Dispense Refill     acetaminophen (TYLENOL) 325 MG tablet Take 3 tablets (975 mg) by mouth every 6 hours       diazepam (VALIUM) 5 MG tablet Take 1-2 tablets (5-10 mg) by mouth once as needed for anxiety (may repeat one time if needed.) 4 tablet 0     ibuprofen (ADVIL/MOTRIN) 600 MG tablet Take 1 tablet (600 mg) by mouth every 8 hours as needed for moderate pain (4-6) (Patient not taking: Reported on 4/18/2023) 270 tablet 3     magic mouthwash suspension (diphenhydrAMINE, lidocaine, aluminum-magnesium & simethicone) Swish and spit 10 mLs in mouth every 6 hours as needed for mouth sores 200 mL 3     methocarbamol (ROBAXIN) 500 MG tablet Take 1 tablet (500  mg) by mouth every 6 hours as needed for muscle spasms 30 tablet 0     order for DME Equipment being ordered: Walker ()  Treatment Diagnosis: stroke 1 Units 0     oxyCODONE-acetaminophen (PERCOCET) 5-325 MG tablet Take 1-2 tablets by mouth every 6 hours as needed for pain Max 5 per day. 150 tablet 0     senna-docusate (SENOKOT-S/PERICOLACE) 8.6-50 MG tablet Take 1 tablet by mouth 2 times daily Reduce dose or temporarily discontinue if having loose stools. 50 tablet 0     spironolactone (ALDACTONE) 50 MG tablet Take 1 tablet (50 mg) by mouth daily (Patient not taking: Reported on 4/18/2023) 90 tablet 3     tamsulosin (FLOMAX) 0.4 MG capsule TAKE 1 CAPSULE BY MOUTH ONCE DAILY (Patient not taking: Reported on 4/18/2023) 90 capsule 4     vitamin D3 (CHOLECALCIFEROL) 50 mcg (2000 units) tablet Take 1 tablet by mouth daily (Patient not taking: Reported on 4/18/2023)       ALLERGIES  No Known Allergies     REVIEW OF SYSTEMS  As above in the HPI, o/w complete 12-point ROS was negative.    PHYSICAL EXAM  There were no vitals taken for this visit.    General: Well-appearing male, NAD  Eyes: EOMI, PERRL. No scleral icterus.  ENT: Posterior OP tumor  Cardiovascular: RRR, no m/g/r. No peripheral edema.  Respiratory: CTA bilaterally. No wheezes or crackles.  Neurologic: Grossly nonfocal  Skin: No rashes, petechiae, or bruising noted on exposed skin.    LABORATORY AND IMAGING STUDIES   Most Recent 3 CBC's:  Recent Labs   Lab Test 04/18/23  0911 03/03/23  0720 02/27/23  1603 08/03/22  1240   WBC 7.9 13.2* 8.7 7.7   HGB 9.8* 9.3* 9.4* 10.6*   MCV 80 80 81 83    195 226 227   ANEUTAUTO 4.6  --  5.6 4.0    Most Recent 3 BMP's:  Recent Labs   Lab Test 04/18/23  0911 03/03/23  0720 03/03/23  0557 03/02/23  0942 02/27/23  1603 08/03/22  1240 08/03/22  1240    141  --   --  141  --  140   POTASSIUM 4.3 4.1  --  4.1 4.2   < > 3.5   CHLORIDE 106 108*  --   --  106  --  107   CO2 23 25  --   --  25  --  27   BUN 20.0 18.1   --   --  19.2  --  21   CR 0.98 0.85  --  0.86 0.94  --  0.88   ANIONGAP 12 8  --   --  10  --  6   WARREN 9.9 9.1  --   --  9.6  --  9.3   * 199* 176*  --  92  --  96   PROTTOTAL 7.1  --   --   --  7.0  --  7.4   ALBUMIN 4.0  --   --   --  4.0  --  3.5    < > = values in this interval not displayed.    Most Recent 2 LFT's:  Recent Labs   Lab Test 04/18/23  0911 02/27/23  1603   AST 28 27   ALT 18 15   ALKPHOS 97 105   BILITOTAL 0.2 0.2    Most Recent TSH and T4:  Recent Labs   Lab Test 04/18/23  0911   TSH 0.85   T4 1.33     Phos/Mag:  Lab Results   Component Value Date    PHOS 2.4 (L) 12/09/2020    PHOS 3.9 12/08/2020    MAG 2.0 12/09/2020    MAG 2.1 12/08/2020      I reviewed the above labs today.             Again, thank you for allowing me to participate in the care of your patient.        Sincerely,        Vandana Bertrand, CNP

## 2023-05-08 ENCOUNTER — MEDICAL CORRESPONDENCE (OUTPATIENT)
Dept: HEALTH INFORMATION MANAGEMENT | Facility: CLINIC | Age: 78
End: 2023-05-08

## 2023-05-08 ENCOUNTER — INFUSION THERAPY VISIT (OUTPATIENT)
Dept: ONCOLOGY | Facility: CLINIC | Age: 78
End: 2023-05-08
Attending: REGISTERED NURSE
Payer: COMMERCIAL

## 2023-05-08 ENCOUNTER — APPOINTMENT (OUTPATIENT)
Dept: LAB | Facility: CLINIC | Age: 78
End: 2023-05-08
Attending: REGISTERED NURSE
Payer: COMMERCIAL

## 2023-05-08 VITALS
RESPIRATION RATE: 18 BRPM | HEART RATE: 70 BPM | DIASTOLIC BLOOD PRESSURE: 74 MMHG | BODY MASS INDEX: 37.99 KG/M2 | OXYGEN SATURATION: 98 % | SYSTOLIC BLOOD PRESSURE: 124 MMHG | WEIGHT: 217.9 LBS | TEMPERATURE: 97.4 F

## 2023-05-08 DIAGNOSIS — C10.9 SQUAMOUS CELL CARCINOMA OF OROPHARYNX (H): ICD-10-CM

## 2023-05-08 DIAGNOSIS — C09.9 TONSIL CANCER (H): Primary | ICD-10-CM

## 2023-05-08 DIAGNOSIS — R79.89 ELEVATED SERUM CREATININE: ICD-10-CM

## 2023-05-08 DIAGNOSIS — C78.00 MALIGNANT NEOPLASM METASTATIC TO LUNG, UNSPECIFIED LATERALITY (H): ICD-10-CM

## 2023-05-08 LAB
ALBUMIN SERPL BCG-MCNC: 4.3 G/DL (ref 3.5–5.2)
ALP SERPL-CCNC: 108 U/L (ref 40–129)
ALT SERPL W P-5'-P-CCNC: 14 U/L (ref 10–50)
ANION GAP SERPL CALCULATED.3IONS-SCNC: 11 MMOL/L (ref 7–15)
AST SERPL W P-5'-P-CCNC: 23 U/L (ref 10–50)
BILIRUB SERPL-MCNC: 0.2 MG/DL
BUN SERPL-MCNC: 33.4 MG/DL (ref 8–23)
CALCIUM SERPL-MCNC: 10.3 MG/DL (ref 8.8–10.2)
CHLORIDE SERPL-SCNC: 107 MMOL/L (ref 98–107)
CREAT SERPL-MCNC: 1.35 MG/DL (ref 0.67–1.17)
DEPRECATED HCO3 PLAS-SCNC: 26 MMOL/L (ref 22–29)
GFR SERPL CREATININE-BSD FRML MDRD: 54 ML/MIN/1.73M2
GLUCOSE SERPL-MCNC: 101 MG/DL (ref 70–99)
POTASSIUM SERPL-SCNC: 4.8 MMOL/L (ref 3.4–5.3)
PROT SERPL-MCNC: 7.7 G/DL (ref 6.4–8.3)
SODIUM SERPL-SCNC: 144 MMOL/L (ref 136–145)
TSH SERPL DL<=0.005 MIU/L-ACNC: 1.29 UIU/ML (ref 0.3–4.2)

## 2023-05-08 PROCEDURE — 258N000003 HC RX IP 258 OP 636: Performed by: REGISTERED NURSE

## 2023-05-08 PROCEDURE — 84443 ASSAY THYROID STIM HORMONE: CPT | Performed by: REGISTERED NURSE

## 2023-05-08 PROCEDURE — 36415 COLL VENOUS BLD VENIPUNCTURE: CPT | Performed by: REGISTERED NURSE

## 2023-05-08 PROCEDURE — 96413 CHEMO IV INFUSION 1 HR: CPT

## 2023-05-08 PROCEDURE — 80053 COMPREHEN METABOLIC PANEL: CPT | Performed by: REGISTERED NURSE

## 2023-05-08 PROCEDURE — 250N000011 HC RX IP 250 OP 636: Performed by: REGISTERED NURSE

## 2023-05-08 PROCEDURE — 99214 OFFICE O/P EST MOD 30 MIN: CPT | Mod: 24 | Performed by: REGISTERED NURSE

## 2023-05-08 PROCEDURE — G0463 HOSPITAL OUTPT CLINIC VISIT: HCPCS | Mod: 25 | Performed by: REGISTERED NURSE

## 2023-05-08 RX ORDER — ALBUTEROL SULFATE 90 UG/1
1-2 AEROSOL, METERED RESPIRATORY (INHALATION)
Status: CANCELLED
Start: 2023-05-10

## 2023-05-08 RX ORDER — HEPARIN SODIUM,PORCINE 10 UNIT/ML
5 VIAL (ML) INTRAVENOUS
Status: CANCELLED | OUTPATIENT
Start: 2023-05-10

## 2023-05-08 RX ORDER — LORAZEPAM 2 MG/ML
0.5 INJECTION INTRAMUSCULAR EVERY 4 HOURS PRN
Status: CANCELLED | OUTPATIENT
Start: 2023-05-10

## 2023-05-08 RX ORDER — HEPARIN SODIUM (PORCINE) LOCK FLUSH IV SOLN 100 UNIT/ML 100 UNIT/ML
5 SOLUTION INTRAVENOUS
Status: CANCELLED | OUTPATIENT
Start: 2023-05-10

## 2023-05-08 RX ORDER — EPINEPHRINE 1 MG/ML
0.3 INJECTION, SOLUTION INTRAMUSCULAR; SUBCUTANEOUS EVERY 5 MIN PRN
Status: CANCELLED | OUTPATIENT
Start: 2023-05-10

## 2023-05-08 RX ORDER — MEPERIDINE HYDROCHLORIDE 25 MG/ML
25 INJECTION INTRAMUSCULAR; INTRAVENOUS; SUBCUTANEOUS EVERY 30 MIN PRN
Status: CANCELLED | OUTPATIENT
Start: 2023-05-10

## 2023-05-08 RX ORDER — DIPHENHYDRAMINE HYDROCHLORIDE 50 MG/ML
50 INJECTION INTRAMUSCULAR; INTRAVENOUS
Status: CANCELLED
Start: 2023-05-10

## 2023-05-08 RX ORDER — ALBUTEROL SULFATE 0.83 MG/ML
2.5 SOLUTION RESPIRATORY (INHALATION)
Status: CANCELLED | OUTPATIENT
Start: 2023-05-10

## 2023-05-08 RX ORDER — METHYLPREDNISOLONE SODIUM SUCCINATE 125 MG/2ML
125 INJECTION, POWDER, LYOPHILIZED, FOR SOLUTION INTRAMUSCULAR; INTRAVENOUS
Status: CANCELLED
Start: 2023-05-10

## 2023-05-08 RX ADMIN — SODIUM CHLORIDE 500 ML: 9 INJECTION, SOLUTION INTRAVENOUS at 12:25

## 2023-05-08 RX ADMIN — SODIUM CHLORIDE 200 MG: 9 INJECTION, SOLUTION INTRAVENOUS at 12:27

## 2023-05-08 ASSESSMENT — PAIN SCALES - GENERAL: PAINLEVEL: EXTREME PAIN (8)

## 2023-05-08 NOTE — PROGRESS NOTES
[facility] CANCER [facility]    PATIENT NAME: Jonathan Bishop  MRN # [id number]   DATE OF VISIT: May 8, 2023  YOB: 1945     Referring Provider: Dr. Karishma Eng  Otolaryngology: Dr. Karishma Eng  Radiation Oncology: Dr. Jenni Mills  PCP: Dr. Buck Nascimento    CANCER TYPE: SCC L tonsil, p16 +lety  STAGE: pC8M4Y2 (IVC)  ECOG PS: 1    PD-L1: TPS 20%, CPS 25% on [id number] tonsil bx  NGS: N/A    SUMMARY  2/26/23 L tonsil mass bx in clinic (Dr. Eng).   2/20/23 PET/CT. 3.7 x 4.0 x 5.1 cm mass L palatine tonsil causing narrowing of the oropharyngeal lumen, L level 2 node, L retropharyngeal nodular density, extension of primary mass vs retropharyngeal node, 0.8 cm RLL nodule concerning for met, mild focal uptake R iliac bone and L1 without CT correlate suspicious for mets.   3/2/23 R VATS, wedge resection. Path: SCC, poorly differentiated, associated with necrosis, 1 cm, negative margins, p16 +lety  3/24/23 MRI L spine and pelvis. Diffusely heterogeneous marrow signal throughout without corresponding abnormal signal on sagittal STIR sequence and no abnormal enhancement. Nonspecific but could be benign process such as red marrow hyperplasia, cannot completely exclude metastatic disease or infiltrative pathologic marrow process. No lesions corresponding to FDG avid spots on PET/CT.   4/18/23 Pembrolizumab 200 mg [id number]    ASSESSMENT AND PLAN  SCC L tonsil, p16 +lety, CPS 25%/TPS 20%, oligometastatic lung met: MRI L spine and pelvis negative for mets. Systemic therapy with pembrolizumab alone recommended, began cycle 1 4/19/23. Less ideal candidate for cytotoxic chemo given comorbidities. Anticipate 3-6 months of treatment in absence of PD then consider definitive chemoradiation. Tumor following 1 cycle appears smaller clinically.   -Proceed with C2 pembrolizumab  -Repeat CT neck and CT-CAP prior to cycle 3, will see Dr. Mueller to review     Microcytic anemia: Iron studies 8/2022 showed perhaps very mild LEW to  normal iron studies. Hgb 4/18 slightly improved at 9.8. Vitamin B12 and TFTs unremarkable.    Hyperglyemia, pre-diabetes: A1c 6.0-6.3 for years now.     H/o CVA: Residual L weakness. Uses cane and walker at baseline, but independent and functional. Observed gait today stable. Is pursuing scooter with his PCP.    Peripheral neuropathy: MRI shows a lot of foramenal stenosis and spinal canal stenosis, so more likely related to that than DM2. B12, TSH normal.    Constipation: Increase fiber and fluid intake. Begin Senna 1 tablet BID PRN.    Creatinine elevation: Possibly hypovolemia with reported decrease in fluid intake. Will give 500 ml IVF in infusion today. Check UA    30 minutes spent on the date of the encounter doing chart review, review of test results, interpretation of tests, patient visit and documentation    Vandana Bertrand CNP    SUBJECTIVE  Conner is seen today prior to cycle 2 pembrolizumab.  Feels well  Hemoptysis has resolved  No fatigue  Feels constipated. Has senna but isn't using  Feels he could be drinking more fluids  Eats a variety of foods  Denies cough, rash or joint pain  Went fishing yesterday   Throat pain is minimal. Salty foods are irritating    PAST MEDICAL HISTORY  SCC as above  Chronic LBP  TAVR 2020  H/o rheumatic carditis 1950  CHF. 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 Nocturia once nightly   ED  Cataracts  Umbilical hernia repair 2011  Knee arthroplasty B 2010  Lumbar spine fusion, laminectomy, sciatic pain R > L  Peripheral neuropathy - from pinched nerves?  Hearing loss    Numbness/tingling in both feet - bilaterally, symmetric, R spasms more than left     CURRENT OUTPATIENT MEDICATIONS  Current Outpatient Medications   Medication Sig Dispense Refill     acetaminophen (TYLENOL) 325 MG tablet Take 3 tablets (975 mg) by mouth every 6 hours       diazepam (VALIUM) 5 MG tablet Take 1-2 tablets (5-10 mg) by mouth once as needed for anxiety (may  repeat one time if needed.) 4 tablet 0     magic mouthwash suspension (diphenhydrAMINE, lidocaine, aluminum-magnesium & simethicone) Swish and spit 10 mLs in mouth every 6 hours as needed for mouth sores 200 mL 3     methocarbamol (ROBAXIN) 500 MG tablet Take 1 tablet (500 mg) by mouth every 6 hours as needed for muscle spasms 30 tablet 0     order for DME Equipment being ordered: Walker ()  Treatment Diagnosis: stroke 1 Units 0     oxyCODONE-acetaminophen (PERCOCET) 5-325 MG tablet Take 1-2 tablets by mouth every 6 hours as needed for pain Max 5 per day. 150 tablet 0     senna-docusate (SENOKOT-S/PERICOLACE) 8.6-50 MG tablet Take 1 tablet by mouth 2 times daily Reduce dose or temporarily discontinue if having loose stools. 50 tablet 0     ibuprofen (ADVIL/MOTRIN) 600 MG tablet Take 1 tablet (600 mg) by mouth every 8 hours as needed for moderate pain (4-6) (Patient not taking: Reported on 4/18/2023) 270 tablet 3     spironolactone (ALDACTONE) 50 MG tablet Take 1 tablet (50 mg) by mouth daily (Patient not taking: Reported on 5/8/2023) 90 tablet 3     tamsulosin (FLOMAX) 0.4 MG capsule TAKE 1 CAPSULE BY MOUTH ONCE DAILY (Patient not taking: Reported on 5/8/2023) 90 capsule 4     vitamin D3 (CHOLECALCIFEROL) 50 mcg (2000 units) tablet Take 1 tablet by mouth daily (Patient not taking: Reported on 5/8/2023)       ALLERGIES  No Known Allergies     REVIEW OF SYSTEMS  As above in the HPI, o/w complete 12-point ROS was negative.    PHYSICAL EXAM   5/8/2023  9:49 AM   Vital Signs    Systolic 124    Diastolic 74    Pulse 70    Temperature 97.4  F (36.3  C)    Respirations 18    Weight (LB) 217 lb 14.4 oz    Height    BMI (Calculated)    Pain Score 8 (Extreme)    O2 98 %        General: Well-appearing male, NAD  Eyes: EOMI, PERRL. No scleral icterus.  ENT: Posterior OP tumor, tissue appears pink without erosive edges, inflammation or bleeding.   Resp: CTA bilaterally  Card: RRR, no m/g/r  Neurologic: Grossly  nonfocal  Skin: No rashes, petechiae, or bruising noted on exposed skin.    LABORATORY AND IMAGING STUDIES   Most Recent 3 CBC's:  Recent Labs   Lab Test 04/18/23  0911 03/03/23  0720 02/27/23  1603 08/03/22  1240   WBC 7.9 13.2* 8.7 7.7   HGB 9.8* 9.3* 9.4* 10.6*   MCV 80 80 81 83    195 226 227   ANEUTAUTO 4.6  --  5.6 4.0    Most Recent 3 BMP's:  Recent Labs   Lab Test 05/08/23  0958 04/18/23  0911 03/03/23  0720 03/02/23  0942 02/27/23  1603    141 141  --  141   POTASSIUM 4.8 4.3 4.1   < > 4.2   CHLORIDE 107 106 108*  --  106   CO2 26 23 25  --  25   BUN 33.4* 20.0 18.1  --  19.2   CR 1.35* 0.98 0.85   < > 0.94   ANIONGAP 11 12 8  --  10   WARREN 10.3* 9.9 9.1  --  9.6   * 124* 199*   < > 92   PROTTOTAL 7.7 7.1  --   --  7.0   ALBUMIN 4.3 4.0  --   --  4.0    < > = values in this interval not displayed.    Most Recent 2 LFT's:  Recent Labs   Lab Test 05/08/23  0958 04/18/23  0911   AST 23 28   ALT 14 18   ALKPHOS 108 97   BILITOTAL 0.2 0.2    Most Recent TSH and T4:  Recent Labs   Lab Test 04/18/23  0911   TSH 0.85   T4 1.33     Phos/Mag:  Lab Results   Component Value Date    PHOS 2.4 (L) 12/09/2020    PHOS 3.9 12/08/2020    MAG 2.0 12/09/2020    MAG 2.1 12/08/2020      I reviewed the above labs today.

## 2023-05-08 NOTE — PATIENT INSTRUCTIONS
Contact Numbers    [facility] Main Line/TRIAGE: [phone]    Call with chills and/or temperature greater than or equal to 100.5, uncontrolled nausea/vomiting, diarrhea, constipation, dizziness, shortness of breath, chest pain, bleeding, unexplained bruising, or any new/concerning symptoms, questions/concerns.     If you are having any concerning symptoms or wish to speak to a provider before your next infusion visit, please call your care coordinator or triage to notify them so we can adequately serve you.       After Hours: [phone]    If after hours, weekends, or holidays, call main hospital  and ask for Oncology doctor on call.      May 2023      Raul Monday Tuesday Wednesday Thursday Friday Saturday        1    RETURN CCSL   1:30 PM   (45 min.)   Vandana Bertrand CNP   [facility] 2     3     4     5     6       7     8    UMP RETURN   8:45 AM   (30 min.)   Buck Nascimento MD   [facility] [place]    LAB PERIPHERAL   9:45 AM   (15 min.)   [facility] DRAW   [facility]    RETURN ACTIVE TREATMENT  10:00 AM   (45 min.)   Vandana Bertrand CNP   [facility]    ONC INFUSION 1 HR (60 MIN)  12:00 PM   (60 min.)    ONC INFUSION NURSE   [facility] 9     10     11     12     13       14     15     16     17     18     19     20       21     22     23     24     25     26    CT CHEST/ABDOMEN/PELVIS W   9:30 AM   (20 min.)   [email or web address]   [facility] [place]    CT SOFT TISSUE NECK W  10:05 AM   (20 min.)   [facility] [place] 27       28     29     30     31    LAB PERIPHERAL  11:45 AM   (15 min.)   [facility]RAW   [facility]    RETURN CCSL  12:00 PM   (30 min.)   Dominique Mueller MD   [facility]    ONC INFUSION 1 HR (60 MIN)   1:00  PM   (60 min.)   [facility] INFUSION NURSE   [facility]                              June 2023 Sunday Monday Tuesday Wednesday Thursday Friday Saturday                       1     2    RETURN   3:25 PM   (20 min.)   Karishma Eng MD   [facility] [place] 3       4     5     6     7    RETURN FOOT/ANKLE   8:45 AM   (20 min.)   Mamadou Gary DPM   [facility] [place] 8     9     10       11     12     13     14     15     16     17       18     19     20     21     22     23     24       25     26     27     28     29     30                            Lab Results:  Recent Results (from the past 12 hour(s))   Comprehensive metabolic panel    Collection Time: 05/08/23  9:58 AM   Result Value Ref Range    Sodium 144 136 - 145 mmol/L    Potassium 4.8 3.4 - 5.3 mmol/L    Chloride 107 98 - 107 mmol/L    Carbon Dioxide (CO2) 26 22 - 29 mmol/L    Anion Gap 11 7 - 15 mmol/L    Urea Nitrogen 33.4 (H) 8.0 - 23.0 mg/dL    Creatinine 1.35 (H) 0.67 - 1.17 mg/dL    Calcium 10.3 (H) 8.8 - 10.2 mg/dL    Glucose 101 (H) 70 - 99 mg/dL    Alkaline Phosphatase 108 40 - 129 U/L    AST 23 10 - 50 U/L    ALT 14 10 - 50 U/L    Protein Total 7.7 6.4 - 8.3 g/dL    Albumin 4.3 3.5 - 5.2 g/dL    Bilirubin Total 0.2 <=1.2 mg/dL    GFR Estimate 54 (L) >60 mL/min/1.73m2   TSH with free T4 reflex    Collection Time: 05/08/23  9:58 AM   Result Value Ref Range    TSH 1.29 0.30 - 4.20 uIU/mL

## 2023-05-08 NOTE — PROGRESS NOTES
Infusion Nursing Note:  Jonathan Bishop presents today for Cycle 2, day 1 Keytruda.    Patient seen by provider today: Yes: Vandana Bertrand NP    Note: Patient presents to clinic today feeling well with no questions.  Pt did not request or require any intervention for pain today.    Intravenous Access:  Peripheral IV placed.    Treatment Conditions:  Lab Results   Component Value Date    HGB 9.8 (L) 04/18/2023    WBC 7.9 04/18/2023    ANEU 3.9 10/28/2020    ANEUTAUTO 4.6 04/18/2023     04/18/2023      Lab Results   Component Value Date     05/08/2023    POTASSIUM 4.8 05/08/2023    MAG 2.0 12/09/2020    CR 1.35 (H) 05/08/2023    WARREN 10.3 (H) 05/08/2023    BILITOTAL 0.2 05/08/2023    ALBUMIN 4.3 05/08/2023    ALT 14 05/08/2023    AST 23 05/08/2023     Results reviewed, labs MET treatment parameters, ok to proceed with treatment.    Post Infusion Assessment:  Patient tolerated infusion without incident.  Blood return noted pre and post infusion.  Site patent and intact, free from redness, edema or discomfort.  No evidence of extravasations.  Access discontinued per protocol.    Discharge Plan:   Patient declined prescription refills.  Discharge instructions reviewed with: Patient.  Patient and/or family verbalized understanding of discharge instructions and all questions answered.  AVS to patient via [employer].  Patient will return 5/31/2023 for next appointment.   Patient discharged in stable condition accompanied by: self.  Departure Mode: Ambulatory.    Gaviota Mitchell RN

## 2023-05-08 NOTE — NURSING NOTE
Chief Complaint   Patient presents with     Blood Draw     Labs drawn from PIV placed by RN. Line flushed with saline. Vitals taken. Pt checked in for appointment(s).      Tatianna Delgado RN

## 2023-05-08 NOTE — Clinical Note
5/8/2023         RE: Jonathan Bishop  [address]  [place] [address]        Dear Colleague,    Thank you for referring your patient, Jonathan Bishop, to the [facility]. Please see a copy of my visit note below.       [facility]    PATIENT NAME: Jonathan Bishop  MRN # [id number]   DATE OF VISIT: May 8, 2023  YOB: 1945     Referring Provider: Dr. Karishma Eng  Otolaryngology: Dr. Karishma Eng  Radiation Oncology: Dr. Jenni Mills  PCP: Dr. Buck Nascimento    CANCER TYPE: SCC L tonsil, p16 +lety  STAGE: dW1P0W0 (IVC)  ECOG PS: 1    PD-L1: TPS 20%, CPS 25% on [id number] tonsil bx  NGS: N/A    SUMMARY  2/26/23 L tonsil mass bx in clinic (Dr. Eng).   2/20/23 PET/CT. 3.7 x 4.0 x 5.1 cm mass L palatine tonsil causing narrowing of the oropharyngeal lumen, L level 2 node, L retropharyngeal nodular density, extension of primary mass vs retropharyngeal node, 0.8 cm RLL nodule concerning for met, mild focal uptake R iliac bone and L1 without CT correlate suspicious for mets.   3/2/23 R VATS, wedge resection. Path: SCC, poorly differentiated, associated with necrosis, 1 cm, negative margins, p16 +lety  3/24/23 MRI L spine and pelvis. Diffusely heterogeneous marrow signal throughout without corresponding abnormal signal on sagittal STIR sequence and no abnormal enhancement. Nonspecific but could be benign process such as red marrow hyperplasia, cannot completely exclude metastatic disease or infiltrative pathologic marrow process. No lesions corresponding to FDG avid spots on PET/CT.   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 Systemic therapy with pembrolizumab alone recommended, began cycle 1 4/19/23. Less ideal candidate for cytotoxic chemo given comorbidities. Anticipate 3-6 months of treatment in absence of PD then  consider definitive chemoradiation. Discussed today changes to tumor may represent treatment response although it is too soon to tell. No concern for tumor or rapid, local progression based on appearance. Will watch closely clinically and with repeat imaging prior to cycle 3.   -RTC 5/8 for C2 pembrolizumab  -Repeat CT neck and CT-CAP prior to cycle 3, will see Dr. Meuller to review    Microcytic anemia: Iron studies 8/2022 showed perhaps very mild LEW to normal iron studies. Hgb 4/18 slightly improved at 9.8. Vitamin B12 and TFTs unremarkable.    Hyperglyemia, pre-diabetes: A1c 6.0-6.3 for years now.     H/o CVA: Residual L weakness. Uses cane and walker at baseline, but independent and functional. Observed gait today stable. Is pursuing scooter with his PCP.    Peripheral neuropathy: MRI shows a lot of foramenal stenosis and spinal canal stenosis, so more likely related to that than DM2. B12, TSH normal.    *** minutes spent on the date of the encounter doing {2021 E&M time in:620545}    Vandana Bertrand, CNP    SUBJECTIVE  ***    PAST MEDICAL HISTORY  SCC as above  Chronic LBP  TAVR 2020  H/o rheumatic carditis 1950  CHF. 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 Nocturia once nightly   ED  Cataracts  Umbilical hernia repair 2011  Knee arthroplasty B 2010  Lumbar spine fusion, laminectomy, sciatic pain R > L  Peripheral neuropathy - from pinched nerves?  Hearing loss    Numbness/tingling in both feet - bilaterally, symmetric, R spasms more than left     CURRENT OUTPATIENT MEDICATIONS  Current Outpatient Medications   Medication Sig Dispense Refill     acetaminophen (TYLENOL) 325 MG tablet Take 3 tablets (975 mg) by mouth every 6 hours       diazepam (VALIUM) 5 MG tablet Take 1-2 tablets (5-10 mg) by mouth once as needed for anxiety (may repeat one time if needed.) 4 tablet 0     ibuprofen (ADVIL/MOTRIN) 600 MG tablet Take 1 tablet (600 mg) by mouth every 8 hours as needed  for moderate pain (4-6) (Patient not taking: Reported on 4/18/2023) 270 tablet 3     magic mouthwash suspension (diphenhydrAMINE, lidocaine, aluminum-magnesium & simethicone) Swish and spit 10 mLs in mouth every 6 hours as needed for mouth sores 200 mL 3     methocarbamol (ROBAXIN) 500 MG tablet Take 1 tablet (500 mg) by mouth every 6 hours as needed for muscle spasms 30 tablet 0     order for DME Equipment being ordered: Walker ()  Treatment Diagnosis: stroke 1 Units 0     oxyCODONE-acetaminophen (PERCOCET) 5-325 MG tablet Take 1-2 tablets by mouth every 6 hours as needed for pain Max 5 per day. 150 tablet 0     senna-docusate (SENOKOT-S/PERICOLACE) 8.6-50 MG tablet Take 1 tablet by mouth 2 times daily Reduce dose or temporarily discontinue if having loose stools. 50 tablet 0     spironolactone (ALDACTONE) 50 MG tablet Take 1 tablet (50 mg) by mouth daily 90 tablet 3     tamsulosin (FLOMAX) 0.4 MG capsule TAKE 1 CAPSULE BY MOUTH ONCE DAILY 90 capsule 4     vitamin D3 (CHOLECALCIFEROL) 50 mcg (2000 units) tablet Take 1 tablet by mouth daily       ALLERGIES  No Known Allergies     REVIEW OF SYSTEMS  As above in the HPI, o/w complete 12-point ROS was negative.    PHYSICAL EXAM  There were no vitals taken for this visit.     BP recheck following visit 110/58 on right arm manually    General: Well-appearing male, NAD  Eyes: EOMI, PERRL. No scleral icterus.  ENT: Posterior OP tumor, tissue appears pink without erosive edges, inflammation or bleeding.   Neurologic: Grossly nonfocal  Skin: No rashes, petechiae, or bruising noted on exposed skin.    LABORATORY AND IMAGING STUDIES   Most Recent 3 CBC's:  Recent Labs   Lab Test 04/18/23  0911 03/03/23  0720 02/27/23  1603 08/03/22  1240   WBC 7.9 13.2* 8.7 7.7   HGB 9.8* 9.3* 9.4* 10.6*   MCV 80 80 81 83    195 226 227   ANEUTAUTO 4.6  --  5.6 4.0    Most Recent 3 BMP's:  Recent Labs   Lab Test 04/18/23  0911 03/03/23  0720 03/03/23  0557 03/02/23  0942  02/27/23  1603 08/03/22  1240 08/03/22  1240    141  --   --  141  --  140   POTASSIUM 4.3 4.1  --  4.1 4.2   < > 3.5   CHLORIDE 106 108*  --   --  106  --  107   CO2 23 25  --   --  25  --  27   BUN 20.0 18.1  --   --  19.2  --  21   CR 0.98 0.85  --  0.86 0.94  --  0.88   ANIONGAP 12 8  --   --  10  --  6   WARREN 9.9 9.1  --   --  9.6  --  9.3   * 199* 176*  --  92  --  96   PROTTOTAL 7.1  --   --   --  7.0  --  7.4   ALBUMIN 4.0  --   --   --  4.0  --  3.5    < > = values in this interval not displayed.    Most Recent 2 LFT's:  Recent Labs   Lab Test 04/18/23  0911 02/27/23  1603   AST 28 27   ALT 18 15   ALKPHOS 97 105   BILITOTAL 0.2 0.2    Most Recent TSH and T4:  Recent Labs   Lab Test 04/18/23  0911   TSH 0.85   T4 1.33     Phos/Mag:  Lab Results   Component Value Date    PHOS 2.4 (L) 12/09/2020    PHOS 3.9 12/08/2020    MAG 2.0 12/09/2020    MAG 2.1 12/08/2020      I reviewed the above labs today.             Again, thank you for allowing me to participate in the care of your patient.        Sincerely,        Vandana Bertrand, CNP

## 2023-05-12 ENCOUNTER — DOCUMENTATION ONLY (OUTPATIENT)
Dept: INTERNAL MEDICINE | Facility: CLINIC | Age: 78
End: 2023-05-12
Payer: COMMERCIAL

## 2023-05-12 NOTE — PROGRESS NOTES
Type of Form Received:     Form Received (Date) 5/12/23   Form Filled out Yes 5/15/23   Placed in provider folder Yes

## 2023-05-15 ENCOUNTER — MEDICAL CORRESPONDENCE (OUTPATIENT)
Dept: HEALTH INFORMATION MANAGEMENT | Facility: CLINIC | Age: 78
End: 2023-05-15
Payer: COMMERCIAL

## 2023-05-26 ENCOUNTER — ANCILLARY PROCEDURE (OUTPATIENT)
Dept: CT IMAGING | Facility: CLINIC | Age: 78
End: 2023-05-26
Attending: INTERNAL MEDICINE
Payer: COMMERCIAL

## 2023-05-26 DIAGNOSIS — C78.00 MALIGNANT NEOPLASM METASTATIC TO LUNG, UNSPECIFIED LATERALITY (H): ICD-10-CM

## 2023-05-26 LAB
CREAT BLD-MCNC: 0.8 MG/DL (ref 0.7–1.3)
GFR SERPL CREATININE-BSD FRML MDRD: >60 ML/MIN/1.73M2

## 2023-05-26 PROCEDURE — 70491 CT SOFT TISSUE NECK W/DYE: CPT | Mod: GC | Performed by: RADIOLOGY

## 2023-05-26 PROCEDURE — 71260 CT THORAX DX C+: CPT | Performed by: RADIOLOGY

## 2023-05-26 PROCEDURE — 82565 ASSAY OF CREATININE: CPT | Performed by: PATHOLOGY

## 2023-05-26 PROCEDURE — 74177 CT ABD & PELVIS W/CONTRAST: CPT | Performed by: RADIOLOGY

## 2023-05-26 RX ORDER — IOPAMIDOL 755 MG/ML
119 INJECTION, SOLUTION INTRAVASCULAR ONCE
Status: COMPLETED | OUTPATIENT
Start: 2023-05-26 | End: 2023-05-26

## 2023-05-26 RX ADMIN — IOPAMIDOL 119 ML: 755 INJECTION, SOLUTION INTRAVASCULAR at 10:15

## 2023-05-26 NOTE — TELEPHONE ENCOUNTER
Called patient in attempts to complete pre assessment for upcoming colonoscopy procedure scheduled on 9/27/22    Patient stating that they will need to reschedule as Tuesdays do not work. Attempted to transfer patient to endoscopy scheduling when patient ended call.     Will send to endoscopy scheduling to reschedule. Upon review patient is looking to get an electric scooter/wheelchair. Verify if patient is able to self transfer otherwise should be rescheduled to [facility].     Patient is on Clopidogrel (Plavix).    Extended prep d/t fair prep in previous colonoscopy and border line BMI 39.8. Prep sent to [address]     Tati Lezama RN     done

## 2023-05-30 DIAGNOSIS — M54.50 ACUTE MIDLINE LOW BACK PAIN WITHOUT SCIATICA: ICD-10-CM

## 2023-05-30 RX ORDER — OXYCODONE AND ACETAMINOPHEN 5; 325 MG/1; MG/1
1-2 TABLET ORAL EVERY 6 HOURS PRN
Qty: 150 TABLET | Refills: 0 | Status: SHIPPED | OUTPATIENT
Start: 2023-05-30 | End: 2023-06-27

## 2023-05-30 NOTE — TELEPHONE ENCOUNTER
oxyCODONE-acetaminophen (PERCOCET) 5-325 MG tablet  Last Written Prescription Date:   4/27/2023  Last Fill Quantity: 150,   # refills: 0  Last Office Visit :  4/10/2023  Future Office visit:   7/3/2023  Routing refill request to provider for review/approval because:  Drug not on the [facility], P or [facility] refill protocol or controlled substance      Mayda Holland RN  Central Triage Red Flags/Med Refills

## 2023-05-30 NOTE — TELEPHONE ENCOUNTER
[facility] Call Center    Phone Message    May a detailed message be left on voicemail: yes     Reason for Call: Medication Refill Request    Has the patient contacted the pharmacy for the refill? Yes   Name of medication being requested: oxyCODONE-acetaminophen (PERCOCET) 5-325 MG tablet     Provider who prescribed the medication: Dr Nascimento  Pharmacy: [facility] [address] [place] [id number] [facility]    Date medication is needed: due on 5/27/2023 per patient          Action Taken: Message routed to:  [facility]    Travel Screening: Not Applicable

## 2023-05-31 ENCOUNTER — APPOINTMENT (OUTPATIENT)
Dept: LAB | Facility: CLINIC | Age: 78
End: 2023-05-31
Attending: INTERNAL MEDICINE
Payer: COMMERCIAL

## 2023-05-31 ENCOUNTER — INFUSION THERAPY VISIT (OUTPATIENT)
Dept: ONCOLOGY | Facility: CLINIC | Age: 78
End: 2023-05-31
Attending: INTERNAL MEDICINE
Payer: COMMERCIAL

## 2023-05-31 VITALS
WEIGHT: 229.8 LBS | TEMPERATURE: 98 F | HEART RATE: 79 BPM | SYSTOLIC BLOOD PRESSURE: 124 MMHG | BODY MASS INDEX: 40.06 KG/M2 | RESPIRATION RATE: 16 BRPM | OXYGEN SATURATION: 97 % | DIASTOLIC BLOOD PRESSURE: 62 MMHG

## 2023-05-31 DIAGNOSIS — C78.00 MALIGNANT NEOPLASM METASTATIC TO LUNG, UNSPECIFIED LATERALITY (H): ICD-10-CM

## 2023-05-31 DIAGNOSIS — C09.9 TONSIL CANCER (H): Primary | ICD-10-CM

## 2023-05-31 DIAGNOSIS — C10.9 SQUAMOUS CELL CARCINOMA OF OROPHARYNX (H): Primary | ICD-10-CM

## 2023-05-31 DIAGNOSIS — C10.9 SQUAMOUS CELL CARCINOMA OF OROPHARYNX (H): ICD-10-CM

## 2023-05-31 DIAGNOSIS — Z13.29 SCREENING FOR HYPOTHYROIDISM: ICD-10-CM

## 2023-05-31 DIAGNOSIS — C09.9 TONSIL CANCER (H): ICD-10-CM

## 2023-05-31 LAB
ALBUMIN SERPL BCG-MCNC: 3.8 G/DL (ref 3.5–5.2)
ALP SERPL-CCNC: 94 U/L (ref 40–129)
ALT SERPL W P-5'-P-CCNC: 17 U/L (ref 10–50)
ANION GAP SERPL CALCULATED.3IONS-SCNC: 7 MMOL/L (ref 7–15)
AST SERPL W P-5'-P-CCNC: 22 U/L (ref 10–50)
BILIRUB SERPL-MCNC: 0.2 MG/DL
BUN SERPL-MCNC: 17.1 MG/DL (ref 8–23)
CALCIUM SERPL-MCNC: 9.7 MG/DL (ref 8.8–10.2)
CHLORIDE SERPL-SCNC: 107 MMOL/L (ref 98–107)
CREAT SERPL-MCNC: 0.84 MG/DL (ref 0.67–1.17)
DEPRECATED HCO3 PLAS-SCNC: 28 MMOL/L (ref 22–29)
GFR SERPL CREATININE-BSD FRML MDRD: 90 ML/MIN/1.73M2
GLUCOSE SERPL-MCNC: 118 MG/DL (ref 70–99)
HOLD SPECIMEN: NORMAL
POTASSIUM SERPL-SCNC: 4.1 MMOL/L (ref 3.4–5.3)
PROT SERPL-MCNC: 7 G/DL (ref 6.4–8.3)
SODIUM SERPL-SCNC: 142 MMOL/L (ref 136–145)
TSH SERPL DL<=0.005 MIU/L-ACNC: 1.86 UIU/ML (ref 0.3–4.2)

## 2023-05-31 PROCEDURE — 99214 OFFICE O/P EST MOD 30 MIN: CPT | Performed by: INTERNAL MEDICINE

## 2023-05-31 PROCEDURE — 80053 COMPREHEN METABOLIC PANEL: CPT | Performed by: INTERNAL MEDICINE

## 2023-05-31 PROCEDURE — 258N000003 HC RX IP 258 OP 636: Performed by: INTERNAL MEDICINE

## 2023-05-31 PROCEDURE — 250N000011 HC RX IP 250 OP 636: Performed by: INTERNAL MEDICINE

## 2023-05-31 PROCEDURE — 36415 COLL VENOUS BLD VENIPUNCTURE: CPT | Performed by: INTERNAL MEDICINE

## 2023-05-31 PROCEDURE — G0463 HOSPITAL OUTPT CLINIC VISIT: HCPCS | Mod: 25 | Performed by: INTERNAL MEDICINE

## 2023-05-31 PROCEDURE — 96413 CHEMO IV INFUSION 1 HR: CPT

## 2023-05-31 PROCEDURE — 84443 ASSAY THYROID STIM HORMONE: CPT | Performed by: INTERNAL MEDICINE

## 2023-05-31 RX ORDER — METHYLPREDNISOLONE SODIUM SUCCINATE 125 MG/2ML
125 INJECTION, POWDER, LYOPHILIZED, FOR SOLUTION INTRAMUSCULAR; INTRAVENOUS
Status: CANCELLED
Start: 2023-05-31

## 2023-05-31 RX ORDER — DIPHENHYDRAMINE HYDROCHLORIDE 50 MG/ML
50 INJECTION INTRAMUSCULAR; INTRAVENOUS
Status: CANCELLED
Start: 2023-05-31

## 2023-05-31 RX ORDER — ALBUTEROL SULFATE 0.83 MG/ML
2.5 SOLUTION RESPIRATORY (INHALATION)
Status: CANCELLED | OUTPATIENT
Start: 2023-05-31

## 2023-05-31 RX ORDER — MEPERIDINE HYDROCHLORIDE 25 MG/ML
25 INJECTION INTRAMUSCULAR; INTRAVENOUS; SUBCUTANEOUS EVERY 30 MIN PRN
Status: CANCELLED | OUTPATIENT
Start: 2023-05-31

## 2023-05-31 RX ORDER — LORAZEPAM 2 MG/ML
0.5 INJECTION INTRAMUSCULAR EVERY 4 HOURS PRN
Status: CANCELLED | OUTPATIENT
Start: 2023-05-31

## 2023-05-31 RX ORDER — HEPARIN SODIUM (PORCINE) LOCK FLUSH IV SOLN 100 UNIT/ML 100 UNIT/ML
5 SOLUTION INTRAVENOUS
Status: CANCELLED | OUTPATIENT
Start: 2023-05-31

## 2023-05-31 RX ORDER — EPINEPHRINE 1 MG/ML
0.3 INJECTION, SOLUTION INTRAMUSCULAR; SUBCUTANEOUS EVERY 5 MIN PRN
Status: CANCELLED | OUTPATIENT
Start: 2023-05-31

## 2023-05-31 RX ORDER — ALBUTEROL SULFATE 90 UG/1
1-2 AEROSOL, METERED RESPIRATORY (INHALATION)
Status: CANCELLED
Start: 2023-05-31

## 2023-05-31 RX ORDER — HEPARIN SODIUM,PORCINE 10 UNIT/ML
5 VIAL (ML) INTRAVENOUS
Status: CANCELLED | OUTPATIENT
Start: 2023-05-31

## 2023-05-31 RX ADMIN — SODIUM CHLORIDE 200 MG: 9 INJECTION, SOLUTION INTRAVENOUS at 13:33

## 2023-05-31 RX ADMIN — SODIUM CHLORIDE 250 ML: 9 INJECTION, SOLUTION INTRAVENOUS at 13:32

## 2023-05-31 ASSESSMENT — PAIN SCALES - GENERAL: PAINLEVEL: EXTREME PAIN (9)

## 2023-05-31 NOTE — PROGRESS NOTES
[facility] CANCER [facility]    PATIENT NAME: Jonathan Bishop  MRN # [id number]   DATE OF VISIT: May 31, 2023  YOB: 1945     Referring Provider: Dr. Karishma Eng  Otolaryngology: Dr. Karishma Eng  Radiation Oncology: Dr. Jenni Mills  PCP: Dr. Buck Nascimento    CANCER TYPE: SCC L tonsil, p16 +lety  STAGE: [id number] (IVC)  ECOG PS: 1    PD-L1: TPS 20%, CPS 25% on [id number] tonsil bx  NGS: N/A    SUMMARY  2/26/23 L tonsil mass bx in clinic (Dr. Eng).   2/20/23 PET/CT. 3.7 x 4.0 x 5.1 cm mass L palatine tonsil causing narrowing of the oropharyngeal lumen, L level 2 node, L retropharyngeal nodular density, extension of primary mass vs retropharyngeal node, 0.8 cm RLL nodule concerning for met, mild focal uptake R iliac bone and L1 without CT correlate suspicious for mets.   3/2/23 R VATS, wedge resection. Path: SCC, poorly differentiated, associated with necrosis, 1 cm, negative margins, p16 +lety  3/24/23 MRI L spine and pelvis. Diffusely heterogeneous marrow signal throughout without corresponding abnormal signal on sagittal STIR sequence and no abnormal enhancement. Nonspecific but could be benign process such as red marrow hyperplasia, cannot completely exclude metastatic disease or infiltrative pathologic marrow process. No lesions corresponding to FDG avid spots on PET/CT.   4/19/23~current Pembrolizumab.    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 The tonsil mass looks a little smaller on my measurements. Clinically it's clear the cancer is responding. He's swallowing better and hemoptysis has resolved. Proceed with 3 more cycles of pembrolizumab and restage. We had discussed in the beginning the concept of being aggressive, which means potentially incorporating consolidative radiation down the road. If PD on pembro, would consider  platinum vs platinum doublet as a second line and SQZ trial as a third line, or vice versa.     Microcytic anemia: Iron studies 8/2022 showed perhaps very mild LEW to normal iron studies. Current studies 4/18/23 show roughly the same. No supplements for now.    Hyperglyemia, pre-diabetes: A1c 6.0-6.3 for years now. Not discussed today    H/o CVA: Residual L weakness. Uses cane and walker at baseline, but independent and functional. Stable    Peripheral neuropathy: MRI shows a lot of foramenal stenosis and spinal canal stenosis, so more likely related to that than DM2. TSHs have been fine. B12 4/1823 was 1131. No worsening on pembro      30 minutes spent by me on the date of the encounter doing chart review, history and exam, documentation and further activities per the note     Dominique Mueller MD  Associate Professor of Medicine  Hematology, Oncology and Transplantation      SUBJECTIVE  Mr. Bishop returns for follow up after 2 cycles of pembrolizumab.   Doing well overall. Hemoptysis stopped. No dysphagia. Gaining some weight as he's able to eat better. No F/C. No N/V. A little constipation that he is managing. No increase in numbness/tingling. No shortness of breath. Getting around ok. No Chest pain/pressure. Feels actually quite well.     M-W-F are best days for scheduling    Going to [place] 7/17 ~ 7/18     PAST MEDICAL HISTORY  SCC as above  Chronic LBP  TAVR 2020  H/o rheumatic carditis 1950  CHF. 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 Nocturia once nightly   ED  Cataracts  Umbilical hernia repair 2011  Knee arthroplasty B 2010  Lumbar spine fusion, laminectomy, sciatic pain R > L  Peripheral neuropathy - from pinched nerves?  Hearing loss    Numbness/tingling in both feet - bilaterally, symmetric, R spasms more than left    CURRENT OUTPATIENT MEDICATIONS  Current Outpatient Medications   Medication Sig Dispense Refill     acetaminophen (TYLENOL) 325 MG  tablet Take 3 tablets (975 mg) by mouth every 6 hours       diazepam (VALIUM) 5 MG tablet Take 1-2 tablets (5-10 mg) by mouth once as needed for anxiety (may repeat one time if needed.) 4 tablet 0     ibuprofen (ADVIL/MOTRIN) 600 MG tablet Take 1 tablet (600 mg) by mouth every 8 hours as needed for moderate pain (4-6) 270 tablet 3     methocarbamol (ROBAXIN) 500 MG tablet Take 1 tablet (500 mg) by mouth every 6 hours as needed for muscle spasms 30 tablet 0     order for DME Equipment being ordered: Walker ()  Treatment Diagnosis: stroke 1 Units 0     oxyCODONE-acetaminophen (PERCOCET) 5-325 MG tablet Take 1-2 tablets by mouth every 6 hours as needed for pain Max 5 per day. 150 tablet 0     senna-docusate (SENOKOT-S/PERICOLACE) 8.6-50 MG tablet Take 1 tablet by mouth 2 times daily Reduce dose or temporarily discontinue if having loose stools. 50 tablet 0     spironolactone (ALDACTONE) 50 MG tablet Take 1 tablet (50 mg) by mouth daily 90 tablet 3     tamsulosin (FLOMAX) 0.4 MG capsule TAKE 1 CAPSULE BY MOUTH ONCE DAILY 90 capsule 4     vitamin D3 (CHOLECALCIFEROL) 50 mcg (2000 units) tablet Take 1 tablet by mouth daily       magic mouthwash suspension (diphenhydrAMINE, lidocaine, aluminum-magnesium & simethicone) Swish and spit 10 mLs in mouth every 6 hours as needed for mouth sores (Patient not taking: Reported on 5/31/2023) 200 mL 3     ALLERGIES  No Known Allergies     REVIEW OF SYSTEMS  As above in the HPI, o/w complete 12-point ROS was negative.    PHYSICAL EXAM  /62 (BP Location: Right arm, Patient Position: Sitting, Cuff Size: Adult Regular)   Pulse 79   Temp 98  F (36.7  C) (Oral)   Resp 16   Wt 104.2 kg (229 lb 12.8 oz)   SpO2 97%   BMI 40.06 kg/m    GEN: NAD  HEENT: EOMI, no icterus, injection or pallor  EXT: trace edema  NEURO: alert. Walking with his walker  SKIN: no rashes    LABORATORY AND IMAGING STUDIES   04/18/23 09:11 05/08/23 09:58 05/26/23 10:11 05/31/23 12:09   Sodium 141 144  142    Potassium 4.3 4.8  4.1   Chloride 106 107  107   Carbon Dioxide (CO2) 23 26  28   Urea Nitrogen 20.0 33.4 (H)  17.1   Creatinine 0.98 1.35 (H)  0.84   Creatinine POCT   0.8    GFR Estimate 79 54 (L)  90   GFR, ESTIMATED POCT   >60    Calcium 9.9 10.3 (H)  9.7   Anion Gap 12 11  7   Albumin 4.0 4.3  3.8   Protein Total 7.1 7.7  7.0   Alkaline Phosphatase 97 108  94   ALT 18 14  17   AST 28 23  22   Bilirubin Total 0.2 0.2  0.2   Ferritin 117      Glucose 124 (H) 101 (H)  118 (H)   Iron 55 (L)      Iron Binding Capacity 263      Iron Sat Index 21      T4 Free 1.33        CBCs since last visit with me have also been personally reviewed    Labs were independently reviewed and interpreted by me    CT Chest/Abdomen/Pelvis w Contrast  Narrative: EXAM: CT CHEST/ABDOMEN/PELVIS W CONTRAST  LOCATION: [facility]  DATE/TIME: 5/26/2023 10:33 AM CDT    INDICATION: Restage oligometastatic SCC L tonsil, resected lung met, on carboplatin paclitaxel + pembrolizumab x 2 cycles  COMPARISON: PET/CT, 02/20/2023  TECHNIQUE: CT scan of the chest, abdomen, and pelvis was performed following injection of IV contrast. Multiplanar reformats were obtained. Dose reduction techniques were used.   CONTRAST: Isovue 370 119 cc    FINDINGS:   LUNGS AND PLEURA: Interval wedge resection changes involving the right lower lobe with resection of a previously seen right lower lobe pulmonary nodule. There is a 7 x 4 mm left lower lobe pulmonary nodule on image 151 of series 5 which previously   measured 2 mm in diameter on 02/20/2023 within the medial aspect of the left upper lobe on image 83 of series 5, there is a 6 x 4 mm pulmonary nodule which previously measured 1 mm diameter. 2 mm right upper lobe pulmonary nodule on image 70 of series 5   appears new since comparison examination. Also new within the right lower lobe on image 176 anteriorly is a 2 mm pulmonary nodule.    MEDIASTINUM/AXILLAE: Heart size is  normal. The thoracic aorta is normal in caliber. There is a borderline prominent right hilar lymph node on image 170 of series 4 measuring 1.0 x 1.8 cm in size. This compares with 0.8 x 1.6 cm previously. There are a   couple of nodules involving the thyroid gland, unchanged. The largest measures 9 mm in greatest dimension on the right.    CORONARY ARTERY CALCIFICATION: Mild.    HEPATOBILIARY: Normal.    PANCREAS: Normal.    SPLEEN: Normal.    ADRENAL GLANDS: Normal.    KIDNEYS/BLADDER: There are simple-appearing right renal cysts.    BOWEL: No bowel obstruction or bowel inflammation. The appendix is normal.    LYMPH NODES: Normal.    VASCULATURE: Unremarkable.    PELVIC ORGANS: Normal.    MUSCULOSKELETAL: No definite suspicious osseous lesions. Surgical changes of a lumbosacral fusion.  Impression: IMPRESSION:  1.  Interval wedge resection changes involving the right lower lobe.  2.  Increasing size of left lung pulmonary nodules. There are a couple of tiny right lung nodules which are new from comparison CT scan as well. These are suspicious for metastatic disease.  3.  Mildly enlarged right hilar lymph node.  CT Soft Tissue Neck w Contrast  Narrative: EXAM: CT SOFT TISSUE NECK W CONTRAST  5/26/2023 10:31 AM     HISTORY:  restage oligometastatic SCC L tonsil, resected lung met, on  carboplatin paclitaxel + pembrolizumab x 2 cycles; Malignant neoplasm  metastatic to lung, unspecified laterality (H)         COMPARISON:  PET/CT 2/20/2023     TECHNIQUE: Following intravenous administration of nonionic iodinated  contrast medium, thin section helical CT images were obtained from the  skull base down to the level of the aortic arch.  Axial, coronal and  sagittal reformations were performed with 2-3 mm slice thickness  reconstruction. Images were reviewed in soft tissue, lung and bone  windows.    CONTRAST: Isovue 370 119cc    FINDINGS:   - 2.9 x 3.7 x 3.8 cm mass centered in the left palatine tonsil  previously  hypermetabolic and measuring 4 x 3.9 x 5.3 cm. Unchanged  adjacent satellite nodule in the posterior oropharynx measuring up to  1.7 cm.  - Left level 2A cystic and solid metastatic lymph node measures 2.3 x  4.6 x 2.4 cm, previously 1.6 x 3.1 x 2.2 cm. Notably, it is the solid  previously hypermetabolic portion of this lymph node that has  increased in size.  - New part cystic part solid right level 2A lymph node measuring 1.7  cm.  - New part cystic part solid left level 3 lymph node measuring 1.6 cm.    Decreased mass effect upon the aerodigestive tract.    Major cervical vessels are patent. Parotid, submandibular, and thyroid  glands are unremarkable. Paranasal sinuses are clear. Orbits and  visualized intracranial contents are unremarkable. Multilevel cervical  spondylosis similar to prior exam. Lung apices are clear.  Impression: IMPRESSION:  1. Decreased size of primary squamous cell carcinoma of the left  tonsil.  2. Multiple new suspicious cervical lymph nodes bilaterally and  increased size of previously hypermetabolic left level 2A cervical  lymph node.    I have personally reviewed the examination and initial interpretation  and I agree with the findings.    ORION ENGLISH MD         SYSTEM ID:  [id number]     Imaging was personally reviewed and interpreted by me as above

## 2023-05-31 NOTE — LETTER
5/31/2023         RE: Jonathan Bishop  [address]  [place] [address]        Dear Colleague,    Thank you for referring your patient, Jonathan Bishop, to the [facility]. Please see a copy of my visit note below.       [facility]    PATIENT NAME: Jonathan Bishop  MRN # [id number]   DATE OF VISIT: May 31, 2023  YOB: 1945     Referring Provider: Dr. Karishma Eng  Otolaryngology: Dr. Karishma Eng  Radiation Oncology: Dr. Jenni Mills  PCP: Dr. Buck Nascimento    CANCER TYPE: SCC L tonsil, p16 +lety  STAGE: [id number] (IVC)  ECOG PS: 1    PD-L1: TPS 20%, CPS 25% on [id number] tonsil bx  NGS: N/A    SUMMARY  2/26/23 L tonsil mass bx in clinic (Dr. Egn).   2/20/23 PET/CT. 3.7 x 4.0 x 5.1 cm mass L palatine tonsil causing narrowing of the oropharyngeal lumen, L level 2 node, L retropharyngeal nodular density, extension of primary mass vs retropharyngeal node, 0.8 cm RLL nodule concerning for met, mild focal uptake R iliac bone and L1 without CT correlate suspicious for mets.   3/2/23 R VATS, wedge resection. Path: SCC, poorly differentiated, associated with necrosis, 1 cm, negative margins, p16 +lety  3/24/23 MRI L spine and pelvis. Diffusely heterogeneous marrow signal throughout without corresponding abnormal signal on sagittal STIR sequence and no abnormal enhancement. Nonspecific but could be benign process such as red marrow hyperplasia, cannot completely exclude metastatic disease or infiltrative pathologic marrow process. No lesions corresponding to FDG avid spots on PET/CT.   4/19/23~current Pembrolizumab.    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 The tonsil mass looks a little smaller on my measurements. Clinically it's clear the cancer is responding. He's swallowing  better and hemoptysis has resolved. Proceed with 3 more cycles of pembrolizumab and restage. We had discussed in the beginning the concept of being aggressive, which means potentially incorporating consolidative radiation down the road. If PD on pembro, would consider platinum vs platinum doublet as a second line and SQZ trial as a third line, or vice versa.     Microcytic anemia: Iron studies 8/2022 showed perhaps very mild LEW to normal iron studies. Current studies 4/18/23 show roughly the same. No supplements for now.    Hyperglyemia, pre-diabetes: A1c 6.0-6.3 for years now. Not discussed today    H/o CVA: Residual L weakness. Uses cane and walker at baseline, but independent and functional. Stable    Peripheral neuropathy: MRI shows a lot of foramenal stenosis and spinal canal stenosis, so more likely related to that than DM2. TSHs have been fine. B12 4/1823 was 1131. No worsening on pembro      30 minutes spent by me on the date of the encounter doing chart review, history and exam, documentation and further activities per the note     Dominique Mueller MD  Associate Professor of Medicine  Hematology, Oncology and Transplantation      SUBJECTIVE  Mr. Bishop returns for follow up after 2 cycles of pembrolizumab.   Doing well overall. Hemoptysis stopped. No dysphagia. Gaining some weight as he's able to eat better. No F/C. No N/V. A little constipation that he is managing. No increase in numbness/tingling. No shortness of breath. Getting around ok. No Chest pain/pressure. Feels actually quite well.     M-W-F are best days for scheduling    Going to [place] 7/17 ~ 7/18     PAST MEDICAL HISTORY  SCC as above  Chronic LBP  TAVR 2020  H/o rheumatic carditis 1950  CHF. 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 Nocturia once nightly   ED  Cataracts  Umbilical hernia repair 2011  Knee arthroplasty B 2010  Lumbar spine fusion, laminectomy, sciatic pain R > L  Peripheral  neuropathy - from pinched nerves?  Hearing loss    Numbness/tingling in both feet - bilaterally, symmetric, R spasms more than left    CURRENT OUTPATIENT MEDICATIONS  Current Outpatient Medications   Medication Sig Dispense Refill    acetaminophen (TYLENOL) 325 MG tablet Take 3 tablets (975 mg) by mouth every 6 hours      diazepam (VALIUM) 5 MG tablet Take 1-2 tablets (5-10 mg) by mouth once as needed for anxiety (may repeat one time if needed.) 4 tablet 0    ibuprofen (ADVIL/MOTRIN) 600 MG tablet Take 1 tablet (600 mg) by mouth every 8 hours as needed for moderate pain (4-6) 270 tablet 3    methocarbamol (ROBAXIN) 500 MG tablet Take 1 tablet (500 mg) by mouth every 6 hours as needed for muscle spasms 30 tablet 0    order for DME Equipment being ordered: Walker ()  Treatment Diagnosis: stroke 1 Units 0    oxyCODONE-acetaminophen (PERCOCET) 5-325 MG tablet Take 1-2 tablets by mouth every 6 hours as needed for pain Max 5 per day. 150 tablet 0    senna-docusate (SENOKOT-S/PERICOLACE) 8.6-50 MG tablet Take 1 tablet by mouth 2 times daily Reduce dose or temporarily discontinue if having loose stools. 50 tablet 0    spironolactone (ALDACTONE) 50 MG tablet Take 1 tablet (50 mg) by mouth daily 90 tablet 3    tamsulosin (FLOMAX) 0.4 MG capsule TAKE 1 CAPSULE BY MOUTH ONCE DAILY 90 capsule 4    vitamin D3 (CHOLECALCIFEROL) 50 mcg (2000 units) tablet Take 1 tablet by mouth daily      magic mouthwash suspension (diphenhydrAMINE, lidocaine, aluminum-magnesium & simethicone) Swish and spit 10 mLs in mouth every 6 hours as needed for mouth sores (Patient not taking: Reported on 5/31/2023) 200 mL 3     ALLERGIES  No Known Allergies     REVIEW OF SYSTEMS  As above in the HPI, o/w complete 12-point ROS was negative.    PHYSICAL EXAM  /62 (BP Location: Right arm, Patient Position: Sitting, Cuff Size: Adult Regular)   Pulse 79   Temp 98  F (36.7  C) (Oral)   Resp 16   Wt 104.2 kg (229 lb 12.8 oz)   SpO2 97%   BMI 40.06  kg/m    GEN: NAD  HEENT: EOMI, no icterus, injection or pallor  EXT: trace edema  NEURO: alert. Walking with his walker  SKIN: no rashes    LABORATORY AND IMAGING STUDIES   04/18/23 09:11 05/08/23 09:58 05/26/23 10:11 05/31/23 12:09   Sodium 141 144  142   Potassium 4.3 4.8  4.1   Chloride 106 107  107   Carbon Dioxide (CO2) 23 26  28   Urea Nitrogen 20.0 33.4 (H)  17.1   Creatinine 0.98 1.35 (H)  0.84   Creatinine POCT   0.8    GFR Estimate 79 54 (L)  90   GFR, ESTIMATED POCT   >60    Calcium 9.9 10.3 (H)  9.7   Anion Gap 12 11  7   Albumin 4.0 4.3  3.8   Protein Total 7.1 7.7  7.0   Alkaline Phosphatase 97 108  94   ALT 18 14  17   AST 28 23  22   Bilirubin Total 0.2 0.2  0.2   Ferritin 117      Glucose 124 (H) 101 (H)  118 (H)   Iron 55 (L)      Iron Binding Capacity 263      Iron Sat Index 21      T4 Free 1.33        CBCs since last visit with me have also been personally reviewed    Labs were independently reviewed and interpreted by me    CT Chest/Abdomen/Pelvis w Contrast  Narrative: EXAM: CT CHEST/ABDOMEN/PELVIS W CONTRAST  LOCATION: [facility]  DATE/TIME: 5/26/2023 10:33 AM CDT    INDICATION: Restage oligometastatic SCC L tonsil, resected lung met, on carboplatin paclitaxel + pembrolizumab x 2 cycles  COMPARISON: PET/CT, 02/20/2023  TECHNIQUE: CT scan of the chest, abdomen, and pelvis was performed following injection of IV contrast. Multiplanar reformats were obtained. Dose reduction techniques were used.   CONTRAST: Isovue 370 119 cc    FINDINGS:   LUNGS AND PLEURA: Interval wedge resection changes involving the right lower lobe with resection of a previously seen right lower lobe pulmonary nodule. There is a 7 x 4 mm left lower lobe pulmonary nodule on image 151 of series 5 which previously   measured 2 mm in diameter on 02/20/2023 within the medial aspect of the left upper lobe on image 83 of series 5, there is a 6 x 4 mm pulmonary nodule which previously  measured 1 mm diameter. 2 mm right upper lobe pulmonary nodule on image 70 of series 5   appears new since comparison examination. Also new within the right lower lobe on image 176 anteriorly is a 2 mm pulmonary nodule.    MEDIASTINUM/AXILLAE: Heart size is normal. The thoracic aorta is normal in caliber. There is a borderline prominent right hilar lymph node on image 170 of series 4 measuring 1.0 x 1.8 cm in size. This compares with 0.8 x 1.6 cm previously. There are a   couple of nodules involving the thyroid gland, unchanged. The largest measures 9 mm in greatest dimension on the right.    CORONARY ARTERY CALCIFICATION: Mild.    HEPATOBILIARY: Normal.    PANCREAS: Normal.    SPLEEN: Normal.    ADRENAL GLANDS: Normal.    KIDNEYS/BLADDER: There are simple-appearing right renal cysts.    BOWEL: No bowel obstruction or bowel inflammation. The appendix is normal.    LYMPH NODES: Normal.    VASCULATURE: Unremarkable.    PELVIC ORGANS: Normal.    MUSCULOSKELETAL: No definite suspicious osseous lesions. Surgical changes of a lumbosacral fusion.  Impression: IMPRESSION:  1.  Interval wedge resection changes involving the right lower lobe.  2.  Increasing size of left lung pulmonary nodules. There are a couple of tiny right lung nodules which are new from comparison CT scan as well. These are suspicious for metastatic disease.  3.  Mildly enlarged right hilar lymph node.  CT Soft Tissue Neck w Contrast  Narrative: EXAM: CT SOFT TISSUE NECK W CONTRAST  5/26/2023 10:31 AM     HISTORY:  restage oligometastatic SCC L tonsil, resected lung met, on  carboplatin paclitaxel + pembrolizumab x 2 cycles; Malignant neoplasm  metastatic to lung, unspecified laterality (H)         COMPARISON:  PET/CT 2/20/2023     TECHNIQUE: Following intravenous administration of nonionic iodinated  contrast medium, thin section helical CT images were obtained from the  skull base down to the level of the aortic arch.  Axial, coronal and  sagittal  reformations were performed with 2-3 mm slice thickness  reconstruction. Images were reviewed in soft tissue, lung and bone  windows.    CONTRAST: Isovue 370 119cc    FINDINGS:   - 2.9 x 3.7 x 3.8 cm mass centered in the left palatine tonsil  previously hypermetabolic and measuring 4 x 3.9 x 5.3 cm. Unchanged  adjacent satellite nodule in the posterior oropharynx measuring up to  1.7 cm.  - Left level 2A cystic and solid metastatic lymph node measures 2.3 x  4.6 x 2.4 cm, previously 1.6 x 3.1 x 2.2 cm. Notably, it is the solid  previously hypermetabolic portion of this lymph node that has  increased in size.  - New part cystic part solid right level 2A lymph node measuring 1.7  cm.  - New part cystic part solid left level 3 lymph node measuring 1.6 cm.    Decreased mass effect upon the aerodigestive tract.    Major cervical vessels are patent. Parotid, submandibular, and thyroid  glands are unremarkable. Paranasal sinuses are clear. Orbits and  visualized intracranial contents are unremarkable. Multilevel cervical  spondylosis similar to prior exam. Lung apices are clear.  Impression: IMPRESSION:  1. Decreased size of primary squamous cell carcinoma of the left  tonsil.  2. Multiple new suspicious cervical lymph nodes bilaterally and  increased size of previously hypermetabolic left level 2A cervical  lymph node.    I have personally reviewed the examination and initial interpretation  and I agree with the findings.    ORION ENGLISH MD         SYSTEM ID:  [id number]     Imaging was personally reviewed and interpreted by me as above       Dominique Mueller MD

## 2023-05-31 NOTE — PROGRESS NOTES
Infusion Nursing Note:  Jonathan Bishop presents today for Cycle 3 Day 1 Pembrolizumab.    Patient seen by provider today: Yes: Dr. Mueller   present during visit today: Not Applicable.    Note: Conner presents to infusion today following his provider appointment and denies any questions or concerns.      Intravenous Access:  Peripheral IV placed by lab.    Treatment Conditions:  Lab Results   Component Value Date    HGB 9.8 (L) 04/18/2023    WBC 7.9 04/18/2023    ANEU 3.9 10/28/2020    ANEUTAUTO 4.6 04/18/2023     04/18/2023      Lab Results   Component Value Date     05/31/2023    POTASSIUM 4.1 05/31/2023    MAG 2.0 12/09/2020    CR 0.84 05/31/2023    WARREN 9.7 05/31/2023    BILITOTAL 0.2 05/31/2023    ALBUMIN 3.8 05/31/2023    ALT 17 05/31/2023    AST 22 05/31/2023     Results reviewed, labs MET treatment parameters, ok to proceed with treatment.      Post Infusion Assessment:  Patient tolerated infusion without incident.  Blood return noted pre and post infusion.  Site patent and intact, free from redness, edema or discomfort.  No evidence of extravasations.  Access discontinued per protocol.       Discharge Plan:   Patient declined prescription refills.  Discharge instructions reviewed with: Patient.  Patient and/or family verbalized understanding of discharge instructions and all questions answered.  AVS to patient via [employer].  Patient will return in 3 weeks for next appointment. Appointment not scheduled, orders placed by provider, patient aware to watch for appointments on MYCHART.   Patient discharged in stable condition accompanied by: daughter.  Departure Mode: Wheelchair.      Joanie Taveras RN

## 2023-05-31 NOTE — NURSING NOTE
Chief Complaint   Patient presents with     Blood Draw     Labs drawn with piv start.  VS taken.     Labs drawn with PIV start.  Pt tolerated well.  VS taken and pt checked in for next appt.    Modesta Turk RN

## 2023-06-07 ENCOUNTER — OFFICE VISIT (OUTPATIENT)
Dept: ORTHOPEDICS | Facility: CLINIC | Age: 78
End: 2023-06-07
Payer: COMMERCIAL

## 2023-06-07 ENCOUNTER — TELEPHONE (OUTPATIENT)
Dept: INTERNAL MEDICINE | Facility: CLINIC | Age: 78
End: 2023-06-07

## 2023-06-07 DIAGNOSIS — I73.9 PVD (PERIPHERAL VASCULAR DISEASE) (H): Primary | ICD-10-CM

## 2023-06-07 DIAGNOSIS — B35.1 OM (ONYCHOMYCOSIS): ICD-10-CM

## 2023-06-07 DIAGNOSIS — I73.89 OTHER SPECIFIED PERIPHERAL VASCULAR DISEASES (H): ICD-10-CM

## 2023-06-07 PROCEDURE — 99213 OFFICE O/P EST LOW 20 MIN: CPT | Performed by: PODIATRIST

## 2023-06-07 NOTE — LETTER
6/7/2023         RE: Jonathan Bishop  [address]  [facility] [address]        Dear Colleague,    Thank you for referring your patient, Jonathan Bishop, to the [facility]. Please see a copy of my visit note below.    Chief Complaint   Patient presents with    Follow Up     4 month follow up.             No Known Allergies      Subjective: Jonathan is a 77 year old male who presents to the clinic today for a follow up of elongated nails. He relates that the nails are long and painful. Edema in the legs and feet continues to improve.  He is now wearing compression socks.      Objective  PT and DP pulses are non-palpable bilaterally. CRT is >3 seconds. Diminished pedal hair. Mild non-pitting edema noted to BL LE.  Gross sensation is slightly decreased bilaterally.   Nails are thickened, discolored, dystrophic and elongated bilaterally to varying degrees. Nails have subungual debris consistent with onychomycosis. No open lesions are noted. Skin is normal.      Assessment: PVD  Onychomycosis x 10      Plan:  - Pt seen and evaluated.  - Nails were debrided x 10..  -Continue compression socks.  - See again in 3 months.       Mamadou Gary DPM

## 2023-06-07 NOTE — PROGRESS NOTES
Chief Complaint   Patient presents with     Follow Up     4 month follow up.             No Known Allergies      Subjective: Jonathan is a 77 year old male who presents to the clinic today for a follow up of elongated nails. He relates that the nails are long and painful. Edema in the legs and feet continues to improve.  He is now wearing compression socks.      Objective  PT and DP pulses are non-palpable bilaterally. CRT is >3 seconds. Diminished pedal hair. Mild non-pitting edema noted to BL LE.  Gross sensation is slightly decreased bilaterally.   Nails are thickened, discolored, dystrophic and elongated bilaterally to varying degrees. Nails have subungual debris consistent with onychomycosis. No open lesions are noted. Skin is normal.      Assessment: PVD  Onychomycosis x 10      Plan:  - Pt seen and evaluated.  - Nails were debrided x 10..  -Continue compression socks.  - See again in 3 months.     
Never smoker

## 2023-06-07 NOTE — TELEPHONE ENCOUNTER
[facility] Call Center    Phone Message    May a detailed message be left on voicemail: yes     Reason for Call: Other: Patient calling stating that [employer] is waiting for a letter of authorization from Dr. Nascimento for his electric scooter. Patient states he does not have a fax number, but their phone number is, [phone]. He states the letter can also be sent to him directly.      Action Taken: Message routed to:  [facility]    Travel Screening: Not Applicable

## 2023-06-08 NOTE — TELEPHONE ENCOUNTER
Re-faxed order and provider visit notes to [employer].      Michael Weldon CMA ([facility]) at 7:23 AM on 6/8/2023

## 2023-06-20 ENCOUNTER — LAB (OUTPATIENT)
Dept: LAB | Facility: CLINIC | Age: 78
End: 2023-06-20
Payer: COMMERCIAL

## 2023-06-20 ENCOUNTER — ONCOLOGY VISIT (OUTPATIENT)
Dept: ONCOLOGY | Facility: CLINIC | Age: 78
End: 2023-06-20
Attending: NURSE PRACTITIONER
Payer: COMMERCIAL

## 2023-06-20 DIAGNOSIS — C78.00 MALIGNANT NEOPLASM METASTATIC TO LUNG, UNSPECIFIED LATERALITY (H): ICD-10-CM

## 2023-06-20 DIAGNOSIS — C09.9 TONSIL CANCER (H): Primary | ICD-10-CM

## 2023-06-20 DIAGNOSIS — R79.89 ELEVATED SERUM CREATININE: ICD-10-CM

## 2023-06-20 DIAGNOSIS — C10.9 SQUAMOUS CELL CARCINOMA OF OROPHARYNX (H): ICD-10-CM

## 2023-06-20 DIAGNOSIS — Z13.29 SCREENING FOR HYPOTHYROIDISM: ICD-10-CM

## 2023-06-20 DIAGNOSIS — C09.9 TONSIL CANCER (H): ICD-10-CM

## 2023-06-20 LAB
ALBUMIN SERPL BCG-MCNC: 4.1 G/DL (ref 3.5–5.2)
ALP SERPL-CCNC: 106 U/L (ref 40–129)
ALT SERPL W P-5'-P-CCNC: 15 U/L (ref 0–70)
ANION GAP SERPL CALCULATED.3IONS-SCNC: 13 MMOL/L (ref 7–15)
AST SERPL W P-5'-P-CCNC: 22 U/L (ref 0–45)
BASOPHILS # BLD AUTO: 0.1 10E3/UL (ref 0–0.2)
BASOPHILS NFR BLD AUTO: 1 %
BILIRUB SERPL-MCNC: 0.2 MG/DL
BUN SERPL-MCNC: 39.8 MG/DL (ref 8–23)
CALCIUM SERPL-MCNC: 9.8 MG/DL (ref 8.8–10.2)
CHLORIDE SERPL-SCNC: 100 MMOL/L (ref 98–107)
CREAT SERPL-MCNC: 1.42 MG/DL (ref 0.67–1.17)
DEPRECATED HCO3 PLAS-SCNC: 24 MMOL/L (ref 22–29)
EOSINOPHIL # BLD AUTO: 0.5 10E3/UL (ref 0–0.7)
EOSINOPHIL NFR BLD AUTO: 5 %
ERYTHROCYTE [DISTWIDTH] IN BLOOD BY AUTOMATED COUNT: 17.3 % (ref 10–15)
GFR SERPL CREATININE-BSD FRML MDRD: 51 ML/MIN/1.73M2
GLUCOSE SERPL-MCNC: 92 MG/DL (ref 70–99)
HCT VFR BLD AUTO: 32.1 % (ref 40–53)
HGB BLD-MCNC: 9.8 G/DL (ref 13.3–17.7)
IMM GRANULOCYTES # BLD: 0 10E3/UL
IMM GRANULOCYTES NFR BLD: 0 %
LYMPHOCYTES # BLD AUTO: 2.4 10E3/UL (ref 0.8–5.3)
LYMPHOCYTES NFR BLD AUTO: 27 %
MCH RBC QN AUTO: 24.3 PG (ref 26.5–33)
MCHC RBC AUTO-ENTMCNC: 30.5 G/DL (ref 31.5–36.5)
MCV RBC AUTO: 80 FL (ref 78–100)
MONOCYTES # BLD AUTO: 0.9 10E3/UL (ref 0–1.3)
MONOCYTES NFR BLD AUTO: 10 %
NEUTROPHILS # BLD AUTO: 5 10E3/UL (ref 1.6–8.3)
NEUTROPHILS NFR BLD AUTO: 57 %
NRBC # BLD AUTO: 0 10E3/UL
NRBC BLD AUTO-RTO: 0 /100
PLATELET # BLD AUTO: 254 10E3/UL (ref 150–450)
POTASSIUM SERPL-SCNC: 4.1 MMOL/L (ref 3.4–5.3)
PROT SERPL-MCNC: 7.6 G/DL (ref 6.4–8.3)
RBC # BLD AUTO: 4.03 10E6/UL (ref 4.4–5.9)
SODIUM SERPL-SCNC: 137 MMOL/L (ref 136–145)
T4 FREE SERPL-MCNC: 1.27 NG/DL (ref 0.9–1.7)
TSH SERPL DL<=0.005 MIU/L-ACNC: 1.78 UIU/ML (ref 0.3–4.2)
WBC # BLD AUTO: 8.9 10E3/UL (ref 4–11)

## 2023-06-20 PROCEDURE — 84439 ASSAY OF FREE THYROXINE: CPT | Performed by: PATHOLOGY

## 2023-06-20 PROCEDURE — G0463 HOSPITAL OUTPT CLINIC VISIT: HCPCS | Performed by: NURSE PRACTITIONER

## 2023-06-20 PROCEDURE — 84443 ASSAY THYROID STIM HORMONE: CPT | Performed by: PATHOLOGY

## 2023-06-20 PROCEDURE — 36415 COLL VENOUS BLD VENIPUNCTURE: CPT | Performed by: PATHOLOGY

## 2023-06-20 PROCEDURE — 85025 COMPLETE CBC W/AUTO DIFF WBC: CPT | Performed by: PATHOLOGY

## 2023-06-20 PROCEDURE — 99214 OFFICE O/P EST MOD 30 MIN: CPT | Performed by: NURSE PRACTITIONER

## 2023-06-20 PROCEDURE — 80053 COMPREHEN METABOLIC PANEL: CPT | Performed by: PATHOLOGY

## 2023-06-20 RX ORDER — ALBUTEROL SULFATE 90 UG/1
1-2 AEROSOL, METERED RESPIRATORY (INHALATION)
Status: CANCELLED
Start: 2023-07-12

## 2023-06-20 RX ORDER — ALBUTEROL SULFATE 0.83 MG/ML
2.5 SOLUTION RESPIRATORY (INHALATION)
Status: CANCELLED | OUTPATIENT
Start: 2023-06-21

## 2023-06-20 RX ORDER — HEPARIN SODIUM (PORCINE) LOCK FLUSH IV SOLN 100 UNIT/ML 100 UNIT/ML
5 SOLUTION INTRAVENOUS
Status: CANCELLED | OUTPATIENT
Start: 2023-07-12

## 2023-06-20 RX ORDER — ALBUTEROL SULFATE 0.83 MG/ML
2.5 SOLUTION RESPIRATORY (INHALATION)
Status: CANCELLED | OUTPATIENT
Start: 2023-07-12

## 2023-06-20 RX ORDER — DIPHENHYDRAMINE HYDROCHLORIDE 50 MG/ML
50 INJECTION INTRAMUSCULAR; INTRAVENOUS
Status: CANCELLED
Start: 2023-07-12

## 2023-06-20 RX ORDER — METHYLPREDNISOLONE SODIUM SUCCINATE 125 MG/2ML
125 INJECTION, POWDER, LYOPHILIZED, FOR SOLUTION INTRAMUSCULAR; INTRAVENOUS
Status: CANCELLED
Start: 2023-06-21

## 2023-06-20 RX ORDER — HEPARIN SODIUM,PORCINE 10 UNIT/ML
5 VIAL (ML) INTRAVENOUS
Status: CANCELLED | OUTPATIENT
Start: 2023-07-12

## 2023-06-20 RX ORDER — MEPERIDINE HYDROCHLORIDE 25 MG/ML
25 INJECTION INTRAMUSCULAR; INTRAVENOUS; SUBCUTANEOUS EVERY 30 MIN PRN
Status: CANCELLED | OUTPATIENT
Start: 2023-06-21

## 2023-06-20 RX ORDER — LORAZEPAM 2 MG/ML
0.5 INJECTION INTRAMUSCULAR EVERY 4 HOURS PRN
Status: CANCELLED | OUTPATIENT
Start: 2023-07-12

## 2023-06-20 RX ORDER — LORAZEPAM 2 MG/ML
0.5 INJECTION INTRAMUSCULAR EVERY 4 HOURS PRN
Status: CANCELLED | OUTPATIENT
Start: 2023-06-21

## 2023-06-20 RX ORDER — MEPERIDINE HYDROCHLORIDE 25 MG/ML
25 INJECTION INTRAMUSCULAR; INTRAVENOUS; SUBCUTANEOUS EVERY 30 MIN PRN
Status: CANCELLED | OUTPATIENT
Start: 2023-07-12

## 2023-06-20 RX ORDER — EPINEPHRINE 1 MG/ML
0.3 INJECTION, SOLUTION INTRAMUSCULAR; SUBCUTANEOUS EVERY 5 MIN PRN
Status: CANCELLED | OUTPATIENT
Start: 2023-07-12

## 2023-06-20 RX ORDER — EPINEPHRINE 1 MG/ML
0.3 INJECTION, SOLUTION INTRAMUSCULAR; SUBCUTANEOUS EVERY 5 MIN PRN
Status: CANCELLED | OUTPATIENT
Start: 2023-06-21

## 2023-06-20 RX ORDER — DIPHENHYDRAMINE HYDROCHLORIDE 50 MG/ML
50 INJECTION INTRAMUSCULAR; INTRAVENOUS
Status: CANCELLED
Start: 2023-06-21

## 2023-06-20 RX ORDER — HEPARIN SODIUM (PORCINE) LOCK FLUSH IV SOLN 100 UNIT/ML 100 UNIT/ML
5 SOLUTION INTRAVENOUS
Status: CANCELLED | OUTPATIENT
Start: 2023-06-21

## 2023-06-20 RX ORDER — ALBUTEROL SULFATE 90 UG/1
1-2 AEROSOL, METERED RESPIRATORY (INHALATION)
Status: CANCELLED
Start: 2023-06-21

## 2023-06-20 RX ORDER — HEPARIN SODIUM,PORCINE 10 UNIT/ML
5 VIAL (ML) INTRAVENOUS
Status: CANCELLED | OUTPATIENT
Start: 2023-06-21

## 2023-06-20 RX ORDER — METHYLPREDNISOLONE SODIUM SUCCINATE 125 MG/2ML
125 INJECTION, POWDER, LYOPHILIZED, FOR SOLUTION INTRAMUSCULAR; INTRAVENOUS
Status: CANCELLED
Start: 2023-07-12

## 2023-06-20 NOTE — PROGRESS NOTES
[facility]    PATIENT NAME: Jonathan Bishop  MRN # [id number]   DATE OF VISIT: June 20, 2023  YOB: 1945     Referring Provider: Dr. Karishma Eng  Otolaryngology: Dr. Karishma Eng  Radiation Oncology: Dr. Jenni Mills  PCP: Dr. Buck Nascimento    CANCER TYPE: SCC L tonsil, p16 +lety  STAGE: [id number] (IVC)  ECOG PS: 1    PD-L1: TPS 20%, CPS 25% on [id number] tonsil bx  NGS: N/A    SUMMARY  2/26/23 L tonsil mass bx in clinic (Dr. Eng).   2/20/23 PET/CT. 3.7 x 4.0 x 5.1 cm mass L palatine tonsil causing narrowing of the oropharyngeal lumen, L level 2 node, L retropharyngeal nodular density, extension of primary mass vs retropharyngeal node, 0.8 cm RLL nodule concerning for met, mild focal uptake R iliac bone and L1 without CT correlate suspicious for mets.   3/2/23 R VATS, wedge resection. Path: SCC, poorly differentiated, associated with necrosis, 1 cm, negative margins, p16 +lety  3/24/23 MRI L spine and pelvis. Diffusely heterogeneous marrow signal throughout without corresponding abnormal signal on sagittal STIR sequence and no abnormal enhancement. Nonspecific but could be benign process such as red marrow hyperplasia, cannot completely exclude metastatic disease or infiltrative pathologic marrow process. No lesions corresponding to FDG avid spots on PET/CT.   4/19/23~current Pembrolizumab.      SUBJECTIVE  Mr. Bishop returns for follow up after 3 cycles of pembrolizumab. Continues to tolerate well. Eating better, feeling better. Going on fishing trip next month. Bowels moving. Breathing is stable. Reports mobility has improved but he is getting a scooter tomorrow.     M-W-F are best days for scheduling    Going to [place] 7/17 ~ 7/18     PAST MEDICAL HISTORY  SCC as above  Chronic LBP  TAVR 2020  H/o rheumatic carditis 1950  CHF. 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 Nocturia once nightly   ED  Cataracts  Umbilical hernia repair  2011  Knee arthroplasty B 2010  Lumbar spine fusion, laminectomy, sciatic pain R > L  Peripheral neuropathy - from pinched nerves?  Hearing loss    Numbness/tingling in both feet - bilaterally, symmetric, R spasms more than left    CURRENT OUTPATIENT MEDICATIONS  Current Outpatient Medications   Medication Sig Dispense Refill     acetaminophen (TYLENOL) 325 MG tablet Take 3 tablets (975 mg) by mouth every 6 hours       diazepam (VALIUM) 5 MG tablet Take 1-2 tablets (5-10 mg) by mouth once as needed for anxiety (may repeat one time if needed.) 4 tablet 0     ibuprofen (ADVIL/MOTRIN) 600 MG tablet Take 1 tablet (600 mg) by mouth every 8 hours as needed for moderate pain (4-6) 270 tablet 3     magic mouthwash suspension (diphenhydrAMINE, lidocaine, aluminum-magnesium & simethicone) Swish and spit 10 mLs in mouth every 6 hours as needed for mouth sores (Patient not taking: Reported on 5/31/2023) 200 mL 3     methocarbamol (ROBAXIN) 500 MG tablet Take 1 tablet (500 mg) by mouth every 6 hours as needed for muscle spasms 30 tablet 0     order for DME Equipment being ordered: Walker ()  Treatment Diagnosis: stroke 1 Units 0     oxyCODONE-acetaminophen (PERCOCET) 5-325 MG tablet Take 1-2 tablets by mouth every 6 hours as needed for pain Max 5 per day. 150 tablet 0     senna-docusate (SENOKOT-S/PERICOLACE) 8.6-50 MG tablet Take 1 tablet by mouth 2 times daily Reduce dose or temporarily discontinue if having loose stools. 50 tablet 0     spironolactone (ALDACTONE) 50 MG tablet Take 1 tablet (50 mg) by mouth daily 90 tablet 3     tamsulosin (FLOMAX) 0.4 MG capsule TAKE 1 CAPSULE BY MOUTH ONCE DAILY 90 capsule 4     vitamin D3 (CHOLECALCIFEROL) 50 mcg (2000 units) tablet Take 1 tablet by mouth daily       ALLERGIES  No Known Allergies     REVIEW OF SYSTEMS  As above in the HPI, o/w complete 12-point ROS was negative.    PHYSICAL EXAM  There were no vitals taken for this visit.  GEN: NAD  HEENT: EOMI, no icterus, injection  or pallor  EXT: trace edema  NEURO: alert. Walking with his walker  SKIN: no rashes    LABORATORY AND IMAGING STUDIES  Most Recent 3 CBC's:  Recent Labs   Lab Test 06/20/23  1507 04/18/23  0911 03/03/23  0720 02/27/23  1603   WBC 8.9 7.9 13.2* 8.7   HGB 9.8* 9.8* 9.3* 9.4*   MCV 80 80 80 81    240 195 226   ANEUTAUTO 5.0 4.6  --  5.6     Most Recent 3 BMP's:  Recent Labs   Lab Test 05/31/23  1209 05/26/23  1011 05/08/23  0958 04/18/23  0911     --  144 141   POTASSIUM 4.1  --  4.8 4.3   CHLORIDE 107  --  107 106   CO2 28  --  26 23   BUN 17.1  --  33.4* 20.0   CR 0.84 0.8 1.35* 0.98   ANIONGAP 7  --  11 12   WARREN 9.7  --  10.3* 9.9   *  --  101* 124*   PROTTOTAL 7.0  --  7.7 7.1   ALBUMIN 3.8  --  4.3 4.0    Most Recent 3 LFT's:  Recent Labs   Lab Test 05/31/23  1209 05/08/23  0958 04/18/23  0911   AST 22 23 28   ALT 17 14 18   ALKPHOS 94 108 97   BILITOTAL 0.2 0.2 0.2    Most Recent 2 TSH and T4:  Recent Labs   Lab Test 05/31/23  1209 05/08/23  0958 04/18/23  0911   TSH 1.86 1.29 0.85   T4  --   --  1.33     I reviewed the above labs today.      Labs were independently reviewed and interpreted by me    CT Chest/Abdomen/Pelvis w Contrast  Narrative: EXAM: CT CHEST/ABDOMEN/PELVIS W CONTRAST  LOCATION: [facility]  DATE/TIME: 5/26/2023 10:33 AM CDT    INDICATION: Restage oligometastatic SCC L tonsil, resected lung met, on carboplatin paclitaxel + pembrolizumab x 2 cycles  COMPARISON: PET/CT, 02/20/2023  TECHNIQUE: CT scan of the chest, abdomen, and pelvis was performed following injection of IV contrast. Multiplanar reformats were obtained. Dose reduction techniques were used.   CONTRAST: Isovue 370 119 cc    FINDINGS:   LUNGS AND PLEURA: Interval wedge resection changes involving the right lower lobe with resection of a previously seen right lower lobe pulmonary nodule. There is a 7 x 4 mm left lower lobe pulmonary nodule on image 151 of series 5 which  previously   measured 2 mm in diameter on 02/20/2023 within the medial aspect of the left upper lobe on image 83 of series 5, there is a 6 x 4 mm pulmonary nodule which previously measured 1 mm diameter. 2 mm right upper lobe pulmonary nodule on image 70 of series 5   appears new since comparison examination. Also new within the right lower lobe on image 176 anteriorly is a 2 mm pulmonary nodule.    MEDIASTINUM/AXILLAE: Heart size is normal. The thoracic aorta is normal in caliber. There is a borderline prominent right hilar lymph node on image 170 of series 4 measuring 1.0 x 1.8 cm in size. This compares with 0.8 x 1.6 cm previously. There are a   couple of nodules involving the thyroid gland, unchanged. The largest measures 9 mm in greatest dimension on the right.    CORONARY ARTERY CALCIFICATION: Mild.    HEPATOBILIARY: Normal.    PANCREAS: Normal.    SPLEEN: Normal.    ADRENAL GLANDS: Normal.    KIDNEYS/BLADDER: There are simple-appearing right renal cysts.    BOWEL: No bowel obstruction or bowel inflammation. The appendix is normal.    LYMPH NODES: Normal.    VASCULATURE: Unremarkable.    PELVIC ORGANS: Normal.    MUSCULOSKELETAL: No definite suspicious osseous lesions. Surgical changes of a lumbosacral fusion.  Impression: IMPRESSION:  1.  Interval wedge resection changes involving the right lower lobe.  2.  Increasing size of left lung pulmonary nodules. There are a couple of tiny right lung nodules which are new from comparison CT scan as well. These are suspicious for metastatic disease.  3.  Mildly enlarged right hilar lymph node.  CT Soft Tissue Neck w Contrast  Narrative: EXAM: CT SOFT TISSUE NECK W CONTRAST  5/26/2023 10:31 AM     HISTORY:  restage oligometastatic SCC L tonsil, resected lung met, on  carboplatin paclitaxel + pembrolizumab x 2 cycles; Malignant neoplasm  metastatic to lung, unspecified laterality (H)         COMPARISON:  PET/CT 2/20/2023     TECHNIQUE: Following intravenous  administration of nonionic iodinated  contrast medium, thin section helical CT images were obtained from the  skull base down to the level of the aortic arch.  Axial, coronal and  sagittal reformations were performed with 2-3 mm slice thickness  reconstruction. Images were reviewed in soft tissue, lung and bone  windows.    CONTRAST: Isovue 370 119cc    FINDINGS:   - 2.9 x 3.7 x 3.8 cm mass centered in the left palatine tonsil  previously hypermetabolic and measuring 4 x 3.9 x 5.3 cm. Unchanged  adjacent satellite nodule in the posterior oropharynx measuring up to  1.7 cm.  - Left level 2A cystic and solid metastatic lymph node measures 2.3 x  4.6 x 2.4 cm, previously 1.6 x 3.1 x 2.2 cm. Notably, it is the solid  previously hypermetabolic portion of this lymph node that has  increased in size.  - New part cystic part solid right level 2A lymph node measuring 1.7  cm.  - New part cystic part solid left level 3 lymph node measuring 1.6 cm.    Decreased mass effect upon the aerodigestive tract.    Major cervical vessels are patent. Parotid, submandibular, and thyroid  glands are unremarkable. Paranasal sinuses are clear. Orbits and  visualized intracranial contents are unremarkable. Multilevel cervical  spondylosis similar to prior exam. Lung apices are clear.  Impression: IMPRESSION:  1. Decreased size of primary squamous cell carcinoma of the left  tonsil.  2. Multiple new suspicious cervical lymph nodes bilaterally and  increased size of previously hypermetabolic left level 2A cervical  lymph node.    I have personally reviewed the examination and initial interpretation  and I agree with the findings.    ORION ENGLISH MD         SYSTEM ID:  [id number]     Imaging was personally reviewed and interpreted by me as above       ASSESSMENT AND PLAN  SCC L tonsil, p16 +lety, CPS 25%/TPS 20%, oligometastatic lung met: Tolerating keytruda well--can continue today. Clinically has noticed improvement-- he's swallowing better  and hemoptysis has resolved. Restage after 2 more cycles of pembrolizumab as scheduled prior to cycle 6. No need for MARK visit prior to cycle 5 given his tolerance but can add if things change.     Microcytic anemia: Iron studies 8/2022 showed perhaps very mild LEW to normal iron studies. Current studies 4/18/23 show roughly the same. No supplements for now.    Hyperglyemia, pre-diabetes: A1c 6.0-6.3 for years now. Not discussed today    H/o CVA: Residual L weakness. Uses cane and walker at baseline, but independent and functional. Getting a scooter tomorrow    Peripheral neuropathy: MRI shows a lot of foramenal stenosis and spinal canal stenosis, so more likely related to that than DM2. TSHs have been fine. B12 4/1823 was 1131. No worsening on pembro        30 minutes spent on the date of the encounter doing chart review, review of test results, interpretation of tests, patient visit, documentation and discussion with other provider(s)     Olivia Sanchez, CNP on 6/20/2023 at 4:25 PM       Addendum: CMP machine was down so patient left prior to infusion 6/20 with request to r/s. Reviewed elevated Cr. I think its okay for keytruda when he returns still but will give some extra hydration. He has previously had elevated Cr that responded to fluids and was not consistent despite keytruda so this does not fit IM nephritis picture.

## 2023-06-20 NOTE — PROGRESS NOTES
Infusion Nursing Note:  Jonathan Bishop presents today for C4D1 Keytruda (did not receive).    Patient seen by provider today: Yes: Olivia Sanchez, YOANNA    present during visit today: Not Applicable.    Note: Pt saw provider prior to infusion, ok for treatment.    -CMP machine was down and results were longer than expected.  -When results came back pt said he did not want to stay for infusion  -Encouraged pt to stay for infusion due to the Wait List and he declined  -Encouraged pt to leave urine sample, creat is elevated today, he declined.    Intravenous Access:  No Intravenous access at this visit.    Treatment Conditions:  Lab Results   Component Value Date    HGB 9.8 (L) 06/20/2023    WBC 8.9 06/20/2023    ANEU 3.9 10/28/2020    ANEUTAUTO 5.0 06/20/2023     06/20/2023      Lab Results   Component Value Date     06/20/2023    POTASSIUM 4.1 06/20/2023    MAG 2.0 12/09/2020    CR 1.42 (H) 06/20/2023    WARREN 9.8 06/20/2023    BILITOTAL 0.2 06/20/2023    ALBUMIN 4.1 06/20/2023    ALT 15 06/20/2023    AST 22 06/20/2023         Discharge Plan:   Patient declined prescription refills.  Discharge instructions reviewed with: Patient.  Patient and/or family verbalized understanding of discharge instructions and all questions answered.  AVS to patient via [employer].  IB sent to scheduling to get pt in ASAP for labs/Keytruda  Patient discharged in stable condition accompanied by: self.  Departure Mode: Ambulatory.    Isamar Gee RN

## 2023-06-20 NOTE — Clinical Note
6/20/2023         RE: Jonathan Bishop  [address]  [facility] [address]        Dear Colleague,    Thank you for referring your patient, Jonathan Bishop, to the [facility]. Please see a copy of my visit note below.       [facility]    PATIENT NAME: Jonathan Bishop  MRN # [id number]   DATE OF VISIT: June 20, 2023  YOB: 1945     Referring Provider: Dr. Karishma Eng  Otolaryngology: Dr. Karishma Eng  Radiation Oncology: Dr. Jenni Mills  PCP: Dr. Buck Nascimento    CANCER TYPE: SCC L tonsil, p16 +lety  STAGE: [id number] (IVC)  ECOG PS: 1    PD-L1: TPS 20%, CPS 25% on [id number] tonsil bx  NGS: N/A    SUMMARY  2/26/23 L tonsil mass bx in clinic (Dr. Eng).   2/20/23 PET/CT. 3.7 x 4.0 x 5.1 cm mass L palatine tonsil causing narrowing of the oropharyngeal lumen, L level 2 node, L retropharyngeal nodular density, extension of primary mass vs retropharyngeal node, 0.8 cm RLL nodule concerning for met, mild focal uptake R iliac bone and L1 without CT correlate suspicious for mets.   3/2/23 R VATS, wedge resection. Path: SCC, poorly differentiated, associated with necrosis, 1 cm, negative margins, p16 +lety  3/24/23 MRI L spine and pelvis. Diffusely heterogeneous marrow signal throughout without corresponding abnormal signal on sagittal STIR sequence and no abnormal enhancement. Nonspecific but could be benign process such as red marrow hyperplasia, cannot completely exclude metastatic disease or infiltrative pathologic marrow process. No lesions corresponding to FDG avid spots on PET/CT.   4/19/23~current Pembrolizumab.      SUBJECTIVE  Mr. Bishop returns for follow up after 2 cycles of pembrolizumab.   Doing well overall. Hemoptysis stopped. No dysphagia. Gaining some weight as he's able to eat better. No F/C. No N/V. A little constipation that he is managing. No increase in numbness/tingling. No shortness of breath. Getting around ok. No Chest  pain/pressure. Feels actually quite well.     M-W-F are best days for scheduling    Going to [place] 7/17 ~ 7/18     PAST MEDICAL HISTORY  SCC as above  Chronic LBP  TAVR 2020  H/o rheumatic carditis 1950  CHF. 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 Nocturia once nightly   ED  Cataracts  Umbilical hernia repair 2011  Knee arthroplasty B 2010  Lumbar spine fusion, laminectomy, sciatic pain R > L  Peripheral neuropathy - from pinched nerves?  Hearing loss    Numbness/tingling in both feet - bilaterally, symmetric, R spasms more than left    CURRENT OUTPATIENT MEDICATIONS  Current Outpatient Medications   Medication Sig Dispense Refill     acetaminophen (TYLENOL) 325 MG tablet Take 3 tablets (975 mg) by mouth every 6 hours       diazepam (VALIUM) 5 MG tablet Take 1-2 tablets (5-10 mg) by mouth once as needed for anxiety (may repeat one time if needed.) 4 tablet 0     ibuprofen (ADVIL/MOTRIN) 600 MG tablet Take 1 tablet (600 mg) by mouth every 8 hours as needed for moderate pain (4-6) 270 tablet 3     magic mouthwash suspension (diphenhydrAMINE, lidocaine, aluminum-magnesium & simethicone) Swish and spit 10 mLs in mouth every 6 hours as needed for mouth sores (Patient not taking: Reported on 5/31/2023) 200 mL 3     methocarbamol (ROBAXIN) 500 MG tablet Take 1 tablet (500 mg) by mouth every 6 hours as needed for muscle spasms 30 tablet 0     order for DME Equipment being ordered: Walker ()  Treatment Diagnosis: stroke 1 Units 0     oxyCODONE-acetaminophen (PERCOCET) 5-325 MG tablet Take 1-2 tablets by mouth every 6 hours as needed for pain Max 5 per day. 150 tablet 0     senna-docusate (SENOKOT-S/PERICOLACE) 8.6-50 MG tablet Take 1 tablet by mouth 2 times daily Reduce dose or temporarily discontinue if having loose stools. 50 tablet 0     spironolactone (ALDACTONE) 50 MG tablet Take 1 tablet (50 mg) by mouth daily 90 tablet 3     tamsulosin (FLOMAX) 0.4 MG capsule  TAKE 1 CAPSULE BY MOUTH ONCE DAILY 90 capsule 4     vitamin D3 (CHOLECALCIFEROL) 50 mcg (2000 units) tablet Take 1 tablet by mouth daily       ALLERGIES  No Known Allergies     REVIEW OF SYSTEMS  As above in the HPI, o/w complete 12-point ROS was negative.    PHYSICAL EXAM  There were no vitals taken for this visit.  GEN: NAD  HEENT: EOMI, no icterus, injection or pallor  EXT: trace edema  NEURO: alert. Walking with his walker  SKIN: no rashes    LABORATORY AND IMAGING STUDIES  Most Recent 3 CBC's:  Recent Labs   Lab Test 06/20/23  1507 04/18/23  0911 03/03/23  0720 02/27/23  1603   WBC 8.9 7.9 13.2* 8.7   HGB 9.8* 9.8* 9.3* 9.4*   MCV 80 80 80 81    240 195 226   ANEUTAUTO 5.0 4.6  --  5.6     Most Recent 3 BMP's:  Recent Labs   Lab Test 05/31/23  1209 05/26/23  1011 05/08/23  0958 04/18/23  0911     --  144 141   POTASSIUM 4.1  --  4.8 4.3   CHLORIDE 107  --  107 106   CO2 28  --  26 23   BUN 17.1  --  33.4* 20.0   CR 0.84 0.8 1.35* 0.98   ANIONGAP 7  --  11 12   WARREN 9.7  --  10.3* 9.9   *  --  101* 124*   PROTTOTAL 7.0  --  7.7 7.1   ALBUMIN 3.8  --  4.3 4.0    Most Recent 3 LFT's:  Recent Labs   Lab Test 05/31/23  1209 05/08/23  0958 04/18/23  0911   AST 22 23 28   ALT 17 14 18   ALKPHOS 94 108 97   BILITOTAL 0.2 0.2 0.2    Most Recent 2 TSH and T4:  Recent Labs   Lab Test 05/31/23  1209 05/08/23  0958 04/18/23  0911   TSH 1.86 1.29 0.85   T4  --   --  1.33     I reviewed the above labs today.      Labs were independently reviewed and interpreted by me    CT Chest/Abdomen/Pelvis w Contrast  Narrative: EXAM: CT CHEST/ABDOMEN/PELVIS W CONTRAST  LOCATION: [facility]  DATE/TIME: 5/26/2023 10:33 AM CDT    INDICATION: Restage oligometastatic SCC L tonsil, resected lung met, on carboplatin paclitaxel + pembrolizumab x 2 cycles  COMPARISON: PET/CT, 02/20/2023  TECHNIQUE: CT scan of the chest, abdomen, and pelvis was performed following injection of IV  contrast. Multiplanar reformats were obtained. Dose reduction techniques were used.   CONTRAST: Isovue 370 119 cc    FINDINGS:   LUNGS AND PLEURA: Interval wedge resection changes involving the right lower lobe with resection of a previously seen right lower lobe pulmonary nodule. There is a 7 x 4 mm left lower lobe pulmonary nodule on image 151 of series 5 which previously   measured 2 mm in diameter on 02/20/2023 within the medial aspect of the left upper lobe on image 83 of series 5, there is a 6 x 4 mm pulmonary nodule which previously measured 1 mm diameter. 2 mm right upper lobe pulmonary nodule on image 70 of series 5   appears new since comparison examination. Also new within the right lower lobe on image 176 anteriorly is a 2 mm pulmonary nodule.    MEDIASTINUM/AXILLAE: Heart size is normal. The thoracic aorta is normal in caliber. There is a borderline prominent right hilar lymph node on image 170 of series 4 measuring 1.0 x 1.8 cm in size. This compares with 0.8 x 1.6 cm previously. There are a   couple of nodules involving the thyroid gland, unchanged. The largest measures 9 mm in greatest dimension on the right.    CORONARY ARTERY CALCIFICATION: Mild.    HEPATOBILIARY: Normal.    PANCREAS: Normal.    SPLEEN: Normal.    ADRENAL GLANDS: Normal.    KIDNEYS/BLADDER: There are simple-appearing right renal cysts.    BOWEL: No bowel obstruction or bowel inflammation. The appendix is normal.    LYMPH NODES: Normal.    VASCULATURE: Unremarkable.    PELVIC ORGANS: Normal.    MUSCULOSKELETAL: No definite suspicious osseous lesions. Surgical changes of a lumbosacral fusion.  Impression: IMPRESSION:  1.  Interval wedge resection changes involving the right lower lobe.  2.  Increasing size of left lung pulmonary nodules. There are a couple of tiny right lung nodules which are new from comparison CT scan as well. These are suspicious for metastatic disease.  3.  Mildly enlarged right hilar lymph node.  CT Soft Tissue  Neck w Contrast  Narrative: EXAM: CT SOFT TISSUE NECK W CONTRAST  5/26/2023 10:31 AM     HISTORY:  restage oligometastatic SCC L tonsil, resected lung met, on  carboplatin paclitaxel + pembrolizumab x 2 cycles; Malignant neoplasm  metastatic to lung, unspecified laterality (H)         COMPARISON:  PET/CT 2/20/2023     TECHNIQUE: Following intravenous administration of nonionic iodinated  contrast medium, thin section helical CT images were obtained from the  skull base down to the level of the aortic arch.  Axial, coronal and  sagittal reformations were performed with 2-3 mm slice thickness  reconstruction. Images were reviewed in soft tissue, lung and bone  windows.    CONTRAST: Isovue 370 119cc    FINDINGS:   - 2.9 x 3.7 x 3.8 cm mass centered in the left palatine tonsil  previously hypermetabolic and measuring 4 x 3.9 x 5.3 cm. Unchanged  adjacent satellite nodule in the posterior oropharynx measuring up to  1.7 cm.  - Left level 2A cystic and solid metastatic lymph node measures 2.3 x  4.6 x 2.4 cm, previously 1.6 x 3.1 x 2.2 cm. Notably, it is the solid  previously hypermetabolic portion of this lymph node that has  increased in size.  - New part cystic part solid right level 2A lymph node measuring 1.7  cm.  - New part cystic part solid left level 3 lymph node measuring 1.6 cm.    Decreased mass effect upon the aerodigestive tract.    Major cervical vessels are patent. Parotid, submandibular, and thyroid  glands are unremarkable. Paranasal sinuses are clear. Orbits and  visualized intracranial contents are unremarkable. Multilevel cervical  spondylosis similar to prior exam. Lung apices are clear.  Impression: IMPRESSION:  1. Decreased size of primary squamous cell carcinoma of the left  tonsil.  2. Multiple new suspicious cervical lymph nodes bilaterally and  increased size of previously hypermetabolic left level 2A cervical  lymph node.    I have personally reviewed the examination and initial  interpretation  and I agree with the findings.    ORION ENGLISH MD         SYSTEM ID:  [id number]     Imaging was personally reviewed and interpreted by me as above       ASSESSMENT AND PLAN  SCC L tonsil, p16 +lety, CPS 25%/TPS 20%, oligometastatic lung met: Report indicates mixed response but likely due to the interval between PET/CT 2/20 and starting systemic therapy 4/19. The tonsil mass looks a little smaller on my measurements. Clinically it's clear the cancer is responding. He's swallowing better and hemoptysis has resolved. Proceed with 3 more cycles of pembrolizumab and restage. We had discussed in the beginning the concept of being aggressive, which means potentially incorporating consolidative radiation down the road. If PD on pembro, would consider platinum vs platinum doublet as a second line and SQZ trial as a third line, or vice versa.     Microcytic anemia: Iron studies 8/2022 showed perhaps very mild LEW to normal iron studies. Current studies 4/18/23 show roughly the same. No supplements for now.    Hyperglyemia, pre-diabetes: A1c 6.0-6.3 for years now. Not discussed today    H/o CVA: Residual L weakness. Uses cane and walker at baseline, but independent and functional. Stable    Peripheral neuropathy: MRI shows a lot of foramenal stenosis and spinal canal stenosis, so more likely related to that than DM2. TSHs have been fine. B12 4/1823 was 1131. No worsening on pembro             [facility]    PATIENT NAME: Jonathan Bishop  MRN # [id number]   DATE OF VISIT: June 20, 2023  YOB: 1945     Referring Provider: Dr. Karishma Eng  Otolaryngology: Dr. Karishma Eng  Radiation Oncology: Dr. Jenni Mills  PCP: Dr. Buck Nascimento    CANCER TYPE: SCC L tonsil, p16 +lety  STAGE: [id number] (IVC)  ECOG PS: 1    PD-L1: TPS 20%, CPS 25% on [id number] tonsil bx  NGS: N/A    SUMMARY  2/26/23 L tonsil mass bx in clinic (Dr. Eng).   2/20/23 PET/CT. 3.7 x 4.0 x 5.1 cm mass L palatine tonsil  causing narrowing of the oropharyngeal lumen, L level 2 node, L retropharyngeal nodular density, extension of primary mass vs retropharyngeal node, 0.8 cm RLL nodule concerning for met, mild focal uptake R iliac bone and L1 without CT correlate suspicious for mets.   3/2/23 R VATS, wedge resection. Path: SCC, poorly differentiated, associated with necrosis, 1 cm, negative margins, p16 +lety  3/24/23 MRI L spine and pelvis. Diffusely heterogeneous marrow signal throughout without corresponding abnormal signal on sagittal STIR sequence and no abnormal enhancement. Nonspecific but could be benign process such as red marrow hyperplasia, cannot completely exclude metastatic disease or infiltrative pathologic marrow process. No lesions corresponding to FDG avid spots on PET/CT.   4/19/23~current Pembrolizumab.      SUBJECTIVE  Mr. Bishop returns for follow up after 3 cycles of pembrolizumab. Continues to tolerate well. Eating better, feeling better. Going on fishing trip next month. Bowels moving. Breathing is stable. Reports mobility has improved but he is getting a scooter tomorrow.     M-W-F are best days for scheduling    Going to [place] 7/17 ~ 7/18     PAST MEDICAL HISTORY  SCC as above  Chronic LBP  TAVR 2020  H/o rheumatic carditis 1950  CHF. 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 Nocturia once nightly   ED  Cataracts  Umbilical hernia repair 2011  Knee arthroplasty B 2010  Lumbar spine fusion, laminectomy, sciatic pain R > L  Peripheral neuropathy - from pinched nerves?  Hearing loss    Numbness/tingling in both feet - bilaterally, symmetric, R spasms more than left    CURRENT OUTPATIENT MEDICATIONS  Current Outpatient Medications   Medication Sig Dispense Refill     acetaminophen (TYLENOL) 325 MG tablet Take 3 tablets (975 mg) by mouth every 6 hours       diazepam (VALIUM) 5 MG tablet Take 1-2 tablets (5-10 mg) by mouth once as needed for anxiety (may repeat  one time if needed.) 4 tablet 0     ibuprofen (ADVIL/MOTRIN) 600 MG tablet Take 1 tablet (600 mg) by mouth every 8 hours as needed for moderate pain (4-6) 270 tablet 3     magic mouthwash suspension (diphenhydrAMINE, lidocaine, aluminum-magnesium & simethicone) Swish and spit 10 mLs in mouth every 6 hours as needed for mouth sores (Patient not taking: Reported on 5/31/2023) 200 mL 3     methocarbamol (ROBAXIN) 500 MG tablet Take 1 tablet (500 mg) by mouth every 6 hours as needed for muscle spasms 30 tablet 0     order for DME Equipment being ordered: Walker ()  Treatment Diagnosis: stroke 1 Units 0     oxyCODONE-acetaminophen (PERCOCET) 5-325 MG tablet Take 1-2 tablets by mouth every 6 hours as needed for pain Max 5 per day. 150 tablet 0     senna-docusate (SENOKOT-S/PERICOLACE) 8.6-50 MG tablet Take 1 tablet by mouth 2 times daily Reduce dose or temporarily discontinue if having loose stools. 50 tablet 0     spironolactone (ALDACTONE) 50 MG tablet Take 1 tablet (50 mg) by mouth daily 90 tablet 3     tamsulosin (FLOMAX) 0.4 MG capsule TAKE 1 CAPSULE BY MOUTH ONCE DAILY 90 capsule 4     vitamin D3 (CHOLECALCIFEROL) 50 mcg (2000 units) tablet Take 1 tablet by mouth daily       ALLERGIES  No Known Allergies     REVIEW OF SYSTEMS  As above in the HPI, o/w complete 12-point ROS was negative.    PHYSICAL EXAM  There were no vitals taken for this visit.  GEN: NAD  HEENT: EOMI, no icterus, injection or pallor  EXT: trace edema  NEURO: alert. Walking with his walker  SKIN: no rashes    LABORATORY AND IMAGING STUDIES  Most Recent 3 CBC's:  Recent Labs   Lab Test 06/20/23  1507 04/18/23  0911 03/03/23  0720 02/27/23  1603   WBC 8.9 7.9 13.2* 8.7   HGB 9.8* 9.8* 9.3* 9.4*   MCV 80 80 80 81    240 195 226   ANEUTAUTO 5.0 4.6  --  5.6     Most Recent 3 BMP's:  Recent Labs   Lab Test 05/31/23  1209 05/26/23  1011 05/08/23  0958 04/18/23  0911     --  144 141   POTASSIUM 4.1  --  4.8 4.3   CHLORIDE 107  --  107  106   CO2 28  --  26 23   BUN 17.1  --  33.4* 20.0   CR 0.84 0.8 1.35* 0.98   ANIONGAP 7  --  11 12   WARREN 9.7  --  10.3* 9.9   *  --  101* 124*   PROTTOTAL 7.0  --  7.7 7.1   ALBUMIN 3.8  --  4.3 4.0    Most Recent 3 LFT's:  Recent Labs   Lab Test 05/31/23  1209 05/08/23  0958 04/18/23  0911   AST 22 23 28   ALT 17 14 18   ALKPHOS 94 108 97   BILITOTAL 0.2 0.2 0.2    Most Recent 2 TSH and T4:  Recent Labs   Lab Test 05/31/23  1209 05/08/23  0958 04/18/23  0911   TSH 1.86 1.29 0.85   T4  --   --  1.33     I reviewed the above labs today.      Labs were independently reviewed and interpreted by me    CT Chest/Abdomen/Pelvis w Contrast  Narrative: EXAM: CT CHEST/ABDOMEN/PELVIS W CONTRAST  LOCATION: [facility]  DATE/TIME: 5/26/2023 10:33 AM CDT    INDICATION: Restage oligometastatic SCC L tonsil, resected lung met, on carboplatin paclitaxel + pembrolizumab x 2 cycles  COMPARISON: PET/CT, 02/20/2023  TECHNIQUE: CT scan of the chest, abdomen, and pelvis was performed following injection of IV contrast. Multiplanar reformats were obtained. Dose reduction techniques were used.   CONTRAST: Isovue 370 119 cc    FINDINGS:   LUNGS AND PLEURA: Interval wedge resection changes involving the right lower lobe with resection of a previously seen right lower lobe pulmonary nodule. There is a 7 x 4 mm left lower lobe pulmonary nodule on image 151 of series 5 which previously   measured 2 mm in diameter on 02/20/2023 within the medial aspect of the left upper lobe on image 83 of series 5, there is a 6 x 4 mm pulmonary nodule which previously measured 1 mm diameter. 2 mm right upper lobe pulmonary nodule on image 70 of series 5   appears new since comparison examination. Also new within the right lower lobe on image 176 anteriorly is a 2 mm pulmonary nodule.    MEDIASTINUM/AXILLAE: Heart size is normal. The thoracic aorta is normal in caliber. There is a borderline prominent right  hilar lymph node on image 170 of series 4 measuring 1.0 x 1.8 cm in size. This compares with 0.8 x 1.6 cm previously. There are a   couple of nodules involving the thyroid gland, unchanged. The largest measures 9 mm in greatest dimension on the right.    CORONARY ARTERY CALCIFICATION: Mild.    HEPATOBILIARY: Normal.    PANCREAS: Normal.    SPLEEN: Normal.    ADRENAL GLANDS: Normal.    KIDNEYS/BLADDER: There are simple-appearing right renal cysts.    BOWEL: No bowel obstruction or bowel inflammation. The appendix is normal.    LYMPH NODES: Normal.    VASCULATURE: Unremarkable.    PELVIC ORGANS: Normal.    MUSCULOSKELETAL: No definite suspicious osseous lesions. Surgical changes of a lumbosacral fusion.  Impression: IMPRESSION:  1.  Interval wedge resection changes involving the right lower lobe.  2.  Increasing size of left lung pulmonary nodules. There are a couple of tiny right lung nodules which are new from comparison CT scan as well. These are suspicious for metastatic disease.  3.  Mildly enlarged right hilar lymph node.  CT Soft Tissue Neck w Contrast  Narrative: EXAM: CT SOFT TISSUE NECK W CONTRAST  5/26/2023 10:31 AM     HISTORY:  restage oligometastatic SCC L tonsil, resected lung met, on  carboplatin paclitaxel + pembrolizumab x 2 cycles; Malignant neoplasm  metastatic to lung, unspecified laterality (H)         COMPARISON:  PET/CT 2/20/2023     TECHNIQUE: Following intravenous administration of nonionic iodinated  contrast medium, thin section helical CT images were obtained from the  skull base down to the level of the aortic arch.  Axial, coronal and  sagittal reformations were performed with 2-3 mm slice thickness  reconstruction. Images were reviewed in soft tissue, lung and bone  windows.    CONTRAST: Isovue 370 119cc    FINDINGS:   - 2.9 x 3.7 x 3.8 cm mass centered in the left palatine tonsil  previously hypermetabolic and measuring 4 x 3.9 x 5.3 cm. Unchanged  adjacent satellite nodule in the  posterior oropharynx measuring up to  1.7 cm.  - Left level 2A cystic and solid metastatic lymph node measures 2.3 x  4.6 x 2.4 cm, previously 1.6 x 3.1 x 2.2 cm. Notably, it is the solid  previously hypermetabolic portion of this lymph node that has  increased in size.  - New part cystic part solid right level 2A lymph node measuring 1.7  cm.  - New part cystic part solid left level 3 lymph node measuring 1.6 cm.    Decreased mass effect upon the aerodigestive tract.    Major cervical vessels are patent. Parotid, submandibular, and thyroid  glands are unremarkable. Paranasal sinuses are clear. Orbits and  visualized intracranial contents are unremarkable. Multilevel cervical  spondylosis similar to prior exam. Lung apices are clear.  Impression: IMPRESSION:  1. Decreased size of primary squamous cell carcinoma of the left  tonsil.  2. Multiple new suspicious cervical lymph nodes bilaterally and  increased size of previously hypermetabolic left level 2A cervical  lymph node.    I have personally reviewed the examination and initial interpretation  and I agree with the findings.    ORION ENGLISH MD         SYSTEM ID:  [id number]     Imaging was personally reviewed and interpreted by me as above       ASSESSMENT AND PLAN  SCC L tonsil, p16 +lety, CPS 25%/TPS 20%, oligometastatic lung met: Tolerating keytruda well--can continue today. Clinically has noticed improvement-- he's swallowing better and hemoptysis has resolved. Restage after 2 more cycles of pembrolizumab as scheduled.     Microcytic anemia: Iron studies 8/2022 showed perhaps very mild LEW to normal iron studies. Current studies 4/18/23 show roughly the same. No supplements for now.    Hyperglyemia, pre-diabetes: A1c 6.0-6.3 for years now. Not discussed today    H/o CVA: Residual L weakness. Uses cane and walker at baseline, but independent and functional. Getting a scooter tomorrow    Peripheral neuropathy: MRI shows a lot of foramenal stenosis and spinal  canal stenosis, so more likely related to that than DM2. TSHs have been fine. B12 4/1823 was 1131. No worsening on pembro            Again, thank you for allowing me to participate in the care of your patient.        Sincerely,        Olivia Sanchez, CNP

## 2023-06-21 ENCOUNTER — PATIENT OUTREACH (OUTPATIENT)
Dept: ONCOLOGY | Facility: CLINIC | Age: 78
End: 2023-06-21
Payer: COMMERCIAL

## 2023-06-21 RX ORDER — LORAZEPAM 2 MG/ML
0.5 INJECTION INTRAMUSCULAR EVERY 4 HOURS PRN
Status: CANCELLED | OUTPATIENT
Start: 2023-06-21

## 2023-06-21 RX ORDER — ALBUTEROL SULFATE 90 UG/1
1-2 AEROSOL, METERED RESPIRATORY (INHALATION)
Status: CANCELLED
Start: 2023-06-21

## 2023-06-21 RX ORDER — HEPARIN SODIUM,PORCINE 10 UNIT/ML
5 VIAL (ML) INTRAVENOUS
Status: CANCELLED | OUTPATIENT
Start: 2023-06-21

## 2023-06-21 RX ORDER — MEPERIDINE HYDROCHLORIDE 25 MG/ML
25 INJECTION INTRAMUSCULAR; INTRAVENOUS; SUBCUTANEOUS EVERY 30 MIN PRN
Status: CANCELLED | OUTPATIENT
Start: 2023-06-21

## 2023-06-21 RX ORDER — EPINEPHRINE 1 MG/ML
0.3 INJECTION, SOLUTION INTRAMUSCULAR; SUBCUTANEOUS EVERY 5 MIN PRN
Status: CANCELLED | OUTPATIENT
Start: 2023-06-21

## 2023-06-21 RX ORDER — HEPARIN SODIUM (PORCINE) LOCK FLUSH IV SOLN 100 UNIT/ML 100 UNIT/ML
5 SOLUTION INTRAVENOUS
Status: CANCELLED | OUTPATIENT
Start: 2023-06-21

## 2023-06-21 RX ORDER — DIPHENHYDRAMINE HYDROCHLORIDE 50 MG/ML
50 INJECTION INTRAMUSCULAR; INTRAVENOUS
Status: CANCELLED
Start: 2023-06-21

## 2023-06-21 RX ORDER — ALBUTEROL SULFATE 0.83 MG/ML
2.5 SOLUTION RESPIRATORY (INHALATION)
Status: CANCELLED | OUTPATIENT
Start: 2023-06-21

## 2023-06-21 RX ORDER — METHYLPREDNISOLONE SODIUM SUCCINATE 125 MG/2ML
125 INJECTION, POWDER, LYOPHILIZED, FOR SOLUTION INTRAMUSCULAR; INTRAVENOUS
Status: CANCELLED
Start: 2023-06-21

## 2023-06-22 ENCOUNTER — INFUSION THERAPY VISIT (OUTPATIENT)
Dept: ONCOLOGY | Facility: CLINIC | Age: 78
End: 2023-06-22
Attending: THORACIC SURGERY (CARDIOTHORACIC VASCULAR SURGERY)
Payer: COMMERCIAL

## 2023-06-22 ENCOUNTER — APPOINTMENT (OUTPATIENT)
Dept: LAB | Facility: CLINIC | Age: 78
End: 2023-06-22
Attending: THORACIC SURGERY (CARDIOTHORACIC VASCULAR SURGERY)
Payer: COMMERCIAL

## 2023-06-22 VITALS
DIASTOLIC BLOOD PRESSURE: 63 MMHG | HEART RATE: 70 BPM | OXYGEN SATURATION: 98 % | WEIGHT: 224.2 LBS | TEMPERATURE: 97.7 F | SYSTOLIC BLOOD PRESSURE: 136 MMHG | BODY MASS INDEX: 39.09 KG/M2 | RESPIRATION RATE: 18 BRPM

## 2023-06-22 DIAGNOSIS — C78.00 MALIGNANT NEOPLASM METASTATIC TO LUNG, UNSPECIFIED LATERALITY (H): ICD-10-CM

## 2023-06-22 DIAGNOSIS — R79.89 ELEVATED SERUM CREATININE: ICD-10-CM

## 2023-06-22 DIAGNOSIS — C10.9 SQUAMOUS CELL CARCINOMA OF OROPHARYNX (H): ICD-10-CM

## 2023-06-22 DIAGNOSIS — C09.9 TONSIL CANCER (H): Primary | ICD-10-CM

## 2023-06-22 LAB
ALBUMIN UR-MCNC: NEGATIVE MG/DL
APPEARANCE UR: CLEAR
BASOPHILS # BLD AUTO: 0.1 10E3/UL (ref 0–0.2)
BASOPHILS NFR BLD AUTO: 1 %
BILIRUB UR QL STRIP: NEGATIVE
COLOR UR AUTO: ABNORMAL
EOSINOPHIL # BLD AUTO: 0.4 10E3/UL (ref 0–0.7)
EOSINOPHIL NFR BLD AUTO: 5 %
ERYTHROCYTE [DISTWIDTH] IN BLOOD BY AUTOMATED COUNT: 17.3 % (ref 10–15)
GLUCOSE UR STRIP-MCNC: NEGATIVE MG/DL
HCT VFR BLD AUTO: 32.2 % (ref 40–53)
HGB BLD-MCNC: 9.7 G/DL (ref 13.3–17.7)
HGB UR QL STRIP: NEGATIVE
HYALINE CASTS: 43 /LPF
IMM GRANULOCYTES # BLD: 0 10E3/UL
IMM GRANULOCYTES NFR BLD: 0 %
KETONES UR STRIP-MCNC: NEGATIVE MG/DL
LEUKOCYTE ESTERASE UR QL STRIP: NEGATIVE
LYMPHOCYTES # BLD AUTO: 2.2 10E3/UL (ref 0.8–5.3)
LYMPHOCYTES NFR BLD AUTO: 25 %
MCH RBC QN AUTO: 24.1 PG (ref 26.5–33)
MCHC RBC AUTO-ENTMCNC: 30.1 G/DL (ref 31.5–36.5)
MCV RBC AUTO: 80 FL (ref 78–100)
MONOCYTES # BLD AUTO: 0.9 10E3/UL (ref 0–1.3)
MONOCYTES NFR BLD AUTO: 11 %
MUCOUS THREADS #/AREA URNS LPF: PRESENT /LPF
NEUTROPHILS # BLD AUTO: 4.9 10E3/UL (ref 1.6–8.3)
NEUTROPHILS NFR BLD AUTO: 58 %
NITRATE UR QL: NEGATIVE
NRBC # BLD AUTO: 0 10E3/UL
NRBC BLD AUTO-RTO: 0 /100
PH UR STRIP: 5 [PH] (ref 5–7)
PLATELET # BLD AUTO: 242 10E3/UL (ref 150–450)
RBC # BLD AUTO: 4.02 10E6/UL (ref 4.4–5.9)
RBC URINE: 2 /HPF
SP GR UR STRIP: 1.02 (ref 1–1.03)
SQUAMOUS EPITHELIAL: <1 /HPF
UROBILINOGEN UR STRIP-MCNC: NORMAL MG/DL
WBC # BLD AUTO: 8.5 10E3/UL (ref 4–11)
WBC URINE: 1 /HPF

## 2023-06-22 PROCEDURE — 81001 URINALYSIS AUTO W/SCOPE: CPT

## 2023-06-22 PROCEDURE — 258N000003 HC RX IP 258 OP 636: Performed by: NURSE PRACTITIONER

## 2023-06-22 PROCEDURE — 85025 COMPLETE CBC W/AUTO DIFF WBC: CPT

## 2023-06-22 PROCEDURE — 36415 COLL VENOUS BLD VENIPUNCTURE: CPT

## 2023-06-22 PROCEDURE — 96413 CHEMO IV INFUSION 1 HR: CPT

## 2023-06-22 PROCEDURE — 250N000011 HC RX IP 250 OP 636: Mod: JZ | Performed by: NURSE PRACTITIONER

## 2023-06-22 RX ADMIN — SODIUM CHLORIDE 200 MG: 9 INJECTION, SOLUTION INTRAVENOUS at 08:26

## 2023-06-22 RX ADMIN — SODIUM CHLORIDE 1000 ML: 9 INJECTION, SOLUTION INTRAVENOUS at 08:05

## 2023-06-22 ASSESSMENT — PAIN SCALES - GENERAL: PAINLEVEL: EXTREME PAIN (9)

## 2023-06-22 NOTE — PROGRESS NOTES
Infusion Nursing Note:  Jonathan Bishop presents today for cycle 4, day 1 keytruda, IV fluids.    Patient seen by provider today: No   present during visit today: Not Applicable.    Note: Patient had a visit with Olivia Sanchez on 6/20. Denies any changes or new concerns since that visit.  Continues with back pain which is at baseline. Taking pain medications as ordered. No interventions needed for pain with today's infusion visit.        Intravenous Access:  Peripheral IV placed in lab    Treatment Conditions:  Lab Results   Component Value Date     06/20/2023    POTASSIUM 4.1 06/20/2023    MAG 2.0 12/09/2020    CR 1.42 (H) 06/20/2023    WARREN 9.8 06/20/2023    BILITOTAL 0.2 06/20/2023    ALBUMIN 4.1 06/20/2023    ALT 15 06/20/2023    AST 22 06/20/2023     Results reviewed, labs MET treatment parameters, ok to proceed with treatment.  Extra IV fluids given as ordered for elevated creatinine and patient encouraged to push oral fluids at home  UA sent      Post Infusion Assessment:  Patient tolerated infusion without incident.  Blood return noted pre and post infusion.  Site patent and intact, free from redness, edema or discomfort.  No evidence of extravasations.  Access discontinued per protocol.       Discharge Plan:   Patient declined prescription refills.  Discharge instructions reviewed with: Patient.  Copy of AVS reviewed with patient and/or family.  Patient will return 7/12/23 for next appointment.  Patient discharged in stable condition accompanied by: self.  Departure Mode: Ambulatory.      Melba Garcia RN

## 2023-06-22 NOTE — PATIENT INSTRUCTIONS
[facility] Triage and after hours / weekends / holidays:  [phone]    Please call the triage or after hours line if you experience a temperature greater than or equal to 100.4, shaking chills, have uncontrolled nausea, vomiting and/or diarrhea, dizziness, shortness of breath, chest pain, bleeding, unexplained bruising, or if you have any other new/concerning symptoms, questions or concerns.      If you are having any concerning symptoms or wish to speak to a provider before your next infusion visit, please call triage to notify them so we can adequately serve you.     If you need a refill on a narcotic prescription or other medication, please call before your infusion appointment.             June 2023 Sunday Monday Tuesday Wednesday Thursday Friday Saturday                       1     2     3       4     5     6     7    RETURN FOOT/ANKLE   8:45 AM   (20 min.)   Mamadou Gary DPM   [facility] 8     9     10       11     12     13     14     15     16     17       18     19     20    LAB   3:00 PM   (15 min.)    LAB   [facility]    ONC INFUSION 1 HR (60 MIN)   3:30 PM   (60 min.)    ONC INFUSION NURSE   [facility]    RETURN ACTIVE TREATMENT   4:00 PM   (45 min.)   Olivia Sanchez CNP   [facility] 21     22    LAB CENTRAL   7:15 AM   (15 min.)   [facility] LAB DRAW   [facility]    ONC INFUSION 1 HR (60 MIN)   7:30 AM   (60 min.)    ONC INFUSION NURSE   [facility] 23     24       25     26     27     28     29     30                        July 2023 Sunday Monday Tuesday Wednesday Thursday Friday Saturday                                 1       2     3    [facility] RETURN  10:15 AM   (30 min.)   Buck Nascimento MD   [facility] [place] 4     5     6     7     8       9     10    [facility]  RETURN   6:45 AM   (30 min.)   Buck Nascimento MD   [facility] [place] 11     12    LAB PERIPHERAL  12:30 PM   (15 min.)   [facility] LAB DRAW   [facility]    ONC INFUSION 1 HR (60 MIN)   1:00 PM   (60 min.)    ONC INFUSION NURSE   [facility] 13     14     15       16     17     18     19     20     21     22       23     24     25     26     27     28     29       30     31                                                 Recent Results (from the past 24 hour(s))   CBC with platelets and differential    Collection Time: 06/22/23  7:51 AM   Result Value Ref Range    WBC Count 8.5 4.0 - 11.0 10e3/uL    RBC Count 4.02 (L) 4.40 - 5.90 10e6/uL    Hemoglobin 9.7 (L) 13.3 - 17.7 g/dL    Hematocrit 32.2 (L) 40.0 - 53.0 %    MCV 80 78 - 100 fL    MCH 24.1 (L) 26.5 - 33.0 pg    MCHC 30.1 (L) 31.5 - 36.5 g/dL    RDW 17.3 (H) 10.0 - 15.0 %    Platelet Count 242 150 - 450 10e3/uL    % Neutrophils 58 %    % Lymphocytes 25 %    % Monocytes 11 %    % Eosinophils 5 %    % Basophils 1 %    % Immature Granulocytes 0 %    NRBCs per 100 WBC 0 <1 /100    Absolute Neutrophils 4.9 1.6 - 8.3 10e3/uL    Absolute Lymphocytes 2.2 0.8 - 5.3 10e3/uL    Absolute Monocytes 0.9 0.0 - 1.3 10e3/uL    Absolute Eosinophils 0.4 0.0 - 0.7 10e3/uL    Absolute Basophils 0.1 0.0 - 0.2 10e3/uL    Absolute Immature Granulocytes 0.0 <=0.4 10e3/uL    Absolute NRBCs 0.0 10e3/uL

## 2023-06-22 NOTE — NURSING NOTE
Chief Complaint   Patient presents with     Blood Draw     Labs drawn from PIV placed. Line flushed with saline. Vitals taken. Pt checked in for appointment(s).      Tatianna Delgado RN

## 2023-06-26 DIAGNOSIS — M54.50 ACUTE MIDLINE LOW BACK PAIN WITHOUT SCIATICA: ICD-10-CM

## 2023-06-26 NOTE — TELEPHONE ENCOUNTER
[facility] Call Center    Phone Message    May a detailed message be left on voicemail: yes     Reason for Call: Medication Refill Request    Has the patient contacted the pharmacy for the refill? Yes   Name of medication being requested: oxyCODONE-acetaminophen (PERCOCET) 5-325 MG tablet  Provider who prescribed the medication: Buck Nascimento MD  Pharmacy: [facility] [address] [place] [id number] [facility]  Date medication is needed: asap   Please call patient once order has been sent thank you.      Action Taken: Message routed to:  [facility] pcc    Travel Screening: Not Applicable

## 2023-06-27 RX ORDER — OXYCODONE AND ACETAMINOPHEN 5; 325 MG/1; MG/1
1-2 TABLET ORAL EVERY 6 HOURS PRN
Qty: 150 TABLET | Refills: 0 | Status: SHIPPED | OUTPATIENT
Start: 2023-06-30 | End: 2023-07-25

## 2023-06-27 NOTE — TELEPHONE ENCOUNTER
Per  data, pt last had oxyCODONE-acetaminophen (PERCOCET) 5-325 MG tablet refilled for a 30 day supply on 5/31/23. Medication is due for refill on 6/30/23. Pt was last seen by provider on 4/10/23. Medication routed to provider for refill approval.     CHELSEA VIVEROS RN on 6/27/2023 at 1:24 PM

## 2023-07-03 ENCOUNTER — OFFICE VISIT (OUTPATIENT)
Dept: INTERNAL MEDICINE | Facility: CLINIC | Age: 78
End: 2023-07-03
Payer: COMMERCIAL

## 2023-07-03 VITALS
BODY MASS INDEX: 38.36 KG/M2 | DIASTOLIC BLOOD PRESSURE: 68 MMHG | WEIGHT: 224.7 LBS | HEIGHT: 64 IN | SYSTOLIC BLOOD PRESSURE: 138 MMHG | HEART RATE: 64 BPM | OXYGEN SATURATION: 99 %

## 2023-07-03 DIAGNOSIS — I63.521 CEREBROVASCULAR ACCIDENT INVOLVING ANTERIOR CIRCULATION, RIGHT (H): ICD-10-CM

## 2023-07-03 DIAGNOSIS — N52.9 ERECTILE DYSFUNCTION, UNSPECIFIED ERECTILE DYSFUNCTION TYPE: ICD-10-CM

## 2023-07-03 DIAGNOSIS — C09.9 TONSIL CANCER (H): ICD-10-CM

## 2023-07-03 DIAGNOSIS — C10.9 SQUAMOUS CELL CARCINOMA OF OROPHARYNX (H): Primary | ICD-10-CM

## 2023-07-03 DIAGNOSIS — E66.01 MORBID OBESITY (H): ICD-10-CM

## 2023-07-03 DIAGNOSIS — I50.32 CHRONIC DIASTOLIC HEART FAILURE (H): ICD-10-CM

## 2023-07-03 PROCEDURE — 99214 OFFICE O/P EST MOD 30 MIN: CPT | Performed by: INTERNAL MEDICINE

## 2023-07-03 RX ORDER — SILDENAFIL 100 MG/1
TABLET, FILM COATED ORAL
Qty: 30 TABLET | Refills: 5 | Status: SHIPPED | OUTPATIENT
Start: 2023-07-03 | End: 2023-10-12

## 2023-07-03 NOTE — PROGRESS NOTES
ASSESSMENT/PLAN:      1. Squamous cell carcinoma of oropharynx (H)  2. Tonsil cancer (H)  Squamous cell carcinoma of the oropharynx -he is being followed by oncology and started a new chemotherapy medication called pembrolizumab.  Conner reports this treatment is going well, with clinical improvement in symptoms. Reports he no longer has any symptoms that he attributes to cancer and no side effects. Per oncology notes, 2 more cycles before restaging. Has been able to continue fishing trips. Discussed at length reasons why he needs to continue treatment despite feeling better clinically. Recommended pt continue therapy as ordered as long as he is tolerating medication. If medication begins to interfere with quality of life, pt should discuss pros/cons with oncology. Creatinine elevated at last visit. Pt to get BMP with infusion labs at next visit. Encouraged pt to push oral hydration.     3. Chronic diastolic heart failure (H)  S/p TAVR 2020. Stable per cardiology notes. Continue to monitor.   - Basic metabolic panel  (Ca, Cl, CO2, Creat, Gluc, K, Na, BUN); Future    4. Morbid obesity (H)  Weight stable. Continue to monitor.     5. Erectile dysfunction, unspecified erectile dysfunction type  Refilled prescription per request  - sildenafil (VIAGRA) 100 MG tablet; TAKE 30MIN TO 4 HOURS BEFORE INTERCOURSE AS NEEDED FOR ERECTILE DYSFUNCTION  Dispense: 30 tablet; Refill: 5    Got scooter- pleased with mobility aid for long walks/standing periods. Uses walker as much as possible, ambulated with walker to clinic today.     Of note, noticed soft lump on R forearm. Pt states he has had this for 68 years after an injury as a child. No further workup given longevity of issue.     Exam and note completed by DNP student Genia Mendez under supervision of Dr. Buck Nascimento.     I, Buck Nascimento, was present with the NP student who participated in the service and in the documentation of the note.  I have verified the history and personally  performed the physical exam and medical decision making.  I agree with the assessment and plan of care as documented in the note.    Buck Nascimento MD, FACP      SUBJECTIVE: Pt is   Started pembrolizumab for oropharynx cancer- per oncology note they will restage in 2 cycles. Pt is tolerating well without side effects. No clinical symptoms of cancer. Pt has been able to go fishing this summer. No complaints at this time.     Gen: no fevers, night sweats or weight change  Cardiac: no chest pain, palpitations, or pain with walking  Lungs: no dyspnea, cough, or shortness of breath  Heme/Lymph: no concerning bumps, no bleeding problems      Patient Active Problem List   Diagnosis     Essential hypertension, benign     Hyperlipidemia with target LDL less than 130     Anemia     Medication refill- do not delete      Low back pain     Osteoarthritis of knee     Stroke (H)     Cerebrovascular accident involving anterior circulation, right (H)     Dermatophytosis of nail     PVD (peripheral vascular disease) (H)     Systolic murmur     Peripheral edema     Hyperplasia of prostate with lower urinary tract symptoms (LUTS)     Erectile dysfunction, unspecified erectile dysfunction type     Severe aortic stenosis     Status post coronary angiogram     Morbid obesity (H)     Aortic valve stenosis, etiology of cardiac valve disease unspecified     Aortic stenosis, severe     Diastolic heart failure (H)     S/P TAVR (transcatheter aortic valve replacement)     Bilateral leg edema     Malignant neoplasm metastatic to lung     Tonsil cancer (H)     Squamous cell carcinoma of oropharynx (H)       Past Medical History:   Diagnosis Date     * * * SBE PROPHYLAXIS * * *     s/p TAVR     Acute rheumatic carditis 1950     Complication of anesthesia     pt once woke up during back surgery     Coronary artery disease     murmur     Erectile dysfunction     Sildenafil     Hip pain, bilateral      Hypercholesteremia      Hypertension      Low back  pain      Nonsenile cataract      Obesity      Renal cyst      S/P 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     Stroke (cerebrum) (H) 11/30/2016     Umbilical hernia 06/10/2011    s/p surgical repair     Wound, surgical, infected 06/10/2011    hernia       Past Surgical History:   Procedure Laterality Date     ARTHROPLASTY KNEE BILATERAL  7/22/2010     COLONOSCOPY N/A 4/14/2017    Procedure: COLONOSCOPY;  Surgeon: Toby Bills MD;  Location: [facility]     CV CORONARY ANGIOGRAM N/A 10/28/2019    Procedure: Coronary Angiogram;  Surgeon: Guerrero Huitron MD;  Location:  HEART CARDIAC CATH LAB     CV RIGHT HEART CATH MEASUREMENTS RECORDED N/A 10/28/2019    Procedure: Right Heart Cath;  Surgeon: Guerrero Huitron MD;  Location:  HEART CARDIAC CATH LAB     CV TRANSCATHETER AORTIC VALVE REPLACEMENT N/A 12/8/2020    Procedure: Transcatheter Aortic Valve Replacement;  Surgeon: Camila Begum MD;  Location:  HEART CARDIAC CATH LAB     FUSION LUMBAR ANTERIOR TWO LEVELS       HERNIORRHAPHY UMBILICAL  6/10/2011    Procedure:HERNIORRHAPHY UMBILICAL; Open, with mesh placement; Surgeon:HO PARHAM; Location:[facility]     LAMINECTOMY LUMBAR TWO LEVELS       THORACOSCOPIC WEDGE RESECTION LUNG Right 3/2/2023    Procedure: Right thoracoscopic wedge resection;  Surgeon: Hebert Jones MD;  Location:  OR       Family History   Problem Relation Age of Onset     C.A.D. Mother      Unknown/Adopted Father      Heart Failure Sister      Heart Failure Sister      Glaucoma No family hx of      Macular Degeneration No family hx of      Diabetes No family hx of      Hypertension No family hx of        Social History     Socioeconomic History     Marital status:      Spouse name: Not on file     Number of children: Not on file     Years of education: Not on file     Highest education level: Not on file   Occupational History     Not on file   Tobacco Use      "Smoking status: Former     Types: Cigarettes     Quit date: 2005     Years since quittin.5     Smokeless tobacco: Never     Tobacco comments:     Non smoker. No 2nd hand exposure. CCX, RMA [facility] 2011   Substance and Sexual Activity     Alcohol use: No     Drug use: No     Sexual activity: Not Currently   Other Topics Concern     Parent/sibling w/ CABG, MI or angioplasty before 65F 55M? Not Asked   Social History Narrative    He lives by himself in an apt in [place].  He has 2 goldfish, Lai and Oneida.     Social Determinants of Health     Financial Resource Strain: Not on file   Food Insecurity: Not on file   Transportation Needs: Not on file   Physical Activity: Not on file   Stress: Not on file   Social Connections: Not on file   Intimate Partner Violence: Not on file   Housing Stability: Not on file       Health Maintenance   Topic Date Due     URINE DRUG SCREEN  Never done     ADVANCE CARE PLANNING  Never done     ZOSTER IMMUNIZATION (1 of 2) 2014     MEDICARE ANNUAL WELLNESS VISIT  2020     COVID-19 Vaccine (3 - Moderna risk series) 2021     COLORECTAL CANCER SCREENING  2022     FALL RISK ASSESSMENT  2023     LIPID  2027     DTAP/TDAP/TD IMMUNIZATION (3 - Td or Tdap) 2032     HEPATITIS C SCREENING  Completed     PHQ-2 (once per calendar year)  Completed     Pneumococcal Vaccine: 65+ Years  Completed     IPV IMMUNIZATION  Aged Out     MENINGITIS IMMUNIZATION  Aged Out     INFLUENZA VACCINE  Discontinued       OBJECTIVE:  Physical Exam:  /68   Pulse 64   Ht 1.613 m (5' 3.5\")   Wt 101.9 kg (224 lb 11.2 oz)   SpO2 99%   BMI 39.17 kg/m    Constitutional: no distress, comfortable, pleasant   Cardiovascular: regular rate and rhythm, normal S1 and S2, no murmurs, rubs or gallops, peripheral pulses full and symmetric   Respiratory: clear to auscultation, no wheezes or crackles, normal breath sounds   Musculoskeletal: ambulates with walker. 6 cm x " 3 cm soft, nontender lump on R forearm. no edema   Skin: no concerning lesions, no jaundice   Psychological: appropriate mood   Lymphatic: no cervical  lymphadenopathy

## 2023-07-03 NOTE — NURSING NOTE
"Jonathan Bishop is a 77 year old male patient that presents today in clinic for the following:    Chief Complaint   Patient presents with     Follow Up     Recheck Medication     Pt would like to discuss ED medication     Refill Request     The patient's allergies and medications were reviewed as noted. A set of vitals were recorded as noted without incident: BP (!) 170/75 (BP Location: Right arm, Patient Position: Sitting, Cuff Size: Adult Regular)   Pulse 64   Ht 1.613 m (5' 3.5\")   Wt 101.9 kg (224 lb 11.2 oz)   SpO2 99%   BMI 39.17 kg/m  . The patient does not have any other questions for the provider.    [place], EMT 11:03 AM on 7/3/2023   "

## 2023-07-12 ENCOUNTER — INFUSION THERAPY VISIT (OUTPATIENT)
Dept: ONCOLOGY | Facility: CLINIC | Age: 78
End: 2023-07-12
Attending: INTERNAL MEDICINE
Payer: COMMERCIAL

## 2023-07-12 ENCOUNTER — APPOINTMENT (OUTPATIENT)
Dept: LAB | Facility: CLINIC | Age: 78
End: 2023-07-12
Attending: INTERNAL MEDICINE
Payer: COMMERCIAL

## 2023-07-12 VITALS
RESPIRATION RATE: 16 BRPM | SYSTOLIC BLOOD PRESSURE: 115 MMHG | DIASTOLIC BLOOD PRESSURE: 74 MMHG | TEMPERATURE: 97.7 F | WEIGHT: 227.9 LBS | HEART RATE: 80 BPM | OXYGEN SATURATION: 97 % | BODY MASS INDEX: 39.73 KG/M2

## 2023-07-12 DIAGNOSIS — C78.00 MALIGNANT NEOPLASM METASTATIC TO LUNG, UNSPECIFIED LATERALITY (H): ICD-10-CM

## 2023-07-12 DIAGNOSIS — C09.9 TONSIL CANCER (H): Primary | ICD-10-CM

## 2023-07-12 DIAGNOSIS — C10.9 SQUAMOUS CELL CARCINOMA OF OROPHARYNX (H): ICD-10-CM

## 2023-07-12 DIAGNOSIS — I50.32 CHRONIC DIASTOLIC HEART FAILURE (H): ICD-10-CM

## 2023-07-12 LAB
ALBUMIN SERPL BCG-MCNC: 4.1 G/DL (ref 3.5–5.2)
ALP SERPL-CCNC: 105 U/L (ref 40–129)
ALT SERPL W P-5'-P-CCNC: 15 U/L (ref 0–70)
ANION GAP SERPL CALCULATED.3IONS-SCNC: 10 MMOL/L (ref 7–15)
AST SERPL W P-5'-P-CCNC: 22 U/L (ref 0–45)
BASOPHILS # BLD AUTO: 0.1 10E3/UL (ref 0–0.2)
BASOPHILS NFR BLD AUTO: 1 %
BILIRUB SERPL-MCNC: 0.2 MG/DL
BUN SERPL-MCNC: 25 MG/DL (ref 8–23)
CALCIUM SERPL-MCNC: 9.5 MG/DL (ref 8.8–10.2)
CHLORIDE SERPL-SCNC: 105 MMOL/L (ref 98–107)
CREAT SERPL-MCNC: 0.95 MG/DL (ref 0.67–1.17)
DEPRECATED HCO3 PLAS-SCNC: 26 MMOL/L (ref 22–29)
EOSINOPHIL # BLD AUTO: 0.5 10E3/UL (ref 0–0.7)
EOSINOPHIL NFR BLD AUTO: 6 %
ERYTHROCYTE [DISTWIDTH] IN BLOOD BY AUTOMATED COUNT: 17.1 % (ref 10–15)
GFR SERPL CREATININE-BSD FRML MDRD: 82 ML/MIN/1.73M2
GLUCOSE SERPL-MCNC: 128 MG/DL (ref 70–99)
HCT VFR BLD AUTO: 33.2 % (ref 40–53)
HGB BLD-MCNC: 10.1 G/DL (ref 13.3–17.7)
IMM GRANULOCYTES # BLD: 0 10E3/UL
IMM GRANULOCYTES NFR BLD: 0 %
LYMPHOCYTES # BLD AUTO: 2.1 10E3/UL (ref 0.8–5.3)
LYMPHOCYTES NFR BLD AUTO: 26 %
MCH RBC QN AUTO: 24.5 PG (ref 26.5–33)
MCHC RBC AUTO-ENTMCNC: 30.4 G/DL (ref 31.5–36.5)
MCV RBC AUTO: 80 FL (ref 78–100)
MONOCYTES # BLD AUTO: 0.7 10E3/UL (ref 0–1.3)
MONOCYTES NFR BLD AUTO: 9 %
NEUTROPHILS # BLD AUTO: 4.6 10E3/UL (ref 1.6–8.3)
NEUTROPHILS NFR BLD AUTO: 58 %
NRBC # BLD AUTO: 0 10E3/UL
NRBC BLD AUTO-RTO: 0 /100
PLATELET # BLD AUTO: 212 10E3/UL (ref 150–450)
POTASSIUM SERPL-SCNC: 3.8 MMOL/L (ref 3.4–5.3)
PROT SERPL-MCNC: 7.5 G/DL (ref 6.4–8.3)
RBC # BLD AUTO: 4.13 10E6/UL (ref 4.4–5.9)
SODIUM SERPL-SCNC: 141 MMOL/L (ref 136–145)
TSH SERPL DL<=0.005 MIU/L-ACNC: 1.01 UIU/ML (ref 0.3–4.2)
WBC # BLD AUTO: 7.9 10E3/UL (ref 4–11)

## 2023-07-12 PROCEDURE — 85025 COMPLETE CBC W/AUTO DIFF WBC: CPT

## 2023-07-12 PROCEDURE — 84443 ASSAY THYROID STIM HORMONE: CPT | Performed by: NURSE PRACTITIONER

## 2023-07-12 PROCEDURE — 258N000003 HC RX IP 258 OP 636: Performed by: NURSE PRACTITIONER

## 2023-07-12 PROCEDURE — 36415 COLL VENOUS BLD VENIPUNCTURE: CPT

## 2023-07-12 PROCEDURE — 80053 COMPREHEN METABOLIC PANEL: CPT | Performed by: NURSE PRACTITIONER

## 2023-07-12 PROCEDURE — 96413 CHEMO IV INFUSION 1 HR: CPT

## 2023-07-12 PROCEDURE — 250N000011 HC RX IP 250 OP 636: Mod: JZ | Performed by: NURSE PRACTITIONER

## 2023-07-12 RX ADMIN — SODIUM CHLORIDE 250 ML: 9 INJECTION, SOLUTION INTRAVENOUS at 13:54

## 2023-07-12 RX ADMIN — SODIUM CHLORIDE 200 MG: 9 INJECTION, SOLUTION INTRAVENOUS at 13:54

## 2023-07-12 ASSESSMENT — PAIN SCALES - GENERAL: PAINLEVEL: EXTREME PAIN (9)

## 2023-07-12 NOTE — NURSING NOTE
Chief Complaint   Patient presents with     Blood Draw     Labs drawn with piv start by rn.  VS taken.     Labs drawn with PIV start by rn.  Pt tolerated well.  VS taken and pt checked in for next appt.    Modesta Turk RN

## 2023-07-12 NOTE — PROGRESS NOTES
"Infusion Nursing Note:  Jonathan Bishop presents today for Cycle 5 Day 1 Keytruda.    Patient seen by provider today: No   present during visit today: Not Applicable.    Note: Patient arrives feeling well with no acute changes. He reports \"8.75/10\" low back pain which is not new for him. He states his pain meds are effective but accepted heat packs for relief when offered. He also reports difficulty sleeping unrelated to pain. He asked what he could take and RN suggested trying melatonin OTC. Pt appreciated information.      Intravenous Access:  Peripheral IV placed.    Treatment Conditions:  Lab Results   Component Value Date    HGB 10.1 (L) 07/12/2023    WBC 7.9 07/12/2023    ANEU 3.9 10/28/2020    ANEUTAUTO 4.6 07/12/2023     07/12/2023      Lab Results   Component Value Date     07/12/2023    POTASSIUM 3.8 07/12/2023    MAG 2.0 12/09/2020    CR 0.95 07/12/2023    WARREN 9.5 07/12/2023    BILITOTAL 0.2 07/12/2023    ALBUMIN 4.1 07/12/2023    ALT 15 07/12/2023    AST 22 07/12/2023     Results reviewed, labs MET treatment parameters, ok to proceed with treatment.      Post Infusion Assessment:  Patient tolerated infusion without incident.  Blood return noted pre and post infusion.  Site patent and intact, free from redness, edema or discomfort.  Access discontinued per protocol.       Discharge Plan:   Patient declined prescription refills.  Discharge instructions reviewed with: Patient and caregiver.  Patient and/or family verbalized understanding of discharge instructions and all questions answered.  AVS to patient via [employer].  Patient will return 8/3 for next appointment.   Patient discharged in stable condition accompanied by: caregiver.  Departure Mode: Ambulatory with walker.      Aydee Palomino RN    "

## 2023-07-18 ENCOUNTER — MYC REFILL (OUTPATIENT)
Dept: INTERNAL MEDICINE | Facility: CLINIC | Age: 78
End: 2023-07-18
Payer: COMMERCIAL

## 2023-07-18 DIAGNOSIS — F40.240 CLAUSTROPHOBIA: ICD-10-CM

## 2023-07-19 NOTE — TELEPHONE ENCOUNTER
Per  data, pt last had diazepam (VALIUM) 5 MG tablet refilled for a 4 day supply on 4/28/23. Medication is due for refill. Pt was last seen by provider on 7/3/23. Medication routed to provider for refill approval.     CHELSEA VIVEROS RN on 7/19/2023 at 12:48 PM

## 2023-07-20 RX ORDER — DIAZEPAM 5 MG
5-10 TABLET ORAL
Qty: 4 TABLET | Refills: 0 | Status: SHIPPED | OUTPATIENT
Start: 2023-07-20 | End: 2023-10-12

## 2023-07-25 DIAGNOSIS — M54.50 ACUTE MIDLINE LOW BACK PAIN WITHOUT SCIATICA: ICD-10-CM

## 2023-07-25 RX ORDER — OXYCODONE AND ACETAMINOPHEN 5; 325 MG/1; MG/1
1-2 TABLET ORAL EVERY 6 HOURS PRN
Qty: 150 TABLET | Refills: 0 | Status: SHIPPED | OUTPATIENT
Start: 2023-07-25 | End: 2023-08-22

## 2023-07-25 NOTE — TELEPHONE ENCOUNTER
Health Call Center    Phone Message    May a detailed message be left on voicemail: yes     Reason for Call: Medication Refill Request    Has the patient contacted the pharmacy for the refill? Yes   Name of medication being requested: oxyCODONE-acetaminophen (PERCOCET) 5-325 MG tablet [   Provider who prescribed the medication: Dr. Sick  Pharmacy:   [facility] [address] [place] [id number] [facility]     Date medication is needed: 7/25/23

## 2023-07-25 NOTE — TELEPHONE ENCOUNTER
oxyCODONE-acetaminophen (PERCOCET) 5-325 MG tablet  Last Written Prescription Date:  6/30/2023  Last Fill Quantity: 150,   # refills: 0  Last Office Visit : 7/3/2023  Future Office visit: None    Routing refill request to provider for review/approval because:  Drug not on the [facility], P or [facility] refill protocol or controlled substance      Mayda Holland RN  Central Triage Red Flags/Med Refills

## 2023-08-02 ENCOUNTER — NURSE TRIAGE (OUTPATIENT)
Dept: ONCOLOGY | Facility: CLINIC | Age: 78
End: 2023-08-02

## 2023-08-02 ENCOUNTER — PATIENT OUTREACH (OUTPATIENT)
Dept: ONCOLOGY | Facility: CLINIC | Age: 78
End: 2023-08-02

## 2023-08-02 NOTE — PROGRESS NOTES
[facility]: Cancer Care                                                                                          Reached out to patient to check in on him, as he canceled his labs, scans, visit with Dr. Mueller, and infusion appointment this week.    Pt reports he is ill - feels feverish (doesn't have a thermometer), has chills, has a cough, and doesn't feel like getting out of bed. Reports he is drinking and voiding. I tried to get more info out of him but he reports he is tired and doesn't want to talk, and ended the call abruptly.     Message sent to triage team to assess patient further, and FYI message sent to provider. RNCC to follow up as needed.    Olivia Gates, RN, BSN  RN Care Coordinator  [facility]

## 2023-08-02 NOTE — TELEPHONE ENCOUNTER
"Request from care team to call pt related to:    \"Conner canceled his scans, labs, Dr. Mueller visit, and infusion this week so I just called to check in on him.     Conner reports he is ill - feels feverish (doesn't have a thermometer), chills, has cough, doesn't feel like getting out of bed. Reports he is drinking and voiding. I tried to get more info out of him but he reports he is tired and doesn't want to talk, and ended the call abruptly.     Oncology Nurse Triage    Situation:   Jonathan reporting the following symptoms:  - feverish  -cough  -Hay fever like symptoms    Background:   Treating Provider:   Dr. Mueller    Date of last office visit: 6/20/23 Olivia Sanchez CNP    Recent Treatments:7/12/23 D1C5 pembrolizumab    Assessment:     Onset: Tuesday 8/1/23 started feeling worse.     \"not sure of what highest fever was in last 24hours, does not have a thermometer.\"    Pt state having shaking chills, but they go away on their own.     Pt has not taken a COVID test but has no known exposure.    Chest feels heaving and when cough it hurts.   Coughing up phlegm white in color  Some SOB at rest  Sneezing    Runny nose, white drainage    Eye burning.     Sore throat present    Pt is trying to keep up with fluids maybe drinking 2 -3 glasses of water a day.     Pt states every year suffers from hay fever, but generally in month of June, but symptoms feel similar to hay fever. Pt usually takes allergy pills which helps, and today took two allergy pills - \"Loratadine 10mg, total of 2 tablets (20mg).     Pt states has primary care that can follow up on symptoms.     Denies chest pain, headaches, dizziness, bowel/bladder issues    This writer educated to closely self-monitor, get a thermometer next time family member visit and can do errands for pt, encouraged clear liquids, rest, and instructed patient to seek care immediately for worsening symptoms, including: increase fever, chest pain, shortness of breath, " dizziness.      Recommendations:   1647 Olivia Sanchez CNP  1651 Per Olivia, symptoms not-likely from side effects from Keytruda. Continue supportive home cares of rest, okay for OTC allergy medication, encourage clear liquids.   Would like care team to follow up with pt to proactively reschedule  CT scans, Labs, Provider appts reschedule as soon as pt feels better.     Pt denies needing to go to ED, UC. Feels wants to give time to rest as feels related to allergies. Pt is aware to call 911 and to seek immediate care if symptoms do not improve and will also call PCP.

## 2023-08-07 NOTE — PROGRESS NOTES
[facility]: Cancer Care                                                                                          Reached out to patient to see how he is feeling after being ill last week. Pt reports he is largely feeling better. Denies fever, chills, SOB. Has some residual cough. Pt would like to reschedule last week's canceled chemo appointments for next week. Will message scheduling team to reschedule.    Olivia Gates, RN, BSN  RN Care Coordinator  [facility]

## 2023-08-16 ENCOUNTER — ANCILLARY PROCEDURE (OUTPATIENT)
Dept: CT IMAGING | Facility: CLINIC | Age: 78
End: 2023-08-16
Attending: INTERNAL MEDICINE
Payer: COMMERCIAL

## 2023-08-16 ENCOUNTER — ONCOLOGY VISIT (OUTPATIENT)
Dept: ONCOLOGY | Facility: CLINIC | Age: 78
End: 2023-08-16
Attending: INTERNAL MEDICINE
Payer: COMMERCIAL

## 2023-08-16 VITALS — WEIGHT: 228.7 LBS | BODY MASS INDEX: 39.87 KG/M2

## 2023-08-16 DIAGNOSIS — Z13.29 SCREENING FOR HYPOTHYROIDISM: ICD-10-CM

## 2023-08-16 DIAGNOSIS — C09.9 TONSIL CANCER (H): Primary | ICD-10-CM

## 2023-08-16 DIAGNOSIS — C10.9 SQUAMOUS CELL CARCINOMA OF OROPHARYNX (H): ICD-10-CM

## 2023-08-16 DIAGNOSIS — C09.9 TONSIL CANCER (H): ICD-10-CM

## 2023-08-16 DIAGNOSIS — C78.00 MALIGNANT NEOPLASM METASTATIC TO LUNG, UNSPECIFIED LATERALITY (H): Primary | ICD-10-CM

## 2023-08-16 DIAGNOSIS — C78.00 MALIGNANT NEOPLASM METASTATIC TO LUNG, UNSPECIFIED LATERALITY (H): ICD-10-CM

## 2023-08-16 DIAGNOSIS — N40.1 HYPERPLASIA OF PROSTATE WITH LOWER URINARY TRACT SYMPTOMS (LUTS): ICD-10-CM

## 2023-08-16 LAB
ALBUMIN SERPL BCG-MCNC: 4 G/DL (ref 3.5–5.2)
ALP SERPL-CCNC: 91 U/L (ref 40–129)
ALT SERPL W P-5'-P-CCNC: 16 U/L (ref 0–70)
ALT SERPL W P-5'-P-CCNC: ABNORMAL U/L
ANION GAP SERPL CALCULATED.3IONS-SCNC: 10 MMOL/L (ref 7–15)
AST SERPL W P-5'-P-CCNC: 41 U/L (ref 0–45)
BASOPHILS # BLD AUTO: 0 10E3/UL (ref 0–0.2)
BASOPHILS NFR BLD AUTO: 0 %
BILIRUB SERPL-MCNC: 0.3 MG/DL
BUN SERPL-MCNC: 35.9 MG/DL (ref 8–23)
CALCIUM SERPL-MCNC: 9.1 MG/DL (ref 8.8–10.2)
CHLORIDE SERPL-SCNC: 100 MMOL/L (ref 98–107)
CREAT BLD-MCNC: 1.3 MG/DL (ref 0.7–1.3)
CREAT SERPL-MCNC: 1.08 MG/DL (ref 0.67–1.17)
DEPRECATED HCO3 PLAS-SCNC: 28 MMOL/L (ref 22–29)
EOSINOPHIL # BLD AUTO: 0.6 10E3/UL (ref 0–0.7)
EOSINOPHIL NFR BLD AUTO: 6 %
ERYTHROCYTE [DISTWIDTH] IN BLOOD BY AUTOMATED COUNT: 16.6 % (ref 10–15)
GFR SERPL CREATININE-BSD FRML MDRD: 57 ML/MIN/1.73M2
GFR SERPL CREATININE-BSD FRML MDRD: 71 ML/MIN/1.73M2
GLUCOSE SERPL-MCNC: 101 MG/DL (ref 70–99)
HCT VFR BLD AUTO: 33.8 % (ref 40–53)
HGB BLD-MCNC: 10.2 G/DL (ref 13.3–17.7)
IMM GRANULOCYTES # BLD: 0 10E3/UL
IMM GRANULOCYTES NFR BLD: 0 %
LYMPHOCYTES # BLD AUTO: 2 10E3/UL (ref 0.8–5.3)
LYMPHOCYTES NFR BLD AUTO: 22 %
MCH RBC QN AUTO: 23.9 PG (ref 26.5–33)
MCHC RBC AUTO-ENTMCNC: 30.2 G/DL (ref 31.5–36.5)
MCV RBC AUTO: 79 FL (ref 78–100)
MONOCYTES # BLD AUTO: 1 10E3/UL (ref 0–1.3)
MONOCYTES NFR BLD AUTO: 11 %
NEUTROPHILS # BLD AUTO: 5.7 10E3/UL (ref 1.6–8.3)
NEUTROPHILS NFR BLD AUTO: 61 %
NRBC # BLD AUTO: 0 10E3/UL
NRBC BLD AUTO-RTO: 0 /100
PLATELET # BLD AUTO: 258 10E3/UL (ref 150–450)
POTASSIUM SERPL-SCNC: 5 MMOL/L (ref 3.4–5.3)
PROT SERPL-MCNC: 7.6 G/DL (ref 6.4–8.3)
RBC # BLD AUTO: 4.26 10E6/UL (ref 4.4–5.9)
SODIUM SERPL-SCNC: 138 MMOL/L (ref 136–145)
T4 FREE SERPL-MCNC: 1.25 NG/DL (ref 0.9–1.7)
TSH SERPL DL<=0.005 MIU/L-ACNC: 1.09 UIU/ML (ref 0.3–4.2)
WBC # BLD AUTO: 9.3 10E3/UL (ref 4–11)

## 2023-08-16 PROCEDURE — 84155 ASSAY OF PROTEIN SERUM: CPT

## 2023-08-16 PROCEDURE — 258N000003 HC RX IP 258 OP 636: Performed by: INTERNAL MEDICINE

## 2023-08-16 PROCEDURE — 250N000011 HC RX IP 250 OP 636: Mod: JZ | Performed by: INTERNAL MEDICINE

## 2023-08-16 PROCEDURE — 84439 ASSAY OF FREE THYROXINE: CPT

## 2023-08-16 PROCEDURE — 80053 COMPREHEN METABOLIC PANEL: CPT | Performed by: PATHOLOGY

## 2023-08-16 PROCEDURE — 70491 CT SOFT TISSUE NECK W/DYE: CPT | Mod: GC | Performed by: RADIOLOGY

## 2023-08-16 PROCEDURE — 71260 CT THORAX DX C+: CPT | Performed by: RADIOLOGY

## 2023-08-16 PROCEDURE — 36415 COLL VENOUS BLD VENIPUNCTURE: CPT

## 2023-08-16 PROCEDURE — 84443 ASSAY THYROID STIM HORMONE: CPT

## 2023-08-16 PROCEDURE — G0463 HOSPITAL OUTPT CLINIC VISIT: HCPCS | Performed by: INTERNAL MEDICINE

## 2023-08-16 PROCEDURE — 99215 OFFICE O/P EST HI 40 MIN: CPT | Performed by: INTERNAL MEDICINE

## 2023-08-16 PROCEDURE — 85014 HEMATOCRIT: CPT

## 2023-08-16 PROCEDURE — 36415 COLL VENOUS BLD VENIPUNCTURE: CPT | Performed by: INTERNAL MEDICINE

## 2023-08-16 PROCEDURE — 74177 CT ABD & PELVIS W/CONTRAST: CPT | Performed by: RADIOLOGY

## 2023-08-16 PROCEDURE — 96413 CHEMO IV INFUSION 1 HR: CPT

## 2023-08-16 PROCEDURE — 80053 COMPREHEN METABOLIC PANEL: CPT

## 2023-08-16 RX ORDER — METHYLPREDNISOLONE SODIUM SUCCINATE 125 MG/2ML
125 INJECTION, POWDER, LYOPHILIZED, FOR SOLUTION INTRAMUSCULAR; INTRAVENOUS
Status: CANCELLED
Start: 2023-08-16

## 2023-08-16 RX ORDER — TAMSULOSIN HYDROCHLORIDE 0.4 MG/1
CAPSULE ORAL
Qty: 90 CAPSULE | Refills: 3 | Status: SHIPPED | OUTPATIENT
Start: 2023-08-16

## 2023-08-16 RX ORDER — MEPERIDINE HYDROCHLORIDE 25 MG/ML
25 INJECTION INTRAMUSCULAR; INTRAVENOUS; SUBCUTANEOUS EVERY 30 MIN PRN
Status: CANCELLED | OUTPATIENT
Start: 2023-08-16

## 2023-08-16 RX ORDER — HEPARIN SODIUM (PORCINE) LOCK FLUSH IV SOLN 100 UNIT/ML 100 UNIT/ML
5 SOLUTION INTRAVENOUS
Status: CANCELLED | OUTPATIENT
Start: 2023-08-16

## 2023-08-16 RX ORDER — ALBUTEROL SULFATE 0.83 MG/ML
2.5 SOLUTION RESPIRATORY (INHALATION)
Status: CANCELLED | OUTPATIENT
Start: 2023-08-16

## 2023-08-16 RX ORDER — ALBUTEROL SULFATE 90 UG/1
1-2 AEROSOL, METERED RESPIRATORY (INHALATION)
Status: CANCELLED
Start: 2023-08-16

## 2023-08-16 RX ORDER — HEPARIN SODIUM,PORCINE 10 UNIT/ML
5 VIAL (ML) INTRAVENOUS
Status: CANCELLED | OUTPATIENT
Start: 2023-08-16

## 2023-08-16 RX ORDER — IOPAMIDOL 755 MG/ML
111 INJECTION, SOLUTION INTRAVASCULAR ONCE
Status: COMPLETED | OUTPATIENT
Start: 2023-08-16 | End: 2023-08-16

## 2023-08-16 RX ORDER — DIPHENHYDRAMINE HYDROCHLORIDE 50 MG/ML
50 INJECTION INTRAMUSCULAR; INTRAVENOUS
Status: CANCELLED
Start: 2023-08-16

## 2023-08-16 RX ORDER — EPINEPHRINE 1 MG/ML
0.3 INJECTION, SOLUTION INTRAMUSCULAR; SUBCUTANEOUS EVERY 5 MIN PRN
Status: CANCELLED | OUTPATIENT
Start: 2023-08-16

## 2023-08-16 RX ADMIN — SODIUM CHLORIDE 500 ML: 9 INJECTION, SOLUTION INTRAVENOUS at 15:46

## 2023-08-16 RX ADMIN — IOPAMIDOL 111 ML: 755 INJECTION, SOLUTION INTRAVASCULAR at 08:29

## 2023-08-16 RX ADMIN — SODIUM CHLORIDE 200 MG: 9 INJECTION, SOLUTION INTRAVENOUS at 15:53

## 2023-08-16 NOTE — PROGRESS NOTES
Infusion Nursing Note:  Jonathan Bishop presents today for Cycle 6, day 1 Keytruda.    Patient seen by provider today: Yes: Dr. Mueller    Note: Patient presents to clinic today feeling well with no questions.  Pt did not request or require any intervention for pain today.    Intravenous Access:  Peripheral IV placed.    Treatment Conditions:  Lab Results   Component Value Date    HGB 10.2 (L) 08/16/2023    WBC 9.3 08/16/2023    ANEU 3.9 10/28/2020    ANEUTAUTO 5.7 08/16/2023     08/16/2023        Lab Results   Component Value Date     08/16/2023    POTASSIUM 5.0 08/16/2023    MAG 2.0 12/09/2020    CR 1.08 08/16/2023    WARREN 9.1 08/16/2023    BILITOTAL 0.3 08/16/2023    ALBUMIN 4.0 08/16/2023    ALT 16 08/16/2023    AST 41 08/16/2023       Results reviewed, labs MET treatment parameters, ok to proceed with treatment.    Post Infusion Assessment:  Patient tolerated infusion without incident--report given to Dia TSAI RN, to hang keytruda and discharge pt at 1515.  Blood return noted pre and post infusion.  Site patent and intact, free from redness, edema or discomfort.  No evidence of extravasations.  Access discontinued per protocol.    Discharge Plan:   Patient declined prescription refills.  Discharge instructions reviewed with: Patient.  Patient and/or family verbalized understanding of discharge instructions and all questions answered.  AVS to patient via [employer].  Patient will return 9/7/2023 for next appointment; placed on wait list for infusion following MARK visit.   Patient discharged in stable condition accompanied by: self.  Departure Mode: Ambulatory.    Gaviota Mitchell RN

## 2023-08-16 NOTE — DISCHARGE INSTRUCTIONS

## 2023-08-16 NOTE — LETTER
8/16/2023         RE: Jonathan Bishop  [address]  [facility] [address]        Dear Colleague,    Thank you for referring your patient, Jonathan Bishop, to the [facility]. Please see a copy of my visit note below.       [facility]    PATIENT NAME: Jonathan Bishop  MRN # [id number]   DATE OF VISIT: August 16, 2023  YOB: 1945     Referring Provider: Dr. Karishma Eng  Otolaryngology: Dr. Karishma Eng  Radiation Oncology: Dr. Jenni Mills  PCP: Dr. Buck Nascimento    CANCER TYPE: SCC L tonsil, p16 +lety  STAGE: [id number] (IVC)  ECOG PS: 1    PD-L1: TPS 20%, CPS 25% on [id number] tonsil bx  NGS: N/A    SUMMARY  2/26/23 L tonsil mass bx in clinic (Dr. Eng).   2/20/23 PET/CT. 3.7 x 4.0 x 5.1 cm mass L palatine tonsil causing narrowing of the oropharyngeal lumen, L level 2 node, L retropharyngeal nodular density, extension of primary mass vs retropharyngeal node, 0.8 cm RLL nodule concerning for met, mild focal uptake R iliac bone and L1 without CT correlate suspicious for mets.   3/2/23 R VATS, wedge resection. Path: SCC, poorly differentiated, associated with necrosis, 1 cm, negative margins, p16 +lety  3/24/23 MRI L spine and pelvis. Diffusely heterogeneous marrow signal throughout without corresponding abnormal signal on sagittal STIR sequence and no abnormal enhancement. Nonspecific but could be benign process such as red marrow hyperplasia, cannot completely exclude metastatic disease or infiltrative pathologic marrow process. No lesions corresponding to FDG avid spots on PET/CT.   4/19/23~current Pembrolizumab.    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  The R level 2 node is a little bigger. Likely reactive to  the bad URI. Discussed that in the absence of other new sites of disease, only slight progression in the LNs in the setting of obvious recent infection, no new pain or other symptoms, etc., makes sense to not conclude definitive progression yet and continue pembrolizumab. Restage after 3 more cycles with CT chest and CT abd. I cannot see the sclerotic lesion in the R iliac bone nor L1 so we can skip imaging that area next time, alternating. He asked when we would be done with treatment. We reviewed the overall strategy of continuing pembro for up to 2 years in the absence of PD, toxicity or wanting to stop. Discussed concept of consolidation therapy down the road and when we would consider that. No-showed Dr. Eng last week due to not feeling well. Will reschedule.     URI: Didn;'t come in, didn't get tested. Recovered    Hyperglyemia, pre-diabetes: A1c 6.0-6.3 for years now. No changes.     H/o CVA: Residual L weakness. Uses cane and walker at baseline, but independent and functional. Stable    Microcytic anemia: Iron studies 8/2022 showed perhaps very mild LEW to normal iron studies. Current studies 4/18/23 show roughly the same. No supplements for now. Recheck iron studies if MCV drops further - today only 79.    Peripheral neuropathy: MRI shows a lot of foramenal stenosis and spinal canal stenosis, so more likely related to that than DM2. Monitor for worsening    LE edema; Not able to get compression stockings on due to his HHA being on vacation. Knows to elevate and use moisturizers.     40 minutes spent by me on the date of the encounter doing chart review, history and exam, documentation and further activities per the note     Dominique Mueller MD  Associate Professor of Medicine  Hematology, Oncology and Transplantation      SUBJECTIVE  Mr. Bishop returns for follow up after 4 cycles of pembrolizumab.   Had a fever a couple of weeks ago and wasn't feeling well. Was coughing whitish phlegm, sneezing, had  some increased shortness of breath, chills. Didn't want to come in. Is feeling better - he says 99%. O/w no new issues. LE swelling a little worse - can't get compression stockings on himself and his aide is out on vacation until Fri. No pain.     M-W-F are best days for scheduling    PAST MEDICAL HISTORY  SCC as above  Chronic LBP  TAVR 2020  H/o rheumatic carditis 1950  CHF. 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 Nocturia once nightly   ED  Cataracts  Umbilical hernia repair 2011  Knee arthroplasty B 2010  Lumbar spine fusion, laminectomy, sciatic pain R > L  Peripheral neuropathy - from pinched nerves?  Hearing loss    Numbness/tingling in both feet - bilaterally, symmetric, R spasms more than left    CURRENT OUTPATIENT MEDICATIONS  Reviewed    ALLERGIES  No Known Allergies     REVIEW OF SYSTEMS  As above in the HPI, o/w complete 12-point ROS was negative.    PHYSICAL EXAM  No vitals - not sure why  GEN: NAD  HEENT: EOMI, no icterus, injection or pallor  EXT: 2 + edema bilaterally. Skin healthy but a little tight  NEURO: alert    LABORATORY AND IMAGING STUDIES    Labs today and 7/12/23 were independently reviewed and interpreted by me     Imaging was personally reviewed and interpreted by me as above

## 2023-08-16 NOTE — PROGRESS NOTES
[facility]    PATIENT NAME: Jonathan Bishop  MRN # [id number]   DATE OF VISIT: August 16, 2023  YOB: 1945     Referring Provider: Dr. Karishma Eng  Otolaryngology: Dr. Karishma Eng  Radiation Oncology: Dr. Jenni Mills  PCP: Dr. Buck Nascimento    CANCER TYPE: SCC L tonsil, p16 +lety  STAGE: [id number] (IVC)  ECOG PS: 1    PD-L1: TPS 20%, CPS 25% on [id number] tonsil bx  NGS: N/A    SUMMARY  2/26/23 L tonsil mass bx in clinic (Dr. Eng).   2/20/23 PET/CT. 3.7 x 4.0 x 5.1 cm mass L palatine tonsil causing narrowing of the oropharyngeal lumen, L level 2 node, L retropharyngeal nodular density, extension of primary mass vs retropharyngeal node, 0.8 cm RLL nodule concerning for met, mild focal uptake R iliac bone and L1 without CT correlate suspicious for mets.   3/2/23 R VATS, wedge resection. Path: SCC, poorly differentiated, associated with necrosis, 1 cm, negative margins, p16 +lety  3/24/23 MRI L spine and pelvis. Diffusely heterogeneous marrow signal throughout without corresponding abnormal signal on sagittal STIR sequence and no abnormal enhancement. Nonspecific but could be benign process such as red marrow hyperplasia, cannot completely exclude metastatic disease or infiltrative pathologic marrow process. No lesions corresponding to FDG avid spots on PET/CT.   4/19/23~current Pembrolizumab.    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  The R level 2 node is a little bigger. Likely reactive to the bad URI. Discussed that in the absence of other new sites of disease, only slight progression in the LNs in the setting of obvious recent infection, no new pain or other symptoms, etc., makes sense to not conclude definitive progression yet and continue pembrolizumab.  Restage after 3 more cycles with CT chest and CT abd. I cannot see the sclerotic lesion in the R iliac bone nor L1 so we can skip imaging that area next time, alternating. He asked when we would be done with treatment. We reviewed the overall strategy of continuing pembro for up to 2 years in the absence of PD, toxicity or wanting to stop. Discussed concept of consolidation therapy down the road and when we would consider that. No-showed Dr. Eng last week due to not feeling well. Will reschedule.     URI: Didn;'t come in, didn't get tested. Recovered    Hyperglyemia, pre-diabetes: A1c 6.0-6.3 for years now. No changes.     H/o CVA: Residual L weakness. Uses cane and walker at baseline, but independent and functional. Stable    Microcytic anemia: Iron studies 8/2022 showed perhaps very mild LEW to normal iron studies. Current studies 4/18/23 show roughly the same. No supplements for now. Recheck iron studies if MCV drops further - today only 79.    Peripheral neuropathy: MRI shows a lot of foramenal stenosis and spinal canal stenosis, so more likely related to that than DM2. Monitor for worsening    LE edema; Not able to get compression stockings on due to his HHA being on vacation. Knows to elevate and use moisturizers.     40 minutes spent by me on the date of the encounter doing chart review, history and exam, documentation and further activities per the note     Dominique Mueller MD  Associate Professor of Medicine  Hematology, Oncology and Transplantation      SUBJECTIVE  Mr. Bishop returns for follow up after 4 cycles of pembrolizumab.   Had a fever a couple of weeks ago and wasn't feeling well. Was coughing whitish phlegm, sneezing, had some increased shortness of breath, chills. Didn't want to come in. Is feeling better - he says 99%. O/w no new issues. LE swelling a little worse - can't get compression stockings on himself and his aide is out on vacation until Fri. No pain.     M-W-F are best days for  scheduling    PAST MEDICAL HISTORY  SCC as above  Chronic LBP  TAVR 2020  H/o rheumatic carditis 1950  CHF. 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 Nocturia once nightly   ED  Cataracts  Umbilical hernia repair 2011  Knee arthroplasty B 2010  Lumbar spine fusion, laminectomy, sciatic pain R > L  Peripheral neuropathy - from pinched nerves?  Hearing loss    Numbness/tingling in both feet - bilaterally, symmetric, R spasms more than left    CURRENT OUTPATIENT MEDICATIONS  Reviewed    ALLERGIES  No Known Allergies     REVIEW OF SYSTEMS  As above in the HPI, o/w complete 12-point ROS was negative.    PHYSICAL EXAM  No vitals - not sure why  GEN: NAD  HEENT: EOMI, no icterus, injection or pallor  EXT: 2 + edema bilaterally. Skin healthy but a little tight  NEURO: alert    LABORATORY AND IMAGING STUDIES    Labs today and 7/12/23 were independently reviewed and interpreted by me     Imaging was personally reviewed and interpreted by me as above

## 2023-08-16 NOTE — NURSING NOTE
"Oncology Rooming Note    August 16, 2023 12:27 PM   Jonathan Bishop is a 77 year old male who presents for:    Chief Complaint   Patient presents with    Oncology Clinic Visit     Tonsil cancer     Initial Vitals: There were no vitals taken for this visit. Estimated body mass index is 39.73 kg/m  as calculated from the following:    Height as of 7/3/23: 1.613 m (5' 3.5\").    Weight as of 7/12/23: 103.4 kg (227 lb 14.4 oz). There is no height or weight on file to calculate BSA.  Data Unavailable Comment: Data Unavailable   No LMP for male patient.  Allergies reviewed: Yes  Medications reviewed: Yes    Medications: MEDICATION REFILLS NEEDED TODAY. Provider was notified.  Pharmacy name entered into [employer]: [id number] [place] [facility] [address]    Clinical concerns: needs refill for tamsulosin.      Shashi Miller"

## 2023-08-21 DIAGNOSIS — I63.521 CEREBROVASCULAR ACCIDENT (CVA) DUE TO OCCLUSION OF RIGHT ANTERIOR CEREBRAL ARTERY (H): ICD-10-CM

## 2023-08-21 DIAGNOSIS — N40.1 HYPERPLASIA OF PROSTATE WITH LOWER URINARY TRACT SYMPTOMS (LUTS): ICD-10-CM

## 2023-08-21 DIAGNOSIS — I50.32 CHRONIC DIASTOLIC HEART FAILURE (H): ICD-10-CM

## 2023-08-22 DIAGNOSIS — M54.50 ACUTE MIDLINE LOW BACK PAIN WITHOUT SCIATICA: ICD-10-CM

## 2023-08-22 RX ORDER — OXYCODONE AND ACETAMINOPHEN 5; 325 MG/1; MG/1
1-2 TABLET ORAL EVERY 6 HOURS PRN
Qty: 150 TABLET | Refills: 0 | Status: SHIPPED | OUTPATIENT
Start: 2023-08-22 | End: 2023-09-25

## 2023-08-22 NOTE — TELEPHONE ENCOUNTER
Health Call Center    Phone Message    May a detailed message be left on voicemail: yes     Reason for Call: Medication Refill Request    Has the patient contacted the pharmacy for the refill? Yes   Name of medication being requested: oxyCODONE-acetaminophen (PERCOCET) 5-325 MG tablet   Provider who prescribed the medication: Dr. Sick  Pharmacy:   [facility] [address] [place] [id number] [facility]     Date medication is needed: 8/22/23

## 2023-08-22 NOTE — TELEPHONE ENCOUNTER
oxyCODONE-acetaminophen (PERCOCET) 5-325 MG tablet     Last Written Prescription Date:   7/25/2023  Last Fill Quantity: 150,   # refills: 0  Last Office Visit :  7/3/2023  Future Office visit:  None    Routing refill request to provider for review/approval because:  Drug not on the [facility], [facility] or [facility] refill protocol or controlled substance    Mayda Holland RN  Central Triage Red Flags/Med Refills

## 2023-08-24 RX ORDER — SPIRONOLACTONE 50 MG/1
50 TABLET, FILM COATED ORAL DAILY
Qty: 90 TABLET | Refills: 2 | Status: SHIPPED | OUTPATIENT
Start: 2023-08-24

## 2023-08-24 RX ORDER — TAMSULOSIN HYDROCHLORIDE 0.4 MG/1
CAPSULE ORAL
Qty: 90 CAPSULE | Refills: 0 | OUTPATIENT
Start: 2023-08-24

## 2023-08-24 RX ORDER — TORSEMIDE 20 MG/1
40 TABLET ORAL DAILY
Qty: 180 TABLET | Refills: 0 | OUTPATIENT
Start: 2023-08-24

## 2023-08-24 RX ORDER — ATORVASTATIN CALCIUM 40 MG/1
40 TABLET, FILM COATED ORAL DAILY
Qty: 90 TABLET | Refills: 0 | OUTPATIENT
Start: 2023-08-24

## 2023-08-24 NOTE — TELEPHONE ENCOUNTER
Pt was last seen in clinic on 7/3/23. Atorvastatin not on pt's medication list at last visit with PCP. Refill refused.     Torsemide not on pt's medication list at last visit with PCP. Refill request refused.     CHELSEA VIVEROS RN on 8/24/2023 at 11:15 AM     No

## 2023-08-24 NOTE — TELEPHONE ENCOUNTER
Atorvastatin Calcium 40 MG Oral Tablet       Last Written Prescription Date:  not on active med list  Discontinued Stop at Discharge Sanjay Fritz PA-C 3/3/23 1059     Last Office Visit : 7-3-23  Future Office visit:  none    Routing refill request to provider for review/approval because:  Drug not active on patient's medication list  Discontinue by other provider/hospital discharge       Torsemide 20 MG Oral Tablet       Last Written Prescription Date:  not on active med list  Discontinued Med Rec(No AVS / No eCancel) Barb Lees NP 2/28/23 1425     Routing refill request to provider for review/approval because:  Drug not active on patient's medication list  Discontinue by other provider/clinic        spironolactone (ALDACTONE) 50 MG tablet       Last Written Prescription Date:  8-1-22  Last Fill Quantity: 90,   # refills: 3  RF 90 day;2rf    Duplicate: rf denied  tamsulosin (FLOMAX) 0.4 MG capsule   90 capsule 3 8/16/2023  No  Sig: TAKE 1 CAPSULE BY MOUTH ONCE DAILY  Prescribing Provider's NPI: [id number]  Dominique Mueller  [facility] [place], [place] - [address] [place] [address] [phone]

## 2023-09-02 ENCOUNTER — HEALTH MAINTENANCE LETTER (OUTPATIENT)
Age: 78
End: 2023-09-02

## 2023-09-05 NOTE — PROGRESS NOTES
[facility]CER [facility]    PATIENT NAME: Jonathan Bishop  MRN # [id number]   DATE OF VISIT: September 7, 2023  YOB: 1945     Referring Provider: Dr. Karishma Eng  Otolaryngology: Dr. Karishma Eng  Radiation Oncology: Dr. Jenni Mills  PCP: Dr. Buck Nascimento    CANCER TYPE: SCC L tonsil, p16 +lety  STAGE: aH1T3T3 (IVC)  ECOG PS: 1    PD-L1: TPS 20%, CPS 25% on [id number] tonsil bx  NGS: N/A    SUMMARY  2/26/23 L tonsil mass bx in clinic (Dr. Eng).   2/20/23 PET/CT. 3.7 x 4.0 x 5.1 cm mass L palatine tonsil causing narrowing of the oropharyngeal lumen, L level 2 node, L retropharyngeal nodular density, extension of primary mass vs retropharyngeal node, 0.8 cm RLL nodule concerning for met, mild focal uptake R iliac bone and L1 without CT correlate suspicious for mets.   3/2/23 R VATS, wedge resection. Path: SCC, poorly differentiated, associated with necrosis, 1 cm, negative margins, p16 +lety  3/24/23 MRI L spine and pelvis. Diffusely heterogeneous marrow signal throughout without corresponding abnormal signal on sagittal STIR sequence and no abnormal enhancement. Nonspecific but could be benign process such as red marrow hyperplasia, cannot completely exclude metastatic disease or infiltrative pathologic marrow process. No lesions corresponding to FDG avid spots on PET/CT.   4/19/23~current Pembrolizumab.    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 R level 2 node believed to be enlarged at that time due to coinciding URI. Without evidence of definitive progression and absence of new sites of disease, plan is to continue pembrolizumab and restage after 3 additional cycles. He missed apt with Dr. Eng in August, will request rescheduling. Labs today stable, no concerns.  -Proceed with cycle 7 pembrolizumab  -RTC in 3 weeks for infusion with MARK visit    Hyperglyemia, pre-diabetes: A1c  6.0-6.3 for years now. No changes.     H/o CVA: Residual L weakness. Uses cane and walker at baseline, but independent and functional. Stable    Microcytic anemia: Iron studies 8/2022 showed perhaps very mild LEW to normal iron studies. Current studies 4/18/23 show roughly the same. No supplements for now. MCV stable, 81 today. Recheck iron studies if MCV drops further.    Peripheral neuropathy: MRI shows a lot of foramenal stenosis and spinal canal stenosis, so more likely related to that than DM2. No changes.    LE edema: Depends on HHA to apply compression stockings. Not currently wearing. Aide has been off this past week. Continues to elevate in recliner and in bed.     45 minutes spent on the date of the encounter doing chart review, review of test results, interpretation of tests, patient visit, and documentation     Vandana Bertrand CNP    SUBJECTIVE  Conner is seen today in follow-up of oligometastatic L tonsil cancer prior to pembrolizumab infusion  -Feels well. Energy is high.  -Fatigued the day after the infusion but then back to normal.  -HHA off this week, hasn't been wearing compression stockings  -Denies pain in throat or bleeding from tumor  -Appetite good  -No issues with eating or swallowing  -Denies diarrhea, shortness of breath, cough, joint pain  -Skin intermittently dry following infusion    PAST MEDICAL HISTORY  SCC as above  Chronic LBP  TAVR 2020  H/o rheumatic carditis 1950  CHF. 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 Nocturia once nightly   ED  Cataracts  Umbilical hernia repair 2011  Knee arthroplasty B 2010  Lumbar spine fusion, laminectomy, sciatic pain R > L  Peripheral neuropathy - from pinched nerves?  Hearing loss    Numbness/tingling in both feet - bilaterally, symmetric, R spasms more than left    CURRENT OUTPATIENT MEDICATIONS  Reviewed    ALLERGIES  No Known Allergies     REVIEW OF SYSTEMS  As above in the HPI, o/w complete 12-point ROS  was negative.    PHYSICAL EXAM  /74   Pulse 69   Temp 98.3  F (36.8  C)   Resp 16   Wt 105 kg (231 lb 6.4 oz)   SpO2 97%   BMI 40.34 kg/m      General: Well-appearing male, NAD  Eyes: EOMI, PERRL. No scleral icterus.  ENT: Oral mucosa is moist, visible L tonsil tumor without bleeding or drainage.   Lymphatic: Neck is supple without cervical or supraclavicular lymphadenopathy.   Cardiovascular: RRR, no m/g/r. 2+ BLE edema  Respiratory: CTA bilaterally. No wheezes or crackles.  Neurologic: Cranial nerves II through XII are grossly intact.  Skin: No rashes, petechiae, or bruising noted on exposed skin.    LABORATORY AND IMAGING STUDIES  Most Recent 3 CBC's:  Recent Labs   Lab Test 08/16/23  1331 07/12/23  1246 06/22/23  0751   WBC 9.3 7.9 8.5   HGB 10.2* 10.1* 9.7*   MCV 79 80 80    212 242   ANEUTAUTO 5.7 4.6 4.9    Most Recent 3 BMP's:  Recent Labs   Lab Test 08/16/23  1331 08/16/23  0824 07/12/23  1246 06/20/23  1507     --  141 137   POTASSIUM 5.0  --  3.8 4.1   CHLORIDE 100  --  105 100   CO2 28  --  26 24   BUN 35.9*  --  25.0* 39.8*   CR 1.08 1.3 0.95 1.42*   ANIONGAP 10  --  10 13   WARREN 9.1  --  9.5 9.8   *  --  128* 92   PROTTOTAL 7.6  --  7.5 7.6   ALBUMIN 4.0  --  4.1 4.1    Most Recent 2 LFT's:  Recent Labs   Lab Test 08/16/23  1432 08/16/23  1331 07/12/23  1246   AST  --  41 22   ALT 16  --  15   ALKPHOS  --  91 105   BILITOTAL  --  0.3 0.2    Most Recent TSH and T4:  Recent Labs   Lab Test 08/16/23  1331   TSH 1.09   T4 1.25     Phos/Mag:  Lab Results   Component Value Date    PHOS 2.4 (L) 12/09/2020    PHOS 3.9 12/08/2020    MAG 2.0 12/09/2020    MAG 2.1 12/08/2020        Labs were independently reviewed and interpreted by me

## 2023-09-07 ENCOUNTER — APPOINTMENT (OUTPATIENT)
Dept: LAB | Facility: CLINIC | Age: 78
End: 2023-09-07
Attending: INTERNAL MEDICINE
Payer: COMMERCIAL

## 2023-09-07 ENCOUNTER — INFUSION THERAPY VISIT (OUTPATIENT)
Dept: ONCOLOGY | Facility: CLINIC | Age: 78
End: 2023-09-07
Attending: REGISTERED NURSE
Payer: COMMERCIAL

## 2023-09-07 VITALS
RESPIRATION RATE: 16 BRPM | OXYGEN SATURATION: 97 % | SYSTOLIC BLOOD PRESSURE: 139 MMHG | WEIGHT: 231.4 LBS | BODY MASS INDEX: 40.34 KG/M2 | DIASTOLIC BLOOD PRESSURE: 74 MMHG | TEMPERATURE: 98.3 F | HEART RATE: 69 BPM

## 2023-09-07 DIAGNOSIS — C09.9 TONSIL CANCER (H): Primary | ICD-10-CM

## 2023-09-07 DIAGNOSIS — C10.9 SQUAMOUS CELL CARCINOMA OF OROPHARYNX (H): ICD-10-CM

## 2023-09-07 DIAGNOSIS — R60.0 BILATERAL LEG EDEMA: ICD-10-CM

## 2023-09-07 DIAGNOSIS — C78.00 MALIGNANT NEOPLASM METASTATIC TO LUNG, UNSPECIFIED LATERALITY (H): ICD-10-CM

## 2023-09-07 DIAGNOSIS — Z13.29 SCREENING FOR HYPOTHYROIDISM: ICD-10-CM

## 2023-09-07 DIAGNOSIS — G60.9 IDIOPATHIC PERIPHERAL NEUROPATHY: ICD-10-CM

## 2023-09-07 DIAGNOSIS — C09.9 TONSIL CANCER (H): ICD-10-CM

## 2023-09-07 DIAGNOSIS — C78.00 MALIGNANT NEOPLASM METASTATIC TO LUNG, UNSPECIFIED LATERALITY (H): Primary | ICD-10-CM

## 2023-09-07 LAB
ALBUMIN SERPL BCG-MCNC: 4.2 G/DL (ref 3.5–5.2)
ALP SERPL-CCNC: 105 U/L (ref 40–129)
ALT SERPL W P-5'-P-CCNC: 13 U/L (ref 0–70)
ANION GAP SERPL CALCULATED.3IONS-SCNC: 10 MMOL/L (ref 7–15)
AST SERPL W P-5'-P-CCNC: 21 U/L (ref 0–45)
BASOPHILS # BLD AUTO: 0.1 10E3/UL (ref 0–0.2)
BASOPHILS NFR BLD AUTO: 1 %
BILIRUB SERPL-MCNC: 0.4 MG/DL
BUN SERPL-MCNC: 20.3 MG/DL (ref 8–23)
CALCIUM SERPL-MCNC: 9.7 MG/DL (ref 8.8–10.2)
CHLORIDE SERPL-SCNC: 106 MMOL/L (ref 98–107)
CREAT SERPL-MCNC: 0.9 MG/DL (ref 0.67–1.17)
DEPRECATED HCO3 PLAS-SCNC: 29 MMOL/L (ref 22–29)
EGFRCR SERPLBLD CKD-EPI 2021: 88 ML/MIN/1.73M2
EOSINOPHIL # BLD AUTO: 0.4 10E3/UL (ref 0–0.7)
EOSINOPHIL NFR BLD AUTO: 6 %
ERYTHROCYTE [DISTWIDTH] IN BLOOD BY AUTOMATED COUNT: 16.6 % (ref 10–15)
GLUCOSE SERPL-MCNC: 126 MG/DL (ref 70–99)
HCT VFR BLD AUTO: 33.4 % (ref 40–53)
HGB BLD-MCNC: 10.2 G/DL (ref 13.3–17.7)
IMM GRANULOCYTES # BLD: 0 10E3/UL
IMM GRANULOCYTES NFR BLD: 0 %
LYMPHOCYTES # BLD AUTO: 1.7 10E3/UL (ref 0.8–5.3)
LYMPHOCYTES NFR BLD AUTO: 27 %
MCH RBC QN AUTO: 24.8 PG (ref 26.5–33)
MCHC RBC AUTO-ENTMCNC: 30.5 G/DL (ref 31.5–36.5)
MCV RBC AUTO: 81 FL (ref 78–100)
MONOCYTES # BLD AUTO: 0.5 10E3/UL (ref 0–1.3)
MONOCYTES NFR BLD AUTO: 7 %
NEUTROPHILS # BLD AUTO: 3.8 10E3/UL (ref 1.6–8.3)
NEUTROPHILS NFR BLD AUTO: 59 %
NRBC # BLD AUTO: 0 10E3/UL
NRBC BLD AUTO-RTO: 0 /100
PLATELET # BLD AUTO: 178 10E3/UL (ref 150–450)
POTASSIUM SERPL-SCNC: 3.6 MMOL/L (ref 3.4–5.3)
PROT SERPL-MCNC: 7.5 G/DL (ref 6.4–8.3)
RBC # BLD AUTO: 4.12 10E6/UL (ref 4.4–5.9)
SODIUM SERPL-SCNC: 145 MMOL/L (ref 136–145)
TSH SERPL DL<=0.005 MIU/L-ACNC: 0.94 UIU/ML (ref 0.3–4.2)
WBC # BLD AUTO: 6.4 10E3/UL (ref 4–11)

## 2023-09-07 PROCEDURE — 80053 COMPREHEN METABOLIC PANEL: CPT | Performed by: REGISTERED NURSE

## 2023-09-07 PROCEDURE — 96413 CHEMO IV INFUSION 1 HR: CPT

## 2023-09-07 PROCEDURE — 999N000285 HC STATISTIC VASC ACCESS LAB DRAW WITH PIV START

## 2023-09-07 PROCEDURE — 99215 OFFICE O/P EST HI 40 MIN: CPT | Performed by: REGISTERED NURSE

## 2023-09-07 PROCEDURE — G0463 HOSPITAL OUTPT CLINIC VISIT: HCPCS | Mod: 25 | Performed by: REGISTERED NURSE

## 2023-09-07 PROCEDURE — 999N000127 HC STATISTIC PERIPHERAL IV START W US GUIDANCE

## 2023-09-07 PROCEDURE — 84443 ASSAY THYROID STIM HORMONE: CPT | Performed by: REGISTERED NURSE

## 2023-09-07 PROCEDURE — 250N000011 HC RX IP 250 OP 636: Mod: JZ | Performed by: REGISTERED NURSE

## 2023-09-07 PROCEDURE — 85025 COMPLETE CBC W/AUTO DIFF WBC: CPT

## 2023-09-07 PROCEDURE — 36415 COLL VENOUS BLD VENIPUNCTURE: CPT | Performed by: REGISTERED NURSE

## 2023-09-07 PROCEDURE — 258N000003 HC RX IP 258 OP 636: Performed by: REGISTERED NURSE

## 2023-09-07 RX ORDER — DIPHENHYDRAMINE HYDROCHLORIDE 50 MG/ML
50 INJECTION INTRAMUSCULAR; INTRAVENOUS
Status: CANCELLED
Start: 2023-09-07

## 2023-09-07 RX ORDER — EPINEPHRINE 1 MG/ML
0.3 INJECTION, SOLUTION INTRAMUSCULAR; SUBCUTANEOUS EVERY 5 MIN PRN
Status: CANCELLED | OUTPATIENT
Start: 2023-09-07

## 2023-09-07 RX ORDER — METHYLPREDNISOLONE SODIUM SUCCINATE 125 MG/2ML
125 INJECTION, POWDER, LYOPHILIZED, FOR SOLUTION INTRAMUSCULAR; INTRAVENOUS
Status: CANCELLED
Start: 2023-09-07

## 2023-09-07 RX ORDER — MEPERIDINE HYDROCHLORIDE 25 MG/ML
25 INJECTION INTRAMUSCULAR; INTRAVENOUS; SUBCUTANEOUS EVERY 30 MIN PRN
Status: CANCELLED | OUTPATIENT
Start: 2023-09-07

## 2023-09-07 RX ORDER — HEPARIN SODIUM (PORCINE) LOCK FLUSH IV SOLN 100 UNIT/ML 100 UNIT/ML
5 SOLUTION INTRAVENOUS
Status: CANCELLED | OUTPATIENT
Start: 2023-09-07

## 2023-09-07 RX ORDER — ALBUTEROL SULFATE 0.83 MG/ML
2.5 SOLUTION RESPIRATORY (INHALATION)
Status: CANCELLED | OUTPATIENT
Start: 2023-09-07

## 2023-09-07 RX ORDER — ALBUTEROL SULFATE 90 UG/1
1-2 AEROSOL, METERED RESPIRATORY (INHALATION)
Status: CANCELLED
Start: 2023-09-07

## 2023-09-07 RX ORDER — HEPARIN SODIUM,PORCINE 10 UNIT/ML
5 VIAL (ML) INTRAVENOUS
Status: CANCELLED | OUTPATIENT
Start: 2023-09-07

## 2023-09-07 RX ADMIN — SODIUM CHLORIDE 200 MG: 9 INJECTION, SOLUTION INTRAVENOUS at 13:07

## 2023-09-07 RX ADMIN — SODIUM CHLORIDE 250 ML: 9 INJECTION, SOLUTION INTRAVENOUS at 12:56

## 2023-09-07 ASSESSMENT — PAIN SCALES - GENERAL: PAINLEVEL: EXTREME PAIN (9)

## 2023-09-07 NOTE — PROGRESS NOTES
Infusion Nursing Note:  Jonathan Bishop presents today for C7D1 Keytruda.    Patient seen by provider today: Yes: Vandana Bertrand NP   present during visit today: Not Applicable.    Note: No intervention for pain today.       Intravenous Access:  Peripheral IV placed.    Treatment Conditions:  Lab Results   Component Value Date    HGB 10.2 (L) 09/07/2023    WBC 6.4 09/07/2023    ANEU 3.9 10/28/2020    ANEUTAUTO 3.8 09/07/2023     09/07/2023        Lab Results   Component Value Date     09/07/2023    POTASSIUM 3.6 09/07/2023    MAG 2.0 12/09/2020    CR 0.90 09/07/2023    WARREN 9.7 09/07/2023    BILITOTAL 0.4 09/07/2023    ALBUMIN 4.2 09/07/2023    ALT 13 09/07/2023    AST 21 09/07/2023       Results reviewed, labs MET treatment parameters, ok to proceed with treatment.      Post Infusion Assessment:  Patient tolerated infusion without incident.  Blood return noted pre and post infusion.  Site patent and intact, free from redness, edema or discomfort.  No evidence of extravasations.  Access discontinued per protocol.       Discharge Plan:   Patient declined prescription refills.  Discharge instructions reviewed with: Patient.  Patient and/or family verbalized understanding of discharge instructions and all questions answered.  Copy of AVS reviewed with patient and/or family.  Patient will return 9/28/23 for next appointment.  Patient discharged in stable condition accompanied by: self.  Departure Mode: Ambulatory.      GREG BLAND RN

## 2023-09-07 NOTE — NURSING NOTE
Chief Complaint   Patient presents with    Blood Draw     Labs drawn via piv by rn in lab. VS taken.     Labs drawn from PIV placed by RN. Line flushed with saline. Vitals taken. Pt checked in for appointment(s).    Ric Castano RN

## 2023-09-07 NOTE — NURSING NOTE
"Oncology Rooming Note    September 7, 2023 11:41 AM   Jonathan Bishop is a 77 year old male who presents for:    Chief Complaint   Patient presents with    Blood Draw     Labs drawn via piv by rn in lab. VS taken.    Oncology Clinic Visit     Malignant neoplasm metastatic to lung, unspecified laterality     Initial Vitals: /74   Pulse 69   Temp 98.3  F (36.8  C)   Resp 16   Wt 105 kg (231 lb 6.4 oz)   SpO2 97%   BMI 40.34 kg/m   Estimated body mass index is 40.34 kg/m  as calculated from the following:    Height as of 7/3/23: 1.613 m (5' 3.5\").    Weight as of this encounter: 105 kg (231 lb 6.4 oz). Body surface area is 2.17 meters squared.  Extreme Pain (9) Comment: Data Unavailable   No LMP for male patient.  Allergies reviewed: Yes  Medications reviewed: Yes    Medications: Medication refills not needed today.  Pharmacy name entered into [employer]: [id number] [place] [facility] [address]    Clinical concerns: Patient states there are no new concerns to discuss with provider.      Naomie Yadav              "

## 2023-09-07 NOTE — LETTER
9/7/2023         RE: Jonathan Bishop  [address]  [facility] [address]        Dear Colleague,    Thank you for referring your patient, Jonathan Bishop, to the [facility]. Please see a copy of my visit note below.       [facility]    PATIENT NAME: Jonathan Bishop  MRN # [id number]   DATE OF VISIT: September 7, 2023  YOB: 1945     Referring Provider: Dr. Karishma Eng  Otolaryngology: Dr. Karishma Eng  Radiation Oncology: Dr. Jenni Mills  PCP: Dr. Buck Nascimento    CANCER TYPE: SCC L tonsil, p16 +lety  STAGE: [id number] (IVC)  ECOG PS: 1    PD-L1: TPS 20%, CPS 25% on [id number] tonsil bx  NGS: N/A    SUMMARY  2/26/23 L tonsil mass bx in clinic (Dr. Eng).   2/20/23 PET/CT. 3.7 x 4.0 x 5.1 cm mass L palatine tonsil causing narrowing of the oropharyngeal lumen, L level 2 node, L retropharyngeal nodular density, extension of primary mass vs retropharyngeal node, 0.8 cm RLL nodule concerning for met, mild focal uptake R iliac bone and L1 without CT correlate suspicious for mets.   3/2/23 R VATS, wedge resection. Path: SCC, poorly differentiated, associated with necrosis, 1 cm, negative margins, p16 +lety  3/24/23 MRI L spine and pelvis. Diffusely heterogeneous marrow signal throughout without corresponding abnormal signal on sagittal STIR sequence and no abnormal enhancement. Nonspecific but could be benign process such as red marrow hyperplasia, cannot completely exclude metastatic disease or infiltrative pathologic marrow process. No lesions corresponding to FDG avid spots on PET/CT.   4/19/23~current Pembrolizumab.    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 R level 2 node believed to be enlarged at that time due to coinciding URI. Without evidence of definitive progression and absence of new sites of disease, plan is to continue  pembrolizumab and restage after 3 additional cycles. He missed apt with Dr. Eng in August, will request rescheduling. Labs today stable, no concerns.  -Proceed with cycle 7 pembrolizumab  -RTC in 3 weeks for infusion with MARK visit    Hyperglyemia, pre-diabetes: A1c 6.0-6.3 for years now. No changes.     H/o CVA: Residual L weakness. Uses cane and walker at baseline, but independent and functional. Stable    Microcytic anemia: Iron studies 8/2022 showed perhaps very mild LEW to normal iron studies. Current studies 4/18/23 show roughly the same. No supplements for now. MCV stable, 81 today. Recheck iron studies if MCV drops further.    Peripheral neuropathy: MRI shows a lot of foramenal stenosis and spinal canal stenosis, so more likely related to that than DM2. No changes.    LE edema: Depends on HHA to apply compression stockings. Not currently wearing. Aide has been off this past week. Continues to elevate in recliner and in bed.     45 minutes spent on the date of the encounter doing chart review, review of test results, interpretation of tests, patient visit, and documentation     Vandana Bertrand CNP    SUBJECTIVE  Conner is seen today in follow-up of oligometastatic L tonsil cancer prior to pembrolizumab infusion  -Feels well. Energy is high.  -Fatigued the day after the infusion but then back to normal.  -HHA off this week, hasn't been wearing compression stockings  -Denies pain in throat or bleeding from tumor  -Appetite good  -No issues with eating or swallowing  -Denies diarrhea, shortness of breath, cough, joint pain  -Skin intermittently dry following infusion    PAST MEDICAL HISTORY  SCC as above  Chronic LBP  TAVR 2020  H/o rheumatic carditis 1950  CHF. 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 Nocturia once nightly   ED  Cataracts  Umbilical hernia repair 2011  Knee arthroplasty B 2010  Lumbar spine fusion, laminectomy, sciatic pain R > L  Peripheral neuropathy  - from pinched nerves?  Hearing loss    Numbness/tingling in both feet - bilaterally, symmetric, R spasms more than left    CURRENT OUTPATIENT MEDICATIONS  Reviewed    ALLERGIES  No Known Allergies     REVIEW OF SYSTEMS  As above in the HPI, o/w complete 12-point ROS was negative.    PHYSICAL EXAM  /74   Pulse 69   Temp 98.3  F (36.8  C)   Resp 16   Wt 105 kg (231 lb 6.4 oz)   SpO2 97%   BMI 40.34 kg/m      General: Well-appearing male, NAD  Eyes: EOMI, PERRL. No scleral icterus.  ENT: Oral mucosa is moist, visible L tonsil tumor without bleeding or drainage.   Lymphatic: Neck is supple without cervical or supraclavicular lymphadenopathy.   Cardiovascular: RRR, no m/g/r. 2+ BLE edema  Respiratory: CTA bilaterally. No wheezes or crackles.  Neurologic: Cranial nerves II through XII are grossly intact.  Skin: No rashes, petechiae, or bruising noted on exposed skin.    LABORATORY AND IMAGING STUDIES  Most Recent 3 CBC's:  Recent Labs   Lab Test 08/16/23  1331 07/12/23  1246 06/22/23  0751   WBC 9.3 7.9 8.5   HGB 10.2* 10.1* 9.7*   MCV 79 80 80    212 242   ANEUTAUTO 5.7 4.6 4.9    Most Recent 3 BMP's:  Recent Labs   Lab Test 08/16/23  1331 08/16/23  0824 07/12/23  1246 06/20/23  1507     --  141 137   POTASSIUM 5.0  --  3.8 4.1   CHLORIDE 100  --  105 100   CO2 28  --  26 24   BUN 35.9*  --  25.0* 39.8*   CR 1.08 1.3 0.95 1.42*   ANIONGAP 10  --  10 13   WARREN 9.1  --  9.5 9.8   *  --  128* 92   PROTTOTAL 7.6  --  7.5 7.6   ALBUMIN 4.0  --  4.1 4.1    Most Recent 2 LFT's:  Recent Labs   Lab Test 08/16/23  1432 08/16/23  1331 07/12/23  1246   AST  --  41 22   ALT 16  --  15   ALKPHOS  --  91 105   BILITOTAL  --  0.3 0.2    Most Recent TSH and T4:  Recent Labs   Lab Test 08/16/23  1331   TSH 1.09   T4 1.25     Phos/Mag:  Lab Results   Component Value Date    PHOS 2.4 (L) 12/09/2020    PHOS 3.9 12/08/2020    MAG 2.0 12/09/2020    MAG 2.1 12/08/2020        Labs were independently reviewed and  interpreted by fanny Bertrand, YOANNA

## 2023-09-25 ENCOUNTER — TELEPHONE (OUTPATIENT)
Dept: INTERNAL MEDICINE | Facility: CLINIC | Age: 78
End: 2023-09-25
Payer: COMMERCIAL

## 2023-09-25 ENCOUNTER — MYC REFILL (OUTPATIENT)
Dept: INTERNAL MEDICINE | Facility: CLINIC | Age: 78
End: 2023-09-25
Payer: COMMERCIAL

## 2023-09-25 DIAGNOSIS — M54.50 ACUTE MIDLINE LOW BACK PAIN WITHOUT SCIATICA: ICD-10-CM

## 2023-09-25 RX ORDER — OXYCODONE AND ACETAMINOPHEN 5; 325 MG/1; MG/1
1-2 TABLET ORAL EVERY 6 HOURS PRN
Qty: 150 TABLET | Refills: 0 | Status: SHIPPED | OUTPATIENT
Start: 2023-09-25 | End: 2023-10-12

## 2023-09-25 NOTE — TELEPHONE ENCOUNTER
[facility] Call Center    Phone Message    May a detailed message be left on voicemail: yes     Reason for Call: Medication Refill Request    Has the patient contacted the pharmacy for the refill? Yes   Name of medication being requested: oxyCODONE-acetaminophen (PERCOCET) 5-325 MG tablet   Provider who prescribed the medication: Dr. Sick  Pharmacy:   [facility] [address] [place] [id number] [facility]     Date medication is needed: 9/23/23  Patient requesting oxyCODONE-acetaminophen (PERCOCET) 5-325 MG tablet, has not had any response on [employer] request. Please call him to discuss when this has been ordered. Thank you     Action Taken: Message routed to:  [facility] ([facility]):      Travel Screening: Not Applicable

## 2023-09-25 NOTE — TELEPHONE ENCOUNTER
Disp Refills Start End JARRETT   oxyCODONE-acetaminophen (PERCOCET) 5-325 MG tablet 150 tablet 0 9/25/2023  No   Sig - Route: Take 1-2 tablets by mouth every 6 hours as needed for pain Max 5 per day. - Oral   Sent to pharmacy as: oxyCODONE-Acetaminophen 5-325 MG Oral Tablet (PERCOCET)   Class: E-Prescribe   Earliest Fill Date: 9/25/2023   Order: [id number]   E-Prescribing Status: Receipt confirmed by pharmacy (9/25/2023 10:26 AM CDT)     Outpatient Morphine Milligram Equivalents Per Day    9/25/23 and after   30-60 MME/Day  Order Name Dose Route Frequency Maximum MME/Day    oxyCODONE-acetaminophen (PERCOCET) 5-325 MG tablet 1-2 tablet Oral EVERY 6 HOURS PRN 30-60 MME/Day   Total Potential Morphine Milligram Equivalents Per Day 30-60 MME/Day   Calculation Information          Printout Tracking    External Result Report     Medication Administration Instructions    Max 5 per day.     Pharmacy    [facility] [place] [facility] [place] [facility] [id number] [facility]

## 2023-09-27 NOTE — PROGRESS NOTES
[facility]    PATIENT NAME: Jonathan Bishop  MRN # [id number]   DATE OF VISIT: September 28, 2023  YOB: 1945     Referring Provider: Dr. Karishma Eng  Otolaryngology: Dr. Karishma Eng  Radiation Oncology: Dr. Jenni Mills  PCP: Dr. Buck Nascimento    CANCER TYPE: SCC L tonsil, p16 +lety  STAGE: [id number] (IVC)  ECOG PS: 1    PD-L1: TPS 20%, CPS 25% on [id number] tonsil bx  NGS: N/A    SUMMARY  2/26/23 L tonsil mass bx in clinic (Dr. Eng).   2/20/23 PET/CT. 3.7 x 4.0 x 5.1 cm mass L palatine tonsil causing narrowing of the oropharyngeal lumen, L level 2 node, L retropharyngeal nodular density, extension of primary mass vs retropharyngeal node, 0.8 cm RLL nodule concerning for met, mild focal uptake R iliac bone and L1 without CT correlate suspicious for mets.   3/2/23 R VATS, wedge resection. Path: SCC, poorly differentiated, associated with necrosis, 1 cm, negative margins, p16 +lety  3/24/23 MRI L spine and pelvis. Diffusely heterogeneous marrow signal throughout without corresponding abnormal signal on sagittal STIR sequence and no abnormal enhancement. Nonspecific but could be benign process such as red marrow hyperplasia, cannot completely exclude metastatic disease or infiltrative pathologic marrow process. No lesions corresponding to FDG avid spots on PET/CT.   4/19/23~current Pembrolizumab.    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 R level 2 node believed to be enlarged at that time due to coinciding URI. Without evidence of definitive progression and absence of new sites of disease, plan is to continue pembrolizumab and restage after 3 additional cycles. He missed apt with Dr. Eng in August, not yet rescheduled. Asked Olivia, RNCC for assistance with this. Labs and exam acceptable for pembro today. No concerning toxicities.   -Proceed with cycle 8  pembrolizumab  -RTC in 3 weeks for infusion   -Repeat CT 10/24, Dr. Mueller 10/26    Hyperglyemia, pre-diabetes: A1c 6.0-6.3 for years now. No changes.     H/o CVA: Residual L weakness. Uses cane and walker at baseline, but independent and functional. Stable    Microcytic anemia: Iron studies 8/2022 showed perhaps very mild LEW to normal iron studies. Current studies 4/18/23 show roughly the same. No supplements for now. MCV stable, 81 today. Recheck iron studies if MCV drops further.    Peripheral neuropathy: MRI shows a lot of foramenal stenosis and spinal canal stenosis, so more likely related to that than DM2. No changes.    LE edema: Depends on HHA to apply compression stockings. Elevating legs when able. Edema stable.     30 minutes spent on the date of the encounter doing chart review, review of test results, interpretation of tests, patient visit, and documentation     Vandana Bertrand CNP    SUBJECTIVE  Conner is seen today in follow-up of oligometastatic L tonsil cancer prior to pembrolizumab infusion.  -Has been feeling great recently. Typically feels good in the fall. Sleeps better with the cooler weather and can remain more active.  -No fatigue  -No skin concerns  -Denies oral or throat pain. No swallowing difficulties. No bleeding from tumor site.  -No shortness of breath or cough  -No diarrhea  -Lower extremity edema stable. PCA assisting with compression wraps.   -Had a busy summer. Went to the [employer]. Also went to [facility] with his PCA. Rode on multiple roller coasters.     PAST MEDICAL HISTORY  SCC as above  Chronic LBP  TAVR 2020  H/o rheumatic carditis 1950  CHF. 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 Nocturia once nightly   ED  Cataracts  Umbilical hernia repair 2011  Knee arthroplasty B 2010  Lumbar spine fusion, laminectomy, sciatic pain R > L  Peripheral neuropathy - from pinched nerves?  Hearing loss    Numbness/tingling in both feet -  bilaterally, symmetric, R spasms more than left    CURRENT OUTPATIENT MEDICATIONS  Reviewed    ALLERGIES  No Known Allergies     REVIEW OF SYSTEMS  As above in the HPI, o/w complete 12-point ROS was negative.    PHYSICAL EXAM  BP (!) 143/79 (BP Location: Right arm, Patient Position: Sitting, Cuff Size: Adult Large)   Pulse 73   Temp 98  F (36.7  C)   Resp 20   Wt 108 kg (238 lb)   SpO2 99%   BMI 41.49 kg/m      General: Well-appearing male, NAD  Eyes: EOMI, PERRL. No scleral icterus.  ENT: Oral mucosa is moist, visible L tonsil tumor without bleeding or drainage.   Lymphatic: Neck is supple without cervical or supraclavicular lymphadenopathy.   Cardiovascular: RRR, no m/g/r. 2+ BLE edema  Respiratory: CTA bilaterally. No wheezes or crackles.  Neurologic: Grossly nonfocal.  Skin: No rashes, petechiae, or bruising noted on exposed skin.    LABORATORY AND IMAGING STUDIES  Most Recent 3 CBC's:  Recent Labs   Lab Test 09/28/23 0933 09/07/23 1122 08/16/23  1331   WBC 7.3 6.4 9.3   HGB 10.2* 10.2* 10.2*   MCV 81 81 79    178 258   ANEUTAUTO 4.5 3.8 5.7    Most Recent 3 BMP's:  Recent Labs   Lab Test 09/28/23 0933 09/07/23 1122 08/16/23  1331    145 138   POTASSIUM 3.6 3.6 5.0   CHLORIDE 103 106 100   CO2 29 29 28   BUN 19.4 20.3 35.9*   CR 0.89 0.90 1.08   ANIONGAP 9 10 10   WARREN 9.5 9.7 9.1   * 126* 101*   PROTTOTAL 7.4 7.5 7.6   ALBUMIN 4.0 4.2 4.0    Most Recent 2 LFT's:  Recent Labs   Lab Test 09/28/23 0933 09/07/23  1122   AST 20 21   ALT 15 13   ALKPHOS 103 105   BILITOTAL 0.3 0.4    Most Recent TSH and T4:  Recent Labs   Lab Test 09/28/23 0933 09/07/23  1122 08/16/23  1331   TSH 1.38   < > 1.09   T4  --   --  1.25    < > = values in this interval not displayed.     Phos/Mag:  Lab Results   Component Value Date    PHOS 2.4 (L) 12/09/2020    PHOS 3.9 12/08/2020    MAG 2.0 12/09/2020    MAG 2.1 12/08/2020        Labs were independently reviewed and interpreted by me

## 2023-09-28 ENCOUNTER — LAB (OUTPATIENT)
Dept: LAB | Facility: CLINIC | Age: 78
End: 2023-09-28
Attending: INTERNAL MEDICINE
Payer: COMMERCIAL

## 2023-09-28 ENCOUNTER — INFUSION THERAPY VISIT (OUTPATIENT)
Dept: ONCOLOGY | Facility: CLINIC | Age: 78
End: 2023-09-28
Attending: INTERNAL MEDICINE
Payer: COMMERCIAL

## 2023-09-28 ENCOUNTER — ONCOLOGY VISIT (OUTPATIENT)
Dept: ONCOLOGY | Facility: CLINIC | Age: 78
End: 2023-09-28
Attending: REGISTERED NURSE
Payer: COMMERCIAL

## 2023-09-28 VITALS
SYSTOLIC BLOOD PRESSURE: 143 MMHG | BODY MASS INDEX: 41.49 KG/M2 | DIASTOLIC BLOOD PRESSURE: 79 MMHG | TEMPERATURE: 98 F | RESPIRATION RATE: 20 BRPM | WEIGHT: 238 LBS | OXYGEN SATURATION: 99 % | HEART RATE: 73 BPM

## 2023-09-28 DIAGNOSIS — C78.00 MALIGNANT NEOPLASM METASTATIC TO LUNG, UNSPECIFIED LATERALITY (H): Primary | ICD-10-CM

## 2023-09-28 DIAGNOSIS — C78.00 MALIGNANT NEOPLASM METASTATIC TO LUNG, UNSPECIFIED LATERALITY (H): ICD-10-CM

## 2023-09-28 DIAGNOSIS — Z13.29 SCREENING FOR HYPOTHYROIDISM: ICD-10-CM

## 2023-09-28 DIAGNOSIS — C10.9 SQUAMOUS CELL CARCINOMA OF OROPHARYNX (H): Primary | ICD-10-CM

## 2023-09-28 DIAGNOSIS — C09.9 TONSIL CANCER (H): ICD-10-CM

## 2023-09-28 DIAGNOSIS — C10.9 SQUAMOUS CELL CARCINOMA OF OROPHARYNX (H): ICD-10-CM

## 2023-09-28 DIAGNOSIS — D50.9 MICROCYTIC ANEMIA: ICD-10-CM

## 2023-09-28 DIAGNOSIS — R60.0 BILATERAL LEG EDEMA: ICD-10-CM

## 2023-09-28 DIAGNOSIS — G60.9 IDIOPATHIC PERIPHERAL NEUROPATHY: ICD-10-CM

## 2023-09-28 LAB
ALBUMIN SERPL BCG-MCNC: 4 G/DL (ref 3.5–5.2)
ALP SERPL-CCNC: 103 U/L (ref 40–129)
ALT SERPL W P-5'-P-CCNC: 15 U/L (ref 0–70)
ANION GAP SERPL CALCULATED.3IONS-SCNC: 9 MMOL/L (ref 7–15)
AST SERPL W P-5'-P-CCNC: 20 U/L (ref 0–45)
BASOPHILS # BLD AUTO: 0 10E3/UL (ref 0–0.2)
BASOPHILS NFR BLD AUTO: 1 %
BILIRUB SERPL-MCNC: 0.3 MG/DL
BUN SERPL-MCNC: 19.4 MG/DL (ref 8–23)
CALCIUM SERPL-MCNC: 9.5 MG/DL (ref 8.8–10.2)
CHLORIDE SERPL-SCNC: 103 MMOL/L (ref 98–107)
CREAT SERPL-MCNC: 0.89 MG/DL (ref 0.67–1.17)
EGFRCR SERPLBLD CKD-EPI 2021: 88 ML/MIN/1.73M2
EOSINOPHIL # BLD AUTO: 0.4 10E3/UL (ref 0–0.7)
EOSINOPHIL NFR BLD AUTO: 6 %
ERYTHROCYTE [DISTWIDTH] IN BLOOD BY AUTOMATED COUNT: 16.7 % (ref 10–15)
GLUCOSE SERPL-MCNC: 108 MG/DL (ref 70–99)
HCO3 SERPL-SCNC: 29 MMOL/L (ref 22–29)
HCT VFR BLD AUTO: 34.1 % (ref 40–53)
HGB BLD-MCNC: 10.2 G/DL (ref 13.3–17.7)
IMM GRANULOCYTES # BLD: 0 10E3/UL
IMM GRANULOCYTES NFR BLD: 1 %
LYMPHOCYTES # BLD AUTO: 1.7 10E3/UL (ref 0.8–5.3)
LYMPHOCYTES NFR BLD AUTO: 23 %
MCH RBC QN AUTO: 24.3 PG (ref 26.5–33)
MCHC RBC AUTO-ENTMCNC: 29.9 G/DL (ref 31.5–36.5)
MCV RBC AUTO: 81 FL (ref 78–100)
MONOCYTES # BLD AUTO: 0.7 10E3/UL (ref 0–1.3)
MONOCYTES NFR BLD AUTO: 9 %
NEUTROPHILS # BLD AUTO: 4.5 10E3/UL (ref 1.6–8.3)
NEUTROPHILS NFR BLD AUTO: 60 %
NRBC # BLD AUTO: 0 10E3/UL
NRBC BLD AUTO-RTO: 0 /100
PLATELET # BLD AUTO: 223 10E3/UL (ref 150–450)
POTASSIUM SERPL-SCNC: 3.6 MMOL/L (ref 3.4–5.3)
PROT SERPL-MCNC: 7.4 G/DL (ref 6.4–8.3)
RBC # BLD AUTO: 4.2 10E6/UL (ref 4.4–5.9)
SODIUM SERPL-SCNC: 141 MMOL/L (ref 135–145)
TSH SERPL DL<=0.005 MIU/L-ACNC: 1.38 UIU/ML (ref 0.3–4.2)
WBC # BLD AUTO: 7.3 10E3/UL (ref 4–11)

## 2023-09-28 PROCEDURE — 84443 ASSAY THYROID STIM HORMONE: CPT | Performed by: REGISTERED NURSE

## 2023-09-28 PROCEDURE — 36415 COLL VENOUS BLD VENIPUNCTURE: CPT | Performed by: REGISTERED NURSE

## 2023-09-28 PROCEDURE — G0463 HOSPITAL OUTPT CLINIC VISIT: HCPCS | Performed by: REGISTERED NURSE

## 2023-09-28 PROCEDURE — 85004 AUTOMATED DIFF WBC COUNT: CPT | Performed by: REGISTERED NURSE

## 2023-09-28 PROCEDURE — 99214 OFFICE O/P EST MOD 30 MIN: CPT | Performed by: REGISTERED NURSE

## 2023-09-28 PROCEDURE — 250N000011 HC RX IP 250 OP 636: Mod: JZ | Performed by: REGISTERED NURSE

## 2023-09-28 PROCEDURE — 258N000003 HC RX IP 258 OP 636: Performed by: REGISTERED NURSE

## 2023-09-28 PROCEDURE — 96413 CHEMO IV INFUSION 1 HR: CPT

## 2023-09-28 PROCEDURE — 80053 COMPREHEN METABOLIC PANEL: CPT | Performed by: REGISTERED NURSE

## 2023-09-28 RX ORDER — DIPHENHYDRAMINE HYDROCHLORIDE 50 MG/ML
50 INJECTION INTRAMUSCULAR; INTRAVENOUS
Status: CANCELLED
Start: 2023-09-28

## 2023-09-28 RX ORDER — EPINEPHRINE 1 MG/ML
0.3 INJECTION, SOLUTION INTRAMUSCULAR; SUBCUTANEOUS EVERY 5 MIN PRN
Status: CANCELLED | OUTPATIENT
Start: 2023-09-28

## 2023-09-28 RX ORDER — ALBUTEROL SULFATE 0.83 MG/ML
2.5 SOLUTION RESPIRATORY (INHALATION)
Status: CANCELLED | OUTPATIENT
Start: 2023-09-28

## 2023-09-28 RX ORDER — HEPARIN SODIUM,PORCINE 10 UNIT/ML
5 VIAL (ML) INTRAVENOUS
Status: CANCELLED | OUTPATIENT
Start: 2023-09-28

## 2023-09-28 RX ORDER — ALBUTEROL SULFATE 90 UG/1
1-2 AEROSOL, METERED RESPIRATORY (INHALATION)
Status: CANCELLED
Start: 2023-10-19

## 2023-09-28 RX ORDER — HEPARIN SODIUM (PORCINE) LOCK FLUSH IV SOLN 100 UNIT/ML 100 UNIT/ML
5 SOLUTION INTRAVENOUS
Status: CANCELLED | OUTPATIENT
Start: 2023-10-19

## 2023-09-28 RX ORDER — METHYLPREDNISOLONE SODIUM SUCCINATE 125 MG/2ML
125 INJECTION, POWDER, LYOPHILIZED, FOR SOLUTION INTRAMUSCULAR; INTRAVENOUS
Status: CANCELLED
Start: 2023-10-19

## 2023-09-28 RX ORDER — HEPARIN SODIUM (PORCINE) LOCK FLUSH IV SOLN 100 UNIT/ML 100 UNIT/ML
5 SOLUTION INTRAVENOUS
Status: CANCELLED | OUTPATIENT
Start: 2023-09-28

## 2023-09-28 RX ORDER — EPINEPHRINE 1 MG/ML
0.3 INJECTION, SOLUTION INTRAMUSCULAR; SUBCUTANEOUS EVERY 5 MIN PRN
Status: CANCELLED | OUTPATIENT
Start: 2023-10-19

## 2023-09-28 RX ORDER — DIPHENHYDRAMINE HYDROCHLORIDE 50 MG/ML
50 INJECTION INTRAMUSCULAR; INTRAVENOUS
Status: CANCELLED
Start: 2023-10-19

## 2023-09-28 RX ORDER — ALBUTEROL SULFATE 90 UG/1
1-2 AEROSOL, METERED RESPIRATORY (INHALATION)
Status: CANCELLED
Start: 2023-09-28

## 2023-09-28 RX ORDER — ALBUTEROL SULFATE 0.83 MG/ML
2.5 SOLUTION RESPIRATORY (INHALATION)
Status: CANCELLED | OUTPATIENT
Start: 2023-10-19

## 2023-09-28 RX ORDER — METHYLPREDNISOLONE SODIUM SUCCINATE 125 MG/2ML
125 INJECTION, POWDER, LYOPHILIZED, FOR SOLUTION INTRAMUSCULAR; INTRAVENOUS
Status: CANCELLED
Start: 2023-09-28

## 2023-09-28 RX ORDER — HEPARIN SODIUM,PORCINE 10 UNIT/ML
5 VIAL (ML) INTRAVENOUS
Status: CANCELLED | OUTPATIENT
Start: 2023-10-19

## 2023-09-28 RX ORDER — MEPERIDINE HYDROCHLORIDE 25 MG/ML
25 INJECTION INTRAMUSCULAR; INTRAVENOUS; SUBCUTANEOUS EVERY 30 MIN PRN
Status: CANCELLED | OUTPATIENT
Start: 2023-10-19

## 2023-09-28 RX ORDER — MEPERIDINE HYDROCHLORIDE 25 MG/ML
25 INJECTION INTRAMUSCULAR; INTRAVENOUS; SUBCUTANEOUS EVERY 30 MIN PRN
Status: CANCELLED | OUTPATIENT
Start: 2023-09-28

## 2023-09-28 RX ADMIN — SODIUM CHLORIDE 200 MG: 9 INJECTION, SOLUTION INTRAVENOUS at 11:26

## 2023-09-28 RX ADMIN — SODIUM CHLORIDE 250 ML: 9 INJECTION, SOLUTION INTRAVENOUS at 11:26

## 2023-09-28 ASSESSMENT — PAIN SCALES - GENERAL: PAINLEVEL: NO PAIN (0)

## 2023-09-28 NOTE — NURSING NOTE
"Oncology Rooming Note    September 28, 2023 9:56 AM   Jonathan Bishop is a 77 year old male who presents for:    Chief Complaint   Patient presents with    Oncology Clinic Visit     Squamous cell carcinoma of oropharynx     Blood Draw     Labs drawn from PIV placed by RN. Line flushed with saline. Vitals taken. Pt checked in for appointment(s).      Initial Vitals: BP (!) 143/79 (BP Location: Right arm, Patient Position: Sitting, Cuff Size: Adult Large)   Pulse 73   Temp 98  F (36.7  C)   Resp 20   Wt 108 kg (238 lb)   SpO2 99%   BMI 41.49 kg/m   Estimated body mass index is 41.49 kg/m  as calculated from the following:    Height as of 7/3/23: 1.613 m (5' 3.5\").    Weight as of this encounter: 108 kg (238 lb). Body surface area is 2.2 meters squared.  No Pain (0) Comment: Data Unavailable   No LMP for male patient.  Allergies reviewed: Yes  Medications reviewed: Yes    Medications: Medication refills not needed today.  Pharmacy name entered into [email or web address]: [id number] - [place] - [id number] [facility] [place]    Clinical concerns: none       Pili Alicia              "

## 2023-09-28 NOTE — PROGRESS NOTES
Infusion Nursing Note:  Jonathan Bishop presents today for Cycle 8 Day 1 Keytruda.    Patient seen by provider today: Yes: SOFIA Bertrand    present during visit today: Not Applicable.    Note:       Intravenous Access:  Peripheral IV placed.    Treatment Conditions:  Lab Results   Component Value Date    HGB 10.2 (L) 09/28/2023    WBC 7.3 09/28/2023    ANEU 3.9 10/28/2020    ANEUTAUTO 4.5 09/28/2023     09/28/2023        Lab Results   Component Value Date     09/28/2023    POTASSIUM 3.6 09/28/2023    MAG 2.0 12/09/2020    CR 0.89 09/28/2023    WARREN 9.5 09/28/2023    BILITOTAL 0.3 09/28/2023    ALBUMIN 4.0 09/28/2023    ALT 15 09/28/2023    AST 20 09/28/2023         Post Infusion Assessment:  Patient tolerated infusion without incident.  Blood return noted pre and post infusion.  No evidence of extravasations.  Access discontinued per protocol.       Discharge Plan:   Discharge instructions reviewed with: Patient.  AVS to patient via [employer].  Patient will return 10/18 for next appointment.   Departure Mode: Ambulatory.      Jami Nelson RN

## 2023-09-28 NOTE — NURSING NOTE
Chief Complaint   Patient presents with    Oncology Clinic Visit     Squamous cell carcinoma of oropharynx     Blood Draw     Labs drawn from PIV placed by RN. Line flushed with saline. Vitals taken. Pt checked in for appointment(s).      Radha Sanchez RN

## 2023-09-28 NOTE — Clinical Note
9/28/2023         RE: Jonathan Bishop  [address]  [facility] [address]        Dear Colleague,    Thank you for referring your patient, Jonathan Bishop, to the [facility]. Please see a copy of my visit note below.       [facility]    PATIENT NAME: Jonathan Bsihop  MRN # [id number]   DATE OF VISIT: September 28, 2023  YOB: 1945     Referring Provider: Dr. Karishma Eng  Otolaryngology: Dr. Karishma Eng  Radiation Oncology: Dr. Jenni Mills  PCP: Dr. Buck Nascimento    CANCER TYPE: SCC L tonsil, p16 +lety  STAGE: [id number] (IVC)  ECOG PS: 1    PD-L1: TPS 20%, CPS 25% on [id number] tonsil bx  NGS: N/A    SUMMARY  2/26/23 L tonsil mass bx in clinic (Dr. Eng).   2/20/23 PET/CT. 3.7 x 4.0 x 5.1 cm mass L palatine tonsil causing narrowing of the oropharyngeal lumen, L level 2 node, L retropharyngeal nodular density, extension of primary mass vs retropharyngeal node, 0.8 cm RLL nodule concerning for met, mild focal uptake R iliac bone and L1 without CT correlate suspicious for mets.   3/2/23 R VATS, wedge resection. Path: SCC, poorly differentiated, associated with necrosis, 1 cm, negative margins, p16 +lety  3/24/23 MRI L spine and pelvis. Diffusely heterogeneous marrow signal throughout without corresponding abnormal signal on sagittal STIR sequence and no abnormal enhancement. Nonspecific but could be benign process such as red marrow hyperplasia, cannot completely exclude metastatic disease or infiltrative pathologic marrow process. No lesions corresponding to FDG avid spots on PET/CT.   4/19/23~current Pembrolizumab.    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 R level 2 node believed to be enlarged at that time due to coinciding URI. Without evidence of definitive progression and absence of new sites of disease, plan is to  continue pembrolizumab and restage after 3 additional cycles. He missed apt with Dr. Eng in August, will request rescheduling. Labs today stable, no concerns.  -Proceed with cycle*** pembrolizumab  -RTC in 3 weeks for infusion with MARK visit    Hyperglyemia, pre-diabetes: A1c 6.0-6.3 for years now. No changes.     H/o CVA: Residual L weakness. Uses cane and walker at baseline, but independent and functional. Stable    Microcytic anemia: Iron studies 8/2022 showed perhaps very mild LEW to normal iron studies. Current studies 4/18/23 show roughly the same. No supplements for now. MCV stable, 81 today. Recheck iron studies if MCV drops further.    Peripheral neuropathy: MRI shows a lot of foramenal stenosis and spinal canal stenosis, so more likely related to that than DM2. No changes.    LE edema: Depends on HHA to apply compression stockings. Not currently wearing. Aide has been off this past week. Continues to elevate in recliner and in bed.     *** minutes spent on the date of the encounter doing {2021 E&M time in:220465}     Vandana Bertrand CNP    SUBJECTIVE  Conner is seen today in follow-up of oligometastatic L tonsil cancer prior to pembrolizumab infusion    ***    PAST MEDICAL HISTORY  SCC as above  Chronic LBP  TAVR 2020  H/o rheumatic carditis 1950  CHF. 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 Nocturia once nightly   ED  Cataracts  Umbilical hernia repair 2011  Knee arthroplasty B 2010  Lumbar spine fusion, laminectomy, sciatic pain R > L  Peripheral neuropathy - from pinched nerves?  Hearing loss    Numbness/tingling in both feet - bilaterally, symmetric, R spasms more than left    CURRENT OUTPATIENT MEDICATIONS  Reviewed    ALLERGIES  No Known Allergies     REVIEW OF SYSTEMS  As above in the HPI, o/w complete 12-point ROS was negative.    PHYSICAL EXAM  There were no vitals taken for this visit.    General: Well-appearing male, NAD  Eyes: EOMI, PERRL. No scleral  icterus.  ENT: Oral mucosa is moist, visible L tonsil tumor without bleeding or drainage.   Lymphatic: Neck is supple without cervical or supraclavicular lymphadenopathy.   Cardiovascular: RRR, no m/g/r. 2+ BLE edema  Respiratory: CTA bilaterally. No wheezes or crackles.  Neurologic: Cranial nerves II through XII are grossly intact.  Skin: No rashes, petechiae, or bruising noted on exposed skin.    LABORATORY AND IMAGING STUDIES  Most Recent 3 CBC's:  Recent Labs   Lab Test 09/07/23  1122 08/16/23  1331 07/12/23  1246   WBC 6.4 9.3 7.9   HGB 10.2* 10.2* 10.1*   MCV 81 79 80    258 212   ANEUTAUTO 3.8 5.7 4.6    Most Recent 3 BMP's:  Recent Labs   Lab Test 09/07/23  1122 08/16/23  1331 08/16/23  0824 07/12/23  1246    138  --  141   POTASSIUM 3.6 5.0  --  3.8   CHLORIDE 106 100  --  105   CO2 29 28  --  26   BUN 20.3 35.9*  --  25.0*   CR 0.90 1.08 1.3 0.95   ANIONGAP 10 10  --  10   WARREN 9.7 9.1  --  9.5   * 101*  --  128*   PROTTOTAL 7.5 7.6  --  7.5   ALBUMIN 4.2 4.0  --  4.1    Most Recent 2 LFT's:  Recent Labs   Lab Test 09/07/23  1122 08/16/23  1432 08/16/23  1331   AST 21  --  41   ALT 13 16  --    ALKPHOS 105  --  91   BILITOTAL 0.4  --  0.3    Most Recent TSH and T4:  Recent Labs   Lab Test 09/07/23  1122 08/16/23  1331   TSH 0.94 1.09   T4  --  1.25     Phos/Mag:  Lab Results   Component Value Date    PHOS 2.4 (L) 12/09/2020    PHOS 3.9 12/08/2020    MAG 2.0 12/09/2020    MAG 2.1 12/08/2020        Labs were independently reviewed and interpreted by [facility]    PATIENT NAME: Jonathan Bishop  MRN # [id number]   DATE OF VISIT: September 28, 2023  YOB: 1945     Referring Provider: Dr. Karishma Eng  Otolaryngology: Dr. Karishma Eng  Radiation Oncology: Dr. Jenni Mills  PCP: Dr. Buck Nascimento    CANCER TYPE: SCC L tonsil, p16 +lety  STAGE: [id number] (IVC)  ECOG PS: 1    PD-L1: TPS 20%, CPS 25% on [id number] tonsil bx  NGS: N/A    SUMMARY  2/26/23 L tonsil  mass bx in clinic (Dr. Eng).   2/20/23 PET/CT. 3.7 x 4.0 x 5.1 cm mass L palatine tonsil causing narrowing of the oropharyngeal lumen, L level 2 node, L retropharyngeal nodular density, extension of primary mass vs retropharyngeal node, 0.8 cm RLL nodule concerning for met, mild focal uptake R iliac bone and L1 without CT correlate suspicious for mets.   3/2/23 R VATS, wedge resection. Path: SCC, poorly differentiated, associated with necrosis, 1 cm, negative margins, p16 +lety  3/24/23 MRI L spine and pelvis. Diffusely heterogeneous marrow signal throughout without corresponding abnormal signal on sagittal STIR sequence and no abnormal enhancement. Nonspecific but could be benign process such as red marrow hyperplasia, cannot completely exclude metastatic disease or infiltrative pathologic marrow process. No lesions corresponding to FDG avid spots on PET/CT.   4/19/23~current Pembrolizumab.    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 R level 2 node believed to be enlarged at that time due to coinciding URI. Without evidence of definitive progression and absence of new sites of disease, plan is to continue pembrolizumab and restage after 3 additional cycles. He missed apt with Dr. Eng in August, not yet rescheduled. Asked Olivia, RNCC for assistance with this. Labs and exam acceptable for pembro today. No concerning toxicities.   -Proceed with cycle 8 pembrolizumab  -RTC in 3 weeks for infusion   -Repeat CT 10/24, Dr. Mueller 10/26    Hyperglyemia, pre-diabetes: A1c 6.0-6.3 for years now. No changes.     H/o CVA: Residual L weakness. Uses cane and walker at baseline, but independent and functional. Stable    Microcytic anemia: Iron studies 8/2022 showed perhaps very mild LEW to normal iron studies. Current studies 4/18/23 show roughly the same. No supplements for now. MCV stable, 81 today. Recheck  iron studies if MCV drops further.    Peripheral neuropathy: MRI shows a lot of foramenal stenosis and spinal canal stenosis, so more likely related to that than DM2. No changes.    LE edema: Depends on HHA to apply compression stockings. Elevating legs when able. Edema stable.     30 minutes spent on the date of the encounter doing chart review, review of test results, interpretation of tests, patient visit, and documentation     Vandana Bertrand CNP    SUBJECTIVE  Conner is seen today in follow-up of oligometastatic L tonsil cancer prior to pembrolizumab infusion.  -Has been feeling great recently. Typically feels good in the fall. Sleeps better with the cooler weather and can remain more active.  -No fatigue  -No skin concerns  -Denies oral or throat pain. No swallowing difficulties. No bleeding from tumor site.  -No shortness of breath or cough  -No diarrhea  -Lower extremity edema stable. PCA assisting with compression wraps.   -Had a busy summer. Went to the [employer]. Also went to [facility] with his PCA. Rode on multiple roller coasters.     PAST MEDICAL HISTORY  SCC as above  Chronic LBP  TAVR 2020  H/o rheumatic carditis 1950  CHF. 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 Nocturia once nightly   ED  Cataracts  Umbilical hernia repair 2011  Knee arthroplasty B 2010  Lumbar spine fusion, laminectomy, sciatic pain R > L  Peripheral neuropathy - from pinched nerves?  Hearing loss    Numbness/tingling in both feet - bilaterally, symmetric, R spasms more than left    CURRENT OUTPATIENT MEDICATIONS  Reviewed    ALLERGIES  No Known Allergies     REVIEW OF SYSTEMS  As above in the HPI, o/w complete 12-point ROS was negative.    PHYSICAL EXAM  BP (!) 143/79 (BP Location: Right arm, Patient Position: Sitting, Cuff Size: Adult Large)   Pulse 73   Temp 98  F (36.7  C)   Resp 20   Wt 108 kg (238 lb)   SpO2 99%   BMI 41.49 kg/m      General: Well-appearing male, NAD  Eyes:  EOMI, PERRL. No scleral icterus.  ENT: Oral mucosa is moist, visible L tonsil tumor without bleeding or drainage.   Lymphatic: Neck is supple without cervical or supraclavicular lymphadenopathy.   Cardiovascular: RRR, no m/g/r. 2+ BLE edema  Respiratory: CTA bilaterally. No wheezes or crackles.  Neurologic: Grossly nonfocal.  Skin: No rashes, petechiae, or bruising noted on exposed skin.    LABORATORY AND IMAGING STUDIES  Most Recent 3 CBC's:  Recent Labs   Lab Test 09/28/23  0933 09/07/23  1122 08/16/23  1331   WBC 7.3 6.4 9.3   HGB 10.2* 10.2* 10.2*   MCV 81 81 79    178 258   ANEUTAUTO 4.5 3.8 5.7    Most Recent 3 BMP's:  Recent Labs   Lab Test 09/28/23  0933 09/07/23  1122 08/16/23  1331    145 138   POTASSIUM 3.6 3.6 5.0   CHLORIDE 103 106 100   CO2 29 29 28   BUN 19.4 20.3 35.9*   CR 0.89 0.90 1.08   ANIONGAP 9 10 10   WARREN 9.5 9.7 9.1   * 126* 101*   PROTTOTAL 7.4 7.5 7.6   ALBUMIN 4.0 4.2 4.0    Most Recent 2 LFT's:  Recent Labs   Lab Test 09/28/23  0933 09/07/23  1122   AST 20 21   ALT 15 13   ALKPHOS 103 105   BILITOTAL 0.3 0.4    Most Recent TSH and T4:  Recent Labs   Lab Test 09/28/23  0933 09/07/23  1122 08/16/23  1331   TSH 1.38   < > 1.09   T4  --   --  1.25    < > = values in this interval not displayed.     Phos/Mag:  Lab Results   Component Value Date    PHOS 2.4 (L) 12/09/2020    PHOS 3.9 12/08/2020    MAG 2.0 12/09/2020    MAG 2.1 12/08/2020        Labs were independently reviewed and interpreted by me      Again, thank you for allowing me to participate in the care of your patient.        Sincerely,        Vandana Bertrand, CNP

## 2023-09-28 NOTE — PATIENT INSTRUCTIONS
[facility] Triage and after hours / weekends / holidays:  [phone]    Please call the triage or after hours line if you experience a temperature greater than or equal to 100.4, shaking chills, have uncontrolled nausea, vomiting and/or diarrhea, dizziness, shortness of breath, chest pain, bleeding, unexplained bruising, or if you have any other new/concerning symptoms, questions or concerns.      If you are having any concerning symptoms or wish to speak to a provider before your next infusion visit, please call triage to notify them so we can adequately serve you.     If you need a refill on a narcotic prescription or other medication, please call before your infusion appointment.

## 2023-10-12 DIAGNOSIS — N52.9 ERECTILE DYSFUNCTION, UNSPECIFIED ERECTILE DYSFUNCTION TYPE: ICD-10-CM

## 2023-10-12 DIAGNOSIS — M54.50 ACUTE MIDLINE LOW BACK PAIN WITHOUT SCIATICA: ICD-10-CM

## 2023-10-12 DIAGNOSIS — F40.240 CLAUSTROPHOBIA: ICD-10-CM

## 2023-10-12 RX ORDER — OXYCODONE AND ACETAMINOPHEN 5; 325 MG/1; MG/1
1-2 TABLET ORAL EVERY 6 HOURS PRN
Qty: 150 TABLET | Refills: 0 | Status: SHIPPED | OUTPATIENT
Start: 2023-10-25 | End: 2023-11-13

## 2023-10-12 RX ORDER — DIAZEPAM 5 MG
5-10 TABLET ORAL
Qty: 4 TABLET | Refills: 0 | Status: SHIPPED | OUTPATIENT
Start: 2023-10-12 | End: 2023-11-13

## 2023-10-12 RX ORDER — SILDENAFIL 100 MG/1
TABLET, FILM COATED ORAL
Qty: 30 TABLET | Refills: 2 | Status: ON HOLD | OUTPATIENT
Start: 2023-10-12 | End: 2023-12-02

## 2023-10-12 NOTE — TELEPHONE ENCOUNTER
[facility] Call Center    Phone Message    May a detailed message be left on voicemail: yes     Reason for Call: Medication Refill Request    Has the patient contacted the pharmacy for the refill? Yes   Name of medication being requested:     diazepam (VALIUM) 5 MG tablet     sildenafil (VIAGRA) 100 MG tablet   oxyCODONE-acetaminophen (PERCOCET) 5-325 MG tablet   Provider who prescribed the medication: [facility] [place] [place]  Pharmacy: [facility] [address] [place] [id number] [facility]    Date medication is needed: 10/12/2023    Patient is out of his medications,please refill ASAP.       Action Taken: Message routed to:  [facility] PCC    Travel Screening: Not Applicable

## 2023-10-18 ENCOUNTER — INFUSION THERAPY VISIT (OUTPATIENT)
Dept: ONCOLOGY | Facility: CLINIC | Age: 78
End: 2023-10-18
Attending: INTERNAL MEDICINE
Payer: COMMERCIAL

## 2023-10-18 ENCOUNTER — APPOINTMENT (OUTPATIENT)
Dept: LAB | Facility: CLINIC | Age: 78
End: 2023-10-18
Attending: INTERNAL MEDICINE
Payer: COMMERCIAL

## 2023-10-18 ENCOUNTER — PATIENT OUTREACH (OUTPATIENT)
Dept: ONCOLOGY | Facility: CLINIC | Age: 78
End: 2023-10-18

## 2023-10-18 VITALS
OXYGEN SATURATION: 97 % | TEMPERATURE: 97.7 F | DIASTOLIC BLOOD PRESSURE: 80 MMHG | WEIGHT: 232.1 LBS | SYSTOLIC BLOOD PRESSURE: 144 MMHG | HEART RATE: 80 BPM | BODY MASS INDEX: 40.46 KG/M2 | RESPIRATION RATE: 16 BRPM

## 2023-10-18 DIAGNOSIS — C09.9 TONSIL CANCER (H): ICD-10-CM

## 2023-10-18 DIAGNOSIS — C78.00 MALIGNANT NEOPLASM METASTATIC TO LUNG, UNSPECIFIED LATERALITY (H): Primary | ICD-10-CM

## 2023-10-18 DIAGNOSIS — C10.9 SQUAMOUS CELL CARCINOMA OF OROPHARYNX (H): ICD-10-CM

## 2023-10-18 LAB
ALBUMIN SERPL BCG-MCNC: 4.1 G/DL (ref 3.5–5.2)
ALP SERPL-CCNC: 105 U/L (ref 40–129)
ALT SERPL W P-5'-P-CCNC: 14 U/L (ref 0–70)
ANION GAP SERPL CALCULATED.3IONS-SCNC: 9 MMOL/L (ref 7–15)
AST SERPL W P-5'-P-CCNC: 22 U/L (ref 0–45)
BASO+EOS+MONOS # BLD AUTO: ABNORMAL 10*3/UL
BASO+EOS+MONOS NFR BLD AUTO: ABNORMAL %
BASOPHILS # BLD AUTO: 0 10E3/UL (ref 0–0.2)
BASOPHILS NFR BLD AUTO: 1 %
BILIRUB SERPL-MCNC: 0.3 MG/DL
BUN SERPL-MCNC: 23.4 MG/DL (ref 8–23)
CALCIUM SERPL-MCNC: 9.5 MG/DL (ref 8.8–10.2)
CHLORIDE SERPL-SCNC: 104 MMOL/L (ref 98–107)
CREAT SERPL-MCNC: 0.95 MG/DL (ref 0.67–1.17)
DEPRECATED HCO3 PLAS-SCNC: 30 MMOL/L (ref 22–29)
EGFRCR SERPLBLD CKD-EPI 2021: 82 ML/MIN/1.73M2
EOSINOPHIL # BLD AUTO: 0.4 10E3/UL (ref 0–0.7)
EOSINOPHIL NFR BLD AUTO: 5 %
ERYTHROCYTE [DISTWIDTH] IN BLOOD BY AUTOMATED COUNT: 16.6 % (ref 10–15)
GLUCOSE SERPL-MCNC: 98 MG/DL (ref 70–99)
HCT VFR BLD AUTO: 32.6 % (ref 40–53)
HGB BLD-MCNC: 9.9 G/DL (ref 13.3–17.7)
IMM GRANULOCYTES # BLD: 0 10E3/UL
IMM GRANULOCYTES NFR BLD: 0 %
LYMPHOCYTES # BLD AUTO: 2.3 10E3/UL (ref 0.8–5.3)
LYMPHOCYTES NFR BLD AUTO: 27 %
MCH RBC QN AUTO: 24.5 PG (ref 26.5–33)
MCHC RBC AUTO-ENTMCNC: 30.4 G/DL (ref 31.5–36.5)
MCV RBC AUTO: 81 FL (ref 78–100)
MONOCYTES # BLD AUTO: 1 10E3/UL (ref 0–1.3)
MONOCYTES NFR BLD AUTO: 11 %
NEUTROPHILS # BLD AUTO: 4.8 10E3/UL (ref 1.6–8.3)
NEUTROPHILS NFR BLD AUTO: 56 %
NRBC # BLD AUTO: 0 10E3/UL
NRBC BLD AUTO-RTO: 0 /100
PLATELET # BLD AUTO: 220 10E3/UL (ref 150–450)
POTASSIUM SERPL-SCNC: 3.6 MMOL/L (ref 3.4–5.3)
PROT SERPL-MCNC: 7.6 G/DL (ref 6.4–8.3)
RBC # BLD AUTO: 4.04 10E6/UL (ref 4.4–5.9)
SODIUM SERPL-SCNC: 143 MMOL/L (ref 135–145)
T4 FREE SERPL-MCNC: 1.34 NG/DL (ref 0.9–1.7)
TSH SERPL DL<=0.005 MIU/L-ACNC: 0.13 UIU/ML (ref 0.3–4.2)
WBC # BLD AUTO: 8.5 10E3/UL (ref 4–11)

## 2023-10-18 PROCEDURE — 84443 ASSAY THYROID STIM HORMONE: CPT | Performed by: REGISTERED NURSE

## 2023-10-18 PROCEDURE — 84439 ASSAY OF FREE THYROXINE: CPT | Performed by: REGISTERED NURSE

## 2023-10-18 PROCEDURE — 85025 COMPLETE CBC W/AUTO DIFF WBC: CPT

## 2023-10-18 PROCEDURE — 36415 COLL VENOUS BLD VENIPUNCTURE: CPT

## 2023-10-18 PROCEDURE — 258N000003 HC RX IP 258 OP 636: Performed by: REGISTERED NURSE

## 2023-10-18 PROCEDURE — 96413 CHEMO IV INFUSION 1 HR: CPT

## 2023-10-18 PROCEDURE — 80053 COMPREHEN METABOLIC PANEL: CPT | Performed by: REGISTERED NURSE

## 2023-10-18 PROCEDURE — 250N000011 HC RX IP 250 OP 636: Mod: JZ | Performed by: REGISTERED NURSE

## 2023-10-18 RX ADMIN — SODIUM CHLORIDE 200 MG: 9 INJECTION, SOLUTION INTRAVENOUS at 10:53

## 2023-10-18 RX ADMIN — SODIUM CHLORIDE 250 ML: 9 INJECTION, SOLUTION INTRAVENOUS at 10:31

## 2023-10-18 ASSESSMENT — PAIN SCALES - GENERAL: PAINLEVEL: EXTREME PAIN (9)

## 2023-10-18 NOTE — PROGRESS NOTES
[facility]: Cancer Care Follow-Up Note                                    Discussion with Patient:                                                      Received message from infusion nurses that Conner reports he has been unable to get anyone from his [facility] to assist him in a few weeks.    Visited Conner in infusion center to discuss further. Conner reports that his PCA through his [facility]HA went out of town weeks ago, and he has been unable to contact anyone in the agency to get assistance. He reports he has called every number he has for them. He reports he has been wearing the same compression stockings for 3 weeks and his house is quite messy.    I reached out to [facility] and left voice message with Carissa Shepard, their , requesting a call back regarding these issues. Left my direct number.     Addendum 10/19/23 11:52 AM  Received VM from Ashley Garza, Conner's , requesting a call back. Returned her call and left VM requesting she return my call.    Message sent to our SW team to see if they are able to assist as well.    I reached out to Conner, who reports he spoke with Ashley this morning. He said she is working on requesting resources for him. Conner said he will reach out if he needs anything from our team.    Addendum 10/19/23 12:44 PM  Received call from Ashley that she is working to get a PCA to his house as soon as possible (likely next week) and she has placed an order for new socks for him. Ashley reports she will follow up on this.    Dates of Treatment:                                                      Infusion given in last 28 days       Administered MAR Action Medication Dose Rate Visit    09/28/2023 11:26 New Bag pembrolizumab (KEYTRUDA) 200 mg in sodium chloride 0.9 % 63 mL infusion 200 mg 126 mL/hr Infusion Therapy Visit on 09/28/2023 in [facility]    10/18/2023 10:53 New Bag pembrolizumab (KEYTRUDA) 200 mg in sodium chloride 0.9 %  63 mL infusion 200 mg 126 mL/hr Infusion Therapy Visit on 10/18/2023 in [facility]            Assessment:                                                      No assessment indicated    Intervention/Education provided during outreach:                                                       Plan: Conner will reach out to me if he has any further issues with securing assistance from his .    Olivia Gates, RN, BSN  RN Care Coordinator  [facility]

## 2023-10-18 NOTE — PROGRESS NOTES
Infusion Nursing Note:  Jonathan Bishop presents today for Keytruda infusion, cycle 9/11.    Patient seen by provider today: No   present during visit today: Not Applicable.    Note: Conner presents for Keytruda infusion. He reports pain 9/10 in lower back and right leg. Pain has been ongoing for him. He is taking pain medication as needed and doing water aerobics. Conner reports coughing at night with clear sputum production and shortness of breath with activity starting several months ago. He reports intense itching at night all over his head and eyebrows starting three weeks ago. No redness or rash present. Educated patient on ways to decrease itching by changing pillowcase and using lotion. Patient will continue to monitor and notify triage if symptoms are worsening. Patient KALIE has not been in the home for 3 weeks, contacted DNAIE Johns who will follow up with patient.     Intravenous Access:  Peripheral IV placed by lab.    Treatment Conditions:  Lab Results   Component Value Date    HGB 9.9 (L) 10/18/2023    WBC 8.5 10/18/2023    ANEU 3.9 10/28/2020    ANEUTAUTO 4.8 10/18/2023     10/18/2023        Lab Results   Component Value Date     10/18/2023    POTASSIUM 3.6 10/18/2023    MAG 2.0 12/09/2020    CR 0.95 10/18/2023    WARREN 9.5 10/18/2023    BILITOTAL 0.3 10/18/2023    ALBUMIN 4.1 10/18/2023    ALT 14 10/18/2023    AST 22 10/18/2023     Patient DID meet treatment parameters: ALT/AST, Bilirubin, and Creatinine.     Post Infusion Assessment:  Patient tolerated infusion without incident.  Blood return noted pre and post infusion.  Site patent and intact, free from redness, edema or discomfort.  No evidence of extravasations.  Access discontinued per protocol.     Discharge Plan:   Patient declined prescription refills.  Discharge instructions reviewed with: Patient.  Patient and/or family verbalized understanding of discharge instructions and all questions answered.  Copy of AVS  reviewed with patient and/or family.  Patient will return 11/8 for next appointment.  Patient discharged in stable condition accompanied by: self.  Departure Mode: Ambulatory.    BP (!) 144/80 (BP Location: Right arm, Patient Position: Sitting)   Pulse 80   Temp 97.7  F (36.5  C) (Oral)   Resp 16   Wt 105.3 kg (232 lb 1.6 oz)   SpO2 97%   BMI 40.46 kg/m      Moira Monge, Nursing Student

## 2023-10-18 NOTE — PATIENT INSTRUCTIONS
[facility] Triage and after hours / weekends / holidays:  [phone]    Please call the triage or after hours line if you experience a temperature greater than or equal to 100.4, shaking chills, have uncontrolled nausea, vomiting and/or diarrhea, dizziness, shortness of breath, chest pain, bleeding, unexplained bruising, or if you have any other new/concerning symptoms, questions or concerns.      If you are having any concerning symptoms or wish to speak to a provider before your next infusion visit, please call triage to notify them so we can adequately serve you.     If you need a refill on a narcotic prescription or other medication, please call before your infusion appointment.                October 2023 Sunday Monday Tuesday Wednesday Thursday Friday Saturday   1     2     3     4    RETURN FOOT/ANKLE   8:45 AM   (20 min.)   Mamadou Gary DPM   [facility] [place] 5     6     7       8     9     10     11     12     13     14       15     16     17     18    LAB PERIPHERAL   9:15 AM   (15 min.)    [facility] DRAW   [facility]    ONC INFUSION 1 HR (60 MIN)  10:00 AM   (60 min.)    ONC INFUSION NURSE   [facility] 19     20     21       22  Happy Birthday!     23     24    CT CHEST/ABDOMEN/PELVIS W   6:30 AM   (20 min.)   [facility] [place]    CT SOFT TISSUE NECK W   7:05 AM   (20 min.)   [facility]   [facility] [place] 25     26    RETURN CCSL  11:30 AM   (30 min.)   Dominique Mueller MD   [facility] 27     28       29     30     31                                     November 2023 Sunday Monday Tuesday Wednesday Thursday Friday Saturday                  1     2     3     4       5     6     7     8    LAB PERIPHERAL   9:00 AM   (15 min.)    [facility] DRAW   [facility]    ONC INFUSION 1 HR (60 MIN)   9:30 AM   (60 min.)    ONC INFUSION NURSE   [facility] 9     10     11       12     13     14     15     16     17     18       19     20     21     22     23     24     25       26     27     28     29    LAB PERIPHERAL   9:00 AM   (15 min.)   [facility] DRAW   [facility]    ONC INFUSION 1 HR (60 MIN)   9:30 AM   (60 min.)    ONC INFUSION NURSE   [facility] 30                               Lab Results:  Recent Results (from the past 12 hour(s))   Comprehensive metabolic panel    Collection Time: 10/18/23  9:41 AM   Result Value Ref Range    Sodium 143 135 - 145 mmol/L    Potassium 3.6 3.4 - 5.3 mmol/L    Carbon Dioxide (CO2) 30 (H) 22 - 29 mmol/L    Anion Gap 9 7 - 15 mmol/L    Urea Nitrogen 23.4 (H) 8.0 - 23.0 mg/dL    Creatinine 0.95 0.67 - 1.17 mg/dL    GFR Estimate 82 >60 mL/min/1.73m2    Calcium 9.5 8.8 - 10.2 mg/dL    Chloride 104 98 - 107 mmol/L    Glucose 98 70 - 99 mg/dL    Alkaline Phosphatase 105 40 - 129 U/L    AST 22 0 - 45 U/L    ALT 14 0 - 70 U/L    Protein Total 7.6 6.4 - 8.3 g/dL    Albumin 4.1 3.5 - 5.2 g/dL    Bilirubin Total 0.3 <=1.2 mg/dL   TSH with free T4 reflex    Collection Time: 10/18/23  9:41 AM   Result Value Ref Range    TSH 0.13 (L) 0.30 - 4.20 uIU/mL   CBC with platelets and differential    Collection Time: 10/18/23  9:41 AM   Result Value Ref Range    WBC Count 8.5 4.0 - 11.0 10e3/uL    RBC Count 4.04 (L) 4.40 - 5.90 10e6/uL    Hemoglobin 9.9 (L) 13.3 - 17.7 g/dL    Hematocrit 32.6 (L) 40.0 - 53.0 %    MCV 81 78 - 100 fL    MCH 24.5 (L) 26.5 - 33.0 pg    MCHC 30.4 (L) 31.5 - 36.5 g/dL    RDW 16.6 (H) 10.0 - 15.0 %    Platelet Count 220 150 - 450 10e3/uL    % Neutrophils 56 %    % Lymphocytes 27 %    % Monocytes 11 %    Mids % (Monos, Eos, Basos)      % Eosinophils 5 %    % Basophils 1 %    % Immature Granulocytes 0 %    NRBCs per 100 WBC 0 <1 /100     Absolute Neutrophils 4.8 1.6 - 8.3 10e3/uL    Absolute Lymphocytes 2.3 0.8 - 5.3 10e3/uL    Absolute Monocytes 1.0 0.0 - 1.3 10e3/uL    Mids Abs (Monos, Eos, Basos)      Absolute Eosinophils 0.4 0.0 - 0.7 10e3/uL    Absolute Basophils 0.0 0.0 - 0.2 10e3/uL    Absolute Immature Granulocytes 0.0 <=0.4 10e3/uL    Absolute NRBCs 0.0 10e3/uL

## 2023-10-18 NOTE — NURSING NOTE
Chief Complaint   Patient presents with    Blood Draw     Labs drawn with piv start.  VS taken.     Labs drawn with PIV start.  Pt tolerated well.  VS taken and pt checked in for next appt.    Modesta Turk RN         no

## 2023-10-26 ENCOUNTER — ANCILLARY PROCEDURE (OUTPATIENT)
Dept: CT IMAGING | Facility: CLINIC | Age: 78
End: 2023-10-26
Attending: INTERNAL MEDICINE
Payer: COMMERCIAL

## 2023-10-26 ENCOUNTER — ONCOLOGY VISIT (OUTPATIENT)
Dept: ONCOLOGY | Facility: CLINIC | Age: 78
End: 2023-10-26
Attending: INTERNAL MEDICINE
Payer: COMMERCIAL

## 2023-10-26 VITALS
BODY MASS INDEX: 40.39 KG/M2 | DIASTOLIC BLOOD PRESSURE: 74 MMHG | WEIGHT: 231.7 LBS | HEART RATE: 89 BPM | TEMPERATURE: 97.9 F | OXYGEN SATURATION: 98 % | SYSTOLIC BLOOD PRESSURE: 132 MMHG

## 2023-10-26 DIAGNOSIS — C78.00 MALIGNANT NEOPLASM METASTATIC TO LUNG, UNSPECIFIED LATERALITY (H): ICD-10-CM

## 2023-10-26 DIAGNOSIS — C10.3 MALIGNANT NEOPLASM OF POSTERIOR WALL OF OROPHARYNX (H): ICD-10-CM

## 2023-10-26 DIAGNOSIS — C10.9 SQUAMOUS CELL CARCINOMA OF OROPHARYNX (H): ICD-10-CM

## 2023-10-26 DIAGNOSIS — C09.9 TONSIL CANCER (H): ICD-10-CM

## 2023-10-26 DIAGNOSIS — C78.00 MALIGNANT NEOPLASM METASTATIC TO LUNG, UNSPECIFIED LATERALITY (H): Primary | ICD-10-CM

## 2023-10-26 DIAGNOSIS — Z13.29 SCREENING FOR HYPOTHYROIDISM: ICD-10-CM

## 2023-10-26 PROCEDURE — 74177 CT ABD & PELVIS W/CONTRAST: CPT | Mod: GC | Performed by: STUDENT IN AN ORGANIZED HEALTH CARE EDUCATION/TRAINING PROGRAM

## 2023-10-26 PROCEDURE — G0463 HOSPITAL OUTPT CLINIC VISIT: HCPCS | Performed by: INTERNAL MEDICINE

## 2023-10-26 PROCEDURE — 71260 CT THORAX DX C+: CPT | Mod: GC | Performed by: STUDENT IN AN ORGANIZED HEALTH CARE EDUCATION/TRAINING PROGRAM

## 2023-10-26 PROCEDURE — 99215 OFFICE O/P EST HI 40 MIN: CPT | Performed by: INTERNAL MEDICINE

## 2023-10-26 PROCEDURE — 70491 CT SOFT TISSUE NECK W/DYE: CPT | Mod: GC | Performed by: STUDENT IN AN ORGANIZED HEALTH CARE EDUCATION/TRAINING PROGRAM

## 2023-10-26 RX ORDER — IOPAMIDOL 755 MG/ML
114 INJECTION, SOLUTION INTRAVASCULAR ONCE
Status: COMPLETED | OUTPATIENT
Start: 2023-10-26 | End: 2023-10-26

## 2023-10-26 RX ADMIN — IOPAMIDOL 114 ML: 755 INJECTION, SOLUTION INTRAVASCULAR at 13:01

## 2023-10-26 ASSESSMENT — PAIN SCALES - GENERAL: PAINLEVEL: EXTREME PAIN (9)

## 2023-10-26 NOTE — PROGRESS NOTES
[facility] CANCER [facility]    PATIENT NAME: Jonathan Bishop  MRN # [id number]   DATE OF VISIT: October 26, 2023  YOB: 1945     Referring Provider: Dr. Karishma Eng  Otolaryngology: Dr. Karishma Eng  Radiation Oncology: Dr. Jenni Mills  PCP: Dr. Buck Nascimento    CANCER TYPE: SCC L tonsil, p16 +lety  STAGE: [id number] (IVC)  ECOG PS: 1    PD-L1: TPS 20%, CPS 25% on [id number] tonsil bx  NGS: N/A    SUMMARY  2/26/23 L tonsil mass bx in clinic (Dr. Eng).   2/20/23 PET/CT. 3.7 x 4.0 x 5.1 cm mass L palatine tonsil causing narrowing of the oropharyngeal lumen, L level 2 node, L retropharyngeal nodular density, extension of primary mass vs retropharyngeal node, 0.8 cm RLL nodule concerning for met, mild focal uptake R iliac bone and L1 without CT correlate suspicious for mets.   3/2/23 R VATS, wedge resection. Path: SCC, poorly differentiated, associated with necrosis, 1 cm, negative margins, p16 +lety  3/24/23 MRI L spine and pelvis. Diffusely heterogeneous marrow signal throughout without corresponding abnormal signal on sagittal STIR sequence and no abnormal enhancement. Nonspecific but could be benign process such as red marrow hyperplasia, cannot completely exclude metastatic disease or infiltrative pathologic marrow process. No lesions corresponding to FDG avid spots on PET/CT.   4/19~10/18/23 Pembrolizumab.    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 Reviewed images - adenopathy bilaterally and L tonsil mass clearly larger now. Read on CT CAP pending but I don't see any new lung nodules, adenopathy of bone mets, although there are a few sclerotic lesions in the spine nad the R pelvis. Will await read. Discussed next steps - see Dr. Mills again in Radiation Oncology to consider definitive management of the head/neck at this time, +/- chemo - typically would give chemo as a radiosensitizer but Conner  is a bit frail. She and I will talk it over. Will also get PET to make sure the bones look good. If needed, I could give carboplatin paclitaxel weekly until we get to chemorads. I do not think continuing the pembrolizumab right now makes sense but we might come back to it in the future in combination with cetuximab. Overall, I worry about Conner's overall health and frailty, but on the other hand, he's done well with pembrolizumab. Talked to him on the phone later in the evening once the CTs were done.     Hyperglyemia, pre-diabetes: A1c 6.0-6.3 for years now    H/o CVA: Residual L weakness. Uses cane and walker at baseline, but independent and functional. Stable    LE edema; Not able to get compression stockings on due to his HHA being on vacation. Knows to elevate and use moisturizers    Microcytic anemia: Iron studies 8/2022 fairly normal, normal 4/18/23. Recheck iron studies if MCV drops further.    Peripheral neuropathy: MRI shows a lot of foramenal stenosis and spinal canal stenosis, so more likely related to that than DM2. Monitor for worsening    40 minutes spent by me on the date of the encounter doing chart review, history and exam, documentation and further activities per the note     Dominique Mueller MD  Associate Professor of Medicine  Hematology, Oncology and Transplantation      SUBJECTIVE  Mr. Bishop returns for follow up on pembrolizumab  Missed scans  HHA left - he got behind on ADLs, etc. Has been tough. Met a new HHA, not working out, talked to the agency already   Bday 10/22 had a good dinner and time with his close friend  Itchiness on his head. No rash. Most intense on his eyebrows.  M-W-F are best days for scheduling    PAST MEDICAL HISTORY  SCC as above  Chronic LBP  TAVR 2020  H/o rheumatic carditis 1950  CHF. 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 Nocturia once nightly   ED  Cataracts  Umbilical hernia repair 2011  Knee arthroplasty B  2010  Lumbar spine fusion, laminectomy, sciatic pain R > L  Peripheral neuropathy - from pinched nerves?  Hearing loss    Numbness/tingling in both feet - bilaterally, symmetric, R spasms more than left    CURRENT OUTPATIENT MEDICATIONS  Reviewed    ALLERGIES  No Known Allergies     PHYSICAL EXAM  /74 (BP Location: Right arm, Patient Position: Sitting, Cuff Size: Adult Large)   Pulse 89   Temp 97.9  F (36.6  C) (Oral)   Wt 105.1 kg (231 lb 11.2 oz)   SpO2 98%   BMI 40.39 kg/m      GEN: NAD  HEENT: EOMI, no icterus, injection or pallor  EXT: 2+ edema bilaterally  NEURO: alert    LABORATORY AND IMAGING STUDIES    Labs 10/18/23 and 9/28/23 were independently reviewed and interpreted by me     Imaging was personally reviewed and interpreted by me as above and compared to serial imaging

## 2023-10-26 NOTE — NURSING NOTE
"Oncology Rooming Note    October 26, 2023 11:58 AM   Jonathan Bishop is a 78 year old male who presents for:    Chief Complaint   Patient presents with    Oncology Clinic Visit     Squamous cell carcinoma of oropharynx     Initial Vitals: /74 (BP Location: Right arm, Patient Position: Sitting, Cuff Size: Adult Large)   Pulse 89   Temp 97.9  F (36.6  C) (Oral)   Wt 105.1 kg (231 lb 11.2 oz)   SpO2 98%   BMI 40.39 kg/m   Estimated body mass index is 40.39 kg/m  as calculated from the following:    Height as of 7/3/23: 1.613 m (5' 3.5\").    Weight as of this encounter: 105.1 kg (231 lb 11.2 oz). Body surface area is 2.17 meters squared.  Extreme Pain (9) Comment: Data Unavailable   No LMP for male patient.  Allergies reviewed: Yes  Medications reviewed: Yes    Medications: no refills here   Pharmacy name entered into [employer]: [facility] [place] [facility] [address] [facility]    Clinical concerns:  none      Kym Cortez              "

## 2023-10-26 NOTE — LETTER
10/26/2023         RE: Jonathan Bishop  [address]  [place] [facility] [address]        Dear Colleague,    Thank you for referring your patient, Jonathan Bishop, to the [facility] CANCER [facility]. Please see a copy of my visit note below.       [facility]CER [facility]    PATIENT NAME: Jonathan Bishop  MRN # [id number]   DATE OF VISIT: October 26, 2023  YOB: 1945     Referring Provider: Dr. Karishma Eng  Otolaryngology: Dr. Karishma Eng  Radiation Oncology: Dr. Jenni Milsl  PCP: Dr. Buck Nascimento    CANCER TYPE: SCC L tonsil, p16 +lety  STAGE: [id number] (IVC)  ECOG PS: 1    PD-L1: TPS 20%, CPS 25% on [id number] tonsil bx  NGS: N/A    SUMMARY  2/26/23 L tonsil mass bx in clinic (Dr. Eng).   2/20/23 PET/CT. 3.7 x 4.0 x 5.1 cm mass L palatine tonsil causing narrowing of the oropharyngeal lumen, L level 2 node, L retropharyngeal nodular density, extension of primary mass vs retropharyngeal node, 0.8 cm RLL nodule concerning for met, mild focal uptake R iliac bone and L1 without CT correlate suspicious for mets.   3/2/23 R VATS, wedge resection. Path: SCC, poorly differentiated, associated with necrosis, 1 cm, negative margins, p16 +lety  3/24/23 MRI L spine and pelvis. Diffusely heterogeneous marrow signal throughout without corresponding abnormal signal on sagittal STIR sequence and no abnormal enhancement. Nonspecific but could be benign process such as red marrow hyperplasia, cannot completely exclude metastatic disease or infiltrative pathologic marrow process. No lesions corresponding to FDG avid spots on PET/CT.   4/19~10/18/23 Pembrolizumab.    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 Reviewed images - adenopathy bilaterally and L tonsil mass clearly larger now. Read on CT CAP pending but I don't see any new lung nodules, adenopathy of bone mets, although there are a  few sclerotic lesions in the spine nad the R pelvis. Will await read. Discussed next steps - see Dr. Mills again in Radiation Oncology to consider definitive management of the head/neck at this time, +/- chemo - typically would give chemo as a radiosensitizer but Conner is a bit frail. She and I will talk it over. Will also get PET to make sure the bones look good. If needed, I could give carboplatin paclitaxel weekly until we get to chemorads. I do not think continuing the pembrolizumab right now makes sense but we might come back to it in the future in combination with cetuximab. Overall, I worry about Conner's overall health and frailty, but on the other hand, he's done well with pembrolizumab. Talked to him on the phone later in the evening once the CTs were done.     Hyperglyemia, pre-diabetes: A1c 6.0-6.3 for years now    H/o CVA: Residual L weakness. Uses cane and walker at baseline, but independent and functional. Stable    LE edema; Not able to get compression stockings on due to his HHA being on vacation. Knows to elevate and use moisturizers    Microcytic anemia: Iron studies 8/2022 fairly normal, normal 4/18/23. Recheck iron studies if MCV drops further.    Peripheral neuropathy: MRI shows a lot of foramenal stenosis and spinal canal stenosis, so more likely related to that than DM2. Monitor for worsening    40 minutes spent by me on the date of the encounter doing chart review, history and exam, documentation and further activities per the note     Dominique Mueller MD  Associate Professor of Medicine  Hematology, Oncology and Transplantation      SUBJECTIVE  Mr. Bishop returns for follow up on pembrolizumab  Missed scans  HHA left - he got behind on ADLs, etc. Has been tough. Met a new HHA, not working out, talked to the agency already   Bday 10/22 had a good dinner and time with his close friend  Itchiness on his head. No rash. Most intense on his eyebrows.  M-W-F are best days for scheduling    PAST  MEDICAL HISTORY  SCC as above  Chronic LBP  TAVR 2020  H/o rheumatic carditis 1950  CHF. 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 Nocturia once nightly   ED  Cataracts  Umbilical hernia repair 2011  Knee arthroplasty B 2010  Lumbar spine fusion, laminectomy, sciatic pain R > L  Peripheral neuropathy - from pinched nerves?  Hearing loss    Numbness/tingling in both feet - bilaterally, symmetric, R spasms more than left    CURRENT OUTPATIENT MEDICATIONS  Reviewed    ALLERGIES  No Known Allergies     PHYSICAL EXAM  /74 (BP Location: Right arm, Patient Position: Sitting, Cuff Size: Adult Large)   Pulse 89   Temp 97.9  F (36.6  C) (Oral)   Wt 105.1 kg (231 lb 11.2 oz)   SpO2 98%   BMI 40.39 kg/m      GEN: NAD  HEENT: EOMI, no icterus, injection or pallor  EXT: 2+ edema bilaterally  NEURO: alert    LABORATORY AND IMAGING STUDIES    Labs 10/18/23 and 9/28/23 were independently reviewed and interpreted by me     Imaging was personally reviewed and interpreted by me as above and compared to serial imaging            Dominique Mueller MD

## 2023-10-26 NOTE — DISCHARGE INSTRUCTIONS

## 2023-10-27 ENCOUNTER — TELEPHONE (OUTPATIENT)
Dept: RADIATION ONCOLOGY | Facility: CLINIC | Age: 78
End: 2023-10-27
Payer: COMMERCIAL

## 2023-10-27 NOTE — TELEPHONE ENCOUNTER
Called pt to schedule a 1HR follow up w/Dr. Mills per Dr. Mueller  Unable to leave v/m as mailbox is full. Sent pt a [employer] message with info  Thank you

## 2023-10-29 LAB — RADIOLOGIST FLAGS: NORMAL

## 2023-11-03 ENCOUNTER — PATIENT OUTREACH (OUTPATIENT)
Dept: ONCOLOGY | Facility: CLINIC | Age: 78
End: 2023-11-03
Payer: COMMERCIAL

## 2023-11-03 ENCOUNTER — OFFICE VISIT (OUTPATIENT)
Dept: RADIATION ONCOLOGY | Facility: CLINIC | Age: 78
End: 2023-11-03
Attending: RADIOLOGY
Payer: COMMERCIAL

## 2023-11-03 DIAGNOSIS — C10.9 SQUAMOUS CELL CARCINOMA OF OROPHARYNX (H): Primary | ICD-10-CM

## 2023-11-03 PROCEDURE — 99215 OFFICE O/P EST HI 40 MIN: CPT | Performed by: RADIOLOGY

## 2023-11-03 NOTE — Clinical Note
11/3/2023         RE: Jonathan Bishop  [address]  [place] [place] [facility] [address]        Dear Colleague,    Thank you for referring your patient, Jonathan Bishop, to the [facility] RADIATION ONCOLOGY. Please see a copy of my visit note below.    RADIATION ONCOLOGY FOLLOW-UP NOTE  Date of Visit: Nov 3, 2023    Patient Name: Jonathan Bishop  MRN: [id number]  : 1945    DISEASE TREATED:   ***    RADIATION THERAPY DELIVERED:   ***    INTERVAL SINCE COMPLETION OF RADIATION THERAPY:   ***    SUBJECTIVE:   Jonathan Bishop is a 78 year old male who is here today for routine 1 month follow up after completing radiation therapy.     PAST MEDICAL/SURGICAL HISTORY:   Past Medical History:   Diagnosis Date    * * * SBE PROPHYLAXIS * * *     s/p TAVR    Acute rheumatic carditis 1950    Complication of anesthesia     pt once woke up during back surgery    Coronary artery disease     murmur    Erectile dysfunction     Sildenafil    Hip pain, bilateral     Hypercholesteremia     Hypertension     Low back pain     Nonsenile cataract     Obesity     Renal cyst     S/P TAVR (transcatheter aortic valve replacement) 2020    26mm Anton Tawana 3 valve.    Stroke (cerebrum) (H) 2016    Umbilical hernia 06/10/2011    s/p surgical repair    Wound, surgical, infected 06/10/2011    hernia      Past Surgical History:   Procedure Laterality Date    ARTHROPLASTY KNEE BILATERAL  2010    COLONOSCOPY N/A 2017    Procedure: COLONOSCOPY;  Surgeon: Toby Bills MD;  Location:  GI    CV CORONARY ANGIOGRAM N/A 10/28/2019    Procedure: Coronary Angiogram;  Surgeon: Guerrero Huitron MD;  Location:  HEART CARDIAC CATH LAB    CV RIGHT HEART CATH MEASUREMENTS RECORDED N/A 10/28/2019    Procedure: Right Heart Cath;  Surgeon: Guerrero Huitron MD;  Location:  HEART CARDIAC CATH LAB    CV TRANSCATHETER AORTIC VALVE REPLACEMENT N/A 2020    Procedure: Transcatheter Aortic Valve  Replacement;  Surgeon: Camila Begum MD;  Location:  HEART CARDIAC CATH LAB    FUSION LUMBAR ANTERIOR TWO LEVELS      HERNIORRHAPHY UMBILICAL  6/10/2011    Procedure:HERNIORRHAPHY UMBILICAL; Open, with mesh placement; Surgeon:HO PARHAM; Location:[facility] OR    LAMINECTOMY LUMBAR TWO LEVELS      THORACOSCOPIC WEDGE RESECTION LUNG Right 3/2/2023    Procedure: Right thoracoscopic wedge resection;  Surgeon: Hebert Jones MD;  Location:  OR       ALLERGIES:  No Known Allergies    MEDICATIONS:   Current Outpatient Medications   Medication Sig Dispense Refill    acetaminophen (TYLENOL) 325 MG tablet Take 3 tablets (975 mg) by mouth every 6 hours (Patient not taking: Reported on 10/18/2023)      diazepam (VALIUM) 5 MG tablet Take 1-2 tablets (5-10 mg) by mouth once as needed for anxiety (for procedures. May repeat one time if needed.) 4 tablet 0    ibuprofen (ADVIL/MOTRIN) 600 MG tablet Take 1 tablet (600 mg) by mouth every 8 hours as needed for moderate pain (4-6) 270 tablet 3    methocarbamol (ROBAXIN) 500 MG tablet Take 1 tablet (500 mg) by mouth every 6 hours as needed for muscle spasms 30 tablet 0    order for DME Equipment being ordered: Walker ()  Treatment Diagnosis: stroke 1 Units 0    oxyCODONE-acetaminophen (PERCOCET) 5-325 MG tablet Take 1-2 tablets by mouth every 6 hours as needed for pain Max 5 per day. 150 tablet 0    senna-docusate (SENOKOT-S/PERICOLACE) 8.6-50 MG tablet Take 1 tablet by mouth 2 times daily Reduce dose or temporarily discontinue if having loose stools. (Patient not taking: Reported on 10/18/2023) 50 tablet 0    sildenafil (VIAGRA) 100 MG tablet TAKE 30MIN TO 4 HOURS BEFORE INTERCOURSE AS NEEDED FOR ERECTILE DYSFUNCTION 30 tablet 2    spironolactone (ALDACTONE) 50 MG tablet Take 1 tablet by mouth once daily 90 tablet 2    tamsulosin (FLOMAX) 0.4 MG capsule TAKE 1 CAPSULE BY MOUTH ONCE DAILY 90 capsule 3    vitamin D3 (CHOLECALCIFEROL) 50 mcg (2000 units) tablet Take  1 tablet by mouth daily         REVIEW OF SYSTEMS: A 12-point review of systems was obtained. Pertinent findings are noted in the HPI and are otherwise unremarkable.     PHYSICAL EXAM:  VITALS: There were no vitals taken for this visit.  GEN: appears well, in no acute distress  HEENT: normocephalic and atraumatic, EOMI, anicteric sclerae  CV: no LE edema, no JVD  RESP: normal respiration on room air, no stridor  ABDOMEN: soft, NT, ND  SKIN: normal color and turgor  MSK: moving all extremities well  LYMPHATICS: no cervical or supraclavicular LAD  NEURO: CN II-XII grossly intact, no focal neurologic deficit  PSYCH: appropriate mood, affect, and judgment      LABS AND IMAGING:    IMPRESSION/RECOMMENDATION:  *** He is doing well with no significant radiation toxicity and no evidence of disease. He will return for follow up in *** months and was instructed to call our clinic with any questions or concerns.    I personally reviewed the available *** images. Laboratory data and pathology as noted above was reviewed. I reviewed previous medical records, which are summarized in the HPI. I spent a total of *** minutes of time including face to face time and indirect time during this visit, and more than 50% of the time was spent in counseling and coordination of care.    Jenni Mills MD  Radiation Oncology      CC:  Patient Care Team:  Buck Nascimento MD as PCP - General (Internal Medicine - Pediatrics)  Pascale Clark MD as MD (Ophthalmology)  Mamadou Gary DPM as MD (Podiatry)  Buck Nascimento MD as MD (Internal Medicine)  Tristen Reed MD as MD (Otolaryngology)  Mamadou Gary DPM as Assigned Musculoskeletal Provider  Buck Nascimento MD as Assigned PCP  Maurice Kilgore OD (Optometry)  Buck Nascimento MD as Assigned Pain Medication Provider  Karishma Eng MD as MD (Otolaryngology)  Jenni Mills MD as MD (Radiation Oncology)  Yoseph Majano MD as MD (Hematology &  Oncology)  Hebert Jones MD as MD (Cardiovascular & Thoracic Surgery)  Hebert Jones MD as MD (Cardiovascular & Thoracic Surgery)  Olivia Morales, RN as Specialty Care Coordinator (Hematology & Oncology)  Hebert Jones MD as Assigned Heart and Vascular Provider  Karishma Eng MD as Assigned Surgical Provider  Olivia Gates, RN as Specialty Care Coordinator  Vandana Bertrand CNP as Assigned Cancer Care Provider  Dominique Mueller MD as MD (Hematology & Oncology)     RADIATION ONCOLOGY CONSULTATION  DATE OF VISIT: 3/16/2023    Jonathan Bishop  MRN: [id number]    PROBLEM:   Mr. Bishop was seen for initial consultation in the Department of Radiation Oncology on 03/20/2023 at the request of Karishma Eng MD.    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    HISTORY OF PRESENT ILLNESS:    1/16/2023: Patient sees his PCP due to a 3/4-week history of a sore throat.  Physical exam at this visit was noted to have a 3 cm mass in the uvula with cervical lymphadenopathy.  Patient was subsequently referred to ENT at [facility].    1/30/2023: Patient initially scheduled to see Dr. Eng but had to cancel visit    2/6/2023, initial consultation with Dr. Eng: Flexible fiberoptic laryngoscopy at the time showed submucosal tumor of the left posterior soft palate, left lateral pharyngeal wall.    2/6/2023: Left tonsil biopsy with Dr. Eng    2/6/2023, surgical path exam: Left tonsil biopsy shows HPV associated squamous cell carcinoma.  p40(+), p16 (+), PD-L1 pending      2/20/2023, PET CT: Hypermetabolic 0.8 cm solid right lower lobe pulmonary nodule.  PET avid (SUV max  29.2) 3.7 x 4.0 x 5.1 cm enhancing mass in the left palatine tonsil compatible with the primary squamous cell carcinoma.  Multiple left level 2A lymph nodes that are centrally necrotic.  Solid left level 2A lymph node measuring 2.3 x 1.2 x 1.5 cm with increased FDG avidity (SUV max 21.1).   Avid (SUV max 27.2) left retropharyngeal nodes, measuring 1.7 x 1.4 cm.  Mild FDG uptake in the right iliac bone and L1 vertebral bodies without any definite CT correlate but suspicious for metastasis.    2/24/2023, ENT tumor conference: Tumor board recommends a lumbar spine MRI, referral to thoracic surgery, medical oncology and radiation oncology    3/2/2023: Right VATS wedge resection    3/2/2023, surgical path exam: Wedge resection of the right lower lobe nodule shows poorly differentiated squamous cell carcinoma with negative margins for malignancy. p16 (+) and is favored to be metastasis from the primary    3/16/2023, surgical pathology: PD-L1 status still in process as of 3/20/2023    On interview:  The patient reports using a combination of oxycodone, ibuprofen, and acetaminophen for pain  The patient reports being able to chew and swallow  The patient denies any nasal regurgitation with swallowing  The patient denies any signs or has any symptoms of a fistula or soft palate insufficiency  The patient has a scheduled fishing trip in [place] and would like to proceed with any radiotherapy after he returns in April    PHYSICAL EXAMINATION:    There were no vitals taken for this visit.  Exam:  Constitutional: healthy, alert and no distress  Head: Normocephalic. No masses, lesions, tenderness or abnormalities  Neck: Neck supple. No palpable adenopathy.  ENT: Exophytic lesion on the midline palate extending to the left tonsillar fossa and lateral pharyngeal wall.  Lesion crosses midline to involve the entire uvula.  No signs of bleeding.  Edentulous  Cardiovascular: Regular rate, extremities warm, well-perfused  Respiratory: No  increased work of breathing, no stridor  Gastrointestinal: Nondistended  Musculoskeletal: Bilateral lower extremity edema, low back pain, left-sided weakness from stroke  Skin: no suspicious lesions or rashes  Neurologic: Ambulates with the assistance of a walker.  Cranial nerves II through XII are grossly intact.  Psychiatric: mentation appears normal and affect normal/bright        PHYSICIAN ATTESTATION:  Mr. Bishop was seen and examined by me. Note above by Dr. Key was reviewed and edited by me and reflects our mutual findings and plan of care.  Mr. 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  He has biopsy-proven metastasis in his lung.  He is scheduled for an MRI of the lumbar spine later this week to assess for the possibility of bone metastasis.  I reviewed with the patient the rationale for recommending chemoradiation to the head and neck encompassing the primary tumor, metastatic adenopathy and eric groups.  I reviewed the anticipated course of therapy, expected acute toxicities and their management, potential long-term risks and expected outcome from therapy.    We are waiting on results of the MRI of the lumbar spine and PD-L1 status.  While chemoradiation to the head and neck will play a role in his care, I am as yet unsure of the sequencing of systemic therapy prior to starting chemoradiation.  This will likely need to be scheduled around his planned fishing trips later this month and in early April.  In discussion of acute side effects, I did review that discussed that there may be a need for feeding tube if he is not able to maintain adequate caloric and fluid intake.    He is edentulous and does not require a dental referral.                    Again, thank you for allowing me to participate in the care of your patient.        Sincerely,        Jenni Mills MD

## 2023-11-06 NOTE — PROGRESS NOTES
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  Since his initial consultation with us in 2023, he has been treated with pembrolizumab from 2023 through 10/18/2023.  Interval imaging shows increased size of the left palatine tonsil mass now measuring 4.7 x 3.5 cm as well as increased bilateral cervical lymph nodes measuring up to 2.3 x 2.4 cm in right level 2.  There is also a new 3 mm right middle lobe lung nodule.    He presents today to discuss radiation to the head and neck tumor to achieve local control and minimize risk for airway obstruction.  Currently, he is not having any difficulty with breathing and is able to chew and swallow most foods without difficulty.  He denies any hoarseness, headache, otalgia, hemoptysis, fever, chills, nausea, vomiting.    PAST MEDICAL/SURGICAL HISTORY:   Past Medical History:   Diagnosis Date    * * * SBE PROPHYLAXIS * * *     s/p TAVR    Acute rheumatic carditis 1950    Complication of anesthesia     pt once woke up during back surgery    Coronary artery disease     murmur    Erectile dysfunction     Sildenafil    Hip pain, bilateral     Hypercholesteremia     Hypertension     Low back pain     Nonsenile cataract     Obesity     Renal cyst     S/P TAVR (transcatheter aortic valve replacement) 2020    26mm Anton Tawana 3 valve.    Stroke (cerebrum) (H) 2016    Umbilical hernia 06/10/2011    s/p surgical repair    Wound, surgical, infected 06/10/2011    hernia      Past Surgical History:   Procedure Laterality Date    ARTHROPLASTY KNEE BILATERAL  2010    COLONOSCOPY N/A 2017    Procedure: COLONOSCOPY;  Surgeon: Toby Bills MD;  Location: [facility]    CV CORONARY ANGIOGRAM N/A 10/28/2019    Procedure:  Coronary Angiogram;  Surgeon: Guerrero Huitron MD;  Location:  HEART CARDIAC CATH LAB    CV RIGHT HEART CATH MEASUREMENTS RECORDED N/A 10/28/2019    Procedure: Right Heart Cath;  Surgeon: Guerrero Huitron MD;  Location:  HEART CARDIAC CATH LAB    CV TRANSCATHETER AORTIC VALVE REPLACEMENT N/A 12/8/2020    Procedure: Transcatheter Aortic Valve Replacement;  Surgeon: Camila Begum MD;  Location:  HEART CARDIAC CATH LAB    FUSION LUMBAR ANTERIOR TWO LEVELS      HERNIORRHAPHY UMBILICAL  6/10/2011    Procedure:HERNIORRHAPHY UMBILICAL; Open, with mesh placement; Surgeon:HO PARHAM; Location:[facility]    LAMINECTOMY LUMBAR TWO LEVELS      THORACOSCOPIC WEDGE RESECTION LUNG Right 3/2/2023    Procedure: Right thoracoscopic wedge resection;  Surgeon: Hebert Jones MD;  Location:  OR     ALLERGIES:  No Known Allergies    MEDICATIONS:   Current Outpatient Medications   Medication Sig Dispense Refill    acetaminophen (TYLENOL) 325 MG tablet Take 3 tablets (975 mg) by mouth every 6 hours (Patient not taking: Reported on 10/18/2023)      diazepam (VALIUM) 5 MG tablet Take 1-2 tablets (5-10 mg) by mouth once as needed for anxiety (for procedures. May repeat one time if needed.) 4 tablet 0    ibuprofen (ADVIL/MOTRIN) 600 MG tablet Take 1 tablet (600 mg) by mouth every 8 hours as needed for moderate pain (4-6) 270 tablet 3    methocarbamol (ROBAXIN) 500 MG tablet Take 1 tablet (500 mg) by mouth every 6 hours as needed for muscle spasms 30 tablet 0    order for DME Equipment being ordered: Walker ()  Treatment Diagnosis: stroke 1 Units 0    oxyCODONE-acetaminophen (PERCOCET) 5-325 MG tablet Take 1-2 tablets by mouth every 6 hours as needed for pain Max 5 per day. 150 tablet 0    senna-docusate (SENOKOT-S/PERICOLACE) 8.6-50 MG tablet Take 1 tablet by mouth 2 times daily Reduce dose or temporarily discontinue if having loose stools. (Patient not taking: Reported on 10/18/2023) 50 tablet 0    sildenafil  (VIAGRA) 100 MG tablet TAKE 30MIN TO 4 HOURS BEFORE INTERCOURSE AS NEEDED FOR ERECTILE DYSFUNCTION 30 tablet 2    spironolactone (ALDACTONE) 50 MG tablet Take 1 tablet by mouth once daily 90 tablet 2    tamsulosin (FLOMAX) 0.4 MG capsule TAKE 1 CAPSULE BY MOUTH ONCE DAILY 90 capsule 3    vitamin D3 (CHOLECALCIFEROL) 50 mcg (2000 units) tablet Take 1 tablet by mouth daily         REVIEW OF SYSTEMS: A 12-point review of systems was obtained. Pertinent findings are noted in the HPI and are otherwise unremarkable.     PHYSICAL EXAM:  VITALS: There were no vitals taken for this visit.,  In wheelchair  HEENT: Normocephalic, large left oropharyngeal mass, no thrush  Neck: Bilateral palpable adenopathy, no lymphedema  Respiratory: Breathing comfortably on room air, no wheezing or stridor  Cardiovascular: Regular rate, extremities warm, well-perfused  Abdomen: Nondistended  Extremities: Bilateral lower extremity edema, no cyanosis  Musculoskeletal: Normal muscle bulk and tone  Neuro: Cranial nerves II through XII are grossly intact, gait not tested, peripheral neuropathy  Skin: No visible rash    LABS AND IMAGING: Please refer to history of present illness for recent imaging discussion.    IMPRESSION/RECOMMENDATION:  Jonathan Bishop is a 78-year-old man with clinical stage T3 N2b M1lung squamous cell carcinoma of the left oropharynx who is currently receiving pembrolizumab.  He has shown progression of disease while on immunotherapy.  His other medical problems limit which types of chemotherapy he can tolerate.  We had previously discussed a course of head and neck radiation concurrent with chemotherapy.  While we still think he does require radiation for local control of his head and neck cancer to prevent airway compromise and increased difficulty with eating, this needs to be balanced with his current physical status and social needs.  Given that disease is metastatic, any local head and neck radiation would be  considered aggressive palliation.  I did again review the typical course of head and neck radiation over 7 weeks with daily treatments Monday through Friday.  Another option would be to proceed with Quad Shot which requires treatment twice daily for 2 days (4 fractions total) repeated every 3 weeks for a total of 3 times.  This has been shown to effectively palliate head and neck cancers with the patient experiencing minimal acute toxicities.  This would also minimize his transportation requirements.  Mr. Bishop is interested in pursuing Quad Shot.  Informed consent was obtained.  He will undergo treatment simulation in the near future.    After discussion with Dr. Mueller, we will not plan for concurrent chemotherapy with radiation.    We appreciate the opportunity to participate in Ananda Bishop's care.  Please call with questions    50 minutes spent by me on the date of the encounter doing chart review, history and exam, documentation and further activities per the note.    Jenni Mills MD  Radiation Oncology    CC:  Patient Care Team:  Buck Nascimento MD as PCP - General (Internal Medicine - Pediatrics)  Pascale Clark MD as MD (Ophthalmology)  Mamadou Gary DPM as MD (Podiatry)  Buck Nascimento MD as MD (Internal Medicine)  Tristen Reed MD as MD (Otolaryngology)  Mamadou Gary DPM as Assigned Musculoskeletal Provider  Buck Nascimento MD as Assigned PCP  Maurice Kilgore OD (Optometry)  Buck Nascimento MD as Assigned Pain Medication Provider  Karishma Eng MD as MD (Otolaryngology)  Jenni Mills MD as MD (Radiation Oncology)  Yoseph Majano MD as MD (Hematology & Oncology)  Hebert Jones MD as MD (Cardiovascular & Thoracic Surgery)  Hebert Jones MD as MD (Cardiovascular & Thoracic Surgery)  Olivia Morales, TRAE as Specialty Care Coordinator (Hematology & Oncology)  Hebert Jones MD as Assigned Heart and Vascular Provider  Karishma Eng MD  as Assigned Surgical Provider  Olivia Gates, RN as Specialty Care Coordinator  Vandana [place] CNP as Assigned Cancer Care Provider  Dominique Mueller MD as MD (Hematology & Oncology)     This note was dictated with voice recognition software and then edited. Please excuse any unintentional errors.

## 2023-11-13 DIAGNOSIS — F40.240 CLAUSTROPHOBIA: ICD-10-CM

## 2023-11-13 DIAGNOSIS — M54.50 ACUTE MIDLINE LOW BACK PAIN WITHOUT SCIATICA: ICD-10-CM

## 2023-11-13 RX ORDER — OXYCODONE AND ACETAMINOPHEN 5; 325 MG/1; MG/1
1-2 TABLET ORAL EVERY 6 HOURS PRN
Qty: 150 TABLET | Refills: 0 | Status: ON HOLD | OUTPATIENT
Start: 2023-11-13 | End: 2023-12-02

## 2023-11-13 RX ORDER — DIAZEPAM 5 MG
5-10 TABLET ORAL
Qty: 4 TABLET | Refills: 0 | Status: ON HOLD | OUTPATIENT
Start: 2023-11-13 | End: 2023-12-02

## 2023-11-13 NOTE — TELEPHONE ENCOUNTER
[facility] Call Center    Phone Message    May a detailed message be left on voicemail: yes     Reason for Call: Medication Refill Request    Has the patient contacted the pharmacy for the refill? Yes   Name of medication being requested:   diazepam (VALIUM) 5 MG tablet and oxyCODONE-acetaminophen (PERCOCET) 5-325 MG tablet     Provider who prescribed the medication: Dr Nascimento  Pharmacy: [facility] [address] [place] [id number] [facility]    Date medication is needed: ASAP  patient is having a scan on 11/14/2023 and will need the Diazepam. Please respond.       Action Taken: Message routed to:  [facility]    Travel Screening: Not Applicable

## 2023-11-13 NOTE — TELEPHONE ENCOUNTER
diazepam (VALIUM) 5 MG tablet and       Last Written Prescription Date:  10-12-23  Last Fill Quantity: 4,   # refills: 0  Last Office Visit : 7-3-23  Future Office visit:  none    Routing refill request to provider for review/approval because:  Drug not on the [facility], [facility] or  Health refill protocol or controlled substance    oxyCODONE-acetaminophen (PERCOCET) 5-325 MG tablet       Last Written Prescription Date:  10-25-23  Last Fill Quantity: 150,   # refills: 0    Routing refill request to provider for review/approval because:  Drug not on the [facility], [facility] or  Health refill protocol or controlled substance        Date medication is needed: ASAP  patient is having a scan on 11/14/2023 and will need the Diazepam.

## 2023-11-14 ENCOUNTER — HOSPITAL ENCOUNTER (OUTPATIENT)
Dept: PET IMAGING | Facility: CLINIC | Age: 78
Discharge: HOME | End: 2023-11-14
Attending: INTERNAL MEDICINE | Admitting: INTERNAL MEDICINE
Payer: COMMERCIAL

## 2023-11-14 DIAGNOSIS — C78.00 MALIGNANT NEOPLASM METASTATIC TO LUNG, UNSPECIFIED LATERALITY (H): ICD-10-CM

## 2023-11-14 DIAGNOSIS — C10.3 MALIGNANT NEOPLASM OF POSTERIOR WALL OF OROPHARYNX (H): ICD-10-CM

## 2023-11-14 PROCEDURE — 78815 PET IMAGE W/CT SKULL-THIGH: CPT | Mod: 26 | Performed by: RADIOLOGY

## 2023-11-14 PROCEDURE — 343N000001 HC RX 343: Performed by: INTERNAL MEDICINE

## 2023-11-14 PROCEDURE — A9552 F18 FDG: HCPCS | Performed by: INTERNAL MEDICINE

## 2023-11-14 PROCEDURE — 78815 PET IMAGE W/CT SKULL-THIGH: CPT | Mod: PS

## 2023-11-14 RX ADMIN — FLUDEOXYGLUCOSE F-18 13.85 MILLICURIE: 500 INJECTION, SOLUTION INTRAVENOUS at 12:33

## 2023-11-15 ENCOUNTER — OFFICE VISIT (OUTPATIENT)
Dept: RADIATION ONCOLOGY | Facility: CLINIC | Age: 78
End: 2023-11-15
Attending: RADIOLOGY
Payer: COMMERCIAL

## 2023-11-15 ENCOUNTER — HOSPITAL ENCOUNTER (OUTPATIENT)
Dept: CT IMAGING | Facility: CLINIC | Age: 78
Discharge: HOME | End: 2023-11-15
Attending: RADIOLOGY
Payer: COMMERCIAL

## 2023-11-15 ENCOUNTER — ONCOLOGY VISIT (OUTPATIENT)
Dept: ONCOLOGY | Facility: CLINIC | Age: 78
End: 2023-11-15
Attending: INTERNAL MEDICINE
Payer: COMMERCIAL

## 2023-11-15 VITALS
BODY MASS INDEX: 40.73 KG/M2 | WEIGHT: 233.6 LBS | DIASTOLIC BLOOD PRESSURE: 97 MMHG | OXYGEN SATURATION: 97 % | RESPIRATION RATE: 16 BRPM | SYSTOLIC BLOOD PRESSURE: 136 MMHG | TEMPERATURE: 97.4 F | HEART RATE: 84 BPM

## 2023-11-15 DIAGNOSIS — C10.9 SQUAMOUS CELL CARCINOMA OF OROPHARYNX (H): ICD-10-CM

## 2023-11-15 DIAGNOSIS — C78.00 MALIGNANT NEOPLASM METASTATIC TO LUNG, UNSPECIFIED LATERALITY (H): Primary | ICD-10-CM

## 2023-11-15 DIAGNOSIS — C10.9 SQUAMOUS CELL CARCINOMA OF OROPHARYNX (H): Primary | ICD-10-CM

## 2023-11-15 DIAGNOSIS — I89.0 LYMPHEDEMA: ICD-10-CM

## 2023-11-15 PROCEDURE — G0463 HOSPITAL OUTPT CLINIC VISIT: HCPCS | Mod: 25 | Performed by: INTERNAL MEDICINE

## 2023-11-15 PROCEDURE — 77334 RADIATION TREATMENT AID(S): CPT | Performed by: RADIOLOGY

## 2023-11-15 PROCEDURE — 250N000011 HC RX IP 250 OP 636: Performed by: RADIOLOGY

## 2023-11-15 PROCEDURE — 77334 RADIATION TREATMENT AID(S): CPT | Mod: 26 | Performed by: RADIOLOGY

## 2023-11-15 PROCEDURE — 99215 OFFICE O/P EST HI 40 MIN: CPT | Performed by: INTERNAL MEDICINE

## 2023-11-15 PROCEDURE — 250N000009 HC RX 250: Performed by: RADIOLOGY

## 2023-11-15 PROCEDURE — 77014 CT THERAPY CORRELATE: CPT

## 2023-11-15 RX ORDER — IOPAMIDOL 755 MG/ML
90 INJECTION, SOLUTION INTRAVASCULAR ONCE
Status: COMPLETED | OUTPATIENT
Start: 2023-11-15 | End: 2023-11-15

## 2023-11-15 RX ADMIN — SODIUM CHLORIDE, PRESERVATIVE FREE 60 ML: 5 INJECTION INTRAVENOUS at 14:26

## 2023-11-15 RX ADMIN — IOPAMIDOL 90 ML: 755 INJECTION, SOLUTION INTRAVENOUS at 14:25

## 2023-11-15 ASSESSMENT — PAIN SCALES - GENERAL: PAINLEVEL: EXTREME PAIN (8)

## 2023-11-15 NOTE — PROGRESS NOTES
A radiation therapy treatment planning simulation was performed.  Please see the [facility] record for documentation.    Jenni Mills MD  Radiation Oncology

## 2023-11-15 NOTE — LETTER
11/15/2023         RE: Jonathan Bishop  [address]  [place] [address]        Dear Colleague,    Thank you for referring your patient, Jonathan Bishop, to the [facility]. Please see a copy of my visit note below.       [facility]    PATIENT NAME: Jonathan Bishop  MRN # [id number]   DATE OF VISIT: November 15, 2023  YOB: 1945     Referring Provider: Dr. Karishma Eng  Otolaryngology: Dr. Karishma Eng  Radiation Oncology: Dr. Jenni Mills  PCP: Dr. Buck Nascimento    CANCER TYPE: SCC L tonsil, p16 +lety  STAGE: [id number] (IVC)  ECOG PS: 1    PD-L1: TPS 20%, CPS 25% on [id number] tonsil bx  NGS: N/A    SUMMARY  2/26/23 L tonsil mass bx in clinic (Dr. Eng).   2/20/23 PET/CT. 3.7 x 4.0 x 5.1 cm mass L palatine tonsil causing narrowing of the oropharyngeal lumen, L level 2 node, L retropharyngeal nodular density, extension of primary mass vs retropharyngeal node, 0.8 cm RLL nodule concerning for met, mild focal uptake R iliac bone and L1 without CT correlate suspicious for mets.   3/2/23 R VATS, wedge resection. Path: SCC, poorly differentiated, associated with necrosis, 1 cm, negative margins, p16 +lety  3/24/23 MRI L spine and pelvis. Diffusely heterogeneous marrow signal throughout without corresponding abnormal signal on sagittal STIR sequence and no abnormal enhancement. Nonspecific but could be benign process such as red marrow hyperplasia, cannot completely exclude metastatic disease or infiltrative pathologic marrow process. No lesions corresponding to FDG avid spots on PET/CT.   4/19~10/18/23 Pembrolizumab.  10/26/23 CT neck and CAP. Increased L palatine tonsil mass, 3.8 x 2.7 cm --> 4.7 x 3.5 cm. Increased bilateral cervical nodes up to 2.3 x 2.4 cm R level 2 node. New 3 mm RML nodule, no other mets.   11/15/23 PET. 4.6 x 3.3 cm L palatine tonsil mass (SUB 26.8), R level 2A and 2A/3 nodes. Questionable uptake distal R femoral  medullary cavity, partially imaged, with questionable subtle corresponding soft tissue attenuation on CT. MRI could be obtained to better evaluate.    ASSESSMENT AND PLAN  SCC L tonsil, p16 +lety, CPS 25%/TPS 20%, oligometastatic lung met, +/- bone mets: PET not read yet but I don't see anything obvious outside of the head/neck. Discussed carboplatin as a radiosensitizer during quad shot, but overall, given his frailty and other factors, after discussing, we decided mutually to forego the addition of chemo. I'll see him about a month after radiation is completed with another CT chest non contrast just to make sure the small new nodule isn't growing. Discussed that we would do a PET/CT 2-3 months after radiation to ensure the primary and neck are responding as they should. SW consult placed to see if we can get help with rides, etc. Right now he plans on driving himself.     H/o CVA: Residual L weakness. Uses cane and walker at baseline, but independent and functional, albeit frail. He doesn't qualify for home care, but he might if he becomes homebound.     Hyperglyemia, pre-diabetes: A1c 6.0-6.3 for years    LE edema; Not able to get compression stockings on due to his HHA now being gone. Knows to elevate and use moisturizers. I'd like to see if we can get him into lymphedema clinic or have therapist see him when he's here for radiation without too much extra work on his part - it would be challenging for him to get [facility] the [facility] [place] the radiation clinic each time.     Microcytic anemia: Iron studies 8/2022 fairly normal, normal 4/18/23. Recheck iron studies if MCV drops further.    Peripheral neuropathy: MRI shows a lot of foramenal stenosis and spinal canal stenosis, so more likely related to that than DM2. Monitor for worsening    40 minutes spent by me on the date of the encounter doing chart review, history and exam, documentation and further activities per the note     Dominique Mueller MD   of  Medicine  Hematology, Oncology and Transplantation      SUBJECTIVE  Mr. Bishop returns for follow up to review PET scan. Met with Dr. Mills since our last visit, recommended quad shot approach.   11/28~11/29/23 - BID  12/19~12/20/23 1/9~1/10/24  No other major changes since our last visit.   Doesn't have PCA yet. Not sure what's going to happen with that, is actually assuming he won't have one moving forward.    PAST MEDICAL HISTORY  SCC as above  Chronic LBP  TAVR 2020  H/o rheumatic carditis 1950  CHF. 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 Nocturia once nightly   ED  Cataracts  Umbilical hernia repair 2011  Knee arthroplasty B 2010  Lumbar spine fusion, laminectomy, sciatic pain R > L  Peripheral neuropathy - from pinched nerves?  Hearing loss    Numbness/tingling in both feet - bilaterally, symmetric, R spasms more than left    CURRENT OUTPATIENT MEDICATIONS  Reviewed    ALLERGIES  No Known Allergies     PHYSICAL EXAM  BP (!) 136/97 (BP Location: Right arm, Patient Position: Sitting, Cuff Size: Adult Regular)   Pulse 84   Temp 97.4  F (36.3  C) (Oral)   Resp 16   Wt 106 kg (233 lb 9.6 oz)   SpO2 97%   BMI 40.73 kg/m    GEN: NAD  HEENT: EOMI, no icterus, injection or pallor. OP clear  EXT: warm, 1+ edema bilaterally  NEURO: alert    LABORATORY AND IMAGING STUDIES     PET 11/14/23 was personally reviewed and interpreted by me. I don't see anything in the two questionable areas in the R iliac or L1 vertebral body that were commented on in the Feb 20, 2023 PET/CT that didn't have a CT correlate. Don't see any obvious new lung nodules. MRI pelvis and L spine 3/24/23 were unremarkable too

## 2023-11-15 NOTE — NURSING NOTE
"Oncology Rooming Note    November 15, 2023 3:33 PM   Jonathan Bishop is a 78 year old male who presents for:    Chief Complaint   Patient presents with    Oncology Clinic Visit     Squamous cell carcinoma of oropharynx     Initial Vitals: BP (!) 136/97 (BP Location: Right arm, Patient Position: Sitting, Cuff Size: Adult Regular)   Pulse 84   Temp 97.4  F (36.3  C) (Oral)   Resp 16   Wt 106 kg (233 lb 9.6 oz)   SpO2 97%   BMI 40.73 kg/m   Estimated body mass index is 40.73 kg/m  as calculated from the following:    Height as of 7/3/23: 1.613 m (5' 3.5\").    Weight as of this encounter: 106 kg (233 lb 9.6 oz). Body surface area is 2.18 meters squared.  Extreme Pain (8) Comment: Data Unavailable   No LMP for male patient.  Allergies reviewed: Yes  Medications reviewed: Yes    Medications: MEDICATION REFILLS NEEDED TODAY. Provider was notified.  Pharmacy name entered into [facility]: [id number] - [place], [place] - [address]    Clinical concerns: Refills needed: Diazepam, Flomax      Arleen Thurston, EMT  11/15/2023              "

## 2023-11-15 NOTE — PROGRESS NOTES
Radiation Therapy Patient Education    Person involved with teaching: Patient    Patient educational needs for self management of treatment-related side effects assessment completed.  [facility] Patient Ed tab contains Patient Learning Assessment    Education Materials Given  Simulation hand out    Educational Topics Discussed  Side effects expected, Pain management, Skin care, Nutrition and weight loss, and When to call MD/RN    Response To Teaching  Verbalizes understanding    GYN Only  Vaginal Dilator-given and educated: N/A    Referrals sent: None    Chemotherapy?  Yes: Ervin, seeing after this appointment

## 2023-11-15 NOTE — LETTER
11/15/2023         RE: Jonathan Bishop  [address]  [place] [address]        Dear Colleague,    Thank you for referring your patient, Jonathan Bishop, to the [facility] RADIATION ONCOLOGY. Please see a copy of my visit note below.    A radiation therapy treatment planning simulation was performed.  Please see the [facility] record for documentation.    Jenni Mills MD  Radiation Oncology     Radiation Therapy Patient Education    Person involved with teaching: Patient    Patient educational needs for self management of treatment-related side effects assessment completed.  [facility] Patient Ed tab contains Patient Learning Assessment    Education Materials Given  Simulation hand out    Educational Topics Discussed  Side effects expected, Pain management, Skin care, Nutrition and weight loss, and When to call MD/RN    Response To Teaching  Verbalizes understanding    GYN Only  Vaginal Dilator-given and educated: N/A    Referrals sent: None    Chemotherapy?  Yes: Ervin, seeing after this appointment       Again, thank you for allowing me to participate in the care of your patient.        Sincerely,        Jenni Mills MD

## 2023-11-15 NOTE — PROGRESS NOTES
[facility]    PATIENT NAME: Jonathan Bishop  MRN # [id number]   DATE OF VISIT: November 15, 2023  YOB: 1945     Referring Provider: Dr. Karishma Eng  Otolaryngology: Dr. Karishma Eng  Radiation Oncology: Dr. Jenni Mills  PCP: Dr. Buck Nascimento    CANCER TYPE: SCC L tonsil, p16 +lety  STAGE: [id number] (IVC)  ECOG PS: 1    PD-L1: TPS 20%, CPS 25% on [id number] tonsil bx  NGS: N/A    SUMMARY  2/26/23 L tonsil mass bx in clinic (Dr. Eng).   2/20/23 PET/CT. 3.7 x 4.0 x 5.1 cm mass L palatine tonsil causing narrowing of the oropharyngeal lumen, L level 2 node, L retropharyngeal nodular density, extension of primary mass vs retropharyngeal node, 0.8 cm RLL nodule concerning for met, mild focal uptake R iliac bone and L1 without CT correlate suspicious for mets.   3/2/23 R VATS, wedge resection. Path: SCC, poorly differentiated, associated with necrosis, 1 cm, negative margins, p16 +lety  3/24/23 MRI L spine and pelvis. Diffusely heterogeneous marrow signal throughout without corresponding abnormal signal on sagittal STIR sequence and no abnormal enhancement. Nonspecific but could be benign process such as red marrow hyperplasia, cannot completely exclude metastatic disease or infiltrative pathologic marrow process. No lesions corresponding to FDG avid spots on PET/CT.   4/19~10/18/23 Pembrolizumab.  10/26/23 CT neck and CAP. Increased L palatine tonsil mass, 3.8 x 2.7 cm --> 4.7 x 3.5 cm. Increased bilateral cervical nodes up to 2.3 x 2.4 cm R level 2 node. New 3 mm RML nodule, no other mets.   11/15/23 PET. 4.6 x 3.3 cm L palatine tonsil mass (SUB 26.8), R level 2A and 2A/3 nodes. Questionable uptake distal R femoral medullary cavity, partially imaged, with questionable subtle corresponding soft tissue attenuation on CT. MRI could be obtained to better evaluate.    ASSESSMENT AND PLAN  SCC L tonsil, p16 +lety, CPS 25%/TPS 20%, oligometastatic lung met, +/- bone mets: PET not read yet but I  don't see anything obvious outside of the head/neck. Discussed carboplatin as a radiosensitizer during quad shot, but overall, given his frailty and other factors, after discussing, we decided mutually to forego the addition of chemo. I'll see him about a month after radiation is completed with another CT chest non contrast just to make sure the small new nodule isn't growing. Discussed that we would do a PET/CT 2-3 months after radiation to ensure the primary and neck are responding as they should. SW consult placed to see if we can get help with rides, etc. Right now he plans on driving himself.     H/o CVA: Residual L weakness. Uses cane and walker at baseline, but independent and functional, albeit frail. He doesn't qualify for home care, but he might if he becomes homebound.     Hyperglyemia, pre-diabetes: A1c 6.0-6.3 for years    LE edema; Not able to get compression stockings on due to his HHA now being gone. Knows to elevate and use moisturizers. I'd like to see if we can get him into lymphedema clinic or have therapist see him when he's here for radiation without too much extra work on his part - it would be challenging for him to get to the [facility] [place] the radiation clinic each time.     Microcytic anemia: Iron studies 8/2022 fairly normal, normal 4/18/23. Recheck iron studies if MCV drops further.    Peripheral neuropathy: MRI shows a lot of foramenal stenosis and spinal canal stenosis, so more likely related to that than DM2. Monitor for worsening    40 minutes spent by me on the date of the encounter doing chart review, history and exam, documentation and further activities per the note     Dominique Mueller MD  Associate Professor of Medicine  Hematology, Oncology and Transplantation      SUBJECTIVE  Mr. Bishop returns for follow up to review PET scan. Met with Dr. Mills since our last visit, recommended quad shot approach.   11/28~11/29/23 - BID  12/19~12/20/23 1/9~1/10/24  No other major changes  since our last visit.   Doesn't have PCA yet. Not sure what's going to happen with that, is actually assuming he won't have one moving forward.    PAST MEDICAL HISTORY  SCC as above  Chronic LBP  TAVR 2020  H/o rheumatic carditis 1950  CHF. 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 Nocturia once nightly   ED  Cataracts  Umbilical hernia repair 2011  Knee arthroplasty B 2010  Lumbar spine fusion, laminectomy, sciatic pain R > L  Peripheral neuropathy - from pinched nerves?  Hearing loss    Numbness/tingling in both feet - bilaterally, symmetric, R spasms more than left    CURRENT OUTPATIENT MEDICATIONS  Reviewed    ALLERGIES  No Known Allergies     PHYSICAL EXAM  BP (!) 136/97 (BP Location: Right arm, Patient Position: Sitting, Cuff Size: Adult Regular)   Pulse 84   Temp 97.4  F (36.3  C) (Oral)   Resp 16   Wt 106 kg (233 lb 9.6 oz)   SpO2 97%   BMI 40.73 kg/m    GEN: NAD  HEENT: EOMI, no icterus, injection or pallor. OP clear  EXT: warm, 1+ edema bilaterally  NEURO: alert    LABORATORY AND IMAGING STUDIES     PET 11/14/23 was personally reviewed and interpreted by me. I don't see anything in the two questionable areas in the R iliac or L1 vertebral body that were commented on in the Feb 20, 2023 PET/CT that didn't have a CT correlate. Don't see any obvious new lung nodules. MRI pelvis and L spine 3/24/23 were unremarkable too

## 2023-11-17 ENCOUNTER — NURSE TRIAGE (OUTPATIENT)
Dept: ONCOLOGY | Facility: CLINIC | Age: 78
End: 2023-11-17
Payer: COMMERCIAL

## 2023-11-17 NOTE — TELEPHONE ENCOUNTER
Jonathan is calling to request we send a nurse out to his house to put on compression stockings.  Explained that we are not a home care agency so we are not able to provide that service.  Pt states his  ordered stockings.  This writer advised pt call back  to express his needs for a home care nurse to assist with putting on compression stockings.  Pt states that  has been working on this for six weeks and nobody has come out.   This writer asked about PCP. Pt states he does have a PCP but was told that he (the patient) needed to work with his insurance company.  This writer explained that a message would be sent to the Care Team and if we can assist him, then we will do that, but we cannot send a nurse out to help him today.   Pt voiced understanding of this information.  Message routed to Olivia Gates RNCC    Routed to Care Team.

## 2023-11-20 DIAGNOSIS — C09.9 TONSIL CANCER (H): Primary | ICD-10-CM

## 2023-11-20 NOTE — PROGRESS NOTES
Called patient regarding upcoming radiation appointments. He asked if he could have something for sedation during treatment.  We discussed different options but that he would have to have a .  Reached out to , if they could help with rides and asked patient to maybe ask friends or family to assist with rides, otherwise we cannot provide any medication for treatment.  Social service referral placed.

## 2023-11-21 ENCOUNTER — APPOINTMENT (OUTPATIENT)
Dept: CT IMAGING | Facility: CLINIC | Age: 78
DRG: 178 | End: 2023-11-21
Attending: STUDENT IN AN ORGANIZED HEALTH CARE EDUCATION/TRAINING PROGRAM
Payer: COMMERCIAL

## 2023-11-21 ENCOUNTER — APPOINTMENT (OUTPATIENT)
Dept: CT IMAGING | Facility: CLINIC | Age: 78
DRG: 178 | End: 2023-11-21
Attending: EMERGENCY MEDICINE
Payer: COMMERCIAL

## 2023-11-21 ENCOUNTER — APPOINTMENT (OUTPATIENT)
Dept: GENERAL RADIOLOGY | Facility: CLINIC | Age: 78
DRG: 178 | End: 2023-11-21
Attending: EMERGENCY MEDICINE
Payer: COMMERCIAL

## 2023-11-21 ENCOUNTER — PATIENT OUTREACH (OUTPATIENT)
Dept: CARE COORDINATION | Facility: CLINIC | Age: 78
End: 2023-11-21
Payer: COMMERCIAL

## 2023-11-21 ENCOUNTER — HOSPITAL ENCOUNTER (INPATIENT)
Facility: CLINIC | Age: 78
LOS: 11 days | Discharge: LEFT BEFORE DISCHARGE | DRG: 178 | End: 2023-12-02
Attending: EMERGENCY MEDICINE | Admitting: STUDENT IN AN ORGANIZED HEALTH CARE EDUCATION/TRAINING PROGRAM
Payer: COMMERCIAL

## 2023-11-21 ENCOUNTER — APPOINTMENT (OUTPATIENT)
Dept: ULTRASOUND IMAGING | Facility: CLINIC | Age: 78
DRG: 178 | End: 2023-11-21
Attending: STUDENT IN AN ORGANIZED HEALTH CARE EDUCATION/TRAINING PROGRAM
Payer: COMMERCIAL

## 2023-11-21 DIAGNOSIS — R78.81 BACTEREMIA: ICD-10-CM

## 2023-11-21 DIAGNOSIS — M54.50 ACUTE MIDLINE LOW BACK PAIN WITHOUT SCIATICA: ICD-10-CM
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
## 2023-11-21 DIAGNOSIS — S41.002A SHOULDER WOUND, LEFT, INITIAL ENCOUNTER: Primary | ICD-10-CM

## 2023-11-21 DIAGNOSIS — U07.1 COVID-19: ICD-10-CM

## 2023-11-21 DIAGNOSIS — R78.81 GRAM-POSITIVE BACTEREMIA: ICD-10-CM

## 2023-11-21 DIAGNOSIS — R53.1 WEAKNESS: ICD-10-CM

## 2023-11-21 DIAGNOSIS — I63.521 CEREBROVASCULAR ACCIDENT INVOLVING ANTERIOR CIRCULATION, RIGHT (H): ICD-10-CM

## 2023-11-21 LAB
ALBUMIN SERPL BCG-MCNC: 4.1 G/DL (ref 3.5–5.2)
ALBUMIN UR-MCNC: 200 MG/DL
ALP SERPL-CCNC: 109 U/L (ref 40–150)
ALT SERPL W P-5'-P-CCNC: 36 U/L (ref 0–70)
ANION GAP SERPL CALCULATED.3IONS-SCNC: 13 MMOL/L (ref 7–15)
APPEARANCE UR: ABNORMAL
AST SERPL W P-5'-P-CCNC: 161 U/L (ref 0–45)
BACTERIA #/AREA URNS HPF: ABNORMAL /HPF
BASOPHILS # BLD AUTO: 0 10E3/UL (ref 0–0.2)
BASOPHILS NFR BLD AUTO: 0 %
BILIRUB SERPL-MCNC: 0.4 MG/DL
BILIRUB UR QL STRIP: NEGATIVE
BUN SERPL-MCNC: 16.2 MG/DL (ref 8–23)
CALCIUM SERPL-MCNC: 9.7 MG/DL (ref 8.8–10.2)
CHLORIDE SERPL-SCNC: 107 MMOL/L (ref 98–107)
COLOR UR AUTO: YELLOW
CREAT SERPL-MCNC: 0.95 MG/DL (ref 0.67–1.17)
CRP SERPL-MCNC: 69.61 MG/L
D DIMER PPP FEU-MCNC: 4.22 UG/ML FEU (ref 0–0.5)
DEPRECATED HCO3 PLAS-SCNC: 27 MMOL/L (ref 22–29)
EGFRCR SERPLBLD CKD-EPI 2021: 82 ML/MIN/1.73M2
EOSINOPHIL # BLD AUTO: 0 10E3/UL (ref 0–0.7)
EOSINOPHIL NFR BLD AUTO: 0 %
ERYTHROCYTE [DISTWIDTH] IN BLOOD BY AUTOMATED COUNT: 16.5 % (ref 10–15)
FLUAV RNA SPEC QL NAA+PROBE: NEGATIVE
FLUBV RNA RESP QL NAA+PROBE: NEGATIVE
GLUCOSE SERPL-MCNC: 97 MG/DL (ref 70–99)
GLUCOSE UR STRIP-MCNC: NEGATIVE MG/DL
HCT VFR BLD AUTO: 35.7 % (ref 40–53)
HGB BLD-MCNC: 10.8 G/DL (ref 13.3–17.7)
HGB UR QL STRIP: ABNORMAL
HOLD SPECIMEN: NORMAL
IMM GRANULOCYTES # BLD: 0 10E3/UL
IMM GRANULOCYTES NFR BLD: 1 %
KETONES UR STRIP-MCNC: NEGATIVE MG/DL
LACTATE SERPL-SCNC: 1.3 MMOL/L (ref 0.7–2)
LEUKOCYTE ESTERASE UR QL STRIP: NEGATIVE
LYMPHOCYTES # BLD AUTO: 1.3 10E3/UL (ref 0.8–5.3)
LYMPHOCYTES NFR BLD AUTO: 17 %
MCH RBC QN AUTO: 24.7 PG (ref 26.5–33)
MCHC RBC AUTO-ENTMCNC: 30.3 G/DL (ref 31.5–36.5)
MCV RBC AUTO: 82 FL (ref 78–100)
MONOCYTES # BLD AUTO: 0.6 10E3/UL (ref 0–1.3)
MONOCYTES NFR BLD AUTO: 8 %
MUCOUS THREADS #/AREA URNS LPF: PRESENT /LPF
NEUTROPHILS # BLD AUTO: 5.6 10E3/UL (ref 1.6–8.3)
NEUTROPHILS NFR BLD AUTO: 74 %
NITRATE UR QL: NEGATIVE
NRBC # BLD AUTO: 0 10E3/UL
NRBC BLD AUTO-RTO: 0 /100
NT-PROBNP SERPL-MCNC: 1337 PG/ML (ref 0–1800)
PH UR STRIP: 5.5 [PH] (ref 5–7)
PLATELET # BLD AUTO: 208 10E3/UL (ref 150–450)
POTASSIUM SERPL-SCNC: 3.6 MMOL/L (ref 3.4–5.3)
PROT SERPL-MCNC: 7.7 G/DL (ref 6.4–8.3)
RBC # BLD AUTO: 4.38 10E6/UL (ref 4.4–5.9)
RBC URINE: 1 /HPF
RSV RNA SPEC NAA+PROBE: NEGATIVE
SARS-COV-2 RNA RESP QL NAA+PROBE: POSITIVE
SODIUM SERPL-SCNC: 147 MMOL/L (ref 135–145)
SP GR UR STRIP: 1.02 (ref 1–1.03)
SQUAMOUS EPITHELIAL: <1 /HPF
UROBILINOGEN UR STRIP-MCNC: NORMAL MG/DL
WBC # BLD AUTO: 7.5 10E3/UL (ref 4–11)
WBC URINE: 1 /HPF

## 2023-11-21 PROCEDURE — 87637 SARSCOV2&INF A&B&RSV AMP PRB: CPT | Performed by: EMERGENCY MEDICINE

## 2023-11-21 PROCEDURE — 71046 X-RAY EXAM CHEST 2 VIEWS: CPT

## 2023-11-21 PROCEDURE — 99285 EMERGENCY DEPT VISIT HI MDM: CPT | Mod: 25

## 2023-11-21 PROCEDURE — 120N000001 HC R&B MED SURG/OB

## 2023-11-21 PROCEDURE — 36415 COLL VENOUS BLD VENIPUNCTURE: CPT | Performed by: STUDENT IN AN ORGANIZED HEALTH CARE EDUCATION/TRAINING PROGRAM

## 2023-11-21 PROCEDURE — G0378 HOSPITAL OBSERVATION PER HR: HCPCS

## 2023-11-21 PROCEDURE — 83605 ASSAY OF LACTIC ACID: CPT | Performed by: EMERGENCY MEDICINE

## 2023-11-21 PROCEDURE — 250N000013 HC RX MED GY IP 250 OP 250 PS 637: Performed by: STUDENT IN AN ORGANIZED HEALTH CARE EDUCATION/TRAINING PROGRAM

## 2023-11-21 PROCEDURE — 80053 COMPREHEN METABOLIC PANEL: CPT | Performed by: EMERGENCY MEDICINE

## 2023-11-21 PROCEDURE — 70450 CT HEAD/BRAIN W/O DYE: CPT

## 2023-11-21 PROCEDURE — 36415 COLL VENOUS BLD VENIPUNCTURE: CPT | Performed by: EMERGENCY MEDICINE

## 2023-11-21 PROCEDURE — 96372 THER/PROPH/DIAG INJ SC/IM: CPT | Performed by: STUDENT IN AN ORGANIZED HEALTH CARE EDUCATION/TRAINING PROGRAM

## 2023-11-21 PROCEDURE — 83880 ASSAY OF NATRIURETIC PEPTIDE: CPT | Performed by: EMERGENCY MEDICINE

## 2023-11-21 PROCEDURE — 81001 URINALYSIS AUTO W/SCOPE: CPT | Performed by: EMERGENCY MEDICINE

## 2023-11-21 PROCEDURE — 73590 X-RAY EXAM OF LOWER LEG: CPT | Mod: LT

## 2023-11-21 PROCEDURE — 85379 FIBRIN DEGRADATION QUANT: CPT | Performed by: STUDENT IN AN ORGANIZED HEALTH CARE EDUCATION/TRAINING PROGRAM

## 2023-11-21 PROCEDURE — 250N000009 HC RX 250: Performed by: STUDENT IN AN ORGANIZED HEALTH CARE EDUCATION/TRAINING PROGRAM

## 2023-11-21 PROCEDURE — 87077 CULTURE AEROBIC IDENTIFY: CPT | Performed by: EMERGENCY MEDICINE

## 2023-11-21 PROCEDURE — 87149 DNA/RNA DIRECT PROBE: CPT | Performed by: EMERGENCY MEDICINE

## 2023-11-21 PROCEDURE — 250N000011 HC RX IP 250 OP 636: Mod: JZ | Performed by: STUDENT IN AN ORGANIZED HEALTH CARE EDUCATION/TRAINING PROGRAM

## 2023-11-21 PROCEDURE — 85025 COMPLETE CBC W/AUTO DIFF WBC: CPT | Performed by: EMERGENCY MEDICINE

## 2023-11-21 PROCEDURE — 258N000003 HC RX IP 258 OP 636: Performed by: STUDENT IN AN ORGANIZED HEALTH CARE EDUCATION/TRAINING PROGRAM

## 2023-11-21 PROCEDURE — 99223 1ST HOSP IP/OBS HIGH 75: CPT | Mod: AI | Performed by: STUDENT IN AN ORGANIZED HEALTH CARE EDUCATION/TRAINING PROGRAM

## 2023-11-21 PROCEDURE — 73552 X-RAY EXAM OF FEMUR 2/>: CPT | Mod: LT

## 2023-11-21 PROCEDURE — 93970 EXTREMITY STUDY: CPT

## 2023-11-21 PROCEDURE — 86140 C-REACTIVE PROTEIN: CPT | Performed by: STUDENT IN AN ORGANIZED HEALTH CARE EDUCATION/TRAINING PROGRAM

## 2023-11-21 PROCEDURE — 71275 CT ANGIOGRAPHY CHEST: CPT

## 2023-11-21 PROCEDURE — 250N000011 HC RX IP 250 OP 636: Performed by: STUDENT IN AN ORGANIZED HEALTH CARE EDUCATION/TRAINING PROGRAM

## 2023-11-21 RX ORDER — NALOXONE HYDROCHLORIDE 0.4 MG/ML
0.4 INJECTION, SOLUTION INTRAMUSCULAR; INTRAVENOUS; SUBCUTANEOUS
Status: DISCONTINUED | OUTPATIENT
Start: 2023-11-21 | End: 2023-12-02 | Stop reason: HOSPADM

## 2023-11-21 RX ORDER — MULTIVITAMIN,THERAPEUTIC
1 TABLET ORAL DAILY
COMMUNITY

## 2023-11-21 RX ORDER — METHOCARBAMOL 500 MG/1
500 TABLET, FILM COATED ORAL EVERY 6 HOURS PRN
Status: DISCONTINUED | OUTPATIENT
Start: 2023-11-21 | End: 2023-11-22

## 2023-11-21 RX ORDER — NALOXONE HYDROCHLORIDE 0.4 MG/ML
0.2 INJECTION, SOLUTION INTRAMUSCULAR; INTRAVENOUS; SUBCUTANEOUS
Status: DISCONTINUED | OUTPATIENT
Start: 2023-11-21 | End: 2023-12-02 | Stop reason: HOSPADM

## 2023-11-21 RX ORDER — TORSEMIDE 20 MG/1
40 TABLET ORAL DAILY
COMMUNITY
End: 2023-12-27

## 2023-11-21 RX ORDER — IOPAMIDOL 755 MG/ML
79 INJECTION, SOLUTION INTRAVASCULAR ONCE
Status: COMPLETED | OUTPATIENT
Start: 2023-11-21 | End: 2023-11-21

## 2023-11-21 RX ORDER — AMOXICILLIN 250 MG
2 CAPSULE ORAL 2 TIMES DAILY PRN
Status: DISCONTINUED | OUTPATIENT
Start: 2023-11-21 | End: 2023-11-22

## 2023-11-21 RX ORDER — ONDANSETRON 4 MG/1
4 TABLET, ORALLY DISINTEGRATING ORAL EVERY 6 HOURS PRN
Status: DISCONTINUED | OUTPATIENT
Start: 2023-11-21 | End: 2023-12-02 | Stop reason: HOSPADM

## 2023-11-21 RX ORDER — TAMSULOSIN HYDROCHLORIDE 0.4 MG/1
0.4 CAPSULE ORAL DAILY
Status: DISCONTINUED | OUTPATIENT
Start: 2023-11-21 | End: 2023-11-22

## 2023-11-21 RX ORDER — SODIUM CHLORIDE 9 MG/ML
INJECTION, SOLUTION INTRAVENOUS CONTINUOUS
Status: DISCONTINUED | OUTPATIENT
Start: 2023-11-21 | End: 2023-11-21

## 2023-11-21 RX ORDER — ONDANSETRON 2 MG/ML
4 INJECTION INTRAMUSCULAR; INTRAVENOUS EVERY 6 HOURS PRN
Status: DISCONTINUED | OUTPATIENT
Start: 2023-11-21 | End: 2023-12-02 | Stop reason: HOSPADM

## 2023-11-21 RX ORDER — OXYCODONE AND ACETAMINOPHEN 5; 325 MG/1; MG/1
1-2 TABLET ORAL EVERY 6 HOURS PRN
Status: DISCONTINUED | OUTPATIENT
Start: 2023-11-21 | End: 2023-11-21

## 2023-11-21 RX ORDER — TORSEMIDE 20 MG/1
40 TABLET ORAL DAILY
Status: DISCONTINUED | OUTPATIENT
Start: 2023-11-21 | End: 2023-11-22

## 2023-11-21 RX ORDER — IBUPROFEN 600 MG/1
600 TABLET, FILM COATED ORAL EVERY 8 HOURS PRN
Status: DISCONTINUED | OUTPATIENT
Start: 2023-11-21 | End: 2023-11-21

## 2023-11-21 RX ORDER — DIAZEPAM 5 MG
5-10 TABLET ORAL
Status: DISCONTINUED | OUTPATIENT
Start: 2023-11-21 | End: 2023-11-22

## 2023-11-21 RX ORDER — IBUPROFEN 600 MG/1
600 TABLET, FILM COATED ORAL EVERY 8 HOURS PRN
Status: DISCONTINUED | OUTPATIENT
Start: 2023-11-21 | End: 2023-11-22

## 2023-11-21 RX ORDER — OXYCODONE AND ACETAMINOPHEN 5; 325 MG/1; MG/1
1 TABLET ORAL EVERY 8 HOURS PRN
Status: DISCONTINUED | OUTPATIENT
Start: 2023-11-21 | End: 2023-12-02 | Stop reason: HOSPADM

## 2023-11-21 RX ORDER — ATORVASTATIN CALCIUM 40 MG/1
40 TABLET, FILM COATED ORAL DAILY
COMMUNITY

## 2023-11-21 RX ORDER — ENOXAPARIN SODIUM 100 MG/ML
40 INJECTION SUBCUTANEOUS EVERY 24 HOURS
Status: DISCONTINUED | OUTPATIENT
Start: 2023-11-21 | End: 2023-11-22

## 2023-11-21 RX ORDER — AMOXICILLIN 250 MG
1 CAPSULE ORAL 2 TIMES DAILY PRN
Status: DISCONTINUED | OUTPATIENT
Start: 2023-11-21 | End: 2023-11-22

## 2023-11-21 RX ORDER — SPIRONOLACTONE 25 MG/1
50 TABLET ORAL DAILY
Status: DISCONTINUED | OUTPATIENT
Start: 2023-11-21 | End: 2023-11-22

## 2023-11-21 RX ORDER — MULTIVITAMIN,THERAPEUTIC
1 TABLET ORAL DAILY
Status: ON HOLD | COMMUNITY
End: 2023-11-21

## 2023-11-21 RX ADMIN — TAMSULOSIN HYDROCHLORIDE 0.4 MG: 0.4 CAPSULE ORAL at 18:37

## 2023-11-21 RX ADMIN — SPIRONOLACTONE 50 MG: 25 TABLET, FILM COATED ORAL at 21:54

## 2023-11-21 RX ADMIN — SODIUM CHLORIDE 100 ML: 9 INJECTION, SOLUTION INTRAVENOUS at 20:41

## 2023-11-21 RX ADMIN — ENOXAPARIN SODIUM 40 MG: 40 INJECTION SUBCUTANEOUS at 18:37

## 2023-11-21 RX ADMIN — SODIUM CHLORIDE: 9 INJECTION, SOLUTION INTRAVENOUS at 18:38

## 2023-11-21 RX ADMIN — IOPAMIDOL 79 ML: 755 INJECTION, SOLUTION INTRAVENOUS at 20:41

## 2023-11-21 RX ADMIN — TORSEMIDE 40 MG: 20 TABLET ORAL at 21:54

## 2023-11-21 ASSESSMENT — ACTIVITIES OF DAILY LIVING (ADL)
ADLS_ACUITY_SCORE: 35
ADLS_ACUITY_SCORE: 49
ADLS_ACUITY_SCORE: 45
ADLS_ACUITY_SCORE: 36
ADLS_ACUITY_SCORE: 35

## 2023-11-21 NOTE — H&P
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     Confirmed COVID-19 infection  Generalized weakness, mechanical fall 2/2 above     Pt presents to the ED with generalized weakness and a fall. He reported falling out of bed this morning. He has some pain in his legs and back. Denies head trauma or LOC. He notes he has been feeling generally unwell with fatigue, weakness and cough for the past week as well.   * COVID testing in the ED is positive.   * He is not hypoxic and VSS. CXR did show patchy opacity in the L lung base which could represent atelectasis or pneumonia.   * Head CT, XR  L femur, and XR L tibia/fibula are negative for acute injury.   * D dimer is elevate to 4.22 and CRP is elevated to 69.61.   - COVID-19 special precautions, continuous pulse-ox  - Continuous oximetry and PRN supplemental O2,  titrate to keep SpO2 between 90-96%  - Start Paxlovid.   - Defer steroids and remdesivir given that the patient is not hypoxic   - CT PE as well as b/l DVT US ordered for eval of PE/DVT in the setting of elevated D dimer - Pending   - Continue with prophylactic dose enoxaparin for now.   - PT/OT  - CC/SW consult     Mild Anemia   Hemoglobin is 10.8, stable from baseline  - Monitor hemoglobin     Mild hypernateremia   Sodium is 147.   - Monitor BMP   - Encourage PO intake     Squamous cell carcinoma of the oropharynx currently undergoing radiation treatments  Chronic pain 2/2 above   - Continue PTA ibuprofen, percocet (dose reduced in the setting of Paxlovid administration), robaxin     Anxiety  - Continue  PTA diazepam PRN     CAD   HTN  HLD  S/p AVR  CHF  (TTE in 2021 with EF of 60-65%, Grade I diastolic dysfunction), not in acute exacerbation    Chronic LE edema   - Hold PTA atorvastatin   - Resume PTA torsemide, spironolactone    Diet: Regular diet   DVT Prophylaxis: Enoxaparin (Lovenox) SQ  Onofre Catheter: Not present  Lines: None     Cardiac Monitoring: None  Code Status:DNR/DNI    Clinically Significant Risk Factors Present on Admission         # Hypernatremia: Highest Na = 147 mmol/L in last 2 days, will monitor as appropriate          # Hypertension: Noted on problem list                 Disposition Plan      Expected Discharge Date: 11/22/2023                  Mena Douglass MD  Hospitalist Service  [facility]  Securely message with [employer] (more info)  Text page via [employer] Paging/Directory     ______________________________________________________________________    Chief Complaint   Generalized weakness, cough, fatigue     History is obtained from the patient    History of Present Illness   Jonathan Bishop is a 78 year old male who presents to the ED with generalized weakness and a fall. He reported falling out of bed this morning. He has some pain in his legs and back. Denies head trauma or LOC. He sustained an abrasion to his left shoulder as he was trying to get off the floor.  He notes he has been feeling generally unwell with fatigue, weakness and cough for the past week as well. He lives alone and does not think that he can safely care for himself at home due to his weakness.       Past Medical History    Past Medical History:   Diagnosis Date    * * * SBE PROPHYLAXIS * * *     s/p TAVR    Acute rheumatic carditis 1950    Complication of anesthesia     pt once woke up during back surgery    Coronary artery disease     murmur    Erectile dysfunction     Sildenafil    Hip pain, bilateral     Hypercholesteremia     Hypertension     Low back pain     Nonsenile cataract      Obesity     Renal cyst     S/P 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    Stroke (cerebrum) (H) 11/30/2016    Umbilical hernia 06/10/2011    s/p surgical repair    Wound, surgical, infected 06/10/2011    hernia       Past Surgical History   Past Surgical History:   Procedure Laterality Date    ARTHROPLASTY KNEE BILATERAL  7/22/2010    COLONOSCOPY N/A 4/14/2017    Procedure: COLONOSCOPY;  Surgeon: Toby Bills MD;  Location: [facility]    CV CORONARY ANGIOGRAM N/A 10/28/2019    Procedure: Coronary Angiogram;  Surgeon: Guerrero Huitron MD;  Location:  HEART CARDIAC CATH LAB    CV RIGHT HEART CATH MEASUREMENTS RECORDED N/A 10/28/2019    Procedure: Right Heart Cath;  Surgeon: Guerrero Huitron MD;  Location:  HEART CARDIAC CATH LAB    CV TRANSCATHETER AORTIC VALVE REPLACEMENT N/A 12/8/2020    Procedure: Transcatheter Aortic Valve Replacement;  Surgeon: Camila Begum MD;  Location:  HEART CARDIAC CATH LAB    FUSION LUMBAR ANTERIOR TWO LEVELS      HERNIORRHAPHY UMBILICAL  6/10/2011    Procedure:HERNIORRHAPHY UMBILICAL; Open, with mesh placement; Surgeon:HO PARHAM; Location:[facility]    LAMINECTOMY LUMBAR TWO LEVELS      THORACOSCOPIC WEDGE RESECTION LUNG Right 3/2/2023    Procedure: Right thoracoscopic wedge resection;  Surgeon: Hebert Jones MD;  Location:  OR       Prior to Admission Medications   Prior to Admission Medications   Prescriptions Last Dose Informant Patient Reported? Taking?   atorvastatin (LIPITOR) 40 MG tablet Unknown  Yes No   Sig: Take 40 mg by mouth daily   diazepam (VALIUM) 5 MG tablet prn at for procedures  No Yes   Sig: Take 1-2 tablets (5-10 mg) by mouth once as needed for anxiety (for procedures. May repeat one time if needed.)   ibuprofen (ADVIL/MOTRIN) 600 MG tablet Past Week Self No Yes   Sig: Take 1 tablet (600 mg) by mouth every 8 hours as needed for moderate pain (4-6)   methocarbamol (ROBAXIN) 500 MG tablet  Not Taking  No No   Sig: Take 1 tablet (500 mg) by mouth every 6 hours as needed for muscle spasms   Patient not taking: Reported on 11/21/2023   multivitamin, therapeutic (THERA-VIT) TABS tablet   Yes Yes   Sig: Take 1 tablet by mouth daily   order for DME   No No   Sig: Equipment being ordered: Walker ()  Treatment Diagnosis: stroke   oxyCODONE-acetaminophen (PERCOCET) 5-325 MG tablet Past Week  No Yes   Sig: Take 1-2 tablets by mouth every 6 hours as needed for pain Max 5 per day.   senna-docusate (SENOKOT-S/PERICOLACE) 8.6-50 MG tablet   No No   Sig: Take 1 tablet by mouth 2 times daily Reduce dose or temporarily discontinue if having loose stools.   Patient not taking: Reported on 10/18/2023   sildenafil (VIAGRA) 100 MG tablet   No Yes   Sig: TAKE 30MIN TO 4 HOURS BEFORE INTERCOURSE AS NEEDED FOR ERECTILE DYSFUNCTION   spironolactone (ALDACTONE) 50 MG tablet Unknown  No No   Sig: Take 1 tablet by mouth once daily   tamsulosin (FLOMAX) 0.4 MG capsule   No Yes   Sig: TAKE 1 CAPSULE BY MOUTH ONCE DAILY   torsemide (DEMADEX) 20 MG tablet   Yes Yes   Sig: Take 40 mg by mouth daily   vitamin D3 (CHOLECALCIFEROL) 50 mcg (2000 units) tablet  Self Yes Yes   Sig: Take 1 tablet by mouth daily      Facility-Administered Medications: None        Review of Systems    The 10 point Review of Systems is negative other than noted in the HPI or here.     Physical Exam   Vital Signs: Temp: 98.8  F (37.1  C) Temp src: Oral BP: 116/68 Pulse: 86   Resp: 18 SpO2: 96 % O2 Device: None (Room air)    Weight: 0 lbs 0 oz    Constitutional: Awake, alert, cooperative, no apparent distress.  Eyes: Conjunctiva and pupils examined and normal.  HEENT: Moist mucous membranes, edentulous  Respiratory: Clear to auscultation bilaterally, no crackles or wheezing.  Cardiovascular: Regular rate and rhythm, normal S1 and S2, and no murmur noted.  GI: Soft, non-distended, non-tender, normal bowel sounds.  Skin: No rashes, no cyanosis, 1-2 +  bilateral lower extremity edema.  Musculoskeletal: No joint swelling, erythema or tenderness.  Neurologic: Cranial nerves 2-12 intact, normal strength and sensation.  Psychiatric: Alert, oriented to person, place and time, no obvious anxiety or depression.      Medical Decision Making       75 MINUTES SPENT BY ME on the date of service doing chart review, history, exam, documentation & further activities per the note.      Data     I have personally reviewed the following data over the past 24 hrs:    7.5  \   10.8 (L)   / 208     147 (H) 107 16.2 /  97   3.6 27 0.95 \     ALT: 36 AST: 161 (H) AP: 109 TBILI: 0.4   ALB: 4.1 TOT PROTEIN: 7.7 LIPASE: N/A     Trop: N/A BNP: 1,337     Procal: N/A CRP: 69.61 (H) Lactic Acid: 1.3       INR:  N/A PTT:  N/A   D-dimer:  4.22 (H) Fibrinogen:  N/A       Imaging results reviewed over the past 24 hrs:   Recent Results (from the past 24 hour(s))   CT Head w/o Contrast    Narrative    CT SCAN OF THE HEAD WITHOUT CONTRAST   11/21/2023 3:10 PM     HISTORY: Fall, head trauma.    TECHNIQUE:  Axial images of the head and coronal reformations without  IV contrast material. Radiation dose for this scan was reduced using  automated exposure control, adjustment of the mA and/or kV according  to patient size, or iterative reconstruction technique.    COMPARISON: MRI of the brain 11/30/2016.    FINDINGS: There is no evidence of intracranial hemorrhage, mass, acute  infarct or anomaly. The previously demonstrated multiple small  ischemic infarcts of the high right frontoparietal region are not well  seen on CT. The ventricles are normal in size, shape and  configuration. Mild diffuse parenchymal volume loss. Mild patchy  scattered cerebral white matter hypodensities which are nonspecific,  but likely related to chronic microvascular ischemic disease.     Mild to moderate scattered paranasal sinus mucosal thickening. Small  volume area of aerated secretions in the right maxillary sinus.  The  mastoid air cells appear clear. There is mild soft tissue  swelling/hematoma of the scalp along the frontoparietal vertex. No  underlying calvarial fracture is identified. The bony calvarium and  bones of the skull base appear intact.       Impression    IMPRESSION:  1. No CT findings of acute intracranial process.  2. Brain atrophy and presumed chronic ischemic changes, as described.  3. Mild-to-moderate mucosal thickening in the paranasal sinuses with  small amount of aerated secretions in the right maxillary sinus.  4. Mild scalp vertex soft tissue swelling without underlying calvarial  fracture.    BALWINDER HICKS MD         SYSTEM ID:  [id number]   XR Chest 2 Views    Narrative    CHEST TWO VIEWS 11/21/2023 3:47 PM     HISTORY: cough, fever, fall out of bed    COMPARISON: Chest x-ray 3/3/2023.       Impression    IMPRESSION: Enlarged cardiopericardial silhouette. Aortic arch  calcification. Low lung volumes. Possible opacity in the left lung  base which may represent atelectasis or pneumonia. The lateral view is  nondiagnostic due to overlapping soft tissues from the patient's arms.  No significant pleural effusion or pneumothorax.    RIAN MCCRACKEN MD         SYSTEM ID:  [id number]   XR Femur Left 2 Views    Narrative    XR FEMUR LEFT 2 VIEWS 11/21/2023 3:47 PM     HISTORY: pain, fall out of bed    COMPARISON: None.         Impression    IMPRESSION: The left hip is negative for fracture. The left femur is  negative for fracture. Postoperative changes total knee arthroplasty.    PHILLIP ÁLVAREZ MD         SYSTEM ID:  [id number]   XR Tibia and Fibula Left 2 Views    Narrative    XR TIBIA AND FIBULA LEFT 2 VIEWS 11/21/2023 3:48 PM     HISTORY: pain, fall out of bed    COMPARISON: None.         Impression    IMPRESSION: Postoperative changes total knee arthroplasty. No evidence  for fracture. There is some nonspecific edema or cellulitis about the  lower leg.    PHILLIP ÁLVAREZ MD         SYSTEM ID:  [id number]

## 2023-11-21 NOTE — PROGRESS NOTES
RECEIVING UNIT ED HANDOFF REVIEW    ED Nurse Handoff Report was reviewed by: Beatrice Hernandez RN on November 21, 2023 at 4:56 PM

## 2023-11-21 NOTE — ED PROVIDER NOTES
History     Chief Complaint:  Fall and Flu Symptoms    The history is provided by the patient.     Jonathan Bishop is a 78 year old male with history of tonsil cancer, squamous cell carcinoma of oropharynx, CAD, PVD, and hypertension presenting from home via EMS for evaluation of a fall and flu symptoms. Conner explains that he fells out of bed this morning but does not remember falling. He reports pain in his left leg from his knee to his ankle. He also notes recent illness for the past week, noting a cough, fever, and congestion. He denies abdominal pain, hip pain, or right leg pain. He denies urinary symptoms. He denies known illness exposures. Conner states that he lives alone and uses a walker and cane to walk. He used to receive home care but does not currently.    Independent Historian:   None - Patient Only    Review of External Notes:   Chart review and oncology notes    Medications:    Valium  Robaxin  Viagra  Aldactone  Flomax  Aspirin  Lipitor    Past Medical History:    Acute rheumatic carditis  CAD  ED  Hypercholesteremia  Hypertension  Nonensile cataract  Renal cyst  TAVR  Stroke  Umbilical hernia  Osteoarthritis of knee  PVD  Peripheral edema  Hyperplasia of prostate with lower urinary tract symptoms  Aortic valve stenosis  Malignant neoplasm metastatic to lung  Tonsil cancer  Squamous cell carcinoma of oropharynx    Past Surgical History:    Arthroplasty knee bilateral  Colonoscopy  Coronary angiogram  Right heart cath measurements recorded  Transcatheter aortic valve replacement  Fusion lumbar anterior two levels  Herniorrhaphy umbilical  Laminectomy lumbar two levels  Thoracoscopic wedge resection lung, right    Physical Exam   Patient Vitals for the past 24 hrs:   BP Temp Temp src Pulse Resp SpO2   11/21/23 1614 -- -- -- 86 24 --   11/21/23 1453 -- -- -- 82 23 --   11/21/23 1448 (!) 132/97 -- -- 87 24 --   11/21/23 1418 -- -- -- 82 20 --   11/21/23 1352 -- -- -- -- -- 93 %   11/21/23 1351 (!)  188/75 -- -- 86 20 --   11/21/23 1348 -- -- -- 90 29 93 %   11/21/23 1330 -- 98.8  F (37.1  C) Oral -- -- --     Physical Exam  GENERAL: well developed, pleasant  HEAD: atraumatic  EYES: pupils reactive, extraocular muscles intact, conjunctivae normal  ENT:  mucus membranes moist  NECK:  trachea midline, normal range of motion  RESPIRATORY: no tachypnea, breath sounds clear to auscultation   CVS: normal S1/S2, no murmurs, intact distal pulses  ABDOMEN: soft, nontender, nondistention  MUSCULOSKELETAL: no deformities. Generalized pain to the left leg.  SKIN: warm and dry, no acute rashes or ulceration. Abrasion to the left shoulder. Feels warm to touch  NEURO: GCS 15, cranial nerves intact, alert and oriented x3  PSYCH:  Mood/affect normal    Emergency Department Course   Imaging:  XR Tibia and Fibula Left 2 Views   Final Result   IMPRESSION: Postoperative changes total knee arthroplasty. No evidence   for fracture. There is some nonspecific edema or cellulitis about the   lower leg.      PHILLIP ÁLVAREZ MD            SYSTEM ID:  [id number]      XR Chest 2 Views   Final Result   IMPRESSION: Enlarged cardiopericardial silhouette. Aortic arch   calcification. Low lung volumes. Possible opacity in the left lung   base which may represent atelectasis or pneumonia. The lateral view is   nondiagnostic due to overlapping soft tissues from the patient's arms.   No significant pleural effusion or pneumothorax.      RIAN MCCRACKEN MD            SYSTEM ID:  [id number]      XR Femur Left 2 Views   Final Result   IMPRESSION: The left hip is negative for fracture. The left femur is   negative for fracture. Postoperative changes total knee arthroplasty.      PHILLIP ÁLVAREZ MD            SYSTEM ID:  [id number]      CT Head w/o Contrast   Preliminary Result   IMPRESSION:   1. No CT findings of acute intracranial process.   2. Brain atrophy and presumed chronic ischemic changes, as described.   3. Mild-to-moderate mucosal thickening in  the paranasal sinuses with   small amount of aerated secretions in the right maxillary sinus.   4. Mild scalp vertex soft tissue swelling without underlying calvarial   fracture.         Laboratory:  Labs Ordered and Resulted from Time of ED Arrival to Time of ED Departure   COMPREHENSIVE METABOLIC PANEL - Abnormal       Result Value    Sodium 147 (*)     Potassium 3.6      Carbon Dioxide (CO2) 27      Anion Gap 13      Urea Nitrogen 16.2      Creatinine 0.95      GFR Estimate 82      Calcium 9.7      Chloride 107      Glucose 97      Alkaline Phosphatase 109       (*)     ALT 36      Protein Total 7.7      Albumin 4.1      Bilirubin Total 0.4     INFLUENZA A/B, RSV, & SARS-COV2 PCR - Abnormal    Influenza A PCR Negative      Influenza B PCR Negative      RSV PCR Negative      SARS CoV2 PCR Positive (*)    CBC WITH PLATELETS AND DIFFERENTIAL - Abnormal    WBC Count 7.5      RBC Count 4.38 (*)     Hemoglobin 10.8 (*)     Hematocrit 35.7 (*)     MCV 82      MCH 24.7 (*)     MCHC 30.3 (*)     RDW 16.5 (*)     Platelet Count 208      % Neutrophils 74      % Lymphocytes 17      % Monocytes 8      % Eosinophils 0      % Basophils 0      % Immature Granulocytes 1      NRBCs per 100 WBC 0      Absolute Neutrophils 5.6      Absolute Lymphocytes 1.3      Absolute Monocytes 0.6      Absolute Eosinophils 0.0      Absolute Basophils 0.0      Absolute Immature Granulocytes 0.0      Absolute NRBCs 0.0     LACTIC ACID WHOLE BLOOD - Normal    Lactic Acid 1.3     NT PROBNP INPATIENT - Normal    N terminal Pro BNP Inpatient 1,337     ROUTINE UA WITH MICROSCOPIC REFLEX TO CULTURE   BLOOD CULTURE   BLOOD CULTURE     Emergency Department Course & Assessments:       Interventions:  Medications - No data to display     Assessments:  1420 I obtained history and examined the patient as noted above.     Independent Interpretation (X-rays, CTs, rhythm strip):  I independently reviewed the patient's tibia and fibula X-ray and note no  fracture.    Consultations/Discussion of Management or Tests:  ED Course as of 11/21/23 1640   Tue Nov 21, 2023   1640 I spoke with Dr. Douglass of the hospitalist team regarding the patient, who accepted the patient for admission.     Social Determinants of Health affecting care:   None and Healthcare Access/Compliance    Disposition:  The patient was admitted to the hospital under the care of Dr. Douglass.     Impression & Plan    Medical Decision Making:    Patient presents with weakness cough for 8 days and fall out of bed.  COVID is positive.  He is not hypoxic.  CT head is negative and chest x-ray is negative for pneumothorax or rib fracture possible infiltrate but difficult to interpret.  Does have pain to the left leg and the entire x-ray is negative.  He has a large abrasion to his left shoulder.  Reading through his notes it sounds to be living independently and driving himself but struggling with his underlying cancer and since he is still driving is not homebound to get services at home.  Spoke with the hospitalist regarding admission as he looks too weak to get up and walk.    Diagnosis:    ICD-10-CM    1. COVID-19  U07.1       2. Weakness  R53.1       3. Fall, initial encounter  W19.XXXA         Scribe Disclosure:  I, J Luis Ricardo, am serving as a scribe at 2:18 PM on 11/21/2023 to document services personally performed by Rafael Berkowitz MD based on my observations and the provider's statements to me.   11/21/2023   Rafael Berkowitz MD Adams, Shaun L, MD  11/21/23 5814

## 2023-11-21 NOTE — ED TRIAGE NOTES
Pt arrives via EMS from home w c/o a fall that occurred sometime last night along with flu like symptoms. Pt states he doesn't know what time he fell out of  bed, but his friend checked on him around 1130 this morning and then called EMS. Unsure LOC, unsure head strike, unsure of thinners. Pt states he has pain in his left lower extremity. No obvious deformity. Pt does have what appears to be carpet burn on his left upper shoulder. Pt denies pain in that area. Pt is A&Ox4 on arrival, poor historian.  en route. Hx throat cancer. 18 G IV L AC, 200mL NS given.      Triage Assessment (Adult)       Row Name 11/21/23 1334          Triage Assessment    Airway WDL WDL        Respiratory WDL    Respiratory WDL cough     Cough Frequency frequent     Cough Type congested        Peripheral/Neurovascular WDL    Peripheral Neurovascular WDL WDL

## 2023-11-21 NOTE — PHARMACY-ADMISSION MEDICATION HISTORY
Pharmacist Admission Medication History    Admission medication history is complete. The information provided in this note is only as accurate as the sources available at the time of the update.    Information Source(s): Patient and CareEverywhere/SureScripts via in-person    Pertinent Information:   -He reports taking atorvastatin and 2 tablets of water pill daily - atorvastatin and torsemide last filled SureScripts 4/23/23 for 90 days supply (torsemide was #180 for 90 day supply). Spironolactone last filled [facility]s 4/23/23 for 90 days supply - he was unsure if taking.  -He does report taking ibuprofen 600mg and Percocet for pain  -He reports completed infusions in oncology clinic and plan is to start radiation soon.    Changes made to PTA medication list:  Added: torsemide, atorvastatin, MVI  Deleted: None  Changed: None    Allergies reviewed with patient and updates made in EHR: yes    Medication History Completed By: Gaviota Barron [facility] 11/21/2023 5:06 PM      Prior to Admission medications    Medication Sig Last Dose Taking? Auth Provider Long Term End Date   diazepam (VALIUM) 5 MG tablet Take 1-2 tablets (5-10 mg) by mouth once as needed for anxiety (for procedures. May repeat one time if needed.) prn at for procedures Yes Buck Nascimento MD     ibuprofen (ADVIL/MOTRIN) 600 MG tablet Take 1 tablet (600 mg) by mouth every 8 hours as needed for moderate pain (4-6) Past Week Yes Barb Lees, NP No    multivitamin, therapeutic (THERA-VIT) TABS tablet Take 1 tablet by mouth daily  Yes Unknown, Entered By History              oxyCODONE-acetaminophen (PERCOCET) 5-325 MG tablet Take 1-2 tablets by mouth every 6 hours as needed for pain Max 5 per day. Past Week Yes Buck Nascimento MD No    sildenafil (VIAGRA) 100 MG tablet TAKE 30MIN TO 4 HOURS BEFORE INTERCOURSE AS NEEDED FOR ERECTILE DYSFUNCTION  Yes Buck Nascimento MD Yes    tamsulosin (FLOMAX) 0.4 MG capsule TAKE 1 CAPSULE BY MOUTH ONCE DAILY  Yes Ervin  MD Dominique     torsemide (DEMADEX) 20 MG tablet Take 40 mg by mouth daily  Yes Unknown, Entered By History No    vitamin D3 (CHOLECALCIFEROL) 50 mcg (2000 units) tablet Take 1 tablet by mouth daily  Yes Reported, Patient     atorvastatin (LIPITOR) 40 MG tablet Take 40 mg by mouth daily Unknown  Unknown, Entered By History No    methocarbamol (ROBAXIN) 500 MG tablet Take 1 tablet (500 mg) by mouth every 6 hours as needed for muscle spasms  Patient not taking: Reported on 11/21/2023 Not Taking  Sanjay Fritz PA-C     order for DME Equipment being ordered: Walker ()  Treatment Diagnosis: stroke   Vlad Rajput MD     senna-docusate (SENOKOT-S/PERICOLACE) 8.6-50 MG tablet Take 1 tablet by mouth 2 times daily Reduce dose or temporarily discontinue if having loose stools.  Patient not taking: Reported on 10/18/2023   Sanjay Fritz PA-C     spironolactone (ALDACTONE) 50 MG tablet Take 1 tablet by mouth once daily Unknown  Buck Nascimento MD Yes

## 2023-11-21 NOTE — ED NOTES
[facility]  ED Nurse Handoff Report    ED Chief complaint: Fall and Flu Symptoms      ED Diagnosis:   Final diagnoses:   None       Code Status: MD to discuss with pt.     Allergies: No Known Allergies    Patient Story: Pt arrives via EMS from home w c/o a fall that occurred sometime last night along with flu like symptoms. Pt states he doesn't know what time he fell out of  bed, but his friend checked on him around 1130 this morning and then called EMS. Unsure LOC, unsure head strike, unsure of thinners. Pt states he has pain in his left lower extremity. No obvious deformity. Pt does have what appears to be carpet burn on his left upper shoulder. Pt denies pain in that area.    history of tonsil cancer, squamous cell carcinoma of oropharynx, CAD, PVD, and hypertension.    Pt being admitted for COVID, weakness, and being unable to take care of himself at this time.   Focused Assessment:  weakness and fatigue. No deformities noted from fall.     Treatments and/or interventions provided: 18 G IV L AC, blood work, xrays  Patient's response to treatments and/or interventions: tolerated well    To be done/followed up on inpatient unit:  see chart    Does this patient have any cognitive concerns?: Short term memory loss and Forgetful    Activity level - Baseline/Home:  Independent  Activity Level - Current:   Unknown    Patient's Preferred language: English   Needed?: No    Isolation: None  Infection: Not Applicable  Patient tested for COVID 19 prior to admission: YES  Bariatric?: No    Vital Signs:   Vitals:    11/21/23 1418 11/21/23 1448 11/21/23 1453 11/21/23 1614   BP:  (!) 132/97     Pulse: 82 87 82 86   Resp: 20 24 23 24   Temp:       TempSrc:       SpO2:           Cardiac Rhythm:     Was the PSS-3 completed:   Yes  What interventions are required if any?               Family Comments:     OBS brochure/video discussed/provided to patient/family: No              Name of person given  brochure if not patient:                 Relationship to patient:       For the majority of the shift this patient's behavior was Green.   Behavioral interventions performed were   .    ED NURSE PHONE NUMBER: [phone]

## 2023-11-21 NOTE — ED NOTES
Pt transported up to room 6604/2. No concerns at time of transfer. Pt resting in bed comfortably.

## 2023-11-21 NOTE — ED NOTES
Bed: ED10  Expected date:   Expected time:   Means of arrival:   Comments:  [facility] - 443 - 78 M fall leg pain flu like symptoms eta 1323

## 2023-11-21 NOTE — PROGRESS NOTES
Social Work - Telephone/MyChart message  [facility]  Data:   Patient Name: Jonathan Bishop  Goes By: Conner    /Age: 1945 (78 year old)      Referral Source: care team    Reason for Referral: transportation    Intervention: Left voice message for patient on 2023 and Sent patient MyChart message on 2023 . Provided resource help at your door in voicemail message will also provide additional information about this resource in my chart message.   Plan:  will await patient's return phone call/message and provide assistance at that time.   Earlene BAEZ, [facility]  - Oncology  Phone : [phone]  Pager: [phone]

## 2023-11-22 ENCOUNTER — APPOINTMENT (OUTPATIENT)
Dept: MRI IMAGING | Facility: CLINIC | Age: 78
DRG: 178 | End: 2023-11-22
Payer: COMMERCIAL

## 2023-11-22 ENCOUNTER — APPOINTMENT (OUTPATIENT)
Dept: CT IMAGING | Facility: CLINIC | Age: 78
DRG: 178 | End: 2023-11-22
Attending: HOSPITALIST
Payer: COMMERCIAL

## 2023-11-22 ENCOUNTER — APPOINTMENT (OUTPATIENT)
Dept: CT IMAGING | Facility: CLINIC | Age: 78
DRG: 178 | End: 2023-11-22
Payer: COMMERCIAL

## 2023-11-22 PROBLEM — S41.002A: Status: ACTIVE | Noted: 2023-11-22

## 2023-11-22 LAB
ANION GAP SERPL CALCULATED.3IONS-SCNC: 10 MMOL/L (ref 7–15)
BUN SERPL-MCNC: 9.7 MG/DL (ref 8–23)
CALCIUM SERPL-MCNC: 9.2 MG/DL (ref 8.8–10.2)
CHLORIDE SERPL-SCNC: 103 MMOL/L (ref 98–107)
CREAT SERPL-MCNC: 0.61 MG/DL (ref 0.67–1.17)
DEPRECATED HCO3 PLAS-SCNC: 25 MMOL/L (ref 22–29)
EGFRCR SERPLBLD CKD-EPI 2021: >90 ML/MIN/1.73M2
ENTEROCOCCUS FAECALIS: NOT DETECTED
ENTEROCOCCUS FAECIUM: NOT DETECTED
ERYTHROCYTE [DISTWIDTH] IN BLOOD BY AUTOMATED COUNT: 16.6 % (ref 10–15)
GLUCOSE BLDC GLUCOMTR-MCNC: 106 MG/DL (ref 70–99)
GLUCOSE BLDC GLUCOMTR-MCNC: 97 MG/DL (ref 70–99)
GLUCOSE SERPL-MCNC: 130 MG/DL (ref 70–99)
HBA1C MFR BLD: 6.2 %
HCT VFR BLD AUTO: 34.9 % (ref 40–53)
HGB BLD-MCNC: 10.4 G/DL (ref 13.3–17.7)
LISTERIA SPECIES (DETECTED/NOT DETECTED): NOT DETECTED
MCH RBC QN AUTO: 24.2 PG (ref 26.5–33)
MCHC RBC AUTO-ENTMCNC: 29.8 G/DL (ref 31.5–36.5)
MCV RBC AUTO: 81 FL (ref 78–100)
PLATELET # BLD AUTO: 191 10E3/UL (ref 150–450)
POTASSIUM SERPL-SCNC: 4 MMOL/L (ref 3.4–5.3)
RBC # BLD AUTO: 4.29 10E6/UL (ref 4.4–5.9)
SODIUM SERPL-SCNC: 138 MMOL/L (ref 135–145)
STAPHYLOCOCCUS AUREUS: DETECTED
STAPHYLOCOCCUS EPIDERMIDIS: NOT DETECTED
STAPHYLOCOCCUS LUGDUNENSIS: NOT DETECTED
STREPTOCOCCUS AGALACTIAE: NOT DETECTED
STREPTOCOCCUS ANGINOSUS GROUP: NOT DETECTED
STREPTOCOCCUS PNEUMONIAE: NOT DETECTED
STREPTOCOCCUS PYOGENES: NOT DETECTED
STREPTOCOCCUS SPECIES: NOT DETECTED
WBC # BLD AUTO: 7.7 10E3/UL (ref 4–11)

## 2023-11-22 PROCEDURE — 250N000011 HC RX IP 250 OP 636: Performed by: PHYSICIAN ASSISTANT

## 2023-11-22 PROCEDURE — 99233 SBSQ HOSP IP/OBS HIGH 50: CPT | Performed by: HOSPITALIST

## 2023-11-22 PROCEDURE — 99207 PR NO BILLABLE SERVICE THIS VISIT: CPT | Performed by: NURSE PRACTITIONER

## 2023-11-22 PROCEDURE — 70553 MRI BRAIN STEM W/O & W/DYE: CPT

## 2023-11-22 PROCEDURE — G0463 HOSPITAL OUTPT CLINIC VISIT: HCPCS

## 2023-11-22 PROCEDURE — G0378 HOSPITAL OBSERVATION PER HR: HCPCS

## 2023-11-22 PROCEDURE — 120N000001 HC R&B MED SURG/OB

## 2023-11-22 PROCEDURE — 250N000013 HC RX MED GY IP 250 OP 250 PS 637: Performed by: STUDENT IN AN ORGANIZED HEALTH CARE EDUCATION/TRAINING PROGRAM

## 2023-11-22 PROCEDURE — 99222 1ST HOSP IP/OBS MODERATE 55: CPT | Mod: FS | Performed by: SPECIALIST

## 2023-11-22 PROCEDURE — 96372 THER/PROPH/DIAG INJ SC/IM: CPT | Performed by: HOSPITALIST

## 2023-11-22 PROCEDURE — 255N000002 HC RX 255 OP 636: Performed by: STUDENT IN AN ORGANIZED HEALTH CARE EDUCATION/TRAINING PROGRAM

## 2023-11-22 PROCEDURE — 36415 COLL VENOUS BLD VENIPUNCTURE: CPT | Performed by: STUDENT IN AN ORGANIZED HEALTH CARE EDUCATION/TRAINING PROGRAM

## 2023-11-22 PROCEDURE — 99207 PR APP CREDIT; MD BILLING SHARED VISIT: CPT

## 2023-11-22 PROCEDURE — 80048 BASIC METABOLIC PNL TOTAL CA: CPT | Performed by: STUDENT IN AN ORGANIZED HEALTH CARE EDUCATION/TRAINING PROGRAM

## 2023-11-22 PROCEDURE — 70450 CT HEAD/BRAIN W/O DYE: CPT

## 2023-11-22 PROCEDURE — 258N000003 HC RX IP 258 OP 636: Performed by: STUDENT IN AN ORGANIZED HEALTH CARE EDUCATION/TRAINING PROGRAM

## 2023-11-22 PROCEDURE — 80061 LIPID PANEL: CPT

## 2023-11-22 PROCEDURE — 99254 IP/OBS CNSLTJ NEW/EST MOD 60: CPT | Mod: FS

## 2023-11-22 PROCEDURE — 70496 CT ANGIOGRAPHY HEAD: CPT

## 2023-11-22 PROCEDURE — 36415 COLL VENOUS BLD VENIPUNCTURE: CPT | Performed by: HOSPITALIST

## 2023-11-22 PROCEDURE — 250N000011 HC RX IP 250 OP 636: Performed by: STUDENT IN AN ORGANIZED HEALTH CARE EDUCATION/TRAINING PROGRAM

## 2023-11-22 PROCEDURE — 87040 BLOOD CULTURE FOR BACTERIA: CPT | Performed by: HOSPITALIST

## 2023-11-22 PROCEDURE — 70498 CT ANGIOGRAPHY NECK: CPT

## 2023-11-22 PROCEDURE — A9585 GADOBUTROL INJECTION: HCPCS | Performed by: STUDENT IN AN ORGANIZED HEALTH CARE EDUCATION/TRAINING PROGRAM

## 2023-11-22 PROCEDURE — 85027 COMPLETE CBC AUTOMATED: CPT | Performed by: STUDENT IN AN ORGANIZED HEALTH CARE EDUCATION/TRAINING PROGRAM

## 2023-11-22 PROCEDURE — 250N000013 HC RX MED GY IP 250 OP 250 PS 637

## 2023-11-22 PROCEDURE — 82962 GLUCOSE BLOOD TEST: CPT

## 2023-11-22 PROCEDURE — 250N000011 HC RX IP 250 OP 636: Mod: JZ | Performed by: STUDENT IN AN ORGANIZED HEALTH CARE EDUCATION/TRAINING PROGRAM

## 2023-11-22 PROCEDURE — 250N000011 HC RX IP 250 OP 636: Performed by: HOSPITALIST

## 2023-11-22 PROCEDURE — 258N000003 HC RX IP 258 OP 636: Performed by: PHYSICIAN ASSISTANT

## 2023-11-22 PROCEDURE — 250N000009 HC RX 250: Performed by: STUDENT IN AN ORGANIZED HEALTH CARE EDUCATION/TRAINING PROGRAM

## 2023-11-22 PROCEDURE — 83036 HEMOGLOBIN GLYCOSYLATED A1C: CPT

## 2023-11-22 RX ORDER — CEFAZOLIN SODIUM 1 G/50ML
2 SOLUTION INTRAVENOUS EVERY 8 HOURS
Status: DISCONTINUED | OUTPATIENT
Start: 2023-11-23 | End: 2023-11-22

## 2023-11-22 RX ORDER — CEFAZOLIN SODIUM 2 G/100ML
2 INJECTION, SOLUTION INTRAVENOUS EVERY 8 HOURS
Status: DISCONTINUED | OUTPATIENT
Start: 2023-11-22 | End: 2023-11-22 | Stop reason: ALTCHOICE

## 2023-11-22 RX ORDER — VANCOMYCIN HYDROCHLORIDE 1 G/200ML
1000 INJECTION, SOLUTION INTRAVENOUS EVERY 12 HOURS
Status: DISCONTINUED | OUTPATIENT
Start: 2023-11-22 | End: 2023-11-22 | Stop reason: ALTCHOICE

## 2023-11-22 RX ORDER — AMOXICILLIN 250 MG
2 CAPSULE ORAL 2 TIMES DAILY PRN
Status: DISCONTINUED | OUTPATIENT
Start: 2023-11-22 | End: 2023-12-02 | Stop reason: HOSPADM

## 2023-11-22 RX ORDER — LORAZEPAM 2 MG/ML
0.5 INJECTION INTRAMUSCULAR ONCE
Status: COMPLETED | OUTPATIENT
Start: 2023-11-22 | End: 2023-11-22

## 2023-11-22 RX ORDER — DOXAZOSIN 1 MG/1
1 TABLET ORAL DAILY
Status: DISCONTINUED | OUTPATIENT
Start: 2023-11-23 | End: 2023-11-26 | Stop reason: ALTCHOICE

## 2023-11-22 RX ORDER — IBUPROFEN 100 MG/5ML
600 SUSPENSION, ORAL (FINAL DOSE FORM) ORAL EVERY 8 HOURS PRN
Status: DISCONTINUED | OUTPATIENT
Start: 2023-11-22 | End: 2023-12-02 | Stop reason: HOSPADM

## 2023-11-22 RX ORDER — IOPAMIDOL 755 MG/ML
117 INJECTION, SOLUTION INTRAVASCULAR ONCE
Status: DISCONTINUED | OUTPATIENT
Start: 2023-11-22 | End: 2023-11-22

## 2023-11-22 RX ORDER — METHOCARBAMOL 500 MG/1
500 TABLET, FILM COATED ORAL EVERY 6 HOURS PRN
Status: DISCONTINUED | OUTPATIENT
Start: 2023-11-22 | End: 2023-12-02 | Stop reason: HOSPADM

## 2023-11-22 RX ORDER — DIAZEPAM 5 MG
5-10 TABLET ORAL
Status: DISCONTINUED | OUTPATIENT
Start: 2023-11-22 | End: 2023-12-02 | Stop reason: HOSPADM

## 2023-11-22 RX ORDER — IOPAMIDOL 755 MG/ML
67 INJECTION, SOLUTION INTRAVASCULAR ONCE
Status: COMPLETED | OUTPATIENT
Start: 2023-11-22 | End: 2023-11-22

## 2023-11-22 RX ORDER — AMOXICILLIN 250 MG
1 CAPSULE ORAL 2 TIMES DAILY PRN
Status: DISCONTINUED | OUTPATIENT
Start: 2023-11-22 | End: 2023-12-02 | Stop reason: HOSPADM

## 2023-11-22 RX ORDER — ENOXAPARIN SODIUM 100 MG/ML
40 INJECTION SUBCUTANEOUS EVERY 12 HOURS
Status: DISCONTINUED | OUTPATIENT
Start: 2023-11-22 | End: 2023-12-02 | Stop reason: HOSPADM

## 2023-11-22 RX ORDER — SPIRONOLACTONE 25 MG/1
50 TABLET ORAL DAILY
Status: DISCONTINUED | OUTPATIENT
Start: 2023-11-23 | End: 2023-12-02 | Stop reason: HOSPADM

## 2023-11-22 RX ORDER — GADOBUTROL 604.72 MG/ML
10 INJECTION INTRAVENOUS ONCE
Status: COMPLETED | OUTPATIENT
Start: 2023-11-22 | End: 2023-11-22

## 2023-11-22 RX ORDER — TAMSULOSIN HYDROCHLORIDE 0.4 MG/1
0.4 CAPSULE ORAL DAILY
Status: DISCONTINUED | OUTPATIENT
Start: 2023-11-23 | End: 2023-12-02 | Stop reason: HOSPADM

## 2023-11-22 RX ORDER — TORSEMIDE 20 MG/1
40 TABLET ORAL DAILY
Status: DISCONTINUED | OUTPATIENT
Start: 2023-11-23 | End: 2023-12-02 | Stop reason: HOSPADM

## 2023-11-22 RX ORDER — CEFAZOLIN SODIUM 2 G/100ML
2 INJECTION, SOLUTION INTRAVENOUS EVERY 8 HOURS
Status: DISCONTINUED | OUTPATIENT
Start: 2023-11-22 | End: 2023-11-22

## 2023-11-22 RX ORDER — ASPIRIN 81 MG/1
81 TABLET, CHEWABLE ORAL DAILY
Status: DISCONTINUED | OUTPATIENT
Start: 2023-11-22 | End: 2023-12-02 | Stop reason: HOSPADM

## 2023-11-22 RX ORDER — IBUPROFEN 600 MG/1
600 TABLET, FILM COATED ORAL EVERY 8 HOURS PRN
Status: DISCONTINUED | OUTPATIENT
Start: 2023-11-22 | End: 2023-12-02 | Stop reason: HOSPADM

## 2023-11-22 RX ADMIN — VANCOMYCIN HYDROCHLORIDE 2500 MG: 10 INJECTION, POWDER, LYOPHILIZED, FOR SOLUTION INTRAVENOUS at 13:18

## 2023-11-22 RX ADMIN — IBUPROFEN 600 MG: 600 TABLET ORAL at 22:07

## 2023-11-22 RX ADMIN — SPIRONOLACTONE 50 MG: 25 TABLET, FILM COATED ORAL at 10:00

## 2023-11-22 RX ADMIN — CEFAZOLIN 2 G: 1 INJECTION, POWDER, FOR SOLUTION INTRAMUSCULAR; INTRAVENOUS at 17:59

## 2023-11-22 RX ADMIN — TAMSULOSIN HYDROCHLORIDE 0.4 MG: 0.4 CAPSULE ORAL at 10:00

## 2023-11-22 RX ADMIN — IOPAMIDOL 67 ML: 755 INJECTION, SOLUTION INTRAVENOUS at 17:44

## 2023-11-22 RX ADMIN — TORSEMIDE 40 MG: 20 TABLET ORAL at 10:00

## 2023-11-22 RX ADMIN — SODIUM CHLORIDE 100 ML: 9 INJECTION, SOLUTION INTRAVENOUS at 17:45

## 2023-11-22 RX ADMIN — LORAZEPAM 0.5 MG: 2 INJECTION INTRAMUSCULAR; INTRAVENOUS at 22:27

## 2023-11-22 RX ADMIN — ENOXAPARIN SODIUM 40 MG: 40 INJECTION SUBCUTANEOUS at 10:21

## 2023-11-22 RX ADMIN — GADOBUTROL 10 ML: 604.72 INJECTION INTRAVENOUS at 22:56

## 2023-11-22 RX ADMIN — OXYCODONE HYDROCHLORIDE AND ACETAMINOPHEN 1 TABLET: 5; 325 TABLET ORAL at 10:21

## 2023-11-22 RX ADMIN — OXYCODONE HYDROCHLORIDE AND ACETAMINOPHEN 1 TABLET: 5; 325 TABLET ORAL at 18:58

## 2023-11-22 RX ADMIN — ASPIRIN 81 MG 81 MG: 81 TABLET ORAL at 17:59

## 2023-11-22 ASSESSMENT — ACTIVITIES OF DAILY LIVING (ADL)
ADLS_ACUITY_SCORE: 49
ADLS_ACUITY_SCORE: 47
ADLS_ACUITY_SCORE: 49
ADLS_ACUITY_SCORE: 49
ADLS_ACUITY_SCORE: 47
ADLS_ACUITY_SCORE: 49

## 2023-11-22 NOTE — PROGRESS NOTES
[facility]  Hospitalist Progress Note        Hebert Villalba MD   11/22/2023        Interval History:        -Reports feeling better but still very weak  -Remains afebrile, not hypoxic  -D dimer elevated at 4.22; per pharmacy recs, will switch Lovenox to BID for DVT prophylaxis  -CT chest PE protocol 11/21 noted no acute pulmonary embolism; Grossly unchanged bilateral cervical lymphadenopathy and unchanged 3 mm right middle lobe pulmonary nodule  -ultrasound lower extremity 11/21 noted no DVT  -will get PT/OT evaluation;  for disposition planning         Assessment and Plan:        Jonathan Bishop is a 78 year old male with PMH significant for aortic valve replacement, CAD, HTN, HLD, CVA, and squamous cell carcinoma of the oropharynx currently undergoing radiation treatments admitted on 11/21/2023 with generalized weakness and a fall in the setting of COVID-19 infection.      Confirmed COVID-19 infection  Generalized weakness, mechanical fall 2/2 above     - presented with generalized weakness and a fall.  * COVID testing in the ED is positive.   *Remains afebrile and not hypoxic ; CXR did show patchy opacity in the L lung base which could represent atelectasis or pneumonia.   * Head CT, XR  L femur, and XR L tibia/fibula are negative for acute injury.   * D dimer is elevate to 4.22 and CRP is elevated to 69.61.     - per pharmacy recs, switched Lovenox to BID for DVT prophylaxis; however, with concern for new stroke (see below) and patient refusing imaging, will hold off on Lovenox until able to obtain head CT to rule out bleed  - CT chest PE protocol 11/21 noted no acute pulmonary embolism; Grossly unchanged bilateral cervical lymphadenopathy and unchanged 3 mm right middle lobe pulmonary nodule  -ultrasound lower extremity 11/21 noted no DVT  -continue Paxlovid; deferred steroids and remdesivir given that the patient is not hypoxic  - maintain COVID-19 special precautions, continuous  pulse-ox and prn O2  -will get PT/OT evaluation; SW for disposition planning    Gram positive bacteremia  - 1/2 blood cultures from 11/21 growing GPC in clusters; veirgene panel noted staph aureus, negative for mecA gene  -will start vancomycin ; repeat blood cultures  - will also consult Infectious Disease    H/o CVA with residual left sided weakness  - on 11/22 was noted with left-sided weakness during therapy evaluation  - a code stroke was called, evaluated by house MARK; patient however refused to have any stroke workup/imaging  - he does have history of prior right UMM infarct and notes baseline residual left sided weakness and states that he ambulates with help of a cane and scooter  -will have neuro- checks Q4 hours    - re-visited patient and explained that we won't be able to continue lovenox for DVT prophylaxis un til head bleed ruled out  - he agrees for a head CT and no other imagings  - will get a stat head CT (11/22)    Addendum:  - called by Neurology; patient now agreeable for further testing; ordered for MRI brain, CT/CTA  - patient reports being claustrophobic, will order prn ativan prior to MRI  - will also get PT/OT/SLP; ECHO; lipid panel  -defer antiplatelets to neurology     Mild Anemia   Hemoglobin is 10.8, stable from baseline     Mild hypernateremia   Sodium is 147, improved.   - Monitor BMP intermittently  - Encourage PO intake      Squamous cell carcinoma of the oropharynx currently undergoing radiation treatments  Chronic pain 2/2 above   - Continue PTA ibuprofen, percocet (dose reduced in the setting of Paxlovid administration), robaxin      Anxiety  - Continue PTA diazepam PRN      CAD   HTN  HLD  S/p AVR  CHF  (TTE in 2021 with EF of 60-65%, Grade I diastolic dysfunction), not in acute exacerbation    Chronic LE edema   - Hold PTA atorvastatin   -continue PTA torsemide, spironolactone     DVT Prophylaxis: holding Enoxaparin (Lovenox) subcutaneous until head Ct as above    Code  Status:DNR/DNI    Diet: Regular Diet Adult      Disposition:   - likely 1-2 days if remains clinically stable  - will get PT/OT evaluation; SW for disposition planning    Clinically Significant Risk Factors Present on Admission         # Hypernatremia: Highest Na = 147 mmol/L in last 2 days, will monitor as appropriate            # Hypertension: Noted on problem list                      Page Me (7 am to 6 pm)    Care plan discussed with patient and nursing; also discussed with house MARK  Later also discussed with Neurology    Total time spent today 65 minutes including 2 separate visits and discussion with multiple providers    High medical complexity    Will change admit status to inpatient              Physical Exam:      Blood pressure (!) 154/64, pulse 83, temperature 98.7  F (37.1  C), temperature source Oral, resp. rate 20, SpO2 93%.  There were no vitals filed for this visit.  Vital Signs with Ranges  Temp:  [98.7  F (37.1  C)-99.3  F (37.4  C)] 98.7  F (37.1  C)  Pulse:  [80-90] 83  Resp:  [17-29] 20  BP: (116-188)/(64-97) 154/64  SpO2:  [93 %-96 %] 93 %  I/O's Last 24 hours  I/O last 3 completed shifts:  In: 360 [P.O.:360]  Out: -     Constitutional: Alert, awake and oriented X 3; resting comfortably in no apparent distress    Mild left facial droop   Oral cavity: Moist mucosa   Cardiovascular: Normal s1 s2, regular rate and rhythm, no murmur   Lungs: B/l clear to auscultation, no wheezes or crepitations   Abdomen: Soft, nt, nd, no guarding, rigidity or rebound; BS +   LE : Mild b/l edema +   Musculoskeletal/Neuro Left LE weakness, left hand  weak; power 5/5 in all other extremities   Psychiatry: normal mood and affect                Medications:         enoxaparin ANTICOAGULANT  40 mg Subcutaneous Q24H    nirmatrelvir and ritonavir  3 tablet Oral BID    spironolactone  50 mg Oral Daily    tamsulosin  0.4 mg Oral Daily    torsemide  40 mg Oral Daily     PRN Meds: diazepam, ibuprofen, methocarbamol,  naloxone **OR** naloxone **OR** naloxone **OR** naloxone, ondansetron **OR** ondansetron, oxyCODONE-acetaminophen, senna-docusate **OR** senna-docusate         Data:      All new lab and imaging data was reviewed.   Recent Labs   Lab Test 11/21/23  1348 10/18/23  0941 09/28/23  0933 10/28/20  1420 10/28/19  0900 09/19/19  0858 11/30/16  0930   WBC 7.5 8.5 7.3   < > 6.8   < > 7.2   HGB 10.8* 9.9* 10.2*   < > 11.4*   < > 12.9*   MCV 82 81 81   < > 82   < > 78    220 223   < > 259   < > 289   INR  --   --   --   --  1.01  --  0.99    < > = values in this interval not displayed.      Recent Labs   Lab Test 11/21/23  1348 10/18/23  0941 09/28/23  0933   * 143 141   POTASSIUM 3.6 3.6 3.6   CHLORIDE 107 104 103   CO2 27 30* 29   BUN 16.2 23.4* 19.4   CR 0.95 0.95 0.89   ANIONGAP 13 9 9   WARREN 9.7 9.5 9.5   GLC 97 98 108*     Recent Labs   Lab Test 12/01/16  0300 11/30/16  1930 11/30/16  0930   TROPI <0.015  The 99th percentile for upper reference range is 0.045 ug/L.  Troponin values in   the range of 0.045 - 0.120 ug/L may be associated with risks of adverse   clinical events.   <0.015  The 99th percentile for upper reference range is 0.045 ug/L.  Troponin values in   the range of 0.045 - 0.120 ug/L may be associated with risks of adverse   clinical events.   <0.015  The 99th percentile for upper reference range is 0.045 ug/L.  Troponin values in   the range of 0.045 - 0.120 ug/L may be associated with risks of adverse   clinical events.

## 2023-11-22 NOTE — PROVIDER NOTIFICATION
ID lab called with update on BC drawn 11/21 at 1353, Positive for Staph aureus and negative for MecA gene. MD notified.

## 2023-11-22 NOTE — CONSULTS
[facility]    Stroke Consult Note    Reason for Consult:      Chief Complaint: Fall and Flu Symptoms     HPI  Jonathan Bishop is a 78 year old male with a PMH significant for Tonsil cancer, sqamous cell carcinoma of the oropharynx (previously on chemotherapy, undergoing radiation) CAD, HLD, HTN, Chronic diastolic HR, TAVR replacement, hx of stroke (2016, residual left sided weakness). Presented on 11/21 after fall and for flu like symptoms. Found to be COVID-19 positive. Today, at 11:25 AM patient was noted to have left facial droop while working with therapy. Unknown LKW but was evaluated by nursing in the AM and did not notice left facial droop. Stroke code was called and then subsequently canceled due to the patient not wanting additional imaging and did not want to undergo additional stroke evaluation. Patient provided additional history about his presenting symptoms. Conner reports falling while attempting to get out of bed and his scooter or walker falling on him so he was unable to get up. The fall may have been due to worsening left leg weakness but Conner is not sure. He notes his left leg has been more weak of the past ~3 days but notes that it has been weak since his previous stroke. Conner was agreeable to MRI brain to evaluate future for stroke and CTA Head and Neck to evaluate vessels. Not on ASA PTA. On atorvastatin 40 mg PTA.     Neuro Evaluation Summarized  MRI/Head CT MRI [place] 11/22/2023: Pending     CTH 11/21/2023 on admission: No CT findings of acute intracranial process. Brain atrophy and presumed chronic ischemic changes   Intracranial Vasculature CTA Head 11/22/2023: Pending    Cervical Vasculature CTA Neck 11/22/2023: Pending      Echocardiogram Pending    EKG/Telemetry Pending    Other Testing Blood cultures per primary team: No growth after 12 hours      LDL  No lab value available in past 30 days   A1C  11/22/2023: 6.2 %   Troponin No lab value available in past 48  "hrs     Impression  Left facial droop, left arm weakness, left leg weakness, concern for stroke vs recrudescence in the setting of encephalopathy and COVID-19     Recommendations   - Use orderset: \"Ischemic Stroke Routine Admission\" or \"Ischemic Stroke No Thrombolytics/No Thrombectomy ICU Admission\"  - Neurochecks and Vital Signs every 4 hours  - Aspirin 81 mg for secondary stroke prevention    - Statin: continue PTA Atorvastatin 40 mg, LDL results pending   - MRI Brain with and without contrast (ordered)  - CTA Head and Neck (ordered)  - Telemetry, EKG  - Bedside Glucose Monitoring  - A1c, Lipid Panel, Troponin x 3  - PT/OT/SLP  - Stroke Education  - Euthermia, Euglycemia    Patient Follow-up    - final recommendation pending work-up    Thank you for this consult. We will continue to follow.     Debbie Dukes PA-C  Vascular Neurology    To page me or covering stroke neurology team member, click here: AMCOM  Choose \"On Call\" tab at top, then select \"NEUROLOGY/ALL SITES\" from middle drop-down box, press Enter, then look for \"stroke\" or \"telestroke\" for your site.  _____________________________________________________    Clinically Significant Risk Factors Present on Admission         # Hypernatremia: Highest Na = 147 mmol/L in last 2 days, will monitor as appropriate          # Hypertension: Noted on problem list                 Past Medical History    Past Medical History:   Diagnosis Date    * * * SBE PROPHYLAXIS * * *     s/p TAVR    Acute rheumatic carditis 1950    Complication of anesthesia     pt once woke up during back surgery    Coronary artery disease     murmur    Erectile dysfunction     Sildenafil    Hip pain, bilateral     Hypercholesteremia     Hypertension     Low back pain     Nonsenile cataract     Obesity     Renal cyst     S/P 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    Stroke (cerebrum) (H) 11/30/2016    Umbilical hernia 06/10/2011    s/p surgical " repair    Wound, surgical, infected 06/10/2011    hernia     Medications   Home Meds  Prior to Admission medications    Medication Sig Start Date End Date Taking? Authorizing Provider   diazepam (VALIUM) 5 MG tablet Take 1-2 tablets (5-10 mg) by mouth once as needed for anxiety (for procedures. May repeat one time if needed.) 11/13/23  Yes Buck Nascimento MD   ibuprofen (ADVIL/MOTRIN) 600 MG tablet Take 1 tablet (600 mg) by mouth every 8 hours as needed for moderate pain (4-6) 2/28/23  Yes Barb Lees, DYLLAN   multivitamin, therapeutic (THERA-VIT) TABS tablet Take 1 tablet by mouth daily   Yes Unknown, Entered By History   oxyCODONE-acetaminophen (PERCOCET) 5-325 MG tablet Take 1-2 tablets by mouth every 6 hours as needed for pain Max 5 per day. 11/13/23  Yes Buck Nascimento MD   sildenafil (VIAGRA) 100 MG tablet TAKE 30MIN TO 4 HOURS BEFORE INTERCOURSE AS NEEDED FOR ERECTILE DYSFUNCTION 10/12/23  Yes Buck Nascimento MD   tamsulosin (FLOMAX) 0.4 MG capsule TAKE 1 CAPSULE BY MOUTH ONCE DAILY 8/16/23  Yes Dominique Mueller MD   torsemide (DEMADEX) 20 MG tablet Take 40 mg by mouth daily   Yes Unknown, Entered By History   vitamin D3 (CHOLECALCIFEROL) 50 mcg (2000 units) tablet Take 1 tablet by mouth daily   Yes Reported, Patient   atorvastatin (LIPITOR) 40 MG tablet Take 40 mg by mouth daily    Unknown, Entered By History   methocarbamol (ROBAXIN) 500 MG tablet Take 1 tablet (500 mg) by mouth every 6 hours as needed for muscle spasms  Patient not taking: Reported on 11/21/2023 3/3/23   Sanjay Fritz PA-C   order for DME Equipment being ordered: Walker ()  Treatment Diagnosis: stroke 12/3/16   Vlad Rajput MD   senna-docusate (SENOKOT-S/PERICOLACE) 8.6-50 MG tablet Take 1 tablet by mouth 2 times daily Reduce dose or temporarily discontinue if having loose stools.  Patient not taking: Reported on 10/18/2023 3/3/23   Sanjay Fritz PA-C   spironolactone (ALDACTONE) 50 MG tablet Take 1 tablet by mouth  once daily 8/24/23   Buck Nascimento MD       Scheduled Meds   aspirin  81 mg Oral or Feeding Tube Daily    ceFAZolin  2 g Intravenous Q8H    ceFAZolin (ANCEF) 2 g in sodium chloride 0.9 % 100 mL  2 g Intravenous Q8H    [Held by provider] enoxaparin ANTICOAGULANT  40 mg Subcutaneous Q12H    LORazepam  0.5 mg Intravenous Once    nirmatrelvir and ritonavir  3 tablet Oral BID    spironolactone  50 mg Oral Daily    tamsulosin  0.4 mg Oral Daily    torsemide  40 mg Oral Daily       Infusion Meds   - MEDICATION INSTRUCTIONS -         Allergies   No Known Allergies       PHYSICAL EXAMINATION   Temp:  [98.4  F (36.9  C)-99.3  F (37.4  C)] 98.4  F (36.9  C)  Pulse:  [66-92] 86  Resp:  [16-20] 18  BP: (116-161)/(64-87) 134/67  SpO2:  [93 %-97 %] 95 %    General Exam  General:  patient lying in bed without any acute distress    HEENT:  normocephalic/atraumatic  Pulmonary:  no respiratory distress    Neuro Exam  Mental Status:  lethargic, oriented to month and age, unable to spell word backwards (said DLOW), able to say months of year in reverse chronicle order, follows commands, mild dysarthria   Cranial Nerves:  visual fields intact, EOMI with normal smooth pursuit, facial sensation intact and symmetric, left facial droop, hearing not formally tested but intact to conversation, left shoulder shrug weak, tongue protrusion midline  Motor:  Mild asterixis present, no activation of right triceps muscle, LUE drift present, left elbow flexion weaker than right, equal bilateral elbow extension, diffuse LLE weakness and has no antigravity, weak LLE hip flexion more severe proximally, left knee extension 3/5, equal strength in hands, atrophy noted in left dorsal hand muscles, decreased left ankle plantar and dorsiflexion, slow left toe taps, No RUE or RLE drift   Sensory:  light touch sensation intact and symmetric throughout upper and lower extremities, no extinction on double simultaneous stimulation   Coordination:  RUE ataxia not  out of proportion to weakness, unable to test left leg heal-to-shin due to weakness,  Station/Gait:  deferred    Stroke Scales  NIHSS  1a. Level of Consciousness 0-->Alert, keenly responsive   1b. LOC Questions 0-->Answers both questions correctly   1c. LOC Commands 0-->Performs both tasks correctly   2.   Best Gaze 0-->Normal   3.   Visual 0-->No visual loss   4.   Facial Palsy 1-->Minor paralysis (flattened nasolabial fold, asymmetry on smiling) (left)   5a. Motor Arm, Left 1-->Drift, limb holds 90 (or 45) degrees, but drifts down before full 10 seconds, does not hit bed or other support   5b. Motor Arm, Right 0-->No drift, limb holds 90 (or 45) degrees for full 10 secs   6a. Motor Leg, Left 3-->No effort against gravity, leg falls to bed immediately   6b. Motor Leg, right 0-->No drift, leg holds 30 degree position for full 5 secs   7.   Limb Ataxia 1-->Present in one limb (RUE)   8.   Sensory 0-->Normal, no sensory loss   9.   Best Language 0-->No aphasia, normal   10. Dysarthria 0-->Normal   11. Extinction and Inattention  0-->No abnormality   Total 6 (11/22/23 1549)       Imaging  I personally reviewed all imaging; relevant findings per HPI.    Labs Data   CBC  Recent Labs   Lab 11/22/23  1251 11/21/23  1348   WBC 7.7 7.5   RBC 4.29* 4.38*   HGB 10.4* 10.8*   HCT 34.9* 35.7*    208     Basic Metabolic Panel   Recent Labs   Lab 11/22/23  1135 11/22/23  0832 11/21/23  1348   NA  --  138 147*   POTASSIUM  --  4.0 3.6   CHLORIDE  --  103 107   CO2  --  25 27   BUN  --  9.7 16.2   CR  --  0.61* 0.95   GLC 97 130* 97   WARREN  --  9.2 9.7     Liver Panel  Recent Labs   Lab 11/21/23  1348   PROTTOTAL 7.7   ALBUMIN 4.1   BILITOTAL 0.4   ALKPHOS 109   *   ALT 36     INR    Recent Labs   Lab Test 10/28/19  0900 11/30/16  0930   INR 1.01 0.99           Stroke Consult Data Data   This was a non-emergent, non-telestroke consult.  I have personally spent a total of 60 minutes providing care today, time spent in  reviewing medical records and reviewing tests, examining the patient and obtaining history, coordination of care, and discussion with the patient and/or family regarding diagnostic results, prognosis, symptom management, risks and benefits of management options, and development of plan of care. Greater than 50% was spent in counseling and coordination of care.

## 2023-11-22 NOTE — CONSULTS
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 Reports that he was down for about two hours. Scooted across the floor on his left shoulder. Mepilex lite in place over wound on writer's assessment.  Wound base: 100 %  pink, moist dermis ,      Palpation of the wound bed: normal      Drainage: small     Description of drainage: serous and creamy     Measurements (length x width x depth, in cm): 10  x 11.5  x  < 0.1 cm      Tunneling: N/A     Undermining: N/A  Periwound skin: Ecchymosis      Color:  hyperpigmented      Temperature: normal   Odor: none  Pain: Irritable or crying out at intervals and facial expression of distress, intermittent and stinging  Pain interventions prior to dressing change: patient tolerated well, slow and gentle cares , and distraction  Treatment goal: Heal , Drainage control, Infection control/prevention, and Protection  STATUS: initial assessment and evolving  Supplies ordered: gathered, at bedside, supplies stored on unit, and discussed with patient       Treatment Plan:     Left shoulder wound(s): Daily  and PRN if drainage reaches edge of foam or damage  Cleanse very gently with wound cleanser and gauze. Clean " "walker-wound skin, too. Pat dry.  Swab walker-wound skin with no-sting barrier and allow to dry.  Cover with Optifoam border (640303) dressings- writer used two to cover wound.  Initial and date.  ~ Assess under dressings every shift for sings of deterioration. Document in flowsheet.     Orders: Written    RECOMMEND PRIMARY TEAM ORDER: None, at this time  Education provided: plan of care and wound progress  Discussed plan of care with: Patient and Nurse  WO nurse follow-up plan: weekly  Notify WOC if wound(s) deteriorate.  Nursing to notify the Provider(s) and re-consult the WOC Nurse if new skin concern.    DATA:     Current support surface: Standard  Standard gel/foam mattress (IsoFlex, Atmos air, etc)  Containment of urine/stool:  Not assessed today  BMI: There is no height or weight on file to calculate BMI.   Active diet order: Orders Placed This Encounter      Regular Diet Adult     Output: I/O last 3 completed shifts:  In: 360 [P.O.:360]  Out: -      Labs:   Recent Labs   Lab 11/21/23  1348   ALBUMIN 4.1   HGB 10.8*   WBC 7.5     Pressure injury risk assessment:   Sensory Perception: 3-->slightly limited  Moisture: 4-->rarely moist  Activity: 3-->walks occasionally  Mobility: 3-->slightly limited  Nutrition: 3-->adequate  Friction and Shear: 2-->potential problem  Robbie Score: 18    Ashley Santana RN, CWON  Please contact via 2can at name or group \"[facility] nurse\"- M-F 8A-4P  Leave VM @ *[phone] for non-urgent needs. Checked occasionally M-F.   "

## 2023-11-22 NOTE — PHARMACY-VANCOMYCIN DOSING SERVICE
Pharmacy Vancomycin Initial Note  Date of Service 2023  Patient's  1945  78 year old, male    Indication: Bacteremia    Current estimated CrCl = Estimated Creatinine Clearance: 109.1 mL/min (A) (based on SCr of 0.61 mg/dL (L)).    Creatinine for last 3 days  2023:  1:48 PM Creatinine 0.95 mg/dL  2023:  8:32 AM Creatinine 0.61 mg/dL    Recent Vancomycin Level(s) for last 3 days  No results found for requested labs within last 3 days.      Vancomycin IV Administrations (past 72 hours)        No vancomycin orders with administrations in past 72 hours.                    Nephrotoxins and other renal medications (From now, onward)      Start     Dose/Rate Route Frequency Ordered Stop    23 1230  vancomycin (VANCOCIN) 2,500 mg in 0.9% NaCl 500 mL intermittent infusion         2,500 mg  over 120 Minutes Intravenous ONCE 23 1226      23  torsemide (DEMADEX) tablet 40 mg        Note to Pharmacy: PTA Sig:Take 40 mg by mouth daily      40 mg Oral DAILY 23 180  ibuprofen (ADVIL/MOTRIN) tablet 600 mg        Note to Pharmacy: PTA Sig:Take 1 tablet (600 mg) by mouth every 8 hours as needed for moderate pain (4-6)      600 mg Oral EVERY 8 HOURS PRN 23 1808              Contrast Orders - past 72 hours (72h ago, onward)      Start     Dose/Rate Route Frequency Stop    23 1200  iopamidol (ISOVUE-370) solution 117 mL  Status:  Discontinued         117 mL Intravenous ONCE 23 1226    23  iopamidol (ISOVUE-370) solution 79 mL         79 mL Intravenous ONCE 23            Insight[employer] Prediction of Planned Initial Vancomycin Regimen   Loading dose: 2500 mg  Regimen: 1000 mg IV every 12 hours.  Start time: 12:24 on 2023  Exposure target: AUC24 (range)400-600 mg/L.hr   AUC24,ss: 487 mg/L.hr  Probability of AUC24 > 400: 65 %  Ctrough,ss: 13.9 mg/L  Probability of Ctrough,ss > 20: 31 %  Probability of nephrotoxicity  (Lodise ASTER 2009): 9 %          Plan:  Give 2500 mg IV vancomycin load, then continue 1000 mg q12h.   Vancomycin monitoring method: AUC  Vancomycin therapeutic monitoring goal: 400-600 mg*h/L  Pharmacy will check vancomycin levels as appropriate in 1-3 Days.    Serum creatinine levels will be ordered daily for the first week of therapy and at least twice weekly for subsequent weeks.      RUSS CARRION [facility]

## 2023-11-22 NOTE — PLAN OF CARE
Goal Outcome Evaluation:       Cognitive Concerns/ Orientation : A&Ox4, forgetful at times, garbled speech.   BEHAVIOR & AGGRESSION TOOL COLOR: Green  CIWA SCORE: NA   ABNL VS/O2: VSS on room air   MOBILITY: Total care, up with lift, high falls risk.  Baseline uses walker/cane independently per pt report. Uses scooter for longer distances.   PAIN MANAGMENT: Denies   DIET: Regular   BOWEL/BLADDER: Incontinent of bowel and bladder, x 3 loose stools this shift   ABNL LAB/BG: Na 147, , Hgb 10.8, Covid19 positive.   DRAIN/DEVICES: Peripheral IV SL left arm   TELEMETRY RHYTHM: NA  SKIN: Reported numbness and tingling BLE. Trace edema BLE. Scattered bruising.  Abrasion to left shoulder, pt states from trying to get off the floor at home  TESTS/PROCEDURES: US legs negative for DVT and CT of chest negative for PE  D/C DAY/GOALS/PLACE: [facility]  OTHER IMPORTANT INFO: Observation status. Lung sounds diminished with infrequent congested/productive cough.     -diagnostic tests and consults completed and resulted: not met   -vital signs normal or at patient baseline: Met   -tolerating oral intake to maintain hydration: met   -returns to baseline functional status: not met   -safe disposition plan has been identified: not met   Nurse to notify provider when observation goals have been met and patient is ready for discharge.

## 2023-11-22 NOTE — PLAN OF CARE
Goal Outcome Evaluation:      Plan of Care Reviewed With: patient    Admission    Patient arrives to room 604 via cart from ED.  Care plan note:   Summary: Weakness, fall, Covid19 positive.     DATE & TIME: 11/21/23 [id number]    Cognitive Concerns/ Orientation : A&Ox4, forgetful at times, garbled speech.   BEHAVIOR & AGGRESSION TOOL COLOR: Green  CIWA SCORE: NA   ABNL VS/O2: VSS, O2 96% RA.   MOBILITY: UW strong 2 walker/gait belt, unsteady gait, high falls risk. BUE tremors. Baseline uses walker/cane independently per pt report. Uses scooter for longer distances.   PAIN MANAGMENT: denies  DIET: Regular diet  BOWEL/BLADDER: incontinent of bowel and bladder, loose BM x2   ABNL LAB/BG: Na 147, , Hgb 10.8, Covid19 positive.   DRAIN/DEVICES: PIV x1 infusing  mL/hr   TELEMETRY RHYTHM: NA  SKIN: reported numbness and tingling BLE. Trace edema BLE. Scabbing Lt elbow, Discoloration back of Lt shoulder.   TESTS/PROCEDURES: none  D/C DAY/GOALS/PLACE: TBD  OTHER IMPORTANT INFO: Initiated special precaution. ED admission at 1715. Observation status. IVF NS infusing 100 mL/hr.      -diagnostic tests and consults completed and resulted: not met   -vital signs normal or at patient baseline: Met   -tolerating oral intake to maintain hydration: met   -returns to baseline functional status: not met   -safe disposition plan has been identified: not met   Nurse to notify provider when observation goals have been met and patient is ready for discharge.         Inpatient nursing criteria listed below were met:    Did you put disposition on whiteboard and in sticky note: Yes  Full skin assessment done (add LDA if skin issue present). Initials of 2nd RN :Yes, 2nd RN DS  Isolation education started/completed Yes  Patient allergies verified with patient: Yes  Fall Risk? (Care plan updated, Education given and documented) Yes  Primary Care Plan initiated: Yes  Home medications documented in belongings flowsheet: Yes  Patient  belongings documented in belongings flowsheet: Yes  Reminder note (belongings/ medications) placed in discharge instructions:Yes  Admission profile/ required documentation complete: Yes  If patient is a 72 hour hold/Commitment are belongings removed from room and locked up? NA

## 2023-11-22 NOTE — CONSULTS
[facility]    Infectious Disease Consultation     Date of Admission:  11/21/2023  Date of Consult (When I saw the patient): 11/22/23    Assessment & Plan   Jonathan Bishop is a 78 year old who was admitted on 11/21/2023.     Impression:    79 yo male with history of TAVR, CVA, HTN, CAD and obesity currently undergoing radiation for oropharyngeal cancer presenting with weakness and testing positive for COVID and blood culture with staph aureus.     -Afebrile. Not hypoxic. WBC normal.   -CXR with patchy opacity left lung base. Could have post-Covid staph pneumonia.   -Blood culture (1 of 2) from admission on 11/21/23 with GPC-clusters and verigene panel positive for MSSA.   -On Paxlovid and Vancomycin.   -No cellulitis or wounds other than shoulder abrasion from fall on 11/21/23.  -Hx. bilateral TKA. No knee pain presently.   -Hx TAVR.      Recommendations:   -Stop IV Vancomycin and start IV Cefazolin.   -Follow blood cultures. If blood cultures continue to be positive, will need to obtain an echocardiogram with known history of TAVR.   -Continue Paxlovid.   -ID will follow.         Marialuisa Gordon PA-C    Reason for Consult   Reason for consult:Asked to evaluate this patient for staph aureus bacteremia in setting of COVID.    Primary Care Physician   Buck Nascimento    Chief Complaint   Weakness    History is obtained from the patient and medical records    History of Present Illness   Jonathan Bishop is a very pleasant 78 year old male with a history of TAVR in 2020, CVA, HTN, obesity, CAD, and hyperlipidemia who has been undergoing radiation for oropharyngeal cancer. He developed some generalized weakness, felt unwell for the past week with cough, and fell out of bed on 11/21/23. He felt due to his weakness he couldn't care for himself at home and came to ED. In the ED, COVID test was positive.  CXR did show patchy opacity in the L lung base which could represent atelectasis or pneumonia.  He has not been hypoxic and no fever. He was started on Paxlovid and admitted.     Blood cultures from admission have now had one return positive for GPC-clusters with verigene showing MSSA. He was started on IV Vancomycin. There was concern for stroke this morning, but patient declined further imaging / code stroke work-up.     He is doing well. He denies fever, chills, SOB, chest pain, n/v, abdominal pain, diarrhea, wounds other than new shoulder abrasion from fall, joint aches.       Past Medical History   I have reviewed this patient's medical history and updated it with pertinent information if needed.   Past Medical History:   Diagnosis Date    * * * SBE PROPHYLAXIS * * *     s/p TAVR    Acute rheumatic carditis 1950    Complication of anesthesia     pt once woke up during back surgery    Coronary artery disease     murmur    Erectile dysfunction     Sildenafil    Hip pain, bilateral     Hypercholesteremia     Hypertension     Low back pain     Nonsenile cataract     Obesity     Renal cyst     S/P 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    Stroke (cerebrum) (H) 11/30/2016    Umbilical hernia 06/10/2011    s/p surgical repair    Wound, surgical, infected 06/10/2011    hernia       Past Surgical History   I have reviewed this patient's surgical history and updated it with pertinent information if needed.  Past Surgical History:   Procedure Laterality Date    ARTHROPLASTY KNEE BILATERAL  7/22/2010    COLONOSCOPY N/A 4/14/2017    Procedure: COLONOSCOPY;  Surgeon: Toby Bills MD;  Location: [facility]    CV CORONARY ANGIOGRAM N/A 10/28/2019    Procedure: Coronary Angiogram;  Surgeon: Guerrero Huitron MD;  Location:  [facility]    CV RIGHT HEART CATH MEASUREMENTS RECORDED N/A 10/28/2019    Procedure: Right Heart Cath;  Surgeon: Guerrero Huitron MD;  Location:  [facility]    CV TRANSCATHETER AORTIC VALVE REPLACEMENT N/A 12/8/2020     Procedure: Transcatheter Aortic Valve Replacement;  Surgeon: Camila Begum MD;  Location:  HE[facility]ARD[facility]ATH [facility]    FUSION LUMBAR ANTERIOR TWO LEVELS      HERNIORRHAPHY UMBILICAL  6/10/2011    Procedure:HERNIORRHAPHY UMBILICAL; Open, with mesh placement; Surgeon:HO PARHAM; Location:[facility]    LAMINECTOMY LUMBAR TWO LEVELS      THORACOSCOPIC WEDGE RESECTION LUNG Right 3/2/2023    Procedure: Right thoracoscopic wedge resection;  Surgeon: Hebert Jones MD;  Location:  OR       Prior to Admission Medications   Prior to Admission Medications   Prescriptions Last Dose Informant Patient Reported? Taking?   atorvastatin (LIPITOR) 40 MG tablet Unknown  Yes No   Sig: Take 40 mg by mouth daily   diazepam (VALIUM) 5 MG tablet prn at for procedures  No Yes   Sig: Take 1-2 tablets (5-10 mg) by mouth once as needed for anxiety (for procedures. May repeat one time if needed.)   ibuprofen (ADVIL/MOTRIN) 600 MG tablet Past Week Self No Yes   Sig: Take 1 tablet (600 mg) by mouth every 8 hours as needed for moderate pain (4-6)   methocarbamol (ROBAXIN) 500 MG tablet Not Taking  No No   Sig: Take 1 tablet (500 mg) by mouth every 6 hours as needed for muscle spasms   Patient not taking: Reported on 11/21/2023   multivitamin, therapeutic (THERA-VIT) TABS tablet   Yes Yes   Sig: Take 1 tablet by mouth daily   order for DME   No No   Sig: Equipment being ordered: Walker ()  Treatment Diagnosis: stroke   oxyCODONE-acetaminophen (PERCOCET) 5-325 MG tablet Past Week  No Yes   Sig: Take 1-2 tablets by mouth every 6 hours as needed for pain Max 5 per day.   senna-docusate (SENOKOT-S/PERICOLACE) 8.6-50 MG tablet   No No   Sig: Take 1 tablet by mouth 2 times daily Reduce dose or temporarily discontinue if having loose stools.   Patient not taking: Reported on 10/18/2023   sildenafil (VIAGRA) 100 MG tablet   No Yes   Sig: TAKE 30MIN TO 4 HOURS BEFORE INTERCOURSE AS NEEDED FOR ERECTILE DYSFUNCTION   spironolactone  (ALDACTONE) 50 MG tablet Unknown  No No   Sig: Take 1 tablet by mouth once daily   tamsulosin (FLOMAX) 0.4 MG capsule   No Yes   Sig: TAKE 1 CAPSULE BY MOUTH ONCE DAILY   torsemide (DEMADEX) 20 MG tablet   Yes Yes   Sig: Take 40 mg by mouth daily   vitamin D3 (CHOLECALCIFEROL) 50 mcg (2000 units) tablet  Self Yes Yes   Sig: Take 1 tablet by mouth daily      Facility-Administered Medications: None     Allergies   No Known Allergies    Immunization History   Immunization History   Administered Date(s) Administered    COVID-19 Monovalent 18+ (Moderna) 02/25/2021, 03/25/2021    Pneumo Conj 13-V (2010&after) 12/18/2015    Pneumococcal 23 valent 04/22/2011    TDAP (Adacel,Boostrix) 04/22/2011, 08/01/2022    Zoster vaccine, live 10/07/2014       Social History   I have reviewed this patient's social history and updated it with pertinent information if needed. Jonathan Bishop  reports that he quit smoking about 18 years ago. His smoking use included cigarettes. He has been exposed to tobacco smoke. He has never used smokeless tobacco. He reports that he does not drink alcohol and does not use drugs.    Family History   I have reviewed this patient's family history and updated it with pertinent information if needed.   Family History   Problem Relation Age of Onset    C.A.D. Mother     Unknown/Adopted Father     Heart Failure Sister     Heart Failure Sister     Glaucoma No family hx of     Macular Degeneration No family hx of     Diabetes No family hx of     Hypertension No family hx of        Review of Systems   The 10 point Review of Systems is negative    Physical Exam   Temp: 98.8  F (37.1  C) Temp src: Oral BP: 137/76 Pulse: 85   Resp: 16 SpO2: 97 % O2 Device: None (Room air)    Vital Signs with Ranges  Temp:  [98.7  F (37.1  C)-99.3  F (37.4  C)] 98.8  F (37.1  C)  Pulse:  [66-92] 85  Resp:  [16-24] 16  BP: (116-161)/(64-97) 137/76  SpO2:  [93 %-97 %] 97 %  0 lbs 0 oz  There is no height or weight on file to  calculate BMI.    GENERAL APPEARANCE:  awake  EYES: Eyes grossly normal to inspection  NECK: no adenopathy  RESP: lungs clear   CV: regular rates and rhythm  ABDOMEN: soft, nontender  MS: extremities normal, no knee pain  SKIN: no suspicious lesions or rashes        Data   All laboratory data reviewed  Component      Latest Ref [facility] 11/21/2023  1:48 PM 11/22/2023  12:51 PM   WBC      4.0 - 11.0 10e3/uL 7.5  7.7    RBC Count      4.40 - 5.90 10e6/uL 4.38 (L)  4.29 (L)    Hemoglobin      13.3 - 17.7 g/dL 10.8 (L)  10.4 (L)    Hematocrit      40.0 - 53.0 % 35.7 (L)  34.9 (L)    MCV      78 - 100 fL 82  81    MCH      26.5 - 33.0 pg 24.7 (L)  24.2 (L)    MCHC      31.5 - 36.5 g/dL 30.3 (L)  29.8 (L)    RDW      10.0 - 15.0 % 16.5 (H)  16.6 (H)    Platelet Count      150 - 450 10e3/uL 208  191    % Neutrophils      % 74     % Lymphocytes      % 17     % Monocytes      % 8     % Eosinophils      % 0     % Basophils      % 0     % Immature Granulocytes      % 1     NRBCs per 100 WBC      <1 /100 0     Absolute Neutrophils      1.6 - 8.3 10e3/uL 5.6     Absolute Lymphocytes      0.8 - 5.3 10e3/uL 1.3     Absolute Monocytes      0.0 - 1.3 10e3/uL 0.6     Absolute Eosinophils      0.0 - 0.7 10e3/uL 0.0     Absolute Basophils      0.0 - 0.2 10e3/uL 0.0     Absolute Immature Granulocytes      <=0.4 10e3/uL 0.0     Absolute NRBCs      10e3/uL 0.0        Component      Latest Ref [facility] 11/21/2023  1:48 PM 11/22/2023  8:32 AM   Sodium      135 - 145 mmol/L 147 (H)  138    Potassium      3.4 - 5.3 mmol/L 3.6  4.0    Carbon Dioxide (CO2)      22 - 29 mmol/L 27  25    Anion Gap      7 - 15 mmol/L 13  10    Urea Nitrogen      8.0 - 23.0 mg/dL 16.2  9.7    Creatinine      0.67 - 1.17 mg/dL 0.95  0.61 (L)    GFR Estimate      >60 mL/min/1.73m2 82  >90    Calcium      8.8 - 10.2 mg/dL 9.7  9.2    Chloride      98 - 107 mmol/L 107  103    Glucose      70 - 99 mg/dL 97  130 (H)    Alkaline Phosphatase      40 - 150 U/L 109     AST      0 - 45  U/L 161 (H)     ALT      0 - 70 U/L 36     Protein Total      6.4 - 8.3 g/dL 7.7     Albumin      3.5 - 5.2 g/dL 4.1     Bilirubin Total      <=1.2 mg/dL 0.4        Component      Latest Ref Rng 11/21/2023  1:48 PM 11/21/2023  1:51 PM 11/21/2023  6:43 PM   Lactic Acid      0.7 - 2.0 mmol/L  1.3     N-Terminal Pro BNP Inpatient      0 - 1,800 pg/mL 1,337      D-Dimer Quantitative      0.00 - 0.50 ug/mL FEU   4.22 (H)    CRP Inflammation      <5.00 mg/L   69.61 (H)         CollectedUpdatedProcedureResult Status  11/22/2023 7091721/22/2023 1306  Blood Culture Hand, Right [[id number]]   Blood from Hand, Right   In process  Component Value   No component results      11/22/2023 595271111/22/2023 1305  Blood Culture Hand, Right [[id number]]   Blood from Hand, Right   In process  Component Value   No component results      11/21/2023 3947991/21/2023 1530  Symptomatic Influenza A/B, RSV, & SARS-CoV2 PCR (COVID-19) Nasopharyngeal [[id number]]    (Abnormal)   Swab from Nasopharyngeal   Final result  Component Value   Influenza A PCR Negative   Influenza B PCR Negative   RSV PCR Negative   SARS CoV2 PCR Positive Abnormal    POSITIVE: SARS-CoV-2 (COVID-19) RNA detected, presumed positive.   11/21/2023 6379831/22/2023 1143  Blood Culture Peripheral Blood [[id number]]   (Abnormal)   Peripheral Blood   Preliminary result  Component Value   Culture Positive on the 1st day of incubation Abnormal  P    Gram positive cocci in clusters Panic  P    1 of 2 bottles      11/21/2023 0383531/22/2023 1419  Verigene GP Panel [[id number]]    (Abnormal)   Peripheral Blood   Final result  Component Value   Staphylococcus aureus Detected Abnormal    Positive for Staphylococcus aureus and negative for the mecA gene (not resistant to methicillin) by Verigene multiplex nucleic acid test. Final identification and antimicrobial susceptibility testing will be verified by standard methods.   Staphylococcus epidermidis Not Detected   Staphylococcus  lugdunensis Not Detected   Enterococcus faecalis Not Detected   Enterococcus faecium Not Detected   Streptococcus species Not Detected   Streptococcus agalactiae Not Detected   Streptococcus anginosus group Not Detected   Streptococcus pneumoniae Not Detected   Streptococcus pyogenes Not Detected   Listeria species Not Detected   11/21/2023 818951111/22/2023 0516  Blood Culture Peripheral Blood [[id number]]   Peripheral Blood   Preliminary result  Component Value   Culture No growth after 12 hours P        EXAM: CT CHEST PULMONARY EMBOLISM W CONTRAST  LOCATION: [facility]  DATE: 11/21/2023     INDICATION: Elevated d-dimer; head and neck cancer.  COMPARISON: CT 10/26/2023.  TECHNIQUE: CT chest pulmonary angiogram during arterial phase injection of IV contrast. Multiplanar reformats and MIP reconstructions were performed. Dose reduction techniques were used.   CONTRAST: 79 mL Isovue-370.     FINDINGS: Patient was imaged with arm(s) at side, limiting study quality.     ANGIOGRAM CHEST: No acute pulmonary embolism.     Thoracic aorta is normal in course and caliber. Mild atheromatous disease.     LUNGS AND PLEURA: Trachea and large airways are patent. Mild diffuse bronchial wall thickening. Medial right lower lobe wedge resection, which appears unchanged. Mild dependent atelectatic change, bilaterally. Unchanged 3 mm nodule within the right   middle lobe, series 5 image 134.     Few small calcified granulomas within the right lower lobe.     No pneumothorax.      No pleural effusion.     MEDIASTINUM/AXILLAE: Calcified mediastinal and bilateral hilar lymph nodes, compatible with healed granulomatous disease. No mediastinal/hilar adenopathy.      Thoracic esophagus is unremarkable.       No axillary lymphadenopathy.     Enlarged bilateral cervical lymph nodes, grossly unchanged when correlated to recent PET/CT imaging.     Heart is normal in size. No pericardial effusion. Transcatheter aortic  valve replacement.     CORONARY ARTERY CALCIFICATION: None.     UPPER ABDOMEN: Diffuse hepatic steatosis.     MUSCULOSKELETAL: No suspicious abnormality.     OTHER: No additionally suspicious abnormality.                                                                      IMPRESSION:  1.  No acute pulmonary embolism.  2.  Grossly unchanged bilateral cervical lymphadenopathy.  3.  Unchanged 3 mm right middle lobe pulmonary nodule.  4.  Right lower lobe wedge resection.      CHEST TWO VIEWS 11/21/2023 3:47 PM      HISTORY: cough, fever, fall out of bed     COMPARISON: Chest x-ray 3/3/2023.                                                                       IMPRESSION: Enlarged cardiopericardial silhouette. Aortic arch  calcification. Low lung volumes. Possible opacity in the left lung  base which may represent atelectasis or pneumonia. The lateral view is  nondiagnostic due to overlapping soft tissues from the patient's arms.  No significant pleural effusion or pneumothorax.

## 2023-11-22 NOTE — PROGRESS NOTES
Brief note:    Stroke code activated for Left sided weakness as per NP. Patient refused work up for stroke and NP cancelled stroke code. Patient has history of prior right UMM infarct. Stroke team is available for any queries.    Shiv Michelle MD  Vascular Neurology Fellow

## 2023-11-22 NOTE — CODE/RAPID RESPONSE
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       Patient was sitting in the chair working with therapy who noticed left facial droop, onset at 11:25 AM, last known normal time unknown, but not present when RN examined patient in the AM.  RRT activated for focal neurologic deficit.  On my arrival patient is lying in the bed.  He does have left upper extremity weakness, LUE drift as well as left lower extremity weakness, inability to lift LLE off of bed  There is noticeable facial droop on the L.  Review of EMR reveals prior similar deficits from stroke in 2016.  I initially was not going to call a code stroke however when patient looked in the phone camera he thought the face droop was new and that his face today looks different than usual.  Code stroke activated.      Before he could be evaluated by the stroke team patient told me he did not want to undergo stroke evaluation.  He reports having had multiple CTs earlier this admission.  He is hoping to go home tomorrow.  He does not think his symptoms are from a stroke neither hemorrhagic nor ischemic.  He thinks it is all related to COVID.  I discussed with patient that acute illness may precipitate recurrence of prior stroke symptoms but in the setting of a new facial droop we cannot make a determination that it is not a stroke without imaging.  He is quite clear in his thoughts, oriented x 3.  I counseled him that either (hemorrhagic or ischemic stroke )may potentially worsen to the point of death.  He does not think that is going to happen and continues to  politely decline offers for CT imaging.    Impression  Subjectively new L facial droop.  Has remote EMR documentation evidence of L facial droop and L sided weakness post prior stroke at least as far back as 2016  Differential diagnosis:  -recrudescence of old stroke deficits in setting of acute illness w/ covid, acute stroke either hemorrhagic or ischemic    INTERVENTIONS:  -since pt refusing code stroke eval/ imaging, code stroke cancelled  -consult stroke neuro re: any further med management despite limitations of no neuroimaging  - Neurochecks every 4 hours x 24 hours  -gluc 97mg/dL  -/77    Last 24H PRN:     oxyCODONE-acetaminophen (PERCOCET) 5-325 MG per tablet 1 tablet, 1 tablet at 11/22/23 1021    Working diagnosis: Cannot rule out acute stroke but patient declining emergent stroke imaging    At the end of the RRT she was resting comfortably in bed, hemodynamically stable    disposition continue current level of care    Discussed with and defer further cares to hospitalist attending physician Dr. Villalba     Code Status: No CPR- Do NOT Intubate    Allergies   No Known Allergies    Physical Exam   Vital Signs with Ranges:  Temp:  [98.7  F (37.1  C)-99.3  F (37.4  C)] 98.8  F (37.1  C)  Pulse:  [66-92] 85  Resp:  [16-29] 16  BP: (116-188)/(64-97) 137/76  SpO2:  [93 %-97 %] 97 %  I/O last 3 completed shifts:  In: 360 [P.O.:360]  Out: -     Constitutional: vs as above and/or per EMR  General:  adult pt lying in bed without acute distress   GCS:   Motor 6=Obeys commands   Verbal 5=Oriented   Eye Opening 4=Spontaneous   Total: 15     Neuro: see NIHSS below  Eyes defer  Head, ENT & mouth: NC/AT,  mouth moist oral mucosa  Neck: supple  CV NSR, normotensive on monitor  resp: speaking in full sentences  gi:defer  Ext: defer  Skin: no rashes on exposed skin  Lymph: defer  Musculoskeletal no bony joint deformities     National [employer] Stroke Scale  Exam Interval: Baseline   Score    Level of  consciousness: (0)   Alert, keenly responsive    LOC questions: (0)   Answers both questions correctly    LOC commands: (0)   Performs both tasks correctly    Best gaze: (0)   Normal    Visual: (0)   No visual loss, incompletely tested    Facial palsy: (1)   Minor paralysis (flat nasolabial fold, smile asymmetry) on left    Motor arm (left): (1)   Drift    Motor arm (right): (0)   No drift    Motor leg (left): (3)   No effort against gravity    Motor leg (right): (0)   No drift but weak    Limb ataxia: (1)   Present in one limb, LUE    Sensory: (0)   Normal- no sensory loss    Best language: (0)   Normal- no aphasia    Dysarthria: (0)   Normal    Extinction and inattention: (0)   No abnormality        Total Score:  6       IMAGING: (X-ray/CT/MRI)   Recent Results (from the past 24 hour(s))   CT Head w/o Contrast    Narrative    CT SCAN OF THE HEAD WITHOUT CONTRAST   11/21/2023 3:10 PM     HISTORY: Fall, head trauma.    TECHNIQUE:  Axial images of the head and coronal reformations without  IV contrast material. Radiation dose for this scan was reduced using  automated exposure control, adjustment of the mA and/or kV according  to patient size, or iterative reconstruction technique.    COMPARISON: MRI of the brain 11/30/2016.    FINDINGS: There is no evidence of intracranial hemorrhage, mass, acute  infarct or anomaly. The previously demonstrated multiple small  ischemic infarcts of the high right frontoparietal region are not well  seen on CT. The ventricles are normal in size, shape and  configuration. Mild diffuse parenchymal volume loss. Mild patchy  scattered cerebral white matter hypodensities which are nonspecific,  but likely related to chronic microvascular ischemic disease.     Mild to moderate scattered paranasal sinus mucosal thickening. Small  volume area of aerated secretions in the right maxillary sinus. The  mastoid air cells appear clear. There is mild soft tissue  swelling/hematoma of the scalp along  the frontoparietal vertex. No  underlying calvarial fracture is identified. The bony calvarium and  bones of the skull base appear intact.       Impression    IMPRESSION:  1. No CT findings of acute intracranial process.  2. Brain atrophy and presumed chronic ischemic changes, as described.  3. Mild-to-moderate mucosal thickening in the paranasal sinuses with  small amount of aerated secretions in the right maxillary sinus.  4. Mild scalp vertex soft tissue swelling without underlying calvarial  fracture.    BALWINDER HICKS MD         SYSTEM ID:  [id number]   XR Chest 2 Views    Narrative    CHEST TWO VIEWS 11/21/2023 3:47 PM     HISTORY: cough, fever, fall out of bed    COMPARISON: Chest x-ray 3/3/2023.       Impression    IMPRESSION: Enlarged cardiopericardial silhouette. Aortic arch  calcification. Low lung volumes. Possible opacity in the left lung  base which may represent atelectasis or pneumonia. The lateral view is  nondiagnostic due to overlapping soft tissues from the patient's arms.  No significant pleural effusion or pneumothorax.    RIAN MCCRACKEN MD         SYSTEM ID:  [id number]   XR Femur Left 2 Views    Narrative    XR FEMUR LEFT 2 VIEWS 11/21/2023 3:47 PM     HISTORY: pain, fall out of bed    COMPARISON: None.         Impression    IMPRESSION: The left hip is negative for fracture. The left femur is  negative for fracture. Postoperative changes total knee arthroplasty.    PHILLIP ÁLVAREZ MD         SYSTEM ID:  [id number]   XR Tibia and Fibula Left 2 Views    Narrative    XR TIBIA AND FIBULA LEFT 2 VIEWS 11/21/2023 3:48 PM     HISTORY: pain, fall out of bed    COMPARISON: None.         Impression    IMPRESSION: Postoperative changes total knee arthroplasty. No evidence  for fracture. There is some nonspecific edema or cellulitis about the  lower leg.    PHILLIP ÁLVAREZ MD         SYSTEM ID:  [id number]   US Lower Extremity Venous Duplex Bilateral    Narrative    EXAM: US LOWER EXTREMITY VENOUS DUPLEX  BILATERAL  LOCATION: [facility]  DATE: 11/21/2023    INDICATION: Bilateral lower extremity edema, elevated D dimer in the setting + COVID  COMPARISON: Lower extremity venous on 11/30/2016.  TECHNIQUE: Venous Duplex ultrasound of bilateral lower extremities with and without compression, augmentation and duplex. Color flow and spectral Doppler with waveform analysis performed.    FINDINGS: Exam includes the common femoral, femoral, popliteal veins as well as segmentally visualized deep calf veins and greater saphenous vein.     RIGHT: No deep vein thrombosis. No superficial thrombophlebitis. No popliteal cyst.    LEFT: No deep vein thrombosis. No superficial thrombophlebitis. No popliteal cyst.      Impression    IMPRESSION:  1.  No deep venous thrombosis in either lower extremity, although calf veins are not well seen/evaluated due to the presence of soft tissue swelling.   CT Chest Pulmonary Embolism w Contrast    Narrative    EXAM: CT CHEST PULMONARY EMBOLISM W CONTRAST  LOCATION: [facility]  DATE: 11/21/2023    INDICATION: Elevated d-dimer; head and neck cancer.  COMPARISON: CT 10/26/2023.  TECHNIQUE: CT chest pulmonary angiogram during arterial phase injection of IV contrast. Multiplanar reformats and MIP reconstructions were performed. Dose reduction techniques were used.   CONTRAST: 79 mL Isovue-370.    FINDINGS: Patient was imaged with arm(s) at side, limiting study quality.    ANGIOGRAM CHEST: No acute pulmonary embolism.    Thoracic aorta is normal in course and caliber. Mild atheromatous disease.    LUNGS AND PLEURA: Trachea and large airways are patent. Mild diffuse bronchial wall thickening. Medial right lower lobe wedge resection, which appears unchanged. Mild dependent atelectatic change, bilaterally. Unchanged 3 mm nodule within the right   middle lobe, series 5 image 134.     Few small calcified granulomas within the right lower lobe.    No  pneumothorax.     No pleural effusion.     MEDIASTINUM/AXILLAE: Calcified mediastinal and bilateral hilar lymph nodes, compatible with healed granulomatous disease. No mediastinal/hilar adenopathy.     Thoracic esophagus is unremarkable.      No axillary lymphadenopathy.    Enlarged bilateral cervical lymph nodes, grossly unchanged when correlated to recent PET/CT imaging.    Heart is normal in size. No pericardial effusion. Transcatheter aortic valve replacement.    CORONARY ARTERY CALCIFICATION: None.    UPPER ABDOMEN: Diffuse hepatic steatosis.    MUSCULOSKELETAL: No suspicious abnormality.    OTHER: No additionally suspicious abnormality.      Impression    IMPRESSION:  1.  No acute pulmonary embolism.  2.  Grossly unchanged bilateral cervical lymphadenopathy.  3.  Unchanged 3 mm right middle lobe pulmonary nodule.  4.  Right lower lobe wedge resection.           CBC with Diff:  Recent Labs   Lab Test 11/21/23  1348 10/28/20  1420 10/28/19  0900   WBC 7.5   < > 6.8   HGB 10.8*   < > 11.4*   MCV 82   < > 82      < > 259   INR  --   --  1.01    < > = values in this interval not displayed.      Comprehensive Metabolic Panel:  Recent Labs   Lab 11/22/23  1135 11/22/23  0832 11/21/23  1348   NA  --  138 147*   POTASSIUM  --  4.0 3.6   CHLORIDE  --  103 107   CO2  --  25 27   ANIONGAP  --  10 13   GLC 97 130* 97   BUN  --  9.7 16.2   CR  --  0.61* 0.95   GFRESTIMATED  --  >90 82   WARREN  --  9.2 9.7   PROTTOTAL  --   --  7.7   ALBUMIN  --   --  4.1   BILITOTAL  --   --  0.4   ALKPHOS  --   --  109   AST  --   --  161*   ALT  --   --  36       Time Spent on this Encounter   I spent 25 minutes on the unit/floor managing the care of Jonathan Bishop. Over 50% of my time was spent counseling the patient and/or coordinating care regarding services listed in this note.    PORFIRIO Saucedo [facility]  Hospitalist Service  [facility]  Securely message with [employer] (more info)  Text page via [employer]  Paging/Directory

## 2023-11-23 ENCOUNTER — APPOINTMENT (OUTPATIENT)
Dept: PHYSICAL THERAPY | Facility: CLINIC | Age: 78
DRG: 178 | End: 2023-11-23
Attending: STUDENT IN AN ORGANIZED HEALTH CARE EDUCATION/TRAINING PROGRAM
Payer: COMMERCIAL

## 2023-11-23 ENCOUNTER — APPOINTMENT (OUTPATIENT)
Dept: SPEECH THERAPY | Facility: CLINIC | Age: 78
DRG: 178 | End: 2023-11-23
Attending: HOSPITALIST
Payer: COMMERCIAL

## 2023-11-23 ENCOUNTER — APPOINTMENT (OUTPATIENT)
Dept: OCCUPATIONAL THERAPY | Facility: CLINIC | Age: 78
DRG: 178 | End: 2023-11-23
Attending: HOSPITALIST
Payer: COMMERCIAL

## 2023-11-23 LAB
CHOLEST SERPL-MCNC: 177 MG/DL
CREAT SERPL-MCNC: 1 MG/DL (ref 0.67–1.17)
EGFRCR SERPLBLD CKD-EPI 2021: 77 ML/MIN/1.73M2
ERYTHROCYTE [DISTWIDTH] IN BLOOD BY AUTOMATED COUNT: 16.6 % (ref 10–15)
GLUCOSE BLDC GLUCOMTR-MCNC: 132 MG/DL (ref 70–99)
GLUCOSE BLDC GLUCOMTR-MCNC: 152 MG/DL (ref 70–99)
GLUCOSE BLDC GLUCOMTR-MCNC: 88 MG/DL (ref 70–99)
GLUCOSE BLDC GLUCOMTR-MCNC: 88 MG/DL (ref 70–99)
GLUCOSE BLDC GLUCOMTR-MCNC: 97 MG/DL (ref 70–99)
HCT VFR BLD AUTO: 32.2 % (ref 40–53)
HDLC SERPL-MCNC: 41 MG/DL
HGB BLD-MCNC: 9.7 G/DL (ref 13.3–17.7)
LDLC SERPL CALC-MCNC: 97 MG/DL
MCH RBC QN AUTO: 24 PG (ref 26.5–33)
MCHC RBC AUTO-ENTMCNC: 30.1 G/DL (ref 31.5–36.5)
MCV RBC AUTO: 80 FL (ref 78–100)
NONHDLC SERPL-MCNC: 136 MG/DL
PLATELET # BLD AUTO: 182 10E3/UL (ref 150–450)
RBC # BLD AUTO: 4.04 10E6/UL (ref 4.4–5.9)
TRIGL SERPL-MCNC: 195 MG/DL
WBC # BLD AUTO: 6.7 10E3/UL (ref 4–11)

## 2023-11-23 PROCEDURE — 250N000013 HC RX MED GY IP 250 OP 250 PS 637: Performed by: STUDENT IN AN ORGANIZED HEALTH CARE EDUCATION/TRAINING PROGRAM

## 2023-11-23 PROCEDURE — 258N000003 HC RX IP 258 OP 636: Performed by: STUDENT IN AN ORGANIZED HEALTH CARE EDUCATION/TRAINING PROGRAM

## 2023-11-23 PROCEDURE — 99232 SBSQ HOSP IP/OBS MODERATE 35: CPT | Performed by: PSYCHIATRY & NEUROLOGY

## 2023-11-23 PROCEDURE — 97535 SELF CARE MNGMENT TRAINING: CPT | Mod: GO

## 2023-11-23 PROCEDURE — 92610 EVALUATE SWALLOWING FUNCTION: CPT | Mod: GN

## 2023-11-23 PROCEDURE — 250N000011 HC RX IP 250 OP 636: Performed by: STUDENT IN AN ORGANIZED HEALTH CARE EDUCATION/TRAINING PROGRAM

## 2023-11-23 PROCEDURE — 250N000013 HC RX MED GY IP 250 OP 250 PS 637

## 2023-11-23 PROCEDURE — 250N000013 HC RX MED GY IP 250 OP 250 PS 637: Performed by: HOSPITALIST

## 2023-11-23 PROCEDURE — 82565 ASSAY OF CREATININE: CPT | Performed by: STUDENT IN AN ORGANIZED HEALTH CARE EDUCATION/TRAINING PROGRAM

## 2023-11-23 PROCEDURE — 97162 PT EVAL MOD COMPLEX 30 MIN: CPT | Mod: GP

## 2023-11-23 PROCEDURE — 36415 COLL VENOUS BLD VENIPUNCTURE: CPT | Performed by: STUDENT IN AN ORGANIZED HEALTH CARE EDUCATION/TRAINING PROGRAM

## 2023-11-23 PROCEDURE — 85027 COMPLETE CBC AUTOMATED: CPT | Performed by: STUDENT IN AN ORGANIZED HEALTH CARE EDUCATION/TRAINING PROGRAM

## 2023-11-23 PROCEDURE — 97116 GAIT TRAINING THERAPY: CPT | Mod: GP

## 2023-11-23 PROCEDURE — 120N000001 HC R&B MED SURG/OB

## 2023-11-23 PROCEDURE — 99233 SBSQ HOSP IP/OBS HIGH 50: CPT | Performed by: HOSPITALIST

## 2023-11-23 PROCEDURE — 99232 SBSQ HOSP IP/OBS MODERATE 35: CPT | Performed by: SPECIALIST

## 2023-11-23 PROCEDURE — 97165 OT EVAL LOW COMPLEX 30 MIN: CPT | Mod: GO

## 2023-11-23 PROCEDURE — 250N000011 HC RX IP 250 OP 636: Mod: JZ | Performed by: HOSPITALIST

## 2023-11-23 RX ORDER — FLUTICASONE PROPIONATE 50 MCG
1 SPRAY, SUSPENSION (ML) NASAL DAILY
Status: COMPLETED | OUTPATIENT
Start: 2023-11-23 | End: 2023-11-24

## 2023-11-23 RX ADMIN — TAMSULOSIN HYDROCHLORIDE 0.4 MG: 0.4 CAPSULE ORAL at 09:06

## 2023-11-23 RX ADMIN — FLUTICASONE PROPIONATE 1 SPRAY: 50 SPRAY, METERED NASAL at 18:17

## 2023-11-23 RX ADMIN — IBUPROFEN 600 MG: 600 TABLET ORAL at 18:19

## 2023-11-23 RX ADMIN — SODIUM CHLORIDE, PRESERVATIVE FREE 2 G: 5 INJECTION INTRAVENOUS at 02:09

## 2023-11-23 RX ADMIN — ASPIRIN 81 MG 81 MG: 81 TABLET ORAL at 09:06

## 2023-11-23 RX ADMIN — TORSEMIDE 40 MG: 20 TABLET ORAL at 09:06

## 2023-11-23 RX ADMIN — ENOXAPARIN SODIUM 40 MG: 40 INJECTION SUBCUTANEOUS at 22:22

## 2023-11-23 RX ADMIN — SODIUM CHLORIDE, PRESERVATIVE FREE 2 G: 5 INJECTION INTRAVENOUS at 10:23

## 2023-11-23 RX ADMIN — SODIUM CHLORIDE, PRESERVATIVE FREE 2 G: 5 INJECTION INTRAVENOUS at 18:16

## 2023-11-23 RX ADMIN — OXYCODONE HYDROCHLORIDE AND ACETAMINOPHEN 1 TABLET: 5; 325 TABLET ORAL at 09:12

## 2023-11-23 RX ADMIN — SPIRONOLACTONE 50 MG: 25 TABLET ORAL at 09:06

## 2023-11-23 ASSESSMENT — ACTIVITIES OF DAILY LIVING (ADL)
ADLS_ACUITY_SCORE: 41
ADLS_ACUITY_SCORE: 45
ADLS_ACUITY_SCORE: 49
ADLS_ACUITY_SCORE: 45
ADLS_ACUITY_SCORE: 45
ADLS_ACUITY_SCORE: 41
ADLS_ACUITY_SCORE: 49
ADLS_ACUITY_SCORE: 49
ADLS_ACUITY_SCORE: 41
ADLS_ACUITY_SCORE: 49
ADLS_ACUITY_SCORE: 45
ADLS_ACUITY_SCORE: 45

## 2023-11-23 NOTE — PROGRESS NOTES
"Speech-Language Pathology: Clinical Swallow Evaluation       11/23/23 0700   Appointment Info   Signing Clinician's Name / Credentials (SLP) Mary Chowdary MA CCC-SLP   General Information   Onset of Illness/Injury or Date of Surgery 11/21/23   Referring Physician Dr. Hebert Villalba   Patient/Family Therapy Goal Statement (SLP) \"I'd like some turkey.\"   Pertinent History of Current Problem 77 yo male with history of TAVR, CVA with L sided weakness, HTN, CAD and obesity currently undergoing radiation for oropharyngeal cancer (L tonsil mass), oligometastatic lung met, presenting to ER with weakness and testing positive for COVID and blood culture with staph aureus.  Referred to SLP per stroke protocol, MRI indicates no acute findings.   Type of Evaluation   Type of Evaluation Swallow Evaluation   Oral Motor   Oral Musculature generally intact   Mucosal Quality dry   Dentition (Oral Motor)   Dentition (Oral Motor) dental appliance/dentures   Dental Appliance/Denture (Oral Motor) upper and lower   Facial Symmetry (Oral Motor)   Facial Symmetry (Oral Motor) WNL   Lip Function (Oral Motor)   Lip Strength (Oral Motor) bilateral;mild impairment   Tongue Function (Oral Motor)   Tongue Strength (Oral Motor) left side;minimal impairment   Tongue ROM (Oral Motor) lateralization is impaired   Lateralization, Tongue ROM Impairment (Oral Motor) minimal impairment   Jaw Function (Oral Motor)   Jaw Function (Oral Motor) WNL   Facial Sensation   Facial Sensation WNL   Cough/Swallow/Gag Reflex (Oral Motor)   Volitional Throat Clear/Cough (Oral Motor) reduced strength   Volitional Swallow (Oral Motor) WNL   Vocal Quality/Secretion Management (Oral Motor)   Vocal Quality (Oral Motor) WNL   Secretion Management (Oral Motor) WNL   General Swallowing Observations   Past History of Dysphagia VFSS 2/24/23: Overall, pt demonstrates safe functional oropharyngeal swallow. 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 There is " no residual in the oral or pharyngeal phases when swallow is elicited. He does not have any episodes of aspiration/penetration. Adequate hyolaryngeal excursion and epiglottic inversion noted. UES opening is functional. Pt demonstrates bolus fractionation with cookie consistency. He swallowed the initial portion easily then advanced the remainder into his valleculae with very delayed second swallow. Once he triggers a swallow, the valleculae is cleared. He was able to swallow barium tablet in applesauce without difficulty. Unfortunately, this image wasn't saved. Recommend pt continue with regular consistency solids and thin liquids. He will eat slowly and alternate bites/sips. He will benefit from speech pathology services during his probable chemoradiation therapy.   Respiratory Support room air   Current Diet/Method of Nutritional Intake (General Swallowing Observations, NIS) thin liquids (level 0);regular diet   Swallowing Evaluation Clinical swallow evaluation   Clinical Swallow Evaluation   Clinical Swallow Evaluation Textures Trialed thin liquids;pureed;solid foods   Clinical Swallow Eval: Thin Liquid Texture Trial   Mode of Presentation, Thin Liquids spoon;cup;straw;self-fed;fed by clinician   Volume of Liquid or Food Presented 8 oz   Oral Phase of Swallow WFL   Pharyngeal Phase of Swallow intact   Diagnostic Statement no s/sx of aspiration   Clinical Swallow Evaluation: Puree Solid Texture Trial   Mode of Presentation, Puree spoon;fed by clinician   Volume of Puree Presented 2 oz   Oral Phase, Puree WFL   Pharyngeal Phase, Puree intact   Diagnostic Statement no s/sx of aspiration   Clinical Swallow Evaluation: Solid Food Texture Trial   Mode of Presentation self-fed   Volume Presented crackers   Oral Phase delayed AP movement   Pharyngeal Phase intact   Swallowing Recommendations   Diet Consistency Recommendations regular diet;thin liquids (level 0)   Mode of Delivery Recommendations bolus size, small;slow  rate of intake   Swallowing Maneuver Recommendations alternate food and liquid intake   Recommended Feeding/Eating Techniques (Swallow Eval) maintain upright sitting position for eating   Medication Administration Recommendations, Swallowing (SLP) whole in pureed   Instrumental Assessment Recommendations instrumental evaluation not recommended at this time   General Therapy Interventions   Planned Therapy Interventions Dysphagia Treatment   Dysphagia treatment Instruction of safe swallow strategies   Clinical Impression   Criteria for Skilled Therapeutic Interventions Met (SLP Eval) Yes, treatment indicated   Risks & Benefits of therapy have been explained evaluation/treatment results reviewed;care plan/treatment goals reviewed;participants voiced agreement with care plan;participants included;patient   Clinical Impression Comments Patient is alert, pleasant, cooperative and reports no difficulty chewing or swallowing. Patient presents with mild oropharyngeal dysphagia characterized by mild reduced bolus formation, mild prolonged however adequate mastication, multiple swallows to clear oral cavity, and no overt s/sx noted pureed, solids, or thin liquids. Recommended diet is regular and thin liquids with medication in pureed. Precautions: upright, slow rate, small bites/sips, alternate liquids/solids, monitor for s/sx of aspiration or decline in respiratory status.   SLP Total Evaluation Time   Eval: oral/pharyngeal swallow function, clinical swallow Minutes (86923) 16   SLP Goals   Therapy Frequency (SLP Eval) 5 times/week   SLP Goals Swallow   SLP: Safely tolerate diet without signs/symptoms of aspiration Regular diet;Thin liquids;With use of swallow precautions;Independently   Interventions   Interventions Quick Adds Swallowing Dysfunction   SLP Discharge Planning   SLP Plan diet tolerance, strategies,   SLP Discharge Recommendation home;home with outpatient therapy services   SLP Rationale for DC Rec patient  would benefit from ongoing SLP services to maximize safe swallow function   SLP Brief overview of current status  Patient presents with mild oropharyngeal dysphagia and recommended diet is regular and thin liquids with medication in pureed. Precautions: upright, slow rate, small bites/sips, alternate consistencies intermittently, monitor for s/sx or decline in respiratory status   Total Session Time   Total Session Time (sum of timed and untimed services) 16

## 2023-11-23 NOTE — PROGRESS NOTES
[facility]    Infectious Disease Progress Note    Date of Service : 11/23/2023       Assessment:  77 yo male with history of TAVR, CVA, HTN, CAD and obesity currently undergoing radiation for oropharyngeal cancer presenting with weakness and testing positive for COVID and blood culture with staph aureus.      -Afebrile. Not hypoxic. WBC normal.   -CXR with patchy opacity left lung base. Could have post-Covid staph pneumonia though CT chest did not show pneumonia . Nasopharyngeal mass may also be the source of bacteremia  -Blood culture (1 of 2) from admission on 11/21/23  positive for MSSA, follow up blood cxs are negative.   -No cellulitis or wounds other than shoulder abrasion from fall on 11/21/23.  -Hx. bilateral TKA. No knee pain presently.   -Hx TAVR.    Recommendations  Follow repeat blood cxs X 2 sets  If multiple blood cxs are positive, will need evaluation for endocarditis  Continue Cefazolin, will need IV antibiotics at discharge  Midline once blood cxs clear  ID will continue to follow    Paris Rao MD    Interval History   Feels ok, improving, asking about discharge  No new complaints, tolerating antibiotics without side effects    Physical Exam   Temp: 97.1  F (36.2  C) Temp src: Axillary BP: (!) 147/70 Pulse: 66   Resp: 18 SpO2: 97 % O2 Device: None (Room air)    There were no vitals filed for this visit.  Vital Signs with Ranges  Temp:  [97.1  F (36.2  C)-98.4  F (36.9  C)] 97.1  F (36.2  C)  Pulse:  [66-92] 66  Resp:  [16-18] 18  BP: (126-161)/(59-87) 147/70  SpO2:  [93 %-97 %] 97 %    Constitutional: Awake, alert, cooperative, no apparent distress  Lungs: Clear to auscultation bilaterally, no crackles or wheezing  Cardiovascular: S1S2  Abdomen: Normal bowel sounds, soft, non-distended, non-tender  Skin: No rashes, no cyanosis, no edema  MS :no knee swelling    Other:    Medications    - MEDICATION INSTRUCTIONS -        aspirin  81 mg Oral or Feeding Tube Daily    ceFAZolin  (ANCEF) 2 g in sodium chloride 0.9 % 100 mL  2 g Intravenous Q8H    tamsulosin  0.4 mg Oral Daily    Or    doxazosin  1 mg Oral or Feeding Tube Daily    [Held by provider] enoxaparin ANTICOAGULANT  40 mg Subcutaneous Q12H    nirmatrelvir and ritonavir  3 tablet Oral BID    spironolactone  50 mg Oral or Feeding Tube Daily    torsemide  40 mg Oral or Feeding Tube Daily       Data   All microbiology laboratory data reviewed.  Recent Labs   Lab Test 11/23/23  0729 11/22/23  1251 11/21/23  1348   WBC 6.7 7.7 7.5   HGB 9.7* 10.4* 10.8*   HCT 32.2* 34.9* 35.7*   MCV 80 81 82    191 208     Recent Labs   Lab Test 11/23/23  0729 11/22/23  0832 11/21/23  1348   CR 1.00 0.61* 0.95

## 2023-11-23 NOTE — PHARMACY-CONSULT NOTE
Pharmacy Consult to evaluate for medication related stroke core measures    Jonathan Bishop, 78 year old male admitted for fall, COVID+ on 11/21/2023.    Thrombolytic was not given because of Time from onset contraindications    VTE Prophylaxis SCDs /PCDs placed on 11/22, as appropriate prior to end of hospital day 2.    Antithrombotic: aspirin started on 11/22, as appropriate by end of hospital day 2. Continue antithrombotic therapy on discharge to meet quality measures, unless contraindicated.    Anticoagulation if history of A-fib/flutter: Patient does not have history of A-fib/flutter - anticoagulation not required for medication related stroke core measures.     LDL Cholesterol Calculated   Date Value Ref Range Status   11/22/2023 97 <=100 mg/dL Final   07/01/2019 51 <100 mg/dL Final     Comment:     Desirable:       <100 mg/dl       Patient's PTA atorvastatin is currently on hold d/t interaction with Paxlovid. Plan to resume 3 days after Paxlovid course completed.     Recommendations: None at this time    Thank you for the consult.    RUSS CARRION RPH 11/23/2023 8:39 AM

## 2023-11-23 NOTE — PLAN OF CARE
Goal Outcome Evaluation:       Summary: Weakness, fall, Covid positive.     DATE & TIME:11/23/2023 0539-1722  Cognitive Concerns/ Orientation : A&Ox4, forgetful at times, garbled speech.   BEHAVIOR & AGGRESSION TOOL COLOR: Green  CIWA SCORE: NA   ABNL VS/O2: VSS on RA. Intermittent productive coughs.  MOBILITY: Total care, up with lift, turn and repo q2h. Baseline uses walker/cane independently and uses motorized scooter for longer distances.   PAIN MANAGMENT: c/o lower back pain and left shoulder and hip pain, given prn ibuprofen x1 with some relief.    DIET: Regular   BOWEL/BLADDER: Incontinent of bowel and bladder, request for urinal at times, small stool x1 this shift.   ABNL LAB/BG: Cr 0.61, A1C 6.2, , 97. BC 1/2 positive, GPC in clusters and verigene panel positive for MSSA.  DRAIN/DEVICES: L PIV SL. Int. abx  TELEMETRY RHYTHM: NSR   SKIN: Reported numbness and tingling BLE baseline. Trace edema BLE. Bruise on L shoulder and hip.  Abrasion to left shoulder, dressing CDI.   TESTS/PROCEDURES: Brain MRI this shift.    D/C DAY/GOALS/PLACE: TBD  OTHER IMPORTANT INFO: Lung sounds diminished with infrequent productive cough. Neuro q4h, mild Lt facial droop and baseline Lt sided weakness, otherwise intact.

## 2023-11-23 NOTE — PROGRESS NOTES
"   11/23/23 9752   Appointment Info   Signing Clinician's Name / Credentials (PT) Monika Barragan, PT, DPT   Living Environment   People in Home alone   Current Living Arrangements apartment   Home Accessibility no concerns   Transportation Anticipated family or friend will provide   Living Environment Comments Walk in shower, grab bars, tall toilet with grab bars   Self-Care   Usual Activity Tolerance good   Current Activity Tolerance moderate   Equipment Currently Used at Home other (see comments)   Fall history within last six months yes   Number of times patient has fallen within last six months 2   Activity/Exercise/Self-Care Comment Pt lives alone, had a PCA until a few months ago ( 6 weeks per pt), who had helped him with meals and cleaning. Pt reports that otherwise he has been trying to take care of himself, but it's been difficult without a PCA. Ambulates without a device in household, walker in community and on rare occasion uses motorized scooter.   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 \"   Existing Precautions/Restrictions fall   Weight-Bearing Status - LLE full weight-bearing   Weight-Bearing Status - RLE full weight-bearing   Cognition   Follows Commands (Cognition) repetition of directions required;increased processing time needed   Pain Assessment   Patient Currently in Pain Yes, see Vital Sign flowsheet  (L shoulder  wound pain) "   Integumentary/Edema   Integumentary/Edema Comments L shoulder wound from fall, managed by nursing   Posture    Posture Forward head position   Range of Motion (ROM)   ROM Comment stiffness in B LE   Strength (Manual Muscle Testing)   Strength Comments decreased L strength compared to R, deficits from CVA in 2016   Bed Mobility   Comment, (Bed Mobility) session to/from chair   Transfers   Comment, (Transfers) mod assist of 2 to stand w/ FWW, unsteady especially at first   Gait/Stairs (Locomotion)   Comment, (Gait/Stairs) unsteady, especially at first, with FWW, slow pace and flexed posture   Balance   Balance Comments high falls risk, unsteady with FWW and assist   Sensory Examination   Sensory Perception patient reports no sensory changes   Clinical Impression   Criteria for Skilled Therapeutic Intervention Yes, treatment indicated   PT Diagnosis (PT) impaired functional mobility   Influenced by the following impairments strength, balance, activity tolerance, coordination   Functional limitations due to impairments bed mobility, transfers, ambulation   Clinical Presentation (PT Evaluation Complexity) evolving   Clinical Presentation Rationale clinical judgement   Clinical Decision Making (Complexity) moderate complexity   Planned Therapy Interventions (PT) balance training;bed mobility training;gait training;home exercise program;neuromuscular re-education;patient/family education;transfer training;strengthening   Risk & Benefits of therapy have been explained evaluation/treatment results reviewed;care plan/treatment goals reviewed;risks/benefits reviewed;current/potential barriers reviewed;participants voiced agreement with care plan;participants included;patient   PT Total Evaluation Time   PT Eval, Moderate Complexity Minutes (93487) 10   Physical Therapy Goals   PT Frequency Daily   PT Predicted Duration/Target Date for Goal Attainment 11/30/23   PT Goals Bed Mobility;Transfers;Gait   PT: Bed Mobility Modified  independent;Supine to/from sit   PT: Transfers Modified independent;Sit to/from stand;Bed to/from chair;Assistive device   PT: Gait Modified independent;Rolling walker;150 feet   Interventions   Interventions Quick Adds Gait Training   Gait Training   Gait Training Minutes (75711) 17   Symptoms Noted During/After Treatment (Gait Training) fatigue   Treatment Detail/Skilled Intervention co tx with OT first session d/t unknown activity tolerance and medical complexity; nursing hoyered pt to recliner chair. From chair, worked on sit to stands w/ mod assist of 2 with FWW. Pt very unsteady at first, needs assist for anterior weight shift. Worked on pregait first with weight shifting and marching in place, then ambulation forward w/ FWW with mod assist at first and chair follow. Gait very small steps, very unsteady at first. Improved second bout, mod assist to stand but min-mod of 1 to ambulation w/ FWW with chair follow in room x 25ft with assist for balance and FWW maneuvering. Tolerated relatively well, but fatigued.   Distance in Feet 5ft, 25ft   [place] Level (Gait Training) moderate assist (50% patient effort)   Physical Assistance Level (Gait Training) 2 person assist   Weight Bearing (Gait Training) full weight-bearing   Assistive Device (Gait Training) rolling walker   PT Discharge Planning   PT Plan progress ambulation w/ FWW, consider chair follow; activity tolerance and balance   PT Discharge Recommendation (DC Rec) Acute [facility]-Motivated patient will benefit from intensive, interdisciplinary therapy.  Anticipate will be able to tolerate 3 hours of therapy per day   PT Rationale for DC Rec Patient well below baseline for functional mobility w/ decreased strength and activity tolerance. Would benefit from intensive rehab to address this prior to returning home.   PT Brief overview of current status min-mod assist of 1-2 with FWW x 25ft   Total Session Time   Timed Code Treatment Minutes 17   Total  Session Time (sum of timed and untimed services) 27

## 2023-11-23 NOTE — PLAN OF CARE
Goal Outcome Evaluation:      Plan of Care Reviewed With: patient    Summary: Weakness, fall, Covid19 positive.     DATE & TIME:11/23/2023 2825-1416  Cognitive Concerns/ Orientation : A&Ox4, forgetful at times, garbled speech.   BEHAVIOR & AGGRESSION TOOL COLOR: Green  CIWA SCORE: NA   ABNL VS/O2: /69, 134/67, other VSS, O2 96% on RA. Intermittent productive coughs, denies chest pain/SOB  MOBILITY: Total care, up with lift, high falls risk. Baseline uses walker/cane independently. Uses scooter for longer distances.   PAIN MANAGMENT: c/o lower back pain, given prn Percocet x2 with some relief.    DIET: Regular   BOWEL/BLADDER: Incontinent of bowel and bladder, request for urinal at times, loose stool x1 this shift.   ABNL LAB/BG: Hgb 10.4, BC 1/2 positive, GPC in clusters and verigene panel positive for MSSA.  DRAIN/DEVICES: Peripheral IV SL left arm   TELEMETRY RHYTHM: NSR   SKIN: Reported numbness and tingling BLE. Trace edema BLE. Scattered bruising.  Abrasion to left shoulder, pt states from trying to get off the floor at home, WOC changed dressing today, CDI.   TESTS/PROCEDURES: s/p Head CT, negative for mass, hemorrhage or focal area suggestive of infarct. MRI brain pending.    D/C DAY/GOALS/PLACE: TBD  OTHER IMPORTANT INFO: Observation status changed to inpatient. Lung sounds diminished with infrequent congested/productive cough. D/t positive BC, started on IV Vancomycin, however ID discontinued Vancomycin after receiving one dose and started Cefazolin q8h.   RRT called around 1130 d/t new facial droop, please read PA's note for details. Neurology saw the pt. Neuro q4h, mild Lt facial droop and baseline Lt sided weakness, otherwise intact.

## 2023-11-23 NOTE — PROGRESS NOTES
[facility]  Hospitalist Progress Note        Hebert Villalba MD   11/23/2023        Interval History:        - Patient seems frustrated being in the hospital  - 1/2 Blood cultures 11/21 growing GPC in clusters (MSSA); blood cultures from 11/22 with no growth so far  - evaluated by ID and switched vancomycin to Ancef  - evaluate stroke neurology, started on aspirin  - MRI brain 11/22 noted no acute infarct, mass, mass effect, or hemorrhage; moderate chronic small vessel ischemia.  - CT head 11/22 noted no CT finding of a mass, hemorrhage or focal area suggestive of infarct  - CTA head and neck 11/22 noted no significant vascular stenosis or occlusion  - evaluated by SLP, noted mild oropharyngeal dysphagia, diet modification per SLP  - awaiting ECHO  - neurology (signed off 11/23) suggest likely recrudescence of old stroke symptoms in the context of acute infection and suggested no further stroke workup  - to continue ASA 81 mg daily, Lipitor 40 mg daily; will hold Lipitor until completion of Paxlovid given the risk for interaction         Assessment and Plan:        Jonathan Bishop is a 78 year old male with PMH significant for aortic valve replacement, CAD, HTN, HLD, CVA, and 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      Confirmed COVID-19 infection  Generalized weakness, mechanical fall 2/2 above     - presented with generalized weakness and a fall.  * COVID testing in the ED is positive.   *Remains afebrile and not hypoxic ; CXR did show patchy opacity in the L lung base which could represent atelectasis or pneumonia.   * Head CT, XR  L femur, and XR L tibia/fibula are negative for acute injury.   * D dimer is elevate to 4.22 and CRP is elevated to 69.61.     - per pharmacy recs, switched Lovenox to BID for DVT  prophylaxis  - CT chest PE protocol 11/21 noted no acute pulmonary embolism; Grossly unchanged bilateral cervical lymphadenopathy and unchanged 3 mm right middle lobe pulmonary nodule  - ultrasound lower extremity 11/21 noted no DVT  - continue Paxlovid; deferred steroids and remdesivir given that the patient is not hypoxic  - maintain COVID-19 special precautions, continuous pulse-ox and prn O2  - awaiting get PT/OT evaluation;  for disposition planning    Staph aureus bacteremia  - 1/2 Blood cultures 11/21 growing GPC in clusters (MSSA); blood cultures from 11/22 with no growth so far  - was started on Vancomycin  - evaluated by ID and switched vancomycin to Ancef (11/22)  - ECHO ordered    H/o CVA with residual left sided weakness  - on 11/22 was noted with left-sided weakness during therapy evaluation  - a code stroke was called, evaluated by house MARK  - he does have history of prior right UMM infarct and notes baseline residual left sided weakness and states that he ambulates with help of a cane and scooter  - evaluated by stroke neurology  - MRI brain 11/22 noted no acute infarct, mass, mass effect, or hemorrhage; moderate chronic small vessel ischemia.  - CT head 11/22 noted no CT finding of a mass, hemorrhage or focal area suggestive of infarct  - CTA head and neck 11/22 noted no significant vascular stenosis or occlusion  - evaluated by SLP, noted mild oropharyngeal dysphagia, diet modification per SLP  - awaiting ECHO  - neurology (signed off 11/23) suggest likely recrudescence of old stroke symptoms in the context of acute infection and suggested no further stroke workup  - to continue ASA 81 mg daily, Lipitor 40 mg daily; will hold Lipitor until completion of Paxlovid given the risk for interaction     Mild Anemia   Hemoglobin is 10.8---10.4---9.7, stable from baseline     Mild hypernateremia   Sodium is 147, improved.   - Monitor BMP intermittently  - Encourage PO intake      Squamous cell carcinoma  "of the oropharynx/tonsil with lung metastasis, currently undergoing radiation treatments   Chronic pain 2/2 above   - Continue PTA ibuprofen, percocet (dose reduced in the setting of Paxlovid administration), robaxin   - patient follows [facility] oncology (last saw DR Dominique Mueller on 11/15/23) and also following with radiation oncology and has been undergoing radiation treatment  - MRI brain 11/22/23 noted 4.5 x 3 x 4.5 cm mass lesion left nasopharynx; incompletely visualized. CT soft tissue neck with contrast recommended.   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 in the neck, appearance similar to PET/CT of 11/14/2020\"  - follow-up with PCP/oncology     Anxiety  - Continue PTA diazepam PRN      CAD   HTN  HLD  S/p AVR  CHF  (TTE in 2021 with EF of 60-65%, Grade I diastolic dysfunction), not in acute exacerbation    Chronic LE edema   - Hold PTA atorvastatin while on Paxlovid  - continue PTA torsemide, spironolactone     DVT Prophylaxis: resume Enoxaparin (Lovenox) subcutaneous 11/23    Code Status:DNR/DNI    Diet: Regular Diet Adult      Disposition:   - likely 1-2 days pending clinical stability, antibiotics plan per ID  - awaiting PT/OT evaluation; SW for disposition planning    Clinically Significant Risk Factors Present on Admission         # Hypernatremia: Highest Na = 147 mmol/L in last 2 days, will monitor as appropriate            # Hypertension: Noted on problem list                      Page Me (7 am to 6 pm)    Care plan discussed with patient and nursing;    High medical complexity              Physical Exam:      Blood pressure (!) 147/70, pulse 66, temperature 97.1  F (36.2  C), temperature source Axillary, resp. rate 18, SpO2 97%.  There were no vitals filed for this visit.  Vital Signs with Ranges  Temp:  [97.1  F (36.2  C)-98.4  F (36.9  C)] 97.1  F (36.2  C)  Pulse:  [66-92] 66  Resp:  [16-18] 18  BP: (126-161)/(59-87) 147/70  SpO2:  " [93 %-97 %] 97 %  I/O's Last 24 hours  I/O last 3 completed shifts:  In: 120 [P.O.:120]  Out: 600 [Urine:600]    Constitutional: Alert, awake and oriented X 3; resting comfortably in no apparent distress    Mild left facial droop   Oral cavity: Moist mucosa   Cardiovascular: Normal s1 s2, regular rate and rhythm, no murmur   Lungs: B/l clear to auscultation, no wheezes or crepitations   Abdomen: Soft, nt, nd, no guarding, rigidity or rebound; BS +   LE : Mild b/l edema +, more on left   Musculoskeletal/Neuro Left LE weakness, left hand  weak; power 5/5 in all other extremities   Psychiatry: normal mood and affect                Medications:         aspirin  81 mg Oral or Feeding Tube Daily    ceFAZolin (ANCEF) 2 g in sodium chloride 0.9 % 100 mL  2 g Intravenous Q8H    tamsulosin  0.4 mg Oral Daily    Or    doxazosin  1 mg Oral or Feeding Tube Daily    [Held by provider] enoxaparin ANTICOAGULANT  40 mg Subcutaneous Q12H    nirmatrelvir and ritonavir  3 tablet Oral BID    spironolactone  50 mg Oral or Feeding Tube Daily    torsemide  40 mg Oral or Feeding Tube Daily     PRN Meds: diazepam, ibuprofen **OR** ibuprofen, - MEDICATION INSTRUCTIONS -, methocarbamol, naloxone **OR** naloxone **OR** naloxone **OR** naloxone, ondansetron **OR** ondansetron, oxyCODONE-acetaminophen, senna-docusate **OR** senna-docusate         Data:      All new lab and imaging data was reviewed.   Recent Labs   Lab Test 11/23/23  0729 11/22/23  1251 11/21/23  1348 10/28/20  1420 10/28/19  0900 09/19/19  0858 11/30/16  0930   WBC 6.7 7.7 7.5   < > 6.8   < > 7.2   HGB 9.7* 10.4* 10.8*   < > 11.4*   < > 12.9*   MCV 80 81 82   < > 82   < > 78    191 208   < > 259   < > 289   INR  --   --   --   --  1.01  --  0.99    < > = values in this interval not displayed.      Recent Labs   Lab Test 11/23/23  0520 11/22/23  2337 11/22/23  1135 11/22/23  0832 11/21/23  1348 10/18/23  0941   NA  --   --   --  138 147* 143   POTASSIUM  --   --   --   4.0 3.6 3.6   CHLORIDE  --   --   --  103 107 104   CO2  --   --   --  25 27 30*   BUN  --   --   --  9.7 16.2 23.4*   CR  --   --   --  0.61* 0.95 0.95   ANIONGAP  --   --   --  10 13 9   WARREN  --   --   --  9.2 9.7 9.5   GLC 97 106* 97 130* 97 98     Recent Labs   Lab Test 12/01/16  0300 11/30/16  1930 11/30/16  0930   TROPI <0.015  The 99th percentile for upper reference range is 0.045 ug/L.  Troponin values in   the range of 0.045 - 0.120 ug/L may be associated with risks of adverse   clinical events.   <0.015  The 99th percentile for upper reference range is 0.045 ug/L.  Troponin values in   the range of 0.045 - 0.120 ug/L may be associated with risks of adverse   clinical events.   <0.015  The 99th percentile for upper reference range is 0.045 ug/L.  Troponin values in   the range of 0.045 - 0.120 ug/L may be associated with risks of adverse   clinical events.

## 2023-11-23 NOTE — PROVIDER NOTIFICATION
MD Notification    Notified Person: MD    Notified Person Name: Isabelle    Notification Date/Time: 11/23/23 0005    Notification Interaction: [employer]    Purpose of Notification: [facility], Dr. Finch, would like to speak to our MDs regarding pts brain MRI. Can you please call them at [phone].     Orders Received:    Comments:

## 2023-11-23 NOTE — PROGRESS NOTES
"   11/23/23 1350   Appointment Info   Signing Clinician's Name / Credentials (OT) Ayaka Larsen, OTR/L   Living Environment   People in Home alone   Current Living Arrangements apartment   Home Accessibility no concerns   Transportation Anticipated family or friend will provide   Living Environment Comments Walk in shower, grab bars, tall toilet with grab bars. Pt reports formerly had PCA up until 6 weeks ago assisting with IADLs.   Self-Care   Usual Activity Tolerance good   Current Activity Tolerance moderate   Equipment Currently Used at Home other (see comments)  (Reacher, sock aid)   Fall history within last six months yes   Number of times patient has fallen within last six months 2   Activity/Exercise/Self-Care Comment Ind ADLs baseline   Instrumental Activities of Daily Living (IADL)   IADL Comments PCA assist with IADLs until 6 weeks ago - pt reports it has been difficult to do these on his own since   General Information   Onset of Illness/Injury or Date of Surgery 11/21/23   Referring Physician Hebert Villalba MD   Patient/Family Therapy Goal Statement (OT) To get stronger   Additional Occupational Profile Info/Pertinent History of Current Problem Per chart: \"Jonathan Bishop is a 78 year old male with PMH significant for aortic valve replacement, CAD, HTN, HLD, CVA, and squamous cell carcinoma of the oropharynx currently undergoing radiation treatments admitted on 11/21/2023 with generalized weakness and a fall in the setting of COVID-19 infection.\"   Existing Precautions/Restrictions fall   Limitations/Impairments safety/cognitive   Cognitive Status Examination   Orientation Status orientation to person, place and time   Affect/Mental Status (Cognitive) confused   Follows Commands follows one-step commands;75-90% accuracy;delayed response/completion;increased processing time needed;physical/tactile prompts required;repetition of directions required;verbal cues/prompting required   Safety Deficit " awareness of need for assistance;impulsivity;insight into deficits/self-awareness;judgment;problem-solving   Cognitive Status Comments Pt requires repeated cues throughout session   Visual Perception   Visual Impairment/Limitations WFL;corrective lenses for reading   Sensory   Sensory Comments Pt denies numbness/tingling   Pain Assessment   Patient Currently in Pain   (LBP and pain in L shoulder - L shoulder wound from fall)   Range of Motion Comprehensive   Comment, General Range of Motion BUEs WFL - limited B shoulder flexion   Strength Comprehensive (MMT)   Comment, General Manual Muscle Testing (MMT) Assessment BUEs WFL, global weakness   Coordination   Upper Extremity Coordination No deficits were identified   Bed Mobility   Comment (Bed Mobility) NT - pt up in chair at time of session   Transfers   Transfers sit-stand transfer;toilet transfer   Sit-Stand Transfer   Sit/Stand Transfer Comments Mod A x 2   Toilet Transfer   Toilet Transfer Comments CGA per clinical judgement   Balance   Balance Comments Unsteadiness with FWW with mobility   Activities of Daily Living   BADL Assessment/Intervention lower body dressing;toileting   Lower Body Dressing Assessment/Training   Comment, (Lower Body Dressing) Dep A B socks   Toileting   Comment, (Toileting) Dep A for urinal placement   Clinical Impression   Criteria for Skilled Therapeutic Interventions Met (OT) Yes, treatment indicated   OT Diagnosis Decreased ind with I/ADLs   OT Problem List-Impairments impacting ADL problems related to;balance;activity tolerance impaired;cognition;mobility;strength;pain   Assessment of Occupational Performance 3-5 Performance Deficits   Identified Performance Deficits dressing, bathing, toileting, heavy tasks   Planned Therapy Interventions (OT) ADL retraining;IADL retraining;cognition;transfer training   Clinical Decision Making Complexity (OT) problem focused assessment/low complexity   Risk & Benefits of therapy have been  explained patient   OT Total Evaluation Time   OT Eval, Low Complexity Minutes (26788) 10   OT Goals   Therapy Frequency (OT) Daily   OT Predicted Duration/Target Date for Goal Attainment 11/30/23   OT Goals Hygiene/Grooming;Lower Body Dressing;Transfers;Toilet Transfer/Toileting;Cognition;OT Goal 1   OT: Hygiene/Grooming modified independent;while standing  (FWW)   OT: Lower Body Dressing Modified independent;using adaptive equipment;within precautions  (FWW)   OT: Transfer Modified independent;with assistive device  (FWW, walk in)   OT: Toilet Transfer/Toileting Modified independent;toilet transfer;cleaning and garment management;using adaptive equipment;within precautions  (FWW, commode over toilet)   OT: Cognitive Patient/caregiver will verbalize understanding of cognitive assessment results/recommendations as needed for safe discharge planning   OT: Goal 1 Pt will demo BUE HEP for increased strength and ind with I/ADLs.   Self-Care/Home Management   Self-Care/Home Mgmt/ADL, Compensatory, Meal Prep Minutes (39269) 20   Symptoms Noted During/After Treatment (Meal Preparation/Planning Training) fatigue   Treatment Detail/Skilled Intervention Co-tx with PT d/t pt not mobilizing yet, medical complexity, requiring two skilled therapists. Pt demo some confusion throughout session, requiring repeated cues and questions. Pt requesting to void in urinal, pt with Dep A for urinal placement and positioning. DepA for brief management. Pt demo sit > stand from chair with Mod A x 2, assist with trunk support, support of FWW, and cues for hand placement/posture. Pt ambulated approx. 5 ft with chair follow and Mod Ax 1-2 intermittently, cues for walker safety, FWW. Pt sits back into chair with Mod A x 2 for controlled sit, VCs to hand placement. Pt stands once more and demo room level mobility around room beyond BR level distancce with Mod A x2 progressing to Mod Ax 1 with chair follow, cues throughout for walker safety,  managing walker. Pt sits back in chair with Mod A x 2 for slow controlled sit, cues for hand placement and safety. Pt up in chair with needs met, alarm set, items in reach, RN updated.   OT Discharge Planning   OT Plan Cog screen, progress to BR transfers w/commode over toilet, BUE HEP, gh for standing sherin sinkside with commode chair behind, LB dressing with AE   OT Discharge Recommendation (DC Rec) Acute Rehab Center-Motivated patient will benefit from intensive, interdisciplinary therapy.  Anticipate will be able to tolerate 3 hours of therapy per day   OT Rationale for DC Rec Pt functioning below baseline with decreased strength, activity sherin, mobility, balance, cognition, impacting I/ADL ind. Pt would benefit from skilled ARU for increased strength/ind with I/ADLs.   OT Brief overview of current status See above   OT Equipment Needed at Discharge other (see comments);raised toilet seat  (Will continue to assess -pt has reacher and sock aid)   Total Session Time   Timed Code Treatment Minutes 20   Total Session Time (sum of timed and untimed services) 30

## 2023-11-23 NOTE — PROGRESS NOTES
Vascular Neurology Progress Note     Date of Service: 11/23/2023       SUBJECTIVE:  Patient feels better today. More energy. Better mood and clearheaded. Patient reports that his left sided weakness and the right UE weakness is his baseline.          OBJECTIVE:  BP (!) 147/70 (BP Location: Right arm)   Pulse 66   Temp 97.1  F (36.2  C) (Axillary)   Resp 18   SpO2 97%      Neuro Exam:   Awake. Semisitting in bed. RN at bedside giving meds and observing him as he eats his breakfast.  Oriented to time and place. Able to spell 'world' forward but made one mistake spelling it backward. Decreased fluency but naming and comprehension are intact. No extinction or neglect.   No field cut. No ophthalmoplegia. Normal facial sensation. Left facial droop. Mild dysarthria. Frequent strong cough productive of moderately thick phlegm. Able to swallow food, pill, and water without choking. No tongue deviation or weakness.   Atrophy of small muscles of the hand bilaterally. Left hemiparesis, right elbow extension 2/5.   No hemihypesthesia.   No ataxia or involuntary movements.         Imaging:   I personally reviewed the MRI brain which did not show acute pathology. I also reviewed the CTA which was of poor quality due to suboptimal arterial opacification yet no obvious severe stenosis or occlusion.         ASSESSMENT & PLAN:  Recrudescence of old stroke symptoms in the context of acute infection (COVID and GPC bacteremia). Patient has history of old stroke happened in 2016 and involved the R UMM territory. MRA at that time showed stenosis of the R ICA origin, R UMM, R PCA, and L vert. Patient has residual left hemiparesis since that stroke. This admission, MRI brain is negative and CTA does not show severe stenosis or occlusion.     Patient reports being back to baseline now. He also indicates that he takes aspirin 81 mg daily at home although I did not see it on his home med list.     -No further stroke workup is indicated.  "  -Continue home meds ASA 81 mg/d and atrovastatin 40.   -Vascular risk factors control. Target BP < 130/80. Target LDL < 70.     Will sign off. Please call with questions.     I spent 35 minutes in face to face with this patient and coordinating care. Over 50% of the time on the unit was spent counseling the patient and/or coordinating care as documented.     Fransico Hernandez MD, Msc, FAHA, FAAN   of Neurology  [facility]     11/23/2023 11:17 AM  To page me or covering stroke neurology team member, click here: AMCOM  Choose \"On Call\" tab at top, then search dropdown box for \"Neurology Adult\" & press Enter, look for Neuro ICU/Stroke    "

## 2023-11-24 ENCOUNTER — APPOINTMENT (OUTPATIENT)
Dept: PHYSICAL THERAPY | Facility: CLINIC | Age: 78
DRG: 178 | End: 2023-11-24
Payer: COMMERCIAL

## 2023-11-24 ENCOUNTER — APPOINTMENT (OUTPATIENT)
Dept: OCCUPATIONAL THERAPY | Facility: CLINIC | Age: 78
DRG: 178 | End: 2023-11-24
Payer: COMMERCIAL

## 2023-11-24 ENCOUNTER — APPOINTMENT (OUTPATIENT)
Dept: CARDIOLOGY | Facility: CLINIC | Age: 78
DRG: 178 | End: 2023-11-24
Attending: HOSPITALIST
Payer: COMMERCIAL

## 2023-11-24 LAB
BACTERIA BLD CULT: ABNORMAL
BACTERIA BLD CULT: ABNORMAL
CREAT SERPL-MCNC: 0.86 MG/DL (ref 0.67–1.17)
EGFRCR SERPLBLD CKD-EPI 2021: 89 ML/MIN/1.73M2
ERYTHROCYTE [DISTWIDTH] IN BLOOD BY AUTOMATED COUNT: 16.1 % (ref 10–15)
GLUCOSE BLDC GLUCOMTR-MCNC: 129 MG/DL (ref 70–99)
GLUCOSE BLDC GLUCOMTR-MCNC: 88 MG/DL (ref 70–99)
GLUCOSE BLDC GLUCOMTR-MCNC: 92 MG/DL (ref 70–99)
GLUCOSE BLDC GLUCOMTR-MCNC: 98 MG/DL (ref 70–99)
HCT VFR BLD AUTO: 33.6 % (ref 40–53)
HGB BLD-MCNC: 10.2 G/DL (ref 13.3–17.7)
LVEF ECHO: NORMAL
MCH RBC QN AUTO: 24.5 PG (ref 26.5–33)
MCHC RBC AUTO-ENTMCNC: 30.4 G/DL (ref 31.5–36.5)
MCV RBC AUTO: 81 FL (ref 78–100)
PLATELET # BLD AUTO: 206 10E3/UL (ref 150–450)
RBC # BLD AUTO: 4.16 10E6/UL (ref 4.4–5.9)
WBC # BLD AUTO: 6.3 10E3/UL (ref 4–11)

## 2023-11-24 PROCEDURE — 250N000011 HC RX IP 250 OP 636: Mod: JZ | Performed by: HOSPITALIST

## 2023-11-24 PROCEDURE — 85027 COMPLETE CBC AUTOMATED: CPT | Performed by: HOSPITALIST

## 2023-11-24 PROCEDURE — 97530 THERAPEUTIC ACTIVITIES: CPT | Mod: GP

## 2023-11-24 PROCEDURE — 97110 THERAPEUTIC EXERCISES: CPT | Mod: GO

## 2023-11-24 PROCEDURE — 250N000013 HC RX MED GY IP 250 OP 250 PS 637

## 2023-11-24 PROCEDURE — 36415 COLL VENOUS BLD VENIPUNCTURE: CPT | Performed by: HOSPITALIST

## 2023-11-24 PROCEDURE — 93306 TTE W/DOPPLER COMPLETE: CPT | Mod: 26 | Performed by: INTERNAL MEDICINE

## 2023-11-24 PROCEDURE — 250N000013 HC RX MED GY IP 250 OP 250 PS 637: Performed by: STUDENT IN AN ORGANIZED HEALTH CARE EDUCATION/TRAINING PROGRAM

## 2023-11-24 PROCEDURE — 258N000003 HC RX IP 258 OP 636: Performed by: STUDENT IN AN ORGANIZED HEALTH CARE EDUCATION/TRAINING PROGRAM

## 2023-11-24 PROCEDURE — 97535 SELF CARE MNGMENT TRAINING: CPT | Mod: GO

## 2023-11-24 PROCEDURE — 97116 GAIT TRAINING THERAPY: CPT | Mod: GP

## 2023-11-24 PROCEDURE — 87040 BLOOD CULTURE FOR BACTERIA: CPT | Performed by: HOSPITALIST

## 2023-11-24 PROCEDURE — 120N000001 HC R&B MED SURG/OB

## 2023-11-24 PROCEDURE — 99232 SBSQ HOSP IP/OBS MODERATE 35: CPT | Performed by: PHYSICIAN ASSISTANT

## 2023-11-24 PROCEDURE — 99221 1ST HOSP IP/OBS SF/LOW 40: CPT | Performed by: PODIATRIST

## 2023-11-24 PROCEDURE — 255N000002 HC RX 255 OP 636: Performed by: STUDENT IN AN ORGANIZED HEALTH CARE EDUCATION/TRAINING PROGRAM

## 2023-11-24 PROCEDURE — 999N000208 ECHOCARDIOGRAM COMPLETE

## 2023-11-24 PROCEDURE — 99232 SBSQ HOSP IP/OBS MODERATE 35: CPT | Performed by: HOSPITALIST

## 2023-11-24 PROCEDURE — 82565 ASSAY OF CREATININE: CPT | Performed by: STUDENT IN AN ORGANIZED HEALTH CARE EDUCATION/TRAINING PROGRAM

## 2023-11-24 PROCEDURE — 250N000011 HC RX IP 250 OP 636: Performed by: STUDENT IN AN ORGANIZED HEALTH CARE EDUCATION/TRAINING PROGRAM

## 2023-11-24 RX ADMIN — SODIUM CHLORIDE, PRESERVATIVE FREE 2 G: 5 INJECTION INTRAVENOUS at 18:17

## 2023-11-24 RX ADMIN — SPIRONOLACTONE 50 MG: 25 TABLET ORAL at 09:10

## 2023-11-24 RX ADMIN — ENOXAPARIN SODIUM 40 MG: 40 INJECTION SUBCUTANEOUS at 21:56

## 2023-11-24 RX ADMIN — SODIUM CHLORIDE, PRESERVATIVE FREE 2 G: 5 INJECTION INTRAVENOUS at 10:52

## 2023-11-24 RX ADMIN — TORSEMIDE 40 MG: 20 TABLET ORAL at 09:10

## 2023-11-24 RX ADMIN — ENOXAPARIN SODIUM 40 MG: 40 INJECTION SUBCUTANEOUS at 10:52

## 2023-11-24 RX ADMIN — FLUTICASONE PROPIONATE 1 SPRAY: 50 SPRAY, METERED NASAL at 18:17

## 2023-11-24 RX ADMIN — OXYCODONE HYDROCHLORIDE AND ACETAMINOPHEN 1 TABLET: 5; 325 TABLET ORAL at 14:06

## 2023-11-24 RX ADMIN — HUMAN ALBUMIN MICROSPHERES AND PERFLUTREN 6 ML: 10; .22 INJECTION, SOLUTION INTRAVENOUS at 10:20

## 2023-11-24 RX ADMIN — IBUPROFEN 600 MG: 600 TABLET ORAL at 12:31

## 2023-11-24 RX ADMIN — TAMSULOSIN HYDROCHLORIDE 0.4 MG: 0.4 CAPSULE ORAL at 09:10

## 2023-11-24 RX ADMIN — FLUTICASONE PROPIONATE 1 SPRAY: 50 SPRAY, METERED NASAL at 03:24

## 2023-11-24 RX ADMIN — SODIUM CHLORIDE, PRESERVATIVE FREE 2 G: 5 INJECTION INTRAVENOUS at 03:15

## 2023-11-24 RX ADMIN — OXYCODONE HYDROCHLORIDE AND ACETAMINOPHEN 1 TABLET: 5; 325 TABLET ORAL at 07:08

## 2023-11-24 RX ADMIN — ASPIRIN 81 MG 81 MG: 81 TABLET ORAL at 09:10

## 2023-11-24 ASSESSMENT — ACTIVITIES OF DAILY LIVING (ADL)
ADLS_ACUITY_SCORE: 43
ADLS_ACUITY_SCORE: 41
ADLS_ACUITY_SCORE: 43
ADLS_ACUITY_SCORE: 41
ADLS_ACUITY_SCORE: 43
ADLS_ACUITY_SCORE: 43
ADLS_ACUITY_SCORE: 41
DEPENDENT_IADLS:: CLEANING;LAUNDRY;SHOPPING;MEAL PREPARATION
ADLS_ACUITY_SCORE: 41

## 2023-11-24 NOTE — CONSULTS
Care Management Initial Consult    General Information  Assessment completed with: Patient,    Type of CM/SW Visit: Initial Assessment    Primary Care Provider verified and updated as needed: Yes   Readmission within the last 30 days: no previous admission in last 30 days      Reason for Consult: discharge planning  Advance Care Planning:            Communication Assessment  Patient's communication style: spoken language (English or Bilingual)    Hearing Difficulty or Deaf: no   Wear Glasses or Blind: yes    Cognitive  Cognitive/Neuro/Behavioral: WDL                      Living Environment:   People in home: alone     Current living Arrangements: apartment      Able to return to prior arrangements: no       Family/Social Support:  Care provided by: self  Provides care for: no one  Marital Status: Single  Sibling(s)          Description of Support System: Supportive    Support Assessment: Lacks adequate physical care    Current Resources:   Patient receiving home care services: Yes     Community Resources:    Equipment currently used at home: wheelchair, power, walker, rolling  Supplies currently used at home:      Employment/Financial:  Employment Status: retired        Financial Concerns:             Does the patient's insurance plan have a 3 day qualifying hospital stay waiver?  No    Lifestyle & Psychosocial Needs:  Social Determinants of Health     Food Insecurity: Not on file   Depression: Not at risk (1/16/2023)    PHQ-2     PHQ-2 Score: 2   Housing Stability: Not on file   Tobacco Use: Medium Risk (9/7/2023)    Patient History     Smoking Tobacco Use: Former     Smokeless Tobacco Use: Never     Passive Exposure: Past   Financial Resource Strain: Not on file   Alcohol Use: Not on file   Transportation Needs: Not on file   Physical Activity: Not on file   Interpersonal Safety: Not on file   Stress: Not on file   Social Connections: Not on file       Functional Status:  Prior to admission patient needed  assistance:   Dependent ADLs:: Independent  Dependent IADLs:: Cleaning, Laundry, Shopping, Meal Preparation  Assesssment of Functional Status: Not at baseline with mobility, Not at baseline with ADL Functioning, Not at  functional baseline    Mental Health Status:  Mental Health Status: No Current Concerns       Chemical Dependency Status:  Chemical Dependency Status: No Current Concerns             Values/Beliefs:  Spiritual, Cultural Beliefs, [employer]alism Practices, Values that affect care: no               Additional Information:  Spoke with patient regarding plan of care and discharge plans.  Patient states he lives in an apartment and is independent with mobility and personal acres.  He has PCA services 3x/week for cleaning cooking and house chores.However his PCA has not showed for the last couple months. Patient does not remember the name of the agency . Pt reports that otherwise he has been trying to take care of himself, but it's been difficult without a PCA. Ambulates without a device in household, walker in community and on rare occasion uses motorized scooter.   Discussed that therapy recommends ARU however after reviewing by the ARU Liaison Blake,patient does not meet criteria.   Patient will benefit from TCU. SW will continue to follow patient for discharge needs.     Dong Valdez RN [phone]

## 2023-11-24 NOTE — PLAN OF CARE
Summary: Weakness, fall, Covid positive.     DATE & TIME:11/24/2023 [id number]  Cognitive Concerns/ Orientation : A&Ox4, forgetful at times.   BEHAVIOR & AGGRESSION TOOL COLOR: Green  CIWA SCORE: NA   ABNL VS/O2: VSS on RA. Frequent productive coughs w/ some bloody sputum x2 this shift. MD aware. Continuing to monitor.  MOBILITY: Total care, assist x1-2 with walker and gait belt, baseline uses walker/cane independently and uses motorized scooter for longer distances.   PAIN MANAGMENT: Reports low back pain 7/10. Managed with PRN ibuprofen and percocet.  DIET: Regular   BOWEL/BLADDER: Using urinal at bedside and attempted male external catheter, but leaked and needed full bed change.  ABNL LAB/BG: BG 98, Hgb 10.2   DRAIN/DEVICES: L PIV SL. Int. abx  TELEMETRY RHYTHM: 1st degree AV block. Discontinued this shift.  SKIN: Reported numbness and tingling BLE baseline. Trace edema BLE. Compression stocking on. Bruise on L shoulder and hip. Abrasion to left shoulder, applied new dressing, CDI.  TESTS/PROCEDURES: Echo completed   D/C DAY/GOALS/PLACE: TBD. TCU placement.  OTHER IMPORTANT INFO: Lung sounds diminished with frequent productive cough. Neuro q4h, minimal L facial droop and baseline L sided weakness, otherwise intact. On IV Ancef q8h. Neurology/SLP/PT/OT following. Podiatry saw patient and signed off, f/u as OP.

## 2023-11-24 NOTE — CONSULTS
[facility]  CONSULTATION NOTE    CHIEF COMPLAINT:      I was asked by Hebert Villalba MD  to evaluate this patient for ingrown toenail    PATIENT HISTORY:  Jonathan Bishop is a 78 year old male  with a past medical history significant for what's listed below. He presented to the hospital on 11/21 after falling out of bed, stating he had worsening weakness. He was found to have Covid-19 with other medical problems, contributing to his weakness. Podiatry has been consulted for his left hallux, where he states he was going to have an ingrown toenail procedure done with Dr. Gary. He states the nail is tender, denies drainage to site.         Review of Systems:  A 10 point review of systems was performed and is positive for that noted above in the patient history.  All other areas are negative.     PAST MEDICAL HISTORY:   Past Medical History:   Diagnosis Date    * * * SBE PROPHYLAXIS * * *     s/p TAVR    Acute rheumatic carditis 1950    Complication of anesthesia     pt once woke up during back surgery    Coronary artery disease     murmur    Erectile dysfunction     Sildenafil    Hip pain, bilateral     Hypercholesteremia     Hypertension     Low back pain     Nonsenile cataract     Obesity     Renal cyst     S/P 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    Stroke (cerebrum) (H) 11/30/2016    Umbilical hernia 06/10/2011    s/p surgical repair    Wound, surgical, infected 06/10/2011    hernia        PAST SURGICAL HISTORY:   Past Surgical History:   Procedure Laterality Date    ARTHROPLASTY KNEE BILATERAL  7/22/2010    COLONOSCOPY N/A 4/14/2017    Procedure: COLONOSCOPY;  Surgeon: Toby Bills MD;  Location: [facility]    CV CORONARY ANGIOGRAM N/A 10/28/2019    Procedure: Coronary Angiogram;  Surgeon: Guerrero Huitron MD;  Location:  HEART CARDIAC CATH LAB    CV RIGHT HEART CATH MEASUREMENTS RECORDED N/A 10/28/2019     Procedure: Right Heart Cath;  Surgeon: Guerrero Huitron MD;  Location:  HEART CARDIAC CATH LAB    CV TRANSCATHETER AORTIC VALVE REPLACEMENT N/A 2020    Procedure: Transcatheter Aortic Valve Replacement;  Surgeon: Camila Begum MD;  Location:  HEART CARDIAC CATH LAB    FUSION LUMBAR ANTERIOR TWO LEVELS      HERNIORRHAPHY UMBILICAL  6/10/2011    Procedure:HERNIORRHAPHY UMBILICAL; Open, with mesh placement; Surgeon:HO PARHAM; Location:[facility]    LAMINECTOMY LUMBAR TWO LEVELS      THORACOSCOPIC WEDGE RESECTION LUNG Right 3/2/2023    Procedure: Right thoracoscopic wedge resection;  Surgeon: Hebert Jones MD;  Location:  OR        MEDICATIONS:  Reviewed in Epic. Current.     ALLERGIES:  No Known Allergies     SOCIAL HISTORY:   Social History     Socioeconomic History    Marital status:      Spouse name: Not on file    Number of children: Not on file    Years of education: Not on file    Highest education level: Not on file   Occupational History    Not on file   Tobacco Use    Smoking status: Former     Types: Cigarettes     Quit date: 2005     Years since quittin.9     Passive exposure: Past    Smokeless tobacco: Never    Tobacco comments:     Non smoker. No 2nd hand exposure. CCX, RMA PCC 2011   Substance and Sexual Activity    Alcohol use: No    Drug use: No    Sexual activity: Not Currently   Other Topics Concern    Parent/sibling w/ CABG, MI or angioplasty before 65F 55M? Not Asked   Social History Narrative    He lives by himself in an apt in [place].  He has 2 goldfish, Lai and Ubaldo.     Social Determinants of Health     Financial Resource Strain: Not on file   Food Insecurity: Not on file   Transportation Needs: Not on file   Physical Activity: Not on file   Stress: Not on file   Social Connections: Not on file   Interpersonal Safety: Not on file   Housing Stability: Not on file        FAMILY HISTORY:   Family History   Problem Relation Age of Onset     C.A.D. Mother     Unknown/Adopted Father     Heart Failure Sister     Heart Failure Sister     Glaucoma No family hx of     Macular Degeneration No family hx of     Diabetes No family hx of     Hypertension No family hx of         EXAM:Vitals: BP (!) 164/71 (BP Location: Right arm)   Pulse 70   Temp 97.7  F (36.5  C) (Oral)   Resp 18   SpO2 95%   BMI= There is no height or weight on file to calculate BMI.    LABS:     Last Comprehensive Metabolic Panel:  Sodium   Date Value Ref Range Status   11/22/2023 138 135 - 145 mmol/L Final     Comment:     Reference intervals for this test were updated on 09/26/2023 to more accurately reflect our healthy population. There may be differences in the flagging of prior results with similar values performed with this method. Interpretation of those prior results can be made in the context of the updated reference intervals.    06/21/2021 138 133 - 144 mmol/L Final     Potassium   Date Value Ref Range Status   11/22/2023 4.0 3.4 - 5.3 mmol/L Final   08/03/2022 3.5 3.4 - 5.3 mmol/L Final   06/21/2021 4.4 3.4 - 5.3 mmol/L Final     Chloride   Date Value Ref Range Status   11/22/2023 103 98 - 107 mmol/L Final   08/03/2022 107 94 - 109 mmol/L Final   06/21/2021 106 94 - 109 mmol/L Final     Carbon Dioxide   Date Value Ref Range Status   06/21/2021 29 20 - 32 mmol/L Final     Carbon Dioxide (CO2)   Date Value Ref Range Status   11/22/2023 25 22 - 29 mmol/L Final   08/03/2022 27 20 - 32 mmol/L Final     Anion Gap   Date Value Ref Range Status   11/22/2023 10 7 - 15 mmol/L Final   08/03/2022 6 3 - 14 mmol/L Final   06/21/2021 3 3 - 14 mmol/L Final     Glucose   Date Value Ref Range Status   08/03/2022 96 70 - 99 mg/dL Final   06/21/2021 111 (H) 70 - 99 mg/dL Final     GLUCOSE BY METER POCT   Date Value Ref Range Status   11/24/2023 98 70 - 99 mg/dL Final     Urea Nitrogen   Date Value Ref Range Status   11/22/2023 9.7 8.0 - 23.0 mg/dL Final   08/03/2022 21 7 - 30 mg/dL Final    06/21/2021 24 7 - 30 mg/dL Final     Creatinine   Date Value Ref Range Status   11/23/2023 1.00 0.67 - 1.17 mg/dL Final   06/21/2021 1.08 0.66 - 1.25 mg/dL Final     GFR Estimate   Date Value Ref Range Status   11/23/2023 77 >60 mL/min/1.73m2 Final   06/21/2021 66 >60 mL/min/[1.73_m2] Final     Comment:     Non  GFR Calc  Starting 12/18/2018, serum creatinine based estimated GFR (eGFR) will be   calculated using the Chronic Kidney Disease Epidemiology Collaboration   (CKD-EPI) equation.       GFR, ESTIMATED POCT   Date Value Ref Range Status   08/16/2023 57 (L) >60 mL/min/1.73m2 Final     Calcium   Date Value Ref Range Status   11/22/2023 9.2 8.8 - 10.2 mg/dL Final   06/21/2021 9.7 8.5 - 10.1 mg/dL Final     Lab Results   Component Value Date    WBC 6.3 11/24/2023    WBC 7.2 01/05/2021     Lab Results   Component Value Date    RBC 4.16 11/24/2023    RBC 4.50 01/05/2021     Lab Results   Component Value Date    HGB 10.2 11/24/2023    HGB 10.9 04/16/2021     Lab Results   Component Value Date    HCT 33.6 11/24/2023    HCT 38.0 01/05/2021     Lab Results   Component Value Date     11/24/2023     01/05/2021      Lab Results   Component Value Date    INR 1.01 10/28/2019    INR 0.99 11/30/2016    INR 4.95 08/02/2010    INR 1.86 07/26/2010    INR 1.45 07/25/2010    INR 1.35 07/24/2010    INR 1.11 07/23/2010    INR 1.10 07/22/2010        General appearance: Patient is alert and fully cooperative with history & exam.  No sign of distress is noted during the visit.      Respiratory: Breathing is regular & unlabored while sitting.      HEENT: Hearing is intact to spoken word.  Speech is clear.  No gross evidence of visual impairment that would impact ambulation.      Dermatologic: Left hallux nail elongated and with mild ingrowning to both medial and lateral borders. No cellulitis, drainage, open lesions or signs of infection. Nail appears to be more elongated with chronic curling than ingrown.  All nails dystrophic.      Vascular: Dorsalis pedis and posterior tibial pulses are intact & regular bilaterally.  CFT and skin temperature is normal to both lower extremities.       Neurologic: Lower extremity sensation is intact, bilateral foot, to light touch.  No evidence of neurological-based weakness or contracture in the lower extremities.       Musculoskeletal: Patient is ambulatory without an assistive device or brace.  No gross foot or ankle deformity noted.       Psychiatric: Affect is pleasant & appropriate.      All cultures:  Recent Labs   Lab 11/22/23  1259 11/22/23  1251 11/21/23  1353 11/21/23  1351   CULTURE No growth after 1 day No growth after 1 day Positive on the 1st day of incubation*  Staphylococcus aureus* No growth after 2 days        IMAGING:     ASSESSMENT:  Left hallux nail pain and dystrophy--> no signs of infection      MEDICAL DECISION MAKING:   -Discussed all findings with patient. Chart and imaging reviewed.   -Nail without any signs of infection or acutely ingrown: mildly elongated and incurvated, but without drainage, swelling or erythema.   -Discussed with patient any nail procedures, with absence of infection, would be best performed outpatient once covid symptoms improve.   -May benefit from debridement, which can be done at the TCU or also with continued follow up with Dr. Gary.   -Explained all findings. Patient agrees to this plan.   -Will sign off. Call with questions.     Thank you for the consultation request and the opportunity to participate in the care of Jonathan Dario Winter DPM   [facility] of Podiatry/Foot & Ankle Surgery  [phone]

## 2023-11-24 NOTE — PROGRESS NOTES
[facility]    Infectious Disease Progress Note    Date of Service : 11/24/2023       Assessment:  77 yo male with history of TAVR, CVA, HTN, CAD and obesity currently undergoing radiation for oropharyngeal cancer presenting with weakness and testing positive for COVID and blood culture with staph aureus.      -Afebrile. Not hypoxic. WBC normal.   -CXR with patchy opacity left lung base. Could have post-Covid staph pneumonia though CT chest did not show pneumonia . Nasopharyngeal mass may also be the source of bacteremia.  -Blood culture (1 of 2) from admission on 11/21/23  positive for MSSA, follow up blood cxs 11/22 and 11/24 are negative to date.   -No cellulitis or wounds other than shoulder abrasion from fall on 11/21/23.  -Hx. bilateral TKA. No knee pain presently.   -Hx TAVR. TTE with no evidence endocarditis.     Recommendations  Follow repeat blood cxs  Continue Cefazolin, will need IV antibiotics at discharge  Midline once blood cxs clear  On Paxlovid for COVID    ID will continue to follow    Marialuisa Gordon PA-C    Interval History   Feels ok, improving, worried about making it to radiation therapy this coming Tuesday at the [facility].   No new complaints, tolerating antibiotics without side effects    Physical Exam   Temp: 98.1  F (36.7  C) Temp src: Oral BP: (!) 151/83 Pulse: 68   Resp: 18 SpO2: 96 % O2 Device: None (Room air)    There were no vitals filed for this visit.  Vital Signs with Ranges  Temp:  [97.5  F (36.4  C)-98.1  F (36.7  C)] 98.1  F (36.7  C)  Pulse:  [68-90] 68  Resp:  [18] 18  BP: (113-164)/(47-83) 151/83  SpO2:  [94 %-97 %] 96 %    Constitutional: Awake, alert, cooperative, no apparent distress  Lungs: Clear to auscultation bilaterally, no crackles or wheezing  Cardiovascular: S1S2  Abdomen: Normal bowel sounds, soft, non-distended, non-tender  Skin: No rashes, no cyanosis, no edema  MS :no knee swelling    Medications    - MEDICATION INSTRUCTIONS -         aspirin  81 mg Oral or Feeding Tube Daily    ceFAZolin (ANCEF) 2 g in sodium chloride 0.9 % 100 mL  2 g Intravenous Q8H    tamsulosin  0.4 mg Oral Daily    Or    doxazosin  1 mg Oral or Feeding Tube Daily    enoxaparin ANTICOAGULANT  40 mg Subcutaneous Q12H    fluticasone  1 spray Both Nostrils Daily    nirmatrelvir and ritonavir  3 tablet Oral BID    spironolactone  50 mg Oral or Feeding Tube Daily    torsemide  40 mg Oral or Feeding Tube Daily       Data   All microbiology laboratory data reviewed.  Recent Labs   Lab Test 11/24/23  0848 11/23/23  0729 11/22/23  1251   WBC 6.3 6.7 7.7   HGB 10.2* 9.7* 10.4*   HCT 33.6* 32.2* 34.9*   MCV 81 80 81    182 191     Recent Labs   Lab Test 11/24/23  0848 11/23/23  0729 11/22/23  0832   CR 0.86 1.00 0.61*       Collected Updated Procedure Result Status    11/24/2023 0848 11/24/2023 0926 Blood Culture Hand, Right [[id number]]   Blood from Hand, Right    In process Component Value   No component results             11/24/2023 0848 11/24/2023 0926 Blood Culture Hand, Right [[id number]]   Blood from Hand, Right    In process Component Value   No component results             11/22/2023 1259 11/23/2023 1547 Blood Culture Hand, Right [[id number]]    Blood from Hand, Right    Preliminary result Component Value   Culture No growth after 1 day P             11/22/2023 1251 11/23/2023 1547 Blood Culture Hand, Right [[id number]]   Blood from Hand, Right    Preliminary result Component Value   Culture No growth after 1 day P             11/21/2023 1447 11/21/2023 1530 Symptomatic Influenza A/B, RSV, & SARS-CoV2 PCR (COVID-19) Nasopharyngeal [[id number]]    (Abnormal)   Swab from Nasopharyngeal    Final result Component Value   Influenza A PCR Negative   Influenza B PCR Negative   RSV PCR Negative   SARS CoV2 PCR Positive Abnormal    POSITIVE: SARS-CoV-2 (COVID-19) RNA detected, presumed positive.          11/21/2023 1353 11/24/2023 0753 Blood Culture Peripheral Blood  [[id number]]    (Abnormal)   Peripheral Blood    Final result Component Value   Culture Positive on the 1st day of incubation Abnormal     Staphylococcus aureus Panic     1 of 2 bottles       Susceptibility     Staphylococcus aureus     OMER     Clindamycin 0.25 ug/mL Susceptible 1     Daptomycin 0.25 ug/mL Susceptible     Doxycycline <=0.5 ug/mL Susceptible     Erythromycin >=8 ug/mL Resistant     Gentamicin <=0.5 ug/mL Susceptible     Oxacillin <=0.25 ug/mL Susceptible 2     Tetracycline >=16 ug/mL Resistant     Trimethoprim/Sulfamethoxazole <=0.5/9.5 u... Susceptible     Vancomycin <=0.5 ug/mL Susceptible                    Procedure 11/24/23  Complete Echo Adult. Optison (NDC #3503-6584) given intravenously.  ______________________________________________________________________________  Interpretation Summary     1. The left ventricle is normal in structure, function and size. The visual  ejection fraction is estimated at 65%.  2. The right ventricle is normal in structure, function and size.  3. There is mild mitral stenosis. The mean mitral valve gradient is 5mmHg.  4. s/p TAVR with 26mm Anton Tawana 3, implanted 2020. Mean 11mmHg, Vmax  2.4m/s. No AI or PVL.     Echo 12/2021 showed EF 60%, TAVR with mean 10mmHg.  ___________

## 2023-11-24 NOTE — PLAN OF CARE
Goal Outcome Evaluation:      Plan of Care Reviewed With: patient    Overall Patient Progress: improvingOverall Patient Progress: improving    Summary: Weakness, fall, Covid positive.     DATE & TIME:11/23/2023 [id number]  Cognitive Concerns/ Orientation : A&Ox4, forgetful at times, garbled speech.   BEHAVIOR & AGGRESSION TOOL COLOR: Green  CIWA SCORE: NA   ABNL VS/O2: VSS on RA. Intermittent productive coughs.  MOBILITY: Total care, UW1-2 with walker and gait belt, baseline uses walker/cane independently and uses motorized scooter for longer distances.   PAIN MANAGMENT: c/o lower back pain and left shoulder and hip pain, given prn percocet x1.    DIET: Regular   BOWEL/BLADDER: Incontinent of bowel and bladder, request for urinal at times, small stool x1 this shift.   ABNL LAB/BG: BG 88, 88, 152, Hgb 9.7,   DRAIN/DEVICES: L PIV SL. Int. abx  TELEMETRY RHYTHM: NSR   SKIN: Reported numbness and tingling BLE baseline. Trace edema BLE. Bruise on L shoulder and hip. Abrasion to left shoulder, applied new dressing, CDI. C/o Lt great toe pain, tender to touch, no discoloration noted, pt reported he missed his appointment with podiatry for ingrown toenail.   TESTS/PROCEDURES: Echo complete,   D/C DAY/GOALS/PLACE: TBD  OTHER IMPORTANT INFO: Lung sounds diminished with infrequent productive cough. Neuro q4h, mild Lt facial droop and baseline Lt sided weakness, otherwise intact. On IV Ancef q8h. Neurology/SLP/PT/OT following

## 2023-11-24 NOTE — PROGRESS NOTES
[facility]  Hospitalist Progress Note        Hebert Villalba MD   11/24/2023        Interval History:        - Remains afebrile  - ID following; suspect nasopharyngeal mass may also be the source of bacteremia; will repeat blood cultures 11/24 per ID recs; if multiple blood cultures are positive will need evaluation for endocarditis; to continue cefazolin, will need IV antibiotics at discharge  - plan for midline once blood cultures clear  - PT/OT rec acute rehab; SW for disposition planning  - Podiatry consulted for left great toe ingrown toe nail; podiatry evaluated 11/24 and suggested suggested no need for surgical intervention at this time  - SW/CC following for disposition, patient not interested in TCU and wants to go home upon discharge    - ECHO 11/24/23 with LVEF 65 %, normal in structure, function and size; mild mitral stenosis; s/p TAVR with 26mm Anton Tawana 3, implanted 2020. Mean 11mmHg, Vmax 2.4m/s. No AI or PVL.          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      Confirmed COVID-19 infection  Generalized weakness, mechanical fall 2/2 above     - presented with generalized weakness and a fall.  * COVID testing in the ED is positive.   *Remains afebrile and not hypoxic ; CXR did show patchy opacity in the L lung base which could represent atelectasis or pneumonia.   * Head CT, XR  L femur, and XR L tibia/fibula are negative for acute injury.   * D dimer is elevate to 4.22 and CRP is elevated to 69.61.     - per pharmacy recs, switched Lovenox to BID for DVT prophylaxis  - CT chest PE protocol 11/21 noted no acute pulmonary embolism; Grossly unchanged bilateral cervical  lymphadenopathy and unchanged 3 mm right middle lobe pulmonary nodule  - ultrasound lower extremity 11/21 noted no DVT  - continue Paxlovid (started 11/21, for 5 days); deferred steroids and remdesivir given that the patient is not hypoxic  - maintain COVID-19 special precautions, continuous pulse-ox and prn O2  - Therapies rec ARU/TCU but patient hoping to go home upon discharge    Staph aureus (MSSA) bacteremia  - 1/2 Blood cultures 11/21 growing GPC in clusters (MSSA); blood cultures from 11/22 with no growth so far  - was started on Vancomycin; evaluated by ID and switched vancomycin to Ancef (11/22)  - ECHO 11/24/23 with LVEF 65 %, normal in structure, function and size; mild mitral stenosis; s/p TAVR with 26mm Anton Tawana 3, implanted 2020. Mean 11mmHg, Vmax 2.4m/s. No AI or PVL.    - ID suspect nasopharyngeal mass may also be the source of bacteremia; will repeat blood cultures 11/24 per ID recs; if multiple blood cultures are positive will need evaluation for endocarditis; to continue cefazolin, will need IV antibiotics at discharge  - plan for midline once blood cultures clear    Ingrown toe nail:  - Podiatry consulted for left great toe ingrown toe nail; podiatry evaluated 11/24 and suggested suggested no need for surgical intervention at this time    H/o CVA with residual left sided weakness  - on 11/22 was noted with left-sided weakness during therapy evaluation  - a code stroke was called, evaluated by house MARK  - he does have history of prior right UMM infarct and notes baseline residual left sided weakness and states that he ambulates with help of a cane and scooter  - was evaluated by stroke neurology (signed off 11/23)  - MRI brain 11/22 noted no acute infarct, mass, mass effect, or hemorrhage; moderate chronic small vessel ischemia.  - CT head 11/22 noted no CT finding of a mass, hemorrhage or focal area suggestive of infarct  - CTA head and neck 11/22 noted no significant vascular stenosis or  "occlusion  - evaluated by SLP, noted mild oropharyngeal dysphagia, diet modification per SLP  - ECHO as above  - neurology suggest likely recrudescence of old stroke symptoms in the context of acute infection and suggested no further stroke workup  - to continue ASA 81 mg daily, Lipitor 40 mg daily; will hold Lipitor until completion of Paxlovid given the risk for interaction     Mild Anemia   Hemoglobin is 10.8---10.4---9.7---10.2, stable from baseline     Mild hypernateremia   Sodium is 147, improved.   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 CT soft tissue neck with contrast recommended.   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 in the neck, appearance similar to PET/CT of 11/14/2020\"  - follow-up with PCP/oncology     Anxiety  - Continue PTA diazepam PRN      CAD   HTN  HLD  S/p AVR  CHF  (TTE in 2021 with EF of 60-65%, Grade I diastolic dysfunction), not in acute exacerbation    Chronic LE edema   - Hold PTA atorvastatin while on Paxlovid  - continue PTA torsemide, spironolactone     DVT Prophylaxis: resumed Enoxaparin (Lovenox) subcutaneous 11/23    Code Status:DNR/DNI    Diet: Regular Diet Adult      Disposition:   - likely 2-3 days pending clinical stability, antibiotics plan per ID  - PT/OT rec acute rehab; SW for disposition planning  - patient not interested in TCU and wants to go home upon " discharge    Clinically Significant Risk Factors Present on Admission                    # Hypertension: Noted on problem list                        Page Me (7 am to 6 pm)    Care plan discussed with patient and nursing; also discussed with               Physical Exam:      Blood pressure 122/47, pulse 68, temperature 98  F (36.7  C), temperature source Oral, resp. rate 18, SpO2 97%.  There were no vitals filed for this visit.  Vital Signs with Ranges  Temp:  [97.5  F (36.4  C)-98  F (36.7  C)] 98  F (36.7  C)  Pulse:  [68-90] 68  Resp:  [18] 18  BP: (113-143)/(47-80) 122/47  SpO2:  [94 %-97 %] 97 %  I/O's Last 24 hours  I/O last 3 completed shifts:  In: 240 [P.O.:240]  Out: 200 [Urine:200]    Constitutional: Alert, awake and oriented X 3; resting comfortably in no apparent distress    Mild left facial droop   Oral cavity: Moist mucosa   Cardiovascular: Normal s1 s2, regular rate and rhythm, no murmur   Lungs: B/l clear to auscultation, no wheezes or crepitations   Abdomen: Soft, nt, nd, no guarding, rigidity or rebound; BS +   LE : Mild b/l edema +, more on left   Musculoskeletal/Neuro Left LE weakness, left hand  weak; power 5/5 in all other extremities   Psychiatry: normal mood and affect                Medications:         aspirin  81 mg Oral or Feeding Tube Daily    ceFAZolin (ANCEF) 2 g in sodium chloride 0.9 % 100 mL  2 g Intravenous Q8H    tamsulosin  0.4 mg Oral Daily    Or    doxazosin  1 mg Oral or Feeding Tube Daily    enoxaparin ANTICOAGULANT  40 mg Subcutaneous Q12H    fluticasone  1 spray Both Nostrils Daily    nirmatrelvir and ritonavir  3 tablet Oral BID    spironolactone  50 mg Oral or Feeding Tube Daily    torsemide  40 mg Oral or Feeding Tube Daily     PRN Meds: diazepam, ibuprofen **OR** ibuprofen, - MEDICATION INSTRUCTIONS -, methocarbamol, naloxone **OR** naloxone **OR** naloxone **OR** naloxone, ondansetron **OR** ondansetron, oxyCODONE-acetaminophen, senna-docusate **OR**  senna-docusate         Data:      All new lab and imaging data was reviewed.   Recent Labs   Lab Test 11/23/23  0729 11/22/23  1251 11/21/23  1348 10/28/20  1420 10/28/19  0900 09/19/19  0858 11/30/16  0930   WBC 6.7 7.7 7.5   < > 6.8   < > 7.2   HGB 9.7* 10.4* 10.8*   < > 11.4*   < > 12.9*   MCV 80 81 82   < > 82   < > 78    191 208   < > 259   < > 289   INR  --   --   --   --  1.01  --  0.99    < > = values in this interval not displayed.      Recent Labs   Lab Test 11/24/23  0149 11/23/23  2119 11/23/23  1716 11/23/23  0749 11/23/23  0729 11/22/23  1135 11/22/23  0832 11/21/23  1348 10/18/23  0941   NA  --   --   --   --   --   --  138 147* 143   POTASSIUM  --   --   --   --   --   --  4.0 3.6 3.6   CHLORIDE  --   --   --   --   --   --  103 107 104   CO2  --   --   --   --   --   --  25 27 30*   BUN  --   --   --   --   --   --  9.7 16.2 23.4*   CR  --   --   --   --  1.00  --  0.61* 0.95 0.95   ANIONGAP  --   --   --   --   --   --  10 13 9   WARREN  --   --   --   --   --   --  9.2 9.7 9.5   GLC 88 132* 152*   < >  --    < > 130* 97 98    < > = values in this interval not displayed.     Recent Labs   Lab Test 12/01/16  0300 11/30/16  1930 11/30/16  0930   TROPI <0.015  The 99th percentile for upper reference range is 0.045 ug/L.  Troponin values in   the range of 0.045 - 0.120 ug/L may be associated with risks of adverse   clinical events.   <0.015  The 99th percentile for upper reference range is 0.045 ug/L.  Troponin values in   the range of 0.045 - 0.120 ug/L may be associated with risks of adverse   clinical events.   <0.015  The 99th percentile for upper reference range is 0.045 ug/L.  Troponin values in   the range of 0.045 - 0.120 ug/L may be associated with risks of adverse   clinical events.

## 2023-11-25 ENCOUNTER — APPOINTMENT (OUTPATIENT)
Dept: SPEECH THERAPY | Facility: CLINIC | Age: 78
DRG: 178 | End: 2023-11-25
Payer: COMMERCIAL

## 2023-11-25 LAB
CREAT SERPL-MCNC: 0.8 MG/DL (ref 0.67–1.17)
EGFRCR SERPLBLD CKD-EPI 2021: >90 ML/MIN/1.73M2
GLUCOSE BLDC GLUCOMTR-MCNC: 104 MG/DL (ref 70–99)

## 2023-11-25 PROCEDURE — 250N000013 HC RX MED GY IP 250 OP 250 PS 637: Performed by: STUDENT IN AN ORGANIZED HEALTH CARE EDUCATION/TRAINING PROGRAM

## 2023-11-25 PROCEDURE — 120N000001 HC R&B MED SURG/OB

## 2023-11-25 PROCEDURE — 258N000003 HC RX IP 258 OP 636: Performed by: STUDENT IN AN ORGANIZED HEALTH CARE EDUCATION/TRAINING PROGRAM

## 2023-11-25 PROCEDURE — 36415 COLL VENOUS BLD VENIPUNCTURE: CPT | Performed by: STUDENT IN AN ORGANIZED HEALTH CARE EDUCATION/TRAINING PROGRAM

## 2023-11-25 PROCEDURE — 99232 SBSQ HOSP IP/OBS MODERATE 35: CPT | Performed by: HOSPITALIST

## 2023-11-25 PROCEDURE — 250N000011 HC RX IP 250 OP 636: Performed by: STUDENT IN AN ORGANIZED HEALTH CARE EDUCATION/TRAINING PROGRAM

## 2023-11-25 PROCEDURE — 92526 ORAL FUNCTION THERAPY: CPT | Mod: GN

## 2023-11-25 PROCEDURE — 250N000013 HC RX MED GY IP 250 OP 250 PS 637

## 2023-11-25 PROCEDURE — 999N000040 HC STATISTIC CONSULT NO CHARGE VASC ACCESS

## 2023-11-25 PROCEDURE — 999N000128 HC STATISTIC PERIPHERAL IV START W/O US GUIDANCE

## 2023-11-25 PROCEDURE — 82565 ASSAY OF CREATININE: CPT | Performed by: STUDENT IN AN ORGANIZED HEALTH CARE EDUCATION/TRAINING PROGRAM

## 2023-11-25 PROCEDURE — 250N000011 HC RX IP 250 OP 636: Mod: JZ | Performed by: HOSPITALIST

## 2023-11-25 PROCEDURE — 99232 SBSQ HOSP IP/OBS MODERATE 35: CPT | Performed by: PHYSICIAN ASSISTANT

## 2023-11-25 RX ORDER — CEFAZOLIN SODIUM 1 G/3ML
2 INJECTION, POWDER, FOR SOLUTION INTRAMUSCULAR; INTRAVENOUS EVERY 8 HOURS
DISCHARGE
Start: 2023-11-23 | End: 2023-12-07

## 2023-11-25 RX ADMIN — SODIUM CHLORIDE, PRESERVATIVE FREE 2 G: 5 INJECTION INTRAVENOUS at 17:13

## 2023-11-25 RX ADMIN — IBUPROFEN 600 MG: 600 TABLET ORAL at 17:11

## 2023-11-25 RX ADMIN — SODIUM CHLORIDE, PRESERVATIVE FREE 2 G: 5 INJECTION INTRAVENOUS at 09:11

## 2023-11-25 RX ADMIN — SODIUM CHLORIDE, PRESERVATIVE FREE 2 G: 5 INJECTION INTRAVENOUS at 02:09

## 2023-11-25 RX ADMIN — TAMSULOSIN HYDROCHLORIDE 0.4 MG: 0.4 CAPSULE ORAL at 09:05

## 2023-11-25 RX ADMIN — SPIRONOLACTONE 50 MG: 25 TABLET ORAL at 09:05

## 2023-11-25 RX ADMIN — OXYCODONE HYDROCHLORIDE AND ACETAMINOPHEN 1 TABLET: 5; 325 TABLET ORAL at 04:49

## 2023-11-25 RX ADMIN — ENOXAPARIN SODIUM 40 MG: 40 INJECTION SUBCUTANEOUS at 21:33

## 2023-11-25 RX ADMIN — ASPIRIN 81 MG 81 MG: 81 TABLET ORAL at 09:06

## 2023-11-25 RX ADMIN — ENOXAPARIN SODIUM 40 MG: 40 INJECTION SUBCUTANEOUS at 09:31

## 2023-11-25 ASSESSMENT — ACTIVITIES OF DAILY LIVING (ADL)
ADLS_ACUITY_SCORE: 41
ADLS_ACUITY_SCORE: 43
ADLS_ACUITY_SCORE: 41
ADLS_ACUITY_SCORE: 41
ADLS_ACUITY_SCORE: 43
ADLS_ACUITY_SCORE: 41

## 2023-11-25 NOTE — PROGRESS NOTES
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      Confirmed COVID-19 infection  Generalized weakness, mechanical fall 2/2 above     - presented with generalized weakness and a fall.  * COVID testing in the ED is positive.   *Remains afebrile and not hypoxic ; CXR did show patchy opacity in the L lung base which could represent atelectasis or pneumonia.   * Head CT, XR  L femur, and XR L tibia/fibula are negative for acute injury.   * D dimer is elevate to 4.22 and CRP is elevated to 69.61.     - per pharmacy recs, switched Lovenox to BID for DVT prophylaxis  - CT chest PE protocol 11/21 noted no acute pulmonary embolism; Grossly unchanged bilateral cervical lymphadenopathy and unchanged 3 mm right middle lobe pulmonary nodule  - ultrasound lower extremity 11/21 noted no DVT  - continue Paxlovid (started 11/21, for 5 days); deferred steroids and remdesivir given that the patient is not hypoxic; will complete Paxlovid 11/25  - maintain COVID-19 special precautions, continuous pulse-ox and prn O2  - Therapies rec ARU/TCU but patient hoping to go home upon discharge    Staph aureus (MSSA) bacteremia  - 1/2 Blood cultures 11/21 growing GPC in clusters (MSSA); blood cultures from 11/22 with no growth so far  - was started on Vancomycin;  evaluated by ID and switched vancomycin to Ancef (11/22)  - ECHO 11/24/23 with LVEF 65 %, normal in structure, function and size; mild mitral stenosis; s/p TAVR with 26mm Anton Tawana 3, implanted 2020. Mean 11mmHg, Vmax 2.4m/s. No AI or PVL, no note of vegetation    - ID suspect nasopharyngeal mass may also be the source of bacteremia; blood cultures from 11/22 and 11/24 with no growth so far; if multiple blood cultures are positive will need evaluation for endocarditis; to continue cefazolin, will need IV antibiotics at discharge  - plan for midline once blood cultures clear--defer to ID    Ingrown toe nail:  - Podiatry consulted for left great toe ingrown toe nail; podiatry evaluated 11/24 and suggested suggested no need for surgical intervention at this time    H/o CVA with residual left sided weakness  - on 11/22 was noted with left-sided weakness during therapy evaluation  - a code stroke was called, evaluated by house MARK  - he does have history of prior right UMM infarct and notes baseline residual left sided weakness and states that he ambulates with help of a cane and scooter  - was evaluated by stroke neurology (signed off 11/23)  - MRI brain 11/22 noted no acute infarct, mass, mass effect, or hemorrhage; moderate chronic small vessel ischemia.  - CT head 11/22 noted no CT finding of a mass, hemorrhage or focal area suggestive of infarct  - CTA head and neck 11/22 noted no significant vascular stenosis or occlusion  - evaluated by SLP, noted mild oropharyngeal dysphagia, diet modification per SLP  - ECHO as above  - neurology suggest likely recrudescence of old stroke symptoms in the context of acute infection and suggested no further stroke workup  - to continue ASA 81 mg daily, Lipitor 40 mg daily; will hold Lipitor until completion of Paxlovid given the risk for interaction     Mild Anemia   Hemoglobin is 10.8---10.4---9.7---10.2, stable from baseline     Mild hypernateremia   Sodium is 147,  "improved.   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 CT soft tissue neck with contrast recommended.   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 in the neck, appearance similar to PET/CT of 11/14/2020\"  - follow-up with PCP/oncology     Anxiety  - Continue PTA diazepam PRN      CAD   HTN  HLD  S/p AVR  CHF  (TTE in 2021 with EF of 60-65%, Grade I diastolic dysfunction), not in acute exacerbation    Chronic LE edema   - Hold PTA atorvastatin while on Paxlovid  - continue PTA torsemide, spironolactone     DVT Prophylaxis: resumed Enoxaparin (Lovenox) subcutaneous 11/23    Code Status:DNR/DNI    Diet: Regular Diet Adult      Disposition:   - likely 1-2 days pending clinical stability, antibiotics plan per ID; will need home IV antibiotics  - midline to be placed when okay with ID  - PT/OT rec acute rehab; SW for disposition planning  - patient not interested in TCU and wants to go home upon discharge    Clinically Significant Risk Factors Present on Admission                    # Hypertension: Noted on problem list                        Page Me (7 am to 6 pm)    Care plan discussed with patient and nursing              Physical Exam:      Blood pressure (!) 147/72, pulse 76, temperature 97.6  F (36.4  C), temperature source Oral, resp. rate 18, SpO2 96%.  There were no vitals filed for this visit.  Vital Signs with Ranges  Temp: "  [97.5  F (36.4  C)-98.1  F (36.7  C)] 97.6  F (36.4  C)  Pulse:  [66-76] 76  Resp:  [16-18] 18  BP: (114-164)/(51-83) 147/72  SpO2:  [94 %-96 %] 96 %  I/O's Last 24 hours  I/O last 3 completed shifts:  In: 240 [P.O.:240]  Out: 1200 [Urine:1200]    Constitutional: Alert, awake and oriented X 3; resting comfortably in no apparent distress    Mild left facial droop   Oral cavity: Moist mucosa   Cardiovascular: Normal s1 s2, regular rate and rhythm, no murmur   Lungs: B/l clear to auscultation, no wheezes or crepitations   Abdomen: Soft, nt, nd, no guarding, rigidity or rebound; BS +   LE : Mild b/l edema +, more on left   Musculoskeletal/Neuro Left LE weakness, left hand  weak; power 5/5 in all other extremities   Psychiatry: normal mood and affect                Medications:         aspirin  81 mg Oral or Feeding Tube Daily    ceFAZolin (ANCEF) 2 g in sodium chloride 0.9 % 100 mL  2 g Intravenous Q8H    tamsulosin  0.4 mg Oral Daily    Or    doxazosin  1 mg Oral or Feeding Tube Daily    enoxaparin ANTICOAGULANT  40 mg Subcutaneous Q12H    nirmatrelvir and ritonavir  3 tablet Oral BID    spironolactone  50 mg Oral or Feeding Tube Daily    torsemide  40 mg Oral or Feeding Tube Daily     PRN Meds: diazepam, ibuprofen **OR** ibuprofen, - MEDICATION INSTRUCTIONS -, methocarbamol, naloxone **OR** naloxone **OR** naloxone **OR** naloxone, ondansetron **OR** ondansetron, oxyCODONE-acetaminophen, senna-docusate **OR** senna-docusate         Data:      All new lab and imaging data was reviewed.   Recent Labs   Lab Test 11/24/23  0848 11/23/23  0729 11/22/23  1251 10/28/20  1420 10/28/19  0900 09/19/19  0858 11/30/16  0930   WBC 6.3 6.7 7.7   < > 6.8   < > 7.2   HGB 10.2* 9.7* 10.4*   < > 11.4*   < > 12.9*   MCV 81 80 81   < > 82   < > 78    182 191   < > 259   < > 289   INR  --   --   --   --  1.01  --  0.99    < > = values in this interval not displayed.      Recent Labs   Lab Test 11/24/23  4848 11/24/23  1144  11/24/23  0900 11/24/23  0848 11/23/23  0749 11/23/23  0729 11/22/23  1135 11/22/23  0832 11/21/23  1348 10/18/23  0941   NA  --   --   --   --   --   --   --  138 147* 143   POTASSIUM  --   --   --   --   --   --   --  4.0 3.6 3.6   CHLORIDE  --   --   --   --   --   --   --  103 107 104   CO2  --   --   --   --   --   --   --  25 27 30*   BUN  --   --   --   --   --   --   --  9.7 16.2 23.4*   CR  --   --   --  0.86  --  1.00  --  0.61* 0.95 0.95   ANIONGAP  --   --   --   --   --   --   --  10 13 9   WARREN  --   --   --   --   --   --   --  9.2 9.7 9.5   * 92 98  --    < >  --    < > 130* 97 98    < > = values in this interval not displayed.     Recent Labs   Lab Test 12/01/16  0300 11/30/16  1930 11/30/16  0930   TROPI <0.015  The 99th percentile for upper reference range is 0.045 ug/L.  Troponin values in   the range of 0.045 - 0.120 ug/L may be associated with risks of adverse   clinical events.   <0.015  The 99th percentile for upper reference range is 0.045 ug/L.  Troponin values in   the range of 0.045 - 0.120 ug/L may be associated with risks of adverse   clinical events.   <0.015  The 99th percentile for upper reference range is 0.045 ug/L.  Troponin values in   the range of 0.045 - 0.120 ug/L may be associated with risks of adverse   clinical events.

## 2023-11-25 NOTE — PLAN OF CARE
Goal Outcome Evaluation:    Summary: Weakness, fall, Covid positive.     DATE & TIME:11/24/2023 [id number]  Cognitive Concerns/ Orientation : A&Ox4, forgetful at times.   BEHAVIOR & AGGRESSION TOOL COLOR: Green  CIWA SCORE: NA   ABNL VS/O2: VSS on RA. Frequent productive coughs w/ Some bloody sputum at times. MD aware. Continuing to monitor.  MOBILITY: able to reposition independently in the bed, assist x1-2 with walker and gait belt, baseline uses walker/cane independently and uses motorized scooter for longer distances.   PAIN MANAGMENT: Denied pain.  DIET: Regular, Good appetite.   BOWEL/BLADDER: Using urinal at bedside and attempted male external catheter, but leaked  ABNL LAB/BG: , Hgb 10.2   DRAIN/DEVICES: L PIV SL. Int. abx  TELEMETRY RHYTHM: NA  SKIN: Reported numbness and tingling BLE baseline. Trace edema BLE. Compression stocking on. Bruise on L shoulder and hip. Abrasion to left shoulder, applied new dressing, CDI.  TESTS/PROCEDURES: NA  D/C DAY/GOALS/PLACE: TBD. TCU placement.  OTHER IMPORTANT INFO: Lung sounds diminished with frequent productive cough. Neuro q4h, minimal L facial droop and baseline L sided weakness, otherwise intact. On IV Ancef q8h. Neurology/SLP/PT/OT following. Podiatry saw patient and signed off, f/u as OP.

## 2023-11-25 NOTE — PROGRESS NOTES
Care Management Follow Up    Length of Stay (days): 4    Expected Discharge Date: 11/27/2023     Concerns to be Addressed: adjustment to diagnosis/illness, discharge planning     Patient plan of care discussed at interdisciplinary rounds: Yes    Anticipated Discharge Disposition: Skilled Nursing Facility     Anticipated Discharge Services: Transportation Services, Housekeeping/[employer]  Anticipated Discharge DME:      Patient/family educated on Medicare website which has current facility and service quality ratings:    Education Provided on the Discharge Plan:    Patient/Family in Agreement with the Plan: no    Referrals Placed by CM/SW:    Private pay costs discussed: Not applicable    Additional Information:  SW called and spoke with pt about TCU.  Pt asked if he could discharge to ARU, and SW mentioned that ARU declined him, and pt asked why.  SW mentioned that a reason is not noted, but that common reasons are because a pt needs a longer duration of therapy or because they do not have enough assistance at home afterwards (pt PCA not available).  SW and pt discussed pt discharge preferences.  Pt states he would prefer to go to ARU because it would focus on both his physical recovery as well as a lot of individual support which would give him support he needs for his sobriety.  SW spoke to pt about how this could look in TCU.  SW mentions that most TCU's focus more on the physical rehabilitation.  Pt states that this is fine, and he knows that getting stronger is the most important.      SHELLY LucianoSW

## 2023-11-25 NOTE — PLAN OF CARE
Goal Outcome Evaluation:       Summary: Weakness, fall, Covid positive.     DATE & TIME:11/24/23 [id number]  Cognitive Concerns/ Orientation : A&Ox4, forgetful at times.   BEHAVIOR & AGGRESSION TOOL COLOR: Green  CIWA SCORE: NA   ABNL VS/O2: VSS on RA. Has mild L facial droop, otherwise neuro's intact.  MOBILITY: Assist x1 GB/W, patient is able to reposition self independently in the bed,  uses walker/cane at baseline, and motorized scooter for longer distances.   PAIN MANAGMENT: Lower back pain managed with PRN percocet x1  DIET: Regular  BOWEL/BLADDER: Incontinent of urine, also using urinal in bed   ABNL LAB/BG: , Hgb 10.2 , BC in process  DRAIN/DEVICES: L PIV SL with int. abx  TELEMETRY RHYTHM: NA  SKIN: Trace edema on BLE. Bruise on L shoulder and hip. Abrasion to left, R shoulder; with dressing in place.  TESTS/PROCEDURES: NA  D/C DAY/GOALS/PLACE: Pending, SW following for placement  OTHER IMPORTANT INFO: On IV Ancef q8h., and may need a Midline once blood cxs clear, per ID note. Neurology/SLP/PT/OT/ID following.

## 2023-11-25 NOTE — PLAN OF CARE
Mental Status: A&O x4  Activity/dangle: Ast of 2 Lift  Diet: Regular diet  Pain: Minimum pain noted on lower back but didn't request any PRN.  Onofre/Voiding: Urine incontinent, using urinal  Tele/Restraints/Iso: Special Isolation maintained  02/LDA: Room air. IV saline locked  D/C Date: Pending. Possible placement   Other Info: Minimal L facial droop and baseline L sided weakness. Wound dressing on R and L Shoulder done per plan. Neurology/SLP/PT/OT/ID following.

## 2023-11-25 NOTE — PROGRESS NOTES
[facility]    Infectious Disease Progress Note    Date of Service : 11/25/2023       Assessment:  79 yo male with history of TAVR, CVA, HTN, CAD and obesity currently undergoing radiation for oropharyngeal cancer presenting with weakness and testing positive for COVID and blood culture with staph aureus.      -Afebrile. Not hypoxic. WBC normal.   -CXR with patchy opacity left lung base. Could have post-Covid staph pneumonia though CT chest did not show pneumonia . Nasopharyngeal mass may also be the source of bacteremia.   -Blood culture (1 of 2) from admission on 11/21/23  positive for MSSA, follow up blood cxs 11/22 and 11/24 are negative to date.   -No cellulitis or wounds other than shoulder abrasion from fall on 11/21/23.  -Hx. bilateral TKA. No knee pain presently.   -Hx TAVR. TTE with no evidence endocarditis.     Recommendations  Follow repeat blood cxs  Continue Cefazolin, will need IV antibiotics x 2 weeks from time of negative blood cultures (until 12/7/23) to treat low-grade MSSA bacteremia which cleared quickly. Ordered in discharge navigator.   OK to place midline. Order placed.   On Paxlovid for COVID - finishing day 5.       Marialuisa Gordon PA-C    Interval History   Feels ok, improving, worried about making it to radiation therapy this coming Tuesday at the [facility].   No new complaints, tolerating antibiotics without side effects    Physical Exam   Temp: 98.3  F (36.8  C) Temp src: Oral BP: 117/59 Pulse: 79   Resp: 18 SpO2: 96 % O2 Device: None (Room air)    There were no vitals filed for this visit.  Vital Signs with Ranges  Temp:  [97.5  F (36.4  C)-98.3  F (36.8  C)] 98.3  F (36.8  C)  Pulse:  [66-79] 79  Resp:  [16-18] 18  BP: (114-149)/(51-72) 117/59  SpO2:  [94 %-96 %] 96 %    Constitutional: Awake, alert, cooperative, no apparent distress  Lungs: Clear to auscultation bilaterally, no crackles or wheezing  Cardiovascular: S1S2  Abdomen: Normal bowel sounds, soft,  non-distended, non-tender  Skin: No rashes, no cyanosis, no edema  MS :no knee swelling    Medications    - MEDICATION INSTRUCTIONS -        aspirin  81 mg Oral or Feeding Tube Daily    ceFAZolin (ANCEF) 2 g in sodium chloride 0.9 % 100 mL  2 g Intravenous Q8H    tamsulosin  0.4 mg Oral Daily    Or    doxazosin  1 mg Oral or Feeding Tube Daily    enoxaparin ANTICOAGULANT  40 mg Subcutaneous Q12H    nirmatrelvir and ritonavir  3 tablet Oral BID    spironolactone  50 mg Oral or Feeding Tube Daily    torsemide  40 mg Oral or Feeding Tube Daily       Data   All microbiology laboratory data reviewed.  Recent Labs   Lab Test 11/24/23  0848 11/23/23  0729 11/22/23  1251   WBC 6.3 6.7 7.7   HGB 10.2* 9.7* 10.4*   HCT 33.6* 32.2* 34.9*   MCV 81 80 81    182 191     Recent Labs   Lab Test 11/25/23  0923 11/24/23  0848 11/23/23  0729   CR 0.80 0.86 1.00       Collected Updated Procedure Result Status    11/24/2023 0848 11/24/2023 0926 Blood Culture Hand, Right [[id number]]   Blood from Hand, Right    In process Component Value   No component results             11/24/2023 0848 11/24/2023 0926 Blood Culture Hand, Right [[id number]]   Blood from Hand, Right    In process Component Value   No component results             11/22/2023 1259 11/23/2023 1547 Blood Culture Hand, Right [[id number]]    Blood from Hand, Right    Preliminary result Component Value   Culture No growth after 1 day P             11/22/2023 1251 11/23/2023 1547 Blood Culture Hand, Right [[id number]]   Blood from Hand, Right    Preliminary result Component Value   Culture No growth after 1 day P             11/21/2023 1447 11/21/2023 1530 Symptomatic Influenza A/B, RSV, & SARS-CoV2 PCR (COVID-19) Nasopharyngeal [[id number]]    (Abnormal)   Swab from Nasopharyngeal    Final result Component Value   Influenza A PCR Negative   Influenza B PCR Negative   RSV PCR Negative   SARS CoV2 PCR Positive Abnormal    POSITIVE: SARS-CoV-2 (COVID-19) RNA  detected, presumed positive.          11/21/2023 1353 11/24/2023 0753 Blood Culture Peripheral Blood [[id number]]    (Abnormal)   Peripheral Blood    Final result Component Value   Culture Positive on the 1st day of incubation Abnormal     Staphylococcus aureus Panic     1 of 2 bottles       Susceptibility     Staphylococcus aureus     OMER     Clindamycin 0.25 ug/mL Susceptible 1     Daptomycin 0.25 ug/mL Susceptible     Doxycycline <=0.5 ug/mL Susceptible     Erythromycin >=8 ug/mL Resistant     Gentamicin <=0.5 ug/mL Susceptible     Oxacillin <=0.25 ug/mL Susceptible 2     Tetracycline >=16 ug/mL Resistant     Trimethoprim/Sulfamethoxazole <=0.5/9.5 u... Susceptible     Vancomycin <=0.5 ug/mL Susceptible                    Procedure 11/24/23  Complete Echo Adult. Optison (NDC #[id number]) given intravenously.  ______________________________________________________________________________  Interpretation Summary     1. The left ventricle is normal in structure, function and size. The visual  ejection fraction is estimated at 65%.  2. The right ventricle is normal in structure, function and size.  3. There is mild mitral stenosis. The mean mitral valve gradient is 5mmHg.  4. s/p TAVR with 26mm Anton Tawana 3, implanted 2020. Mean 11mmHg, Vmax  2.4m/s. No AI or PVL.     Echo 12/2021 showed EF 60%, TAVR with mean 10mmHg.  ___________

## 2023-11-26 ENCOUNTER — APPOINTMENT (OUTPATIENT)
Dept: OCCUPATIONAL THERAPY | Facility: CLINIC | Age: 78
DRG: 178 | End: 2023-11-26
Payer: COMMERCIAL

## 2023-11-26 LAB
BACTERIA BLD CULT: NO GROWTH
GLUCOSE BLDC GLUCOMTR-MCNC: 103 MG/DL (ref 70–99)
GLUCOSE BLDC GLUCOMTR-MCNC: 107 MG/DL (ref 70–99)

## 2023-11-26 PROCEDURE — 97110 THERAPEUTIC EXERCISES: CPT | Mod: GO | Performed by: OCCUPATIONAL THERAPIST

## 2023-11-26 PROCEDURE — 97535 SELF CARE MNGMENT TRAINING: CPT | Mod: GO | Performed by: OCCUPATIONAL THERAPIST

## 2023-11-26 PROCEDURE — 99232 SBSQ HOSP IP/OBS MODERATE 35: CPT | Performed by: PHYSICIAN ASSISTANT

## 2023-11-26 PROCEDURE — 250N000013 HC RX MED GY IP 250 OP 250 PS 637

## 2023-11-26 PROCEDURE — 250N000011 HC RX IP 250 OP 636: Performed by: STUDENT IN AN ORGANIZED HEALTH CARE EDUCATION/TRAINING PROGRAM

## 2023-11-26 PROCEDURE — 258N000003 HC RX IP 258 OP 636: Performed by: STUDENT IN AN ORGANIZED HEALTH CARE EDUCATION/TRAINING PROGRAM

## 2023-11-26 PROCEDURE — 99232 SBSQ HOSP IP/OBS MODERATE 35: CPT | Performed by: HOSPITALIST

## 2023-11-26 PROCEDURE — 120N000001 HC R&B MED SURG/OB

## 2023-11-26 PROCEDURE — 250N000011 HC RX IP 250 OP 636: Mod: JZ | Performed by: HOSPITALIST

## 2023-11-26 PROCEDURE — 250N000013 HC RX MED GY IP 250 OP 250 PS 637: Performed by: STUDENT IN AN ORGANIZED HEALTH CARE EDUCATION/TRAINING PROGRAM

## 2023-11-26 RX ADMIN — SODIUM CHLORIDE, PRESERVATIVE FREE 2 G: 5 INJECTION INTRAVENOUS at 09:39

## 2023-11-26 RX ADMIN — ENOXAPARIN SODIUM 40 MG: 40 INJECTION SUBCUTANEOUS at 21:54

## 2023-11-26 RX ADMIN — TORSEMIDE 40 MG: 20 TABLET ORAL at 09:36

## 2023-11-26 RX ADMIN — SODIUM CHLORIDE, PRESERVATIVE FREE 2 G: 5 INJECTION INTRAVENOUS at 02:47

## 2023-11-26 RX ADMIN — ENOXAPARIN SODIUM 40 MG: 40 INJECTION SUBCUTANEOUS at 09:38

## 2023-11-26 RX ADMIN — OXYCODONE HYDROCHLORIDE AND ACETAMINOPHEN 1 TABLET: 5; 325 TABLET ORAL at 12:46

## 2023-11-26 RX ADMIN — TAMSULOSIN HYDROCHLORIDE 0.4 MG: 0.4 CAPSULE ORAL at 09:38

## 2023-11-26 RX ADMIN — SODIUM CHLORIDE, PRESERVATIVE FREE 2 G: 5 INJECTION INTRAVENOUS at 18:56

## 2023-11-26 RX ADMIN — ASPIRIN 81 MG 81 MG: 81 TABLET ORAL at 09:36

## 2023-11-26 ASSESSMENT — ACTIVITIES OF DAILY LIVING (ADL)
ADLS_ACUITY_SCORE: 41
ADLS_ACUITY_SCORE: 41
ADLS_ACUITY_SCORE: 42
ADLS_ACUITY_SCORE: 41
ADLS_ACUITY_SCORE: 42
ADLS_ACUITY_SCORE: 42
ADLS_ACUITY_SCORE: 41
ADLS_ACUITY_SCORE: 42
ADLS_ACUITY_SCORE: 41

## 2023-11-26 NOTE — PLAN OF CARE
Goal Outcome Evaluation:         Summary: Weakness, fall, Covid positive.       DATE & TIME: 11/25/2023-11/26/2023 7128-3043    Cognitive Concerns/ Orientation: A&Ox4, forgetful at times.   BEHAVIOR & AGGRESSION TOOL COLOR: Green  ABNL VS/O2: VSS on RA. Has mild L facial droop, otherwise neuro's intact.  MOBILITY: Assist x1 GB/W, patient is able to reposition self independently in the bed, uses walker/cane at baseline, and motorized scooter for longer distances.   PAIN MANAGMENT: Denied pain this shift   DIET: Regular  BOWEL/BLADDER: Using urinal at bedside   ABNL LAB/BG: Hgb 10.2, BC in process  DRAIN/DEVICES: L PIV SL with int. abx  TELEMETRY RHYTHM: NA  SKIN: Trace edema on BLE. Bruise on L shoulder and hip. Abrasion to left shoulder; with dressing in place.  TESTS/PROCEDURES: None this shift   D/C DAY/GOALS/PLACE: PT/Ot recommend TCU, SW following for placement    OTHER IMPORTANT INFO: On IV Ancef q8h. and may need a Midline once blood cxs clear, per ID note. On Paxlovid for covid. Neurology/SLP/PT/OT/ID following.

## 2023-11-26 NOTE — PROGRESS NOTES
RN talked to vascular access regarding midline placement; MD talked to pt this AM and is ok to hold of on Midline placement on day of discharge. Pt due to discharge Monday or Tuesday, RN please reach out to vascular access for placement.

## 2023-11-26 NOTE — PROGRESS NOTES
Attempted to place Midline however pt declined and stated that would like to wait until closer to the discharge date.    Nurse informed.

## 2023-11-26 NOTE — PROGRESS NOTES
[facility]  Hospitalist Progress Note        Hebert Villalba MD   11/26/2023        Interval History:        - per ID, plans for IV Ancef x 2 weeks from time of negative blood cultures (until 12/7/23) to treat low-grade MSSA bacteremia   - ID okayed for midline but patient wants to wait until the day of discharge for it to be placed  - completed 5 days course of Paxlovid for COVID (11/25/23)  - SW following for disposition planning; patient now agreeable for a TCU placement          Assessment and Plan:        Jonathan Bishop is a 78 year old male with PMH significant for aortic valve replacement, CAD, HTN, HLD, CVA, and 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      Confirmed COVID-19 infection  Generalized weakness, mechanical fall 2/2 above     - presented with generalized weakness and a fall.  * COVID testing in the ED is positive.   *Remains afebrile and not hypoxic ; CXR did show patchy opacity in the L lung base which could represent atelectasis or pneumonia.   * Head CT, XR  L femur, and XR L tibia/fibula are negative for acute injury.   * D dimer is elevate to 4.22 and CRP is elevated to 69.61.     - per pharmacy recs, switched Lovenox to BID for DVT prophylaxis  - CT chest PE protocol 11/21 noted no acute pulmonary embolism; Grossly unchanged bilateral cervical lymphadenopathy and unchanged 3 mm right middle lobe pulmonary nodule  - ultrasound lower extremity 11/21 noted no DVT  - completed 5 days course of Paxlovid for COVID (11/25/23)  - deferred steroids and remdesivir given that the patient is not hypoxic  - maintain COVID-19 special precautions, continuous pulse-ox and prn O2  - SW following for disposition planning; patient now agreeable for a TCU placement    Staph aureus (MSSA) bacteremia  - 1/2 Blood  cultures 11/21 growing GPC in clusters (MSSA); blood cultures from 11/22 with no growth so far  - was started on Vancomycin; evaluated by ID and switched vancomycin to Ancef (11/22)  - ECHO 11/24/23 with LVEF 65 %, normal in structure, function and size; mild mitral stenosis; s/p TAVR with 26mm Anton Tawana 3, implanted 2020. Mean 11mmHg, Vmax 2.4m/s. No AI or PVL, no note of vegetation    - ID suspect nasopharyngeal mass may also be the source of bacteremia; blood cultures from 11/22 and 11/24 with no growth so far; if multiple blood cultures are positive will need evaluation for endocarditis  - per ID, plans for IV Ancef x 2 weeks from time of negative blood cultures (until 12/7/23) to treat low-grade MSSA bacteremia   - ID okayed for midline (11/25) but patient wants to wait until the day of discharge for it to be placed    Ingrown toe nail:  - Podiatry consulted for left great toe ingrown toe nail; podiatry evaluated 11/24 and suggested suggested no need for surgical intervention at this time    H/o CVA with residual left sided weakness  - on 11/22 was noted with left-sided weakness during therapy evaluation  - a code stroke was called, evaluated by house MARK  - he does have history of prior right UMM infarct and notes baseline residual left sided weakness and states that he ambulates with help of a cane and scooter  - was evaluated by stroke neurology (signed off 11/23)  - MRI brain 11/22 noted no acute infarct, mass, mass effect, or hemorrhage; moderate chronic small vessel ischemia.  - CT head 11/22 noted no CT finding of a mass, hemorrhage or focal area suggestive of infarct  - CTA head and neck 11/22 noted no significant vascular stenosis or occlusion  - evaluated by SLP, noted mild oropharyngeal dysphagia, diet modification per SLP  - ECHO as above  - neurology suggest likely recrudescence of old stroke symptoms in the context of acute infection and suggested no further stroke workup  - to continue ASA  "81 mg daily, Lipitor 40 mg daily; holding Lipitor while on Paxlovid given the risk for interaction; can resume on discharge      Mild Anemia   Hemoglobin is 10.8---10.4---9.7---10.2, stable from baseline     Mild hypernateremia   Sodium is 147, improved.   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 CT soft tissue neck with contrast recommended.   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 in the neck, appearance similar to PET/CT of 11/14/2020\"  - follow-up with PCP/oncology    - if patient still admitted beyond 11/28, might need to consult radiation oncology to initiate radiation therapy (was planned at [facility])     Anxiety  - Continue PTA diazepam PRN      CAD   HTN  HLD  S/p AVR  CHF  (TTE in 2021 with EF of 60-65%, Grade I diastolic dysfunction), not in acute exacerbation    Chronic LE edema   - Hold PTA atorvastatin while on Paxlovid  - continue PTA torsemide, spironolactone     DVT Prophylaxis: resumed Enoxaparin (Lovenox) subcutaneous 11/23    Code Status:DNR/DNI    Diet: Regular Diet Adult      Disposition:   - medically now stable for discharge pending TCU placement   - needs two weeks of IV antibiotics with Ancef per ID   - needs midline prior to discharge (see above)  - PT/OT rec acute rehab; patient initially declined, now agreeable for TCU; SW for disposition planning    Clinically Significant Risk Factors " Present on Admission                    # Hypertension: Noted on problem list                        Page Me (7 am to 6 pm)    Care plan discussed with patient and nursing; also discussed with ID              Physical Exam:      Blood pressure 125/60, pulse 78, temperature 98.1  F (36.7  C), temperature source Oral, resp. rate 16, SpO2 96%.  There were no vitals filed for this visit.  Vital Signs with Ranges  Temp:  [97.6  F (36.4  C)-98.3  F (36.8  C)] 98.1  F (36.7  C)  Pulse:  [76-79] 78  Resp:  [16-18] 16  BP: (117-152)/(59-72) 125/60  SpO2:  [96 %-97 %] 96 %  I/O's Last 24 hours  I/O last 3 completed shifts:  In: 236 [P.O.:236]  Out: 600 [Urine:600]    Constitutional: Alert, awake and oriented X 3; resting comfortably in no apparent distress    Mild left facial droop   Oral cavity: Moist mucosa   Cardiovascular: Normal s1 s2, regular rate and rhythm, no murmur   Lungs: B/l clear to auscultation, no wheezes or crepitations   Abdomen: Soft, nt, nd, no guarding, rigidity or rebound; BS +   LE : Mild b/l edema +, more on left   Musculoskeletal/Neuro Left LE weakness, left hand  weak; power 5/5 in all other extremities   Psychiatry: normal mood and affect                Medications:         aspirin  81 mg Oral or Feeding Tube Daily    ceFAZolin (ANCEF) 2 g in sodium chloride 0.9 % 100 mL  2 g Intravenous Q8H    tamsulosin  0.4 mg Oral Daily    Or    doxazosin  1 mg Oral or Feeding Tube Daily    enoxaparin ANTICOAGULANT  40 mg Subcutaneous Q12H    nirmatrelvir and ritonavir  3 tablet Oral BID    spironolactone  50 mg Oral or Feeding Tube Daily    torsemide  40 mg Oral or Feeding Tube Daily     PRN Meds: diazepam, ibuprofen **OR** ibuprofen, - MEDICATION INSTRUCTIONS -, methocarbamol, naloxone **OR** naloxone **OR** naloxone **OR** naloxone, ondansetron **OR** ondansetron, oxyCODONE-acetaminophen, senna-docusate **OR** senna-docusate         Data:      All new lab and imaging data was reviewed.   Recent Labs   Lab  Test 11/24/23  0848 11/23/23  0729 11/22/23  1251 10/28/20  1420 10/28/19  0900 09/19/19  0858 11/30/16  0930   WBC 6.3 6.7 7.7   < > 6.8   < > 7.2   HGB 10.2* 9.7* 10.4*   < > 11.4*   < > 12.9*   MCV 81 80 81   < > 82   < > 78    182 191   < > 259   < > 289   INR  --   --   --   --  1.01  --  0.99    < > = values in this interval not displayed.      Recent Labs   Lab Test 11/25/23  1827 11/25/23  0923 11/24/23  2131 11/24/23  1734 11/24/23  0900 11/24/23  0848 11/23/23  0749 11/23/23  0729 11/22/23  1135 11/22/23  0832 11/21/23  1348 10/18/23  0941   NA  --   --   --   --   --   --   --   --   --  138 147* 143   POTASSIUM  --   --   --   --   --   --   --   --   --  4.0 3.6 3.6   CHLORIDE  --   --   --   --   --   --   --   --   --  103 107 104   CO2  --   --   --   --   --   --   --   --   --  25 27 30*   BUN  --   --   --   --   --   --   --   --   --  9.7 16.2 23.4*   CR  --  0.80  --   --   --  0.86  --  1.00  --  0.61* 0.95 0.95   ANIONGAP  --   --   --   --   --   --   --   --   --  10 13 9   WARREN  --   --   --   --   --   --   --   --   --  9.2 9.7 9.5   *  --  129* 92   < >  --    < >  --    < > 130* 97 98    < > = values in this interval not displayed.     Recent Labs   Lab Test 12/01/16  0300 11/30/16  1930 11/30/16  0930   TROPI <0.015  The 99th percentile for upper reference range is 0.045 ug/L.  Troponin values in   the range of 0.045 - 0.120 ug/L may be associated with risks of adverse   clinical events.   <0.015  The 99th percentile for upper reference range is 0.045 ug/L.  Troponin values in   the range of 0.045 - 0.120 ug/L may be associated with risks of adverse   clinical events.   <0.015  The 99th percentile for upper reference range is 0.045 ug/L.  Troponin values in   the range of 0.045 - 0.120 ug/L may be associated with risks of adverse   clinical events.

## 2023-11-26 NOTE — PLAN OF CARE
Goal Outcome Evaluation:  A&OX4, VSS on RA. C/o low back pain, ibuprofen given with some relief. Up AX1 with GB and walker to the bathroom. Can be incontinent. Using urinal at bedside. Self repositions in bed. Dressing on the bilateral shoulders c/d/I. Vascular consult for midline placement pending. ID following.Plan to discharge to TCU pending placement. Neuros unchanged with L sided weakness at baseline, LLE weaker than LUE. Special precaution maintained. On regular diet. Continue to monitor.

## 2023-11-26 NOTE — PLAN OF CARE
Goal Outcome Evaluation:         Summary: Weakness, fall, Covid positive.       DATE & TIME: 11/25/23 9341-7269    Cognitive Concerns/ Orientation: A&Ox4, forgetful at times.   BEHAVIOR & AGGRESSION TOOL COLOR: Green  ABNL VS/O2: VSS on RA. Has mild L facial droop, otherwise neuro's intact.  MOBILITY: Assist x1 GB/W, patient is able to reposition self independently in the bed, uses walker/cane at baseline, and motorized scooter for longer distances.   PAIN MANAGMENT: Denied pain.  DIET: Regular  BOWEL/BLADDER: Using urinal at bedside   ABNL LAB/BG: Hgb 10.2 , BC in process  DRAIN/DEVICES: L PIV SL with int. abx  TELEMETRY RHYTHM: NA  SKIN: Trace edema on BLE. Bruise on L shoulder and hip. Abrasion to left shoulder; with dressing in place.  TESTS/PROCEDURES: NA  D/C DAY/GOALS/PLACE: PT/Ot recommend TCU, SW following for placement    OTHER IMPORTANT INFO: On IV Ancef q8h. and may need a Midline once blood cxs clear, per ID note. On Paxlovid for covid. Neurology/SLP/PT/OT/ID following.

## 2023-11-26 NOTE — PROGRESS NOTES
[facility]    Infectious Disease Progress Note    Date of Service : 11/26/2023       Assessment:  79 yo male with history of TAVR, CVA, HTN, CAD and obesity currently undergoing radiation for oropharyngeal cancer presenting with weakness and testing positive for COVID and blood culture with staph aureus.      -Afebrile. Not hypoxic. WBC normal.   -CXR with patchy opacity left lung base. Could have post-Covid staph pneumonia though CT chest did not show pneumonia . Nasopharyngeal mass may also be the source of bacteremia.   -Blood culture (1 of 2) from admission on 11/21/23  positive for MSSA, follow up blood cxs 11/22 and 11/24 are negative to date.   -No cellulitis or wounds other than shoulder abrasion from fall on 11/21/23.  -Hx. bilateral TKA. No knee pain presently.   -Hx TAVR. TTE with no evidence endocarditis.     Recommendations  Continue Cefazolin, will need IV antibiotics x 2 weeks from time of negative blood cultures (until 12/7/23) to treat low-grade MSSA bacteremia which cleared quickly. Ordered in discharge navigator. Will need Blood cultures repeated one week after stopping IV antibiotics.   OK to place midline. Order placed. Patient wanting to hold off on placement of line until closer to discharge.   On Paxlovid for COVID - last day of treatment today.  Awaiting TCU placement.      Marialuisa Gordon PA-C    Interval History   Feels ok, improving, worried about making it to radiation therapy this coming Tuesday at the [facility].   No new complaints, tolerating antibiotics without side effects.    Physical Exam   Temp: 97.9  F (36.6  C) Temp src: Oral BP: (!) 160/77 Pulse: 92   Resp: 18 SpO2: 95 % O2 Device: None (Room air)    There were no vitals filed for this visit.  Vital Signs with Ranges  Temp:  [97.9  F (36.6  C)-98.3  F (36.8  C)] 97.9  F (36.6  C)  Pulse:  [78-92] 92  Resp:  [16-18] 18  BP: (117-160)/(59-77) 160/77  SpO2:  [95 %-97 %] 95 %    Constitutional: Awake,  alert, cooperative, no apparent distress  Lungs: Clear to auscultation bilaterally, no crackles or wheezing  Cardiovascular: S1S2  Abdomen: Normal bowel sounds, soft, non-distended, non-tender  Skin: No rashes, no cyanosis, no edema  MS :no knee swelling    Medications    - MEDICATION INSTRUCTIONS -        aspirin  81 mg Oral or Feeding Tube Daily    ceFAZolin (ANCEF) 2 g in sodium chloride 0.9 % 100 mL  2 g Intravenous Q8H    enoxaparin ANTICOAGULANT  40 mg Subcutaneous Q12H    spironolactone  50 mg Oral or Feeding Tube Daily    tamsulosin  0.4 mg Oral Daily    torsemide  40 mg Oral or Feeding Tube Daily       Data   All microbiology laboratory data reviewed.  Recent Labs   Lab Test 11/24/23  0848 11/23/23  0729 11/22/23  1251   WBC 6.3 6.7 7.7   HGB 10.2* 9.7* 10.4*   HCT 33.6* 32.2* 34.9*   MCV 81 80 81    182 191     Recent Labs   Lab Test 11/25/23  0923 11/24/23  0848 11/23/23  0729   CR 0.80 0.86 1.00       Collected Updated Procedure Result Status    11/24/2023 0848 11/25/2023 1316 Blood Culture Hand, Right [[id number]]    Blood from Hand, Right    Preliminary result Component Value   Culture No growth after 1 day P             11/24/2023 0848 11/25/2023 1316 Blood Culture Hand, Right [[id number]]    Blood from Hand, Right    Preliminary result Component Value   Culture No growth after 1 day P             11/22/2023 1259 11/25/2023 1603 Blood Culture Hand, Right [[id number]]    Blood from Hand, Right    Preliminary result Component Value   Culture No growth after 3 days P             11/22/2023 1251 11/25/2023 1603 Blood Culture Hand, Right [[id number]]   Blood from Hand, Right    Preliminary result Component Value   Culture No growth after 3 days P             11/21/2023 1447 11/21/2023 1530 Symptomatic Influenza A/B, RSV, & SARS-CoV2 PCR (COVID-19) Nasopharyngeal [[id number]]    (Abnormal)   Swab from Nasopharyngeal    Final result Component Value   Influenza A PCR Negative   Influenza B  PCR Negative   RSV PCR Negative   SARS CoV2 PCR Positive Abnormal    POSITIVE: SARS-CoV-2 (COVID-19) RNA detected, presumed positive.          11/21/2023 1353 11/24/2023 0753 Blood Culture Peripheral Blood [[id number]]    (Abnormal)   Peripheral Blood    Final result Component Value   Culture Positive on the 1st day of incubation Abnormal     Staphylococcus aureus Panic     1 of 2 bottles       Susceptibility     Staphylococcus aureus     OMER     Clindamycin 0.25 ug/mL Susceptible 1     Daptomycin 0.25 ug/mL Susceptible     Doxycycline <=0.5 ug/mL Susceptible     Erythromycin >=8 ug/mL Resistant     Gentamicin <=0.5 ug/mL Susceptible     Oxacillin <=0.25 ug/mL Susceptible 2     Tetracycline >=16 ug/mL Resistant     Trimethoprim/Sulfamethoxazole <=0.5/9.5 u... Susceptible     Vancomycin <=0.5 ug/mL Susceptible                      Procedure 11/24/23  Complete Echo Adult. Optison (NDC #[id number]) given intravenously.  ______________________________________________________________________________  Interpretation Summary     1. The left ventricle is normal in structure, function and size. The visual  ejection fraction is estimated at 65%.  2. The right ventricle is normal in structure, function and size.  3. There is mild mitral stenosis. The mean mitral valve gradient is 5mmHg.  4. s/p TAVR with 26mm Anton Tawana 3, implanted 2020. Mean 11mmHg, Vmax  2.4m/s. No AI or PVL.     Echo 12/2021 showed EF 60%, TAVR with mean 10mmHg.  ___________

## 2023-11-26 NOTE — PROGRESS NOTES
Care Management Follow Up    Length of Stay (days): 5    Expected Discharge Date: 11/28/2023     Concerns to be Addressed: adjustment to diagnosis/illness, discharge planning     Patient plan of care discussed at interdisciplinary rounds: Yes    Anticipated Discharge Disposition: Skilled Nursing Facility     Anticipated Discharge Services: Transportation Services, Housekeeping/[employer]  Anticipated Discharge DME:      Patient/family educated on Medicare website which has current facility and service quality ratings:    Education Provided on the Discharge Plan:    Patient/Family in Agreement with the Plan: no    Referrals Placed by CM/SW:    Private pay costs discussed: Not applicable    Additional Information:  Called pt to discuss TCU choices.  No answer in room x 2.  Will call pt again tomorrow.  SW to send TCU choices AFTER we are able to get his insurance information updated into Epic (it is currently not showing, is showing pt as self-pay).      Elina Finch, SHELLYSW

## 2023-11-26 NOTE — PROGRESS NOTES
Care Management Follow Up    Length of Stay (days): 5    Expected Discharge Date: 11/28/2023     Concerns to be Addressed: adjustment to diagnosis/illness, discharge planning     Patient plan of care discussed at interdisciplinary rounds: Yes    Anticipated Discharge Disposition: Skilled Nursing Facility     Anticipated Discharge Services: Transportation Services, Housekeeping/[employer]  Anticipated Discharge DME:      Patient/family educated on Medicare website which has current facility and service quality ratings:    Education Provided on the Discharge Plan:    Patient/Family in Agreement with the Plan: no    Referrals Placed by CM/SW:    Private pay costs discussed: Not applicable    Additional Information:  No insurance is listed.  Email to financial office to check status.    Clarissa Spring RN  Inpatient Float Care Coordinator

## 2023-11-27 ENCOUNTER — APPOINTMENT (OUTPATIENT)
Dept: OCCUPATIONAL THERAPY | Facility: CLINIC | Age: 78
DRG: 178 | End: 2023-11-27
Payer: COMMERCIAL

## 2023-11-27 ENCOUNTER — APPOINTMENT (OUTPATIENT)
Dept: PHYSICAL THERAPY | Facility: CLINIC | Age: 78
DRG: 178 | End: 2023-11-27
Payer: COMMERCIAL

## 2023-11-27 ENCOUNTER — APPOINTMENT (OUTPATIENT)
Dept: SPEECH THERAPY | Facility: CLINIC | Age: 78
DRG: 178 | End: 2023-11-27
Payer: COMMERCIAL

## 2023-11-27 LAB
BACTERIA BLD CULT: NO GROWTH
BACTERIA BLD CULT: NO GROWTH

## 2023-11-27 PROCEDURE — 120N000001 HC R&B MED SURG/OB

## 2023-11-27 PROCEDURE — 97530 THERAPEUTIC ACTIVITIES: CPT | Mod: GP

## 2023-11-27 PROCEDURE — 97535 SELF CARE MNGMENT TRAINING: CPT | Mod: GO

## 2023-11-27 PROCEDURE — 92526 ORAL FUNCTION THERAPY: CPT | Mod: GN

## 2023-11-27 PROCEDURE — 250N000011 HC RX IP 250 OP 636: Performed by: STUDENT IN AN ORGANIZED HEALTH CARE EDUCATION/TRAINING PROGRAM

## 2023-11-27 PROCEDURE — 97116 GAIT TRAINING THERAPY: CPT | Mod: GP

## 2023-11-27 PROCEDURE — 99233 SBSQ HOSP IP/OBS HIGH 50: CPT | Performed by: HOSPITALIST

## 2023-11-27 PROCEDURE — 258N000003 HC RX IP 258 OP 636: Performed by: STUDENT IN AN ORGANIZED HEALTH CARE EDUCATION/TRAINING PROGRAM

## 2023-11-27 PROCEDURE — 250N000011 HC RX IP 250 OP 636: Mod: JZ | Performed by: HOSPITALIST

## 2023-11-27 PROCEDURE — 99232 SBSQ HOSP IP/OBS MODERATE 35: CPT | Performed by: SPECIALIST

## 2023-11-27 PROCEDURE — 250N000013 HC RX MED GY IP 250 OP 250 PS 637

## 2023-11-27 PROCEDURE — 77338 DESIGN MLC DEVICE FOR IMRT: CPT | Performed by: RADIOLOGY

## 2023-11-27 PROCEDURE — 250N000013 HC RX MED GY IP 250 OP 250 PS 637: Performed by: STUDENT IN AN ORGANIZED HEALTH CARE EDUCATION/TRAINING PROGRAM

## 2023-11-27 PROCEDURE — 77300 RADIATION THERAPY DOSE PLAN: CPT | Performed by: RADIOLOGY

## 2023-11-27 PROCEDURE — 77301 RADIOTHERAPY DOSE PLAN IMRT: CPT | Performed by: RADIOLOGY

## 2023-11-27 RX ADMIN — OXYCODONE HYDROCHLORIDE AND ACETAMINOPHEN 1 TABLET: 5; 325 TABLET ORAL at 09:39

## 2023-11-27 RX ADMIN — ASPIRIN 81 MG 81 MG: 81 TABLET ORAL at 09:37

## 2023-11-27 RX ADMIN — OXYCODONE HYDROCHLORIDE AND ACETAMINOPHEN 1 TABLET: 5; 325 TABLET ORAL at 00:03

## 2023-11-27 RX ADMIN — SODIUM CHLORIDE, PRESERVATIVE FREE 2 G: 5 INJECTION INTRAVENOUS at 02:52

## 2023-11-27 RX ADMIN — TAMSULOSIN HYDROCHLORIDE 0.4 MG: 0.4 CAPSULE ORAL at 09:37

## 2023-11-27 RX ADMIN — ENOXAPARIN SODIUM 40 MG: 40 INJECTION SUBCUTANEOUS at 09:41

## 2023-11-27 RX ADMIN — SPIRONOLACTONE 50 MG: 25 TABLET ORAL at 09:37

## 2023-11-27 RX ADMIN — SODIUM CHLORIDE, PRESERVATIVE FREE 2 G: 5 INJECTION INTRAVENOUS at 18:22

## 2023-11-27 RX ADMIN — ENOXAPARIN SODIUM 40 MG: 40 INJECTION SUBCUTANEOUS at 21:57

## 2023-11-27 RX ADMIN — SODIUM CHLORIDE, PRESERVATIVE FREE 2 G: 5 INJECTION INTRAVENOUS at 09:41

## 2023-11-27 ASSESSMENT — ACTIVITIES OF DAILY LIVING (ADL)
ADLS_ACUITY_SCORE: 36
ADLS_ACUITY_SCORE: 42
ADLS_ACUITY_SCORE: 36
ADLS_ACUITY_SCORE: 36
ADLS_ACUITY_SCORE: 42
ADLS_ACUITY_SCORE: 42
ADLS_ACUITY_SCORE: 36
ADLS_ACUITY_SCORE: 42
ADLS_ACUITY_SCORE: 42
ADLS_ACUITY_SCORE: 36

## 2023-11-27 NOTE — PROGRESS NOTES
"Care Management Follow Up    Length of Stay (days): 6    Expected Discharge Date: 11/28/2023     Concerns to be Addressed: adjustment to diagnosis/illness, discharge planning     Patient plan of care discussed at interdisciplinary rounds: Yes    Anticipated Discharge Disposition: Skilled Nursing Facility     Anticipated Discharge Services: Transportation Services, Housekeeping/[employer]  Anticipated Discharge DME:      Patient/family educated on Medicare website which has current facility and service quality ratings:    Education Provided on the Discharge Plan:    Patient/Family in Agreement with the Plan: no    Referrals Placed by CM/SW:    Private pay costs discussed: Not applicable    Additional Information:  Writer spoke with pt via phone. Introduced self and role. Pt states that he is leaving today. Writer asked pt where he is going. Pt sates he is going home. Writer discussed TCU with pt. Pt states that he does not want to talk to writer about that because he is not going. Pt states he will be going home. Writer asked pt if he has discussed this with the doctor. Pt states, \" I don't need the doctor to sign off. I am signing my own self out.\" Pt then again states no interest in discussing TCU with this writer as he states he will go home.    Writer updated Charge RN of pt stating he is going home. Writer informed Charge that writer will not work further on pt's case unless pt decides he wants TCU, or unless doctor feels pt not able to make his own decisions. Charge states understanding.     Addendum: received voicemail from Mayra with [employer] [phone] she is going to see pt and offer services for SNV,, and so forth. Mayra wanting call back regarding pt.  Writer called Mayra. No answer. Writer left voicemail informing her that writer is not working on TCU as pt not in agreement and wanting to discharge. Writer inofrmed Mayra that unsure if doctor will discharge pt or if pt will go " SAMY Deleon, BSW  Social Work  [facility]

## 2023-11-27 NOTE — PLAN OF CARE
Speech Language Therapy Discharge Summary    Reason for therapy discharge:    All goals and outcomes met, no further needs identified.    Progress towards therapy goal(s). See goals on Care Plan in [facility] electronic health record for goal details.  Goals met    Therapy recommendation(s):    Continued therapy is recommended.  Rationale/Recommendations:  Continue regular and thin liquids. Precautions: upright, slow rate, small bites/sips, alternate consistencies intermittently, monitor for s/sx or decline in respiratory status. Recommend OP SLP follow up for oropharyngeal exercises to minimize post-radiation fibrosis and maximize longevity of functional swallow/swallow safety given initiation of radiation for oropharyngeal cancer. No further SLP services warranted as IP, but OP SLP orders should be placed at discharge when pt initiates radiation.

## 2023-11-27 NOTE — PROGRESS NOTES
VSS on RA. A&Ox4, forgetful. Ax1 GB/W. Continent, urinal at bedside. R PIV SL. Will get midline prior to discharge. Wound on bilat shoulders, back. Rash to walker area/abdominal folds. Blanchable redness on coccyx. Wound cares done. Prn percocet given x1 for back pain. Neuros: Mild left droop with movement, RUE ataix. LLE 3/5. Needs radiation for throat cancer, plan to reschedule this after pt goes to TCU. Cannot go to TCU until COVID iso is done and placement is found.       Pt is wanting to leave AMA. PT/OT called and are seeing pt now. Waiting for their eval to see if TCU is still recommended before calling pt's friend to discuss pt leaving AMA.

## 2023-11-27 NOTE — PLAN OF CARE
Goal Outcome Evaluation:         Summary: Weakness, Fall, Covid positive.       DATE & TIME: 11/26/2023-11/27/2023 1114-6163    Cognitive Concerns/ Orientation: A&Ox4, forgetful at times.   BEHAVIOR & AGGRESSION TOOL COLOR: Green  ABNL VS/O2: VSS on RA. Q4 neuro's intact.  MOBILITY: Assist x1 GB/W, patient is able to reposition self independently in the bed  PAIN MANAGMENT: C/o back pain PRN Percocet given x1   DIET: Regular  BOWEL/BLADDER: Using urinal at bedside. No BM this shift  ABNL LAB/BG: Hgb 10.2  DRAIN/DEVICES: R PIV SL with int. abx  TELEMETRY RHYTHM: NA  SKIN: Trace edema on BLE. Bruise on L shoulder and hip. Abrasion to left shoulder; with dressing - CDI  TESTS/PROCEDURES: None this shift   D/C DAY/GOALS/PLACE: PT/Ot recommend TCU, SW following for placement    OTHER IMPORTANT INFO: On IV Ancef q8h,  pt. uses walker/cane at baseline, and motorized scooter for longer distances. Will need a Midline once blood cxs clear, per ID note. Midline to be placed before pt. Discharges- see note   Neurology/SLP/PT/OT/ID following.

## 2023-11-27 NOTE — PROGRESS NOTES
[facility]    Infectious Disease Progress Note    Date of Service : 11/27/2023     Assessment:  79 yo male with history of TAVR, CVA, HTN, CAD and obesity currently undergoing radiation for oropharyngeal cancer presenting with weakness and testing positive for COVID and blood culture with staph aureus.      -CXR with patchy opacity left lung base. Could have post-Covid staph pneumonia though CT chest did not show pneumonia . Nasopharyngeal mass may also be the source of bacteremia  -Blood culture (1 of 2) from admission on 11/21/23  positive for MSSA, follow up blood cxs are negative.   -No cellulitis or wounds other than shoulder abrasion from fall on 11/21/23.  -Hx. bilateral TKA. No knee pain presently.   -Hx TAVR.TTE with no evidence endocarditis.      Recommendations  Continue Cefazolin, will need IV antibiotics x 2 weeks from time of negative blood cultures (until 12/7/23) to treat low-grade MSSA bacteremia which cleared quickly. Ordered in discharge navigator. Will need Blood cultures X 2 sets repeated one week after stopping IV antibiotics.   Midline.   S/p treatment with Paxlovid for COVID   Awaiting TCU placement.    Can discharge from the ID stand point once antibiotics are arranged  ID will sign off, please call us back as needed  Paris Rao MD    Interval History   Feels ok, has remained afebrile, tolerating antibiotics without side effects, blood cxs have remained negative, no new complaints today      Physical Exam   Temp: 97.6  F (36.4  C) Temp src: Oral BP: (!) 144/70 Pulse: 69   Resp: 16 SpO2: 94 % O2 Device: None (Room air)    Vitals:    11/27/23 0002   Weight: 101.8 kg (224 lb 6.9 oz)     Vital Signs with Ranges  Temp:  [97.6  F (36.4  C)-98  F (36.7  C)] 97.6  F (36.4  C)  Pulse:  [62-82] 69  Resp:  [16-18] 16  BP: (129-144)/(60-70) 144/70  SpO2:  [94 %] 94 %    Constitutional: Awake, alert, cooperative, no apparent distress  Lungs: Clear to auscultation bilaterally, no  crackles or wheezing  Cardiovascular: S1S2  Abdomen: Normal bowel sounds, soft, non-distended, non-tender  Skin: No rashes, no cyanosis, no edema  MS :no knee swelling    Other:    Medications    - MEDICATION INSTRUCTIONS -        aspirin  81 mg Oral or Feeding Tube Daily    ceFAZolin (ANCEF) 2 g in sodium chloride 0.9 % 100 mL  2 g Intravenous Q8H    enoxaparin ANTICOAGULANT  40 mg Subcutaneous Q12H    spironolactone  50 mg Oral or Feeding Tube Daily    tamsulosin  0.4 mg Oral Daily    torsemide  40 mg Oral or Feeding Tube Daily       Data   All microbiology laboratory data reviewed.  Recent Labs   Lab Test 11/24/23  0848 11/23/23  0729 11/22/23  1251   WBC 6.3 6.7 7.7   HGB 10.2* 9.7* 10.4*   HCT 33.6* 32.2* 34.9*   MCV 81 80 81    182 191     Recent Labs   Lab Test 11/25/23  0923 11/24/23  0848 11/23/23  0729   CR 0.80 0.86 1.00     Microbiology  Blood cx 11/22, 11/24 negative  11/21/2023 1447 11/21/2023 1530 Symptomatic Influenza A/B, RSV, & SARS-CoV2 PCR (COVID-19) Nasopharyngeal [[id number]]    (Abnormal)   Swab from Nasopharyngeal    Final result Component Value   Influenza A PCR Negative   Influenza B PCR Negative   RSV PCR Negative   SARS CoV2 PCR Positive Abnormal    POSITIVE: SARS-CoV-2 (COVID-19) RNA detected, presumed positive.          11/21/2023 1353 11/24/2023 0753 Blood Culture Peripheral Blood [[id number]]    (Abnormal)   Peripheral Blood    Final result Component Value   Culture Positive on the 1st day of incubation Abnormal     Staphylococcus aureus Panic     1 of 2 bottles       Susceptibility     Staphylococcus aureus     OMER     Ciprofloxacin <=0.5 ug/mL Susceptible *     Clindamycin 0.25 ug/mL Susceptible 1     Daptomycin 0.25 ug/mL Susceptible     Doxycycline <=0.5 ug/mL Susceptible     Erythromycin >=8 ug/mL Resistant     Gentamicin <=0.5 ug/mL Susceptible     Inducible macrolide resistance test Negative ug/mL Negative *     Levofloxacin <=0.12 ug/mL Susceptible *     Linezolid 2  ug/mL Susceptible *     Moxifloxacin <=0.25 ug/mL Susceptible *     Nitrofurantoin <=16 ug/mL Susceptible *     Oxacillin <=0.25 ug/mL Susceptible 2     Rifampin <=0.5 ug/mL Susceptible *     Tetracycline >=16 ug/mL Resistant     Tigecycline <=0.12 ug/mL Susceptible *     Trimethoprim/Sulfamethoxazole <=0.5/9.5 u... Susceptible     Vancomycin <=0.5 ug/mL Susceptible

## 2023-11-27 NOTE — PROGRESS NOTES
[facility]  Hospitalist Progress Note        Gerald Khanna MD   11/27/2023        Interval History:        - per ID, plans for IV Ancef x 2 weeks from time of negative blood cultures (until 12/7/23) to treat low-grade MSSA bacteremia   - ID okayed for midline but patient wants to wait until the day of discharge for it to be placed  - completed 5 days course of Paxlovid for COVID (11/25/23); on RA  -Was scheduled to start XRT today at [facility] tomorrow, spoke with XRT resident who states that patient's COVID status is not obstacle for treatment, described as twice daily x 2 days.  Not likely that he will be excepted as inpatient to [facility] therefore discussed [facility] TCU that can assist in transfer to [place] for XRT however due to COVID status discussed with Care Coordinator this will not be until after 10 days of COVID isolation.  Discussed with XRT need for likely rescheduling to the following week, patient without S/S airway obstruction from his oropharyngeal CA.         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      Confirmed COVID-19 infection status post Paxlovid x 5 days, on room air.  Generalized weakness, mechanical fall 2/2 above     - presented with generalized weakness and a fall.  * COVID testing in the ED is positive.   *Remains afebrile and not hypoxic ; CXR did show patchy opacity in the L lung base which could represent atelectasis or pneumonia.   * Head CT, XR  L femur, and XR L tibia/fibula are negative for acute injury.   * D dimer is elevate to 4.22 and CRP is elevated to 69.61.   - per pharmacy recs, switched Lovenox to BID for DVT prophylaxis  - CT chest PE protocol  11/21 noted no acute pulmonary embolism; Grossly unchanged bilateral cervical lymphadenopathy and unchanged 3 mm right middle lobe pulmonary nodule  - ultrasound lower extremity 11/21 noted no DVT  - completed 5 days course of Paxlovid for COVID (11/25/23)  - deferred steroids and remdesivir given that the patient is not hypoxic  - maintain COVID-19 special precautions x 10 days, continuous pulse-ox and prn O2  - SW following for disposition planning; patient was agreeable for a TCU placement, today I discussed [facility] TCU to facilitate transport to [place] XRT pending completion of COVID isolation status which would be on Friday.  This would mean delaying XRT for about a week starting on Monday, December 4.    Staph aureus (MSSA) bacteremia  - 1/2 Blood cultures 11/21 growing GPC in clusters (MSSA); blood cultures from 11/22 with no growth so far  - was started on Vancomycin; evaluated by ID and switched vancomycin to Ancef (11/22)  - ECHO 11/24/23 with LVEF 65 %, normal in structure, function and size; mild mitral stenosis; s/p TAVR with 26mm Anton [place] 3, implanted 2020. Mean 11mmHg, Vmax 2.4m/s. No AI or PVL, no note of vegetation    - ID suspect nasopharyngeal mass may also be the source of bacteremia; blood cultures from 11/22 and 11/24 with no growth so far; if multiple blood cultures are positive will need evaluation for endocarditis  - per ID, plans for IV Ancef x 2 weeks from time of negative blood cultures (until 12/7/23) to treat low-grade MSSA bacteremia   - ID okayed for midline (11/25) but patient wants to wait until the day of discharge for it to be placed    Ingrown toe nail:  - Podiatry consulted for left great toe ingrown toe nail; podiatry evaluated 11/24 and suggested suggested no need for surgical intervention at this time    H/o CVA with residual left sided weakness  - on 11/22 was noted with left-sided weakness during therapy evaluation  - a code stroke was called, evaluated by house  "MARK  - he does have history of prior right UMM infarct and notes baseline residual left sided weakness and states that he ambulates with help of a cane and scooter  - was evaluated by stroke neurology (signed off 11/23)  - MRI brain 11/22 noted no acute infarct, mass, mass effect, or hemorrhage; moderate chronic small vessel ischemia.  - CT head 11/22 noted no CT finding of a mass, hemorrhage or focal area suggestive of infarct  - CTA head and neck 11/22 noted no significant vascular stenosis or occlusion  - evaluated by SLP, noted mild oropharyngeal dysphagia, diet modification per SLP  - ECHO as above  - neurology suggest likely recrudescence of old stroke symptoms in the context of acute infection and suggested no further stroke workup  - to continue ASA 81 mg daily, Lipitor 40 mg daily; holding Lipitor while on Paxlovid given the risk for interaction; can resume on discharge      Mild Anemia   Hemoglobin is 10.8---10.4---9.7---10.2, stable from baseline     Mild hypernateremia   Sodium is 147, improved.   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 CT soft tissue neck with contrast recommended.   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 in the neck, appearance similar to PET/CT of 11/14/2020\"  - follow-up with " PCP/oncology  - if patient still admitted beyond 11/28, might need to consult radiation oncology to initiate radiation therapy (was planned at [place]).  Will discuss with Dr. Doshi.     Anxiety  - Continue PTA diazepam PRN      CAD   HTN  HLD  S/p AVR  CHF  (TTE in 2021 with EF of 60-65%, Grade I diastolic dysfunction), not in acute exacerbation    Chronic LE edema   - Hold PTA atorvastatin while on Paxlovid  - continue PTA torsemide, spironolactone     DVT Prophylaxis: resumed Enoxaparin (Lovenox) subcutaneous 11/23    Code Status:DNR/DNI    Diet: Regular Diet Adult      Disposition:   - medically now stable for discharge pending TCU placement   - needs two weeks of IV antibiotics with Ancef per ID   - needs midline prior to discharge (see above)  - PT/OT rec acute rehab; patient initially declined, per prior hospitalist now agreeable for TCU; SW for disposition planning    Clinically Significant Risk Factors Present on Admission                    # Hypertension: Noted on problem list                      Total time 50 minutes for today 11/27/2023 :   time consisted of the following, assuming Patient care with review of records including labs, imaging results, medications, interdisciplinary notes; examination of Patient;  and completing documentation and orders.  Care Management included counseling/discussion with Patient regarding current condition including COVID isolation status; discharge planning, bacteremia and IV abx; XRT and Coordination of Care time with Nursing  and Specialists, Radiation Onc at [facility] regarding XRT care plan, management and surveillance; CC re discharge planning and ID re: IV abx.                Physical Exam:      Blood pressure (!) 144/70, pulse 69, temperature 97.6  F (36.4  C), temperature source Oral, resp. rate 16, weight 101.8 kg (224 lb 6.9 oz), SpO2 94%.  Vitals:    11/27/23 0002   Weight: 101.8 kg (224 lb 6.9 oz)       General/Constitutional:   NAD, alert, calm, cooperative     HEENT; swallow mechanism intact  Chest/Respiratory: Respirations nonlabored room air; no strider  Cardiovascular:  regular, no murmur appreciated.    Gastrointestinal/Abdomen:  soft, nontender,   Neuro.  Gross motor tested, nonfocal,  Psych oriented, affect calm                  Medications:         aspirin  81 mg Oral or Feeding Tube Daily    ceFAZolin (ANCEF) 2 g in sodium chloride 0.9 % 100 mL  2 g Intravenous Q8H    enoxaparin ANTICOAGULANT  40 mg Subcutaneous Q12H    spironolactone  50 mg Oral or Feeding Tube Daily    tamsulosin  0.4 mg Oral Daily    torsemide  40 mg Oral or Feeding Tube Daily     PRN Meds: diazepam, ibuprofen **OR** ibuprofen, - MEDICATION INSTRUCTIONS -, methocarbamol, naloxone **OR** naloxone **OR** naloxone **OR** naloxone, ondansetron **OR** ondansetron, oxyCODONE-acetaminophen, senna-docusate **OR** senna-docusate         Data:      All new lab and imaging data was reviewed.   Recent Labs   Lab Test 11/24/23  0848 11/23/23  0729 11/22/23  1251 10/28/20  1420 10/28/19  0900 09/19/19  0858 11/30/16  0930   WBC 6.3 6.7 7.7   < > 6.8   < > 7.2   HGB 10.2* 9.7* 10.4*   < > 11.4*   < > 12.9*   MCV 81 80 81   < > 82   < > 78    182 191   < > 259   < > 289   INR  --   --   --   --  1.01  --  0.99    < > = values in this interval not displayed.      Recent Labs   Lab Test 11/26/23  1730 11/26/23  0904 11/25/23  1827 11/25/23  0923 11/24/23  0900 11/24/23  0848 11/23/23  0749 11/23/23  0729 11/22/23  1135 11/22/23  0832 11/21/23  1348 10/18/23  0941   NA  --   --   --   --   --   --   --   --   --  138 147* 143   POTASSIUM  --   --   --   --   --   --   --   --   --  4.0 3.6 3.6   CHLORIDE  --   --   --   --   --   --   --   --   --  103 107 104   CO2  --   --   --   --   --   --   --   --   --  25 27 30*   BUN  --   --   --   --   --   --   --   --   --  9.7 16.2 23.4*   CR  --   --   --  0.80  --  0.86  --  1.00  --  0.61* 0.95 0.95   ANIONGAP  --   --   --   --   --   --   --   --   --   10 13 9   WARREN  --   --   --   --   --   --   --   --   --  9.2 9.7 9.5   * 103* 104*  --    < >  --    < >  --    < > 130* 97 98    < > = values in this interval not displayed.

## 2023-11-27 NOTE — PLAN OF CARE
Goal Outcome Evaluation:      Plan of Care Reviewed With: patient    Summary: Weakness, fall, Covid positive.       DATE & TIME: 11/26/2023 [id number]    Cognitive Concerns/ Orientation: A&Ox4, forgetful at times.   BEHAVIOR & AGGRESSION TOOL COLOR: Green  ABNL VS/O2: VSS on RA. neuro's intact.  MOBILITY: Assist x1 GB/W, patient is able to reposition self independently in the bed, uses walker/cane at baseline, and motorized scooter for longer distances.   PAIN MANAGMENT: BUE pain PRN percocet given x1  DIET: Regular  BOWEL/BLADDER: Using urinal at bedside  ABNL LAB/BG: Hgb 10.2, , 107  DRAIN/DEVICES: L PIV SL with int. abx  TELEMETRY RHYTHM: NA  SKIN: Trace edema on BLE. Bruise on L shoulder and hip. Abrasion to left shoulder; with dressing changed today- CDI  TESTS/PROCEDURES: None this shift   D/C DAY/GOALS/PLACE: PT/Ot recommend TCU, SW following for placement    OTHER IMPORTANT INFO: On IV Ancef q8h. and may need a Midline once blood cxs clear, per ID note. Neurology/SLP/PT/OT/ID following.

## 2023-11-27 NOTE — PROVIDER NOTIFICATION
MD Notification    Notified Person: MD    Notified Person Name: Clemencia    Notification Date/Time: 11/27/2023 1011    Notification Interaction: vocera    Purpose of Notification: Can you place order for midline? There is just the IV consult order but not the actual order for one. Also ALFONSO pt refused his demadex this morning.     Orders Received:    Comments:

## 2023-11-28 ENCOUNTER — APPOINTMENT (OUTPATIENT)
Dept: OCCUPATIONAL THERAPY | Facility: CLINIC | Age: 78
DRG: 178 | End: 2023-11-28
Payer: COMMERCIAL

## 2023-11-28 ENCOUNTER — APPOINTMENT (OUTPATIENT)
Dept: PHYSICAL THERAPY | Facility: CLINIC | Age: 78
DRG: 178 | End: 2023-11-28
Payer: COMMERCIAL

## 2023-11-28 PROCEDURE — 97530 THERAPEUTIC ACTIVITIES: CPT | Mod: GP | Performed by: PHYSICAL THERAPIST

## 2023-11-28 PROCEDURE — 250N000011 HC RX IP 250 OP 636: Performed by: STUDENT IN AN ORGANIZED HEALTH CARE EDUCATION/TRAINING PROGRAM

## 2023-11-28 PROCEDURE — 250N000013 HC RX MED GY IP 250 OP 250 PS 637: Performed by: STUDENT IN AN ORGANIZED HEALTH CARE EDUCATION/TRAINING PROGRAM

## 2023-11-28 PROCEDURE — 97116 GAIT TRAINING THERAPY: CPT | Mod: GP | Performed by: PHYSICAL THERAPIST

## 2023-11-28 PROCEDURE — 258N000003 HC RX IP 258 OP 636: Performed by: STUDENT IN AN ORGANIZED HEALTH CARE EDUCATION/TRAINING PROGRAM

## 2023-11-28 PROCEDURE — 99232 SBSQ HOSP IP/OBS MODERATE 35: CPT | Performed by: HOSPITALIST

## 2023-11-28 PROCEDURE — 250N000011 HC RX IP 250 OP 636: Mod: JZ | Performed by: HOSPITALIST

## 2023-11-28 PROCEDURE — 120N000001 HC R&B MED SURG/OB

## 2023-11-28 PROCEDURE — 250N000013 HC RX MED GY IP 250 OP 250 PS 637

## 2023-11-28 PROCEDURE — 97110 THERAPEUTIC EXERCISES: CPT | Mod: GO

## 2023-11-28 PROCEDURE — 97535 SELF CARE MNGMENT TRAINING: CPT | Mod: GO

## 2023-11-28 RX ADMIN — SODIUM CHLORIDE, PRESERVATIVE FREE 2 G: 5 INJECTION INTRAVENOUS at 17:25

## 2023-11-28 RX ADMIN — ENOXAPARIN SODIUM 40 MG: 40 INJECTION SUBCUTANEOUS at 09:25

## 2023-11-28 RX ADMIN — ASPIRIN 81 MG 81 MG: 81 TABLET ORAL at 09:23

## 2023-11-28 RX ADMIN — IBUPROFEN 600 MG: 600 TABLET ORAL at 01:15

## 2023-11-28 RX ADMIN — ENOXAPARIN SODIUM 40 MG: 40 INJECTION SUBCUTANEOUS at 21:08

## 2023-11-28 RX ADMIN — SPIRONOLACTONE 50 MG: 25 TABLET ORAL at 09:23

## 2023-11-28 RX ADMIN — SODIUM CHLORIDE, PRESERVATIVE FREE 2 G: 5 INJECTION INTRAVENOUS at 09:26

## 2023-11-28 RX ADMIN — TORSEMIDE 40 MG: 20 TABLET ORAL at 09:23

## 2023-11-28 RX ADMIN — TAMSULOSIN HYDROCHLORIDE 0.4 MG: 0.4 CAPSULE ORAL at 09:23

## 2023-11-28 RX ADMIN — IBUPROFEN 600 MG: 600 TABLET ORAL at 21:07

## 2023-11-28 RX ADMIN — SODIUM CHLORIDE, PRESERVATIVE FREE 2 G: 5 INJECTION INTRAVENOUS at 01:14

## 2023-11-28 RX ADMIN — OXYCODONE HYDROCHLORIDE AND ACETAMINOPHEN 1 TABLET: 5; 325 TABLET ORAL at 09:27

## 2023-11-28 ASSESSMENT — ACTIVITIES OF DAILY LIVING (ADL)
ADLS_ACUITY_SCORE: 36
ADLS_ACUITY_SCORE: 36
ADLS_ACUITY_SCORE: 37
ADLS_ACUITY_SCORE: 36
ADLS_ACUITY_SCORE: 37
ADLS_ACUITY_SCORE: 36
ADLS_ACUITY_SCORE: 37
ADLS_ACUITY_SCORE: 37

## 2023-11-28 NOTE — CONSULTS
RADIATION ONCOLOGY NOTE    Briefly, the patient's case was discussed with the inpatient care team. His radiotherapy course is already planned and ready at [facility], and re-planning the treatment to be delivered at [facility] would add additional time and complexity. Given that the patient has no signs or symptoms of airway obstruction or any other urgent need for palliative radiotherapy, I recommended patient placement as planned and treatment at [facility] when feasible. If the patient develops a need for emergent radiotherapy, we are happy to reconsider treatment at the [facility] clinic site. Please feel free to contact me with any questions or concerns.    Jared Orta MD  Radiation Oncology  [phone]

## 2023-11-28 NOTE — PROGRESS NOTES
Care Management Follow Up    Length of Stay (days): 7    Expected Discharge Date: 12/02/2023     Concerns to be Addressed: adjustment to diagnosis/illness, discharge planning     Patient plan of care discussed at interdisciplinary rounds: No    Anticipated Discharge Disposition: Skilled Nursing Facility     Anticipated Discharge Services: Transportation Services, Housekeeping/[employer]  Anticipated Discharge DME:      Patient/family educated on Medicare website which has current facility and service quality ratings:    Education Provided on the Discharge Plan:    Patient/Family in Agreement with the Plan: no    Referrals Placed by CM/SW:    Private pay costs discussed: Not applicable    Additional Information:  Writer called pt's room twice. First time no answer after several rings. Second time phone line listing busy.    KIRTI Wise  Social Work  [facility]

## 2023-11-28 NOTE — PLAN OF CARE
Goal Outcome Evaluation:  Patient is A&O X4, some forgetfulness. VS on RA except hypertensive. Pain managed with Ibuprofen PRN X1. PIV CDI, flushed, cap changed SL. On regular diet, up with 1 assist B/W. Continue with plan of care.

## 2023-11-28 NOTE — PLAN OF CARE
Goal Outcome Evaluation:  Summary: L side weakness, Fall, Covid positive. Stroke on 11/22. Pharyngeal CA.    DATE & TIME: 11/28/2023 [id number]     Cognitive Concerns/ Orientation: A&Ox4.  BEHAVIOR & AGGRESSION TOOL COLOR: Green  ABNL VS/O2: VSS on RA   MOBILITY: A1 GB/W. Slow steady gait. Up in chair and transferred back to bed this shift. Moves independently in bed. Refused PCDs.   PAIN MANAGMENT: 8/10 pain, gave prn oxycodone w/ relief  DIET: Regular. Poor intake.  BOWEL/BLADDER: Using urinal at bedside. No BM this shift  ABNL LAB/BG: No new labs   DRAIN/DEVICES: R PIV SL with intermittent abx  TELEMETRY RHYTHM: NA  SKIN: Trace edema on BLE. Abrasion on L and R shoulders, scab on L foot.  TESTS/PROCEDURES: None this shift   D/C DAY/GOALS/PLACE: PT/Ot recommend TCU. Message for SW left in chart to resume TCU placement as pt had declined it earlier when SW reached out.     OTHER IMPORTANT INFO: On IV Ancef q8h through 12/07. Needs midline at discharge.  SLP/PT/OT/ID/oncology following. Radiation therapy to continue after discharge.

## 2023-11-28 NOTE — PLAN OF CARE
"Summary: Weakness, Fall, Covid positive.       DATE & TIME: 11/27/2023 Evenings     Cognitive Concerns/ Orientation: A&Ox4, forgetful at times. Slight left sided facial droop. Reported that pt wanted to \"go home\" at shift change. Talked with pt to clarify and answer any questions. Pt stated to OT that he did not want to be \"bound to this bed and chair\" and had questions about blood cultures. Care, labs, and plan discussed with pt. Appreciated explanation and did not express any other desires to leave. Asked for WC to be more mobile in room.   BEHAVIOR & AGGRESSION TOOL COLOR: Green  ABNL VS/O2: VSS on RA. Q4 neuro's intact. 1 episode of bloody sputum.   MOBILITY: 1PA, GB, and walker. Slow steady gait. Up in chair and transferred back to bed this shift. Moves independently in bed. Refused PCDs   PAIN MANAGMENT: Denied this shift.   DIET: Regular. Fair appetite. Pt stated he \"did not like hospital food.\"  BOWEL/BLADDER: Using urinal at bedside. No BM this shift  ABNL LAB/BG: No new labs   DRAIN/DEVICES: R PIV SL with intermittent abx  TELEMETRY RHYTHM: NA  SKIN: Trace edema on BLE. Bruise on L shoulder and hip. Abrasion to left shoulder; with dressing - CDI from fall at home.   TESTS/PROCEDURES: None this shift   D/C DAY/GOALS/PLACE: PT/Ot recommend TCU. Message for SW left in chart to resume TCU placement as pt had declined it earlier when SW reached out.     OTHER IMPORTANT INFO: On IV Ancef q8h,  pt. uses walker/cane at baseline, and motorized scooter for longer distances. Will need a Midline once blood cxs clear, per ID note. Midline to be placed before pt. Discharges- see note   Neurology/SLP/PT/OT/ID following.                       "

## 2023-11-28 NOTE — PROGRESS NOTES
[facility]  Hospitalist Progress Note        Gerald Khanna MD   11/28/2023        Interval History:        - per ID, plans for IV Ancef x 2 weeks from time of negative blood cultures (until 12/7/23) to treat low-grade MSSA bacteremia   - ID okayed for midline but patient wants to wait until the day of discharge for it to be placed  - completed 5 days course of Paxlovid for COVID (11/25/23); on RA  -Was scheduled to start XRT at [facility] 11/28/23 spoke with XRT resident who states that patient's COVID status is not obstacle for treatment, described as twice daily x 2 days.  Not likely that he will be excepted as inpatient to [facility] therefore discussed [facility] TCU that can assist in transfer to [place] for XRT however due to COVID status discussed with Care Coordinator this will not be until after 10 days of COVID isolation.  Discussed with XRT need for likely rescheduling to the following week 12/4/23, patient without S/S airway obstruction from his oropharyngeal CA.         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      Confirmed COVID-19 infection status post Paxlovid x 5 days, on room air.  Generalized weakness, mechanical fall 2/2 above     - presented with generalized weakness and a fall.  * COVID testing in the ED is positive.   *Remains afebrile and not hypoxic ; CXR did show patchy opacity in the L lung base which could represent atelectasis or pneumonia.   * Head CT, XR  L femur, and XR L tibia/fibula are negative for acute injury.   * D dimer is elevate to 4.22 and CRP is elevated to 69.61.   - per pharmacy recs, switched Lovenox to BID for DVT prophylaxis  - CT chest PE protocol  11/21 noted no acute pulmonary embolism; Grossly unchanged bilateral cervical lymphadenopathy and unchanged 3 mm right middle lobe pulmonary nodule  - ultrasound lower extremity 11/21 noted no DVT  - completed 5 days course of Paxlovid for COVID (11/25/23)  - deferred steroids and remdesivir given that the patient is not hypoxic  - maintain COVID-19 special precautions x 10 days, continuous pulse-ox and prn O2  -See above regarding plans for XRT at [facility], currently hospitalized and not eligible for discharge to TCU because of COVID status until 12/1/2023 therefore need to reschedule XRT until 12/4/2023.  Because of this we will obtain consult for possibility of XRT at [facility].  No pain, dysphagia, odynophagia.    Staph aureus (MSSA) bacteremia  - 1/2 Blood cultures 11/21 growing GPC in clusters (MSSA); blood cultures from 11/22 with no growth so far  - was started on Vancomycin; evaluated by ID and switched vancomycin to Ancef (11/22)  - ECHO 11/24/23 with LVEF 65 %, normal in structure, function and size; mild mitral stenosis; s/p TAVR with 26mm Anton Tawana 3, implanted 2020. Mean 11mmHg, Vmax 2.4m/s. No AI or PVL, no note of vegetation    - ID suspect nasopharyngeal mass may also be the source of bacteremia; blood cultures from 11/22 and 11/24 with no growth so far; if multiple blood cultures are positive will need evaluation for endocarditis  - per ID, plans for IV Ancef x 2 weeks from time of negative blood cultures (until 12/7/23) to treat low-grade MSSA bacteremia   - ID okayed for midline (11/25) but patient wants to wait until the day of discharge for it to be placed    Ingrown toe nail:  - Podiatry consulted for left great toe ingrown toe nail; podiatry evaluated 11/24 and suggested suggested no need for surgical intervention at this time    H/o CVA with residual left sided weakness  - on 11/22 was noted with left-sided weakness during therapy evaluation  - a code stroke was called, evaluated by house MARK  -  "he does have history of prior right UMM infarct and notes baseline residual left sided weakness and states that he ambulates with help of a cane and scooter  - was evaluated by stroke neurology (signed off 11/23)  - MRI brain 11/22 noted no acute infarct, mass, mass effect, or hemorrhage; moderate chronic small vessel ischemia.  - CT head 11/22 noted no CT finding of a mass, hemorrhage or focal area suggestive of infarct  - CTA head and neck 11/22 noted no significant vascular stenosis or occlusion  - evaluated by SLP, noted mild oropharyngeal dysphagia, diet modification per SLP  - ECHO as above  - neurology suggest likely recrudescence of old stroke symptoms in the context of acute infection and suggested no further stroke workup  - to continue ASA 81 mg daily, Lipitor 40 mg daily; holding Lipitor while on Paxlovid given the risk for interaction; can resume on discharge      Mild Anemia   Hemoglobin is 10.8---10.4---9.7---10.2, stable from baseline     Mild hypernateremia   Sodium is 147, improved.   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 CT soft tissue neck with contrast recommended.   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 in the neck, appearance similar to PET/CT of 11/14/2020\"  - follow-up with " PCP/oncology  -Patient's previously scheduled to have XRT at [facility].  See above.  Patient remains inpatient and not eligible for discharge to TCU pending COVID isolation will have radiation oncology, Dr. Doshi see patient.       Anxiety  - Continue PTA diazepam PRN      CAD   HTN  HLD  S/p AVR  CHF  (TTE in 2021 with EF of 60-65%, Grade I diastolic dysfunction), not in acute exacerbation    Chronic LE edema   - Hold PTA atorvastatin while on Paxlovid  - continue PTA torsemide, spironolactone     DVT Prophylaxis: resumed Enoxaparin (Lovenox) subcutaneous 11/23    Code Status:DNR/DNI    Diet: Regular Diet Adult      Disposition:   - medically now stable for discharge pending TCU placement   - needs two weeks of IV antibiotics with Ancef per ID   - needs midline prior to discharge (see above)  - PT/OT  per prior hospitalist now agreeable for TCU; SW for disposition planning    Clinically Significant Risk Factors Present on Admission                    # Hypertension: Noted on problem list                               Physical Exam:      Blood pressure 122/61, pulse 86, temperature 98.3  F (36.8  C), temperature source Oral, resp. rate 18, weight 101.8 kg (224 lb 6.9 oz), SpO2 97%.  Vitals:    11/27/23 0002   Weight: 101.8 kg (224 lb 6.9 oz)       General/Constitutional:    NAD, alert, calm, cooperative    HEENT; swallow mechanism intact  Chest/Respiratory: Respirations nonlabored room air; no strider  Cardiovascular:  regular, no murmur appreciated.    Gastrointestinal/Abdomen:  soft, nontender,   Neuro.  Gross motor tested, nonfocal,  Psych oriented, affect calm                Medications:         aspirin  81 mg Oral or Feeding Tube Daily    ceFAZolin (ANCEF) 2 g in sodium chloride 0.9 % 100 mL  2 g Intravenous Q8H    enoxaparin ANTICOAGULANT  40 mg Subcutaneous Q12H    spironolactone  50 mg Oral or Feeding Tube Daily    tamsulosin  0.4 mg Oral Daily    torsemide  40 mg Oral or Feeding Tube Daily     PRN Meds: diazepam,  ibuprofen **OR** ibuprofen, - MEDICATION INSTRUCTIONS -, methocarbamol, naloxone **OR** naloxone **OR** naloxone **OR** naloxone, ondansetron **OR** ondansetron, oxyCODONE-acetaminophen, senna-docusate **OR** senna-docusate         Data:      All new lab and imaging data was reviewed.   Recent Labs   Lab Test 11/24/23  0848 11/23/23  0729 11/22/23  1251 10/28/20  1420 10/28/19  0900 09/19/19  0858 11/30/16  0930   WBC 6.3 6.7 7.7   < > 6.8   < > 7.2   HGB 10.2* 9.7* 10.4*   < > 11.4*   < > 12.9*   MCV 81 80 81   < > 82   < > 78    182 191   < > 259   < > 289   INR  --   --   --   --  1.01  --  0.99    < > = values in this interval not displayed.      Recent Labs   Lab Test 11/26/23  1730 11/26/23  0904 11/25/23  1827 11/25/23  0923 11/24/23  0900 11/24/23  0848 11/23/23  0749 11/23/23  0729 11/22/23  1135 11/22/23  0832 11/21/23  1348 10/18/23  0941   NA  --   --   --   --   --   --   --   --   --  138 147* 143   POTASSIUM  --   --   --   --   --   --   --   --   --  4.0 3.6 3.6   CHLORIDE  --   --   --   --   --   --   --   --   --  103 107 104   CO2  --   --   --   --   --   --   --   --   --  25 27 30*   BUN  --   --   --   --   --   --   --   --   --  9.7 16.2 23.4*   CR  --   --   --  0.80  --  0.86  --  1.00  --  0.61* 0.95 0.95   ANIONGAP  --   --   --   --   --   --   --   --   --  10 13 9   WARREN  --   --   --   --   --   --   --   --   --  9.2 9.7 9.5   * 103* 104*  --    < >  --    < >  --    < > 130* 97 98    < > = values in this interval not displayed.

## 2023-11-29 ENCOUNTER — APPOINTMENT (OUTPATIENT)
Dept: PHYSICAL THERAPY | Facility: CLINIC | Age: 78
DRG: 178 | End: 2023-11-29
Payer: COMMERCIAL

## 2023-11-29 LAB
BACTERIA BLD CULT: NO GROWTH
BACTERIA BLD CULT: NO GROWTH

## 2023-11-29 PROCEDURE — 97116 GAIT TRAINING THERAPY: CPT | Mod: GP

## 2023-11-29 PROCEDURE — 250N000011 HC RX IP 250 OP 636: Mod: JZ | Performed by: HOSPITALIST

## 2023-11-29 PROCEDURE — 97530 THERAPEUTIC ACTIVITIES: CPT | Mod: GP

## 2023-11-29 PROCEDURE — 258N000003 HC RX IP 258 OP 636: Performed by: STUDENT IN AN ORGANIZED HEALTH CARE EDUCATION/TRAINING PROGRAM

## 2023-11-29 PROCEDURE — 250N000011 HC RX IP 250 OP 636: Performed by: STUDENT IN AN ORGANIZED HEALTH CARE EDUCATION/TRAINING PROGRAM

## 2023-11-29 PROCEDURE — 120N000001 HC R&B MED SURG/OB

## 2023-11-29 PROCEDURE — 99232 SBSQ HOSP IP/OBS MODERATE 35: CPT | Performed by: HOSPITALIST

## 2023-11-29 PROCEDURE — 250N000013 HC RX MED GY IP 250 OP 250 PS 637: Performed by: STUDENT IN AN ORGANIZED HEALTH CARE EDUCATION/TRAINING PROGRAM

## 2023-11-29 PROCEDURE — 250N000013 HC RX MED GY IP 250 OP 250 PS 637

## 2023-11-29 PROCEDURE — G0463 HOSPITAL OUTPT CLINIC VISIT: HCPCS

## 2023-11-29 RX ADMIN — OXYCODONE HYDROCHLORIDE AND ACETAMINOPHEN 1 TABLET: 5; 325 TABLET ORAL at 22:24

## 2023-11-29 RX ADMIN — SODIUM CHLORIDE, PRESERVATIVE FREE 2 G: 5 INJECTION INTRAVENOUS at 09:36

## 2023-11-29 RX ADMIN — ENOXAPARIN SODIUM 40 MG: 40 INJECTION SUBCUTANEOUS at 22:24

## 2023-11-29 RX ADMIN — TORSEMIDE 40 MG: 20 TABLET ORAL at 09:36

## 2023-11-29 RX ADMIN — IBUPROFEN 600 MG: 600 TABLET ORAL at 13:35

## 2023-11-29 RX ADMIN — SODIUM CHLORIDE, PRESERVATIVE FREE 2 G: 5 INJECTION INTRAVENOUS at 18:08

## 2023-11-29 RX ADMIN — ENOXAPARIN SODIUM 40 MG: 40 INJECTION SUBCUTANEOUS at 09:36

## 2023-11-29 RX ADMIN — TAMSULOSIN HYDROCHLORIDE 0.4 MG: 0.4 CAPSULE ORAL at 09:36

## 2023-11-29 RX ADMIN — ASPIRIN 81 MG 81 MG: 81 TABLET ORAL at 09:36

## 2023-11-29 RX ADMIN — OXYCODONE HYDROCHLORIDE AND ACETAMINOPHEN 1 TABLET: 5; 325 TABLET ORAL at 10:27

## 2023-11-29 RX ADMIN — SODIUM CHLORIDE, PRESERVATIVE FREE 2 G: 5 INJECTION INTRAVENOUS at 03:30

## 2023-11-29 RX ADMIN — SPIRONOLACTONE 50 MG: 25 TABLET ORAL at 09:36

## 2023-11-29 ASSESSMENT — ACTIVITIES OF DAILY LIVING (ADL)
ADLS_ACUITY_SCORE: 34
ADLS_ACUITY_SCORE: 34
ADLS_ACUITY_SCORE: 35
ADLS_ACUITY_SCORE: 33
ADLS_ACUITY_SCORE: 34
ADLS_ACUITY_SCORE: 33
ADLS_ACUITY_SCORE: 37
ADLS_ACUITY_SCORE: 33

## 2023-11-29 NOTE — PLAN OF CARE
Goal Outcome Evaluation:      Plan of Care Reviewed With: patient  DATE & TIME: 11/28/2023 [id number]    Summary: L side weakness, Fall, Covid positive. Stroke on 11/22. Pharyngeal CA.    Cognitive Concerns/ Orientation: A&Ox4.  BEHAVIOR & AGGRESSION TOOL COLOR: Green  ABNL VS/O2: VSS on RA   MOBILITY: A1 GB/W.   PAIN MANAGMENT: 8/10 pain,PRN Ibuprofen x1  DIET: Reg  BOWEL/BLADDER: Cont B/B.Uses urinal at bedside.   ABNL LAB/BG:   DRAIN/DEVICES: R PIV SL   SKIN: Trace edema on BLE. Abrasion on L and R shoulders, scab on L foot.  TESTS/PROCEDURES: na  D/C DAY/GOALS/PLACE: Pending TCU placement  OTHER IMPORTANT INFO: On IV Ancef q8h through 12/07. Needs midline at discharge.  SLP/PT/OT/ID/oncology following. Radiation therapy to continue after discharge.

## 2023-11-29 NOTE — PROGRESS NOTES
[facility]  Hospitalist Progress Note        Gerald Khanna MD   11/29/2023        Interval History:        - per ID, plans for IV Ancef x 2 weeks from time of negative blood cultures (until 12/7/23) to treat low-grade MSSA bacteremia   - ID okayed for midline but patient wants to wait until the day of discharge for it to be placed  - completed 5 days course of Paxlovid for COVID (11/25/23); on RA  -Was scheduled to start XRT at [facility] 11/28/23 spoke with XRT resident who states that patient's COVID status is not obstacle for treatment, described as twice daily x 2 days.  Not likely that he will be excepted as inpatient to [facility] therefore discussed [facility] TCU that can assist in transfer to [place] for XRT however due to COVID status discussed with Care Coordinator this will not be until after 10 days of COVID isolation.  Discussed with XRT need for likely rescheduling to the following week 12/4/23, patient without S/S airway obstruction from his oropharyngeal CA.         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      Confirmed COVID-19 infection status post Paxlovid x 5 days, on room air.  Generalized weakness, mechanical fall 2/2 above     - presented with generalized weakness and a fall.  * COVID testing in the ED is positive.   *Remains afebrile and not hypoxic ; CXR did show patchy opacity in the L lung base which could represent atelectasis or pneumonia.   * Head CT, XR  L femur, and XR L tibia/fibula are negative for acute injury.   * D dimer is elevate to 4.22 and CRP is elevated to 69.61.   - per pharmacy recs, switched Lovenox to BID for DVT prophylaxis  - CT chest PE protocol  11/21 noted no acute pulmonary embolism; Grossly unchanged bilateral cervical lymphadenopathy and unchanged 3 mm right middle lobe pulmonary nodule  - ultrasound lower extremity 11/21 noted no DVT  - completed 5 days course of Paxlovid for COVID (11/25/23)  - deferred steroids and remdesivir given that the patient is not hypoxic  - maintain COVID-19 special precautions x 10 days, continuous pulse-ox and prn O2  -See above regarding plans for XRT at [facility], currently hospitalized and not eligible for discharge to TCU because of COVID status until 12/1/2023 therefore need to reschedule XRT until 12/4/2023.  Because of this we will obtain consult for possibility of XRT at [facility].  No pain, dysphagia, odynophagia.  See XRT note 11/28/2023, defer to [facility], currently no need for emergent XRT.    Staph aureus (MSSA) bacteremia  - 1/2 Blood cultures 11/21 growing GPC in clusters (MSSA); blood cultures from 11/22 with no growth so far  - was started on Vancomycin; evaluated by ID and switched vancomycin to Ancef (11/22)  - ECHO 11/24/23 with LVEF 65 %, normal in structure, function and size; mild mitral stenosis; s/p TAVR with 26mm Anton Tawana 3, implanted 2020. Mean 11mmHg, Vmax 2.4m/s. No AI or PVL, no note of vegetation    - ID suspect nasopharyngeal mass may also be the source of bacteremia; blood cultures from 11/22 and 11/24 with no growth so far; if multiple blood cultures are positive will need evaluation for endocarditis  - per ID, plans for IV Ancef x 2 weeks from time of negative blood cultures (until 12/7/23) to treat low-grade MSSA bacteremia   - ID okayed for midline (11/25) but patient wants to wait until the day of discharge for it to be placed    Ingrown toe nail:  - Podiatry consulted for left great toe ingrown toe nail; podiatry evaluated 11/24 and suggested suggested no need for surgical intervention at this time    H/o CVA with residual left sided weakness  - on 11/22 was noted with left-sided weakness  "during therapy evaluation  - a code stroke was called, evaluated by house MARK  - he does have history of prior right UMM infarct and notes baseline residual left sided weakness and states that he ambulates with help of a cane and scooter  - was evaluated by stroke neurology (signed off 11/23)  - MRI brain 11/22 noted no acute infarct, mass, mass effect, or hemorrhage; moderate chronic small vessel ischemia.  - CT head 11/22 noted no CT finding of a mass, hemorrhage or focal area suggestive of infarct  - CTA head and neck 11/22 noted no significant vascular stenosis or occlusion  - evaluated by SLP, noted mild oropharyngeal dysphagia, diet modification per SLP  - ECHO as above  - neurology suggest likely recrudescence of old stroke symptoms in the context of acute infection and suggested no further stroke workup  - to continue ASA 81 mg daily, Lipitor 40 mg daily; holding Lipitor while on Paxlovid given the risk for interaction; can resume on discharge      Mild Anemia   Hemoglobin is 10.8---10.4---9.7---10.2, stable from baseline     Mild hypernateremia   Sodium is 147, improved.   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 CT soft tissue neck with contrast recommended.   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 in " "the neck, appearance similar to PET/CT of 11/14/2020\"  - follow-up with PCP/oncology  -Patient's previously scheduled to have XRT at [facility].  See above.  [facility] XRT deferring to [facility] as previously scheduled.  No indication for emergent XRT.    Anxiety  - Continue PTA diazepam PRN      CAD   HTN  HLD  S/p AVR  CHF  (TTE in 2021 with EF of 60-65%, Grade I diastolic dysfunction), not in acute exacerbation    Chronic LE edema   - Hold PTA atorvastatin while on Paxlovid  - continue PTA torsemide, spironolactone     DVT Prophylaxis: resumed Enoxaparin (Lovenox) subcutaneous 11/23    Code Status:DNR/DNI    Diet: Regular Diet Adult      Disposition:   - medically now stable for discharge pending TCU placement   - needs two weeks of IV antibiotics with Ancef per ID   - needs midline prior to discharge (see above)  - PT/OT  per prior hospitalist now agreeable for TCU; SW for disposition planning    Clinically Significant Risk Factors Present on Admission                    # Hypertension: Noted on problem list                               Physical Exam:      Blood pressure 115/64, pulse 82, temperature 97.8  F (36.6  C), temperature source Oral, resp. rate 18, weight 101.8 kg (224 lb 6.9 oz), SpO2 97%.  Vitals:    11/27/23 0002   Weight: 101.8 kg (224 lb 6.9 oz)       General/Constitutional:     NAD, alert, calm, cooperative    HEENT; swallow mechanism intact  Chest/Respiratory: Respirations nonlabored room air; no strider  Cardiovascular:  regular, no murmur appreciated.    Gastrointestinal/Abdomen:  soft, nontender,   Neuro.  Gross motor tested, nonfocal,  Psych oriented, affect calm                Medications:         aspirin  81 mg Oral or Feeding Tube Daily    ceFAZolin (ANCEF) 2 g in sodium chloride 0.9 % 100 mL  2 g Intravenous Q8H    enoxaparin ANTICOAGULANT  40 mg Subcutaneous Q12H    spironolactone  50 mg Oral or Feeding Tube Daily    tamsulosin  0.4 mg Oral Daily    torsemide  40 mg Oral or " Feeding Tube Daily     PRN Meds: diazepam, ibuprofen **OR** ibuprofen, - MEDICATION INSTRUCTIONS -, methocarbamol, naloxone **OR** naloxone **OR** naloxone **OR** naloxone, ondansetron **OR** ondansetron, oxyCODONE-acetaminophen, senna-docusate **OR** senna-docusate         Data:      All new lab and imaging data was reviewed.   Recent Labs   Lab Test 11/24/23  0848 11/23/23  0729 11/22/23  1251 10/28/20  1420 10/28/19  0900 09/19/19  0858 11/30/16  0930   WBC 6.3 6.7 7.7   < > 6.8   < > 7.2   HGB 10.2* 9.7* 10.4*   < > 11.4*   < > 12.9*   MCV 81 80 81   < > 82   < > 78    182 191   < > 259   < > 289   INR  --   --   --   --  1.01  --  0.99    < > = values in this interval not displayed.      Recent Labs   Lab Test 11/26/23  1730 11/26/23  0904 11/25/23  1827 11/25/23  0923 11/24/23  0900 11/24/23  0848 11/23/23  0749 11/23/23  0729 11/22/23  1135 11/22/23  0832 11/21/23  1348 10/18/23  0941   NA  --   --   --   --   --   --   --   --   --  138 147* 143   POTASSIUM  --   --   --   --   --   --   --   --   --  4.0 3.6 3.6   CHLORIDE  --   --   --   --   --   --   --   --   --  103 107 104   CO2  --   --   --   --   --   --   --   --   --  25 27 30*   BUN  --   --   --   --   --   --   --   --   --  9.7 16.2 23.4*   CR  --   --   --  0.80  --  0.86  --  1.00  --  0.61* 0.95 0.95   ANIONGAP  --   --   --   --   --   --   --   --   --  10 13 9   WARREN  --   --   --   --   --   --   --   --   --  9.2 9.7 9.5   * 103* 104*  --    < >  --    < >  --    < > 130* 97 98    < > = values in this interval not displayed.

## 2023-11-29 NOTE — PROGRESS NOTES
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 Reports that he was down for about two hours. Scooted across the floor on his left shoulder. Mepilex lite in place over wound on writer's assessment.  Wound base: 100 %  pale, moist dermis ,      Palpation of the wound bed: normal      Drainage: none     Description of drainage: none     Measurements (length x width x depth, in cm): 10  x 11.5  x  < 0.1 cm      Tunneling: N/A     Undermining: N/A  Periwound skin: Dry/scaly, Ecchymosis, and peeling devitalized epidermis      Color:  hyperpigmented      Temperature: normal   Odor: none  Pain: denies , none  Pain interventions prior to dressing change: patient tolerated well, slow and gentle cares , and distraction  Treatment goal: Heal , Drainage control, Infection control/prevention, and Protection  STATUS: improving  Supplies ordered: gathered, at bedside, supplies stored on unit, and discussed with patient       Treatment Plan:     Left shoulder wound(s): Daily  and PRN if drainage reaches edge of foam or damage  Cleanse very gently with wound cleanser and gauze. Clean walker-wound skin, too. Pat dry.  Swab walker-wound skin with no-sting barrier and " "allow to dry.  Cover with Optifoam border (801191) dressings- writer used two to cover wound.  Initial and date.  ~ Assess under dressings every shift for sings of deterioration. Document in flowsheet.     Orders: Reviewed    RECOMMEND PRIMARY TEAM ORDER: None, at this time  Education provided: plan of care and wound progress  Discussed plan of care with: Patient and Nurse  WO nurse follow-up plan: weekly  Notify WOC if wound(s) deteriorate.  Nursing to notify the Provider(s) and re-consult the WOC Nurse if new skin concern.    DATA:     Current support surface: Standard  Standard gel/foam mattress (IsoFlex, Atmos air, etc)  Containment of urine/stool:  Not assessed today  BMI: Body mass index is 39.13 kg/m .   Active diet order: Orders Placed This Encounter      Regular Diet Adult     Output: I/O last 3 completed shifts:  In: -   Out: 700 [Urine:700]     Labs:   Recent Labs   Lab 11/24/23  0848   HGB 10.2*   WBC 6.3     Pressure injury risk assessment:   Sensory Perception: 4-->no impairment  Moisture: 4-->rarely moist  Activity: 3-->walks occasionally  Mobility: 3-->slightly limited  Nutrition: 2-->probably inadequate  Friction and Shear: 3-->no apparent problem  Robbie Score: 19    Ashley Santana RN, CWON  Please contact via [employer] at name or group \"[facility] nurse\"- M-F 8A-4P  Leave VM @ *[phone] for non-urgent needs. Checked occasionally M-F.   "

## 2023-11-29 NOTE — PLAN OF CARE
Goal Outcome Evaluation:  DATE & TIME: 11/29/23 [id number]  Summary: L side weakness, Fall, Covid positive. Pharyngeal CA.  Cognitive Concerns/ Orientation: A&Ox4  BEHAVIOR & AGGRESSION TOOL COLOR: Green  ABNL VS/O2: VSS on RA   MOBILITY: A1 GB/W  PAIN MANAGMENT: Pain 8/10 gave prn oxycodone and ibuprofen  DIET: Regular. Poor appetite  BOWEL/BLADDER: Cont B/B. Can use urinal at bedside but encouraged to walk to bathroom  ABNL LAB/BG: NA  DRAIN/DEVICES: R PIV SL   SKIN: Trace edema on BLE. Abrasion on L and R shoulders, scab on L foot.  TESTS/PROCEDURES: NA  D/C DAY/GOALS/PLACE: Pending TCU placement after COVID isolation  OTHER IMPORTANT INFO: On IV Ancef q8h through 12/07. Needs midline at discharge.  SLP/PT/OT/ID/oncology/WOC following. Radiation therapy to continue after discharge.

## 2023-11-29 NOTE — PLAN OF CARE
DATE & TIME: 11/28-11/29/23 Night shift  Summary: L side weakness, Fall, Covid positive. Stroke on 11/22. Pharyngeal CA.  Cognitive Concerns/ Orientation: A&Ox4, pleasant  BEHAVIOR & AGGRESSION TOOL COLOR: Green  ABNL VS/O2: VSS on RA   MOBILITY: A1 GB/W; walked to bathroom well  PAIN MANAGMENT: Denied  DIET: Reg  BOWEL/BLADDER: Cont B/B. Can use urinal at bedside but encouraged to walk to bathroom  ABNL LAB/BG: MA  DRAIN/DEVICES: R PIV SL   SKIN: Trace edema on BLE. Abrasion on L and R shoulders, scab on L foot.  TESTS/PROCEDURES: NA  D/C DAY/GOALS/PLACE: Pending TCU placement  OTHER IMPORTANT INFO: On IV Ancef q8h through 12/07. Needs midline at discharge.  SLP/PT/OT/ID/oncology following. Radiation therapy to continue after discharge.

## 2023-11-30 ENCOUNTER — APPOINTMENT (OUTPATIENT)
Dept: PHYSICAL THERAPY | Facility: CLINIC | Age: 78
DRG: 178 | End: 2023-11-30
Payer: COMMERCIAL

## 2023-11-30 ENCOUNTER — HOME INFUSION (PRE-WILLOW HOME INFUSION) (OUTPATIENT)
Dept: PHARMACY | Facility: CLINIC | Age: 78
End: 2023-11-30
Payer: COMMERCIAL

## 2023-11-30 ENCOUNTER — HOSPITAL ENCOUNTER (INPATIENT)
Facility: SKILLED NURSING FACILITY | Age: 78
End: 2023-11-30
Payer: COMMERCIAL

## 2023-11-30 PROCEDURE — 250N000011 HC RX IP 250 OP 636: Performed by: STUDENT IN AN ORGANIZED HEALTH CARE EDUCATION/TRAINING PROGRAM

## 2023-11-30 PROCEDURE — 99233 SBSQ HOSP IP/OBS HIGH 50: CPT | Performed by: HOSPITALIST

## 2023-11-30 PROCEDURE — 120N000001 HC R&B MED SURG/OB

## 2023-11-30 PROCEDURE — 97116 GAIT TRAINING THERAPY: CPT | Mod: GP

## 2023-11-30 PROCEDURE — 250N000013 HC RX MED GY IP 250 OP 250 PS 637: Performed by: STUDENT IN AN ORGANIZED HEALTH CARE EDUCATION/TRAINING PROGRAM

## 2023-11-30 PROCEDURE — 250N000011 HC RX IP 250 OP 636: Mod: JZ | Performed by: HOSPITALIST

## 2023-11-30 PROCEDURE — 97530 THERAPEUTIC ACTIVITIES: CPT | Mod: GP

## 2023-11-30 PROCEDURE — 258N000003 HC RX IP 258 OP 636: Performed by: STUDENT IN AN ORGANIZED HEALTH CARE EDUCATION/TRAINING PROGRAM

## 2023-11-30 PROCEDURE — 250N000013 HC RX MED GY IP 250 OP 250 PS 637

## 2023-11-30 PROCEDURE — 97110 THERAPEUTIC EXERCISES: CPT | Mod: GP

## 2023-11-30 RX ADMIN — TORSEMIDE 40 MG: 20 TABLET ORAL at 09:37

## 2023-11-30 RX ADMIN — SODIUM CHLORIDE, PRESERVATIVE FREE 2 G: 5 INJECTION INTRAVENOUS at 09:37

## 2023-11-30 RX ADMIN — SPIRONOLACTONE 50 MG: 25 TABLET ORAL at 09:37

## 2023-11-30 RX ADMIN — OXYCODONE HYDROCHLORIDE AND ACETAMINOPHEN 1 TABLET: 5; 325 TABLET ORAL at 09:37

## 2023-11-30 RX ADMIN — ENOXAPARIN SODIUM 40 MG: 40 INJECTION SUBCUTANEOUS at 22:21

## 2023-11-30 RX ADMIN — SODIUM CHLORIDE, PRESERVATIVE FREE 2 G: 5 INJECTION INTRAVENOUS at 01:51

## 2023-11-30 RX ADMIN — SODIUM CHLORIDE, PRESERVATIVE FREE 2 G: 5 INJECTION INTRAVENOUS at 18:25

## 2023-11-30 RX ADMIN — ASPIRIN 81 MG 81 MG: 81 TABLET ORAL at 09:37

## 2023-11-30 RX ADMIN — TAMSULOSIN HYDROCHLORIDE 0.4 MG: 0.4 CAPSULE ORAL at 09:37

## 2023-11-30 RX ADMIN — ENOXAPARIN SODIUM 40 MG: 40 INJECTION SUBCUTANEOUS at 09:38

## 2023-11-30 ASSESSMENT — ACTIVITIES OF DAILY LIVING (ADL)
ADLS_ACUITY_SCORE: 33
ADLS_ACUITY_SCORE: 29
ADLS_ACUITY_SCORE: 33

## 2023-11-30 NOTE — PROGRESS NOTES
DATE & TIME: 11/29/23-11/30/23 4291-4431  Summary: L side weakness, Fall, Covid positive. Pharyngeal CA.  Cognitive Concerns/ Orientation: A&Ox4  BEHAVIOR & AGGRESSION TOOL COLOR: Green  ABNL VS/O2: VSS on RA   MOBILITY: A1 GB/W  PAIN MANAGMENT: Pain 8/10 gave prn oxycodone x1.  DIET: Regular. Poor appetite  BOWEL/BLADDER: Cont B/B. Can use urinal at bedside but encouraged to walk to bathroom  ABNL LAB/BG: NA  DRAIN/DEVICES: R PIV SL   SKIN: Trace edema on BLE. Abrasion on L and R shoulders, scab on L foot. Wound care daily.  TESTS/PROCEDURES: NA  D/C DAY/GOALS/PLACE: Pending TCU placement after COVID isolation  OTHER IMPORTANT INFO: On IV Ancef q8h through 12/07. Needs midline at discharge. Neuro checks q4.   SLP/PT/OT/ID/oncology following. Radiation therapy to continue after discharge.

## 2023-11-30 NOTE — PROGRESS NOTES
Care Management Follow Up    Patient now in agreement  for TCU. Noted radiation rx is for 12/19 and 12/20.  Patient stated he would like referrals to be sent to  [facility]    [facility] Authrized TCU stay  Ref # is [id number]      Dong Valdez RN  Inpatient Care Coordinator  [facility]  #[phone]

## 2023-11-30 NOTE — PROGRESS NOTES
Therapy: IV ABX  Insurance: BC[employer] Medicare Ad[place] primary, [place]    Patient will have coverage for IV ABX under their [facility] Medicare Advantage plan when on a [place].   If not on a [employer] pump pt would be self-pay due to no coverage.   Patient has an out of pocket of $5500.00 met $5500.00. Once the out of pocket was met pt is covered at 100%.  The patient also has a secondary [place] plan that will  remaining from primary plan.    [facility] cannot do nursing and an outside agency will be utilized.     In reference to hospital admission 11/21/23 and referral from [facility].    Please contact Intake with any questions, [phone] or In Basket pool,  Home Infusion ([phone]).

## 2023-11-30 NOTE — PROGRESS NOTES
[facility]  Hospitalist Progress Note        Gerald Khanna MD   11/30/2023        Interval History:        - per ID, plans for IV Ancef  tid x 2 weeks from time of negative blood cultures (until 12/7/23) to treat low-grade MSSA bacteremia   - ID okayed for midline but patient wants to wait until the day of discharge for it to be placed  - completed 5 days course of Paxlovid for COVID (11/25/23); on RA  -Was scheduled to start XRT at [facility] 11/28/23 spoke with XRT resident who states that patient's COVID status is not obstacle for treatment, described as twice daily x 2 days.  Not likely that he will be excepted as inpatient to [facility] therefore discussed [facility] TCU that can assist in transfer to [place] for XRT however due to COVID status discussed with Care Coordinator this will not be until after 10 days of COVID isolation.  Discussed with XRT need for likely rescheduling to the following week 12/4/23, patient without S/S airway obstruction from his oropharyngeal CA.         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      Confirmed COVID-19 infection status post Paxlovid x 5 days, on room air.  Generalized weakness, mechanical fall 2/2 above     - presented with generalized weakness and a fall.  * COVID testing in the ED is positive.   *Remains afebrile and not hypoxic ; CXR did show patchy opacity in the L lung base which could represent atelectasis or pneumonia.   * Head CT, XR  L femur, and XR L tibia/fibula are negative for acute injury.   * D dimer is elevate to 4.22 and CRP is elevated to 69.61.   - per pharmacy recs, switched Lovenox to BID for DVT prophylaxis  - CT chest PE  protocol 11/21 noted no acute pulmonary embolism; Grossly unchanged bilateral cervical lymphadenopathy and unchanged 3 mm right middle lobe pulmonary nodule  - ultrasound lower extremity 11/21 noted no DVT  - completed 5 days course of Paxlovid for COVID (11/25/23)  - deferred steroids and remdesivir given that the patient is not hypoxic  - maintain COVID-19 special precautions x 10 days, continuous pulse-ox and prn O2  -See above regarding plans for XRT at [facility], currently hospitalized and not eligible for discharge to TCU because of COVID status until 12/1/2023 therefore need to reschedule XRT until 12/4/2023.  Because of this we will obtain consult for possibility of XRT at [facility].  No pain, dysphagia, odynophagia.  See XRT note 11/28/2023, defer to [facility], currently no need for emergent XRT.  -11/30/2023 Long discussion with patient in coordination with Care Coordinator.  Patient is accepting of TCU for arrangement for XRT at [facility] as well as for completion of IV antibiotics Ancef dosed 3 times daily [through 12/7//23, next wk].  A friend of the patient will undress patient's concern of his cat at home tomorrow.  Patient was also concerned about needing to go to the bank to have money transferred for some payments.  Reassured patient that a letter can be drafted stating he has been in the hospital since November 21 and then discharged and transitioned to TCU facility.  He felt this was adequate to address inability to pay his bills while he has been in hospital/TCU.  Therefore patient accepting of  TCU to arrange which will provide transport to [facility] for XRT and completion of his IV Abx; please notify date of transfer to TCU so that order can be placed for midline    Staph aureus (MSSA) bacteremia  - 1/2 Blood cultures 11/21 growing GPC in clusters (MSSA); blood cultures from 11/22 with no growth so far  - was started on Vancomycin; evaluated by ID and switched vancomycin to Ancef (11/22)  - ECHO 11/24/23  with LVEF 65 %, normal in structure, function and size; mild mitral stenosis; s/p TAVR with 26mm Anton Tawana 3, implanted 2020. Mean 11mmHg, Vmax 2.4m/s. No AI or PVL, no note of vegetation    - ID suspect nasopharyngeal mass may also be the source of bacteremia; blood cultures from 11/22 and 11/24 with no growth so far; if multiple blood cultures are positive will need evaluation for endocarditis  - per ID, plans for IV Ancef x 2 weeks from time of negative blood cultures (until 12/7/23) to treat low-grade MSSA bacteremia   - ID okayed for midline (11/25) but patient wants to wait until the day of discharge for it to be placed    Ingrown toe nail:  - Podiatry consulted for left great toe ingrown toe nail; podiatry evaluated 11/24 and suggested suggested no need for surgical intervention at this time    H/o CVA with residual left sided weakness  - on 11/22 was noted with left-sided weakness during therapy evaluation  - a code stroke was called, evaluated by house MARK  - he does have history of prior right UMM infarct and notes baseline residual left sided weakness and states that he ambulates with help of a cane and scooter  - was evaluated by stroke neurology (signed off 11/23)  - MRI brain 11/22 noted no acute infarct, mass, mass effect, or hemorrhage; moderate chronic small vessel ischemia.  - CT head 11/22 noted no CT finding of a mass, hemorrhage or focal area suggestive of infarct  - CTA head and neck 11/22 noted no significant vascular stenosis or occlusion  - evaluated by SLP, noted mild oropharyngeal dysphagia, diet modification per SLP  - ECHO as above  - neurology suggest likely recrudescence of old stroke symptoms in the context of acute infection and suggested no further stroke workup  - to continue ASA 81 mg daily, Lipitor 40 mg daily; holding Lipitor while on Paxlovid given the risk for interaction; can resume on discharge      Mild Anemia   Hemoglobin is 10.8---10.4---9.7---10.2, stable from  "baseline     Mild hypernateremia   Sodium is 147, improved.   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 CT soft tissue neck with contrast recommended.   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 in the neck, appearance similar to PET/CT of 11/14/2020\"  - follow-up with PCP/oncology  -See above.  XRT at [facility] that was scheduled for this Tuesday will be rescheduled for next week, discussed with care coordinator.    Anxiety  - Continue PTA diazepam PRN      CAD   HTN  HLD  S/p AVR  CHF  (TTE in 2021 with EF of 60-65%, Grade I diastolic dysfunction), not in acute exacerbation    Chronic LE edema   - Hold PTA atorvastatin while on Paxlovid  - continue PTA torsemide, spironolactone     DVT Prophylaxis: resumed Enoxaparin (Lovenox) subcutaneous 11/23    Code Status:DNR/DNI    Diet: Regular Diet Adult      Disposition:   - medically now stable for discharge pending TCU placement   - needs two weeks of IV antibiotics with Ancef per ID   - needs midline prior to discharge (see above)  - PT/OT   now agreeable for TCU;  see above; SW for disposition planning    Total time 50 minutes for today 11/30/2023 :  time consisted of the following, examination of patient, review of records including labs, imaging results, medications, " interdisciplinary notes and completing documentation and orders.  Care Management included counseling/discussion with Patient regarding current condition including discharge planning, XRT and need for facility for tid IV abx and Coordination of Care time with Nursing  and CC regarding discharge planning    Clinically Significant Risk Factors Present on Admission                    # Hypertension: Noted on problem list                               Physical Exam:      Blood pressure (!) 158/90, pulse 88, temperature 97.8  F (36.6  C), temperature source Oral, resp. rate 18, weight 101.8 kg (224 lb 6.9 oz), SpO2 94%.  Vitals:    11/27/23 0002   Weight: 101.8 kg (224 lb 6.9 oz)       General/Constitutional:      NAD, alert, calm, cooperative      HEENT; swallow mechanism intact  Chest/Respiratory: Respirations nonlabored room air; no strider  Cardiovascular:  regular, no murmur appreciated.    Gastrointestinal/Abdomen:  soft, nontender,   Neuro.  Gross motor tested, nonfocal,  Psych oriented, affect calm                 Medications:         aspirin  81 mg Oral or Feeding Tube Daily    ceFAZolin (ANCEF) 2 g in sodium chloride 0.9 % 100 mL  2 g Intravenous Q8H    enoxaparin ANTICOAGULANT  40 mg Subcutaneous Q12H    spironolactone  50 mg Oral or Feeding Tube Daily    tamsulosin  0.4 mg Oral Daily    torsemide  40 mg Oral or Feeding Tube Daily     PRN Meds: diazepam, ibuprofen **OR** ibuprofen, - MEDICATION INSTRUCTIONS -, methocarbamol, naloxone **OR** naloxone **OR** naloxone **OR** naloxone, ondansetron **OR** ondansetron, oxyCODONE-acetaminophen, senna-docusate **OR** senna-docusate         Data:      All new lab and imaging data was reviewed.   Recent Labs   Lab Test 11/24/23  0848 11/23/23  0729 11/22/23  1251 10/28/20  1420 10/28/19  0900 09/19/19  0858 11/30/16  0930   WBC 6.3 6.7 7.7   < > 6.8   < > 7.2   HGB 10.2* 9.7* 10.4*   < > 11.4*   < > 12.9*   MCV 81 80 81   < > 82   < > 78    182 191   < > 259   <  > 289   INR  --   --   --   --  1.01  --  0.99    < > = values in this interval not displayed.      Recent Labs   Lab Test 11/26/23  1730 11/26/23  0904 11/25/23  1827 11/25/23  0923 11/24/23  0900 11/24/23  0848 11/23/23  0749 11/23/23  0729 11/22/23  1135 11/22/23  0832 11/21/23  1348 10/18/23  0941   NA  --   --   --   --   --   --   --   --   --  138 147* 143   POTASSIUM  --   --   --   --   --   --   --   --   --  4.0 3.6 3.6   CHLORIDE  --   --   --   --   --   --   --   --   --  103 107 104   CO2  --   --   --   --   --   --   --   --   --  25 27 30*   BUN  --   --   --   --   --   --   --   --   --  9.7 16.2 23.4*   CR  --   --   --  0.80  --  0.86  --  1.00  --  0.61* 0.95 0.95   ANIONGAP  --   --   --   --   --   --   --   --   --  10 13 9   WARREN  --   --   --   --   --   --   --   --   --  9.2 9.7 9.5   * 103* 104*  --    < >  --    < >  --    < > 130* 97 98    < > = values in this interval not displayed.

## 2023-11-30 NOTE — PROGRESS NOTES
Care Management Follow Up    Length of Stay (days): 9    Expected Discharge Date: 12/02/2023     Concerns to be Addressed: adjustment to diagnosis/illness, discharge planning     Patient plan of care discussed at interdisciplinary rounds: Yes    Anticipated Discharge Disposition: Skilled Nursing Facility     Anticipated Discharge Services: Transportation Services, Housekeeping/[employer]  Anticipated Discharge DME:      Patient/family educated on Medicare website which has current facility and service quality ratings:    Education Provided on the Discharge Plan:    Patient/Family in Agreement with the Plan: no    Referrals Placed by CM/SW:    Private pay costs discussed: Not applicable    Additional Information:  Spoke at length with patient regarding discharge plans. Discussed with him that he would need IVAB every 8hrs  till 12/7, he would need 2 radiation treatment at the [facility] radiation department and therapy recommends he will need TCU at discharge as patient is unsteady,unable to care for himself. Patient refused TCU stating he will return to his home and he has RN from [facility] who will be able to manage his IVAB and PCA to assist. Patient stated there is a Omero from [email or web address] who had given him this information.  Writer called Omero  at [phone] who states he had not given any of this information to patient and patient has misunderstood him.     Writer discussed with patient that  returning home is a poor choice and is not recommended by the medical team. Patient will have to leave against medical advice.  Patient states his sister will be able to care for him.With patients consent writer called sister Mariann who stated she lives in [place] and is unable to help patient at this time. Mariann stated she had mentioned this to patient yesterday.     Writer stated it will be his physicians determination  regarding discharge plans.  [facility] assessing patient at this time as he would be able to trans for  radiation at the [facility]    Dong Valdez RN -[phone]

## 2023-12-01 ENCOUNTER — APPOINTMENT (OUTPATIENT)
Dept: OCCUPATIONAL THERAPY | Facility: CLINIC | Age: 78
DRG: 178 | End: 2023-12-01
Payer: COMMERCIAL

## 2023-12-01 ENCOUNTER — PATIENT OUTREACH (OUTPATIENT)
Dept: CARE COORDINATION | Facility: CLINIC | Age: 78
End: 2023-12-01
Payer: COMMERCIAL

## 2023-12-01 DIAGNOSIS — R55 SYNCOPAL EPISODES: Primary | ICD-10-CM

## 2023-12-01 LAB
GLUCOSE BLDC GLUCOMTR-MCNC: 110 MG/DL (ref 70–99)
GLUCOSE BLDC GLUCOMTR-MCNC: 86 MG/DL (ref 70–99)
GLUCOSE BLDC GLUCOMTR-MCNC: 98 MG/DL (ref 70–99)

## 2023-12-01 PROCEDURE — 97535 SELF CARE MNGMENT TRAINING: CPT | Mod: GO

## 2023-12-01 PROCEDURE — 250N000011 HC RX IP 250 OP 636: Performed by: STUDENT IN AN ORGANIZED HEALTH CARE EDUCATION/TRAINING PROGRAM

## 2023-12-01 PROCEDURE — 120N000001 HC R&B MED SURG/OB

## 2023-12-01 PROCEDURE — 250N000013 HC RX MED GY IP 250 OP 250 PS 637

## 2023-12-01 PROCEDURE — 250N000013 HC RX MED GY IP 250 OP 250 PS 637: Performed by: STUDENT IN AN ORGANIZED HEALTH CARE EDUCATION/TRAINING PROGRAM

## 2023-12-01 PROCEDURE — 97110 THERAPEUTIC EXERCISES: CPT | Mod: GO

## 2023-12-01 PROCEDURE — 250N000011 HC RX IP 250 OP 636: Mod: JZ | Performed by: HOSPITALIST

## 2023-12-01 PROCEDURE — 99232 SBSQ HOSP IP/OBS MODERATE 35: CPT | Performed by: HOSPITALIST

## 2023-12-01 PROCEDURE — 258N000003 HC RX IP 258 OP 636: Performed by: STUDENT IN AN ORGANIZED HEALTH CARE EDUCATION/TRAINING PROGRAM

## 2023-12-01 RX ADMIN — ENOXAPARIN SODIUM 40 MG: 40 INJECTION SUBCUTANEOUS at 10:20

## 2023-12-01 RX ADMIN — SPIRONOLACTONE 50 MG: 25 TABLET ORAL at 08:06

## 2023-12-01 RX ADMIN — ASPIRIN 81 MG 81 MG: 81 TABLET ORAL at 08:06

## 2023-12-01 RX ADMIN — TAMSULOSIN HYDROCHLORIDE 0.4 MG: 0.4 CAPSULE ORAL at 08:06

## 2023-12-01 RX ADMIN — OXYCODONE HYDROCHLORIDE AND ACETAMINOPHEN 1 TABLET: 5; 325 TABLET ORAL at 10:20

## 2023-12-01 RX ADMIN — ENOXAPARIN SODIUM 40 MG: 40 INJECTION SUBCUTANEOUS at 21:34

## 2023-12-01 RX ADMIN — OXYCODONE HYDROCHLORIDE AND ACETAMINOPHEN 1 TABLET: 5; 325 TABLET ORAL at 01:28

## 2023-12-01 RX ADMIN — SODIUM CHLORIDE, PRESERVATIVE FREE 2 G: 5 INJECTION INTRAVENOUS at 01:32

## 2023-12-01 RX ADMIN — SODIUM CHLORIDE, PRESERVATIVE FREE 2 G: 5 INJECTION INTRAVENOUS at 10:31

## 2023-12-01 RX ADMIN — SODIUM CHLORIDE, PRESERVATIVE FREE 2 G: 5 INJECTION INTRAVENOUS at 18:00

## 2023-12-01 RX ADMIN — TORSEMIDE 40 MG: 20 TABLET ORAL at 08:06

## 2023-12-01 RX ADMIN — ONDANSETRON 4 MG: 4 TABLET, ORALLY DISINTEGRATING ORAL at 10:20

## 2023-12-01 ASSESSMENT — ACTIVITIES OF DAILY LIVING (ADL)
ADLS_ACUITY_SCORE: 29

## 2023-12-01 NOTE — PROGRESS NOTES
"Clinic Care Coordination Contact    Situation: Patient chart reviewed by care coordinator.    Background: Referred by Clinic RN for Care Transition related to complex medical concerns/transition from inpatient to outpatient/compliance with medical treatment plan on 11/30/23.     RN made aware of this case from TRAE Ledezma, as pt called into clinic wanting us to set up his Home Infusion for the weekend. Per RN CC inpatient note as of 11/30-    Spoke at length with patient regarding discharge plans. Discussed with him that he would need IVAB every 8hrs  till 12/7, he would need 2 radiation treatment at the [facility] radiation department and therapy recommends he will need TCU at discharge as patient is unsteady,unable to care for himself. Patient refused TCU stating he will return to his home and he has RN from [email or web address] who will be able to manage his IVAB and PCA to assist. Patient stated there is a Omero from [employer] who had given him this information.   Writer called Omero  at [phone] who states he had not given any of this information to patient and patient has misunderstood him.      Writer discussed with patient that  returning home is a poor choice and is not recommended by the medical team. Patient will have to leave against medical advice.   Patient states his sister will be able to care for him.With patients consent writer called sister Mariann who stated she lives in [place] and is unable to help patient at this time. Mariann stated she had mentioned this to patient yesterday.      Writer stated it will be his physicians determination  regarding discharge plans.   [facility] assessing patient at this time as he would be able to trans for radiation at the [facility]      Dong Valdez RN -[phone]     Assessment: RN reviewed case today from RN CC note as of yesterday evening- \"Patient now in agreement  for TCU. Noted radiation rx is for 12/19 and 12/20.  Patient stated he would like referrals to be " "sent to  [facility]  [facility]\". RN has also sent inbasket message to Dong for care coordination collaboration for patient transitioning to outpatient.     Plan/Recommendations: RN will await pt discharge from the hospital for TCU transition and will remain available to support patient at TCU discharge.     CHELSEA VIVEROS RN on 12/1/2023 at 9:55 AM    "

## 2023-12-01 NOTE — PLAN OF CARE
DATE & TIME: 11/30/2023 9229-3716  Summary: L side weakness, Fall, Covid positive. Pharyngeal CA.  Cognitive Concerns/ Orientation: A&Ox4, difficulty understanding, soft voice  BEHAVIOR & AGGRESSION TOOL COLOR: Green, calm and pleasant, seemed irritant after pt started having pain  ABNL VS/O2: VSS on RA   MOBILITY: A1 GB/W, ambulated to x1 bathroom  PAIN MANAGMENT: Pain rated 10/10, PRN percocet given x1  DIET: Regular. Poor appetite  BOWEL/BLADDER: Cont B/B. Can use urinal at bedside but encouraged to walk to bathroom  ABNL LAB/BG: NA  DRAIN/DEVICES: R PIV SL   SKIN: Trace edema on BLE. Abrasion on L and R shoulders - mepelix intact. L scab foot.  TESTS/PROCEDURES: NA  D/C DAY/GOALS/PLACE: Pending TCU placement after COVID isolation  OTHER IMPORTANT INFO: On IV Ancef q8h through 12/07. Needs midline at discharge. Neuro checks WNL  SLP/PT/OT/ID/oncology following. Radiation therapy to continue after discharge.

## 2023-12-01 NOTE — PLAN OF CARE
Goal Outcome Evaluation:  DATE & TIME: 11/30/2023 [id number]  Summary: L side weakness, Fall, Covid positive. Pharyngeal CA.  Cognitive Concerns/ Orientation: A&Ox4, difficulty understanding  BEHAVIOR & AGGRESSION TOOL COLOR: Green  ABNL VS/O2: VSS on RA   MOBILITY: A1 GB/W, ambulated to bathroom and chair  PAIN MANAGMENT: Pain 8/10 gave prn oxycodone x1.  DIET: Regular. Poor appetite  BOWEL/BLADDER: Cont B/B. Can use urinal at bedside but encouraged to walk to bathroom  ABNL LAB/BG: NA  DRAIN/DEVICES: R PIV SL   SKIN: Trace edema on BLE. Abrasion on L and R shoulders - changed dressings. L scab foot.  TESTS/PROCEDURES: NA  D/C DAY/GOALS/PLACE: Pending TCU placement after COVID isolation  OTHER IMPORTANT INFO: On IV Ancef q8h through 12/07. Needs midline at discharge. Neuro checks q4.   SLP/PT/OT/ID/oncology following. Radiation therapy to continue after discharge.

## 2023-12-01 NOTE — PROGRESS NOTES
[facility]  Hospitalist Progress Note        Gerald Khanna MD   12/01/2023        Interval History:        - per ID, plans for IV Ancef  tid x 2 weeks from time of negative blood cultures (until 12/7/23) to treat low-grade MSSA bacteremia   - ID okayed for midline but patient wants to wait until the day of discharge for it to be placed  - completed 5 days course of Paxlovid for COVID (11/25/23); on RA  -Was scheduled to start XRT at [facility] 11/28/23 spoke with XRT resident who states that patient's COVID status is not obstacle for treatment, described as twice daily x 2 days.  XRT rescheduled per [facility] XRT; he will be at [facility] that can assist in transfer to [place] for XRT;  patient without S/S airway obstruction from his oropharyngeal CA.         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      Confirmed COVID-19 infection status post Paxlovid x 5 days, on room air.  Generalized weakness, mechanical fall 2/2 above     - presented with generalized weakness and a fall.  * COVID testing in the ED is positive.   *Remains afebrile and not hypoxic ; CXR did show patchy opacity in the L lung base which could represent atelectasis or pneumonia.   * Head CT, XR  L femur, and XR L tibia/fibula are negative for acute injury.   * D dimer is elevate to 4.22 and CRP is elevated to 69.61.   - per pharmacy recs, switched Lovenox to BID for DVT prophylaxis  - CT chest PE protocol 11/21 noted no acute pulmonary embolism; Grossly unchanged bilateral cervical lymphadenopathy and unchanged 3 mm right middle lobe pulmonary nodule  - ultrasound lower extremity 11/21 noted no DVT  - completed 5 days course  of Paxlovid for COVID (11/25/23)  - deferred steroids and remdesivir given that the patient is not hypoxic  - maintain COVID-19 special precautions x 10 days; completed 12/1/23.   - patient accepting of  TCU to arrange transport to [facility] for XRT and completion of his IV Abx; CC states [facility] TCU admission scheduled for 12/2/2023, patient wants midline placed on that day which has been ordered, letter to be given to patient regarding the day of admission at [facility], discharge and transition to TCU    Staph aureus (MSSA) bacteremia  - 1/2 Blood cultures 11/21 growing GPC in clusters (MSSA); blood cultures from 11/22 with no growth so far  - was started on Vancomycin; evaluated by ID and switched vancomycin to Ancef (11/22)  - ECHO 11/24/23 with LVEF 65 %, normal in structure, function and size; mild mitral stenosis; s/p TAVR with 26mm Anton Tawana 3, implanted 2020. Mean 11mmHg, Vmax 2.4m/s. No AI or PVL, no note of vegetation    - ID suspect nasopharyngeal mass may also be the source of bacteremia; blood cultures from 11/22 and 11/24 with no growth so far; if multiple blood cultures are positive will need evaluation for endocarditis  - per ID, plans for IV Ancef x 2 weeks from time of negative blood cultures (until 12/7/23) to treat low-grade MSSA bacteremia   - ID okayed for midline (11/25) but patient wants to wait until the day of discharge for it to be placed [ordered]    Ingrown toe nail:  - Podiatry consulted for left great toe ingrown toe nail; podiatry evaluated 11/24 and suggested suggested no need for surgical intervention at this time    H/o CVA with residual left sided weakness  - on 11/22 was noted with left-sided weakness during therapy evaluation  - a code stroke was called, evaluated by house MARK  - he does have history of prior right UMM infarct and notes baseline residual left sided weakness and states that he ambulates with help of a cane and scooter  - was evaluated by stroke neurology  "(signed off 11/23)  - MRI brain 11/22 noted no acute infarct, mass, mass effect, or hemorrhage; moderate chronic small vessel ischemia.  - CT head 11/22 noted no CT finding of a mass, hemorrhage or focal area suggestive of infarct  - CTA head and neck 11/22 noted no significant vascular stenosis or occlusion  - evaluated by SLP, noted mild oropharyngeal dysphagia, diet modification per SLP  - ECHO as above  - neurology suggest likely recrudescence of old stroke symptoms in the context of acute infection and suggested no further stroke workup  - to continue ASA 81 mg daily, Lipitor 40 mg daily; holding Lipitor while on Paxlovid given the risk for interaction; can resume on discharge      Mild Anemia   Hemoglobin is 10.8---10.4---9.7---10.2, stable from baseline     Mild hypernateremia   Sodium is 147, improved.   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 CT soft tissue neck with contrast recommended.   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 in the neck, appearance similar to PET/CT of 11/14/2020\"  - follow-up with PCP/oncology  -See above.  XRT at [facility] that was scheduled for this Tuesday will be rescheduled discussed with care coordinator.    Anxiety  - Continue PTA diazepam PRN      CAD   HTN  HLD  S/p AVR  CHF  (TTE in 2021 with EF of 60-65%, Grade I diastolic dysfunction), not " in acute exacerbation    Chronic LE edema   - Hold PTA atorvastatin while on Paxlovid  - continue PTA torsemide, spironolactone     DVT Prophylaxis: resumed Enoxaparin (Lovenox) subcutaneous 11/23    Code Status:DNR/DNI    Diet: Regular Diet Adult      Disposition:   - medically now stable for discharge pending TCU placement   - needs two weeks of IV antibiotics with Ancef per ID   - needs midline prior to discharge (see above)  - PT/OT   now agreeable for TCU;  see above;  for disposition planning    Clinically Significant Risk Factors Present on Admission                    # Hypertension: Noted on problem list                               Physical Exam:      Blood pressure 99/42, pulse 87, temperature 98  F (36.7  C), temperature source Oral, resp. rate 16, weight 96.9 kg (213 lb 10 oz), SpO2 96%.  Vitals:    11/27/23 0002 12/01/23 0637   Weight: 101.8 kg (224 lb 6.9 oz) 96.9 kg (213 lb 10 oz)       General/Constitutional:      NAD, alert,     HEENT; swallow mechanism intact  Chest/Respiratory: Respirations nonlabored room air; no strider  Cardiovascular:  regular, no murmur appreciated.    Gastrointestinal/Abdomen:  soft, nontender,   Neuro.  Gross motor tested, nonfocal,  Psych oriented, affect baseline                Medications:         aspirin  81 mg Oral or Feeding Tube Daily    ceFAZolin (ANCEF) 2 g in sodium chloride 0.9 % 100 mL  2 g Intravenous Q8H    enoxaparin ANTICOAGULANT  40 mg Subcutaneous Q12H    spironolactone  50 mg Oral or Feeding Tube Daily    tamsulosin  0.4 mg Oral Daily    torsemide  40 mg Oral or Feeding Tube Daily     PRN Meds: diazepam, ibuprofen **OR** ibuprofen, - MEDICATION INSTRUCTIONS -, methocarbamol, naloxone **OR** naloxone **OR** naloxone **OR** naloxone, ondansetron **OR** ondansetron, oxyCODONE-acetaminophen, senna-docusate **OR** senna-docusate         Data:      All new lab and imaging data was reviewed.   Recent Labs   Lab Test 11/24/23  0848 11/23/23  0793  11/22/23  1251 10/28/20  1420 10/28/19  0900 09/19/19  0858 11/30/16  0930   WBC 6.3 6.7 7.7   < > 6.8   < > 7.2   HGB 10.2* 9.7* 10.4*   < > 11.4*   < > 12.9*   MCV 81 80 81   < > 82   < > 78    182 191   < > 259   < > 289   INR  --   --   --   --  1.01  --  0.99    < > = values in this interval not displayed.      Recent Labs   Lab Test 12/01/23  1257 12/01/23  0816 11/26/23  1730 11/25/23  1827 11/25/23  0923 11/24/23  0900 11/24/23  0848 11/23/23  0749 11/23/23  0729 11/22/23  1135 11/22/23  0832 11/21/23  1348 10/18/23  0941   NA  --   --   --   --   --   --   --   --   --   --  138 147* 143   POTASSIUM  --   --   --   --   --   --   --   --   --   --  4.0 3.6 3.6   CHLORIDE  --   --   --   --   --   --   --   --   --   --  103 107 104   CO2  --   --   --   --   --   --   --   --   --   --  25 27 30*   BUN  --   --   --   --   --   --   --   --   --   --  9.7 16.2 23.4*   CR  --   --   --   --  0.80  --  0.86  --  1.00  --  0.61* 0.95 0.95   ANIONGAP  --   --   --   --   --   --   --   --   --   --  10 13 9   WARREN  --   --   --   --   --   --   --   --   --   --  9.2 9.7 9.5   GLC 98 86 107*   < >  --    < >  --    < >  --    < > 130* 97 98    < > = values in this interval not displayed.

## 2023-12-01 NOTE — PROGRESS NOTES
[facility] has accepted patient. [facility] liaison will reach out to SW in AM to discuss time for transport.

## 2023-12-02 ENCOUNTER — APPOINTMENT (OUTPATIENT)
Dept: CT IMAGING | Facility: CLINIC | Age: 78
DRG: 178 | End: 2023-12-02
Attending: PHYSICIAN ASSISTANT
Payer: COMMERCIAL

## 2023-12-02 ENCOUNTER — APPOINTMENT (OUTPATIENT)
Dept: GENERAL RADIOLOGY | Facility: CLINIC | Age: 78
DRG: 178 | End: 2023-12-02
Attending: PHYSICIAN ASSISTANT
Payer: COMMERCIAL

## 2023-12-02 VITALS
SYSTOLIC BLOOD PRESSURE: 102 MMHG | OXYGEN SATURATION: 98 % | BODY MASS INDEX: 37.24 KG/M2 | WEIGHT: 213.63 LBS | HEART RATE: 85 BPM | RESPIRATION RATE: 18 BRPM | TEMPERATURE: 98.5 F | DIASTOLIC BLOOD PRESSURE: 49 MMHG

## 2023-12-02 LAB
ANION GAP SERPL CALCULATED.3IONS-SCNC: 10 MMOL/L (ref 7–15)
BUN SERPL-MCNC: 21 MG/DL (ref 8–23)
CALCIUM SERPL-MCNC: 9.6 MG/DL (ref 8.8–10.2)
CHLORIDE SERPL-SCNC: 102 MMOL/L (ref 98–107)
CREAT SERPL-MCNC: 0.88 MG/DL (ref 0.67–1.17)
CREAT SERPL-MCNC: 1 MG/DL (ref 0.67–1.17)
DEPRECATED HCO3 PLAS-SCNC: 32 MMOL/L (ref 22–29)
EGFRCR SERPLBLD CKD-EPI 2021: 77 ML/MIN/1.73M2
EGFRCR SERPLBLD CKD-EPI 2021: 88 ML/MIN/1.73M2
ERYTHROCYTE [DISTWIDTH] IN BLOOD BY AUTOMATED COUNT: 16.4 % (ref 10–15)
GLUCOSE BLDC GLUCOMTR-MCNC: 141 MG/DL (ref 70–99)
GLUCOSE SERPL-MCNC: 134 MG/DL (ref 70–99)
HCT VFR BLD AUTO: 33.6 % (ref 40–53)
HGB BLD-MCNC: 9.9 G/DL (ref 13.3–17.7)
LACTATE SERPL-SCNC: 1.9 MMOL/L (ref 0.7–2)
MCH RBC QN AUTO: 24.2 PG (ref 26.5–33)
MCHC RBC AUTO-ENTMCNC: 29.5 G/DL (ref 31.5–36.5)
MCV RBC AUTO: 82 FL (ref 78–100)
PLATELET # BLD AUTO: 360 10E3/UL (ref 150–450)
PLATELET # BLD AUTO: 371 10E3/UL (ref 150–450)
POTASSIUM SERPL-SCNC: 2.8 MMOL/L (ref 3.4–5.3)
PROLACTIN SERPL 3RD IS-MCNC: 80 NG/ML (ref 4–15)
RBC # BLD AUTO: 4.09 10E6/UL (ref 4.4–5.9)
SODIUM SERPL-SCNC: 144 MMOL/L (ref 135–145)
TROPONIN T SERPL HS-MCNC: 46 NG/L
TROPONIN T SERPL HS-MCNC: 52 NG/L
WBC # BLD AUTO: 8.4 10E3/UL (ref 4–11)

## 2023-12-02 PROCEDURE — 85049 AUTOMATED PLATELET COUNT: CPT | Performed by: PHYSICIAN ASSISTANT

## 2023-12-02 PROCEDURE — 93005 ELECTROCARDIOGRAM TRACING: CPT

## 2023-12-02 PROCEDURE — 85027 COMPLETE CBC AUTOMATED: CPT | Performed by: STUDENT IN AN ORGANIZED HEALTH CARE EDUCATION/TRAINING PROGRAM

## 2023-12-02 PROCEDURE — 84146 ASSAY OF PROLACTIN: CPT | Performed by: PHYSICIAN ASSISTANT

## 2023-12-02 PROCEDURE — 83605 ASSAY OF LACTIC ACID: CPT | Performed by: PHYSICIAN ASSISTANT

## 2023-12-02 PROCEDURE — 250N000011 HC RX IP 250 OP 636: Mod: JZ | Performed by: HOSPITALIST

## 2023-12-02 PROCEDURE — 250N000011 HC RX IP 250 OP 636: Performed by: STUDENT IN AN ORGANIZED HEALTH CARE EDUCATION/TRAINING PROGRAM

## 2023-12-02 PROCEDURE — 250N000013 HC RX MED GY IP 250 OP 250 PS 637

## 2023-12-02 PROCEDURE — 99233 SBSQ HOSP IP/OBS HIGH 50: CPT | Performed by: HOSPITALIST

## 2023-12-02 PROCEDURE — 36415 COLL VENOUS BLD VENIPUNCTURE: CPT | Performed by: PHYSICIAN ASSISTANT

## 2023-12-02 PROCEDURE — 36415 COLL VENOUS BLD VENIPUNCTURE: CPT | Performed by: STUDENT IN AN ORGANIZED HEALTH CARE EDUCATION/TRAINING PROGRAM

## 2023-12-02 PROCEDURE — 70450 CT HEAD/BRAIN W/O DYE: CPT

## 2023-12-02 PROCEDURE — 85049 AUTOMATED PLATELET COUNT: CPT | Performed by: STUDENT IN AN ORGANIZED HEALTH CARE EDUCATION/TRAINING PROGRAM

## 2023-12-02 PROCEDURE — 258N000003 HC RX IP 258 OP 636: Performed by: STUDENT IN AN ORGANIZED HEALTH CARE EDUCATION/TRAINING PROGRAM

## 2023-12-02 PROCEDURE — 250N000013 HC RX MED GY IP 250 OP 250 PS 637: Performed by: STUDENT IN AN ORGANIZED HEALTH CARE EDUCATION/TRAINING PROGRAM

## 2023-12-02 PROCEDURE — 93010 ELECTROCARDIOGRAM REPORT: CPT | Performed by: INTERNAL MEDICINE

## 2023-12-02 PROCEDURE — 99291 CRITICAL CARE FIRST HOUR: CPT | Performed by: PHYSICIAN ASSISTANT

## 2023-12-02 PROCEDURE — 82310 ASSAY OF CALCIUM: CPT | Performed by: PHYSICIAN ASSISTANT

## 2023-12-02 PROCEDURE — 71045 X-RAY EXAM CHEST 1 VIEW: CPT

## 2023-12-02 PROCEDURE — 82565 ASSAY OF CREATININE: CPT | Performed by: STUDENT IN AN ORGANIZED HEALTH CARE EDUCATION/TRAINING PROGRAM

## 2023-12-02 PROCEDURE — 84484 ASSAY OF TROPONIN QUANT: CPT | Performed by: PHYSICIAN ASSISTANT

## 2023-12-02 PROCEDURE — 250N000013 HC RX MED GY IP 250 OP 250 PS 637: Performed by: HOSPITALIST

## 2023-12-02 RX ORDER — LIDOCAINE 40 MG/G
CREAM TOPICAL
Status: DISCONTINUED | OUTPATIENT
Start: 2023-12-02 | End: 2023-12-02 | Stop reason: HOSPADM

## 2023-12-02 RX ORDER — OXYCODONE AND ACETAMINOPHEN 5; 325 MG/1; MG/1
1 TABLET ORAL EVERY 6 HOURS PRN
Qty: 5 TABLET | Refills: 0 | Status: SHIPPED | OUTPATIENT
Start: 2023-12-02 | End: 2023-12-18

## 2023-12-02 RX ORDER — POLYETHYLENE GLYCOL 3350 17 G/17G
17 POWDER, FOR SOLUTION ORAL DAILY
Status: DISCONTINUED | OUTPATIENT
Start: 2023-12-02 | End: 2023-12-02 | Stop reason: HOSPADM

## 2023-12-02 RX ORDER — BISACODYL 10 MG
10 SUPPOSITORY, RECTAL RECTAL DAILY PRN
Status: DISCONTINUED | OUTPATIENT
Start: 2023-12-02 | End: 2023-12-02 | Stop reason: HOSPADM

## 2023-12-02 RX ORDER — ASPIRIN 81 MG/1
81 TABLET, CHEWABLE ORAL DAILY
DISCHARGE
Start: 2023-12-03

## 2023-12-02 RX ORDER — POTASSIUM CHLORIDE 1500 MG/1
20 TABLET, EXTENDED RELEASE ORAL ONCE
Status: COMPLETED | OUTPATIENT
Start: 2023-12-02 | End: 2023-12-02

## 2023-12-02 RX ORDER — AMOXICILLIN 250 MG
1 CAPSULE ORAL 2 TIMES DAILY
Status: DISCONTINUED | OUTPATIENT
Start: 2023-12-02 | End: 2023-12-02 | Stop reason: HOSPADM

## 2023-12-02 RX ORDER — POTASSIUM CHLORIDE 1500 MG/1
40 TABLET, EXTENDED RELEASE ORAL ONCE
Status: COMPLETED | OUTPATIENT
Start: 2023-12-02 | End: 2023-12-02

## 2023-12-02 RX ADMIN — MAGNESIUM HYDROXIDE 30 ML: 400 SUSPENSION ORAL at 11:55

## 2023-12-02 RX ADMIN — TORSEMIDE 40 MG: 20 TABLET ORAL at 08:09

## 2023-12-02 RX ADMIN — POTASSIUM CHLORIDE 20 MEQ: 1500 TABLET, EXTENDED RELEASE ORAL at 14:18

## 2023-12-02 RX ADMIN — ASPIRIN 81 MG 81 MG: 81 TABLET ORAL at 08:10

## 2023-12-02 RX ADMIN — ENOXAPARIN SODIUM 40 MG: 40 INJECTION SUBCUTANEOUS at 10:58

## 2023-12-02 RX ADMIN — SODIUM CHLORIDE, PRESERVATIVE FREE 2 G: 5 INJECTION INTRAVENOUS at 03:12

## 2023-12-02 RX ADMIN — TAMSULOSIN HYDROCHLORIDE 0.4 MG: 0.4 CAPSULE ORAL at 08:10

## 2023-12-02 RX ADMIN — POLYETHYLENE GLYCOL 3350 17 G: 17 POWDER, FOR SOLUTION ORAL at 11:56

## 2023-12-02 RX ADMIN — OXYCODONE HYDROCHLORIDE AND ACETAMINOPHEN 1 TABLET: 5; 325 TABLET ORAL at 08:10

## 2023-12-02 RX ADMIN — DOCUSATE SODIUM 50 MG AND SENNOSIDES 8.6 MG 1 TABLET: 8.6; 5 TABLET, FILM COATED ORAL at 11:55

## 2023-12-02 RX ADMIN — SPIRONOLACTONE 50 MG: 25 TABLET ORAL at 08:09

## 2023-12-02 RX ADMIN — POTASSIUM CHLORIDE 40 MEQ: 1500 TABLET, EXTENDED RELEASE ORAL at 11:55

## 2023-12-02 RX ADMIN — SODIUM CHLORIDE, PRESERVATIVE FREE 2 G: 5 INJECTION INTRAVENOUS at 10:57

## 2023-12-02 ASSESSMENT — ACTIVITIES OF DAILY LIVING (ADL)
ADLS_ACUITY_SCORE: 29
ADLS_ACUITY_SCORE: 33
ADLS_ACUITY_SCORE: 29

## 2023-12-02 NOTE — PROGRESS NOTES
Care Management Follow Up    Length of Stay (days): 11    Expected Discharge Date: 12/02/2023     Concerns to be Addressed: adjustment to diagnosis/illness, discharge planning     Patient plan of care discussed at interdisciplinary rounds: Yes    Anticipated Discharge Disposition: Skilled Nursing Facility     Anticipated Discharge Services: Transportation Services, Housekeeping/[employer]  Anticipated Discharge DME:      Patient/family educated on Medicare website which has current facility and service quality ratings:    Education Provided on the Discharge Plan:    Patient/Family in Agreement with the Plan: no    Referrals Placed by CM/SW:    Private pay costs discussed:     Additional Information:  Writer has been informed patient had a rapid response today and patient will not be stable for discharge today. Per Dr Khanna, pending workup patient may be ready on Sunday.  Writer contacted  TCU admissions to update staff and was told the planned discharge from their TCU did not happen so they do not have a vacancy for patient and she will not know till later today if they will have a Sunday vacancy.   Plan:   TCU admissions will contact patient later today with an update and writer await MD direction regarding expected discharge date.     Update at 1500  Spoke with Maryan at  TCU admissions. She will know tomorrow morning if a patient is discharging and she will call me.  If their discharge occurs then [facility]. Tentative transportation has been set up after speaking with patient's bedside RN today and [facility].  Esther arranged for a 1500 time with service window of 1440 to 1525. Dr Khanna updated.   She stated patient does need an EEG before discharge.  Will follow tomorrow morning.     SHELLY PaulsonSW

## 2023-12-02 NOTE — SIGNIFICANT EVENT
"Patient's light was on and in overtime. I went in there and asked him what he needed and he stated, \" I need to go to the bathroom.\" Patient was already sitting on the edge of the bed with gait belt on. I asked him to get up but patient couldn't and just sat there. Patient kept not responding, I put him in bed and laid him straight with head of bed high up. Asked patient to stick tongue out and he did- it was straight. However, not responding to anything else. Stare was glazed as well.   Checked code status and carotid pulse- DNR/DNI with pulse. Called charge RN in room and RRT. Found patient's main RN.  "

## 2023-12-02 NOTE — CODE/RAPID RESPONSE
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      RRT called this morning for new onset unresponsiveness in bed.  20mins earlier had a shower this morning without incident, was in bed when he felt nauseous unwell and need for a bowel movement with abdominal cramping.  Pushed call light,  RN arrived the room about 2 to 3 minutes later found pt unresponsive in bed, possible mild head twitching.  Carotids were present but there was some question if they may have been difficult to acquire versus absent for a few seconds.  BG . No arrhythmias on space lab. Patient's LOC rapidly returned to normal within minutes.  At the time of my arrival he was fully alert and oriented complaining of nothing. BPO , HR 86, RA spo2 88%, asx other than tiredness and need for BM (mild). No resp sx. No focal deficits or seizure activity.  Is also of note that he has been a little tired afterwards but did not have clear postictal symptoms.  Differentials started w w/u for transient arrhythmia causing hypotension, seizure.  Discussed in detail with MD. BP overall stable 120s, w stable HR, but RN did note low BP (spuriously?) was 80s while arm raised attempting BM on bedpan, but BP normal after.     Working diagnosis/Impression:  Unresponsive episode, supine in bed, w incontinence  Covid-19 infection, MSSA bacteremia (on abx)  Oral CA w lung metastasis    Differential diagnosis considered following (not exclusive):  -Rule out seizure, w incontinence,  (note admitted w fall, and did not recall the event), hx of CVA  -Rule out transient arrhythmia with hypotension   -Rule out vasovagal syncope (BM pending)  -Rule out acute intracranial bleed/CVA, doubt, is on lovenox too  - Sepsis is unlikely now, despite MSSA bacteremia noted, vitals stable.  - PE lower in ddx, note on lovenox, denies resp sx, but CA hx (ddimer unreliable w elevated CRP0 and Spo2 was low    INTERVENTIONS:  - VSS and BP stable as above  - BG 140s, ok throughout most of visit (transient 80s d/t arm position)  - Prolactin-if elevated favors seizure, send out to [facility] to be run immed  - Stat lactate (WNL), BMP (K sl low), CBC (stable)  - CXR 1 view for mild hypoxia (not cause of sx)- stable w/o infiltrate  - HCT-rule out acute bleed- no acute abnormality   - ECG- nonischemic  - Tele start   - Trop x 1-- 52, will trend x2 (favors demand ischemia)  - rev echo reviewed. Defe judith new  - check orthostatics this afternoon  - defer on new infection workup/ bld cx  - K sl low, defer tx option to Dr Khanna  - defer to Dr Khanna if future neurology consult need, ? EEG, maybe CT Chest PA (though PE lower in DDx)     At the end of the RRT, pt appears to be stable, (c/o tiredness  only), no new sx w stable BP 120s throughout    Disposition- remains in room w close monitoring, w Dr Khanna following results.   D/c today delayed.   Discussed with Dr Khanna in detail, appreciate coordination.   Defer future cares to rounder/hospitalist attending provider     Code Status: No CPR- Do NOT Intubate    Allergies   No Known Allergies    Last 24H PRN:     oxyCODONE-acetaminophen (PERCOCET) 5-325 MG per tablet 1 tablet, 1 tablet at 12/02/23 0810    Subjective:  Interval History     Only sx pre event was dizziness/need to have BM (but did not yet), post event is feeling tired and mild pressure/need for BM, No GI sx offered, outside of feeling need for a BM. No abdominal pn, cramp, symptoms at present. Despite minimally  hypoxic but denies any respiratory symptoms though he does have asymptomatic COVID. No acute neuro sx, hx of CVA.  No chest pain shortness of breath.   No fevers chills or any other new symptoms.  No history of seizure.  No other new sx here. Seems alert and oriented w/o confusion    Exam::  Physical Exam   Vital Signs with Ranges:  Vitals:    12/02/23 0930 12/02/23 0940 12/02/23 0945 12/02/23 1000   BP: 120/50 (!) 82/51 (!) 87/69 124/51   BP Location: Right arm      Pulse: 88 62 65 80   Resp: 18 18 18 18   Temp:       TempSrc:       SpO2: (!) 88% 93% 94% 95%   Weight:       (BP while arm raised, attempting BM)    Temp:  [97.7  F (36.5  C)-98.3  F (36.8  C)] 98.1  F (36.7  C)  Pulse:  [] 80  Resp:  [16-20] 18  BP: ()/(42-72) 124/51  Cuff Mean (mmHg):  [58-93] 93  SpO2:  [88 %-96 %] 95 %  I/O last 3 completed shifts:  In: 200 [P.O.:200]  Out: 300 [Urine:300]    Lines, PIV , no mae  Constitutional: vs as above and/or per EMR  General:  adult pt lying in bed without acute distress  HEENT: NC/AT,  mouth moist oral mucosa,   Neck: w/o JVD, supple  CV: S1S2, w/o obvious murmur  Resp: RA Spo2 89%, on 1l 98%, chest rise unlabored, lungs clr  , w/o rhonchi, rales, whz upper and lower lobes  GI:, soft, nontender, nondistended  Musculoskeletal: MAEW, no bony joint deformities no lower edema or mottling  Skin: no obvious rashes on exposed skin  Lymph: defer  Neuro: non focal, faces symmetric, tongue midline, speech fluent  Psych: calm., sl sleepy, resting    Labs/Imaging  Data     Trop- 52  Lactic Acid:  1.9  Prolactin - pending at time of note (sent to [facility], should be avail today)    CBC with Diff:  Recent Labs   Lab Test 12/02/23  0943 12/02/23  0821 11/24/23  0848 11/23/23  0729 10/28/20  1420 10/28/19  0900 09/19/19  0858 11/30/16  0930   WBC 8.4  --  6.3 6.7   < > 6.8   < > 7.2   HGB 9.9*  --  10.2* 9.7*   < > 11.4*   < > 12.9*   MCV 82  --  81 80   < > 82   < > 78    371 206 182   < > 259   < > 289    INR  --   --   --   --   --  1.01  --  0.99    < > = values in this interval not displayed.      Comprehensive Metabolic Panel:  Recent Labs   Lab 12/02/23  0943 12/02/23  0926 12/02/23  0821 12/01/23  1710     --   --   --    POTASSIUM 2.8*  --   --   --    CHLORIDE 102  --   --   --    CO2 32*  --   --   --    ANIONGAP 10  --   --   --    * 141*  --  110*   BUN 21.0  --   --   --    CR 1.00  --  0.88  --    GFRESTIMATED 77  --  88  --    WARREN 9.6  --   --   --      ECG:   Interpreted by Glenn Be PA-C, Time reviewed:   Sinus, no ectopy,  1st degree AV block  No Q waves, no new ST depression/elevation/ acute strain or acute ischemic signs.    Normal QTc interval, 460    No recent comparison tracing,     IMAGING recent:   I personally reviewed the new CXR and HCT radiographic imaging,  Radiology formal interpretation (pending)    CXR 1v  12/2/2023  No focal infiltrate, sl more increase vasc congestion vs 11/21    Head CT   12/2/2023  No acute intracranial abnormality,  no bleed noted    MRI Brain 11/22 reviewed, no acute infarct, chronic small vessel ischemia, known mass in L nasal pharynx.     Echo 11/21- EF 65%, no WMA, mild MS, s/p TAVR, grade I diastolic dysfxn     Results for orders placed or performed during the hospital encounter of 11/21/23 (from the past 48 hour(s))   Glucose by meter   Result Value Ref Range    GLUCOSE BY METER POCT 86 70 - 99 mg/dL   Glucose by meter   Result Value Ref Range    GLUCOSE BY METER POCT 98 70 - 99 mg/dL   Glucose by meter   Result Value Ref Range    GLUCOSE BY METER POCT 110 (H) 70 - 99 mg/dL   Platelet count   Result Value Ref Range    Platelet Count 371 150 - 450 10e3/uL   Creatinine   Result Value Ref Range    Creatinine 0.88 0.67 - 1.17 mg/dL    GFR Estimate 88 >60 mL/min/1.73m2   Glucose by meter   Result Value Ref Range    GLUCOSE BY METER POCT 141 (H) 70 - 99 mg/dL   Basic metabolic panel   Result Value Ref Range    Sodium 144 135 - 145 mmol/L     Potassium 2.8 (L) 3.4 - 5.3 mmol/L    Chloride 102 98 - 107 mmol/L    Carbon Dioxide (CO2) 32 (H) 22 - 29 mmol/L    Anion Gap 10 7 - 15 mmol/L    Urea Nitrogen 21.0 8.0 - 23.0 mg/dL    Creatinine 1.00 0.67 - 1.17 mg/dL    GFR Estimate 77 >60 mL/min/1.73m2    Calcium 9.6 8.8 - 10.2 mg/dL    Glucose 134 (H) 70 - 99 mg/dL   CBC with platelets   Result Value Ref Range    WBC Count 8.4 4.0 - 11.0 10e3/uL    RBC Count 4.09 (L) 4.40 - 5.90 10e6/uL    Hemoglobin 9.9 (L) 13.3 - 17.7 g/dL    Hematocrit 33.6 (L) 40.0 - 53.0 %    MCV 82 78 - 100 fL    MCH 24.2 (L) 26.5 - 33.0 pg    MCHC 29.5 (L) 31.5 - 36.5 g/dL    RDW 16.4 (H) 10.0 - 15.0 %    Platelet Count 360 150 - 450 10e3/uL   Lactic acid whole blood   Result Value Ref Range    Lactic Acid 1.9 0.7 - 2.0 mmol/L   Troponin T, High Sensitivity   Result Value Ref Range    Troponin T, High Sensitivity 52 (H) <=22 ng/L   EKG 12-lead, tracing only   Result Value Ref Range    Systolic Blood Pressure  mmHg    Diastolic Blood Pressure  mmHg    Ventricular Rate 73 BPM    Atrial Rate 73 BPM    PA Interval 246 ms    QRS Duration 88 ms     ms    QTc 460 ms    P Axis 53 degrees    R AXIS 75 degrees    T Axis 61 degrees    Interpretation ECG       Sinus rhythm with 1st degree A-V block  Otherwise normal ECG  When compared with ECG of 09-DEC-2020 07:22,  ST no longer depressed in Lateral leads  Nonspecific T wave abnormality no longer evident in Inferior leads  Nonspecific T wave abnormality no longer evident in Lateral leads       Time Spent on this Encounter   I spent 50 minutes of critical care time on the unit/floor managing the care of this patient. Upon evaluation, this patient had a high probability of imminent or life-threatening deterioration due to acute unresponsive episode, which required my direct attention, intervention, and personal management including 100% of my time was spent at the bedside counseling the patient and/or coordinating care regarding services listed  in this note.     [facility] House Officer/MARK  Glenn Be PA-C    [facility]  Securely message with the [employer] Web Console (learn more here)  Text page via [employer] Paging/Directory

## 2023-12-02 NOTE — PROVIDER NOTIFICATION
MD Notification    Notified Person: MD    Notified Person Name: Dr Khanna    Notification Date/Time: 12/2/23 1104    Notification Interaction: [employer] messaging    Purpose of Notification:He is back from the tests. His K+ is 2.8, see other labs. Do you want protocol?     Orders Received:    Comments:

## 2023-12-02 NOTE — PROGRESS NOTES
Was called into patients room for RRT. See previous notes. Patient was alert but sleepy, vitals stable, blood sugar 141. CT head (-), CXR done, orthostatic BP complete & patient got a little nauseated from lying to sitting, no dizziness or nausea when standing. K+2.8/replacing, Started bowel prep. Patient not sure when last BM was & not charted since the 11/23. Continue to monitor.

## 2023-12-02 NOTE — CONSULTS
DATE OF SERVICE : 12/2/2023    DATE OF ADMISSION: 11/21/2023    NEUROLOGICAL CONSULTATION    REQUESTED BY Dr. 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  He had old stroke with left-sided weakness worsening left leg weakness stroke in 2016 right UMM territory.  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      RRT called this morning new onset of unresponsive episode in bed 20 minutes earlier had a shower this morning without any incident when in bed he felt nauseous unwell and need for a bowel movement with abdominal cramping RN arrived 2 to 3 minutes later found unresponsive in bed possible mild head twitching  Blood glucose 140  Loss of consciousness rapidly returned to normal within minutes.  Systolic blood pressure 120s heart rate 86 room air 88%    ?  Blood pressure lows 80s while arm raised attempting bowel movement on bedpan thereafter BP was normal      Past Medical History:   Diagnosis Date    * * * SBE PROPHYLAXIS * * *     s/p TAVR    Acute rheumatic carditis 1950    Complication of anesthesia     pt once woke up during back surgery    Coronary artery disease     murmur    Erectile dysfunction     Sildenafil    Hip pain, bilateral     Hypercholesteremia     Hypertension     Low back pain     Nonsenile cataract     Obesity     Renal cyst     S/P 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    Stroke (cerebrum) (H) 11/30/2016    Umbilical hernia  06/10/2011    s/p surgical repair    Wound, surgical, infected 06/10/2011    hernia       PSHx:   Past Surgical History:   Procedure Laterality Date    ARTHROPLASTY KNEE BILATERAL  7/22/2010    COLONOSCOPY N/A 4/14/2017    Procedure: COLONOSCOPY;  Surgeon: Toby Bills MD;  Location: [facility]    CV CORONARY ANGIOGRAM N/A 10/28/2019    Procedure: Coronary Angiogram;  Surgeon: Guerrero Huitron MD;  Location:  [facility] CARD[facility] CATH [facility]    CV RIGHT HEART CATH MEASUREMENTS RECORDED N/A 10/28/2019    Procedure: Right Heart Cath;  Surgeon: Guerrero Huitron MD;  Location:  HE[facility] CARD[facility] CATH LAB    CV TRANSCATHETER AORTIC VALVE REPLACEMENT N/A 12/8/2020    Procedure: Transcatheter Aortic Valve Replacement;  Surgeon: Camila Begum MD;  Location:  HEART CARDIAC CATH LAB    FUSION LUMBAR ANTERIOR TWO LEVELS      HERNIORRHAPHY UMBILICAL  6/10/2011    Procedure:HERNIORRHAPHY UMBILICAL; Open, with mesh placement; Surgeon:HO PARHAM; Location:[facility]    LAMINECTOMY LUMBAR TWO LEVELS      THORACOSCOPIC WEDGE RESECTION LUNG Right 3/2/2023    Procedure: Right thoracoscopic wedge resection;  Surgeon: Hebert Jones MD;  Location:  OR       Meds:   Current Outpatient Medications   Medication Sig Dispense Refill    [START ON 12/3/2023] aspirin (ASA) 81 MG chewable tablet 1 tablet (81 mg) by Oral or Feeding Tube route daily      ceFAZolin (ANCEF) 1 GM vial Inject 2 g into the vein every 8 hours for 14 days CBC with differential, creatinine, SGOT weekly while on this medication to be faxed to Dr. Moore office.      oxyCODONE-acetaminophen (PERCOCET) 5-325 MG tablet Take 1 tablet by mouth every 6 hours as needed for pain 5 tablet 0    sodium chloride 100 mL with ceFAZolin 2 g Inject 2 g into the vein every 8 hours for 6 days           Current Facility-Administered Medications   Medication Dose Route Frequency    aspirin  81 mg Oral or Feeding Tube Daily    ceFAZolin (ANCEF) 2 g in sodium chloride 0.9 %  100 mL  2 g Intravenous Q8H    enoxaparin ANTICOAGULANT  40 mg Subcutaneous Q12H    magnesium hydroxide  30 mL Oral Once    polyethylene glycol  17 g Oral Daily    senna-docusate  1 tablet Oral BID    sodium chloride (PF)  10 mL Intracatheter Q8H    spironolactone  50 mg Oral or Feeding Tube Daily    tamsulosin  0.4 mg Oral Daily    torsemide  40 mg Oral or Feeding Tube Daily     Current Facility-Administered Medications   Medication Dose Route Frequency    diazepam  5-10 mg Oral or Feeding Tube Once PRN    ibuprofen  600 mg Oral Q8H PRN    Or    ibuprofen  600 mg Oral or Feeding Tube Q8H PRN    lidocaine 4%   Topical Q1H PRN    lidocaine (buffered or not buffered)  0.1-1 mL Other Q1H PRN    - MEDICATION INSTRUCTIONS -   Intravenous Continuous PRN    methocarbamol  500 mg Oral or Feeding Tube Q6H PRN    naloxone  0.2 mg Intravenous Q2 Min PRN    Or    naloxone  0.4 mg Intravenous Q2 Min PRN    Or    naloxone  0.2 mg Intramuscular Q2 Min PRN    Or    naloxone  0.4 mg Intramuscular Q2 Min PRN    ondansetron  4 mg Oral Q6H PRN    Or    ondansetron  4 mg Intravenous Q6H PRN    oxyCODONE-acetaminophen  1 tablet Oral Q8H PRN    senna-docusate  1 tablet Oral or Feeding Tube BID PRN    Or    senna-docusate  2 tablet Oral or Feeding Tube BID PRN    sodium chloride (PF)  10-20 mL Intracatheter q1 min prn      No Known Allergies      SocHx:  reports that he quit smoking about 18 years ago. His smoking use included cigarettes. He has been exposed to tobacco smoke. He has never used smokeless tobacco. He reports that he does not drink alcohol and does not use drugs.    Family History   Problem Relation Age of Onset    C.A.D. Mother     Unknown/Adopted Father     Heart Failure Sister     Heart Failure Sister     Glaucoma No family hx of     Macular Degeneration No family hx of     Diabetes No family hx of     Hypertension No family hx of              PHYSICAL EXAM    /51   Pulse 80   Temp 98.1  F (36.7  C) (Oral)   Resp  18   Wt 96.9 kg (213 lb 10 oz)   SpO2 95%   BMI 37.24 kg/m      Alert and oriented to current situations fund of knowledge is intact spontaneous speech comprehension is normal pupils equal and round no gaze preference face is symmetric hearing normal to conversation tongue is in midline soft palate elevation midline  Able to move all 4 limbs against gravity reflexes 2 in the upper extremities diminished in the knees (bilateral knee replacement) intact at the ankles  No tremors or involuntary movements    Lab and X-ray:   Recent Labs   Lab Test 12/02/23  0943 11/22/23  1251 11/22/23  0832 11/08/19  0749 10/28/19  0900   WBC 8.4   < >  --    < > 6.8   HGB 9.9*   < >  --    < > 11.4*      < >  --    < > 259   INR  --   --   --   --  1.01   POTASSIUM 2.8*  --  4.0   < > 3.6   LDL  --   --  97   < >  --     < > = values in this interval not displayed.     Recent Labs   Lab Test 12/02/23  0943 11/22/23  0832   POTASSIUM 2.8* 4.0   CHLORIDE 102 103   BUN 21.0 9.7     Recent Labs   Lab Test 12/02/23  0943 12/02/23  0821 11/24/23  0848 10/28/20  1420 10/28/19  0900 09/19/19  0858 11/30/16  0930   WBC 8.4  --  6.3   < > 6.8   < > 7.2   HGB 9.9*  --  10.2*   < > 11.4*   < > 12.9*   MCV 82  --  81   < > 82   < > 78    371 206   < > 259   < > 289   INR  --   --   --   --  1.01  --  0.99    < > = values in this interval not displayed.     Recent Labs   Lab Test 11/21/23  1348 10/18/23  0941   * 22   ALT 36 14   ALKPHOS 109 105     Recent Labs   Lab Test 11/22/23  0832 08/03/22  1240   HDL 41 40   LDL 97 136*     No lab results found.    Laboratory results were personally interpreted and reviewed in detail.  Imaging studies reviewed and interpreted in detail      Summary: List Problems:   Patient Active Problem List   Diagnosis    Essential hypertension, benign    Hyperlipidemia with target LDL less than 130    Anemia    Medication refill- do not delete     Low back pain    Osteoarthritis of knee    Stroke  (H)    Cerebrovascular accident involving anterior circulation, right (H)    Dermatophytosis of nail    PVD (peripheral vascular disease) (H24)    Systolic murmur    Peripheral edema    Hyperplasia of prostate with lower urinary tract symptoms (LUTS)    Erectile dysfunction, unspecified erectile dysfunction type    Severe aortic stenosis    Status post coronary angiogram    Morbid obesity (H)    Aortic valve stenosis, etiology of cardiac valve disease unspecified    Aortic stenosis, severe    Diastolic heart failure (H)    S/P TAVR (transcatheter aortic valve replacement)    Bilateral leg edema    Malignant neoplasm metastatic to lung    Tonsil cancer (H)    Squamous cell carcinoma of oropharynx (H)    Weakness    Fall, initial encounter    COVID-19    Shoulder wound, left, initial encounter       ASSESSMENT:       Episode of unresponsiveness likely related to hypotensive episode/syncopal--less likely related to a seizure-    No further neurodiagnostic work-up is planned  Continued systemic management per the primary team  Call us with any questions      Thank you for the opportunity to provide consultation on Jonathan Bishop

## 2023-12-02 NOTE — PROVIDER NOTIFICATION
MD Notification    Notified Person: MD    Notified Person Name: Dr Khanna    Notification Date/Time:12/2/23 1520    Notification Interaction: aaTag messaging    Purpose of Notification:Just talked to Dr Pope & no need for EEG.     Orders Received:    Comments:

## 2023-12-02 NOTE — PROGRESS NOTES
Went into see patient & give him a dulcolax suppository per doctors order since he had not had a BM since giving him miralax/MOM/senna. Patient was very adamant that he will not take it. Stated he was fine & didn't need it.  He stated he is done & going home. He called a friend to pick  him up.  I saw Dr Khanna to let her know what he was doing. We discussed telling the patient he does not have to take the suppository & we would just give him the senna & MOM after dinner. Then have midline placed in morning & discharge to rehab for IV ABO on 12/3/23. I explained this to patient & he would not discuss anymore & said he is going home. I also stated that these IV antibiotics were very important for him. Told patient I didn't think it was safe for him to go home, he is unsteady at times & also due to his syncopal episode this morning.  Patient would not listen.  Printed papers for leaving AMA & he signed.

## 2023-12-02 NOTE — PLAN OF CARE
Goal Outcome Evaluation:  DATE & TIME: 12/1/2023  [id number]  Summary: L side weakness, Fall, Covid recovered, Pharyngeal CA.  Cognitive Concerns/ Orientation: A&Ox4   BEHAVIOR & AGGRESSION TOOL COLOR: Green  ABNL VS/O2: VSS on RA   MOBILITY: A1 GB/W, ambulated x3 in hallway, also ambulates to bathroom, up in chair for meals.  PAIN MANAGMENT: Pain to lower back, PRN percocet given x1  DIET: Regular. Fair appetite  BOWEL/BLADDER: Cont B/B. Uses urinal at bedside  ABNL LAB/BG: BGM's 86/98/110  DRAIN/DEVICES: R PIV SL   SKIN: Trace edema on BLE. Abrasion on L and R shoulders - dressings changed.  L scab foot.  TESTS/PROCEDURES: NA  D/C DAY/GOALS/PLACE: TCU 12/2/23  OTHER IMPORTANT INFO: On IV Ancef q8h through 12/07. midline to be placed tomorrow before discharge. Neuro checks WNL SLP/PT/OT/ID/oncology following. Radiation therapy to continue after discharge.

## 2023-12-02 NOTE — PLAN OF CARE
Goal Outcome Evaluation:      Plan of Care Reviewed With: patient    Overall Patient Progress: improvingOverall Patient Progress: improving     SUMMARY: L side weakness, Fall, Covid recovered, Pharyngeal CA.  DATE & TIME: 12/1-12/02 1930-0730  Cognitive Concerns/ Orientation: A&Ox4- forgetful   BEHAVIOR & AGGRESSION TOOL COLOR: Green  ABNL VS/O2: VSS on RA   MOBILITY: A1 GB/W, ambulates to bathroom, up in chair for meals.  PAIN MANAGMENT: Pain to lower back- denied pain this shift.   DIET: Regular.  BOWEL/BLADDER: Cont B/B. Uses urinal at bedside/ bathroom   ABNL LAB/BG: NA  DRAIN/DEVICES: R PIV SL- intermittent ancef    SKIN: Trace edema on BLE. Abrasion on L and R shoulders - mepilex CDI, L scab foot.  TESTS/PROCEDURES: NA  D/C DAY/GOALS/PLACE: TCU 12/2/23  OTHER IMPORTANT INFO: On IV Ancef q8h through 12/07. midline to be placed this AM before discharge. Neuro checks WNL SLP/PT/OT/ID/oncology following. Radiation therapy to continue after discharge.

## 2023-12-02 NOTE — PROGRESS NOTES
[facility]  Hospitalist Progress Note        Gerald Khanna MD   12/02/2023        Interval History:        - per ID, plans for IV Ancef  tid x 2 weeks from time of negative blood cultures (until 12/7/23) to treat low-grade MSSA bacteremia   - ID okayed for midline but patient wants to wait until the day of discharge for it to be placed  - completed 5 days course of Paxlovid for COVID (11/25/23); on RA  -Was scheduled to start XRT at [facility] 11/28/23 spoke with XRT resident who states that patient's COVID status is not obstacle for treatment, described as twice daily x 2 days.  XRT rescheduled per [facility] XRT; he will be at [facility] TCU that can assist in transfer to [place] for XRT;  patient without S/S airway obstruction from his oropharyngeal CA.         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      Confirmed COVID-19 infection status post Paxlovid x 5 days, on room air.  Generalized weakness, mechanical fall 2/2 above     - presented with generalized weakness and a fall.  * COVID testing in the ED is positive.   *Remains afebrile and not hypoxic ; CXR did show patchy opacity in the L lung base which could represent atelectasis or pneumonia.   * Head CT, XR  L femur, and XR L tibia/fibula are negative for acute injury.   * D dimer is elevate to 4.22 and CRP is elevated to 69.61.   - per pharmacy recs, switched Lovenox to BID for DVT prophylaxis  - CT chest PE protocol 11/21 noted no acute pulmonary embolism; Grossly unchanged bilateral cervical lymphadenopathy and unchanged 3 mm right middle lobe pulmonary nodule  - ultrasound lower extremity 11/21 noted no DVT  - completed 5 days course  of Paxlovid for COVID (11/25/23)  - deferred steroids and remdesivir given that the patient is not hypoxic  - maintain COVID-19 special precautions x 10 days; completed 12/1/23.   - patient accepting of  TCU to arrange transport to [facility] for XRT and completion of his IV Abx; CC states [facility] TCU admission scheduled for 12/2/2023, patient wants midline placed on that day which has been ordered, letter to be given to patient regarding the day of admission at [facility], discharge and transition to TCU  -12/2/2023 in a.m. was well, eager for breakfast and planned discharge to TCU.  However later in the morning RRT called for not clearly defined condition, found unresponsive for undetermined time, nursing states no pulse at that time however patient quickly returned to baseline awake, stable vital signs although RRT MARK was concerned of possible seizure activity with incontinence.  It appears from history that patient was attempting BM at that time suggesting vasovagal episode.  See RRT note for details.  Briefly, head CT without acute findings, CXR without acute findings.  Troponins flat, 52-46, EKG without acute ST-T changes, LA WNL, VSS, no leukocytosis, hemoglobin fairly stable in the 9.9-10 range.  Pt on lovenox and had be up and ambulatory, no O2 desat or tachy with less likihood of VTE. Potassium returned low at 2.8, patient was not on telemetry therefore he might have had some dysrhythmia, since then on telemetry, negative.  On aggressive potassium replacement protocol.  Seen by neurology to rule out seizure, see note, Dr. Pope did not feel consistent with seizure activity and advised no further neurologic workup including EEG.  Patient on bowel program.  If he continues stable, VSS, tele NEG and bowels moving proceed with existing plans to discharge and transition to TCU if bed available tomorrow.  If this is in fact the case needs midline prior to discharge for ongoing IV antibiotic Rx.  In the  meantime, K replacement; monitor on tele; bowel program.      Staph aureus (MSSA) bacteremia  - 1/2 Blood cultures 11/21 growing GPC in clusters (MSSA); blood cultures from 11/22 with no growth so far  - was started on Vancomycin; evaluated by ID and switched vancomycin to Ancef (11/22)  - ECHO 11/24/23 with LVEF 65 %, normal in structure, function and size; mild mitral stenosis; s/p TAVR with 26mm Anton Tawana 3, implanted 2020. Mean 11mmHg, Vmax 2.4m/s. No AI or PVL, no note of vegetation    - ID suspect nasopharyngeal mass may also be the source of bacteremia; blood cultures from 11/22 and 11/24 with no growth so far; if multiple blood cultures are positive will need evaluation for endocarditis  - per ID, plans for IV Ancef x 2 weeks from time of negative blood cultures (until 12/7/23) to treat low-grade MSSA bacteremia   - ID okayed for midline (11/25) but patient wants to wait until the day of discharge for it to be placed [ordered]    Ingrown toe nail:  - Podiatry consulted for left great toe ingrown toe nail; podiatry evaluated 11/24 and suggested suggested no need for surgical intervention at this time    H/o CVA with residual left sided weakness  - on 11/22 was noted with left-sided weakness during therapy evaluation  - a code stroke was called, evaluated by house MARK  - he does have history of prior right UMM infarct and notes baseline residual left sided weakness and states that he ambulates with help of a cane and scooter  - was evaluated by stroke neurology (signed off 11/23)  - MRI brain 11/22 noted no acute infarct, mass, mass effect, or hemorrhage; moderate chronic small vessel ischemia.  - CT head 11/22 noted no CT finding of a mass, hemorrhage or focal area suggestive of infarct  - CTA head and neck 11/22 noted no significant vascular stenosis or occlusion  - evaluated by SLP, noted mild oropharyngeal dysphagia, diet modification per SLP  - ECHO as above  - neurology suggest likely recrudescence  "of old stroke symptoms in the context of acute infection and suggested no further stroke workup  - to continue ASA 81 mg daily, Lipitor 40 mg daily; holding Lipitor while on Paxlovid given the risk for interaction; can resume on discharge      Mild Anemia   Hemoglobin is 10.8---10.4---9.7---10.2,-- 9.9 stable from baseline     Mild hypernateremia    - Monitor BMP intermittently  - Encourage PO intake   NA now normal 144 on 12/2/23     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 CT soft tissue neck with contrast recommended.   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 in the neck, appearance similar to PET/CT of 11/14/2020\"  - follow-up with PCP/oncology  -See above.  XRT at [facility] that was scheduled for this Tuesday will be rescheduled discussed with care coordinator.    Anxiety  - Continue PTA diazepam PRN      CAD   HTN  HLD  S/p AVR  CHF  (TTE in 2021 with EF of 60-65%, Grade I diastolic dysfunction), not in acute exacerbation    Chronic LE edema   - Hold PTA atorvastatin while on Paxlovid  - continue PTA torsemide, spironolactone     DVT Prophylaxis: resumed Enoxaparin (Lovenox) subcutaneous 11/23    Code Status:DNR/DNI    Diet: Regular Diet Adult      Disposition:     now agreeable for TCU;  see above; FEI for disposition planning; on discharge 12/2/2023 postponed due to RRT, see above.    Total time 50 minutes for today 12/2/2023 :  " time consisted of the following, examination of patient, review of records including labs, imaging results, medications, interdisciplinary notes and completing documentation and orders.  Care Management included counseling/discussion with Patient regarding current condition including acute event related to RRT; hypoK; constipation and Coordination of Care time with Nursing re: hypoK ; SW re discharge planning and Specialists, Neurology regarding care plan, management and surveillance; as above. .     Clinically Significant Risk Factors Present on Admission        # Hypokalemia: Lowest K = 2.8 mmol/L in last 2 days, will replace as needed             # Hypertension: Noted on problem list                               Physical Exam:      Blood pressure 124/51, pulse 80, temperature 98.3  F (36.8  C), temperature source Oral, resp. rate 18, weight 96.9 kg (213 lb 10 oz), SpO2 95%.  Vitals:    11/27/23 0002 12/01/23 0637   Weight: 101.8 kg (224 lb 6.9 oz) 96.9 kg (213 lb 10 oz)       General/Constitutional:    On AM encounter and this afternoon NAD, oriented, calm, cooperative.  HEENT; swallow mechanism intact  Chest/Respiratory: Respirations nonlabored room air; no strider  Cardiovascular:  regular, no murmur appreciated.    Gastrointestinal/Abdomen:  soft, nontender,   Neuro.  Gross motor tested, nonfocal,  Psych oriented, affect at baseline                Medications:         aspirin  81 mg Oral or Feeding Tube Daily    ceFAZolin (ANCEF) 2 g in sodium chloride 0.9 % 100 mL  2 g Intravenous Q8H    enoxaparin ANTICOAGULANT  40 mg Subcutaneous Q12H    polyethylene glycol  17 g Oral Daily    senna-docusate  1 tablet Oral BID    sodium chloride (PF)  10 mL Intracatheter Q8H    spironolactone  50 mg Oral or Feeding Tube Daily    tamsulosin  0.4 mg Oral Daily    torsemide  40 mg Oral or Feeding Tube Daily     PRN Meds: diazepam, ibuprofen **OR** ibuprofen, lidocaine 4%, lidocaine (buffered or not buffered), - MEDICATION  INSTRUCTIONS -, methocarbamol, naloxone **OR** naloxone **OR** naloxone **OR** naloxone, ondansetron **OR** ondansetron, oxyCODONE-acetaminophen, senna-docusate **OR** senna-docusate, sodium chloride (PF)         Data:      All new lab and imaging data was reviewed.   Recent Labs   Lab Test 12/02/23  0943 12/02/23  0821 11/24/23  0848 11/23/23  0729 10/28/20  1420 10/28/19  0900 09/19/19  0858 11/30/16  0930   WBC 8.4  --  6.3 6.7   < > 6.8   < > 7.2   HGB 9.9*  --  10.2* 9.7*   < > 11.4*   < > 12.9*   MCV 82  --  81 80   < > 82   < > 78    371 206 182   < > 259   < > 289   INR  --   --   --   --   --  1.01  --  0.99    < > = values in this interval not displayed.      Recent Labs   Lab Test 12/02/23  0943 12/02/23  0926 12/02/23  0821 12/01/23  1710 11/25/23  1827 11/25/23  0923 11/22/23  1135 11/22/23  0832 11/21/23  1348     --   --   --   --   --   --  138 147*   POTASSIUM 2.8*  --   --   --   --   --   --  4.0 3.6   CHLORIDE 102  --   --   --   --   --   --  103 107   CO2 32*  --   --   --   --   --   --  25 27   BUN 21.0  --   --   --   --   --   --  9.7 16.2   CR 1.00  --  0.88  --   --  0.80   < > 0.61* 0.95   ANIONGAP 10  --   --   --   --   --   --  10 13   WARREN 9.6  --   --   --   --   --   --  9.2 9.7   * 141*  --  110*   < >  --    < > 130* 97    < > = values in this interval not displayed.

## 2023-12-03 NOTE — PLAN OF CARE
Physical Therapy Discharge Summary    Reason for therapy discharge:    Discharged to transitional care facility.    Progress towards therapy goal(s). See goals on Care Plan in [facility] electronic health record for goal details.  Goals partially met.  Barriers to achieving goals:   discharge from facility.    Therapy recommendation(s):    Continued therapy is recommended.  Rationale/Recommendations:   .     PT Discharge Planning:  PT Plan: increased gait distance (bring 4WW), LE strengthening, sit<>stand,  PT Discharge Recommendation (DC Rec): Transitional Care Facility  PT Rationale for DC Rec: Patient below baseline for functional mobility w/ decreased strength and activity tolerance. Pt requiring SB-Loli with STS transfers, progressed to ambulate 150' with FWW SBA.  Recommend TCU to increase strength and functional mobility. Pt has declined TCF, planning to go home. If pt returns home, recommend pt have Ax1 for trasnfers and SBA FWW for ambulation. Anticipate pt will need assistance with other ADLs including bathing and IADLs such as cooking, cleaning, etc.  PT Brief overview of current status: SB-Loli for STS trasnfers, gait with FWW SBA.  PT Equipment Needed at Discharge:  (pt owns 4WW.)    Recommendation above provided by last treating therapist.

## 2023-12-03 NOTE — RESULT ENCOUNTER NOTE
Prolactin resulted post discharge- high level 80 is consistent with suspected seizure event (see RRT note) , Discussed w Dr Pope in neurology 12/2 pm, they will expedite a followup appt in clinic with them to address. Pt left AMA, will call pt too

## 2023-12-03 NOTE — DISCHARGE SUMMARY
[facility]    Discharge Summary  Hospitalist    Date of Admission:  11/21/2023  Date of Discharge:  12/2/2023  5:26 PM     Patient left AMA  Discharging Provider: Gerald Khanna MD  Date of Service (when I saw the patient): 12/2/23      Please see yesterday's 12/2/2023 progress note for details.  Patient had an eventful day 12/2/2023 and was scheduled to be discharged and [facility]  However during the late morning he had an RRT for unresponsiveness however recovered without residual.  Please see 12/2/2023 progress note as well as RRT AAP note.    I saw the patient on 3 occasions yesterday, first on morning rounds regarding planned discharge to TCU and he was of fine affect and agreeable, after RRT patient agreed that he needed further evaluation with HCT, CXR performed [negative for acute findings] and laboratories drawn at the time of RRT.  Later in the afternoon reviewed results to date; Patient felt well  however patient confirmed no BM for about a week with concern at that time the RRT presentation represented vagal episode given clinical data to date and told of bowel program for which he was agreeable.    However after hours patient told nursing he wanted to leave the hospital.  Please see nursing note yesterday.  He was told of plans and his condition however signed out AMA.    Since that time laboratory from yesterday returned with prolactin 80, suggesting that event yesterday of unresponsiveness could represent seizure.  Bex had tried to contact the patient last evening by phone, without answer and without ability to leave VM.  MARK contacted Neurology, Dr. Pope of results of prolactin that were not available at the time of her consult.  She stated to Bex that her office will call patient for follow-up appointment and evaluation.  With possibility of seizure patient should not drive until follow-up neurology appointment.  Today 12/3/2023 charge nurse  [station 66] also contacted Dr. Pope regarding same and she stated that her office will schedule follow-up appointment with this patient.    Regarding diagnosis of MSSA bacteremia 1 of 2 blood cultures on 11/21/23  was positive and since then BC NO growth.  Review of records indicates patient was started on Ancef from 11/21/2022 until the time he left [facility] 12/2/2023 with 11 days of antibiotics, short of the 14-day recommended by ID.  During hospitalization patient has remained without leukocytosis, fever.  Patient left AMA 12/2/23.  Will CC this discharge summary to his PCP regarding follow-up of his condition.  Review of records indicates scheduled appointment with XRT Dr. Mills 12/19/2023 and will also CC this discharge summary to that provider.    MD Costa  Internal Medicine  [facility] Service

## 2023-12-03 NOTE — PROGRESS NOTES
Writer was approached by the hospitalist, Dr Khanna, this morning and was asked to page Dr Pope (Neurology). Dr Pope was notified about pt leaving AMA and that prolactin of 80 was back after him leaving. Dr Anderson said that she will schedule follow up for pt.

## 2023-12-04 ENCOUNTER — TELEPHONE (OUTPATIENT)
Dept: INTERNAL MEDICINE | Facility: CLINIC | Age: 78
End: 2023-12-04
Payer: COMMERCIAL

## 2023-12-04 NOTE — PROGRESS NOTES
Care Management Discharge Note    Discharge Date: 12/02/2023       Discharge Disposition: Skilled Nursing Facility    Discharge Services: Transportation Services, Housekeeping/[employer]    Discharge DME:      Discharge Transportation: health plan transportation    Private pay costs discussed: Not applicable    Does the patient's insurance plan have a 3 day qualifying hospital stay waiver?  Yes     Which insurance plan 3 day waiver is available? Alternative insurance waiver    Will the waiver be used for post-acute placement? Yes    PAS Confirmation Code:    Patient/family educated on Medicare website which has current facility and service quality ratings:      Education Provided on the Discharge Plan:    Persons Notified of Discharge Plans: see below  Patient/Family in Agreement with the Plan: no    Handoff Referral Completed:     Additional Information:  Patient discharged home, AMA per nursing on 12/2  Jorgere was notified TCU authorization is not being used.   Also notified [facility] and [facility] Pre admission staff. [facility] had called asking for patient's admit date as they provide services to him under his [place] Wavier/PCA benefits.    SHELLY PaulsonSW

## 2023-12-04 NOTE — PROGRESS NOTES
Occupational Therapy Discharge Summary    Reason for therapy discharge:    Discharged to transitional care facility.    Progress towards therapy goal(s). See goals on Care Plan in [facility] electronic health record for goal details.  Goals partially met.  Barriers to achieving goals:   discharge from facility.    Therapy recommendation(s):    Continued therapy is recommended.  Rationale/Recommendations:  increase function and safety for ADL.

## 2023-12-04 NOTE — TELEPHONE ENCOUNTER
Health Call Center    Phone Message    May a detailed message be left on voicemail: yes     Reason for Call: Medication Question or concern regarding medication   Prescription Clarification  Name of Medication: diazepam  Prescribing Provider: unsure      What on the order needs clarification? Pt requesting call back from Dr. Nascimento's nurse to discuss getting the above medication. Pt stated he needs it to help him with his current treatment he is going through

## 2023-12-04 NOTE — PROGRESS NOTES
Clinic Care Coordination Contact    Situation: Patient chart reviewed by care coordinator.    Background: Pt was to be sent to TCU on Discharge from hospital, appears on 12/2, date of discharge, pt had a RRT call with suspected seizure and left hospital AMA.     Assessment: Pt has since called clinic asking for Valium to help with Cancer treatment.     Appears per Oncology Triage, patient has a  as well as an ICLS Provider for patient. Patient is also receiving support from FEI Chaudhary with Oncology.     Plan/Recommendations: RN routed to FEI Chaudhary and Olivia Gates RN with oncology in regards to update on patient's hospitalization and how primary care can further help.     CHELSEA VIVEROS RN on 12/4/2023 at 12:56 PM

## 2023-12-05 ENCOUNTER — PATIENT OUTREACH (OUTPATIENT)
Dept: ONCOLOGY | Facility: CLINIC | Age: 78
End: 2023-12-05
Payer: COMMERCIAL

## 2023-12-05 LAB
ATRIAL RATE - MUSE: 73 BPM
DIASTOLIC BLOOD PRESSURE - MUSE: NORMAL MMHG
INTERPRETATION ECG - MUSE: NORMAL
P AXIS - MUSE: 53 DEGREES
PR INTERVAL - MUSE: 246 MS
QRS DURATION - MUSE: 88 MS
QT - MUSE: 418 MS
QTC - MUSE: 460 MS
R AXIS - MUSE: 75 DEGREES
SYSTOLIC BLOOD PRESSURE - MUSE: NORMAL MMHG
T AXIS - MUSE: 61 DEGREES
VENTRICULAR RATE- MUSE: 73 BPM

## 2023-12-05 NOTE — PROGRESS NOTES
Per collaboration with Aydee, Oncology RN CC and pt's care team, Primary Care goals to assist patient with neurology follow up as well as PCC follow up appt.     Pt is seeing Dr. Macdonald on 12/12 in Primary Care Clinic for hospital follow up.     Per hospital discharge note:   MARK contacted Neurology, Dr. Pope of results of prolactin that were not available at the time of her consult.  She stated to MARK that her office will call patient for follow-up appointment and evaluation.  With possibility of seizure patient should not drive until follow-up neurology appointment.  Today 12/3/2023 charge nurse [station 66] also contacted Dr. Pope regarding same and she stated that her office will schedule follow-up appointment with this patient.     Per PA note, hospital:        Prolactin resulted post discharge- high level 80 is consistent with suspected seizure event (see RRT note) , Discussed w Dr Pope in neurology 12/2 pm, they will expedite a followup appt in clinic with them to address. Pt left AMA, will call pt too        Per RN note, hospital:        Writer was approached by the hospitalist, Dr Khanna, this morning and was asked to page Dr Pope (Neurology). Dr Pope was notified about pt leaving AMA and that prolactin of 80 was back after him leaving. Dr Anderson said that she will schedule follow up for pt.         RN called [facility] to see if pt is scheduled. RN received VM for Toya, new patient . RN LVM and provided direct number for Toya for return call.     CHELSEA VIVEROS RN on 12/5/2023 at 1:04 PM

## 2023-12-06 NOTE — PROGRESS NOTES
"[facility]: Post-Discharge Note  SITUATION                                                      Admission:    Admission Date: 11/21/23   Reason for Admission: generalized weakness and a fall in the setting of COVID-19 infection  Discharge:   Discharge Date: 12/02/23 - left AMA (Plan was to place midline and discharge to TCU on 12/3 for ongoing IV antibiotic needs)  Discharge Diagnosis: Confirmed COVID-19 infection status post Paxlovid x 5 days;  Generalized weakness, mechanical fall 2/2; Staph aureus (MSSA) bacteremia; Possible seizure on 12/2    BACKGROUND                                                      Jonathan Bishop is a 78 year old male with PMH significant for aortic valve replacement, CAD, HTN, HLD, CVA, and 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     Plan was to discharge to [facility]CU on IV antibiotics and get radiation treatments on an outpatient basis. On 12/2/23, an RRT was called for unresponsiveness. Per discharge MD note, his prolactin level of 80 may suggest that the 12/2 event of unresponsiveness could represent seizure. Conner left AMA that same day to his home.     ASSESSMENT      Discharge Assessment  How are you doing now that you are home?: \"I feel fantastic\" \"I'm not sick\"  How are your symptoms? (Red Flag symptoms escalate to triage hotline per guidelines): Improved  Do you feel your condition is stable enough to be safe at home until your provider visit?: Yes  Does the patient have their discharge instructions? : No - Review discharge instructions (left AMA)  Does the patient have questions regarding their discharge instructions? : No  Were you started on any new medications or were there changes to any of your previous medications? : Yes (started on Ancef from 11/21/2022 until the time he left AMA 12/2/2023, " short of the 14-day recommended by ID)  Do you have questions regarding any of your medications? : No  Do you have all of your needed medical supplies or equipment (DME)?  (i.e. oxygen tank, CPAP, cane, etc.): Yes  Discharge follow-up appointment scheduled within 14 calendar days? : Yes (scheduled follow up with medical oncology MARK on 12/12. messaged his primary care RNCC to set him up for appointment with PCP this week. PCP RNCC is reaching out to his neurology clinic to set up that follow up)  Discharge Follow Up Appointment Date: 12/12/23  Discharge Follow Up Appointment Scheduled with?: Primary Care Provider this week, med onc MARK next week    PLAN                                                      Outpatient Plan:    Follow up with medical oncology RNCC on 12/12/23  Follow up with PCP this week - message sent to his RN Care Manager in Primary Care Clinic   Follow up with Dr. Pope at [facility] of Neurology - primary care RN is inquiring about this  Instructed Conner that per Dr. Khanna's discharge note, he should not drive until follow-up neurology appointment due to possibility of seizure. Conner says he was not aware of this. Instructed him to reach out to neurology team for further questions. Provided phone numbers for transportation resources that Earlene Hou [facility] sent him via [employer] and instructed Conner to let us know if they are not able to accommodate his ride needs  Scheduled for quad shot radiation 12/19    Future Appointments   Date Time Provider Department Center   12/12/2023  7:30 AM  [facility] LAB DRAW ONL [facility]   12/12/2023  8:00 AM Vandana Bertrand, CNP ONA [facility]   12/12/2023  9:40 AM Odette Macdonald MD [facility]   12/19/2023  7:45 AM [facility] RAD ONC YAR[facility]EL [facility]   12/19/2023  8:30 AM Jenni Mills MD [facility]   12/19/2023  1:45 PM [facility] RAD ONC [facility]EL [facility]   12/20/2023  7:45 AM [facility] RAD ONC [facility]EL [facility]   12/20/2023  1:45 PM  [facility] RAD ONC [facility]EL [facility]   1/5/2024  2:45 PM Frank, Saray, OT [address]   1/9/2024  8:45 AM UMP RAD ONC YARIEL [facility]   1/9/2024  2:45 PM [facility] RAD ONC [facility]   1/10/2024  8:45 AM U[facility] RAD ONC Y[facility]EL [facility]   1/10/2024  2:45 PM UMP RAD ONC YARIEL [facility]   1/25/2024  9:30 AM [facility] LAB DRAW [facility]   1/25/2024 10:00 AM [email or web address] CCT [facility]   1/25/2024 11:45 AM Dominique Mueller MD [facility]         For any urgent concerns, please contact our 24 hour clinic line:   [place]: [phone]     Olivia Gates, RN, BSN  RN Care Coordinator  [facility]

## 2023-12-06 NOTE — PROGRESS NOTES
PRIMARY CARE CENTER     CC: Hospital discharge (AMA) follow up      HPI: Jonathan Bishop is a 78 year old male with PMHx of AS s/p TAVR in 2020, CAD, HTN, HLD, CVA, and squamous cell carcinoma of the oropharynx currently undergoing radiation treatments, presents today for hospital discharge follow up.    Per discharge summary, he was admitted from 11/21 to 12/2 after presenting with generalized weakness and fall with COVID-19 infection s/p Paxlovid. His hospitalization was complicated by MSSA bacteremia 1 of 2 blood cultures on 11/21/23 treated with IV cefazolin from 11/21/2022 until the time he left AMA 12/2/2023 with 11 days of antibiotics, short of the 14-day recommended by ID. He was not febrile during hospitalization.     On 12/2, [facility] patient had an RRT for unresponsiveness however recovered without residual. Neurology was consulted and thought that it was likely related to hypotensive episode/syncopal--less likely related to a seizure, and recommended no further neurodiagnostic work-up. However, patient became very adamant that he wanted to leave AMA and left on 12/2. Serum PRL came back elevated at 80 after the patient left AMA, which suggested possible seizure. MARK had tried to contact the patient last evening by phone, without answer and without ability to leave VM.  MARK contacted Neurology, Dr. Pope of results of prolactin that were not available at the time of her consult.  She stated to MARK that her office will call patient for follow-up appointment and evaluation.  With possibility of seizure patient should not drive until follow-up neurology appointment.         Past Medical History:   Diagnosis Date    * * * SBE PROPHYLAXIS * * *     s/p TAVR    Acute rheumatic carditis 1950    Complication of anesthesia     pt once woke up during back surgery    Coronary artery disease     murmur    Erectile dysfunction     Sildenafil    Hip pain,  bilateral     Hypercholesteremia     Hypertension     Low back pain     Nonsenile cataract     Obesity     Renal cyst     S/P TAVR (transcatheter aortic valve replacement) 2020    26mm Anton Tawana 3 valve.    Stroke (cerebrum) (H) 2016    Umbilical hernia 06/10/2011    s/p surgical repair    Wound, surgical, infected 06/10/2011    hernia     Past Surgical History:   Procedure Laterality Date    ARTHROPLASTY KNEE BILATERAL  2010    COLONOSCOPY N/A 2017    Procedure: COLONOSCOPY;  Surgeon: Toby Bills MD;  Location:  GI    CV CORONARY ANGIOGRAM N/A 10/28/2019    Procedure: Coronary Angiogram;  Surgeon: Guerrero Huitron MD;  Location:  HEART CARDIAC CATH LAB    CV RIGHT HEART CATH MEASUREMENTS RECORDED N/A 10/28/2019    Procedure: Right Heart Cath;  Surgeon: Guerrero Huitron MD;  Location:  HEART CARDIAC CATH LAB    CV TRANSCATHETER AORTIC VALVE REPLACEMENT N/A 2020    Procedure: Transcatheter Aortic Valve Replacement;  Surgeon: Camila Begum MD;  Location:  HEART CARDIAC CATH LAB    FUSION LUMBAR ANTERIOR TWO LEVELS      HERNIORRHAPHY UMBILICAL  6/10/2011    Procedure:HERNIORRHAPHY UMBILICAL; Open, with mesh placement; Surgeon:HO PARHAM; Location:[facility] OR    LAMINECTOMY LUMBAR TWO LEVELS      THORACOSCOPIC WEDGE RESECTION LUNG Right 3/2/2023    Procedure: Right thoracoscopic wedge resection;  Surgeon: Hebert Jones MD;  Location:  OR     Family History   Problem Relation Age of Onset    C.A.D. Mother     Unknown/Adopted Father     Heart Failure Sister     Heart Failure Sister     Glaucoma No family hx of     Macular Degeneration No family hx of     Diabetes No family hx of     Hypertension No family hx of      Social History     Tobacco Use    Smoking status: Former     Types: Cigarettes     Quit date: 2005     Years since quittin.9     Passive exposure: Past    Smokeless tobacco: Never    Tobacco comments:     Non smoker. No 2nd hand  "exposure. CCX, RMA [facility] 7/28/2011   Substance Use Topics    Alcohol use: No    Drug use: No     Current Outpatient Medications   Medication Sig Dispense Refill    aspirin (ASA) 81 MG chewable tablet 1 tablet (81 mg) by Oral or Feeding Tube route daily      atorvastatin (LIPITOR) 40 MG tablet Take 40 mg by mouth daily      ceFAZolin (ANCEF) 1 GM vial Inject 2 g into the vein every 8 hours for 14 days CBC with differential, creatinine, SGOT weekly while on this medication to be faxed to Dr. Moore office.      ibuprofen (ADVIL/MOTRIN) 600 MG tablet Take 1 tablet (600 mg) by mouth every 8 hours as needed for moderate pain (4-6) 270 tablet 3    multivitamin, therapeutic (THERA-VIT) TABS tablet Take 1 tablet by mouth daily      order for DME Equipment being ordered: Walker ()  Treatment Diagnosis: stroke 1 Units 0    oxyCODONE-acetaminophen (PERCOCET) 5-325 MG tablet Take 1 tablet by mouth every 6 hours as needed for pain 5 tablet 0    sodium chloride 100 mL with ceFAZolin 2 g Inject 2 g into the vein every 8 hours for 6 days      spironolactone (ALDACTONE) 50 MG tablet Take 1 tablet by mouth once daily 90 tablet 2    tamsulosin (FLOMAX) 0.4 MG capsule TAKE 1 CAPSULE BY MOUTH ONCE DAILY 90 capsule 3    torsemide (DEMADEX) 20 MG tablet Take 40 mg by mouth daily      vitamin D3 (CHOLECALCIFEROL) 50 mcg (2000 units) tablet Take 1 tablet by mouth daily        No Known Allergies      /73 (BP Location: Right arm, Patient Position: Sitting, Cuff Size: Adult Large)   Pulse 70   Temp 97.7  F (36.5  C) (Oral)   Ht 1.613 m (5' 3.5\")   Wt 99.3 kg (218 lb 14.4 oz)   SpO2 96%   BMI 38.17 kg/m      Physical Examination:    General Appearance: AOx3, no clubbing/cyanosis, no edema, no JVD  HEENT: PERRL, EOMI, no pharyngeal erythema  Respiratory: CTAB, no wheezing, no crackles  Cardiovascular: RRR, normal S1/S2, no murmur  GI: no distension, normoactive bowel sounds, soft, not tender, no rebound tenderness or guarding, no " hepatosplenomegaly  Genitourinary: no CVA tenderness  Skin: no rash  Musculoskeletal: no deformities, no joint swelling, no pitting edema bilaterally   Neurologic: CN grossly intact, no focal neurological deficits, no asterixis        Assessment and Plan:  Jonathan Bishop is a 78 year old male with PMHx of AS s/p TAVR in 2020, CAD, HTN, HLD, CVA, and squamous cell carcinoma of the oropharynx currently undergoing radiation treatments, presents today for hospital discharge follow up.    # MSSA bacteremia  Patient had MSSA bacteremia only in 1/2 blood cultures on 11/21/2023, but the subsequent blood cultures on 11/22 and 11/24 were negative. He had been on IV cefazolin for 11 days. So far, no fevers or chills. He is feeling very well. Unclear if he really had MSSA infection or it was contamination. Given that he has been doing very well for the past week off treatment, we will hold off on further antibiotics at this point.    # Possible seizure  Since discharge, patient never has any syncopal episodes or seizure. I told him that it would be a good idea to see Neurology but he refuses. I also told him that he should not be driving in the meantime, he verbalizes understanding but refuses to comply.    # Squamous cell carcinoma of the oropharynx  -  consult for setting up a ride for the patient for his next radiation therapy on 12/19/2023      This patient was discussed with Dr. Kemp, who agrees with the above exam, assessment and plan      Florentino Rodrigues MD  Internal Medicine Resident (PGY-3)  [facility]  Pager: [phone]

## 2023-12-07 ENCOUNTER — PATIENT OUTREACH (OUTPATIENT)
Dept: CARE COORDINATION | Facility: CLINIC | Age: 78
End: 2023-12-07

## 2023-12-07 ENCOUNTER — OFFICE VISIT (OUTPATIENT)
Dept: INTERNAL MEDICINE | Facility: CLINIC | Age: 78
End: 2023-12-07
Payer: COMMERCIAL

## 2023-12-07 VITALS
OXYGEN SATURATION: 96 % | WEIGHT: 218.9 LBS | SYSTOLIC BLOOD PRESSURE: 131 MMHG | DIASTOLIC BLOOD PRESSURE: 73 MMHG | BODY MASS INDEX: 37.37 KG/M2 | TEMPERATURE: 97.7 F | HEART RATE: 70 BPM | HEIGHT: 64 IN

## 2023-12-07 DIAGNOSIS — R78.81 GRAM-POSITIVE BACTEREMIA: ICD-10-CM

## 2023-12-07 DIAGNOSIS — C10.9 SQUAMOUS CELL CARCINOMA OF OROPHARYNX (H): Primary | ICD-10-CM

## 2023-12-07 DIAGNOSIS — M62.81 GENERALIZED MUSCLE WEAKNESS: ICD-10-CM

## 2023-12-07 PROCEDURE — 99214 OFFICE O/P EST MOD 30 MIN: CPT | Mod: GC
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
## 2023-12-07 NOTE — COMMUNITY RESOURCES LIST (ENGLISH)
12/07/2023   [facility]  N/A  For questions about this resource list or additional care needs, please contact your primary care clinic or care manager.  Phone: [phone]   Email: N/A   Address: [facility] [place] [address]   Hours: N/A        Transportation       Free or low-cost transportation  1  [facility] - Bus Passes - Free or low-cost transportation Distance: 9.96 miles      In-Person   [address] [place] [address]  Language: English  Hours: Tue 9:30 AM - 11:30 AM , Thu 9:30 AM - 11:30 AM  Fees: Free   Phone: (915) 861-2598 Email: info@[employer] Website: http://www.Ghost/     2  [employer] [facility] [place] Distance: 10.01 miles      In-Person   [address] [place] [address]  Language: English  Hours: Mon - Fri 9:00 AM - 12:00 PM , Mon - Fri 1:00 PM - 3:00 PM  Fees: Self Pay   Phone: (732) 826-7225 Email: info@[employer] Website: https://www.[facility] Hospital[facility].org/[place]     Transportation to medical appointments  3  [facility] Distance: 4.03 miles      In-Person   [address] [facility] [address] [place] [address]  Language: English  Hours: Mon - Fri 9:00 AM - 5:00 PM  Fees: Self Pay   Phone: (556) 584-9655 Email: sharita@[employer] Website: https://www.[employer]/     4  [place] [employer] Distance: 11.11 miles      In-Person   [address] [address]  Language: English  Hours: Mon - Sat 4:00 AM - 6:00 PM  Fees: Insurance, Self Pay   Phone: (773) 872-5095 Email: delighttransportation1@[employer] Website: https://helpmeconnect.web.[facility].us/HelpMeConnect/Providers/Delight_Transportation/Transportation/2?returnUrl=%2FHelpMeConnect%2FSearch%2FBasicNeeds%2FTransportation%2FTransportationServices%3Fstart%3D40          Important Numbers & Websites       Emergency Services   911  [facility] Services   311  Poison Control   (806) 751-9110  Suicide Prevention Lifeline   (149) 845-3244  (TALK)  Child Abuse Hotline   (646) 851-6775 (4-A-Child)  Sexual Assault Hotline   (860) 781-6705 (HOPE)  National Runaway Safeline   (713) 435-8347 (RUNAWAY)  All-Options Talkline   (727) 403-4221  Substance Abuse Referral   (320) 112-2279 (HELP)

## 2023-12-07 NOTE — COMMUNITY RESOURCES LIST (ENGLISH)
12/07/2023   [facility]  N/A  For questions about this resource list or additional care needs, please contact your primary care clinic or care manager.  Phone: [phone]   Email: N/A   Address: [facility] [place] [address]   Hours: N/A        Transportation       Free or low-cost transportation  1  [facility] [employer] [facility] - Free or low-cost transportation Distance: 9.96 miles      In-Person   [address] [place] [address]  Language: English  Hours: Tue 9:30 AM - 11:30 AM , Thu 9:30 AM - 11:30 AM  Fees: Free   Phone: (106) 264-6080 Email: info@[employer] Website: http://www.[employer]/     2  [facility] [facility] [place] Distance: 10.01 miles      In-Person   [address] [place] [address]  Language: English  Hours: Mon - Fri 9:00 AM - 12:00 PM , Mon - Fri 1:00 PM - 3:00 PM  Fees: Self Pay   Phone: (657) 630-8485 Email: info@[employer] Website: https://www.[facility].org/[place]     Transportation to medical appointments  3  [facility] Distance: 4.03 miles      In-Person   [address] [facility] [address] [place] [address]  Language: English  Hours: Mon - Fri 9:00 AM - 5:00 PM  Fees: Self Pay   Phone: (361) 232-8763 Email: sharita@[employer] Website: https://www.[employer]/     4  [place] [employer] Distance: 11.11 miles      In-Person   [address] [address]  Language: English  Hours: Mon - Sat 4:00 AM - 6:00 PM  Fees: Insurance, Self Pay   Phone: (543) 377-6057 Email: delighttransportation1@[employer] Website: https://helpmeconnect.web.[facility].us/HelpMeConnect/Providers/Delight_Transportation/Transportation/2?returnUrl=%2FHelpMeConnect%2FSearch%2FBasicNeeds%2FTransportation%2FTransportationServices%3Fstart%3D40          Important Numbers & Websites       Emergency Services   911  [facility] Services   311  Poison Control   (401) 589-4246  Suicide Prevention Lifeline   (682) 896-5674  (TALK)  Child Abuse Hotline   (534) 963-3488 (4-A-Child)  Sexual Assault Hotline   (738) 120-3604 (HOPE)  National Runaway Safeline   (604) 822-5516 (RUNAWAY)  All-Options Talkline   (433) 314-5982  Substance Abuse Referral   (653) 599-7846 (HELP)

## 2023-12-07 NOTE — PROGRESS NOTES
RN met with patient in clinic following patient's visit with Dr. Good on 12/7. Patient reports that he has a lot of questions in regards to transportation assistance, especially with his upcoming radiation appointments that are 2x per day. Patient states that he has called the two resources that were provided to him by oncology, and both places told him that they didn't have the funding to offer more rides. RN routed to TRAE Baker CC with Oncology to help patient with understanding/navigating of the radiation appts that patient has upcoming.     Patient reports that he has a [facility] . He states that her last name is Kayla, but that is all he knows. Pt does confirm that this is through [employer], which is also confirmed via [facility] that [facility] has AC for coverage. Patient did ask that RN call the [facility] to find out his CM information. RN called [facility] at [phone] to find out who patient's AC  is and to see what benefits patient has through the [facility] support.     Per [facility] SW, pt's Ashley Garza [phone]. RN called Ashley and [facility] for Ashley, direct number was left for return call inquiring about the [facility] services that patient has available and if patient has support for rides.     CHELSEA VIVEROS RN on 12/7/2023 at 10:47 AM

## 2023-12-12 NOTE — PROGRESS NOTES
RN received return call and spoke with Ashley. She states that patient has rides through help at your door for Tuesday, 12/19 for both radiation appointments. Per Ashley, homemaking is starting for patient, and PCA will be starting. She will also try to see if the PCA could help but unsure if this is in their scope of work.     Patient's ICHS services haven't started yet, both [facility] been trying to call patient to start the initial assessment but patient is not answering the phone and the VM box is full.     Patient also met with someone who is helping another resident in his building, and they mentioned they could also care for Conner. They don't know who the person is but they are working to figure it out.     Conner is still needing rides for 12/20, 1/9 and 1/10. Ashley had asked for help from social work, and she will update writer with any changes or updates that she has.     CHELSEA VIVEROS RN on 12/12/2023 at 12:43 PM

## 2023-12-12 NOTE — PROGRESS NOTES
RN received call from [facility] Work with [facility]. Per Ashley, patient was able to get a couple of rides approved. Ashley was inquiring if Conner had rides available through insurance. RN called Ashley back and [facility], inquired about [employer], the dates that rides are covered for, and confirmed that with Conner's Insurance plan, he does not have rides available. RN asked if there were any other options for safe rides to radiation, and if not, RN would send to MUSTAPHA Chaudhary with oncology to brainstorm further rides for radiation for patient.     CHELSEA VIVEROS RN on 12/12/2023 at 12:09 PM

## 2023-12-14 NOTE — PROGRESS NOTES
Per collaboration with SW, Earlene Hou, will help patient with rides for radiation for 12/19, 1/9, and 1/10. SW will also help with [employer] application as well.     SW inquiring on if [employer] can assist patient with future rides. RN called and LVM for SUNNI Ramirez CM at [phone] and asked if [employer] could assist patient in the future.     CHELSEA VIVEROS RN on 12/14/2023 at 4:15 PM

## 2023-12-15 ENCOUNTER — PATIENT OUTREACH (OUTPATIENT)
Dept: CARE COORDINATION | Facility: CLINIC | Age: 78
End: 2023-12-15
Payer: COMMERCIAL

## 2023-12-15 NOTE — PROGRESS NOTES
Social Work - Telephone/[employer] message  [facility]  Data:   Patient Name: Jonathan Bishop  Goes By: Conner    /Age: 1945 (78 year old)      Referral Source: care team    Reason for Referral: transportation    Intervention: Sent patient MyChart message on 12/15/2023 and Unable to leave message due to mail box being full .   Plan:  will await patient's return phone call/message and provide assistance at that time.      Earlene BAEZ, [facility]  - Oncology  Phone : [phone]  Pager: [phone]

## 2023-12-15 NOTE — PROGRESS NOTES
RN received return VM from Ashley Garza. [facility]- the support person went out yesterday and today to help him with any of this needs. Ashley is hoping the support person could be present on appointment days, cleaning, help with food at home. They are not sure if Conner is able to ask for rides, but he was informed that transportation is something they can help with. His ICLS provider was also notified of this as well.     RN will reach out to Ashley with any further concerns.     CHELSEA VIVEROS RN on 12/15/2023 at 1:17 PM

## 2023-12-18 ENCOUNTER — PATIENT OUTREACH (OUTPATIENT)
Dept: CARE COORDINATION | Facility: CLINIC | Age: 78
End: 2023-12-18
Payer: COMMERCIAL

## 2023-12-18 DIAGNOSIS — M54.50 ACUTE MIDLINE LOW BACK PAIN WITHOUT SCIATICA: ICD-10-CM

## 2023-12-18 RX ORDER — OXYCODONE AND ACETAMINOPHEN 5; 325 MG/1; MG/1
1 TABLET ORAL EVERY 6 HOURS PRN
Qty: 150 TABLET | Refills: 0 | Status: SHIPPED | OUTPATIENT
Start: 2023-12-23 | End: 2024-01-23

## 2023-12-18 NOTE — PROGRESS NOTES
Social Work - Telephone/[employer] message  [facility]  Data:   Patient Name: Jonathan Bishop  Goes By: Conner    /Age: 1945 (78 year old)      Referral Source: care team    Reason for Referral: transportation    Intervention: Unable to leave message due to voicemail box is full .   Plan:  will await patient's return phone call/message and provide assistance at that time.      Earlene BAEZ, [facility]  - Oncology  Phone : [phone]  Pager: [phone]

## 2023-12-18 NOTE — TELEPHONE ENCOUNTER
Health Call Center    Phone Message    May a detailed message be left on voicemail: yes     Reason for Call: Medication Refill Request    Has the patient contacted the pharmacy for the refill? Yes   Name of medication being requested: oxyCODONE-acetaminophen (PERCOCET) 5-325 MG tablet   Provider who prescribed the medication: Dr. Sick  Pharmacy:   [facility] [address] [place] [address] [facility]     Date medication is needed: 12/18/23

## 2023-12-18 NOTE — TELEPHONE ENCOUNTER
oxyCODONE-acetaminophen (PERCOCET) 5-325 MG tablet       Last Written Prescription Date:  12/2/23  Last Fill Quantity: 5,   # refills: 0  Last Office Visit : 12/7/23  Future Office visit:  0    Routing refill request to provider for review/approval because:   controlled substance

## 2023-12-19 ENCOUNTER — APPOINTMENT (OUTPATIENT)
Dept: RADIATION ONCOLOGY | Facility: CLINIC | Age: 78
End: 2023-12-19
Attending: RADIOLOGY
Payer: COMMERCIAL

## 2023-12-19 VITALS
BODY MASS INDEX: 38.19 KG/M2 | WEIGHT: 219 LBS | DIASTOLIC BLOOD PRESSURE: 84 MMHG | HEART RATE: 82 BPM | SYSTOLIC BLOOD PRESSURE: 134 MMHG

## 2023-12-19 DIAGNOSIS — C10.9 SQUAMOUS CELL CARCINOMA OF OROPHARYNX (H): Primary | ICD-10-CM

## 2023-12-19 PROCEDURE — 77386 HC IMRT TREATMENT DELIVERY, COMPLEX: CPT | Performed by: RADIOLOGY

## 2023-12-19 RX ORDER — LORAZEPAM 0.5 MG/1
0.5 TABLET ORAL ONCE
Status: DISCONTINUED | OUTPATIENT
Start: 2023-12-19 | End: 2023-12-19 | Stop reason: HOSPADM

## 2023-12-19 NOTE — LETTER
"    2023         RE: Jonathan Bishop  [address]  [facility] [address]        Dear Colleague,    Thank you for referring your patient, Jonathan Bishop, to the [facility] RADIATION ONCOLOGY. Please see a copy of my visit note below.    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 Bishop presents to clinic today for his weekly on-treatment visit.  He tolerated his first treatment well.  Due to treatment related anxiety, he did require lorazepam 0.5 mg prior to his radiation treatment.  Overall, he feels well and is recovering from his hospitalization for 2 weeks at [facility].  Nursing ROS:   Nutrition Alteration  Diet Type: Patient's Preference  Skin  Skin Reaction: 0 - No changes  Skin Progress: Aquaphor     ENT and Mouth Exam  Mucositis - Current: 0 - None   Cardiovascular  Respiratory effort: 1 - Normal - without distress  Gastrointestinal  Nausea: 0 - None    Pain Assessment  0-10 Pain Scale: 0    PEG Tube: No  Electronic Cardiac Implant: No     Objective:   /84   Pulse 82   Wt 99.3 kg (219 lb)   BMI 38.19 kg/m    Gen: Appears well, NAD  HEENT: No mucositis  Skin: No erythema    Laboratory:  Lab Results   Component Value Date    WBC 8.4 2023    HGB 9.9 (L) 2023    HCT 33.6 (L) 2023    MCV 82 2023     2023     Lab Results   Component Value Date     2023    POTASSIUM 2.8 (L) 2023    CHLORIDE 102 2023    CO2 32 (H) 2023    GLC " 134 (H) 12/02/2023     Lab Results   Component Value Date     (H) 11/21/2023    ALT 36 11/21/2023    ALKPHOS 109 11/21/2023    BILITOTAL 0.4 11/21/2023     Magnesium   Date Value Ref Range Status   12/09/2020 2.0 1.6 - 2.3 mg/dL Final       Treatment-related toxicities (CTCAE v5.0):  Anorexia: Grade 0: No toxicity  Fatigue: Grade 0: No toxicity  Nausea: Grade 0: No toxicity  Pain: Grade 1: Mild pain  Dry mouth: Grade 0: No toxicity  Dysphagia: Grade 0: No toxicity  Mucositis: Grade 0: No toxicity  Dermatitis: Grade 0: No toxicity    ED visits/Hospitalizations: None    Missed Treatments: None    Mosaiq chart and setup information reviewed  IGRT images reviewed    Assessment:    Mr. 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  He tolerated his first fraction well.    Plan:   1.  Continue radiation treatment as planned    Jenni Mills  Department of Radiation Oncology  [facility]    CC  Patient Care Team:  Buck Nascimento MD as PCP - General (Internal Medicine - Pediatrics)

## 2023-12-19 NOTE — PROGRESS NOTES
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 Bishop presents to clinic today for his weekly on-treatment visit.  He tolerated his first treatment well.  Due to treatment related anxiety, he did require lorazepam 0.5 mg prior to his radiation treatment.  Overall, he feels well and is recovering from his hospitalization for 2 weeks at [facility].  Nursing ROS:   Nutrition Alteration  Diet Type: Patient's Preference  Skin  Skin Reaction: 0 - No changes  Skin Progress: Aquaphor     ENT and Mouth Exam  Mucositis - Current: 0 - None   Cardiovascular  Respiratory effort: 1 - Normal - without distress  Gastrointestinal  Nausea: 0 - None    Pain Assessment  0-10 Pain Scale: 0    PEG Tube: No  Electronic Cardiac Implant: No     Objective:   /84   Pulse 82   Wt 99.3 kg (219 lb)   BMI 38.19 kg/m    Gen: Appears well, NAD  HEENT: No mucositis  Skin: No erythema    Laboratory:  Lab Results   Component Value Date    WBC 8.4 2023    HGB 9.9 (L) 2023    HCT 33.6 (L) 2023    MCV 82 2023     2023     Lab Results   Component Value Date     2023    POTASSIUM 2.8 (L) 2023    CHLORIDE 102 2023    CO2 32 (H) 2023     (H) 2023     Lab Results   Component Value Date     (H) 2023    ALT 36 2023    ALKPHOS 109 2023    BILITOTAL 0.4 2023     Magnesium   Date Value Ref Range Status   2020 2.0 1.6 - 2.3 mg/dL Final       Treatment-related " toxicities (CTCAE v5.0):  Anorexia: Grade 0: No toxicity  Fatigue: Grade 0: No toxicity  Nausea: Grade 0: No toxicity  Pain: Grade 1: Mild pain  Dry mouth: Grade 0: No toxicity  Dysphagia: Grade 0: No toxicity  Mucositis: Grade 0: No toxicity  Dermatitis: Grade 0: No toxicity    ED visits/Hospitalizations: None    Missed Treatments: None    Mosaiq chart and setup information reviewed  IGRT images reviewed    Assessment:    Mr. 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  He tolerated his first fraction well.    Plan:   1.  Continue radiation treatment as planned    Jenni Mills  Department of Radiation Oncology  [facility]  Patient Care Team:  Buck Nascimento MD as PCP - General (Internal Medicine - Pediatrics)  Pascale Clark MD as MD (Ophthalmology)  Mamadou Gary DPM as MD (Podiatry)  Buck Nascimento MD as MD (Internal Medicine)  Tristen Reed MD as MD (Otolaryngology)  Mamadou Gary DPM as Assigned Musculoskeletal Provider  Buck Nascimento MD as Assigned PCP  Maurice Kilgore OD (Optometry)  Buck Nascimento MD as Assigned Pain Medication Provider  Karishma Eng MD as MD (Otolaryngology)  Jenni Mills MD as MD (Radiation Oncology)  Yoseph Majano MD as MD (Hematology & Oncology)  Hebert Jones MD as MD (Cardiovascular & Thoracic Surgery)  Hebert Jones MD as MD (Cardiovascular & Thoracic Surgery)  Hebert Jones MD as Assigned Heart and Vascular Provider  Karishma Eng MD as Assigned Surgical Provider  Olivia Gates, RN as Specialty Care Coordinator  Dominique Mueller MD as Assigned Cancer Care Provider  Dominique Mueller MD as MD (Hematology & Oncology)  Tammy Elizabeth, TRAE as Lead Care Coordinator (Primary Care - CC)

## 2023-12-27 ENCOUNTER — ONCOLOGY VISIT (OUTPATIENT)
Dept: ONCOLOGY | Facility: CLINIC | Age: 78
End: 2023-12-27
Attending: INTERNAL MEDICINE
Payer: COMMERCIAL

## 2023-12-27 ENCOUNTER — APPOINTMENT (OUTPATIENT)
Dept: LAB | Facility: CLINIC | Age: 78
End: 2023-12-27
Attending: INTERNAL MEDICINE
Payer: COMMERCIAL

## 2023-12-27 VITALS
RESPIRATION RATE: 18 BRPM | HEART RATE: 83 BPM | BODY MASS INDEX: 37.14 KG/M2 | WEIGHT: 213 LBS | DIASTOLIC BLOOD PRESSURE: 73 MMHG | SYSTOLIC BLOOD PRESSURE: 160 MMHG | OXYGEN SATURATION: 100 % | TEMPERATURE: 97.5 F

## 2023-12-27 DIAGNOSIS — C10.9 SQUAMOUS CELL CARCINOMA OF OROPHARYNX (H): ICD-10-CM

## 2023-12-27 DIAGNOSIS — Z13.29 SCREENING FOR HYPOTHYROIDISM: ICD-10-CM

## 2023-12-27 DIAGNOSIS — E83.42 HYPOMAGNESEMIA: ICD-10-CM

## 2023-12-27 DIAGNOSIS — C09.9 TONSIL CANCER (H): ICD-10-CM

## 2023-12-27 DIAGNOSIS — R60.0 PERIPHERAL EDEMA: Primary | ICD-10-CM

## 2023-12-27 DIAGNOSIS — C78.00 MALIGNANT NEOPLASM METASTATIC TO LUNG, UNSPECIFIED LATERALITY (H): ICD-10-CM

## 2023-12-27 LAB
ALBUMIN SERPL BCG-MCNC: 3.7 G/DL (ref 3.5–5.2)
ALP SERPL-CCNC: 89 U/L (ref 40–150)
ALT SERPL W P-5'-P-CCNC: 12 U/L (ref 0–70)
ANION GAP SERPL CALCULATED.3IONS-SCNC: 13 MMOL/L (ref 7–15)
AST SERPL W P-5'-P-CCNC: 29 U/L (ref 0–45)
BASOPHILS # BLD AUTO: 0 10E3/UL (ref 0–0.2)
BASOPHILS NFR BLD AUTO: 0 %
BILIRUB SERPL-MCNC: 0.4 MG/DL
BUN SERPL-MCNC: 8.3 MG/DL (ref 8–23)
CALCIUM SERPL-MCNC: 9.4 MG/DL (ref 8.8–10.2)
CHLORIDE SERPL-SCNC: 106 MMOL/L (ref 98–107)
CREAT SERPL-MCNC: 0.61 MG/DL (ref 0.67–1.17)
DEPRECATED HCO3 PLAS-SCNC: 22 MMOL/L (ref 22–29)
EGFRCR SERPLBLD CKD-EPI 2021: >90 ML/MIN/1.73M2
EOSINOPHIL # BLD AUTO: 0.3 10E3/UL (ref 0–0.7)
EOSINOPHIL NFR BLD AUTO: 3 %
ERYTHROCYTE [DISTWIDTH] IN BLOOD BY AUTOMATED COUNT: 16.7 % (ref 10–15)
GLUCOSE SERPL-MCNC: 89 MG/DL (ref 70–99)
HCT VFR BLD AUTO: 32.7 % (ref 40–53)
HGB BLD-MCNC: 10 G/DL (ref 13.3–17.7)
IMM GRANULOCYTES # BLD: 0 10E3/UL
IMM GRANULOCYTES NFR BLD: 0 %
LYMPHOCYTES # BLD AUTO: 1.9 10E3/UL (ref 0.8–5.3)
LYMPHOCYTES NFR BLD AUTO: 25 %
MCH RBC QN AUTO: 24.4 PG (ref 26.5–33)
MCHC RBC AUTO-ENTMCNC: 30.6 G/DL (ref 31.5–36.5)
MCV RBC AUTO: 80 FL (ref 78–100)
MONOCYTES # BLD AUTO: 0.6 10E3/UL (ref 0–1.3)
MONOCYTES NFR BLD AUTO: 8 %
NEUTROPHILS # BLD AUTO: 5 10E3/UL (ref 1.6–8.3)
NEUTROPHILS NFR BLD AUTO: 64 %
NRBC # BLD AUTO: 0 10E3/UL
NRBC BLD AUTO-RTO: 0 /100
PLATELET # BLD AUTO: 211 10E3/UL (ref 150–450)
POTASSIUM SERPL-SCNC: 3.6 MMOL/L (ref 3.4–5.3)
PROT SERPL-MCNC: 7.1 G/DL (ref 6.4–8.3)
RBC # BLD AUTO: 4.09 10E6/UL (ref 4.4–5.9)
SODIUM SERPL-SCNC: 141 MMOL/L (ref 135–145)
T4 FREE SERPL-MCNC: 1.47 NG/DL (ref 0.9–1.7)
TSH SERPL DL<=0.005 MIU/L-ACNC: 0.54 UIU/ML (ref 0.3–4.2)
WBC # BLD AUTO: 7.9 10E3/UL (ref 4–11)

## 2023-12-27 PROCEDURE — 85025 COMPLETE CBC W/AUTO DIFF WBC: CPT | Performed by: INTERNAL MEDICINE

## 2023-12-27 PROCEDURE — G0463 HOSPITAL OUTPT CLINIC VISIT: HCPCS | Performed by: INTERNAL MEDICINE

## 2023-12-27 PROCEDURE — 84439 ASSAY OF FREE THYROXINE: CPT | Performed by: INTERNAL MEDICINE

## 2023-12-27 PROCEDURE — 99215 OFFICE O/P EST HI 40 MIN: CPT | Performed by: INTERNAL MEDICINE

## 2023-12-27 PROCEDURE — 82947 ASSAY GLUCOSE BLOOD QUANT: CPT | Performed by: INTERNAL MEDICINE

## 2023-12-27 PROCEDURE — 84443 ASSAY THYROID STIM HORMONE: CPT | Performed by: INTERNAL MEDICINE

## 2023-12-27 PROCEDURE — 36415 COLL VENOUS BLD VENIPUNCTURE: CPT | Performed by: INTERNAL MEDICINE

## 2023-12-27 PROCEDURE — 82374 ASSAY BLOOD CARBON DIOXIDE: CPT | Performed by: INTERNAL MEDICINE

## 2023-12-27 PROCEDURE — 82310 ASSAY OF CALCIUM: CPT | Performed by: INTERNAL MEDICINE

## 2023-12-27 RX ORDER — TORSEMIDE 20 MG/1
40 TABLET ORAL DAILY
Qty: 60 TABLET | Refills: 3 | Status: SHIPPED | OUTPATIENT
Start: 2023-12-27 | End: 2024-07-24

## 2023-12-27 ASSESSMENT — PAIN SCALES - GENERAL: PAINLEVEL: NO PAIN (0)

## 2023-12-27 NOTE — NURSING NOTE
"Oncology Rooming Note    December 27, 2023 1:42 PM   Jonathan Bishop is a 78 year old male who presents for:    Chief Complaint   Patient presents with    Blood Draw     Labs drawn with  by RN. Vitals taken. Pt checked into next appointment.      Oncology Clinic Visit     Malignant neoplasm metastatic to lung, unspecified laterality     Initial Vitals: BP (!) 160/73 (BP Location: Right arm, Patient Position: Sitting, Cuff Size: Adult Regular)   Pulse 83   Temp 97.5  F (36.4  C) (Oral)   Resp 18   Wt 96.6 kg (213 lb)   SpO2 100%   BMI 37.14 kg/m   Estimated body mass index is 37.14 kg/m  as calculated from the following:    Height as of 12/7/23: 1.613 m (5' 3.5\").    Weight as of this encounter: 96.6 kg (213 lb). Body surface area is 2.08 meters squared.  No Pain (0) Comment: Data Unavailable   No LMP for male patient.  Allergies reviewed: Yes  Medications reviewed: Yes    Medications: MEDICATION REFILLS NEEDED TODAY. Provider was notified.  Pharmacy name entered into [facility]: [id number] - [place], [place] - [address] [facility]    Frailty Screening:   Is the patient here for a new oncology consult visit in cancer care? 2. No      Clinical concerns: needs refill for lasix pill.     Would like to know if he could be referred to an eye doctor.     Would like to know plan for future treatment.     Shashi Miller"

## 2023-12-27 NOTE — LETTER
12/27/2023         RE: Jonathan Bishop  [address]  [facility] [address]        Dear Colleague,    Thank you for referring your patient, Jonathan Bishop, to the [facility]. Please see a copy of my visit note below.       [facility]    PATIENT NAME: Jonathan Bishop  MRN # [id number]   DATE OF VISIT: December 27, 2023  YOB: 1945     Otolaryngology: Dr. Karishma Eng  Radiation Oncology: Dr. Jenni Mills  PCP: Dr. Buck Nascimento    CANCER TYPE: SCC L tonsil, p16 +lety  STAGE: [id number] (IVC)  ECOG PS: 1    PD-L1: TPS 20%, CPS 25% on [id number] tonsil bx  NGS: N/A    SUMMARY  2/26/23 L tonsil mass bx in clinic (Dr. Eng).   2/20/23 PET/CT. 3.7 x 4.0 x 5.1 cm mass L palatine tonsil causing narrowing of the oropharyngeal lumen, L level 2 node, L retropharyngeal nodular density, extension of primary mass vs retropharyngeal node, 0.8 cm RLL nodule concerning for met, mild focal uptake R iliac bone and L1 without CT correlate suspicious for mets.   3/2/23 R VATS, wedge resection. Path: SCC, poorly differentiated, associated with necrosis, 1 cm, negative margins, p16 +lety  3/24/23 MRI L spine and pelvis. Diffusely heterogeneous marrow signal throughout without corresponding abnormal signal on sagittal STIR sequence and no abnormal enhancement. Nonspecific but could be benign process such as red marrow hyperplasia, cannot completely exclude metastatic disease or infiltrative pathologic marrow process. No lesions corresponding to FDG avid spots on PET/CT.   4/19~10/18/23 Pembrolizumab.  10/26/23 CT neck and CAP. Increased L palatine tonsil mass, 3.8 x 2.7 cm --> 4.7 x 3.5 cm. Increased bilateral cervical nodes up to 2.3 x 2.4 cm R level 2 node. New 3 mm RML nodule, no other mets.   11/14/23 PET. 4.6 x 3.3 cm L palatine tonsil mass (SUB 26.8), R level 2A and 2A/3 nodes. Questionable uptake distal R femoral medullary cavity, partially imaged, with  questionable subtle corresponding soft tissue attenuation on CT. MRI could be obtained to better evaluate.  11/21~12/2/23 [facility] for weakness/fall. COVID/paxlovid, MSSA bacteremia (1 of 2 bottles on 1 day), TTE negative, treated with cefazolin but didn't complete 2 week course, episode of unresponsiveness 12/2. Dispo plan was TCU on [facility], but left AMA. CTA chest negative for PE, showed grossly unchanged cervical adenopathy. Brain MRI 11/22 for stroke code negative for infarct. Showed SVID, moderate atrophy, suboptimal contrast bolus to examine intracranial structures, 4.5 x 3 x 4.5 cm L nasopharyngeal mass, incompletely visualized. CTA negative.   12/19/23 Quad shot fraction #1. No-showed thereafter.     ASSESSMENT AND PLAN  SCC L tonsil, p16 +lety, CPS 25%/TPS 20%, oligometastatic lung met, +/- bone mets: Unfortunately couldn't tolerate afternoon radiation session, says he couldn't breathe, and so was stopped. Discussed options. One is to try radiation again with a little bit more lorazepam or diazepam. He would absolutely need a  for this, to which he agrees. Dr. Mills would have to agree. Could try to make the mask more tolerable but explained to Conner that these strategies often don't get him very far. Discussed difference between curative intent and palliative intent treatment approaches. He is wanting to try radiation again. The other option is systemic therapy. We would try weekly carboplatin paclitaxel to make sure he tolerated it. Briefly discussed potential side effects. Will get in touch with Dr. Mills, see what she thinks, and go from there.    Surrogate decision maker: Discussed this in the context of unresponsive episode during his hosptialization. He would like to remove Sandie from his authorized contact list, and add Mariann, his sister. He would want us to talk with Mariann if he could not speak for himself.     Anorexia: Mostly driven by dysgeusia. Might still be from paxlovid vs  covid.   Monitor closely.   H/o CVA: Residual L weakness. Uses cane and walker at baseline, but independent and functional, albeit frail. He doesn't qualify for home care, but he would if he becomes homebound. He seems close to his baseline prior to the hospitalization, which is still a little worse than when he had his PCA.    LE edema; Not able to get compression stockings on due to his HHA now being gone. Knows to elevate and use moisturizers. But overall a little better than before. Takes torsemide 40 mg po daily prn - he asked for a refill. He does not remember exactly whether he's taking spironolactone. Is not taking KCl. Will ask for med management and clarify this, also make sure he has a good strategy in place to take meds.     COVID: Feels recovered.     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 Not discussed today    Microcytic anemia: Iron studies 8/2022 fairly normal, normal 4/18/23. Recheck iron studies if MCV drops further.    Peripheral neuropathy: MRI shows a lot of foramenal stenosis and spinal canal stenosis, so more likely related to that than DM2. Monitor for worsening, none today    40 minutes spent by me on the date of the encounter doing chart review, history and exam, documentation and further activities per the note     Dominique Mueller MD  Associate Professor of Medicine  Hematology, Oncology and Transplantation      SUBJECTIVE  Mr. Bishop returns for follow up. Met with Dr. Mills since our last visit, recommended quad shot approach. Unfortunately, he was hospitalized with covid, etc., - see above - and then couldn't tolerate the second fraction of radiation in the afternoon, although the morning fraction went fine. So radiation was stopped.   Feeling frail and weaker than earlier this year when he had his PCA, but better since leaving the hospital. No falls or close calls. Getting groceries from the food shelf. Place is a mess - his PCA helped with cleaning before.   Says he  has no appetite because food isn't tasting good at all. Started with covid but persisting despite being done with paxlovid for 2 weeks now.   But feels safe and feels like he's getting around ok   No pain  Leg swelling isn't too bad right now  Feels recovered from covid  Breathing ok    PAST MEDICAL HISTORY  SCC as above  Chronic LBP  TAVR 2020  H/o rheumatic carditis 1950  CHF. 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 Nocturia once nightly   ED  Cataracts  Umbilical hernia repair 2011  Knee arthroplasty B 2010  Lumbar spine fusion, laminectomy, sciatic pain R > L  Peripheral neuropathy - from pinched nerves?  Hearing loss    Numbness/tingling in both feet - bilaterally, symmetric, R spasms more than left    CURRENT OUTPATIENT MEDICATIONS  Reviewed    ALLERGIES  No Known Allergies     PHYSICAL EXAM  BP (!) 160/73 (BP Location: Right arm, Patient Position: Sitting, Cuff Size: Adult Regular)   Pulse 83   Temp 97.5  F (36.4  C) (Oral)   Resp 18   Wt 96.6 kg (213 lb)   SpO2 100%   BMI 37.14 kg/m      GEN: NAD  HEENT: EOMI, no icterus, injection or pallor. Oropharynx is clear, no thrush  EXT: 1+ edema bilaterally  NEURO: alert    LABORATORY AND IMAGING STUDIES    Labs were independently reviewed and interpreted by me    I personally reviewed and interpreted the MRI brain 11/22 and the CTA 11/21/23           Dominique Mueller MD

## 2023-12-27 NOTE — NURSING NOTE
Chief Complaint   Patient presents with    Blood Draw     Labs drawn with  by RN. Vitals taken. Pt checked into next appointment.       Emani Maynard RN

## 2023-12-27 NOTE — PROGRESS NOTES
[facility]CER [facility]    PATIENT NAME: Jonathan Bishop  MRN # [id number]   DATE OF VISIT: December 27, 2023  YOB: 1945     Otolaryngology: Dr. Karishma Eng  Radiation Oncology: Dr. Jenni Mlils  PCP: Dr. Buck Nascimento    CANCER TYPE: SCC L tonsil, p16 +lety  STAGE: [id number] (IVC)  ECOG PS: 1    PD-L1: TPS 20%, CPS 25% on [id number] tonsil bx  NGS: N/A    SUMMARY  2/26/23 L tonsil mass bx in clinic (Dr. Eng).   2/20/23 PET/CT. 3.7 x 4.0 x 5.1 cm mass L palatine tonsil causing narrowing of the oropharyngeal lumen, L level 2 node, L retropharyngeal nodular density, extension of primary mass vs retropharyngeal node, 0.8 cm RLL nodule concerning for met, mild focal uptake R iliac bone and L1 without CT correlate suspicious for mets.   3/2/23 R VATS, wedge resection. Path: SCC, poorly differentiated, associated with necrosis, 1 cm, negative margins, p16 +lety  3/24/23 MRI L spine and pelvis. Diffusely heterogeneous marrow signal throughout without corresponding abnormal signal on sagittal STIR sequence and no abnormal enhancement. Nonspecific but could be benign process such as red marrow hyperplasia, cannot completely exclude metastatic disease or infiltrative pathologic marrow process. No lesions corresponding to FDG avid spots on PET/CT.   4/19~10/18/23 Pembrolizumab.  10/26/23 CT neck and CAP. Increased L palatine tonsil mass, 3.8 x 2.7 cm --> 4.7 x 3.5 cm. Increased bilateral cervical nodes up to 2.3 x 2.4 cm R level 2 node. New 3 mm RML nodule, no other mets.   11/14/23 PET. 4.6 x 3.3 cm L palatine tonsil mass (SUB 26.8), R level 2A and 2A/3 nodes. Questionable uptake distal R femoral medullary cavity, partially imaged, with questionable subtle corresponding soft tissue attenuation on CT. MRI could be obtained to better evaluate.  11/21~12/2/23 [facility] for weakness/fall. COVID/paxlovid, MSSA bacteremia (1 of 2 bottles on 1 day), TTE negative, treated with cefazolin but didn't complete 2  week course, episode of unresponsiveness 12/2. Dispo plan was TCU on [facility], but left AMA. CTA chest negative for PE, showed grossly unchanged cervical adenopathy. Brain MRI 11/22 for stroke code negative for infarct. Showed SVID, moderate atrophy, suboptimal contrast bolus to examine intracranial structures, 4.5 x 3 x 4.5 cm L nasopharyngeal mass, incompletely visualized. CTA negative.   12/19/23 Quad shot fraction #1. No-showed thereafter.     ASSESSMENT AND PLAN  SCC L tonsil, p16 +lety, CPS 25%/TPS 20%, oligometastatic lung met, +/- bone mets: Unfortunately couldn't tolerate afternoon radiation session, says he couldn't breathe, and so was stopped. Discussed options. One is to try radiation again with a little bit more lorazepam or diazepam. He would absolutely need a  for this, to which he agrees. Dr. Mills would have to agree. Could try to make the mask more tolerable but explained to Conner that these strategies often don't get him very far. Discussed difference between curative intent and palliative intent treatment approaches. He is wanting to try radiation again. The other option is systemic therapy. We would try weekly carboplatin paclitaxel to make sure he tolerated it. Briefly discussed potential side effects. Will get in touch with Dr. Mills, see what she thinks, and go from there.    Surrogate decision maker: Discussed this in the context of unresponsive episode during his hosptialization. He would like to remove Sandie from his authorized contact list, and add Mariann, his sister. He would want us to talk with Mariann if he could not speak for himself.     Anorexia: Mostly driven by dysgeusia. Might still be from paxlovid vs covid.   Monitor closely.   H/o CVA: Residual L weakness. Uses cane and walker at baseline, but independent and functional, albeit frail. He doesn't qualify for home care, but he would if he becomes homebound. He seems close to his baseline prior to the hospitalization,  which is still a little worse than when he had his PCA.    LE edema; Not able to get compression stockings on due to his HHA now being gone. Knows to elevate and use moisturizers. But overall a little better than before. Takes torsemide 40 mg po daily prn - he asked for a refill. He does not remember exactly whether he's taking spironolactone. Is not taking KCl. Will ask for med management and clarify this, also make sure he has a good strategy in place to take meds.     COVID: Feels recovered.     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 Not discussed today    Microcytic anemia: Iron studies 8/2022 fairly normal, normal 4/18/23. Recheck iron studies if MCV drops further.    Peripheral neuropathy: MRI shows a lot of foramenal stenosis and spinal canal stenosis, so more likely related to that than DM2. Monitor for worsening, none today    40 minutes spent by me on the date of the encounter doing chart review, history and exam, documentation and further activities per the note     Dominique Mueller MD  Associate Professor of Medicine  Hematology, Oncology and Transplantation      SUBJECTIVE  Mr. Bishop returns for follow up. Met with Dr. Mills since our last visit, recommended quad shot approach. Unfortunately, he was hospitalized with covid, etc., - see above - and then couldn't tolerate the second fraction of radiation in the afternoon, although the morning fraction went fine. So radiation was stopped.   Feeling frail and weaker than earlier this year when he had his PCA, but better since leaving the hospital. No falls or close calls. Getting groceries from the food shelf. Place is a mess - his PCA helped with cleaning before.   Says he has no appetite because food isn't tasting good at all. Started with covid but persisting despite being done with paxlovid for 2 weeks now.   But feels safe and feels like he's getting around ok   No pain  Leg swelling isn't too bad right now  Feels recovered from  covid  Breathing ok    PAST MEDICAL HISTORY  SCC as above  Chronic LBP  TAVR 2020  H/o rheumatic carditis 1950  CHF. 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 Nocturia once nightly   ED  Cataracts  Umbilical hernia repair 2011  Knee arthroplasty B 2010  Lumbar spine fusion, laminectomy, sciatic pain R > L  Peripheral neuropathy - from pinched nerves?  Hearing loss    Numbness/tingling in both feet - bilaterally, symmetric, R spasms more than left    CURRENT OUTPATIENT MEDICATIONS  Reviewed    ALLERGIES  No Known Allergies     PHYSICAL EXAM  BP (!) 160/73 (BP Location: Right arm, Patient Position: Sitting, Cuff Size: Adult Regular)   Pulse 83   Temp 97.5  F (36.4  C) (Oral)   Resp 18   Wt 96.6 kg (213 lb)   SpO2 100%   BMI 37.14 kg/m      GEN: NAD  HEENT: EOMI, no icterus, injection or pallor. Oropharynx is clear, no thrush  EXT: 1+ edema bilaterally  NEURO: alert    LABORATORY AND IMAGING STUDIES    Labs were independently reviewed and interpreted by me    I personally reviewed and interpreted the MRI brain 11/22 and the CTA 11/21/23

## 2024-01-02 ENCOUNTER — TELEPHONE (OUTPATIENT)
Dept: ONCOLOGY | Facility: CLINIC | Age: 79
End: 2024-01-02
Payer: COMMERCIAL

## 2024-01-02 DIAGNOSIS — C09.9 TONSIL CANCER (H): ICD-10-CM

## 2024-01-02 DIAGNOSIS — C10.9 SQUAMOUS CELL CARCINOMA OF OROPHARYNX (H): ICD-10-CM

## 2024-01-02 DIAGNOSIS — Z13.29 SCREENING FOR HYPOTHYROIDISM: ICD-10-CM

## 2024-01-02 DIAGNOSIS — C78.00 MALIGNANT NEOPLASM METASTATIC TO LUNG, UNSPECIFIED LATERALITY (H): Primary | ICD-10-CM

## 2024-01-02 RX ORDER — DIPHENHYDRAMINE HYDROCHLORIDE 50 MG/ML
50 INJECTION INTRAMUSCULAR; INTRAVENOUS
Status: CANCELLED
Start: 2024-01-10

## 2024-01-02 RX ORDER — MEPERIDINE HYDROCHLORIDE 25 MG/ML
25 INJECTION INTRAMUSCULAR; INTRAVENOUS; SUBCUTANEOUS EVERY 30 MIN PRN
Status: CANCELLED | OUTPATIENT
Start: 2024-01-10

## 2024-01-02 RX ORDER — LORAZEPAM 2 MG/ML
0.5 INJECTION INTRAMUSCULAR EVERY 4 HOURS PRN
Status: CANCELLED | OUTPATIENT
Start: 2024-01-10

## 2024-01-02 RX ORDER — ALBUTEROL SULFATE 0.83 MG/ML
2.5 SOLUTION RESPIRATORY (INHALATION)
Status: CANCELLED | OUTPATIENT
Start: 2024-01-10

## 2024-01-02 RX ORDER — HEPARIN SODIUM (PORCINE) LOCK FLUSH IV SOLN 100 UNIT/ML 100 UNIT/ML
5 SOLUTION INTRAVENOUS
Status: CANCELLED | OUTPATIENT
Start: 2024-01-10

## 2024-01-02 RX ORDER — EPINEPHRINE 1 MG/ML
0.3 INJECTION, SOLUTION INTRAMUSCULAR; SUBCUTANEOUS EVERY 5 MIN PRN
Status: CANCELLED | OUTPATIENT
Start: 2024-01-10

## 2024-01-02 RX ORDER — METHYLPREDNISOLONE SODIUM SUCCINATE 125 MG/2ML
125 INJECTION, POWDER, LYOPHILIZED, FOR SOLUTION INTRAMUSCULAR; INTRAVENOUS
Status: CANCELLED
Start: 2024-01-10

## 2024-01-02 RX ORDER — HEPARIN SODIUM,PORCINE 10 UNIT/ML
5-20 VIAL (ML) INTRAVENOUS DAILY PRN
Status: CANCELLED | OUTPATIENT
Start: 2024-01-10

## 2024-01-02 RX ORDER — ALBUTEROL SULFATE 90 UG/1
1-2 AEROSOL, METERED RESPIRATORY (INHALATION)
Status: CANCELLED
Start: 2024-01-10

## 2024-01-02 RX ORDER — DIPHENHYDRAMINE HCL 25 MG
50 CAPSULE ORAL ONCE
Status: CANCELLED
Start: 2024-01-10

## 2024-01-02 NOTE — TELEPHONE ENCOUNTER
Called Conner to follow up on our visit last week    Discussed with Dr. Mills - prefers systemic therapy rather than trying to resume radiation with all of its difficulties and low likelihood of successfully getting through it    Discussed with Conner weekly carbo paclitaxel as we had discussed at our visit last week. Briefly reviewed potential side effects    With dex, might need additional help from PCP managing BGs     Prefers early AM appointments     Asked him to clear voicemail box - see Earlene Hou's message from 12/18. Conner asked about rides today    He's on board with this plan. Would do about 6-8 weeks of treatment and restaging with CT.     If he does really well with the weekly dosing after a few weeks, options are q3 week carbo and weekly paclitaxel or q3 week dosing of both.     Last CT neck was more or less 11/22/23. Will take this into consideration when interpreting next CT.     Dominique Mueller MD

## 2024-01-03 ENCOUNTER — TELEPHONE (OUTPATIENT)
Dept: ONCOLOGY | Facility: CLINIC | Age: 79
End: 2024-01-03
Payer: COMMERCIAL

## 2024-01-03 ENCOUNTER — PATIENT OUTREACH (OUTPATIENT)
Dept: CARE COORDINATION | Facility: CLINIC | Age: 79
End: 2024-01-03
Payer: COMMERCIAL

## 2024-01-03 NOTE — PROGRESS NOTES
Social Work - Follow-Up  [facility]    Data/Intervention:    Patient Name: Jonathan Bishop Goes By: Conner    /Age: 1945 (78 year old)    Reason for Follow-Up:  transportation    Collaborated With:    -patient  -    Intervention/Education/Resources Provided:  SW spoke with patient about transportation resources available to them and reviewed with patient how to use each resource.      sent a my chart message with contact information and instructions for using each resource  per patient request.  [facility] transportation  Help at your door    Assessment/Plan:  Patient to use resources above and will reach out to  if assistance is needed.     Previously provided patient/family with writer's contact information and availability.      Earlene BAEZ, [facility] Hospital[facility]  - Oncology  Phone : [phone]  Pager: [phone]

## 2024-01-03 NOTE — TELEPHONE ENCOUNTER
MTM referral from: [facility] visit (referral by provider)    MTM referral outreach attempt #2 on January 3, 2024 at 9:35 AM      Outcome: Patient is not interested at this time because he doesn't want to waste time on something like this, will route to MTM Pharmacist/Provider as an FYI. Thank you for the referral.     Use bcbs part d map for the carrier/Plan on the flowsheet      Joanie Gamboa - [facility]

## 2024-01-04 NOTE — TELEPHONE ENCOUNTER
[facility]: Cancer Care                                                                                      Reached out to patient to discuss, but could not connect.  Will try again tomorrow.    Ilsa Page, RN, BSN  Oncology RN Care Coordinator  [facility] Cancer [facility]

## 2024-01-05 ENCOUNTER — TELEPHONE (OUTPATIENT)
Dept: INTERNAL MEDICINE | Facility: CLINIC | Age: 79
End: 2024-01-05

## 2024-01-05 DIAGNOSIS — Z01.00 EXAMINATION OF EYES AND VISION: Primary | ICD-10-CM

## 2024-01-05 DIAGNOSIS — H26.9 CATARACTS, BILATERAL: ICD-10-CM

## 2024-01-05 NOTE — TELEPHONE ENCOUNTER
I called patient and he agreed to visit but wanted to do it right now or tomorrow. I told him our 1st available would be Tuesday for a virtual visit but then wants to do in person. Vlad is not In clinic Tuesday but able to do phone visit- pt states video visits don't work on his computer. He said he just needs to know what would interact with his infusion and to let him know. I was not able to get him scheduled.     Forwarding to Vlad Calhoun for help with this patient     Joanie Gamboa - [facility]

## 2024-01-05 NOTE — TELEPHONE ENCOUNTER
[facility]: Cancer Care                                                                                      This writer spoke with Conner today and explained the importance of knowing exactly what medications he takes, dose, frequency, etc.  Emphasized the importance of the need for this going into treatment.  Conner is willing to meet with the MTM team to review medications.  Advised him someone from the MTM team would reach back out.    Ilsa Page, RN, BSN  Oncology RN Care Coordinator  [facility] Cancer [facility]

## 2024-01-05 NOTE — TELEPHONE ENCOUNTER
Patient last seen eye clinic in 2022.  Since it has been over 1 year, he would need a new referral for eye exam.  Hx of bilateral cataract.    New eye referral pended to provider.      Michael Weldon CMA ([facility]) at 1:28 PM on 1/5/2024

## 2024-01-05 NOTE — TELEPHONE ENCOUNTER
[facility] Call Center    Phone Message    May a detailed message be left on voicemail: yes     Reason for Call: Other: Patient would like a referral to ophthalmology (Eye)     Action Taken: Other: pcc    Travel Screening: Not Applicable

## 2024-01-09 ENCOUNTER — PATIENT OUTREACH (OUTPATIENT)
Dept: CARE COORDINATION | Facility: CLINIC | Age: 79
End: 2024-01-09
Payer: COMMERCIAL

## 2024-01-09 RX ORDER — PROCHLORPERAZINE MALEATE 10 MG
5 TABLET ORAL EVERY 6 HOURS PRN
Qty: 30 TABLET | Refills: 2 | Status: SHIPPED | OUTPATIENT
Start: 2024-01-09

## 2024-01-09 RX ORDER — ONDANSETRON 8 MG/1
8 TABLET, FILM COATED ORAL EVERY 8 HOURS PRN
Qty: 30 TABLET | Refills: 11 | Status: SHIPPED | OUTPATIENT
Start: 2024-01-09

## 2024-01-09 NOTE — PROGRESS NOTES
Clinic Care Coordination Contact  [facility]    Clinical Data: Care Coordinator Outreach    Outreach Documentation Number of Outreach Attempt   1/9/2024  12:13 PM 1       RN unable to LVM as pt's VM box is full.     Plan: Care Coordinator will try to reach patient again in 1-2 business days.    CHELSEA VIVEROS, RN on 1/9/2024 at 12:14 PM

## 2024-01-09 NOTE — LETTER
PRIMARY CARE CARE COORDINATION  [facility]  [address] [place] [address]    January 11, 2024    Jonathan Bishop  [address]   [place] [address]      Dear Jonathan,    I have been unsuccessful in reaching you since our last contact. At this time the Care Coordination team will make no further attempts to reach you, however this does not change your ability to continue receiving care from your providers at your primary care clinic. If you need additional support from a care coordinator in the future please call [phone].    All of us at [facility] are invested in your health and are here to assist you in meeting your goals.     Sincerely,    TRAE Munoz Care Manager  Primary Care Clinic   Phone: [phone]  Fax: [phone]

## 2024-01-10 ENCOUNTER — ONCOLOGY VISIT (OUTPATIENT)
Dept: ONCOLOGY | Facility: CLINIC | Age: 79
End: 2024-01-10
Attending: NURSE PRACTITIONER
Payer: COMMERCIAL

## 2024-01-10 ENCOUNTER — APPOINTMENT (OUTPATIENT)
Dept: LAB | Facility: CLINIC | Age: 79
End: 2024-01-10
Attending: NURSE PRACTITIONER
Payer: COMMERCIAL

## 2024-01-10 VITALS
OXYGEN SATURATION: 97 % | HEART RATE: 89 BPM | BODY MASS INDEX: 36.63 KG/M2 | RESPIRATION RATE: 18 BRPM | WEIGHT: 210.1 LBS | TEMPERATURE: 98.4 F | DIASTOLIC BLOOD PRESSURE: 53 MMHG | SYSTOLIC BLOOD PRESSURE: 127 MMHG

## 2024-01-10 DIAGNOSIS — C10.9 SQUAMOUS CELL CARCINOMA OF OROPHARYNX (H): Primary | ICD-10-CM

## 2024-01-10 DIAGNOSIS — C78.00 MALIGNANT NEOPLASM METASTATIC TO LUNG, UNSPECIFIED LATERALITY (H): Primary | ICD-10-CM

## 2024-01-10 DIAGNOSIS — C09.9 TONSIL CANCER (H): ICD-10-CM

## 2024-01-10 DIAGNOSIS — E83.42 HYPOMAGNESEMIA: ICD-10-CM

## 2024-01-10 DIAGNOSIS — C78.00 MALIGNANT NEOPLASM METASTATIC TO LUNG, UNSPECIFIED LATERALITY (H): ICD-10-CM

## 2024-01-10 DIAGNOSIS — C10.9 SQUAMOUS CELL CARCINOMA OF OROPHARYNX (H): ICD-10-CM

## 2024-01-10 LAB
ALBUMIN SERPL BCG-MCNC: 3.8 G/DL (ref 3.5–5.2)
ALP SERPL-CCNC: 82 U/L (ref 40–150)
ALT SERPL W P-5'-P-CCNC: 9 U/L (ref 0–70)
ANION GAP SERPL CALCULATED.3IONS-SCNC: 10 MMOL/L (ref 7–15)
AST SERPL W P-5'-P-CCNC: 25 U/L (ref 0–45)
BASOPHILS # BLD AUTO: 0 10E3/UL (ref 0–0.2)
BASOPHILS NFR BLD AUTO: 1 %
BILIRUB SERPL-MCNC: 0.4 MG/DL
BUN SERPL-MCNC: 11.8 MG/DL (ref 8–23)
CALCIUM SERPL-MCNC: 9 MG/DL (ref 8.8–10.2)
CHLORIDE SERPL-SCNC: 103 MMOL/L (ref 98–107)
CREAT SERPL-MCNC: 0.74 MG/DL (ref 0.67–1.17)
DEPRECATED HCO3 PLAS-SCNC: 31 MMOL/L (ref 22–29)
EGFRCR SERPLBLD CKD-EPI 2021: >90 ML/MIN/1.73M2
EOSINOPHIL # BLD AUTO: 0.3 10E3/UL (ref 0–0.7)
EOSINOPHIL NFR BLD AUTO: 4 %
ERYTHROCYTE [DISTWIDTH] IN BLOOD BY AUTOMATED COUNT: 16.6 % (ref 10–15)
GLUCOSE SERPL-MCNC: 119 MG/DL (ref 70–99)
HCT VFR BLD AUTO: 32.2 % (ref 40–53)
HGB BLD-MCNC: 9.8 G/DL (ref 13.3–17.7)
IMM GRANULOCYTES # BLD: 0 10E3/UL
IMM GRANULOCYTES NFR BLD: 0 %
LYMPHOCYTES # BLD AUTO: 2.6 10E3/UL (ref 0.8–5.3)
LYMPHOCYTES NFR BLD AUTO: 33 %
MAGNESIUM SERPL-MCNC: 1.4 MG/DL (ref 1.7–2.3)
MCH RBC QN AUTO: 24.1 PG (ref 26.5–33)
MCHC RBC AUTO-ENTMCNC: 30.4 G/DL (ref 31.5–36.5)
MCV RBC AUTO: 79 FL (ref 78–100)
MONOCYTES # BLD AUTO: 0.6 10E3/UL (ref 0–1.3)
MONOCYTES NFR BLD AUTO: 7 %
NEUTROPHILS # BLD AUTO: 4.2 10E3/UL (ref 1.6–8.3)
NEUTROPHILS NFR BLD AUTO: 55 %
NRBC # BLD AUTO: 0 10E3/UL
NRBC BLD AUTO-RTO: 0 /100
PLATELET # BLD AUTO: 273 10E3/UL (ref 150–450)
POTASSIUM SERPL-SCNC: 3 MMOL/L (ref 3.4–5.3)
PROT SERPL-MCNC: 7.2 G/DL (ref 6.4–8.3)
RBC # BLD AUTO: 4.07 10E6/UL (ref 4.4–5.9)
SODIUM SERPL-SCNC: 144 MMOL/L (ref 135–145)
WBC # BLD AUTO: 7.8 10E3/UL (ref 4–11)

## 2024-01-10 PROCEDURE — 250N000013 HC RX MED GY IP 250 OP 250 PS 637: Performed by: NURSE PRACTITIONER

## 2024-01-10 PROCEDURE — 80053 COMPREHEN METABOLIC PANEL: CPT | Performed by: INTERNAL MEDICINE

## 2024-01-10 PROCEDURE — 258N000003 HC RX IP 258 OP 636: Performed by: NURSE PRACTITIONER

## 2024-01-10 PROCEDURE — 96413 CHEMO IV INFUSION 1 HR: CPT

## 2024-01-10 PROCEDURE — 258N000003 HC RX IP 258 OP 636: Performed by: INTERNAL MEDICINE

## 2024-01-10 PROCEDURE — 250N000013 HC RX MED GY IP 250 OP 250 PS 637: Performed by: INTERNAL MEDICINE

## 2024-01-10 PROCEDURE — 99215 OFFICE O/P EST HI 40 MIN: CPT | Performed by: NURSE PRACTITIONER

## 2024-01-10 PROCEDURE — 96367 TX/PROPH/DG ADDL SEQ IV INF: CPT

## 2024-01-10 PROCEDURE — 85025 COMPLETE CBC W/AUTO DIFF WBC: CPT | Performed by: INTERNAL MEDICINE

## 2024-01-10 PROCEDURE — 36415 COLL VENOUS BLD VENIPUNCTURE: CPT | Performed by: INTERNAL MEDICINE

## 2024-01-10 PROCEDURE — 250N000011 HC RX IP 250 OP 636: Performed by: NURSE PRACTITIONER

## 2024-01-10 PROCEDURE — 96375 TX/PRO/DX INJ NEW DRUG ADDON: CPT

## 2024-01-10 PROCEDURE — 250N000011 HC RX IP 250 OP 636: Performed by: INTERNAL MEDICINE

## 2024-01-10 PROCEDURE — 96417 CHEMO IV INFUS EACH ADDL SEQ: CPT

## 2024-01-10 PROCEDURE — 83735 ASSAY OF MAGNESIUM: CPT | Performed by: NURSE PRACTITIONER

## 2024-01-10 PROCEDURE — G0463 HOSPITAL OUTPT CLINIC VISIT: HCPCS | Performed by: NURSE PRACTITIONER

## 2024-01-10 RX ORDER — POTASSIUM CHLORIDE 1500 MG/1
20 TABLET, EXTENDED RELEASE ORAL ONCE
Status: COMPLETED | OUTPATIENT
Start: 2024-01-10 | End: 2024-01-10

## 2024-01-10 RX ORDER — DIPHENHYDRAMINE HCL 25 MG
50 CAPSULE ORAL ONCE
Qty: 2 CAPSULE | Refills: 0 | Status: COMPLETED | OUTPATIENT
Start: 2024-01-10 | End: 2024-01-10

## 2024-01-10 RX ORDER — HEPARIN SODIUM (PORCINE) LOCK FLUSH IV SOLN 100 UNIT/ML 100 UNIT/ML
5 SOLUTION INTRAVENOUS
Status: DISCONTINUED | OUTPATIENT
Start: 2024-01-10 | End: 2024-01-10 | Stop reason: HOSPADM

## 2024-01-10 RX ORDER — HEPARIN SODIUM,PORCINE 10 UNIT/ML
5-20 VIAL (ML) INTRAVENOUS DAILY PRN
Status: DISCONTINUED | OUTPATIENT
Start: 2024-01-10 | End: 2024-01-10 | Stop reason: HOSPADM

## 2024-01-10 RX ADMIN — CARBOPLATIN 240 MG: 10 INJECTION INTRAVENOUS at 12:28

## 2024-01-10 RX ADMIN — DIPHENHYDRAMINE HYDROCHLORIDE 50 MG: 25 CAPSULE ORAL at 10:35

## 2024-01-10 RX ADMIN — FAMOTIDINE 20 MG: 10 INJECTION, SOLUTION INTRAVENOUS at 10:39

## 2024-01-10 RX ADMIN — SODIUM CHLORIDE 250 ML: 9 INJECTION, SOLUTION INTRAVENOUS at 10:39

## 2024-01-10 RX ADMIN — PACLITAXEL 125 MG: 6 INJECTION, SOLUTION INTRAVENOUS at 11:16

## 2024-01-10 RX ADMIN — DEXAMETHASONE SODIUM PHOSPHATE: 10 INJECTION, SOLUTION INTRAMUSCULAR; INTRAVENOUS at 10:45

## 2024-01-10 RX ADMIN — MAGNESIUM SULFATE HEPTAHYDRATE: 500 INJECTION, SOLUTION INTRAMUSCULAR; INTRAVENOUS at 11:22

## 2024-01-10 RX ADMIN — POTASSIUM CHLORIDE 20 MEQ: 1500 TABLET, EXTENDED RELEASE ORAL at 10:21

## 2024-01-10 ASSESSMENT — PAIN SCALES - GENERAL: PAINLEVEL: EXTREME PAIN (9)

## 2024-01-10 NOTE — LETTER
1/10/2024         RE: Jonathan Bishop  [address]  [facility] [address]        Dear Colleague,    Thank you for referring your patient, Jonathan Bishop, to the [facility]CER [facility]. Please see a copy of my visit note below.       [facility]    PATIENT NAME: Jonathan Bishop  MRN # [id number]   DATE OF VISIT: January 10, 2024 YOB: 1945     Otolaryngology: Dr. Karishma Eng  Radiation Oncology: Dr. Jenni Mills  PCP: Dr. Buck Nascimento    CANCER TYPE: SCC L tonsil, p16 +lety  STAGE: [id number] (IVC)  ECOG PS: 1    PD-L1: TPS 20%, CPS 25% on [id number] tonsil bx  NGS: N/A    SUMMARY  2/26/23 L tonsil mass bx in clinic (Dr. Eng).   2/20/23 PET/CT. 3.7 x 4.0 x 5.1 cm mass L palatine tonsil causing narrowing of the oropharyngeal lumen, L level 2 node, L retropharyngeal nodular density, extension of primary mass vs retropharyngeal node, 0.8 cm RLL nodule concerning for met, mild focal uptake R iliac bone and L1 without CT correlate suspicious for mets.   3/2/23 R VATS, wedge resection. Path: SCC, poorly differentiated, associated with necrosis, 1 cm, negative margins, p16 +lety  3/24/23 MRI L spine and pelvis. Diffusely heterogeneous marrow signal throughout without corresponding abnormal signal on sagittal STIR sequence and no abnormal enhancement. Nonspecific but could be benign process such as red marrow hyperplasia, cannot completely exclude metastatic disease or infiltrative pathologic marrow process. No lesions corresponding to FDG avid spots on PET/CT.   4/19~10/18/23 Pembrolizumab.  10/26/23 CT neck and CAP. Increased L palatine tonsil mass, 3.8 x 2.7 cm --> 4.7 x 3.5 cm. Increased bilateral cervical nodes up to 2.3 x 2.4 cm R level 2 node. New 3 mm RML nodule, no other mets.   11/14/23 PET. 4.6 x 3.3 cm L palatine tonsil mass (SUB 26.8), R level 2A and 2A/3 nodes. Questionable uptake distal R femoral medullary cavity, partially imaged, with  questionable subtle corresponding soft tissue attenuation on CT. MRI could be obtained to better evaluate.  11/21~12/2/23 [facility] for weakness/fall. COVID/paxlovid, MSSA bacteremia (1 of 2 bottles on 1 day), TTE negative, treated with cefazolin but didn't complete 2 week course, episode of unresponsiveness 12/2. Dispo plan was TCU on [facility], but left AMA. CTA chest negative for PE, showed grossly unchanged cervical adenopathy. Brain MRI 11/22 for stroke code negative for infarct. Showed SVID, moderate atrophy, suboptimal contrast bolus to examine intracranial structures, 4.5 x 3 x 4.5 cm L nasopharyngeal mass, incompletely visualized. CTA negative.   12/19/23 Quad shot fraction #1. No-showed thereafter.   1/10/23-current: carbo/taxol weekly    SUBJECTIVE  Mr. Bishop returns for follow up.   Remembers his prior conversations with Dr. Mueller with plans for new infusion  Wondering if its similar to his prior infusion  Getting around the house ok with his walker, no falls  Leg swelling stable but it's hard to get his stockings on  Wants to connect with SW about PCA  Has poor appetite with taste changes  Prepares food for himself, feels he has access at the food shelf to appropriate foods--likes sardines!  Breathing ok, just not a lot of stamina  Has headache sometimes at bedtime, goes away with sleep  No neuro concerns    PAST MEDICAL HISTORY  SCC as above  Chronic LBP  TAVR 2020  H/o rheumatic carditis 1950  CHF. 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 Nocturia once nightly   ED  Cataracts  Umbilical hernia repair 2011  Knee arthroplasty B 2010  Lumbar spine fusion, laminectomy, sciatic pain R > L  Peripheral neuropathy - from pinched nerves?  Hearing loss    Numbness/tingling in both feet - bilaterally, symmetric, R spasms more than left    CURRENT OUTPATIENT MEDICATIONS  Reviewed    ALLERGIES  No Known Allergies     PHYSICAL EXAM  /53   Pulse 89    Temp 98.4  F (36.9  C) (Oral)   Resp 18   Wt 95.3 kg (210 lb 1.6 oz)   SpO2 97%   BMI 36.63 kg/m      GEN: NAD  HEENT: EOMI, no icterus, injection or pallor. Oropharynx is clear, no thrush  EXT: 1+ edema bilaterally  NEURO: alert, steady gait with walker    LABORATORY AND IMAGING STUDIES    Most Recent 3 CBC's:  Recent Labs   Lab Test 12/27/23  1328 12/02/23  0943 12/02/23  0821 11/24/23  0848 11/22/23  1251 11/21/23  1348 10/18/23  0941   WBC 7.9 8.4  --  6.3   < > 7.5 8.5   HGB 10.0* 9.9*  --  10.2*   < > 10.8* 9.9*   MCV 80 82  --  81   < > 82 81    360 371 206   < > 208 220   ANEUTAUTO 5.0  --   --   --   --  5.6 4.8    < > = values in this interval not displayed.     Most Recent 3 BMP's:  Recent Labs   Lab Test 12/27/23  1328 12/02/23  0943 12/02/23  0926 12/02/23  0821 11/22/23  1135 11/22/23  0832 11/21/23  1348 10/18/23  0941    144  --   --   --  138 147* 143   POTASSIUM 3.6 2.8*  --   --   --  4.0 3.6 3.6   CHLORIDE 106 102  --   --   --  103 107 104   CO2 22 32*  --   --   --  25 27 30*   BUN 8.3 21.0  --   --   --  9.7 16.2 23.4*   CR 0.61* 1.00  --  0.88   < > 0.61* 0.95 0.95   ANIONGAP 13 10  --   --   --  10 13 9   WARREN 9.4 9.6  --   --   --  9.2 9.7 9.5   GLC 89 134* 141*  --    < > 130* 97 98   PROTTOTAL 7.1  --   --   --   --   --  7.7 7.6   ALBUMIN 3.7  --   --   --   --   --  4.1 4.1    < > = values in this interval not displayed.    Most Recent 3 LFT's:  Recent Labs   Lab Test 12/27/23  1328 11/21/23  1348 10/18/23  0941   AST 29 161* 22   ALT 12 36 14   ALKPHOS 89 109 105   BILITOTAL 0.4 0.4 0.3    Most Recent 2 TSH and T4:  Recent Labs   Lab Test 12/27/23  1328 10/18/23  0941   TSH 0.54 0.13*   T4 1.47 1.34     I reviewed the above labs today.    ASSESSMENT AND PLAN  SCC L tonsil, p16 +lety, CPS 25%/TPS 20%, oligometastatic lung met, +/- bone mets: Unable to tolerate radiation session, and after discussion with Dr. Mills, decided to pursue systemic therapy alone. Conner agrees.  Reviewed plan and rationale for weekly carboplatin paclitaxel. Discussed possible SE including infusion reaction, low blood counts, nausea, taste changes, neuropathy, and fatigue. Will see him weekly to keep a close eye on things. Restage after 8 weeks, requested prior to C3.     Anorexia: Mostly driven by dysgeusia. ?covid. May worsen with paclitaxel too. Weight down trending, will monitor weekly     Hypomag, hypoK: replace pp today. Start supplement pending trend over coming weeks.     H/o CVA: Residual L weakness. Uses cane and walker at baseline, but independent and functional, albeit frail. He doesn't qualify for home care, but he would if he becomes homebound. He seems close to his baseline prior to the hospitalization, which is still a little worse than when he had his PCA. Will message SW as he still feels he really needs PCA    LE edema; Not able to get compression stockings on due to his HHA now being gone. Knows to elevate and use moisturizers. But overall a little better than before.   -working on getting med management help as he has some confusion around his diuretics     COVID: Feels recovered.     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 Would be good to have him establish with a PCP, will see if we can coordinate with one of his weekly visits here    Microcytic anemia: Iron studies 8/2022 fairly normal, normal 4/18/23. Recheck iron studies if MCV drops further.    Peripheral neuropathy: MRI shows a lot of foramenal stenosis and spinal canal stenosis, so more likely related to that than DM2. Monitor for worsening with taxol    Surrogate decision maker: Discussed this in the context of unresponsive episode during his hosptialization. He would like to remove Sandie from his authorized contact list, and add Mariann, his sister. He would want us to talk with Mariann if he could not speak for himself. Not discussed today.      40 minutes spent on the date of the encounter doing chart review,  review of test results, interpretation of tests, patient visit, documentation, and discussion with other provider(s)    Olivia Sanchez CNP on 1/10/2024 at 10:13 AM

## 2024-01-10 NOTE — NURSING NOTE
Chief Complaint   Patient presents with    Oncology Clinic Visit     Squamous cell carcinoma of oropharynx    Blood Draw     Vitals, blood drawn and PIV placed by NGUYEN VARMA. Pt checked into jessicat.      BRAD Vasquez LPN

## 2024-01-10 NOTE — PATIENT INSTRUCTIONS
CONTACT TRIAGE WITH CONCERNS.  YOU CAN USE THIS NUMBER ANY TIME OF DAY: [phone]      Protect yourself  Keep your hands clean. To reduce your risk of infection, bathe every day and wash your hands often throughout the day. For best results, lather them with soap for at least 15 seconds. Wash your hands before eating, after spending time in public places, and after using the bathroom.  Stay away from some foods. Limit your risk. Don t eat uncooked or undercooked meat or fish. You may also be told not to eat raw vegetables or thin-skinned fruits during your mike.  Reduce your risk for illness. During this time your body is less able to fight off colds, measles, and other illnesses. Stay away from anyone who has a fever or an infection. Also stay away from large crowds during your mike.  Wear gloves. Make it harder for infection to enter your body. Wear gloves when you work around germs and dirt. Have someone else clean a pet s tank, cage, or litter box.  Try not to cut yourself. Protect your feet from injury and germs by not walking barefoot.      Contact your healthcare provider right away if you have any of the following:  Fever of 100.4 F (38 C) or higher, or as directed by your healthcare provider  Burning when you urinate  Severe coughing, nasal congestion, or sore throat  Shortness of breath, sweating, or chills  Vomiting or diarrhea  Pain, especially near an open wound or catheter site

## 2024-01-10 NOTE — PROGRESS NOTES
Infusion Nursing Note:  Jonathan Bishop presents today for C1D1 Taxol-Carboplatin-Magnesium/Potassium Replacement.    Patient seen by provider today: Yes: Olivia Sanchez, CNP    present during visit today: Not Applicable.    Note: Pt saw provider prior to infusion, ok for treatment.    First time receiving Taxol/Carboplatin today.Chemotherapy teaching, side effects, and schedule reviewed with patient. Pt instructed to call triage (or MD on call if after hours/weekends) with chills/temp >=100.5. Pt stated understanding of plan.       - provider will monitor labs the next few weeks and decide if pt should start a supplement  -attached an information sheet for eating with cancer  -pt was given thermometer and a handout for when to call triage or go to ED  -IB to Care Team re:ability to take care of himself if he gets weaker, SW currently trying to help him apply for a PCA    Intravenous Access:  Peripheral IV placed.    Treatment Conditions:  Lab Results   Component Value Date    HGB 9.8 (L) 01/10/2024    WBC 7.8 01/10/2024    ANEU 3.9 10/28/2020    ANEUTAUTO 4.2 01/10/2024     01/10/2024        Lab Results   Component Value Date     01/10/2024    POTASSIUM 3.0 (L) 01/10/2024    MAG 1.4 (L) 01/10/2024    CR 0.74 01/10/2024    WARREN 9.0 01/10/2024    BILITOTAL 0.4 01/10/2024    ALBUMIN 3.8 01/10/2024    ALT 9 01/10/2024    AST 25 01/10/2024       Results reviewed, labs MET treatment parameters, ok to proceed with treatment.      Post Infusion Assessment:  Patient tolerated infusion without incident.  Blood return noted pre and post infusion.  Site patent and intact, free from redness, edema or discomfort.  No evidence of extravasations.  Access discontinued per protocol.       Discharge Plan:   Prescription refills given for Compazine and Zofran.  Discharge instructions reviewed with: Patient.  Patient and/or family verbalized understanding of discharge instructions and all questions  answered.  Copy of AVS reviewed with patient and/or family.  Patient will return 1/16 for next appointment.  Patient discharged in stable condition accompanied by: self.  Departure Mode: Ambulatory with walker.    Isamar Gee RN

## 2024-01-10 NOTE — PROGRESS NOTES
[facility] CANCER [facility]    PATIENT NAME: Jonathan Bishop  MRN # [id number]   DATE OF VISIT: January 10, 2024 YOB: 1945     Otolaryngology: Dr. Karishma Eng  Radiation Oncology: Dr. Jenni Mills  PCP: Dr. Buck Nascimento    CANCER TYPE: SCC L tonsil, p16 +lety  STAGE: [id number] (IVC)  ECOG PS: 1    PD-L1: TPS 20%, CPS 25% on [id number] tonsil bx  NGS: N/A    SUMMARY  2/26/23 L tonsil mass bx in clinic (Dr. Eng).   2/20/23 PET/CT. 3.7 x 4.0 x 5.1 cm mass L palatine tonsil causing narrowing of the oropharyngeal lumen, L level 2 node, L retropharyngeal nodular density, extension of primary mass vs retropharyngeal node, 0.8 cm RLL nodule concerning for met, mild focal uptake R iliac bone and L1 without CT correlate suspicious for mets.   3/2/23 R VATS, wedge resection. Path: SCC, poorly differentiated, associated with necrosis, 1 cm, negative margins, p16 +lety  3/24/23 MRI L spine and pelvis. Diffusely heterogeneous marrow signal throughout without corresponding abnormal signal on sagittal STIR sequence and no abnormal enhancement. Nonspecific but could be benign process such as red marrow hyperplasia, cannot completely exclude metastatic disease or infiltrative pathologic marrow process. No lesions corresponding to FDG avid spots on PET/CT.   4/19~10/18/23 Pembrolizumab.  10/26/23 CT neck and CAP. Increased L palatine tonsil mass, 3.8 x 2.7 cm --> 4.7 x 3.5 cm. Increased bilateral cervical nodes up to 2.3 x 2.4 cm R level 2 node. New 3 mm RML nodule, no other mets.   11/14/23 PET. 4.6 x 3.3 cm L palatine tonsil mass (SUB 26.8), R level 2A and 2A/3 nodes. Questionable uptake distal R femoral medullary cavity, partially imaged, with questionable subtle corresponding soft tissue attenuation on CT. MRI could be obtained to better evaluate.  11/21~12/2/23 [facility] for weakness/fall. COVID/paxlovid, MSSA bacteremia (1 of 2 bottles on 1 day), TTE negative, treated with cefazolin but didn't complete 2 week  course, episode of unresponsiveness 12/2. Dispo plan was TCU on [facility], but left AMA. CTA chest negative for PE, showed grossly unchanged cervical adenopathy. Brain MRI 11/22 for stroke code negative for infarct. Showed SVID, moderate atrophy, suboptimal contrast bolus to examine intracranial structures, 4.5 x 3 x 4.5 cm L nasopharyngeal mass, incompletely visualized. CTA negative.   12/19/23 Quad shot fraction #1. No-showed thereafter.   1/10/23-current: carbo/taxol weekly    SUBJECTIVE  Mr. Bisohp returns for follow up.   Remembers his prior conversations with Dr. Mueller with plans for new infusion  Wondering if its similar to his prior infusion  Getting around the house ok with his walker, no falls  Leg swelling stable but it's hard to get his stockings on  Wants to connect with SW about PCA  Has poor appetite with taste changes  Prepares food for himself, feels he has access at the food shelf to appropriate foods--likes sardines!  Breathing ok, just not a lot of stamina  Has headache sometimes at bedtime, goes away with sleep  No neuro concerns    PAST MEDICAL HISTORY  SCC as above  Chronic LBP  TAVR 2020  H/o rheumatic carditis 1950  CHF. 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 Nocturia once nightly   ED  Cataracts  Umbilical hernia repair 2011  Knee arthroplasty B 2010  Lumbar spine fusion, laminectomy, sciatic pain R > L  Peripheral neuropathy - from pinched nerves?  Hearing loss    Numbness/tingling in both feet - bilaterally, symmetric, R spasms more than left    CURRENT OUTPATIENT MEDICATIONS  Reviewed    ALLERGIES  No Known Allergies     PHYSICAL EXAM  /53   Pulse 89   Temp 98.4  F (36.9  C) (Oral)   Resp 18   Wt 95.3 kg (210 lb 1.6 oz)   SpO2 97%   BMI 36.63 kg/m      GEN: NAD  HEENT: EOMI, no icterus, injection or pallor. Oropharynx is clear, no thrush  EXT: 1+ edema bilaterally  NEURO: alert, steady gait with walker    LABORATORY AND  IMAGING STUDIES    Most Recent 3 CBC's:  Recent Labs   Lab Test 12/27/23  1328 12/02/23  0943 12/02/23  0821 11/24/23  0848 11/22/23  1251 11/21/23  1348 10/18/23  0941   WBC 7.9 8.4  --  6.3   < > 7.5 8.5   HGB 10.0* 9.9*  --  10.2*   < > 10.8* 9.9*   MCV 80 82  --  81   < > 82 81    360 371 206   < > 208 220   ANEUTAUTO 5.0  --   --   --   --  5.6 4.8    < > = values in this interval not displayed.     Most Recent 3 BMP's:  Recent Labs   Lab Test 12/27/23  1328 12/02/23  0943 12/02/23  0926 12/02/23  0821 11/22/23  1135 11/22/23  0832 11/21/23  1348 10/18/23  0941    144  --   --   --  138 147* 143   POTASSIUM 3.6 2.8*  --   --   --  4.0 3.6 3.6   CHLORIDE 106 102  --   --   --  103 107 104   CO2 22 32*  --   --   --  25 27 30*   BUN 8.3 21.0  --   --   --  9.7 16.2 23.4*   CR 0.61* 1.00  --  0.88   < > 0.61* 0.95 0.95   ANIONGAP 13 10  --   --   --  10 13 9   WARREN 9.4 9.6  --   --   --  9.2 9.7 9.5   GLC 89 134* 141*  --    < > 130* 97 98   PROTTOTAL 7.1  --   --   --   --   --  7.7 7.6   ALBUMIN 3.7  --   --   --   --   --  4.1 4.1    < > = values in this interval not displayed.    Most Recent 3 LFT's:  Recent Labs   Lab Test 12/27/23  1328 11/21/23  1348 10/18/23  0941   AST 29 161* 22   ALT 12 36 14   ALKPHOS 89 109 105   BILITOTAL 0.4 0.4 0.3    Most Recent 2 TSH and T4:  Recent Labs   Lab Test 12/27/23  1328 10/18/23  0941   TSH 0.54 0.13*   T4 1.47 1.34     I reviewed the above labs today.    ASSESSMENT AND PLAN  SCC L tonsil, p16 +lety, CPS 25%/TPS 20%, oligometastatic lung met, +/- bone mets: Unable to tolerate radiation session, and after discussion with Dr. Mills, decided to pursue systemic therapy alone. Conner agrees. Reviewed plan and rationale for weekly carboplatin paclitaxel. Discussed possible SE including infusion reaction, low blood counts, nausea, taste changes, neuropathy, and fatigue. Will see him weekly to keep a close eye on things. Restage after 8 weeks, requested prior to C3.      Anorexia: Mostly driven by dysgeusia. ?covid. May worsen with paclitaxel too. Weight down trending, will monitor weekly     Hypomag, hypoK: replace pp today. Start supplement pending trend over coming weeks.     H/o CVA: Residual L weakness. Uses cane and walker at baseline, but independent and functional, albeit frail. He doesn't qualify for home care, but he would if he becomes homebound. He seems close to his baseline prior to the hospitalization, which is still a little worse than when he had his PCA. Will message SW as he still feels he really needs PCA    LE edema; Not able to get compression stockings on due to his HHA now being gone. Knows to elevate and use moisturizers. But overall a little better than before.   -working on getting med management help as he has some confusion around his diuretics     COVID: Feels recovered.     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 Would be good to have him establish with a PCP, will see if we can coordinate with one of his weekly visits here    Microcytic anemia: Iron studies 8/2022 fairly normal, normal 4/18/23. Recheck iron studies if MCV drops further.    Peripheral neuropathy: MRI shows a lot of foramenal stenosis and spinal canal stenosis, so more likely related to that than DM2. Monitor for worsening with taxol    Surrogate decision maker: Discussed this in the context of unresponsive episode during his hosptialization. He would like to remove Sandie from his authorized contact list, and add Mariann, his sister. He would want us to talk with Mariann if he could not speak for himself. Not discussed today.      40 minutes spent on the date of the encounter doing chart review, review of test results, interpretation of tests, patient visit, documentation, and discussion with other provider(s)    Olivia Sanchez CNP on 1/10/2024 at 10:13 AM

## 2024-01-10 NOTE — NURSING NOTE
"Oncology Rooming Note    January 10, 2024 9:16 AM   Jonathan Bishop is a 78 year old male who presents for:    Chief Complaint   Patient presents with    Oncology Clinic Visit     Squamous cell carcinoma of oropharynx    Blood Draw     Vitals, blood drawn and PIV placed by NGUYEN VARMA. Pt checked into appt.      Initial Vitals: /53   Pulse 89   Temp 98.4  F (36.9  C) (Oral)   Resp 18   Wt 95.3 kg (210 lb 1.6 oz)   SpO2 97%   BMI 36.63 kg/m   Estimated body mass index is 36.63 kg/m  as calculated from the following:    Height as of 12/7/23: 1.613 m (5' 3.5\").    Weight as of this encounter: 95.3 kg (210 lb 1.6 oz). Body surface area is 2.07 meters squared.  Extreme Pain (9) Comment: Data Unavailable   No LMP for male patient.  Allergies reviewed: Yes  Medications reviewed: Yes    Medications: Medication refills not needed today.  Pharmacy name entered into [facility]: [id number] - [place], [place] - [address] [facility]    Frailty Screening:   Is the patient here for a new oncology consult visit in cancer care? 2. No      Clinical concerns: none       Pili Alicia              " n/a

## 2024-01-11 ENCOUNTER — TELEPHONE (OUTPATIENT)
Dept: INTERNAL MEDICINE | Facility: CLINIC | Age: 79
End: 2024-01-11
Payer: COMMERCIAL

## 2024-01-11 NOTE — PROGRESS NOTES
Clinic Care Coordination Contact  [facility]    Clinical Data: Care Coordinator Outreach    Outreach Documentation Number of Outreach Attempt   1/9/2024  12:13 PM 1   1/11/2024   2:48 PM 2       Unable to LVM on pt's VM. Patient's VM box is full. Patient has been active on MyChart, MyChart message sent to patient.     Plan: Care Coordinator will send unable to contact letter with care coordinator contact information via mail. Care Coordinator will do no further outreaches at this time.    CHELSEA VIVEROS RN on 1/11/2024 at 2:49 PM

## 2024-01-12 ENCOUNTER — PATIENT OUTREACH (OUTPATIENT)
Dept: ONCOLOGY | Facility: CLINIC | Age: 79
End: 2024-01-12
Payer: COMMERCIAL

## 2024-01-12 NOTE — PROGRESS NOTES
[facility]: Cancer Care                                                                                        Spoke with Conner.  Reports he is doing well. No side effects or symptoms to report after C1D1 Taxol/carbo.     SW has not been able to find a PCA for him yet, but they are continuing to look.  Does not need anything right now.     Confirmed next lab/[facility]/C1D8 Taxol/Carbo on 1/16.  He plans to be here.  Encouraged him to call with any new concerns or symptoms.     Shae Chang RN

## 2024-01-22 NOTE — PROGRESS NOTES
[facility] CANCER [facility]    PATIENT NAME: Jonathan Bishop  MRN # [id number]   DATE OF VISIT: January 23, 2024 YOB: 1945     Otolaryngology: Dr. Karishma Eng  Radiation Oncology: Dr. Jenni iMlls  PCP: Dr. Buck Nascimento    CANCER TYPE: SCC L tonsil, p16 +lety  STAGE: [id number] (IVC)  ECOG PS: 1    PD-L1: TPS 20%, CPS 25% on [id number] tonsil bx  NGS: N/A    SUMMARY  2/26/23 L tonsil mass bx in clinic (Dr. Eng).   2/20/23 PET/CT. 3.7 x 4.0 x 5.1 cm mass L palatine tonsil causing narrowing of the oropharyngeal lumen, L level 2 node, L retropharyngeal nodular density, extension of primary mass vs retropharyngeal node, 0.8 cm RLL nodule concerning for met, mild focal uptake R iliac bone and L1 without CT correlate suspicious for mets.   3/2/23 R VATS, wedge resection. Path: SCC, poorly differentiated, associated with necrosis, 1 cm, negative margins, p16 +lety  3/24/23 MRI L spine and pelvis. Diffusely heterogeneous marrow signal throughout without corresponding abnormal signal on sagittal STIR sequence and no abnormal enhancement. Nonspecific but could be benign process such as red marrow hyperplasia, cannot completely exclude metastatic disease or infiltrative pathologic marrow process. No lesions corresponding to FDG avid spots on PET/CT.   4/19~10/18/23 Pembrolizumab.  10/26/23 CT neck and CAP. Increased L palatine tonsil mass, 3.8 x 2.7 cm --> 4.7 x 3.5 cm. Increased bilateral cervical nodes up to 2.3 x 2.4 cm R level 2 node. New 3 mm RML nodule, no other mets.   11/14/23 PET. 4.6 x 3.3 cm L palatine tonsil mass (SUB 26.8), R level 2A and 2A/3 nodes. Questionable uptake distal R femoral medullary cavity, partially imaged, with questionable subtle corresponding soft tissue attenuation on CT. MRI could be obtained to better evaluate.  11/21~12/2/23 [facility] for weakness/fall. COVID/paxlovid, MSSA bacteremia (1 of 2 bottles on 1 day), TTE negative, treated with cefazolin but didn't complete 2 week  course, episode of unresponsiveness 12/2. Dispo plan was TCU on [facility], but left AMA. CTA chest negative for PE, showed grossly unchanged cervical adenopathy. Brain MRI 11/22 for stroke code negative for infarct. Showed SVID, moderate atrophy, suboptimal contrast bolus to examine intracranial structures, 4.5 x 3 x 4.5 cm L nasopharyngeal mass, incompletely visualized. CTA negative.   12/19/23 Quad shot fraction #1. No-showed thereafter.   1/10/23-current: carbo/taxol weekly    SUBJECTIVE  Conner is seen today prior to weekly carbo/taxol. Missed his second infusion last week due to transportation issues.  -Feels well, no fatigue  -Appetite increased, weight stable  -No nausea or dyspepsia symptoms following first carbo/taxol infusion  -No increase in numbness in tingling in hands or feet   -No weakness, feels stable on feet    PAST MEDICAL HISTORY  SCC as above  Chronic LBP  TAVR 2020  H/o rheumatic carditis 1950  CHF. 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 Nocturia once nightly   ED  Cataracts  Umbilical hernia repair 2011  Knee arthroplasty B 2010  Lumbar spine fusion, laminectomy, sciatic pain R > L  Peripheral neuropathy - from pinched nerves?  Hearing loss    Numbness/tingling in both feet - bilaterally, symmetric, R spasms more than left    CURRENT OUTPATIENT MEDICATIONS  Current Outpatient Medications   Medication    aspirin (ASA) 81 MG chewable tablet    atorvastatin (LIPITOR) 40 MG tablet    ibuprofen (ADVIL/MOTRIN) 600 MG tablet    multivitamin, therapeutic (THERA-VIT) TABS tablet    ondansetron (ZOFRAN) 8 MG tablet    order for DME    oxyCODONE-acetaminophen (PERCOCET) 5-325 MG tablet    prochlorperazine (COMPAZINE) 10 MG tablet    spironolactone (ALDACTONE) 50 MG tablet    tamsulosin (FLOMAX) 0.4 MG capsule    torsemide (DEMADEX) 20 MG tablet    vitamin D3 (CHOLECALCIFEROL) 50 mcg (2000 units) tablet     No current facility-administered medications for this  visit.     Facility-Administered Medications Ordered in Other Visits   Medication    CARBOplatin 205 mg in sodium chloride 0.9 % 270.5 mL infusion    diphenhydrAMINE (BENADRYL) capsule 50 mg    famotidine (PEPCID) injection 20 mg    magnesium sulfate 2 g in 50 mL sterile water intermittent infusion    ondansetron (ZOFRAN) 8 mg, dexAMETHasone (DECADRON) 16 mg in sodium chloride 0.9 % 55.6 mL intermittent infusion    PACLitaxel (TAXOL) 125 mg in sodium chloride 0.9% in non-PVC container 270.83 mL infusion    sodium chloride 0.9% BOLUS 250 mL       ALLERGIES  No Known Allergies     PHYSICAL EXAM  /71 (BP Location: Right arm, Patient Position: Sitting, Cuff Size: Adult Regular)   Pulse 81   Temp 97.7  F (36.5  C) (Oral)   Resp 16   Wt 95.5 kg (210 lb 8 oz)   SpO2 98%   BMI 36.70 kg/m      General: Well-appearing male, NAD  Eyes: EOMI, PERRL. No scleral icterus.  ENT: Oral mucosa is moist. No lesions or thrush. Left tonsillar mass without erythema or erosion.   Lymphatic: Neck is supple without cervical or supraclavicular lymphadenopathy.   Cardiovascular: RRR, no m/g/r. +1 bilateral LE edema  Respiratory: CTA bilaterally. No wheezes or crackles.  Neurologic: No focal deficits.   Skin: No rashes, petechiae, or bruising noted on exposed skin.    LABORATORY AND IMAGING STUDIES  Most Recent 3 CBC's:  Recent Labs   Lab Test 01/23/24  0718 01/10/24  0905 12/27/23  1328   WBC 4.3 7.8 7.9   HGB 9.1* 9.8* 10.0*   MCV 78 79 80    273 211   ANEUTAUTO 2.2 4.2 5.0     Most Recent 3 BMP's:  Recent Labs   Lab Test 01/23/24  0718 01/10/24  0905 12/27/23  1328    144 141   POTASSIUM 3.5 3.0* 3.6   CHLORIDE 104 103 106   CO2 32* 31* 22   BUN 7.9* 11.8 8.3   CR 0.82 0.74 0.61*   ANIONGAP 9 10 13   WARREN 9.0 9.0 9.4   * 119* 89   PROTTOTAL 6.7 7.2 7.1   ALBUMIN 3.8 3.8 3.7    Most Recent 3 LFT's:  Recent Labs   Lab Test 01/23/24  0718 01/10/24  0905 12/27/23  1328   AST 21 25 29   ALT 13 9 12   ALKPHOS 87 82  89   BILITOTAL 0.4 0.4 0.4    Most Recent 2 TSH and T4:  Recent Labs   Lab Test 12/27/23  1328 10/18/23  0941   TSH 0.54 0.13*   T4 1.47 1.34     I reviewed the above labs today.    ASSESSMENT AND PLAN  SCC L tonsil, p16 +lety, CPS 25%/TPS 20%, oligometastatic lung met, +/- bone mets: Unable to tolerate radiation session, and after discussion with Dr. Mills, decided to pursue systemic therapy alone weith weekly carbo/taxol. Tolerated first infusion well. Slight decrease in hgb on labs today. Missed appointment last week but set up for weekly visits moving forward. Transportation issues sorted out.   -Proceed with carbo/taxol  -Weekly MARK visits with labs/infusion for close monitoring    Anorexia: Improved. Weight stable. May worsen with chemo moving forward. Reviewed calorically dense foods to focus on. Monitor weight trend weekly.    Hypomag, hypoK: K+ improved, stop replacement. Mg 1.4, replace again pp today. If low next week, consider daily home supplementation.    H/o CVA: Residual L weakness. Uses cane and walker at baseline, but independent and functional, albeit frail. Doesn't qualify for home care, but he would if he becomes homebound. Working on PCA application.    LE edema: Compression stockings when able, elevated legs. 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 Follows with PCP, Dr. Nascimento. As above, MTM scheduled.    Microcytic anemia: Iron studies 8/2022 fairly normal, normal 4/18/23. Recheck iron studies if MCV drops further.    Peripheral neuropathy: MRI shows a lot of foramenal stenosis and spinal canal stenosis, so more likely related to that than DM2. Monitor for worsening with taxol    37 minutes spent on the date of the encounter doing chart review, review of test results, interpretation of tests, patient visit, and documentation     Vandana Bertrand, CNP

## 2024-01-23 ENCOUNTER — ONCOLOGY VISIT (OUTPATIENT)
Dept: ONCOLOGY | Facility: CLINIC | Age: 79
End: 2024-01-23
Attending: REGISTERED NURSE
Payer: COMMERCIAL

## 2024-01-23 ENCOUNTER — APPOINTMENT (OUTPATIENT)
Dept: LAB | Facility: CLINIC | Age: 79
End: 2024-01-23
Attending: REGISTERED NURSE
Payer: COMMERCIAL

## 2024-01-23 VITALS
HEART RATE: 81 BPM | TEMPERATURE: 97.7 F | DIASTOLIC BLOOD PRESSURE: 71 MMHG | OXYGEN SATURATION: 98 % | BODY MASS INDEX: 36.7 KG/M2 | SYSTOLIC BLOOD PRESSURE: 110 MMHG | RESPIRATION RATE: 16 BRPM | WEIGHT: 210.5 LBS

## 2024-01-23 DIAGNOSIS — M54.50 ACUTE MIDLINE LOW BACK PAIN WITHOUT SCIATICA: ICD-10-CM

## 2024-01-23 DIAGNOSIS — C10.9 SQUAMOUS CELL CARCINOMA OF OROPHARYNX (H): ICD-10-CM

## 2024-01-23 DIAGNOSIS — C10.9 SQUAMOUS CELL CARCINOMA OF OROPHARYNX (H): Primary | ICD-10-CM

## 2024-01-23 DIAGNOSIS — C09.9 TONSIL CANCER (H): ICD-10-CM

## 2024-01-23 DIAGNOSIS — E83.42 HYPOMAGNESEMIA: ICD-10-CM

## 2024-01-23 DIAGNOSIS — R60.0 PERIPHERAL EDEMA: ICD-10-CM

## 2024-01-23 DIAGNOSIS — C78.00 MALIGNANT NEOPLASM METASTATIC TO LUNG, UNSPECIFIED LATERALITY (H): Primary | ICD-10-CM

## 2024-01-23 DIAGNOSIS — C78.00 MALIGNANT NEOPLASM METASTATIC TO LUNG, UNSPECIFIED LATERALITY (H): ICD-10-CM

## 2024-01-23 LAB
ALBUMIN SERPL BCG-MCNC: 3.8 G/DL (ref 3.5–5.2)
ALP SERPL-CCNC: 87 U/L (ref 40–150)
ALT SERPL W P-5'-P-CCNC: 13 U/L (ref 0–70)
ANION GAP SERPL CALCULATED.3IONS-SCNC: 9 MMOL/L (ref 7–15)
AST SERPL W P-5'-P-CCNC: 21 U/L (ref 0–45)
BASOPHILS # BLD AUTO: 0 10E3/UL (ref 0–0.2)
BASOPHILS NFR BLD AUTO: 1 %
BILIRUB SERPL-MCNC: 0.4 MG/DL
BUN SERPL-MCNC: 7.9 MG/DL (ref 8–23)
CALCIUM SERPL-MCNC: 9 MG/DL (ref 8.8–10.2)
CHLORIDE SERPL-SCNC: 104 MMOL/L (ref 98–107)
CREAT SERPL-MCNC: 0.82 MG/DL (ref 0.67–1.17)
DEPRECATED HCO3 PLAS-SCNC: 32 MMOL/L (ref 22–29)
EGFRCR SERPLBLD CKD-EPI 2021: 90 ML/MIN/1.73M2
EOSINOPHIL # BLD AUTO: 0.3 10E3/UL (ref 0–0.7)
EOSINOPHIL NFR BLD AUTO: 8 %
ERYTHROCYTE [DISTWIDTH] IN BLOOD BY AUTOMATED COUNT: 16.9 % (ref 10–15)
GLUCOSE SERPL-MCNC: 104 MG/DL (ref 70–99)
HCT VFR BLD AUTO: 29.4 % (ref 40–53)
HGB BLD-MCNC: 9.1 G/DL (ref 13.3–17.7)
IMM GRANULOCYTES # BLD: 0 10E3/UL
IMM GRANULOCYTES NFR BLD: 0 %
LYMPHOCYTES # BLD AUTO: 1.3 10E3/UL (ref 0.8–5.3)
LYMPHOCYTES NFR BLD AUTO: 30 %
MAGNESIUM SERPL-MCNC: 1.4 MG/DL (ref 1.7–2.3)
MCH RBC QN AUTO: 24.2 PG (ref 26.5–33)
MCHC RBC AUTO-ENTMCNC: 31 G/DL (ref 31.5–36.5)
MCV RBC AUTO: 78 FL (ref 78–100)
MONOCYTES # BLD AUTO: 0.5 10E3/UL (ref 0–1.3)
MONOCYTES NFR BLD AUTO: 11 %
NEUTROPHILS # BLD AUTO: 2.2 10E3/UL (ref 1.6–8.3)
NEUTROPHILS NFR BLD AUTO: 50 %
NRBC # BLD AUTO: 0 10E3/UL
NRBC BLD AUTO-RTO: 1 /100
PLATELET # BLD AUTO: 213 10E3/UL (ref 150–450)
POTASSIUM SERPL-SCNC: 3.5 MMOL/L (ref 3.4–5.3)
PROT SERPL-MCNC: 6.7 G/DL (ref 6.4–8.3)
RBC # BLD AUTO: 3.76 10E6/UL (ref 4.4–5.9)
SODIUM SERPL-SCNC: 145 MMOL/L (ref 135–145)
WBC # BLD AUTO: 4.3 10E3/UL (ref 4–11)

## 2024-01-23 PROCEDURE — 250N000013 HC RX MED GY IP 250 OP 250 PS 637: Performed by: REGISTERED NURSE

## 2024-01-23 PROCEDURE — 80053 COMPREHEN METABOLIC PANEL: CPT | Performed by: REGISTERED NURSE

## 2024-01-23 PROCEDURE — 85004 AUTOMATED DIFF WBC COUNT: CPT | Performed by: REGISTERED NURSE

## 2024-01-23 PROCEDURE — 96413 CHEMO IV INFUSION 1 HR: CPT

## 2024-01-23 PROCEDURE — G0463 HOSPITAL OUTPT CLINIC VISIT: HCPCS | Performed by: REGISTERED NURSE

## 2024-01-23 PROCEDURE — 258N000003 HC RX IP 258 OP 636: Performed by: REGISTERED NURSE

## 2024-01-23 PROCEDURE — 250N000011 HC RX IP 250 OP 636: Performed by: REGISTERED NURSE

## 2024-01-23 PROCEDURE — 36415 COLL VENOUS BLD VENIPUNCTURE: CPT | Performed by: REGISTERED NURSE

## 2024-01-23 PROCEDURE — 83735 ASSAY OF MAGNESIUM: CPT

## 2024-01-23 PROCEDURE — 96375 TX/PRO/DX INJ NEW DRUG ADDON: CPT

## 2024-01-23 PROCEDURE — 96417 CHEMO IV INFUS EACH ADDL SEQ: CPT

## 2024-01-23 PROCEDURE — 99214 OFFICE O/P EST MOD 30 MIN: CPT | Performed by: REGISTERED NURSE

## 2024-01-23 RX ORDER — DIPHENHYDRAMINE HYDROCHLORIDE 50 MG/ML
50 INJECTION INTRAMUSCULAR; INTRAVENOUS
Status: CANCELLED
Start: 2024-01-23

## 2024-01-23 RX ORDER — HEPARIN SODIUM,PORCINE 10 UNIT/ML
5-20 VIAL (ML) INTRAVENOUS DAILY PRN
Status: CANCELLED | OUTPATIENT
Start: 2024-01-23

## 2024-01-23 RX ORDER — ALBUTEROL SULFATE 90 UG/1
1-2 AEROSOL, METERED RESPIRATORY (INHALATION)
Status: CANCELLED
Start: 2024-01-23

## 2024-01-23 RX ORDER — DIPHENHYDRAMINE HCL 25 MG
50 CAPSULE ORAL ONCE
Status: COMPLETED | OUTPATIENT
Start: 2024-01-23 | End: 2024-01-23

## 2024-01-23 RX ORDER — MEPERIDINE HYDROCHLORIDE 25 MG/ML
25 INJECTION INTRAMUSCULAR; INTRAVENOUS; SUBCUTANEOUS EVERY 30 MIN PRN
Status: CANCELLED | OUTPATIENT
Start: 2024-01-23

## 2024-01-23 RX ORDER — METHYLPREDNISOLONE SODIUM SUCCINATE 125 MG/2ML
125 INJECTION, POWDER, LYOPHILIZED, FOR SOLUTION INTRAMUSCULAR; INTRAVENOUS
Status: CANCELLED
Start: 2024-01-23

## 2024-01-23 RX ORDER — HEPARIN SODIUM (PORCINE) LOCK FLUSH IV SOLN 100 UNIT/ML 100 UNIT/ML
5 SOLUTION INTRAVENOUS
Status: CANCELLED | OUTPATIENT
Start: 2024-01-23

## 2024-01-23 RX ORDER — ALBUTEROL SULFATE 0.83 MG/ML
2.5 SOLUTION RESPIRATORY (INHALATION)
Status: CANCELLED | OUTPATIENT
Start: 2024-01-23

## 2024-01-23 RX ORDER — DIPHENHYDRAMINE HCL 25 MG
50 CAPSULE ORAL ONCE
Status: CANCELLED
Start: 2024-01-23

## 2024-01-23 RX ORDER — LORAZEPAM 2 MG/ML
0.5 INJECTION INTRAMUSCULAR EVERY 4 HOURS PRN
Status: CANCELLED | OUTPATIENT
Start: 2024-01-23

## 2024-01-23 RX ORDER — EPINEPHRINE 1 MG/ML
0.3 INJECTION, SOLUTION INTRAMUSCULAR; SUBCUTANEOUS EVERY 5 MIN PRN
Status: CANCELLED | OUTPATIENT
Start: 2024-01-23

## 2024-01-23 RX ORDER — MAGNESIUM SULFATE HEPTAHYDRATE 40 MG/ML
2 INJECTION, SOLUTION INTRAVENOUS ONCE
Status: COMPLETED | OUTPATIENT
Start: 2024-01-23 | End: 2024-01-23

## 2024-01-23 RX ADMIN — FAMOTIDINE 20 MG: 10 INJECTION, SOLUTION INTRAVENOUS at 09:11

## 2024-01-23 RX ADMIN — SODIUM CHLORIDE 250 ML: 9 INJECTION, SOLUTION INTRAVENOUS at 09:11

## 2024-01-23 RX ADMIN — PACLITAXEL 125 MG: 6 INJECTION, SOLUTION INTRAVENOUS at 09:56

## 2024-01-23 RX ADMIN — DEXAMETHASONE SODIUM PHOSPHATE: 10 INJECTION, SOLUTION INTRAMUSCULAR; INTRAVENOUS at 09:32

## 2024-01-23 RX ADMIN — CARBOPLATIN 200 MG: 10 INJECTION INTRAVENOUS at 11:10

## 2024-01-23 RX ADMIN — MAGNESIUM SULFATE 2 G: 2 INJECTION INTRAVENOUS at 09:51

## 2024-01-23 RX ADMIN — DIPHENHYDRAMINE HYDROCHLORIDE 50 MG: 25 CAPSULE ORAL at 09:08

## 2024-01-23 ASSESSMENT — PAIN SCALES - GENERAL: PAINLEVEL: EXTREME PAIN (8)

## 2024-01-23 NOTE — Clinical Note
1/23/2024         RE: Jonathan Bishop  [address]  [place] [address]        Dear Colleague,    Thank you for referring your patient, Jonathan Bishop, to the [facility]. Please see a copy of my visit note below.       [facility] CANCER [facility]    PATIENT NAME: Jonathan Bishop  MRN # [id number]   DATE OF VISIT: January 23, 2024 YOB: 1945     Otolaryngology: Dr. Karishma Eng  Radiation Oncology: Dr. Jenni Mills  PCP: Dr. Buck Nascimento    CANCER TYPE: SCC L tonsil, p16 +lety  STAGE: [id number] (IVC)  ECOG PS: 1    PD-L1: TPS 20%, CPS 25% on [id number] tonsil bx  NGS: N/A    SUMMARY  2/26/23 L tonsil mass bx in clinic (Dr. Eng).   2/20/23 PET/CT. 3.7 x 4.0 x 5.1 cm mass L palatine tonsil causing narrowing of the oropharyngeal lumen, L level 2 node, L retropharyngeal nodular density, extension of primary mass vs retropharyngeal node, 0.8 cm RLL nodule concerning for met, mild focal uptake R iliac bone and L1 without CT correlate suspicious for mets.   3/2/23 R VATS, wedge resection. Path: SCC, poorly differentiated, associated with necrosis, 1 cm, negative margins, p16 +lety  3/24/23 MRI L spine and pelvis. Diffusely heterogeneous marrow signal throughout without corresponding abnormal signal on sagittal STIR sequence and no abnormal enhancement. Nonspecific but could be benign process such as red marrow hyperplasia, cannot completely exclude metastatic disease or infiltrative pathologic marrow process. No lesions corresponding to FDG avid spots on PET/CT.   4/19~10/18/23 Pembrolizumab.  10/26/23 CT neck and CAP. Increased L palatine tonsil mass, 3.8 x 2.7 cm --> 4.7 x 3.5 cm. Increased bilateral cervical nodes up to 2.3 x 2.4 cm R level 2 node. New 3 mm RML nodule, no other mets.   11/14/23 PET. 4.6 x 3.3 cm L palatine tonsil mass (SUB 26.8), R level 2A and 2A/3 nodes. Questionable uptake distal R femoral medullary cavity, partially imaged, with  questionable subtle corresponding soft tissue attenuation on CT. MRI could be obtained to better evaluate.  11/21~12/2/23 [facility] for weakness/fall. COVID/paxlovid, MSSA bacteremia (1 of 2 bottles on 1 day), TTE negative, treated with cefazolin but didn't complete 2 week course, episode of unresponsiveness 12/2. Dispo plan was TCU on [facility], but left AMA. CTA chest negative for PE, showed grossly unchanged cervical adenopathy. Brain MRI 11/22 for stroke code negative for infarct. Showed SVID, moderate atrophy, suboptimal contrast bolus to examine intracranial structures, 4.5 x 3 x 4.5 cm L nasopharyngeal mass, incompletely visualized. CTA negative.   12/19/23 Quad shot fraction #1. No-showed thereafter.   1/10/23-current: carbo/taxol weekly    SUBJECTIVE  Conner is seen today prior to weekly carbo/taxol. Missed his second infusion last week due to transportation issues.  -Feels well, no fatigue  -Appetite increased, weight stable  -No nausea or dyspepsia symptoms following first carbo/taxol infusion  -No increase in numbness in tingling in hands or feet   -No weakness, feels stable on feet    PAST MEDICAL HISTORY  SCC as above  Chronic LBP  TAVR 2020  H/o rheumatic carditis 1950  CHF. 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 Nocturia once nightly   ED  Cataracts  Umbilical hernia repair 2011  Knee arthroplasty B 2010  Lumbar spine fusion, laminectomy, sciatic pain R > L  Peripheral neuropathy - from pinched nerves?  Hearing loss    Numbness/tingling in both feet - bilaterally, symmetric, R spasms more than left    CURRENT OUTPATIENT MEDICATIONS  Current Outpatient Medications   Medication    aspirin (ASA) 81 MG chewable tablet    atorvastatin (LIPITOR) 40 MG tablet    ibuprofen (ADVIL/MOTRIN) 600 MG tablet    multivitamin, therapeutic (THERA-VIT) TABS tablet    ondansetron (ZOFRAN) 8 MG tablet    order for DME    oxyCODONE-acetaminophen (PERCOCET) 5-325 MG tablet     prochlorperazine (COMPAZINE) 10 MG tablet    spironolactone (ALDACTONE) 50 MG tablet    tamsulosin (FLOMAX) 0.4 MG capsule    torsemide (DEMADEX) 20 MG tablet    vitamin D3 (CHOLECALCIFEROL) 50 mcg (2000 units) tablet     No current facility-administered medications for this visit.     Facility-Administered Medications Ordered in Other Visits   Medication    CARBOplatin 205 mg in sodium chloride 0.9 % 270.5 mL infusion    diphenhydrAMINE (BENADRYL) capsule 50 mg    famotidine (PEPCID) injection 20 mg    magnesium sulfate 2 g in 50 mL sterile water intermittent infusion    ondansetron (ZOFRAN) 8 mg, dexAMETHasone (DECADRON) 16 mg in sodium chloride 0.9 % 55.6 mL intermittent infusion    PACLitaxel (TAXOL) 125 mg in sodium chloride 0.9% in non-PVC container 270.83 mL infusion    sodium chloride 0.9% BOLUS 250 mL       ALLERGIES  No Known Allergies     PHYSICAL EXAM  /71 (BP Location: Right arm, Patient Position: Sitting, Cuff Size: Adult Regular)   Pulse 81   Temp 97.7  F (36.5  C) (Oral)   Resp 16   Wt 95.5 kg (210 lb 8 oz)   SpO2 98%   BMI 36.70 kg/m      General: Well-appearing male, NAD  Eyes: EOMI, PERRL. No scleral icterus.  ENT: Oral mucosa is moist. No lesions or thrush. Left tonsillar mass without erythema or erosion.   Lymphatic: Neck is supple without cervical or supraclavicular lymphadenopathy.   Cardiovascular: RRR, no m/g/r. +1 bilateral LE edema  Respiratory: CTA bilaterally. No wheezes or crackles.  Neurologic: No focal deficits.   Skin: No rashes, petechiae, or bruising noted on exposed skin.    LABORATORY AND IMAGING STUDIES  Most Recent 3 CBC's:  Recent Labs   Lab Test 01/23/24  0718 01/10/24  0905 12/27/23  1328   WBC 4.3 7.8 7.9   HGB 9.1* 9.8* 10.0*   MCV 78 79 80    273 211   ANEUTAUTO 2.2 4.2 5.0     Most Recent 3 BMP's:  Recent Labs   Lab Test 01/23/24  0718 01/10/24  0905 12/27/23  1328    144 141   POTASSIUM 3.5 3.0* 3.6   CHLORIDE 104 103 106   CO2 32* 31* 22   BUN  7.9* 11.8 8.3   CR 0.82 0.74 0.61*   ANIONGAP 9 10 13   WARREN 9.0 9.0 9.4   * 119* 89   PROTTOTAL 6.7 7.2 7.1   ALBUMIN 3.8 3.8 3.7    Most Recent 3 LFT's:  Recent Labs   Lab Test 01/23/24  0718 01/10/24  0905 12/27/23  1328   AST 21 25 29   ALT 13 9 12   ALKPHOS 87 82 89   BILITOTAL 0.4 0.4 0.4    Most Recent 2 TSH and T4:  Recent Labs   Lab Test 12/27/23  1328 10/18/23  0941   TSH 0.54 0.13*   T4 1.47 1.34     I reviewed the above labs today.    ASSESSMENT AND PLAN  SCC L tonsil, p16 +lety, CPS 25%/TPS 20%, oligometastatic lung met, +/- bone mets: Unable to tolerate radiation session, and after discussion with Dr. Mills, decided to pursue systemic therapy alone weith weekly carbo/taxol. Tolerated first infusion well. Slight decrease in hgb on labs today. Missed appointment last week but set up for weekly visits moving forward. Transportation issues sorted out.   -Proceed with carbo/taxol  -Weekly MARK visits with labs/infusion for close monitoring    Anorexia: Improved. Weight stable. May worsen with chemo moving forward. Reviewed calorically dense foods to focus on. Monitor weight trend weekly.    Hypomag, hypoK: K+ improved, stop replacement. Mg 1.4, replace again pp today. If low next week, consider daily home supplementation.    H/o CVA: Residual L weakness. Uses cane and walker at baseline, but independent and functional, albeit frail. Doesn't qualify for home care, but he would if he becomes homebound. Working on PCA application.    LE edema: Compression stockings when able, elevated legs. 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 Follows with PCP, Dr. Nascimento. As above, MTM scheduled.    Microcytic anemia: Iron studies 8/2022 fairly normal, normal 4/18/23. Recheck iron studies if MCV drops further.    Peripheral neuropathy: MRI shows a lot of foramenal stenosis and spinal canal stenosis, so more likely related to that than DM2.  Monitor for worsening with taxol    37 minutes spent on the date of the encounter doing chart review, review of test results, interpretation of tests, patient visit, and documentation     Vandana Bertrand CNP                Again, thank you for allowing me to participate in the care of your patient.        Sincerely,        Vandana Bertrand CNP

## 2024-01-23 NOTE — NURSING NOTE
Chief Complaint   Patient presents with    Blood Draw     Labs drawn via PIV placed by Vascular Access Team in lab     Labs drawn from PIV placed by Vascular Access Team. Line flushed with saline. Vitals taken. Pt checked in for appointment(s).     Mirta Aguirre RN

## 2024-01-23 NOTE — PROGRESS NOTES
Infusion Nursing Note:  Jonathan Bishop presents today for Cycle 1 Day 8 Paclitaxel/Carboplatin + IV Mag.    Patient seen by provider today: Yes: Vandana Bertrand CNP   present during visit today: Not Applicable.    Note: Pt presents to infusion today feeling well. Pt offers no new concerns following visit with provider today.    JOSSELIN Bertrand CNP/Zoila Garcia RN 1/23/24 @ 1000:  - Replace mag per protocol    Intravenous Access:  Peripheral IV placed.    Treatment Conditions:  Lab Results   Component Value Date    HGB 9.1 (L) 01/23/2024    WBC 4.3 01/23/2024    ANEU 3.9 10/28/2020    ANEUTAUTO 2.2 01/23/2024     01/23/2024        Lab Results   Component Value Date     01/23/2024    POTASSIUM 3.5 01/23/2024    MAG 1.4 (L) 01/23/2024    CR 0.82 01/23/2024    WARREN 9.0 01/23/2024    BILITOTAL 0.4 01/23/2024    ALBUMIN 3.8 01/23/2024    ALT 13 01/23/2024    AST 21 01/23/2024       Results reviewed, labs MET treatment parameters, ok to proceed with treatment.      Post Infusion Assessment:  Patient tolerated infusion without incident.  Blood return noted pre and post infusion.  Site patent and intact, free from redness, edema or discomfort.  No evidence of extravasations.  Access discontinued per protocol.     Discharge Plan:   Patient declined prescription refills.  Discharge instructions reviewed with: Patient.  Patient and/or family verbalized understanding of discharge instructions and all questions answered.  Copy of AVS reviewed with patient and/or family.  Patient will return 1/30/24 for next appointment.  Patient discharged in stable condition accompanied by: self.  Departure Mode: Ambulatory.      Zoila Garcia, RN

## 2024-01-23 NOTE — NURSING NOTE
"Oncology Rooming Note    January 23, 2024 7:49 AM   Jonathan Bishop is a 78 year old male who presents for:    Chief Complaint   Patient presents with    Blood Draw     Labs drawn via PIV placed by Vascular Access Team in lab    Oncology Clinic Visit     Squamous cell carcinoma of oropharynx     Initial Vitals: /71 (BP Location: Right arm, Patient Position: Sitting, Cuff Size: Adult Regular)   Pulse 81   Temp 97.7  F (36.5  C) (Oral)   Resp 16   Wt 95.5 kg (210 lb 8 oz)   SpO2 98%   BMI 36.70 kg/m   Estimated body mass index is 36.7 kg/m  as calculated from the following:    Height as of 12/7/23: 1.613 m (5' 3.5\").    Weight as of this encounter: 95.5 kg (210 lb 8 oz). Body surface area is 2.07 meters squared.  Extreme Pain (8) Comment: Data Unavailable   No LMP for male patient.  Allergies reviewed: Yes  Medications reviewed: Yes    Medications: Medication refills not needed today.  Pharmacy name entered into [employer]: [id number] - [place], [place] - [address] [facility]    Frailty Screening:   Is the patient here for a new oncology consult visit in cancer care? 2. No      Clinical concerns:        Maranda Graf CMA              "

## 2024-01-23 NOTE — TELEPHONE ENCOUNTER
Health Call Center    Phone Message    May a detailed message be left on voicemail: yes     Reason for Call: Medication Refill Request    Has the patient contacted the pharmacy for the refill? Yes   Name of medication being requested: oxyCODONE-acetaminophen (PERCOCET) 5-325 MG tablet   Provider who prescribed the medication: Dr. Sick  Pharmacy:   [facility] [address] [place] [id number] [facility]     Date medication is needed: 1/23/24

## 2024-01-23 NOTE — TELEPHONE ENCOUNTER
oxyCODONE-acetaminophen (PERCOCET) 5-325 MG tablet   Last Written Prescription Date:    Last Fill Quantity: 150,   # refills: 0  Last Office Visit : 12/7/2023  Future Office visit:  2/19/2024    Routing refill request to provider for review/approval because:  Drug not on the [facility], [facility] or [facility] refill protocol or controlled substance    Mayda Holland RN  Central Triage Red Flags/Med Refills

## 2024-01-23 NOTE — PATIENT INSTRUCTIONS
[facility] Triage and after hours / weekends / holidays:  [phone]    Please call the triage or after hours line if you experience a temperature greater than or equal to 100.4, shaking chills, have uncontrolled nausea, vomiting and/or diarrhea, dizziness, shortness of breath, chest pain, bleeding, unexplained bruising, or if you have any other new/concerning symptoms, questions or concerns.      If you are having any concerning symptoms or wish to speak to a provider before your next infusion visit, please call triage to notify them so we can adequately serve you.     If you need a refill on a narcotic prescription or other medication, please call before your infusion appointment.                January 2024 Sunday Monday Tuesday Wednesday Thursday Friday Saturday        1     2     3     4     5    LAB   2:00 PM   (20 min.)    LAB ONLY   [facility] 6       7     8     9     10    LAB PERIPHERAL   8:45 AM   (15 min.)   [facility] LAB DRAW   [facility]    RETURN ACTIVE TREATMENT   9:00 AM   (45 min.)   Olivia Sanchez CNP   [facility]    ONC INFUSION 2 HR (120 MIN)  10:00 AM   (120 min.)    ONC INFUSION NURSE   [facility] 11     12     13       14     15     16    LAB PERIPHERAL   7:45 AM   (15 min.)   [facility] DRAW   [facility]    RETURN ACTIVE TREATMENT   7:45 AM   (45 min.)   Vandana Bertrand CNP   [facility]    ONC INFUSION 2 HR (120 MIN)   8:30 AM   (120 min.)    ONC INFUSION NURSE   [facility] 17     18     19     20       21     22     23    LAB PERIPHERAL   7:15 AM   (15 min.)   [facility] DRAW   [facility]    RETURN ACTIVE TREATMENT   7:45 AM   (45 min.)   Vandana Bertrand CNP   [facility]    ONC INFUSION 2 HR (120 MIN)   9:00 AM    (120 min.)   [facility] ONC INFUSION NURSE   [facility] 24     25     26    NEW - MTM   8:30 AM   (60 min.)   Vlad Calhoun RPH   [facility] 27       28     29     30    LAB PERIPHERAL   9:30 AM   (15 min.)   [facility] DRAW   [facility]    RETURN ACTIVE TREATMENT   9:45 AM   (45 min.)   Vandana Bertrand CNP   [facility]    ONC INFUSION 2 HR (120 MIN)  11:00 AM   (120 min.)   [facility] ONC INFUSION NURSE   [facility] 31 February 2024 Sunday Monday Tuesday Wednesday Thursday Friday Saturday                       1     2     3       4     5     6    LAB PERIPHERAL  10:15 AM   (15 min.)   [facility] DRAW   [facility]    RETURN ACTIVE TREATMENT  10:45 AM   (45 min.)   Vandana Bertrand CNP   [facility]    ONC INFUSION 2 HR (120 MIN)  12:30 PM   (120 min.)   [facility] ONC INFUSION NURSE   [facility] 7     8     9     10       11     12     13    LAB PERIPHERAL   8:30 AM   (15 min.)    [facility] DRAW   [facility]    RETURN ACTIVE TREATMENT   8:45 AM   (45 min.)   Vandana Bertrand CNP   [facility]    ONC INFUSION 2 HR (120 MIN)  10:00 AM   (120 min.)   [facility] ONC INFUSION NURSE   [facility]    CT SOFT TISSUE NECK W  12:25 PM   (20 min.)   [email or web address]   [facility] [place] 14     15     16     17       18     19    [facility] RETURN   6:45 AM   (30 min.)   Buck Nascimento MD   [facility] 20     21    LAB PERIPHERAL  11:45 AM   (15 min.)   [facility] DRAW   [facility]    RETURN CCSL  12:00 PM   (30 min.)   Dominique Mueller MD   [facility]    ONC INFUSION 2 HR (120 MIN)    2:00 PM   (120 min.)    ONC INFUSION NURSE   [facility] 22     23     24       25     26     27    LAB PERIPHERAL  10:15 AM   (15 min.)   [facility] DRAW   [facility]    RETURN ACTIVE TREATMENT  10:45 AM   (45 min.)   Vandana Bertrand CNP   [facility]    ONC INFUSION 2 HR (120 MIN)  12:00 PM   (120 min.)    ONC INFUSION NURSE   [facility] 28     29                              Recent Results (from the past 24 hour(s))   Comprehensive metabolic panel    Collection Time: 01/23/24  7:18 AM   Result Value Ref Range    Sodium 145 135 - 145 mmol/L    Potassium 3.5 3.4 - 5.3 mmol/L    Carbon Dioxide (CO2) 32 (H) 22 - 29 mmol/L    Anion Gap 9 7 - 15 mmol/L    Urea Nitrogen 7.9 (L) 8.0 - 23.0 mg/dL    Creatinine 0.82 0.67 - 1.17 mg/dL    GFR Estimate 90 >60 mL/min/1.73m2    Calcium 9.0 8.8 - 10.2 mg/dL    Chloride 104 98 - 107 mmol/L    Glucose 104 (H) 70 - 99 mg/dL    Alkaline Phosphatase 87 40 - 150 U/L    AST 21 0 - 45 U/L    ALT 13 0 - 70 U/L    Protein Total 6.7 6.4 - 8.3 g/dL    Albumin 3.8 3.5 - 5.2 g/dL    Bilirubin Total 0.4 <=1.2 mg/dL   CBC with platelets and differential    Collection Time: 01/23/24  7:18 AM   Result Value Ref Range    WBC Count 4.3 4.0 - 11.0 10e3/uL    RBC Count 3.76 (L) 4.40 - 5.90 10e6/uL    Hemoglobin 9.1 (L) 13.3 - 17.7 g/dL    Hematocrit 29.4 (L) 40.0 - 53.0 %    MCV 78 78 - 100 fL    MCH 24.2 (L) 26.5 - 33.0 pg    MCHC 31.0 (L) 31.5 - 36.5 g/dL    RDW 16.9 (H) 10.0 - 15.0 %    Platelet Count 213 150 - 450 10e3/uL    % Neutrophils 50 %    % Lymphocytes 30 %    % Monocytes 11 %    % Eosinophils 8 %    % Basophils 1 %    % Immature Granulocytes 0 %    NRBCs per 100 WBC 1 (H) <1 /100    Absolute Neutrophils 2.2 1.6 - 8.3 10e3/uL    Absolute Lymphocytes 1.3 0.8 - 5.3 10e3/uL    Absolute Monocytes 0.5 0.0 - 1.3 10e3/uL    Absolute Eosinophils 0.3 0.0 - 0.7 10e3/uL     Absolute Basophils 0.0 0.0 - 0.2 10e3/uL    Absolute Immature Granulocytes 0.0 <=0.4 10e3/uL    Absolute NRBCs 0.0 10e3/uL   Magnesium    Collection Time: 01/23/24  7:18 AM   Result Value Ref Range    Magnesium 1.4 (L) 1.7 - 2.3 mg/dL

## 2024-01-24 ENCOUNTER — PATIENT OUTREACH (OUTPATIENT)
Dept: CARE COORDINATION | Facility: CLINIC | Age: 79
End: 2024-01-24
Payer: COMMERCIAL

## 2024-01-24 RX ORDER — OXYCODONE AND ACETAMINOPHEN 5; 325 MG/1; MG/1
1 TABLET ORAL EVERY 6 HOURS PRN
Qty: 150 TABLET | Refills: 0 | Status: SHIPPED | OUTPATIENT
Start: 2024-01-24 | End: 2024-02-27

## 2024-01-24 NOTE — PROGRESS NOTES
Social Work - Follow-Up  [facility]    Data/Intervention:    Patient Name: Jonathan Bishop Goes By: Conner JORGENSENB/Age: 1945 (78 year old)    Reason for Follow-Up:  Supportive resources    Collaborated With:    -patient  -Ashley Garza [phone] Alternative care      Intervention/Education/Resources Provided:  24  SW met with patient on 24 and completed a metro mobility application to assist with patient transportation needs to appointments as well as getting around the community. SW also reviewed patient's current transportation resources Help at your door 1 ride per a week and [facility] transportation. SW provided paper copies to patient with instructions of how to set up rides with these services and contact information to schedule needed rides. SW reviewed this information with patient, patient expressed understanding and agreed with plan to use these resources to get to future appointments. During this meeting patient discussed some help needed at home regarding assistance with getting dressed, showering, light house work and getting around the community for gorcery shopping. Patient also mentioned they had previously requested a basket for their walker from their alternative care casemanager. Pateint wondered if their alternative  was working on these things and what the status was on this.     24  SW called and spoke with patient's alternative  who reported patient was assigned an ICL[employer] worker who will assist patient with compression stockings, light house work and help with getting patient out into the community. Ashley alternative care  reported there was an issue with the paperwork due to the company of the ICLS worker recently changing the company name. This has created a stepan in getting the ICLS worker services approved. The [employer] company is working to address this and the alternative care  will continue  to work with patient on updates of when their ICLS worker will being working with them.     Ashley mentioned patient asked for help at your door to be removed from their service plan as this put them at their max for services they can receive and get assistance with. FEI discussed there will be an increase in rides needed for patient to their appointments and suggested this be add back on until metro mobility can be approved.   Ashley agreed to follow up with patient about this and stated they could get this service added back on within this week.       FEI checked in about basket for patient walker. Ashley stated this was ordered and should be delivered to patient within this month.       FEI called and spoke with patient providing them these updates and discussed keeping help at your door in their service plan until metro mobility is approved. Patient was on board with this.     Assessment/Plan:  Patient's alternative care manager (Ashley) will reach out some time this week to review all the above with patient and get help at your door added back on to patient's service plan.     FEI will update patient's care team with above information.     Previously provided patient/family with writer's contact information and availability.      Earlene BAEZ, [facility] HospitalSW  - Oncology  Phone : [phone]  Pager: [phone]

## 2024-01-25 NOTE — PROGRESS NOTES
[facility] CANCER [facility]    PATIENT NAME: Jonathan Bishop  MRN # [id number]   DATE OF VISIT: January 30, 2024 YOB: 1945     Otolaryngology: Dr. Karishma Eng  Radiation Oncology: Dr. Jenni Mills  PCP: Dr. Buck Nascimento    CANCER TYPE: SCC L tonsil, p16 +lety  STAGE: [id number] (IVC)  ECOG PS: 1    PD-L1: TPS 20%, CPS 25% on [id number] tonsil bx  NGS: N/A    SUMMARY  2/26/23 L tonsil mass bx in clinic (Dr. Eng).   2/20/23 PET/CT. 3.7 x 4.0 x 5.1 cm mass L palatine tonsil causing narrowing of the oropharyngeal lumen, L level 2 node, L retropharyngeal nodular density, extension of primary mass vs retropharyngeal node, 0.8 cm RLL nodule concerning for met, mild focal uptake R iliac bone and L1 without CT correlate suspicious for mets.   3/2/23 R VATS, wedge resection. Path: SCC, poorly differentiated, associated with necrosis, 1 cm, negative margins, p16 +lety  3/24/23 MRI L spine and pelvis. Diffusely heterogeneous marrow signal throughout without corresponding abnormal signal on sagittal STIR sequence and no abnormal enhancement. Nonspecific but could be benign process such as red marrow hyperplasia, cannot completely exclude metastatic disease or infiltrative pathologic marrow process. No lesions corresponding to FDG avid spots on PET/CT.   4/19~10/18/23 Pembrolizumab.  10/26/23 CT neck and CAP. Increased L palatine tonsil mass, 3.8 x 2.7 cm --> 4.7 x 3.5 cm. Increased bilateral cervical nodes up to 2.3 x 2.4 cm R level 2 node. New 3 mm RML nodule, no other mets.   11/14/23 PET. 4.6 x 3.3 cm L palatine tonsil mass (SUB 26.8), R level 2A and 2A/3 nodes. Questionable uptake distal R femoral medullary cavity, partially imaged, with questionable subtle corresponding soft tissue attenuation on CT. MRI could be obtained to better evaluate.  11/21~12/2/23 [facility] for weakness/fall. COVID/paxlovid, MSSA bacteremia (1 of 2 bottles on 1 day), TTE negative, treated with cefazolin but didn't complete 2 week  course, episode of unresponsiveness 12/2. Dispo plan was TCU on [facility], but left AMA. CTA chest negative for PE, showed grossly unchanged cervical adenopathy. Brain MRI 11/22 for stroke code negative for infarct. Showed SVID, moderate atrophy, suboptimal contrast bolus to examine intracranial structures, 4.5 x 3 x 4.5 cm L nasopharyngeal mass, incompletely visualized. CTA negative.   12/19/23 Quad shot fraction #1. No-showed thereafter.   1/10/23-current: carbo/taxol weekly    SUBJECTIVE  Conner is seen today prior to weekly carbo/taxol.  -More fatigued today, not a constant issue. Didn't sleep well last night  -No increase in LE edema  -Intermittent n/t in bilateral legs, R > L. Worse at night. Doesn't last long and not terribly bothersome. No gait changes  -No nausea  -Appetite/intake good. Drinks 4 bottles of Gatorade/day  -No urinary or bowel concerns  -No shortness of breath or cough     PAST MEDICAL HISTORY  SCC as above  Chronic LBP  TAVR 2020  H/o rheumatic carditis 1950  CHF. 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 Nocturia once nightly   ED  Cataracts  Umbilical hernia repair 2011  Knee arthroplasty B 2010  Lumbar spine fusion, laminectomy, sciatic pain R > L  Peripheral neuropathy - from pinched nerves?  Hearing loss    Numbness/tingling in both feet - bilaterally, symmetric, R spasms more than left    CURRENT OUTPATIENT MEDICATIONS  Current Outpatient Medications   Medication    aspirin (ASA) 81 MG chewable tablet    atorvastatin (LIPITOR) 40 MG tablet    ibuprofen (ADVIL/MOTRIN) 600 MG tablet    multivitamin, therapeutic (THERA-VIT) TABS tablet    ondansetron (ZOFRAN) 8 MG tablet    order for DME    oxyCODONE-acetaminophen (PERCOCET) 5-325 MG tablet    prochlorperazine (COMPAZINE) 10 MG tablet    spironolactone (ALDACTONE) 50 MG tablet    tamsulosin (FLOMAX) 0.4 MG capsule    torsemide (DEMADEX) 20 MG tablet    vitamin D3 (CHOLECALCIFEROL) 50 mcg (2000  units) tablet     No current facility-administered medications for this visit.       ALLERGIES  No Known Allergies     PHYSICAL EXAM  /74   Pulse 85   Temp 98.3  F (36.8  C) (Oral)   Resp 18   Wt 95.1 kg (209 lb 9.6 oz)   SpO2 99%   BMI 36.55 kg/m      General: Well-appearing male, NAD  Eyes: EOMI, PERRL. No scleral icterus.  ENT: Oral mucosa is moist. No lesions or thrush. Left tonsillar mass without erythema or erosion.   Lymphatic: Neck is supple without cervical or supraclavicular lymphadenopathy.   Cardiovascular: RRR, no m/g/r. +1 bilateral LE edema  Respiratory: CTA bilaterally. No wheezes or crackles.  Neurologic: No focal deficits.   Skin: No rashes, petechiae, or bruising noted on exposed skin.    LABORATORY AND IMAGING STUDIES  Most Recent 3 CBC's:  Recent Labs   Lab Test 01/30/24  0701 01/23/24  0718 01/10/24  0905   WBC 5.7 4.3 7.8   HGB 9.3* 9.1* 9.8*   MCV 77* 78 79    213 273   ANEUTAUTO 3.0 2.2 4.2     Most Recent 3 BMP's:  Recent Labs   Lab Test 01/30/24  0701 01/23/24  0718 01/10/24  0905    145 144   POTASSIUM 4.1 3.5 3.0*   CHLORIDE 105 104 103   CO2 26 32* 31*   BUN 12.9 7.9* 11.8   CR 0.86 0.82 0.74   ANIONGAP 12 9 10   WARREN 9.4 9.0 9.0   * 104* 119*   PROTTOTAL 7.2 6.7 7.2   ALBUMIN 4.0 3.8 3.8    Most Recent 3 LFT's:  Recent Labs   Lab Test 01/30/24  0701 01/23/24  0718 01/10/24  0905   AST 21 21 25   ALT 13 13 9   ALKPHOS 89 87 82   BILITOTAL 0.4 0.4 0.4    Most Recent 2 TSH and T4:  Recent Labs   Lab Test 12/27/23  1328 10/18/23  0941   TSH 0.54 0.13*   T4 1.47 1.34     I reviewed the above labs today.    ASSESSMENT AND PLAN  SCC L tonsil, p16 +lety, CPS 25%/TPS 20%, oligometastatic lung met, +/- bone mets: Unable to tolerate radiation session, and after discussion with Dr. Mills, decided to pursue systemic therapy alone weith weekly carbo/taxol. Tolerating infusions well, slight increase in LE neuropathy but G1/mild. Hgb stable.   -Proceed with  carbo/taxol  -Weekly MARK visits with labs/infusion for close monitoring    Anorexia: Improved. Weight stable. May worsen with chemo moving forward.    Hypomag, hypoK: Mg added to labs today. Replaced pp the past 2 weeks. If remains low, will add daily supplement.     Update: Mg 1.5 today. Begin max oxide 400 mg daily.    H/o CVA: Residual L weakness. Uses cane and walker at baseline, but independent and functional, albeit frail. Doesn't qualify for home care, but he would if he becomes homebound. Working on PCA application.    LE edema: Compression stockings when able, elevated legs. 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 Follows with PCP, Dr. Nascimento. As above, MTM scheduled.    Microcytic anemia: Iron studies 8/2022 fairly normal, normal 4/18/23. MCV lower at 77 today, will repeat iron studies.    Peripheral neuropathy: MRI shows a lot of foramenal stenosis and spinal canal stenosis, so more likely related to that than DM2. Slightly worse this past week with taxol. Watch closely and adjust dose/frequency if needed.     30 minutes spent on the date of the encounter doing chart review, review of test results, interpretation of tests, patient visit, and documentation     Vandana Bertrand, CNP

## 2024-01-28 DIAGNOSIS — R60.0 PERIPHERAL EDEMA: ICD-10-CM

## 2024-01-30 ENCOUNTER — INFUSION THERAPY VISIT (OUTPATIENT)
Dept: ONCOLOGY | Facility: CLINIC | Age: 79
End: 2024-01-30
Attending: REGISTERED NURSE
Payer: COMMERCIAL

## 2024-01-30 ENCOUNTER — LAB (OUTPATIENT)
Dept: LAB | Facility: CLINIC | Age: 79
End: 2024-01-30
Attending: REGISTERED NURSE
Payer: COMMERCIAL

## 2024-01-30 VITALS
BODY MASS INDEX: 36.55 KG/M2 | TEMPERATURE: 98.3 F | HEART RATE: 85 BPM | WEIGHT: 209.6 LBS | OXYGEN SATURATION: 99 % | DIASTOLIC BLOOD PRESSURE: 74 MMHG | SYSTOLIC BLOOD PRESSURE: 135 MMHG | RESPIRATION RATE: 18 BRPM

## 2024-01-30 DIAGNOSIS — E83.42 HYPOMAGNESEMIA: ICD-10-CM

## 2024-01-30 DIAGNOSIS — G60.9 IDIOPATHIC PERIPHERAL NEUROPATHY: ICD-10-CM

## 2024-01-30 DIAGNOSIS — C10.9 SQUAMOUS CELL CARCINOMA OF OROPHARYNX (H): ICD-10-CM

## 2024-01-30 DIAGNOSIS — C09.9 TONSIL CANCER (H): ICD-10-CM

## 2024-01-30 DIAGNOSIS — R60.0 BILATERAL LEG EDEMA: ICD-10-CM

## 2024-01-30 DIAGNOSIS — C78.00 MALIGNANT NEOPLASM METASTATIC TO LUNG, UNSPECIFIED LATERALITY (H): Primary | ICD-10-CM

## 2024-01-30 LAB
ALBUMIN SERPL BCG-MCNC: 4 G/DL (ref 3.5–5.2)
ALP SERPL-CCNC: 89 U/L (ref 40–150)
ALT SERPL W P-5'-P-CCNC: 13 U/L (ref 0–70)
ANION GAP SERPL CALCULATED.3IONS-SCNC: 12 MMOL/L (ref 7–15)
AST SERPL W P-5'-P-CCNC: 21 U/L (ref 0–45)
BASOPHILS # BLD AUTO: 0 10E3/UL (ref 0–0.2)
BASOPHILS NFR BLD AUTO: 1 %
BILIRUB SERPL-MCNC: 0.4 MG/DL
BUN SERPL-MCNC: 12.9 MG/DL (ref 8–23)
CALCIUM SERPL-MCNC: 9.4 MG/DL (ref 8.8–10.2)
CHLORIDE SERPL-SCNC: 105 MMOL/L (ref 98–107)
CREAT SERPL-MCNC: 0.86 MG/DL (ref 0.67–1.17)
DEPRECATED HCO3 PLAS-SCNC: 26 MMOL/L (ref 22–29)
EGFRCR SERPLBLD CKD-EPI 2021: 89 ML/MIN/1.73M2
EOSINOPHIL # BLD AUTO: 0.2 10E3/UL (ref 0–0.7)
EOSINOPHIL NFR BLD AUTO: 3 %
ERYTHROCYTE [DISTWIDTH] IN BLOOD BY AUTOMATED COUNT: 16.7 % (ref 10–15)
GLUCOSE SERPL-MCNC: 107 MG/DL (ref 70–99)
HCT VFR BLD AUTO: 29.7 % (ref 40–53)
HGB BLD-MCNC: 9.3 G/DL (ref 13.3–17.7)
IMM GRANULOCYTES # BLD: 0 10E3/UL
IMM GRANULOCYTES NFR BLD: 0 %
LYMPHOCYTES # BLD AUTO: 2.3 10E3/UL (ref 0.8–5.3)
LYMPHOCYTES NFR BLD AUTO: 39 %
MAGNESIUM SERPL-MCNC: 1.5 MG/DL (ref 1.7–2.3)
MCH RBC QN AUTO: 24.2 PG (ref 26.5–33)
MCHC RBC AUTO-ENTMCNC: 31.3 G/DL (ref 31.5–36.5)
MCV RBC AUTO: 77 FL (ref 78–100)
MONOCYTES # BLD AUTO: 0.3 10E3/UL (ref 0–1.3)
MONOCYTES NFR BLD AUTO: 5 %
NEUTROPHILS # BLD AUTO: 3 10E3/UL (ref 1.6–8.3)
NEUTROPHILS NFR BLD AUTO: 52 %
NRBC # BLD AUTO: 0 10E3/UL
NRBC BLD AUTO-RTO: 0 /100
PLATELET # BLD AUTO: 280 10E3/UL (ref 150–450)
POTASSIUM SERPL-SCNC: 4.1 MMOL/L (ref 3.4–5.3)
PROT SERPL-MCNC: 7.2 G/DL (ref 6.4–8.3)
RBC # BLD AUTO: 3.84 10E6/UL (ref 4.4–5.9)
SODIUM SERPL-SCNC: 143 MMOL/L (ref 135–145)
WBC # BLD AUTO: 5.7 10E3/UL (ref 4–11)

## 2024-01-30 PROCEDURE — 36415 COLL VENOUS BLD VENIPUNCTURE: CPT | Performed by: REGISTERED NURSE

## 2024-01-30 PROCEDURE — 80053 COMPREHEN METABOLIC PANEL: CPT | Performed by: REGISTERED NURSE

## 2024-01-30 PROCEDURE — 83735 ASSAY OF MAGNESIUM: CPT

## 2024-01-30 PROCEDURE — 85025 COMPLETE CBC W/AUTO DIFF WBC: CPT | Performed by: REGISTERED NURSE

## 2024-01-30 PROCEDURE — 96375 TX/PRO/DX INJ NEW DRUG ADDON: CPT

## 2024-01-30 PROCEDURE — 250N000011 HC RX IP 250 OP 636: Performed by: REGISTERED NURSE

## 2024-01-30 PROCEDURE — 96417 CHEMO IV INFUS EACH ADDL SEQ: CPT

## 2024-01-30 PROCEDURE — G0463 HOSPITAL OUTPT CLINIC VISIT: HCPCS | Performed by: REGISTERED NURSE

## 2024-01-30 PROCEDURE — 258N000003 HC RX IP 258 OP 636: Performed by: REGISTERED NURSE

## 2024-01-30 PROCEDURE — 99214 OFFICE O/P EST MOD 30 MIN: CPT | Performed by: REGISTERED NURSE

## 2024-01-30 PROCEDURE — 250N000013 HC RX MED GY IP 250 OP 250 PS 637: Performed by: REGISTERED NURSE

## 2024-01-30 PROCEDURE — 96413 CHEMO IV INFUSION 1 HR: CPT

## 2024-01-30 RX ORDER — ALBUTEROL SULFATE 0.83 MG/ML
2.5 SOLUTION RESPIRATORY (INHALATION)
Status: CANCELLED | OUTPATIENT
Start: 2024-01-30

## 2024-01-30 RX ORDER — LORAZEPAM 2 MG/ML
0.5 INJECTION INTRAMUSCULAR EVERY 4 HOURS PRN
Status: CANCELLED | OUTPATIENT
Start: 2024-01-30

## 2024-01-30 RX ORDER — HEPARIN SODIUM,PORCINE 10 UNIT/ML
5-20 VIAL (ML) INTRAVENOUS DAILY PRN
Status: CANCELLED | OUTPATIENT
Start: 2024-01-30

## 2024-01-30 RX ORDER — EPINEPHRINE 1 MG/ML
0.3 INJECTION, SOLUTION INTRAMUSCULAR; SUBCUTANEOUS EVERY 5 MIN PRN
Status: CANCELLED | OUTPATIENT
Start: 2024-01-30

## 2024-01-30 RX ORDER — MAGNESIUM OXIDE 400 MG/1
400 TABLET ORAL DAILY
Qty: 30 TABLET | Refills: 0 | Status: SHIPPED | OUTPATIENT
Start: 2024-01-30 | End: 2024-06-12

## 2024-01-30 RX ORDER — HEPARIN SODIUM (PORCINE) LOCK FLUSH IV SOLN 100 UNIT/ML 100 UNIT/ML
5 SOLUTION INTRAVENOUS
Status: CANCELLED | OUTPATIENT
Start: 2024-01-30

## 2024-01-30 RX ORDER — DIPHENHYDRAMINE HYDROCHLORIDE 50 MG/ML
50 INJECTION INTRAMUSCULAR; INTRAVENOUS
Status: CANCELLED
Start: 2024-01-30

## 2024-01-30 RX ORDER — DIPHENHYDRAMINE HCL 25 MG
50 CAPSULE ORAL ONCE
Status: COMPLETED | OUTPATIENT
Start: 2024-01-30 | End: 2024-01-30

## 2024-01-30 RX ORDER — MEPERIDINE HYDROCHLORIDE 25 MG/ML
25 INJECTION INTRAMUSCULAR; INTRAVENOUS; SUBCUTANEOUS EVERY 30 MIN PRN
Status: CANCELLED | OUTPATIENT
Start: 2024-01-30

## 2024-01-30 RX ORDER — ALBUTEROL SULFATE 90 UG/1
1-2 AEROSOL, METERED RESPIRATORY (INHALATION)
Status: CANCELLED
Start: 2024-01-30

## 2024-01-30 RX ORDER — METHYLPREDNISOLONE SODIUM SUCCINATE 125 MG/2ML
125 INJECTION, POWDER, LYOPHILIZED, FOR SOLUTION INTRAMUSCULAR; INTRAVENOUS
Status: CANCELLED
Start: 2024-01-30

## 2024-01-30 RX ORDER — DIPHENHYDRAMINE HCL 25 MG
50 CAPSULE ORAL ONCE
Status: CANCELLED
Start: 2024-01-30

## 2024-01-30 RX ADMIN — PACLITAXEL 125 MG: 6 INJECTION, SOLUTION INTRAVENOUS at 09:26

## 2024-01-30 RX ADMIN — DIPHENHYDRAMINE HYDROCHLORIDE 50 MG: 25 CAPSULE ORAL at 08:30

## 2024-01-30 RX ADMIN — FAMOTIDINE 20 MG: 10 INJECTION INTRAVENOUS at 08:30

## 2024-01-30 RX ADMIN — SODIUM CHLORIDE 250 ML: 9 INJECTION, SOLUTION INTRAVENOUS at 08:30

## 2024-01-30 RX ADMIN — DEXAMETHASONE SODIUM PHOSPHATE: 10 INJECTION, SOLUTION INTRAMUSCULAR; INTRAVENOUS at 08:33

## 2024-01-30 RX ADMIN — CARBOPLATIN 200 MG: 10 INJECTION INTRAVENOUS at 10:33

## 2024-01-30 ASSESSMENT — PAIN SCALES - GENERAL: PAINLEVEL: EXTREME PAIN (9)

## 2024-01-30 NOTE — Clinical Note
1/30/2024         RE: Jonathan Bishop  [address]  [facility] [address]        Dear Colleague,    Thank you for referring your patient, Jonathan Bishop, to the [facility]. Please see a copy of my visit note below.       [facility] CANCER [facility]    PATIENT NAME: Jonathan Bishop  MRN # [id number]   DATE OF VISIT: January 30, 2024 YOB: 1945     Otolaryngology: Dr. Karishma Eng  Radiation Oncology: Dr. Jenni Mills  PCP: Dr. Buck Nascimento    CANCER TYPE: SCC L tonsil, p16 +lety  STAGE: [id number] (IVC)  ECOG PS: 1    PD-L1: TPS 20%, CPS 25% on [id number] tonsil bx  NGS: N/A    SUMMARY  2/26/23 L tonsil mass bx in clinic (Dr. Eng).   2/20/23 PET/CT. 3.7 x 4.0 x 5.1 cm mass L palatine tonsil causing narrowing of the oropharyngeal lumen, L level 2 node, L retropharyngeal nodular density, extension of primary mass vs retropharyngeal node, 0.8 cm RLL nodule concerning for met, mild focal uptake R iliac bone and L1 without CT correlate suspicious for mets.   3/2/23 R VATS, wedge resection. Path: SCC, poorly differentiated, associated with necrosis, 1 cm, negative margins, p16 +lety  3/24/23 MRI L spine and pelvis. Diffusely heterogeneous marrow signal throughout without corresponding abnormal signal on sagittal STIR sequence and no abnormal enhancement. Nonspecific but could be benign process such as red marrow hyperplasia, cannot completely exclude metastatic disease or infiltrative pathologic marrow process. No lesions corresponding to FDG avid spots on PET/CT.   4/19~10/18/23 Pembrolizumab.  10/26/23 CT neck and CAP. Increased L palatine tonsil mass, 3.8 x 2.7 cm --> 4.7 x 3.5 cm. Increased bilateral cervical nodes up to 2.3 x 2.4 cm R level 2 node. New 3 mm RML nodule, no other mets.   11/14/23 PET. 4.6 x 3.3 cm L palatine tonsil mass (SUB 26.8), R level 2A and 2A/3 nodes. Questionable uptake distal R femoral medullary cavity, partially imaged, with  questionable subtle corresponding soft tissue attenuation on CT. MRI could be obtained to better evaluate.  11/21~12/2/23 [facility] for weakness/fall. COVID/paxlovid, MSSA bacteremia (1 of 2 bottles on 1 day), TTE negative, treated with cefazolin but didn't complete 2 week course, episode of unresponsiveness 12/2. [place] [facility] [place]. CTA chest negative for PE, showed grossly unchanged cervical adenopathy. Brain MRI 11/22 for stroke code negative for infarct. Showed SVID, moderate atrophy, suboptimal contrast bolus to examine intracranial structures, 4.5 x 3 x 4.5 cm L nasopharyngeal mass, incompletely visualized. CTA negative.   12/19/23 Quad shot fraction #1. No-showed thereafter.   1/10/23-current: carbo/taxol weekly    SUBJECTIVE  Conner is seen today prior to weekly carbo/taxol.  -More fatigued today, not a constant issue. Didn't sleep well last night  -No increase in LE edema  -Intermittent n/t in bilateral legs, R > L. Worse at night. Doesn't last long and not terribly bothersome. No gait changes  -No nausea  -Appetite/intake good. Drinks 4 bottles of Gatorade/day  -No urinary or bowel concerns  -No shortness of breath or cough     PAST MEDICAL HISTORY  SCC as above  Chronic LBP  TAVR 2020  H/o rheumatic carditis 1950  CHF. 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 Nocturia once nightly   ED  Cataracts  Umbilical hernia repair 2011  Knee arthroplasty B 2010  Lumbar spine fusion, laminectomy, sciatic pain R > L  Peripheral neuropathy - from pinched nerves?  Hearing loss    Numbness/tingling in both feet - bilaterally, symmetric, R spasms more than left    CURRENT OUTPATIENT MEDICATIONS  Current Outpatient Medications   Medication    aspirin (ASA) 81 MG chewable tablet    atorvastatin (LIPITOR) 40 MG tablet    ibuprofen (ADVIL/MOTRIN) 600 MG tablet    multivitamin, therapeutic (THERA-VIT) TABS tablet    ondansetron (ZOFRAN) 8 MG tablet     order for DME    oxyCODONE-acetaminophen (PERCOCET) 5-325 MG tablet    prochlorperazine (COMPAZINE) 10 MG tablet    spironolactone (ALDACTONE) 50 MG tablet    tamsulosin (FLOMAX) 0.4 MG capsule    torsemide (DEMADEX) 20 MG tablet    vitamin D3 (CHOLECALCIFEROL) 50 mcg (2000 units) tablet     No current facility-administered medications for this visit.     Facility-Administered Medications Ordered in Other Visits   Medication    CARBOplatin 200 mg in sodium chloride 0.9 % 295 mL infusion    PACLitaxel (TAXOL) 125 mg in sodium chloride 0.9% in non-PVC container 295.83 mL infusion       ALLERGIES  No Known Allergies     PHYSICAL EXAM  /74   Pulse 85   Temp 98.3  F (36.8  C) (Oral)   Resp 18   Wt 95.1 kg (209 lb 9.6 oz)   SpO2 99%   BMI 36.55 kg/m      General: Well-appearing male, NAD  Eyes: EOMI, PERRL. No scleral icterus.  ENT: Oral mucosa is moist. No lesions or thrush. Left tonsillar mass without erythema or erosion.   Lymphatic: Neck is supple without cervical or supraclavicular lymphadenopathy.   Cardiovascular: RRR, no m/g/r. +1 bilateral LE edema  Respiratory: CTA bilaterally. No wheezes or crackles.  Neurologic: No focal deficits.   Skin: No rashes, petechiae, or bruising noted on exposed skin.    LABORATORY AND IMAGING STUDIES  Most Recent 3 CBC's:  Recent Labs   Lab Test 01/30/24  0701 01/23/24  0718 01/10/24  0905   WBC 5.7 4.3 7.8   HGB 9.3* 9.1* 9.8*   MCV 77* 78 79    213 273   ANEUTAUTO 3.0 2.2 4.2     Most Recent 3 BMP's:  Recent Labs   Lab Test 01/30/24  0701 01/23/24  0718 01/10/24  0905    145 144   POTASSIUM 4.1 3.5 3.0*   CHLORIDE 105 104 103   CO2 26 32* 31*   BUN 12.9 7.9* 11.8   CR 0.86 0.82 0.74   ANIONGAP 12 9 10   WARREN 9.4 9.0 9.0   * 104* 119*   PROTTOTAL 7.2 6.7 7.2   ALBUMIN 4.0 3.8 3.8    Most Recent 3 LFT's:  Recent Labs   Lab Test 01/30/24  0701 01/23/24  0718 01/10/24  0905   AST 21 21 25   ALT 13 13 9   ALKPHOS 89 87 82   BILITOTAL 0.4 0.4 0.4    Most  Recent 2 TSH and T4:  Recent Labs   Lab Test 12/27/23  1328 10/18/23  0941   TSH 0.54 0.13*   T4 1.47 1.34     I reviewed the above labs today.    ASSESSMENT AND PLAN  SCC L tonsil, p16 +lety, CPS 25%/TPS 20%, oligometastatic lung met, +/- bone mets: Unable to tolerate radiation session, and after discussion with Dr. Mills, decided to pursue systemic therapy alone weith weekly carbo/taxol. Tolerating infusions well, slight increase in LE neuropathy but G1/mild. Hgb stable.   -Proceed with carbo/taxol  -Weekly MARK visits with labs/infusion for close monitoring    Anorexia: Improved. Weight stable. May worsen with chemo moving forward.    Hypomag, hypoK: Mg added to labs today. Replaced pp the past 2 weeks. If remains low, will add daily supplement.     H/o CVA: Residual L weakness. Uses cane and walker at baseline, but independent and functional, albeit frail. Doesn't qualify for home care, but he would if he becomes homebound. Working on PCA application.    LE edema: Compression stockings when able, elevated legs. 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 Follows with PCP, Dr. Nascimento. As above, MTM scheduled.    Microcytic anemia: Iron studies 8/2022 fairly normal, normal 4/18/23. MCV lower at 77 today, will repeat iron studies.    Peripheral neuropathy: MRI shows a lot of foramenal stenosis and spinal canal stenosis, so more likely related to that than DM2. Slightly worse this past week with taxol. Watch closely and adjust dose/frequency if needed.     30 minutes spent on the date of the encounter doing chart review, review of test results, interpretation of tests, patient visit, and documentation     Vandana Bertrand CNP                Again, thank you for allowing me to participate in the care of your patient.        Sincerely,        Vandana Bertrand, YOANNA

## 2024-01-30 NOTE — NURSING NOTE
"Oncology Rooming Note    January 30, 2024 7:26 AM   Jonathan Bishop is a 78 year old male who presents for:    Chief Complaint   Patient presents with    Blood Draw     Labs drawn via  by RN in lab. VS taken.     Oncology Clinic Visit     Malignant neoplasm metastatic to lung, unspecified laterality     Initial Vitals: /74   Pulse 85   Temp 98.3  F (36.8  C) (Oral)   Resp 18   Wt 95.1 kg (209 lb 9.6 oz)   SpO2 99%   BMI 36.55 kg/m   Estimated body mass index is 36.55 kg/m  as calculated from the following:    Height as of 12/7/23: 1.613 m (5' 3.5\").    Weight as of this encounter: 95.1 kg (209 lb 9.6 oz). Body surface area is 2.06 meters squared.  Extreme Pain (9) Comment: Data Unavailable   No LMP for male patient.  Allergies reviewed: Yes  Medications reviewed: Yes    Medications: Medication refills not needed today.  Pharmacy name entered into [employer]: [facility] - [place], [place] - [address] [facility]    Frailty Screening:   Is the patient here for a new oncology consult visit in cancer care? 2. No      Clinical concerns: None       aKt Jones CMA            "

## 2024-01-30 NOTE — PROGRESS NOTES
Infusion Nursing Note:  Jonathan Bishop presents today for Cycle 1, day 15 Taxol and Carboplatin.    Patient seen by provider today: Yes: Vandana Bertrand NP    Note: Patient presents to clinic today feeling well with no questions.  Pt did not request or require any intervention for pain today.    Intravenous Access:  Peripheral IV placed by vascular per pt request.    Treatment Conditions:  Lab Results   Component Value Date    HGB 9.3 (L) 01/30/2024    WBC 5.7 01/30/2024    ANEU 3.9 10/28/2020    ANEUTAUTO 3.0 01/30/2024     01/30/2024        Lab Results   Component Value Date     01/30/2024    POTASSIUM 4.1 01/30/2024    MAG 1.4 (L) 01/23/2024    CR 0.86 01/30/2024    WARREN 9.4 01/30/2024    BILITOTAL 0.4 01/30/2024    ALBUMIN 4.0 01/30/2024    ALT 13 01/30/2024    AST 21 01/30/2024     Results reviewed, labs MET treatment parameters, ok to proceed with treatment.    Post Infusion Assessment:  Patient tolerated infusion without incident.  Blood return noted pre and post infusion.  Site patent and intact, free from redness, edema or discomfort.  No evidence of extravasations.  Access discontinued per protocol.    Discharge Plan:   Patient declined prescription refills.  Discharge instructions reviewed with: Patient.  Patient and/or family verbalized understanding of discharge instructions and all questions answered.  AVS to patient via [employer].  Patient will return 2/6/2024 for next appointment.   Patient discharged in stable condition accompanied by: self.  Departure Mode: Ambulatory.    Gaviota Mitchell RN

## 2024-01-30 NOTE — PATIENT INSTRUCTIONS
Contact Numbers    [facility]: [phone]    Call with chills and/or temperature greater than or equal to 100.5, uncontrolled nausea/vomiting, diarrhea, constipation, dizziness, shortness of breath, chest pain, bleeding, unexplained bruising, or any new/concerning symptoms, questions/concerns.     If you are having any concerning symptoms or wish to speak to a provider before your next infusion visit, please call your care coordinator or triage to notify them so we can adequately serve you.       After Hours: [phone]    If after hours, weekends, or holidays, call main hospital  and ask for Oncology doctor on call.          Lab Results:  Recent Results (from the past 12 hour(s))   Comprehensive metabolic panel    Collection Time: 01/30/24  7:01 AM   Result Value Ref Range    Sodium 143 135 - 145 mmol/L    Potassium 4.1 3.4 - 5.3 mmol/L    Carbon Dioxide (CO2) 26 22 - 29 mmol/L    Anion Gap 12 7 - 15 mmol/L    Urea Nitrogen 12.9 8.0 - 23.0 mg/dL    Creatinine 0.86 0.67 - 1.17 mg/dL    GFR Estimate 89 >60 mL/min/1.73m2    Calcium 9.4 8.8 - 10.2 mg/dL    Chloride 105 98 - 107 mmol/L    Glucose 107 (H) 70 - 99 mg/dL    Alkaline Phosphatase 89 40 - 150 U/L    AST 21 0 - 45 U/L    ALT 13 0 - 70 U/L    Protein Total 7.2 6.4 - 8.3 g/dL    Albumin 4.0 3.5 - 5.2 g/dL    Bilirubin Total 0.4 <=1.2 mg/dL   CBC with platelets and differential    Collection Time: 01/30/24  7:01 AM   Result Value Ref Range    WBC Count 5.7 4.0 - 11.0 10e3/uL    RBC Count 3.84 (L) 4.40 - 5.90 10e6/uL    Hemoglobin 9.3 (L) 13.3 - 17.7 g/dL    Hematocrit 29.7 (L) 40.0 - 53.0 %    MCV 77 (L) 78 - 100 fL    MCH 24.2 (L) 26.5 - 33.0 pg    MCHC 31.3 (L) 31.5 - 36.5 g/dL    RDW 16.7 (H) 10.0 - 15.0 %    Platelet Count 280 150 - 450 10e3/uL    % Neutrophils 52 %    % Lymphocytes 39 %    % Monocytes 5 %    % Eosinophils 3 %    % Basophils 1 %    % Immature Granulocytes 0 %    NRBCs per 100 WBC 0 <1 /100    Absolute Neutrophils 3.0 1.6  - 8.3 10e3/uL    Absolute Lymphocytes 2.3 0.8 - 5.3 10e3/uL    Absolute Monocytes 0.3 0.0 - 1.3 10e3/uL    Absolute Eosinophils 0.2 0.0 - 0.7 10e3/uL    Absolute Basophils 0.0 0.0 - 0.2 10e3/uL    Absolute Immature Granulocytes 0.0 <=0.4 10e3/uL    Absolute NRBCs 0.0 10e3/uL   Magnesium    Collection Time: 01/30/24  7:01 AM   Result Value Ref Range    Magnesium 1.5 (L) 1.7 - 2.3 mg/dL

## 2024-01-30 NOTE — NURSING NOTE
Chief Complaint   Patient presents with    Blood Draw     Labs drawn via  by RN in lab. VS taken.      Labs drawn via venipuncture. Vital signs taken. Checked into next appointment.   Olivia Jones RN

## 2024-02-01 RX ORDER — TORSEMIDE 20 MG/1
40 TABLET ORAL DAILY
Qty: 180 TABLET | Refills: 0 | OUTPATIENT
Start: 2024-02-01

## 2024-02-05 NOTE — PROGRESS NOTES
[facility] CANCER [facility]    PATIENT NAME: Jonathan Bishop  MRN # [id number]   DATE OF VISIT: February 6, 2024 YOB: 1945     Otolaryngology: Dr. Karishma Eng  Radiation Oncology: Dr. Jenni Mills  PCP: Dr. Buck Nascimento    CANCER TYPE: SCC L tonsil, p16 +lety  STAGE: [id number] (IVC)  ECOG PS: 1    PD-L1: TPS 20%, CPS 25% on [id number] tonsil bx  NGS: N/A    SUMMARY  2/26/23 L tonsil mass bx in clinic (Dr. Eng).   2/20/23 PET/CT. 3.7 x 4.0 x 5.1 cm mass L palatine tonsil causing narrowing of the oropharyngeal lumen, L level 2 node, L retropharyngeal nodular density, extension of primary mass vs retropharyngeal node, 0.8 cm RLL nodule concerning for met, mild focal uptake R iliac bone and L1 without CT correlate suspicious for mets.   3/2/23 R VATS, wedge resection. Path: SCC, poorly differentiated, associated with necrosis, 1 cm, negative margins, p16 +lety  3/24/23 MRI L spine and pelvis. Diffusely heterogeneous marrow signal throughout without corresponding abnormal signal on sagittal STIR sequence and no abnormal enhancement. Nonspecific but could be benign process such as red marrow hyperplasia, cannot completely exclude metastatic disease or infiltrative pathologic marrow process. No lesions corresponding to FDG avid spots on PET/CT.   4/19~10/18/23 Pembrolizumab.  10/26/23 CT neck and CAP. Increased L palatine tonsil mass, 3.8 x 2.7 cm --> 4.7 x 3.5 cm. Increased bilateral cervical nodes up to 2.3 x 2.4 cm R level 2 node. New 3 mm RML nodule, no other mets.   11/14/23 PET. 4.6 x 3.3 cm L palatine tonsil mass (SUB 26.8), R level 2A and 2A/3 nodes. Questionable uptake distal R femoral medullary cavity, partially imaged, with questionable subtle corresponding soft tissue attenuation on CT. MRI could be obtained to better evaluate.  11/21~12/2/23 [facility] for weakness/fall. COVID/paxlovid, MSSA bacteremia (1 of 2 bottles on 1 day), TTE negative, treated with cefazolin but didn't complete 2 week  course, episode of unresponsiveness 12/2. Dispo plan was [place]CU on [facility] [place], but left AMA. CTA chest negative for PE, showed grossly unchanged cervical adenopathy. Brain MRI 11/22 for stroke code negative for infarct. Showed SVID, moderate atrophy, suboptimal contrast bolus to examine intracranial structures, 4.5 x 3 x 4.5 cm L nasopharyngeal mass, incompletely visualized. CTA negative.   12/19/23 Quad shot fraction #1. No-showed thereafter.   1/10/23-current: carbo/taxol weekly    SUBJECTIVE  Conner is seen today prior to weekly carbo/taxol for close toxicity monitoring.   -Appetite has been decreased this past week. He ate a peanut butter and jelly sandwich and a glass of milk this weekend which caused both diarrhea and vomiting intermittently for 2 days (Sat and Sun). He has felt much better the past 2 days. Now eating bland foods such as noodles which he usually tolerates well.  -Didn't eat much for 2 days while experiencing GI upset  -Denies any change to baseline neuropathy  -Denies increased throat pain of difficulty swallowing  -No significant fatigue this week. Energy levels stable    PAST MEDICAL HISTORY  SCC as above  Chronic LBP  TAVR 2020  H/o rheumatic carditis 1950  CHF. 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 Nocturia once nightly   ED  Cataracts  Umbilical hernia repair 2011  Knee arthroplasty B 2010  Lumbar spine fusion, laminectomy, sciatic pain R > L  Peripheral neuropathy - from pinched nerves?  Hearing loss    Numbness/tingling in both feet - bilaterally, symmetric, R spasms more than left    CURRENT OUTPATIENT MEDICATIONS  Current Outpatient Medications   Medication    aspirin (ASA) 81 MG chewable tablet    atorvastatin (LIPITOR) 40 MG tablet    ibuprofen (ADVIL/MOTRIN) 600 MG tablet    magnesium oxide (MAG-OX) 400 MG tablet    multivitamin, therapeutic (THERA-VIT) TABS tablet    ondansetron (ZOFRAN) 8 MG tablet    order for DME     oxyCODONE-acetaminophen (PERCOCET) 5-325 MG tablet    prochlorperazine (COMPAZINE) 10 MG tablet    spironolactone (ALDACTONE) 50 MG tablet    tamsulosin (FLOMAX) 0.4 MG capsule    torsemide (DEMADEX) 20 MG tablet    vitamin D3 (CHOLECALCIFEROL) 50 mcg (2000 units) tablet     No current facility-administered medications for this visit.     Facility-Administered Medications Ordered in Other Visits   Medication    CARBOplatin 200 mg in sodium chloride 0.9 % 295 mL infusion    diphenhydrAMINE (BENADRYL) capsule 25 mg    famotidine (PEPCID) injection 20 mg    ondansetron (ZOFRAN) 8 mg, dexAMETHasone (DECADRON) 16 mg in sodium chloride 0.9 % 60.6 mL intermittent infusion    PACLitaxel (TAXOL) 125 mg in sodium chloride 0.9% in non-PVC container 295.83 mL infusion    sodium chloride 0.9% BOLUS 250 mL     ALLERGIES  No Known Allergies     PHYSICAL EXAM  /58   Pulse 107   Temp 98.1  F (36.7  C) (Oral)   Resp 16   Wt 90.9 kg (200 lb 6.4 oz)   SpO2 97%   BMI 34.94 kg/m      General: Well-appearing male, NAD  Eyes: EOMI, PERRL. No scleral icterus.  ENT: Oral mucosa is moist. No lesions or thrush. Left tonsillar mass without erythema or erosion.   Lymphatic: Neck is supple without cervical or supraclavicular lymphadenopathy.   Cardiovascular: RRR, no m/g/r. No tachycardia noted with auscultation. +1 bilateral LE edema  Respiratory: CTA bilaterally. No wheezes or crackles.  Neurologic: No focal deficits.   Skin: No rashes, petechiae, or bruising noted on exposed skin.    LABORATORY AND IMAGING STUDIES  Most Recent 3 CBC's:  Recent Labs   Lab Test 02/06/24  1013 01/30/24  0701 01/23/24  0718   WBC 5.2 5.7 4.3   HGB 9.7* 9.3* 9.1*   MCV 78 77* 78    280 213   ANEUTAUTO 2.5 3.0 2.2     Most Recent 3 BMP's:  Recent Labs   Lab Test 02/06/24  1013 01/30/24  0701 01/23/24  0718   * 143 145   POTASSIUM 4.1 4.1 3.5   CHLORIDE 110* 105 104   CO2 26 26 32*   BUN 10.5 12.9 7.9*   CR 0.84 0.86 0.82   ANIONGAP 10 12 9    WARREN 9.6 9.4 9.0   * 107* 104*   PROTTOTAL 7.1 7.2 6.7   ALBUMIN 4.1 4.0 3.8    Most Recent 3 LFT's:  Recent Labs   Lab Test 02/06/24  1013 01/30/24  0701 01/23/24  0718   AST 25 21 21   ALT 13 13 13   ALKPHOS 83 89 87   BILITOTAL 0.4 0.4 0.4    Most Recent 2 TSH and T4:  Recent Labs   Lab Test 12/27/23  1328 10/18/23  0941   TSH 0.54 0.13*   T4 1.47 1.34     I reviewed the above labs today.    ASSESSMENT AND PLAN  SCC L tonsil, p16 +lety, CPS 25%/TPS 20%, oligometastatic lung met, +/- bone mets: Unable to tolerate radiation session, and after discussion with Dr. Mills, decided to pursue systemic therapy alone weith weekly carbo/taxol. Tolerating infusions well, slight increase in LE neuropathy but G1/mild. Notable 9 lb weight loss this past week although suspect this was r/t acute GI distress rather than chemo.  -Proceed with carbo/taxol today  -Weekly MARK visits with labs/infusion for close monitoring    Anorexia: Intermittently worse this past weekend following 2 days of diarrhea and vomiting. Suspect this was caused by either lactose intolerance or Conner suspects possible his milk was sour. Weight down 9 lb. Long discussion today regarding adding foods high in protein/calories. Plans to have his friend make him a lasagna for the Super Bowl.    Hypomag, hypoK: Started mag oxide 400 mg daily last week. Add on mag today.    H/o CVA: Residual L weakness. Uses cane and walker at baseline, but independent and functional, albeit frail. Doesn't qualify for home care, but he would if he becomes homebound. Working on PCA application.    LE edema: Improving. Compression stockings when able, elevated legs. Missed recent MTM appt (1/26) for med/diuretic management.    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 Follows with PCP, Dr. Nascimento. Follow-up scheduled 2/19.    Microcytic anemia: Iron studies 8/2022 fairly normal, normal 4/18/23. MCV 77-78, iron studies added today (missed last  week).    Peripheral neuropathy: MRI shows a lot of foramenal stenosis and spinal canal stenosis, so more likely related to that than DM2. Symptoms currently stable. Watch closely and adjust dose/frequency if needed.     32 minutes spent on the date of the encounter doing chart review, review of test results, interpretation of tests, patient visit, and documentation     Vandana Bertrand, CNP

## 2024-02-06 ENCOUNTER — ONCOLOGY VISIT (OUTPATIENT)
Dept: ONCOLOGY | Facility: CLINIC | Age: 79
End: 2024-02-06
Attending: REGISTERED NURSE
Payer: COMMERCIAL

## 2024-02-06 ENCOUNTER — APPOINTMENT (OUTPATIENT)
Dept: LAB | Facility: CLINIC | Age: 79
End: 2024-02-06
Attending: REGISTERED NURSE
Payer: COMMERCIAL

## 2024-02-06 VITALS
HEART RATE: 107 BPM | RESPIRATION RATE: 16 BRPM | OXYGEN SATURATION: 97 % | WEIGHT: 200.4 LBS | DIASTOLIC BLOOD PRESSURE: 58 MMHG | SYSTOLIC BLOOD PRESSURE: 131 MMHG | TEMPERATURE: 98.1 F | BODY MASS INDEX: 34.94 KG/M2

## 2024-02-06 DIAGNOSIS — C09.9 TONSIL CANCER (H): ICD-10-CM

## 2024-02-06 DIAGNOSIS — C78.00 MALIGNANT NEOPLASM METASTATIC TO LUNG, UNSPECIFIED LATERALITY (H): Primary | ICD-10-CM

## 2024-02-06 DIAGNOSIS — C10.9 SQUAMOUS CELL CARCINOMA OF OROPHARYNX (H): ICD-10-CM

## 2024-02-06 DIAGNOSIS — R63.0 ANOREXIA: ICD-10-CM

## 2024-02-06 DIAGNOSIS — D50.9 MICROCYTIC ANEMIA: ICD-10-CM

## 2024-02-06 DIAGNOSIS — E83.42 HYPOMAGNESEMIA: ICD-10-CM

## 2024-02-06 LAB
ALBUMIN SERPL BCG-MCNC: 4.1 G/DL (ref 3.5–5.2)
ALP SERPL-CCNC: 83 U/L (ref 40–150)
ALT SERPL W P-5'-P-CCNC: 13 U/L (ref 0–70)
ANION GAP SERPL CALCULATED.3IONS-SCNC: 10 MMOL/L (ref 7–15)
AST SERPL W P-5'-P-CCNC: 25 U/L (ref 0–45)
BASOPHILS # BLD AUTO: 0 10E3/UL (ref 0–0.2)
BASOPHILS NFR BLD AUTO: 1 %
BILIRUB SERPL-MCNC: 0.4 MG/DL
BUN SERPL-MCNC: 10.5 MG/DL (ref 8–23)
CALCIUM SERPL-MCNC: 9.6 MG/DL (ref 8.8–10.2)
CHLORIDE SERPL-SCNC: 110 MMOL/L (ref 98–107)
CREAT SERPL-MCNC: 0.84 MG/DL (ref 0.67–1.17)
DEPRECATED HCO3 PLAS-SCNC: 26 MMOL/L (ref 22–29)
EGFRCR SERPLBLD CKD-EPI 2021: 89 ML/MIN/1.73M2
EOSINOPHIL # BLD AUTO: 0.1 10E3/UL (ref 0–0.7)
EOSINOPHIL NFR BLD AUTO: 3 %
ERYTHROCYTE [DISTWIDTH] IN BLOOD BY AUTOMATED COUNT: 17.2 % (ref 10–15)
FERRITIN SERPL-MCNC: 253 NG/ML (ref 31–409)
GLUCOSE SERPL-MCNC: 108 MG/DL (ref 70–99)
HCT VFR BLD AUTO: 30.7 % (ref 40–53)
HGB BLD-MCNC: 9.7 G/DL (ref 13.3–17.7)
IMM GRANULOCYTES # BLD: 0 10E3/UL
IMM GRANULOCYTES NFR BLD: 0 %
IRON BINDING CAPACITY (ROCHE): 288 UG/DL (ref 240–430)
IRON SATN MFR SERPL: 15 % (ref 15–46)
IRON SERPL-MCNC: 42 UG/DL (ref 61–157)
LYMPHOCYTES # BLD AUTO: 2.3 10E3/UL (ref 0.8–5.3)
LYMPHOCYTES NFR BLD AUTO: 44 %
MAGNESIUM SERPL-MCNC: 1.7 MG/DL (ref 1.7–2.3)
MCH RBC QN AUTO: 24.6 PG (ref 26.5–33)
MCHC RBC AUTO-ENTMCNC: 31.6 G/DL (ref 31.5–36.5)
MCV RBC AUTO: 78 FL (ref 78–100)
MONOCYTES # BLD AUTO: 0.3 10E3/UL (ref 0–1.3)
MONOCYTES NFR BLD AUTO: 5 %
NEUTROPHILS # BLD AUTO: 2.5 10E3/UL (ref 1.6–8.3)
NEUTROPHILS NFR BLD AUTO: 47 %
NRBC # BLD AUTO: 0 10E3/UL
NRBC BLD AUTO-RTO: 0 /100
PLATELET # BLD AUTO: 246 10E3/UL (ref 150–450)
POTASSIUM SERPL-SCNC: 4.1 MMOL/L (ref 3.4–5.3)
PROT SERPL-MCNC: 7.1 G/DL (ref 6.4–8.3)
RBC # BLD AUTO: 3.94 10E6/UL (ref 4.4–5.9)
SODIUM SERPL-SCNC: 146 MMOL/L (ref 135–145)
WBC # BLD AUTO: 5.2 10E3/UL (ref 4–11)

## 2024-02-06 PROCEDURE — 96417 CHEMO IV INFUS EACH ADDL SEQ: CPT

## 2024-02-06 PROCEDURE — 99215 OFFICE O/P EST HI 40 MIN: CPT | Performed by: REGISTERED NURSE

## 2024-02-06 PROCEDURE — 85025 COMPLETE CBC W/AUTO DIFF WBC: CPT | Performed by: REGISTERED NURSE

## 2024-02-06 PROCEDURE — 82728 ASSAY OF FERRITIN: CPT

## 2024-02-06 PROCEDURE — 96413 CHEMO IV INFUSION 1 HR: CPT

## 2024-02-06 PROCEDURE — 83550 IRON BINDING TEST: CPT

## 2024-02-06 PROCEDURE — 258N000003 HC RX IP 258 OP 636: Performed by: REGISTERED NURSE

## 2024-02-06 PROCEDURE — G0463 HOSPITAL OUTPT CLINIC VISIT: HCPCS | Performed by: REGISTERED NURSE

## 2024-02-06 PROCEDURE — 36415 COLL VENOUS BLD VENIPUNCTURE: CPT | Performed by: REGISTERED NURSE

## 2024-02-06 PROCEDURE — 96367 TX/PROPH/DG ADDL SEQ IV INF: CPT

## 2024-02-06 PROCEDURE — 80053 COMPREHEN METABOLIC PANEL: CPT | Performed by: REGISTERED NURSE

## 2024-02-06 PROCEDURE — 250N000013 HC RX MED GY IP 250 OP 250 PS 637: Performed by: REGISTERED NURSE

## 2024-02-06 PROCEDURE — 96375 TX/PRO/DX INJ NEW DRUG ADDON: CPT

## 2024-02-06 PROCEDURE — 250N000011 HC RX IP 250 OP 636: Performed by: REGISTERED NURSE

## 2024-02-06 PROCEDURE — 83735 ASSAY OF MAGNESIUM: CPT

## 2024-02-06 RX ORDER — LORAZEPAM 2 MG/ML
0.5 INJECTION INTRAMUSCULAR EVERY 4 HOURS PRN
Status: CANCELLED | OUTPATIENT
Start: 2024-02-06

## 2024-02-06 RX ORDER — METHYLPREDNISOLONE SODIUM SUCCINATE 125 MG/2ML
125 INJECTION, POWDER, LYOPHILIZED, FOR SOLUTION INTRAMUSCULAR; INTRAVENOUS
Status: CANCELLED
Start: 2024-02-06

## 2024-02-06 RX ORDER — DIPHENHYDRAMINE HCL 25 MG
25 CAPSULE ORAL ONCE
Status: COMPLETED | OUTPATIENT
Start: 2024-02-06 | End: 2024-02-06

## 2024-02-06 RX ORDER — ALBUTEROL SULFATE 90 UG/1
1-2 AEROSOL, METERED RESPIRATORY (INHALATION)
Status: CANCELLED
Start: 2024-02-06

## 2024-02-06 RX ORDER — HEPARIN SODIUM,PORCINE 10 UNIT/ML
5-20 VIAL (ML) INTRAVENOUS DAILY PRN
Status: CANCELLED | OUTPATIENT
Start: 2024-02-06

## 2024-02-06 RX ORDER — ACETAMINOPHEN 325 MG/1
650 TABLET ORAL ONCE
Status: COMPLETED | OUTPATIENT
Start: 2024-02-06 | End: 2024-02-06

## 2024-02-06 RX ORDER — DIPHENHYDRAMINE HCL 25 MG
25 CAPSULE ORAL ONCE
Status: CANCELLED
Start: 2024-02-06

## 2024-02-06 RX ORDER — ALBUTEROL SULFATE 0.83 MG/ML
2.5 SOLUTION RESPIRATORY (INHALATION)
Status: CANCELLED | OUTPATIENT
Start: 2024-02-06

## 2024-02-06 RX ORDER — DIPHENHYDRAMINE HYDROCHLORIDE 50 MG/ML
50 INJECTION INTRAMUSCULAR; INTRAVENOUS
Status: CANCELLED
Start: 2024-02-06

## 2024-02-06 RX ORDER — DIPHENHYDRAMINE HCL 25 MG
50 CAPSULE ORAL ONCE
Status: CANCELLED
Start: 2024-02-06

## 2024-02-06 RX ORDER — EPINEPHRINE 1 MG/ML
0.3 INJECTION, SOLUTION INTRAMUSCULAR; SUBCUTANEOUS EVERY 5 MIN PRN
Status: CANCELLED | OUTPATIENT
Start: 2024-02-06

## 2024-02-06 RX ORDER — HEPARIN SODIUM (PORCINE) LOCK FLUSH IV SOLN 100 UNIT/ML 100 UNIT/ML
5 SOLUTION INTRAVENOUS
Status: CANCELLED | OUTPATIENT
Start: 2024-02-06

## 2024-02-06 RX ORDER — MEPERIDINE HYDROCHLORIDE 25 MG/ML
25 INJECTION INTRAMUSCULAR; INTRAVENOUS; SUBCUTANEOUS EVERY 30 MIN PRN
Status: CANCELLED | OUTPATIENT
Start: 2024-02-06

## 2024-02-06 RX ADMIN — ACETAMINOPHEN 650 MG: 325 TABLET ORAL at 12:04

## 2024-02-06 RX ADMIN — DEXAMETHASONE SODIUM PHOSPHATE: 10 INJECTION, SOLUTION INTRAMUSCULAR; INTRAVENOUS at 12:05

## 2024-02-06 RX ADMIN — DIPHENHYDRAMINE HYDROCHLORIDE 25 MG: 25 CAPSULE ORAL at 11:56

## 2024-02-06 RX ADMIN — FAMOTIDINE 20 MG: 10 INJECTION INTRAVENOUS at 11:58

## 2024-02-06 RX ADMIN — SODIUM CHLORIDE 125 MG: 9 INJECTION, SOLUTION INTRAVENOUS at 12:35

## 2024-02-06 RX ADMIN — SODIUM CHLORIDE 250 ML: 9 INJECTION, SOLUTION INTRAVENOUS at 11:58

## 2024-02-06 RX ADMIN — CARBOPLATIN 200 MG: 10 INJECTION INTRAVENOUS at 13:48

## 2024-02-06 ASSESSMENT — PAIN SCALES - GENERAL: PAINLEVEL: SEVERE PAIN (7)

## 2024-02-06 NOTE — NURSING NOTE
"Oncology Rooming Note    February 6, 2024 10:34 AM   Jonathan Bishop is a 78 year old male who presents for:    Chief Complaint   Patient presents with    Blood Draw     Labs drawn via PIV by RN in lab.  VS taken    Oncology Clinic Visit     Malignant neoplasm metastatic to lung, unspecified laterality      Initial Vitals: /58   Pulse 107   Temp 98.1  F (36.7  C) (Oral)   Resp 16   Wt 90.9 kg (200 lb 6.4 oz)   SpO2 97%   BMI 34.94 kg/m   Estimated body mass index is 34.94 kg/m  as calculated from the following:    Height as of 12/7/23: 1.613 m (5' 3.5\").    Weight as of this encounter: 90.9 kg (200 lb 6.4 oz). Body surface area is 2.02 meters squared.  Severe Pain (7) Comment: Data Unavailable   No LMP for male patient.  Allergies reviewed: Yes  Medications reviewed: Yes    Medications: Medication refills not needed today.  Pharmacy name entered into [employer]: [facility] - [place], [place] - [address] [facility]    Frailty Screening:   Is the patient here for a new oncology consult visit in cancer care? 2. No      Clinical concerns: None       Kat Jones CMA            "

## 2024-02-06 NOTE — PATIENT INSTRUCTIONS
[facility] Triage and after hours / weekends / holidays:  [phone]    Please call the triage or after hours line if you experience a temperature greater than or equal to 100.4, shaking chills, have uncontrolled nausea, vomiting and/or diarrhea, dizziness, shortness of breath, chest pain, bleeding, unexplained bruising, or if you have any other new/concerning symptoms, questions or concerns.      If you are having any concerning symptoms or wish to speak to a provider before your next infusion visit, please call triage to notify them so we can adequately serve you.     If you need a refill on a narcotic prescription or other medication, please call before your infusion appointment.                February 2024 Sunday Monday Tuesday Wednesday Thursday Friday Saturday                       1     2     3       4     5     6    LAB PERIPHERAL  10:15 AM   (15 min.)   [facility]RAW   [facility]    RETURN ACTIVE TREATMENT  10:45 AM   (45 min.)   Vandana Bertrand CNP   [facility]    ONC INFUSION 2 HR (120 MIN)  12:30 PM   (120 min.)    ONC INFUSION NURSE   [facility] 7     8     9     10       11     12     13    LAB PERIPHERAL   8:30 AM   (15 min.)   [facility]   [facility]    RETURN ACTIVE TREATMENT   8:45 AM   (45 min.)   Vandana Bertrand CNP   [facility]    ONC INFUSION 2 HR (120 MIN)  10:00 AM   (120 min.)    ONC INFUSION NURSE   [facility]    CT SOFT TISSUE NECK W  12:25 PM   (20 min.)   [email or web address]   [facility] [place] 14     15     16     17       18     19    [facility] RETURN   6:45 AM   (30 min.)   Buck Nascimento MD   [facility] [place] 20     21    LAB PERIPHERAL  11:45 AM   (15 min.)   [facility]   [facility]    RETURN  [facility]  12:00 PM   (30 min.)   Dominique Mueller MD   [facility]    ONC INFUSION 2 HR (120 MIN)   2:00 PM   (120 min.)    ONC INFUSION NURSE   [facility] 22     23     24       25     26     27    LAB PERIPHERAL  10:15 AM   (15 min.)   [facility]   [facility]    RETURN ACTIVE TREATMENT  10:45 AM   (45 min.)   Vandana Bertrand CNP   [facility]    ONC INFUSION 2 HR (120 MIN)  12:00 PM   (120 min.)    ONC INFUSION NURSE   [facility] 28 29 March 2024 Sunday Monday Tuesday Wednesday Thursday Friday Saturday                            1     2       3     4     5     6     7     8     9       10     11     12     13     14     15     16       17     18     19     20     21     22     23       24     25     26     27     28     29     30       31                                                   Lab Results:  Recent Results (from the past 12 hour(s))   Comprehensive metabolic panel    Collection Time: 02/06/24 10:13 AM   Result Value Ref Range    Sodium 146 (H) 135 - 145 mmol/L    Potassium 4.1 3.4 - 5.3 mmol/L    Carbon Dioxide (CO2) 26 22 - 29 mmol/L    Anion Gap 10 7 - 15 mmol/L    Urea Nitrogen 10.5 8.0 - 23.0 mg/dL    Creatinine 0.84 0.67 - 1.17 mg/dL    GFR Estimate 89 >60 mL/min/1.73m2    Calcium 9.6 8.8 - 10.2 mg/dL    Chloride 110 (H) 98 - 107 mmol/L    Glucose 108 (H) 70 - 99 mg/dL    Alkaline Phosphatase 83 40 - 150 U/L    AST 25 0 - 45 U/L    ALT 13 0 - 70 U/L    Protein Total 7.1 6.4 - 8.3 g/dL    Albumin 4.1 3.5 - 5.2 g/dL    Bilirubin Total 0.4 <=1.2 mg/dL   CBC with platelets and differential    Collection Time: 02/06/24 10:13 AM   Result Value Ref Range    WBC Count 5.2 4.0 - 11.0 10e3/uL    RBC Count 3.94 (L) 4.40 - 5.90 10e6/uL    Hemoglobin 9.7 (L) 13.3 - 17.7 g/dL    Hematocrit 30.7 (L) 40.0 - 53.0 %    MCV 78 78 - 100  fL    MCH 24.6 (L) 26.5 - 33.0 pg    MCHC 31.6 31.5 - 36.5 g/dL    RDW 17.2 (H) 10.0 - 15.0 %    Platelet Count 246 150 - 450 10e3/uL    % Neutrophils 47 %    % Lymphocytes 44 %    % Monocytes 5 %    % Eosinophils 3 %    % Basophils 1 %    % Immature Granulocytes 0 %    NRBCs per 100 WBC 0 <1 /100    Absolute Neutrophils 2.5 1.6 - 8.3 10e3/uL    Absolute Lymphocytes 2.3 0.8 - 5.3 10e3/uL    Absolute Monocytes 0.3 0.0 - 1.3 10e3/uL    Absolute Eosinophils 0.1 0.0 - 0.7 10e3/uL    Absolute Basophils 0.0 0.0 - 0.2 10e3/uL    Absolute Immature Granulocytes 0.0 <=0.4 10e3/uL    Absolute NRBCs 0.0 10e3/uL

## 2024-02-06 NOTE — NURSING NOTE
Chief Complaint   Patient presents with    Blood Draw     Labs drawn via PIV by RN in lab.  VS taken       Labs drawn from PIV placed by RN. Line flushed with saline. Vitals taken. Pt checked in for appointment(s).    Gaye Rueda RN

## 2024-02-06 NOTE — Clinical Note
2/6/2024         RE: Jonathan Bishop  [address]  [place] [address]        Dear Colleague,    Thank you for referring your patient, Jonathan Bishop, to the [facility]CER [facility]. Please see a copy of my visit note below.       [facility]CER [facility]    PATIENT NAME: Jonathan Bishop  MRN # [id number]   DATE OF VISIT: February 6, 2024 YOB: 1945     Otolaryngology: Dr. Karishma Eng  Radiation Oncology: Dr. Jenni Mills  PCP: Dr. Buck Nascimento    CANCER TYPE: SCC L tonsil, p16 +lety  STAGE: [id number] (IVC)  ECOG PS: 1    PD-L1: TPS 20%, CPS 25% on [id number] tonsil bx  NGS: N/A    SUMMARY  2/26/23 L tonsil mass bx in clinic (Dr. Eng).   2/20/23 PET/CT. 3.7 x 4.0 x 5.1 cm mass L palatine tonsil causing narrowing of the oropharyngeal lumen, L level 2 node, L retropharyngeal nodular density, extension of primary mass vs retropharyngeal node, 0.8 cm RLL nodule concerning for met, mild focal uptake R iliac bone and L1 without CT correlate suspicious for mets.   3/2/23 R VATS, wedge resection. Path: SCC, poorly differentiated, associated with necrosis, 1 cm, negative margins, p16 +lety  3/24/23 MRI L spine and pelvis. Diffusely heterogeneous marrow signal throughout without corresponding abnormal signal on sagittal STIR sequence and no abnormal enhancement. Nonspecific but could be benign process such as red marrow hyperplasia, cannot completely exclude metastatic disease or infiltrative pathologic marrow process. No lesions corresponding to FDG avid spots on PET/CT.   4/19~10/18/23 Pembrolizumab.  10/26/23 CT neck and CAP. Increased L palatine tonsil mass, 3.8 x 2.7 cm --> 4.7 x 3.5 cm. Increased bilateral cervical nodes up to 2.3 x 2.4 cm R level 2 node. New 3 mm RML nodule, no other mets.   11/14/23 PET. 4.6 x 3.3 cm L palatine tonsil mass (SUB 26.8), R level 2A and 2A/3 nodes. Questionable uptake distal R femoral medullary cavity, partially imaged, with  questionable subtle corresponding soft tissue attenuation on CT. MRI could be obtained to better evaluate.  11/21~12/2/23 [facility] for weakness/fall. COVID/paxlovid, MSSA bacteremia (1 of 2 bottles on 1 day), TTE negative, treated with cefazolin but didn't complete 2 week course, episode of unresponsiveness 12/2. Dispo plan was TCU on [facility], but left AMA. CTA chest negative for PE, showed grossly unchanged cervical adenopathy. Brain MRI 11/22 for stroke code negative for infarct. Showed SVID, moderate atrophy, suboptimal contrast bolus to examine intracranial structures, 4.5 x 3 x 4.5 cm L nasopharyngeal mass, incompletely visualized. CTA negative.   12/19/23 Quad shot fraction #1. No-showed thereafter.   1/10/23-current: carbo/taxol weekly    SUBJECTIVE  Conner is seen today prior to weekly carbo/taxol for close toxicity monitoring.   -Appetite has been decreased this past week. He ate a peanut butter and jelly sandwich and a glass of milk this weekend which caused both diarrhea and vomiting intermittently for 2 days (Sat and Sun). He has felt much better the past 2 days. Now eating bland foods such as noodles which he usually tolerates well.  -Didn't eat much for 2 days while experiencing GI upset  -Denies any change to baseline neuropathy  -Denies increased throat pain of difficulty swallowing  -No significant fatigue this week. Energy levels stable    PAST MEDICAL HISTORY  SCC as above  Chronic LBP  TAVR 2020  H/o rheumatic carditis 1950  CHF. 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 Nocturia once nightly   ED  Cataracts  Umbilical hernia repair 2011  Knee arthroplasty B 2010  Lumbar spine fusion, laminectomy, sciatic pain R > L  Peripheral neuropathy - from pinched nerves?  Hearing loss    Numbness/tingling in both feet - bilaterally, symmetric, R spasms more than left    CURRENT OUTPATIENT MEDICATIONS  Current Outpatient Medications   Medication    aspirin  (ASA) 81 MG chewable tablet    atorvastatin (LIPITOR) 40 MG tablet    ibuprofen (ADVIL/MOTRIN) 600 MG tablet    magnesium oxide (MAG-OX) 400 MG tablet    multivitamin, therapeutic (THERA-VIT) TABS tablet    ondansetron (ZOFRAN) 8 MG tablet    order for DME    oxyCODONE-acetaminophen (PERCOCET) 5-325 MG tablet    prochlorperazine (COMPAZINE) 10 MG tablet    spironolactone (ALDACTONE) 50 MG tablet    tamsulosin (FLOMAX) 0.4 MG capsule    torsemide (DEMADEX) 20 MG tablet    vitamin D3 (CHOLECALCIFEROL) 50 mcg (2000 units) tablet     No current facility-administered medications for this visit.     Facility-Administered Medications Ordered in Other Visits   Medication    CARBOplatin 195 mg in sodium chloride 0.9 % 119.5 mL infusion    diphenhydrAMINE (BENADRYL) capsule 25 mg    famotidine (PEPCID) injection 20 mg    ondansetron (ZOFRAN) 8 mg, dexAMETHasone (DECADRON) 16 mg in sodium chloride 0.9 % 55.6 mL intermittent infusion    PACLitaxel (TAXOL) 125 mg in sodium chloride 0.9% in non-PVC container 270.83 mL infusion    sodium chloride 0.9% BOLUS 250 mL     ALLERGIES  No Known Allergies     PHYSICAL EXAM  /58   Pulse 107   Temp 98.1  F (36.7  C) (Oral)   Resp 16   Wt 90.9 kg (200 lb 6.4 oz)   SpO2 97%   BMI 34.94 kg/m      General: Well-appearing male, NAD  Eyes: EOMI, PERRL. No scleral icterus.  ENT: Oral mucosa is moist. No lesions or thrush. Left tonsillar mass without erythema or erosion.   Lymphatic: Neck is supple without cervical or supraclavicular lymphadenopathy.   Cardiovascular: RRR, no m/g/r. No tachycardia noted with auscultation. +1 bilateral LE edema  Respiratory: CTA bilaterally. No wheezes or crackles.  Neurologic: No focal deficits.   Skin: No rashes, petechiae, or bruising noted on exposed skin.    LABORATORY AND IMAGING STUDIES  Most Recent 3 CBC's:  Recent Labs   Lab Test 02/06/24  1013 01/30/24  0701 01/23/24  0718   WBC 5.2 5.7 4.3   HGB 9.7* 9.3* 9.1*   MCV 78 77* 78    280 213    ANEUTAUTO 2.5 3.0 2.2     Most Recent 3 BMP's:  Recent Labs   Lab Test 02/06/24  1013 01/30/24  0701 01/23/24  0718   * 143 145   POTASSIUM 4.1 4.1 3.5   CHLORIDE 110* 105 104   CO2 26 26 32*   BUN 10.5 12.9 7.9*   CR 0.84 0.86 0.82   ANIONGAP 10 12 9   WARREN 9.6 9.4 9.0   * 107* 104*   PROTTOTAL 7.1 7.2 6.7   ALBUMIN 4.1 4.0 3.8    Most Recent 3 LFT's:  Recent Labs   Lab Test 02/06/24  1013 01/30/24  0701 01/23/24  0718   AST 25 21 21   ALT 13 13 13   ALKPHOS 83 89 87   BILITOTAL 0.4 0.4 0.4    Most Recent 2 TSH and T4:  Recent Labs   Lab Test 12/27/23  1328 10/18/23  0941   TSH 0.54 0.13*   T4 1.47 1.34     I reviewed the above labs today.    ASSESSMENT AND PLAN  SCC L tonsil, p16 +lety, CPS 25%/TPS 20%, oligometastatic lung met, +/- bone mets: Unable to tolerate radiation session, and after discussion with Dr. Mills, decided to pursue systemic therapy alone weith weekly carbo/taxol. Tolerating infusions well, slight increase in LE neuropathy but G1/mild. Notable 9 lb weight loss this past week although suspect this was r/t acute GI distress rather than chemo.  -Proceed with carbo/taxol today  -Weekly MARK visits with labs/infusion for close monitoring    Anorexia: Intermittently worse this past weekend following 2 days of diarrhea and vomiting. Suspect this was caused by either lactose intolerance or Conner suspects possible his milk was sour. Weight down 9 lb. Long discussion today regarding adding foods high in protein/calories. Plans to have his friend make him     Hypomag, hypoK: Mg added to labs today. Replaced pp the past 2 weeks. If remains low, will add daily supplement.     Update: Mg 1.5 today. Begin max oxide 400 mg daily.    H/o CVA: Residual L weakness. Uses cane and walker at baseline, but independent and functional, albeit frail. Doesn't qualify for home care, but he would if he becomes homebound. Working on PCA application.    LE edema: Compression stockings when able, elevated legs.  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 Follows with PCP, Dr. Nascimento. As above, MTM scheduled.    Microcytic anemia: Iron studies 8/2022 fairly normal, normal 4/18/23. MCV lower at 77 today, will repeat iron studies.    Peripheral neuropathy: MRI shows a lot of foramenal stenosis and spinal canal stenosis, so more likely related to that than DM2. Slightly worse this past week with taxol. Watch closely and adjust dose/frequency if needed.     *** minutes spent on the date of the encounter doing {2021 E&M time in:573899}     Vandana Bertrand CNP                 [facility]    PATIENT NAME: Jonathan Bishop  MRN # [id number]   DATE OF VISIT: February 6, 2024 YOB: 1945     Otolaryngology: Dr. Karishma Eng  Radiation Oncology: Dr. Jenni Mills  PCP: Dr. Buck Nascimento    CANCER TYPE: SCC L tonsil, p16 +lety  STAGE: [id number] (IVC)  ECOG PS: 1    PD-L1: TPS 20%, CPS 25% on [id number] tonsil bx  NGS: N/A    SUMMARY  2/26/23 L tonsil mass bx in clinic (Dr. Eng).   2/20/23 PET/CT. 3.7 x 4.0 x 5.1 cm mass L palatine tonsil causing narrowing of the oropharyngeal lumen, L level 2 node, L retropharyngeal nodular density, extension of primary mass vs retropharyngeal node, 0.8 cm RLL nodule concerning for met, mild focal uptake R iliac bone and L1 without CT correlate suspicious for mets.   3/2/23 R VATS, wedge resection. Path: SCC, poorly differentiated, associated with necrosis, 1 cm, negative margins, p16 +lety  3/24/23 MRI L spine and pelvis. Diffusely heterogeneous marrow signal throughout without corresponding abnormal signal on sagittal STIR sequence and no abnormal enhancement. Nonspecific but could be benign process such as red marrow hyperplasia, cannot completely exclude metastatic disease or infiltrative pathologic marrow process. No lesions corresponding to FDG avid spots on PET/CT.    4/19~10/18/23 Pembrolizumab.  10/26/23 CT neck and CAP. Increased L palatine tonsil mass, 3.8 x 2.7 cm --> 4.7 x 3.5 cm. Increased bilateral cervical nodes up to 2.3 x 2.4 cm R level 2 node. New 3 mm RML nodule, no other mets.   11/14/23 PET. 4.6 x 3.3 cm L palatine tonsil mass (SUB 26.8), R level 2A and 2A/3 nodes. Questionable uptake distal R femoral medullary cavity, partially imaged, with questionable subtle corresponding soft tissue attenuation on CT. MRI could be obtained to better evaluate.  11/21~12/2/23 [facility] for weakness/fall. COVID/paxlovid, MSSA bacteremia (1 of 2 bottles on 1 day), TTE negative, treated with cefazolin but didn't complete 2 week course, episode of unresponsiveness 12/2. Dispo plan was TCU on [facility], but left AMA. CTA chest negative for PE, showed grossly unchanged cervical adenopathy. Brain MRI 11/22 for stroke code negative for infarct. Showed SVID, moderate atrophy, suboptimal contrast bolus to examine intracranial structures, 4.5 x 3 x 4.5 cm L nasopharyngeal mass, incompletely visualized. CTA negative.   12/19/23 Quad shot fraction #1. No-showed thereafter.   1/10/23-current: carbo/taxol weekly    SUBJECTIVE  Conner is seen today prior to weekly carbo/taxol for close toxicity monitoring.   -Appetite has been decreased this past week. He ate a peanut butter and jelly sandwich and a glass of milk this weekend which caused both diarrhea and vomiting intermittently for 2 days (Sat and Sun). He has felt much better the past 2 days. Now eating bland foods such as noodles which he usually tolerates well.  -Didn't eat much for 2 days while experiencing GI upset  -Denies any change to baseline neuropathy  -Denies increased throat pain of difficulty swallowing  -No significant fatigue this week. Energy levels stable    PAST MEDICAL HISTORY  SCC as above  Chronic LBP  TAVR 2020  H/o rheumatic carditis 1950  CHF. Chronic LE edema   H/o R CVA 2016, residual L sided  weakness  HTN  Dyslipidemia  BPH. Nocturia once nightly   ED  Cataracts  Umbilical hernia repair 2011  Knee arthroplasty B 2010  Lumbar spine fusion, laminectomy, sciatic pain R > L  Peripheral neuropathy - from pinched nerves?  Hearing loss    Numbness/tingling in both feet - bilaterally, symmetric, R spasms more than left    CURRENT OUTPATIENT MEDICATIONS  Current Outpatient Medications   Medication     aspirin (ASA) 81 MG chewable tablet     atorvastatin (LIPITOR) 40 MG tablet     ibuprofen (ADVIL/MOTRIN) 600 MG tablet     magnesium oxide (MAG-OX) 400 MG tablet     multivitamin, therapeutic (THERA-VIT) TABS tablet     ondansetron (ZOFRAN) 8 MG tablet     order for DME     oxyCODONE-acetaminophen (PERCOCET) 5-325 MG tablet     prochlorperazine (COMPAZINE) 10 MG tablet     spironolactone (ALDACTONE) 50 MG tablet     tamsulosin (FLOMAX) 0.4 MG capsule     torsemide (DEMADEX) 20 MG tablet     vitamin D3 (CHOLECALCIFEROL) 50 mcg (2000 units) tablet     No current facility-administered medications for this visit.     Facility-Administered Medications Ordered in Other Visits   Medication     CARBOplatin 195 mg in sodium chloride 0.9 % 119.5 mL infusion     diphenhydrAMINE (BENADRYL) capsule 25 mg     famotidine (PEPCID) injection 20 mg     ondansetron (ZOFRAN) 8 mg, dexAMETHasone (DECADRON) 16 mg in sodium chloride 0.9 % 55.6 mL intermittent infusion     PACLitaxel (TAXOL) 125 mg in sodium chloride 0.9% in non-PVC container 270.83 mL infusion     sodium chloride 0.9% BOLUS 250 mL     ALLERGIES  No Known Allergies     PHYSICAL EXAM  /58   Pulse 107   Temp 98.1  F (36.7  C) (Oral)   Resp 16   Wt 90.9 kg (200 lb 6.4 oz)   SpO2 97%   BMI 34.94 kg/m      General: Well-appearing male, NAD  Eyes: EOMI, PERRL. No scleral icterus.  ENT: Oral mucosa is moist. No lesions or thrush. Left tonsillar mass without erythema or erosion.   Lymphatic: Neck is supple without cervical or supraclavicular lymphadenopathy.    Cardiovascular: RRR, no m/g/r. No tachycardia noted with auscultation. +1 bilateral LE edema  Respiratory: CTA bilaterally. No wheezes or crackles.  Neurologic: No focal deficits.   Skin: No rashes, petechiae, or bruising noted on exposed skin.    LABORATORY AND IMAGING STUDIES  Most Recent 3 CBC's:  Recent Labs   Lab Test 02/06/24  1013 01/30/24  0701 01/23/24  0718   WBC 5.2 5.7 4.3   HGB 9.7* 9.3* 9.1*   MCV 78 77* 78    280 213   ANEUTAUTO 2.5 3.0 2.2     Most Recent 3 BMP's:  Recent Labs   Lab Test 02/06/24  1013 01/30/24  0701 01/23/24  0718   * 143 145   POTASSIUM 4.1 4.1 3.5   CHLORIDE 110* 105 104   CO2 26 26 32*   BUN 10.5 12.9 7.9*   CR 0.84 0.86 0.82   ANIONGAP 10 12 9   WARREN 9.6 9.4 9.0   * 107* 104*   PROTTOTAL 7.1 7.2 6.7   ALBUMIN 4.1 4.0 3.8    Most Recent 3 LFT's:  Recent Labs   Lab Test 02/06/24  1013 01/30/24  0701 01/23/24  0718   AST 25 21 21   ALT 13 13 13   ALKPHOS 83 89 87   BILITOTAL 0.4 0.4 0.4    Most Recent 2 TSH and T4:  Recent Labs   Lab Test 12/27/23  1328 10/18/23  0941   TSH 0.54 0.13*   T4 1.47 1.34     I reviewed the above labs today.    ASSESSMENT AND PLAN  SCC L tonsil, p16 +lety, CPS 25%/TPS 20%, oligometastatic lung met, +/- bone mets: Unable to tolerate radiation session, and after discussion with Dr. Mills, decided to pursue systemic therapy alone weith weekly carbo/taxol. Tolerating infusions well, slight increase in LE neuropathy but G1/mild. Notable 9 lb weight loss this past week although suspect this was r/t acute GI distress rather than chemo.  -Proceed with carbo/taxol today  -Weekly MARK visits with labs/infusion for close monitoring    Anorexia: Intermittently worse this past weekend following 2 days of diarrhea and vomiting. Suspect this was caused by either lactose intolerance or Conner suspects possible his milk was sour. Weight down 9 lb. Long discussion today regarding adding foods high in protein/calories. Plans to have his friend make him a  kayli for the [employer] Bowl.    Hypomag, hypoK: Started mag oxide 400 mg daily last week. Add on mag today.    H/o CVA: Residual L weakness. Uses cane and walker at baseline, but independent and functional, albeit frail. Doesn't qualify for home care, but he would if he becomes homebound. Working on PCA application.    LE edema: Improving. Compression stockings when able, elevated legs. Missed recent MTM appt (1/26) for med/diuretic management.    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 Follows with PCP, Dr. Nascimento. Follow-up scheduled 2/19.    Microcytic anemia: Iron studies 8/2022 fairly normal, normal 4/18/23. MCV 77-78, iron studies added today (missed last week).    Peripheral neuropathy: MRI shows a lot of foramenal stenosis and spinal canal stenosis, so more likely related to that than DM2. Symptoms currently stable. Watch closely and adjust dose/frequency if needed.     32 minutes spent on the date of the encounter doing chart review, review of test results, interpretation of tests, patient visit, and documentation     Vandana Bertrand CNP                Again, thank you for allowing me to participate in the care of your patient.        Sincerely,        Vandana Bertrand CNP

## 2024-02-06 NOTE — PROGRESS NOTES
Infusion Nursing Note:  Jonathan Bishop presents today for Cycle 1 Day 22 Taxol and Carboplatin.    Patient seen by provider today: Yes: Vandana Bertrand NP   present during visit today: Not Applicable.    Note: Pt presents to infusion feeling ok, requesting something additional for his chronic leg pain if possible. He is taking Oxycodone q8 hours as needed. He otherwise offers no new concerns following his provider appointment today.    Per secure chat with Vandana Bertrand NP @ 1200:  - ok to give 650mg Tylenol, no further opiates at this time    Pt reports pain improved following Tylenol and feels comfortable discharging home.    Intravenous Access:  Peripheral IV placed.    Treatment Conditions:  Lab Results   Component Value Date    HGB 9.7 (L) 02/06/2024    WBC 5.2 02/06/2024    ANEU 3.9 10/28/2020    ANEUTAUTO 2.5 02/06/2024     02/06/2024        Lab Results   Component Value Date     (H) 02/06/2024    POTASSIUM 4.1 02/06/2024    MAG 1.5 (L) 01/30/2024    CR 0.84 02/06/2024    WARREN 9.6 02/06/2024    BILITOTAL 0.4 02/06/2024    ALBUMIN 4.1 02/06/2024    ALT 13 02/06/2024    AST 25 02/06/2024     Results reviewed, labs MET treatment parameters, ok to proceed with treatment.      Post Infusion Assessment:  Patient tolerated infusion without incident.  Blood return noted pre and post infusion.  Site patent and intact, free from redness, edema or discomfort.  No evidence of extravasations.  Access discontinued per protocol.       Discharge Plan:   Patient declined prescription refills.  Discharge instructions reviewed with: Patient.  Patient and/or family verbalized understanding of discharge instructions and all questions answered.  Copy of AVS reviewed with patient and/or family.  Patient will return 2/13/24 for next appointment.  Patient discharged in stable condition accompanied by: self.  Departure Mode: Ambulatory.      Yoly Persaud RN

## 2024-02-09 NOTE — PROGRESS NOTES
[facility] CANCER [facility]    PATIENT NAME: Jonathan Bishop  MRN # [id number]   DATE OF VISIT: February 13, 2024 YOB: 1945     Otolaryngology: Dr. Karishma Eng  Radiation Oncology: Dr. Jenni Mills  PCP: Dr. Buck Nascimento    CANCER TYPE: SCC L tonsil, p16 +lety  STAGE: [id number] (IVC)  ECOG PS: 1    PD-L1: TPS 20%, CPS 25% on [id number] tonsil bx  NGS: N/A    SUMMARY  2/26/23 L tonsil mass bx in clinic (Dr. Eng).   2/20/23 PET/CT. 3.7 x 4.0 x 5.1 cm mass L palatine tonsil causing narrowing of the oropharyngeal lumen, L level 2 node, L retropharyngeal nodular density, extension of primary mass vs retropharyngeal node, 0.8 cm RLL nodule concerning for met, mild focal uptake R iliac bone and L1 without CT correlate suspicious for mets.   3/2/23 R VATS, wedge resection. Path: SCC, poorly differentiated, associated with necrosis, 1 cm, negative margins, p16 +lety  3/24/23 MRI L spine and pelvis. Diffusely heterogeneous marrow signal throughout without corresponding abnormal signal on sagittal STIR sequence and no abnormal enhancement. Nonspecific but could be benign process such as red marrow hyperplasia, cannot completely exclude metastatic disease or infiltrative pathologic marrow process. No lesions corresponding to FDG avid spots on PET/CT.   4/19~10/18/23 Pembrolizumab.  10/26/23 CT neck and CAP. Increased L palatine tonsil mass, 3.8 x 2.7 cm --> 4.7 x 3.5 cm. Increased bilateral cervical nodes up to 2.3 x 2.4 cm R level 2 node. New 3 mm RML nodule, no other mets.   11/14/23 PET. 4.6 x 3.3 cm L palatine tonsil mass (SUB 26.8), R level 2A and 2A/3 nodes. Questionable uptake distal R femoral medullary cavity, partially imaged, with questionable subtle corresponding soft tissue attenuation on CT. MRI could be obtained to better evaluate.  11/21~12/2/23 [facility] for weakness/fall. COVID/paxlovid, MSSA bacteremia (1 of 2 bottles on 1 day), TTE negative, treated with cefazolin but didn't complete 2 week  "course, episode of unresponsiveness 12/2. Dispo plan was TCU on [facility], but left AMA. CTA chest negative for PE, showed grossly unchanged cervical adenopathy. Brain MRI 11/22 for stroke code negative for infarct. Showed SVID, moderate atrophy, suboptimal contrast bolus to examine intracranial structures, 4.5 x 3 x 4.5 cm L nasopharyngeal mass, incompletely visualized. CTA negative.   12/19/23 Quad shot fraction #1. No-showed thereafter.   1/10/23-current: carbo/taxol weekly    SUBJECTIVE  Conner is seen today for close toxicity monitoring on carbo/taxol. He's received 4 weekly doses in the past 5 weeks.   -Again experienced diarrhea over the weekend. This happened last week as well. Feels fine for about 3 days after infusion then loses his appetite and develops loose stools. This usually improved around Sunday night and he feels good on Mondays and Tuesdays  -No significant nausea or vomiting  -No increase in neuropathy but legs feel more \"jumpy\"   -No shortness of breath or cough  -No change in pain in throat or neck    PAST MEDICAL HISTORY  SCC as above  Chronic LBP  TAVR 2020  H/o rheumatic carditis 1950  CHF. 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 Nocturia once nightly   ED  Cataracts  Umbilical hernia repair 2011  Knee arthroplasty B 2010  Lumbar spine fusion, laminectomy, sciatic pain R > L  Peripheral neuropathy - from pinched nerves?  Hearing loss    Numbness/tingling in both feet - bilaterally, symmetric, R spasms more than left    CURRENT OUTPATIENT MEDICATIONS  Current Outpatient Medications   Medication    aspirin (ASA) 81 MG chewable tablet    atorvastatin (LIPITOR) 40 MG tablet    ibuprofen (ADVIL/MOTRIN) 600 MG tablet    magnesium oxide (MAG-OX) 400 MG tablet    multivitamin, therapeutic (THERA-VIT) TABS tablet    ondansetron (ZOFRAN) 8 MG tablet    order for DME    oxyCODONE-acetaminophen (PERCOCET) 5-325 MG tablet    prochlorperazine (COMPAZINE) 10 MG " tablet    spironolactone (ALDACTONE) 50 MG tablet    tamsulosin (FLOMAX) 0.4 MG capsule    torsemide (DEMADEX) 20 MG tablet    vitamin D3 (CHOLECALCIFEROL) 50 mcg (2000 units) tablet     No current facility-administered medications for this visit.     ALLERGIES  No Known Allergies     PHYSICAL EXAM  BP (!) 142/76   Pulse 89   Temp 97.7  F (36.5  C) (Oral)   Resp 16   Wt 88.8 kg (195 lb 11.2 oz)   SpO2 99%   BMI 34.12 kg/m      General: Well-appearing male, NAD  Eyes: EOMI, PERRL. No scleral icterus.  ENT: Oral mucosa is moist. No lesions or thrush. Left tonsillar mass without erythema or erosion.   Lymphatic: Neck is supple without cervical or supraclavicular lymphadenopathy.   Cardiovascular: RRR, no m/g/r. +1 bilateral LE edema  Respiratory: CTA bilaterally. No wheezes or crackles.  Neurologic: No focal deficits.   Skin: No rashes, petechiae, or bruising noted on exposed skin.    LABORATORY AND IMAGING STUDIES  Most Recent 3 CBC's:  Recent Labs   Lab Test 02/13/24  0900 02/06/24  1013 01/30/24  0701   WBC 4.2 5.2 5.7   HGB 9.0* 9.7* 9.3*   MCV 77* 78 77*    246 280   ANEUTAUTO 1.8 2.5 3.0     Most Recent 3 BMP's:  Recent Labs   Lab Test 02/13/24  0900 02/06/24  1013 01/30/24  0701   * 146* 143   POTASSIUM 3.9 4.1 4.1   CHLORIDE 111* 110* 105   CO2 26 26 26   BUN 19.2 10.5 12.9   CR 1.09 0.84 0.86   ANIONGAP 10 10 12   WARREN 9.4 9.6 9.4   * 108* 107*   PROTTOTAL 6.9 7.1 7.2   ALBUMIN 4.1 4.1 4.0    Most Recent 3 LFT's:  Recent Labs   Lab Test 02/13/24  0900 02/06/24  1013 01/30/24  0701   AST 19 25 21   ALT 15 13 13   ALKPHOS 84 83 89   BILITOTAL 0.4 0.4 0.4    Most Recent 2 TSH and T4:  Recent Labs   Lab Test 12/27/23  1328 10/18/23  0941   TSH 0.54 0.13*   T4 1.47 1.34     I reviewed the above labs today.    ASSESSMENT AND PLAN  SCC L tonsil, p16 +lety, CPS 25%/TPS 20%, oligometastatic lung met, +/- bone mets: Unable to tolerate radiation session, and after discussion with Dr. Mills,  decided to pursue systemic therapy alone weith weekly carbo/taxol. So far has received 4 weekly infusions with one break. Slight increase in LE neuropathy but G1/mild. Continues to lose weight following cyclic anorexia and diarrhea, more suspicious for chemo effect. I recommend we take a break from chemo this week and proceed with IV fluids for supplemental hydration. Conner agrees. Scheduled for CT neck today. Needs CT chest/abd added on as well, if not today then next week prior to appt with Dr. Mueller. Discussed with scheduling.   -Defer carbo/taxol today  -IVF with 1L NS over 2 hours  -CT neck and CT chest/abd today for restaging  -RTC in 1 week with Dr. Mueller for imaging review and tentative carbo/taxol      Weight loss, Anorexia, diarrhea: Worse over the weekends for the past 2 weeks following infusion. Diarrhea usually lasts ~48 hours but has now resolved. Notable anorexia during that time. Mag oxide could be contributing but he's only taking this daily and mag is still low so we'll continue this. Low suspicion for infectious etiology given cyclic nature of symptoms. Can trial Imodium PRN but hopefully the treatment break will result in symptom improvement. Weight is down another 5 lbs this week.    Hypomag, hypoK: Mg 1.4 today. Continue magnesium oxide 400 mg daily.    H/o CVA: Residual L weakness. Uses cane and walker at baseline, but independent and functional, albeit frail. Doesn't qualify for home care, but he would if he becomes homebound. Working on PCA application.    LE edema: Improving. Compression stockings when able, elevated legs. Missed recent [facility] appt (1/26) for med/diuretic management.    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 Follows with PCP, Dr. Nascimento. Follow-up scheduled 2/19.    Microcytic anemia: Iron studies 8/2022 fairly normal, normal 4/18/23. Likely chemo effect at this point. MCV 77-78, iron studies added today (2/13)-pending.    Peripheral neuropathy:  MRI shows a lot of foramenal stenosis and spinal canal stenosis, so more likely related to that than DM2. Symptoms currently stable. Watch closely and adjust dose/frequency if needed.     32 minutes spent on the date of the encounter doing chart review, review of test results, interpretation of tests, patient visit, and documentation     Vandana Bertrand CNP

## 2024-02-13 ENCOUNTER — ONCOLOGY VISIT (OUTPATIENT)
Dept: ONCOLOGY | Facility: CLINIC | Age: 79
End: 2024-02-13
Attending: REGISTERED NURSE
Payer: COMMERCIAL

## 2024-02-13 ENCOUNTER — ANCILLARY PROCEDURE (OUTPATIENT)
Dept: CT IMAGING | Facility: CLINIC | Age: 79
End: 2024-02-13
Attending: INTERNAL MEDICINE
Payer: COMMERCIAL

## 2024-02-13 ENCOUNTER — APPOINTMENT (OUTPATIENT)
Dept: LAB | Facility: CLINIC | Age: 79
End: 2024-02-13
Attending: REGISTERED NURSE
Payer: COMMERCIAL

## 2024-02-13 VITALS
DIASTOLIC BLOOD PRESSURE: 76 MMHG | OXYGEN SATURATION: 99 % | RESPIRATION RATE: 16 BRPM | HEART RATE: 89 BPM | BODY MASS INDEX: 34.12 KG/M2 | SYSTOLIC BLOOD PRESSURE: 142 MMHG | TEMPERATURE: 97.7 F | WEIGHT: 195.7 LBS

## 2024-02-13 DIAGNOSIS — D50.9 MICROCYTIC ANEMIA: ICD-10-CM

## 2024-02-13 DIAGNOSIS — C10.9 SQUAMOUS CELL CARCINOMA OF OROPHARYNX (H): Primary | ICD-10-CM

## 2024-02-13 DIAGNOSIS — R63.0 ANOREXIA: ICD-10-CM

## 2024-02-13 DIAGNOSIS — C78.00 MALIGNANT NEOPLASM METASTATIC TO LUNG, UNSPECIFIED LATERALITY (H): Primary | ICD-10-CM

## 2024-02-13 DIAGNOSIS — C10.9 SQUAMOUS CELL CARCINOMA OF OROPHARYNX (H): ICD-10-CM

## 2024-02-13 DIAGNOSIS — C78.00 MALIGNANT NEOPLASM METASTATIC TO LUNG, UNSPECIFIED LATERALITY (H): ICD-10-CM

## 2024-02-13 DIAGNOSIS — R60.0 BILATERAL LEG EDEMA: ICD-10-CM

## 2024-02-13 DIAGNOSIS — E83.42 HYPOMAGNESEMIA: ICD-10-CM

## 2024-02-13 DIAGNOSIS — C09.9 TONSIL CANCER (H): ICD-10-CM

## 2024-02-13 DIAGNOSIS — R19.7 DIARRHEA, UNSPECIFIED TYPE: ICD-10-CM

## 2024-02-13 LAB
ALBUMIN SERPL BCG-MCNC: 4.1 G/DL (ref 3.5–5.2)
ALP SERPL-CCNC: 84 U/L (ref 40–150)
ALT SERPL W P-5'-P-CCNC: 15 U/L (ref 0–70)
ANION GAP SERPL CALCULATED.3IONS-SCNC: 10 MMOL/L (ref 7–15)
AST SERPL W P-5'-P-CCNC: 19 U/L (ref 0–45)
BASOPHILS # BLD AUTO: 0 10E3/UL (ref 0–0.2)
BASOPHILS NFR BLD AUTO: 1 %
BILIRUB SERPL-MCNC: 0.4 MG/DL
BUN SERPL-MCNC: 19.2 MG/DL (ref 8–23)
CALCIUM SERPL-MCNC: 9.4 MG/DL (ref 8.8–10.2)
CHLORIDE SERPL-SCNC: 111 MMOL/L (ref 98–107)
CREAT SERPL-MCNC: 1.09 MG/DL (ref 0.67–1.17)
DEPRECATED HCO3 PLAS-SCNC: 26 MMOL/L (ref 22–29)
EGFRCR SERPLBLD CKD-EPI 2021: 69 ML/MIN/1.73M2
EOSINOPHIL # BLD AUTO: 0.1 10E3/UL (ref 0–0.7)
EOSINOPHIL NFR BLD AUTO: 2 %
ERYTHROCYTE [DISTWIDTH] IN BLOOD BY AUTOMATED COUNT: 17.5 % (ref 10–15)
FERRITIN SERPL-MCNC: 281 NG/ML (ref 31–409)
GLUCOSE SERPL-MCNC: 109 MG/DL (ref 70–99)
HCT VFR BLD AUTO: 28.8 % (ref 40–53)
HGB BLD-MCNC: 9 G/DL (ref 13.3–17.7)
IMM GRANULOCYTES # BLD: 0 10E3/UL
IMM GRANULOCYTES NFR BLD: 0 %
IRON BINDING CAPACITY (ROCHE): 272 UG/DL (ref 240–430)
IRON SATN MFR SERPL: 20 % (ref 15–46)
IRON SERPL-MCNC: 54 UG/DL (ref 61–157)
LYMPHOCYTES # BLD AUTO: 2 10E3/UL (ref 0.8–5.3)
LYMPHOCYTES NFR BLD AUTO: 47 %
MAGNESIUM SERPL-MCNC: 1.4 MG/DL (ref 1.7–2.3)
MCH RBC QN AUTO: 24.2 PG (ref 26.5–33)
MCHC RBC AUTO-ENTMCNC: 31.3 G/DL (ref 31.5–36.5)
MCV RBC AUTO: 77 FL (ref 78–100)
MONOCYTES # BLD AUTO: 0.3 10E3/UL (ref 0–1.3)
MONOCYTES NFR BLD AUTO: 6 %
NEUTROPHILS # BLD AUTO: 1.8 10E3/UL (ref 1.6–8.3)
NEUTROPHILS NFR BLD AUTO: 44 %
NRBC # BLD AUTO: 0 10E3/UL
NRBC BLD AUTO-RTO: 0 /100
PLATELET # BLD AUTO: 212 10E3/UL (ref 150–450)
POTASSIUM SERPL-SCNC: 3.9 MMOL/L (ref 3.4–5.3)
PROT SERPL-MCNC: 6.9 G/DL (ref 6.4–8.3)
RBC # BLD AUTO: 3.72 10E6/UL (ref 4.4–5.9)
SODIUM SERPL-SCNC: 147 MMOL/L (ref 135–145)
WBC # BLD AUTO: 4.2 10E3/UL (ref 4–11)

## 2024-02-13 PROCEDURE — 74160 CT ABDOMEN W/CONTRAST: CPT | Mod: GC | Performed by: RADIOLOGY

## 2024-02-13 PROCEDURE — 85025 COMPLETE CBC W/AUTO DIFF WBC: CPT | Performed by: REGISTERED NURSE

## 2024-02-13 PROCEDURE — 83735 ASSAY OF MAGNESIUM: CPT

## 2024-02-13 PROCEDURE — G0463 HOSPITAL OUTPT CLINIC VISIT: HCPCS | Mod: 25,27 | Performed by: REGISTERED NURSE

## 2024-02-13 PROCEDURE — 71260 CT THORAX DX C+: CPT | Mod: GC | Performed by: RADIOLOGY

## 2024-02-13 PROCEDURE — 80053 COMPREHEN METABOLIC PANEL: CPT | Performed by: REGISTERED NURSE

## 2024-02-13 PROCEDURE — 36415 COLL VENOUS BLD VENIPUNCTURE: CPT | Performed by: REGISTERED NURSE

## 2024-02-13 PROCEDURE — 82728 ASSAY OF FERRITIN: CPT

## 2024-02-13 PROCEDURE — 70491 CT SOFT TISSUE NECK W/DYE: CPT | Performed by: STUDENT IN AN ORGANIZED HEALTH CARE EDUCATION/TRAINING PROGRAM

## 2024-02-13 PROCEDURE — 99214 OFFICE O/P EST MOD 30 MIN: CPT | Performed by: REGISTERED NURSE

## 2024-02-13 PROCEDURE — 258N000003 HC RX IP 258 OP 636: Performed by: REGISTERED NURSE

## 2024-02-13 PROCEDURE — 83550 IRON BINDING TEST: CPT

## 2024-02-13 PROCEDURE — G0463 HOSPITAL OUTPT CLINIC VISIT: HCPCS | Mod: 25,27

## 2024-02-13 PROCEDURE — 96360 HYDRATION IV INFUSION INIT: CPT

## 2024-02-13 RX ORDER — METHYLPREDNISOLONE SODIUM SUCCINATE 125 MG/2ML
125 INJECTION, POWDER, LYOPHILIZED, FOR SOLUTION INTRAMUSCULAR; INTRAVENOUS
Status: CANCELLED
Start: 2024-02-13

## 2024-02-13 RX ORDER — EPINEPHRINE 1 MG/ML
0.3 INJECTION, SOLUTION, CONCENTRATE INTRAVENOUS EVERY 5 MIN PRN
OUTPATIENT
Start: 2024-02-13

## 2024-02-13 RX ORDER — MEPERIDINE HYDROCHLORIDE 25 MG/ML
25 INJECTION INTRAMUSCULAR; INTRAVENOUS; SUBCUTANEOUS EVERY 30 MIN PRN
OUTPATIENT
Start: 2024-02-13

## 2024-02-13 RX ORDER — DIPHENHYDRAMINE HYDROCHLORIDE 50 MG/ML
50 INJECTION INTRAMUSCULAR; INTRAVENOUS
Status: CANCELLED
Start: 2024-02-13

## 2024-02-13 RX ORDER — ALBUTEROL SULFATE 0.83 MG/ML
2.5 SOLUTION RESPIRATORY (INHALATION)
Status: CANCELLED | OUTPATIENT
Start: 2024-02-13

## 2024-02-13 RX ORDER — METHYLPREDNISOLONE SODIUM SUCCINATE 125 MG/2ML
125 INJECTION, POWDER, LYOPHILIZED, FOR SOLUTION INTRAMUSCULAR; INTRAVENOUS
Start: 2024-02-13

## 2024-02-13 RX ORDER — MEPERIDINE HYDROCHLORIDE 25 MG/ML
25 INJECTION INTRAMUSCULAR; INTRAVENOUS; SUBCUTANEOUS EVERY 30 MIN PRN
Status: CANCELLED | OUTPATIENT
Start: 2024-02-13

## 2024-02-13 RX ORDER — ALBUTEROL SULFATE 90 UG/1
1-2 AEROSOL, METERED RESPIRATORY (INHALATION)
Status: CANCELLED
Start: 2024-02-13

## 2024-02-13 RX ORDER — EPINEPHRINE 1 MG/ML
0.3 INJECTION, SOLUTION INTRAMUSCULAR; SUBCUTANEOUS EVERY 5 MIN PRN
Status: CANCELLED | OUTPATIENT
Start: 2024-02-13

## 2024-02-13 RX ORDER — HEPARIN SODIUM,PORCINE 10 UNIT/ML
5-20 VIAL (ML) INTRAVENOUS DAILY PRN
OUTPATIENT
Start: 2024-02-13

## 2024-02-13 RX ORDER — HEPARIN SODIUM (PORCINE) LOCK FLUSH IV SOLN 100 UNIT/ML 100 UNIT/ML
5 SOLUTION INTRAVENOUS
Status: CANCELLED | OUTPATIENT
Start: 2024-02-13

## 2024-02-13 RX ORDER — ALBUTEROL SULFATE 90 UG/1
1-2 AEROSOL, METERED RESPIRATORY (INHALATION)
Start: 2024-02-13

## 2024-02-13 RX ORDER — HEPARIN SODIUM,PORCINE 10 UNIT/ML
5-20 VIAL (ML) INTRAVENOUS DAILY PRN
Status: CANCELLED | OUTPATIENT
Start: 2024-02-13

## 2024-02-13 RX ORDER — IOPAMIDOL 755 MG/ML
95 INJECTION, SOLUTION INTRAVASCULAR ONCE
Status: COMPLETED | OUTPATIENT
Start: 2024-02-13 | End: 2024-02-13

## 2024-02-13 RX ORDER — DIPHENHYDRAMINE HYDROCHLORIDE 50 MG/ML
50 INJECTION INTRAMUSCULAR; INTRAVENOUS
Start: 2024-02-13

## 2024-02-13 RX ORDER — ALBUTEROL SULFATE 0.83 MG/ML
2.5 SOLUTION RESPIRATORY (INHALATION)
OUTPATIENT
Start: 2024-02-13

## 2024-02-13 RX ORDER — HEPARIN SODIUM (PORCINE) LOCK FLUSH IV SOLN 100 UNIT/ML 100 UNIT/ML
5 SOLUTION INTRAVENOUS
OUTPATIENT
Start: 2024-02-13

## 2024-02-13 RX ADMIN — IOPAMIDOL 95 ML: 755 INJECTION, SOLUTION INTRAVASCULAR at 12:26

## 2024-02-13 RX ADMIN — SODIUM CHLORIDE 1000 ML: 9 INJECTION, SOLUTION INTRAVENOUS at 09:57

## 2024-02-13 ASSESSMENT — PAIN SCALES - GENERAL: PAINLEVEL: EXTREME PAIN (8)

## 2024-02-13 NOTE — NURSING NOTE
"Oncology Rooming Note    February 13, 2024 9:13 AM   Jonathan Bishop is a 78 year old male who presents for:    Chief Complaint   Patient presents with    Blood Draw     Labs drawn via PIV by RN in lab.  VS taken    Oncology Clinic Visit     RTN for Tonsil Cancer     Initial Vitals: BP (!) 142/76   Pulse 89   Temp 97.7  F (36.5  C) (Oral)   Resp 16   Wt 88.8 kg (195 lb 11.2 oz)   SpO2 99%   BMI 34.12 kg/m   Estimated body mass index is 34.12 kg/m  as calculated from the following:    Height as of 12/7/23: 1.613 m (5' 3.5\").    Weight as of this encounter: 88.8 kg (195 lb 11.2 oz). Body surface area is 1.99 meters squared.  Extreme Pain (8) Comment: Data Unavailable   No LMP for male patient.  Allergies reviewed: Yes  Medications reviewed: Yes    Medications: Medication refills not needed today.  Pharmacy name entered into [employer]: [id number] [place] [facility] [id number] [facility]    Frailty Screening:   Is the patient here for a new oncology consult visit in cancer care? 2. No      Clinical concerns: none       Darlene Quezada MA             "

## 2024-02-13 NOTE — DISCHARGE INSTRUCTIONS

## 2024-02-13 NOTE — PATIENT INSTRUCTIONS
[facility] Triage and after hours / weekends / holidays:  [phone] option 5, option 2    Please call the triage or after hours line if you experience a temperature greater than or equal to 100.4, shaking chills, have uncontrolled nausea, vomiting and/or diarrhea, dizziness, shortness of breath, chest pain, bleeding, unexplained bruising, or if you have any other new/concerning symptoms, questions or concerns.      If you are having any concerning symptoms or wish to speak to a provider before your next infusion visit, please call triage to notify your care team so we can adequately serve you.     If you need a refill on a narcotic prescription or other medication, please call before your infusion appointment.

## 2024-02-13 NOTE — Clinical Note
2/13/2024         RE: Jonathan Bishop  [address]  [facility] [address]        Dear Colleague,    Thank you for referring your patient, Jonathan Bishop, to the [facility] CANCER [facility]. Please see a copy of my visit note below.       [facility] CANCER [facility]    PATIENT NAME: Jonathan Bishop  MRN # [id number]   DATE OF VISIT: February 13, 2024 YOB: 1945     Otolaryngology: Dr. Karishma Eng  Radiation Oncology: Dr. Jenni Mills  PCP: Dr. Buck Nascimento    CANCER TYPE: SCC L tonsil, p16 +lety  STAGE: [id number] (IVC)  ECOG PS: 1    PD-L1: TPS 20%, CPS 25% on [id number] tonsil bx  NGS: N/A    SUMMARY  2/26/23 L tonsil mass bx in clinic (Dr. Eng).   2/20/23 PET/CT. 3.7 x 4.0 x 5.1 cm mass L palatine tonsil causing narrowing of the oropharyngeal lumen, L level 2 node, L retropharyngeal nodular density, extension of primary mass vs retropharyngeal node, 0.8 cm RLL nodule concerning for met, mild focal uptake R iliac bone and L1 without CT correlate suspicious for mets.   3/2/23 R VATS, wedge resection. Path: SCC, poorly differentiated, associated with necrosis, 1 cm, negative margins, p16 +lety  3/24/23 MRI L spine and pelvis. Diffusely heterogeneous marrow signal throughout without corresponding abnormal signal on sagittal STIR sequence and no abnormal enhancement. Nonspecific but could be benign process such as red marrow hyperplasia, cannot completely exclude metastatic disease or infiltrative pathologic marrow process. No lesions corresponding to FDG avid spots on PET/CT.   4/19~10/18/23 Pembrolizumab.  10/26/23 CT neck and CAP. Increased L palatine tonsil mass, 3.8 x 2.7 cm --> 4.7 x 3.5 cm. Increased bilateral cervical nodes up to 2.3 x 2.4 cm R level 2 node. New 3 mm RML nodule, no other mets.   11/14/23 PET. 4.6 x 3.3 cm L palatine tonsil mass (SUB 26.8), R level 2A and 2A/3 nodes. Questionable uptake distal R femoral medullary cavity, partially imaged, with  "questionable subtle corresponding soft tissue attenuation on CT. MRI could be obtained to better evaluate.  11/21~12/2/23 [facility] for weakness/fall. COVID/paxlovid, MSSA bacteremia (1 of 2 bottles on 1 day), TTE negative, treated with cefazolin but didn't complete 2 week course, episode of unresponsiveness 12/2. Dispo plan was TCU on [facility], but left AMA. CTA chest negative for PE, showed grossly unchanged cervical adenopathy. Brain MRI 11/22 for stroke code negative for infarct. Showed SVID, moderate atrophy, suboptimal contrast bolus to examine intracranial structures, 4.5 x 3 x 4.5 cm L nasopharyngeal mass, incompletely visualized. CTA negative.   12/19/23 Quad shot fraction #1. No-showed thereafter.   1/10/23-current: carbo/taxol weekly    SUBJECTIVE  Conner is seen today for close toxicity monitoring on carbo/taxol. He's received 4 weekly doses in the past 5 weeks.   -Again experienced diarrhea over the weekend. This happened last week as well. Feels fine for about 3 days after infusion then loses his appetite and develops loose stools. This usually improved around Sunday night and he feels good on Mondays and Tuesdays  -No significant nausea or vomiting  -No increase in neuropathy but legs feel more \"jumpy\"   -No shortness of breath or cough  -No change in pain in throat or neck    PAST MEDICAL HISTORY  SCC as above  Chronic LBP  TAVR 2020  H/o rheumatic carditis 1950  CHF. 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 Nocturia once nightly   ED  Cataracts  Umbilical hernia repair 2011  Knee arthroplasty B 2010  Lumbar spine fusion, laminectomy, sciatic pain R > L  Peripheral neuropathy - from pinched nerves?  Hearing loss    Numbness/tingling in both feet - bilaterally, symmetric, R spasms more than left    CURRENT OUTPATIENT MEDICATIONS  Current Outpatient Medications   Medication    aspirin (ASA) 81 MG chewable tablet    atorvastatin (LIPITOR) 40 MG tablet    " ibuprofen (ADVIL/MOTRIN) 600 MG tablet    magnesium oxide (MAG-OX) 400 MG tablet    multivitamin, therapeutic (THERA-VIT) TABS tablet    ondansetron (ZOFRAN) 8 MG tablet    order for DME    oxyCODONE-acetaminophen (PERCOCET) 5-325 MG tablet    prochlorperazine (COMPAZINE) 10 MG tablet    spironolactone (ALDACTONE) 50 MG tablet    tamsulosin (FLOMAX) 0.4 MG capsule    torsemide (DEMADEX) 20 MG tablet    vitamin D3 (CHOLECALCIFEROL) 50 mcg (2000 units) tablet     No current facility-administered medications for this visit.     ALLERGIES  No Known Allergies     PHYSICAL EXAM  BP (!) 142/76   Pulse 89   Temp 97.7  F (36.5  C) (Oral)   Resp 16   Wt 88.8 kg (195 lb 11.2 oz)   SpO2 99%   BMI 34.12 kg/m      General: Well-appearing male, NAD  Eyes: EOMI, PERRL. No scleral icterus.  ENT: Oral mucosa is moist. No lesions or thrush. Left tonsillar mass without erythema or erosion.   Lymphatic: Neck is supple without cervical or supraclavicular lymphadenopathy.   Cardiovascular: RRR, no m/g/r. +1 bilateral LE edema  Respiratory: CTA bilaterally. No wheezes or crackles.  Neurologic: No focal deficits.   Skin: No rashes, petechiae, or bruising noted on exposed skin.    LABORATORY AND IMAGING STUDIES  Most Recent 3 CBC's:  Recent Labs   Lab Test 02/13/24  0900 02/06/24  1013 01/30/24  0701   WBC 4.2 5.2 5.7   HGB 9.0* 9.7* 9.3*   MCV 77* 78 77*    246 280   ANEUTAUTO 1.8 2.5 3.0     Most Recent 3 BMP's:  Recent Labs   Lab Test 02/13/24  0900 02/06/24  1013 01/30/24  0701   * 146* 143   POTASSIUM 3.9 4.1 4.1   CHLORIDE 111* 110* 105   CO2 26 26 26   BUN 19.2 10.5 12.9   CR 1.09 0.84 0.86   ANIONGAP 10 10 12   WARREN 9.4 9.6 9.4   * 108* 107*   PROTTOTAL 6.9 7.1 7.2   ALBUMIN 4.1 4.1 4.0    Most Recent 3 LFT's:  Recent Labs   Lab Test 02/13/24  0900 02/06/24  1013 01/30/24  0701   AST 19 25 21   ALT 15 13 13   ALKPHOS 84 83 89   BILITOTAL 0.4 0.4 0.4    Most Recent 2 TSH and T4:  Recent Labs   Lab Test  12/27/23  1328 10/18/23  0941   TSH 0.54 0.13*   T4 1.47 1.34     I reviewed the above labs today.    ASSESSMENT AND PLAN  SCC L tonsil, p16 +lety, CPS 25%/TPS 20%, oligometastatic lung met, +/- bone mets: Unable to tolerate radiation session, and after discussion with Dr. Mills, decided to pursue systemic therapy alone weith weekly carbo/taxol. So far has received 4 weekly infusions with one break. Slight increase in LE neuropathy but G1/mild. Continues to lose weight following cyclic anorexia and diarrhea, more suspicious for chemo effect. I recommend we take a break from chemo this week and proceed with IV fluids for supplemental hydration. Conner agrees. Scheduled for CT neck today. Needs CT chest/abd added on as well, if not today then next week prior to appt with Dr. Mueller. Discussed with scheduling.   -Defer carbo/taxol today  -IVF with 1L NS over 2 hours  -CT neck and CT chest/abd today for restaging  -RTC in 1 week with Dr. Mueller for imaging review and tentative carbo/taxol      Weight loss, Anorexia, diarrhea: Worse over the weekends for the past 2 weeks following infusion. Diarrhea usually lasts ~48 hours but has now resolved. Notable anorexia during that time. Mag oxide could be contributing but he's only taking this daily and mag is still low so we'll continue this. Low suspicion for infectious etiology given cyclic nature of symptoms. Can trial Imodium PRN but hopefully the treatment break will result in symptom improvement. Weight is down another 5 lbs this week.    Hypomag, hypoK: Mg 1.4 today. Continue magnesium oxide 400 mg daily.    H/o CVA: Residual L weakness. Uses cane and walker at baseline, but independent and functional, albeit frail. Doesn't qualify for home care, but he would if he becomes homebound. Working on PCA application.    LE edema: Improving. Compression stockings when able, elevated legs. Missed recent [facility] appt (1/26) for med/diuretic management.    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 Follows with PCP, Dr. Nascimento. Follow-up scheduled 2/19.    Microcytic anemia: Iron studies 8/2022 fairly normal, normal 4/18/23. Likely chemo effect at this point. MCV 77-78, iron studies added today (2/13)-pending.    Peripheral neuropathy: MRI shows a lot of foramenal stenosis and spinal canal stenosis, so more likely related to that than DM2. Symptoms currently stable. Watch closely and adjust dose/frequency if needed.     32 minutes spent on the date of the encounter doing chart review, review of test results, interpretation of tests, patient visit, and documentation     Vandana Bertrand CNP                Again, thank you for allowing me to participate in the care of your patient.        Sincerely,        Vandana Bertrand CNP

## 2024-02-13 NOTE — PROGRESS NOTES
"Infusion Nursing Note:  Jonathan Bishop presents today for C2D1 Taxol/Carboplatin -- cancelled/IV Fluids given.    Patient seen by provider today: Yes: Vandana Bertrand CNP prior to infusion   present during visit today: Not Applicable.    Note: Conner denied any questions or concerns following his visit with the provider prior to infusion today.    Per written communication with Vandana Bertrand CNP/Holly Dudley RN 02/13/24 @ 0940  - no chemo today; will give IV fluids instead  - will cancel chemo day  - let patient know mag is low; do not need to replace per protocol today  - please tell patient to continue his magnesium supplement at home    Patient stated that he has oral magnesium at home and has been taking 2 tablets daily. He reported concern about whether or not his tablets are working since he thinks it might be an older bottle. Patient had a prescription ordered on 1/30 for 400 mg magnesium daily. He is unsure if he picked this prescription up from the pharmacy.    Called pharmacy downstairs to inquire about magnesium. Per pharmacy, magnesium oxide was not picked up and was put \"back on the shelf\" as he will need to buy it over the counter per insurance. Reviewed this with patient who verbalized understanding. Print out provided with images of magnesium bottle for patient to purchase. Also provided patient with written education on foods with magnesium.      Intravenous Access:  Peripheral IV placed. PIV left in place for CT scan after infusion.    Treatment Conditions:   Latest Reference Range & Units 02/13/24 09:00   Sodium 135 - 145 mmol/L 147 (H)   Potassium 3.4 - 5.3 mmol/L 3.9   Chloride 98 - 107 mmol/L 111 (H)   Carbon Dioxide (CO2) 22 - 29 mmol/L 26   Urea Nitrogen 8.0 - 23.0 mg/dL 19.2   Creatinine 0.67 - 1.17 mg/dL 1.09   GFR Estimate >60 mL/min/1.73m2 69   Calcium 8.8 - 10.2 mg/dL 9.4   Anion Gap 7 - 15 mmol/L 10   Magnesium 1.7 - 2.3 mg/dL 1.4 (L)   Albumin 3.5 - 5.2 g/dL 4.1   Protein " Total 6.4 - 8.3 g/dL 6.9   Alkaline Phosphatase 40 - 150 U/L 84   ALT 0 - 70 U/L 15   AST 0 - 45 U/L 19   Bilirubin Total <=1.2 mg/dL 0.4   Glucose 70 - 99 mg/dL 109 (H)   WBC 4.0 - 11.0 10e3/uL 4.2   Hemoglobin 13.3 - 17.7 g/dL 9.0 (L)   Hematocrit 40.0 - 53.0 % 28.8 (L)   Platelet Count 150 - 450 10e3/uL 212   RBC Count 4.40 - 5.90 10e6/uL 3.72 (L)   MCV 78 - 100 fL 77 (L)   MCH 26.5 - 33.0 pg 24.2 (L)   MCHC 31.5 - 36.5 g/dL 31.3 (L)   RDW 10.0 - 15.0 % 17.5 (H)   % Neutrophils % 44   % Lymphocytes % 47   % Monocytes % 6   % Eosinophils % 2   % Basophils % 1   Absolute Basophils 0.0 - 0.2 10e3/uL 0.0   Absolute Eosinophils 0.0 - 0.7 10e3/uL 0.1   Absolute Immature Granulocytes <=0.4 10e3/uL 0.0   Absolute Lymphocytes 0.8 - 5.3 10e3/uL 2.0   Absolute Monocytes 0.0 - 1.3 10e3/uL 0.3   % Immature Granulocytes % 0   Absolute Neutrophils 1.6 - 8.3 10e3/uL 1.8   Absolute NRBCs 10e3/uL 0.0   NRBCs per 100 WBC <1 /100 0         Post Infusion Assessment:  Patient tolerated infusion without incident.  Blood return noted pre and post infusion.  Site patent and intact, free from redness, edema or discomfort.  No evidence of extravasations.  Access discontinued per protocol.       Discharge Plan:   Discharge instructions reviewed with: Patient.  Patient and/or family verbalized understanding of discharge instructions and all questions answered.  AVS to patient via [employer].  Patient will return 2/21 for next appointment.   Patient discharged in stable condition accompanied by: self.  Departure Mode: Ambulatory.      Mary Dudley RN

## 2024-02-13 NOTE — DISCHARGE INSTRUCTIONS

## 2024-02-19 ENCOUNTER — OFFICE VISIT (OUTPATIENT)
Dept: INTERNAL MEDICINE | Facility: CLINIC | Age: 79
End: 2024-02-19
Payer: COMMERCIAL

## 2024-02-19 ENCOUNTER — PATIENT OUTREACH (OUTPATIENT)
Dept: CARE COORDINATION | Facility: CLINIC | Age: 79
End: 2024-02-19

## 2024-02-19 VITALS
HEART RATE: 79 BPM | SYSTOLIC BLOOD PRESSURE: 123 MMHG | DIASTOLIC BLOOD PRESSURE: 73 MMHG | OXYGEN SATURATION: 100 % | WEIGHT: 205.4 LBS | HEIGHT: 64 IN | BODY MASS INDEX: 35.07 KG/M2 | TEMPERATURE: 97.6 F

## 2024-02-19 DIAGNOSIS — Z29.11 NEED FOR RSV IMMUNIZATION: ICD-10-CM

## 2024-02-19 DIAGNOSIS — C09.9 TONSIL CANCER (H): ICD-10-CM

## 2024-02-19 DIAGNOSIS — I50.32 CHRONIC DIASTOLIC HEART FAILURE (H): ICD-10-CM

## 2024-02-19 DIAGNOSIS — E66.01 MORBID OBESITY (H): ICD-10-CM

## 2024-02-19 DIAGNOSIS — E11.9 TYPE 2 DIABETES MELLITUS WITHOUT COMPLICATION, WITHOUT LONG-TERM CURRENT USE OF INSULIN (H): ICD-10-CM

## 2024-02-19 DIAGNOSIS — C78.00 MALIGNANT NEOPLASM METASTATIC TO LUNG, UNSPECIFIED LATERALITY (H): Primary | ICD-10-CM

## 2024-02-19 DIAGNOSIS — C10.9 SQUAMOUS CELL CARCINOMA OF OROPHARYNX (H): ICD-10-CM

## 2024-02-19 DIAGNOSIS — I73.9 PVD (PERIPHERAL VASCULAR DISEASE) (H): ICD-10-CM

## 2024-02-19 DIAGNOSIS — Z74.2 HOME HELP NEEDED: ICD-10-CM

## 2024-02-19 DIAGNOSIS — Z23 NEED FOR COVID-19 VACCINE: ICD-10-CM

## 2024-02-19 DIAGNOSIS — H43.391 FLOATERS IN VISUAL FIELD, RIGHT: ICD-10-CM

## 2024-02-19 PROCEDURE — 99213 OFFICE O/P EST LOW 20 MIN: CPT | Performed by: INTERNAL MEDICINE

## 2024-02-19 RX ORDER — RESPIRATORY SYNCYTIAL VIRUS VACCINE 120MCG/0.5
0.5 KIT INTRAMUSCULAR ONCE
Qty: 1 EACH | Refills: 0 | Status: SHIPPED | OUTPATIENT
Start: 2024-02-19 | End: 2024-02-19

## 2024-02-19 ASSESSMENT — PATIENT HEALTH QUESTIONNAIRE - PHQ9: SUM OF ALL RESPONSES TO PHQ QUESTIONS 1-9: 8

## 2024-02-19 NOTE — PROGRESS NOTES
Social Work - Follow-Up  [facility]    Data/Intervention:    Patient Name: Jonathan Bishop Goes By: Conner    /Age: 1945 (78 year old)    Reason for Follow-Up:  In home support    Collaborated With:    -patient's Alternative care  Ashley Garza  -    Intervention/Education/Resources Provided:  SW called and spoke with patient's alternative  Ashley Garza who reported they are aware of patient's request to work with Suni, a ILS worker who provides services to another resident in their apartment building. This is the provider that Ashley has been coordinating with to begin ILS services for patient. Patient is approved for services. Ashley has attempted to submit paperwork to [facility] for ILS services to begin however there is still an issue the company being accepted as an ILS provider possibly due to a recent name change.This has created a stepan in getting the ICLS worker services to start. The [employer] company is working to address this.       Assessment/Plan:  Ashley reported she did provide patient with an update on 2/15/4 and will continue to check in with patient regarding updates of when their ICLS worker will being working with them.    Previously provided patient/family with writer's contact information and availability.    Earlene BAEZ, [facility]  - Oncology  Phone : [phone]  Pager: [phone]

## 2024-02-19 NOTE — COMMUNITY RESOURCES LIST (ENGLISH)
02/19/2024   [facility]  N/A  For questions about this resource list or additional care needs, please contact your primary care clinic or care manager.  Phone: [phone]   Email: N/A   Address: [address] [place] [address]   Hours: N/A        Financial Stability       Rent and mortgage payment assistance  1  [facility] ([employer]) - [employer] - Homelessness Prevention - Rent and mortgage payment assistance Distance: 2.02 miles      In-Person, Phone/Virtual   [address] [place] [address]  Language: English, [place], Telugu  Hours: Mon 12:00 AM - 7:00 PM , Tue 10:00 AM - 3:00 PM , Wed - Thu 10:00 AM - 2:30 PM  Fees: Free   Phone: (633) 564-6456 Email: pat@[email or web address] Website: http://[email or web address].org     2  [facility] [place] Distance: 2.84 miles      In-Person, Phone/Virtual   [address] [place] [address]  Language: English  Hours: Mon - Fri 8:00 AM - 4:30 PM  Fees: Free   Phone: (950) 416-1243 Email: zandra@[email or web address]. Website: [email or web address]          Important Numbers & Websites       Emergency Services   911  City Services   311  Poison Control   (956) 174-9441  Suicide Prevention Lifeline   (821) 551-1657 (TALK)  Child Abuse Hotline   (485) 429-2365 (4-A-Child)  Sexual Assault Hotline   (369) 777-1795 (HOPE)  National Runaway Safeline   (563) 732-8358 (RUNAWAY)  All-Options Talkline   (179) 444-1055  Substance Abuse Referral   (612) 159-5357 (HELP)

## 2024-02-19 NOTE — PROGRESS NOTES
"  Assessment & Plan     Malignant neoplasm metastatic to lung  Tonsil cancer (H)  Squamous cell carcinoma of oropharynx (H)  Following with oncology. Unable to tolerate radiation, instead is receiving  systemic therapy alone weith weekly carbo/taxol. So far has received 4 weekly infusions with one break.  Reports feeling generally well with this. Notes to feel good few days after infusion and then more fatigued as next infusion approaches. Biggest complaint in loss of appetite, is able to eat \"oodles of noodles\" and fish.     Floaters in visual field, right  New floater in right eye. Has not been to eye doctor in over 2+ years. Referral for comprehensive eye exam placed.     Need for RSV immunization  Need for COVID-19 vaccine  Prescription printed, patient to bring to local pharmacy.      Home Help Needed   Conner has had home help in past but this person has since left. Conner has found an individual willing to help but has not been successfully in connecting with  to set this up. Reached out to , Earlene Hou to assist in coordination.     Plan to follow up in 6 months.     Lindy Mijares RN, BAN  Doctor of Nursing Practice Student  Class of 2024   [employer]     I, Buck Nascimento, was present with the medical student who participated in the service and in the documentation of the note.  I have verified the history and personally performed the physical exam and medical decision making.  I agree with the assessment and plan of care as documented in the note.    Buck Nascimento MD, FACP        Subjective   Conner is a 78 year old, presenting for the following health issues:  Follow Up (Pt would like to discuss getting an eye doctor)      2/19/2024     6:34 AM   Additional Questions   Roomed by [facility]     Cancer treatment, CT results to be shared Wednesday  Weekly chemo; decrease appetite     Eye doctor; right eye floater.  Double vision, headaches, worsens " "when on computer.      Wants a home worker for home, cooking/cleaning     Review of Systems  Constitutional, HEENT, cardiovascular, pulmonary, gi and gu systems are negative, except as otherwise noted.      Objective    /73 (BP Location: Right arm, Patient Position: Sitting, Cuff Size: Adult Large)   Pulse 79   Temp 97.6  F (36.4  C) (Oral)   Ht 1.613 m (5' 3.5\")   Wt 93.2 kg (205 lb 6.4 oz)   SpO2 100%   BMI 35.81 kg/m    Body mass index is 35.81 kg/m .    Physical Exam   GENERAL: alert and no distress  EYES: Eyes grossly normal to inspection, PERRL and conjunctivae and sclerae normal  HENT: ear canals and TM's normal, nose and mouth without ulcers or lesions  NECK: no adenopathy, no asymmetry, masses, or scars  RESP: lungs clear to auscultation - no rales, rhonchi or wheezes  CV: regular rate and rhythm, normal S1 S2, no S3 or S4, no murmur, click or rub, no peripheral edema  SKIN: no suspicious lesions or rashes  PSYCH: mentation appears normal, affect normal/bright    Infusion Therapy Visit on 02/13/2024   Component Date Value Ref Range Status    Sodium 02/13/2024 147 (H)  135 - 145 mmol/L Final    Reference intervals for this test were updated on 09/26/2023 to more accurately reflect our healthy population. There may be differences in the flagging of prior results with similar values performed with this method. Interpretation of those prior results can be made in the context of the updated reference intervals.     Potassium 02/13/2024 3.9  3.4 - 5.3 mmol/L Final    Carbon Dioxide (CO2) 02/13/2024 26  22 - 29 mmol/L Final    Anion Gap 02/13/2024 10  7 - 15 mmol/L Final    Urea Nitrogen 02/13/2024 19.2  8.0 - 23.0 mg/dL Final    Creatinine 02/13/2024 1.09  0.67 - 1.17 mg/dL Final    GFR Estimate 02/13/2024 69  >60 mL/min/1.73m2 Final    Calcium 02/13/2024 9.4  8.8 - 10.2 mg/dL Final    Chloride 02/13/2024 111 (H)  98 - 107 mmol/L Final    Glucose 02/13/2024 109 (H)  70 - 99 mg/dL Final    Alkaline " Phosphatase 02/13/2024 84  40 - 150 U/L Final    Reference intervals for this test were updated on 11/14/2023 to more accurately reflect our healthy population. There may be differences in the flagging of prior results with similar values performed with this method. Interpretation of those prior results can be made in the context of the updated reference intervals.    AST 02/13/2024 19  0 - 45 U/L Final    Reference intervals for this test were updated on 6/12/2023 to more accurately reflect our healthy population. There may be differences in the flagging of prior results with similar values performed with this method. Interpretation of those prior results can be made in the context of the updated reference intervals.    ALT 02/13/2024 15  0 - 70 U/L Final    Reference intervals for this test were updated on 6/12/2023 to more accurately reflect our healthy population. There may be differences in the flagging of prior results with similar values performed with this method. Interpretation of those prior results can be made in the context of the updated reference intervals.      Protein Total 02/13/2024 6.9  6.4 - 8.3 g/dL Final    Albumin 02/13/2024 4.1  3.5 - 5.2 g/dL Final    Bilirubin Total 02/13/2024 0.4  <=1.2 mg/dL Final    WBC Count 02/13/2024 4.2  4.0 - 11.0 10e3/uL Final    RBC Count 02/13/2024 3.72 (L)  4.40 - 5.90 10e6/uL Final    Hemoglobin 02/13/2024 9.0 (L)  13.3 - 17.7 g/dL Final    Hematocrit 02/13/2024 28.8 (L)  40.0 - 53.0 % Final    MCV 02/13/2024 77 (L)  78 - 100 fL Final    MCH 02/13/2024 24.2 (L)  26.5 - 33.0 pg Final    MCHC 02/13/2024 31.3 (L)  31.5 - 36.5 g/dL Final    RDW 02/13/2024 17.5 (H)  10.0 - 15.0 % Final    Platelet Count 02/13/2024 212  150 - 450 10e3/uL Final    % Neutrophils 02/13/2024 44  % Final    % Lymphocytes 02/13/2024 47  % Final    % Monocytes 02/13/2024 6  % Final    % Eosinophils 02/13/2024 2  % Final    % Basophils 02/13/2024 1  % Final    % Immature Granulocytes  02/13/2024 0  % Final    NRBCs per 100 WBC 02/13/2024 0  <1 /100 Final    Absolute Neutrophils 02/13/2024 1.8  1.6 - 8.3 10e3/uL Final    Absolute Lymphocytes 02/13/2024 2.0  0.8 - 5.3 10e3/uL Final    Absolute Monocytes 02/13/2024 0.3  0.0 - 1.3 10e3/uL Final    Absolute Eosinophils 02/13/2024 0.1  0.0 - 0.7 10e3/uL Final    Absolute Basophils 02/13/2024 0.0  0.0 - 0.2 10e3/uL Final    Absolute Immature Granulocytes 02/13/2024 0.0  <=0.4 10e3/uL Final    Absolute NRBCs 02/13/2024 0.0  10e3/uL Final    Magnesium 02/13/2024 1.4 (L)  1.7 - 2.3 mg/dL Final    Ferritin 02/13/2024 281  31 - 409 ng/mL Final    Iron 02/13/2024 54 (L)  61 - 157 ug/dL Final    Iron Binding Capacity 02/13/2024 272  240 - 430 ug/dL Final    Iron Sat Index 02/13/2024 20  15 - 46 % Final           Signed Electronically by: Buck Nascimento MD

## 2024-02-21 ENCOUNTER — APPOINTMENT (OUTPATIENT)
Dept: LAB | Facility: CLINIC | Age: 79
End: 2024-02-21
Attending: INTERNAL MEDICINE
Payer: COMMERCIAL

## 2024-02-21 ENCOUNTER — INFUSION THERAPY VISIT (OUTPATIENT)
Dept: ONCOLOGY | Facility: CLINIC | Age: 79
End: 2024-02-21
Attending: INTERNAL MEDICINE
Payer: COMMERCIAL

## 2024-02-21 VITALS
DIASTOLIC BLOOD PRESSURE: 75 MMHG | TEMPERATURE: 97.9 F | RESPIRATION RATE: 18 BRPM | BODY MASS INDEX: 36.35 KG/M2 | WEIGHT: 208.5 LBS | SYSTOLIC BLOOD PRESSURE: 135 MMHG | HEART RATE: 94 BPM | OXYGEN SATURATION: 100 %

## 2024-02-21 DIAGNOSIS — E83.42 HYPOMAGNESEMIA: ICD-10-CM

## 2024-02-21 DIAGNOSIS — C78.00 MALIGNANT NEOPLASM METASTATIC TO LUNG, UNSPECIFIED LATERALITY (H): Primary | ICD-10-CM

## 2024-02-21 DIAGNOSIS — C10.9 SQUAMOUS CELL CARCINOMA OF OROPHARYNX (H): ICD-10-CM

## 2024-02-21 DIAGNOSIS — Z13.29 SCREENING FOR HYPOTHYROIDISM: ICD-10-CM

## 2024-02-21 DIAGNOSIS — C09.9 TONSIL CANCER (H): ICD-10-CM

## 2024-02-21 PROBLEM — E11.9 TYPE 2 DIABETES MELLITUS WITHOUT COMPLICATION, WITHOUT LONG-TERM CURRENT USE OF INSULIN (H): Status: ACTIVE | Noted: 2024-02-21

## 2024-02-21 LAB
ALBUMIN SERPL BCG-MCNC: 3.7 G/DL (ref 3.5–5.2)
ALP SERPL-CCNC: 92 U/L (ref 40–150)
ALT SERPL W P-5'-P-CCNC: 5 U/L (ref 0–70)
ANION GAP SERPL CALCULATED.3IONS-SCNC: 11 MMOL/L (ref 7–15)
AST SERPL W P-5'-P-CCNC: 16 U/L (ref 0–45)
BASOPHILS # BLD AUTO: 0 10E3/UL (ref 0–0.2)
BASOPHILS NFR BLD AUTO: 1 %
BILIRUB SERPL-MCNC: 0.3 MG/DL
BUN SERPL-MCNC: 13.1 MG/DL (ref 8–23)
CALCIUM SERPL-MCNC: 8.9 MG/DL (ref 8.8–10.2)
CHLORIDE SERPL-SCNC: 109 MMOL/L (ref 98–107)
CREAT SERPL-MCNC: 0.84 MG/DL (ref 0.67–1.17)
DEPRECATED HCO3 PLAS-SCNC: 25 MMOL/L (ref 22–29)
EGFRCR SERPLBLD CKD-EPI 2021: 89 ML/MIN/1.73M2
EOSINOPHIL # BLD AUTO: 0.2 10E3/UL (ref 0–0.7)
EOSINOPHIL NFR BLD AUTO: 3 %
ERYTHROCYTE [DISTWIDTH] IN BLOOD BY AUTOMATED COUNT: 19.1 % (ref 10–15)
GLUCOSE SERPL-MCNC: 106 MG/DL (ref 70–99)
HCT VFR BLD AUTO: 26.9 % (ref 40–53)
HGB BLD-MCNC: 8.4 G/DL (ref 13.3–17.7)
IMM GRANULOCYTES # BLD: 0 10E3/UL
IMM GRANULOCYTES NFR BLD: 0 %
LYMPHOCYTES # BLD AUTO: 1.9 10E3/UL (ref 0.8–5.3)
LYMPHOCYTES NFR BLD AUTO: 36 %
MAGNESIUM SERPL-MCNC: 1.3 MG/DL (ref 1.7–2.3)
MCH RBC QN AUTO: 24.6 PG (ref 26.5–33)
MCHC RBC AUTO-ENTMCNC: 31.2 G/DL (ref 31.5–36.5)
MCV RBC AUTO: 79 FL (ref 78–100)
MONOCYTES # BLD AUTO: 0.6 10E3/UL (ref 0–1.3)
MONOCYTES NFR BLD AUTO: 11 %
NEUTROPHILS # BLD AUTO: 2.6 10E3/UL (ref 1.6–8.3)
NEUTROPHILS NFR BLD AUTO: 49 %
NRBC # BLD AUTO: 0 10E3/UL
NRBC BLD AUTO-RTO: 0 /100
PLATELET # BLD AUTO: 159 10E3/UL (ref 150–450)
POTASSIUM SERPL-SCNC: 3.5 MMOL/L (ref 3.4–5.3)
PROT SERPL-MCNC: 6.2 G/DL (ref 6.4–8.3)
RBC # BLD AUTO: 3.42 10E6/UL (ref 4.4–5.9)
SODIUM SERPL-SCNC: 145 MMOL/L (ref 135–145)
WBC # BLD AUTO: 5.2 10E3/UL (ref 4–11)

## 2024-02-21 PROCEDURE — 250N000013 HC RX MED GY IP 250 OP 250 PS 637: Performed by: INTERNAL MEDICINE

## 2024-02-21 PROCEDURE — G0463 HOSPITAL OUTPT CLINIC VISIT: HCPCS | Mod: 25 | Performed by: INTERNAL MEDICINE

## 2024-02-21 PROCEDURE — 96413 CHEMO IV INFUSION 1 HR: CPT

## 2024-02-21 PROCEDURE — 99214 OFFICE O/P EST MOD 30 MIN: CPT | Performed by: INTERNAL MEDICINE

## 2024-02-21 PROCEDURE — 250N000011 HC RX IP 250 OP 636: Performed by: INTERNAL MEDICINE

## 2024-02-21 PROCEDURE — G2211 COMPLEX E/M VISIT ADD ON: HCPCS | Performed by: INTERNAL MEDICINE

## 2024-02-21 PROCEDURE — 96417 CHEMO IV INFUS EACH ADDL SEQ: CPT

## 2024-02-21 PROCEDURE — 80053 COMPREHEN METABOLIC PANEL: CPT | Performed by: INTERNAL MEDICINE

## 2024-02-21 PROCEDURE — 258N000003 HC RX IP 258 OP 636: Performed by: INTERNAL MEDICINE

## 2024-02-21 PROCEDURE — 85025 COMPLETE CBC W/AUTO DIFF WBC: CPT | Performed by: INTERNAL MEDICINE

## 2024-02-21 PROCEDURE — 96375 TX/PRO/DX INJ NEW DRUG ADDON: CPT

## 2024-02-21 PROCEDURE — 83735 ASSAY OF MAGNESIUM: CPT

## 2024-02-21 PROCEDURE — 36415 COLL VENOUS BLD VENIPUNCTURE: CPT | Performed by: INTERNAL MEDICINE

## 2024-02-21 RX ORDER — HEPARIN SODIUM,PORCINE 10 UNIT/ML
5-20 VIAL (ML) INTRAVENOUS DAILY PRN
Status: CANCELLED | OUTPATIENT
Start: 2024-02-21

## 2024-02-21 RX ORDER — ALBUTEROL SULFATE 0.83 MG/ML
2.5 SOLUTION RESPIRATORY (INHALATION)
Status: CANCELLED | OUTPATIENT
Start: 2024-03-06

## 2024-02-21 RX ORDER — HEPARIN SODIUM (PORCINE) LOCK FLUSH IV SOLN 100 UNIT/ML 100 UNIT/ML
5 SOLUTION INTRAVENOUS
Status: CANCELLED | OUTPATIENT
Start: 2024-02-21

## 2024-02-21 RX ORDER — HEPARIN SODIUM (PORCINE) LOCK FLUSH IV SOLN 100 UNIT/ML 100 UNIT/ML
5 SOLUTION INTRAVENOUS
Status: CANCELLED | OUTPATIENT
Start: 2024-03-06

## 2024-02-21 RX ORDER — EPINEPHRINE 1 MG/ML
0.3 INJECTION, SOLUTION INTRAMUSCULAR; SUBCUTANEOUS EVERY 5 MIN PRN
Status: CANCELLED | OUTPATIENT
Start: 2024-03-06

## 2024-02-21 RX ORDER — DIPHENHYDRAMINE HCL 25 MG
25 CAPSULE ORAL ONCE
Status: CANCELLED | OUTPATIENT
Start: 2024-02-21

## 2024-02-21 RX ORDER — HEPARIN SODIUM,PORCINE 10 UNIT/ML
5-20 VIAL (ML) INTRAVENOUS DAILY PRN
Status: CANCELLED | OUTPATIENT
Start: 2024-03-06

## 2024-02-21 RX ORDER — DIPHENHYDRAMINE HCL 25 MG
25 CAPSULE ORAL ONCE
Status: CANCELLED
Start: 2024-03-06

## 2024-02-21 RX ORDER — EPINEPHRINE 1 MG/ML
0.3 INJECTION, SOLUTION INTRAMUSCULAR; SUBCUTANEOUS EVERY 5 MIN PRN
Status: CANCELLED | OUTPATIENT
Start: 2024-02-27

## 2024-02-21 RX ORDER — DIPHENHYDRAMINE HCL 25 MG
25 CAPSULE ORAL ONCE
Status: CANCELLED
Start: 2024-02-27

## 2024-02-21 RX ORDER — DIPHENHYDRAMINE HYDROCHLORIDE 50 MG/ML
50 INJECTION INTRAMUSCULAR; INTRAVENOUS
Status: CANCELLED
Start: 2024-02-27

## 2024-02-21 RX ORDER — MEPERIDINE HYDROCHLORIDE 25 MG/ML
25 INJECTION INTRAMUSCULAR; INTRAVENOUS; SUBCUTANEOUS EVERY 30 MIN PRN
Status: CANCELLED | OUTPATIENT
Start: 2024-03-06

## 2024-02-21 RX ORDER — MEPERIDINE HYDROCHLORIDE 25 MG/ML
25 INJECTION INTRAMUSCULAR; INTRAVENOUS; SUBCUTANEOUS EVERY 30 MIN PRN
Status: CANCELLED | OUTPATIENT
Start: 2024-03-20

## 2024-02-21 RX ORDER — DIPHENHYDRAMINE HYDROCHLORIDE 50 MG/ML
50 INJECTION INTRAMUSCULAR; INTRAVENOUS
Status: CANCELLED
Start: 2024-02-21

## 2024-02-21 RX ORDER — EPINEPHRINE 1 MG/ML
0.3 INJECTION, SOLUTION INTRAMUSCULAR; SUBCUTANEOUS EVERY 5 MIN PRN
Status: CANCELLED | OUTPATIENT
Start: 2024-03-20

## 2024-02-21 RX ORDER — EPINEPHRINE 1 MG/ML
0.3 INJECTION, SOLUTION INTRAMUSCULAR; SUBCUTANEOUS EVERY 5 MIN PRN
Status: CANCELLED | OUTPATIENT
Start: 2024-02-21

## 2024-02-21 RX ORDER — METHYLPREDNISOLONE SODIUM SUCCINATE 125 MG/2ML
125 INJECTION, POWDER, LYOPHILIZED, FOR SOLUTION INTRAMUSCULAR; INTRAVENOUS
Status: CANCELLED
Start: 2024-03-20

## 2024-02-21 RX ORDER — LORAZEPAM 2 MG/ML
0.5 INJECTION INTRAMUSCULAR EVERY 4 HOURS PRN
Status: CANCELLED | OUTPATIENT
Start: 2024-02-21

## 2024-02-21 RX ORDER — ALBUTEROL SULFATE 90 UG/1
1-2 AEROSOL, METERED RESPIRATORY (INHALATION)
Status: CANCELLED
Start: 2024-03-20

## 2024-02-21 RX ORDER — ALBUTEROL SULFATE 90 UG/1
1-2 AEROSOL, METERED RESPIRATORY (INHALATION)
Status: CANCELLED
Start: 2024-02-27

## 2024-02-21 RX ORDER — ALBUTEROL SULFATE 90 UG/1
1-2 AEROSOL, METERED RESPIRATORY (INHALATION)
Status: CANCELLED
Start: 2024-03-06

## 2024-02-21 RX ORDER — ALBUTEROL SULFATE 90 UG/1
1-2 AEROSOL, METERED RESPIRATORY (INHALATION)
Status: CANCELLED
Start: 2024-02-21

## 2024-02-21 RX ORDER — MEPERIDINE HYDROCHLORIDE 25 MG/ML
25 INJECTION INTRAMUSCULAR; INTRAVENOUS; SUBCUTANEOUS EVERY 30 MIN PRN
Status: CANCELLED | OUTPATIENT
Start: 2024-02-27

## 2024-02-21 RX ORDER — LORAZEPAM 2 MG/ML
0.5 INJECTION INTRAMUSCULAR EVERY 4 HOURS PRN
Status: CANCELLED | OUTPATIENT
Start: 2024-03-06

## 2024-02-21 RX ORDER — METHYLPREDNISOLONE SODIUM SUCCINATE 125 MG/2ML
125 INJECTION, POWDER, LYOPHILIZED, FOR SOLUTION INTRAMUSCULAR; INTRAVENOUS
Status: CANCELLED
Start: 2024-02-21

## 2024-02-21 RX ORDER — METHYLPREDNISOLONE SODIUM SUCCINATE 125 MG/2ML
125 INJECTION, POWDER, LYOPHILIZED, FOR SOLUTION INTRAMUSCULAR; INTRAVENOUS
Status: CANCELLED
Start: 2024-03-06

## 2024-02-21 RX ORDER — DIPHENHYDRAMINE HYDROCHLORIDE 50 MG/ML
50 INJECTION INTRAMUSCULAR; INTRAVENOUS
Status: CANCELLED
Start: 2024-03-06

## 2024-02-21 RX ORDER — HEPARIN SODIUM (PORCINE) LOCK FLUSH IV SOLN 100 UNIT/ML 100 UNIT/ML
5 SOLUTION INTRAVENOUS
Status: CANCELLED | OUTPATIENT
Start: 2024-02-27

## 2024-02-21 RX ORDER — DIPHENHYDRAMINE HCL 25 MG
25 CAPSULE ORAL ONCE
Status: COMPLETED | OUTPATIENT
Start: 2024-02-21 | End: 2024-02-21

## 2024-02-21 RX ORDER — ALBUTEROL SULFATE 0.83 MG/ML
2.5 SOLUTION RESPIRATORY (INHALATION)
Status: CANCELLED | OUTPATIENT
Start: 2024-03-20

## 2024-02-21 RX ORDER — ALBUTEROL SULFATE 0.83 MG/ML
2.5 SOLUTION RESPIRATORY (INHALATION)
Status: CANCELLED | OUTPATIENT
Start: 2024-02-27

## 2024-02-21 RX ORDER — HEPARIN SODIUM,PORCINE 10 UNIT/ML
5-20 VIAL (ML) INTRAVENOUS DAILY PRN
Status: CANCELLED | OUTPATIENT
Start: 2024-02-27

## 2024-02-21 RX ORDER — MAGNESIUM SULFATE HEPTAHYDRATE 40 MG/ML
2 INJECTION, SOLUTION INTRAVENOUS ONCE
Status: DISCONTINUED | OUTPATIENT
Start: 2024-02-21 | End: 2024-02-22

## 2024-02-21 RX ORDER — DIPHENHYDRAMINE HCL 25 MG
25 CAPSULE ORAL ONCE
Status: CANCELLED
Start: 2024-03-20

## 2024-02-21 RX ORDER — LORAZEPAM 2 MG/ML
0.5 INJECTION INTRAMUSCULAR EVERY 4 HOURS PRN
Status: CANCELLED | OUTPATIENT
Start: 2024-03-20

## 2024-02-21 RX ORDER — METHYLPREDNISOLONE SODIUM SUCCINATE 125 MG/2ML
125 INJECTION, POWDER, LYOPHILIZED, FOR SOLUTION INTRAMUSCULAR; INTRAVENOUS
Status: CANCELLED
Start: 2024-02-27

## 2024-02-21 RX ORDER — LORAZEPAM 2 MG/ML
0.5 INJECTION INTRAMUSCULAR EVERY 4 HOURS PRN
Status: CANCELLED | OUTPATIENT
Start: 2024-02-27

## 2024-02-21 RX ORDER — ALBUTEROL SULFATE 0.83 MG/ML
2.5 SOLUTION RESPIRATORY (INHALATION)
Status: CANCELLED | OUTPATIENT
Start: 2024-02-21

## 2024-02-21 RX ORDER — MEPERIDINE HYDROCHLORIDE 25 MG/ML
25 INJECTION INTRAMUSCULAR; INTRAVENOUS; SUBCUTANEOUS EVERY 30 MIN PRN
Status: CANCELLED | OUTPATIENT
Start: 2024-02-21

## 2024-02-21 RX ORDER — HEPARIN SODIUM (PORCINE) LOCK FLUSH IV SOLN 100 UNIT/ML 100 UNIT/ML
5 SOLUTION INTRAVENOUS
Status: CANCELLED | OUTPATIENT
Start: 2024-03-20

## 2024-02-21 RX ORDER — DIPHENHYDRAMINE HYDROCHLORIDE 50 MG/ML
50 INJECTION INTRAMUSCULAR; INTRAVENOUS
Status: CANCELLED
Start: 2024-03-20

## 2024-02-21 RX ORDER — HEPARIN SODIUM,PORCINE 10 UNIT/ML
5-20 VIAL (ML) INTRAVENOUS DAILY PRN
Status: CANCELLED | OUTPATIENT
Start: 2024-03-20

## 2024-02-21 RX ADMIN — DIPHENHYDRAMINE HYDROCHLORIDE 25 MG: 25 CAPSULE ORAL at 13:56

## 2024-02-21 RX ADMIN — SODIUM CHLORIDE 250 ML: 9 INJECTION, SOLUTION INTRAVENOUS at 13:56

## 2024-02-21 RX ADMIN — FAMOTIDINE 20 MG: 10 INJECTION INTRAVENOUS at 13:56

## 2024-02-21 RX ADMIN — CARBOPLATIN 200 MG: 10 INJECTION INTRAVENOUS at 16:08

## 2024-02-21 RX ADMIN — DEXAMETHASONE SODIUM PHOSPHATE: 10 INJECTION, SOLUTION INTRAMUSCULAR; INTRAVENOUS at 13:58

## 2024-02-21 RX ADMIN — PACLITAXEL 125 MG: 6 INJECTION, SOLUTION INTRAVENOUS at 14:35

## 2024-02-21 ASSESSMENT — PAIN SCALES - GENERAL: PAINLEVEL: EXTREME PAIN (8)

## 2024-02-21 NOTE — NURSING NOTE
Chief Complaint   Patient presents with    Blood Draw     Labs drawn with PIV start by vascular. Pt tolerated well. Vitals taken. Patient checked into next appointment.       Emani Maynard RN

## 2024-02-21 NOTE — PATIENT INSTRUCTIONS
Contact Numbers    [facility] Main Line/TRIAGE: [phone]    Call with chills and/or temperature greater than or equal to 100.5, uncontrolled nausea/vomiting, diarrhea, constipation, dizziness, shortness of breath, chest pain, bleeding, unexplained bruising, or any new/concerning symptoms, questions/concerns.     If you are having any concerning symptoms or wish to speak to a provider before your next infusion visit, please call your care coordinator or triage to notify them so we can adequately serve you.       After Hours: [phone]    If after hours, weekends, or holidays, call main hospital  and ask for Oncology doctor on call.        Lab Results:  Recent Results (from the past 12 hour(s))   Comprehensive metabolic panel    Collection Time: 02/21/24 12:19 PM   Result Value Ref Range    Sodium 145 135 - 145 mmol/L    Potassium 3.5 3.4 - 5.3 mmol/L    Carbon Dioxide (CO2) 25 22 - 29 mmol/L    Anion Gap 11 7 - 15 mmol/L    Urea Nitrogen 13.1 8.0 - 23.0 mg/dL    Creatinine 0.84 0.67 - 1.17 mg/dL    GFR Estimate 89 >60 mL/min/1.73m2    Calcium 8.9 8.8 - 10.2 mg/dL    Chloride 109 (H) 98 - 107 mmol/L    Glucose 106 (H) 70 - 99 mg/dL    Alkaline Phosphatase 92 40 - 150 U/L    AST 16 0 - 45 U/L    ALT 5 0 - 70 U/L    Protein Total 6.2 (L) 6.4 - 8.3 g/dL    Albumin 3.7 3.5 - 5.2 g/dL    Bilirubin Total 0.3 <=1.2 mg/dL   CBC with platelets and differential    Collection Time: 02/21/24 12:19 PM   Result Value Ref Range    WBC Count 5.2 4.0 - 11.0 10e3/uL    RBC Count 3.42 (L) 4.40 - 5.90 10e6/uL    Hemoglobin 8.4 (L) 13.3 - 17.7 g/dL    Hematocrit 26.9 (L) 40.0 - 53.0 %    MCV 79 78 - 100 fL    MCH 24.6 (L) 26.5 - 33.0 pg    MCHC 31.2 (L) 31.5 - 36.5 g/dL    RDW 19.1 (H) 10.0 - 15.0 %    Platelet Count 159 150 - 450 10e3/uL    % Neutrophils 49 %    % Lymphocytes 36 %    % Monocytes 11 %    % Eosinophils 3 %    % Basophils 1 %    % Immature Granulocytes 0 %    NRBCs per 100 WBC 0 <1 /100    Absolute Neutrophils 2.6  1.6 - 8.3 10e3/uL    Absolute Lymphocytes 1.9 0.8 - 5.3 10e3/uL    Absolute Monocytes 0.6 0.0 - 1.3 10e3/uL    Absolute Eosinophils 0.2 0.0 - 0.7 10e3/uL    Absolute Basophils 0.0 0.0 - 0.2 10e3/uL    Absolute Immature Granulocytes 0.0 <=0.4 10e3/uL    Absolute NRBCs 0.0 10e3/uL   Magnesium    Collection Time: 02/21/24 12:19 PM   Result Value Ref Range    Magnesium 1.3 (L) 1.7 - 2.3 mg/dL

## 2024-02-21 NOTE — LETTER
2/21/2024         RE: Jonathan Bishop  [address]  [place] [place] [facility] [address]        Dear Colleague,    Thank you for referring your patient, Jonathan Bishop, to the [facility]. Please see a copy of my visit note below.       [facility]    PATIENT NAME: Jonathan Bishop  MRN # [id number]   DATE OF VISIT: February 21, 2024  YOB: 1945     Otolaryngology: Dr. Karishma Eng  Radiation Oncology: Dr. Jenni Mills  PCP: Dr. Buck Nascimento    CANCER TYPE: SCC L tonsil, p16 +lety  STAGE: [id number] (IVC)  ECOG PS: 1    PD-L1: TPS 20%, CPS 25% on [id number] tonsil bx  NGS: N/A    SUMMARY  2/26/23 L tonsil mass bx in clinic (Dr. Eng).   2/20/23 PET/CT. 3.7 x 4.0 x 5.1 cm mass L palatine tonsil causing narrowing of the oropharyngeal lumen, L level 2 node, L retropharyngeal nodular density, extension of primary mass vs retropharyngeal node, 0.8 cm RLL nodule concerning for met, mild focal uptake R iliac bone and L1 without CT correlate suspicious for mets.   3/2/23 R VATS, wedge resection. Path: SCC, poorly differentiated, associated with necrosis, 1 cm, negative margins, p16 +lety  3/24/23 MRI L spine and pelvis. Diffusely heterogeneous marrow signal throughout without corresponding abnormal signal on sagittal STIR sequence and no abnormal enhancement. Nonspecific but could be benign process such as red marrow hyperplasia, cannot completely exclude metastatic disease or infiltrative pathologic marrow process. No lesions corresponding to FDG avid spots on PET/CT.   4/19~10/18/23 Pembrolizumab.  10/26/23 CT neck and CAP. Increased L palatine tonsil mass, 3.8 x 2.7 cm --> 4.7 x 3.5 cm. Increased bilateral cervical nodes up to 2.3 x 2.4 cm R level 2 node. New 3 mm RML nodule, no other mets.   11/14/23 PET. 4.6 x 3.3 cm L palatine tonsil mass (SUB 26.8), R level 2A and 2A/3 nodes. Questionable uptake distal R femoral medullary cavity, partially imaged, with  questionable subtle corresponding soft tissue attenuation on CT. MRI could be obtained to better evaluate.  11/21~12/2/23 [facility] for weakness/fall. COVID/paxlovid, MSSA bacteremia (1 of 2 bottles on 1 day), TTE negative, treated with cefazolin but didn't complete 2 week course, episode of unresponsiveness 12/2. Dispo plan was TCU on [facility], but left AMA. CTA chest negative for PE, showed grossly unchanged cervical adenopathy. Brain MRI 11/22 for stroke code negative for infarct. Showed SVID, moderate atrophy, suboptimal contrast bolus to examine intracranial structures, 4.5 x 3 x 4.5 cm L nasopharyngeal mass, incompletely visualized. CTA negative.   12/19/23 Quad shot fraction #1. No-showed thereafter.  1/10/24~curr Carboplatin (AUC 2) + paclitaxel 60 mg/m2 weekly. Held 2/13 due to anorexia, fatigue.    ASSESSMENT AND PLAN  SCC L tonsil, p16 +lety, CPS 25%/TPS 20%, oligometastatic lung met, +/- bone mets: Good AK on CT. No new sites of disease. We'll have to keep an eye on bone mets with PET/CT at the time of PD. Fortunately, is tolerating chemo pretty well. Last week with diarrhea, N/V, abd cramping - was a one-off. He thinks the toxicities are acceptable. Discussed options. One is to take a short treatment break. However, he's doing well, so it makes sense to continue. I think we can keep radiation on the back burner for a little longer, but I still want to be aggressive with it and not lose our window of opportunity. CT chest abd and CT neck in 8 weeks. MARK visits every 2 weeks given his frailty. Minimize dex as able given DM2. He asked about prognosis, which we discussed. Median survival is in the 1-2 year range, and he's at the one year shay already. I do think he'll be on the longer end of the spectrum with the disease biology and limited metastatic disease we've seen. Acknowledged the uncertainty however.     Anorexia, malnutrition, weight loss: Improved other than last week.     Hypomag: Check,  replace if needed.    H/o CVA: Residual L weakness. Uses walker at baseline. Still waiting for PCA. Remains independent otherwise.     LE edema: Better recently. Was exacerbated last year and couldn't get his stockings on.     Surrogate decision maker: Removed Sandie from his authorized contact list, and added Mariann, his sister. He would want us to talk with Mariann if he could not speak for himself. Not discussed again today.    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 Not discussed today    Microcytic anemia: Iron studies 8/2022 fairly normal, normal 4/18/23. Progressive anemia due to chemo, discussed possible need for transfusion in the coming weeks. Recheck iron studies last week - not iron deficient.    Peripheral neuropathy: MRI shows a lot of foramenal stenosis and spinal canal stenosis, so more likely related to that than DM2. Not worsening on paclitaxel, but monitor closely.     The longitudinal plan of care for the condition(s) below were addressed during this visit. Due to the added complexity in care, I will continue to support Conner in the subsequent management of this condition(s) and with the ongoing continuity of care of this condition(s): SCC tonsil      30 minutes spent by me on the date of the encounter doing chart review, history and exam, documentation and further activities per the note     Dominique Mueller MD  Associate Professor of Medicine  Hematology, Oncology and Transplantation    SUBJECTIVE  Mr. Bishop returns for follow up on weekly carboplatin paclitaxel since 1/10  Diarrhea - resolved  Eating ok  Anorexia - appetite is much better the last week  Some stomach cramps last week - resolved   Not sleeping well - wonders if that's related to not doing enough during the day  Getting around ok  No falls or close calls  No other new symptoms.   Feels QOL is good     Brother lives in the [facility], works at [employer]. They're going to [place] sometime this summer - exact  timing pending his brother's schedule coming out.     PAST MEDICAL HISTORY  SCC as above  Chronic LBP  TAVR 2020  H/o rheumatic carditis 1950  CHF. 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 Nocturia once nightly   ED  Cataracts  Umbilical hernia repair 2011  Knee arthroplasty B 2010  Lumbar spine fusion, laminectomy, sciatic pain R > L  Peripheral neuropathy - from pinched nerves?  Hearing loss    Numbness/tingling in both feet - bilaterally, symmetric, R spasms more than left    CURRENT OUTPATIENT MEDICATIONS  Reviewed    ALLERGIES  No Known Allergies     PHYSICAL EXAM  /75 (BP Location: Left arm, Patient Position: Sitting, Cuff Size: Adult Regular)   Pulse 94   Temp 97.9  F (36.6  C) (Oral)   Resp 18   Wt 94.6 kg (208 lb 8 oz)   SpO2 100%   BMI 36.35 kg/m    GEN: NAD  HEENT: EOMI, no icterus, injection or pallor  EXT: no edema  NEURO: alert    LABORATORY AND IMAGING STUDIES    Labs today, 2/13/24, 2/6/24 were independently reviewed and interpreted by me    CT Soft Tissue Neck w Contrast  Narrative: CT SOFT TISSUE NECK W CONTRAST 2/13/2024 12:57 PM    History:  restage locally recurrent, unresectable L tonsil carcinoma  on carbo paclitaxel; Malignant neoplasm metastatic to lung,  unspecified laterality (H); Tonsil cancer (H)      Comparison:  Neck CT 10/26/2023 and PET/CT 11/14/2023     Technique: Following intravenous administration of nonionic iodinated  contrast medium, thin section helical CT images were obtained from the  skull base down to the level of the aortic arch.  Axial, coronal and  sagittal reformations were performed with 2-3 mm slice thickness  reconstruction. Images were reviewed in soft tissue, lung and bone  windows.    Contrast: Isovue 370 95cc    Findings: Marked positive response to treatment with significantly  decreased size of left palatine tonsil enhancing mass. No visible  enhancing tumor identified in this region within the limits  of  contrast enhanced neck CT. The pharyngeal airspace is improved.    Regarding metastatic cervical lymphadenopathy; previous 3.5 cm left  level 2A lymph node today appears more necrotic and measures 1.9 cm.  The other lymph node immediately inferior to this enlarged lymph node  measures 1 cm in diameter previously 1.8 cm. The lymph node still  exhibits some contrast enhancement. The other lymph node at left level  3-5 junction immediately posterior to sternocleidomastoid muscle today  measures 8 mm in diameter, previously 17 mm. Previously seen left  supraclavicular level IV 1 cm lymph node appears smaller today  measures 6 mm. Previous partially necrotic right level 22.2 x 2.4 cm  metastatic lymph node today measures 1.5 x 1.2 cm. The other right  level 2 metastatic lymph node posterior to internal jugular vein  measuring 1.9 x 1.5 cm today measures 1.3 x 0.8 cm and appears more  necrotic. Overall no new lymphadenopathy identified.    Clear lung apices. Normal aortic arch. Trachea is patent. Thyroid  gland is unremarkable. Internal jugular veins are patent. Concentric  atherosclerotic calcifications at the carotid bifurcations without  significant stenosis exists and unchanged. Retropharyngeal short  course of cervical internal carotid arteries.    Parotid glands appear normal. Bilateral submandibular glands appears  similar to prior with a large partially obstructive ductal stone in  the right submandibular gland duct measuring 5 mm with a small ductal  dilatation similar to prior. Thyroid gland appears unchanged. No lytic  bone lesion identified. Extensive degenerative changes in the cervical  spine particularly at C3-CT 6 similar to prior. No pathologic  fracture. No intracranial space-occupying lesion. Unchanged mild  ventriculomegaly.   Impression: Impression: Compared with CT from 10/26 2023, there is marked partial  response to treatment with near complete resolution of left palatine  tonsil mass. No  radiologically identifiable tumor within the primary  site. Markedly decreased size and necrosis of multiple metastatic  cervical lymph nodes. Some of the lymph nodes still exhibit contrast  enhancement, may suggest viable residual tumor. Overall no new  enlarging lymph node or evidence of metastatic disease elsewhere in  the neck.    ESTHER HERRING MD         SYSTEM ID:  [id number]     EXAMINATION: CT CHEST ABDOMEN W CONTRAST, 2/13/2024 12:58 PM     TECHNIQUE: Helical CT images from the thoracic inlet through the  symphysis pubis were obtained with intravenous contrast. Contrast  dose: Isovue 370 95cc     COMPARISON: Chest x-ray 12/2/2023, CT 11/21/2023, PET CT 11/14/2023     HISTORY: restage locally recurrent, unresectable L tonsil carcinoma on  carbo paclitaxel; Malignant neoplasm metastatic to lung, unspecified  laterality (H); Tonsil cancer (H)     FINDINGS:     Chest:   Lungs: The trachea and central airways are patent. No pneumothorax or  pleural effusion. No focal airspace opacity. No suspicious pulmonary  nodule. Stable postoperative changes of right lower lobe wedge  resection.     Mediastinum: Subcentimeter hypoattenuating nodules in the right  thyroid gland and isthmus similar to previous exams. The heart size is  within normal limits. No pericardial effusion. Prosthetic aortic  valve. The ascending aorta and main pulmonary artery diameters are  within normal limits. Normal appearance and configuration of the great  vessels off of the aortic arch. No suspicious mediastinal, hilar, or  axillary lymph nodes.     Abdomen and pelvis:     Liver: No mass. No intrahepatic biliary ductal dilation.     Biliary System: Normal gallbladder. No extrahepatic biliary ductal  dilation.     Pancreas: No mass or pancreatic ductal dilation.     Adrenal glands: No mass or nodules     Spleen: Normal.     Kidneys: No suspicious mass, obstructing calculus or hydronephrosis.     Gastrointestinal tract :Normal appendix. Normal  caliber small bowel.     Mesentery/peritoneum/retroperitoneum: No mass. No free fluid or air.     Lymph nodes: No significant lymphadenopathy.     Vasculature: Patent visualized major abdominal vasculature.     Osseous structures: No aggressive or acute osseous lesion.   Degenerative changes of the spine. Partially visualized lumbar fusion  hardware which appears intact without evidence of complication.      Soft tissues: Within normal limits.                                                         IMPRESSION: In this patient with metastatic oropharyngeal squamous  cell carcinoma status post chemotherapy  1. No evidence of progressive disease in the chest or abdomen.  2. Stable postoperative changes of right lower lobe wedge resection.      I have personally reviewed the examination and initial interpretation  and I agree with the findings.     KHRIS BLACK MD     Imaging was personally reviewed and interpreted by me

## 2024-02-21 NOTE — PROGRESS NOTES
[facility] CANCER [facility]    PATIENT NAME: Jonathan Bishop  MRN # [id number]   DATE OF VISIT: February 21, 2024  YOB: 1945     Otolaryngology: Dr. Karishma Eng  Radiation Oncology: Dr. Jenni Mills  PCP: Dr. Buck Nascimento    CANCER TYPE: SCC L tonsil, p16 +lety  STAGE: [id number] (IVC)  ECOG PS: 1    PD-L1: TPS 20%, CPS 25% on [id number] tonsil bx  NGS: N/A    SUMMARY  2/26/23 L tonsil mass bx in clinic (Dr. Eng).   2/20/23 PET/CT. 3.7 x 4.0 x 5.1 cm mass L palatine tonsil causing narrowing of the oropharyngeal lumen, L level 2 node, L retropharyngeal nodular density, extension of primary mass vs retropharyngeal node, 0.8 cm RLL nodule concerning for met, mild focal uptake R iliac bone and L1 without CT correlate suspicious for mets.   3/2/23 R VATS, wedge resection. Path: SCC, poorly differentiated, associated with necrosis, 1 cm, negative margins, p16 +lety  3/24/23 MRI L spine and pelvis. Diffusely heterogeneous marrow signal throughout without corresponding abnormal signal on sagittal STIR sequence and no abnormal enhancement. Nonspecific but could be benign process such as red marrow hyperplasia, cannot completely exclude metastatic disease or infiltrative pathologic marrow process. No lesions corresponding to FDG avid spots on PET/CT.   4/19~10/18/23 Pembrolizumab.  10/26/23 CT neck and CAP. Increased L palatine tonsil mass, 3.8 x 2.7 cm --> 4.7 x 3.5 cm. Increased bilateral cervical nodes up to 2.3 x 2.4 cm R level 2 node. New 3 mm RML nodule, no other mets.   11/14/23 PET. 4.6 x 3.3 cm L palatine tonsil mass (SUB 26.8), R level 2A and 2A/3 nodes. Questionable uptake distal R femoral medullary cavity, partially imaged, with questionable subtle corresponding soft tissue attenuation on CT. MRI could be obtained to better evaluate.  11/21~12/2/23 [facility] for weakness/fall. COVID/paxlovid, MSSA bacteremia (1 of 2 bottles on 1 day), TTE negative, treated with cefazolin but didn't complete 2  week course, episode of unresponsiveness 12/2. Dispo plan was TCU on [facility], but left AMA. CTA chest negative for PE, showed grossly unchanged cervical adenopathy. Brain MRI 11/22 for stroke code negative for infarct. Showed SVID, moderate atrophy, suboptimal contrast bolus to examine intracranial structures, 4.5 x 3 x 4.5 cm L nasopharyngeal mass, incompletely visualized. CTA negative.   12/19/23 Quad shot fraction #1. No-showed thereafter.  1/10/24~curr Carboplatin (AUC 2) + paclitaxel 60 mg/m2 weekly. Held 2/13 due to anorexia, fatigue.    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 No new sites of disease. We'll have to keep an eye on bone mets with PET/CT at the time of PD. Fortunately, is tolerating chemo pretty well. Last week with diarrhea, N/V, abd cramping - was a one-off. He thinks the toxicities are acceptable. Discussed options. One is to take a short treatment break. However, he's doing well, so it makes sense to continue. I think we can keep radiation on the back burner for a little longer, but I still want to be aggressive with it and not lose our window of opportunity. CT chest abd and CT neck in 8 weeks. MARK visits every 2 weeks given his frailty. Minimize dex as able given DM2. He asked about prognosis, which we discussed. Median survival is in the 1-2 year range, and he's at the one year shay already. I do think he'll be on the longer end of the spectrum with the disease biology and limited metastatic disease we've seen. Acknowledged the uncertainty however.     Anorexia, malnutrition, weight loss: Improved other than last week.     Hypomag: Check, replace if needed.    H/o CVA: Residual L weakness. Uses walker at baseline. Still waiting for PCA. Remains independent otherwise.     LE edema: Better recently. Was exacerbated last year and couldn't get his stockings on.     Surrogate decision maker: Removed Sandie from his  authorized contact list, and added Mariann, his sister. He would want us to talk with Mariann if he could not speak for himself. Not discussed again today.    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 Not discussed today    Microcytic anemia: Iron studies 8/2022 fairly normal, normal 4/18/23. Progressive anemia due to chemo, discussed possible need for transfusion in the coming weeks. Recheck iron studies last week - not iron deficient.    Peripheral neuropathy: MRI shows a lot of foramenal stenosis and spinal canal stenosis, so more likely related to that than DM2. Not worsening on paclitaxel, but monitor closely.     The longitudinal plan of care for the condition(s) below were addressed during this visit. Due to the added complexity in care, I will continue to support Conner in the subsequent management of this condition(s) and with the ongoing continuity of care of this condition(s): SCC tonsil      30 minutes spent by me on the date of the encounter doing chart review, history and exam, documentation and further activities per the note     Dominique Mueller MD  Associate Professor of Medicine  Hematology, Oncology and Transplantation    SUBJECTIVE  Mr. Bishop returns for follow up on weekly carboplatin paclitaxel since 1/10  Diarrhea - resolved  Eating ok  Anorexia - appetite is much better the last week  Some stomach cramps last week - resolved   Not sleeping well - wonders if that's related to not doing enough during the day  Getting around ok  No falls or close calls  No other new symptoms.   Feels QOL is good     Brother lives in the [facility], works at [employer]. They're going to [place] sometime this summer - exact timing pending his brother's schedule coming out.     PAST MEDICAL HISTORY  SCC as above  Chronic LBP  TAVR 2020  H/o rheumatic carditis 1950  CHF. 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 Nocturia once nightly    ED  Cataracts  Umbilical hernia repair 2011  Knee arthroplasty B 2010  Lumbar spine fusion, laminectomy, sciatic pain R > L  Peripheral neuropathy - from pinched nerves?  Hearing loss    Numbness/tingling in both feet - bilaterally, symmetric, R spasms more than left    CURRENT OUTPATIENT MEDICATIONS  Reviewed    ALLERGIES  No Known Allergies     PHYSICAL EXAM  /75 (BP Location: Left arm, Patient Position: Sitting, Cuff Size: Adult Regular)   Pulse 94   Temp 97.9  F (36.6  C) (Oral)   Resp 18   Wt 94.6 kg (208 lb 8 oz)   SpO2 100%   BMI 36.35 kg/m    GEN: NAD  HEENT: EOMI, no icterus, injection or pallor  EXT: no edema  NEURO: alert    LABORATORY AND IMAGING STUDIES    Labs today, 2/13/24, 2/6/24 were independently reviewed and interpreted by me    CT Soft Tissue Neck w Contrast  Narrative: CT SOFT TISSUE NECK W CONTRAST 2/13/2024 12:57 PM    History:  restage locally recurrent, unresectable L tonsil carcinoma  on carbo paclitaxel; Malignant neoplasm metastatic to lung,  unspecified laterality (H); Tonsil cancer (H)      Comparison:  Neck CT 10/26/2023 and PET/CT 11/14/2023     Technique: Following intravenous administration of nonionic iodinated  contrast medium, thin section helical CT images were obtained from the  skull base down to the level of the aortic arch.  Axial, coronal and  sagittal reformations were performed with 2-3 mm slice thickness  reconstruction. Images were reviewed in soft tissue, lung and bone  windows.    Contrast: Isovue 370 95cc    Findings: Marked positive response to treatment with significantly  decreased size of left palatine tonsil enhancing mass. No visible  enhancing tumor identified in this region within the limits of  contrast enhanced neck CT. The pharyngeal airspace is improved.    Regarding metastatic cervical lymphadenopathy; previous 3.5 cm left  level 2A lymph node today appears more necrotic and measures 1.9 cm.  The other lymph node immediately inferior to  this enlarged lymph node  measures 1 cm in diameter previously 1.8 cm. The lymph node still  exhibits some contrast enhancement. The other lymph node at left level  3-5 junction immediately posterior to sternocleidomastoid muscle today  measures 8 mm in diameter, previously 17 mm. Previously seen left  supraclavicular level IV 1 cm lymph node appears smaller today  measures 6 mm. Previous partially necrotic right level 22.2 x 2.4 cm  metastatic lymph node today measures 1.5 x 1.2 cm. The other right  level 2 metastatic lymph node posterior to internal jugular vein  measuring 1.9 x 1.5 cm today measures 1.3 x 0.8 cm and appears more  necrotic. Overall no new lymphadenopathy identified.    Clear lung apices. Normal aortic arch. Trachea is patent. Thyroid  gland is unremarkable. Internal jugular veins are patent. Concentric  atherosclerotic calcifications at the carotid bifurcations without  significant stenosis exists and unchanged. Retropharyngeal short  course of cervical internal carotid arteries.    Parotid glands appear normal. Bilateral submandibular glands appears  similar to prior with a large partially obstructive ductal stone in  the right submandibular gland duct measuring 5 mm with a small ductal  dilatation similar to prior. Thyroid gland appears unchanged. No lytic  bone lesion identified. Extensive degenerative changes in the cervical  spine particularly at C3-CT 6 similar to prior. No pathologic  fracture. No intracranial space-occupying lesion. Unchanged mild  ventriculomegaly.   Impression: Impression: Compared with CT from 10/26 2023, there is marked partial  response to treatment with near complete resolution of left palatine  tonsil mass. No radiologically identifiable tumor within the primary  site. Markedly decreased size and necrosis of multiple metastatic  cervical lymph nodes. Some of the lymph nodes still exhibit contrast  enhancement, may suggest viable residual tumor. Overall no  new  enlarging lymph node or evidence of metastatic disease elsewhere in  the neck.    ESTHER HERRING MD         SYSTEM ID:  [id number]     EXAMINATION: CT CHEST ABDOMEN W CONTRAST, 2/13/2024 12:58 PM     TECHNIQUE: Helical CT images from the thoracic inlet through the  symphysis pubis were obtained with intravenous contrast. Contrast  dose: Isovue 370 95cc     COMPARISON: Chest x-ray 12/2/2023, CT 11/21/2023, PET CT 11/14/2023     HISTORY: restage locally recurrent, unresectable L tonsil carcinoma on  carbo paclitaxel; Malignant neoplasm metastatic to lung, unspecified  laterality (H); Tonsil cancer (H)     FINDINGS:     Chest:   Lungs: The trachea and central airways are patent. No pneumothorax or  pleural effusion. No focal airspace opacity. No suspicious pulmonary  nodule. Stable postoperative changes of right lower lobe wedge  resection.     Mediastinum: Subcentimeter hypoattenuating nodules in the right  thyroid gland and isthmus similar to previous exams. The heart size is  within normal limits. No pericardial effusion. Prosthetic aortic  valve. The ascending aorta and main pulmonary artery diameters are  within normal limits. Normal appearance and configuration of the great  vessels off of the aortic arch. No suspicious mediastinal, hilar, or  axillary lymph nodes.     Abdomen and pelvis:     Liver: No mass. No intrahepatic biliary ductal dilation.     Biliary System: Normal gallbladder. No extrahepatic biliary ductal  dilation.     Pancreas: No mass or pancreatic ductal dilation.     Adrenal glands: No mass or nodules     Spleen: Normal.     Kidneys: No suspicious mass, obstructing calculus or hydronephrosis.     Gastrointestinal tract :Normal appendix. Normal caliber small bowel.     Mesentery/peritoneum/retroperitoneum: No mass. No free fluid or air.     Lymph nodes: No significant lymphadenopathy.     Vasculature: Patent visualized major abdominal vasculature.     Osseous structures: No aggressive or acute  osseous lesion.   Degenerative changes of the spine. Partially visualized lumbar fusion  hardware which appears intact without evidence of complication.      Soft tissues: Within normal limits.                                                         IMPRESSION: In this patient with metastatic oropharyngeal squamous  cell carcinoma status post chemotherapy  1. No evidence of progressive disease in the chest or abdomen.  2. Stable postoperative changes of right lower lobe wedge resection.      I have personally reviewed the examination and initial interpretation  and I agree with the findings.     KHRIS BLACK MD     Imaging was personally reviewed and interpreted by me

## 2024-02-21 NOTE — PROGRESS NOTES
Infusion Nursing Note:  Jonathan Bishop presents today for Cycle 2, day 1 Taxol and Carboplatin.    Patient seen by provider today: Yes: Dr. Mueller    Note: Patient presents to clinic today feeling well with no questions.  Pt did not request or require any intervention for pain today.    Intravenous Access:  Peripheral IV placed.  Approximately 30 minutes into Taxol, pt c/o of some mild pain at IV.  No other s/s infiltration, brisk blood return.  Painful w/ only saline, removed and vascular access replaced PIV for remainder of infusions.      Treatment Conditions:  Lab Results   Component Value Date    HGB 8.4 (L) 02/21/2024    WBC 5.2 02/21/2024    ANEU 3.9 10/28/2020    ANEUTAUTO 2.6 02/21/2024     02/21/2024        Lab Results   Component Value Date     02/21/2024    POTASSIUM 3.5 02/21/2024    MAG 1.3 (L) 02/21/2024    CR 0.84 02/21/2024    WARREN 8.9 02/21/2024    BILITOTAL 0.3 02/21/2024    ALBUMIN 3.7 02/21/2024    ALT 5 02/21/2024    AST 16 02/21/2024     Results reviewed, labs MET treatment parameters, ok to proceed with treatment.    Post Infusion Assessment:  Patient tolerated infusion without incident.  Report given to Lindy PERRY RN, to complete Taxol, hang Carbo and discharge pt at 1515.  Blood return noted pre and post infusion.  Site patent and intact, free from redness, edema or discomfort.  No evidence of extravasations.  Access discontinued per protocol.    Discharge Plan:   Patient declined prescription refills.  Discharge instructions reviewed with: Patient.  Patient and/or family verbalized understanding of discharge instructions and all questions answered.  AVS to patient via [employer].  Patient will return 2/27/2024 for next appointment.   Patient discharged in stable condition accompanied by: self.  Departure Mode: Ambulatory.    Gaviota Mitchell RN

## 2024-02-21 NOTE — NURSING NOTE
"Oncology Rooming Note    February 21, 2024 12:47 PM   Jonathan Bishop is a 78 year old male who presents for:    Chief Complaint   Patient presents with    Blood Draw     Labs drawn with PIV start by vascular. Pt tolerated well. Vitals taken. Patient checked into next appointment.      Oncology Clinic Visit     Squamous Cell Carcinoma     Initial Vitals: /75 (BP Location: Left arm, Patient Position: Sitting, Cuff Size: Adult Regular)   Pulse 94   Temp 97.9  F (36.6  C) (Oral)   Resp 18   Wt 94.6 kg (208 lb 8 oz)   SpO2 100%   BMI 36.35 kg/m   Estimated body mass index is 36.35 kg/m  as calculated from the following:    Height as of 2/19/24: 1.613 m (5' 3.5\").    Weight as of this encounter: 94.6 kg (208 lb 8 oz). Body surface area is 2.06 meters squared.  Extreme Pain (8) Comment: Data Unavailable   No LMP for male patient.  Allergies reviewed: Yes  Medications reviewed: Yes    Medications: Medication refills not needed today.  Pharmacy name entered into [employer]: [id number] - [place], [place] - [address] [facility]    Frailty Screening:   Is the patient here for a new oncology consult visit in cancer care? 2. No      Clinical concerns:        Maranda Graf CMA            "

## 2024-02-22 ENCOUNTER — PATIENT OUTREACH (OUTPATIENT)
Dept: ONCOLOGY | Facility: CLINIC | Age: 79
End: 2024-02-22
Payer: COMMERCIAL

## 2024-02-22 NOTE — PROGRESS NOTES
[facility]/Voicemail    Clinical Data: Care Coordinator Outreach    Reaching out to Conner to discuss increasing his magnesium oxide to BID per Dr. Mueller's request. Outreach attempted x 1.  Left message on patient's voicemail with call back information and requested return call. amcure message sent as well.    Plan: Care Coordinator will try to reach patient again in 1-2 business days if Conner does not read amcure by then.    Addendum 02/23/24 9:30 AM  Attempted to reach Conner again. No answer and mailbox is now full. Will attempt to reach him again on Monday.    Addendum 02/26/24 9:24 AM  Phone call with Conner. Discussed increasing 400 MG magnesium oxide to BID. He had no further questions.     Olivia Gates, RN, BSN  RN Care Coordinator  [facility]

## 2024-02-23 DIAGNOSIS — C78.00 MALIGNANT NEOPLASM METASTATIC TO LUNG, UNSPECIFIED LATERALITY (H): Primary | ICD-10-CM

## 2024-02-23 DIAGNOSIS — C10.9 SQUAMOUS CELL CARCINOMA OF OROPHARYNX (H): ICD-10-CM

## 2024-02-23 DIAGNOSIS — C09.9 TONSIL CANCER (H): ICD-10-CM

## 2024-02-25 DIAGNOSIS — M54.50 ACUTE MIDLINE LOW BACK PAIN WITHOUT SCIATICA: ICD-10-CM

## 2024-02-26 ENCOUNTER — TELEPHONE (OUTPATIENT)
Dept: OPHTHALMOLOGY | Facility: CLINIC | Age: 79
End: 2024-02-26
Payer: COMMERCIAL

## 2024-02-26 NOTE — PROGRESS NOTES
[facility] CANCER [facility]    PATIENT NAME: Jonathan Bishop  MRN # [id number]   DATE OF VISIT: February 27, 2024  YOB: 1945     Otolaryngology: Dr. Karishma Eng  Radiation Oncology: Dr. Jenni Mills  PCP: Dr. Buck Nascimento    CANCER TYPE: SCC L tonsil, p16 +lety  STAGE: [id number] (IVC)  ECOG PS: 1    PD-L1: TPS 20%, CPS 25% on [id number] tonsil bx  NGS: N/A    SUMMARY  2/26/23 L tonsil mass bx in clinic (Dr. Eng).   2/20/23 PET/CT. 3.7 x 4.0 x 5.1 cm mass L palatine tonsil causing narrowing of the oropharyngeal lumen, L level 2 node, L retropharyngeal nodular density, extension of primary mass vs retropharyngeal node, 0.8 cm RLL nodule concerning for met, mild focal uptake R iliac bone and L1 without CT correlate suspicious for mets.   3/2/23 R VATS, wedge resection. Path: SCC, poorly differentiated, associated with necrosis, 1 cm, negative margins, p16 +lety  3/24/23 MRI L spine and pelvis. Diffusely heterogeneous marrow signal throughout without corresponding abnormal signal on sagittal STIR sequence and no abnormal enhancement. Nonspecific but could be benign process such as red marrow hyperplasia, cannot completely exclude metastatic disease or infiltrative pathologic marrow process. No lesions corresponding to FDG avid spots on PET/CT.   4/19~10/18/23 Pembrolizumab.  10/26/23 CT neck and CAP. Increased L palatine tonsil mass, 3.8 x 2.7 cm --> 4.7 x 3.5 cm. Increased bilateral cervical nodes up to 2.3 x 2.4 cm R level 2 node. New 3 mm RML nodule, no other mets.   11/14/23 PET. 4.6 x 3.3 cm L palatine tonsil mass (SUB 26.8), R level 2A and 2A/3 nodes. Questionable uptake distal R femoral medullary cavity, partially imaged, with questionable subtle corresponding soft tissue attenuation on CT. MRI could be obtained to better evaluate.  11/21~12/2/23 [facility] for weakness/fall. COVID/paxlovid, MSSA bacteremia (1 of 2 bottles on 1 day), TTE negative, treated with cefazolin but didn't complete 2  week course, episode of unresponsiveness 12/2. Dispo plan was TCU on [facility], but left AMA. CTA chest negative for PE, showed grossly unchanged cervical adenopathy. Brain MRI 11/22 for stroke code negative for infarct. Showed SVID, moderate atrophy, suboptimal contrast bolus to examine intracranial structures, 4.5 x 3 x 4.5 cm L nasopharyngeal mass, incompletely visualized. CTA negative.   12/19/23 Quad shot fraction #1. No-showed thereafter.  1/10/24~curr Carboplatin (AUC 2) + paclitaxel 60 mg/m2 weekly. Held 2/13 due to anorexia, fatigue.    ASSESSMENT AND PLAN  SCC L tonsil, p16 +lety, CPS 25%/TPS 20%, oligometastatic lung met, +/- bone mets: Good OH on CT 2/13/24. No new sites of disease. Will plan to keep an eye on bone mets with PET/CT at the time of PD. Tolerating chemo with carbo/taxol well. Plan is to continue weekly infusions and repeat CT chest/abd and CT neck in 8 weeks. May reconsider radiation after next imaging. Continue MARK visits every 2 weeks after next week's visit with Olivia given frailty.      Anorexia, malnutrition, weight loss: Appetite improved. Weight down this week but fluctuating overall. Monitor closely.    Hypomag: Remains low, 1.4. Continue home oral supplement and replace per protocol.     H/o CVA: Residual L weakness. Uses walker at baseline. Has PCA. Remains independent otherwise.     LE edema: Better recently. Was exacerbated last year and couldn't get his stockings on.     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 Not discussed today    Microcytic anemia: Iron studies 8/2022 fairly normal, normal 4/18/23. Progressive anemia due to chemo, may need transfusion in the coming weeks but remains asymptomatic so will hold off for now.  Not iron deficient based on repeat studies.    Peripheral neuropathy: MRI shows a lot of foramenal stenosis and spinal canal stenosis, so more likely related to that than DM2. Not worsening on paclitaxel, but monitor closely.     35 minutes spent  on the date of the encounter doing chart review, review of test results, interpretation of tests, patient visit, and documentation     Vandana Bertrand CNP    SUBJECTIVE  Conner is seen today for routine follow-up and ongoing toxicity monitoring on weekly carbo/taxol.   -Appetite is improved. Recently ate kayden goat stew and enjoyed this. Denies recurrent diarrhea  -Started magnesium supplement   -Neuropathy slightly worse in feet/legs, continues to wax and wane  -No fatigue, energy stable  -No nausea    Brother lives in the [facility], works at [employer]. They're going to [place] this summer, around 8/18/24, for 2 weeks.    PAST MEDICAL HISTORY  SCC as above  Chronic LBP  TAVR 2020  H/o rheumatic carditis 1950  CHF. 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 Nocturia once nightly   ED  Cataracts  Umbilical hernia repair 2011  Knee arthroplasty B 2010  Lumbar spine fusion, laminectomy, sciatic pain R > L  Peripheral neuropathy - from pinched nerves?  Hearing loss    Numbness/tingling in both feet - bilaterally, symmetric, R spasms more than left    CURRENT OUTPATIENT MEDICATIONS  Current Outpatient Medications   Medication    aspirin (ASA) 81 MG chewable tablet    atorvastatin (LIPITOR) 40 MG tablet    ibuprofen (ADVIL/MOTRIN) 600 MG tablet    magnesium oxide (MAG-OX) 400 MG tablet    multivitamin, therapeutic (THERA-VIT) TABS tablet    ondansetron (ZOFRAN) 8 MG tablet    order for DME    oxyCODONE-acetaminophen (PERCOCET) 5-325 MG tablet    prochlorperazine (COMPAZINE) 10 MG tablet    spironolactone (ALDACTONE) 50 MG tablet    tamsulosin (FLOMAX) 0.4 MG capsule    torsemide (DEMADEX) 20 MG tablet    vitamin D3 (CHOLECALCIFEROL) 50 mcg (2000 units) tablet     No current facility-administered medications for this visit.       ALLERGIES  No Known Allergies     PHYSICAL EXAM  BP (!) 161/73 (BP Location: Left arm, Patient Position: Sitting, Cuff Size: Adult Large)    Pulse 94   Temp 98  F (36.7  C) (Oral)   Resp 16   Wt 90.1 kg (198 lb 9.6 oz)   SpO2 98%   BMI 34.63 kg/m      GEN: NAD  HEENT: EOMI, no icterus, injection or pallor  RESP: cta bilaterally, no wheezing   CV: rrr, no m/g/r  EXT: +1 edema to bilateral ankles  NEURO: alert, oriented, no focal deficits    LABORATORY AND IMAGING STUDIES  Most Recent 3 CBC's:  Recent Labs   Lab Test 02/27/24  1049 02/21/24  1219 02/13/24  0900   WBC 4.4 5.2 4.2   HGB 8.2* 8.4* 9.0*   MCV 80 79 77*   * 159 212   ANEUTAUTO 2.3 2.6 1.8    Most Recent 3 BMP's:  Recent Labs   Lab Test 02/27/24  1049 02/21/24  1219 02/13/24  0900   * 145 147*   POTASSIUM 3.8 3.5 3.9   CHLORIDE 107 109* 111*   CO2 29 25 26   BUN 17.2 13.1 19.2   CR 0.82 0.84 1.09   ANIONGAP 11 11 10   WARREN 8.9 8.9 9.4   * 106* 109*   PROTTOTAL 6.4 6.2* 6.9   ALBUMIN 3.9 3.7 4.1    Most Recent 2 LFT's:  Recent Labs   Lab Test 02/27/24  1049 02/21/24  1219   AST 20 16   ALT 11 5   ALKPHOS 89 92   BILITOTAL 0.5 0.3    Most Recent TSH and T4:  Recent Labs   Lab Test 12/27/23  1328   TSH 0.54   T4 1.47     Phos/Mag:  Lab Results   Component Value Date    PHOS 2.4 (L) 12/09/2020    PHOS 3.9 12/08/2020    MAG 1.2 (L) 02/27/2024    MAG 1.3 (L) 02/21/2024    MAG 1.4 (L) 02/13/2024       I reviewed the above labs today.

## 2024-02-26 NOTE — TELEPHONE ENCOUNTER
Patent reports ongoing intermittent floaters for one month.  No change in vision. No flashes.  Appointment scheduled.  Patient will call if changes in symptoms.

## 2024-02-26 NOTE — TELEPHONE ENCOUNTER
[facility] Call Center    Phone Message    May a detailed message be left on voicemail: yes     Reason for Call: Symptoms or Concerns     If patient has red-flag symptoms, warm transfer to triage line    Current symptom or concern: Pt reports he's been having new floaters in the Rt eye for about a month now.    Symptoms have been present for:  1 month(s)    Has patient previously been seen for this? No    By : Dr. Kilgore    Date: 8/19/22    Are there any new or worsening symptoms? Yes: New floaters, Rt eye    Action Taken: Message routed to:  [facility]    Travel Screening: Not Applicable

## 2024-02-27 ENCOUNTER — APPOINTMENT (OUTPATIENT)
Dept: LAB | Facility: CLINIC | Age: 79
End: 2024-02-27
Attending: REGISTERED NURSE
Payer: COMMERCIAL

## 2024-02-27 ENCOUNTER — INFUSION THERAPY VISIT (OUTPATIENT)
Dept: ONCOLOGY | Facility: CLINIC | Age: 79
End: 2024-02-27
Attending: REGISTERED NURSE
Payer: COMMERCIAL

## 2024-02-27 VITALS
WEIGHT: 198.6 LBS | DIASTOLIC BLOOD PRESSURE: 73 MMHG | OXYGEN SATURATION: 98 % | RESPIRATION RATE: 16 BRPM | HEART RATE: 94 BPM | BODY MASS INDEX: 34.63 KG/M2 | SYSTOLIC BLOOD PRESSURE: 161 MMHG | TEMPERATURE: 98 F

## 2024-02-27 DIAGNOSIS — C09.9 TONSIL CANCER (H): ICD-10-CM

## 2024-02-27 DIAGNOSIS — C78.00 MALIGNANT NEOPLASM METASTATIC TO LUNG, UNSPECIFIED LATERALITY (H): Primary | ICD-10-CM

## 2024-02-27 DIAGNOSIS — E83.42 HYPOMAGNESEMIA: ICD-10-CM

## 2024-02-27 DIAGNOSIS — D50.9 MICROCYTIC ANEMIA: ICD-10-CM

## 2024-02-27 DIAGNOSIS — C09.9 TONSIL CANCER (H): Primary | ICD-10-CM

## 2024-02-27 DIAGNOSIS — C10.9 SQUAMOUS CELL CARCINOMA OF OROPHARYNX (H): ICD-10-CM

## 2024-02-27 DIAGNOSIS — R63.0 ANOREXIA: ICD-10-CM

## 2024-02-27 DIAGNOSIS — C78.00 MALIGNANT NEOPLASM METASTATIC TO LUNG, UNSPECIFIED LATERALITY (H): ICD-10-CM

## 2024-02-27 DIAGNOSIS — R60.0 BILATERAL LEG EDEMA: ICD-10-CM

## 2024-02-27 LAB
ALBUMIN SERPL BCG-MCNC: 3.9 G/DL (ref 3.5–5.2)
ALP SERPL-CCNC: 89 U/L (ref 40–150)
ALT SERPL W P-5'-P-CCNC: 11 U/L (ref 0–70)
ANION GAP SERPL CALCULATED.3IONS-SCNC: 11 MMOL/L (ref 7–15)
AST SERPL W P-5'-P-CCNC: 20 U/L (ref 0–45)
BASOPHILS # BLD AUTO: 0 10E3/UL (ref 0–0.2)
BASOPHILS NFR BLD AUTO: 1 %
BILIRUB SERPL-MCNC: 0.5 MG/DL
BUN SERPL-MCNC: 17.2 MG/DL (ref 8–23)
CALCIUM SERPL-MCNC: 8.9 MG/DL (ref 8.8–10.2)
CHLORIDE SERPL-SCNC: 107 MMOL/L (ref 98–107)
CREAT SERPL-MCNC: 0.82 MG/DL (ref 0.67–1.17)
DEPRECATED HCO3 PLAS-SCNC: 29 MMOL/L (ref 22–29)
EGFRCR SERPLBLD CKD-EPI 2021: 90 ML/MIN/1.73M2
EOSINOPHIL # BLD AUTO: 0.1 10E3/UL (ref 0–0.7)
EOSINOPHIL NFR BLD AUTO: 3 %
ERYTHROCYTE [DISTWIDTH] IN BLOOD BY AUTOMATED COUNT: 18.9 % (ref 10–15)
GLUCOSE SERPL-MCNC: 121 MG/DL (ref 70–99)
HCT VFR BLD AUTO: 26.5 % (ref 40–53)
HGB BLD-MCNC: 8.2 G/DL (ref 13.3–17.7)
IMM GRANULOCYTES # BLD: 0 10E3/UL
IMM GRANULOCYTES NFR BLD: 1 %
LYMPHOCYTES # BLD AUTO: 1.7 10E3/UL (ref 0.8–5.3)
LYMPHOCYTES NFR BLD AUTO: 39 %
MAGNESIUM SERPL-MCNC: 1.2 MG/DL (ref 1.7–2.3)
MCH RBC QN AUTO: 24.6 PG (ref 26.5–33)
MCHC RBC AUTO-ENTMCNC: 30.9 G/DL (ref 31.5–36.5)
MCV RBC AUTO: 80 FL (ref 78–100)
MONOCYTES # BLD AUTO: 0.2 10E3/UL (ref 0–1.3)
MONOCYTES NFR BLD AUTO: 4 %
NEUTROPHILS # BLD AUTO: 2.3 10E3/UL (ref 1.6–8.3)
NEUTROPHILS NFR BLD AUTO: 52 %
NRBC # BLD AUTO: 0 10E3/UL
NRBC BLD AUTO-RTO: 0 /100
PLATELET # BLD AUTO: 121 10E3/UL (ref 150–450)
POTASSIUM SERPL-SCNC: 3.8 MMOL/L (ref 3.4–5.3)
PROT SERPL-MCNC: 6.4 G/DL (ref 6.4–8.3)
RBC # BLD AUTO: 3.33 10E6/UL (ref 4.4–5.9)
SODIUM SERPL-SCNC: 147 MMOL/L (ref 135–145)
WBC # BLD AUTO: 4.4 10E3/UL (ref 4–11)

## 2024-02-27 PROCEDURE — 85025 COMPLETE CBC W/AUTO DIFF WBC: CPT | Performed by: INTERNAL MEDICINE

## 2024-02-27 PROCEDURE — 83735 ASSAY OF MAGNESIUM: CPT

## 2024-02-27 PROCEDURE — 96375 TX/PRO/DX INJ NEW DRUG ADDON: CPT

## 2024-02-27 PROCEDURE — 99215 OFFICE O/P EST HI 40 MIN: CPT | Performed by: REGISTERED NURSE

## 2024-02-27 PROCEDURE — 36415 COLL VENOUS BLD VENIPUNCTURE: CPT | Performed by: INTERNAL MEDICINE

## 2024-02-27 PROCEDURE — 250N000013 HC RX MED GY IP 250 OP 250 PS 637: Performed by: INTERNAL MEDICINE

## 2024-02-27 PROCEDURE — 96417 CHEMO IV INFUS EACH ADDL SEQ: CPT

## 2024-02-27 PROCEDURE — 250N000011 HC RX IP 250 OP 636: Performed by: REGISTERED NURSE

## 2024-02-27 PROCEDURE — 250N000011 HC RX IP 250 OP 636: Performed by: INTERNAL MEDICINE

## 2024-02-27 PROCEDURE — G0463 HOSPITAL OUTPT CLINIC VISIT: HCPCS | Performed by: REGISTERED NURSE

## 2024-02-27 PROCEDURE — 258N000003 HC RX IP 258 OP 636: Performed by: INTERNAL MEDICINE

## 2024-02-27 PROCEDURE — 82040 ASSAY OF SERUM ALBUMIN: CPT | Performed by: INTERNAL MEDICINE

## 2024-02-27 PROCEDURE — 96367 TX/PROPH/DG ADDL SEQ IV INF: CPT

## 2024-02-27 PROCEDURE — 96413 CHEMO IV INFUSION 1 HR: CPT

## 2024-02-27 RX ORDER — DIPHENHYDRAMINE HCL 25 MG
25 CAPSULE ORAL ONCE
Status: COMPLETED | OUTPATIENT
Start: 2024-02-27 | End: 2024-02-27

## 2024-02-27 RX ORDER — MAGNESIUM SULFATE HEPTAHYDRATE 40 MG/ML
2 INJECTION, SOLUTION INTRAVENOUS ONCE
Status: COMPLETED | OUTPATIENT
Start: 2024-02-27 | End: 2024-02-27

## 2024-02-27 RX ADMIN — CARBOPLATIN 200 MG: 10 INJECTION INTRAVENOUS at 14:53

## 2024-02-27 RX ADMIN — SODIUM CHLORIDE 250 ML: 9 INJECTION, SOLUTION INTRAVENOUS at 13:15

## 2024-02-27 RX ADMIN — DEXAMETHASONE SODIUM PHOSPHATE: 10 INJECTION, SOLUTION INTRAMUSCULAR; INTRAVENOUS at 13:19

## 2024-02-27 RX ADMIN — DIPHENHYDRAMINE HYDROCHLORIDE 25 MG: 25 CAPSULE ORAL at 13:09

## 2024-02-27 RX ADMIN — MAGNESIUM SULFATE 2 G: 2 INJECTION INTRAVENOUS at 13:42

## 2024-02-27 RX ADMIN — PACLITAXEL 125 MG: 6 INJECTION, SOLUTION INTRAVENOUS at 13:37

## 2024-02-27 RX ADMIN — FAMOTIDINE 20 MG: 10 INJECTION INTRAVENOUS at 13:09

## 2024-02-27 ASSESSMENT — PAIN SCALES - GENERAL: PAINLEVEL: EXTREME PAIN (8)

## 2024-02-27 NOTE — NURSING NOTE
Chief Complaint   Patient presents with    Blood Draw     Vitals taken, PIV started, labs drawn, saline locked, checked into next appt     BP (!) 161/73 (BP Location: Left arm, Patient Position: Sitting, Cuff Size: Adult Large)   Pulse 94   Temp 98  F (36.7  C) (Oral)   Resp 16   Wt 90.1 kg (198 lb 9.6 oz)   SpO2 98%   BMI 34.63 kg/m    Lio Vasquez RN on 2/27/2024 at 10:43 AM

## 2024-02-27 NOTE — PROGRESS NOTES
Infusion Nursing Note:  Jonathan Bishop presents today for C2D8 Taxol, Carboplatin, and Magnesium.    Patient seen by provider today: Yes: Vandana Bertrand NP   present during visit today: Not Applicable.    Note: Jonathan presents to infusion today following his clinic appointment. He denies any new concerns.    Intravenous Access:  Peripheral IV placed.    Treatment Conditions:  Lab Results   Component Value Date    HGB 8.2 (L) 02/27/2024    WBC 4.4 02/27/2024    ANEU 3.9 10/28/2020    ANEUTAUTO 2.3 02/27/2024     (L) 02/27/2024     Lab Results   Component Value Date     (H) 02/27/2024    POTASSIUM 3.8 02/27/2024    MAG 1.2 (L) 02/27/2024    CR 0.82 02/27/2024    WARREN 8.9 02/27/2024    BILITOTAL 0.5 02/27/2024    ALBUMIN 3.9 02/27/2024    ALT 11 02/27/2024    AST 20 02/27/2024     Results reviewed, labs MET treatment parameters, ok to proceed with treatment.    Post Infusion Assessment:  Patient tolerated infusion without incident.  Blood return noted pre and post infusion.  Site patent and intact, free from redness, edema or discomfort.  No evidence of extravasations.  Access discontinued per protocol.     Discharge Plan:   Patient declined prescription refills.  Discharge instructions reviewed with: Patient.  Patient and/or family verbalized understanding of discharge instructions and all questions answered.  Copy of AVS reviewed with patient and/or family.  Patient will return 3/6/24 for next appointment.  Patient discharged in stable condition accompanied by: self.  Departure Mode: Ambulatory.      Sol Don RN

## 2024-02-27 NOTE — NURSING NOTE
"Oncology Rooming Note    February 27, 2024 10:57 AM   Jonathan Bishop is a 78 year old male who presents for:    Chief Complaint   Patient presents with    Blood Draw     Vitals taken, PIV started, labs drawn, saline locked, checked into next appt    Oncology Clinic Visit     Squamous cell carcinoma of oropharynx      Initial Vitals: BP (!) 161/73 (BP Location: Left arm, Patient Position: Sitting, Cuff Size: Adult Large)   Pulse 94   Temp 98  F (36.7  C) (Oral)   Resp 16   Wt 90.1 kg (198 lb 9.6 oz)   SpO2 98%   BMI 34.63 kg/m   Estimated body mass index is 34.63 kg/m  as calculated from the following:    Height as of 2/19/24: 1.613 m (5' 3.5\").    Weight as of this encounter: 90.1 kg (198 lb 9.6 oz). Body surface area is 2.01 meters squared.  Extreme Pain (8) Comment: \"8.375\"   No LMP for male patient.  Allergies reviewed: No. Pt denied to review allergies today.   Medications reviewed: No. Pt denied to review medications today.     Medications: Medication refills not needed today.  Pharmacy name entered into [employer]: [id number] - [place], [place] - [address] [facility]    Frailty Screening:   Is the patient here for a new oncology consult visit in cancer care? 2. No      Clinical concerns: no other complaints      Vazquez Howell"

## 2024-02-27 NOTE — PATIENT INSTRUCTIONS
Contact Numbers  [facility]: [phone] (for symptom and scheduling needs)    Please call the [facility] Triage line if you experience a temperature greater than or equal to 100.4, shaking chills, have uncontrolled nausea, vomiting and/or diarrhea, dizziness, shortness of breath, chest pain, bleeding, unexplained bruising, or if you have any other new/concerning symptoms, questions or concerns.     If you are having any concerning symptoms or wish to speak to a provider before your next infusion visit, please call your care coordinator or triage to notify them so we can adequately serve you.     If you need a refill on a narcotic prescription or other medication, please call triage before your infusion appointment.          Lab Results:  Recent Results (from the past 12 hour(s))   Comprehensive metabolic panel    Collection Time: 02/27/24 10:49 AM   Result Value Ref Range    Sodium 147 (H) 135 - 145 mmol/L    Potassium 3.8 3.4 - 5.3 mmol/L    Carbon Dioxide (CO2) 29 22 - 29 mmol/L    Anion Gap 11 7 - 15 mmol/L    Urea Nitrogen 17.2 8.0 - 23.0 mg/dL    Creatinine 0.82 0.67 - 1.17 mg/dL    GFR Estimate 90 >60 mL/min/1.73m2    Calcium 8.9 8.8 - 10.2 mg/dL    Chloride 107 98 - 107 mmol/L    Glucose 121 (H) 70 - 99 mg/dL    Alkaline Phosphatase 89 40 - 150 U/L    AST 20 0 - 45 U/L    ALT 11 0 - 70 U/L    Protein Total 6.4 6.4 - 8.3 g/dL    Albumin 3.9 3.5 - 5.2 g/dL    Bilirubin Total 0.5 <=1.2 mg/dL   Magnesium    Collection Time: 02/27/24 10:49 AM   Result Value Ref Range    Magnesium 1.2 (L) 1.7 - 2.3 mg/dL   CBC with platelets and differential    Collection Time: 02/27/24 10:49 AM   Result Value Ref Range    WBC Count 4.4 4.0 - 11.0 10e3/uL    RBC Count 3.33 (L) 4.40 - 5.90 10e6/uL    Hemoglobin 8.2 (L) 13.3 - 17.7 g/dL    Hematocrit 26.5 (L) 40.0 - 53.0 %    MCV 80 78 - 100 fL    MCH 24.6 (L) 26.5 - 33.0 pg    MCHC 30.9 (L) 31.5 - 36.5 g/dL    RDW 18.9 (H) 10.0 - 15.0 %    Platelet Count 121 (L) 150 - 450 10e3/uL     % Neutrophils 52 %    % Lymphocytes 39 %    % Monocytes 4 %    % Eosinophils 3 %    % Basophils 1 %    % Immature Granulocytes 1 %    NRBCs per 100 WBC 0 <1 /100    Absolute Neutrophils 2.3 1.6 - 8.3 10e3/uL    Absolute Lymphocytes 1.7 0.8 - 5.3 10e3/uL    Absolute Monocytes 0.2 0.0 - 1.3 10e3/uL    Absolute Eosinophils 0.1 0.0 - 0.7 10e3/uL    Absolute Basophils 0.0 0.0 - 0.2 10e3/uL    Absolute Immature Granulocytes 0.0 <=0.4 10e3/uL    Absolute NRBCs 0.0 10e3/uL

## 2024-02-27 NOTE — TELEPHONE ENCOUNTER
oxyCODONE-acetaminophen (PERCOCET) 5-325 MG tablet   Last Written Prescription Date:  1/24/2024  Last Fill Quantity: 150,   # refills: 0  Last Office Visit : 2/19/2024  Future Office visit:  8/19/2024    Routing refill request to provider for review/approval because:  Drug not on the [facility], P or [facility] refill protocol or controlled substance    Mayda Holland RN  Central Triage Red Flags/Med Refills

## 2024-02-27 NOTE — Clinical Note
2/27/2024         RE: Jonathan Bishop  [address]  [place] [facility] [address]        Dear Colleague,    Thank you for referring your patient, Jonathan Bishop, to the [facility]. Please see a copy of my visit note below.       [facility]    PATIENT NAME: Jonathan Bishop  MRN # [id number]   DATE OF VISIT: February 27, 2024  YOB: 1945     Otolaryngology: Dr. Karishma Eng  Radiation Oncology: Dr. Jenni Mills  PCP: Dr. Buck Nascimento    CANCER TYPE: SCC L tonsil, p16 +lety  STAGE: [id number] (IVC)  ECOG PS: 1    PD-L1: TPS 20%, CPS 25% on [id number] tonsil bx  NGS: N/A    SUMMARY  2/26/23 L tonsil mass bx in clinic (Dr. Eng).   2/20/23 PET/CT. 3.7 x 4.0 x 5.1 cm mass L palatine tonsil causing narrowing of the oropharyngeal lumen, L level 2 node, L retropharyngeal nodular density, extension of primary mass vs retropharyngeal node, 0.8 cm RLL nodule concerning for met, mild focal uptake R iliac bone and L1 without CT correlate suspicious for mets.   3/2/23 R VATS, wedge resection. Path: SCC, poorly differentiated, associated with necrosis, 1 cm, negative margins, p16 +lety  3/24/23 MRI L spine and pelvis. Diffusely heterogeneous marrow signal throughout without corresponding abnormal signal on sagittal STIR sequence and no abnormal enhancement. Nonspecific but could be benign process such as red marrow hyperplasia, cannot completely exclude metastatic disease or infiltrative pathologic marrow process. No lesions corresponding to FDG avid spots on PET/CT.   4/19~10/18/23 Pembrolizumab.  10/26/23 CT neck and CAP. Increased L palatine tonsil mass, 3.8 x 2.7 cm --> 4.7 x 3.5 cm. Increased bilateral cervical nodes up to 2.3 x 2.4 cm R level 2 node. New 3 mm RML nodule, no other mets.   11/14/23 PET. 4.6 x 3.3 cm L palatine tonsil mass (SUB 26.8), R level 2A and 2A/3 nodes. Questionable uptake distal R femoral medullary cavity, partially imaged, with  questionable subtle corresponding soft tissue attenuation on CT. MRI could be obtained to better evaluate.  11/21~12/2/23 [facility] for weakness/fall. COVID/paxlovid, MSSA bacteremia (1 of 2 bottles on 1 day), TTE negative, treated with cefazolin but didn't complete 2 week course, episode of unresponsiveness 12/2. Dispo plan was [place] [facility], but left AMA. CTA chest negative for PE, showed grossly unchanged cervical adenopathy. Brain MRI 11/22 for stroke code negative for infarct. Showed SVID, moderate atrophy, suboptimal contrast bolus to examine intracranial structures, 4.5 x 3 x 4.5 cm L nasopharyngeal mass, incompletely visualized. CTA negative.   12/19/23 Quad shot fraction #1. No-showed thereafter.  1/10/24~curr Carboplatin (AUC 2) + paclitaxel 60 mg/m2 weekly. Held 2/13 due to anorexia, fatigue.    ASSESSMENT AND PLAN  SCC L tonsil, p16 +lety, CPS 25%/TPS 20%, oligometastatic lung met, +/- bone mets: Good DC on CT. No new sites of disease. We'll have to keep an eye on bone mets with PET/CT at the time of PD. Fortunately, is tolerating chemo pretty well. Last week with diarrhea, N/V, abd cramping - was a one-off. He thinks the toxicities are acceptable. Discussed options. One is to take a short treatment break. However, he's doing well, so it makes sense to continue. I think we can keep radiation on the back burner for a little longer, but I still want to be aggressive with it and not lose our window of opportunity. CT chest abd and CT neck in 8 weeks. MARK visits every 2 weeks given his frailty. Minimize dex as able given DM2. He asked about prognosis, which we discussed. Median survival is in the 1-2 year range, and he's at the one year shay already. I do think he'll be on the longer end of the spectrum with the disease biology and limited metastatic disease we've seen. Acknowledged the uncertainty however.     Anorexia, malnutrition, weight loss: Improved other than last week.     Hypomag: Check,  replace if needed.    H/o CVA: Residual L weakness. Uses walker at baseline. Still waiting for PCA. Remains independent otherwise.     LE edema: Better recently. Was exacerbated last year and couldn't get his stockings on.     Surrogate decision maker: Removed Sandie from his authorized contact list, and added Mariann, his sister. He would want us to talk with Mariann if he could not speak for himself. Not discussed again today.    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 Not discussed today    Microcytic anemia: Iron studies 8/2022 fairly normal, normal 4/18/23. Progressive anemia due to chemo, discussed possible need for transfusion in the coming weeks. Recheck iron studies last week - not iron deficient.    Peripheral neuropathy: MRI shows a lot of foramenal stenosis and spinal canal stenosis, so more likely related to that than DM2. Not worsening on paclitaxel, but monitor closely.     *** minutes spent on the date of the encounter doing {2021 E&M time in:136504}     Vandana Bertrand CNP    SUBJECTIVE  [place] Aug 18 for 2 weeks    Brother lives in the [facility] [place] [facility], works at [employer]. They're going to [place] sometime this summer - exact timing pending his brother's schedule coming out.     PAST MEDICAL HISTORY  SCC as above  Chronic LBP  TAVR 2020  H/o rheumatic carditis 1950  CHF. 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 Nocturia once nightly   ED  Cataracts  Umbilical hernia repair 2011  Knee arthroplasty B 2010  Lumbar spine fusion, laminectomy, sciatic pain R > L  Peripheral neuropathy - from pinched nerves?  Hearing loss    Numbness/tingling in both feet - bilaterally, symmetric, R spasms more than left    CURRENT OUTPATIENT MEDICATIONS  Reviewed    ALLERGIES  No Known Allergies     PHYSICAL EXAM  There were no vitals taken for this visit.  GEN: NAD  HEENT: EOMI, no icterus, injection or pallor  EXT: no edema  NEURO: alert    LABORATORY AND  IMAGING STUDIES  Most Recent 3 CBC's:  Recent Labs   Lab Test 02/21/24  1219 02/13/24  0900 02/06/24  1013   WBC 5.2 4.2 5.2   HGB 8.4* 9.0* 9.7*   MCV 79 77* 78    212 246   ANEUTAUTO 2.6 1.8 2.5    Most Recent 3 BMP's:  Recent Labs   Lab Test 02/21/24  1219 02/13/24  0900 02/06/24  1013    147* 146*   POTASSIUM 3.5 3.9 4.1   CHLORIDE 109* 111* 110*   CO2 25 26 26   BUN 13.1 19.2 10.5   CR 0.84 1.09 0.84   ANIONGAP 11 10 10   WARREN 8.9 9.4 9.6   * 109* 108*   PROTTOTAL 6.2* 6.9 7.1   ALBUMIN 3.7 4.1 4.1    Most Recent 2 LFT's:  Recent Labs   Lab Test 02/21/24  1219 02/13/24  0900   AST 16 19   ALT 5 15   ALKPHOS 92 84   BILITOTAL 0.3 0.4    Most Recent TSH and T4:  Recent Labs   Lab Test 12/27/23  1328   TSH 0.54   T4 1.47     Phos/Mag:  Lab Results   Component Value Date    PHOS 2.4 (L) 12/09/2020    PHOS 3.9 12/08/2020    MAG 1.3 (L) 02/21/2024    MAG 1.4 (L) 02/13/2024    MAG 1.7 02/06/2024       I reviewed the above labs today.                  [facility]    PATIENT NAME: Jonathan Bishop  MRN # [id number]   DATE OF VISIT: February 27, 2024  YOB: 1945     Otolaryngology: Dr. Karishma Eng  Radiation Oncology: Dr. Jenni Mills  PCP: Dr. Buck Nascimento    CANCER TYPE: SCC L tonsil, p16 +lety  STAGE: [id number] (IVC)  ECOG PS: 1    PD-L1: TPS 20%, CPS 25% on [id number] tonsil bx  NGS: N/A    SUMMARY  2/26/23 L tonsil mass bx in clinic (Dr. Eng).   2/20/23 PET/CT. 3.7 x 4.0 x 5.1 cm mass L palatine tonsil causing narrowing of the oropharyngeal lumen, L level 2 node, L retropharyngeal nodular density, extension of primary mass vs retropharyngeal node, 0.8 cm RLL nodule concerning for met, mild focal uptake R iliac bone and L1 without CT correlate suspicious for mets.   3/2/23 R VATS, wedge resection. Path: SCC, poorly differentiated, associated with necrosis, 1 cm, negative margins, p16 +lety  3/24/23 MRI L spine and pelvis. Diffusely heterogeneous marrow signal throughout  without corresponding abnormal signal on sagittal STIR sequence and no abnormal enhancement. Nonspecific but could be benign process such as red marrow hyperplasia, cannot completely exclude metastatic disease or infiltrative pathologic marrow process. No lesions corresponding to FDG avid spots on PET/CT.   4/19~10/18/23 Pembrolizumab.  10/26/23 CT neck and CAP. Increased L palatine tonsil mass, 3.8 x 2.7 cm --> 4.7 x 3.5 cm. Increased bilateral cervical nodes up to 2.3 x 2.4 cm R level 2 node. New 3 mm RML nodule, no other mets.   11/14/23 PET. 4.6 x 3.3 cm L palatine tonsil mass (SUB 26.8), R level 2A and 2A/3 nodes. Questionable uptake distal R femoral medullary cavity, partially imaged, with questionable subtle corresponding soft tissue attenuation on CT. MRI could be obtained to better evaluate.  11/21~12/2/23 [facility] for weakness/fall. COVID/paxlovid, MSSA bacteremia (1 of 2 bottles on 1 day), TTE negative, treated with cefazolin but didn't complete 2 week course, episode of unresponsiveness 12/2. Dispo plan was [facility], but left AMA. CTA chest negative for PE, showed grossly unchanged cervical adenopathy. Brain MRI 11/22 for stroke code negative for infarct. Showed SVID, moderate atrophy, suboptimal contrast bolus to examine intracranial structures, 4.5 x 3 x 4.5 cm L nasopharyngeal mass, incompletely visualized. CTA negative.   12/19/23 Quad shot fraction #1. No-showed thereafter.  1/10/24~curr Carboplatin (AUC 2) + paclitaxel 60 mg/m2 weekly. Held 2/13 due to anorexia, fatigue.    ASSESSMENT AND PLAN  SCC L tonsil, p16 +lety, CPS 25%/TPS 20%, oligometastatic lung met, +/- bone mets: Good MO on CT 2/13/24. No new sites of disease. Will plan to keep an eye on bone mets with PET/CT at the time of PD. Tolerating chemo with carbo/taxol well. Plan is to continue weekly infusions and repeat CT chest/abd and CT neck in 8 weeks. May reconsider radiation after next imaging. Continue MARK visits every 2  weeks after next week's visit with Olivia given frailty.      Anorexia, malnutrition, weight loss: Appetite improved. Weight down this week but fluctuating overall. Monitor closely.    Hypomag: Remains low, 1.4. Continue home oral supplement and replace per protocol.     H/o CVA: Residual L weakness. Uses walker at baseline. Has PCA. Remains independent otherwise.     LE edema: Better recently. Was exacerbated last year and couldn't get his stockings on.     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 Not discussed today    Microcytic anemia: Iron studies 8/2022 fairly normal, normal 4/18/23. Progressive anemia due to chemo, may need transfusion in the coming weeks but remains asymptomatic so will hold off for now.  Not iron deficient based on repeat studies.    Peripheral neuropathy: MRI shows a lot of foramenal stenosis and spinal canal stenosis, so more likely related to that than DM2. Not worsening on paclitaxel, but monitor closely.     35 minutes spent on the date of the encounter doing chart review, review of test results, interpretation of tests, patient visit, and documentation     Vandana Bertrand CNP    SUBJECTIVE  Conner is seen today for routine follow-up and ongoing toxicity monitoring on weekly carbo/taxol.   -Appetite is improved. Recently ate kayden goat stew and enjoyed this. Denies recurrent diarrhea  -Started magnesium supplement   -Neuropathy slightly worse in feet/legs, continues to wax and wane  -No fatigue, energy stable  -No nausea    Brother lives in the [facility], works at [employer] They're going to [place] this summer, around 8/18/24, for 2 weeks.    PAST MEDICAL HISTORY  SCC as above  Chronic LBP  TAVR 2020  H/o rheumatic carditis 1950  CHF. 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 Nocturia once nightly   ED  Cataracts  Umbilical hernia repair 2011  Knee arthroplasty B 2010  Lumbar spine fusion, laminectomy, sciatic pain R >  L  Peripheral neuropathy - from pinched nerves?  Hearing loss    Numbness/tingling in both feet - bilaterally, symmetric, R spasms more than left    CURRENT OUTPATIENT MEDICATIONS  Current Outpatient Medications   Medication     aspirin (ASA) 81 MG chewable tablet     atorvastatin (LIPITOR) 40 MG tablet     ibuprofen (ADVIL/MOTRIN) 600 MG tablet     magnesium oxide (MAG-OX) 400 MG tablet     multivitamin, therapeutic (THERA-VIT) TABS tablet     ondansetron (ZOFRAN) 8 MG tablet     order for DME     oxyCODONE-acetaminophen (PERCOCET) 5-325 MG tablet     prochlorperazine (COMPAZINE) 10 MG tablet     spironolactone (ALDACTONE) 50 MG tablet     tamsulosin (FLOMAX) 0.4 MG capsule     torsemide (DEMADEX) 20 MG tablet     vitamin D3 (CHOLECALCIFEROL) 50 mcg (2000 units) tablet     No current facility-administered medications for this visit.       ALLERGIES  No Known Allergies     PHYSICAL EXAM  BP (!) 161/73 (BP Location: Left arm, Patient Position: Sitting, Cuff Size: Adult Large)   Pulse 94   Temp 98  F (36.7  C) (Oral)   Resp 16   Wt 90.1 kg (198 lb 9.6 oz)   SpO2 98%   BMI 34.63 kg/m      GEN: NAD  HEENT: EOMI, no icterus, injection or pallor  RESP: cta bilaterally, no wheezing   CV: rrr, no m/g/r  EXT: +1 edema to bilateral ankles  NEURO: alert, oriented, no focal deficits    LABORATORY AND IMAGING STUDIES  Most Recent 3 CBC's:  Recent Labs   Lab Test 02/27/24  1049 02/21/24  1219 02/13/24  0900   WBC 4.4 5.2 4.2   HGB 8.2* 8.4* 9.0*   MCV 80 79 77*   * 159 212   ANEUTAUTO 2.3 2.6 1.8    Most Recent 3 BMP's:  Recent Labs   Lab Test 02/27/24  1049 02/21/24  1219 02/13/24  0900   * 145 147*   POTASSIUM 3.8 3.5 3.9   CHLORIDE 107 109* 111*   CO2 29 25 26   BUN 17.2 13.1 19.2   CR 0.82 0.84 1.09   ANIONGAP 11 11 10   WARREN 8.9 8.9 9.4   * 106* 109*   PROTTOTAL 6.4 6.2* 6.9   ALBUMIN 3.9 3.7 4.1    Most Recent 2 LFT's:  Recent Labs   Lab Test 02/27/24  1049 02/21/24  1219   AST 20 16   ALT 11 5    ALKPHOS 89 92   BILITOTAL 0.5 0.3    Most Recent TSH and T4:  Recent Labs   Lab Test 12/27/23  1328   TSH 0.54   T4 1.47     Phos/Mag:  Lab Results   Component Value Date    PHOS 2.4 (L) 12/09/2020    PHOS 3.9 12/08/2020    MAG 1.2 (L) 02/27/2024    MAG 1.3 (L) 02/21/2024    MAG 1.4 (L) 02/13/2024       I reviewed the above labs today.                 Again, thank you for allowing me to participate in the care of your patient.        Sincerely,        Vandana Bertrand, CNP

## 2024-02-28 DIAGNOSIS — M54.50 ACUTE MIDLINE LOW BACK PAIN WITHOUT SCIATICA: ICD-10-CM

## 2024-02-28 RX ORDER — OXYCODONE AND ACETAMINOPHEN 5; 325 MG/1; MG/1
1 TABLET ORAL EVERY 6 HOURS PRN
Qty: 150 TABLET | Refills: 0 | Status: SHIPPED | OUTPATIENT
Start: 2024-02-28 | End: 2024-02-28

## 2024-02-28 NOTE — TELEPHONE ENCOUNTER
[facility] Call Center    Phone Message    May a detailed message be left on voicemail: yes     Reason for Call: Patient calling about his prescription on how it is written. Patient states that the current Oxycodone medication is written for 38 day, and he needs it for a 30 day supply so he can get it today. He is at the pharmacy and would like this done ASAP.  As it is due today.  [facility] - [place], [place] - [address] [facility] [address]      Action Taken: Message routed to:  [facility] ([facility] [place]    Travel Screening: Not Applicable

## 2024-02-28 NOTE — TELEPHONE ENCOUNTER
Called patient- he is taking 1-2 tabs q6 as needed for pain, max 5 pills a day. For 150 tabs this would be a 30 day supply, but sig was changed to only one tab a day. Will verify with Dr. Nascimento and follow up with pharmacy.        Darien Jang RN on 2/28/2024 at 1:16 PM

## 2024-02-29 RX ORDER — OXYCODONE AND ACETAMINOPHEN 5; 325 MG/1; MG/1
1-2 TABLET ORAL EVERY 6 HOURS PRN
Qty: 150 TABLET | Refills: 0 | Status: SHIPPED | OUTPATIENT
Start: 2024-02-29 | End: 2024-03-25

## 2024-03-04 ENCOUNTER — OFFICE VISIT (OUTPATIENT)
Dept: OPTOMETRY | Facility: CLINIC | Age: 79
End: 2024-03-04
Payer: COMMERCIAL

## 2024-03-04 DIAGNOSIS — H43.811 PVD (POSTERIOR VITREOUS DETACHMENT), RIGHT: Primary | ICD-10-CM

## 2024-03-04 DIAGNOSIS — H25.13 NUCLEAR AGE-RELATED CATARACT, BOTH EYES: ICD-10-CM

## 2024-03-04 ASSESSMENT — TONOMETRY
IOP_METHOD: ICARE
OS_IOP_MMHG: 16
OD_IOP_MMHG: 15

## 2024-03-04 ASSESSMENT — CUP TO DISC RATIO
OS_RATIO: 0.2
OD_RATIO: 0.2

## 2024-03-04 ASSESSMENT — CONF VISUAL FIELD
OD_INFERIOR_TEMPORAL_RESTRICTION: 0
OD_NORMAL: 1
OD_SUPERIOR_NASAL_RESTRICTION: 0
OD_SUPERIOR_TEMPORAL_RESTRICTION: 0
OS_SUPERIOR_NASAL_RESTRICTION: 0
OD_INFERIOR_NASAL_RESTRICTION: 0
OS_NORMAL: 1
OS_INFERIOR_TEMPORAL_RESTRICTION: 0
OS_SUPERIOR_TEMPORAL_RESTRICTION: 0
OS_INFERIOR_NASAL_RESTRICTION: 0
METHOD: COUNTING FINGERS

## 2024-03-04 ASSESSMENT — EXTERNAL EXAM - RIGHT EYE: OD_EXAM: NORMAL

## 2024-03-04 ASSESSMENT — SLIT LAMP EXAM - LIDS
COMMENTS: NORMAL
COMMENTS: NORMAL

## 2024-03-04 ASSESSMENT — VISUAL ACUITY
OS_SC+: -2
OD_SC: 20/25
METHOD: SNELLEN - LINEAR
OS_PH_CC: 20/40
OD_SC+: -3
OS_SC: 20/50
OS_PH_CC+: -2

## 2024-03-04 ASSESSMENT — EXTERNAL EXAM - LEFT EYE: OS_EXAM: NORMAL

## 2024-03-04 NOTE — PROGRESS NOTES
A/P  1.) Acute PVD right eye  -Symptomatic (floater, flashes) about 3 weeks ago  -Large prominent Kwok ring today, no holes/tears/detachments  -Reviewed normal findings but small ongoing risk for RD in the first month after symptoms    Rec repeat DFE in 1 month to recheck retinas. Reviewed signs/symptoms to watch for until then - if any NEW symptoms can call eye clinic ASAP    I have confirmed the patient's CC, HPI and reviewed Past Medical History, Past Surgical History, Social History, Family History, Problem List, Medication List and agree with Tech note.     Rachel Thompson, RUBY FAAO FSLS

## 2024-03-04 NOTE — NURSING NOTE
Chief Complaints and History of Present Illnesses   Patient presents with    Floaters     Pt here for new floater right eye x 2-3 weeks.     Chief Complaint(s) and History of Present Illness(es)       Floaters              Laterality: right eye    Comments: Pt here for new floater right eye x 2-3 weeks.              Comments    Pt notes white flashes intermittently. Denies veil or curtain. Floater does move. Vision is unchanged.   Kamilla Pham, COA on 3/4/2024 at 1:28 PM

## 2024-03-05 NOTE — PROGRESS NOTES
[facility] CANCER [facility]    PATIENT NAME: Jonathan Bishop  MRN # [id number]   DATE OF VISIT: March 6, 2024  YOB: 1945     Otolaryngology: Dr. Karishma Eng  Radiation Oncology: Dr. Jenni Mills  PCP: Dr. Buck Nascimento    CANCER TYPE: SCC L tonsil, p16 +lety  STAGE: [id number] (IVC)  ECOG PS: 1    PD-L1: TPS 20%, CPS 25% on [id number] tonsil bx  NGS: N/A    SUMMARY  2/26/23 L tonsil mass bx in clinic (Dr. Eng).   2/20/23 PET/CT. 3.7 x 4.0 x 5.1 cm mass L palatine tonsil causing narrowing of the oropharyngeal lumen, L level 2 node, L retropharyngeal nodular density, extension of primary mass vs retropharyngeal node, 0.8 cm RLL nodule concerning for met, mild focal uptake R iliac bone and L1 without CT correlate suspicious for mets.   3/2/23 R VATS, wedge resection. Path: SCC, poorly differentiated, associated with necrosis, 1 cm, negative margins, p16 +lety  3/24/23 MRI L spine and pelvis. Diffusely heterogeneous marrow signal throughout without corresponding abnormal signal on sagittal STIR sequence and no abnormal enhancement. Nonspecific but could be benign process such as red marrow hyperplasia, cannot completely exclude metastatic disease or infiltrative pathologic marrow process. No lesions corresponding to FDG avid spots on PET/CT.   4/19~10/18/23 Pembrolizumab.  10/26/23 CT neck and CAP. Increased L palatine tonsil mass, 3.8 x 2.7 cm --> 4.7 x 3.5 cm. Increased bilateral cervical nodes up to 2.3 x 2.4 cm R level 2 node. New 3 mm RML nodule, no other mets.   11/14/23 PET. 4.6 x 3.3 cm L palatine tonsil mass (SUB 26.8), R level 2A and 2A/3 nodes. Questionable uptake distal R femoral medullary cavity, partially imaged, with questionable subtle corresponding soft tissue attenuation on CT. MRI could be obtained to better evaluate.  11/21~12/2/23 [facility] for weakness/fall. COVID/paxlovid, MSSA bacteremia (1 of 2 bottles on 1 day), TTE negative, treated with cefazolin but didn't complete 2 week  course, episode of unresponsiveness 12/2. Dispo plan was TCU on [facility], but left AMA. CTA chest negative for PE, showed grossly unchanged cervical adenopathy. Brain MRI 11/22 for stroke code negative for infarct. Showed SVID, moderate atrophy, suboptimal contrast bolus to examine intracranial structures, 4.5 x 3 x 4.5 cm L nasopharyngeal mass, incompletely visualized. CTA negative.   12/19/23 Quad shot fraction #1. No-showed thereafter.  1/10/24~curr Carboplatin (AUC 2) + paclitaxel 60 mg/m2 weekly. Held 2/13 due to anorexia, fatigue. Held 3/6 due to neutropenia      SUBJECTIVE  Conner is seen today for routine follow-up and ongoing toxicity monitoring on weekly carbo/taxol.   - Appetite was decreased this past weekend, no nausea. He began eating again this week. His weight is down 7 lbs from 2/27/24.  - He has not been taking his magnesium pills as he states they are hard to swallow  - Neuropathy is stable, legs wax and wane  - No fatigue, diarrhea, fever, chills  - He got a new PCA this week    Brother lives in the [facility], works at [employer]. They're going to [place] this summer, around 8/18/24, for 2 weeks.    PAST MEDICAL HISTORY  SCC as above  Chronic LBP  TAVR 2020  H/o rheumatic carditis 1950  CHF. 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 Nocturia once nightly   ED  Cataracts  Umbilical hernia repair 2011  Knee arthroplasty B 2010  Lumbar spine fusion, laminectomy, sciatic pain R > L  Peripheral neuropathy - from pinched nerves?  Hearing loss    Numbness/tingling in both feet - bilaterally, symmetric, R spasms more than left    CURRENT OUTPATIENT MEDICATIONS  Current Outpatient Medications   Medication    aspirin (ASA) 81 MG chewable tablet    atorvastatin (LIPITOR) 40 MG tablet    ibuprofen (ADVIL/MOTRIN) 600 MG tablet    magnesium oxide (MAG-OX) 400 MG tablet    multivitamin, therapeutic (THERA-VIT) TABS tablet    ondansetron (ZOFRAN) 8 MG tablet     order for DME    oxyCODONE-acetaminophen (PERCOCET) 5-325 MG tablet    prochlorperazine (COMPAZINE) 10 MG tablet    spironolactone (ALDACTONE) 50 MG tablet    tamsulosin (FLOMAX) 0.4 MG capsule    torsemide (DEMADEX) 20 MG tablet    vitamin D3 (CHOLECALCIFEROL) 50 mcg (2000 units) tablet     No current facility-administered medications for this visit.     Facility-Administered Medications Ordered in Other Visits   Medication    magnesium sulfate 2 g in 50 mL sterile water intermittent infusion    potassium chloride (KLOR-CON) Packet 40 mEq       ALLERGIES  No Known Allergies     PHYSICAL EXAM  /67 (BP Location: Right arm, Patient Position: Sitting, Cuff Size: Adult Regular)   Pulse 112   Temp 97.7  F (36.5  C) (Oral)   Resp 16   Wt 86.7 kg (191 lb 1.6 oz)   SpO2 99%   BMI 33.32 kg/m      General: No acute distress  HEENT: Normocephalic. Sclera anicteric.   Heart: Regular, rate, and rhythm  Lungs: Clear to ascultation bilaterally  Extremities: trace extremity edema  Neuro: Cranial nerves grossly intact  Rash: none    LABORATORY AND IMAGING STUDIES  Most Recent 3 CBC's:  Recent Labs   Lab Test 03/06/24  0716 02/27/24  1049 02/21/24  1219 02/13/24  0900   WBC 3.2* 4.4 5.2 4.2   HGB 9.0* 8.2* 8.4* 9.0*   MCV 79 80 79 77*    121* 159 212   ANEUTAUTO  --  2.3 2.6 1.8    Most Recent 3 BMP's:  Recent Labs   Lab Test 03/06/24  0716 02/27/24  1049 02/21/24  1219    147* 145   POTASSIUM 3.3* 3.8 3.5   CHLORIDE 103 107 109*   CO2 29 29 25   BUN 14.6 17.2 13.1   CR 0.89 0.82 0.84   ANIONGAP 13 11 11   WARREN 9.1 8.9 8.9   GLC 96 121* 106*   PROTTOTAL 7.3 6.4 6.2*   ALBUMIN 4.2 3.9 3.7    Most Recent 2 LFT's:  Recent Labs   Lab Test 03/06/24  0716 02/27/24  1049   AST 23 20   ALT 13 11   ALKPHOS 95 89   BILITOTAL 0.4 0.5    Most Recent TSH and T4:  Recent Labs   Lab Test 03/06/24  0716 12/27/23  1328   TSH 0.85 0.54   T4  --  1.47     Phos/Mag:  Lab Results   Component Value Date    PHOS 2.4 (L)  12/09/2020    PHOS 3.9 12/08/2020    MAG 1.2 (L) 03/06/2024    MAG 1.2 (L) 02/27/2024    MAG 1.3 (L) 02/21/2024       I reviewed the above labs today.      ASSESSMENT AND PLAN  SCC L tonsil, p16 +lety, CPS 25%/TPS 20%, oligometastatic lung met, +/- bone mets: Good [place] on CT 2/13/24. No new sites of disease. Will plan to keep an eye on bone mets with PET/CT at the time of PD. He has been tolerating chemo with carbo/taxol well. His ANC is low today at 1000 so will withhold chemotherapy today and restart next week assuming recovery. Will continue weekly infusions and repeat CT chest/abd and CT neck in 7 weeks. May reconsider radiation after next imaging. Continue MARK visits every 2 weeks.    Anorexia, malnutrition, weight loss: Appetite improved after this weekend. Weight down this week but fluctuating overall. Monitor closely.    Hypomag/kalemia: His magnesium and potassium are low at 1.2 and 3.3 respectively. Plan to utilize his scheduled infusion appointment today to give these via IV. Will consider prescribing potassium at home if this continues to be low. Given his difficulty swallowing pills discussed giving potassium in powder form and having him crush his magnesium pills so that he can start taking these regularly. He agreed to this plan.     H/o CVA: Residual L weakness. Uses walker at baseline. Has PCA. Remains independent otherwise.     LE edema: Taking torsemide 20 mg daily. Better recently. Was exacerbated last year and couldn't get his stockings on.     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 Not discussed today    Microcytic anemia: Iron studies 8/2022 fairly normal, normal 4/18/23. Progressive anemia due to chemo, may need transfusion in the coming weeks but remains asymptomatic so will hold off for now.  Not iron deficient based on repeat studies.    Peripheral neuropathy: MRI shows a lot of foramenal stenosis and spinal canal stenosis, so more likely related to that than DM2. Not worsening on  paclitaxel, but monitor closely.     The patient was seen in conjunction with NEELAM Ramos who served as a scribe for today's visit. I have reviewed and edited the above note, and agree with the above findings and plan.      40 minutes spent on the date of the encounter doing chart review, review of test results, interpretation of tests, patient visit, documentation, and discussion with other provider(s)    The longitudinal plan of care for the diagnosis(es)/condition(s) as documented were addressed during this visit. Due to the added complexity in care, I will continue to support Conner in the subsequent management and with ongoing continuity of care.      Olivia Sanchez, CNP

## 2024-03-06 ENCOUNTER — APPOINTMENT (OUTPATIENT)
Dept: LAB | Facility: CLINIC | Age: 79
End: 2024-03-06
Attending: NURSE PRACTITIONER
Payer: COMMERCIAL

## 2024-03-06 ENCOUNTER — ONCOLOGY VISIT (OUTPATIENT)
Dept: ONCOLOGY | Facility: CLINIC | Age: 79
End: 2024-03-06
Attending: NURSE PRACTITIONER
Payer: COMMERCIAL

## 2024-03-06 VITALS
DIASTOLIC BLOOD PRESSURE: 67 MMHG | OXYGEN SATURATION: 99 % | RESPIRATION RATE: 16 BRPM | BODY MASS INDEX: 33.32 KG/M2 | HEART RATE: 112 BPM | SYSTOLIC BLOOD PRESSURE: 108 MMHG | TEMPERATURE: 97.7 F | WEIGHT: 191.1 LBS

## 2024-03-06 DIAGNOSIS — C09.9 TONSIL CANCER (H): Primary | ICD-10-CM

## 2024-03-06 DIAGNOSIS — C09.9 TONSIL CANCER (H): ICD-10-CM

## 2024-03-06 DIAGNOSIS — C10.9 SQUAMOUS CELL CARCINOMA OF OROPHARYNX (H): ICD-10-CM

## 2024-03-06 DIAGNOSIS — Z13.29 SCREENING FOR HYPOTHYROIDISM: ICD-10-CM

## 2024-03-06 DIAGNOSIS — R60.0 BILATERAL LEG EDEMA: ICD-10-CM

## 2024-03-06 DIAGNOSIS — G89.29 CHRONIC BILATERAL LOW BACK PAIN WITHOUT SCIATICA: ICD-10-CM

## 2024-03-06 DIAGNOSIS — C78.00 MALIGNANT NEOPLASM METASTATIC TO LUNG, UNSPECIFIED LATERALITY (H): ICD-10-CM

## 2024-03-06 DIAGNOSIS — M54.50 CHRONIC BILATERAL LOW BACK PAIN WITHOUT SCIATICA: ICD-10-CM

## 2024-03-06 DIAGNOSIS — C78.00 MALIGNANT NEOPLASM METASTATIC TO LUNG, UNSPECIFIED LATERALITY (H): Primary | ICD-10-CM

## 2024-03-06 LAB
ALBUMIN SERPL BCG-MCNC: 4.2 G/DL (ref 3.5–5.2)
ALP SERPL-CCNC: 95 U/L (ref 40–150)
ALT SERPL W P-5'-P-CCNC: 13 U/L (ref 0–70)
ANION GAP SERPL CALCULATED.3IONS-SCNC: 13 MMOL/L (ref 7–15)
AST SERPL W P-5'-P-CCNC: 23 U/L (ref 0–45)
BASOPHILS # BLD AUTO: ABNORMAL 10*3/UL
BASOPHILS # BLD MANUAL: 0 10E3/UL (ref 0–0.2)
BASOPHILS NFR BLD AUTO: ABNORMAL %
BASOPHILS NFR BLD MANUAL: 0 %
BILIRUB SERPL-MCNC: 0.4 MG/DL
BUN SERPL-MCNC: 14.6 MG/DL (ref 8–23)
CALCIUM SERPL-MCNC: 9.1 MG/DL (ref 8.8–10.2)
CHLORIDE SERPL-SCNC: 103 MMOL/L (ref 98–107)
CREAT SERPL-MCNC: 0.89 MG/DL (ref 0.67–1.17)
DACRYOCYTES BLD QL SMEAR: SLIGHT
DEPRECATED HCO3 PLAS-SCNC: 29 MMOL/L (ref 22–29)
EGFRCR SERPLBLD CKD-EPI 2021: 88 ML/MIN/1.73M2
EOSINOPHIL # BLD AUTO: ABNORMAL 10*3/UL
EOSINOPHIL # BLD MANUAL: 0 10E3/UL (ref 0–0.7)
EOSINOPHIL NFR BLD AUTO: ABNORMAL %
EOSINOPHIL NFR BLD MANUAL: 1 %
ERYTHROCYTE [DISTWIDTH] IN BLOOD BY AUTOMATED COUNT: 18.7 % (ref 10–15)
GLUCOSE SERPL-MCNC: 96 MG/DL (ref 70–99)
HCT VFR BLD AUTO: 29.2 % (ref 40–53)
HGB BLD-MCNC: 9 G/DL (ref 13.3–17.7)
IMM GRANULOCYTES # BLD: ABNORMAL 10*3/UL
IMM GRANULOCYTES NFR BLD: ABNORMAL %
LYMPHOCYTES # BLD AUTO: ABNORMAL 10*3/UL
LYMPHOCYTES # BLD MANUAL: 1.9 10E3/UL (ref 0.8–5.3)
LYMPHOCYTES NFR BLD AUTO: ABNORMAL %
LYMPHOCYTES NFR BLD MANUAL: 58 %
MAGNESIUM SERPL-MCNC: 1.2 MG/DL (ref 1.7–2.3)
MCH RBC QN AUTO: 24.4 PG (ref 26.5–33)
MCHC RBC AUTO-ENTMCNC: 30.8 G/DL (ref 31.5–36.5)
MCV RBC AUTO: 79 FL (ref 78–100)
MONOCYTES # BLD AUTO: ABNORMAL 10*3/UL
MONOCYTES # BLD MANUAL: 0.3 10E3/UL (ref 0–1.3)
MONOCYTES NFR BLD AUTO: ABNORMAL %
MONOCYTES NFR BLD MANUAL: 10 %
MYELOCYTES # BLD MANUAL: 0 10E3/UL
MYELOCYTES NFR BLD MANUAL: 1 %
NEUTROPHILS # BLD AUTO: ABNORMAL 10*3/UL
NEUTROPHILS # BLD MANUAL: 1 10E3/UL (ref 1.6–8.3)
NEUTROPHILS NFR BLD AUTO: ABNORMAL %
NEUTROPHILS NFR BLD MANUAL: 30 %
NRBC # BLD AUTO: 0 10E3/UL
NRBC BLD AUTO-RTO: 1 /100
PLAT MORPH BLD: ABNORMAL
PLATELET # BLD AUTO: 243 10E3/UL (ref 150–450)
POTASSIUM SERPL-SCNC: 3.3 MMOL/L (ref 3.4–5.3)
PROT SERPL-MCNC: 7.3 G/DL (ref 6.4–8.3)
RBC # BLD AUTO: 3.69 10E6/UL (ref 4.4–5.9)
RBC MORPH BLD: ABNORMAL
SODIUM SERPL-SCNC: 145 MMOL/L (ref 135–145)
TARGETS BLD QL SMEAR: SLIGHT
TSH SERPL DL<=0.005 MIU/L-ACNC: 0.85 UIU/ML (ref 0.3–4.2)
WBC # BLD AUTO: 3.2 10E3/UL (ref 4–11)

## 2024-03-06 PROCEDURE — G2211 COMPLEX E/M VISIT ADD ON: HCPCS | Performed by: NURSE PRACTITIONER

## 2024-03-06 PROCEDURE — 96360 HYDRATION IV INFUSION INIT: CPT

## 2024-03-06 PROCEDURE — 80053 COMPREHEN METABOLIC PANEL: CPT | Performed by: INTERNAL MEDICINE

## 2024-03-06 PROCEDURE — 85007 BL SMEAR W/DIFF WBC COUNT: CPT | Performed by: INTERNAL MEDICINE

## 2024-03-06 PROCEDURE — 99215 OFFICE O/P EST HI 40 MIN: CPT | Performed by: NURSE PRACTITIONER

## 2024-03-06 PROCEDURE — G0463 HOSPITAL OUTPT CLINIC VISIT: HCPCS | Performed by: NURSE PRACTITIONER

## 2024-03-06 PROCEDURE — 83735 ASSAY OF MAGNESIUM: CPT

## 2024-03-06 PROCEDURE — 36415 COLL VENOUS BLD VENIPUNCTURE: CPT | Performed by: INTERNAL MEDICINE

## 2024-03-06 PROCEDURE — 85027 COMPLETE CBC AUTOMATED: CPT | Performed by: INTERNAL MEDICINE

## 2024-03-06 PROCEDURE — 250N000013 HC RX MED GY IP 250 OP 250 PS 637: Performed by: NURSE PRACTITIONER

## 2024-03-06 PROCEDURE — 84443 ASSAY THYROID STIM HORMONE: CPT | Performed by: NURSE PRACTITIONER

## 2024-03-06 PROCEDURE — 250N000011 HC RX IP 250 OP 636: Performed by: NURSE PRACTITIONER

## 2024-03-06 RX ORDER — MAGNESIUM SULFATE HEPTAHYDRATE 40 MG/ML
2 INJECTION, SOLUTION INTRAVENOUS ONCE
Status: COMPLETED | OUTPATIENT
Start: 2024-03-06 | End: 2024-03-06

## 2024-03-06 RX ORDER — POTASSIUM CHLORIDE 1.5 G/1.58G
40 POWDER, FOR SOLUTION ORAL ONCE
Status: COMPLETED | OUTPATIENT
Start: 2024-03-06 | End: 2024-03-06

## 2024-03-06 RX ADMIN — POTASSIUM CHLORIDE 40 MEQ: 1.5 POWDER, FOR SOLUTION ORAL at 08:41

## 2024-03-06 RX ADMIN — MAGNESIUM SULFATE 2 G: 2 INJECTION INTRAVENOUS at 08:37

## 2024-03-06 ASSESSMENT — PAIN SCALES - GENERAL: PAINLEVEL: EXTREME PAIN (8)

## 2024-03-06 NOTE — NURSING NOTE
"Oncology Rooming Note    March 6, 2024 7:25 AM   Jonathan Bishop is a 78 year old male who presents for:    Chief Complaint   Patient presents with    Blood Draw     Labs drawn via PIV placed by Vascular Access Team in lab    Oncology Clinic Visit     Malignant neoplasm metastatic to lung, unspecified laterality     Initial Vitals: /67 (BP Location: Right arm, Patient Position: Sitting, Cuff Size: Adult Regular)   Pulse 112   Temp 97.7  F (36.5  C) (Oral)   Resp 16   Wt 86.7 kg (191 lb 1.6 oz)   SpO2 99%   BMI 33.32 kg/m   Estimated body mass index is 33.32 kg/m  as calculated from the following:    Height as of 2/19/24: 1.613 m (5' 3.5\").    Weight as of this encounter: 86.7 kg (191 lb 1.6 oz). Body surface area is 1.97 meters squared.  Extreme Pain (8) Comment: Data Unavailable   No LMP for male patient.  Allergies reviewed: Yes  Medications reviewed: Yes    Medications: Medication refills not needed today.  Pharmacy name entered into [employer]: [id number] - [place], [place] - [address] [facility]    Frailty Screening:   Is the patient here for a new oncology consult visit in cancer care? 2. No      Clinical concerns: none       Pili Alicia              "

## 2024-03-06 NOTE — PROGRESS NOTES
Infusion Nursing Note:  Jonathan Bishop presents today for C2D15 Taxol and Carboplatin (HELD) and Magnesium and Potassium replacement.    Patient seen by provider today: Yes: Olivia Sanchez NP   present during visit today: Not Applicable.    Note: Conner presents to infusion today following his clinic appointment. He states his home magnesium pills are too large to swallow. According to pharmacy, all types of magnesium pills are pretty big, but he is allowed to cut them in halves or fourths if that is helpful. Provided patient with pill splitter to use at home.    TORB Olivia Sanchez NP/Sol Don RN:  -Please replace Mg & K per protocol in infusion  -Defer chemo today for neutropenia    Intravenous Access:  Peripheral IV placed.    Treatment Conditions:  Lab Results   Component Value Date    HGB 9.0 (L) 03/06/2024    WBC 3.2 (L) 03/06/2024    ANEU 1.0 (L) 03/06/2024    ANEUTAUTO 2.3 02/27/2024     03/06/2024     Lab Results   Component Value Date     03/06/2024    POTASSIUM 3.3 (L) 03/06/2024    MAG 1.2 (L) 03/06/2024    CR 0.89 03/06/2024    WARREN 9.1 03/06/2024    BILITOTAL 0.4 03/06/2024    ALBUMIN 4.2 03/06/2024    ALT 13 03/06/2024    AST 23 03/06/2024     Results reviewed, labs did NOT meet treatment parameters: ANC <1.5.    Post Infusion Assessment:  Patient tolerated infusion without incident.  Blood return noted pre and post infusion.  Site patent and intact, free from redness, edema or discomfort.  No evidence of extravasations.  Access discontinued per protocol.     Discharge Plan:   Patient declined prescription refills.  Discharge instructions reviewed with: Patient.  Patient and/or family verbalized understanding of discharge instructions and all questions answered.  Copy of AVS reviewed with patient and/or family.  Patient will return 3/13 for next appointment.  Patient discharged in stable condition accompanied by: self.  Departure Mode: Ambulatory.      Sol Don RN

## 2024-03-06 NOTE — PATIENT INSTRUCTIONS
Contact Numbers  [facility]: [phone] (for symptom and scheduling needs)    Please call the [facility] Triage line if you experience a temperature greater than or equal to 100.4, shaking chills, have uncontrolled nausea, vomiting and/or diarrhea, dizziness, shortness of breath, chest pain, bleeding, unexplained bruising, or if you have any other new/concerning symptoms, questions or concerns.     If you are having any concerning symptoms or wish to speak to a provider before your next infusion visit, please call your care coordinator or triage to notify them so we can adequately serve you.     If you need a refill on a narcotic prescription or other medication, please call triage before your infusion appointment.         Lab Results:  Recent Results (from the past 12 hour(s))   Comprehensive metabolic panel    Collection Time: 03/06/24  7:16 AM   Result Value Ref Range    Sodium 145 135 - 145 mmol/L    Potassium 3.3 (L) 3.4 - 5.3 mmol/L    Carbon Dioxide (CO2) 29 22 - 29 mmol/L    Anion Gap 13 7 - 15 mmol/L    Urea Nitrogen 14.6 8.0 - 23.0 mg/dL    Creatinine 0.89 0.67 - 1.17 mg/dL    GFR Estimate 88 >60 mL/min/1.73m2    Calcium 9.1 8.8 - 10.2 mg/dL    Chloride 103 98 - 107 mmol/L    Glucose 96 70 - 99 mg/dL    Alkaline Phosphatase 95 40 - 150 U/L    AST 23 0 - 45 U/L    ALT 13 0 - 70 U/L    Protein Total 7.3 6.4 - 8.3 g/dL    Albumin 4.2 3.5 - 5.2 g/dL    Bilirubin Total 0.4 <=1.2 mg/dL   Magnesium    Collection Time: 03/06/24  7:16 AM   Result Value Ref Range    Magnesium 1.2 (L) 1.7 - 2.3 mg/dL   TSH with free T4 reflex    Collection Time: 03/06/24  7:16 AM   Result Value Ref Range    TSH 0.85 0.30 - 4.20 uIU/mL   CBC with platelets and differential    Collection Time: 03/06/24  7:16 AM   Result Value Ref Range    WBC Count 3.2 (L) 4.0 - 11.0 10e3/uL    RBC Count 3.69 (L) 4.40 - 5.90 10e6/uL    Hemoglobin 9.0 (L) 13.3 - 17.7 g/dL    Hematocrit 29.2 (L) 40.0 - 53.0 %    MCV 79 78 - 100 fL    MCH 24.4 (L) 26.5  - 33.0 pg    MCHC 30.8 (L) 31.5 - 36.5 g/dL    RDW 18.7 (H) 10.0 - 15.0 %    Platelet Count 243 150 - 450 10e3/uL    % Neutrophils      % Lymphocytes      % Monocytes      % Eosinophils      % Basophils      % Immature Granulocytes      NRBCs per 100 WBC 1 (H) <1 /100    Absolute Neutrophils      Absolute Lymphocytes      Absolute Monocytes      Absolute Eosinophils      Absolute Basophils      Absolute Immature Granulocytes      Absolute NRBCs 0.0 10e3/uL   Manual Differential    Collection Time: 03/06/24  7:16 AM   Result Value Ref Range    % Neutrophils 30 %    % Lymphocytes 58 %    % Monocytes 10 %    % Eosinophils 1 %    % Basophils 0 %    % Myelocytes 1 %    Absolute Neutrophils 1.0 (L) 1.6 - 8.3 10e3/uL    Absolute Lymphocytes 1.9 0.8 - 5.3 10e3/uL    Absolute Monocytes 0.3 0.0 - 1.3 10e3/uL    Absolute Eosinophils 0.0 0.0 - 0.7 10e3/uL    Absolute Basophils 0.0 0.0 - 0.2 10e3/uL    Absolute Myelocytes 0.0 <=0.0 10e3/uL    RBC Morphology Confirmed RBC Indices     Platelet Assessment  Automated Count Confirmed. Platelet morphology is normal.     Automated Count Confirmed. Platelet morphology is normal.    Target Cells Slight (A) None Seen    Teardrop Cells Slight (A) None Seen

## 2024-03-11 DIAGNOSIS — C10.9 SQUAMOUS CELL CARCINOMA OF OROPHARYNX (H): ICD-10-CM

## 2024-03-11 DIAGNOSIS — C78.00 MALIGNANT NEOPLASM METASTATIC TO LUNG, UNSPECIFIED LATERALITY (H): Primary | ICD-10-CM

## 2024-03-11 DIAGNOSIS — C09.9 TONSIL CANCER (H): ICD-10-CM

## 2024-03-11 RX ORDER — LORAZEPAM 2 MG/ML
0.5 INJECTION INTRAMUSCULAR EVERY 4 HOURS PRN
Status: CANCELLED | OUTPATIENT
Start: 2024-03-13

## 2024-03-11 RX ORDER — EPINEPHRINE 1 MG/ML
0.3 INJECTION, SOLUTION, CONCENTRATE INTRAVENOUS EVERY 5 MIN PRN
Status: CANCELLED | OUTPATIENT
Start: 2024-03-13

## 2024-03-11 RX ORDER — HEPARIN SODIUM,PORCINE 10 UNIT/ML
5-20 VIAL (ML) INTRAVENOUS DAILY PRN
Status: CANCELLED | OUTPATIENT
Start: 2024-03-13

## 2024-03-11 RX ORDER — ALBUTEROL SULFATE 90 UG/1
1-2 AEROSOL, METERED RESPIRATORY (INHALATION)
Status: CANCELLED
Start: 2024-03-13

## 2024-03-11 RX ORDER — ALBUTEROL SULFATE 0.83 MG/ML
2.5 SOLUTION RESPIRATORY (INHALATION)
Status: CANCELLED | OUTPATIENT
Start: 2024-03-13

## 2024-03-11 RX ORDER — MEPERIDINE HYDROCHLORIDE 25 MG/ML
25 INJECTION INTRAMUSCULAR; INTRAVENOUS; SUBCUTANEOUS EVERY 30 MIN PRN
Status: CANCELLED | OUTPATIENT
Start: 2024-03-13

## 2024-03-11 RX ORDER — DIPHENHYDRAMINE HYDROCHLORIDE 50 MG/ML
50 INJECTION INTRAMUSCULAR; INTRAVENOUS
Status: CANCELLED
Start: 2024-03-13

## 2024-03-11 RX ORDER — DIPHENHYDRAMINE HCL 25 MG
25 CAPSULE ORAL ONCE
Status: CANCELLED
Start: 2024-03-13 | End: 2024-03-13

## 2024-03-11 RX ORDER — HEPARIN SODIUM (PORCINE) LOCK FLUSH IV SOLN 100 UNIT/ML 100 UNIT/ML
5 SOLUTION INTRAVENOUS
Status: CANCELLED | OUTPATIENT
Start: 2024-03-13

## 2024-03-13 ENCOUNTER — INFUSION THERAPY VISIT (OUTPATIENT)
Dept: ONCOLOGY | Facility: CLINIC | Age: 79
End: 2024-03-13
Attending: INTERNAL MEDICINE
Payer: COMMERCIAL

## 2024-03-13 ENCOUNTER — APPOINTMENT (OUTPATIENT)
Dept: CT IMAGING | Facility: CLINIC | Age: 79
End: 2024-03-13
Attending: EMERGENCY MEDICINE
Payer: COMMERCIAL

## 2024-03-13 ENCOUNTER — HOSPITAL ENCOUNTER (OUTPATIENT)
Facility: CLINIC | Age: 79
Setting detail: OBSERVATION
Discharge: HOME | End: 2024-03-17
Attending: EMERGENCY MEDICINE | Admitting: EMERGENCY MEDICINE
Payer: COMMERCIAL

## 2024-03-13 ENCOUNTER — APPOINTMENT (OUTPATIENT)
Dept: LAB | Facility: CLINIC | Age: 79
End: 2024-03-13
Attending: INTERNAL MEDICINE
Payer: COMMERCIAL

## 2024-03-13 VITALS
DIASTOLIC BLOOD PRESSURE: 65 MMHG | BODY MASS INDEX: 33.53 KG/M2 | HEART RATE: 94 BPM | OXYGEN SATURATION: 99 % | TEMPERATURE: 98.2 F | WEIGHT: 192.3 LBS | RESPIRATION RATE: 19 BRPM | SYSTOLIC BLOOD PRESSURE: 111 MMHG
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
## 2024-03-13 DIAGNOSIS — C10.9 SQUAMOUS CELL CARCINOMA OF OROPHARYNX (H): ICD-10-CM

## 2024-03-13 DIAGNOSIS — R53.1 WEAKNESS: ICD-10-CM

## 2024-03-13 DIAGNOSIS — C78.00 MALIGNANT NEOPLASM METASTATIC TO LUNG, UNSPECIFIED LATERALITY (H): ICD-10-CM

## 2024-03-13 DIAGNOSIS — C09.9 TONSIL CANCER (H): ICD-10-CM

## 2024-03-13 DIAGNOSIS — C78.00 MALIGNANT NEOPLASM METASTATIC TO LUNG, UNSPECIFIED LATERALITY (H): Primary | ICD-10-CM

## 2024-03-13 DIAGNOSIS — R29.818 NEUROCOGNITIVE DEFICITS: Primary | ICD-10-CM

## 2024-03-13 DIAGNOSIS — R41.89 NEUROCOGNITIVE DEFICITS: Primary | ICD-10-CM

## 2024-03-13 DIAGNOSIS — R41.0 CONFUSION: ICD-10-CM

## 2024-03-13 LAB
ALBUMIN SERPL BCG-MCNC: 4.1 G/DL (ref 3.5–5.2)
ALBUMIN SERPL BCG-MCNC: 4.2 G/DL (ref 3.5–5.2)
ALBUMIN UR-MCNC: NEGATIVE MG/DL
ALP SERPL-CCNC: 94 U/L (ref 40–150)
ALP SERPL-CCNC: 99 U/L (ref 40–150)
ALT SERPL W P-5'-P-CCNC: 11 U/L (ref 0–70)
ALT SERPL W P-5'-P-CCNC: 15 U/L (ref 0–70)
AMPHETAMINES UR QL SCN: NORMAL
ANION GAP SERPL CALCULATED.3IONS-SCNC: 12 MMOL/L (ref 7–15)
ANION GAP SERPL CALCULATED.3IONS-SCNC: 13 MMOL/L (ref 7–15)
APPEARANCE UR: CLEAR
AST SERPL W P-5'-P-CCNC: 20 U/L (ref 0–45)
AST SERPL W P-5'-P-CCNC: 20 U/L (ref 0–45)
ATRIAL RATE - MUSE: 109 BPM
BARBITURATES UR QL SCN: NORMAL
BASOPHILS # BLD AUTO: 0 10E3/UL (ref 0–0.2)
BASOPHILS # BLD AUTO: 0 10E3/UL (ref 0–0.2)
BASOPHILS NFR BLD AUTO: 0 %
BASOPHILS NFR BLD AUTO: 1 %
BENZODIAZ UR QL SCN: NORMAL
BILIRUB SERPL-MCNC: 0.3 MG/DL
BILIRUB SERPL-MCNC: 0.3 MG/DL
BILIRUB UR QL STRIP: NEGATIVE
BUN SERPL-MCNC: 21.3 MG/DL (ref 8–23)
BUN SERPL-MCNC: 25.6 MG/DL (ref 8–23)
BZE UR QL SCN: NORMAL
CALCIUM SERPL-MCNC: 9.5 MG/DL (ref 8.8–10.2)
CALCIUM SERPL-MCNC: 9.6 MG/DL (ref 8.8–10.2)
CANNABINOIDS UR QL SCN: NORMAL
CHLORIDE SERPL-SCNC: 104 MMOL/L (ref 98–107)
CHLORIDE SERPL-SCNC: 107 MMOL/L (ref 98–107)
COLOR UR AUTO: ABNORMAL
CREAT SERPL-MCNC: 1.22 MG/DL (ref 0.67–1.17)
CREAT SERPL-MCNC: 1.31 MG/DL (ref 0.67–1.17)
DEPRECATED HCO3 PLAS-SCNC: 23 MMOL/L (ref 22–29)
DEPRECATED HCO3 PLAS-SCNC: 25 MMOL/L (ref 22–29)
DIASTOLIC BLOOD PRESSURE - MUSE: NORMAL MMHG
EGFRCR SERPLBLD CKD-EPI 2021: 56 ML/MIN/1.73M2
EGFRCR SERPLBLD CKD-EPI 2021: 61 ML/MIN/1.73M2
EOSINOPHIL # BLD AUTO: 0 10E3/UL (ref 0–0.7)
EOSINOPHIL # BLD AUTO: 0.1 10E3/UL (ref 0–0.7)
EOSINOPHIL NFR BLD AUTO: 0 %
EOSINOPHIL NFR BLD AUTO: 2 %
ERYTHROCYTE [DISTWIDTH] IN BLOOD BY AUTOMATED COUNT: 20.1 % (ref 10–15)
ERYTHROCYTE [DISTWIDTH] IN BLOOD BY AUTOMATED COUNT: 20.4 % (ref 10–15)
ETHANOL SERPL-MCNC: <0.01 G/DL
FENTANYL UR QL: NORMAL
GLUCOSE SERPL-MCNC: 103 MG/DL (ref 70–99)
GLUCOSE SERPL-MCNC: 173 MG/DL (ref 70–99)
GLUCOSE UR STRIP-MCNC: NEGATIVE MG/DL
HCT VFR BLD AUTO: 27.2 % (ref 40–53)
HCT VFR BLD AUTO: 27.6 % (ref 40–53)
HGB BLD-MCNC: 8.5 G/DL (ref 13.3–17.7)
HGB BLD-MCNC: 8.5 G/DL (ref 13.3–17.7)
HGB UR QL STRIP: NEGATIVE
HOLD SPECIMEN: NORMAL
HYALINE CASTS: 3 /LPF
IMM GRANULOCYTES # BLD: 0 10E3/UL
IMM GRANULOCYTES # BLD: 0 10E3/UL
IMM GRANULOCYTES NFR BLD: 0 %
IMM GRANULOCYTES NFR BLD: 1 %
INTERPRETATION ECG - MUSE: NORMAL
KETONES UR STRIP-MCNC: NEGATIVE MG/DL
LACTATE SERPL-SCNC: 1.7 MMOL/L (ref 0.7–2)
LEUKOCYTE ESTERASE UR QL STRIP: NEGATIVE
LIPASE SERPL-CCNC: 13 U/L (ref 13–60)
LYMPHOCYTES # BLD AUTO: 0.6 10E3/UL (ref 0.8–5.3)
LYMPHOCYTES # BLD AUTO: 2.5 10E3/UL (ref 0.8–5.3)
LYMPHOCYTES NFR BLD AUTO: 22 %
LYMPHOCYTES NFR BLD AUTO: 51 %
MAGNESIUM SERPL-MCNC: 1.5 MG/DL (ref 1.7–2.3)
MAGNESIUM SERPL-MCNC: 2 MG/DL (ref 1.7–2.3)
MCH RBC QN AUTO: 24.6 PG (ref 26.5–33)
MCH RBC QN AUTO: 24.9 PG (ref 26.5–33)
MCHC RBC AUTO-ENTMCNC: 30.8 G/DL (ref 31.5–36.5)
MCHC RBC AUTO-ENTMCNC: 31.3 G/DL (ref 31.5–36.5)
MCV RBC AUTO: 80 FL (ref 78–100)
MCV RBC AUTO: 80 FL (ref 78–100)
MONOCYTES # BLD AUTO: 0 10E3/UL (ref 0–1.3)
MONOCYTES # BLD AUTO: 0.7 10E3/UL (ref 0–1.3)
MONOCYTES NFR BLD AUTO: 1 %
MONOCYTES NFR BLD AUTO: 16 %
NEUTROPHILS # BLD AUTO: 1.4 10E3/UL (ref 1.6–8.3)
NEUTROPHILS # BLD AUTO: 2.1 10E3/UL (ref 1.6–8.3)
NEUTROPHILS NFR BLD AUTO: 30 %
NEUTROPHILS NFR BLD AUTO: 76 %
NITRATE UR QL: NEGATIVE
NRBC # BLD AUTO: 0 10E3/UL
NRBC # BLD AUTO: 0 10E3/UL
NRBC BLD AUTO-RTO: 0 /100
NRBC BLD AUTO-RTO: 0 /100
OPIATES UR QL SCN: NORMAL
P AXIS - MUSE: 56 DEGREES
PCP QUAL URINE (ROCHE): NORMAL
PH UR STRIP: 7 [PH] (ref 5–7)
PLATELET # BLD AUTO: 316 10E3/UL (ref 150–450)
PLATELET # BLD AUTO: 345 10E3/UL (ref 150–450)
POTASSIUM SERPL-SCNC: 4.7 MMOL/L (ref 3.4–5.3)
POTASSIUM SERPL-SCNC: 4.9 MMOL/L (ref 3.4–5.3)
PR INTERVAL - MUSE: 224 MS
PROT SERPL-MCNC: 7.1 G/DL (ref 6.4–8.3)
PROT SERPL-MCNC: 7.3 G/DL (ref 6.4–8.3)
QRS DURATION - MUSE: 76 MS
QT - MUSE: 328 MS
QTC - MUSE: 441 MS
R AXIS - MUSE: 61 DEGREES
RBC # BLD AUTO: 3.42 10E6/UL (ref 4.4–5.9)
RBC # BLD AUTO: 3.45 10E6/UL (ref 4.4–5.9)
RBC URINE: <1 /HPF
SODIUM SERPL-SCNC: 141 MMOL/L (ref 135–145)
SODIUM SERPL-SCNC: 143 MMOL/L (ref 135–145)
SP GR UR STRIP: 1.02 (ref 1–1.03)
SYSTOLIC BLOOD PRESSURE - MUSE: NORMAL MMHG
T AXIS - MUSE: 47 DEGREES
TROPONIN T SERPL HS-MCNC: 19 NG/L
UROBILINOGEN UR STRIP-MCNC: 2 MG/DL
VENTRICULAR RATE- MUSE: 109 BPM
WBC # BLD AUTO: 2.8 10E3/UL (ref 4–11)
WBC # BLD AUTO: 4.8 10E3/UL (ref 4–11)
WBC URINE: 1 /HPF

## 2024-03-13 PROCEDURE — 250N000013 HC RX MED GY IP 250 OP 250 PS 637: Performed by: INTERNAL MEDICINE

## 2024-03-13 PROCEDURE — 70450 CT HEAD/BRAIN W/O DYE: CPT

## 2024-03-13 PROCEDURE — 85004 AUTOMATED DIFF WBC COUNT: CPT | Performed by: INTERNAL MEDICINE

## 2024-03-13 PROCEDURE — 80307 DRUG TEST PRSMV CHEM ANLYZR: CPT | Performed by: EMERGENCY MEDICINE

## 2024-03-13 PROCEDURE — 258N000003 HC RX IP 258 OP 636: Performed by: INTERNAL MEDICINE

## 2024-03-13 PROCEDURE — 84484 ASSAY OF TROPONIN QUANT: CPT | Performed by: EMERGENCY MEDICINE

## 2024-03-13 PROCEDURE — 85025 COMPLETE CBC W/AUTO DIFF WBC: CPT | Performed by: EMERGENCY MEDICINE

## 2024-03-13 PROCEDURE — 87637 SARSCOV2&INF A&B&RSV AMP PRB: CPT | Performed by: EMERGENCY MEDICINE

## 2024-03-13 PROCEDURE — 96367 TX/PROPH/DG ADDL SEQ IV INF: CPT

## 2024-03-13 PROCEDURE — 36415 COLL VENOUS BLD VENIPUNCTURE: CPT | Performed by: EMERGENCY MEDICINE

## 2024-03-13 PROCEDURE — 36415 COLL VENOUS BLD VENIPUNCTURE: CPT | Performed by: INTERNAL MEDICINE

## 2024-03-13 PROCEDURE — 99285 EMERGENCY DEPT VISIT HI MDM: CPT | Mod: 25

## 2024-03-13 PROCEDURE — G0378 HOSPITAL OBSERVATION PER HR: HCPCS

## 2024-03-13 PROCEDURE — 96361 HYDRATE IV INFUSION ADD-ON: CPT

## 2024-03-13 PROCEDURE — 96411 CHEMO IV PUSH ADDL DRUG: CPT

## 2024-03-13 PROCEDURE — 258N000003 HC RX IP 258 OP 636: Performed by: EMERGENCY MEDICINE

## 2024-03-13 PROCEDURE — 96375 TX/PRO/DX INJ NEW DRUG ADDON: CPT

## 2024-03-13 PROCEDURE — 80053 COMPREHEN METABOLIC PANEL: CPT | Performed by: INTERNAL MEDICINE

## 2024-03-13 PROCEDURE — 83605 ASSAY OF LACTIC ACID: CPT | Performed by: EMERGENCY MEDICINE

## 2024-03-13 PROCEDURE — 83735 ASSAY OF MAGNESIUM: CPT | Performed by: INTERNAL MEDICINE

## 2024-03-13 PROCEDURE — 96413 CHEMO IV INFUSION 1 HR: CPT

## 2024-03-13 PROCEDURE — 84155 ASSAY OF PROTEIN SERUM: CPT | Performed by: EMERGENCY MEDICINE

## 2024-03-13 PROCEDURE — 82077 ASSAY SPEC XCP UR&BREATH IA: CPT | Performed by: EMERGENCY MEDICINE

## 2024-03-13 PROCEDURE — 258N000003 HC RX IP 258 OP 636: Performed by: NURSE PRACTITIONER

## 2024-03-13 PROCEDURE — 83690 ASSAY OF LIPASE: CPT | Performed by: EMERGENCY MEDICINE

## 2024-03-13 PROCEDURE — 93005 ELECTROCARDIOGRAM TRACING: CPT

## 2024-03-13 PROCEDURE — 250N000011 HC RX IP 250 OP 636: Performed by: INTERNAL MEDICINE

## 2024-03-13 PROCEDURE — 96360 HYDRATION IV INFUSION INIT: CPT | Mod: 59

## 2024-03-13 PROCEDURE — 83735 ASSAY OF MAGNESIUM: CPT

## 2024-03-13 PROCEDURE — 84450 TRANSFERASE (AST) (SGOT): CPT | Performed by: EMERGENCY MEDICINE

## 2024-03-13 PROCEDURE — 81003 URINALYSIS AUTO W/O SCOPE: CPT | Performed by: EMERGENCY MEDICINE

## 2024-03-13 PROCEDURE — 99222 1ST HOSP IP/OBS MODERATE 55: CPT | Mod: AI | Performed by: INTERNAL MEDICINE

## 2024-03-13 PROCEDURE — 250N000011 HC RX IP 250 OP 636: Performed by: NURSE PRACTITIONER

## 2024-03-13 RX ORDER — MAGNESIUM SULFATE HEPTAHYDRATE 40 MG/ML
2 INJECTION, SOLUTION INTRAVENOUS ONCE
Status: COMPLETED | OUTPATIENT
Start: 2024-03-13 | End: 2024-03-13

## 2024-03-13 RX ORDER — DIPHENHYDRAMINE HCL 25 MG
25 CAPSULE ORAL ONCE
Status: COMPLETED | OUTPATIENT
Start: 2024-03-13 | End: 2024-03-13

## 2024-03-13 RX ADMIN — SODIUM CHLORIDE 1000 ML: 9 INJECTION, SOLUTION INTRAVENOUS at 20:00

## 2024-03-13 RX ADMIN — DIPHENHYDRAMINE HYDROCHLORIDE 25 MG: 25 CAPSULE ORAL at 08:46

## 2024-03-13 RX ADMIN — MAGNESIUM SULFATE 2 G: 2 INJECTION INTRAVENOUS at 09:37

## 2024-03-13 RX ADMIN — FAMOTIDINE 20 MG: 10 INJECTION INTRAVENOUS at 08:48

## 2024-03-13 RX ADMIN — CARBOPLATIN 165 MG: 600 INJECTION, SOLUTION INTRAVENOUS at 11:02

## 2024-03-13 RX ADMIN — SODIUM CHLORIDE 500 ML: 9 INJECTION, SOLUTION INTRAVENOUS at 08:46

## 2024-03-13 RX ADMIN — DEXAMETHASONE SODIUM PHOSPHATE: 10 INJECTION, SOLUTION INTRAMUSCULAR; INTRAVENOUS at 08:53

## 2024-03-13 RX ADMIN — PACLITAXEL 100 MG: 6 INJECTION, SOLUTION INTRAVENOUS at 09:41

## 2024-03-13 ASSESSMENT — COLUMBIA-SUICIDE SEVERITY RATING SCALE - C-SSRS
1. IN THE PAST MONTH, HAVE YOU WISHED YOU WERE DEAD OR WISHED YOU COULD GO TO SLEEP AND NOT WAKE UP?: NO
2. HAVE YOU ACTUALLY HAD ANY THOUGHTS OF KILLING YOURSELF IN THE PAST MONTH?: NO
6. HAVE YOU EVER DONE ANYTHING, STARTED TO DO ANYTHING, OR PREPARED TO DO ANYTHING TO END YOUR LIFE?: NO

## 2024-03-13 ASSESSMENT — ACTIVITIES OF DAILY LIVING (ADL)
ADLS_ACUITY_SCORE: 38

## 2024-03-13 NOTE — NURSING NOTE
Chief Complaint   Patient presents with    Blood Draw     Labs drawn with PIV start by vascular access.      Labs drawn with PIV start by vascular access. Pt tolerated well. Vitals taken. Pt checked into next appointment.    Kamilla Benítez RN

## 2024-03-13 NOTE — PATIENT INSTRUCTIONS
[facility] Triage and after hours / weekends / holidays:  [phone] option 5, option 2    Please call the triage or after hours line if you experience a temperature greater than or equal to 100.4, shaking chills, have uncontrolled nausea, vomiting and/or diarrhea, dizziness, shortness of breath, chest pain, bleeding, unexplained bruising, or if you have any other new/concerning symptoms, questions or concerns.      If you are having any concerning symptoms or wish to speak to a provider before your next infusion visit, please call triage to notify your care team so we can adequately serve you.     If you need a refill on a narcotic prescription or other medication, please call before your infusion appointment.      March 2024 Sunday Monday Tuesday Wednesday Thursday Friday Saturday                            1     2       3     4    NEW ADULT EYE   1:15 PM   (20 min.)   Rachel Thompson OD [facility] 5     6    LAB PERIPHERAL   6:45 AM   (15 min.)   [facility] LAB DRAW   [facility]    RETURN ACTIVE TREATMENT   7:00 AM   (45 min.)   Olivia Sanchez CNP   [facility]    ONC INFUSION 2 HR (120 MIN)   8:00 AM   (120 min.)    ONC INFUSION NURSE   [facility] 7     8     9       10     11     12     13    LAB PERIPHERAL   7:00 AM   (15 min.)   [facility] DRAW   [facility]    ONC INFUSION 2 HR (120 MIN)   7:30 AM   (120 min.)    ONC INFUSION NURSE   [facility] 14     15     16       17     18     19    LAB PERIPHERAL  11:15 AM   (15 min.)   [facility] LAB DRAW   [facility]    RETURN ACTIVE TREATMENT  11:45 AM   (45 min.)   Vandana Bertrand CNP   [facility]    ONC INFUSION 2 HR (120 MIN)   2:00 PM   (120 min.)    ONC INFUSION NURSE   [facility]  20     21     22     23       24     25     26    LAB PERIPHERAL   9:45 AM   (15 min.)   [facility] LAB DRAW   [facility]    ONC INFUSION 2 HR (120 MIN)  10:30 AM   (120 min.)   [facility] ONC INFUSION NURSE   [facility] 27     28     29     30       31 April 2024 Sunday Monday Tuesday Wednesday Thursday Friday Saturday        1     2    LAB PERIPHERAL  10:30 AM   (15 min.)   [facility] LAB DRAW   [facility]    RETURN ACTIVE TREATMENT  10:45 AM   (45 min.)   Vandana Bertrand CNP   [facility]    ONC INFUSION 2 HR (120 MIN)  12:00 PM   (120 min.)   [facility] ONC INFUSION NURSE   [facility] 3     4     5    RETURN ADULT EYE   9:30 AM   (15 min.)   Reina Gomez MD   [facility] [address] 6       7     8     9    LAB PERIPHERAL  12:00 PM   (15 min.)    [facility] DRAW   [facility]    ONC INFUSION 2 HR (120 MIN)  12:30 PM   (120 min.)   [facility] ONC INFUSION NURSE   [facility] 10     11     12     13       14     15     16     17     18     19     20       21     22     23     24     25     26     27       28     29     30                                        Recent Results (from the past 24 hour(s))   Comprehensive metabolic panel    Collection Time: 03/13/24  7:11 AM   Result Value Ref Range    Sodium 141 135 - 145 mmol/L    Potassium 4.7 3.4 - 5.3 mmol/L    Carbon Dioxide (CO2) 25 22 - 29 mmol/L    Anion Gap 12 7 - 15 mmol/L    Urea Nitrogen 21.3 8.0 - 23.0 mg/dL    Creatinine 1.22 (H) 0.67 - 1.17 mg/dL    GFR Estimate 61 >60 mL/min/1.73m2    Calcium 9.6 8.8 - 10.2 mg/dL    Chloride 104 98 - 107 mmol/L    Glucose 103 (H) 70 - 99 mg/dL    Alkaline Phosphatase 94 40 - 150 U/L    AST 20 0 - 45 U/L    ALT 11 0 - 70 U/L    Protein Total 7.1 6.4 - 8.3 g/dL    Albumin 4.1  3.5 - 5.2 g/dL    Bilirubin Total 0.3 <=1.2 mg/dL   Magnesium    Collection Time: 03/13/24  7:11 AM   Result Value Ref Range    Magnesium 1.5 (L) 1.7 - 2.3 mg/dL   CBC with platelets and differential    Collection Time: 03/13/24  7:11 AM   Result Value Ref Range    WBC Count 4.8 4.0 - 11.0 10e3/uL    RBC Count 3.42 (L) 4.40 - 5.90 10e6/uL    Hemoglobin 8.5 (L) 13.3 - 17.7 g/dL    Hematocrit 27.2 (L) 40.0 - 53.0 %    MCV 80 78 - 100 fL    MCH 24.9 (L) 26.5 - 33.0 pg    MCHC 31.3 (L) 31.5 - 36.5 g/dL    RDW 20.4 (H) 10.0 - 15.0 %    Platelet Count 345 150 - 450 10e3/uL    % Neutrophils 30 %    % Lymphocytes 51 %    % Monocytes 16 %    % Eosinophils 2 %    % Basophils 1 %    % Immature Granulocytes 0 %    NRBCs per 100 WBC 0 <1 /100    Absolute Neutrophils 1.4 (L) 1.6 - 8.3 10e3/uL    Absolute Lymphocytes 2.5 0.8 - 5.3 10e3/uL    Absolute Monocytes 0.7 0.0 - 1.3 10e3/uL    Absolute Eosinophils 0.1 0.0 - 0.7 10e3/uL    Absolute Basophils 0.0 0.0 - 0.2 10e3/uL    Absolute Immature Granulocytes 0.0 <=0.4 10e3/uL    Absolute NRBCs 0.0 10e3/uL

## 2024-03-13 NOTE — PROGRESS NOTES
"Infusion Nursing Note:  Jonathan Bishop presents today for D15 C2 Paclitaxel and Carboplatin (# 7)/ Magnesium replacement   Patient seen by provider today: No   present during visit today: Not Applicable.    Note:   Conner arrived to infusion ambulating with 4WW by self. He says that he felt good this week, better than usual. He denies infectious symptoms (fever, chills, nausea/vomiting). He denies chest pain, SOB, dizziness, nausea and vomiting. He endorses loose stools x4 yesterday, alternating with constipation. He manages  headaches and lower back pain with percocet, which he says is \"somewhat helpful\"  B/l peripheral neuropathy in feet, no changes, stable    TORB:Olivia Sanchez CNP/ Jessica Peña RN. 3/13/24@ 0820  -Ok to proceed with treatment with ANC 1.4 and Cr 1.22  - Add 500 mL NS IVH for Cr 1.22  - Paclitaxel reduced today  - Patient should follow-up with PCP for pain management, appears unrelated to cancer treatment    2g of Magnesium Sulfate IVPB, replaced per protocol. Conner confirms that he does split his Magnesium pills and can take them more easily now, he is taking daily.      Intravenous Access:  Peripheral IV placed.    Treatment Conditions:  Results reviewed, labs did NOT meet treatment parameters: See TORB.   Latest Reference Range & Units 03/13/24 07:11   Sodium 135 - 145 mmol/L 141   Potassium 3.4 - 5.3 mmol/L 4.7   Chloride 98 - 107 mmol/L 104   Carbon Dioxide (CO2) 22 - 29 mmol/L 25   Urea Nitrogen 8.0 - 23.0 mg/dL 21.3   Creatinine 0.67 - 1.17 mg/dL 1.22 (H)   GFR Estimate >60 mL/min/1.73m2 61   Calcium 8.8 - 10.2 mg/dL 9.6   Anion Gap 7 - 15 mmol/L 12   Magnesium 1.7 - 2.3 mg/dL 1.5 (L)   Albumin 3.5 - 5.2 g/dL 4.1   Protein Total 6.4 - 8.3 g/dL 7.1   Alkaline Phosphatase 40 - 150 U/L 94   ALT 0 - 70 U/L 11   AST 0 - 45 U/L 20   Bilirubin Total <=1.2 mg/dL 0.3   Glucose 70 - 99 mg/dL 103 (H)   WBC 4.0 - 11.0 10e3/uL 4.8   Hemoglobin 13.3 - 17.7 g/dL 8.5 (L)   Hematocrit 40.0 - " 53.0 % 27.2 (L)   Platelet Count 150 - 450 10e3/uL 345   RBC Count 4.40 - 5.90 10e6/uL 3.42 (L)   MCV 78 - 100 fL 80   MCH 26.5 - 33.0 pg 24.9 (L)   MCHC 31.5 - 36.5 g/dL 31.3 (L)   RDW 10.0 - 15.0 % 20.4 (H)   % Neutrophils % 30   % Lymphocytes % 51   % Monocytes % 16   % Eosinophils % 2   % Basophils % 1   Absolute Basophils 0.0 - 0.2 10e3/uL 0.0   Absolute Eosinophils 0.0 - 0.7 10e3/uL 0.1   Absolute Immature Granulocytes <=0.4 10e3/uL 0.0   Absolute Lymphocytes 0.8 - 5.3 10e3/uL 2.5   Absolute Monocytes 0.0 - 1.3 10e3/uL 0.7   % Immature Granulocytes % 0   Absolute Neutrophils 1.6 - 8.3 10e3/uL 1.4 (L)   Absolute NRBCs 10e3/uL 0.0   NRBCs per 100 WBC <1 /100 0       Post Infusion Assessment:  Patient tolerated infusion without incident.  Blood return noted pre and post infusion.  Site patent and intact, free from redness, edema or discomfort.  No evidence of extravasations.  Access discontinued per protocol.       Discharge Plan:   Patient declined prescription refills.  Discharge instructions reviewed with: Patient.  Patient and/or family verbalized understanding of discharge instructions and all questions answered.  AVS to patient via [facility].  Patient will return 3/19 for next appointment.   Patient discharged in stable condition accompanied by: self.  Departure Mode: Ambulatory w/ 4WW.      Jessica Peña RN

## 2024-03-14 ENCOUNTER — APPOINTMENT (OUTPATIENT)
Dept: PHYSICAL THERAPY | Facility: CLINIC | Age: 79
End: 2024-03-14
Attending: INTERNAL MEDICINE
Payer: COMMERCIAL

## 2024-03-14 ENCOUNTER — APPOINTMENT (OUTPATIENT)
Dept: MRI IMAGING | Facility: CLINIC | Age: 79
End: 2024-03-14
Attending: INTERNAL MEDICINE
Payer: COMMERCIAL

## 2024-03-14 ENCOUNTER — APPOINTMENT (OUTPATIENT)
Dept: OCCUPATIONAL THERAPY | Facility: CLINIC | Age: 79
End: 2024-03-14
Attending: INTERNAL MEDICINE
Payer: COMMERCIAL

## 2024-03-14 ENCOUNTER — APPOINTMENT (OUTPATIENT)
Dept: SPEECH THERAPY | Facility: CLINIC | Age: 79
End: 2024-03-14
Attending: INTERNAL MEDICINE
Payer: COMMERCIAL

## 2024-03-14 LAB
ANION GAP SERPL CALCULATED.3IONS-SCNC: 10 MMOL/L (ref 7–15)
BUN SERPL-MCNC: 25 MG/DL (ref 8–23)
CALCIUM SERPL-MCNC: 8.7 MG/DL (ref 8.8–10.2)
CHLORIDE SERPL-SCNC: 110 MMOL/L (ref 98–107)
CREAT SERPL-MCNC: 0.95 MG/DL (ref 0.67–1.17)
DEPRECATED HCO3 PLAS-SCNC: 24 MMOL/L (ref 22–29)
EGFRCR SERPLBLD CKD-EPI 2021: 82 ML/MIN/1.73M2
FLUAV RNA SPEC QL NAA+PROBE: NEGATIVE
FLUBV RNA RESP QL NAA+PROBE: NEGATIVE
GLUCOSE SERPL-MCNC: 116 MG/DL (ref 70–99)
MAGNESIUM SERPL-MCNC: 2 MG/DL (ref 1.7–2.3)
POTASSIUM SERPL-SCNC: 4.4 MMOL/L (ref 3.4–5.3)
POTASSIUM SERPL-SCNC: 4.6 MMOL/L (ref 3.4–5.3)
RSV RNA SPEC NAA+PROBE: NEGATIVE
SARS-COV-2 RNA RESP QL NAA+PROBE: NEGATIVE
SODIUM SERPL-SCNC: 144 MMOL/L (ref 135–145)

## 2024-03-14 PROCEDURE — 36415 COLL VENOUS BLD VENIPUNCTURE: CPT | Performed by: INTERNAL MEDICINE

## 2024-03-14 PROCEDURE — G0378 HOSPITAL OBSERVATION PER HR: HCPCS

## 2024-03-14 PROCEDURE — A9585 GADOBUTROL INJECTION: HCPCS | Performed by: INTERNAL MEDICINE

## 2024-03-14 PROCEDURE — 84132 ASSAY OF SERUM POTASSIUM: CPT | Mod: 91 | Performed by: INTERNAL MEDICINE

## 2024-03-14 PROCEDURE — 92610 EVALUATE SWALLOWING FUNCTION: CPT | Mod: GN

## 2024-03-14 PROCEDURE — 97116 GAIT TRAINING THERAPY: CPT | Mod: GP

## 2024-03-14 PROCEDURE — 80048 BASIC METABOLIC PNL TOTAL CA: CPT | Performed by: INTERNAL MEDICINE

## 2024-03-14 PROCEDURE — 99232 SBSQ HOSP IP/OBS MODERATE 35: CPT | Performed by: INTERNAL MEDICINE

## 2024-03-14 PROCEDURE — 83735 ASSAY OF MAGNESIUM: CPT | Performed by: INTERNAL MEDICINE

## 2024-03-14 PROCEDURE — 255N000002 HC RX 255 OP 636: Performed by: INTERNAL MEDICINE

## 2024-03-14 PROCEDURE — 97165 OT EVAL LOW COMPLEX 30 MIN: CPT | Mod: GO

## 2024-03-14 PROCEDURE — 97535 SELF CARE MNGMENT TRAINING: CPT | Mod: GO

## 2024-03-14 PROCEDURE — 70553 MRI BRAIN STEM W/O & W/DYE: CPT

## 2024-03-14 PROCEDURE — 97161 PT EVAL LOW COMPLEX 20 MIN: CPT | Mod: GP

## 2024-03-14 PROCEDURE — 97530 THERAPEUTIC ACTIVITIES: CPT | Mod: GP

## 2024-03-14 PROCEDURE — 258N000003 HC RX IP 258 OP 636: Performed by: INTERNAL MEDICINE

## 2024-03-14 PROCEDURE — 250N000013 HC RX MED GY IP 250 OP 250 PS 637: Performed by: INTERNAL MEDICINE

## 2024-03-14 RX ORDER — ACETAMINOPHEN 650 MG/1
650 SUPPOSITORY RECTAL EVERY 4 HOURS PRN
Status: DISCONTINUED | OUTPATIENT
Start: 2024-03-14 | End: 2024-03-17 | Stop reason: HOSPADM

## 2024-03-14 RX ORDER — PROCHLORPERAZINE MALEATE 5 MG
5 TABLET ORAL EVERY 6 HOURS PRN
Status: DISCONTINUED | OUTPATIENT
Start: 2024-03-14 | End: 2024-03-17 | Stop reason: HOSPADM

## 2024-03-14 RX ORDER — GADOBUTROL 604.72 MG/ML
9 INJECTION INTRAVENOUS ONCE
Status: COMPLETED | OUTPATIENT
Start: 2024-03-14 | End: 2024-03-14

## 2024-03-14 RX ORDER — SILDENAFIL 100 MG/1
100 TABLET, FILM COATED ORAL DAILY PRN
COMMUNITY
End: 2024-09-16

## 2024-03-14 RX ORDER — ONDANSETRON 2 MG/ML
4 INJECTION INTRAMUSCULAR; INTRAVENOUS EVERY 6 HOURS PRN
Status: DISCONTINUED | OUTPATIENT
Start: 2024-03-14 | End: 2024-03-17 | Stop reason: HOSPADM

## 2024-03-14 RX ORDER — PROCHLORPERAZINE 25 MG
12.5 SUPPOSITORY, RECTAL RECTAL EVERY 12 HOURS PRN
Status: DISCONTINUED | OUTPATIENT
Start: 2024-03-14 | End: 2024-03-17 | Stop reason: HOSPADM

## 2024-03-14 RX ORDER — AMOXICILLIN 250 MG
2 CAPSULE ORAL 2 TIMES DAILY PRN
Status: DISCONTINUED | OUTPATIENT
Start: 2024-03-14 | End: 2024-03-17 | Stop reason: HOSPADM

## 2024-03-14 RX ORDER — AMOXICILLIN 250 MG
1 CAPSULE ORAL 2 TIMES DAILY PRN
Status: DISCONTINUED | OUTPATIENT
Start: 2024-03-14 | End: 2024-03-17 | Stop reason: HOSPADM

## 2024-03-14 RX ORDER — TAMSULOSIN HYDROCHLORIDE 0.4 MG/1
0.4 CAPSULE ORAL DAILY
Status: DISCONTINUED | OUTPATIENT
Start: 2024-03-14 | End: 2024-03-17 | Stop reason: HOSPADM

## 2024-03-14 RX ORDER — POLYETHYLENE GLYCOL 3350 17 G/17G
17 POWDER, FOR SOLUTION ORAL 2 TIMES DAILY PRN
Status: DISCONTINUED | OUTPATIENT
Start: 2024-03-14 | End: 2024-03-17 | Stop reason: HOSPADM

## 2024-03-14 RX ORDER — ACETAMINOPHEN 325 MG/1
650 TABLET ORAL EVERY 4 HOURS PRN
Status: DISCONTINUED | OUTPATIENT
Start: 2024-03-14 | End: 2024-03-17 | Stop reason: HOSPADM

## 2024-03-14 RX ORDER — BISACODYL 10 MG
10 SUPPOSITORY, RECTAL RECTAL DAILY PRN
Status: DISCONTINUED | OUTPATIENT
Start: 2024-03-14 | End: 2024-03-17 | Stop reason: HOSPADM

## 2024-03-14 RX ORDER — LIDOCAINE 40 MG/G
CREAM TOPICAL
Status: DISCONTINUED | OUTPATIENT
Start: 2024-03-14 | End: 2024-03-17 | Stop reason: HOSPADM

## 2024-03-14 RX ORDER — LORAZEPAM 0.5 MG/1
0.5 TABLET ORAL
Status: COMPLETED | OUTPATIENT
Start: 2024-03-14 | End: 2024-03-14

## 2024-03-14 RX ORDER — ONDANSETRON 4 MG/1
4 TABLET, ORALLY DISINTEGRATING ORAL EVERY 6 HOURS PRN
Status: DISCONTINUED | OUTPATIENT
Start: 2024-03-14 | End: 2024-03-17 | Stop reason: HOSPADM

## 2024-03-14 RX ADMIN — TAMSULOSIN HYDROCHLORIDE 0.4 MG: 0.4 CAPSULE ORAL at 08:25

## 2024-03-14 RX ADMIN — LORAZEPAM 0.5 MG: 0.5 TABLET ORAL at 16:27

## 2024-03-14 RX ADMIN — GADOBUTROL 9 ML: 604.72 INJECTION INTRAVENOUS at 17:59

## 2024-03-14 RX ADMIN — ACETAMINOPHEN 650 MG: 325 TABLET, FILM COATED ORAL at 16:16

## 2024-03-14 RX ADMIN — SODIUM CHLORIDE 1000 ML: 9 INJECTION, SOLUTION INTRAVENOUS at 01:59

## 2024-03-14 RX ADMIN — ACETAMINOPHEN 650 MG: 325 TABLET, FILM COATED ORAL at 08:25

## 2024-03-14 ASSESSMENT — ACTIVITIES OF DAILY LIVING (ADL)
ADLS_ACUITY_SCORE: 40
PREVIOUS_RESPONSIBILITIES: MEAL PREP;HOUSEKEEPING;LAUNDRY;SHOPPING;DRIVING;MEDICATION MANAGEMENT;FINANCES
ADLS_ACUITY_SCORE: 40
ADLS_ACUITY_SCORE: 35
DEPENDENT_IADLS:: INDEPENDENT
ADLS_ACUITY_SCORE: 40
ADLS_ACUITY_SCORE: 35
ADLS_ACUITY_SCORE: 40
ADLS_ACUITY_SCORE: 41
ADLS_ACUITY_SCORE: 35
ADLS_ACUITY_SCORE: 38
ADLS_ACUITY_SCORE: 40
ADLS_ACUITY_SCORE: 35
ADLS_ACUITY_SCORE: 40

## 2024-03-14 NOTE — PLAN OF CARE
Goal Outcome Evaluation:                 Observation goals  PRIOR TO DISCHARGE        Comments:   -Diagnostic tests and consults completed and resulted- Not met; MRI needed. SW/PT/OT following.   -Vital signs normal or at patient baseline- Met  -Returns to baseline functional status- Not met  -Safe disposition plan has been identified- Not met  Nurse to notify provider when observation goals have been met and patient is ready for discharge.

## 2024-03-14 NOTE — PLAN OF CARE
Goal Outcome Evaluation:      Plan of Care Reviewed With: patient    Overall Patient Progress: no change    Observation goals  PRIOR TO DISCHARGE        Comments:   -Diagnostic tests and consults completed and resulted-Not met  -Vital signs normal or at patient baseline-Partially met  -Returns to baseline functional status-Not met  -Safe disposition plan has been identified-Not met  Nurse to notify provider when observation goals have been met and patient is ready for discharge.
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
## 2024-03-14 NOTE — PLAN OF CARE
Goal Outcome Evaluation:      Plan of Care Reviewed With: patient    Overall Patient Progress: no change    Observation goals  PRIOR TO DISCHARGE        Comments:   -Diagnostic tests and consults completed and resulted-Not met  -Vital signs normal or at patient baseline-Partially met  -Returns to baseline functional status-Not met  -Safe disposition plan has been identified-Not met  Nurse to notify provider when observation goals have been met and patient is ready for discharge.        PRIMARY Concern: Here with AMS and confusion  SAFETY RISK Concerns (fall risk, behaviors, etc.):fall   Isolation/Type: Chemo precaution, last chemo was 2 days ago  Tests/Procedures for NEXT shift: MRI, K+,Mg+  Consults? (Pending/following, signed-off?) PT/OT/CC/SW/Speech consults pending  Where is patient from? (Home, TCU, etc.): home  Other Important info for NEXT shift: Need MRI checklist completed with family on day shift, pt is confused.   Anticipated DC date & active delays: TBD    SUMMARY NOTE:  Orientation/Cognitive: A&OX2, disoriented to place, time and situation, very forgetful  Observation Goals (Met/ Not Met):Not met   Mobility Level/Assist Equipment:AX1GB/W  Antibiotics & Plan (IV/po, length of tx left): None  Pain Management: Denies pain  Tele/VS/O2: VSS on RA  ABNL Lab/BG: None this shift  Diet: Mechanical dental soft  Bowel/Bladder: Continent  Skin Concerns: BLE discoloration  Drains/Devices: PIV s/l  Patient Stated Goal for Today:  none
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
## 2024-03-14 NOTE — PROGRESS NOTES
RECEIVING UNIT ED HANDOFF REVIEW    ED Nurse Handoff Report was reviewed by: Farideh Hathaway RN on March 14, 2024 at 12:54 AM

## 2024-03-14 NOTE — PLAN OF CARE
Goal Outcome Evaluation:      Plan of Care Reviewed With: patient    Overall Patient Progress: no changeOverall Patient Progress: no change    Observation goals  PRIOR TO DISCHARGE        Comments:   -Diagnostic tests and consults completed and resulted- Not met; MRI needed. SW/PT/OT following.   -Vital signs normal or at patient baseline- Met  -Returns to baseline functional status- Not met  -Safe disposition plan has been identified- Not met  Nurse to notify provider when observation goals have been met and patient is ready for discharge.

## 2024-03-14 NOTE — PHARMACY-ADMISSION MEDICATION HISTORY
"Pharmacist Admission Medication History    Admission medication history is complete. The information provided in this note is only as accurate as the sources available at the time of the update.    Information Source(s): Patient, Clinic records, and [facility]/SureScripts via in-person    Pertinent Information: pt unsure if he took meds yesterday.    Changes made to PTA medication list:  Added: sildenafil  Deleted: spironolactone (marked \"not taking\"), atorvastatin (marked \"not taking\"). Pt has not needed zofran/compazine but has available  Changed: None    Allergies reviewed with patient and updates made in EHR: yes    Medication History Completed By: Samia Duffy [facility] 3/14/2024 8:34 AM    PTA Med List   Medication Sig Note Last Dose    aspirin (ASA) 81 MG chewable tablet 1 tablet (81 mg) by Oral or Feeding Tube route daily (Patient taking differently: Take 81 mg by mouth daily)      ibuprofen (ADVIL/MOTRIN) 600 MG tablet Take 1 tablet (600 mg) by mouth every 8 hours as needed for moderate pain (4-6) 3/14/2024: Last filled #270 on 11/7/23 per surescripts prn    magnesium oxide (MAG-OX) 400 MG tablet Take 1 tablet (400 mg) by mouth daily      multivitamin, therapeutic (THERA-VIT) TABS tablet Take 1 tablet by mouth daily      oxyCODONE-acetaminophen (PERCOCET) 5-325 MG tablet Take 1-2 tablets by mouth every 6 hours as needed for pain 3/14/2024: Last filled 2/29/24 for # 150 per surescripts prn    sildenafil (VIAGRA) 100 MG tablet Take 100 mg by mouth daily as needed (ED)  prn    tamsulosin (FLOMAX) 0.4 MG capsule TAKE 1 CAPSULE BY MOUTH ONCE DAILY 3/14/2024: Last filled 12/22/23 #90 per surescripts     torsemide (DEMADEX) 20 MG tablet Take 2 tablets (40 mg) by mouth daily 3/14/2024: Last filled 1/28/24 # 60 per surescripts     UNKNOWN TO PATIENT Pt undergoing chemotherapy. Taxol, carboplatin, magnesium listed in oncology outpt notes.      vitamin D3 (CHOLECALCIFEROL) 50 mcg (2000 units) tablet Take 1 tablet by " mouth daily
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
## 2024-03-14 NOTE — PLAN OF CARE
Goal Outcome Evaluation:      Plan of Care Reviewed With: patient    Overall Patient Progress: no changeOverall Patient Progress: no change    Observation goals  PRIOR TO DISCHARGE        Comments:   -Diagnostic tests and consults completed and resulted- Not met; MRI needed. SW/PT/OT following.   -Vital signs normal or at patient baseline- Met  -Returns to baseline functional status- Not met  -Safe disposition plan has been identified- Not met  Nurse to notify provider when observation goals have been met and patient is ready for discharge.     3/14/24; [id number]    PRIMARY Concern: Here with AMS and confusion  SAFETY RISK Concerns (fall risk, behaviors, etc.): fall; confusion  Isolation/Type: Chemo precaution, last chemo 3/13  Tests/Procedures for NEXT shift: MRI needed  Consults? (Pending/following, signed-off?) PT/OT/CC/SW following.   Where is patient from? (Home, TCU, etc.): home alone. Sister involved, but lives in [place]. 2 children, not involved.   Other Important info for NEXT shift:  chemo px, hx tonsillar ca. Previous stroke, weakness in extremities. Confused.   Anticipated DC date & active delays: TBD TCU recommended by therapies.     SUMMARY NOTE:  Orientation/Cognitive: A&OX2, disoriented to place, time and situation, very forgetful  Observation Goals (Met/ Not Met):Not met   Mobility Level/Assist Equipment:AX1GB/W  Antibiotics & Plan (IV/po, length of tx left): None  Pain Management: prn tylenol given for back discomfort.   Tele/VS/O2: VSS on RA  ABNL Lab/BG: Creat 1.31. hgb 8.5. MRI to be completed.   Diet: Reg diet.   Bowel/Bladder: Continent  Skin Concerns: BLE discoloration  Drains/Devices: PIV s/l  Patient Stated Goal for Today:  none
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
## 2024-03-14 NOTE — CARE PLAN
PRIMARY Concern: Here with AMS and confusion  SAFETY RISK Concerns (fall risk, behaviors, etc.): fall; confusion  Isolation/Type: Chemo precaution, last chemo 3/13  Tests/Procedures for NEXT shift: neuro consult pending  Consults? (Pending/following, signed-off?) PT/OT/CC/SW following.   Where is patient from? (Home, TCU, etc.): home alone. Sister involved, but lives in [place]. 2 children, not involved.   Other Important info for NEXT shift:  chemo px, hx tonsillar ca. Previous stroke, with weakness in extremities. Confused.   Anticipated DC date & active delays: TBD TCU recommended by PT/OT.      SUMMARY NOTE:  Orientation/Cognitive: A&OX2, disoriented to place, time and situation, very forgetful  Observation Goals (Met/ Not Met):Not met   Mobility Level/Assist Equipment:AX1GB/W  Antibiotics & Plan (IV/po, length of tx left): None  Pain Management: prn tylenol given for back discomfort.   Tele/VS/O2: VSS on RA  ABNL Lab/BG: Creat 1.31. hgb 8.5. MRI done but needs to be read  Diet: Reg diet.   Bowel/Bladder: Continent  Skin Concerns: BLE discoloration  Drains/Devices: PIV s/l  Patient Stated Goal for Today:  none

## 2024-03-14 NOTE — ED NOTES
Bed: ST02  Expected date:   Expected time:   Means of arrival:   Comments:  MARTHA 417/ 78M/ confusion/Unkown LKW/Stroke alert/

## 2024-03-15 LAB
ANION GAP SERPL CALCULATED.3IONS-SCNC: 10 MMOL/L (ref 7–15)
BUN SERPL-MCNC: 17.4 MG/DL (ref 8–23)
CALCIUM SERPL-MCNC: 8.9 MG/DL (ref 8.8–10.2)
CHLORIDE SERPL-SCNC: 110 MMOL/L (ref 98–107)
CREAT SERPL-MCNC: 0.77 MG/DL (ref 0.67–1.17)
DEPRECATED HCO3 PLAS-SCNC: 23 MMOL/L (ref 22–29)
EGFRCR SERPLBLD CKD-EPI 2021: >90 ML/MIN/1.73M2
GLUCOSE SERPL-MCNC: 87 MG/DL (ref 70–99)
POTASSIUM SERPL-SCNC: 3.9 MMOL/L (ref 3.4–5.3)
SODIUM SERPL-SCNC: 143 MMOL/L (ref 135–145)

## 2024-03-15 PROCEDURE — G0378 HOSPITAL OBSERVATION PER HR: HCPCS

## 2024-03-15 PROCEDURE — 250N000013 HC RX MED GY IP 250 OP 250 PS 637: Performed by: INTERNAL MEDICINE

## 2024-03-15 PROCEDURE — 99232 SBSQ HOSP IP/OBS MODERATE 35: CPT | Performed by: INTERNAL MEDICINE

## 2024-03-15 PROCEDURE — 99223 1ST HOSP IP/OBS HIGH 75: CPT | Performed by: INTERNAL MEDICINE

## 2024-03-15 PROCEDURE — 36415 COLL VENOUS BLD VENIPUNCTURE: CPT | Performed by: INTERNAL MEDICINE

## 2024-03-15 PROCEDURE — 84295 ASSAY OF SERUM SODIUM: CPT | Performed by: INTERNAL MEDICINE

## 2024-03-15 RX ORDER — OLANZAPINE 5 MG/1
5 TABLET, ORALLY DISINTEGRATING ORAL EVERY 8 HOURS PRN
Status: DISCONTINUED | OUTPATIENT
Start: 2024-03-15 | End: 2024-03-17 | Stop reason: HOSPADM

## 2024-03-15 RX ORDER — OXYCODONE AND ACETAMINOPHEN 5; 325 MG/1; MG/1
1-2 TABLET ORAL EVERY 6 HOURS PRN
Status: DISCONTINUED | OUTPATIENT
Start: 2024-03-15 | End: 2024-03-17 | Stop reason: HOSPADM

## 2024-03-15 RX ORDER — NALOXONE HYDROCHLORIDE 0.4 MG/ML
0.2 INJECTION, SOLUTION INTRAMUSCULAR; INTRAVENOUS; SUBCUTANEOUS
Status: DISCONTINUED | OUTPATIENT
Start: 2024-03-15 | End: 2024-03-17 | Stop reason: HOSPADM

## 2024-03-15 RX ORDER — NALOXONE HYDROCHLORIDE 0.4 MG/ML
0.4 INJECTION, SOLUTION INTRAMUSCULAR; INTRAVENOUS; SUBCUTANEOUS
Status: DISCONTINUED | OUTPATIENT
Start: 2024-03-15 | End: 2024-03-17 | Stop reason: HOSPADM

## 2024-03-15 RX ORDER — IBUPROFEN 600 MG/1
600 TABLET, FILM COATED ORAL EVERY 8 HOURS PRN
Status: DISCONTINUED | OUTPATIENT
Start: 2024-03-15 | End: 2024-03-17 | Stop reason: HOSPADM

## 2024-03-15 RX ORDER — ASPIRIN 81 MG/1
81 TABLET, CHEWABLE ORAL DAILY
Status: DISCONTINUED | OUTPATIENT
Start: 2024-03-15 | End: 2024-03-17 | Stop reason: HOSPADM

## 2024-03-15 RX ORDER — ATORVASTATIN CALCIUM 40 MG/1
40 TABLET, FILM COATED ORAL DAILY
Status: DISCONTINUED | OUTPATIENT
Start: 2024-03-15 | End: 2024-03-17 | Stop reason: HOSPADM

## 2024-03-15 RX ORDER — TORSEMIDE 20 MG/1
40 TABLET ORAL DAILY
Status: DISCONTINUED | OUTPATIENT
Start: 2024-03-15 | End: 2024-03-17 | Stop reason: HOSPADM

## 2024-03-15 RX ORDER — OLANZAPINE 10 MG/2ML
5 INJECTION, POWDER, FOR SOLUTION INTRAMUSCULAR EVERY 8 HOURS PRN
Status: DISCONTINUED | OUTPATIENT
Start: 2024-03-15 | End: 2024-03-17 | Stop reason: HOSPADM

## 2024-03-15 RX ADMIN — TORSEMIDE 40 MG: 20 TABLET ORAL at 10:53

## 2024-03-15 RX ADMIN — ATORVASTATIN CALCIUM 40 MG: 40 TABLET, FILM COATED ORAL at 10:53

## 2024-03-15 RX ADMIN — ACETAMINOPHEN 650 MG: 325 TABLET, FILM COATED ORAL at 09:06

## 2024-03-15 RX ADMIN — ASPIRIN 81 MG CHEWABLE TABLET 81 MG: 81 TABLET CHEWABLE at 10:53

## 2024-03-15 RX ADMIN — TAMSULOSIN HYDROCHLORIDE 0.4 MG: 0.4 CAPSULE ORAL at 09:06

## 2024-03-15 ASSESSMENT — ACTIVITIES OF DAILY LIVING (ADL)
ADLS_ACUITY_SCORE: 40

## 2024-03-15 NOTE — CONSULTS
Neurology Consult Note  [facility]       [2024]                                                                                       Admission Date: 3/13/2024  Hospital Day: 2      Patient: Joanthan Bishop      : 1945  MRN:  [id number]     CC:      Chief Complaint   Patient presents with    Altered Mental Status       Consult Request:  Referring Provider:  No admitting provider for patient encounter.  Primary Care Provider:  Buck Nascimento MD        HPI:  Jonathan Bishop is a 78 year old yo male admitted for recent episode of confusion.  The patient was [place] and found [place] [address].  He tells me that he did not sleep the night before, and started driving for a medical appointment that was supposed to be later that morning.  He says that as he was driving, he felt very drowsy, and fell asleep.    Per EMT notes, he was found slumped over in his car early morning on 3/13.  The rest of the story was very similar to what he told me.    Today, he appears to be much more alert, and is able to participate in neurological examination.  I was able to gather his progression in the hospital through his medical notes.  It appears that he has mostly been alert, although memory has been somewhat difficult to gauge.  Has been giving different stories about his background to different nurses and health aides.    He knows his nurses names, as well as the fact that he just came back from an MRI.  Was curious to know the results of the MRI.        PAST MEDICAL HISTORY:  ALLERGIES: No Known Allergies  Tobacco:    History   Smoking Status    Former    Types: Cigarettes    Quit date: 2005   Smokeless Tobacco    Never     Alcohol:  Social History    Substance and Sexual Activity      Alcohol use: No    MEDICATIONS:       CURRENTLY SCHEDULED MEDICATIONS    sodium chloride (PF)  3 mL Intracatheter Q8H    tamsulosin  0.4 mg Oral Daily          HOME  MEDICATIONS  Medications Prior to Admission   Medication Sig Dispense Refill Last Dose    aspirin (ASA) 81 MG chewable tablet 1 tablet (81 mg) by Oral or Feeding Tube route daily (Patient taking differently: Take 81 mg by mouth daily)       ibuprofen (ADVIL/MOTRIN) 600 MG tablet Take 1 tablet (600 mg) by mouth every 8 hours as needed for moderate pain (4-6) 270 tablet 3 prn    magnesium oxide (MAG-OX) 400 MG tablet Take 1 tablet (400 mg) by mouth daily 30 tablet 0     multivitamin, therapeutic (THERA-VIT) TABS tablet Take 1 tablet by mouth daily       oxyCODONE-acetaminophen (PERCOCET) 5-325 MG tablet Take 1-2 tablets by mouth every 6 hours as needed for pain 150 tablet 0 prn    sildenafil (VIAGRA) 100 MG tablet Take 100 mg by mouth daily as needed (ED)   prn    tamsulosin (FLOMAX) 0.4 MG capsule TAKE 1 CAPSULE BY MOUTH ONCE DAILY 90 capsule 3     torsemide (DEMADEX) 20 MG tablet Take 2 tablets (40 mg) by mouth daily 60 tablet 3     UNKNOWN TO PATIENT Pt undergoing chemotherapy. Taxol, carboplatin, magnesium listed in oncology outpt notes.       atorvastatin (LIPITOR) 40 MG tablet Take 40 mg by mouth daily (Patient not taking: Reported on 3/13/2024)       ondansetron (ZOFRAN) 8 MG tablet Take 1 tablet (8 mg) by mouth every 8 hours as needed for nausea (Patient not taking: Reported on 3/13/2024) 30 tablet 11 prn    order for DME Equipment being ordered: Walker ()  Treatment Diagnosis: stroke 1 Units 0     prochlorperazine (COMPAZINE) 10 MG tablet Take 0.5 tablets (5 mg) by mouth every 6 hours as needed for nausea or vomiting (Patient not taking: Reported on 3/13/2024) 30 tablet 2 prn    spironolactone (ALDACTONE) 50 MG tablet Take 1 tablet by mouth once daily (Patient not taking: Reported on 3/14/2024) 90 tablet 2 Not Taking    vitamin D3 (CHOLECALCIFEROL) 50 mcg (2000 units) tablet Take 1 tablet by mouth daily (Patient not taking: Reported on 3/14/2024)   Not Taking     MEDICAL HISTORY  Past Medical History:    Diagnosis Date    * * * SBE PROPHYLAXIS * * *     s/p TAVR    Acute rheumatic carditis 1950    Complication of anesthesia     pt once woke up during back surgery    Coronary artery disease     murmur    Erectile dysfunction     Sildenafil    Hip pain, bilateral     Hypercholesteremia     Hypertension     Low back pain     Nonsenile cataract     Obesity     Renal cyst     S/P 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    Stroke (cerebrum) (H) 11/30/2016    Type 2 diabetes mellitus without complication, without long-term current use of insulin (H) 2/21/2024    Umbilical hernia 06/10/2011    s/p surgical repair    Wound, surgical, infected 06/10/2011    hernia     SURGICAL HISTORY  Past Surgical History:   Procedure Laterality Date    ARTHROPLASTY KNEE BILATERAL  7/22/2010    COLONOSCOPY N/A 4/14/2017    Procedure: COLONOSCOPY;  Surgeon: Toby Bills MD;  Location:  GI    CV CORONARY ANGIOGRAM N/A 10/28/2019    Procedure: Coronary Angiogram;  Surgeon: Guerrero Huitron MD;  Location:  HEART CARDIAC CATH LAB    CV RIGHT HEART CATH MEASUREMENTS RECORDED N/A 10/28/2019    Procedure: Right Heart Cath;  Surgeon: Guerrero Huitron MD;  Location:  HEART CARDIAC CATH LAB    CV TRANSCATHETER AORTIC VALVE REPLACEMENT N/A 12/8/2020    Procedure: Transcatheter Aortic Valve Replacement;  Surgeon: Camila Begum MD;  Location:  HEART CARDIAC CATH LAB    FUSION LUMBAR ANTERIOR TWO LEVELS      HERNIORRHAPHY UMBILICAL  6/10/2011    Procedure:HERNIORRHAPHY UMBILICAL; Open, with mesh placement; Surgeon:HO PARHAM; Location:[facility] OR    LAMINECTOMY LUMBAR TWO LEVELS      THORACOSCOPIC WEDGE RESECTION LUNG Right 3/2/2023    Procedure: Right thoracoscopic wedge resection;  Surgeon: Hebert Jones MD;  Location:  OR     FAMILY HISTORY    Family History   Problem Relation Age of Onset    C.A.D. Mother     Unknown/Adopted Father     Heart Failure Sister      Heart Failure Sister     Glaucoma No family hx of     Macular Degeneration No family hx of     Diabetes No family hx of     Hypertension No family hx of      SOCIAL HISTORY  Social History     Socioeconomic History    Marital status:    Tobacco Use    Smoking status: Former     Types: Cigarettes     Quit date: 2005     Years since quittin.2     Passive exposure: Past    Smokeless tobacco: Never    Tobacco comments:     Non smoker. No 2nd hand exposure. CCX, RMA PCC 2011   Substance and Sexual Activity    Alcohol use: No    Drug use: No    Sexual activity: Not Currently   Social History Narrative    He lives by himself in an apt in [place].  He has 2 goldfish, Lai and Bullock.     Social Determinants of Health     Financial Resource Strain: Low Risk  (2024)    Financial Resource Strain     Within the past 12 months, have you or your family members you live with been unable to get utilities (heat, electricity) when it was really needed?: No   Food Insecurity: Low Risk  (2024)    Food Insecurity     Within the past 12 months, did you worry that your food would run out before you got money to buy more?: No     Within the past 12 months, did the food you bought just not last and you didn t have money to get more?: No   Transportation Needs: Low Risk  (2024)    Transportation Needs     Within the past 12 months, has lack of transportation kept you from medical appointments, getting your medicines, non-medical meetings or appointments, work, or from getting things that you need?: No   Recent Concern: Transportation Needs - High Risk (2023)    Transportation Needs     Within the past 12 months, has lack of transportation kept you from medical appointments, getting your medicines, non-medical meetings or appointments, work, or from getting things that you need?: Yes   Interpersonal Safety: Low Risk  (2023)    Interpersonal Safety     Do you feel physically and  "emotionally safe where you currently live?: Yes     Within the past 12 months, have you been hit, slapped, kicked or otherwise physically hurt by someone?: No     Within the past 12 months, have you been humiliated or emotionally abused in other ways by your partner or ex-partner?: No   Housing Stability: High Risk (2024)    Housing Stability     Do you have housing? : Yes     Are you worried about losing your housing?: Yes            Height: 160 cm (5' 3\")     Temp: 97.5  F (36.4  C)   Weight: 86.7 kg (191 lb 2.2 oz)    Temp src: Oral         BP: 122/58         Estimated body mass index is 33.86 kg/m  as calculated from the following:    Height as of this encounter: 1.6 m (5' 3\").    Weight as of this encounter: 86.7 kg (191 lb 2.2 oz).    Resp: 18   SpO2: 94 %   O2 Device: None (Room air)     Blood Pressure:   BP Readings from Last 3 Encounters:   24 122/58   24 111/65   24 108/67     T24 : Temp (24hrs), Av.1  F (36.7  C), Min:97.5  F (36.4  C), Max:99  F (37.2  C)       GENERAL EXAMINATION:  HEENT: PERRLA, EOMI.  Neck: Supple, No myofascial tender points.    NEUROLOGICAL EXAMINATION:    Level of Consciousness:  Normal.    Orientation:  Alert and oriented to knowing about himself.  Thought that he was at home, but on cues, told me that he was in the hospital.  Told me that it was 2024 instead of .  Knew the time to be approximately 730.  Memory: Faltered and knowing his address.  Attention span and Concentration:  Unremarkable.    Language and Speech:  Normal (can name, repeat phrases, good spontaneous speech).    Fund of Knowledge:  Within acceptable range-knew about the political and sports news around these days.    Cranial Nerves:  2,3,4,5,6,7,8,9,10,11,12 are unremarkable.     Sensory:  No overt sensory abnormalities to touch, pinprick and vibration and proprioception.      Motor:  No motor weakness.  Muscle strength, tone, bulk unremarkable.  No abnormal movements " seen.  Fine motor skills are normal.      Coordination:  Coordination intact.  No clear ataxia or dysmetria on finger-nose-finger or heel-shin-toe testing.      Balance, Gait and Station:  Balance and gait intact.      Deep Tendon Reflexes:  DTR's (biceps, triceps, brachioradialis, knee jerks, ankle jerks) preserved and symmetric.      Plantar response:  Flexor bilaterally.       .  LABORATORY RESULTS          IMAGING RESULTS     Recent Results (from the past 24 hour(s))   CT Head w/o Contrast    Narrative    EXAM: CT HEAD W/O CONTRAST  LOCATION: [facility]  DATE: 3/13/2024    INDICATION: headache, syncope, chemo for throat cancer today, confusion, headache  COMPARISON: 11/22/2023, 12/02/2023  TECHNIQUE: Routine CT Head without IV contrast. Multiplanar reformats. Dose reduction techniques were used.    FINDINGS:  INTRACRANIAL CONTENTS: No intracranial hemorrhage, extraaxial collection, or mass effect.  No CT evidence of acute infarct. Mild to moderate presumed chronic small vessel ischemic changes. Mild to moderate generalized volume loss. No hydrocephalus.     VISUALIZED ORBITS/SINUSES/MASTOIDS: No intraorbital abnormality. No paranasal sinus mucosal disease. No middle ear or mastoid effusion.    BONES/SOFT TISSUES: No acute abnormality.      Impression    IMPRESSION:  1.  No CT evidence for acute intracranial process.  2.  Brain atrophy and presumed chronic microvascular ischemic changes as above.         _____________________________________________________________________________          ASSESSMENT     Cognitive deficits, difficult to gauge whether these are short-term delirium like deficits or longer-term major neurocognitive disorder.  More likely to be longer-term deficits.  No evidence of encephalopathy, delirium, or seizure disorder.  No evidence to think that he has any form of meningitic disorder.  No focal neurological signs and symptoms to point towards strokes.    Towards the  end of the appointment I was able to look at his brain MRI.  Do not have an official report yet, but I was able to ascertain that he does not have any acute DWI restriction.  GRE images were not available, hence could not gauge if he has any amyloid or hemorrhagic component.  FLAIR images show white matter disease globally, and there is ventriculomegaly.  Normal pressure hydrocephalus is possible.  Tells me that he has urinary incontinence..        RECOMMENDATIONS   Likely long-term major neurocognitive disorder.  Should be evaluated with a neuropsychological profile.  Would wait for official MRI result to be sure that there is no acute intracranial process, but evidence lacking.  If MRI normal, patient could be discharged in favor of long-term neurology follow-up.  No indication for EEG or spinal fluid analysis.         Coni Mccullough MD   Neurologist, [facility]   March 14, 2024 7:40 PM        A total time of 80 minutes was spent on the care of this patient on the date of the visit, outside of time spent performing any procedures. Please excuse any typographical errors, as this note was generated using a Voice Recognition Software.

## 2024-03-15 NOTE — PROGRESS NOTES
Observation goals  PRIOR TO DISCHARGE       Comments: -diagnostic tests and consults completed and resulted- not met  -vital signs normal or at patient baseline- met  -returns to baseline functional status- not met  -safe disposition plan has been identified- not met, pending placement.  Nurse to notify provider when observation goals have been met and patient is ready for discharge.

## 2024-03-15 NOTE — UTILIZATION REVIEW
Concurrent stay review; Secondary Review Determination - URCUSTODIAL    [facility]        Under the authority of the Utilization Management Committee, the utilization review process indicated a secondary review on the above patient.  The review outcome is based on review of the medical records, discussions with staff, and applying clinical experience noted on the date of the review.        (x) Outpatient long term status is appropriate       RATIONALE FOR DETERMINATION:          The Patient is 79 y/o  man with PMHx significant for metastatic tonsillary cancer under treatment (last infusion the day of the admission) was found confused but alert when he [place] the [place] the middle [place].  Head CT negative for acute pathology.  The patient was admitted with encephalopathy and high suspicion of chronic major cognitive impairment such as dementia.  The patient has mild stable leukopenia and anemia in the context of cancer and cancer treatment.  UA negative for infection.  Brain MRI negative for acute pathology  PT and LSW recommended TCU.  Expecting discharge today today    Patient delayed discharge is related to disposition, there is no medical necessity for inpatient admission at the time of this review. If there is a change in patient status, please resend for review.    The information on this document is developed by the utilization review team in order for the business office to ensure compliance.  This only denotes the appropriateness of proper admission status and does not reflect the quality of care rendered.       The definitions of Inpatient Status and Observation Status used in making the determination above are those provided in the CMS Coverage Manual, Chapter 1 and Chapter 6, section 70.4.       Sincerely,     MANJU PICKENS MD   Utilization Review  Physician Advisor  [facility]

## 2024-03-15 NOTE — PROGRESS NOTES
Care Management Follow Up    Length of Stay (days): 0    Expected Discharge Date: 03/15/2024     Concerns to be Addressed:       Patient plan of care discussed at interdisciplinary rounds: Yes    Anticipated Discharge Disposition: Transitional Care     Anticipated Discharge Services: Transportation Services  Anticipated Discharge DME:      Patient/family educated on Medicare website which has current facility and service quality ratings:    Education Provided on the Discharge Plan:    Patient/Family in Agreement with the Plan: yes    Referrals Placed by CM/SW:    Private pay costs discussed: Not applicable    Additional Information:  Sent in basket message to Olivia Gates RN care coordinator with [facility] to inquire about pt's chemo being on hold while at TCU.     Addendum 1550: Spoke with Kaylan [employer] complete manager. She is involved and will be helping advocate for more support from sister, plans to call her. Kaylan has connected with pt's AC , who states that pt could apply for MA while at TCU and they could transition to EW to help pay for assisted living for pt. Kaylan is following. Kaylan is connecting with pt's apartment building to help ensure pt's cat is being fed.     MUSTAPHA Caro  Social Work  [facility]

## 2024-03-15 NOTE — PROVIDER NOTIFICATION
MD Notification    Notified Person: MD    Notified Person Name: Dr. Zhang    Notification Date/Time: 10:15 AM    Notification Interaction: page    Purpose of Notification: planning for pt to go to TCU, chemo would need to be held    Orders Received: heme/onc consult

## 2024-03-15 NOTE — PROGRESS NOTES
oal Outcome Evaluation:       Plan of Care Reviewed With: patient     Overall Patient Progress: no changeOverall Patient Progress: no change     Observation goals  PRIOR TO DISCHARGE        Comments:   -Diagnostic tests and consults completed and resulted- Not met; MRI needed. SW/PT/OT following.   -Vital signs normal or at patient baseline- Met  -Returns to baseline functional status- Not met  -Safe disposition plan has been identified- Not met  Nurse to notify provider when observation goals have been met and patient is ready for discharge.

## 2024-03-15 NOTE — PROGRESS NOTES
MD Notification    Notified Person: MD    Notified Person Name: Klever NP    Notification Date/Time:  1859    Notification Interaction:  AMCOM text    Purpose of Notification: SS [id number] RW. We contacted oncall, pt wants to leave, although pt is confused. Pt keep saying doctor discharged him. Can you please come and talk, pt needs to stay overnight and not safe to go home.     Orders Received:    Comments:

## 2024-03-15 NOTE — CARE PLAN
PRIMARY Concern:  Confusion  SAFETY RISK Concerns (fall risk, behaviors, etc.): fall risk  Isolation/Type: Chemo precaution, last chemo 3/13  Tests/Procedures for NEXT shift:   Consults? (Pending/following, signed-off?) PT/OT/CC/SW consulted   Where is patient from? (Home, TCU, etc.): home   Other Important info for NEXT shift:  chemo precaution.      SUMMARY NOTE:  Orientation/Cognitive: A&OX2, disoriented to place, time and situation  Observation Goals (Met/ Not Met):Not met   Mobility Level/Assist Equipment:AX1GB/W  Antibiotics & Plan (IV/po, length of tx left): None  Pain Management: prn tylenol given for back discomfort.   Tele/VS/O2: VSS on RA  ABNL Lab/BG: Creat 1.31.   Diet: Reg diet.   Bowel/Bladder: Continent  Skin Concerns: BLE discoloration  Drains/Devices: PIV SL  Patient Stated Goal for Today:  SUBHA

## 2024-03-15 NOTE — CARE PLAN
PRIMARY Concern:  Confusion  SAFETY RISK Concerns (fall risk, behaviors, etc.): fall risk  Isolation/Type: Chemo precaution, last chemo 3/13  Tests/Procedures for NEXT shift:   Consults? (Pending/following, signed-off?) PT/OT/CC/SW consulted   Where is patient from? (Home, TCU, etc.): home   Other Important info for NEXT shift:  chemo precaution.      SUMMARY NOTE:  Orientation/Cognitive: A&OX2, disoriented to place, time and situation, very forgetful  Observation Goals (Met/ Not Met):Not met   Mobility Level/Assist Equipment:AX1GB/W  Antibiotics & Plan (IV/po, length of tx left): None  Pain Management: prn tylenol given for back discomfort.   Tele/VS/O2: VSS on RA  ABNL Lab/BG: Creat 1.31.   Diet: Reg diet.   Bowel/Bladder: Continent  Skin Concerns: BLE discoloration  Drains/Devices: PIV SL  Patient Stated Goal for Today:  SUBHA

## 2024-03-15 NOTE — PROGRESS NOTES
PRIMARY Concern:  Confusion  SAFETY RISK Concerns (fall risk, behaviors, etc.): fall risk  Isolation/Type: Chemo precaution, last chemo 3/13  Tests/Procedures for NEXT shift: MRI no evidence of stroke   Consults? (Pending/following, signed-off?) PT/OT/CC/SW consulted   Where is patient from? (Home, TCU, etc.): home   Other Important info for NEXT shift:  chemo precaution.      SUMMARY NOTE: Pt asked repeated question,concerned about going home not TCU , educated he  on home safety   Orientation/Cognitive: A&OX2, disoriented to place, time and situation, very forgetful  Observation Goals (Met/ Not Met):Not met   Mobility Level/Assist Equipment:AX 1 with cane   Antibiotics & Plan (IV/po, length of tx left):   Pain Management: prn tylenol given for back discomfort    Tele/VS/O2: VSS on RA  ABNL Lab/BG: Creat 1.31.   Diet: Reg diet.   Bowel/Bladder: Continent  Skin Concerns: BLE discoloration   Drains/Devices: PIV SL infused patent   Patient Stated Goal for Today: To go home sister will assist with cares .
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
## 2024-03-15 NOTE — CONSULTS
[facility]    Hematology/Oncology Consult Note      Date of Admission:  3/13/2024  Date of Consult:  03/15/24  Reason for Consult: head and neck cancer on chemotherapy   Primary Oncologist: Dr Mueller      ASSESSMENT/PLAN:  Jonathan Bishop is a 78 year old male who has a history of metastatic head neck cancer on weekly CarboTaxol last treatment on 3/13/2024 admitted with altered mental status    MRI findings appear to be benign.  Would recommend follow-up at [facility] next week I have sent his primary oncologist a message.  Oncology does not have anything to offer at this time during the hospital stay.  Okay to discharge when medically appropriate.   Not sure if he needs to go to TCU      I will sign off.  Thank you for the consult        HISTORY OF PRESENT ILLNESS: Jonathan Bishop is a 78 year old male who presents with head and neck cancer metastaticOn weekly carbo/taxol last infusion 3/13/2024.  He presented to the emergency room on 313 with altered mental status changes.  [place] [address] was slumped over unresponsive and diaphoretic in his car.  Blood sugar was 177.Brain MRI completed showed no acute intracranial process.  Neurology was consulted and they think likely is a major neurocognitive disorder long-term should be evaluated with neuropsychological profile..  His MRI was normal      Oncology history    SUMMARY  2/26/23              L tonsil mass bx in clinic (Dr. Eng).   2/20/23              PET/CT. 3.7 x 4.0 x 5.1 cm mass L palatine tonsil causing narrowing of the oropharyngeal lumen, L level 2 node, L retropharyngeal nodular density, extension of primary mass vs retropharyngeal node, 0.8 cm RLL nodule concerning for met, mild focal uptake R iliac bone and L1 without CT correlate suspicious for mets.   3/2/23                R VATS, wedge resection. Path: SCC, poorly differentiated, associated with necrosis, 1 cm, negative margins, p16 +lety  3/24/23               MRI L spine and pelvis. Diffusely heterogeneous marrow signal throughout without corresponding abnormal signal on sagittal STIR sequence and no abnormal enhancement. Nonspecific but could be benign process such as red marrow hyperplasia, cannot completely exclude metastatic disease or infiltrative pathologic marrow process. No lesions corresponding to FDG avid spots on PET/CT.   4/19~10/18/23   Pembrolizumab.  10/26/23            CT neck and CAP. Increased L palatine tonsil mass, 3.8 x 2.7 cm --> 4.7 x 3.5 cm. Increased bilateral cervical nodes up to 2.3 x 2.4 cm R level 2 node. New 3 mm RML nodule, no other mets.   11/14/23            PET. 4.6 x 3.3 cm L palatine tonsil mass (SUB 26.8), R level 2A and 2A/3 nodes. Questionable uptake distal R femoral medullary cavity, partially imaged, with questionable subtle corresponding soft tissue attenuation on CT. MRI could be obtained to better evaluate.  11/21~12/2/23   [facility] for weakness/fall. COVID/paxlovid, MSSA bacteremia (1 of 2 bottles on 1 day), TTE negative, treated with cefazolin but didn't complete 2 week course, episode of unresponsiveness 12/2. Dispo plan was TCU on [facility], but left AMA. CTA chest negative for PE, showed grossly unchanged cervical adenopathy. Brain MRI 11/22 for stroke code negative for infarct. Showed SVID, moderate atrophy, suboptimal contrast bolus to examine intracranial structures, 4.5 x 3 x 4.5 cm L nasopharyngeal mass, incompletely visualized. CTA negative.   12/19/23            Quad shot fraction #1. No-showed thereafter.  1/10/24~curr     Carboplatin (AUC 2) + paclitaxel 60 mg/m2 weekly. Held 2/13 due to anorexia, fatigue.  CT 2/2024 good VT     REVIEW OF SYSTEMS:   14 point ROS was reviewed and is negative other than as noted above in HPI.       MEDICATIONS:  Current Facility-Administered Medications   Medication    acetaminophen (TYLENOL) tablet 650 mg    Or    acetaminophen (TYLENOL) Suppository 650 mg    aspirin  (ASA) chewable tablet 81 mg    atorvastatin (LIPITOR) tablet 40 mg    bisacodyl (DULCOLAX) suppository 10 mg    ibuprofen (ADVIL/MOTRIN) tablet 600 mg    lidocaine (LMX4) cream    lidocaine 1 % 0.1-1 mL    melatonin tablet 1 mg    naloxone (NARCAN) injection 0.2 mg    Or    naloxone (NARCAN) injection 0.4 mg    Or    naloxone (NARCAN) injection 0.2 mg    Or    naloxone (NARCAN) injection 0.4 mg    ondansetron (ZOFRAN ODT) ODT tab 4 mg    Or    ondansetron (ZOFRAN) injection 4 mg    oxyCODONE-acetaminophen (PERCOCET) 5-325 MG per tablet 1-2 tablet    polyethylene glycol (MIRALAX) Packet 17 g    prochlorperazine (COMPAZINE) injection 5 mg    Or    prochlorperazine (COMPAZINE) tablet 5 mg    Or    prochlorperazine (COMPAZINE) suppository 12.5 mg    senna-docusate (SENOKOT-S/PERICOLACE) 8.6-50 MG per tablet 1 tablet    Or    senna-docusate (SENOKOT-S/PERICOLACE) 8.6-50 MG per tablet 2 tablet    sodium chloride (PF) 0.9% PF flush 3 mL    sodium chloride (PF) 0.9% PF flush 3 mL    tamsulosin (FLOMAX) capsule 0.4 mg    torsemide (DEMADEX) tablet 40 mg         ALLERGIES:  No Known Allergies      PAST MEDICAL HISTORY:  Past Medical History:   Diagnosis Date    * * * SBE PROPHYLAXIS * * *     s/p TAVR    Acute rheumatic carditis 1950    Complication of anesthesia     pt once woke up during back surgery    Coronary artery disease     murmur    Erectile dysfunction     Sildenafil    Hip pain, bilateral     Hypercholesteremia     Hypertension     Low back pain     Nonsenile cataract     Obesity     Renal cyst     S/P TAVR (transcatheter aortic valve replacement) 12/08/2020    26mm Anton T[place]an 3 valve.    Stroke (cerebrum) (H) 11/30/2016    Type 2 diabetes mellitus without complication, without long-term current use of insulin (H) 2/21/2024    Umbilical hernia 06/10/2011    s/p surgical repair    Wound, surgical, infected 06/10/2011    hernia         PAST SURGICAL HISTORY:  Past Surgical History:   Procedure Laterality Date     ARTHROPLASTY KNEE BILATERAL  2010    COLONOSCOPY N/A 2017    Procedure: COLONOSCOPY;  Surgeon: Toby Bills MD;  Location:  GI    CV CORONARY ANGIOGRAM N/A 10/28/2019    Procedure: Coronary Angiogram;  Surgeon: Guerrero Huitron MD;  Location:  HEART CARDIAC CATH LAB    CV RIGHT HEART CATH MEASUREMENTS RECORDED N/A 10/28/2019    Procedure: Right Heart Cath;  Surgeon: Guerrero Huitron MD;  Location:  HEART CARDIAC CATH LAB    CV TRANSCATHETER AORTIC VALVE REPLACEMENT N/A 2020    Procedure: Transcatheter Aortic Valve Replacement;  Surgeon: Camila Begum MD;  Location:  HEART CARDIAC CATH LAB    FUSION LUMBAR ANTERIOR TWO LEVELS      HERNIORRHAPHY UMBILICAL  6/10/2011    Procedure:HERNIORRHAPHY UMBILICAL; Open, with mesh placement; Surgeon:HO PARHAM; Location: OR    LAMINECTOMY LUMBAR TWO LEVELS      THORACOSCOPIC WEDGE RESECTION LUNG Right 3/2/2023    Procedure: Right thoracoscopic wedge resection;  Surgeon: Hebert Jones MD;  Location:  OR         SOCIAL HISTORY:  Social History     Socioeconomic History    Marital status:      Spouse name: Not on file    Number of children: Not on file    Years of education: Not on file    Highest education level: Not on file   Occupational History    Not on file   Tobacco Use    Smoking status: Former     Types: Cigarettes     Quit date: 2005     Years since quittin.2     Passive exposure: Past    Smokeless tobacco: Never    Tobacco comments:     Non smoker. No 2nd hand exposure. CCX, RMA PCC 2011   Substance and Sexual Activity    Alcohol use: No    Drug use: No    Sexual activity: Not Currently   Other Topics Concern    Parent/sibling w/ CABG, MI or angioplasty before 65F 55M? Not Asked   Social History Narrative    He lives by himself in an apt in [place].  He has 2 goldfish, Lai and Warren.     Social Determinants of Health     Financial Resource Strain: Low Risk  (2024)     Financial Resource Strain     Within the past 12 months, have you or your family members you live with been unable to get utilities (heat, electricity) when it was really needed?: No   Food Insecurity: Low Risk  (2/19/2024)    Food Insecurity     Within the past 12 months, did you worry that your food would run out before you got money to buy more?: No     Within the past 12 months, did the food you bought just not last and you didn t have money to get more?: No   Transportation Needs: Low Risk  (2/19/2024)    Transportation Needs     Within the past 12 months, has lack of transportation kept you from medical appointments, getting your medicines, non-medical meetings or appointments, work, or from getting things that you need?: No   Recent Concern: Transportation Needs - High Risk (12/7/2023)    Transportation Needs     Within the past 12 months, has lack of transportation kept you from medical appointments, getting your medicines, non-medical meetings or appointments, work, or from getting things that you need?: Yes   Physical Activity: Not on file   Stress: Not on file   Social Connections: Not on file   Interpersonal Safety: Low Risk  (12/7/2023)    Interpersonal Safety     Do you feel physically and emotionally safe where you currently live?: Yes     Within the past 12 months, have you been hit, slapped, kicked or otherwise physically hurt by someone?: No     Within the past 12 months, have you been humiliated or emotionally abused in other ways by your partner or ex-partner?: No   Housing Stability: High Risk (2/19/2024)    Housing Stability     Do you have housing? : Yes     Are you worried about losing your housing?: Yes         FAMILY HISTORY:  Family History   Problem Relation Age of Onset    C.A.D. Mother     Unknown/Adopted Father     Heart Failure Sister     Heart Failure Sister     Glaucoma No family hx of     Macular Degeneration No family hx of     Diabetes No family hx of     Hypertension No family  "hx of          PHYSICAL EXAM:  Vital signs:  Temp: 97.7  F (36.5  C) Temp src: Oral BP: 106/52 Pulse: 83   Resp: 20 SpO2: 98 % O2 Device: None (Room air)   Height: 160 cm (5' 3\") Weight: 86.7 kg (191 lb 2.2 oz)  Estimated body mass index is 33.86 kg/m  as calculated from the following:    Height as of this encounter: 1.6 m (5' 3\").    Weight as of this encounter: 86.7 kg (191 lb 2.2 oz).    ECO awake alert  GENERAL/CONSTITUTIONAL: No acute distress.  EYES: Pupils are equal, round, and react to light and accommodation. Extraocular movements intact.  No scleral icterus.  ENT/MOUTH: Neck supple. Oropharynx clear, no mucositis.  LYMPH: No anterior cervical, posterior cervical, supraclavicular, axillary or inguinal adenopathy.   RESPIRATORY: Clear to auscultation bilaterally. No crackles or wheezing.   CARDIOVASCULAR: Regular rate and rhythm without murmurs, gallops, or rubs.  GASTROINTESTINAL: No hepatosplenomegaly, masses, or tenderness. The patient has normal bowel sounds. No guarding.  No distention.  MUSCULOSKELETAL: Warm and well-perfused, no cyanosis, clubbing, or edema.  NEUROLOGIC: Cranial nerves II-XII are intact. Alert, oriented, answers questions appropriately.  INTEGUMENTARY: No rashes or jaundice.  GAIT: Steady, does not use assistive device      LABS:  CBC RESULTS:   Recent Labs   Lab Test 24  2051   WBC 2.8*   RBC 3.45*   HGB 8.5*   HCT 27.6*   MCV 80   MCH 24.6*   MCHC 30.8*   RDW 20.1*          Recent Labs   Lab Test 03/15/24  0725 24  1041    144   POTASSIUM 3.9 4.4   CHLORIDE 110* 110*   CO2 23 24   ANIONGAP 10 10   GLC 87 116*   BUN 17.4 25.0*   CR 0.77 0.95   WARREN 8.9 8.7*         PATHOLOGY:  None     IMAGING:    Noted      Total time spent on day of visit, including review of tests, obtaining/reviewing separately obtained history, ordering medications/tests/procedures, communicating with PCP/consultants, and documenting in electronic medical record: 85 minutes "         Thank you for the opportunity to participate in this patient's care.  Please call with any questions.    Marcio Kilgore MD  Hematology/Oncology  [facility]

## 2024-03-16 LAB
ANION GAP SERPL CALCULATED.3IONS-SCNC: 12 MMOL/L (ref 7–15)
BUN SERPL-MCNC: 17.3 MG/DL (ref 8–23)
CALCIUM SERPL-MCNC: 9.3 MG/DL (ref 8.8–10.2)
CHLORIDE SERPL-SCNC: 106 MMOL/L (ref 98–107)
CREAT SERPL-MCNC: 0.81 MG/DL (ref 0.67–1.17)
DEPRECATED HCO3 PLAS-SCNC: 25 MMOL/L (ref 22–29)
EGFRCR SERPLBLD CKD-EPI 2021: 90 ML/MIN/1.73M2
GLUCOSE SERPL-MCNC: 93 MG/DL (ref 70–99)
POTASSIUM SERPL-SCNC: 3.7 MMOL/L (ref 3.4–5.3)
SODIUM SERPL-SCNC: 143 MMOL/L (ref 135–145)

## 2024-03-16 PROCEDURE — G0378 HOSPITAL OBSERVATION PER HR: HCPCS

## 2024-03-16 PROCEDURE — 250N000013 HC RX MED GY IP 250 OP 250 PS 637: Performed by: INTERNAL MEDICINE

## 2024-03-16 PROCEDURE — 80048 BASIC METABOLIC PNL TOTAL CA: CPT | Performed by: INTERNAL MEDICINE

## 2024-03-16 PROCEDURE — 99232 SBSQ HOSP IP/OBS MODERATE 35: CPT

## 2024-03-16 PROCEDURE — 36415 COLL VENOUS BLD VENIPUNCTURE: CPT | Performed by: INTERNAL MEDICINE

## 2024-03-16 RX ADMIN — TORSEMIDE 40 MG: 20 TABLET ORAL at 08:46

## 2024-03-16 RX ADMIN — TAMSULOSIN HYDROCHLORIDE 0.4 MG: 0.4 CAPSULE ORAL at 08:46

## 2024-03-16 RX ADMIN — ASPIRIN 81 MG CHEWABLE TABLET 81 MG: 81 TABLET CHEWABLE at 08:47

## 2024-03-16 RX ADMIN — ATORVASTATIN CALCIUM 40 MG: 40 TABLET, FILM COATED ORAL at 08:47

## 2024-03-16 ASSESSMENT — ACTIVITIES OF DAILY LIVING (ADL)
ADLS_ACUITY_SCORE: 40
ADLS_ACUITY_SCORE: 34
ADLS_ACUITY_SCORE: 34
ADLS_ACUITY_SCORE: 40
ADLS_ACUITY_SCORE: 34
ADLS_ACUITY_SCORE: 34
ADLS_ACUITY_SCORE: 40
ADLS_ACUITY_SCORE: 34
ADLS_ACUITY_SCORE: 40
ADLS_ACUITY_SCORE: 40
ADLS_ACUITY_SCORE: 34
ADLS_ACUITY_SCORE: 40
ADLS_ACUITY_SCORE: 34

## 2024-03-16 NOTE — PROGRESS NOTES
Observation goals  PRIOR TO DISCHARGE       -diagnostic tests and consults completed and resulted- met  -vital signs normal or at patient baseline- SUBHA, Pt refusing vitals  -returns to baseline functional status- not met  -safe disposition plan has been identified- not met, pending placement.  Nurse to notify provider when observation goals have been met and patient is ready for discharge.

## 2024-03-16 NOTE — PROGRESS NOTES
Brief House NP Note    Patient is confused and lacks decision-making capacity. Based on the documentation of patient's chronic cognitive impairment, the patient lacks 1 or more of the following abilities to display decision-making capacity:  ?Understanding  ?Expressing a choice  ?Appreciation  ?Reasoning    Therefore, patient lacks decision-making capacity and does not require a hold be placed as he does not have the capacity to discharge himself from the hospital. If patient attempts to elope from the unit/hospital please call a code 21 for de-escalation and assistance in returning patient to his room.    PORFIRIO Fowler, CNP  Hospitalist - House [facility]  Text me on the [employer] jessica for a textback

## 2024-03-16 NOTE — PROGRESS NOTES
PRIMARY Concern:  AMS, confusion  SAFETY RISK Concerns (fall risk, behaviors, etc.): fall risk  Isolation/Type: Chemo precaution, last chemo 3/13  Tests/Procedures for NEXT shift: MRI no evidence of stroke   Consults? (Pending/following, signed-off?) Hem oncology signed off. PT recommending TCU. SW following.  Where is patient from? (Home, TCU, etc.): lives in apartment alone.  Other Important info for NEXT shift:  chemo precaution. OT slums  score 11/30.  Anticipate discontinue date and active delays: TCU placement pending.      SUMMARY NOTE:   Orientation/Cognitive: A&OX2, disoriented to place, time and situation, confused.  Observation Goals (Met/ Not Met):Not met   Mobility Level/Assist Equipment:AX1 gb/walker   Antibiotics & Plan (IV/po, length of tx left):   Pain Management: No pain meds given.  Tele/VS/O2: VSS on RA  ABNL Lab/BG: no new labs  Diet: Reg diet.   Bowel/Bladder: Continent. Up to bathroom.  Skin Concerns: BLE discoloration   Drains/Devices: PIV SL   Patient Stated Goal for Today: To go home.
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
## 2024-03-16 NOTE — PROGRESS NOTES
"Writer entered pt room to check on pt, obtain VSs, and perform assessments. Pt refused all assessments. Pt states, \"I just want to be left alone so I can sleep and then go home.\" Pt then asked \"who determines that.\" Writer explained that Doctor and care team determine discharge. Pt ask when they will be here. Writer explained that exact time is unknown, but probably some time after 8am. Pt then rolled over and went back to sleep.   "

## 2024-03-16 NOTE — PROGRESS NOTES
PRIMARY Concern:  AMS, confusion  SAFETY RISK Concerns (fall risk, behaviors, etc.): fall risk  Isolation/Type: Chemo precaution, last chemo 3/13  Tests/Procedures for NEXT shift: MRI no evidence of stroke   Consults? (Pending/following, signed-off?) Hem oncology signed off. PT recommending TCU. SW following.  Where is patient from? (Home, TCU, etc.): lives in apartment alone.  Other Important info for NEXT shift:  chemo precaution. OT slums  score 11/30.  Anticipate discontinue date and active delays: TCU placement pending.      SUMMARY NOTE:   Orientation/Cognitive: A&OX2, disoriented to place, time and situation, confused.  Observation Goals (Met/ Not Met):Not met   Mobility Level/Assist Equipment:AX1 gb/walker   Antibiotics & Plan (IV/po, length of tx left):   Pain Management: No pain meds given.  Tele/VS/O2: VSS on RA  ABNL Lab/BG: no new labs  Diet: Reg diet.   Bowel/Bladder: Continent. Up to bathroom.  Skin Concerns: BLE discoloration   Drains/Devices: PIV SL   Patient Stated Goal for Today: To go home sister will assist with cares .        Observation goals  PRIOR TO DISCHARGE       Comments: -diagnostic tests and consults completed and resulted- not met  -vital signs normal or at patient baseline- met  -returns to baseline functional status- not met  -safe disposition plan has been identified- not met, pending TCU placement.  Nurse to notify provider when observation goals have been met and patient is ready for discharge.
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
## 2024-03-17 ENCOUNTER — APPOINTMENT (OUTPATIENT)
Dept: OCCUPATIONAL THERAPY | Facility: CLINIC | Age: 79
End: 2024-03-17
Payer: COMMERCIAL

## 2024-03-17 VITALS
BODY MASS INDEX: 33.87 KG/M2 | RESPIRATION RATE: 18 BRPM | HEART RATE: 99 BPM | DIASTOLIC BLOOD PRESSURE: 51 MMHG | TEMPERATURE: 98.5 F | WEIGHT: 191.14 LBS | OXYGEN SATURATION: 99 % | HEIGHT: 63 IN | SYSTOLIC BLOOD PRESSURE: 134 MMHG

## 2024-03-17 LAB
ALBUMIN SERPL BCG-MCNC: 4.2 G/DL (ref 3.5–5.2)
ALP SERPL-CCNC: 93 U/L (ref 40–150)
ALT SERPL W P-5'-P-CCNC: 16 U/L (ref 0–70)
ANION GAP SERPL CALCULATED.3IONS-SCNC: 13 MMOL/L (ref 7–15)
AST SERPL W P-5'-P-CCNC: 19 U/L (ref 0–45)
BASOPHILS # BLD AUTO: 0 10E3/UL (ref 0–0.2)
BASOPHILS NFR BLD AUTO: 1 %
BILIRUB SERPL-MCNC: 0.5 MG/DL
BUN SERPL-MCNC: 20.5 MG/DL (ref 8–23)
CALCIUM SERPL-MCNC: 9.6 MG/DL (ref 8.8–10.2)
CHLORIDE SERPL-SCNC: 106 MMOL/L (ref 98–107)
CREAT SERPL-MCNC: 0.95 MG/DL (ref 0.67–1.17)
DEPRECATED HCO3 PLAS-SCNC: 27 MMOL/L (ref 22–29)
EGFRCR SERPLBLD CKD-EPI 2021: 82 ML/MIN/1.73M2
EOSINOPHIL # BLD AUTO: 0 10E3/UL (ref 0–0.7)
EOSINOPHIL NFR BLD AUTO: 0 %
ERYTHROCYTE [DISTWIDTH] IN BLOOD BY AUTOMATED COUNT: 20.2 % (ref 10–15)
GLUCOSE SERPL-MCNC: 111 MG/DL (ref 70–99)
HCT VFR BLD AUTO: 31.3 % (ref 40–53)
HGB BLD-MCNC: 9.5 G/DL (ref 13.3–17.7)
IMM GRANULOCYTES # BLD: 0 10E3/UL
IMM GRANULOCYTES NFR BLD: 0 %
LYMPHOCYTES # BLD AUTO: 1.7 10E3/UL (ref 0.8–5.3)
LYMPHOCYTES NFR BLD AUTO: 44 %
MAGNESIUM SERPL-MCNC: 1.4 MG/DL (ref 1.7–2.3)
MCH RBC QN AUTO: 24.5 PG (ref 26.5–33)
MCHC RBC AUTO-ENTMCNC: 30.4 G/DL (ref 31.5–36.5)
MCV RBC AUTO: 81 FL (ref 78–100)
MONOCYTES # BLD AUTO: 0.3 10E3/UL (ref 0–1.3)
MONOCYTES NFR BLD AUTO: 7 %
NEUTROPHILS # BLD AUTO: 1.8 10E3/UL (ref 1.6–8.3)
NEUTROPHILS NFR BLD AUTO: 48 %
NRBC # BLD AUTO: 0 10E3/UL
NRBC BLD AUTO-RTO: 0 /100
PHOSPHATE SERPL-MCNC: 3 MG/DL (ref 2.5–4.5)
PLATELET # BLD AUTO: 304 10E3/UL (ref 150–450)
POTASSIUM SERPL-SCNC: 3.6 MMOL/L (ref 3.4–5.3)
PROT SERPL-MCNC: 7.1 G/DL (ref 6.4–8.3)
RBC # BLD AUTO: 3.87 10E6/UL (ref 4.4–5.9)
SODIUM SERPL-SCNC: 146 MMOL/L (ref 135–145)
WBC # BLD AUTO: 3.8 10E3/UL (ref 4–11)

## 2024-03-17 PROCEDURE — 84100 ASSAY OF PHOSPHORUS: CPT

## 2024-03-17 PROCEDURE — 250N000013 HC RX MED GY IP 250 OP 250 PS 637: Performed by: INTERNAL MEDICINE

## 2024-03-17 PROCEDURE — 36415 COLL VENOUS BLD VENIPUNCTURE: CPT

## 2024-03-17 PROCEDURE — 250N000013 HC RX MED GY IP 250 OP 250 PS 637

## 2024-03-17 PROCEDURE — 97535 SELF CARE MNGMENT TRAINING: CPT | Mod: GO

## 2024-03-17 PROCEDURE — G0378 HOSPITAL OBSERVATION PER HR: HCPCS

## 2024-03-17 PROCEDURE — 85025 COMPLETE CBC W/AUTO DIFF WBC: CPT

## 2024-03-17 PROCEDURE — 99239 HOSP IP/OBS DSCHRG MGMT >30: CPT

## 2024-03-17 PROCEDURE — 80053 COMPREHEN METABOLIC PANEL: CPT

## 2024-03-17 PROCEDURE — 83735 ASSAY OF MAGNESIUM: CPT

## 2024-03-17 RX ORDER — MAGNESIUM SULFATE HEPTAHYDRATE 40 MG/ML
4 INJECTION, SOLUTION INTRAVENOUS ONCE
Status: DISCONTINUED | OUTPATIENT
Start: 2024-03-17 | End: 2024-03-17

## 2024-03-17 RX ADMIN — MAGNESIUM 64 MG (MAGNESIUM CHLORIDE) TABLET,DELAYED RELEASE 535 MG: at 18:56

## 2024-03-17 RX ADMIN — OXYCODONE HYDROCHLORIDE AND ACETAMINOPHEN 1 TABLET: 5; 325 TABLET ORAL at 09:27

## 2024-03-17 RX ADMIN — ASPIRIN 81 MG CHEWABLE TABLET 81 MG: 81 TABLET CHEWABLE at 09:18

## 2024-03-17 ASSESSMENT — ACTIVITIES OF DAILY LIVING (ADL)
ADLS_ACUITY_SCORE: 34
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
## 2024-03-17 NOTE — PLAN OF CARE
PRIMARY Concern:  AMS  SAFETY RISK Concerns (fall risk, behaviors, etc.): fall risk  Isolation/Type: Chemo precaution, last chemo 3/13  Tests/Procedures for NEXT shift: MRI no evidence of stroke   Consults? (Pending/following, signed-off?) Hem oncology signed off. PT recommending TCU. SW following. PT & OT to re eval   Where is patient from? (Home, TCU, etc.): lives in apartment alone   Other Important info for NEXT shift: chemo precautions. OT slums score 11/30.  Anticipate discontinue date and active delays: TBD pending home care set up      SUMMARY NOTE:   Orientation/Cognitive: A&OX4  Observation Goals (Met/ Not Met): Not met   Mobility Level/Assist Equipment: SBA gb/walker   Antibiotics & Plan (IV/po, length of tx left): n/a   Pain Management: PRN percocet given for chronic back pain   Tele/VS/O2: VSS on RA  ABNL Lab/BG: WDL   Diet: Reg diet.   Bowel/Bladder: Continent. Up to bathroom.  Skin Concerns: BLE discoloration   Drains/Devices: No iv access  Patient Stated Goal for Today: To go home

## 2024-03-17 NOTE — PROGRESS NOTES
PRIMARY Concern: AMS  SAFETY RISK Concerns (fall risk, behaviors, etc.): fall risk  Isolation/Type: Chemo precaution, last chemo 3/13  Tests/Procedures for NEXT shift: MRI no evidence of stroke   Consults? (Pending/following, signed-off?) Hem oncology signed off. PT recommending TCU. SW following. PT & OT reevaluate tomorrow 3/17.  Where is patient from? (Home, TCU, etc.): lives in apartment alone.  Other Important info for NEXT shift: chemo precautions. OT slums score 11/30.  Anticipate discontinue date and active delays: TBD pending PT and OT reevaluate tomorrow 3/17.      SUMMARY NOTE:   Orientation/Cognitive: A&OX4  Observation Goals (Met/ Not Met):Not met   Mobility Level/Assist Equipment:AX1 gb/walker   Antibiotics & Plan (IV/po, length of tx left):   Pain Management: No pain meds given.  Tele/VS/O2: VSS on RA  ABNL Lab/BG: wnl  Diet: Reg diet.   Bowel/Bladder: Continent. Up to bathroom.  Skin Concerns: BLE discoloration   Drains/Devices: No iv access  Patient Stated Goal for Today: To go home

## 2024-03-17 NOTE — PROGRESS NOTES
PRIMARY Concern:  AMS  SAFETY RISK Concerns (fall risk, behaviors, etc.): fall risk  Isolation/Type: Chemo precaution, last chemo 3/13  Tests/Procedures for NEXT shift: MRI no evidence of stroke   Consults? (Pending/following, signed-off?) Hem oncology signed off. PT recommending TCU. SW following. OT seen today 3/17- recommending home with assist OT.  Where is patient from? (Home, TCU, etc.): lives in apartment alone   Other Important info for NEXT shift: chemo precautions. Initial OT slums score 11/30, pt cognitive improved   Anticipate discontinue date and active delays: Discharging home with home care services, details in the AVS, friend coming to give ride.     SUMMARY NOTE:   Orientation/Cognitive: A&OX4  Observation Goals (Met/ Not Met): Not met   Mobility Level/Assist Equipment: SBA gb/walker   Antibiotics & Plan (IV/po, length of tx left): n/a   Pain Management: PRN percocet given for chronic back pain   Tele/VS/O2: VSS on RA  ABNL Lab/BG: mag- 1.4, replaced oral mag.  Diet: Reg diet.   Bowel/Bladder: Continent. Up to bathroom.  Skin Concerns: BLE discoloration   Drains/Devices: No iv access  Patient Stated Goal for Today: very adamant wanting to go home.       AVS printed and explained. Discharging home with home care services, details in the AVS, friend coming to give ride. Discharged at 1910.

## 2024-03-17 NOTE — PROGRESS NOTES
Observation goals  PRIOR TO DISCHARGE       -diagnostic tests and consults completed and resulted- not met, OT & PT consult pending for 3/17  -vital signs normal or at patient baseline- Met  -returns to baseline functional status- Met  -safe disposition plan has been identified- Not met. PT and OT reassess tomorrow 3/17  Nurse to notify provider when observation goals have been met and patient is ready for discharge.
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
## 2024-03-17 NOTE — PROGRESS NOTES
Observation goals  PRIOR TO DISCHARGE       Comments: -diagnostic tests and consults completed and resulted- not met  -vital signs normal or at patient baseline- met  -returns to baseline functional status- partially met  -safe disposition plan has been identified- partially met. PT and OT reassess tomorrow 3/17  Nurse to notify provider when observation goals have been met and patient is ready for discharge.

## 2024-03-17 NOTE — PLAN OF CARE
PRIMARY DIAGNOSIS: GENERALIZED WEAKNESS    OUTPATIENT/OBSERVATION GOALS TO BE MET BEFORE DISCHARGE  1. Orthostatic performed: N/A    2. Tolerating PO medications: Yes    3. Return to near baseline physical activity: Yes    4. Cleared for discharge by consultants (if involved): No    Discharge Planner Nurse   Safe discharge environment identified: No  Barriers to discharge: Yes       Entered by: Tamiko Simmons RN 03/17/2024 11:09 AM     Please review provider order for any additional goals.   Nurse to notify provider when observation goals have been met and patient is ready for discharge.Goal Outcome Evaluation:
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
## 2024-03-17 NOTE — PROGRESS NOTES
Observation goals  PRIOR TO DISCHARGE        Comments: -diagnostic tests and consults completed and resulted- not met  -vital signs normal or at patient baseline-met  -returns to baseline functional status-met  -safe disposition plan has been identified-partially met, OT recommend home with asssit.  Nurse to notify provider when observation goals have been met and patient is ready for discharge.

## 2024-03-17 NOTE — PROGRESS NOTES
PRIMARY Concern:  AMS  SAFETY RISK Concerns (fall risk, behaviors, etc.): fall risk  Isolation/Type: Chemo precaution, last chemo 3/13  Tests/Procedures for NEXT shift: MRI no evidence of stroke   Consults? (Pending/following, signed-off?) Hem oncology signed off. PT recommending TCU. SW following. PT & OT reevaluate tomorrow 3/17.  Where is patient from? (Home, TCU, etc.): lives in apartment alone.  Other Important info for NEXT shift: chemo precautions. OT slums score 11/30.  Anticipate discontinue date and active delays: TBD pending PT and OT reevaluate tomorrow 3/17.      SUMMARY NOTE:   Orientation/Cognitive: A&OX4  Observation Goals (Met/ Not Met):Not met   Mobility Level/Assist Equipment:AX1 gb/walker   Antibiotics & Plan (IV/po, length of tx left):   Pain Management: No pain meds given.  Tele/VS/O2: VSS on RA  ABNL Lab/BG: wnl  Diet: Reg diet.   Bowel/Bladder: Continent. Up to bathroom.  Skin Concerns: BLE discoloration   Drains/Devices: No iv access  Patient Stated Goal for Today: To go home     Observation goals  PRIOR TO DISCHARGE       Comments: -diagnostic tests and consults completed and resulted- not met  -vital signs normal or at patient baseline- met  -returns to baseline functional status-   -safe disposition plan has been identified- partially met  Nurse to notify provider when observation goals have been met and patient is ready for discharge.

## 2024-03-17 NOTE — PLAN OF CARE
PRIMARY DIAGNOSIS: GENERALIZED WEAKNESS    OUTPATIENT/OBSERVATION GOALS TO BE MET BEFORE DISCHARGE  1. Orthostatic performed: N/A    2. Tolerating PO medications: Yes    3. Return to near baseline physical activity: Yes    4. Cleared for discharge by consultants (if involved): No    Discharge Planner Nurse   Safe discharge environment identified: No  Barriers to discharge: Yes       Entered by: Tamiko Simmons RN 03/17/2024 12:01 PM     Please review provider order for any additional goals.   Nurse to notify provider when observation goals have been met and patient is ready for discharge.Goal Outcome Evaluation:

## 2024-03-18 ENCOUNTER — PATIENT OUTREACH (OUTPATIENT)
Dept: CARE COORDINATION | Facility: CLINIC | Age: 79
End: 2024-03-18
Payer: COMMERCIAL
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
## 2024-03-18 NOTE — PLAN OF CARE
"Occupational Therapy Discharge Summary    Reason for therapy discharge:    Discharged to home with home therapy.    Progress towards therapy goal(s). See goals on Care Plan in [facility] electronic health record for goal details.  Goals partially met.  Barriers to achieving goals:   discharge from facility.    Therapy recommendation(s):    Continued therapy is recommended.  Rationale/Recommendations:  \"Pt demonstrated improvement in cognition and func ambulation this date. Pt would benefit from OP  assessment. Rec pt has initial frequent check-ins for safety from friend or family member. Pt would benefit from returning to [facility] services or [facility] for assistance with higher level IADLs (driving, cooking, meds, finances).\" Recommendation provided above from last treating therapist.      "

## 2024-03-18 NOTE — PLAN OF CARE
Physical Therapy Discharge Summary     Reason for therapy discharge:    Discharged to home with home therapy.     Progress towards therapy goal(s). See goals on Care Plan in [facility] electronic health record for goal details.  Goals partially met.  Barriers to achieving goals:   discharge from facility.     Therapy recommendation(s):    Continued therapy is recommended.  Rationale/Recommendations:  Patient would benefit from home PT in order to increase strength, activity tolerance and independence with mobility.  Patient requires home PT at this time as attending PT in a clinic setting would be a considerable and significantly taxing effort, limiting his ability to participate in therapy session.

## 2024-03-20 ENCOUNTER — TELEPHONE (OUTPATIENT)
Dept: NEUROPSYCHOLOGY | Facility: CLINIC | Age: 79
End: 2024-03-20
Payer: COMMERCIAL

## 2024-03-20 ENCOUNTER — MEDICAL CORRESPONDENCE (OUTPATIENT)
Dept: HEALTH INFORMATION MANAGEMENT | Facility: CLINIC | Age: 79
End: 2024-03-20

## 2024-03-22 ENCOUNTER — TELEPHONE (OUTPATIENT)
Dept: INTERNAL MEDICINE | Facility: CLINIC | Age: 79
End: 2024-03-22
Payer: COMMERCIAL

## 2024-03-22 NOTE — TELEPHONE ENCOUNTER
[facility] Call Center    Phone Message    May a detailed message be left on voicemail: yes     Reason for Call: Order(s): Other:   Reason for requested: OT Verbal - 1x wk for 4 week eff 3/25 for ADL and therapeutic exercise home safety  Date needed: asap  Provider name: [facility]    Action Taken: Other: [facility]    Travel Screening: Not Applicable

## 2024-03-25 DIAGNOSIS — M54.50 ACUTE MIDLINE LOW BACK PAIN WITHOUT SCIATICA: ICD-10-CM

## 2024-03-25 NOTE — TELEPHONE ENCOUNTER
[facility] Call Center    Phone Message    May a detailed message be left on voicemail: yes     Reason for Call: Medication Refill Request    Has the patient contacted the pharmacy for the refill? Yes   Name of medication being requested: oxyCODONE-acetaminophen (PERCOCET) 5-325 MG tablet  Provider who prescribed the medication: Dr.Sick  Pharmacy: [facility] [address] [place] [id number] [facility]  Date medication is needed: asap       Per pt was told to call at least 5 days ahead to get medication refilled on time. Per pt will  medication when notified. If any questions please call pt back. Please and thank you!    Action Taken: Message routed to:  [facility]    Travel Screening: Not Applicable
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
## 2024-03-25 NOTE — TELEPHONE ENCOUNTER
Left detailed VM on secure voicemail box for Vandana with the following verbal orders: OT - 1x wk for 4 week eff 3/25 for ADL and therapeutic exercise home safety.  Glory BLACK LPN  [facility]

## 2024-03-26 ENCOUNTER — INFUSION THERAPY VISIT (OUTPATIENT)
Dept: ONCOLOGY | Facility: CLINIC | Age: 79
End: 2024-03-26
Attending: INTERNAL MEDICINE
Payer: COMMERCIAL

## 2024-03-26 ENCOUNTER — APPOINTMENT (OUTPATIENT)
Dept: LAB | Facility: CLINIC | Age: 79
End: 2024-03-26
Attending: INTERNAL MEDICINE
Payer: COMMERCIAL

## 2024-03-26 VITALS
BODY MASS INDEX: 34.67 KG/M2 | SYSTOLIC BLOOD PRESSURE: 132 MMHG | TEMPERATURE: 97.5 F | OXYGEN SATURATION: 100 % | HEART RATE: 92 BPM | WEIGHT: 195.7 LBS | DIASTOLIC BLOOD PRESSURE: 72 MMHG | RESPIRATION RATE: 16 BRPM

## 2024-03-26 DIAGNOSIS — C10.9 SQUAMOUS CELL CARCINOMA OF OROPHARYNX (H): ICD-10-CM

## 2024-03-26 DIAGNOSIS — R29.818 NEUROCOGNITIVE DEFICITS: ICD-10-CM

## 2024-03-26 DIAGNOSIS — R60.0 BILATERAL LEG EDEMA: ICD-10-CM

## 2024-03-26 DIAGNOSIS — C78.00 MALIGNANT NEOPLASM METASTATIC TO LUNG, UNSPECIFIED LATERALITY (H): ICD-10-CM

## 2024-03-26 DIAGNOSIS — C09.9 TONSIL CANCER (H): Primary | ICD-10-CM

## 2024-03-26 DIAGNOSIS — R63.0 ANOREXIA: ICD-10-CM

## 2024-03-26 DIAGNOSIS — C10.9 SQUAMOUS CELL CARCINOMA OF OROPHARYNX (H): Primary | ICD-10-CM

## 2024-03-26 DIAGNOSIS — R41.89 NEUROCOGNITIVE DEFICITS: ICD-10-CM

## 2024-03-26 DIAGNOSIS — E83.42 HYPOMAGNESEMIA: ICD-10-CM

## 2024-03-26 LAB
ALBUMIN SERPL BCG-MCNC: 4.1 G/DL (ref 3.5–5.2)
ALP SERPL-CCNC: 92 U/L (ref 40–150)
ALT SERPL W P-5'-P-CCNC: 13 U/L (ref 0–70)
ANION GAP SERPL CALCULATED.3IONS-SCNC: 11 MMOL/L (ref 7–15)
AST SERPL W P-5'-P-CCNC: 20 U/L (ref 0–45)
BASOPHILS # BLD AUTO: 0 10E3/UL (ref 0–0.2)
BASOPHILS NFR BLD AUTO: 1 %
BILIRUB SERPL-MCNC: 0.3 MG/DL
BUN SERPL-MCNC: 15.8 MG/DL (ref 8–23)
CALCIUM SERPL-MCNC: 9.3 MG/DL (ref 8.8–10.2)
CHLORIDE SERPL-SCNC: 107 MMOL/L (ref 98–107)
CREAT SERPL-MCNC: 0.82 MG/DL (ref 0.67–1.17)
DEPRECATED HCO3 PLAS-SCNC: 26 MMOL/L (ref 22–29)
EGFRCR SERPLBLD CKD-EPI 2021: 90 ML/MIN/1.73M2
EOSINOPHIL # BLD AUTO: 0.2 10E3/UL (ref 0–0.7)
EOSINOPHIL NFR BLD AUTO: 3 %
ERYTHROCYTE [DISTWIDTH] IN BLOOD BY AUTOMATED COUNT: 20.7 % (ref 10–15)
GLUCOSE SERPL-MCNC: 94 MG/DL (ref 70–99)
HCT VFR BLD AUTO: 29 % (ref 40–53)
HGB BLD-MCNC: 8.9 G/DL (ref 13.3–17.7)
IMM GRANULOCYTES # BLD: 0 10E3/UL
IMM GRANULOCYTES NFR BLD: 0 %
LYMPHOCYTES # BLD AUTO: 2.1 10E3/UL (ref 0.8–5.3)
LYMPHOCYTES NFR BLD AUTO: 33 %
MAGNESIUM SERPL-MCNC: 1.7 MG/DL (ref 1.7–2.3)
MCH RBC QN AUTO: 25.1 PG (ref 26.5–33)
MCHC RBC AUTO-ENTMCNC: 30.7 G/DL (ref 31.5–36.5)
MCV RBC AUTO: 82 FL (ref 78–100)
MONOCYTES # BLD AUTO: 0.6 10E3/UL (ref 0–1.3)
MONOCYTES NFR BLD AUTO: 9 %
NEUTROPHILS # BLD AUTO: 3.6 10E3/UL (ref 1.6–8.3)
NEUTROPHILS NFR BLD AUTO: 54 %
NRBC # BLD AUTO: 0 10E3/UL
NRBC BLD AUTO-RTO: 0 /100
PLATELET # BLD AUTO: 194 10E3/UL (ref 150–450)
POTASSIUM SERPL-SCNC: 4.2 MMOL/L (ref 3.4–5.3)
PROT SERPL-MCNC: 6.8 G/DL (ref 6.4–8.3)
RBC # BLD AUTO: 3.55 10E6/UL (ref 4.4–5.9)
SODIUM SERPL-SCNC: 144 MMOL/L (ref 135–145)
WBC # BLD AUTO: 6.5 10E3/UL (ref 4–11)

## 2024-03-26 PROCEDURE — 258N000003 HC RX IP 258 OP 636: Performed by: INTERNAL MEDICINE

## 2024-03-26 PROCEDURE — G0463 HOSPITAL OUTPT CLINIC VISIT: HCPCS | Performed by: REGISTERED NURSE

## 2024-03-26 PROCEDURE — 99214 OFFICE O/P EST MOD 30 MIN: CPT | Performed by: REGISTERED NURSE

## 2024-03-26 PROCEDURE — 36415 COLL VENOUS BLD VENIPUNCTURE: CPT

## 2024-03-26 PROCEDURE — 250N000011 HC RX IP 250 OP 636: Performed by: INTERNAL MEDICINE

## 2024-03-26 PROCEDURE — 96375 TX/PRO/DX INJ NEW DRUG ADDON: CPT

## 2024-03-26 PROCEDURE — 82040 ASSAY OF SERUM ALBUMIN: CPT

## 2024-03-26 PROCEDURE — 96367 TX/PROPH/DG ADDL SEQ IV INF: CPT

## 2024-03-26 PROCEDURE — 85004 AUTOMATED DIFF WBC COUNT: CPT

## 2024-03-26 PROCEDURE — 250N000011 HC RX IP 250 OP 636: Performed by: REGISTERED NURSE

## 2024-03-26 PROCEDURE — 258N000003 HC RX IP 258 OP 636: Performed by: REGISTERED NURSE

## 2024-03-26 PROCEDURE — 83735 ASSAY OF MAGNESIUM: CPT

## 2024-03-26 PROCEDURE — 96417 CHEMO IV INFUS EACH ADDL SEQ: CPT

## 2024-03-26 PROCEDURE — 96413 CHEMO IV INFUSION 1 HR: CPT

## 2024-03-26 RX ORDER — OXYCODONE AND ACETAMINOPHEN 5; 325 MG/1; MG/1
1-2 TABLET ORAL EVERY 6 HOURS PRN
Qty: 150 TABLET | Refills: 0 | Status: SHIPPED | OUTPATIENT
Start: 2024-03-29 | End: 2024-04-24

## 2024-03-26 RX ADMIN — DEXAMETHASONE SODIUM PHOSPHATE: 10 INJECTION, SOLUTION INTRAMUSCULAR; INTRAVENOUS at 10:49

## 2024-03-26 RX ADMIN — FAMOTIDINE 20 MG: 10 INJECTION INTRAVENOUS at 10:47

## 2024-03-26 RX ADMIN — CARBOPLATIN 200 MG: 10 INJECTION, SOLUTION INTRAVENOUS at 12:30

## 2024-03-26 RX ADMIN — SODIUM CHLORIDE 250 ML: 9 INJECTION, SOLUTION INTRAVENOUS at 10:29

## 2024-03-26 RX ADMIN — PACLITAXEL 100 MG: 6 INJECTION, SOLUTION INTRAVENOUS at 11:24

## 2024-03-26 ASSESSMENT — PAIN SCALES - GENERAL: PAINLEVEL: EXTREME PAIN (8)

## 2024-03-26 NOTE — PROGRESS NOTES
Infusion Nursing Note:  Jonathan Bishop presents today for D22 C2 Taxol and Carboplatin.    Patient seen by provider today: Yes: Vandana DURAN   present during visit today: Not Applicable.    Note: N/A.      Intravenous Access:  Peripheral IV placed.    Treatment Conditions:  Lab Results   Component Value Date    HGB 8.9 (L) 03/26/2024    WBC 6.5 03/26/2024    ANEU 1.0 (L) 03/06/2024    ANEUTAUTO 3.6 03/26/2024     03/26/2024        Lab Results   Component Value Date     03/26/2024    POTASSIUM 4.2 03/26/2024    MAG 1.4 (L) 03/17/2024    CR 0.82 03/26/2024    WARREN 9.3 03/26/2024    BILITOTAL 0.3 03/26/2024    ALBUMIN 4.1 03/26/2024    ALT 13 03/26/2024    AST 20 03/26/2024       Results reviewed, labs MET treatment parameters, ok to proceed with treatment.      Post Infusion Assessment:  Patient tolerated infusion without incident.  Blood return noted pre and post infusion.  Site patent and intact, free from redness, edema or discomfort.  No evidence of extravasations.  Access discontinued per protocol.       Discharge Plan:   Patient declined prescription refills.  Discharge instructions reviewed with: Patient.  Patient and/or family verbalized understanding of discharge instructions and all questions answered.  Copy of AVS reviewed with patient and/or family.  Patient will return 4/2 for next provider and infusion appointment.  AVS to patient via GANTECHART.  Patient will return 4/2 for next provider and infusion appointment.   Patient discharged in stable condition accompanied by: self.  Departure Mode: Wheelchair.      Vy Nobles RN

## 2024-03-26 NOTE — NURSING NOTE
"Oncology Rooming Note    March 26, 2024 9:43 AM   Jonathan Bishop is a 78 year old male who presents for:    Chief Complaint   Patient presents with    Blood Draw     Vitals taken, PIV started, labs drawn, saline locked, checked into next appt    Oncology Clinic Visit     Malignant neoplasm metastatic to lung, unspecified laterality     Initial Vitals: /72 (BP Location: Left arm, Patient Position: Sitting, Cuff Size: Adult Large)   Pulse 92   Temp 97.5  F (36.4  C) (Oral)   Resp 16   Wt 88.8 kg (195 lb 11.2 oz)   SpO2 100%   BMI 34.67 kg/m   Estimated body mass index is 34.67 kg/m  as calculated from the following:    Height as of 3/14/24: 1.6 m (5' 3\").    Weight as of this encounter: 88.8 kg (195 lb 11.2 oz). Body surface area is 1.99 meters squared.  Extreme Pain (8) Comment: low back, hips and legs   No LMP for male patient.  Allergies reviewed: Yes  Medications reviewed: Yes    Medications: Medication refills not needed today.  Pharmacy name entered into [facility]: [id number] - [place], [place] - [address] [facility]    Frailty Screening:   Is the patient here for a new oncology consult visit in cancer care? 2. No      Clinical concerns: None       Kat Jones CMA            "

## 2024-03-26 NOTE — NURSING NOTE
Chief Complaint   Patient presents with    Blood Draw     Vitals taken, PIV started, labs drawn, saline locked, checked into next appt     /72 (BP Location: Left arm, Patient Position: Sitting, Cuff Size: Adult Large)   Pulse 92   Temp 97.5  F (36.4  C) (Oral)   Resp 16   Wt 88.8 kg (195 lb 11.2 oz)   SpO2 100%   BMI 34.67 kg/m    Lio Vasquez RN on 3/26/2024 at 9:29 AM

## 2024-03-26 NOTE — TELEPHONE ENCOUNTER
Last filled on 2/29 for 30 days supply, pended for 3/29.    Darien Jang RN on 3/26/2024 at 1:30 PM

## 2024-03-26 NOTE — PATIENT INSTRUCTIONS
[facility] Triage and after hours / weekends / holidays:  [phone]    Please call the triage or after hours line if you experience a temperature greater than or equal to 100.4, shaking chills, have uncontrolled nausea, vomiting and/or diarrhea, dizziness, shortness of breath, chest pain, bleeding, unexplained bruising, or if you have any other new/concerning symptoms, questions or concerns.      If you are having any concerning symptoms or wish to speak to a provider before your next infusion visit, please call triage to notify them so we can adequately serve you.     If you need a refill on a narcotic prescription or other medication, please call before your infusion appointment.                March 2024 Sunday Monday Tuesday Wednesday Thursday Friday Saturday                            1     2       3     4    NEW ADULT EYE   1:15 PM   (20 min.)   Rachel Thompson OD    [facility] 5     6    LAB PERIPHERAL   6:45 AM   (15 min.)   [facility] LAB DRAW   [facility]    RETURN ACTIVE TREATMENT   7:00 AM   (45 min.)   Olivia Sanchez CNP   [facility]    ONC INFUSION 2 HR (120 MIN)   8:00 AM   (120 min.)    ONC INFUSION NURSE   [facility] 7     8     9       10     11     12     13    LAB PERIPHERAL   7:00 AM   (15 min.)   [facility] LAB DRAW   [facility]    ONC INFUSION 2 HR (120 MIN)   7:30 AM   (120 min.)    ONC INFUSION NURSE   [facility]    Admission   8:39 PM   Rafael Berkowitz MD   [facility]   (Discharge: 3/17/2024)    CT HEAD WO   8:55 PM   (20 min.)   SHCT3   [facility] 14    IP OT EVALUATION   9:30 AM   (45 min.)   Lindy Kramer OTR   [facility]    IP SLP EVALUATION  10:30 AM   (45 min.)   Shilo  yAdee, SLP   [facility]    IP PT EVALUATION  11:00 AM   (45 min.)   Ananda Frausto, PT   [facility]    MR BRAIN WWO   3:55 PM   (45 min.)   [id number]   [facility] 15     16       17    IP OT TREATMENT   1:30 PM   (30 min.)   Lindy Kramer, OTR   [facility] 18     19    LAB PERIPHERAL  11:15 AM   (15 min.)    [facility] DRAW   [facility]    RETURN ACTIVE TREATMENT  11:45 AM   (45 min.)   Vandana Bertrand CNP   [facility]    ONC INFUSION 2 HR (120 MIN)   2:00 PM   (120 min.)    ONC INFUSION NURSE   [facility] 20     21     22     23       24     25     26    LAB PERIPHERAL   9:15 AM   (15 min.)    [facility] DRAW   [facility]    RETURN CCSL   9:45 AM   (45 min.)   Vandana Bertrand CNP   [facility]    ONC INFUSION 2 HR (120 MIN)  10:30 AM   (120 min.)    ONC INFUSION NURSE   [facility] 27     28    ED/HOSP FOLLOW UP   8:35 AM   (40 min.)   Veronica Gill MD   [facility] [place] [address]    NEUROPSYCH INTERVIEW ONLY   9:45 AM   (60 min.)   Elin Marcano   [facility] Neuropsychology [place] [address]       31 April 2024 Sunday Monday Tuesday Wednesday Thursday Friday Saturday        1     2    LAB PERIPHERAL  10:30 AM   (15 min.)   [facility] DRAW   [facility]    RETURN ACTIVE TREATMENT  10:45 AM   (45 min.)   Vandana Bertrand CNP   [facility]    ONC INFUSION 2 HR (120 MIN)  12:00 PM   (120 min.)   [facility] ON[facility] INFUSION NURSE   [facility] 3     4     5    RETURN ADULT EYE   9:30 AM   (15 min.)   Reina Gomez MD   [facility] 6       7     8     9    LAB PERIPHERAL  12:00 PM   (15 min.)   [facility] LAB DRAW   [facility]    ONC INFUSION 2 HR (120 MIN)  12:30 PM   (120 min.)    ONC INFUSION NURSE   [facility] 10     11     12     13       14     15     16     17     18     19     20       21     22     23     24     25     26     27       28     29     30    CT SOFT TISSUE NECK W  12:05 PM   (20 min.)   [address] [facility]    CT CHEST ABDOMEN W  12:10 PM   (20 min.)   [address] [facility]                                     Lab Results:  Recent Results (from the past 12 hour(s))   Comprehensive metabolic panel    Collection Time: 03/26/24  9:27 AM   Result Value Ref Range    Sodium 144 135 - 145 mmol/L    Potassium 4.2 3.4 - 5.3 mmol/L    Carbon Dioxide (CO2) 26 22 - 29 mmol/L    Anion Gap 11 7 - 15 mmol/L    Urea Nitrogen 15.8 8.0 - 23.0 mg/dL    Creatinine 0.82 0.67 - 1.17 mg/dL    GFR Estimate 90 >60 mL/min/1.73m2    Calcium 9.3 8.8 - 10.2 mg/dL    Chloride 107 98 - 107 mmol/L    Glucose 94 70 - 99 mg/dL    Alkaline Phosphatase 92 40 - 150 U/L    AST 20 0 - 45 U/L    ALT 13 0 - 70 U/L    Protein Total 6.8 6.4 - 8.3 g/dL    Albumin 4.1 3.5 - 5.2 g/dL    Bilirubin Total 0.3 <=1.2 mg/dL   CBC with platelets and differential    Collection Time: 03/26/24  9:27 AM   Result Value Ref Range    WBC Count 6.5 4.0 - 11.0 10e3/uL    RBC Count 3.55 (L) 4.40 - 5.90 10e6/uL    Hemoglobin 8.9 (L) 13.3 - 17.7 g/dL    Hematocrit 29.0 (L) 40.0 - 53.0 %    MCV 82 78 - 100 fL    MCH 25.1 (L) 26.5 - 33.0 pg    MCHC 30.7 (L) 31.5 - 36.5 g/dL    RDW 20.7 (H) 10.0 - 15.0 %    Platelet Count 194 150 - 450 10e3/uL    % Neutrophils 54 %    % Lymphocytes 33 %    % Monocytes 9 %    % Eosinophils 3 %    % Basophils 1 %    % Immature Granulocytes 0 %    NRBCs per 100 WBC 0 <1 /100    Absolute Neutrophils 3.6 1.6  - 8.3 10e3/uL    Absolute Lymphocytes 2.1 0.8 - 5.3 10e3/uL    Absolute Monocytes 0.6 0.0 - 1.3 10e3/uL    Absolute Eosinophils 0.2 0.0 - 0.7 10e3/uL    Absolute Basophils 0.0 0.0 - 0.2 10e3/uL    Absolute Immature Granulocytes 0.0 <=0.4 10e3/uL    Absolute NRBCs 0.0 10e3/uL
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
## 2024-03-30 ENCOUNTER — HEALTH MAINTENANCE LETTER (OUTPATIENT)
Age: 79
End: 2024-03-30

## 2024-04-01 LAB
ABO/RH(D): NORMAL
ANTIBODY SCREEN: NEGATIVE
SPECIMEN EXPIRATION DATE: NORMAL

## 2024-04-02 ENCOUNTER — ONCOLOGY VISIT (OUTPATIENT)
Dept: ONCOLOGY | Facility: CLINIC | Age: 79
End: 2024-04-02
Attending: REGISTERED NURSE
Payer: COMMERCIAL

## 2024-04-02 ENCOUNTER — LAB (OUTPATIENT)
Dept: LAB | Facility: CLINIC | Age: 79
End: 2024-04-02
Attending: REGISTERED NURSE
Payer: COMMERCIAL

## 2024-04-02 VITALS
RESPIRATION RATE: 16 BRPM | WEIGHT: 197 LBS | OXYGEN SATURATION: 98 % | SYSTOLIC BLOOD PRESSURE: 132 MMHG | HEIGHT: 64 IN | BODY MASS INDEX: 33.63 KG/M2 | DIASTOLIC BLOOD PRESSURE: 60 MMHG | HEART RATE: 75 BPM | TEMPERATURE: 98 F

## 2024-04-02 DIAGNOSIS — C10.9 SQUAMOUS CELL CARCINOMA OF OROPHARYNX (H): Primary | ICD-10-CM

## 2024-04-02 DIAGNOSIS — R60.0 BILATERAL LEG EDEMA: ICD-10-CM

## 2024-04-02 DIAGNOSIS — C09.9 TONSIL CANCER (H): ICD-10-CM

## 2024-04-02 DIAGNOSIS — D50.9 MICROCYTIC ANEMIA: ICD-10-CM

## 2024-04-02 DIAGNOSIS — E83.42 HYPOMAGNESEMIA: ICD-10-CM

## 2024-04-02 DIAGNOSIS — C78.00 MALIGNANT NEOPLASM METASTATIC TO LUNG, UNSPECIFIED LATERALITY (H): ICD-10-CM

## 2024-04-02 DIAGNOSIS — E87.6 HYPOKALEMIA: ICD-10-CM

## 2024-04-02 DIAGNOSIS — C78.00 MALIGNANT NEOPLASM METASTATIC TO LUNG, UNSPECIFIED LATERALITY (H): Primary | ICD-10-CM

## 2024-04-02 DIAGNOSIS — C10.9 SQUAMOUS CELL CARCINOMA OF OROPHARYNX (H): ICD-10-CM

## 2024-04-02 LAB
ALBUMIN SERPL BCG-MCNC: 3.8 G/DL (ref 3.5–5.2)
ALP SERPL-CCNC: 77 U/L (ref 40–150)
ALT SERPL W P-5'-P-CCNC: 11 U/L (ref 0–70)
ANION GAP SERPL CALCULATED.3IONS-SCNC: 11 MMOL/L (ref 7–15)
AST SERPL W P-5'-P-CCNC: 19 U/L (ref 0–45)
BASOPHILS # BLD AUTO: 0 10E3/UL (ref 0–0.2)
BASOPHILS NFR BLD AUTO: 1 %
BILIRUB SERPL-MCNC: 0.3 MG/DL
BUN SERPL-MCNC: 12.7 MG/DL (ref 8–23)
CALCIUM SERPL-MCNC: 8.8 MG/DL (ref 8.8–10.2)
CHLORIDE SERPL-SCNC: 107 MMOL/L (ref 98–107)
CREAT SERPL-MCNC: 0.77 MG/DL (ref 0.67–1.17)
DEPRECATED HCO3 PLAS-SCNC: 28 MMOL/L (ref 22–29)
EGFRCR SERPLBLD CKD-EPI 2021: >90 ML/MIN/1.73M2
EOSINOPHIL # BLD AUTO: 0.2 10E3/UL (ref 0–0.7)
EOSINOPHIL NFR BLD AUTO: 4 %
ERYTHROCYTE [DISTWIDTH] IN BLOOD BY AUTOMATED COUNT: 19.9 % (ref 10–15)
FERRITIN SERPL-MCNC: 318 NG/ML (ref 31–409)
GLUCOSE SERPL-MCNC: 110 MG/DL (ref 70–99)
HCT VFR BLD AUTO: 25.8 % (ref 40–53)
HGB BLD-MCNC: 7.8 G/DL (ref 13.3–17.7)
IMM GRANULOCYTES # BLD: 0 10E3/UL
IMM GRANULOCYTES NFR BLD: 0 %
IRON BINDING CAPACITY (ROCHE): 250 UG/DL (ref 240–430)
IRON SATN MFR SERPL: 13 % (ref 15–46)
IRON SERPL-MCNC: 32 UG/DL (ref 61–157)
LYMPHOCYTES # BLD AUTO: 1.5 10E3/UL (ref 0.8–5.3)
LYMPHOCYTES NFR BLD AUTO: 26 %
MAGNESIUM SERPL-MCNC: 1.4 MG/DL (ref 1.7–2.3)
MCH RBC QN AUTO: 24.8 PG (ref 26.5–33)
MCHC RBC AUTO-ENTMCNC: 30.2 G/DL (ref 31.5–36.5)
MCV RBC AUTO: 82 FL (ref 78–100)
MONOCYTES # BLD AUTO: 0.3 10E3/UL (ref 0–1.3)
MONOCYTES NFR BLD AUTO: 6 %
NEUTROPHILS # BLD AUTO: 3.5 10E3/UL (ref 1.6–8.3)
NEUTROPHILS NFR BLD AUTO: 63 %
NRBC # BLD AUTO: 0 10E3/UL
NRBC BLD AUTO-RTO: 0 /100
PLATELET # BLD AUTO: 155 10E3/UL (ref 150–450)
POTASSIUM SERPL-SCNC: 3.3 MMOL/L (ref 3.4–5.3)
PROT SERPL-MCNC: 6.4 G/DL (ref 6.4–8.3)
RBC # BLD AUTO: 3.14 10E6/UL (ref 4.4–5.9)
SODIUM SERPL-SCNC: 146 MMOL/L (ref 135–145)
WBC # BLD AUTO: 5.5 10E3/UL (ref 4–11)

## 2024-04-02 PROCEDURE — 96361 HYDRATE IV INFUSION ADD-ON: CPT

## 2024-04-02 PROCEDURE — G0463 HOSPITAL OUTPT CLINIC VISIT: HCPCS | Performed by: REGISTERED NURSE

## 2024-04-02 PROCEDURE — 96375 TX/PRO/DX INJ NEW DRUG ADDON: CPT

## 2024-04-02 PROCEDURE — 258N000003 HC RX IP 258 OP 636: Performed by: REGISTERED NURSE

## 2024-04-02 PROCEDURE — 99214 OFFICE O/P EST MOD 30 MIN: CPT | Performed by: REGISTERED NURSE

## 2024-04-02 PROCEDURE — G2211 COMPLEX E/M VISIT ADD ON: HCPCS | Performed by: REGISTERED NURSE

## 2024-04-02 PROCEDURE — 36415 COLL VENOUS BLD VENIPUNCTURE: CPT | Performed by: REGISTERED NURSE

## 2024-04-02 PROCEDURE — 250N000011 HC RX IP 250 OP 636: Performed by: REGISTERED NURSE

## 2024-04-02 PROCEDURE — 83735 ASSAY OF MAGNESIUM: CPT

## 2024-04-02 PROCEDURE — 80053 COMPREHEN METABOLIC PANEL: CPT | Performed by: REGISTERED NURSE

## 2024-04-02 PROCEDURE — 85025 COMPLETE CBC W/AUTO DIFF WBC: CPT | Performed by: REGISTERED NURSE

## 2024-04-02 PROCEDURE — 86900 BLOOD TYPING SEROLOGIC ABO: CPT

## 2024-04-02 PROCEDURE — 82728 ASSAY OF FERRITIN: CPT

## 2024-04-02 PROCEDURE — 250N000013 HC RX MED GY IP 250 OP 250 PS 637: Performed by: REGISTERED NURSE

## 2024-04-02 PROCEDURE — 83550 IRON BINDING TEST: CPT

## 2024-04-02 PROCEDURE — 96367 TX/PROPH/DG ADDL SEQ IV INF: CPT

## 2024-04-02 PROCEDURE — 96413 CHEMO IV INFUSION 1 HR: CPT

## 2024-04-02 PROCEDURE — 96417 CHEMO IV INFUS EACH ADDL SEQ: CPT

## 2024-04-02 RX ORDER — HEPARIN SODIUM,PORCINE 10 UNIT/ML
5-20 VIAL (ML) INTRAVENOUS DAILY PRN
Status: CANCELLED | OUTPATIENT
Start: 2024-04-02

## 2024-04-02 RX ORDER — ALBUTEROL SULFATE 0.83 MG/ML
2.5 SOLUTION RESPIRATORY (INHALATION)
Status: CANCELLED | OUTPATIENT
Start: 2024-04-02

## 2024-04-02 RX ORDER — POTASSIUM CHLORIDE 1500 MG/1
40 TABLET, EXTENDED RELEASE ORAL ONCE
Status: COMPLETED | OUTPATIENT
Start: 2024-04-02 | End: 2024-04-02

## 2024-04-02 RX ORDER — METHYLPREDNISOLONE SODIUM SUCCINATE 125 MG/2ML
125 INJECTION, POWDER, LYOPHILIZED, FOR SOLUTION INTRAMUSCULAR; INTRAVENOUS
Status: CANCELLED
Start: 2024-04-02

## 2024-04-02 RX ORDER — DIPHENHYDRAMINE HYDROCHLORIDE 50 MG/ML
50 INJECTION INTRAMUSCULAR; INTRAVENOUS
Status: CANCELLED
Start: 2024-04-02

## 2024-04-02 RX ORDER — MAGNESIUM SULFATE HEPTAHYDRATE 40 MG/ML
2 INJECTION, SOLUTION INTRAVENOUS ONCE
Status: COMPLETED | OUTPATIENT
Start: 2024-04-02 | End: 2024-04-02

## 2024-04-02 RX ORDER — MEPERIDINE HYDROCHLORIDE 25 MG/ML
25 INJECTION INTRAMUSCULAR; INTRAVENOUS; SUBCUTANEOUS EVERY 30 MIN PRN
Status: CANCELLED | OUTPATIENT
Start: 2024-04-02

## 2024-04-02 RX ORDER — HEPARIN SODIUM (PORCINE) LOCK FLUSH IV SOLN 100 UNIT/ML 100 UNIT/ML
5 SOLUTION INTRAVENOUS
Status: CANCELLED | OUTPATIENT
Start: 2024-04-02

## 2024-04-02 RX ORDER — LORAZEPAM 2 MG/ML
0.5 INJECTION INTRAMUSCULAR EVERY 4 HOURS PRN
Status: CANCELLED | OUTPATIENT
Start: 2024-04-02

## 2024-04-02 RX ORDER — EPINEPHRINE 1 MG/ML
0.3 INJECTION, SOLUTION INTRAMUSCULAR; SUBCUTANEOUS EVERY 5 MIN PRN
Status: CANCELLED | OUTPATIENT
Start: 2024-04-02

## 2024-04-02 RX ORDER — ALBUTEROL SULFATE 90 UG/1
1-2 AEROSOL, METERED RESPIRATORY (INHALATION)
Status: CANCELLED
Start: 2024-04-02

## 2024-04-02 RX ADMIN — DEXAMETHASONE SODIUM PHOSPHATE: 10 INJECTION, SOLUTION INTRAMUSCULAR; INTRAVENOUS at 11:48

## 2024-04-02 RX ADMIN — POTASSIUM CHLORIDE 40 MEQ: 1500 TABLET, EXTENDED RELEASE ORAL at 11:49

## 2024-04-02 RX ADMIN — CARBOPLATIN 200 MG: 10 INJECTION, SOLUTION INTRAVENOUS at 13:28

## 2024-04-02 RX ADMIN — PACLITAXEL 100 MG: 6 INJECTION, SOLUTION INTRAVENOUS at 12:23

## 2024-04-02 RX ADMIN — SODIUM CHLORIDE 250 ML: 9 INJECTION, SOLUTION INTRAVENOUS at 11:46

## 2024-04-02 RX ADMIN — MAGNESIUM SULFATE 2 G: 2 INJECTION INTRAVENOUS at 12:08

## 2024-04-02 RX ADMIN — FAMOTIDINE 20 MG: 10 INJECTION INTRAVENOUS at 11:46

## 2024-04-02 ASSESSMENT — PAIN SCALES - GENERAL: PAINLEVEL: EXTREME PAIN (9)

## 2024-04-02 NOTE — NURSING NOTE
"Oncology Rooming Note    April 2, 2024 10:49 AM   Jonathan Bishop is a 78 year old male who presents for:    Chief Complaint   Patient presents with    Blood Draw     Labs drawn from PIV placed by VAT. Line flushed with saline. Vitals taken. Pt checked in for appointment(s).      Oncology Clinic Visit     [facility] RETURN - MALIGNANT NEOPLASM METASTATIC TO LUNG      Initial Vitals: /60   Pulse 75   Temp 98  F (36.7  C)   Resp 16   Ht 1.613 m (5' 3.5\")   Wt 89.4 kg (197 lb)   SpO2 98%   BMI 34.35 kg/m   Estimated body mass index is 34.35 kg/m  as calculated from the following:    Height as of this encounter: 1.613 m (5' 3.5\").    Weight as of this encounter: 89.4 kg (197 lb). Body surface area is 2 meters squared.  Extreme Pain (9) Comment: Data Unavailable   No LMP for male patient.  Allergies reviewed: Yes  Medications reviewed: Yes    Medications: Medication refills not needed today.  Pharmacy name entered into Perficient: [id number] - [place], [place] - [address] [facility]    Frailty Screening:   Is the patient here for a new oncology consult visit in cancer care? 2. No    Marcos Junior LPN              "
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
## 2024-04-02 NOTE — NURSING NOTE
Chief Complaint   Patient presents with    Blood Draw     Labs drawn from PIV placed by VAT. Line flushed with saline. Vitals taken. Pt checked in for appointment(s).       Labs drawn from PIV placed by VAT. Line flushed with saline. Vitals taken. Pt checked in for appointment(s).     Radha Sanchez RN

## 2024-04-02 NOTE — PROGRESS NOTES
Infusion Nursing Note:  Jonathan Bishop presents today for C3D1 Taxol/Carboplatin(dose #9)/Electrolyte replacement.    Patient seen by provider today: Yes: Vandana Bertrand NP   present during visit today: Not Applicable.    Note: Plan discussed during provider visit. Verbalized understanding. No intervention for pain today. Anemia verbal instruction given and Verbalized understanding.       Intravenous Access:  Peripheral IV placed.    Treatment Conditions:  Lab Results   Component Value Date    HGB 7.8 (L) 04/02/2024    WBC 5.5 04/02/2024    ANEU 1.0 (L) 03/06/2024    ANEUTAUTO 3.5 04/02/2024     04/02/2024        Lab Results   Component Value Date     (H) 04/02/2024    POTASSIUM 3.3 (L) 04/02/2024    MAG 1.4 (L) 04/02/2024    CR 0.77 04/02/2024    WARREN 8.8 04/02/2024    BILITOTAL 0.3 04/02/2024    ALBUMIN 3.8 04/02/2024    ALT 11 04/02/2024    AST 19 04/02/2024       Results reviewed, labs MET treatment parameters, ok to proceed with treatment.    TORB: 4/2/24/Vandana Bertrand NP/Vandana Eubanks RN/ Please replace electrolytes as per protocol. Hb 7.8, no intervention needed. Iron results, Iron was slightly low but with normal ferritin he doesn't need iron tablet.       Post Infusion Assessment:  Patient tolerated infusion without incident.  Blood return noted pre and post infusion.  Site patent and intact, free from redness, edema or discomfort.  No evidence of extravasations.  Access discontinued per protocol.       Discharge Plan:   Patient declined prescription refills.  Discharge instructions reviewed with: Patient.  Patient and/or family verbalized understanding of discharge instructions and all questions answered.  Copy of AVS reviewed with patient and/or family.  Patient will return 4/9/24 for next appointment.  Patient discharged in stable condition accompanied by: self and attendant.  Departure Mode: Ambulatory and motorized wheelchair.      GREG BLAND, RN

## 2024-04-03 ENCOUNTER — TELEPHONE (OUTPATIENT)
Dept: INTERNAL MEDICINE | Facility: CLINIC | Age: 79
End: 2024-04-03
Payer: COMMERCIAL

## 2024-04-03 NOTE — TELEPHONE ENCOUNTER
Left detailed VM on secure voicemail box for Alicia SW with [employer] with the following verbal order(s): Home Care Orders:  order: 1x a month, eval and treat for 4/12/24.  Glory BLACK LPN  [facility]

## 2024-04-03 NOTE — TELEPHONE ENCOUNTER
[facility] Call Center    Phone Message    May a detailed message be left on voicemail: yes     Reason for Call: Order(s): Home Care Orders: Other:  order: 1x a month, eval and treat verbal.  For 4/12/24    Action Taken: Message routed to:  [facility] ([facility]):      Travel Screening: Not Applicable
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
## 2024-04-09 ENCOUNTER — LAB (OUTPATIENT)
Dept: LAB | Facility: CLINIC | Age: 79
End: 2024-04-09
Attending: INTERNAL MEDICINE
Payer: COMMERCIAL

## 2024-04-09 ENCOUNTER — ONCOLOGY VISIT (OUTPATIENT)
Dept: ONCOLOGY | Facility: CLINIC | Age: 79
End: 2024-04-09
Attending: INTERNAL MEDICINE
Payer: COMMERCIAL

## 2024-04-09 VITALS
WEIGHT: 192.9 LBS | RESPIRATION RATE: 16 BRPM | BODY MASS INDEX: 33.63 KG/M2 | HEART RATE: 72 BPM | TEMPERATURE: 98 F | SYSTOLIC BLOOD PRESSURE: 124 MMHG | OXYGEN SATURATION: 100 % | DIASTOLIC BLOOD PRESSURE: 71 MMHG

## 2024-04-09 VITALS — BODY MASS INDEX: 34.61 KG/M2 | HEIGHT: 63 IN

## 2024-04-09 DIAGNOSIS — C10.9 SQUAMOUS CELL CARCINOMA OF OROPHARYNX (H): ICD-10-CM

## 2024-04-09 DIAGNOSIS — C09.9 TONSIL CANCER (H): ICD-10-CM

## 2024-04-09 DIAGNOSIS — I63.521 CEREBROVASCULAR ACCIDENT INVOLVING ANTERIOR CIRCULATION, RIGHT (H): ICD-10-CM

## 2024-04-09 DIAGNOSIS — C78.00 MALIGNANT NEOPLASM METASTATIC TO LUNG, UNSPECIFIED LATERALITY (H): Primary | ICD-10-CM

## 2024-04-09 DIAGNOSIS — R60.0 PERIPHERAL EDEMA: ICD-10-CM

## 2024-04-09 LAB
ALBUMIN SERPL BCG-MCNC: 4 G/DL (ref 3.5–5.2)
ALP SERPL-CCNC: 83 U/L (ref 40–150)
ALT SERPL W P-5'-P-CCNC: 12 U/L (ref 0–70)
ANION GAP SERPL CALCULATED.3IONS-SCNC: 9 MMOL/L (ref 7–15)
AST SERPL W P-5'-P-CCNC: 22 U/L (ref 0–45)
BASOPHILS # BLD AUTO: 0 10E3/UL (ref 0–0.2)
BASOPHILS NFR BLD AUTO: 0 %
BILIRUB SERPL-MCNC: 0.3 MG/DL
BUN SERPL-MCNC: 13 MG/DL (ref 8–23)
CALCIUM SERPL-MCNC: 9.4 MG/DL (ref 8.8–10.2)
CHLORIDE SERPL-SCNC: 106 MMOL/L (ref 98–107)
CREAT SERPL-MCNC: 0.8 MG/DL (ref 0.67–1.17)
DEPRECATED HCO3 PLAS-SCNC: 29 MMOL/L (ref 22–29)
EGFRCR SERPLBLD CKD-EPI 2021: >90 ML/MIN/1.73M2
EOSINOPHIL # BLD AUTO: 0.2 10E3/UL (ref 0–0.7)
EOSINOPHIL NFR BLD AUTO: 3 %
ERYTHROCYTE [DISTWIDTH] IN BLOOD BY AUTOMATED COUNT: 19.9 % (ref 10–15)
GLUCOSE SERPL-MCNC: 95 MG/DL (ref 70–99)
HCT VFR BLD AUTO: 27.2 % (ref 40–53)
HGB BLD-MCNC: 8.4 G/DL (ref 13.3–17.7)
IMM GRANULOCYTES # BLD: 0 10E3/UL
IMM GRANULOCYTES NFR BLD: 1 %
LYMPHOCYTES # BLD AUTO: 1.8 10E3/UL (ref 0.8–5.3)
LYMPHOCYTES NFR BLD AUTO: 34 %
MAGNESIUM SERPL-MCNC: 1.7 MG/DL (ref 1.7–2.3)
MCH RBC QN AUTO: 25.5 PG (ref 26.5–33)
MCHC RBC AUTO-ENTMCNC: 30.9 G/DL (ref 31.5–36.5)
MCV RBC AUTO: 82 FL (ref 78–100)
MONOCYTES # BLD AUTO: 0.3 10E3/UL (ref 0–1.3)
MONOCYTES NFR BLD AUTO: 6 %
NEUTROPHILS # BLD AUTO: 2.9 10E3/UL (ref 1.6–8.3)
NEUTROPHILS NFR BLD AUTO: 56 %
NRBC # BLD AUTO: 0 10E3/UL
NRBC BLD AUTO-RTO: 0 /100
PLATELET # BLD AUTO: 183 10E3/UL (ref 150–450)
POTASSIUM SERPL-SCNC: 4.7 MMOL/L (ref 3.4–5.3)
PROT SERPL-MCNC: 7 G/DL (ref 6.4–8.3)
RBC # BLD AUTO: 3.3 10E6/UL (ref 4.4–5.9)
SODIUM SERPL-SCNC: 144 MMOL/L (ref 135–145)
WBC # BLD AUTO: 5.2 10E3/UL (ref 4–11)

## 2024-04-09 PROCEDURE — 99214 OFFICE O/P EST MOD 30 MIN: CPT | Performed by: NURSE PRACTITIONER

## 2024-04-09 PROCEDURE — 83735 ASSAY OF MAGNESIUM: CPT | Performed by: NURSE PRACTITIONER

## 2024-04-09 PROCEDURE — 85025 COMPLETE CBC W/AUTO DIFF WBC: CPT | Performed by: NURSE PRACTITIONER

## 2024-04-09 PROCEDURE — 258N000003 HC RX IP 258 OP 636: Performed by: NURSE PRACTITIONER

## 2024-04-09 PROCEDURE — 80053 COMPREHEN METABOLIC PANEL: CPT | Performed by: NURSE PRACTITIONER

## 2024-04-09 PROCEDURE — 96417 CHEMO IV INFUS EACH ADDL SEQ: CPT

## 2024-04-09 PROCEDURE — G2211 COMPLEX E/M VISIT ADD ON: HCPCS | Performed by: NURSE PRACTITIONER

## 2024-04-09 PROCEDURE — 36415 COLL VENOUS BLD VENIPUNCTURE: CPT | Performed by: NURSE PRACTITIONER

## 2024-04-09 PROCEDURE — 96413 CHEMO IV INFUSION 1 HR: CPT

## 2024-04-09 PROCEDURE — G0463 HOSPITAL OUTPT CLINIC VISIT: HCPCS | Performed by: NURSE PRACTITIONER

## 2024-04-09 PROCEDURE — 96375 TX/PRO/DX INJ NEW DRUG ADDON: CPT

## 2024-04-09 PROCEDURE — 250N000011 HC RX IP 250 OP 636: Performed by: NURSE PRACTITIONER

## 2024-04-09 RX ORDER — LORAZEPAM 2 MG/ML
0.5 INJECTION INTRAMUSCULAR EVERY 4 HOURS PRN
Status: CANCELLED | OUTPATIENT
Start: 2024-04-09

## 2024-04-09 RX ORDER — ALBUTEROL SULFATE 90 UG/1
1-2 AEROSOL, METERED RESPIRATORY (INHALATION)
Status: CANCELLED
Start: 2024-04-09

## 2024-04-09 RX ORDER — METHYLPREDNISOLONE SODIUM SUCCINATE 125 MG/2ML
125 INJECTION, POWDER, LYOPHILIZED, FOR SOLUTION INTRAMUSCULAR; INTRAVENOUS
Status: CANCELLED
Start: 2024-04-09

## 2024-04-09 RX ORDER — MEPERIDINE HYDROCHLORIDE 25 MG/ML
25 INJECTION INTRAMUSCULAR; INTRAVENOUS; SUBCUTANEOUS EVERY 30 MIN PRN
Status: CANCELLED | OUTPATIENT
Start: 2024-04-09

## 2024-04-09 RX ORDER — HEPARIN SODIUM,PORCINE 10 UNIT/ML
5-20 VIAL (ML) INTRAVENOUS DAILY PRN
Status: CANCELLED | OUTPATIENT
Start: 2024-04-09

## 2024-04-09 RX ORDER — ALBUTEROL SULFATE 0.83 MG/ML
2.5 SOLUTION RESPIRATORY (INHALATION)
Status: CANCELLED | OUTPATIENT
Start: 2024-04-09

## 2024-04-09 RX ORDER — EPINEPHRINE 1 MG/ML
0.3 INJECTION, SOLUTION INTRAMUSCULAR; SUBCUTANEOUS EVERY 5 MIN PRN
Status: CANCELLED | OUTPATIENT
Start: 2024-04-09

## 2024-04-09 RX ORDER — HEPARIN SODIUM (PORCINE) LOCK FLUSH IV SOLN 100 UNIT/ML 100 UNIT/ML
5 SOLUTION INTRAVENOUS
Status: CANCELLED | OUTPATIENT
Start: 2024-04-09

## 2024-04-09 RX ORDER — DIPHENHYDRAMINE HYDROCHLORIDE 50 MG/ML
50 INJECTION INTRAMUSCULAR; INTRAVENOUS
Status: CANCELLED
Start: 2024-04-09

## 2024-04-09 RX ADMIN — SODIUM CHLORIDE 100 MG: 9 INJECTION, SOLUTION INTRAVENOUS at 12:58

## 2024-04-09 RX ADMIN — FAMOTIDINE 20 MG: 10 INJECTION INTRAVENOUS at 12:31

## 2024-04-09 RX ADMIN — SODIUM CHLORIDE 250 ML: 9 INJECTION, SOLUTION INTRAVENOUS at 12:27

## 2024-04-09 RX ADMIN — CARBOPLATIN 200 MG: 600 INJECTION, SOLUTION INTRAVENOUS at 14:00

## 2024-04-09 RX ADMIN — DEXAMETHASONE SODIUM PHOSPHATE: 10 INJECTION, SOLUTION INTRAMUSCULAR; INTRAVENOUS at 12:30

## 2024-04-09 ASSESSMENT — PAIN SCALES - GENERAL: PAINLEVEL: EXTREME PAIN (8)

## 2024-04-09 NOTE — PROGRESS NOTES
Infusion Nursing Note:  Jonathan Bishop presents today for Day 8 Cycle 3 Taxol, Carboplatin (#6)  Patient seen by provider today: Yes: Olivia Sanchez CNP   present during visit today: Not Applicable.    Note: Patient presents to infusion feeling ok. Pt denies new acute discomfort and states no acute complaints or concerns not addressed by MARK today. Pt using the bus for transportation home.       Intravenous Access:  Peripheral IV placed.    Treatment Conditions:  Lab Results   Component Value Date    HGB 8.4 (L) 04/09/2024    WBC 5.2 04/09/2024    ANEU 1.0 (L) 03/06/2024    ANEUTAUTO 2.9 04/09/2024     04/09/2024        Lab Results   Component Value Date     04/09/2024    POTASSIUM 4.7 04/09/2024    MAG 1.7 04/09/2024    CR 0.80 04/09/2024    WARREN 9.4 04/09/2024    BILITOTAL 0.3 04/09/2024    ALBUMIN 4.0 04/09/2024    ALT 12 04/09/2024    AST 22 04/09/2024       Results reviewed, labs MET treatment parameters, ok to proceed with treatment.      Post Infusion Assessment:  Patient tolerated infusion without incident.  Blood return noted pre and post infusion.  Site patent and intact, free from redness, edema or discomfort.  No evidence of extravasations.  Access discontinued per protocol.       Discharge Plan:   Patient declined prescription refills.  Discharge instructions reviewed with: Patient.  Patient and/or family verbalized understanding of discharge instructions and all questions answered.  Copy of AVS reviewed with patient and/or family.  Patient will return 4/17 for next appointment.  Patient discharged in stable condition accompanied by: self.  Departure Mode: Electric scooter.      Darshan Kumar RN
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
## 2024-04-09 NOTE — NURSING NOTE
"Oncology Rooming Note    April 9, 2024 11:01 AM   Jonathan Bishop is a 78 year old male who presents for:    Chief Complaint   Patient presents with    Oncology Clinic Visit     Squamous cell carcinoma of oropharynx    Blood Draw     Labs drawn via PIV by RN in lab.  VS taken     Initial Vitals: /71   Pulse 72   Temp 98  F (36.7  C) (Oral)   Resp 16   Wt 87.5 kg (192 lb 14.4 oz)   SpO2 100%   BMI 33.63 kg/m   Estimated body mass index is 33.63 kg/m  as calculated from the following:    Height as of 4/2/24: 1.613 m (5' 3.5\").    Weight as of this encounter: 87.5 kg (192 lb 14.4 oz). Body surface area is 1.98 meters squared.  Extreme Pain (8) Comment: Data Unavailable   No LMP for male patient.  Allergies reviewed: Yes  Medications reviewed: Yes    Medications: Medication refills not needed today.  Pharmacy name entered into [employer]: [id number] - [place], [place] - [address] [facility]    Frailty Screening:   Is the patient here for a new oncology consult visit in cancer care? 2. No      Clinical concerns: none      Arleen Thurston, EMT  4/9/2024              "

## 2024-04-09 NOTE — PATIENT INSTRUCTIONS
[facility] Triage and after hours / weekends / holidays:  [phone]    Please call the triage or after hours line if you experience a temperature greater than or equal to 100.4, shaking chills, have uncontrolled nausea, vomiting and/or diarrhea, dizziness, shortness of breath, chest pain, bleeding, unexplained bruising, or if you have any other new/concerning symptoms, questions or concerns.      If you are having any concerning symptoms or wish to speak to a provider before your next infusion visit, please call triage to notify them so we can adequately serve you.     If you need a refill on a narcotic prescription or other medication, please call before your infusion appointment.                 April 2024 Sunday Monday Tuesday Wednesday Thursday Friday Saturday        1     2    LAB PERIPHERAL  10:30 AM   (15 min.)   [facility] LAB DRAW   [facility]    RETURN ACTIVE TREATMENT  10:45 AM   (45 min.)   Vandana Bertrand CNP   [employer]    ONC INFUSION 2 HR (120 MIN)  12:00 PM   (120 min.)    ONC INFUSION NURSE   [facility] 3     4     5     6       7     8     9    LAB PERIPHERAL  10:30 AM   (15 min.)   [facility] LAB DRAW   [employer]    RETURN ACTIVE TREATMENT  10:45 AM   (45 min.)   Olivia Sanchez CNP   [employer]    ONC INFUSION 2 HR (120 MIN)  12:30 PM   (120 min.)    ONC INFUSION NURSE   [facility] 10     11     12     13       14     15    RETURN CCSL   2:30 PM   (45 min.)   Vandana Bertrand CNP   [employer] 16     17    LAB PERIPHERAL   8:45 AM   (15 min.)   [facility] DRAW   [facility]    ONC INFUSION 2 HR (120 MIN)   9:00 AM   (120 min.)    ONC INFUSION NURSE   [facility] 18     19     20       21     22     23    LAB PERIPHERAL   2:00  PM   (15 min.)   [facility]   [facility]    ONC INFUSION 2 HR (120 MIN)   2:30 PM   (120 min.)   [facility] ONC INFUSION NURSE   [facility] 24     25     26     27       28     29     30    CT SOFT TISSUE NECK W  12:05 PM   (20 min.)   [facility]   [facility] [place]    CT CHEST ABDOMEN W  12:10 PM   (20 min.)   [facility]   [facility] [place]                                 May 2024      Raul Monday Tuesday Wednesday Thursday Friday Saturday                  1    LAB PERIPHERAL   2:00 PM   (15 min.)   [facility]   [facility]    RETURN CCSL   2:15 PM   (30 min.)   Dominique Mueller MD   [facility]    ONC INFUSION 2 HR (120 MIN)   3:00 PM   (120 min.)   UC ONC INFUSION NURSE   [facility] 2     3     4       5     6     7     8     9     10     11       12     13     14     15     16     17     18       19     20     21     22     23     24     25       26     27     28     29     30     31                          Lab Results:  Recent Results (from the past 12 hour(s))   Comprehensive metabolic panel    Collection Time: 04/09/24 10:49 AM   Result Value Ref Range    Sodium 144 135 - 145 mmol/L    Potassium 4.7 3.4 - 5.3 mmol/L    Carbon Dioxide (CO2) 29 22 - 29 mmol/L    Anion Gap 9 7 - 15 mmol/L    Urea Nitrogen 13.0 8.0 - 23.0 mg/dL    Creatinine 0.80 0.67 - 1.17 mg/dL    GFR Estimate >90 >60 mL/min/1.73m2    Calcium 9.4 8.8 - 10.2 mg/dL    Chloride 106 98 - 107 mmol/L    Glucose 95 70 - 99 mg/dL    Alkaline Phosphatase 83 40 - 150 U/L    AST 22 0 - 45 U/L    ALT 12 0 - 70 U/L    Protein Total 7.0 6.4 - 8.3 g/dL    Albumin 4.0 3.5 - 5.2 g/dL    Bilirubin Total 0.3 <=1.2 mg/dL   CBC with platelets and differential    Collection Time: 04/09/24 10:49 AM   Result Value Ref Range    WBC Count 5.2 4.0 -  11.0 10e3/uL    RBC Count 3.30 (L) 4.40 - 5.90 10e6/uL    Hemoglobin 8.4 (L) 13.3 - 17.7 g/dL    Hematocrit 27.2 (L) 40.0 - 53.0 %    MCV 82 78 - 100 fL    MCH 25.5 (L) 26.5 - 33.0 pg    MCHC 30.9 (L) 31.5 - 36.5 g/dL    RDW 19.9 (H) 10.0 - 15.0 %    Platelet Count 183 150 - 450 10e3/uL    % Neutrophils 56 %    % Lymphocytes 34 %    % Monocytes 6 %    % Eosinophils 3 %    % Basophils 0 %    % Immature Granulocytes 1 %    NRBCs per 100 WBC 0 <1 /100    Absolute Neutrophils 2.9 1.6 - 8.3 10e3/uL    Absolute Lymphocytes 1.8 0.8 - 5.3 10e3/uL    Absolute Monocytes 0.3 0.0 - 1.3 10e3/uL    Absolute Eosinophils 0.2 0.0 - 0.7 10e3/uL    Absolute Basophils 0.0 0.0 - 0.2 10e3/uL    Absolute Immature Granulocytes 0.0 <=0.4 10e3/uL    Absolute NRBCs 0.0 10e3/uL   Magnesium    Collection Time: 04/09/24 10:49 AM   Result Value Ref Range    Magnesium 1.7 1.7 - 2.3 mg/dL

## 2024-04-09 NOTE — NURSING NOTE
Chief Complaint   Patient presents with    Oncology Clinic Visit     Squamous cell carcinoma of oropharynx    Blood Draw     Labs drawn via PIV by RN in lab.  VS taken       Labs drawn from PIV placed by RN. Line flushed with saline. Vitals taken. Pt checked in for appointment(s).    Gaye Rueda RN

## 2024-04-10 ENCOUNTER — TELEPHONE (OUTPATIENT)
Dept: INTERNAL MEDICINE | Facility: CLINIC | Age: 79
End: 2024-04-10
Payer: COMMERCIAL

## 2024-04-10 NOTE — TELEPHONE ENCOUNTER
Spoke with Alicia with [employer] and gave the following verbal order(s): Home Care Orders: Continued  1x a week for 1 week starting 4/15/24.  Glory BLACK LPN  [facility] Primary [facility]

## 2024-04-10 NOTE — TELEPHONE ENCOUNTER
[facility] Call Center    Phone Message    May a detailed message be left on voicemail: yes     Reason for Call: Order(s): Home Care Orders: Other: Continued  1x a week for 1 week starting 4/15/24, Verbal orders thank you.    Action Taken: Message routed to:  [facility]    Travel Screening: Not Applicable

## 2024-04-15 ENCOUNTER — DOCUMENTATION ONLY (OUTPATIENT)
Dept: INTERNAL MEDICINE | Facility: CLINIC | Age: 79
End: 2024-04-15
Payer: COMMERCIAL

## 2024-04-15 NOTE — PROGRESS NOTES
Type of Form Received:     Form Received (Date) 4/15/24   Form Filled out Yes, faxed 4/22/24   Placed in provider folder Yes

## 2024-04-16 DIAGNOSIS — C10.9 SQUAMOUS CELL CARCINOMA OF OROPHARYNX (H): ICD-10-CM

## 2024-04-16 DIAGNOSIS — C78.00 MALIGNANT NEOPLASM METASTATIC TO LUNG, UNSPECIFIED LATERALITY (H): Primary | ICD-10-CM

## 2024-04-16 DIAGNOSIS — C09.9 TONSIL CANCER (H): ICD-10-CM

## 2024-04-16 RX ORDER — DIPHENHYDRAMINE HYDROCHLORIDE 50 MG/ML
50 INJECTION INTRAMUSCULAR; INTRAVENOUS
Status: CANCELLED
Start: 2024-04-16

## 2024-04-16 RX ORDER — HEPARIN SODIUM (PORCINE) LOCK FLUSH IV SOLN 100 UNIT/ML 100 UNIT/ML
5 SOLUTION INTRAVENOUS
Status: CANCELLED | OUTPATIENT
Start: 2024-04-16

## 2024-04-16 RX ORDER — METHYLPREDNISOLONE SODIUM SUCCINATE 125 MG/2ML
125 INJECTION, POWDER, LYOPHILIZED, FOR SOLUTION INTRAMUSCULAR; INTRAVENOUS
Status: CANCELLED
Start: 2024-04-16

## 2024-04-16 RX ORDER — MEPERIDINE HYDROCHLORIDE 25 MG/ML
25 INJECTION INTRAMUSCULAR; INTRAVENOUS; SUBCUTANEOUS EVERY 30 MIN PRN
Status: CANCELLED | OUTPATIENT
Start: 2024-04-16

## 2024-04-16 RX ORDER — LORAZEPAM 2 MG/ML
0.5 INJECTION INTRAMUSCULAR EVERY 4 HOURS PRN
Status: CANCELLED | OUTPATIENT
Start: 2024-04-16

## 2024-04-16 RX ORDER — ALBUTEROL SULFATE 90 UG/1
1-2 AEROSOL, METERED RESPIRATORY (INHALATION)
Status: CANCELLED
Start: 2024-04-16

## 2024-04-16 RX ORDER — EPINEPHRINE 1 MG/ML
0.3 INJECTION, SOLUTION INTRAMUSCULAR; SUBCUTANEOUS EVERY 5 MIN PRN
Status: CANCELLED | OUTPATIENT
Start: 2024-04-16

## 2024-04-16 RX ORDER — HEPARIN SODIUM,PORCINE 10 UNIT/ML
5-20 VIAL (ML) INTRAVENOUS DAILY PRN
Status: CANCELLED | OUTPATIENT
Start: 2024-04-16

## 2024-04-16 RX ORDER — ALBUTEROL SULFATE 0.83 MG/ML
2.5 SOLUTION RESPIRATORY (INHALATION)
Status: CANCELLED | OUTPATIENT
Start: 2024-04-16

## 2024-04-17 ENCOUNTER — INFUSION THERAPY VISIT (OUTPATIENT)
Dept: ONCOLOGY | Facility: CLINIC | Age: 79
End: 2024-04-17
Attending: INTERNAL MEDICINE
Payer: COMMERCIAL

## 2024-04-17 ENCOUNTER — LAB (OUTPATIENT)
Dept: LAB | Facility: CLINIC | Age: 79
End: 2024-04-17
Attending: INTERNAL MEDICINE
Payer: COMMERCIAL

## 2024-04-17 VITALS
DIASTOLIC BLOOD PRESSURE: 60 MMHG | BODY MASS INDEX: 34.61 KG/M2 | HEART RATE: 108 BPM | SYSTOLIC BLOOD PRESSURE: 128 MMHG | TEMPERATURE: 97.5 F | RESPIRATION RATE: 16 BRPM | OXYGEN SATURATION: 99 % | WEIGHT: 192.9 LBS

## 2024-04-17 DIAGNOSIS — C10.9 SQUAMOUS CELL CARCINOMA OF OROPHARYNX (H): ICD-10-CM

## 2024-04-17 DIAGNOSIS — C78.00 MALIGNANT NEOPLASM METASTATIC TO LUNG, UNSPECIFIED LATERALITY (H): Primary | ICD-10-CM

## 2024-04-17 DIAGNOSIS — C09.9 TONSIL CANCER (H): ICD-10-CM

## 2024-04-17 LAB
ALBUMIN SERPL BCG-MCNC: 4.1 G/DL (ref 3.5–5.2)
ALP SERPL-CCNC: 86 U/L (ref 40–150)
ALT SERPL W P-5'-P-CCNC: 15 U/L (ref 0–70)
ANION GAP SERPL CALCULATED.3IONS-SCNC: 10 MMOL/L (ref 7–15)
AST SERPL W P-5'-P-CCNC: 22 U/L (ref 0–45)
BASOPHILS # BLD AUTO: 0 10E3/UL (ref 0–0.2)
BASOPHILS NFR BLD AUTO: 1 %
BILIRUB SERPL-MCNC: 0.2 MG/DL
BUN SERPL-MCNC: 14.8 MG/DL (ref 8–23)
CALCIUM SERPL-MCNC: 9.4 MG/DL (ref 8.8–10.2)
CHLORIDE SERPL-SCNC: 110 MMOL/L (ref 98–107)
CREAT SERPL-MCNC: 0.72 MG/DL (ref 0.67–1.17)
DEPRECATED HCO3 PLAS-SCNC: 25 MMOL/L (ref 22–29)
EGFRCR SERPLBLD CKD-EPI 2021: >90 ML/MIN/1.73M2
EOSINOPHIL # BLD AUTO: 0.1 10E3/UL (ref 0–0.7)
EOSINOPHIL NFR BLD AUTO: 3 %
ERYTHROCYTE [DISTWIDTH] IN BLOOD BY AUTOMATED COUNT: 20.5 % (ref 10–15)
GLUCOSE SERPL-MCNC: 87 MG/DL (ref 70–99)
HCT VFR BLD AUTO: 26.2 % (ref 40–53)
HGB BLD-MCNC: 8 G/DL (ref 13.3–17.7)
IMM GRANULOCYTES # BLD: 0 10E3/UL
IMM GRANULOCYTES NFR BLD: 1 %
LYMPHOCYTES # BLD AUTO: 1.9 10E3/UL (ref 0.8–5.3)
LYMPHOCYTES NFR BLD AUTO: 40 %
MAGNESIUM SERPL-MCNC: 1.5 MG/DL (ref 1.7–2.3)
MCH RBC QN AUTO: 25.6 PG (ref 26.5–33)
MCHC RBC AUTO-ENTMCNC: 30.5 G/DL (ref 31.5–36.5)
MCV RBC AUTO: 84 FL (ref 78–100)
MONOCYTES # BLD AUTO: 0.4 10E3/UL (ref 0–1.3)
MONOCYTES NFR BLD AUTO: 9 %
NEUTROPHILS # BLD AUTO: 2.3 10E3/UL (ref 1.6–8.3)
NEUTROPHILS NFR BLD AUTO: 46 %
NRBC # BLD AUTO: 0 10E3/UL
NRBC BLD AUTO-RTO: 1 /100
PLATELET # BLD AUTO: 190 10E3/UL (ref 150–450)
POTASSIUM SERPL-SCNC: 4 MMOL/L (ref 3.4–5.3)
PROT SERPL-MCNC: 7 G/DL (ref 6.4–8.3)
RBC # BLD AUTO: 3.13 10E6/UL (ref 4.4–5.9)
SODIUM SERPL-SCNC: 145 MMOL/L (ref 135–145)
WBC # BLD AUTO: 4.7 10E3/UL (ref 4–11)

## 2024-04-17 PROCEDURE — 85025 COMPLETE CBC W/AUTO DIFF WBC: CPT | Performed by: INTERNAL MEDICINE

## 2024-04-17 PROCEDURE — 83735 ASSAY OF MAGNESIUM: CPT

## 2024-04-17 PROCEDURE — 36415 COLL VENOUS BLD VENIPUNCTURE: CPT | Performed by: INTERNAL MEDICINE

## 2024-04-17 PROCEDURE — 96367 TX/PROPH/DG ADDL SEQ IV INF: CPT

## 2024-04-17 PROCEDURE — 96375 TX/PRO/DX INJ NEW DRUG ADDON: CPT

## 2024-04-17 PROCEDURE — 250N000011 HC RX IP 250 OP 636: Performed by: INTERNAL MEDICINE

## 2024-04-17 PROCEDURE — 80053 COMPREHEN METABOLIC PANEL: CPT | Performed by: INTERNAL MEDICINE

## 2024-04-17 PROCEDURE — 258N000003 HC RX IP 258 OP 636: Performed by: INTERNAL MEDICINE

## 2024-04-17 PROCEDURE — 96413 CHEMO IV INFUSION 1 HR: CPT

## 2024-04-17 PROCEDURE — 96417 CHEMO IV INFUS EACH ADDL SEQ: CPT

## 2024-04-17 RX ORDER — MAGNESIUM SULFATE HEPTAHYDRATE 40 MG/ML
2 INJECTION, SOLUTION INTRAVENOUS ONCE
Status: COMPLETED | OUTPATIENT
Start: 2024-04-17 | End: 2024-04-17

## 2024-04-17 RX ADMIN — DEXAMETHASONE SODIUM PHOSPHATE: 10 INJECTION, SOLUTION INTRAMUSCULAR; INTRAVENOUS at 10:44

## 2024-04-17 RX ADMIN — FAMOTIDINE 20 MG: 10 INJECTION INTRAVENOUS at 10:42

## 2024-04-17 RX ADMIN — MAGNESIUM SULFATE 2 G: 2 INJECTION INTRAVENOUS at 11:03

## 2024-04-17 RX ADMIN — PACLITAXEL 99 MG: 6 INJECTION, SOLUTION INTRAVENOUS at 11:33

## 2024-04-17 RX ADMIN — SODIUM CHLORIDE 250 ML: 9 INJECTION, SOLUTION INTRAVENOUS at 10:42

## 2024-04-17 RX ADMIN — CARBOPLATIN 200 MG: 10 INJECTION, SOLUTION INTRAVENOUS at 12:46

## 2024-04-17 ASSESSMENT — PAIN SCALES - GENERAL: PAINLEVEL: EXTREME PAIN (8)

## 2024-04-17 NOTE — PATIENT INSTRUCTIONS
Contact Numbers  [facility]: [phone] (for symptom and scheduling needs)    Please call the [facility] Triage line if you experience a temperature greater than or equal to 100.4, shaking chills, have uncontrolled nausea, vomiting and/or diarrhea, dizziness, shortness of breath, chest pain, bleeding, unexplained bruising, or if you have any other new/concerning symptoms, questions or concerns.     If you are having any concerning symptoms or wish to speak to a provider before your next infusion visit, please call your care triage to notify them so we can adequately serve you.     If you need a refill on a narcotic prescription or other medication, please call triage before your infusion appointment.           April 2024 Sunday Monday Tuesday Wednesday Thursday Friday Saturday        1     2    LAB PERIPHERAL  10:30 AM   (15 min.)   [facility] LAB DRAW   [facility]    RETURN ACTIVE TREATMENT  10:45 AM   (45 min.)   Vandana Bertrand CNP   [facility]    ONC INFUSION 2 HR (120 MIN)  12:00 PM   (120 min.)    ONC INFUSION NURSE   [facility] 3     4     5     6       7     8     9    LAB PERIPHERAL  10:30 AM   (15 min.)   [facility] LAB DRAW   [facility]    RETURN ACTIVE TREATMENT  10:45 AM   (45 min.)   Olivia Sanchez CNP   [facility]    ONC INFUSION 2 HR (120 MIN)  12:30 PM   (120 min.)    ONC INFUSION NURSE   [facility] 10     11     12     13       14     15     16     17    LAB PERIPHERAL   8:45 AM   (15 min.)   [facility]   [facility]    ONC INFUSION 2 HR (120 MIN)   9:00 AM   (120 min.)    ONC INFUSION NURSE   [facility] 18     19     20       21     22     23    LAB PERIPHERAL   2:00 PM   (15 min.)   [facility]   [facility]    ONC INFUSION 2 HR (120 MIN)   2:30 PM   (120 min.)   UC ONC INFUSION NURSE   [facility] 24     25     26     27       28     29     30    LAB PERIPHERAL   6:45 AM   (15 min.)   [facility]   [facility]    RETURN ACTIVE TREATMENT   6:45 AM   (45 min.)   Olivia Sanchez CNP   [facility]    ONC INFUSION 2 HR (120 MIN)   7:30 AM   (120 min.)   UC ONC INFUSION NURSE   [facility]    CT SOFT TISSUE NECK W  12:05 PM   (20 min.)   [facility]   [facility] [place]    CT CHEST ABDOMEN W  12:10 PM   (20 min.)   [facility]   [facility] [place]                                 May 2024      Raul Monday Tuesday Wednesday Thursday Friday Saturday                  1     2     3     4       5     6     7    LAB PERIPHERAL   7:00 AM   (15 min.)   [facility]   [facility]    ONC INFUSION 2 HR (120 MIN)   7:30 AM   (120 min.)   UC ONC INFUSION NURSE   [facility] 8     9     10     11       12     13     14    LAB PERIPHERAL   6:30 AM   (15 min.)   [facility] DRAW   [facility]    RETURN ACTIVE TREATMENT   6:45 AM   (45 min.)   Olivia Sanchez CNP   [facility]    ONC INFUSION 2 HR (120 MIN)   7:30 AM   (120 min.)   UC ONC INFUSION NURSE   [facility] 15     16     17     18       19     20     21    LAB PERIPHERAL   7:00 AM   (15 min.)   [facility] DRAW   [facility]    ONC INFUSION 2 HR (120 MIN)   7:30 AM   (120 min.)   [facility] ONC INFUSION NURSE   [facility] 22     23     24     25       26     27     28     29     30     31                          Lab Results:  Recent Results (from the past 12 hour(s))   Comprehensive metabolic  panel    Collection Time: 04/17/24  9:26 AM   Result Value Ref Range    Sodium 145 135 - 145 mmol/L    Potassium 4.0 3.4 - 5.3 mmol/L    Carbon Dioxide (CO2) 25 22 - 29 mmol/L    Anion Gap 10 7 - 15 mmol/L    Urea Nitrogen 14.8 8.0 - 23.0 mg/dL    Creatinine 0.72 0.67 - 1.17 mg/dL    GFR Estimate >90 >60 mL/min/1.73m2    Calcium 9.4 8.8 - 10.2 mg/dL    Chloride 110 (H) 98 - 107 mmol/L    Glucose 87 70 - 99 mg/dL    Alkaline Phosphatase 86 40 - 150 U/L    AST 22 0 - 45 U/L    ALT 15 0 - 70 U/L    Protein Total 7.0 6.4 - 8.3 g/dL    Albumin 4.1 3.5 - 5.2 g/dL    Bilirubin Total 0.2 <=1.2 mg/dL   Magnesium    Collection Time: 04/17/24  9:26 AM   Result Value Ref Range    Magnesium 1.5 (L) 1.7 - 2.3 mg/dL   CBC with platelets and differential    Collection Time: 04/17/24  9:26 AM   Result Value Ref Range    WBC Count 4.7 4.0 - 11.0 10e3/uL    RBC Count 3.13 (L) 4.40 - 5.90 10e6/uL    Hemoglobin 8.0 (L) 13.3 - 17.7 g/dL    Hematocrit 26.2 (L) 40.0 - 53.0 %    MCV 84 78 - 100 fL    MCH 25.6 (L) 26.5 - 33.0 pg    MCHC 30.5 (L) 31.5 - 36.5 g/dL    RDW 20.5 (H) 10.0 - 15.0 %    Platelet Count 190 150 - 450 10e3/uL    % Neutrophils 46 %    % Lymphocytes 40 %    % Monocytes 9 %    % Eosinophils 3 %    % Basophils 1 %    % Immature Granulocytes 1 %    NRBCs per 100 WBC 1 (H) <1 /100    Absolute Neutrophils 2.3 1.6 - 8.3 10e3/uL    Absolute Lymphocytes 1.9 0.8 - 5.3 10e3/uL    Absolute Monocytes 0.4 0.0 - 1.3 10e3/uL    Absolute Eosinophils 0.1 0.0 - 0.7 10e3/uL    Absolute Basophils 0.0 0.0 - 0.2 10e3/uL    Absolute Immature Granulocytes 0.0 <=0.4 10e3/uL    Absolute NRBCs 0.0 10e3/uL

## 2024-04-17 NOTE — NURSING NOTE
Chief Complaint   Patient presents with    Blood Draw     Labs drawn via PIV placed by VAT. VS taken.     Labs drawn from PIV placed by VAT. Line flushed with saline. Vitals taken. Pt checked in for appointment(s).     Nadia Hernández RN

## 2024-04-17 NOTE — PROGRESS NOTES
Infusion Nursing Note:  Jonathan Bishop presents today for Cycle 3 Day 15 Taxol and Carboplatin.  Magnesium replacement    Patient seen by provider today: No   present during visit today: Not Applicable.    Note:   Patient arrives to infusion feeling well today.  He denies signs and symptoms of infection including:fever, cough, shortness of breath, sore throat, diarrhea, vomiting, rash, or pain with urination.     Hot pack given for back pain, which increased patient's comfort.      Intravenous Access:  Peripheral IV placed in lab.    Treatment Conditions:   Latest Reference Range & Units 04/17/24 09:26   Sodium 135 - 145 mmol/L 145   Potassium 3.4 - 5.3 mmol/L 4.0   Chloride 98 - 107 mmol/L 110 (H)   Carbon Dioxide (CO2) 22 - 29 mmol/L 25   Urea Nitrogen 8.0 - 23.0 mg/dL 14.8   Creatinine 0.67 - 1.17 mg/dL 0.72   GFR Estimate >60 mL/min/1.73m2 >90   Calcium 8.8 - 10.2 mg/dL 9.4   Anion Gap 7 - 15 mmol/L 10   Magnesium 1.7 - 2.3 mg/dL 1.5 (L)   Albumin 3.5 - 5.2 g/dL 4.1   Protein Total 6.4 - 8.3 g/dL 7.0   Alkaline Phosphatase 40 - 150 U/L 86   ALT 0 - 70 U/L 15   AST 0 - 45 U/L 22   Bilirubin Total <=1.2 mg/dL 0.2   Glucose 70 - 99 mg/dL 87   WBC 4.0 - 11.0 10e3/uL 4.7   Hemoglobin 13.3 - 17.7 g/dL 8.0 (L)   Hematocrit 40.0 - 53.0 % 26.2 (L)   Platelet Count 150 - 450 10e3/uL 190   RBC Count 4.40 - 5.90 10e6/uL 3.13 (L)   MCV 78 - 100 fL 84   MCH 26.5 - 33.0 pg 25.6 (L)   MCHC 31.5 - 36.5 g/dL 30.5 (L)   RDW 10.0 - 15.0 % 20.5 (H)   % Neutrophils % 46   % Lymphocytes % 40   % Monocytes % 9   % Eosinophils % 3   % Basophils % 1   Absolute Basophils 0.0 - 0.2 10e3/uL 0.0   Absolute Eosinophils 0.0 - 0.7 10e3/uL 0.1   Absolute Immature Granulocytes <=0.4 10e3/uL 0.0   Absolute Lymphocytes 0.8 - 5.3 10e3/uL 1.9   Absolute Monocytes 0.0 - 1.3 10e3/uL 0.4   % Immature Granulocytes % 1   Absolute Neutrophils 1.6 - 8.3 10e3/uL 2.3   Absolute NRBCs 10e3/uL 0.0   NRBCs per 100 WBC <1 /100 1 (H)     Results  reviewed, labs MET treatment parameters, ok to proceed with treatment.  Magnesium replaced per protocol.      Post Infusion Assessment:  Patient tolerated infusion without incident.  Blood return noted pre and post infusion.  No evidence of extravasations.  Access discontinued per protocol.       Discharge Plan:   Discharge instructions reviewed with: Patient.  Patient and/or family verbalized understanding of discharge instructions and all questions answered.  Copy of AVS reviewed with patient and/or family.  Patient will return 4/23/24 for next appointment.  Patient discharged in stable condition accompanied by: self.  Departure Mode: motorized scooter.      Pascale Wolff RN

## 2024-04-18 ENCOUNTER — TELEPHONE (OUTPATIENT)
Dept: INTERNAL MEDICINE | Facility: CLINIC | Age: 79
End: 2024-04-18
Payer: COMMERCIAL

## 2024-04-18 NOTE — TELEPHONE ENCOUNTER
Left detailed VM on secure voicemail box for Alicia ENAMORADO with [employer] with the following verbal order(s): Home Care Orders: Social Work to continue 1x a week for 1 week effective the week of April 23.   Glory BLACK LPN  [facility] Primary [facility]

## 2024-04-18 NOTE — TELEPHONE ENCOUNTER
[facility] Call Center    Phone Message    May a detailed message be left on voicemail: yes     Reason for Call: Order(s): Home Care Orders: Other: Needs verbal order for Social Work to continue 1x a week for 1 week effective the week of April 23.      Action Taken: Message routed to:  [facility]    Travel Screening: Not Applicable                                                                     Yes

## 2024-04-19 ENCOUNTER — DOCUMENTATION ONLY (OUTPATIENT)
Dept: INTERNAL MEDICINE | Facility: CLINIC | Age: 79
End: 2024-04-19
Payer: COMMERCIAL

## 2024-04-19 NOTE — PROGRESS NOTES
Type of Form Received:     Form Received (Date) 4/19/24   Form Filled out Yes, faxed 4/22/24   Placed in provider folder Yes

## 2024-04-20 DIAGNOSIS — C10.9 SQUAMOUS CELL CARCINOMA OF OROPHARYNX (H): ICD-10-CM

## 2024-04-20 DIAGNOSIS — C78.00 MALIGNANT NEOPLASM METASTATIC TO LUNG, UNSPECIFIED LATERALITY (H): Primary | ICD-10-CM

## 2024-04-20 DIAGNOSIS — C09.9 TONSIL CANCER (H): ICD-10-CM

## 2024-04-20 RX ORDER — DIPHENHYDRAMINE HYDROCHLORIDE 50 MG/ML
50 INJECTION INTRAMUSCULAR; INTRAVENOUS
Status: CANCELLED
Start: 2024-04-23

## 2024-04-20 RX ORDER — EPINEPHRINE 1 MG/ML
0.3 INJECTION, SOLUTION, CONCENTRATE INTRAVENOUS EVERY 5 MIN PRN
Status: CANCELLED | OUTPATIENT
Start: 2024-04-23

## 2024-04-20 RX ORDER — ALBUTEROL SULFATE 90 UG/1
1-2 AEROSOL, METERED RESPIRATORY (INHALATION)
Status: CANCELLED
Start: 2024-04-23

## 2024-04-20 RX ORDER — MEPERIDINE HYDROCHLORIDE 25 MG/ML
25 INJECTION INTRAMUSCULAR; INTRAVENOUS; SUBCUTANEOUS EVERY 30 MIN PRN
Status: CANCELLED | OUTPATIENT
Start: 2024-04-23

## 2024-04-20 RX ORDER — LORAZEPAM 2 MG/ML
0.5 INJECTION INTRAMUSCULAR EVERY 4 HOURS PRN
Status: CANCELLED | OUTPATIENT
Start: 2024-04-23

## 2024-04-20 RX ORDER — ALBUTEROL SULFATE 0.83 MG/ML
2.5 SOLUTION RESPIRATORY (INHALATION)
Status: CANCELLED | OUTPATIENT
Start: 2024-04-23

## 2024-04-20 RX ORDER — HEPARIN SODIUM (PORCINE) LOCK FLUSH IV SOLN 100 UNIT/ML 100 UNIT/ML
5 SOLUTION INTRAVENOUS
Status: CANCELLED | OUTPATIENT
Start: 2024-04-23

## 2024-04-20 RX ORDER — HEPARIN SODIUM,PORCINE 10 UNIT/ML
5-20 VIAL (ML) INTRAVENOUS DAILY PRN
Status: CANCELLED | OUTPATIENT
Start: 2024-04-23

## 2024-04-22 ENCOUNTER — MEDICAL CORRESPONDENCE (OUTPATIENT)
Dept: HEALTH INFORMATION MANAGEMENT | Facility: CLINIC | Age: 79
End: 2024-04-22
Payer: COMMERCIAL

## 2024-04-23 ENCOUNTER — INFUSION THERAPY VISIT (OUTPATIENT)
Dept: ONCOLOGY | Facility: CLINIC | Age: 79
End: 2024-04-23
Attending: INTERNAL MEDICINE
Payer: COMMERCIAL

## 2024-04-23 ENCOUNTER — LAB (OUTPATIENT)
Dept: LAB | Facility: CLINIC | Age: 79
End: 2024-04-23
Attending: INTERNAL MEDICINE
Payer: COMMERCIAL

## 2024-04-23 DIAGNOSIS — C09.9 TONSIL CANCER (H): ICD-10-CM

## 2024-04-23 DIAGNOSIS — C78.00 MALIGNANT NEOPLASM METASTATIC TO LUNG, UNSPECIFIED LATERALITY (H): Primary | ICD-10-CM

## 2024-04-23 DIAGNOSIS — Z13.29 SCREENING FOR HYPOTHYROIDISM: ICD-10-CM

## 2024-04-23 DIAGNOSIS — C10.9 SQUAMOUS CELL CARCINOMA OF OROPHARYNX (H): ICD-10-CM

## 2024-04-23 LAB
ALBUMIN SERPL BCG-MCNC: 3.9 G/DL (ref 3.5–5.2)
ALP SERPL-CCNC: 79 U/L (ref 40–150)
ALT SERPL W P-5'-P-CCNC: 12 U/L (ref 0–70)
ANION GAP SERPL CALCULATED.3IONS-SCNC: 10 MMOL/L (ref 7–15)
AST SERPL W P-5'-P-CCNC: 19 U/L (ref 0–45)
BASOPHILS # BLD AUTO: 0 10E3/UL (ref 0–0.2)
BASOPHILS NFR BLD AUTO: 1 %
BILIRUB SERPL-MCNC: 0.4 MG/DL
BUN SERPL-MCNC: 13.6 MG/DL (ref 8–23)
CALCIUM SERPL-MCNC: 9 MG/DL (ref 8.8–10.2)
CHLORIDE SERPL-SCNC: 108 MMOL/L (ref 98–107)
CREAT SERPL-MCNC: 0.69 MG/DL (ref 0.67–1.17)
DEPRECATED HCO3 PLAS-SCNC: 25 MMOL/L (ref 22–29)
EGFRCR SERPLBLD CKD-EPI 2021: >90 ML/MIN/1.73M2
EOSINOPHIL # BLD AUTO: 0.1 10E3/UL (ref 0–0.7)
EOSINOPHIL NFR BLD AUTO: 2 %
ERYTHROCYTE [DISTWIDTH] IN BLOOD BY AUTOMATED COUNT: 20.3 % (ref 10–15)
GLUCOSE SERPL-MCNC: 90 MG/DL (ref 70–99)
HCT VFR BLD AUTO: 25.9 % (ref 40–53)
HGB BLD-MCNC: 7.9 G/DL (ref 13.3–17.7)
IMM GRANULOCYTES # BLD: 0 10E3/UL
IMM GRANULOCYTES NFR BLD: 1 %
LYMPHOCYTES # BLD AUTO: 1.5 10E3/UL (ref 0.8–5.3)
LYMPHOCYTES NFR BLD AUTO: 43 %
MAGNESIUM SERPL-MCNC: 1.5 MG/DL (ref 1.7–2.3)
MCH RBC QN AUTO: 25.6 PG (ref 26.5–33)
MCHC RBC AUTO-ENTMCNC: 30.5 G/DL (ref 31.5–36.5)
MCV RBC AUTO: 84 FL (ref 78–100)
MONOCYTES # BLD AUTO: 0.2 10E3/UL (ref 0–1.3)
MONOCYTES NFR BLD AUTO: 6 %
NEUTROPHILS # BLD AUTO: 1.7 10E3/UL (ref 1.6–8.3)
NEUTROPHILS NFR BLD AUTO: 47 %
NRBC # BLD AUTO: 0 10E3/UL
NRBC BLD AUTO-RTO: 0 /100
PLATELET # BLD AUTO: 138 10E3/UL (ref 150–450)
POTASSIUM SERPL-SCNC: 3.9 MMOL/L (ref 3.4–5.3)
PROT SERPL-MCNC: 6.4 G/DL (ref 6.4–8.3)
RBC # BLD AUTO: 3.09 10E6/UL (ref 4.4–5.9)
SODIUM SERPL-SCNC: 143 MMOL/L (ref 135–145)
T4 FREE SERPL-MCNC: 1.29 NG/DL (ref 0.9–1.7)
TSH SERPL DL<=0.005 MIU/L-ACNC: 0.66 UIU/ML (ref 0.3–4.2)
WBC # BLD AUTO: 3.6 10E3/UL (ref 4–11)

## 2024-04-23 PROCEDURE — 85025 COMPLETE CBC W/AUTO DIFF WBC: CPT | Performed by: INTERNAL MEDICINE

## 2024-04-23 PROCEDURE — 96375 TX/PRO/DX INJ NEW DRUG ADDON: CPT

## 2024-04-23 PROCEDURE — 258N000003 HC RX IP 258 OP 636: Performed by: INTERNAL MEDICINE

## 2024-04-23 PROCEDURE — 96417 CHEMO IV INFUS EACH ADDL SEQ: CPT

## 2024-04-23 PROCEDURE — 96413 CHEMO IV INFUSION 1 HR: CPT

## 2024-04-23 PROCEDURE — 84443 ASSAY THYROID STIM HORMONE: CPT

## 2024-04-23 PROCEDURE — 84439 ASSAY OF FREE THYROXINE: CPT

## 2024-04-23 PROCEDURE — 82374 ASSAY BLOOD CARBON DIOXIDE: CPT | Performed by: INTERNAL MEDICINE

## 2024-04-23 PROCEDURE — 250N000011 HC RX IP 250 OP 636: Performed by: INTERNAL MEDICINE

## 2024-04-23 PROCEDURE — 36415 COLL VENOUS BLD VENIPUNCTURE: CPT | Performed by: INTERNAL MEDICINE

## 2024-04-23 PROCEDURE — 83735 ASSAY OF MAGNESIUM: CPT | Performed by: NURSE PRACTITIONER

## 2024-04-23 PROCEDURE — 82040 ASSAY OF SERUM ALBUMIN: CPT | Performed by: INTERNAL MEDICINE

## 2024-04-23 RX ORDER — MAGNESIUM SULFATE HEPTAHYDRATE 40 MG/ML
2 INJECTION, SOLUTION INTRAVENOUS ONCE
Status: COMPLETED | OUTPATIENT
Start: 2024-04-23 | End: 2024-04-23

## 2024-04-23 RX ADMIN — SODIUM CHLORIDE 250 ML: 9 INJECTION, SOLUTION INTRAVENOUS at 11:16

## 2024-04-23 RX ADMIN — PACLITAXEL 100 MG: 6 INJECTION, SOLUTION INTRAVENOUS at 12:00

## 2024-04-23 RX ADMIN — MAGNESIUM SULFATE 2 G: 2 INJECTION INTRAVENOUS at 11:50

## 2024-04-23 RX ADMIN — DEXAMETHASONE SODIUM PHOSPHATE: 10 INJECTION, SOLUTION INTRAMUSCULAR; INTRAVENOUS at 11:19

## 2024-04-23 RX ADMIN — FAMOTIDINE 20 MG: 10 INJECTION INTRAVENOUS at 11:21

## 2024-04-23 RX ADMIN — CARBOPLATIN 200 MG: 600 INJECTION, SOLUTION INTRAVENOUS at 13:24

## 2024-04-23 NOTE — PATIENT INSTRUCTIONS
April 2024 Sunday Monday Tuesday Wednesday Thursday Friday Saturday        1     2    LAB PERIPHERAL  10:30 AM   (15 min.)   [facility]   [facility]    RETURN ACTIVE TREATMENT  10:45 AM   (45 min.)   Vandana Bertrand CNP   [facility]    ONC INFUSION 2 HR (120 MIN)  12:00 PM   (120 min.)   UC ONC INFUSION NURSE   [facility] 3     4     5     6       7     8     9    LAB PERIPHERAL  10:30 AM   (15 min.)   [facility] DRAW   [facility]    RETURN ACTIVE TREATMENT  10:45 AM   (45 min.)   Olivia Sanchez CNP   [facility]    ONC INFUSION 2 HR (120 MIN)  12:30 PM   (120 min.)   UC ONC INFUSION NURSE   [facility] 10     11     12     13       14     15     16     17    LAB PERIPHERAL   8:45 AM   (15 min.)   [facility] DRAW   [facility]    ONC INFUSION 2 HR (120 MIN)   9:00 AM   (120 min.)   [facility] ONC INFUSION NURSE   [facility] 18     19     20       21     22     23    LAB PERIPHERAL   9:45 AM   (15 min.)   [facility] DRAW   [facility]    ONC INFUSION 2 HR (120 MIN)  10:00 AM   (120 min.)   UC ONC INFUSION NURSE   [facility] 24     25     26     27       28     29     30    LAB PERIPHERAL   6:45 AM   (15 min.)   [facility] D[facility]   [facility]    RETURN ACTIVE TREATMENT   6:45 AM   (45 min.)   Olivia Sanchez CNP   [facility]    ONC INFUSION 2 HR (120 MIN)   7:30 AM   (120 min.)   UC ONC INFUSION NURSE   [facility]    CT SOFT TISSUE NECK W  12:05 PM   (20 min.)   [facility]   [facility] [place]    CT CHEST ABDOMEN W  12:10 PM   (20 min.)   [facility]   [facility]  [place]                                 May 2024      Raul Monday Tuesday Wednesday Thursday Friday Saturday                  1     2     3     4       5     6     7    LAB PERIPHERAL   7:00 AM   (15 min.)    [facility]   [facility]    ONC INFUSION 2 HR (120 MIN)   7:30 AM   (120 min.)   UC ONC INFUSION NURSE   [facility] 8     9     10     11       12     13     14    LAB PERIPHERAL   6:30 AM   (15 min.)    [facility]   [facility]    RETURN ACTIVE TREATMENT   6:45 AM   (45 min.)   Olivia Sanchez CNP   [facility]    ONC INFUSION 2 HR (120 MIN)   7:30 AM   (120 min.)   UC ONC INFUSION NURSE   [facility] 15     16     17     18       19     20     21    LAB PERIPHERAL   7:00 AM   (15 min.)    [facility]   [facility]    ONC INFUSION 2 HR (120 MIN)   7:30 AM   (120 min.)    ONC INFUSION NURSE   [facility] 22     23     24     25       26     27     28     29     30     31                          Lab Results:  Recent Results (from the past 12 hour(s))   Comprehensive metabolic panel    Collection Time: 04/23/24 10:24 AM   Result Value Ref Range    Sodium 143 135 - 145 mmol/L    Potassium 3.9 3.4 - 5.3 mmol/L    Carbon Dioxide (CO2) 25 22 - 29 mmol/L    Anion Gap 10 7 - 15 mmol/L    Urea Nitrogen 13.6 8.0 - 23.0 mg/dL    Creatinine 0.69 0.67 - 1.17 mg/dL    GFR Estimate >90 >60 mL/min/1.73m2    Calcium 9.0 8.8 - 10.2 mg/dL    Chloride 108 (H) 98 - 107 mmol/L    Glucose 90 70 - 99 mg/dL    Alkaline Phosphatase 79 40 - 150 U/L    AST 19 0 - 45 U/L    ALT 12 0 - 70 U/L    Protein Total 6.4 6.4 - 8.3 g/dL    Albumin 3.9 3.5 - 5.2 g/dL    Bilirubin Total 0.4 <=1.2 mg/dL   T4 free    Collection Time: 04/23/24 10:24 AM   Result Value Ref Range    Free T4 1.29 0.90 - 1.70 ng/dL   TSH     Collection Time: 04/23/24 10:24 AM   Result Value Ref Range    TSH 0.66 0.30 - 4.20 uIU/mL   CBC with platelets and differential    Collection Time: 04/23/24 10:24 AM   Result Value Ref Range    WBC Count 3.6 (L) 4.0 - 11.0 10e3/uL    RBC Count 3.09 (L) 4.40 - 5.90 10e6/uL    Hemoglobin 7.9 (L) 13.3 - 17.7 g/dL    Hematocrit 25.9 (L) 40.0 - 53.0 %    MCV 84 78 - 100 fL    MCH 25.6 (L) 26.5 - 33.0 pg    MCHC 30.5 (L) 31.5 - 36.5 g/dL    RDW 20.3 (H) 10.0 - 15.0 %    Platelet Count 138 (L) 150 - 450 10e3/uL    % Neutrophils 47 %    % Lymphocytes 43 %    % Monocytes 6 %    % Eosinophils 2 %    % Basophils 1 %    % Immature Granulocytes 1 %    NRBCs per 100 WBC 0 <1 /100    Absolute Neutrophils 1.7 1.6 - 8.3 10e3/uL    Absolute Lymphocytes 1.5 0.8 - 5.3 10e3/uL    Absolute Monocytes 0.2 0.0 - 1.3 10e3/uL    Absolute Eosinophils 0.1 0.0 - 0.7 10e3/uL    Absolute Basophils 0.0 0.0 - 0.2 10e3/uL    Absolute Immature Granulocytes 0.0 <=0.4 10e3/uL    Absolute NRBCs 0.0 10e3/uL   Magnesium    Collection Time: 04/23/24 10:24 AM   Result Value Ref Range    Magnesium 1.5 (L) 1.7 - 2.3 mg/dL

## 2024-04-23 NOTE — PROGRESS NOTES
Infusion Nursing Note:  Jonathan Bishop presents today for Cycle 3 Day 22 Taxol/Carboplatin + IV Mg replacement.    Patient seen by provider today: No   present during visit today: Not Applicable.    Note: Patient arrives feeling okay. States his RLE edema is worse again. Taking his torsemide as directed. Pt states he is not taking Spironolactone. Encouraged to keep legs elected and push fluids. States he mainly just drinks Gatorade. Pt reports 8-9/10 chronic low back pain. Takes Oxycodone for this. Heat pack given for comfort. No additional intervention requested.    Magnesium level was not ordered with SOC labs today but was low last week.    Per written communication Olivia Sanchez, YOANNA/Aydee Palomino, RN @1102 4/23/24:  - Okay to check Mg today  - Dr. Nascimento (PCP) is in charge of Spironolactone so I can't give recs on that    Writer reached out to PCP via SecureChat but provider didn't have time to look further into at this time. Encouraged patient to follow-up with his PCP on this.     Intravenous Access:  Peripheral IV placed.    Treatment Conditions:  Lab Results   Component Value Date    HGB 7.9 (L) 04/23/2024    WBC 3.6 (L) 04/23/2024    ANEU 1.0 (L) 03/06/2024    ANEUTAUTO 1.7 04/23/2024     (L) 04/23/2024        Lab Results   Component Value Date     04/23/2024    POTASSIUM 3.9 04/23/2024    MAG 1.5 (L) 04/23/2024    CR 0.69 04/23/2024    WARREN 9.0 04/23/2024    BILITOTAL 0.4 04/23/2024    ALBUMIN 3.9 04/23/2024    ALT 12 04/23/2024    AST 19 04/23/2024       Results reviewed, labs MET treatment parameters, ok to proceed with treatment.  Mg replaced IV per protocol.    Post Infusion Assessment:  Patient tolerated infusion without incident.  Blood return noted pre and post infusion.  Site patent and intact, free from redness, edema or discomfort.  No evidence of extravasations.  Access discontinued per protocol.       Discharge Plan:   Patient declined prescription refills.  Discharge  instructions reviewed with: Patient.  Patient and/or family verbalized understanding of discharge instructions and all questions answered.  AVS to patient via [employer].  Patient will return 4/30 for next appointment.   Patient discharged in stable condition accompanied by: self.  Departure Mode: Electric wheelchair.      Aydee Palomino RN

## 2024-04-24 DIAGNOSIS — M54.50 ACUTE MIDLINE LOW BACK PAIN WITHOUT SCIATICA: ICD-10-CM

## 2024-04-24 RX ORDER — OXYCODONE AND ACETAMINOPHEN 5; 325 MG/1; MG/1
1-2 TABLET ORAL EVERY 6 HOURS PRN
Qty: 150 TABLET | Refills: 0 | Status: SHIPPED | OUTPATIENT
Start: 2024-04-29 | End: 2024-05-23
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
## 2024-04-30 ENCOUNTER — ANCILLARY PROCEDURE (OUTPATIENT)
Dept: CT IMAGING | Facility: CLINIC | Age: 79
End: 2024-04-30
Attending: INTERNAL MEDICINE
Payer: COMMERCIAL

## 2024-04-30 ENCOUNTER — ONCOLOGY VISIT (OUTPATIENT)
Dept: ONCOLOGY | Facility: CLINIC | Age: 79
End: 2024-04-30
Attending: NURSE PRACTITIONER
Payer: COMMERCIAL

## 2024-04-30 ENCOUNTER — APPOINTMENT (OUTPATIENT)
Dept: LAB | Facility: CLINIC | Age: 79
End: 2024-04-30
Attending: INTERNAL MEDICINE
Payer: COMMERCIAL

## 2024-04-30 VITALS
DIASTOLIC BLOOD PRESSURE: 69 MMHG | SYSTOLIC BLOOD PRESSURE: 132 MMHG | OXYGEN SATURATION: 98 % | RESPIRATION RATE: 16 BRPM | HEART RATE: 79 BPM | WEIGHT: 191.2 LBS | BODY MASS INDEX: 34.31 KG/M2 | TEMPERATURE: 97.9 F

## 2024-04-30 DIAGNOSIS — C09.9 TONSIL CANCER (H): ICD-10-CM

## 2024-04-30 DIAGNOSIS — C10.9 SQUAMOUS CELL CARCINOMA OF OROPHARYNX (H): ICD-10-CM

## 2024-04-30 DIAGNOSIS — C78.00 MALIGNANT NEOPLASM METASTATIC TO LUNG, UNSPECIFIED LATERALITY (H): Primary | ICD-10-CM

## 2024-04-30 DIAGNOSIS — C78.00 MALIGNANT NEOPLASM METASTATIC TO LUNG, UNSPECIFIED LATERALITY (H): ICD-10-CM

## 2024-04-30 DIAGNOSIS — E87.6 HYPOKALEMIA: ICD-10-CM

## 2024-04-30 DIAGNOSIS — I50.32 CHRONIC DIASTOLIC HEART FAILURE (H): ICD-10-CM

## 2024-04-30 LAB
ALBUMIN SERPL BCG-MCNC: 4 G/DL (ref 3.5–5.2)
ALP SERPL-CCNC: 79 U/L (ref 40–150)
ALT SERPL W P-5'-P-CCNC: 10 U/L (ref 0–70)
ANION GAP SERPL CALCULATED.3IONS-SCNC: 11 MMOL/L (ref 7–15)
AST SERPL W P-5'-P-CCNC: 20 U/L (ref 0–45)
BASOPHILS # BLD AUTO: 0 10E3/UL (ref 0–0.2)
BASOPHILS NFR BLD AUTO: 1 %
BILIRUB SERPL-MCNC: 0.3 MG/DL
BUN SERPL-MCNC: 20.1 MG/DL (ref 8–23)
CALCIUM SERPL-MCNC: 9 MG/DL (ref 8.8–10.2)
CHLORIDE SERPL-SCNC: 108 MMOL/L (ref 98–107)
CREAT SERPL-MCNC: 1.06 MG/DL (ref 0.67–1.17)
DEPRECATED HCO3 PLAS-SCNC: 26 MMOL/L (ref 22–29)
EGFRCR SERPLBLD CKD-EPI 2021: 72 ML/MIN/1.73M2
EOSINOPHIL # BLD AUTO: 0.1 10E3/UL (ref 0–0.7)
EOSINOPHIL NFR BLD AUTO: 2 %
ERYTHROCYTE [DISTWIDTH] IN BLOOD BY AUTOMATED COUNT: 19.7 % (ref 10–15)
GLUCOSE SERPL-MCNC: 118 MG/DL (ref 70–99)
HCT VFR BLD AUTO: 24.8 % (ref 40–53)
HGB BLD-MCNC: 7.8 G/DL (ref 13.3–17.7)
IMM GRANULOCYTES # BLD: 0 10E3/UL
IMM GRANULOCYTES NFR BLD: 1 %
LYMPHOCYTES # BLD AUTO: 1.3 10E3/UL (ref 0.8–5.3)
LYMPHOCYTES NFR BLD AUTO: 39 %
MAGNESIUM SERPL-MCNC: 1.3 MG/DL (ref 1.7–2.3)
MCH RBC QN AUTO: 26.2 PG (ref 26.5–33)
MCHC RBC AUTO-ENTMCNC: 31.5 G/DL (ref 31.5–36.5)
MCV RBC AUTO: 83 FL (ref 78–100)
MONOCYTES # BLD AUTO: 0.2 10E3/UL (ref 0–1.3)
MONOCYTES NFR BLD AUTO: 7 %
NEUTROPHILS # BLD AUTO: 1.8 10E3/UL (ref 1.6–8.3)
NEUTROPHILS NFR BLD AUTO: 50 %
NRBC # BLD AUTO: 0 10E3/UL
NRBC BLD AUTO-RTO: 0 /100
PLATELET # BLD AUTO: 114 10E3/UL (ref 150–450)
POTASSIUM SERPL-SCNC: 3.2 MMOL/L (ref 3.4–5.3)
PROT SERPL-MCNC: 6.4 G/DL (ref 6.4–8.3)
RBC # BLD AUTO: 2.98 10E6/UL (ref 4.4–5.9)
SODIUM SERPL-SCNC: 145 MMOL/L (ref 135–145)
WBC # BLD AUTO: 3.4 10E3/UL (ref 4–11)

## 2024-04-30 PROCEDURE — 99214 OFFICE O/P EST MOD 30 MIN: CPT | Performed by: NURSE PRACTITIONER

## 2024-04-30 PROCEDURE — G0463 HOSPITAL OUTPT CLINIC VISIT: HCPCS | Mod: 25,27

## 2024-04-30 PROCEDURE — 83735 ASSAY OF MAGNESIUM: CPT

## 2024-04-30 PROCEDURE — 258N000003 HC RX IP 258 OP 636: Performed by: NURSE PRACTITIONER

## 2024-04-30 PROCEDURE — 71260 CT THORAX DX C+: CPT | Performed by: RADIOLOGY

## 2024-04-30 PROCEDURE — 85025 COMPLETE CBC W/AUTO DIFF WBC: CPT | Performed by: NURSE PRACTITIONER

## 2024-04-30 PROCEDURE — 36415 COLL VENOUS BLD VENIPUNCTURE: CPT | Performed by: NURSE PRACTITIONER

## 2024-04-30 PROCEDURE — 74177 CT ABD & PELVIS W/CONTRAST: CPT | Performed by: RADIOLOGY

## 2024-04-30 PROCEDURE — 96367 TX/PROPH/DG ADDL SEQ IV INF: CPT

## 2024-04-30 PROCEDURE — 70491 CT SOFT TISSUE NECK W/DYE: CPT | Mod: GC | Performed by: RADIOLOGY

## 2024-04-30 PROCEDURE — 999N000127 HC STATISTIC PERIPHERAL IV START W US GUIDANCE

## 2024-04-30 PROCEDURE — 80053 COMPREHEN METABOLIC PANEL: CPT | Performed by: NURSE PRACTITIONER

## 2024-04-30 PROCEDURE — 96417 CHEMO IV INFUS EACH ADDL SEQ: CPT

## 2024-04-30 PROCEDURE — 96375 TX/PRO/DX INJ NEW DRUG ADDON: CPT

## 2024-04-30 PROCEDURE — 250N000011 HC RX IP 250 OP 636: Performed by: NURSE PRACTITIONER

## 2024-04-30 PROCEDURE — G0463 HOSPITAL OUTPT CLINIC VISIT: HCPCS | Mod: 25,27 | Performed by: NURSE PRACTITIONER

## 2024-04-30 PROCEDURE — 96413 CHEMO IV INFUSION 1 HR: CPT

## 2024-04-30 PROCEDURE — 250N000013 HC RX MED GY IP 250 OP 250 PS 637: Performed by: NURSE PRACTITIONER

## 2024-04-30 PROCEDURE — G2211 COMPLEX E/M VISIT ADD ON: HCPCS | Performed by: NURSE PRACTITIONER

## 2024-04-30 RX ORDER — HEPARIN SODIUM,PORCINE 10 UNIT/ML
5-20 VIAL (ML) INTRAVENOUS DAILY PRN
Status: CANCELLED | OUTPATIENT
Start: 2024-05-01

## 2024-04-30 RX ORDER — ALBUTEROL SULFATE 0.83 MG/ML
2.5 SOLUTION RESPIRATORY (INHALATION)
Status: CANCELLED | OUTPATIENT
Start: 2024-05-01

## 2024-04-30 RX ORDER — POTASSIUM CHLORIDE 1500 MG/1
20 TABLET, EXTENDED RELEASE ORAL DAILY
Qty: 30 TABLET | Refills: 1 | Status: SHIPPED | OUTPATIENT
Start: 2024-04-30

## 2024-04-30 RX ORDER — HEPARIN SODIUM (PORCINE) LOCK FLUSH IV SOLN 100 UNIT/ML 100 UNIT/ML
5 SOLUTION INTRAVENOUS
Status: CANCELLED | OUTPATIENT
Start: 2024-05-01

## 2024-04-30 RX ORDER — DIPHENHYDRAMINE HYDROCHLORIDE 50 MG/ML
50 INJECTION INTRAMUSCULAR; INTRAVENOUS
Status: CANCELLED
Start: 2024-05-01

## 2024-04-30 RX ORDER — MEPERIDINE HYDROCHLORIDE 25 MG/ML
25 INJECTION INTRAMUSCULAR; INTRAVENOUS; SUBCUTANEOUS EVERY 30 MIN PRN
Status: CANCELLED | OUTPATIENT
Start: 2024-05-01

## 2024-04-30 RX ORDER — POTASSIUM CHLORIDE 1500 MG/1
40 TABLET, EXTENDED RELEASE ORAL ONCE
Status: COMPLETED | OUTPATIENT
Start: 2024-04-30 | End: 2024-04-30

## 2024-04-30 RX ORDER — MAGNESIUM SULFATE HEPTAHYDRATE 40 MG/ML
2 INJECTION, SOLUTION INTRAVENOUS ONCE
Status: COMPLETED | OUTPATIENT
Start: 2024-04-30 | End: 2024-04-30

## 2024-04-30 RX ORDER — EPINEPHRINE 1 MG/ML
0.3 INJECTION, SOLUTION INTRAMUSCULAR; SUBCUTANEOUS EVERY 5 MIN PRN
Status: CANCELLED | OUTPATIENT
Start: 2024-05-01

## 2024-04-30 RX ORDER — LORAZEPAM 2 MG/ML
0.5 INJECTION INTRAMUSCULAR EVERY 4 HOURS PRN
Status: CANCELLED | OUTPATIENT
Start: 2024-05-01

## 2024-04-30 RX ORDER — IOPAMIDOL 755 MG/ML
94 INJECTION, SOLUTION INTRAVASCULAR ONCE
Status: COMPLETED | OUTPATIENT
Start: 2024-04-30 | End: 2024-04-30

## 2024-04-30 RX ORDER — METHYLPREDNISOLONE SODIUM SUCCINATE 125 MG/2ML
125 INJECTION, POWDER, LYOPHILIZED, FOR SOLUTION INTRAMUSCULAR; INTRAVENOUS
Status: CANCELLED
Start: 2024-05-01

## 2024-04-30 RX ORDER — ALBUTEROL SULFATE 90 UG/1
1-2 AEROSOL, METERED RESPIRATORY (INHALATION)
Status: CANCELLED
Start: 2024-05-01

## 2024-04-30 RX ADMIN — DEXAMETHASONE SODIUM PHOSPHATE: 10 INJECTION, SOLUTION INTRAMUSCULAR; INTRAVENOUS at 08:33

## 2024-04-30 RX ADMIN — CARBOPLATIN 150 MG: 600 INJECTION, SOLUTION INTRAVENOUS at 10:08

## 2024-04-30 RX ADMIN — PACLITAXEL 96 MG: 6 INJECTION, SOLUTION INTRAVENOUS at 08:58

## 2024-04-30 RX ADMIN — FAMOTIDINE 20 MG: 10 INJECTION INTRAVENOUS at 08:26

## 2024-04-30 RX ADMIN — SODIUM CHLORIDE 250 ML: 9 INJECTION, SOLUTION INTRAVENOUS at 08:25

## 2024-04-30 RX ADMIN — MAGNESIUM SULFATE 2 G: 2 INJECTION INTRAVENOUS at 09:01

## 2024-04-30 RX ADMIN — IOPAMIDOL 94 ML: 755 INJECTION, SOLUTION INTRAVASCULAR at 11:41

## 2024-04-30 RX ADMIN — POTASSIUM CHLORIDE 40 MEQ: 1500 TABLET, EXTENDED RELEASE ORAL at 09:01

## 2024-04-30 ASSESSMENT — PAIN SCALES - GENERAL: PAINLEVEL: EXTREME PAIN (8)

## 2024-04-30 NOTE — DISCHARGE INSTRUCTIONS

## 2024-04-30 NOTE — PROGRESS NOTES
Infusion Nursing Note:  Jonathan Bishop presents today for C4 D1 Taxol + Carboplatin + Magnesium + Potassium.    Patient seen by provider today: Yes: Olivia Sanchez   present during visit today: Not Applicable.    Note: Patient presents to the infusion center today after his provider visit with no new questions or concerns. Potassium resulted at 3.2 today. SecureChat with Olivia Sanchez/Anjelica Ramey RN/Belle Singh RN okay to replace potassium and magnesium per protocol today. Patient will also begin taking oral potassium at home, in addition to oral magnesium. Patient states concern with IV magnesium replacement because he had 2 days of diarrhea following his infusion last time. Olivia Sanchez states that patient can take imodium following magnesium replacement if he feels he needs too. Updated patient of this plan and he states he will pick this up over the counter if he feels he needs it.       Intravenous Access:  Peripheral IV placed.    Treatment Conditions:  Lab Results   Component Value Date    HGB 7.8 (L) 04/30/2024    WBC 3.4 (L) 04/30/2024    ANEU 1.0 (L) 03/06/2024    ANEUTAUTO 1.8 04/30/2024     (L) 04/30/2024        Lab Results   Component Value Date     04/30/2024    POTASSIUM 3.2 (L) 04/30/2024    MAG 1.3 (L) 04/30/2024    CR 1.06 04/30/2024    WARREN 9.0 04/30/2024    BILITOTAL 0.3 04/30/2024    ALBUMIN 4.0 04/30/2024    ALT 10 04/30/2024    AST 20 04/30/2024       Results reviewed, labs MET treatment parameters, ok to proceed with treatment.      Post Infusion Assessment:  Patient tolerated infusion without incident.  Blood return noted pre and post infusion.  Site patent and intact, free from redness, edema or discomfort.  No evidence of extravasations.  Access remains in place for CT Scan following infusion.       Discharge Plan:   Prescription refills given for potassium.  Discharge instructions reviewed with: Patient.  Patient and/or family verbalized understanding of  discharge instructions and all questions answered.  AVS to patient via [employer].  Patient will return 05/07/2024 for next appointment.   Patient discharged in stable condition accompanied by: self.  Departure Mode: Wheelchair.      Belle Singh RN

## 2024-04-30 NOTE — NURSING NOTE
Chief Complaint   Patient presents with    Blood Draw     Labs drawn via  by RN in lab.  VS taken       Labs collected from venipuncture by RN. Vitals taken. Checked in for appointment(s).    Gaye Rueda RN

## 2024-04-30 NOTE — PATIENT INSTRUCTIONS
[facility] Triage and after hours / weekends / holidays:  [phone]    Please call the triage or after hours line if you experience a temperature greater than or equal to 100.4, shaking chills, have uncontrolled nausea, vomiting and/or diarrhea, dizziness, shortness of breath, chest pain, bleeding, unexplained bruising, or if you have any other new/concerning symptoms, questions or concerns.      If you are having any concerning symptoms or wish to speak to a provider before your next infusion visit, please call triage to notify them so we can adequately serve you.     If you need a refill on a narcotic prescription or other medication, please call before your infusion appointment.                April 2024 Sunday Monday Tuesday Wednesday Thursday Friday Saturday        1     2    LAB PERIPHERAL  10:30 AM   (15 min.)   [facility] DRAW   [facility]    RETURN ACTIVE TREATMENT  10:45 AM   (45 min.)   Vandana Bertrand CNP   [facility]    ONC INFUSION 2 HR (120 MIN)  12:00 PM   (120 min.)    ONC INFUSION NURSE   [facility] 3     4     5     6       7     8     9    LAB PERIPHERAL  10:30 AM   (15 min.)   [facility] DRAW   [facility]    RETURN ACTIVE TREATMENT  10:45 AM   (45 min.)   Olivia Sanchez CNP   [facility]    ONC INFUSION 2 HR (120 MIN)  12:30 PM   (120 min.)    ONC INFUSION NURSE   [facility] 10     11     12     13       14     15     16     17    LAB PERIPHERAL   8:45 AM   (15 min.)   [facility] DRAW   [facility]    ONC INFUSION 2 HR (120 MIN)   9:00 AM   (120 min.)   [facility] ONC INFUSION NURSE   [facility] 18     19     20       21     22     23    LAB PERIPHERAL   9:45 AM   (15 min.)   [facility] DRAW   [facility]    ONC INFUSION 2  HR (120 MIN)  10:00 AM   (120 min.)   [facility] ONC INFUSION NURSE   [facility] 24     25     26     27       28     29     30    LAB PERIPHERAL   6:45 AM   (15 min.)   [facility] DRAW   [facility]    RETURN ACTIVE TREATMENT   6:45 AM   (45 min.)   Olivia Sanchez CNP   [facility]    ONC INFUSION 2 HR (120 MIN)   7:30 AM   (120 min.)   [facility] ONC INFUSION NURSE   [facility]    CT SOFT TISSUE NECK W  12:05 PM   (20 min.)   [facility]   [facility]    CT CHEST ABDOMEN W  12:10 PM   (20 min.)   [facility]   [facility]                                 May 2024      Raul Monday Tuesday Wednesday Thursday Friday Saturday                  1     2     3     4       5     6     7    LAB PERIPHERAL   7:00 AM   (15 min.)   [facility] DRAW   [facility]    ONC INFUSION 2 HR (120 MIN)   7:30 AM   (120 min.)   [facility] ONC INFUSION NURSE   [facility] 8     9     10     11       12     13     14    LAB PERIPHERAL   6:30 AM   (15 min.)   [facility] DRAW   [facility]    RETURN ACTIVE TREATMENT   6:45 AM   (45 min.)   Olivia Sanchez CNP   [facility]    ONC INFUSION 2 HR (120 MIN)   7:30 AM   (120 min.)   UC ONC INFUSION NURSE   [facility] 15     16     17     18       19     20     21    LAB PERIPHERAL   7:00 AM   (15 min.)   [facility] DRAW   [facility]    ONC INFUSION 2 HR (120 MIN)   7:30 AM   (120 min.)   [facility] ONC INFUSION NURSE   [facility] 22     23     24     25       26     27     28     29     30     31                         Recent Results (from the past 24 hour(s))   Comprehensive metabolic panel    Collection Time: 04/30/24   6:29 AM   Result Value Ref Range    Sodium 145 135 - 145 mmol/L    Potassium 3.2 (L) 3.4 - 5.3 mmol/L    Carbon Dioxide (CO2) 26 22 - 29 mmol/L    Anion Gap 11 7 - 15 mmol/L    Urea Nitrogen 20.1 8.0 - 23.0 mg/dL    Creatinine 1.06 0.67 - 1.17 mg/dL    GFR Estimate 72 >60 mL/min/1.73m2    Calcium 9.0 8.8 - 10.2 mg/dL    Chloride 108 (H) 98 - 107 mmol/L    Glucose 118 (H) 70 - 99 mg/dL    Alkaline Phosphatase 79 40 - 150 U/L    AST 20 0 - 45 U/L    ALT 10 0 - 70 U/L    Protein Total 6.4 6.4 - 8.3 g/dL    Albumin 4.0 3.5 - 5.2 g/dL    Bilirubin Total 0.3 <=1.2 mg/dL   Magnesium    Collection Time: 04/30/24  6:29 AM   Result Value Ref Range    Magnesium 1.3 (L) 1.7 - 2.3 mg/dL   CBC with platelets and differential    Collection Time: 04/30/24  6:29 AM   Result Value Ref Range    WBC Count 3.4 (L) 4.0 - 11.0 10e3/uL    RBC Count 2.98 (L) 4.40 - 5.90 10e6/uL    Hemoglobin 7.8 (L) 13.3 - 17.7 g/dL    Hematocrit 24.8 (L) 40.0 - 53.0 %    MCV 83 78 - 100 fL    MCH 26.2 (L) 26.5 - 33.0 pg    MCHC 31.5 31.5 - 36.5 g/dL    RDW 19.7 (H) 10.0 - 15.0 %    Platelet Count 114 (L) 150 - 450 10e3/uL    % Neutrophils 50 %    % Lymphocytes 39 %    % Monocytes 7 %    % Eosinophils 2 %    % Basophils 1 %    % Immature Granulocytes 1 %    NRBCs per 100 WBC 0 <1 /100    Absolute Neutrophils 1.8 1.6 - 8.3 10e3/uL    Absolute Lymphocytes 1.3 0.8 - 5.3 10e3/uL    Absolute Monocytes 0.2 0.0 - 1.3 10e3/uL    Absolute Eosinophils 0.1 0.0 - 0.7 10e3/uL    Absolute Basophils 0.0 0.0 - 0.2 10e3/uL    Absolute Immature Granulocytes 0.0 <=0.4 10e3/uL    Absolute NRBCs 0.0 10e3/uL

## 2024-04-30 NOTE — NURSING NOTE
"Oncology Rooming Note    April 30, 2024 6:52 AM   Joanthan Bishop is a 78 year old male who presents for:    Chief Complaint   Patient presents with    Blood Draw     Labs drawn via  by RN in lab.  VS taken    Oncology Clinic Visit     Squamous cell carcinoma of oropharynx      Initial Vitals: /69   Pulse 79   Temp 97.9  F (36.6  C) (Oral)   Resp 16   Wt 86.7 kg (191 lb 3.2 oz)   SpO2 98%   BMI 34.31 kg/m   Estimated body mass index is 34.31 kg/m  as calculated from the following:    Height as of 4/9/24: 1.59 m (5' 2.6\").    Weight as of this encounter: 86.7 kg (191 lb 3.2 oz). Body surface area is 1.96 meters squared.  Extreme Pain (8) Comment: Data Unavailable   No LMP for male patient.  Allergies reviewed: Yes  Medications reviewed: Yes    Medications: Medication refills not needed today.  Pharmacy name entered into [employer]: [id number] - [place], [place] - [address] [facility]    Frailty Screening:   Is the patient here for a new oncology consult visit in cancer care? 2. No      Clinical concerns:        Emani Deluna              "
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
## 2024-05-03 ENCOUNTER — DOCUMENTATION ONLY (OUTPATIENT)
Dept: INTERNAL MEDICINE | Facility: CLINIC | Age: 79
End: 2024-05-03
Payer: COMMERCIAL

## 2024-05-03 NOTE — PROGRESS NOTES
Type of Form Received:     Form Received (Date) 4/30/24   Form Filled out Yes, faxed 5/7   Placed in provider folder Yes

## 2024-05-06 ENCOUNTER — MEDICAL CORRESPONDENCE (OUTPATIENT)
Dept: HEALTH INFORMATION MANAGEMENT | Facility: CLINIC | Age: 79
End: 2024-05-06
Payer: COMMERCIAL
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
## 2024-05-07 ENCOUNTER — APPOINTMENT (OUTPATIENT)
Dept: LAB | Facility: CLINIC | Age: 79
End: 2024-05-07
Attending: INTERNAL MEDICINE
Payer: COMMERCIAL

## 2024-05-07 ENCOUNTER — ONCOLOGY VISIT (OUTPATIENT)
Dept: ONCOLOGY | Facility: CLINIC | Age: 79
End: 2024-05-07
Attending: INTERNAL MEDICINE
Payer: COMMERCIAL

## 2024-05-07 VITALS
HEIGHT: 63 IN | BODY MASS INDEX: 33.66 KG/M2 | SYSTOLIC BLOOD PRESSURE: 124 MMHG | DIASTOLIC BLOOD PRESSURE: 52 MMHG | TEMPERATURE: 97.7 F | HEART RATE: 76 BPM | OXYGEN SATURATION: 97 % | RESPIRATION RATE: 16 BRPM | WEIGHT: 190 LBS

## 2024-05-07 DIAGNOSIS — R63.0 ANOREXIA: ICD-10-CM

## 2024-05-07 DIAGNOSIS — C78.00 MALIGNANT NEOPLASM METASTATIC TO LUNG, UNSPECIFIED LATERALITY (H): ICD-10-CM

## 2024-05-07 DIAGNOSIS — C10.9 SQUAMOUS CELL CARCINOMA OF OROPHARYNX (H): Primary | ICD-10-CM

## 2024-05-07 DIAGNOSIS — E83.42 HYPOMAGNESEMIA: ICD-10-CM

## 2024-05-07 DIAGNOSIS — C09.9 TONSIL CANCER (H): ICD-10-CM

## 2024-05-07 PROCEDURE — G2211 COMPLEX E/M VISIT ADD ON: HCPCS | Performed by: REGISTERED NURSE

## 2024-05-07 PROCEDURE — G0463 HOSPITAL OUTPT CLINIC VISIT: HCPCS | Performed by: REGISTERED NURSE

## 2024-05-07 PROCEDURE — 99214 OFFICE O/P EST MOD 30 MIN: CPT | Performed by: REGISTERED NURSE

## 2024-05-07 ASSESSMENT — PAIN SCALES - GENERAL: PAINLEVEL: EXTREME PAIN (8)

## 2024-05-07 NOTE — NURSING NOTE
.  Chief Complaint   Patient presents with    Blood Draw     Labs drawn via  by RN in lab. VS taken.      Labs drawn via venipuncture. Vital signs taken. Checked into next appointment.   Olivia Jones RN
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
## 2024-05-09 ENCOUNTER — PATIENT OUTREACH (OUTPATIENT)
Dept: ONCOLOGY | Facility: CLINIC | Age: 79
End: 2024-05-09
Payer: COMMERCIAL

## 2024-05-22 ENCOUNTER — INFUSION THERAPY VISIT (OUTPATIENT)
Dept: ONCOLOGY | Facility: CLINIC | Age: 79
End: 2024-05-22
Attending: INTERNAL MEDICINE
Payer: COMMERCIAL

## 2024-05-22 ENCOUNTER — APPOINTMENT (OUTPATIENT)
Dept: LAB | Facility: CLINIC | Age: 79
End: 2024-05-22
Attending: INTERNAL MEDICINE
Payer: COMMERCIAL

## 2024-05-22 VITALS
SYSTOLIC BLOOD PRESSURE: 132 MMHG | RESPIRATION RATE: 16 BRPM | DIASTOLIC BLOOD PRESSURE: 72 MMHG | WEIGHT: 191.5 LBS | OXYGEN SATURATION: 95 % | BODY MASS INDEX: 34.36 KG/M2 | TEMPERATURE: 97.6 F | HEART RATE: 69 BPM

## 2024-05-22 DIAGNOSIS — E83.42 HYPOMAGNESEMIA: Primary | ICD-10-CM

## 2024-05-22 DIAGNOSIS — C78.00 MALIGNANT NEOPLASM METASTATIC TO LUNG, UNSPECIFIED LATERALITY (H): ICD-10-CM

## 2024-05-22 DIAGNOSIS — C09.9 TONSIL CANCER (H): ICD-10-CM

## 2024-05-22 DIAGNOSIS — C10.9 SQUAMOUS CELL CARCINOMA OF OROPHARYNX (H): ICD-10-CM

## 2024-05-22 DIAGNOSIS — Z13.29 SCREENING FOR HYPOTHYROIDISM: ICD-10-CM

## 2024-05-22 DIAGNOSIS — C78.00 MALIGNANT NEOPLASM METASTATIC TO LUNG, UNSPECIFIED LATERALITY (H): Primary | ICD-10-CM

## 2024-05-22 DIAGNOSIS — C09.8 MALIGNANT NEOPLASM OF OVERLAPPING SITES OF TONSIL (H): ICD-10-CM

## 2024-05-22 LAB
ALBUMIN SERPL BCG-MCNC: 4 G/DL (ref 3.5–5.2)
ALP SERPL-CCNC: 96 U/L (ref 40–150)
ALT SERPL W P-5'-P-CCNC: 12 U/L (ref 0–70)
ANION GAP SERPL CALCULATED.3IONS-SCNC: 9 MMOL/L (ref 7–15)
AST SERPL W P-5'-P-CCNC: 21 U/L (ref 0–45)
BASOPHILS # BLD AUTO: 0 10E3/UL (ref 0–0.2)
BASOPHILS NFR BLD AUTO: 0 %
BILIRUB SERPL-MCNC: 0.2 MG/DL
BUN SERPL-MCNC: 17.1 MG/DL (ref 8–23)
CALCIUM SERPL-MCNC: 9.4 MG/DL (ref 8.8–10.2)
CHLORIDE SERPL-SCNC: 105 MMOL/L (ref 98–107)
CREAT SERPL-MCNC: 0.92 MG/DL (ref 0.67–1.17)
DEPRECATED HCO3 PLAS-SCNC: 28 MMOL/L (ref 22–29)
EGFRCR SERPLBLD CKD-EPI 2021: 85 ML/MIN/1.73M2
EOSINOPHIL # BLD AUTO: 0.2 10E3/UL (ref 0–0.7)
EOSINOPHIL NFR BLD AUTO: 4 %
ERYTHROCYTE [DISTWIDTH] IN BLOOD BY AUTOMATED COUNT: 19.1 % (ref 10–15)
GLUCOSE SERPL-MCNC: 111 MG/DL (ref 70–99)
HCT VFR BLD AUTO: 28.9 % (ref 40–53)
HGB BLD-MCNC: 8.7 G/DL (ref 13.3–17.7)
IMM GRANULOCYTES # BLD: 0 10E3/UL
IMM GRANULOCYTES NFR BLD: 0 %
LYMPHOCYTES # BLD AUTO: 1.6 10E3/UL (ref 0.8–5.3)
LYMPHOCYTES NFR BLD AUTO: 27 %
MAGNESIUM SERPL-MCNC: 1.9 MG/DL (ref 1.7–2.3)
MCH RBC QN AUTO: 26.4 PG (ref 26.5–33)
MCHC RBC AUTO-ENTMCNC: 30.1 G/DL (ref 31.5–36.5)
MCV RBC AUTO: 88 FL (ref 78–100)
MONOCYTES # BLD AUTO: 0.6 10E3/UL (ref 0–1.3)
MONOCYTES NFR BLD AUTO: 10 %
NEUTROPHILS # BLD AUTO: 3.5 10E3/UL (ref 1.6–8.3)
NEUTROPHILS NFR BLD AUTO: 59 %
NRBC # BLD AUTO: 0 10E3/UL
NRBC BLD AUTO-RTO: 0 /100
PLATELET # BLD AUTO: 182 10E3/UL (ref 150–450)
POTASSIUM SERPL-SCNC: 4 MMOL/L (ref 3.4–5.3)
PROT SERPL-MCNC: 6.6 G/DL (ref 6.4–8.3)
RBC # BLD AUTO: 3.29 10E6/UL (ref 4.4–5.9)
SODIUM SERPL-SCNC: 142 MMOL/L (ref 135–145)
WBC # BLD AUTO: 5.9 10E3/UL (ref 4–11)

## 2024-05-22 PROCEDURE — G0463 HOSPITAL OUTPT CLINIC VISIT: HCPCS | Mod: 25 | Performed by: INTERNAL MEDICINE

## 2024-05-22 PROCEDURE — 99215 OFFICE O/P EST HI 40 MIN: CPT | Performed by: INTERNAL MEDICINE

## 2024-05-22 PROCEDURE — 36415 COLL VENOUS BLD VENIPUNCTURE: CPT | Performed by: INTERNAL MEDICINE

## 2024-05-22 PROCEDURE — 80053 COMPREHEN METABOLIC PANEL: CPT | Performed by: INTERNAL MEDICINE

## 2024-05-22 PROCEDURE — 250N000011 HC RX IP 250 OP 636: Performed by: INTERNAL MEDICINE

## 2024-05-22 PROCEDURE — 96413 CHEMO IV INFUSION 1 HR: CPT

## 2024-05-22 PROCEDURE — 96417 CHEMO IV INFUS EACH ADDL SEQ: CPT

## 2024-05-22 PROCEDURE — 83735 ASSAY OF MAGNESIUM: CPT

## 2024-05-22 PROCEDURE — 85025 COMPLETE CBC W/AUTO DIFF WBC: CPT | Performed by: INTERNAL MEDICINE

## 2024-05-22 PROCEDURE — 258N000003 HC RX IP 258 OP 636: Performed by: INTERNAL MEDICINE

## 2024-05-22 PROCEDURE — G2211 COMPLEX E/M VISIT ADD ON: HCPCS | Performed by: INTERNAL MEDICINE

## 2024-05-22 PROCEDURE — 96375 TX/PRO/DX INJ NEW DRUG ADDON: CPT

## 2024-05-22 RX ORDER — ALBUTEROL SULFATE 0.83 MG/ML
2.5 SOLUTION RESPIRATORY (INHALATION)
Status: CANCELLED | OUTPATIENT
Start: 2024-06-07

## 2024-05-22 RX ORDER — ALBUTEROL SULFATE 90 UG/1
1-2 AEROSOL, METERED RESPIRATORY (INHALATION)
Status: CANCELLED
Start: 2024-05-28

## 2024-05-22 RX ORDER — EPINEPHRINE 1 MG/ML
0.3 INJECTION, SOLUTION INTRAMUSCULAR; SUBCUTANEOUS EVERY 5 MIN PRN
Status: CANCELLED | OUTPATIENT
Start: 2024-05-28

## 2024-05-22 RX ORDER — METHYLPREDNISOLONE SODIUM SUCCINATE 125 MG/2ML
125 INJECTION, POWDER, LYOPHILIZED, FOR SOLUTION INTRAMUSCULAR; INTRAVENOUS
Status: CANCELLED
Start: 2024-06-07

## 2024-05-22 RX ORDER — DIPHENHYDRAMINE HYDROCHLORIDE 50 MG/ML
50 INJECTION INTRAMUSCULAR; INTRAVENOUS
Status: CANCELLED
Start: 2024-05-28

## 2024-05-22 RX ORDER — DIPHENHYDRAMINE HYDROCHLORIDE 50 MG/ML
50 INJECTION INTRAMUSCULAR; INTRAVENOUS
Status: CANCELLED
Start: 2024-06-07

## 2024-05-22 RX ORDER — EPINEPHRINE 1 MG/ML
0.3 INJECTION, SOLUTION INTRAMUSCULAR; SUBCUTANEOUS EVERY 5 MIN PRN
Status: CANCELLED | OUTPATIENT
Start: 2024-06-07

## 2024-05-22 RX ORDER — HEPARIN SODIUM,PORCINE 10 UNIT/ML
5-20 VIAL (ML) INTRAVENOUS DAILY PRN
Status: CANCELLED | OUTPATIENT
Start: 2024-05-28

## 2024-05-22 RX ORDER — HEPARIN SODIUM,PORCINE 10 UNIT/ML
5-20 VIAL (ML) INTRAVENOUS DAILY PRN
Status: CANCELLED | OUTPATIENT
Start: 2024-06-07

## 2024-05-22 RX ORDER — MEPERIDINE HYDROCHLORIDE 25 MG/ML
25 INJECTION INTRAMUSCULAR; INTRAVENOUS; SUBCUTANEOUS EVERY 30 MIN PRN
Status: CANCELLED | OUTPATIENT
Start: 2024-05-28

## 2024-05-22 RX ORDER — ALBUTEROL SULFATE 0.83 MG/ML
2.5 SOLUTION RESPIRATORY (INHALATION)
Status: CANCELLED | OUTPATIENT
Start: 2024-05-22

## 2024-05-22 RX ORDER — EPINEPHRINE 1 MG/ML
0.3 INJECTION, SOLUTION INTRAMUSCULAR; SUBCUTANEOUS EVERY 5 MIN PRN
Status: CANCELLED | OUTPATIENT
Start: 2024-05-22

## 2024-05-22 RX ORDER — ALBUTEROL SULFATE 0.83 MG/ML
2.5 SOLUTION RESPIRATORY (INHALATION)
Status: CANCELLED | OUTPATIENT
Start: 2024-05-28

## 2024-05-22 RX ORDER — HEPARIN SODIUM,PORCINE 10 UNIT/ML
5-20 VIAL (ML) INTRAVENOUS DAILY PRN
Status: CANCELLED | OUTPATIENT
Start: 2024-05-22

## 2024-05-22 RX ORDER — LORAZEPAM 2 MG/ML
0.5 INJECTION INTRAMUSCULAR EVERY 4 HOURS PRN
Status: CANCELLED | OUTPATIENT
Start: 2024-05-28

## 2024-05-22 RX ORDER — ALBUTEROL SULFATE 90 UG/1
1-2 AEROSOL, METERED RESPIRATORY (INHALATION)
Status: CANCELLED
Start: 2024-05-22

## 2024-05-22 RX ORDER — LORAZEPAM 2 MG/ML
0.5 INJECTION INTRAMUSCULAR EVERY 4 HOURS PRN
Status: CANCELLED | OUTPATIENT
Start: 2024-06-07

## 2024-05-22 RX ORDER — METHYLPREDNISOLONE SODIUM SUCCINATE 125 MG/2ML
125 INJECTION, POWDER, LYOPHILIZED, FOR SOLUTION INTRAMUSCULAR; INTRAVENOUS
Status: CANCELLED
Start: 2024-05-28

## 2024-05-22 RX ORDER — METHYLPREDNISOLONE SODIUM SUCCINATE 125 MG/2ML
125 INJECTION, POWDER, LYOPHILIZED, FOR SOLUTION INTRAMUSCULAR; INTRAVENOUS
Status: CANCELLED
Start: 2024-05-22

## 2024-05-22 RX ORDER — ALBUTEROL SULFATE 90 UG/1
1-2 AEROSOL, METERED RESPIRATORY (INHALATION)
Status: CANCELLED
Start: 2024-06-07

## 2024-05-22 RX ORDER — HEPARIN SODIUM (PORCINE) LOCK FLUSH IV SOLN 100 UNIT/ML 100 UNIT/ML
5 SOLUTION INTRAVENOUS
Status: CANCELLED | OUTPATIENT
Start: 2024-05-28

## 2024-05-22 RX ORDER — MEPERIDINE HYDROCHLORIDE 25 MG/ML
25 INJECTION INTRAMUSCULAR; INTRAVENOUS; SUBCUTANEOUS EVERY 30 MIN PRN
Status: CANCELLED | OUTPATIENT
Start: 2024-05-22

## 2024-05-22 RX ORDER — DIPHENHYDRAMINE HYDROCHLORIDE 50 MG/ML
50 INJECTION INTRAMUSCULAR; INTRAVENOUS
Status: CANCELLED
Start: 2024-05-22

## 2024-05-22 RX ORDER — HEPARIN SODIUM (PORCINE) LOCK FLUSH IV SOLN 100 UNIT/ML 100 UNIT/ML
5 SOLUTION INTRAVENOUS
Status: CANCELLED | OUTPATIENT
Start: 2024-06-07

## 2024-05-22 RX ORDER — LORAZEPAM 2 MG/ML
0.5 INJECTION INTRAMUSCULAR EVERY 4 HOURS PRN
Status: CANCELLED | OUTPATIENT
Start: 2024-05-22

## 2024-05-22 RX ORDER — HEPARIN SODIUM (PORCINE) LOCK FLUSH IV SOLN 100 UNIT/ML 100 UNIT/ML
5 SOLUTION INTRAVENOUS
Status: CANCELLED | OUTPATIENT
Start: 2024-05-22

## 2024-05-22 RX ORDER — MEPERIDINE HYDROCHLORIDE 25 MG/ML
25 INJECTION INTRAMUSCULAR; INTRAVENOUS; SUBCUTANEOUS EVERY 30 MIN PRN
Status: CANCELLED | OUTPATIENT
Start: 2024-06-07

## 2024-05-22 RX ADMIN — FAMOTIDINE 20 MG: 10 INJECTION INTRAVENOUS at 14:00

## 2024-05-22 RX ADMIN — PACLITAXEL 96 MG: 6 INJECTION, SOLUTION INTRAVENOUS at 14:51

## 2024-05-22 RX ADMIN — DEXAMETHASONE SODIUM PHOSPHATE: 10 INJECTION, SOLUTION INTRAMUSCULAR; INTRAVENOUS at 14:02

## 2024-05-22 RX ADMIN — SODIUM CHLORIDE 250 ML: 9 INJECTION, SOLUTION INTRAVENOUS at 13:59

## 2024-05-22 RX ADMIN — CARBOPLATIN 180 MG: 10 INJECTION INTRAVENOUS at 15:53

## 2024-05-22 ASSESSMENT — PAIN SCALES - GENERAL: PAINLEVEL: EXTREME PAIN (8)
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
## 2024-05-22 NOTE — PATIENT INSTRUCTIONS
Contact Numbers    [facility] Main Line (for Scheduling/Triage/After Hours Nurse Line): [phone]    Please call the [facility] nurse triage or the after hours nurse line if you experience a temperature greater than or equal to 100.4, shaking chills, have uncontrolled nausea, vomiting and/or diarrhea, dizziness, lightheadedness, shortness of breath, chest pain, bleeding, unexplained bruising, or if you have any other new/concerning symptoms, questions or concerns.     If you are having any concerning symptoms or wish to speak to a provider before your next infusion visit, please call your care coordinator or triage to notify them so we can adequately serve you.     If you need any refills on medications (narcotics or other medications), please call before your infusion appointment.          Lab Results:  Recent Results (from the past 12 hour(s))   Comprehensive metabolic panel    Collection Time: 05/22/24 12:54 PM   Result Value Ref Range    Sodium 142 135 - 145 mmol/L    Potassium 4.0 3.4 - 5.3 mmol/L    Carbon Dioxide (CO2) 28 22 - 29 mmol/L    Anion Gap 9 7 - 15 mmol/L    Urea Nitrogen 17.1 8.0 - 23.0 mg/dL    Creatinine 0.92 0.67 - 1.17 mg/dL    GFR Estimate 85 >60 mL/min/1.73m2    Calcium 9.4 8.8 - 10.2 mg/dL    Chloride 105 98 - 107 mmol/L    Glucose 111 (H) 70 - 99 mg/dL    Alkaline Phosphatase 96 40 - 150 U/L    AST 21 0 - 45 U/L    ALT 12 0 - 70 U/L    Protein Total 6.6 6.4 - 8.3 g/dL    Albumin 4.0 3.5 - 5.2 g/dL    Bilirubin Total 0.2 <=1.2 mg/dL   Magnesium    Collection Time: 05/22/24 12:54 PM   Result Value Ref Range    Magnesium 1.9 1.7 - 2.3 mg/dL   CBC with platelets and differential    Collection Time: 05/22/24 12:54 PM   Result Value Ref Range    WBC Count 5.9 4.0 - 11.0 10e3/uL    RBC Count 3.29 (L) 4.40 - 5.90 10e6/uL    Hemoglobin 8.7 (L) 13.3 - 17.7 g/dL    Hematocrit 28.9 (L) 40.0 - 53.0 %    MCV 88 78 - 100 fL    MCH 26.4 (L) 26.5 - 33.0 pg    MCHC 30.1 (L) 31.5 - 36.5 g/dL    RDW 19.1 (H) 10.0 -  15.0 %    Platelet Count 182 150 - 450 10e3/uL    % Neutrophils 59 %    % Lymphocytes 27 %    % Monocytes 10 %    % Eosinophils 4 %    % Basophils 0 %    % Immature Granulocytes 0 %    NRBCs per 100 WBC 0 <1 /100    Absolute Neutrophils 3.5 1.6 - 8.3 10e3/uL    Absolute Lymphocytes 1.6 0.8 - 5.3 10e3/uL    Absolute Monocytes 0.6 0.0 - 1.3 10e3/uL    Absolute Eosinophils 0.2 0.0 - 0.7 10e3/uL    Absolute Basophils 0.0 0.0 - 0.2 10e3/uL    Absolute Immature Granulocytes 0.0 <=0.4 10e3/uL    Absolute NRBCs 0.0 10e3/uL

## 2024-05-22 NOTE — PROGRESS NOTES
[facility]    PATIENT NAME: Jonathan Bishop  MRN # [id number]   DATE OF VISIT: May 22, 2024  YOB: 1945     Otolaryngology: Dr. Karishma Eng  Radiation Oncology: Dr. Jenni Mills  PCP: Dr. Buck Nascimento    CANCER TYPE: SCC L tonsil, p16 +lety  STAGE: [id number] (IVC)  ECOG PS: 1    PD-L1: TPS 20%, CPS 25% on [id number] tonsil bx  NGS: N/A    SUMMARY  2/26/23 L tonsil mass bx in clinic (Dr. Eng).   2/20/23 PET/CT. 3.7 x 4.0 x 5.1 cm mass L palatine tonsil causing narrowing of the oropharyngeal lumen, L level 2 node, L retropharyngeal nodular density, extension of primary mass vs retropharyngeal node, 0.8 cm RLL nodule concerning for met, mild focal uptake R iliac bone and L1 without CT correlate suspicious for mets.   3/2/23 R VATS, wedge resection. Path: SCC, poorly differentiated, associated with necrosis, 1 cm, negative margins, p16 +lety  3/24/23 MRI L spine and pelvis. Diffusely heterogeneous marrow signal throughout without corresponding abnormal signal on sagittal STIR sequence and no abnormal enhancement. Nonspecific but could be benign process such as red marrow hyperplasia, cannot completely exclude metastatic disease or infiltrative pathologic marrow process. No lesions corresponding to FDG avid spots on PET/CT.   4/19~10/18/23 Pembrolizumab.  10/26/23 CT neck and CAP. Increased L palatine tonsil mass, 3.8 x 2.7 cm --> 4.7 x 3.5 cm. Increased bilateral cervical nodes up to 2.3 x 2.4 cm R level 2 node. New 3 mm RML nodule, no other mets.   11/14/23 PET. 4.6 x 3.3 cm L palatine tonsil mass (SUB 26.8), R level 2A and 2A/3 nodes. Questionable uptake distal R femoral medullary cavity, partially imaged, with questionable subtle corresponding soft tissue attenuation on CT. MRI could be obtained to better evaluate.  11/21~12/2/23 [facility] for weakness/fall. COVID/paxlovid, MSSA bacteremia (1 of 2 bottles on 1 day), TTE negative, treated with cefazolin but didn't complete 2 week  course, episode of unresponsiveness 12/2. Dispo plan was TCU on [facility], but left AMA. CTA chest negative for PE, showed grossly unchanged cervical adenopathy. Brain MRI 11/22 for stroke code negative for infarct. Showed SVID, moderate atrophy, suboptimal contrast bolus to examine intracranial structures, 4.5 x 3 x 4.5 cm L nasopharyngeal mass, incompletely visualized. CTA negative.   12/19/23 Quad shot fraction #1. No-showed thereafter.  1/10/24~curr Carboplatin (AUC 2) + paclitaxel 60 mg/m2 weekly. Held 2/13 due to anorexia, fatigue.  3/13~3/17/24 [facility] for acute encephalopathy. Had stopped his car in the middle of the highway after receiving chemo earlier that day. Recommended not to drive   4/30/24 CT CAP and CT neck. Residual bilateral cervical adenopathy.     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 Can continue to take intermittent breaks here and there to improve tolerance. We talked about longer-term plans. Discussed that it's only a matter of time before the cancer starts growing again despite the current therapy. I suspect it will be some time this year. Options thereafter are generally going to be more toxic. We could talk about cetuximab + pembro or cetuximab + nivo as a next step. We discussed at length, and he is open to considering quad shot radiation again. If that's the case, will restage with PET/CT next time to get a full picture of the extent of disease. It is medically necessary due to the need to understand the FDG avidity of the lesions. Will do that in about 2-3 months. Continue carbo paclitaxel in the meantime.     Neurocognitive changes, recent episode of encephalopathy: Notes from hospitalization in March reviewed. We discussed the rationale and purpose of cognitive testing and neurology follow up. He does not want to do the neurocog testing.  He worries about loss of independence- people telling him he's crazy, etc. Will continue talking to him about it and encouraging.     Anorexia, malnutrition, weight loss: Weight loss has stabilized in the last couple of months. His current PCA is working out better.     Hypomag: On mag oxide.    H/o CVA: Residual L weakness. Uses walker at baseline, motorized scooter out and about.     LE edema: Much better. recently. Was exacerbated last year and couldn't get his stockings on.     Surrogate decision maker: He would want us to talk with Mariann if he could not speak for himself. Not discussed again today.    Hyperglyemia, pre-diabetes: A1c 6.0-6.3 for years last checked about 6 months ago. Per PCP     Microcytic anemia: About stable.     Peripheral neuropathy: MRI shows a lot of foramenal stenosis and spinal canal stenosis, so more likely related to that than DM2. Not worsening on paclitaxel yet    The longitudinal plan of care for the condition(s) below were addressed during this visit. Due to the added complexity in care, I will continue to support Conner in the subsequent management of this condition(s) and with the ongoing continuity of care of this condition(s): SCC tonsil      40 minutes spent by me on the date of the encounter doing chart review, review of outside records, review of test results, interpretation of tests, patient visit, documentation, orders    Dominique Mueller MD  Associate Professor of Medicine  Hematology, Oncology and Transplantation      SUBJECTIVE  Mr. Bishop returns for follow up on weekly carboplatin paclitaxel since 1/10  Doing ok  Eating ok   Legs ok - swelling controlled  Likes his new PCA. They went fishing recently.   Looking forward to his trip    Brother lives in the [place] area, works at [employer]. They're going to [place] sometime this summer    PAST MEDICAL HISTORY  SCC as above  Chronic LBP  TAVR 2020  H/o rheumatic carditis 1950  CHF. Chronic LE edema   H/o R CVA  2016, residual L sided weakness  HTN  Dyslipidemia  BPH. Nocturia once nightly   ED  Cataracts  Umbilical hernia repair 2011  Knee arthroplasty B 2010  Lumbar spine fusion, laminectomy, sciatic pain R > L  Peripheral neuropathy - from pinched nerves?  Hearing loss    CURRENT OUTPATIENT MEDICATIONS  Reviewed    ALLERGIES  No Known Allergies     PHYSICAL EXAM  /72   Pulse 69   Temp 97.6  F (36.4  C) (Oral)   Resp 16   Wt 86.9 kg (191 lb 8 oz)   SpO2 95%   BMI 34.36 kg/m    Serial weights going back 1 year reviewed.  GEN: NAD  HEENT: EOMI, no icterus, injection or pallor  EXT: trace edema bilaterally  NEURO: alert    LABORATORY AND IMAGING STUDIES    Labs were independently reviewed and interpreted by me  CMP, mg, CBC pd - all really good   Hgb 8.7, low but acceptable     CT Chest/Abdomen/Pelvis w Contrast  Narrative: EXAMINATION: CT CHEST/ABDOMEN/PELVIS W CONTRAST 4/30/2024 11:41 AM    TECHNIQUE: Helical CT images from the thoracic inlet through the  symphysis pubis were obtained with contrast. Contrast dose: Isovue 370  94 mL    COMPARISON: 2/13/2024, 11/21/2023, 11/14/2023, 2/20/2023    HISTORY: Restage SCC tonsil, on carboplatin paclitaxel; Malignant  neoplasm metastatic to lung; Squamous cell carcinoma of oropharynx.    FINDINGS:    Chest:   Numerous stable tiny hypodense thyroid nodules bilaterally. The aortic  branching pattern, heart size, pericardium, and esophagus are normal.  Prosthetic aortic valve. Mitral annular calcifications. The ascending  aorta and main pulmonary artery are not dilated. No large central  pulmonary embolism. Calcified subcarinal and hilar lymph nodes  bilaterally. Otherwise no thoracic adenopathy.    The central tracheobronchial tree is patent. Mild bronchial wall  thickening. No pneumothorax, pleural effusion, or focal airspace  consolidation. Post surgical changes of right lower lobe wedge  resection with associated linear scarring, unchanged. Scattered  calcified  granulomas throughout the lower right lung and in the left  lower lobe. No new or enlarging pulmonary nodules.    Abdomen and pelvis:   The liver, gallbladder, bile ducts, spleen, and adrenal glands appear  normal. Fatty atrophy of the pancreas. Scattered simple renal cysts.  Grossly stable ill-defined intermediate density focus in the lateral  left kidney measuring approximately 1.3 cm (series 4, image 319). Mild  diffuse bladder wall thickening may represent chronic bladder outlet  narrowing in the setting of prostatomegaly.    No intra-abdominal free air or free fluid. No abnormally dilated loops  of bowel. The appendix is normal. Moderate atherosclerotic  calcifications in the normal caliber abdominal aorta and its major  branches. The major abdominal vasculature appears patent. No  lymphadenopathy in the abdomen or pelvis.    Bones and soft tissues:   Multilevel degenerative changes throughout the spine. Postsurgical  changes of posterior spinal fusion at L4-S1 with intact hardware. No  acute or worrisome osseous lesions.  Impression: IMPRESSION:   1. No evidence of metastatic disease in the chest, abdomen, or pelvis.  2. Stable post surgical changes of right lower lobe wedge resection.  3. Unchanged intermediate density focus in the lateral left kidney,  possibly representing hemorrhagic/proteinaceous cyst versus an area of  scarring. Recommend attention on follow-up.     ORION ABREU MD         SYSTEM ID:  [id number]  CT Soft Tissue Neck w Contrast  Narrative: CT SOFT TISSUE NECK W CONTRAST 4/30/2024 11:42 AM    History:  restage SCC tonsil, on carboplatin paclitaxel; Malignant  neoplasm metastatic to lung, unspecified laterality (H); Squamous cell  carcinoma of oropharynx (H)  ICD-10: Malignant neoplasm metastatic to lung, unspecified laterality  (H); Squamous cell carcinoma of oropharynx (H)      Comparison:  Neck CT 2/13/2024, 10/26/2023, 2/20/2023     Technique: Following intravenous administration of  nonionic iodinated  contrast medium, thin section helical CT images were obtained from the  skull base down to the level of the aortic arch.  Axial, coronal and  sagittal reformations were performed with 2-3 mm slice thickness  reconstruction. Images were reviewed in soft tissue, lung and bone  windows.    Contrast: Isovue 370 94cc    Findings:   No evidence of recurrent disease at the primary site. No  abnormal/masslike enhancement at the left palatine tonsil.    Decreased size of multiple bilateral cervical lymph nodes, such as a  right level 2A lymph node measuring up to 1.2 cm on series 503 image  44, previously measuring up to 1.7 cm and left 2A hypoattenuating  lymph node which measures up to 1.7 x 1.1 cm, previously 1.9 x 1.7 cm,  image 55. No new or worsening cervical lymphadenopathy.    There are no findings to suggest a second primary in the imaged  aerodigestive tract.    Evaluation of the visualized portions of the brain, orbits, spine, and  lungs show no aggressive lesion suspicious for metastatic involvement.  Stable multilevel cervical spondylosis without overt spinal canal  stenosis    Stable right submandibular sialolith. Right pelvic team  tonsilloliths.. Subcentimeter right thyroid nodule.    The major vascular structures in the neck are patent. Mild  atherosclerosis of the proximal left internal carotid artery.    The visualized paranasal sinuses are clear. The mastoid air cells are  clear.     The visualized lung apices are clear.  Impression: Impression:  1. Stable appearance of the left palatine tonsil without evidence of  residual enhancing tumor.  2. Decreased size of multiple bilateral cervical lymph nodes. No new  or enlarging cervical lymph nodes.    I have personally reviewed the examination and initial interpretation  and I agree with the findings.    MADY JENKINS MD         SYSTEM ID:  [id number]     Imaging was personally reviewed and interpreted by me - I don't see any new lung  nodules.

## 2024-05-22 NOTE — NURSING NOTE
"Oncology Rooming Note    May 22, 2024 12:58 PM   Jonathan Bishop is a 78 year old male who presents for:    Chief Complaint   Patient presents with    Oncology Clinic Visit     Malignant neoplasm metastatic to lung, unspecified laterality; Squamous cell carcinoma of ororpharynx     Initial Vitals: /72   Pulse 69   Temp 97.6  F (36.4  C) (Oral)   Resp 16   Wt 86.9 kg (191 lb 8 oz)   SpO2 95%   BMI 34.36 kg/m   Estimated body mass index is 34.36 kg/m  as calculated from the following:    Height as of 5/7/24: 1.59 m (5' 2.6\").    Weight as of this encounter: 86.9 kg (191 lb 8 oz). Body surface area is 1.96 meters squared.  Extreme Pain (8) Comment: Data Unavailable   No LMP for male patient.  Allergies reviewed: Yes  Medications reviewed: Yes    Medications: Medication refills not needed today.  Pharmacy name entered into [employer]: [id number] - [place], [place] - [address] [facility]    Frailty Screening:   Is the patient here for a new oncology consult visit in cancer care? 2. No      Clinical concerns: none       Pili Alicia              "

## 2024-05-22 NOTE — NURSING NOTE
Chief Complaint   Patient presents with    Oncology Clinic Visit     Malignant neoplasm metastatic to lung, unspecified laterality; Squamous cell carcinoma of ororpharynx    Blood Draw     Vitals, blood drawn and PIV placed by TV, VAT. Pt checked into appt.      BRAD Vasquez LPN

## 2024-05-23 DIAGNOSIS — M54.50 ACUTE MIDLINE LOW BACK PAIN WITHOUT SCIATICA: ICD-10-CM

## 2024-05-23 RX ORDER — OXYCODONE AND ACETAMINOPHEN 5; 325 MG/1; MG/1
1-2 TABLET ORAL EVERY 6 HOURS PRN
Qty: 150 TABLET | Refills: 0 | Status: SHIPPED | OUTPATIENT
Start: 2024-05-29 | End: 2024-06-19

## 2024-05-23 NOTE — TELEPHONE ENCOUNTER
oxyCODONE-acetaminophen (PERCOCET) 5-325 MG tablet       Last Written Prescription Date:  4/29/24  Last Fill Quantity: 150,   # refills: 0  Last Office Visit : 2/19/24  Future Office visit:  8/19/24    Routing refill request to provider for review/approval because:  controlled substance

## 2024-05-23 NOTE — TELEPHONE ENCOUNTER
Health Call Center    Phone Message    May a detailed message be left on voicemail: yes     Reason for Call: Medication Refill Request    Has the patient contacted the pharmacy for the refill? Yes   Name of medication being requested:   oxyCODONE-acetaminophen (PERCOCET) 5-325 MG tablet   Provider who prescribed the medication: Dr. Sick  Pharmacy:   [facility] [address] [place] [id number] [facility]     Date medication is needed: 5/23/24

## 2024-06-06 ENCOUNTER — ONCOLOGY VISIT (OUTPATIENT)
Dept: ONCOLOGY | Facility: CLINIC | Age: 79
End: 2024-06-06
Attending: REGISTERED NURSE
Payer: COMMERCIAL

## 2024-06-06 ENCOUNTER — APPOINTMENT (OUTPATIENT)
Dept: LAB | Facility: CLINIC | Age: 79
End: 2024-06-06
Attending: REGISTERED NURSE
Payer: COMMERCIAL

## 2024-06-06 VITALS
WEIGHT: 184.7 LBS | SYSTOLIC BLOOD PRESSURE: 148 MMHG | RESPIRATION RATE: 18 BRPM | TEMPERATURE: 97.4 F | OXYGEN SATURATION: 99 % | HEART RATE: 70 BPM | DIASTOLIC BLOOD PRESSURE: 75 MMHG | BODY MASS INDEX: 33.14 KG/M2

## 2024-06-06 DIAGNOSIS — E83.42 HYPOMAGNESEMIA: ICD-10-CM

## 2024-06-06 DIAGNOSIS — R41.89 NEUROCOGNITIVE DEFICITS: ICD-10-CM

## 2024-06-06 DIAGNOSIS — C10.9 SQUAMOUS CELL CARCINOMA OF OROPHARYNX (H): ICD-10-CM

## 2024-06-06 DIAGNOSIS — C78.00 MALIGNANT NEOPLASM METASTATIC TO LUNG, UNSPECIFIED LATERALITY (H): Primary | ICD-10-CM

## 2024-06-06 DIAGNOSIS — R29.818 NEUROCOGNITIVE DEFICITS: ICD-10-CM

## 2024-06-06 DIAGNOSIS — C09.9 TONSIL CANCER (H): ICD-10-CM

## 2024-06-06 DIAGNOSIS — R60.0 PERIPHERAL EDEMA: ICD-10-CM

## 2024-06-06 DIAGNOSIS — R63.0 ANOREXIA: ICD-10-CM

## 2024-06-06 DIAGNOSIS — C10.9 SQUAMOUS CELL CARCINOMA OF OROPHARYNX (H): Primary | ICD-10-CM

## 2024-06-06 LAB
ALBUMIN SERPL BCG-MCNC: 4.3 G/DL (ref 3.5–5.2)
ALP SERPL-CCNC: 107 U/L (ref 40–150)
ALT SERPL W P-5'-P-CCNC: 14 U/L (ref 0–70)
ANION GAP SERPL CALCULATED.3IONS-SCNC: 13 MMOL/L (ref 7–15)
AST SERPL W P-5'-P-CCNC: 24 U/L (ref 0–45)
BASOPHILS # BLD AUTO: 0 10E3/UL (ref 0–0.2)
BASOPHILS NFR BLD AUTO: 1 %
BILIRUB SERPL-MCNC: 0.3 MG/DL
BUN SERPL-MCNC: 20.8 MG/DL (ref 8–23)
CALCIUM SERPL-MCNC: 9.7 MG/DL (ref 8.8–10.2)
CHLORIDE SERPL-SCNC: 100 MMOL/L (ref 98–107)
CREAT SERPL-MCNC: 1 MG/DL (ref 0.67–1.17)
DEPRECATED HCO3 PLAS-SCNC: 28 MMOL/L (ref 22–29)
EGFRCR SERPLBLD CKD-EPI 2021: 77 ML/MIN/1.73M2
EOSINOPHIL # BLD AUTO: 0.2 10E3/UL (ref 0–0.7)
EOSINOPHIL NFR BLD AUTO: 4 %
ERYTHROCYTE [DISTWIDTH] IN BLOOD BY AUTOMATED COUNT: 17.6 % (ref 10–15)
GLUCOSE SERPL-MCNC: 120 MG/DL (ref 70–99)
HCT VFR BLD AUTO: 30.4 % (ref 40–53)
HGB BLD-MCNC: 9.3 G/DL (ref 13.3–17.7)
IMM GRANULOCYTES # BLD: 0 10E3/UL
IMM GRANULOCYTES NFR BLD: 0 %
LYMPHOCYTES # BLD AUTO: 1.9 10E3/UL (ref 0.8–5.3)
LYMPHOCYTES NFR BLD AUTO: 37 %
MAGNESIUM SERPL-MCNC: 1.7 MG/DL (ref 1.7–2.3)
MCH RBC QN AUTO: 26.3 PG (ref 26.5–33)
MCHC RBC AUTO-ENTMCNC: 30.6 G/DL (ref 31.5–36.5)
MCV RBC AUTO: 86 FL (ref 78–100)
MONOCYTES # BLD AUTO: 0.6 10E3/UL (ref 0–1.3)
MONOCYTES NFR BLD AUTO: 13 %
NEUTROPHILS # BLD AUTO: 2.4 10E3/UL (ref 1.6–8.3)
NEUTROPHILS NFR BLD AUTO: 45 %
NRBC # BLD AUTO: 0 10E3/UL
NRBC BLD AUTO-RTO: 0 /100
PLATELET # BLD AUTO: 206 10E3/UL (ref 150–450)
POTASSIUM SERPL-SCNC: 3.5 MMOL/L (ref 3.4–5.3)
PROT SERPL-MCNC: 7.1 G/DL (ref 6.4–8.3)
RBC # BLD AUTO: 3.53 10E6/UL (ref 4.4–5.9)
SODIUM SERPL-SCNC: 141 MMOL/L (ref 135–145)
WBC # BLD AUTO: 5.1 10E3/UL (ref 4–11)

## 2024-06-06 PROCEDURE — 82040 ASSAY OF SERUM ALBUMIN: CPT | Performed by: INTERNAL MEDICINE

## 2024-06-06 PROCEDURE — 36415 COLL VENOUS BLD VENIPUNCTURE: CPT | Performed by: INTERNAL MEDICINE

## 2024-06-06 PROCEDURE — 85041 AUTOMATED RBC COUNT: CPT | Performed by: INTERNAL MEDICINE

## 2024-06-06 PROCEDURE — 96367 TX/PROPH/DG ADDL SEQ IV INF: CPT

## 2024-06-06 PROCEDURE — 99215 OFFICE O/P EST HI 40 MIN: CPT | Performed by: REGISTERED NURSE

## 2024-06-06 PROCEDURE — G2211 COMPLEX E/M VISIT ADD ON: HCPCS | Performed by: REGISTERED NURSE

## 2024-06-06 PROCEDURE — 96375 TX/PRO/DX INJ NEW DRUG ADDON: CPT

## 2024-06-06 PROCEDURE — G0463 HOSPITAL OUTPT CLINIC VISIT: HCPCS | Performed by: REGISTERED NURSE

## 2024-06-06 PROCEDURE — 250N000011 HC RX IP 250 OP 636: Performed by: INTERNAL MEDICINE

## 2024-06-06 PROCEDURE — 96413 CHEMO IV INFUSION 1 HR: CPT

## 2024-06-06 PROCEDURE — 83735 ASSAY OF MAGNESIUM: CPT

## 2024-06-06 PROCEDURE — 250N000011 HC RX IP 250 OP 636: Performed by: REGISTERED NURSE

## 2024-06-06 PROCEDURE — 258N000003 HC RX IP 258 OP 636: Mod: JZ | Performed by: REGISTERED NURSE

## 2024-06-06 PROCEDURE — 258N000003 HC RX IP 258 OP 636: Performed by: INTERNAL MEDICINE

## 2024-06-06 PROCEDURE — 96417 CHEMO IV INFUS EACH ADDL SEQ: CPT

## 2024-06-06 RX ADMIN — PACLITAXEL 94 MG: 6 INJECTION, SOLUTION INTRAVENOUS at 11:47

## 2024-06-06 RX ADMIN — FAMOTIDINE 20 MG: 10 INJECTION INTRAVENOUS at 10:59

## 2024-06-06 RX ADMIN — CARBOPLATIN 165 MG: 10 INJECTION INTRAVENOUS at 12:53

## 2024-06-06 RX ADMIN — SODIUM CHLORIDE 250 ML: 9 INJECTION, SOLUTION INTRAVENOUS at 11:00

## 2024-06-06 RX ADMIN — DEXAMETHASONE SODIUM PHOSPHATE: 10 INJECTION, SOLUTION INTRAMUSCULAR; INTRAVENOUS at 11:02

## 2024-06-06 ASSESSMENT — PAIN SCALES - GENERAL: PAINLEVEL: WORST PAIN (10)

## 2024-06-06 NOTE — PATIENT INSTRUCTIONS
Contact Numbers  [facility]: [phone] (for symptom and scheduling needs)    Please call the [facility] Triage line if you experience a temperature greater than or equal to 100.4, shaking chills, have uncontrolled nausea, vomiting and/or diarrhea, dizziness, shortness of breath, chest pain, bleeding, unexplained bruising, or if you have any other new/concerning symptoms, questions or concerns.     If you are having any concerning symptoms or wish to speak to a provider before your next infusion visit, please call your care coordinator or triage to notify them so we can adequately serve you.     If you need a refill on a narcotic prescription or other medication, please call triage before your infusion appointment.       June 2024 Sunday Monday Tuesday Wednesday Thursday Friday Saturday                                 1       2     3     4     5     6    LAB PERIPHERAL   8:15 AM   (15 min.)   [facility] LAB DRAW   [facility]    RETURN CCSL   8:45 AM   (45 min.)   Vandana Bertrand CNP   [facility]    ONC INFUSION 2 HR (120 MIN)  10:30 AM   (120 min.)    ONC INFUSION NURSE   [facility] 7     8       9     10     11     12    LAB PERIPHERAL   8:45 AM   (15 min.)   [facility] LAB DRAW   [facility]    ONC INFUSION 2 HR (120 MIN)   9:00 AM   (120 min.)    ONC INFUSION NURSE   [facility] 13     14     15       16     17     18    LAB PERIPHERAL   9:15 AM   (15 min.)   [facility] LAB DRAW   [facility]    RETURN CCSL   9:45 AM   (45 min.)   Olivia Sanchez CNP   [facility]    ONC INFUSION 2 HR (120 MIN)   1:00 PM   (120 min.)    ONC INFUSION NURSE   [facility] 19     20     21     22       23     24     25    LAB PERIPHERAL  10:00 AM   (15 min.)   [facility] LAB DRAW   [facility]    ONC INFUSION 2 HR (120 MIN)  10:30 AM   (120 min.)   [facility] ONC INFUSION NURSE   [facility] 26     27     28     29       30                                               July 2024 Sunday Monday Tuesday Wednesday Thursday Friday Saturday        1     2     3    LAB PERIPHERAL   8:45 AM   (15 min.)   [facility] LAB DRAW   [facility]    RETURN CCSL   9:00 AM   (45 min.)   Olivia Sanchez CNP   [facility]    ONC INFUSION 2 HR (120 MIN)  10:00 AM   (120 min.)   [facility] ONC INFUSION NURSE   [facility] 4     5     6       7     8     9     10    LAB PERIPHERAL  11:30 AM   (15 min.)   [facility] LAB DRAW   [facility]    ONC INFUSION 2 HR (120 MIN)  12:00 PM   (120 min.)   [facility] ONC INFUSION NURSE   [facility] 11     12     13       14     15     16     17    LAB PERIPHERAL   9:45 AM   (15 min.)   [facility] LAB DRAW   [facility]    RETURN CCSL  10:00 AM   (45 min.)   Olivia Sanchez, CNP   [facility]    ONC INFUSION 2 HR (120 MIN)  12:30 PM   (120 min.)   [facility] ONC INFUSION NURSE   [facility] 18     19     20       21     22     23     24    LAB PERIPHERAL  12:00 PM   (15 min.)   [facility] LAB DRAW   [facility]    ONC INFUSION 2 HR (120 MIN)  12:30 PM   (120 min.)   [facility] ONC INFUSION NURSE   [facility] 25     26     27       28     29     30    PET ONCOLOGY WHOLE BODY   9:30 AM   (30 min.)   [id number]   [facility] 31                                    Lab Results:  Recent Results (from the past 12 hour(s))   Comprehensive metabolic panel    Collection Time: 06/06/24  8:54 AM   Result Value Ref Range    Sodium 141 135 - 145 mmol/L    Potassium 3.5 3.4 - 5.3 mmol/L     Carbon Dioxide (CO2) 28 22 - 29 mmol/L    Anion Gap 13 7 - 15 mmol/L    Urea Nitrogen 20.8 8.0 - 23.0 mg/dL    Creatinine 1.00 0.67 - 1.17 mg/dL    GFR Estimate 77 >60 mL/min/1.73m2    Calcium 9.7 8.8 - 10.2 mg/dL    Chloride 100 98 - 107 mmol/L    Glucose 120 (H) 70 - 99 mg/dL    Alkaline Phosphatase 107 40 - 150 U/L    AST 24 0 - 45 U/L    ALT 14 0 - 70 U/L    Protein Total 7.1 6.4 - 8.3 g/dL    Albumin 4.3 3.5 - 5.2 g/dL    Bilirubin Total 0.3 <=1.2 mg/dL   Magnesium    Collection Time: 06/06/24  8:54 AM   Result Value Ref Range    Magnesium 1.7 1.7 - 2.3 mg/dL   CBC with platelets and differential    Collection Time: 06/06/24  8:54 AM   Result Value Ref Range    WBC Count 5.1 4.0 - 11.0 10e3/uL    RBC Count 3.53 (L) 4.40 - 5.90 10e6/uL    Hemoglobin 9.3 (L) 13.3 - 17.7 g/dL    Hematocrit 30.4 (L) 40.0 - 53.0 %    MCV 86 78 - 100 fL    MCH 26.3 (L) 26.5 - 33.0 pg    MCHC 30.6 (L) 31.5 - 36.5 g/dL    RDW 17.6 (H) 10.0 - 15.0 %    Platelet Count 206 150 - 450 10e3/uL    % Neutrophils 45 %    % Lymphocytes 37 %    % Monocytes 13 %    % Eosinophils 4 %    % Basophils 1 %    % Immature Granulocytes 0 %    NRBCs per 100 WBC 0 <1 /100    Absolute Neutrophils 2.4 1.6 - 8.3 10e3/uL    Absolute Lymphocytes 1.9 0.8 - 5.3 10e3/uL    Absolute Monocytes 0.6 0.0 - 1.3 10e3/uL    Absolute Eosinophils 0.2 0.0 - 0.7 10e3/uL    Absolute Basophils 0.0 0.0 - 0.2 10e3/uL    Absolute Immature Granulocytes 0.0 <=0.4 10e3/uL    Absolute NRBCs 0.0 10e3/uL

## 2024-06-06 NOTE — PROGRESS NOTES
[facility] CANCER [facility]    PATIENT NAME: Jonathan Bishop  MRN # [id number]   DATE OF VISIT: June 6, 2024  YOB: 1945     Otolaryngology: Dr. Karishma Eng  Radiation Oncology: Dr. Jenni Mills  PCP: Dr. Buck Nascimento    CANCER TYPE: SCC L tonsil, p16 +lety  STAGE: [id number] (IVC)  ECOG PS: 1    PD-L1: TPS 20%, CPS 25% on [id number] tonsil bx  NGS: N/A    SUMMARY  2/26/23 L tonsil mass bx in clinic (Dr. Eng).   2/20/23 PET/CT. 3.7 x 4.0 x 5.1 cm mass L palatine tonsil causing narrowing of the oropharyngeal lumen, L level 2 node, L retropharyngeal nodular density, extension of primary mass vs retropharyngeal node, 0.8 cm RLL nodule concerning for met, mild focal uptake R iliac bone and L1 without CT correlate suspicious for mets.   3/2/23 R VATS, wedge resection. Path: SCC, poorly differentiated, associated with necrosis, 1 cm, negative margins, p16 +lety  3/24/23 MRI L spine and pelvis. Diffusely heterogeneous marrow signal throughout without corresponding abnormal signal on sagittal STIR sequence and no abnormal enhancement. Nonspecific but could be benign process such as red marrow hyperplasia, cannot completely exclude metastatic disease or infiltrative pathologic marrow process. No lesions corresponding to FDG avid spots on PET/CT.   4/19~10/18/23 Pembrolizumab.  10/26/23 CT neck and CAP. Increased L palatine tonsil mass, 3.8 x 2.7 cm --> 4.7 x 3.5 cm. Increased bilateral cervical nodes up to 2.3 x 2.4 cm R level 2 node. New 3 mm RML nodule, no other mets.   11/14/23 PET. 4.6 x 3.3 cm L palatine tonsil mass (SUB 26.8), R level 2A and 2A/3 nodes. Questionable uptake distal R femoral medullary cavity, partially imaged, with questionable subtle corresponding soft tissue attenuation on CT. MRI could be obtained to better evaluate.  11/21~12/2/23 [facility] for weakness/fall. COVID/paxlovid, MSSA bacteremia (1 of 2 bottles on 1 day), TTE negative, treated with cefazolin but didn't complete 2 week  course, episode of unresponsiveness 12/2. Dispo plan was TCU on [facility], but left AMA. CTA chest negative for PE, showed grossly unchanged cervical adenopathy. Brain MRI 11/22 for stroke code negative for infarct. Showed SVID, moderate atrophy, suboptimal contrast bolus to examine intracranial structures, 4.5 x 3 x 4.5 cm L nasopharyngeal mass, incompletely visualized. CTA negative.   12/19/23 Quad shot fraction #1. No-showed thereafter.  1/10/24~curr Carboplatin (AUC 2) + paclitaxel 60 mg/m2 weekly. Held 2/13 due to anorexia, fatigue.  3/13~3/17/24 [facility] for acute encephalopathy. Had [place] his car in the middle of the [place] after receiving chemo earlier that day. Recommended not to drive   4/30/24 CT CAP and CT neck. Residual bilateral cervical adenopathy.     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 Tolerating well but will plan to continue intermittent breaks as needed. Next-line options when disease begins to progress include cetuximab + pembro or cetuximab + nivo. Dr. Mueller also discussed the possible of quad shot radiation again which Conner would consider. Will plan for PET/CT in 2-3 months.    Plan:  -Continue carbo/taxol  -MARK visits every 1-2 weeks with labs/infusion  -PET on 7/30/2024; to see Dr. Mueller after PET    Neurocognitive changes, recent episode of encephalopathy: Notes from hospitalization in March reviewed. We discussed the rationale and purpose of cognitive testing and neurology follow up. He is still against this. Will continue to discuss/encourage  -Will consider neurocognitive testing    Anorexia, malnutrition, weight loss: Down to ~6 lbs today from last visit. Eating ~1 meal per day and snacking regularly. PCA cooks meals for him. Will try to increase po nutrition.    Hypomag: Continue home mag ox supplement, 400 mg daily    H/o CVA: Residual L weakness. Uses walker at baseline,  motorized scooter out and about.     LE edema: Much better. recently. Was exacerbated last year and couldn't get his stockings on.     Surrogate decision maker: He would want us to talk with Mariann if he could not speak for himself. Not discussed today    Hyperglyemia, pre-diabetes: A1c 6.0-6.3 for years last checked about 6 months ago. Per PCP     Microcytic anemia: About stable.     Peripheral neuropathy: MRI shows a lot of foramenal stenosis and spinal canal stenosis, so more likely related to that than DM2. Neuropathy has been gradually working. Could consider starting gabapentin; however, would like to avoid any sedative effects. Will discuss with Dr. Nascimento.    NEELAM Wilson      The patient was seen in conjunction with NEELAM Wilson who served as a scribe for today's visit. I have reviewed and edited the above note, and agree with the above findings and plan.      Vandana Bertrand, CNP    ---  42 minutes spent on the date of the encounter doing chart review, review of test results, interpretation of tests, patient visit, and documentation.    The longitudinal plan of care for the diagnosis(es)/condition(s) as documented were addressed during this visit. Due to the added complexity in care, I will continue to support Conner in the subsequent management and with ongoing continuity of care.    SUBJECTIVE  Conner arrives unaccompanied  Feeling well overall  Requests refill of sildenafil today  Has worsening neuropathy at his bilateral legs; most noticeable at night.  Memory continues to gradually decline. Frequently will forget what he is doing or what he is looking for. Denies any episodes of confusion where he does not know where or who he is.   Had to strain with bowel movement yesterday; was able to pass small hard stool. Otherwise having bowel movements daily. Believes associated with dietary changes from Memorial Day weekend. Will try to eat more fiber and increase fluid intake.  Brother lives in the  [place], works at [employer]. They're going to the [email or web address] this August.     PAST MEDICAL HISTORY  SCC as above  Chronic LBP  TAVR 2020  H/o rheumatic carditis 1950  CHF. 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 Nocturia once nightly   ED  Cataracts  Umbilical hernia repair 2011  Knee arthroplasty B 2010  Lumbar spine fusion, laminectomy, sciatic pain R > L  Peripheral neuropathy - from pinched nerves?  Hearing loss    CURRENT OUTPATIENT MEDICATIONS  Reviewed    ALLERGIES  No Known Allergies     PHYSICAL EXAM  BP (!) 148/75   Pulse 70   Temp 97.4  F (36.3  C) (Oral)   Resp 18   Wt 83.8 kg (184 lb 11.2 oz)   SpO2 99%   BMI 33.14 kg/m    GEN: NAD  HEENT: EOMI, no icterus, injection or pallor  Cardio: RRR, no murmurs, rubs, or gallops  Pulm: CTA bilaterally  EXT: trace edema bilateral lower extremities  NEURO: alert, oriented to person, place, and situation. Somewhat oriented to time (when asked year, patient says \"1994, 2004, I mean two-hundred and four\").    LABORATORY AND IMAGING STUDIES  Most Recent 3 CBC's:  Recent Labs   Lab Test 06/06/24  0854 05/22/24  1254 05/07/24  0652   WBC 5.1 5.9 3.7*   HGB 9.3* 8.7* 7.9*   MCV 86 88 85    182 137*   ANEUTAUTO 2.4 3.5 1.6    Most Recent 3 BMP's:  Recent Labs   Lab Test 06/06/24  0854 05/22/24  1254 05/07/24  0652    142 144   POTASSIUM 3.5 4.0 3.9   CHLORIDE 100 105 105   CO2 28 28 27   BUN 20.8 17.1 20.0   CR 1.00 0.92 0.92   ANIONGAP 13 9 12   WARREN 9.7 9.4 9.5   * 111* 110*   PROTTOTAL 7.1 6.6 6.7   ALBUMIN 4.3 4.0 4.1    Most Recent 2 LFT's:  Recent Labs   Lab Test 06/06/24  0854 05/22/24  1254   AST 24 21   ALT 14 12   ALKPHOS 107 96   BILITOTAL 0.3 0.2    Most Recent TSH and T4:  Recent Labs   Lab Test 04/23/24  1024   TSH 0.66   T4 1.29     Phos/Mag:  Lab Results   Component Value Date    PHOS 3.0 03/17/2024    PHOS 2.4 (L) 12/09/2020    PHOS 3.9 12/08/2020    MAG 1.7 " 06/06/2024    MAG 1.9 05/22/2024    MAG 1.4 (L) 05/07/2024      I reviewed the above labs today.

## 2024-06-06 NOTE — Clinical Note
6/6/2024      Jonathan Bishop  [address]  [place] [address]      Dear Colleague,    Thank you for referring your patient, Jonathan Bishop, to the [facility] CANCER [facility]. Please see a copy of my visit note below.       [facility] CANCER [facility]    PATIENT NAME: Jonathan Bishop  MRN # [id number]   DATE OF VISIT: June 6, 2024  YOB: 1945     Otolaryngology: Dr. Karishma Eng  Radiation Oncology: Dr. Jenni Mills  PCP: Dr. Buck Nascimento    CANCER TYPE: SCC L tonsil, p16 +lety  STAGE: [id number] (IVC)  ECOG PS: 1    PD-L1: TPS 20%, CPS 25% on [id number] tonsil bx  NGS: N/A    SUMMARY  2/26/23 L tonsil mass bx in clinic (Dr. Eng).   2/20/23 PET/CT. 3.7 x 4.0 x 5.1 cm mass L palatine tonsil causing narrowing of the oropharyngeal lumen, L level 2 node, L retropharyngeal nodular density, extension of primary mass vs retropharyngeal node, 0.8 cm RLL nodule concerning for met, mild focal uptake R iliac bone and L1 without CT correlate suspicious for mets.   3/2/23 R VATS, wedge resection. Path: SCC, poorly differentiated, associated with necrosis, 1 cm, negative margins, p16 +lety  3/24/23 MRI L spine and pelvis. Diffusely heterogeneous marrow signal throughout without corresponding abnormal signal on sagittal STIR sequence and no abnormal enhancement. Nonspecific but could be benign process such as red marrow hyperplasia, cannot completely exclude metastatic disease or infiltrative pathologic marrow process. No lesions corresponding to FDG avid spots on PET/CT.   4/19~10/18/23 Pembrolizumab.  10/26/23 CT neck and CAP. Increased L palatine tonsil mass, 3.8 x 2.7 cm --> 4.7 x 3.5 cm. Increased bilateral cervical nodes up to 2.3 x 2.4 cm R level 2 node. New 3 mm RML nodule, no other mets.   11/14/23 PET. 4.6 x 3.3 cm L palatine tonsil mass (SUB 26.8), R level 2A and 2A/3 nodes. Questionable uptake distal R femoral medullary cavity, partially imaged, with questionable subtle  corresponding soft tissue attenuation on CT. MRI could be obtained to better evaluate.  11/21~12/2/23 [facility] weakness/fall. COVID/paxlovid, MSSA bacteremia (1 of 2 bottles on 1 day), TTE negative, treated with cefazolin but didn't complete 2 week course, episode of unresponsiveness 12/2. Dispo plan was TCU on [facility], but left AMA. CTA chest negative for PE, showed grossly unchanged cervical adenopathy. Brain MRI 11/22 for stroke code negative for infarct. Showed SVID, moderate atrophy, suboptimal contrast bolus to examine intracranial structures, 4.5 x 3 x 4.5 cm L nasopharyngeal mass, incompletely visualized. CTA negative.   12/19/23 Quad shot fraction #1. No-showed thereafter.  1/10/24~curr Carboplatin (AUC 2) + paclitaxel 60 mg/m2 weekly. Held 2/13 due to anorexia, fatigue.  3/13~3/17/24 [facility] for acute encephalopathy. Had stopped his car in the middle of the highway after receiving chemo earlier that day. Recommended not to drive   4/30/24 CT CAP and CT neck. Residual bilateral cervical adenopathy.     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 Tolerating well but will plan to continue intermittent breaks as needed. Next-line options when disease begins to progress include cetuximab + pembro or cetuximab + nivo. Dr. Mueller also discussed the possible of quad shot radiation again which Conner would consider. Will plan for PET/CT in 2-3 months.    Plan:  -Continue carbo/taxol  -MARK visits every 1-2 weeks with labs/infusion  -PET on 7/30/2024; to see Dr. Mueller after PET    Neurocognitive changes, recent episode of encephalopathy: Notes from hospitalization in March reviewed. We discussed the rationale and purpose of cognitive testing and neurology follow up. He is still against this. Will continue to discuss/encourage  -Will consider neurocognitive testing    Anorexia, malnutrition, weight loss: Down  to ~6 lbs today from last visit. Eating ~1 meal per day and snacking regularly. PCA cooks meals for him. Will try to increase po nutrition.    Hypomag: Continue home mag ox supplement, 400 mg daily    H/o CVA: Residual L weakness. Uses walker at baseline, motorized scooter out and about.     LE edema: Much better. recently. Was exacerbated last year and couldn't get his stockings on.     Surrogate decision maker: He would want us to talk with Mariann if he could not speak for himself. Not discussed today    Hyperglyemia, pre-diabetes: A1c 6.0-6.3 for years last checked about 6 months ago. Per PCP     Microcytic anemia: About stable.     Peripheral neuropathy: MRI shows a lot of foramenal stenosis and spinal canal stenosis, so more likely related to that than DM2. Neuropathy has been gradually working. Could consider starting gabapentin; however, would like to avoid any sedative effects. Will discuss with Dr. Nascimento.    NEELAM Wilson      The patient was seen in conjunction with NEELAM Wilson who served as a scribe for today's visit. I have reviewed and edited the above note, and agree with the above findings and plan.      Vandana Bertrand CNP    ---  42 minutes spent on the date of the encounter doing chart review, review of test results, interpretation of tests, patient visit, and documentation.    The longitudinal plan of care for the diagnosis(es)/condition(s) as documented were addressed during this visit. Due to the added complexity in care, I will continue to support Conner in the subsequent management and with ongoing continuity of care.    SUBJECTIVE  Conner arrives unaccompanied  Feeling well overall  Requests refill of sildenafil today  Has worsening neuropathy at his bilateral legs; most noticeable at night.  Memory continues to gradually decline. Frequently will forget what he is doing or what he is looking for. Denies any episodes of confusion where he does not know where or who he is.   Had to strain  "with bowel movement yesterday; was able to pass small hard stool. Otherwise having bowel movements daily. Believes associated with dietary changes from Memorial Day weekend. Will try to eat more fiber and increase fluid intake.  Brother lives in the [place] area, works at [employer]. They're going to the [employer] this August.     PAST MEDICAL HISTORY  SCC as above  Chronic LBP  TAVR 2020  H/o rheumatic carditis 1950  CHF. 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 Nocturia once nightly   ED  Cataracts  Umbilical hernia repair 2011  Knee arthroplasty B 2010  Lumbar spine fusion, laminectomy, sciatic pain R > L  Peripheral neuropathy - from pinched nerves?  Hearing loss    CURRENT OUTPATIENT MEDICATIONS  Reviewed    ALLERGIES  No Known Allergies     PHYSICAL EXAM  BP (!) 148/75   Pulse 70   Temp 97.4  F (36.3  C) (Oral)   Resp 18   Wt 83.8 kg (184 lb 11.2 oz)   SpO2 99%   BMI 33.14 kg/m    GEN: NAD  HEENT: EOMI, no icterus, injection or pallor  Cardio: RRR, no murmurs, rubs, or gallops  Pulm: CTA bilaterally  EXT: trace edema bilateral lower extremities  NEURO: alert, oriented to person, place, and situation. Somewhat oriented to time (when asked year, patient says \"1994, 2004, I mean two-hundred and four\").    LABORATORY AND IMAGING STUDIES  Most Recent 3 CBC's:  Recent Labs   Lab Test 06/06/24  0854 05/22/24  1254 05/07/24  0652   WBC 5.1 5.9 3.7*   HGB 9.3* 8.7* 7.9*   MCV 86 88 85    182 137*   ANEUTAUTO 2.4 3.5 1.6    Most Recent 3 BMP's:  Recent Labs   Lab Test 06/06/24  0854 05/22/24  1254 05/07/24  0652    142 144   POTASSIUM 3.5 4.0 3.9   CHLORIDE 100 105 105   CO2 28 28 27   BUN 20.8 17.1 20.0   CR 1.00 0.92 0.92   ANIONGAP 13 9 12   WARREN 9.7 9.4 9.5   * 111* 110*   PROTTOTAL 7.1 6.6 6.7   ALBUMIN 4.3 4.0 4.1    Most Recent 2 LFT's:  Recent Labs   Lab Test 06/06/24  0854 05/22/24  1254   AST 24 21   ALT 14 12   ALKPHOS 107 " 96   BILITOTAL 0.3 0.2    Most Recent TSH and T4:  Recent Labs   Lab Test 04/23/24  1024   TSH 0.66   T4 1.29     Phos/Mag:  Lab Results   Component Value Date    PHOS 3.0 03/17/2024    PHOS 2.4 (L) 12/09/2020    PHOS 3.9 12/08/2020    MAG 1.7 06/06/2024    MAG 1.9 05/22/2024    MAG 1.4 (L) 05/07/2024      I reviewed the above labs today.          Again, thank you for allowing me to participate in the care of your patient.        Sincerely,        Vandana Bertrand, CNP

## 2024-06-06 NOTE — NURSING NOTE
"Oncology Rooming Note    June 6, 2024 9:07 AM   oJnathan Bishop is a 78 year old male who presents for:    Chief Complaint   Patient presents with    Oncology Clinic Visit     Squamous cell carcinoma of oropharynx    Blood Draw     Vitals, blood drawn and PIV placed by NGUYEN VARMA. Pt checked into appt.      Initial Vitals: BP (!) 148/75   Pulse 70   Temp 97.4  F (36.3  C) (Oral)   Resp 18   Wt 83.8 kg (184 lb 11.2 oz)   SpO2 99%   BMI 33.14 kg/m   Estimated body mass index is 33.14 kg/m  as calculated from the following:    Height as of 5/7/24: 1.59 m (5' 2.6\").    Weight as of this encounter: 83.8 kg (184 lb 11.2 oz). Body surface area is 1.92 meters squared.  Worst Pain (10) Comment: Data Unavailable   No LMP for male patient.  Allergies reviewed: Yes  Medications reviewed: Yes    Medications: Medication refills not needed today.  Pharmacy name entered into my[facility]: [id number] - [place], [place] - [address] [facility]    Frailty Screening:   Is the patient here for a new oncology consult visit in cancer care? 2. No      Clinical concerns: Patient states there are no new concerns to discuss with provider.  Vandana was not notified.         Maranda Graf CMA              "

## 2024-06-06 NOTE — PROGRESS NOTES
Infusion Nursing Note:  Jonathan Bishop presents today for Cycle 4 Day 15 Paclitaxel/Carboplatin (#15).    Patient seen by provider today: Yes: Vandana Bertrand CNP   present during visit today: Not Applicable.    Note: Pt arrives to infusion today feeling well. Pt has no further questions or concerns following his appointment with Vandana Bertrand. Pt will proceed with treatment today.       Intravenous Access:  Peripheral IV placed.    Treatment Conditions:  Lab Results   Component Value Date    HGB 9.3 (L) 06/06/2024    WBC 5.1 06/06/2024    ANEU 1.0 (L) 03/06/2024    ANEUTAUTO 2.4 06/06/2024     06/06/2024        Lab Results   Component Value Date     06/06/2024    POTASSIUM 3.5 06/06/2024    MAG 1.7 06/06/2024    CR 1.00 06/06/2024    WARREN 9.7 06/06/2024    BILITOTAL 0.3 06/06/2024    ALBUMIN 4.3 06/06/2024    ALT 14 06/06/2024    AST 24 06/06/2024       Results reviewed, labs MET treatment parameters, ok to proceed with treatment.      Post Infusion Assessment:  Patient tolerated infusion without incident.  Blood return noted pre and post infusion.  Site patent and intact, free from redness, edema or discomfort.  No evidence of extravasations.  Access discontinued per protocol.       Discharge Plan:   Patient declined prescription refills.  Discharge instructions reviewed with: Patient.  Patient and/or family verbalized understanding of discharge instructions and all questions answered.  Copy of AVS reviewed with patient and/or family.  Patient will return 6/12 for next appointment.  Patient discharged in stable condition accompanied by: self.  Departure Mode: Wheelchair.      Ayse Amaya RN

## 2024-06-08 ENCOUNTER — HEALTH MAINTENANCE LETTER (OUTPATIENT)
Age: 79
End: 2024-06-08

## 2024-06-12 ENCOUNTER — APPOINTMENT (OUTPATIENT)
Dept: LAB | Facility: CLINIC | Age: 79
End: 2024-06-12
Attending: INTERNAL MEDICINE
Payer: COMMERCIAL

## 2024-06-12 ENCOUNTER — INFUSION THERAPY VISIT (OUTPATIENT)
Dept: ONCOLOGY | Facility: CLINIC | Age: 79
End: 2024-06-12
Attending: INTERNAL MEDICINE
Payer: COMMERCIAL

## 2024-06-12 VITALS
OXYGEN SATURATION: 99 % | RESPIRATION RATE: 18 BRPM | BODY MASS INDEX: 33.79 KG/M2 | HEART RATE: 78 BPM | DIASTOLIC BLOOD PRESSURE: 67 MMHG | SYSTOLIC BLOOD PRESSURE: 146 MMHG | WEIGHT: 188.3 LBS | TEMPERATURE: 97.5 F

## 2024-06-12 DIAGNOSIS — C10.9 SQUAMOUS CELL CARCINOMA OF OROPHARYNX (H): ICD-10-CM

## 2024-06-12 DIAGNOSIS — C09.9 TONSIL CANCER (H): ICD-10-CM

## 2024-06-12 DIAGNOSIS — E83.42 HYPOMAGNESEMIA: ICD-10-CM

## 2024-06-12 DIAGNOSIS — C78.00 MALIGNANT NEOPLASM METASTATIC TO LUNG, UNSPECIFIED LATERALITY (H): Primary | ICD-10-CM

## 2024-06-12 DIAGNOSIS — C78.00 MALIGNANT NEOPLASM METASTATIC TO LUNG, UNSPECIFIED LATERALITY (H): ICD-10-CM

## 2024-06-12 LAB
ALBUMIN SERPL BCG-MCNC: 3.9 G/DL (ref 3.5–5.2)
ALP SERPL-CCNC: 89 U/L (ref 40–150)
ALT SERPL W P-5'-P-CCNC: 14 U/L (ref 0–70)
ANION GAP SERPL CALCULATED.3IONS-SCNC: 10 MMOL/L (ref 7–15)
AST SERPL W P-5'-P-CCNC: 22 U/L (ref 0–45)
BASOPHILS # BLD AUTO: 0 10E3/UL (ref 0–0.2)
BASOPHILS NFR BLD AUTO: 1 %
BILIRUB SERPL-MCNC: 0.3 MG/DL
BUN SERPL-MCNC: 18.1 MG/DL (ref 8–23)
CALCIUM SERPL-MCNC: 9 MG/DL (ref 8.8–10.2)
CHLORIDE SERPL-SCNC: 105 MMOL/L (ref 98–107)
CREAT SERPL-MCNC: 0.82 MG/DL (ref 0.67–1.17)
DEPRECATED HCO3 PLAS-SCNC: 27 MMOL/L (ref 22–29)
EGFRCR SERPLBLD CKD-EPI 2021: 90 ML/MIN/1.73M2
EOSINOPHIL # BLD AUTO: 0.1 10E3/UL (ref 0–0.7)
EOSINOPHIL NFR BLD AUTO: 3 %
ERYTHROCYTE [DISTWIDTH] IN BLOOD BY AUTOMATED COUNT: 17 % (ref 10–15)
GLUCOSE SERPL-MCNC: 134 MG/DL (ref 70–99)
HCT VFR BLD AUTO: 28.2 % (ref 40–53)
HGB BLD-MCNC: 8.6 G/DL (ref 13.3–17.7)
IMM GRANULOCYTES # BLD: 0.1 10E3/UL
IMM GRANULOCYTES NFR BLD: 1 %
LYMPHOCYTES # BLD AUTO: 1.6 10E3/UL (ref 0.8–5.3)
LYMPHOCYTES NFR BLD AUTO: 33 %
MAGNESIUM SERPL-MCNC: 1.5 MG/DL (ref 1.7–2.3)
MCH RBC QN AUTO: 26.6 PG (ref 26.5–33)
MCHC RBC AUTO-ENTMCNC: 30.5 G/DL (ref 31.5–36.5)
MCV RBC AUTO: 87 FL (ref 78–100)
MONOCYTES # BLD AUTO: 0.3 10E3/UL (ref 0–1.3)
MONOCYTES NFR BLD AUTO: 5 %
NEUTROPHILS # BLD AUTO: 2.8 10E3/UL (ref 1.6–8.3)
NEUTROPHILS NFR BLD AUTO: 57 %
NRBC # BLD AUTO: 0 10E3/UL
NRBC BLD AUTO-RTO: 0 /100
PLATELET # BLD AUTO: 160 10E3/UL (ref 150–450)
POTASSIUM SERPL-SCNC: 3.4 MMOL/L (ref 3.4–5.3)
PROT SERPL-MCNC: 6.3 G/DL (ref 6.4–8.3)
RBC # BLD AUTO: 3.23 10E6/UL (ref 4.4–5.9)
SODIUM SERPL-SCNC: 142 MMOL/L (ref 135–145)
WBC # BLD AUTO: 4.8 10E3/UL (ref 4–11)

## 2024-06-12 PROCEDURE — 83735 ASSAY OF MAGNESIUM: CPT

## 2024-06-12 PROCEDURE — 96413 CHEMO IV INFUSION 1 HR: CPT

## 2024-06-12 PROCEDURE — 96367 TX/PROPH/DG ADDL SEQ IV INF: CPT

## 2024-06-12 PROCEDURE — 80053 COMPREHEN METABOLIC PANEL: CPT | Performed by: INTERNAL MEDICINE

## 2024-06-12 PROCEDURE — 250N000011 HC RX IP 250 OP 636: Mod: JZ | Performed by: INTERNAL MEDICINE

## 2024-06-12 PROCEDURE — 96417 CHEMO IV INFUS EACH ADDL SEQ: CPT

## 2024-06-12 PROCEDURE — 258N000003 HC RX IP 258 OP 636: Mod: JZ | Performed by: REGISTERED NURSE

## 2024-06-12 PROCEDURE — 250N000011 HC RX IP 250 OP 636: Performed by: REGISTERED NURSE

## 2024-06-12 PROCEDURE — 96375 TX/PRO/DX INJ NEW DRUG ADDON: CPT

## 2024-06-12 PROCEDURE — 36415 COLL VENOUS BLD VENIPUNCTURE: CPT | Performed by: INTERNAL MEDICINE

## 2024-06-12 PROCEDURE — 258N000003 HC RX IP 258 OP 636: Mod: JZ | Performed by: INTERNAL MEDICINE

## 2024-06-12 PROCEDURE — 85025 COMPLETE CBC W/AUTO DIFF WBC: CPT | Performed by: INTERNAL MEDICINE

## 2024-06-12 RX ORDER — MAGNESIUM OXIDE 400 MG/1
400 TABLET ORAL DAILY
Qty: 30 TABLET | Refills: 0 | Status: SHIPPED | OUTPATIENT
Start: 2024-06-12 | End: 2024-07-17

## 2024-06-12 RX ORDER — MAGNESIUM OXIDE 400 MG/1
400 TABLET ORAL 2 TIMES DAILY
Qty: 4 TABLET | Refills: 0 | Status: SHIPPED | OUTPATIENT
Start: 2024-06-12 | End: 2024-06-12 | Stop reason: ALTCHOICE

## 2024-06-12 RX ADMIN — DEXAMETHASONE SODIUM PHOSPHATE: 10 INJECTION, SOLUTION INTRAMUSCULAR; INTRAVENOUS at 10:17

## 2024-06-12 RX ADMIN — PACLITAXEL 94 MG: 6 INJECTION, SOLUTION INTRAVENOUS at 10:39

## 2024-06-12 RX ADMIN — SODIUM CHLORIDE 250 ML: 9 INJECTION, SOLUTION INTRAVENOUS at 10:02

## 2024-06-12 RX ADMIN — FAMOTIDINE 20 MG: 10 INJECTION INTRAVENOUS at 10:02

## 2024-06-12 RX ADMIN — CARBOPLATIN 200 MG: 10 INJECTION INTRAVENOUS at 11:44

## 2024-06-12 ASSESSMENT — PAIN SCALES - GENERAL: PAINLEVEL: EXTREME PAIN (8)

## 2024-06-12 NOTE — PROGRESS NOTES
Infusion Nursing Note:  Jonathan Bishop presents today for Cycle 4 Day 22 Paclitaxel/Carboplatin (#16).    Patient seen by provider today: No   present during visit today: Not Applicable.    Note: Pt comes to infusion today with no questions or concerns. Pt has been afebrile and denies signs and symptoms of infection. Pt also denies cough, SOB, sore throat, diarrhea, nausea/vomiting, bruising/unusual bleeding, rash, tinnitus, neuropathy or pain with urination. Pt is eating and hydrating without difficulty. Pt wishes to proceed with today's planned treatment.  Pt is having worsening neuropathy in his legs.    Per written communication with Vandana Bertrand/Ayse Amaya RN @1010  -she will talk to his PCP regarding possible additional medication for neuropathic pain      Intravenous Access:  Peripheral IV placed.    Treatment Conditions:  Lab Results   Component Value Date    HGB 8.6 (L) 06/12/2024    WBC 4.8 06/12/2024    ANEU 1.0 (L) 03/06/2024    ANEUTAUTO 2.8 06/12/2024     06/12/2024        Lab Results   Component Value Date     06/12/2024    POTASSIUM 3.4 06/12/2024    MAG 1.5 (L) 06/12/2024    CR 0.82 06/12/2024    WARREN 9.0 06/12/2024    BILITOTAL 0.3 06/12/2024    ALBUMIN 3.9 06/12/2024    ALT 14 06/12/2024    AST 22 06/12/2024       Results reviewed, labs MET treatment parameters, ok to proceed with treatment.    Pt has not been taking his daily oral magnesium because he ran out. New rx sent and will be picked up today. Per Vandana Bertrand, no additional magnesium replacement today.       Post Infusion Assessment:  Patient tolerated infusion without incident.  Blood return noted pre and post infusion.  Site patent and intact, free from redness, edema or discomfort.  No evidence of extravasations.  Access discontinued per protocol.       Discharge Plan:   Patient declined prescription refills.  Discharge instructions reviewed with: Patient.  Patient and/or family verbalized understanding of  discharge instructions and all questions answered.  Copy of AVS reviewed with patient and/or family.  Patient will return 6/18 for next appointment.  Patient discharged in stable condition accompanied by: self.  Departure Mode: Ambulatory with walker      Ayse Amaya RN

## 2024-06-12 NOTE — PATIENT INSTRUCTIONS
Contact Numbers  [facility]: [phone] (for symptom and scheduling needs)    Please call the [facility] Triage line if you experience a temperature greater than or equal to 100.4, shaking chills, have uncontrolled nausea, vomiting and/or diarrhea, dizziness, shortness of breath, chest pain, bleeding, unexplained bruising, or if you have any other new/concerning symptoms, questions or concerns.     If you are having any concerning symptoms or wish to speak to a provider before your next infusion visit, please call your care coordinator or triage to notify them so we can adequately serve you.     If you need a refill on a narcotic prescription or other medication, please call triage before your infusion appointment.       June 2024 Sunday Monday Tuesday Wednesday Thursday Friday Saturday                                 1       2     3     4     5     6    LAB PERIPHERAL   8:15 AM   (15 min.)   [facility] LAB DRAW   [facility]    RETURN CCSL   8:45 AM   (45 min.)   Vandana Bertrand CNP   [facility]    ONC INFUSION 2 HR (120 MIN)  10:30 AM   (120 min.)    ONC INFUSION NURSE   [facility] 7     8       9     10     11     12    LAB PERIPHERAL   8:45 AM   (15 min.)   [facility] DRAW   [facility]    ONC INFUSION 2 HR (120 MIN)   9:00 AM   (120 min.)    ONC INFUSION NURSE   [facility] 13     14     15       16     17     18    LAB PERIPHERAL   9:15 AM   (15 min.)   [facility] DRAW   [facility]    RETURN CCSL   9:45 AM   (45 min.)   Olivia Sanchez CNP   [facility]    ONC INFUSION 2 HR (120 MIN)   1:00 PM   (120 min.)    ONC INFUSION NURSE   [facility] 19     20     21     22       23     24     25    LAB PERIPHERAL  10:00 AM   (15 min.)   [facility] DRAW   [facility]    ONC INFUSION 2 HR (120 MIN)  10:30 AM   (120 min.)   [facility] ONC INFUSION NURSE   [facility] 26     27     28     29       30                                               July 2024 Sunday Monday Tuesday Wednesday Thursday Friday Saturday        1     2     3    LAB PERIPHERAL   8:45 AM   (15 min.)   [facility] DRAW   [facility]    RETURN CCSL   9:00 AM   (45 min.)   Olivia Sanchez CNP   [facility]    ONC INFUSION 2 HR (120 MIN)  10:00 AM   (120 min.)   [facility] ONC INFUSION NURSE   [facility] 4     5     6       7     8     9     10    LAB PERIPHERAL  11:30 AM   (15 min.)   [facility] DRAW   [facility]    ONC INFUSION 2 HR (120 MIN)  12:00 PM   (120 min.)   [facility] ONC INFUSION NURSE   [facility] 11     12     13       14     15     16     17    LAB PERIPHERAL   9:45 AM   (15 min.)   [facility] DRAW   [facility]    RETURN CCSL  10:00 AM   (45 min.)   Olivia Sanchez, CNP   [facility]    ONC INFUSION 2 HR (120 MIN)  12:30 PM   (120 min.)   [facility] ONC INFUSION NURSE   [facility] 18     19     20       21     22     23     24    LAB PERIPHERAL  12:00 PM   (15 min.)   [facility] DRAW   [facility]    ONC INFUSION 2 HR (120 MIN)  12:30 PM   (120 min.)   [facility] ONC INFUSION NURSE   [facility] 25     26     27       28     29     30    PET ONCOLOGY WHOLE BODY   9:30 AM   (30 min.)   [id number]   [facility] 31                                    Lab Results:  Recent Results (from the past 12 hour(s))   Comprehensive metabolic panel    Collection Time: 06/12/24  8:58 AM   Result Value Ref Range    Sodium 142 135 - 145 mmol/L    Potassium 3.4 3.4 - 5.3 mmol/L     Carbon Dioxide (CO2) 27 22 - 29 mmol/L    Anion Gap 10 7 - 15 mmol/L    Urea Nitrogen 18.1 8.0 - 23.0 mg/dL    Creatinine 0.82 0.67 - 1.17 mg/dL    GFR Estimate 90 >60 mL/min/1.73m2    Calcium 9.0 8.8 - 10.2 mg/dL    Chloride 105 98 - 107 mmol/L    Glucose 134 (H) 70 - 99 mg/dL    Alkaline Phosphatase 89 40 - 150 U/L    AST 22 0 - 45 U/L    ALT 14 0 - 70 U/L    Protein Total 6.3 (L) 6.4 - 8.3 g/dL    Albumin 3.9 3.5 - 5.2 g/dL    Bilirubin Total 0.3 <=1.2 mg/dL   Magnesium    Collection Time: 06/12/24  8:58 AM   Result Value Ref Range    Magnesium 1.5 (L) 1.7 - 2.3 mg/dL   CBC with platelets and differential    Collection Time: 06/12/24  8:58 AM   Result Value Ref Range    WBC Count 4.8 4.0 - 11.0 10e3/uL    RBC Count 3.23 (L) 4.40 - 5.90 10e6/uL    Hemoglobin 8.6 (L) 13.3 - 17.7 g/dL    Hematocrit 28.2 (L) 40.0 - 53.0 %    MCV 87 78 - 100 fL    MCH 26.6 26.5 - 33.0 pg    MCHC 30.5 (L) 31.5 - 36.5 g/dL    RDW 17.0 (H) 10.0 - 15.0 %    Platelet Count 160 150 - 450 10e3/uL    % Neutrophils 57 %    % Lymphocytes 33 %    % Monocytes 5 %    % Eosinophils 3 %    % Basophils 1 %    % Immature Granulocytes 1 %    NRBCs per 100 WBC 0 <1 /100    Absolute Neutrophils 2.8 1.6 - 8.3 10e3/uL    Absolute Lymphocytes 1.6 0.8 - 5.3 10e3/uL    Absolute Monocytes 0.3 0.0 - 1.3 10e3/uL    Absolute Eosinophils 0.1 0.0 - 0.7 10e3/uL    Absolute Basophils 0.0 0.0 - 0.2 10e3/uL    Absolute Immature Granulocytes 0.1 <=0.4 10e3/uL    Absolute NRBCs 0.0 10e3/uL

## 2024-06-12 NOTE — NURSING NOTE
Chief Complaint   Patient presents with    Blood Draw     Labs drawn with PIV start by vascular access. Pt tolerated well. Vitals taken. Patient checked into next appointment.         Emani Maynard RN

## 2024-06-14 RX ORDER — MEPERIDINE HYDROCHLORIDE 25 MG/ML
25 INJECTION INTRAMUSCULAR; INTRAVENOUS; SUBCUTANEOUS EVERY 30 MIN PRN
Status: CANCELLED | OUTPATIENT
Start: 2024-06-25

## 2024-06-14 RX ORDER — MEPERIDINE HYDROCHLORIDE 25 MG/ML
25 INJECTION INTRAMUSCULAR; INTRAVENOUS; SUBCUTANEOUS EVERY 30 MIN PRN
Status: CANCELLED | OUTPATIENT
Start: 2024-07-03

## 2024-06-14 RX ORDER — LORAZEPAM 2 MG/ML
0.5 INJECTION INTRAMUSCULAR EVERY 4 HOURS PRN
Status: CANCELLED | OUTPATIENT
Start: 2024-07-10

## 2024-06-14 RX ORDER — ALBUTEROL SULFATE 0.83 MG/ML
2.5 SOLUTION RESPIRATORY (INHALATION)
Status: CANCELLED | OUTPATIENT
Start: 2024-06-18

## 2024-06-14 RX ORDER — METHYLPREDNISOLONE SODIUM SUCCINATE 125 MG/2ML
125 INJECTION, POWDER, LYOPHILIZED, FOR SOLUTION INTRAMUSCULAR; INTRAVENOUS
Status: CANCELLED
Start: 2024-07-10

## 2024-06-14 RX ORDER — METHYLPREDNISOLONE SODIUM SUCCINATE 125 MG/2ML
125 INJECTION, POWDER, LYOPHILIZED, FOR SOLUTION INTRAMUSCULAR; INTRAVENOUS
Status: CANCELLED
Start: 2024-07-03

## 2024-06-14 RX ORDER — EPINEPHRINE 1 MG/ML
0.3 INJECTION, SOLUTION INTRAMUSCULAR; SUBCUTANEOUS EVERY 5 MIN PRN
Status: CANCELLED | OUTPATIENT
Start: 2024-07-03

## 2024-06-14 RX ORDER — HEPARIN SODIUM,PORCINE 10 UNIT/ML
5-20 VIAL (ML) INTRAVENOUS DAILY PRN
Status: CANCELLED | OUTPATIENT
Start: 2024-07-10

## 2024-06-14 RX ORDER — DIPHENHYDRAMINE HYDROCHLORIDE 50 MG/ML
50 INJECTION INTRAMUSCULAR; INTRAVENOUS
Status: CANCELLED
Start: 2024-06-25

## 2024-06-14 RX ORDER — HEPARIN SODIUM,PORCINE 10 UNIT/ML
5-20 VIAL (ML) INTRAVENOUS DAILY PRN
Status: CANCELLED | OUTPATIENT
Start: 2024-06-18

## 2024-06-14 RX ORDER — HEPARIN SODIUM,PORCINE 10 UNIT/ML
5-20 VIAL (ML) INTRAVENOUS DAILY PRN
Status: CANCELLED | OUTPATIENT
Start: 2024-07-03

## 2024-06-14 RX ORDER — HEPARIN SODIUM (PORCINE) LOCK FLUSH IV SOLN 100 UNIT/ML 100 UNIT/ML
5 SOLUTION INTRAVENOUS
Status: CANCELLED | OUTPATIENT
Start: 2024-06-18

## 2024-06-14 RX ORDER — LORAZEPAM 2 MG/ML
0.5 INJECTION INTRAMUSCULAR EVERY 4 HOURS PRN
Status: CANCELLED | OUTPATIENT
Start: 2024-07-03

## 2024-06-14 RX ORDER — ALBUTEROL SULFATE 0.83 MG/ML
2.5 SOLUTION RESPIRATORY (INHALATION)
Status: CANCELLED | OUTPATIENT
Start: 2024-06-25

## 2024-06-14 RX ORDER — DIPHENHYDRAMINE HYDROCHLORIDE 50 MG/ML
50 INJECTION INTRAMUSCULAR; INTRAVENOUS
Status: CANCELLED
Start: 2024-07-03

## 2024-06-14 RX ORDER — HEPARIN SODIUM (PORCINE) LOCK FLUSH IV SOLN 100 UNIT/ML 100 UNIT/ML
5 SOLUTION INTRAVENOUS
Status: CANCELLED | OUTPATIENT
Start: 2024-06-25

## 2024-06-14 RX ORDER — DIPHENHYDRAMINE HYDROCHLORIDE 50 MG/ML
50 INJECTION INTRAMUSCULAR; INTRAVENOUS
Status: CANCELLED
Start: 2024-07-10

## 2024-06-14 RX ORDER — MEPERIDINE HYDROCHLORIDE 25 MG/ML
25 INJECTION INTRAMUSCULAR; INTRAVENOUS; SUBCUTANEOUS EVERY 30 MIN PRN
Status: CANCELLED | OUTPATIENT
Start: 2024-07-10

## 2024-06-14 RX ORDER — ALBUTEROL SULFATE 0.83 MG/ML
2.5 SOLUTION RESPIRATORY (INHALATION)
Status: CANCELLED | OUTPATIENT
Start: 2024-07-10

## 2024-06-14 RX ORDER — EPINEPHRINE 1 MG/ML
0.3 INJECTION, SOLUTION INTRAMUSCULAR; SUBCUTANEOUS EVERY 5 MIN PRN
Status: CANCELLED | OUTPATIENT
Start: 2024-07-10

## 2024-06-14 RX ORDER — EPINEPHRINE 1 MG/ML
0.3 INJECTION, SOLUTION INTRAMUSCULAR; SUBCUTANEOUS EVERY 5 MIN PRN
Status: CANCELLED | OUTPATIENT
Start: 2024-06-25

## 2024-06-14 RX ORDER — HEPARIN SODIUM,PORCINE 10 UNIT/ML
5-20 VIAL (ML) INTRAVENOUS DAILY PRN
Status: CANCELLED | OUTPATIENT
Start: 2024-06-25

## 2024-06-14 RX ORDER — HEPARIN SODIUM (PORCINE) LOCK FLUSH IV SOLN 100 UNIT/ML 100 UNIT/ML
5 SOLUTION INTRAVENOUS
Status: CANCELLED | OUTPATIENT
Start: 2024-07-03

## 2024-06-14 RX ORDER — DIPHENHYDRAMINE HYDROCHLORIDE 50 MG/ML
50 INJECTION INTRAMUSCULAR; INTRAVENOUS
Status: CANCELLED
Start: 2024-06-18

## 2024-06-14 RX ORDER — ALBUTEROL SULFATE 90 UG/1
1-2 AEROSOL, METERED RESPIRATORY (INHALATION)
Status: CANCELLED
Start: 2024-07-03

## 2024-06-14 RX ORDER — HEPARIN SODIUM (PORCINE) LOCK FLUSH IV SOLN 100 UNIT/ML 100 UNIT/ML
5 SOLUTION INTRAVENOUS
Status: CANCELLED | OUTPATIENT
Start: 2024-07-10

## 2024-06-14 RX ORDER — MEPERIDINE HYDROCHLORIDE 25 MG/ML
25 INJECTION INTRAMUSCULAR; INTRAVENOUS; SUBCUTANEOUS EVERY 30 MIN PRN
Status: CANCELLED | OUTPATIENT
Start: 2024-06-18

## 2024-06-14 RX ORDER — LORAZEPAM 2 MG/ML
0.5 INJECTION INTRAMUSCULAR EVERY 4 HOURS PRN
Status: CANCELLED | OUTPATIENT
Start: 2024-06-25

## 2024-06-14 RX ORDER — LORAZEPAM 2 MG/ML
0.5 INJECTION INTRAMUSCULAR EVERY 4 HOURS PRN
Status: CANCELLED | OUTPATIENT
Start: 2024-06-18

## 2024-06-14 RX ORDER — ALBUTEROL SULFATE 0.83 MG/ML
2.5 SOLUTION RESPIRATORY (INHALATION)
Status: CANCELLED | OUTPATIENT
Start: 2024-07-03

## 2024-06-14 RX ORDER — EPINEPHRINE 1 MG/ML
0.3 INJECTION, SOLUTION INTRAMUSCULAR; SUBCUTANEOUS EVERY 5 MIN PRN
Status: CANCELLED | OUTPATIENT
Start: 2024-06-18

## 2024-06-14 RX ORDER — ALBUTEROL SULFATE 90 UG/1
1-2 AEROSOL, METERED RESPIRATORY (INHALATION)
Status: CANCELLED
Start: 2024-07-10

## 2024-06-14 RX ORDER — METHYLPREDNISOLONE SODIUM SUCCINATE 125 MG/2ML
125 INJECTION, POWDER, LYOPHILIZED, FOR SOLUTION INTRAMUSCULAR; INTRAVENOUS
Status: CANCELLED
Start: 2024-06-25

## 2024-06-14 RX ORDER — ALBUTEROL SULFATE 90 UG/1
1-2 AEROSOL, METERED RESPIRATORY (INHALATION)
Status: CANCELLED
Start: 2024-06-25

## 2024-06-14 RX ORDER — ALBUTEROL SULFATE 90 UG/1
1-2 AEROSOL, METERED RESPIRATORY (INHALATION)
Status: CANCELLED
Start: 2024-06-18

## 2024-06-14 RX ORDER — METHYLPREDNISOLONE SODIUM SUCCINATE 125 MG/2ML
125 INJECTION, POWDER, LYOPHILIZED, FOR SOLUTION INTRAMUSCULAR; INTRAVENOUS
Status: CANCELLED
Start: 2024-06-18

## 2024-06-18 ENCOUNTER — APPOINTMENT (OUTPATIENT)
Dept: LAB | Facility: CLINIC | Age: 79
End: 2024-06-18
Attending: NURSE PRACTITIONER
Payer: COMMERCIAL

## 2024-06-18 ENCOUNTER — ONCOLOGY VISIT (OUTPATIENT)
Dept: ONCOLOGY | Facility: CLINIC | Age: 79
End: 2024-06-18
Attending: NURSE PRACTITIONER
Payer: COMMERCIAL

## 2024-06-18 VITALS
RESPIRATION RATE: 16 BRPM | DIASTOLIC BLOOD PRESSURE: 65 MMHG | SYSTOLIC BLOOD PRESSURE: 144 MMHG | TEMPERATURE: 97.6 F | OXYGEN SATURATION: 100 % | HEART RATE: 61 BPM | WEIGHT: 186.4 LBS | BODY MASS INDEX: 33.44 KG/M2

## 2024-06-18 DIAGNOSIS — E11.9 TYPE 2 DIABETES MELLITUS WITHOUT COMPLICATION, WITHOUT LONG-TERM CURRENT USE OF INSULIN (H): ICD-10-CM

## 2024-06-18 DIAGNOSIS — C78.00 MALIGNANT NEOPLASM METASTATIC TO LUNG, UNSPECIFIED LATERALITY (H): Primary | ICD-10-CM

## 2024-06-18 DIAGNOSIS — C78.00 MALIGNANT NEOPLASM METASTATIC TO LUNG, UNSPECIFIED LATERALITY (H): ICD-10-CM

## 2024-06-18 DIAGNOSIS — C10.9 SQUAMOUS CELL CARCINOMA OF OROPHARYNX (H): Primary | ICD-10-CM

## 2024-06-18 DIAGNOSIS — G89.29 CHRONIC BILATERAL LOW BACK PAIN WITHOUT SCIATICA: ICD-10-CM

## 2024-06-18 DIAGNOSIS — C09.9 TONSIL CANCER (H): ICD-10-CM

## 2024-06-18 DIAGNOSIS — M54.50 CHRONIC BILATERAL LOW BACK PAIN WITHOUT SCIATICA: ICD-10-CM

## 2024-06-18 DIAGNOSIS — C10.9 SQUAMOUS CELL CARCINOMA OF OROPHARYNX (H): ICD-10-CM

## 2024-06-18 LAB
ALBUMIN SERPL BCG-MCNC: 4.1 G/DL (ref 3.5–5.2)
ALP SERPL-CCNC: 81 U/L (ref 40–150)
ALT SERPL W P-5'-P-CCNC: 12 U/L (ref 0–70)
ANION GAP SERPL CALCULATED.3IONS-SCNC: 11 MMOL/L (ref 7–15)
AST SERPL W P-5'-P-CCNC: 28 U/L (ref 0–45)
BASOPHILS # BLD AUTO: 0 10E3/UL (ref 0–0.2)
BASOPHILS NFR BLD AUTO: 1 %
BILIRUB SERPL-MCNC: 0.4 MG/DL
BUN SERPL-MCNC: 25.8 MG/DL (ref 8–23)
CALCIUM SERPL-MCNC: 9.4 MG/DL (ref 8.8–10.2)
CHLORIDE SERPL-SCNC: 107 MMOL/L (ref 98–107)
CREAT SERPL-MCNC: 0.83 MG/DL (ref 0.67–1.17)
DEPRECATED HCO3 PLAS-SCNC: 27 MMOL/L (ref 22–29)
EGFRCR SERPLBLD CKD-EPI 2021: 90 ML/MIN/1.73M2
EOSINOPHIL # BLD AUTO: 0.1 10E3/UL (ref 0–0.7)
EOSINOPHIL NFR BLD AUTO: 2 %
ERYTHROCYTE [DISTWIDTH] IN BLOOD BY AUTOMATED COUNT: 17.2 % (ref 10–15)
GLUCOSE SERPL-MCNC: 94 MG/DL (ref 70–99)
HCT VFR BLD AUTO: 29.1 % (ref 40–53)
HGB BLD-MCNC: 9 G/DL (ref 13.3–17.7)
IMM GRANULOCYTES # BLD: 0 10E3/UL
IMM GRANULOCYTES NFR BLD: 1 %
LYMPHOCYTES # BLD AUTO: 1.7 10E3/UL (ref 0.8–5.3)
LYMPHOCYTES NFR BLD AUTO: 39 %
MAGNESIUM SERPL-MCNC: 1.3 MG/DL (ref 1.7–2.3)
MCH RBC QN AUTO: 26.8 PG (ref 26.5–33)
MCHC RBC AUTO-ENTMCNC: 30.9 G/DL (ref 31.5–36.5)
MCV RBC AUTO: 87 FL (ref 78–100)
MONOCYTES # BLD AUTO: 0.2 10E3/UL (ref 0–1.3)
MONOCYTES NFR BLD AUTO: 5 %
NEUTROPHILS # BLD AUTO: 2.2 10E3/UL (ref 1.6–8.3)
NEUTROPHILS NFR BLD AUTO: 52 %
NRBC # BLD AUTO: 0 10E3/UL
NRBC BLD AUTO-RTO: 0 /100
PLATELET # BLD AUTO: 148 10E3/UL (ref 150–450)
POTASSIUM SERPL-SCNC: 4.1 MMOL/L (ref 3.4–5.3)
PROT SERPL-MCNC: 6.7 G/DL (ref 6.4–8.3)
RBC # BLD AUTO: 3.36 10E6/UL (ref 4.4–5.9)
SODIUM SERPL-SCNC: 145 MMOL/L (ref 135–145)
WBC # BLD AUTO: 4.3 10E3/UL (ref 4–11)

## 2024-06-18 PROCEDURE — 258N000003 HC RX IP 258 OP 636: Mod: JZ | Performed by: INTERNAL MEDICINE

## 2024-06-18 PROCEDURE — 96413 CHEMO IV INFUSION 1 HR: CPT

## 2024-06-18 PROCEDURE — 96375 TX/PRO/DX INJ NEW DRUG ADDON: CPT

## 2024-06-18 PROCEDURE — 36415 COLL VENOUS BLD VENIPUNCTURE: CPT | Performed by: INTERNAL MEDICINE

## 2024-06-18 PROCEDURE — 83735 ASSAY OF MAGNESIUM: CPT

## 2024-06-18 PROCEDURE — G0463 HOSPITAL OUTPT CLINIC VISIT: HCPCS | Mod: 25 | Performed by: NURSE PRACTITIONER

## 2024-06-18 PROCEDURE — 80053 COMPREHEN METABOLIC PANEL: CPT | Performed by: INTERNAL MEDICINE

## 2024-06-18 PROCEDURE — 250N000011 HC RX IP 250 OP 636: Performed by: NURSE PRACTITIONER

## 2024-06-18 PROCEDURE — 85041 AUTOMATED RBC COUNT: CPT | Performed by: INTERNAL MEDICINE

## 2024-06-18 PROCEDURE — 96417 CHEMO IV INFUS EACH ADDL SEQ: CPT

## 2024-06-18 PROCEDURE — G2211 COMPLEX E/M VISIT ADD ON: HCPCS | Performed by: NURSE PRACTITIONER

## 2024-06-18 PROCEDURE — 250N000011 HC RX IP 250 OP 636: Performed by: INTERNAL MEDICINE

## 2024-06-18 PROCEDURE — 99215 OFFICE O/P EST HI 40 MIN: CPT | Performed by: NURSE PRACTITIONER

## 2024-06-18 RX ORDER — MAGNESIUM SULFATE HEPTAHYDRATE 40 MG/ML
2 INJECTION, SOLUTION INTRAVENOUS ONCE
Status: COMPLETED | OUTPATIENT
Start: 2024-06-18 | End: 2024-06-18

## 2024-06-18 RX ADMIN — MAGNESIUM SULFATE 2 G: 2 INJECTION INTRAVENOUS at 11:57

## 2024-06-18 RX ADMIN — SODIUM CHLORIDE 250 ML: 9 INJECTION, SOLUTION INTRAVENOUS at 11:29

## 2024-06-18 RX ADMIN — FAMOTIDINE 20 MG: 10 INJECTION INTRAVENOUS at 11:29

## 2024-06-18 RX ADMIN — DEXAMETHASONE SODIUM PHOSPHATE: 10 INJECTION, SOLUTION INTRAMUSCULAR; INTRAVENOUS at 11:32

## 2024-06-18 RX ADMIN — PACLITAXEL 96 MG: 6 INJECTION, SOLUTION INTRAVENOUS at 11:58

## 2024-06-18 RX ADMIN — CARBOPLATIN 200 MG: 10 INJECTION, SOLUTION INTRAVENOUS at 13:01

## 2024-06-18 ASSESSMENT — PAIN SCALES - GENERAL: PAINLEVEL: EXTREME PAIN (8)

## 2024-06-18 NOTE — PROGRESS NOTES
[facility] CANCER [facility]    PATIENT NAME: Jonathan Bishop  MRN # [id number]   DATE OF VISIT: June 18, 2024  YOB: 1945     Otolaryngology: Dr. Karishma Eng  Radiation Oncology: Dr. Jenni Mills  PCP: Dr. Buck Nascimento    CANCER TYPE: SCC L tonsil, p16 +lety  STAGE: [id number] (IVC)  ECOG PS: 1    PD-L1: TPS 20%, CPS 25% on [id number] tonsil bx  NGS: N/A    SUMMARY  2/26/23 L tonsil mass bx in clinic (Dr. Eng).   2/20/23 PET/CT. 3.7 x 4.0 x 5.1 cm mass L palatine tonsil causing narrowing of the oropharyngeal lumen, L level 2 node, L retropharyngeal nodular density, extension of primary mass vs retropharyngeal node, 0.8 cm RLL nodule concerning for met, mild focal uptake R iliac bone and L1 without CT correlate suspicious for mets.   3/2/23 R VATS, wedge resection. Path: SCC, poorly differentiated, associated with necrosis, 1 cm, negative margins, p16 +lety  3/24/23 MRI L spine and pelvis. Diffusely heterogeneous marrow signal throughout without corresponding abnormal signal on sagittal STIR sequence and no abnormal enhancement. Nonspecific but could be benign process such as red marrow hyperplasia, cannot completely exclude metastatic disease or infiltrative pathologic marrow process. No lesions corresponding to FDG avid spots on PET/CT.   4/19~10/18/23 Pembrolizumab.  10/26/23 CT neck and CAP. Increased L palatine tonsil mass, 3.8 x 2.7 cm --> 4.7 x 3.5 cm. Increased bilateral cervical nodes up to 2.3 x 2.4 cm R level 2 node. New 3 mm RML nodule, no other mets.   11/14/23 PET. 4.6 x 3.3 cm L palatine tonsil mass (SUB 26.8), R level 2A and 2A/3 nodes. Questionable uptake distal R femoral medullary cavity, partially imaged, with questionable subtle corresponding soft tissue attenuation on CT. MRI could be obtained to better evaluate.  11/21~12/2/23 [facility] for weakness/fall. COVID/paxlovid, MSSA bacteremia (1 of 2 bottles on 1 day), TTE negative, treated with cefazolin but didn't complete 2 week  "course, episode of unresponsiveness 12/2. Dispo plan was TCU on [facility], but left AMA. CTA chest negative for PE, showed grossly unchanged cervical adenopathy. Brain MRI 11/22 for stroke code negative for infarct. Showed SVID, moderate atrophy, suboptimal contrast bolus to examine intracranial structures, 4.5 x 3 x 4.5 cm L nasopharyngeal mass, incompletely visualized. CTA negative.   12/19/23 Quad shot fraction #1. No-showed thereafter.  1/10/24~curr Carboplatin (AUC 2) + paclitaxel 60 mg/m2 weekly. Held 2/13 due to anorexia, fatigue.  3/13~3/17/24 [facility] for acute encephalopathy. Had stopped his car in the middle of the highway after receiving chemo earlier that day. Recommended not to drive   4/30/24 CT CAP and CT neck. Residual bilateral cervical adenopathy.     SUBJECTIVE  Conner arrives unaccompanied  Feeling well overall  Has had worsened back pain since Sunday 6/17/2024. Has had similar back pain in the past which usually improves throughout the day, heat therapy, and with Percocet prn. He is currently out of Percocet. He will contact PC provider for refills. Feels as if back pain is associated with prior back surgeries.  Worsening neuropathy in his bilateral legs over the past 3 months. Notes neuropathy is becoming \"more painful\". Worse with laying down, so he has been sitting in his recliner.  He feels as if his memory is improving. Occasionally forgets what he is looking for, but no significant confusion where he feels disoriented.  Still having intermittent constipation. Using Miralax as needed. Last bowel movement was yesterday.  Brother lives in the [place] area, works at [employer]. They're going to the [employer] this August.    PAST MEDICAL HISTORY  SCC as above  Chronic LBP  TAVR 2020  H/o rheumatic carditis 1950  CHF. 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 Nocturia once nightly   ED  Cataracts  Umbilical hernia repair 2011  Knee " arthroplasty B 2010  Lumbar spine fusion, laminectomy, sciatic pain R > L  Peripheral neuropathy - from pinched nerves?  Hearing loss    CURRENT OUTPATIENT MEDICATIONS  Reviewed    ALLERGIES  No Known Allergies     PHYSICAL EXAM  BP (!) 144/65 (BP Location: Right arm, Patient Position: Sitting, Cuff Size: Adult Regular)   Pulse 61   Temp 97.6  F (36.4  C) (Oral)   Resp 16   Wt 84.6 kg (186 lb 6.4 oz)   SpO2 100%   BMI 33.44 kg/m    GEN: NAD  HEENT: EOMI, no icterus, injection or pallor  Cardio: RRR, no murmurs, rubs, or gallops  Pulm: CTA bilaterally  EXT: trace edema bilateral lower extremities  NEURO: alert, oriented to person, place, and situation.    LABORATORY AND IMAGING STUDIES  Most Recent 3 CBC's:  Recent Labs   Lab Test 06/18/24  0938 06/12/24  0858 06/06/24  0854   WBC 4.3 4.8 5.1   HGB 9.0* 8.6* 9.3*   MCV 87 87 86   * 160 206   ANEUTAUTO 2.2 2.8 2.4    Most Recent 3 BMP's:  Recent Labs   Lab Test 06/12/24  0858 06/06/24  0854 05/22/24  1254    141 142   POTASSIUM 3.4 3.5 4.0   CHLORIDE 105 100 105   CO2 27 28 28   BUN 18.1 20.8 17.1   CR 0.82 1.00 0.92   ANIONGAP 10 13 9   WARREN 9.0 9.7 9.4   * 120* 111*   PROTTOTAL 6.3* 7.1 6.6   ALBUMIN 3.9 4.3 4.0    Most Recent 2 LFT's:  Recent Labs   Lab Test 06/12/24  0858 06/06/24  0854   AST 22 24   ALT 14 14   ALKPHOS 89 107   BILITOTAL 0.3 0.3    Most Recent TSH and T4:  Recent Labs   Lab Test 04/23/24  1024   TSH 0.66   T4 1.29     Phos/Mag:  Lab Results   Component Value Date    PHOS 3.0 03/17/2024    PHOS 2.4 (L) 12/09/2020    PHOS 3.9 12/08/2020    MAG 1.5 (L) 06/12/2024    MAG 1.7 06/06/2024    MAG 1.9 05/22/2024      I reviewed the above labs today.        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 Tolerating well but will plan to continue intermittent breaks as needed. Next-line options when disease begins to progress include cetuximab  + pembro or cetuximab + nivo. Dr. Mueller also discussed the possible of quad shot radiation again which Conner would consider. Will plan for PET/CT on 7/30/2024.  He does have some worsening neuropathy which may be related to paclitaxel versus foramenal/spinal canal stenosis or DM2. He is already on reduced paclitaxel dose; will continue to monitor at this time.    Plan:  -Continue carbo/taxol  -MARK visits every 1-2 weeks with labs/infusion  -PET on 7/30/2024; to see Dr. Mueller after PET    LBP: longstanding chronic issue. No known disease to this area. Offered XR today to rule out compression fracture but Conner very clearly declined. Reviewed reaching out to Dr. Nascimento's office for refills of his percocet.     Neurocognitive changes, recent episode of encephalopathy: Notes from hospitalization in March reviewed. We discussed the rationale and purpose of cognitive testing and neurology follow up. Patient believes neurocognitive changes have improved over the past few weeks.   -Consider neurocognitive testing    Anorexia, malnutrition, weight loss: Down to ~2 lbs today from last visit. Eating well per his report. Will continue to monitor.    Hypomag: Low at 1.3. Will increase home mag ox supplement, 800 mg in AM and 400 mg in PM.    H/o CVA: Residual L weakness. Uses walker at baseline, motorized scooter out and about.     LE edema: Somewhat worse today but overall stable; wearing compression stockings.     Surrogate decision maker: He would want us to talk with Mariann if he could not speak for himself. Not discussed today    Hyperglyemia, pre-diabetes: A1c 6.0-6.3 for years last checked about 6 months ago. Per PCP     Microcytic anemia: Stable.     Peripheral neuropathy: MRI shows a lot of foramenal stenosis and spinal canal stenosis, so more likely related to that than DM2. Neuropathy has been gradually working. Could consider starting gabapentin; however, would like to avoid any sedative effects, could discuss with   Sick. Paclitaxel may also be exacerbating his neuropathy, although his dose has already been reduced and based on exam I think stenosis is more likely bigger culprit.      NEELAM Wilson      The patient was seen in conjunction with NEELAM Wilson who served as a scribe for today's visit. I have reviewed and edited the above note, and agree with the above findings and plan.      Olivia Sanchez CNP    ---  40 minutes spent on the date of the encounter doing chart review, review of test results, interpretation of tests, patient visit, and documentation.    The longitudinal plan of care for the diagnosis(es)/condition(s) as documented were addressed during this visit. Due to the added complexity in care, I will continue to support Conner in the subsequent management and with ongoing continuity of care.

## 2024-06-18 NOTE — PROGRESS NOTES
Infusion Nursing Note:  Jonathan Bishop presents today for Cycle 5 Day 1 Taxol and Carboplatin (#17).    Patient seen by provider today: Yes: Olivia Sanchez NP   present during visit today: Not Applicable.    Note: Patient presents to infusion today doing well. No new questions or concerns following his visit with Olivia Sanchez NP.    Magnesium low at 1.3 today. 2g IV Magnesium given per electrolyte replacement protocol.     Intravenous Access:  Peripheral IV placed.    Treatment Conditions:  Lab Results   Component Value Date    HGB 9.0 (L) 06/18/2024    WBC 4.3 06/18/2024    ANEU 1.0 (L) 03/06/2024    ANEUTAUTO 2.2 06/18/2024     (L) 06/18/2024        Lab Results   Component Value Date     06/18/2024    POTASSIUM 4.1 06/18/2024    MAG 1.3 (L) 06/18/2024    CR 0.83 06/18/2024    WARREN 9.4 06/18/2024    BILITOTAL 0.4 06/18/2024    ALBUMIN 4.1 06/18/2024    ALT 12 06/18/2024    AST 28 06/18/2024     Results reviewed, labs MET treatment parameters, ok to proceed with treatment.    Post Infusion Assessment:  Patient tolerated infusion without incident.  Blood return noted pre and post infusion.  Site patent and intact, free from redness, edema or discomfort.  No evidence of extravasations.  Access discontinued per protocol.     Discharge Plan:   Patient declined prescription refills.  Discharge instructions reviewed with: Patient.  Patient and/or family verbalized understanding of discharge instructions and all questions answered.  Copy of AVS reviewed with patient and/or family.  Patient will return 6/25 for next appointment.  Patient discharged in stable condition accompanied by: self.  Departure Mode: Wheelchair.      Marielena Ramey RN

## 2024-06-18 NOTE — PATIENT INSTRUCTIONS
Contact Numbers  [facility]: [phone] (for symptom and scheduling needs)    Please call the [facility] Triage line if you experience a temperature greater than or equal to 100.4, shaking chills, have uncontrolled nausea, vomiting and/or diarrhea, dizziness, shortness of breath, chest pain, bleeding, unexplained bruising, or if you have any other new/concerning symptoms, questions or concerns.     If you are having any concerning symptoms or wish to speak to a provider before your next infusion visit, please call your care coordinator or triage to notify them so we can adequately serve you.     If you need a refill on a narcotic prescription or other medication, please call triage before your infusion appointment.           June 2024 Sunday Monday Tuesday Wednesday Thursday Friday Saturday                                 1       2     3     4     5     6    LAB PERIPHERAL   8:15 AM   (15 min.)   [facility] LAB DRAW   [facility]    RETURN CCSL   8:45 AM   (45 min.)   Vandana Bertrand CNP   [facility]    ONC INFUSION 2 HR (120 MIN)  10:30 AM   (120 min.)    ONC INFUSION NURSE   [facility] 7     8       9     10     11     12    LAB PERIPHERAL   8:45 AM   (15 min.)   [facility] DRAW   [facility]    ONC INFUSION 2 HR (120 MIN)   9:00 AM   (120 min.)    ONC INFUSION NURSE   [facility] 13     14     15       16     17     18    LAB PERIPHERAL   9:15 AM   (15 min.)   [facility] DRAW   [facility]    RETURN CCSL   9:45 AM   (45 min.)   Olivia Sanchez CNP   [facility]    ONC INFUSION 2 HR (120 MIN)   1:00 PM   (120 min.)    ONC INFUSION NURSE   [facility] 19     20     21     22       23     24     25    LAB PERIPHERAL  10:00 AM   (15 min.)   [facility] DRAW   [facility]    ONC INFUSION 2 HR (120 MIN)  10:30 AM   (120 min.)   [facility] ONC INFUSION NURSE   [facility] 26     27     28     29       30                                               July 2024 Sunday Monday Tuesday Wednesday Thursday Friday Saturday        1     2     3    LAB PERIPHERAL   8:45 AM   (15 min.)   [facility] DRAW   [facility]    RETURN CCSL   9:00 AM   (45 min.)   Olivia Sanchez CNP   [facility]    ONC INFUSION 2 HR (120 MIN)  10:00 AM   (120 min.)   [facility] ONC INFUSION NURSE   [facility] 4     5     6       7     8     9     10    LAB PERIPHERAL  11:30 AM   (15 min.)   [facility] DRAW   [facility]    ONC INFUSION 2 HR (120 MIN)  12:00 PM   (120 min.)   [facility] ONC INFUSION NURSE   [facility] 11     12     13       14     15     16     17    LAB PERIPHERAL   9:45 AM   (15 min.)   [facility] DRAW   [facility]    RETURN CCSL  10:00 AM   (45 min.)   Olivia Sanchez, CNP   [facility]    ONC INFUSION 2 HR (120 MIN)  12:30 PM   (120 min.)   [facility] ONC INFUSION NURSE   [facility] 18     19     20       21     22     23     24    LAB PERIPHERAL  12:00 PM   (15 min.)   [facility] DRAW   [facility]    ONC INFUSION 2 HR (120 MIN)  12:30 PM   (120 min.)   [facility] ONC INFUSION NURSE   [facility] 25     26     27       28     29     30    PET ONCOLOGY WHOLE BODY   9:30 AM   (30 min.)   [id number]   [facility] 31                                    Lab Results:  Recent Results (from the past 12 hour(s))   Comprehensive metabolic panel    Collection Time: 06/18/24  9:38 AM   Result Value Ref Range    Sodium 145 135 - 145 mmol/L    Potassium 4.1 3.4 - 5.3 mmol/L     Carbon Dioxide (CO2) 27 22 - 29 mmol/L    Anion Gap 11 7 - 15 mmol/L    Urea Nitrogen 25.8 (H) 8.0 - 23.0 mg/dL    Creatinine 0.83 0.67 - 1.17 mg/dL    GFR Estimate 90 >60 mL/min/1.73m2    Calcium 9.4 8.8 - 10.2 mg/dL    Chloride 107 98 - 107 mmol/L    Glucose 94 70 - 99 mg/dL    Alkaline Phosphatase 81 40 - 150 U/L    AST 28 0 - 45 U/L    ALT 12 0 - 70 U/L    Protein Total 6.7 6.4 - 8.3 g/dL    Albumin 4.1 3.5 - 5.2 g/dL    Bilirubin Total 0.4 <=1.2 mg/dL   Magnesium    Collection Time: 06/18/24  9:38 AM   Result Value Ref Range    Magnesium 1.3 (L) 1.7 - 2.3 mg/dL   CBC with platelets and differential    Collection Time: 06/18/24  9:38 AM   Result Value Ref Range    WBC Count 4.3 4.0 - 11.0 10e3/uL    RBC Count 3.36 (L) 4.40 - 5.90 10e6/uL    Hemoglobin 9.0 (L) 13.3 - 17.7 g/dL    Hematocrit 29.1 (L) 40.0 - 53.0 %    MCV 87 78 - 100 fL    MCH 26.8 26.5 - 33.0 pg    MCHC 30.9 (L) 31.5 - 36.5 g/dL    RDW 17.2 (H) 10.0 - 15.0 %    Platelet Count 148 (L) 150 - 450 10e3/uL    % Neutrophils 52 %    % Lymphocytes 39 %    % Monocytes 5 %    % Eosinophils 2 %    % Basophils 1 %    % Immature Granulocytes 1 %    NRBCs per 100 WBC 0 <1 /100    Absolute Neutrophils 2.2 1.6 - 8.3 10e3/uL    Absolute Lymphocytes 1.7 0.8 - 5.3 10e3/uL    Absolute Monocytes 0.2 0.0 - 1.3 10e3/uL    Absolute Eosinophils 0.1 0.0 - 0.7 10e3/uL    Absolute Basophils 0.0 0.0 - 0.2 10e3/uL    Absolute Immature Granulocytes 0.0 <=0.4 10e3/uL    Absolute NRBCs 0.0 10e3/uL

## 2024-06-18 NOTE — NURSING NOTE
"Oncology Rooming Note    June 18, 2024 10:03 AM   Jonathan Bishop is a 78 year old male who presents for:    Chief Complaint   Patient presents with    Blood Draw     Labs drawn via PIV placed by VAT. VS taken.    Oncology Clinic Visit     Squamous cell carcinoma of oropharynx; tonsil CA     Initial Vitals: BP (!) 144/65 (BP Location: Right arm, Patient Position: Sitting, Cuff Size: Adult Regular)   Pulse 61   Temp 97.6  F (36.4  C) (Oral)   Resp 16   Wt 84.6 kg (186 lb 6.4 oz)   SpO2 100%   BMI 33.44 kg/m   Estimated body mass index is 33.44 kg/m  as calculated from the following:    Height as of 5/7/24: 1.59 m (5' 2.6\").    Weight as of this encounter: 84.6 kg (186 lb 6.4 oz). Body surface area is 1.93 meters squared.  Extreme Pain (8) Comment: Data Unavailable   No LMP for male patient.  Allergies reviewed: Yes  Medications reviewed: Yes    Medications: MEDICATION REFILLS NEEDED TODAY. Provider was notified.  Pharmacy name entered into [facility]: [id number] - [place], [place] - [address] [facility]    Frailty Screening:   Is the patient here for a new oncology consult visit in cancer care? 2. No      Clinical concerns: refill oxycodone   Olivia was notified.      Emani Deluna              "

## 2024-06-18 NOTE — Clinical Note
6/18/2024      Jonathan Bishop  [address]  [facility] [address]      Dear Colleague,    Thank you for referring your patient, Jonathan Bishop, to the [facility]. Please see a copy of my visit note below.       [facility]    PATIENT NAME: Jonathan Bishop  MRN # [id number]   DATE OF VISIT: June 18, 2024  YOB: 1945     Otolaryngology: Dr. Karishma Eng  Radiation Oncology: Dr. Jenni Mills  PCP: Dr. Buck Nascimento    CANCER TYPE: SCC L tonsil, p16 +lety  STAGE: [id number] (IVC)  ECOG PS: 1    PD-L1: TPS 20%, CPS 25% on [id number] tonsil bx  NGS: N/A    SUMMARY  2/26/23 L tonsil mass bx in clinic (Dr. Eng).   2/20/23 PET/CT. 3.7 x 4.0 x 5.1 cm mass L palatine tonsil causing narrowing of the oropharyngeal lumen, L level 2 node, L retropharyngeal nodular density, extension of primary mass vs retropharyngeal node, 0.8 cm RLL nodule concerning for met, mild focal uptake R iliac bone and L1 without CT correlate suspicious for mets.   3/2/23 R VATS, wedge resection. Path: SCC, poorly differentiated, associated with necrosis, 1 cm, negative margins, p16 +lety  3/24/23 MRI L spine and pelvis. Diffusely heterogeneous marrow signal throughout without corresponding abnormal signal on sagittal STIR sequence and no abnormal enhancement. Nonspecific but could be benign process such as red marrow hyperplasia, cannot completely exclude metastatic disease or infiltrative pathologic marrow process. No lesions corresponding to FDG avid spots on PET/CT.   4/19~10/18/23 Pembrolizumab.  10/26/23 CT neck and CAP. Increased L palatine tonsil mass, 3.8 x 2.7 cm --> 4.7 x 3.5 cm. Increased bilateral cervical nodes up to 2.3 x 2.4 cm R level 2 node. New 3 mm RML nodule, no other mets.   11/14/23 PET. 4.6 x 3.3 cm L palatine tonsil mass (SUB 26.8), R level 2A and 2A/3 nodes. Questionable uptake distal R femoral medullary cavity, partially imaged, with questionable subtle  "corresponding soft tissue attenuation on CT. MRI could be obtained to better evaluate.  11/21~12/2/23 [facility] for weakness/fall. COVID/paxlovid, MSSA bacteremia (1 of 2 bottles on 1 day), TTE negative, treated with cefazolin but didn't complete 2 week course, episode of unresponsiveness 12/2. Dispo plan was TCU on [facility], but left AMA. CTA chest negative for PE, showed grossly unchanged cervical adenopathy. Brain MRI 11/22 for stroke code negative for infarct. Showed SVID, moderate atrophy, suboptimal contrast bolus to examine intracranial structures, 4.5 x 3 x 4.5 cm L nasopharyngeal mass, incompletely visualized. CTA negative.   12/19/23 Quad shot fraction #1. No-showed thereafter.  1/10/24~curr Carboplatin (AUC 2) + paclitaxel 60 mg/m2 weekly. Held 2/13 due to anorexia, fatigue.  3/13~3/17/24 [facility] for acute encephalopathy. Had [place] his car in the middle of the highway after receiving chemo earlier that day. Recommended not to drive   4/30/24 CT CAP and CT neck. Residual bilateral cervical adenopathy.     SUBJECTIVE  Conner arrives unaccompanied  Feeling well overall  Has had worsened back pain since Sunday 6/17/2024. Has had similar back pain in the past which usually improves throughout the day, heat therapy, and with Percocet prn. He is currently out of Percocet. He will contact Palliative provider for refills. Feels as if back pain is associated with prior back surgeries.  Worsening neuropathy in his bilateral legs over the past 3 months. Notes neuropathy is becoming \"more painful\". Worse with laying down, so he has been sitting in his recliner.  He feels as if his memory is improving. Occasionally forgets what he is looking for, but no significant confusion where he feels disoriented.  Still having intermittent constipation. Using Miralax as needed. Last bowel movement was yesterday.  Brother lives in the [place] area, works at [employer]. They're going to the [place] [employer] this " August.    PAST MEDICAL HISTORY  SCC as above  Chronic LBP  TAVR 2020  H/o rheumatic carditis 1950  CHF. 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 Nocturia once nightly   ED  Cataracts  Umbilical hernia repair 2011  Knee arthroplasty B 2010  Lumbar spine fusion, laminectomy, sciatic pain R > L  Peripheral neuropathy - from pinched nerves?  Hearing loss    CURRENT OUTPATIENT MEDICATIONS  Reviewed    ALLERGIES  No Known Allergies     PHYSICAL EXAM  BP (!) 144/65 (BP Location: Right arm, Patient Position: Sitting, Cuff Size: Adult Regular)   Pulse 61   Temp 97.6  F (36.4  C) (Oral)   Resp 16   Wt 84.6 kg (186 lb 6.4 oz)   SpO2 100%   BMI 33.44 kg/m    GEN: NAD  HEENT: EOMI, no icterus, injection or pallor  Cardio: RRR, no murmurs, rubs, or gallops  Pulm: CTA bilaterally  EXT: trace edema bilateral lower extremities  NEURO: alert, oriented to person, place, and situation.    LABORATORY AND IMAGING STUDIES  Most Recent 3 CBC's:  Recent Labs   Lab Test 06/18/24  0938 06/12/24  0858 06/06/24  0854   WBC 4.3 4.8 5.1   HGB 9.0* 8.6* 9.3*   MCV 87 87 86   * 160 206   ANEUTAUTO 2.2 2.8 2.4    Most Recent 3 BMP's:  Recent Labs   Lab Test 06/12/24  0858 06/06/24  0854 05/22/24  1254    141 142   POTASSIUM 3.4 3.5 4.0   CHLORIDE 105 100 105   CO2 27 28 28   BUN 18.1 20.8 17.1   CR 0.82 1.00 0.92   ANIONGAP 10 13 9   WARREN 9.0 9.7 9.4   * 120* 111*   PROTTOTAL 6.3* 7.1 6.6   ALBUMIN 3.9 4.3 4.0    Most Recent 2 LFT's:  Recent Labs   Lab Test 06/12/24  0858 06/06/24  0854   AST 22 24   ALT 14 14   ALKPHOS 89 107   BILITOTAL 0.3 0.3    Most Recent TSH and T4:  Recent Labs   Lab Test 04/23/24  1024   TSH 0.66   T4 1.29     Phos/Mag:  Lab Results   Component Value Date    PHOS 3.0 03/17/2024    PHOS 2.4 (L) 12/09/2020    PHOS 3.9 12/08/2020    MAG 1.5 (L) 06/12/2024    MAG 1.7 06/06/2024    MAG 1.9 05/22/2024      I reviewed the above labs today.        ASSESSMENT AND  PLAN  SCC L tonsil, p16 +lety, CPS 25%/TPS 20%, oligometastatic lung met, +/- bone mets: Stable disease on carbo/paclitaxel. Tolerating well but will plan to continue intermittent breaks as needed. Next-line options when disease begins to progress include cetuximab + pembro or cetuximab + nivo. Dr. Mueller also discussed the possible of quad shot radiation again which Conner would consider. Will plan for PET/CT on 7/30/2024.  He does have some worsening neuropathy which may be related to paclitaxel versus foramenal/spinal canal stenosis or DM2. He is already on reduced paclitaxel dose; will continue to monitor at this time.    Plan:  -Continue carbo/taxol  -MARK visits every 1-2 weeks with labs/infusion  -PET on 7/30/2024; to see Dr. Mueller after PET    Neurocognitive changes, recent episode of encephalopathy: Notes from hospitalization in March reviewed. We discussed the rationale and purpose of cognitive testing and neurology follow up. Patient believes neurocognitive changes have improved over the past few weeks.   -Consider neurocognitive testing    Anorexia, malnutrition, weight loss: Down to ~2 lbs today from last visit. Eating well. Will continue to monitor.    Hypomag: Low at 1.3. Will increase home mag ox supplement, 800 mg in AM and 400 mg in PM.    H/o CVA: Residual L weakness. Uses walker at baseline, motorized scooter out and about.     LE edema: Somewhat worse today but overall stable; wearing compression stockings.     Surrogate decision maker: He would want us to talk with Mariann if he could not speak for himself. Not discussed today    Hyperglyemia, pre-diabetes: A1c 6.0-6.3 for years last checked about 6 months ago. Per PCP     Microcytic anemia: Stable.     Peripheral neuropathy: MRI shows a lot of foramenal stenosis and spinal canal stenosis, so more likely related to that than DM2. Neuropathy has been gradually working. Could consider starting gabapentin; however, would like to avoid any sedative  effects. Will discuss with Dr. Nascimento. Paclitaxel may also be exacerbating his neuropathy, although his dose has already been reduced.       NEELAM Wilson      The patient was seen in conjunction with NEELAM Wilson who served as a scribe for today's visit. I have reviewed and edited the above note, and agree with the above findings and plan.      Olivia Sanchez CNP    ---  42 minutes spent on the date of the encounter doing chart review, review of test results, interpretation of tests, patient visit, and documentation.    The longitudinal plan of care for the diagnosis(es)/condition(s) as documented were addressed during this visit. Due to the added complexity in care, I will continue to support Conner in the subsequent management and with ongoing continuity of care.           [facility]    PATIENT NAME: Jonathan Bishop  MRN # [id number]   DATE OF VISIT: June 18, 2024  YOB: 1945     Otolaryngology: Dr. Karishma Eng  Radiation Oncology: Dr. Jenni Mills  PCP: Dr. Buck Nascimento    CANCER TYPE: SCC L tonsil, p16 +lety  STAGE: [id number] (IVC)  ECOG PS: 1    PD-L1: TPS 20%, CPS 25% on [id number] tonsil bx  NGS: N/A    SUMMARY  2/26/23 L tonsil mass bx in clinic (Dr. Eng).   2/20/23 PET/CT. 3.7 x 4.0 x 5.1 cm mass L palatine tonsil causing narrowing of the oropharyngeal lumen, L level 2 node, L retropharyngeal nodular density, extension of primary mass vs retropharyngeal node, 0.8 cm RLL nodule concerning for met, mild focal uptake R iliac bone and L1 without CT correlate suspicious for mets.   3/2/23 R VATS, wedge resection. Path: SCC, poorly differentiated, associated with necrosis, 1 cm, negative margins, p16 +lety  3/24/23 MRI L spine and pelvis. Diffusely heterogeneous marrow signal throughout without corresponding abnormal signal on sagittal STIR sequence and no abnormal enhancement. Nonspecific but could be benign process such as red marrow hyperplasia, cannot completely exclude  metastatic disease or infiltrative pathologic marrow process. No lesions corresponding to FDG avid spots on PET/CT.   4/19~10/18/23 Pembrolizumab.  10/26/23 CT neck and CAP. Increased L palatine tonsil mass, 3.8 x 2.7 cm --> 4.7 x 3.5 cm. Increased bilateral cervical nodes up to 2.3 x 2.4 cm R level 2 node. New 3 mm RML nodule, no other mets.   11/14/23 PET. 4.6 x 3.3 cm L palatine tonsil mass (SUB 26.8), R level 2A and 2A/3 nodes. Questionable uptake distal R femoral medullary cavity, partially imaged, with questionable subtle corresponding soft tissue attenuation on CT. MRI could be obtained to better evaluate.  11/21~12/2/23 [facility] for weakness/fall. COVID/paxlovid, MSSA bacteremia (1 of 2 bottles on 1 day), TTE negative, treated with cefazolin but didn't complete 2 week course, episode of unresponsiveness 12/2. Dispo plan was TCU on [facility], but left AMA. CTA chest negative for PE, showed grossly unchanged cervical adenopathy. Brain MRI 11/22 for stroke code negative for infarct. Showed SVID, moderate atrophy, suboptimal contrast bolus to examine intracranial structures, 4.5 x 3 x 4.5 cm L nasopharyngeal mass, incompletely visualized. CTA negative.   12/19/23 Quad shot fraction #1. No-showed thereafter.  1/10/24~curr Carboplatin (AUC 2) + paclitaxel 60 mg/m2 weekly. Held 2/13 due to anorexia, fatigue.  3/13~3/17/24 [facility] for acute encephalopathy. Had stopped his car in the middle of the highway after receiving chemo earlier that day. Recommended not to drive   4/30/24 CT CAP and CT neck. Residual bilateral cervical adenopathy.     SUBJECTIVE  Conner arrives unaccompanied  Feeling well overall  Has had worsened back pain since Sunday 6/17/2024. Has had similar back pain in the past which usually improves throughout the day, heat therapy, and with Percocet prn. He is currently out of Percocet. He will contact PC provider for refills. Feels as if back pain is associated with prior back  "surgeries.  Worsening neuropathy in his bilateral legs over the past 3 months. Notes neuropathy is becoming \"more painful\". Worse with laying down, so he has been sitting in his recliner.  He feels as if his memory is improving. Occasionally forgets what he is looking for, but no significant confusion where he feels disoriented.  Still having intermittent constipation. Using Miralax as needed. Last bowel movement was yesterday.  Brother lives in the [place] area, works at [employer]. They're going to the [place] this August.    PAST MEDICAL HISTORY  SCC as above  Chronic LBP  TAVR 2020  H/o rheumatic carditis 1950  CHF. 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 Nocturia once nightly   ED  Cataracts  Umbilical hernia repair 2011  Knee arthroplasty B 2010  Lumbar spine fusion, laminectomy, sciatic pain R > L  Peripheral neuropathy - from pinched nerves?  Hearing loss    CURRENT OUTPATIENT MEDICATIONS  Reviewed    ALLERGIES  No Known Allergies     PHYSICAL EXAM  BP (!) 144/65 (BP Location: Right arm, Patient Position: Sitting, Cuff Size: Adult Regular)   Pulse 61   Temp 97.6  F (36.4  C) (Oral)   Resp 16   Wt 84.6 kg (186 lb 6.4 oz)   SpO2 100%   BMI 33.44 kg/m    GEN: NAD  HEENT: EOMI, no icterus, injection or pallor  Cardio: RRR, no murmurs, rubs, or gallops  Pulm: CTA bilaterally  EXT: trace edema bilateral lower extremities  NEURO: alert, oriented to person, place, and situation.    LABORATORY AND IMAGING STUDIES  Most Recent 3 CBC's:  Recent Labs   Lab Test 06/18/24  0938 06/12/24  0858 06/06/24  0854   WBC 4.3 4.8 5.1   HGB 9.0* 8.6* 9.3*   MCV 87 87 86   * 160 206   ANEUTAUTO 2.2 2.8 2.4    Most Recent 3 BMP's:  Recent Labs   Lab Test 06/12/24  0858 06/06/24  0854 05/22/24  1254    141 142   POTASSIUM 3.4 3.5 4.0   CHLORIDE 105 100 105   CO2 27 28 28   BUN 18.1 20.8 17.1   CR 0.82 1.00 0.92   ANIONGAP 10 13 9   WARREN 9.0 9.7 9.4 "   * 120* 111*   PROTTOTAL 6.3* 7.1 6.6   ALBUMIN 3.9 4.3 4.0    Most Recent 2 LFT's:  Recent Labs   Lab Test 06/12/24  0858 06/06/24  0854   AST 22 24   ALT 14 14   ALKPHOS 89 107   BILITOTAL 0.3 0.3    Most Recent TSH and T4:  Recent Labs   Lab Test 04/23/24  1024   TSH 0.66   T4 1.29     Phos/Mag:  Lab Results   Component Value Date    PHOS 3.0 03/17/2024    PHOS 2.4 (L) 12/09/2020    PHOS 3.9 12/08/2020    MAG 1.5 (L) 06/12/2024    MAG 1.7 06/06/2024    MAG 1.9 05/22/2024      I reviewed the above labs today.        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 Tolerating well but will plan to continue intermittent breaks as needed. Next-line options when disease begins to progress include cetuximab + pembro or cetuximab + nivo. Dr. Mueller also discussed the possible of quad shot radiation again which Conner would consider. Will plan for PET/CT on 7/30/2024.  He does have some worsening neuropathy which may be related to paclitaxel versus foramenal/spinal canal stenosis or DM2. He is already on reduced paclitaxel dose; will continue to monitor at this time.    Plan:  -Continue carbo/taxol  -MARK visits every 1-2 weeks with labs/infusion  -PET on 7/30/2024; to see Dr. Mueller after PET    LBP: longstanding chronic issue. No known disease to this area. Offered XR today to rule out compression fracture but Conner very clearly declined. Reviewed reaching out to Dr. Nascimento's office for refills of his percocet.     Neurocognitive changes, recent episode of encephalopathy: Notes from hospitalization in March reviewed. We discussed the rationale and purpose of cognitive testing and neurology follow up. Patient believes neurocognitive changes have improved over the past few weeks.   -Consider neurocognitive testing    Anorexia, malnutrition, weight loss: Down to ~2 lbs today from last visit. Eating well per his report. Will  continue to monitor.    Hypomag: Low at 1.3. Will increase home mag ox supplement, 800 mg in AM and 400 mg in PM.    H/o CVA: Residual L weakness. Uses walker at baseline, motorized scooter out and about.     LE edema: Somewhat worse today but overall stable; wearing compression stockings.     Surrogate decision maker: He would want us to talk with Mariann if he could not speak for himself. Not discussed today    Hyperglyemia, pre-diabetes: A1c 6.0-6.3 for years last checked about 6 months ago. Per PCP     Microcytic anemia: Stable.     Peripheral neuropathy: MRI shows a lot of foramenal stenosis and spinal canal stenosis, so more likely related to that than DM2. Neuropathy has been gradually working. Could consider starting gabapentin; however, would like to avoid any sedative effects, could discuss with Dr. Nascimento. Paclitaxel may also be exacerbating his neuropathy, although his dose has already been reduced and based on exam I think stenosis is more likely bigger culprit.      NEELAM Wilson      The patient was seen in conjunction with NEELAM Wilson who served as a scribe for today's visit. I have reviewed and edited the above note, and agree with the above findings and plan.      Olivia Sanchez CNP    ---  40 minutes spent on the date of the encounter doing chart review, review of test results, interpretation of tests, patient visit, and documentation.    The longitudinal plan of care for the diagnosis(es)/condition(s) as documented were addressed during this visit. Due to the added complexity in care, I will continue to support Conner in the subsequent management and with ongoing continuity of care.          Again, thank you for allowing me to participate in the care of your patient.        Sincerely,        Olivia Sanchez CNP

## 2024-06-19 DIAGNOSIS — M54.50 ACUTE MIDLINE LOW BACK PAIN WITHOUT SCIATICA: ICD-10-CM

## 2024-06-19 RX ORDER — OXYCODONE AND ACETAMINOPHEN 5; 325 MG/1; MG/1
1-2 TABLET ORAL EVERY 6 HOURS PRN
Qty: 150 TABLET | Refills: 0 | Status: SHIPPED | OUTPATIENT
Start: 2024-06-28 | End: 2024-07-23

## 2024-06-19 NOTE — TELEPHONE ENCOUNTER
oxyCODONE-acetaminophen (PERCOCET) 5-325 MG tablet 150 tablet 0 5/29/2024     Last Office Visit : 2/19/24  Future Office visit:  8/19/24    Routing refill request to provider for review/approval because:  controlled substance

## 2024-07-02 ENCOUNTER — DOCUMENTATION ONLY (OUTPATIENT)
Dept: INTERNAL MEDICINE | Facility: CLINIC | Age: 79
End: 2024-07-02
Payer: COMMERCIAL

## 2024-07-02 NOTE — PROGRESS NOTES
[facility] CANCER [facility]    PATIENT NAME: Jonathan Bishop  MRN # [id number]   DATE OF VISIT: July 3, 2024  YOB: 1945     Otolaryngology: Dr. Karishma Eng  Radiation Oncology: Dr. Jenni Mills  PCP: Dr. Buck Nascimento    CANCER TYPE: SCC L tonsil, p16 +lety  STAGE: [id number] (IVC)  ECOG PS: 1    PD-L1: TPS 20%, CPS 25% on [id number] tonsil bx  NGS: N/A    SUMMARY  2/26/23 L tonsil mass bx in clinic (Dr. Eng).   2/20/23 PET/CT. 3.7 x 4.0 x 5.1 cm mass L palatine tonsil causing narrowing of the oropharyngeal lumen, L level 2 node, L retropharyngeal nodular density, extension of primary mass vs retropharyngeal node, 0.8 cm RLL nodule concerning for met, mild focal uptake R iliac bone and L1 without CT correlate suspicious for mets.   3/2/23 R VATS, wedge resection. Path: SCC, poorly differentiated, associated with necrosis, 1 cm, negative margins, p16 +lety  3/24/23 MRI L spine and pelvis. Diffusely heterogeneous marrow signal throughout without corresponding abnormal signal on sagittal STIR sequence and no abnormal enhancement. Nonspecific but could be benign process such as red marrow hyperplasia, cannot completely exclude metastatic disease or infiltrative pathologic marrow process. No lesions corresponding to FDG avid spots on PET/CT.   4/19~10/18/23 Pembrolizumab.  10/26/23 CT neck and CAP. Increased L palatine tonsil mass, 3.8 x 2.7 cm --> 4.7 x 3.5 cm. Increased bilateral cervical nodes up to 2.3 x 2.4 cm R level 2 node. New 3 mm RML nodule, no other mets.   11/14/23 PET. 4.6 x 3.3 cm L palatine tonsil mass (SUB 26.8), R level 2A and 2A/3 nodes. Questionable uptake distal R femoral medullary cavity, partially imaged, with questionable subtle corresponding soft tissue attenuation on CT. MRI could be obtained to better evaluate.  11/21~12/2/23 [facility] for weakness/fall. COVID/paxlovid, MSSA bacteremia (1 of 2 bottles on 1 day), TTE negative, treated with cefazolin but didn't complete 2 week  course, episode of unresponsiveness 12/2. Dispo plan was TCU on [facility], but left AMA. CTA chest negative for PE, showed grossly unchanged cervical adenopathy. Brain MRI 11/22 for stroke code negative for infarct. Showed SVID, moderate atrophy, suboptimal contrast bolus to examine intracranial structures, 4.5 x 3 x 4.5 cm L nasopharyngeal mass, incompletely visualized. CTA negative.   12/19/23 Quad shot fraction #1. No-showed thereafter.  1/10/24~curr Carboplatin (AUC 2) + paclitaxel 60 mg/m2 weekly. Held 2/13 due to anorexia, fatigue.  3/13~3/17/24 [facility] for acute encephalopathy. Had [place] his car in the middle of the highway after receiving chemo earlier that day. Recommended not to drive   4/30/24 CT CAP and CT neck. Residual bilateral cervical adenopathy.     SUBJECTIVE  Conner arrives unaccompanied  Called to cancel last week as he was just so tired  Nothing focal, just fatigued  Feeling much better this week  Appetite has improved, eating well  Going fishing Sunday  Legs a bit more swollen  Mentation same--no new concerns  Bowels normal as of late  Ongoing LBP and neuropathy    PAST MEDICAL HISTORY  SCC as above  Chronic LBP  TAVR 2020  H/o rheumatic carditis 1950  CHF. 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 Nocturia once nightly   ED  Cataracts  Umbilical hernia repair 2011  Knee arthroplasty B 2010  Lumbar spine fusion, laminectomy, sciatic pain R > L  Peripheral neuropathy - from pinched nerves?  Hearing loss    CURRENT OUTPATIENT MEDICATIONS  Reviewed    ALLERGIES  No Known Allergies     PHYSICAL EXAM  There were no vitals taken for this visit.  GEN: NAD  HEENT: EOMI, no icterus, injection or pallor  Cardio: RRR, no murmurs, rubs, or gallops  Pulm: CTA bilaterally  EXT: trace edema bilateral lower extremities  NEURO: alert, oriented to person, place, and situation.    LABORATORY AND IMAGING STUDIES  Most Recent 3 CBC's:  Recent Labs   Lab Test  06/18/24  0938 06/12/24  0858 06/06/24  0854   WBC 4.3 4.8 5.1   HGB 9.0* 8.6* 9.3*   MCV 87 87 86   * 160 206   ANEUTAUTO 2.2 2.8 2.4    Most Recent 3 BMP's:  Recent Labs   Lab Test 06/18/24  0938 06/12/24  0858 06/06/24  0854    142 141   POTASSIUM 4.1 3.4 3.5   CHLORIDE 107 105 100   CO2 27 27 28   BUN 25.8* 18.1 20.8   CR 0.83 0.82 1.00   ANIONGAP 11 10 13   WARREN 9.4 9.0 9.7   GLC 94 134* 120*   PROTTOTAL 6.7 6.3* 7.1   ALBUMIN 4.1 3.9 4.3    Most Recent 2 LFT's:  Recent Labs   Lab Test 06/18/24  0938 06/12/24  0858   AST 28 22   ALT 12 14   ALKPHOS 81 89   BILITOTAL 0.4 0.3    Most Recent TSH and T4:  Recent Labs   Lab Test 04/23/24  1024   TSH 0.66   T4 1.29     Phos/Mag:  Lab Results   Component Value Date    PHOS 3.0 03/17/2024    PHOS 2.4 (L) 12/09/2020    PHOS 3.9 12/08/2020    MAG 1.3 (L) 06/18/2024    MAG 1.5 (L) 06/12/2024    MAG 1.7 06/06/2024      I reviewed the above labs today.        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 Tolerating well but will plan to continue intermittent breaks as needed, as evidenced by break last week that was really helpful for him. Next-line options when disease begins to progress include cetuximab + pembro or cetuximab + nivo. Dr. Mueller also discussed the possible of quad shot radiation again which Conner would consider. Will plan for PET/CT on 7/30/2024.  He does have some worsening neuropathy which may be related to paclitaxel versus foramenal/spinal canal stenosis or DM2. He is already on reduced paclitaxel dose; will continue to monitor at this time.    Plan:  -Continue carbo/taxol  -MARK visits every 2 weeks with labs/infusion  -PET on 7/30/2024; to see Dr. Mueller after PET    LBP: longstanding chronic issue. No known disease to this area. Mgmt per PCP    Neurocognitive changes, recent episode of encephalopathy:  hospitalized in March. Working on cog testing  through Dr. Aguiar through [facility] home care    Anorexia, malnutrition, weight loss: weight back up. Eating well per his report. Will continue to monitor.    Hypomag: Low at 1.3. Will increase home mag ox supplement, 800 mg in AM and 400 mg in PM.    H/o CVA: Residual L weakness. Uses walker at baseline, motorized scooter out and about.     LE edema: Somewhat worse today but overall stable; wearing compression stockings. Recommended he re-start spironolactone, its at [facility]     Surrogate decision maker: He would want us to talk with Mariann if he could not speak for himself. Not discussed today    Hyperglyemia, pre-diabetes: A1c 6.0-6.3 for years last checked about 6 months ago. Per PCP     Microcytic anemia: Stable.     Peripheral neuropathy: MRI shows a lot of foramenal stenosis and spinal canal stenosis, so more likely related to that than DM2. Neuropathy has been gradually working. Could consider starting gabapentin; however, would like to avoid any sedative effects, could discuss with Dr. Nascimento. Paclitaxel may also be exacerbating his neuropathy, although his dose has already been reduced and based on exam I think stenosis is more likely bigger culprit.    Olivia Sanchez, YOANNA    ---  40 minutes spent on the date of the encounter doing chart review, review of test results, interpretation of tests, patient visit, and documentation.    The longitudinal plan of care for the diagnosis(es)/condition(s) as documented were addressed during this visit. Due to the added complexity in care, I will continue to support Conner in the subsequent management and with ongoing continuity of care.

## 2024-07-02 NOTE — PROGRESS NOTES
Type of Form Received:     Form Received (Date) 7/1/24   Form Filled out Yes, date 7/2/24   Placed in provider folder Yes

## 2024-07-03 ENCOUNTER — INFUSION THERAPY VISIT (OUTPATIENT)
Dept: ONCOLOGY | Facility: CLINIC | Age: 79
End: 2024-07-03
Attending: INTERNAL MEDICINE
Payer: COMMERCIAL

## 2024-07-03 ENCOUNTER — ONCOLOGY VISIT (OUTPATIENT)
Dept: ONCOLOGY | Facility: CLINIC | Age: 79
End: 2024-07-03
Attending: NURSE PRACTITIONER
Payer: COMMERCIAL

## 2024-07-03 ENCOUNTER — APPOINTMENT (OUTPATIENT)
Dept: LAB | Facility: CLINIC | Age: 79
End: 2024-07-03
Attending: NURSE PRACTITIONER
Payer: COMMERCIAL

## 2024-07-03 VITALS
DIASTOLIC BLOOD PRESSURE: 85 MMHG | BODY MASS INDEX: 35.13 KG/M2 | OXYGEN SATURATION: 99 % | RESPIRATION RATE: 18 BRPM | HEART RATE: 72 BPM | WEIGHT: 195.8 LBS | SYSTOLIC BLOOD PRESSURE: 160 MMHG | TEMPERATURE: 97.5 F

## 2024-07-03 DIAGNOSIS — R29.818 NEUROCOGNITIVE DEFICITS: ICD-10-CM

## 2024-07-03 DIAGNOSIS — C09.9 TONSIL CANCER (H): ICD-10-CM

## 2024-07-03 DIAGNOSIS — C10.9 SQUAMOUS CELL CARCINOMA OF OROPHARYNX (H): Primary | ICD-10-CM

## 2024-07-03 DIAGNOSIS — E83.42 HYPOMAGNESEMIA: ICD-10-CM

## 2024-07-03 DIAGNOSIS — R41.89 NEUROCOGNITIVE DEFICITS: ICD-10-CM

## 2024-07-03 DIAGNOSIS — R60.0 PERIPHERAL EDEMA: ICD-10-CM

## 2024-07-03 DIAGNOSIS — C78.00 MALIGNANT NEOPLASM METASTATIC TO LUNG, UNSPECIFIED LATERALITY (H): Primary | ICD-10-CM

## 2024-07-03 DIAGNOSIS — G89.29 CHRONIC BILATERAL LOW BACK PAIN WITHOUT SCIATICA: ICD-10-CM

## 2024-07-03 DIAGNOSIS — C10.9 SQUAMOUS CELL CARCINOMA OF OROPHARYNX (H): ICD-10-CM

## 2024-07-03 DIAGNOSIS — M54.50 CHRONIC BILATERAL LOW BACK PAIN WITHOUT SCIATICA: ICD-10-CM

## 2024-07-03 LAB
ALBUMIN SERPL BCG-MCNC: 3.9 G/DL (ref 3.5–5.2)
ALP SERPL-CCNC: 92 U/L (ref 40–150)
ALT SERPL W P-5'-P-CCNC: 13 U/L (ref 0–70)
ANION GAP SERPL CALCULATED.3IONS-SCNC: 10 MMOL/L (ref 7–15)
AST SERPL W P-5'-P-CCNC: 22 U/L (ref 0–45)
BASOPHILS # BLD AUTO: 0 10E3/UL (ref 0–0.2)
BASOPHILS NFR BLD AUTO: 1 %
BILIRUB SERPL-MCNC: 0.3 MG/DL
BUN SERPL-MCNC: 19.5 MG/DL (ref 8–23)
CALCIUM SERPL-MCNC: 8.9 MG/DL (ref 8.8–10.2)
CHLORIDE SERPL-SCNC: 106 MMOL/L (ref 98–107)
CREAT SERPL-MCNC: 0.96 MG/DL (ref 0.67–1.17)
DEPRECATED HCO3 PLAS-SCNC: 27 MMOL/L (ref 22–29)
EGFRCR SERPLBLD CKD-EPI 2021: 81 ML/MIN/1.73M2
EOSINOPHIL # BLD AUTO: 0.1 10E3/UL (ref 0–0.7)
EOSINOPHIL NFR BLD AUTO: 2 %
ERYTHROCYTE [DISTWIDTH] IN BLOOD BY AUTOMATED COUNT: 17.5 % (ref 10–15)
GLUCOSE SERPL-MCNC: 101 MG/DL (ref 70–99)
HCT VFR BLD AUTO: 27.7 % (ref 40–53)
HGB BLD-MCNC: 8.6 G/DL (ref 13.3–17.7)
IMM GRANULOCYTES # BLD: 0 10E3/UL
IMM GRANULOCYTES NFR BLD: 0 %
LYMPHOCYTES # BLD AUTO: 1.6 10E3/UL (ref 0.8–5.3)
LYMPHOCYTES NFR BLD AUTO: 39 %
MAGNESIUM SERPL-MCNC: 1.3 MG/DL (ref 1.7–2.3)
MCH RBC QN AUTO: 27.2 PG (ref 26.5–33)
MCHC RBC AUTO-ENTMCNC: 31 G/DL (ref 31.5–36.5)
MCV RBC AUTO: 88 FL (ref 78–100)
MONOCYTES # BLD AUTO: 0.5 10E3/UL (ref 0–1.3)
MONOCYTES NFR BLD AUTO: 13 %
NEUTROPHILS # BLD AUTO: 1.9 10E3/UL (ref 1.6–8.3)
NEUTROPHILS NFR BLD AUTO: 45 %
NRBC # BLD AUTO: 0 10E3/UL
NRBC BLD AUTO-RTO: 0 /100
PLATELET # BLD AUTO: 122 10E3/UL (ref 150–450)
POTASSIUM SERPL-SCNC: 3.9 MMOL/L (ref 3.4–5.3)
PROT SERPL-MCNC: 6.3 G/DL (ref 6.4–8.3)
RBC # BLD AUTO: 3.16 10E6/UL (ref 4.4–5.9)
SODIUM SERPL-SCNC: 143 MMOL/L (ref 135–145)
WBC # BLD AUTO: 4.1 10E3/UL (ref 4–11)

## 2024-07-03 PROCEDURE — 96417 CHEMO IV INFUS EACH ADDL SEQ: CPT

## 2024-07-03 PROCEDURE — 36415 COLL VENOUS BLD VENIPUNCTURE: CPT | Performed by: NURSE PRACTITIONER

## 2024-07-03 PROCEDURE — G0463 HOSPITAL OUTPT CLINIC VISIT: HCPCS | Performed by: NURSE PRACTITIONER

## 2024-07-03 PROCEDURE — 83735 ASSAY OF MAGNESIUM: CPT

## 2024-07-03 PROCEDURE — 99215 OFFICE O/P EST HI 40 MIN: CPT | Performed by: NURSE PRACTITIONER

## 2024-07-03 PROCEDURE — 85041 AUTOMATED RBC COUNT: CPT | Performed by: NURSE PRACTITIONER

## 2024-07-03 PROCEDURE — 96413 CHEMO IV INFUSION 1 HR: CPT

## 2024-07-03 PROCEDURE — 258N000003 HC RX IP 258 OP 636: Performed by: INTERNAL MEDICINE

## 2024-07-03 PROCEDURE — 96375 TX/PRO/DX INJ NEW DRUG ADDON: CPT

## 2024-07-03 PROCEDURE — 82040 ASSAY OF SERUM ALBUMIN: CPT | Performed by: NURSE PRACTITIONER

## 2024-07-03 PROCEDURE — 250N000011 HC RX IP 250 OP 636: Performed by: INTERNAL MEDICINE

## 2024-07-03 PROCEDURE — G2211 COMPLEX E/M VISIT ADD ON: HCPCS | Performed by: NURSE PRACTITIONER

## 2024-07-03 RX ORDER — MAGNESIUM SULFATE HEPTAHYDRATE 40 MG/ML
2 INJECTION, SOLUTION INTRAVENOUS ONCE
Status: COMPLETED | OUTPATIENT
Start: 2024-07-03 | End: 2024-07-03

## 2024-07-03 RX ADMIN — SODIUM CHLORIDE 250 ML: 9 INJECTION, SOLUTION INTRAVENOUS at 10:28

## 2024-07-03 RX ADMIN — CARBOPLATIN 175 MG: 10 INJECTION, SOLUTION INTRAVENOUS at 12:07

## 2024-07-03 RX ADMIN — PACLITAXEL 94 MG: 6 INJECTION, SOLUTION INTRAVENOUS at 10:54

## 2024-07-03 RX ADMIN — FAMOTIDINE 20 MG: 10 INJECTION INTRAVENOUS at 10:28

## 2024-07-03 RX ADMIN — MAGNESIUM SULFATE 2 G: 2 INJECTION INTRAVENOUS at 10:58

## 2024-07-03 RX ADMIN — DEXAMETHASONE SODIUM PHOSPHATE: 10 INJECTION, SOLUTION INTRAMUSCULAR; INTRAVENOUS at 10:28

## 2024-07-03 ASSESSMENT — PAIN SCALES - GENERAL: PAINLEVEL: EXTREME PAIN (8)

## 2024-07-03 NOTE — PATIENT INSTRUCTIONS
[facility] Triage and after hours / weekends / holidays:  [phone]    Please call the triage or after hours line if you experience a temperature greater than or equal to 100.4, shaking chills, have uncontrolled nausea, vomiting and/or diarrhea, dizziness, shortness of breath, chest pain, bleeding, unexplained bruising, or if you have any other new/concerning symptoms, questions or concerns.      If you are having any concerning symptoms or wish to speak to a provider before your next infusion visit, please call triage to notify them so we can adequately serve you.     If you need a refill on a narcotic prescription or other medication, please call before your infusion appointment.             July 2024 Sunday Monday Tuesday Wednesday Thursday Friday Saturday        1     2     3    LAB PERIPHERAL   8:45 AM   (15 min.)   [facility] DRAW   [facility]    RETURN CCSL   9:00 AM   (45 min.)   Olivia Sanchez CNP   [facility]    ONC INFUSION 2 HR (120 MIN)  10:00 AM   (120 min.)    ONC INFUSION NURSE   [facility] 4     5     6       7     8     9     10    LAB PERIPHERAL  11:30 AM   (15 min.)   [facility] DRAW   [facility]    ONC INFUSION 2 HR (120 MIN)  12:00 PM   (120 min.)   [facility] ONC INFUSION NURSE   [facility] 11     12     13       14     15     16     17    LAB PERIPHERAL   9:45 AM   (15 min.)   [facility]RAW   [facility]    RETURN CCSL  10:00 AM   (45 min.)   Olivia Sanchez CNP   [facility]    ONC INFUSION 2 HR (120 MIN)  12:30 PM   (120 min.)   [facility] ONC INFUSION NURSE   [facility] 18     19     20       21     22     23     24    LAB PERIPHERAL  12:00 PM   (15 min.)   [facility] DRAW   [facility]    ONC INFUSION 2 HR (120  MIN)  12:30 PM   (120 min.)   [facility] ONC INFUSION NURSE   [facility] 25     26     27       28     29     30    PET ONCOLOGY WHOLE BODY   9:30 AM   (30 min.)   [id number]   [facility] 31 August 2024 Sunday Monday Tuesday Wednesday Thursday Friday Saturday                       1    LAB PERIPHERAL  10:45 AM   (15 min.)   [facility] LAB DRAW   [facility]    RETURN CCSL  11:00 AM   (30 min.)   Dominique Mueller MD   [facility]    ONC INFUSION 2 HR (120 MIN)  12:30 PM   (120 min.)    ONC INFUSION NURSE   [facility] 2     3       4     5     6     7     8     9     10       11     12     13     14     15     16     17       18     19    [facility] PHYSICAL   6:45 AM   (30 min.)   Buck Nascimento MD   [facility] [place] 20     21     22     23     24       25     26     27     28     29     30     31                     Lab Results:  Recent Results (from the past 12 hour(s))   Comprehensive metabolic panel    Collection Time: 07/03/24  9:13 AM   Result Value Ref Range    Sodium 143 135 - 145 mmol/L    Potassium 3.9 3.4 - 5.3 mmol/L    Carbon Dioxide (CO2) 27 22 - 29 mmol/L    Anion Gap 10 7 - 15 mmol/L    Urea Nitrogen 19.5 8.0 - 23.0 mg/dL    Creatinine 0.96 0.67 - 1.17 mg/dL    GFR Estimate 81 >60 mL/min/1.73m2    Calcium 8.9 8.8 - 10.2 mg/dL    Chloride 106 98 - 107 mmol/L    Glucose 101 (H) 70 - 99 mg/dL    Alkaline Phosphatase 92 40 - 150 U/L    AST 22 0 - 45 U/L    ALT 13 0 - 70 U/L    Protein Total 6.3 (L) 6.4 - 8.3 g/dL    Albumin 3.9 3.5 - 5.2 g/dL    Bilirubin Total 0.3 <=1.2 mg/dL   CBC with platelets and differential    Collection Time: 07/03/24  9:13 AM   Result Value Ref Range    WBC Count 4.1 4.0 - 11.0 10e3/uL    RBC Count 3.16 (L) 4.40 - 5.90 10e6/uL    Hemoglobin 8.6 (L) 13.3 - 17.7 g/dL    Hematocrit 27.7 (L) 40.0  - 53.0 %    MCV 88 78 - 100 fL    MCH 27.2 26.5 - 33.0 pg    MCHC 31.0 (L) 31.5 - 36.5 g/dL    RDW 17.5 (H) 10.0 - 15.0 %    Platelet Count 122 (L) 150 - 450 10e3/uL    % Neutrophils 45 %    % Lymphocytes 39 %    % Monocytes 13 %    % Eosinophils 2 %    % Basophils 1 %    % Immature Granulocytes 0 %    NRBCs per 100 WBC 0 <1 /100    Absolute Neutrophils 1.9 1.6 - 8.3 10e3/uL    Absolute Lymphocytes 1.6 0.8 - 5.3 10e3/uL    Absolute Monocytes 0.5 0.0 - 1.3 10e3/uL    Absolute Eosinophils 0.1 0.0 - 0.7 10e3/uL    Absolute Basophils 0.0 0.0 - 0.2 10e3/uL    Absolute Immature Granulocytes 0.0 <=0.4 10e3/uL    Absolute NRBCs 0.0 10e3/uL   Magnesium    Collection Time: 07/03/24  9:13 AM   Result Value Ref Range    Magnesium 1.3 (L) 1.7 - 2.3 mg/dL

## 2024-07-03 NOTE — NURSING NOTE
"Oncology Rooming Note    July 3, 2024 9:42 AM   Jonathan Bishop is a 78 year old male who presents for:    Chief Complaint   Patient presents with    Blood Draw     Labs drawn with PIV start by vascular access.     Oncology Clinic Visit     RTN for Squamous Cell Carcinoma     Initial Vitals: BP (!) 160/85 (BP Location: Right arm, Patient Position: Sitting, Cuff Size: Adult Regular)   Pulse 72   Temp 97.5  F (36.4  C) (Oral)   Resp 18   Wt 88.8 kg (195 lb 12.8 oz)   SpO2 99%   BMI 35.13 kg/m   Estimated body mass index is 35.13 kg/m  as calculated from the following:    Height as of 5/7/24: 1.59 m (5' 2.6\").    Weight as of this encounter: 88.8 kg (195 lb 12.8 oz). Body surface area is 1.98 meters squared.  Extreme Pain (8) Comment: Data Unavailable   No LMP for male patient.  Allergies reviewed: Yes  Medications reviewed: Yes    Medications: Medication refills not needed today.  Pharmacy name entered into [facility]: [id number] [place] [facility] [id number] [facility]    Frailty Screening:   Is the patient here for a new oncology consult visit in cancer care? 2. No      Clinical concerns:  None      Leo aYdav"

## 2024-07-03 NOTE — LETTER
7/3/2024      Jonathan Bishop  [address]  [place] [address]      Dear Colleague,    Thank you for referring your patient, Jonathan Bishop, to the [facility] CANCER [facility]. Please see a copy of my visit note below.       [facility] CANCER [facility]    PATIENT NAME: Jonathan Bishop  MRN # [id number]   DATE OF VISIT: July 3, 2024  YOB: 1945     Otolaryngology: Dr. Karishma Eng  Radiation Oncology: Dr. Jenni Mills  PCP: Dr. Buck Nascimento    CANCER TYPE: SCC L tonsil, p16 +lety  STAGE: [id number] (IVC)  ECOG PS: 1    PD-L1: TPS 20%, CPS 25% on [id number] tonsil bx  NGS: N/A    SUMMARY  2/26/23 L tonsil mass bx in clinic (Dr. Eng).   2/20/23 PET/CT. 3.7 x 4.0 x 5.1 cm mass L palatine tonsil causing narrowing of the oropharyngeal lumen, L level 2 node, L retropharyngeal nodular density, extension of primary mass vs retropharyngeal node, 0.8 cm RLL nodule concerning for met, mild focal uptake R iliac bone and L1 without CT correlate suspicious for mets.   3/2/23 R VATS, wedge resection. Path: SCC, poorly differentiated, associated with necrosis, 1 cm, negative margins, p16 +lety  3/24/23 MRI L spine and pelvis. Diffusely heterogeneous marrow signal throughout without corresponding abnormal signal on sagittal STIR sequence and no abnormal enhancement. Nonspecific but could be benign process such as red marrow hyperplasia, cannot completely exclude metastatic disease or infiltrative pathologic marrow process. No lesions corresponding to FDG avid spots on PET/CT.   4/19~10/18/23 Pembrolizumab.  10/26/23 CT neck and CAP. Increased L palatine tonsil mass, 3.8 x 2.7 cm --> 4.7 x 3.5 cm. Increased bilateral cervical nodes up to 2.3 x 2.4 cm R level 2 node. New 3 mm RML nodule, no other mets.   11/14/23 PET. 4.6 x 3.3 cm L palatine tonsil mass (SUB 26.8), R level 2A and 2A/3 nodes. Questionable uptake distal R femoral medullary cavity, partially imaged, with questionable subtle  corresponding soft tissue attenuation on CT. MRI could be obtained to better evaluate.  11/21~12/2/23 [facility] for weakness/fall. COVID/paxlovid, MSSA bacteremia (1 of 2 bottles on 1 day), TTE negative, treated with cefazolin but didn't complete 2 week course, episode of unresponsiveness 12/2. Dispo plan was TCU on [facility], but left AMA. CTA chest negative for PE, showed grossly unchanged cervical adenopathy. Brain MRI 11/22 for stroke code negative for infarct. Showed SVID, moderate atrophy, suboptimal contrast bolus to examine intracranial structures, 4.5 x 3 x 4.5 cm L nasopharyngeal mass, incompletely visualized. CTA negative.   12/19/23 Quad shot fraction #1. No-showed thereafter.  1/10/24~curr Carboplatin (AUC 2) + paclitaxel 60 mg/m2 weekly. Held 2/13 due to anorexia, fatigue.  3/13~3/17/24 [facility] for acute encephalopathy. Had stopped his car in the middle of the highway after receiving chemo earlier that day. Recommended not to drive   4/30/24 CT CAP and CT neck. Residual bilateral cervical adenopathy.     SUBJECTIVE  Conner arrives unaccompanied  Called to cancel last week as he was just so tired  Nothing focal, just fatigued  Feeling much better this week  Appetite has improved, eating well  Going fishing Sunday  Legs a bit more swollen  Mentation same--no new concerns  Bowels normal as of late  Ongoing LBP and neuropathy    PAST MEDICAL HISTORY  SCC as above  Chronic LBP  TAVR 2020  H/o rheumatic carditis 1950  CHF. 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 Nocturia once nightly   ED  Cataracts  Umbilical hernia repair 2011  Knee arthroplasty B 2010  Lumbar spine fusion, laminectomy, sciatic pain R > L  Peripheral neuropathy - from pinched nerves?  Hearing loss    CURRENT OUTPATIENT MEDICATIONS  Reviewed    ALLERGIES  No Known Allergies     PHYSICAL EXAM  There were no vitals taken for this visit.  GEN: NAD  HEENT: EOMI, no icterus, injection or  pallor  Cardio: RRR, no murmurs, rubs, or gallops  Pulm: CTA bilaterally  EXT: trace edema bilateral lower extremities  NEURO: alert, oriented to person, place, and situation.    LABORATORY AND IMAGING STUDIES  Most Recent 3 CBC's:  Recent Labs   Lab Test 06/18/24  0938 06/12/24  0858 06/06/24  0854   WBC 4.3 4.8 5.1   HGB 9.0* 8.6* 9.3*   MCV 87 87 86   * 160 206   ANEUTAUTO 2.2 2.8 2.4    Most Recent 3 BMP's:  Recent Labs   Lab Test 06/18/24  0938 06/12/24  0858 06/06/24  0854    142 141   POTASSIUM 4.1 3.4 3.5   CHLORIDE 107 105 100   CO2 27 27 28   BUN 25.8* 18.1 20.8   CR 0.83 0.82 1.00   ANIONGAP 11 10 13   WARREN 9.4 9.0 9.7   GLC 94 134* 120*   PROTTOTAL 6.7 6.3* 7.1   ALBUMIN 4.1 3.9 4.3    Most Recent 2 LFT's:  Recent Labs   Lab Test 06/18/24  0938 06/12/24  0858   AST 28 22   ALT 12 14   ALKPHOS 81 89   BILITOTAL 0.4 0.3    Most Recent TSH and T4:  Recent Labs   Lab Test 04/23/24  1024   TSH 0.66   T4 1.29     Phos/Mag:  Lab Results   Component Value Date    PHOS 3.0 03/17/2024    PHOS 2.4 (L) 12/09/2020    PHOS 3.9 12/08/2020    MAG 1.3 (L) 06/18/2024    MAG 1.5 (L) 06/12/2024    MAG 1.7 06/06/2024      I reviewed the above labs today.        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 Tolerating well but will plan to continue intermittent breaks as needed, as evidenced by break last week that was really helpful for him. Next-line options when disease begins to progress include cetuximab + pembro or cetuximab + nivo. Dr. Mueller also discussed the possible of quad shot radiation again which Conner would consider. Will plan for PET/CT on 7/30/2024.  He does have some worsening neuropathy which may be related to paclitaxel versus foramenal/spinal canal stenosis or DM2. He is already on reduced paclitaxel dose; will continue to monitor at this time.    Plan:  -Continue carbo/taxol  -MARK visits every 2 weeks  with labs/infusion  -PET on 7/30/2024; to see Dr. Mueller after PET    LBP: longstanding chronic issue. No known disease to this area. Mgmt per PCP    Neurocognitive changes, recent episode of encephalopathy:  hospitalized in March. Working on cog testing through Dr. Aguiar through [facility] home care    Anorexia, malnutrition, weight loss: weight back up. Eating well per his report. Will continue to monitor.    Hypomag: Low at 1.3. Will increase home mag ox supplement, 800 mg in AM and 400 mg in PM.    H/o CVA: Residual L weakness. Uses walker at baseline, motorized scooter out and about.     LE edema: Somewhat worse today but overall stable; wearing compression stockings. Recommended he re-start spironolactone, its at [facility]     Surrogate decision maker: He would want us to talk with Mariann if he could not speak for himself. Not discussed today    Hyperglyemia, pre-diabetes: A1c 6.0-6.3 for years last checked about 6 months ago. Per PCP     Microcytic anemia: Stable.     Peripheral neuropathy: MRI shows a lot of foramenal stenosis and spinal canal stenosis, so more likely related to that than DM2. Neuropathy has been gradually working. Could consider starting gabapentin; however, would like to avoid any sedative effects, could discuss with Dr. Nascimento. Paclitaxel may also be exacerbating his neuropathy, although his dose has already been reduced and based on exam I think stenosis is more likely bigger culprit.    Olivia Sanchez, YOANNA    ---  40 minutes spent on the date of the encounter doing chart review, review of test results, interpretation of tests, patient visit, and documentation.    The longitudinal plan of care for the diagnosis(es)/condition(s) as documented were addressed during this visit. Due to the added complexity in care, I will continue to support Conner in the subsequent management and with ongoing continuity of care.        Again, thank you for allowing me to participate in the care of your patient.         Sincerely,        Olivia Sanchez, CNP

## 2024-07-03 NOTE — PROGRESS NOTES
Infusion Nursing Note:  Jonathan Bishop presents today for C5D15 Taxol and Carboplatin.    Patient seen by provider today: Yes: Olivia Sanchez NP   present during visit today: Not Applicable.    Note: Conner presents to infusion today following his clinic appointment. He denies any new concerns.    Magnesium replaced per protocol per treatment plan.    Intravenous Access:  Peripheral IV placed.    Treatment Conditions:  Lab Results   Component Value Date    HGB 8.6 (L) 07/03/2024    WBC 4.1 07/03/2024    ANEU 1.0 (L) 03/06/2024    ANEUTAUTO 1.9 07/03/2024     (L) 07/03/2024     Lab Results   Component Value Date     07/03/2024    POTASSIUM 3.9 07/03/2024    MAG 1.3 (L) 07/03/2024    CR 0.96 07/03/2024    WARREN 8.9 07/03/2024    BILITOTAL 0.3 07/03/2024    ALBUMIN 3.9 07/03/2024    ALT 13 07/03/2024    AST 22 07/03/2024     Results reviewed, labs MET treatment parameters, ok to proceed with treatment.    Post Infusion Assessment:  Patient tolerated infusion without incident.  Blood return noted pre and post infusion.  Site patent and intact, free from redness, edema or discomfort.  No evidence of extravasations.  Access discontinued per protocol.     Discharge Plan:   Patient declined prescription refills.  Discharge instructions reviewed with: Patient.  Patient and/or family verbalized understanding of discharge instructions and all questions answered.  Copy of AVS reviewed with patient and/or family.  Patient will return 7/10 for next appointment.  Patient discharged in stable condition accompanied by: self.  Departure Mode: Ambulatory.      Sol Don RN

## 2024-07-10 ENCOUNTER — PATIENT OUTREACH (OUTPATIENT)
Dept: ONCOLOGY | Facility: CLINIC | Age: 79
End: 2024-07-10

## 2024-07-10 NOTE — PROGRESS NOTES
[facility]: Cancer Care                                                                                          Reached out to Conner as he canceled his lab appointment today (but not infusion), reason being lack of transportation.    Conner says there was a fluke in transportation coverage to get him to his appointments today. He does not want to reschedule his carbo/taxol infusion but prefers to keep next week's as scheduled.     Offered to place social work referral for assistance with future transportation needs but he declined. Encouraged him to reach out with any further needs or concerns. Providers updated.    Olivia Gates, RN, BSN  RN Care Coordinator  [facility]

## 2024-07-17 ENCOUNTER — LAB (OUTPATIENT)
Dept: LAB | Facility: CLINIC | Age: 79
End: 2024-07-17
Attending: NURSE PRACTITIONER
Payer: COMMERCIAL

## 2024-07-17 ENCOUNTER — INFUSION THERAPY VISIT (OUTPATIENT)
Dept: ONCOLOGY | Facility: CLINIC | Age: 79
End: 2024-07-17
Attending: NURSE PRACTITIONER
Payer: COMMERCIAL

## 2024-07-17 VITALS
BODY MASS INDEX: 34.84 KG/M2 | TEMPERATURE: 97.9 F | DIASTOLIC BLOOD PRESSURE: 68 MMHG | SYSTOLIC BLOOD PRESSURE: 143 MMHG | RESPIRATION RATE: 16 BRPM | HEART RATE: 63 BPM | OXYGEN SATURATION: 100 % | WEIGHT: 194.2 LBS

## 2024-07-17 DIAGNOSIS — C10.9 SQUAMOUS CELL CARCINOMA OF OROPHARYNX (H): ICD-10-CM

## 2024-07-17 DIAGNOSIS — C09.9 TONSIL CANCER (H): ICD-10-CM

## 2024-07-17 DIAGNOSIS — C78.00 MALIGNANT NEOPLASM METASTATIC TO LUNG, UNSPECIFIED LATERALITY (H): Primary | ICD-10-CM

## 2024-07-17 DIAGNOSIS — E83.42 HYPOMAGNESEMIA: ICD-10-CM

## 2024-07-17 LAB
ALBUMIN SERPL BCG-MCNC: 4 G/DL (ref 3.5–5.2)
ALP SERPL-CCNC: 101 U/L (ref 40–150)
ALT SERPL W P-5'-P-CCNC: 14 U/L (ref 0–70)
ANION GAP SERPL CALCULATED.3IONS-SCNC: 10 MMOL/L (ref 7–15)
AST SERPL W P-5'-P-CCNC: 22 U/L (ref 0–45)
BASOPHILS # BLD AUTO: 0 10E3/UL (ref 0–0.2)
BASOPHILS NFR BLD AUTO: 0 %
BILIRUB SERPL-MCNC: 0.3 MG/DL
BUN SERPL-MCNC: 16.7 MG/DL (ref 8–23)
CALCIUM SERPL-MCNC: 8.9 MG/DL (ref 8.8–10.4)
CHLORIDE SERPL-SCNC: 104 MMOL/L (ref 98–107)
CREAT SERPL-MCNC: 0.93 MG/DL (ref 0.67–1.17)
EGFRCR SERPLBLD CKD-EPI 2021: 84 ML/MIN/1.73M2
EOSINOPHIL # BLD AUTO: 0.1 10E3/UL (ref 0–0.7)
EOSINOPHIL NFR BLD AUTO: 3 %
ERYTHROCYTE [DISTWIDTH] IN BLOOD BY AUTOMATED COUNT: 17.2 % (ref 10–15)
GLUCOSE SERPL-MCNC: 93 MG/DL (ref 70–99)
HCO3 SERPL-SCNC: 27 MMOL/L (ref 22–29)
HCT VFR BLD AUTO: 27.9 % (ref 40–53)
HGB BLD-MCNC: 8.5 G/DL (ref 13.3–17.7)
IMM GRANULOCYTES # BLD: 0 10E3/UL
IMM GRANULOCYTES NFR BLD: 0 %
LYMPHOCYTES # BLD AUTO: 1.4 10E3/UL (ref 0.8–5.3)
LYMPHOCYTES NFR BLD AUTO: 36 %
MAGNESIUM SERPL-MCNC: 1.6 MG/DL (ref 1.7–2.3)
MCH RBC QN AUTO: 26.4 PG (ref 26.5–33)
MCHC RBC AUTO-ENTMCNC: 30.5 G/DL (ref 31.5–36.5)
MCV RBC AUTO: 87 FL (ref 78–100)
MONOCYTES # BLD AUTO: 0.4 10E3/UL (ref 0–1.3)
MONOCYTES NFR BLD AUTO: 10 %
NEUTROPHILS # BLD AUTO: 2 10E3/UL (ref 1.6–8.3)
NEUTROPHILS NFR BLD AUTO: 51 %
NRBC # BLD AUTO: 0 10E3/UL
NRBC BLD AUTO-RTO: 0 /100
PLATELET # BLD AUTO: 136 10E3/UL (ref 150–450)
POTASSIUM SERPL-SCNC: 4 MMOL/L (ref 3.4–5.3)
PROT SERPL-MCNC: 6.5 G/DL (ref 6.4–8.3)
RBC # BLD AUTO: 3.22 10E6/UL (ref 4.4–5.9)
SODIUM SERPL-SCNC: 141 MMOL/L (ref 135–145)
WBC # BLD AUTO: 3.9 10E3/UL (ref 4–11)

## 2024-07-17 PROCEDURE — 83735 ASSAY OF MAGNESIUM: CPT

## 2024-07-17 PROCEDURE — 99215 OFFICE O/P EST HI 40 MIN: CPT | Performed by: NURSE PRACTITIONER

## 2024-07-17 PROCEDURE — 36415 COLL VENOUS BLD VENIPUNCTURE: CPT | Performed by: NURSE PRACTITIONER

## 2024-07-17 PROCEDURE — 258N000003 HC RX IP 258 OP 636: Performed by: NURSE PRACTITIONER

## 2024-07-17 PROCEDURE — G0463 HOSPITAL OUTPT CLINIC VISIT: HCPCS | Performed by: NURSE PRACTITIONER

## 2024-07-17 PROCEDURE — 80053 COMPREHEN METABOLIC PANEL: CPT | Performed by: NURSE PRACTITIONER

## 2024-07-17 PROCEDURE — 96367 TX/PROPH/DG ADDL SEQ IV INF: CPT

## 2024-07-17 PROCEDURE — 96417 CHEMO IV INFUS EACH ADDL SEQ: CPT

## 2024-07-17 PROCEDURE — 96413 CHEMO IV INFUSION 1 HR: CPT

## 2024-07-17 PROCEDURE — G2211 COMPLEX E/M VISIT ADD ON: HCPCS | Performed by: NURSE PRACTITIONER

## 2024-07-17 PROCEDURE — 96375 TX/PRO/DX INJ NEW DRUG ADDON: CPT

## 2024-07-17 PROCEDURE — 250N000011 HC RX IP 250 OP 636: Performed by: NURSE PRACTITIONER

## 2024-07-17 PROCEDURE — 85025 COMPLETE CBC W/AUTO DIFF WBC: CPT | Performed by: NURSE PRACTITIONER

## 2024-07-17 RX ORDER — HEPARIN SODIUM,PORCINE 10 UNIT/ML
5-20 VIAL (ML) INTRAVENOUS DAILY PRN
Status: CANCELLED | OUTPATIENT
Start: 2024-07-24

## 2024-07-17 RX ORDER — MEPERIDINE HYDROCHLORIDE 25 MG/ML
25 INJECTION INTRAMUSCULAR; INTRAVENOUS; SUBCUTANEOUS EVERY 30 MIN PRN
Status: CANCELLED | OUTPATIENT
Start: 2024-07-24

## 2024-07-17 RX ORDER — ALBUTEROL SULFATE 90 UG/1
1-2 AEROSOL, METERED RESPIRATORY (INHALATION)
Status: CANCELLED
Start: 2024-07-24

## 2024-07-17 RX ORDER — ALBUTEROL SULFATE 0.83 MG/ML
2.5 SOLUTION RESPIRATORY (INHALATION)
Status: CANCELLED | OUTPATIENT
Start: 2024-07-17

## 2024-07-17 RX ORDER — HEPARIN SODIUM (PORCINE) LOCK FLUSH IV SOLN 100 UNIT/ML 100 UNIT/ML
5 SOLUTION INTRAVENOUS
Status: CANCELLED | OUTPATIENT
Start: 2024-07-24

## 2024-07-17 RX ORDER — HEPARIN SODIUM,PORCINE 10 UNIT/ML
5-20 VIAL (ML) INTRAVENOUS DAILY PRN
Status: CANCELLED | OUTPATIENT
Start: 2024-07-17

## 2024-07-17 RX ORDER — HEPARIN SODIUM (PORCINE) LOCK FLUSH IV SOLN 100 UNIT/ML 100 UNIT/ML
5 SOLUTION INTRAVENOUS
Status: CANCELLED | OUTPATIENT
Start: 2024-07-17

## 2024-07-17 RX ORDER — EPINEPHRINE 1 MG/ML
0.3 INJECTION, SOLUTION INTRAMUSCULAR; SUBCUTANEOUS EVERY 5 MIN PRN
Status: CANCELLED | OUTPATIENT
Start: 2024-07-17

## 2024-07-17 RX ORDER — EPINEPHRINE 1 MG/ML
0.3 INJECTION, SOLUTION INTRAMUSCULAR; SUBCUTANEOUS EVERY 5 MIN PRN
Status: CANCELLED | OUTPATIENT
Start: 2024-07-24

## 2024-07-17 RX ORDER — ALBUTEROL SULFATE 90 UG/1
1-2 AEROSOL, METERED RESPIRATORY (INHALATION)
Status: CANCELLED
Start: 2024-07-17

## 2024-07-17 RX ORDER — METHYLPREDNISOLONE SODIUM SUCCINATE 125 MG/2ML
125 INJECTION, POWDER, LYOPHILIZED, FOR SOLUTION INTRAMUSCULAR; INTRAVENOUS
Status: CANCELLED
Start: 2024-07-17

## 2024-07-17 RX ORDER — ALBUTEROL SULFATE 0.83 MG/ML
2.5 SOLUTION RESPIRATORY (INHALATION)
Status: CANCELLED | OUTPATIENT
Start: 2024-07-24

## 2024-07-17 RX ORDER — MAGNESIUM SULFATE HEPTAHYDRATE 40 MG/ML
2 INJECTION, SOLUTION INTRAVENOUS ONCE
Status: COMPLETED | OUTPATIENT
Start: 2024-07-17 | End: 2024-07-17

## 2024-07-17 RX ORDER — DIPHENHYDRAMINE HYDROCHLORIDE 50 MG/ML
50 INJECTION INTRAMUSCULAR; INTRAVENOUS
Status: CANCELLED
Start: 2024-07-17

## 2024-07-17 RX ORDER — METHYLPREDNISOLONE SODIUM SUCCINATE 125 MG/2ML
125 INJECTION, POWDER, LYOPHILIZED, FOR SOLUTION INTRAMUSCULAR; INTRAVENOUS
Status: CANCELLED
Start: 2024-07-24

## 2024-07-17 RX ORDER — LORAZEPAM 2 MG/ML
0.5 INJECTION INTRAMUSCULAR EVERY 4 HOURS PRN
Status: CANCELLED | OUTPATIENT
Start: 2024-07-17

## 2024-07-17 RX ORDER — MAGNESIUM OXIDE 400 MG/1
TABLET ORAL
Qty: 30 TABLET | Refills: 0 | Status: SHIPPED | OUTPATIENT
Start: 2024-07-17 | End: 2024-08-01

## 2024-07-17 RX ORDER — LORAZEPAM 2 MG/ML
0.5 INJECTION INTRAMUSCULAR EVERY 4 HOURS PRN
Status: CANCELLED | OUTPATIENT
Start: 2024-07-24

## 2024-07-17 RX ORDER — MEPERIDINE HYDROCHLORIDE 25 MG/ML
25 INJECTION INTRAMUSCULAR; INTRAVENOUS; SUBCUTANEOUS EVERY 30 MIN PRN
Status: CANCELLED | OUTPATIENT
Start: 2024-07-17

## 2024-07-17 RX ORDER — DIPHENHYDRAMINE HYDROCHLORIDE 50 MG/ML
50 INJECTION INTRAMUSCULAR; INTRAVENOUS
Status: CANCELLED
Start: 2024-07-24

## 2024-07-17 RX ADMIN — PACLITAXEL 94 MG: 6 INJECTION, SOLUTION INTRAVENOUS at 12:32

## 2024-07-17 RX ADMIN — CARBOPLATIN 175 MG: 10 INJECTION, SOLUTION INTRAVENOUS at 13:41

## 2024-07-17 RX ADMIN — SODIUM CHLORIDE 250 ML: 9 INJECTION, SOLUTION INTRAVENOUS at 11:47

## 2024-07-17 RX ADMIN — DEXAMETHASONE SODIUM PHOSPHATE: 10 INJECTION, SOLUTION INTRAMUSCULAR; INTRAVENOUS at 11:53

## 2024-07-17 RX ADMIN — FAMOTIDINE 20 MG: 10 INJECTION INTRAVENOUS at 11:53

## 2024-07-17 RX ADMIN — MAGNESIUM SULFATE 2 G: 2 INJECTION INTRAVENOUS at 12:25

## 2024-07-17 ASSESSMENT — PAIN SCALES - GENERAL: PAINLEVEL: EXTREME PAIN (9)

## 2024-07-17 NOTE — NURSING NOTE
Chief Complaint   Patient presents with    Blood Draw     Vitals, blood drawn and PIV placed by KG, VAT. Pt checked into appt.      BRAD Vasquez LPN

## 2024-07-17 NOTE — PROGRESS NOTES
[facility] CANCER [facility]    PATIENT NAME: Jonathan Bishop  MRN # [id number]   DATE OF VISIT: July 17, 2024  YOB: 1945     Otolaryngology: Dr. Karishma Eng  Radiation Oncology: Dr. Jenni Mills  PCP: Dr. Buck Nascimento    CANCER TYPE: SCC L tonsil, p16 +lety  STAGE: [id number] (IVC)  ECOG PS: 1    PD-L1: TPS 20%, CPS 25% on [id number] tonsil bx      SUMMARY  2/26/23 L tonsil mass bx in clinic (Dr. Eng).   2/20/23 PET/CT. 3.7 x 4.0 x 5.1 cm mass L palatine tonsil causing narrowing of the oropharyngeal lumen, L level 2 node, L retropharyngeal nodular density, extension of primary mass vs retropharyngeal node, 0.8 cm RLL nodule concerning for met, mild focal uptake R iliac bone and L1 without CT correlate suspicious for mets.   3/2/23 R VATS, wedge resection. Path: SCC, poorly differentiated, associated with necrosis, 1 cm, negative margins, p16 +lety  3/24/23 MRI L spine and pelvis. Diffusely heterogeneous marrow signal throughout without corresponding abnormal signal on sagittal STIR sequence and no abnormal enhancement. Nonspecific but could be benign process such as red marrow hyperplasia, cannot completely exclude metastatic disease or infiltrative pathologic marrow process. No lesions corresponding to FDG avid spots on PET/CT.   4/19~10/18/23 Pembrolizumab.  10/26/23 CT neck and CAP. Increased L palatine tonsil mass, 3.8 x 2.7 cm --> 4.7 x 3.5 cm. Increased bilateral cervical nodes up to 2.3 x 2.4 cm R level 2 node. New 3 mm RML nodule, no other mets.   11/14/23 PET. 4.6 x 3.3 cm L palatine tonsil mass (SUB 26.8), R level 2A and 2A/3 nodes. Questionable uptake distal R femoral medullary cavity, partially imaged, with questionable subtle corresponding soft tissue attenuation on CT. MRI could be obtained to better evaluate.  11/21~12/2/23 [facility] for weakness/fall. COVID/paxlovid, MSSA bacteremia (1 of 2 bottles on 1 day), TTE negative, treated with cefazolin but didn't complete 2 week course,  episode of unresponsiveness 12/2. Dispo plan was TCU on [place], but left AMA. CTA chest negative for PE, showed grossly unchanged cervical adenopathy. Brain MRI 11/22 for stroke code negative for infarct. Showed SVID, moderate atrophy, suboptimal contrast bolus to examine intracranial structures, 4.5 x 3 x 4.5 cm L nasopharyngeal mass, incompletely visualized. CTA negative.   12/19/23 Quad shot fraction #1. No-showed thereafter.  1/10/24~curr Carboplatin (AUC 2) + paclitaxel 60 mg/m2 weekly. Held 2/13 due to anorexia, fatigue.  3/13~3/17/24 [facility] for acute encephalopathy. Had [place] his car in the middle of the [place] after receiving chemo earlier that day. Recommended not to drive   4/30/24 CT CAP and CT neck. Residual bilateral cervical adenopathy.     SUBJECTIVE  Conner arrives unaccompanied  Called to cancel last week as his ride fell thru  Feeling good the last few weeks  Appetite has improved, eating well  Going fishing and recently went to [place] with his ?PCA  Mentation same--no new concerns  Bowels normal as of late  Ongoing LBP and neuropathy    PAST MEDICAL HISTORY  SCC as above  Chronic LBP  TAVR 2020  H/o rheumatic carditis 1950  CHF. 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 Nocturia once nightly   ED  Cataracts  Umbilical hernia repair 2011  Knee arthroplasty B 2010  Lumbar spine fusion, laminectomy, sciatic pain R > L  Peripheral neuropathy - from pinched nerves?  Hearing loss    CURRENT OUTPATIENT MEDICATIONS  Reviewed    ALLERGIES  No Known Allergies     PHYSICAL EXAM  BP (!) 143/68   Pulse 63   Temp 97.9  F (36.6  C) (Oral)   Resp 16   Wt 88.1 kg (194 lb 3.2 oz)   SpO2 100%   BMI 34.84 kg/m    GEN: NAD  HEENT: EOMI, no icterus, injection or pallor  Cardio: RRR, no murmurs, rubs, or gallops  Pulm: CTA bilaterally  EXT: trace edema bilateral lower extremities  NEURO: alert, oriented to person, place, and situation.    LABORATORY AND IMAGING  STUDIES  Most Recent 3 CBC's:  Recent Labs   Lab Test 07/03/24  0913 06/18/24  0938 06/12/24  0858   WBC 4.1 4.3 4.8   HGB 8.6* 9.0* 8.6*   MCV 88 87 87   * 148* 160   ANEUTAUTO 1.9 2.2 2.8    Most Recent 3 BMP's:  Recent Labs   Lab Test 07/03/24  0913 06/18/24  0938 06/12/24  0858    145 142   POTASSIUM 3.9 4.1 3.4   CHLORIDE 106 107 105   CO2 27 27 27   BUN 19.5 25.8* 18.1   CR 0.96 0.83 0.82   ANIONGAP 10 11 10   WARREN 8.9 9.4 9.0   * 94 134*   PROTTOTAL 6.3* 6.7 6.3*   ALBUMIN 3.9 4.1 3.9    Most Recent 2 LFT's:  Recent Labs   Lab Test 07/03/24  0913 06/18/24  0938   AST 22 28   ALT 13 12   ALKPHOS 92 81   BILITOTAL 0.3 0.4    Most Recent TSH and T4:  Recent Labs   Lab Test 04/23/24  1024   TSH 0.66   T4 1.29       ASSESSMENT AND PLAN  SCC L tonsil, p16 +lety, CPS 25%/TPS 20%, oligometastatic lung met, +/- bone mets: Stable disease on carbo/paclitaxel. Tolerating well but will plan to continue intermittent breaks as needed, as evidenced by break last week that was helpful for him. Next-line options when disease begins to progress include cetuximab + pembro or cetuximab + nivo. Dr. Mueller also discussed the possible of quad shot radiation again which Conner would consider. Will plan for PET/CT on 7/30/2024.  He does have some worsening neuropathy which may be related to paclitaxel versus foramenal/spinal canal stenosis or DM2. He is already on reduced paclitaxel dose; will continue to monitor at this time.    Plan:  -Continue carbo/taxol  -provider visits every 2-3 weeks with labs/infusion  -PET on 7/30/2024; to see Dr. Mueller after PET  -of note, travel plans up [facility] ~8/17    LBP: longstanding chronic issue. No known disease to this area. Mgmt per PCP    Neurocognitive changes, recent episode of encephalopathy:  hospitalized in March. Working on cog testing through Dr. Aguiar through [facility] care    Anorexia, malnutrition, weight loss: weight back up. Eating well per his report.  Will continue to monitor.    Hypomag: a bit better today at 1.6. Will continue home mag ox supplement, 800 mg in AM and 400 mg in PM--refilled.    H/o CVA: Residual L weakness. Uses walker at baseline, motorized scooter out and about.     LE edema: Somewhat worse today but overall stable; wearing compression stockings. Recommended he re-start spironolactone, its at [facility]     Surrogate decision maker: He would want us to talk with Mariann if he could not speak for himself. Not discussed today    Hyperglyemia, pre-diabetes: A1c 6.0-6.3 for years last checked about 6 months ago. Per PCP     Microcytic anemia: Stable.     Peripheral neuropathy: MRI shows a lot of foramenal stenosis and spinal canal stenosis, so more likely related to that than DM2. Neuropathy has been gradually working. Could consider starting gabapentin; however, would like to avoid any sedative effects, could discuss with Dr. Nascimento. Paclitaxel may also be exacerbating his neuropathy, although his dose has already been reduced and based on exam I think stenosis is more likely bigger culprit.    Olivia Sanchez, YOANNA    ---  40 minutes spent on the date of the encounter doing chart review, review of test results, interpretation of tests, patient visit, and documentation.    The longitudinal plan of care for the diagnosis(es)/condition(s) as documented were addressed during this visit. Due to the added complexity in care, I will continue to support Conner in the subsequent management and with ongoing continuity of care.

## 2024-07-17 NOTE — LETTER
7/17/2024      Jonathan Bishop  [address]  [place] [address]      Dear Colleague,    Thank you for referring your patient, Jonathan Bishop, to the [facility]CER [facility]. Please see a copy of my visit note below.       [facility]CER [facility]    PATIENT NAME: Jonathan Bishop  MRN # [id number]   DATE OF VISIT: July 17, 2024  YOB: 1945     Otolaryngology: Dr. Karishma Eng  Radiation Oncology: Dr. Jenni Mills  PCP: Dr. Buck Nascimento    CANCER TYPE: SCC L tonsil, p16 +lety  STAGE: [id number] (IVC)  ECOG PS: 1    PD-L1: TPS 20%, CPS 25% on [id number] tonsil bx      SUMMARY  2/26/23 L tonsil mass bx in clinic (Dr. Eng).   2/20/23 PET/CT. 3.7 x 4.0 x 5.1 cm mass L palatine tonsil causing narrowing of the oropharyngeal lumen, L level 2 node, L retropharyngeal nodular density, extension of primary mass vs retropharyngeal node, 0.8 cm RLL nodule concerning for met, mild focal uptake R iliac bone and L1 without CT correlate suspicious for mets.   3/2/23 R VATS, wedge resection. Path: SCC, poorly differentiated, associated with necrosis, 1 cm, negative margins, p16 +lety  3/24/23 MRI L spine and pelvis. Diffusely heterogeneous marrow signal throughout without corresponding abnormal signal on sagittal STIR sequence and no abnormal enhancement. Nonspecific but could be benign process such as red marrow hyperplasia, cannot completely exclude metastatic disease or infiltrative pathologic marrow process. No lesions corresponding to FDG avid spots on PET/CT.   4/19~10/18/23 Pembrolizumab.  10/26/23 CT neck and CAP. Increased L palatine tonsil mass, 3.8 x 2.7 cm --> 4.7 x 3.5 cm. Increased bilateral cervical nodes up to 2.3 x 2.4 cm R level 2 node. New 3 mm RML nodule, no other mets.   11/14/23 PET. 4.6 x 3.3 cm L palatine tonsil mass (SUB 26.8), R level 2A and 2A/3 nodes. Questionable uptake distal R femoral medullary cavity, partially imaged, with questionable subtle  corresponding soft tissue attenuation on CT. MRI could be obtained to better evaluate.  11/21~12/2/23 [facility] for weakness/fall. COVID/paxlovid, MSSA bacteremia (1 of 2 bottles on 1 day), TTE negative, treated with cefazolin but didn't complete 2 week course, episode of unresponsiveness 12/2. Dispo plan was TCU on [facility], but left AMA. CTA chest negative for PE, showed grossly unchanged cervical adenopathy. Brain MRI 11/22 for stroke code negative for infarct. Showed SVID, moderate atrophy, suboptimal contrast bolus to examine intracranial structures, 4.5 x 3 x 4.5 cm L nasopharyngeal mass, incompletely visualized. CTA negative.   12/19/23 Quad shot fraction #1. No-showed thereafter.  1/10/24~curr Carboplatin (AUC 2) + paclitaxel 60 mg/m2 weekly. Held 2/13 due to anorexia, fatigue.  3/13~3/17/24 [facility] for acute encephalopathy. Had stopped his car in the middle of the highway after receiving chemo earlier that day. Recommended not to drive   4/30/24 CT CAP and CT neck. Residual bilateral cervical adenopathy.     SUBJECTIVE  Conner arrives unaccompanied  Called to cancel last week as his ride fell thru  Feeling good the last few weeks  Appetite has improved, eating well  Going fishing and recently went to [place] with his ?PCA  Mentation same--no new concerns  Bowels normal as of late  Ongoing LBP and neuropathy    PAST MEDICAL HISTORY  SCC as above  Chronic LBP  TAVR 2020  H/o rheumatic carditis 1950  CHF. 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 Nocturia once nightly   ED  Cataracts  Umbilical hernia repair 2011  Knee arthroplasty B 2010  Lumbar spine fusion, laminectomy, sciatic pain R > L  Peripheral neuropathy - from pinched nerves?  Hearing loss    CURRENT OUTPATIENT MEDICATIONS  Reviewed    ALLERGIES  No Known Allergies     PHYSICAL EXAM  BP (!) 143/68   Pulse 63   Temp 97.9  F (36.6  C) (Oral)   Resp 16   Wt 88.1 kg (194 lb 3.2 oz)   SpO2 100%   BMI  34.84 kg/m    GEN: NAD  HEENT: EOMI, no icterus, injection or pallor  Cardio: RRR, no murmurs, rubs, or gallops  Pulm: CTA bilaterally  EXT: trace edema bilateral lower extremities  NEURO: alert, oriented to person, place, and situation.    LABORATORY AND IMAGING STUDIES  Most Recent 3 CBC's:  Recent Labs   Lab Test 07/03/24  0913 06/18/24  0938 06/12/24  0858   WBC 4.1 4.3 4.8   HGB 8.6* 9.0* 8.6*   MCV 88 87 87   * 148* 160   ANEUTAUTO 1.9 2.2 2.8    Most Recent 3 BMP's:  Recent Labs   Lab Test 07/03/24  0913 06/18/24  0938 06/12/24  0858    145 142   POTASSIUM 3.9 4.1 3.4   CHLORIDE 106 107 105   CO2 27 27 27   BUN 19.5 25.8* 18.1   CR 0.96 0.83 0.82   ANIONGAP 10 11 10   WARREN 8.9 9.4 9.0   * 94 134*   PROTTOTAL 6.3* 6.7 6.3*   ALBUMIN 3.9 4.1 3.9    Most Recent 2 LFT's:  Recent Labs   Lab Test 07/03/24  0913 06/18/24  0938   AST 22 28   ALT 13 12   ALKPHOS 92 81   BILITOTAL 0.3 0.4    Most Recent TSH and T4:  Recent Labs   Lab Test 04/23/24  1024   TSH 0.66   T4 1.29       ASSESSMENT AND PLAN  SCC L tonsil, p16 +lety, CPS 25%/TPS 20%, oligometastatic lung met, +/- bone mets: Stable disease on carbo/paclitaxel. Tolerating well but will plan to continue intermittent breaks as needed, as evidenced by break last week that was helpful for him. Next-line options when disease begins to progress include cetuximab + pembro or cetuximab + nivo. Dr. Mueller also discussed the possible of quad shot radiation again which Conner would consider. Will plan for PET/CT on 7/30/2024.  He does have some worsening neuropathy which may be related to paclitaxel versus foramenal/spinal canal stenosis or DM2. He is already on reduced paclitaxel dose; will continue to monitor at this time.    Plan:  -Continue carbo/taxol  -provider visits every 2-3 weeks with labs/infusion  -PET on 7/30/2024; to see Dr. Mueller after PET  -of note, travel plans up [facility] ~8/17    LBP: longstanding chronic issue. No known disease to  this area. Mgmt per PCP    Neurocognitive changes, recent episode of encephalopathy:  hospitalized in March. Working on cog testing through Dr. Aguiar through [facility] home care    Anorexia, malnutrition, weight loss: weight back up. Eating well per his report. Will continue to monitor.    Hypomag: a bit better today at 1.6. Will continue home mag ox supplement, 800 mg in AM and 400 mg in PM--refilled.    H/o CVA: Residual L weakness. Uses walker at baseline, motorized scooter out and about.     LE edema: Somewhat worse today but overall stable; wearing compression stockings. Recommended he re-start spironolactone, its at [facility]     Surrogate decision maker: He would want us to talk with Mariann if he could not speak for himself. Not discussed today    Hyperglyemia, pre-diabetes: A1c 6.0-6.3 for years last checked about 6 months ago. Per PCP     Microcytic anemia: Stable.     Peripheral neuropathy: MRI shows a lot of foramenal stenosis and spinal canal stenosis, so more likely related to that than DM2. Neuropathy has been gradually working. Could consider starting gabapentin; however, would like to avoid any sedative effects, could discuss with Dr. Nascimento. Paclitaxel may also be exacerbating his neuropathy, although his dose has already been reduced and based on exam I think stenosis is more likely bigger culprit.    Olivia Sanchez CNP    ---  40 minutes spent on the date of the encounter doing chart review, review of test results, interpretation of tests, patient visit, and documentation.    The longitudinal plan of care for the diagnosis(es)/condition(s) as documented were addressed during this visit. Due to the added complexity in care, I will continue to support Conner in the subsequent management and with ongoing continuity of care.        Again, thank you for allowing me to participate in the care of your patient.        Sincerely,        Olivia Sanchez CNP

## 2024-07-17 NOTE — PROGRESS NOTES
Infusion Nursing Note:  Jonathan Bishop presents today for Cycle 6, Day 1- Taxol and Carboplatin (#19) Infusion with Magnesium replacement.    Patient seen by provider today: Yes: Olivia Sanchez NP   present during visit today: Not Applicable.    Note: Patient reports feeling well on arrival to infusion suite today. Denies signs of infection. No new changes or concerns since MARK appointment. Wishes to proceed with treatment today.       Intravenous Access:  Peripheral IV placed.    Treatment Conditions:  Lab Results   Component Value Date    HGB 8.5 (L) 07/17/2024    WBC 3.9 (L) 07/17/2024    ANEU 1.0 (L) 03/06/2024    ANEUTAUTO 2.0 07/17/2024     (L) 07/17/2024        Lab Results   Component Value Date     07/17/2024    POTASSIUM 4.0 07/17/2024    MAG 1.6 (L) 07/17/2024    CR 0.93 07/17/2024    WARREN 8.9 07/17/2024    BILITOTAL 0.3 07/17/2024    ALBUMIN 4.0 07/17/2024    ALT 14 07/17/2024    AST 22 07/17/2024       Results reviewed, labs MET treatment parameters, ok to proceed with treatment.      Post Infusion Assessment:  Patient tolerated infusion without incident.  Blood return noted pre and post infusion.  Site patent and intact, free from redness, edema or discomfort.  No evidence of extravasations.  Access discontinued per protocol.       Discharge Plan:   Patient declined prescription refills.  Discharge instructions reviewed with: Patient.  Patient and/or family verbalized understanding of discharge instructions and all questions answered.  AVS to patient via [employer].  Patient will return 7/24/24 for next appointment.   Patient discharged in stable condition accompanied by: self.  Departure Mode: motoriz.      Pascale Landers RN

## 2024-07-17 NOTE — NURSING NOTE
"Oncology Rooming Note    July 17, 2024 10:23 AM   Jonathan Bishop is a 78 year old male who presents for:    Chief Complaint   Patient presents with    Blood Draw     Vitals, blood drawn and PIV placed by NGUYEN VARMA. Pt checked into appt.     Oncology Clinic Visit     Metastatic Cancer of the Ooropharynx     Initial Vitals: BP (!) 143/68   Pulse 63   Temp 97.9  F (36.6  C) (Oral)   Resp 16   Wt 88.1 kg (194 lb 3.2 oz)   SpO2 100%   BMI 34.84 kg/m   Estimated body mass index is 34.84 kg/m  as calculated from the following:    Height as of 5/7/24: 1.59 m (5' 2.6\").    Weight as of this encounter: 88.1 kg (194 lb 3.2 oz). Body surface area is 1.97 meters squared.  Extreme Pain (9) Comment: Data Unavailable   No LMP for male patient.  Allergies reviewed: Yes  Medications reviewed: Yes    Medications: MEDICATION REFILLS NEEDED TODAY. Provider was notified.  Pharmacy name entered into [facility]: [id number] - [place] - [address] [facility]    Frailty Screening:   Is the patient here for a new oncology consult visit in cancer care? 2. No      Clinical concerns: None       Maryan Linda LPN  7/17/2024              " yes

## 2024-07-17 NOTE — PATIENT INSTRUCTIONS
[facility] Triage and after hours / weekends / holidays:  [phone]    Please call the triage or after hours line if you experience a temperature greater than or equal to 100.4, shaking chills, have uncontrolled nausea, vomiting and/or diarrhea, dizziness, shortness of breath, chest pain, bleeding, unexplained bruising, or if you have any other new/concerning symptoms, questions or concerns.      If you are having any concerning symptoms or wish to speak to a provider before your next infusion visit, please call triage to notify them so we can adequately serve you.     If you need a refill on a narcotic prescription or other medication, please call before your infusion appointment.

## 2024-07-23 DIAGNOSIS — M54.50 ACUTE MIDLINE LOW BACK PAIN WITHOUT SCIATICA: ICD-10-CM

## 2024-07-23 RX ORDER — OXYCODONE AND ACETAMINOPHEN 5; 325 MG/1; MG/1
1-2 TABLET ORAL EVERY 6 HOURS PRN
Qty: 150 TABLET | Refills: 0 | Status: SHIPPED | OUTPATIENT
Start: 2024-07-23 | End: 2024-08-22

## 2024-07-23 NOTE — TELEPHONE ENCOUNTER
[facility] Call Center    Phone Message    May a detailed message be left on voicemail: yes     Reason for Call: Medication Refill Request    Has the patient contacted the pharmacy for the refill? Yes   Name of medication being requested: oxyCODONE-acetaminophen (PERCOCET) 5-325 MG tablet   Provider who prescribed the medication: Dr Nascimento  Pharmacy: [facility] [address] [place] [address] [facility]    Date medication is needed: 7/28/2024        Action Taken: Message routed to:  [facility]    Travel Screening: Not Applicable     Date of Service:

## 2024-07-23 NOTE — TELEPHONE ENCOUNTER
oxyCODONE-acetaminophen (PERCOCET) 5-325 MG tablet 150 tablet 0 6/28/2024     Last Office Visit: 2/19/24  Future Office visit:   9/16/24    Routing refill request to provider for review/approval because:  Controlled medication    Sadia Patel RN  P Red Flag Triage/MRT

## 2024-07-24 ENCOUNTER — INFUSION THERAPY VISIT (OUTPATIENT)
Dept: ONCOLOGY | Facility: CLINIC | Age: 79
End: 2024-07-24
Attending: INTERNAL MEDICINE
Payer: COMMERCIAL

## 2024-07-24 ENCOUNTER — APPOINTMENT (OUTPATIENT)
Dept: LAB | Facility: CLINIC | Age: 79
End: 2024-07-24
Attending: INTERNAL MEDICINE
Payer: COMMERCIAL

## 2024-07-24 VITALS
DIASTOLIC BLOOD PRESSURE: 70 MMHG | HEART RATE: 60 BPM | TEMPERATURE: 97.6 F | OXYGEN SATURATION: 100 % | BODY MASS INDEX: 33.27 KG/M2 | WEIGHT: 185.4 LBS | RESPIRATION RATE: 16 BRPM | SYSTOLIC BLOOD PRESSURE: 128 MMHG

## 2024-07-24 DIAGNOSIS — C78.00 MALIGNANT NEOPLASM METASTATIC TO LUNG, UNSPECIFIED LATERALITY (H): Primary | ICD-10-CM

## 2024-07-24 DIAGNOSIS — R60.0 PERIPHERAL EDEMA: ICD-10-CM

## 2024-07-24 DIAGNOSIS — C09.9 TONSIL CANCER (H): ICD-10-CM

## 2024-07-24 DIAGNOSIS — C10.9 SQUAMOUS CELL CARCINOMA OF OROPHARYNX (H): ICD-10-CM

## 2024-07-24 LAB
ALBUMIN SERPL BCG-MCNC: 4.2 G/DL (ref 3.5–5.2)
ALP SERPL-CCNC: 100 U/L (ref 40–150)
ALT SERPL W P-5'-P-CCNC: 11 U/L (ref 0–70)
ANION GAP SERPL CALCULATED.3IONS-SCNC: 10 MMOL/L (ref 7–15)
AST SERPL W P-5'-P-CCNC: 20 U/L (ref 0–45)
BASOPHILS # BLD AUTO: 0 10E3/UL (ref 0–0.2)
BASOPHILS NFR BLD AUTO: 0 %
BILIRUB SERPL-MCNC: 0.2 MG/DL
BUN SERPL-MCNC: 24.2 MG/DL (ref 8–23)
CALCIUM SERPL-MCNC: 9.2 MG/DL (ref 8.8–10.4)
CHLORIDE SERPL-SCNC: 104 MMOL/L (ref 98–107)
CREAT SERPL-MCNC: 0.95 MG/DL (ref 0.67–1.17)
EGFRCR SERPLBLD CKD-EPI 2021: 82 ML/MIN/1.73M2
EOSINOPHIL # BLD AUTO: 0.1 10E3/UL (ref 0–0.7)
EOSINOPHIL NFR BLD AUTO: 2 %
ERYTHROCYTE [DISTWIDTH] IN BLOOD BY AUTOMATED COUNT: 16.9 % (ref 10–15)
GLUCOSE SERPL-MCNC: 73 MG/DL (ref 70–99)
HCO3 SERPL-SCNC: 30 MMOL/L (ref 22–29)
HCT VFR BLD AUTO: 28.7 % (ref 40–53)
HGB BLD-MCNC: 8.8 G/DL (ref 13.3–17.7)
IMM GRANULOCYTES # BLD: 0 10E3/UL
IMM GRANULOCYTES NFR BLD: 0 %
LYMPHOCYTES # BLD AUTO: 1.7 10E3/UL (ref 0.8–5.3)
LYMPHOCYTES NFR BLD AUTO: 36 %
MAGNESIUM SERPL-MCNC: 1.6 MG/DL (ref 1.7–2.3)
MCH RBC QN AUTO: 26.5 PG (ref 26.5–33)
MCHC RBC AUTO-ENTMCNC: 30.7 G/DL (ref 31.5–36.5)
MCV RBC AUTO: 86 FL (ref 78–100)
MONOCYTES # BLD AUTO: 0.4 10E3/UL (ref 0–1.3)
MONOCYTES NFR BLD AUTO: 8 %
NEUTROPHILS # BLD AUTO: 2.5 10E3/UL (ref 1.6–8.3)
NEUTROPHILS NFR BLD AUTO: 54 %
NRBC # BLD AUTO: 0 10E3/UL
NRBC BLD AUTO-RTO: 0 /100
PLATELET # BLD AUTO: 149 10E3/UL (ref 150–450)
POTASSIUM SERPL-SCNC: 3.8 MMOL/L (ref 3.4–5.3)
PROT SERPL-MCNC: 6.7 G/DL (ref 6.4–8.3)
RBC # BLD AUTO: 3.32 10E6/UL (ref 4.4–5.9)
SODIUM SERPL-SCNC: 144 MMOL/L (ref 135–145)
WBC # BLD AUTO: 4.7 10E3/UL (ref 4–11)

## 2024-07-24 PROCEDURE — 85025 COMPLETE CBC W/AUTO DIFF WBC: CPT | Performed by: NURSE PRACTITIONER

## 2024-07-24 PROCEDURE — 36415 COLL VENOUS BLD VENIPUNCTURE: CPT | Performed by: NURSE PRACTITIONER

## 2024-07-24 PROCEDURE — 96375 TX/PRO/DX INJ NEW DRUG ADDON: CPT

## 2024-07-24 PROCEDURE — 80053 COMPREHEN METABOLIC PANEL: CPT | Performed by: NURSE PRACTITIONER

## 2024-07-24 PROCEDURE — 83735 ASSAY OF MAGNESIUM: CPT

## 2024-07-24 PROCEDURE — 96413 CHEMO IV INFUSION 1 HR: CPT

## 2024-07-24 PROCEDURE — 258N000003 HC RX IP 258 OP 636: Performed by: NURSE PRACTITIONER

## 2024-07-24 PROCEDURE — 250N000011 HC RX IP 250 OP 636: Performed by: NURSE PRACTITIONER

## 2024-07-24 PROCEDURE — 96417 CHEMO IV INFUS EACH ADDL SEQ: CPT

## 2024-07-24 RX ORDER — TORSEMIDE 20 MG/1
40 TABLET ORAL DAILY
Qty: 180 TABLET | Refills: 0 | Status: SHIPPED | OUTPATIENT
Start: 2024-07-24

## 2024-07-24 RX ORDER — MAGNESIUM SULFATE HEPTAHYDRATE 40 MG/ML
2 INJECTION, SOLUTION INTRAVENOUS ONCE
Status: COMPLETED | OUTPATIENT
Start: 2024-07-24 | End: 2024-07-24

## 2024-07-24 RX ADMIN — PACLITAXEL 94 MG: 6 INJECTION, SOLUTION INTRAVENOUS at 13:48

## 2024-07-24 RX ADMIN — FAMOTIDINE 20 MG: 10 INJECTION INTRAVENOUS at 12:56

## 2024-07-24 RX ADMIN — MAGNESIUM SULFATE 2 G: 2 INJECTION INTRAVENOUS at 13:24

## 2024-07-24 RX ADMIN — CARBOPLATIN 170 MG: 10 INJECTION, SOLUTION INTRAVENOUS at 14:51

## 2024-07-24 RX ADMIN — SODIUM CHLORIDE 250 ML: 9 INJECTION, SOLUTION INTRAVENOUS at 12:52

## 2024-07-24 RX ADMIN — DEXAMETHASONE SODIUM PHOSPHATE: 10 INJECTION, SOLUTION INTRAMUSCULAR; INTRAVENOUS at 12:52

## 2024-07-24 ASSESSMENT — PAIN SCALES - GENERAL: PAINLEVEL: EXTREME PAIN (8)

## 2024-07-24 NOTE — TELEPHONE ENCOUNTER
Pending Prescriptions:                       Disp   Refills    torsemide (DEMADEX) 20 MG tablet [Pharmac*180 ta*0            Sig: Take 2 tablets by mouth once daily    Last prescribing provider:     Last clinic visit date: 07/17/24 w/Olivia Sanchez    Recommendations for requested medication (if none, N/A): N/A    Any other pertinent information (if none, N/A): N/A    Refilled: Y/N, if NO, why?

## 2024-07-24 NOTE — NURSING NOTE
Chief Complaint   Patient presents with    Blood Draw     Labs drawn via PIV placed by vascular access. VS taken.     Labs drawn from PIV placed by vascular access. Line flushed with saline. Vitals taken. Pt checked in for appointment(s).     Nadia Hernández RN

## 2024-07-24 NOTE — PATIENT INSTRUCTIONS
Contact Numbers    [facility] Main Line/TRIAGE: [phone]    Call with chills and/or temperature greater than or equal to 100.5, uncontrolled nausea/vomiting, diarrhea, constipation, dizziness, shortness of breath, chest pain, bleeding, unexplained bruising, or any new/concerning symptoms, questions/concerns.     If you are having any concerning symptoms or wish to speak to a provider before your next infusion visit, please call your care coordinator or triage to notify them so we can adequately serve you.       After Hours: [phone]    If after hours, weekends, or holidays, call main hospital  and ask for Oncology doctor on call.          Lab Results:  Recent Results (from the past 12 hour(s))   Comprehensive metabolic panel    Collection Time: 07/24/24 11:51 AM   Result Value Ref Range    Sodium 144 135 - 145 mmol/L    Potassium 3.8 3.4 - 5.3 mmol/L    Carbon Dioxide (CO2) 30 (H) 22 - 29 mmol/L    Anion Gap 10 7 - 15 mmol/L    Urea Nitrogen 24.2 (H) 8.0 - 23.0 mg/dL    Creatinine 0.95 0.67 - 1.17 mg/dL    GFR Estimate 82 >60 mL/min/1.73m2    Calcium 9.2 8.8 - 10.4 mg/dL    Chloride 104 98 - 107 mmol/L    Glucose 73 70 - 99 mg/dL    Alkaline Phosphatase 100 40 - 150 U/L    AST 20 0 - 45 U/L    ALT 11 0 - 70 U/L    Protein Total 6.7 6.4 - 8.3 g/dL    Albumin 4.2 3.5 - 5.2 g/dL    Bilirubin Total 0.2 <=1.2 mg/dL   Magnesium    Collection Time: 07/24/24 11:51 AM   Result Value Ref Range    Magnesium 1.6 (L) 1.7 - 2.3 mg/dL   CBC with platelets and differential    Collection Time: 07/24/24 11:51 AM   Result Value Ref Range    WBC Count 4.7 4.0 - 11.0 10e3/uL    RBC Count 3.32 (L) 4.40 - 5.90 10e6/uL    Hemoglobin 8.8 (L) 13.3 - 17.7 g/dL    Hematocrit 28.7 (L) 40.0 - 53.0 %    MCV 86 78 - 100 fL    MCH 26.5 26.5 - 33.0 pg    MCHC 30.7 (L) 31.5 - 36.5 g/dL    RDW 16.9 (H) 10.0 - 15.0 %    Platelet Count 149 (L) 150 - 450 10e3/uL    % Neutrophils 54 %    % Lymphocytes 36 %    % Monocytes 8 %    % Eosinophils 2 %     % Basophils 0 %    % Immature Granulocytes 0 %    NRBCs per 100 WBC 0 <1 /100    Absolute Neutrophils 2.5 1.6 - 8.3 10e3/uL    Absolute Lymphocytes 1.7 0.8 - 5.3 10e3/uL    Absolute Monocytes 0.4 0.0 - 1.3 10e3/uL    Absolute Eosinophils 0.1 0.0 - 0.7 10e3/uL    Absolute Basophils 0.0 0.0 - 0.2 10e3/uL    Absolute Immature Granulocytes 0.0 <=0.4 10e3/uL    Absolute NRBCs 0.0 10e3/uL

## 2024-07-24 NOTE — PROGRESS NOTES
Infusion Nursing Note:  Jonathan Bishop presents today for Cycle 6, day 8 Taxol and Carboplatin.    Patient seen by provider today: No    Note: Patient presents to clinic today feeling well with no questions.  Pt would like to proceed with therapy today. Pt did not request or require any intervention for pain today.    Intravenous Access:  Peripheral IV placed.    Treatment Conditions:  Lab Results   Component Value Date    HGB 8.8 (L) 07/24/2024    WBC 4.7 07/24/2024    ANEU 1.0 (L) 03/06/2024    ANEUTAUTO 2.5 07/24/2024     (L) 07/24/2024        Lab Results   Component Value Date     07/24/2024    POTASSIUM 3.8 07/24/2024    MAG 1.6 (L) 07/24/2024    CR 0.95 07/24/2024    WARREN 9.2 07/24/2024    BILITOTAL 0.2 07/24/2024    ALBUMIN 4.2 07/24/2024    ALT 11 07/24/2024    AST 20 07/24/2024     Results reviewed, labs MET treatment parameters, ok to proceed with treatment.    Post Infusion Assessment:  Patient tolerated infusion without incident.  Blood return noted pre and post infusion.  Site patent and intact, free from redness, edema or discomfort.  No evidence of extravasations.  Access discontinued per protocol.    Discharge Plan:   Patient declined prescription refills.  Discharge instructions reviewed with: Patient.  Patient and/or family verbalized understanding of discharge instructions and all questions answered.  AVS to patient via [facility].  Patient will return 8/1/2024 for next appointment.   Patient discharged in stable condition accompanied by: self.  Departure Mode: Wheelchair.    Gaviota Mitchell RN

## 2024-07-30 ENCOUNTER — HOSPITAL ENCOUNTER (OUTPATIENT)
Dept: PET IMAGING | Facility: CLINIC | Age: 79
Discharge: HOME | End: 2024-07-30
Attending: INTERNAL MEDICINE | Admitting: INTERNAL MEDICINE
Payer: COMMERCIAL

## 2024-07-30 DIAGNOSIS — C78.00 MALIGNANT NEOPLASM METASTATIC TO LUNG, UNSPECIFIED LATERALITY (H): ICD-10-CM

## 2024-07-30 DIAGNOSIS — C09.8 MALIGNANT NEOPLASM OF OVERLAPPING SITES OF TONSIL (H): ICD-10-CM

## 2024-07-30 DIAGNOSIS — C10.9 SQUAMOUS CELL CARCINOMA OF OROPHARYNX (H): ICD-10-CM

## 2024-07-30 PROCEDURE — 78816 PET IMAGE W/CT FULL BODY: CPT | Mod: 26 | Performed by: RADIOLOGY

## 2024-07-30 PROCEDURE — 343N000001 HC RX 343: Performed by: INTERNAL MEDICINE

## 2024-07-30 PROCEDURE — 78816 PET IMAGE W/CT FULL BODY: CPT | Mod: PS

## 2024-07-30 PROCEDURE — A9552 F18 FDG: HCPCS | Performed by: INTERNAL MEDICINE

## 2024-07-30 RX ORDER — FLUDEOXYGLUCOSE F 18 200 MCI/ML
10-18 INJECTION, SOLUTION INTRAVENOUS ONCE
Status: COMPLETED | OUTPATIENT
Start: 2024-07-30 | End: 2024-07-30

## 2024-07-30 RX ADMIN — FLUDEOXYGLUCOSE F 18 10.92 MILLICURIE: 200 INJECTION, SOLUTION INTRAVENOUS at 09:06

## 2024-08-01 ENCOUNTER — INFUSION THERAPY VISIT (OUTPATIENT)
Dept: ONCOLOGY | Facility: CLINIC | Age: 79
End: 2024-08-01
Attending: INTERNAL MEDICINE
Payer: COMMERCIAL

## 2024-08-01 ENCOUNTER — APPOINTMENT (OUTPATIENT)
Dept: LAB | Facility: CLINIC | Age: 79
End: 2024-08-01
Attending: INTERNAL MEDICINE
Payer: COMMERCIAL

## 2024-08-01 VITALS
SYSTOLIC BLOOD PRESSURE: 151 MMHG | DIASTOLIC BLOOD PRESSURE: 64 MMHG | OXYGEN SATURATION: 98 % | RESPIRATION RATE: 16 BRPM | TEMPERATURE: 97.8 F | BODY MASS INDEX: 32.89 KG/M2 | WEIGHT: 183.3 LBS | HEART RATE: 78 BPM

## 2024-08-01 DIAGNOSIS — C78.00 MALIGNANT NEOPLASM METASTATIC TO LUNG, UNSPECIFIED LATERALITY (H): Primary | ICD-10-CM

## 2024-08-01 DIAGNOSIS — C10.9 SQUAMOUS CELL CARCINOMA OF OROPHARYNX (H): ICD-10-CM

## 2024-08-01 DIAGNOSIS — E83.42 HYPOMAGNESEMIA: ICD-10-CM

## 2024-08-01 DIAGNOSIS — C09.9 TONSIL CANCER (H): ICD-10-CM

## 2024-08-01 LAB
ALBUMIN SERPL BCG-MCNC: 4.2 G/DL (ref 3.5–5.2)
ALP SERPL-CCNC: 94 U/L (ref 40–150)
ALT SERPL W P-5'-P-CCNC: 12 U/L (ref 0–70)
ANION GAP SERPL CALCULATED.3IONS-SCNC: 11 MMOL/L (ref 7–15)
AST SERPL W P-5'-P-CCNC: 21 U/L (ref 0–45)
BASOPHILS # BLD AUTO: 0 10E3/UL (ref 0–0.2)
BASOPHILS NFR BLD AUTO: 1 %
BILIRUB SERPL-MCNC: 0.3 MG/DL
BUN SERPL-MCNC: 24.8 MG/DL (ref 8–23)
CALCIUM SERPL-MCNC: 9.3 MG/DL (ref 8.8–10.4)
CHLORIDE SERPL-SCNC: 103 MMOL/L (ref 98–107)
CREAT SERPL-MCNC: 0.97 MG/DL (ref 0.67–1.17)
EGFRCR SERPLBLD CKD-EPI 2021: 80 ML/MIN/1.73M2
EOSINOPHIL # BLD AUTO: 0.1 10E3/UL (ref 0–0.7)
EOSINOPHIL NFR BLD AUTO: 2 %
ERYTHROCYTE [DISTWIDTH] IN BLOOD BY AUTOMATED COUNT: 17.2 % (ref 10–15)
GLUCOSE SERPL-MCNC: 98 MG/DL (ref 70–99)
HCO3 SERPL-SCNC: 30 MMOL/L (ref 22–29)
HCT VFR BLD AUTO: 28.6 % (ref 40–53)
HGB BLD-MCNC: 8.6 G/DL (ref 13.3–17.7)
IMM GRANULOCYTES # BLD: 0.1 10E3/UL
IMM GRANULOCYTES NFR BLD: 1 %
LYMPHOCYTES # BLD AUTO: 1.6 10E3/UL (ref 0.8–5.3)
LYMPHOCYTES NFR BLD AUTO: 39 %
MAGNESIUM SERPL-MCNC: 1.6 MG/DL (ref 1.7–2.3)
MCH RBC QN AUTO: 25.8 PG (ref 26.5–33)
MCHC RBC AUTO-ENTMCNC: 30.1 G/DL (ref 31.5–36.5)
MCV RBC AUTO: 86 FL (ref 78–100)
MONOCYTES # BLD AUTO: 0.3 10E3/UL (ref 0–1.3)
MONOCYTES NFR BLD AUTO: 8 %
NEUTROPHILS # BLD AUTO: 2 10E3/UL (ref 1.6–8.3)
NEUTROPHILS NFR BLD AUTO: 49 %
NRBC # BLD AUTO: 0 10E3/UL
NRBC BLD AUTO-RTO: 1 /100
PLATELET # BLD AUTO: 197 10E3/UL (ref 150–450)
POTASSIUM SERPL-SCNC: 3.5 MMOL/L (ref 3.4–5.3)
PROT SERPL-MCNC: 6.9 G/DL (ref 6.4–8.3)
RBC # BLD AUTO: 3.33 10E6/UL (ref 4.4–5.9)
SODIUM SERPL-SCNC: 144 MMOL/L (ref 135–145)
WBC # BLD AUTO: 4.2 10E3/UL (ref 4–11)

## 2024-08-01 PROCEDURE — 96375 TX/PRO/DX INJ NEW DRUG ADDON: CPT

## 2024-08-01 PROCEDURE — 250N000011 HC RX IP 250 OP 636: Performed by: INTERNAL MEDICINE

## 2024-08-01 PROCEDURE — 85049 AUTOMATED PLATELET COUNT: CPT | Performed by: INTERNAL MEDICINE

## 2024-08-01 PROCEDURE — 96367 TX/PROPH/DG ADDL SEQ IV INF: CPT

## 2024-08-01 PROCEDURE — 80053 COMPREHEN METABOLIC PANEL: CPT | Performed by: INTERNAL MEDICINE

## 2024-08-01 PROCEDURE — 96417 CHEMO IV INFUS EACH ADDL SEQ: CPT

## 2024-08-01 PROCEDURE — G0463 HOSPITAL OUTPT CLINIC VISIT: HCPCS | Mod: 25 | Performed by: INTERNAL MEDICINE

## 2024-08-01 PROCEDURE — 83735 ASSAY OF MAGNESIUM: CPT | Performed by: INTERNAL MEDICINE

## 2024-08-01 PROCEDURE — 99215 OFFICE O/P EST HI 40 MIN: CPT | Performed by: INTERNAL MEDICINE

## 2024-08-01 PROCEDURE — 96413 CHEMO IV INFUSION 1 HR: CPT

## 2024-08-01 PROCEDURE — G2211 COMPLEX E/M VISIT ADD ON: HCPCS | Performed by: INTERNAL MEDICINE

## 2024-08-01 PROCEDURE — 36415 COLL VENOUS BLD VENIPUNCTURE: CPT | Performed by: INTERNAL MEDICINE

## 2024-08-01 PROCEDURE — 258N000003 HC RX IP 258 OP 636: Performed by: INTERNAL MEDICINE

## 2024-08-01 RX ORDER — MEPERIDINE HYDROCHLORIDE 25 MG/ML
25 INJECTION INTRAMUSCULAR; INTRAVENOUS; SUBCUTANEOUS EVERY 30 MIN PRN
Status: CANCELLED | OUTPATIENT
Start: 2024-09-19

## 2024-08-01 RX ORDER — MAGNESIUM SULFATE HEPTAHYDRATE 40 MG/ML
2 INJECTION, SOLUTION INTRAVENOUS ONCE
Status: COMPLETED | OUTPATIENT
Start: 2024-08-01 | End: 2024-08-01

## 2024-08-01 RX ORDER — ALBUTEROL SULFATE 90 UG/1
1-2 AEROSOL, METERED RESPIRATORY (INHALATION)
Status: CANCELLED
Start: 2024-08-01

## 2024-08-01 RX ORDER — ALBUTEROL SULFATE 0.83 MG/ML
2.5 SOLUTION RESPIRATORY (INHALATION)
Status: CANCELLED | OUTPATIENT
Start: 2024-08-01

## 2024-08-01 RX ORDER — HEPARIN SODIUM (PORCINE) LOCK FLUSH IV SOLN 100 UNIT/ML 100 UNIT/ML
5 SOLUTION INTRAVENOUS
Status: CANCELLED | OUTPATIENT
Start: 2024-09-19

## 2024-08-01 RX ORDER — DIPHENHYDRAMINE HYDROCHLORIDE 50 MG/ML
50 INJECTION INTRAMUSCULAR; INTRAVENOUS
Status: CANCELLED
Start: 2024-09-19

## 2024-08-01 RX ORDER — EPINEPHRINE 1 MG/ML
0.3 INJECTION, SOLUTION INTRAMUSCULAR; SUBCUTANEOUS EVERY 5 MIN PRN
Status: CANCELLED | OUTPATIENT
Start: 2024-09-19

## 2024-08-01 RX ORDER — LORAZEPAM 2 MG/ML
0.5 INJECTION INTRAMUSCULAR EVERY 4 HOURS PRN
Status: CANCELLED | OUTPATIENT
Start: 2024-08-01

## 2024-08-01 RX ORDER — HEPARIN SODIUM (PORCINE) LOCK FLUSH IV SOLN 100 UNIT/ML 100 UNIT/ML
5 SOLUTION INTRAVENOUS
Status: CANCELLED | OUTPATIENT
Start: 2024-08-01

## 2024-08-01 RX ORDER — ALBUTEROL SULFATE 0.83 MG/ML
2.5 SOLUTION RESPIRATORY (INHALATION)
Status: CANCELLED | OUTPATIENT
Start: 2024-09-19

## 2024-08-01 RX ORDER — MEPERIDINE HYDROCHLORIDE 25 MG/ML
25 INJECTION INTRAMUSCULAR; INTRAVENOUS; SUBCUTANEOUS EVERY 30 MIN PRN
Status: CANCELLED | OUTPATIENT
Start: 2024-08-01

## 2024-08-01 RX ORDER — MAGNESIUM OXIDE 400 MG/1
TABLET ORAL
Qty: 30 TABLET | Refills: 0 | Status: SHIPPED | OUTPATIENT
Start: 2024-08-01

## 2024-08-01 RX ORDER — DIPHENHYDRAMINE HYDROCHLORIDE 50 MG/ML
50 INJECTION INTRAMUSCULAR; INTRAVENOUS
Status: CANCELLED
Start: 2024-08-01

## 2024-08-01 RX ORDER — HEPARIN SODIUM,PORCINE 10 UNIT/ML
5-20 VIAL (ML) INTRAVENOUS DAILY PRN
Status: CANCELLED | OUTPATIENT
Start: 2024-09-19

## 2024-08-01 RX ORDER — LORAZEPAM 2 MG/ML
0.5 INJECTION INTRAMUSCULAR EVERY 4 HOURS PRN
Status: CANCELLED | OUTPATIENT
Start: 2024-09-19

## 2024-08-01 RX ORDER — EPINEPHRINE 1 MG/ML
0.3 INJECTION, SOLUTION INTRAMUSCULAR; SUBCUTANEOUS EVERY 5 MIN PRN
Status: CANCELLED | OUTPATIENT
Start: 2024-08-01

## 2024-08-01 RX ORDER — ALBUTEROL SULFATE 90 UG/1
1-2 AEROSOL, METERED RESPIRATORY (INHALATION)
Status: CANCELLED
Start: 2024-09-19

## 2024-08-01 RX ORDER — METHYLPREDNISOLONE SODIUM SUCCINATE 125 MG/2ML
125 INJECTION, POWDER, LYOPHILIZED, FOR SOLUTION INTRAMUSCULAR; INTRAVENOUS
Status: CANCELLED
Start: 2024-09-19

## 2024-08-01 RX ORDER — METHYLPREDNISOLONE SODIUM SUCCINATE 125 MG/2ML
125 INJECTION, POWDER, LYOPHILIZED, FOR SOLUTION INTRAMUSCULAR; INTRAVENOUS
Status: CANCELLED
Start: 2024-08-01

## 2024-08-01 RX ORDER — HEPARIN SODIUM,PORCINE 10 UNIT/ML
5-20 VIAL (ML) INTRAVENOUS DAILY PRN
Status: CANCELLED | OUTPATIENT
Start: 2024-08-01

## 2024-08-01 RX ADMIN — SODIUM CHLORIDE 250 ML: 9 INJECTION, SOLUTION INTRAVENOUS at 12:24

## 2024-08-01 RX ADMIN — CARBOPLATIN 170 MG: 10 INJECTION, SOLUTION INTRAVENOUS at 14:10

## 2024-08-01 RX ADMIN — FAMOTIDINE 20 MG: 10 INJECTION INTRAVENOUS at 12:27

## 2024-08-01 RX ADMIN — PACLITAXEL 94 MG: 6 INJECTION, SOLUTION INTRAVENOUS at 12:52

## 2024-08-01 RX ADMIN — DEXAMETHASONE SODIUM PHOSPHATE: 10 INJECTION, SOLUTION INTRAMUSCULAR; INTRAVENOUS at 12:29

## 2024-08-01 RX ADMIN — MAGNESIUM SULFATE 2 G: 2 INJECTION INTRAVENOUS at 12:46

## 2024-08-01 ASSESSMENT — PAIN SCALES - GENERAL: PAINLEVEL: EXTREME PAIN (8)

## 2024-08-01 NOTE — NURSING NOTE
"Oncology Rooming Note    August 1, 2024 11:04 AM   Jonathan Bishop is a 78 year old male who presents for:    Chief Complaint   Patient presents with    Blood Draw     Labs drawn via PIV placed by VAT. VS taken.    Oncology Clinic Visit     Squamous Cell Carcinoma of Oropharynx     Initial Vitals: BP (!) 151/64 (BP Location: Right arm, Patient Position: Sitting, Cuff Size: Adult Regular)   Pulse 78   Temp 97.8  F (36.6  C) (Oral)   Resp 16   Wt 83.1 kg (183 lb 4.8 oz)   SpO2 98%   BMI 32.89 kg/m   Estimated body mass index is 32.89 kg/m  as calculated from the following:    Height as of 5/7/24: 1.59 m (5' 2.6\").    Weight as of this encounter: 83.1 kg (183 lb 4.8 oz). Body surface area is 1.92 meters squared.  Extreme Pain (8) Comment: Data Unavailable   No LMP for male patient.  Allergies reviewed: Yes  Medications reviewed: Yes    Medications: Will discuss refill with provider  Pharmacy name entered into [facility]: [facility] - [place] - [id number] [address]    Frailty Screening:   Is the patient here for a new oncology consult visit in cancer care? 2. No      Clinical concerns: None       Maryan Linda LPN  8/1/2024              "

## 2024-08-01 NOTE — PROGRESS NOTES
Infusion Nursing Note:  Jonathan Bishop presents today for C6D15 Taxol/Carboplatin.    Patient seen by provider today: Yes: Dr. Mueller prior to infusion   present during visit today: Not Applicable.    Note: Denied any questions or concerns following provider visit.    Magnesium 1.6. Replaced IV as ordered by provider d/t already being on oral supplement.    Did not need any intervention for pain today.    Intravenous Access:  Peripheral IV placed.    Treatment Conditions:   Latest Reference Range & Units 08/01/24 10:32   Sodium 135 - 145 mmol/L 144   Potassium 3.4 - 5.3 mmol/L 3.5   Chloride 98 - 107 mmol/L 103   Carbon Dioxide (CO2) 22 - 29 mmol/L 30 (H)   Urea Nitrogen 8.0 - 23.0 mg/dL 24.8 (H)   Creatinine 0.67 - 1.17 mg/dL 0.97   GFR Estimate >60 mL/min/1.73m2 80   Calcium 8.8 - 10.4 mg/dL 9.3   Anion Gap 7 - 15 mmol/L 11   Magnesium 1.7 - 2.3 mg/dL 1.6 (L)   Albumin 3.5 - 5.2 g/dL 4.2   Protein Total 6.4 - 8.3 g/dL 6.9   Alkaline Phosphatase 40 - 150 U/L 94   ALT 0 - 70 U/L 12   AST 0 - 45 U/L 21   Bilirubin Total <=1.2 mg/dL 0.3   Glucose 70 - 99 mg/dL 98   WBC 4.0 - 11.0 10e3/uL 4.2   Hemoglobin 13.3 - 17.7 g/dL 8.6 (L)   Hematocrit 40.0 - 53.0 % 28.6 (L)   Platelet Count 150 - 450 10e3/uL 197   RBC Count 4.40 - 5.90 10e6/uL 3.33 (L)   MCV 78 - 100 fL 86   MCH 26.5 - 33.0 pg 25.8 (L)   MCHC 31.5 - 36.5 g/dL 30.1 (L)   RDW 10.0 - 15.0 % 17.2 (H)   % Neutrophils % 49   % Lymphocytes % 39   % Monocytes % 8   % Eosinophils % 2   % Basophils % 1   Absolute Basophils 0.0 - 0.2 10e3/uL 0.0   Absolute Eosinophils 0.0 - 0.7 10e3/uL 0.1   Absolute Immature Granulocytes <=0.4 10e3/uL 0.1   Absolute Lymphocytes 0.8 - 5.3 10e3/uL 1.6   Absolute Monocytes 0.0 - 1.3 10e3/uL 0.3   % Immature Granulocytes % 1   Absolute Neutrophils 1.6 - 8.3 10e3/uL 2.0   Absolute NRBCs 10e3/uL 0.0   NRBCs per 100 WBC <1 /100 1 (H)     Results reviewed, labs MET treatment parameters, ok to proceed with treatment.      Post  Infusion Assessment:  Patient tolerated infusion without incident.  Blood return noted pre and post infusion.  Site patent and intact, free from redness, edema or discomfort.  No evidence of extravasations.  Access discontinued per protocol.       Discharge Plan:   Prescription refills given for magnesium.  Discharge instructions reviewed with: Patient.  Patient and/or family verbalized understanding of discharge instructions and all questions answered.  Copy of AVS reviewed with patient and/or family.  Patient will return 8/8 for next appointment.  Patient discharged in stable condition accompanied by: self.  Departure Mode: Wheelchair.      Mary Dudley RN

## 2024-08-01 NOTE — LETTER
8/1/2024      Jonathan Bishop  [address]  [facility] [address]      Dear Colleague,    Thank you for referring your patient, Jonathan Bishop, to the [facility]. Please see a copy of my visit note below.        [facility]  [address] [address]  Phone: [phone]  Fax: [phone]    PATIENT NAME: Jonathan Bishop  MRN # [id number]   DATE OF VISIT: Aug 1, 2024  YOB: 1945     Otolaryngology: Dr. Karishma Eng  Radiation Oncology: Dr. Jenni Mills  PCP: Dr. Buck Nascimento    CANCER TYPE: SCC L tonsil, p16 +lety  STAGE: gA8L1C1 (IVC)  ECOG PS: 1    PD-L1: TPS 20%, CPS 25% on [id number] tonsil bx  NGS: N/A    SUMMARY  2/26/23 L tonsil mass bx in clinic (Dr. Eng).   2/20/23 PET/CT. 3.7 x 4.0 x 5.1 cm mass L palatine tonsil causing narrowing of the oropharyngeal lumen, L level 2 node, L retropharyngeal nodular density, extension of primary mass vs retropharyngeal node, 0.8 cm RLL nodule concerning for met, mild focal uptake R iliac bone and L1 without CT correlate suspicious for mets.   3/2/23 R VATS, wedge resection. Path: SCC, poorly differentiated, associated with necrosis, 1 cm, negative margins, p16 +lety  3/24/23 MRI L spine and pelvis. Diffusely heterogeneous marrow signal throughout without corresponding abnormal signal on sagittal STIR sequence and no abnormal enhancement. Nonspecific but could be benign process such as red marrow hyperplasia, cannot completely exclude metastatic disease or infiltrative pathologic marrow process. No lesions corresponding to FDG avid spots on PET/CT.   4/19~10/18/23 Pembrolizumab.  10/26/23 CT neck and CAP. Increased L palatine tonsil mass, 3.8 x 2.7 cm --> 4.7 x 3.5 cm. Increased bilateral cervical nodes up to 2.3 x 2.4 cm R level 2 node. New 3 mm RML nodule, no other mets.   11/14/23 PET. 4.6 x 3.3 cm L palatine tonsil mass (SUB 26.8), R level 2A and 2A/3  nodes. Questionable uptake distal R femoral medullary cavity, partially imaged, with questionable subtle corresponding soft tissue attenuation on CT. MRI could be obtained to better evaluate.  11/21~12/2/23 [facility] for weakness/fall. COVID/paxlovid, MSSA bacteremia (1 of 2 bottles on 1 day), TTE negative, treated with cefazolin but didn't complete 2 week course, episode of unresponsiveness 12/2. Dispo plan was TCU on [facility], but left AMA. CTA chest negative for PE, showed grossly unchanged cervical adenopathy. Brain MRI 11/22 for stroke code negative for infarct. Showed SVID, moderate atrophy, suboptimal contrast bolus to examine intracranial structures, 4.5 x 3 x 4.5 cm L nasopharyngeal mass, incompletely visualized. CTA negative.   12/19/23 Quad shot fraction #1. No-showed thereafter.  1/10/24~curr Carboplatin (AUC 2) + paclitaxel 60 mg/m2 weekly. Held 2/13 due to anorexia, fatigue.  3/13~3/17/24 [facility] for acute encephalopathy. Had [place] his car in the middle of the highway after receiving chemo earlier that day. Recommended not to drive   4/30/24 CT CAP and CT neck. Residual bilateral cervical adenopathy.   7/30/24 PET. CT not covered. Significant reduction L palatine tonsil mass - residual focal uptake in the L posterolateral oropharyngeal wall (SUV 9.1, previously 26.8). Previously seen multiple FDG avid L cervical nodes and few enlarged R nodes are completed resolved. Mild diffuse uptake along bilateral lower legs and ankles with associated skin thickening and stranding along the soft tissues; findings can be seen with cellulitis. No metastatic disease.     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 We got a PET/CT this time to help determine if there might be a role for consolidative radiotherapy. I think there is.  Discussed issues with the last attempt at radiation. He's willing to come in. I don't know if Dr. Mills would propose quad shot type approach again or a more conventional approach. Depending on the dose/strategy, I would advocate taking a break from systemic therapy afterward. Will continue weekly carboplatin paclitaxel in the meantime. I've requested an appt with Dr. Mills to discuss further.     Neurocognitive changes, encephalopathy March 2024: Hasn't pursued cognitive testing yet. Will continue to encourage.     Anorexia, malnutrition, weight loss: Stabilized and doing much better with PCA back in place.     Hypomag: On mag oxide but remains marginal so will give 2 g IV today. Discussed rationale with Conner.     H/o CVA: Residual L weakness. Uses walker at baseline, motorized scooter out and about.     LE edema: Better with consistent PCA care.     Surrogate decision maker: He would want us to talk with Mariann if he could not speak for himself. Not discussed again today.    Microcytic anemia: Stable    Peripheral neuropathy: MRI shows a lot of foramenal stenosis and spinal canal stenosis, so more likely related to that than DM2. Doesn't appear to be worsening on paclitaxel    The longitudinal plan of care for the condition(s) below were addressed during this visit. Due to the added complexity in care, I will continue to support Conner in the subsequent management of this condition(s) and with the ongoing continuity of care of this condition(s): SCC tonsil      40 minutes spent by me on the date of the encounter doing chart review, review of test results, interpretation of tests, patient visit, documentation, orders    Dominique Mueller MD  Associate Professor of Medicine  Hematology, Oncology and Transplantation      SUBJECTIVE  Mr. Bishop returns for follow up on weekly carboplatin paclitaxel since 1/10  Going fishing in a couple of weeks   His nephew is already here in [place]  His brother will be arriving in [place]    They'll all go on the fishing trip together  Breathing ok   Legs decent  No falls or close calls recently  Eating ok    Brother lives in the [place] area, works at [employer]. They're going to [place] sometime this summer    PAST MEDICAL HISTORY  SCC as above  Chronic LBP  TAVR 2020  H/o rheumatic carditis 1950  CHF. 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 Nocturia once nightly   ED  Cataracts  Umbilical hernia repair 2011  Knee arthroplasty B 2010  Lumbar spine fusion, laminectomy, sciatic pain R > L  Peripheral neuropathy - from pinched nerves?  Hearing loss    CURRENT OUTPATIENT MEDICATIONS  Reviewed    ALLERGIES  No Known Allergies     PHYSICAL EXAM  BP (!) 151/64 (BP Location: Right arm, Patient Position: Sitting, Cuff Size: Adult Regular)   Pulse 78   Temp 97.8  F (36.6  C) (Oral)   Resp 16   Wt 83.1 kg (183 lb 4.8 oz)   SpO2 98%   BMI 32.89 kg/m    GEN: NAD  HEENT: EOMI, no icterus, injection or pallor  EXT: chronic 1-2+ edema  NEURO: alert    LABORATORY AND IMAGING STUDIES    Labs  8/1/24 were independently reviewed and interpreted by me  Mg 1.6 marginally low as above  CMP acceptable/stable. Alb 4.2   CBC - hemoglobin stable in mithe mid 8 range, MCV 86, plt 197, WBC 4.2, normal diff      Combined Report of: PET and CT on  7/30/2024 10:31 AM:     1. PET of the neck, chest, abdomen, and pelvis.  2. PET CT Fusion for Attenuation Correction and Anatomical  Localization:    3. 3D MIP and PET-CT fused images were processed on an independent  workstation and archived to PACS and reviewed by a radiologist.     Technique:     1. PET: The patient received 10.92 mCi of F-18-FDG; the serum glucose  was 100 mg/dL prior to administration, body weight was 84.1 kg. Images  were evaluated in the axial, sagittal, and coronal planes as well as  the rotational whole body MIP. Images were acquired from the Vertex to  the Feet.     UPTAKE WAS MEASURED AT 61  MINUTES.      2. CT: CT only obtained for attenuation correction and not diagnostic  purposes.     INDICATION: disease re-evaluation. Tonsil cancer, metastatic.  Restaging prior to considering consolidative radiotherapy to the  remaining disease in the head/neck. Also re-evaluate femur lesion seen  on PET 11/2023, r/o progressive bony mets; Malignant neoplasm  metastatic to lung, unspecified laterality (H); Squamous cell  carcinoma of oropharynx (H); Malignant neoplasm of overlapping sites  of tonsil (H)     ADDITIONAL INFORMATION OBTAINED FROM EMR: none     COMPARISON: 4/30/2024 dated neck CT and chest abdomen pelvis CT.  3/14/2024 dated brain MRI.      FINDINGS:   BACKGROUND: Liver SUV max = 3.7, Aorta Blood SUV max = 3.3.      HEAD/NECK:  Significant reduction of the FDG avid mass centered in the left  palatine tonsil. There is residual focal uptake in the left  posterolateral oropharyngeal wall with maximum SUV of 9.1, metabolic  tumor volume of 2.2 cc, previously metabolic tumor volume was 53 and  max SUV was 26.8.     Previously seen multiple FDG avid enlarged left-sided cervical lymph  nodes and few enlarged FDG avid right-sided lymph nodes are completely  resolved.     CHEST:  No abnormal uptake.      ABDOMEN AND PELVIS:  No abnormal uptake.      LOWER EXTREMITIES:   Mild diffuse uptake along bilateral lower lower legs and ankles with  associated skin thickening and stranding along the soft tissues.  Findings can be seen with cellulitis.      BONES AND SOFT TISSUES:   No abnormal uptake.   Post operative/ inflammatory changes related uptake around the right  knee arthroplasty. No suspicious lesion in the distal femur as  suspected from prior PET.                                                                      IMPRESSION: Whole-body FDG PET demonstrates:     -Significant reduction of previously seen left palatine tonsil/ soft  palate FDG avid mass. Small residual focal uptake in the left  posterolateral  oropharyngeal wall might represent residual tumor.   -Complete resolution of previously seen bilateral enlarged FDG avid  lymphadenopathy.  -No new evidence of metastatic disease. Particularly in the lungs no  FDG avid or suspicious lesions. No suspicious lesion in the femur as  questioned and suspected on prior scan.  -Findings suggestive of bilateral lower leg/ankle cellulitis. Please  clinically correlate.     MADHURI TAVAREZ MD     PET was personally reviewed and interpreted by me  Report was not available at the time of our visit, came back afterward  Minimal FDG avidity in the R oropharynx. I didn't see any LNs in the neck   Still scattered calcified nodules in the R lung, scar in the posterior RLL, small calcified nodule LLL   I can't see the stable R hilar node well but no major difference in that area compared to April and not FDG avid.                Again, thank you for allowing me to participate in the care of your patient.        Sincerely,        Dominique Mueller MD

## 2024-08-01 NOTE — PROGRESS NOTES
[facility]  [address] [facility] [address]  Phone: [phone]  Fax: [phone]    PATIENT NAME: Jonathan Bishop  MRN # [id number]   DATE OF VISIT: Aug 1, 2024  YOB: 1945     Otolaryngology: Dr. Karishma Eng  Radiation Oncology: Dr. Jenni Mills  PCP: Dr. Buck Nascimento    CANCER TYPE: SCC L tonsil, p16 +lety  STAGE: [id number] (IVC)  ECOG PS: 1    PD-L1: TPS 20%, CPS 25% on [id number] tonsil bx  NGS: N/A    SUMMARY  2/26/23 L tonsil mass bx in clinic (Dr. Eng).   2/20/23 PET/CT. 3.7 x 4.0 x 5.1 cm mass L palatine tonsil causing narrowing of the oropharyngeal lumen, L level 2 node, L retropharyngeal nodular density, extension of primary mass vs retropharyngeal node, 0.8 cm RLL nodule concerning for met, mild focal uptake R iliac bone and L1 without CT correlate suspicious for mets.   3/2/23 R VATS, wedge resection. Path: SCC, poorly differentiated, associated with necrosis, 1 cm, negative margins, p16 +lety  3/24/23 MRI L spine and pelvis. Diffusely heterogeneous marrow signal throughout without corresponding abnormal signal on sagittal STIR sequence and no abnormal enhancement. Nonspecific but could be benign process such as red marrow hyperplasia, cannot completely exclude metastatic disease or infiltrative pathologic marrow process. No lesions corresponding to FDG avid spots on PET/CT.   4/19~10/18/23 Pembrolizumab.  10/26/23 CT neck and CAP. Increased L palatine tonsil mass, 3.8 x 2.7 cm --> 4.7 x 3.5 cm. Increased bilateral cervical nodes up to 2.3 x 2.4 cm R level 2 node. New 3 mm RML nodule, no other mets.   11/14/23 PET. 4.6 x 3.3 cm L palatine tonsil mass (SUB 26.8), R level 2A and 2A/3 nodes. Questionable uptake distal R femoral medullary cavity, partially imaged, with questionable subtle corresponding soft tissue attenuation on CT. MRI could be obtained to better evaluate.  11/21~12/2/23 [facility] for weakness/fall. COVID/paxlovid,  MSSA bacteremia (1 of 2 bottles on 1 day), TTE negative, treated with cefazolin but didn't complete 2 week course, episode of unresponsiveness 12/2. Dispo plan was TCU on [facility], but left AMA. CTA chest negative for PE, showed grossly unchanged cervical adenopathy. Brain MRI 11/22 for stroke code negative for infarct. Showed SVID, moderate atrophy, suboptimal contrast bolus to examine intracranial structures, 4.5 x 3 x 4.5 cm L nasopharyngeal mass, incompletely visualized. CTA negative.   12/19/23 Quad shot fraction #1. No-showed thereafter.  1/10/24~curr Carboplatin (AUC 2) + paclitaxel 60 mg/m2 weekly. Held 2/13 due to anorexia, fatigue.  3/13~3/17/24 [facility] for acute encephalopathy. Had stopped his car in the middle of the highway after receiving chemo earlier that day. Recommended not to drive   4/30/24 CT CAP and CT neck. Residual bilateral cervical adenopathy.   7/30/24 PET. CT not covered. Significant reduction L palatine tonsil mass - residual focal uptake in the L posterolateral oropharyngeal wall (SUV 9.1, previously 26.8). Previously seen multiple FDG avid L cervical nodes and few enlarged R nodes are completed resolved. Mild diffuse uptake along bilateral lower legs and ankles with associated skin thickening and stranding along the soft tissues; findings can be seen with cellulitis. No metastatic disease.     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 We got a PET/CT this time to help determine if there might be a role for consolidative radiotherapy. I think there is. Discussed issues with the last attempt at radiation. He's willing to come in. I don't know if Dr. Mills would propose quad shot type approach again or a more conventional approach. Depending on the dose/strategy, I would advocate taking a break from systemic  therapy afterward. Will continue weekly carboplatin paclitaxel in the meantime. I've requested an appt with Dr. Mills to discuss further.     Neurocognitive changes, encephalopathy March 2024: Hasn't pursued cognitive testing yet. Will continue to encourage.     Anorexia, malnutrition, weight loss: Stabilized and doing much better with PCA back in place.     Hypomag: On mag oxide but remains marginal so will give 2 g IV today. Discussed rationale with Conner.     H/o CVA: Residual L weakness. Uses walker at baseline, motorized scooter out and about.     LE edema: Better with consistent PCA care.     Surrogate decision maker: He would want us to talk with Mariann if he could not speak for himself. Not discussed again today.    Microcytic anemia: Stable    Peripheral neuropathy: MRI shows a lot of foramenal stenosis and spinal canal stenosis, so more likely related to that than DM2. Doesn't appear to be worsening on paclitaxel    The longitudinal plan of care for the condition(s) below were addressed during this visit. Due to the added complexity in care, I will continue to support Conner in the subsequent management of this condition(s) and with the ongoing continuity of care of this condition(s): SCC tonsil      40 minutes spent by me on the date of the encounter doing chart review, review of test results, interpretation of tests, patient visit, documentation, orders    Dominique Mueller MD  Associate Professor of Medicine  Hematology, Oncology and Transplantation      SUBJECTIVE  Mr. Bishop returns for follow up on weekly carboplatin paclitaxel since 1/10  Going fishing in a couple of weeks   His nephew is already here in [place]  His brother will be arriving in [place]   They'll all go on the fishing trip together  Breathing ok   Legs decent  No falls or close calls recently  Eating ok    Brother lives in the [place] area, works at [employer]. They're going to [place] sometime this summer    PAST MEDICAL HISTORY  SCC  as above  Chronic LBP  TAVR 2020  H/o rheumatic carditis 1950  CHF. 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 Nocturia once nightly   ED  Cataracts  Umbilical hernia repair 2011  Knee arthroplasty B 2010  Lumbar spine fusion, laminectomy, sciatic pain R > L  Peripheral neuropathy - from pinched nerves?  Hearing loss    CURRENT OUTPATIENT MEDICATIONS  Reviewed    ALLERGIES  No Known Allergies     PHYSICAL EXAM  BP (!) 151/64 (BP Location: Right arm, Patient Position: Sitting, Cuff Size: Adult Regular)   Pulse 78   Temp 97.8  F (36.6  C) (Oral)   Resp 16   Wt 83.1 kg (183 lb 4.8 oz)   SpO2 98%   BMI 32.89 kg/m    GEN: NAD  HEENT: EOMI, no icterus, injection or pallor  EXT: chronic 1-2+ edema  NEURO: alert    LABORATORY AND IMAGING STUDIES    Labs  8/1/24 were independently reviewed and interpreted by me  Mg 1.6 marginally low as above  CMP acceptable/stable. Alb 4.2   CBC - hemoglobin stable in mithe mid 8 range, MCV 86, plt 197, WBC 4.2, normal diff      Combined Report of: PET and CT on  7/30/2024 10:31 AM:     1. PET of the neck, chest, abdomen, and pelvis.  2. PET CT Fusion for Attenuation Correction and Anatomical  Localization:    3. 3D MIP and PET-CT fused images were processed on an independent  workstation and archived to PACS and reviewed by a radiologist.     Technique:     1. PET: The patient received 10.92 mCi of F-18-FDG; the serum glucose  was 100 mg/dL prior to administration, body weight was 84.1 kg. Images  were evaluated in the axial, sagittal, and coronal planes as well as  the rotational whole body MIP. Images were acquired from the Vertex to  the Feet.     UPTAKE WAS MEASURED AT 61 MINUTES.      2. CT: CT only obtained for attenuation correction and not diagnostic  purposes.     INDICATION: disease re-evaluation. Tonsil cancer, metastatic.  Restaging prior to considering consolidative radiotherapy to the  remaining disease in the head/neck. Also  re-evaluate femur lesion seen  on PET 11/2023, r/o progressive bony mets; Malignant neoplasm  metastatic to lung, unspecified laterality (H); Squamous cell  carcinoma of oropharynx (H); Malignant neoplasm of overlapping sites  of tonsil (H)     ADDITIONAL INFORMATION OBTAINED FROM EMR: none     COMPARISON: 4/30/2024 dated neck CT and chest abdomen pelvis CT.  3/14/2024 dated brain MRI.      FINDINGS:   BACKGROUND: Liver SUV max = 3.7, Aorta Blood SUV max = 3.3.      HEAD/NECK:  Significant reduction of the FDG avid mass centered in the left  palatine tonsil. There is residual focal uptake in the left  posterolateral oropharyngeal wall with maximum SUV of 9.1, metabolic  tumor volume of 2.2 cc, previously metabolic tumor volume was 53 and  max SUV was 26.8.     Previously seen multiple FDG avid enlarged left-sided cervical lymph  nodes and few enlarged FDG avid right-sided lymph nodes are completely  resolved.     CHEST:  No abnormal uptake.      ABDOMEN AND PELVIS:  No abnormal uptake.      LOWER EXTREMITIES:   Mild diffuse uptake along bilateral lower lower legs and ankles with  associated skin thickening and stranding along the soft tissues.  Findings can be seen with cellulitis.      BONES AND SOFT TISSUES:   No abnormal uptake.   Post operative/ inflammatory changes related uptake around the right  knee arthroplasty. No suspicious lesion in the distal femur as  suspected from prior PET.                                                                      IMPRESSION: Whole-body FDG PET demonstrates:     -Significant reduction of previously seen left palatine tonsil/ soft  palate FDG avid mass. Small residual focal uptake in the left  posterolateral oropharyngeal wall might represent residual tumor.   -Complete resolution of previously seen bilateral enlarged FDG avid  lymphadenopathy.  -No new evidence of metastatic disease. Particularly in the lungs no  FDG avid or suspicious lesions. No suspicious lesion in the  femur as  questioned and suspected on prior scan.  -Findings suggestive of bilateral lower leg/ankle cellulitis. Please  clinically correlate.     MADHURI TAVAREZ MD     PET was personally reviewed and interpreted by me  Report was not available at the time of our visit, came back afterward  Minimal FDG avidity in the R oropharynx. I didn't see any LNs in the neck   Still scattered calcified nodules in the R lung, scar in the posterior RLL, small calcified nodule LLL   I can't see the stable R hilar node well but no major difference in that area compared to April and not FDG avid.

## 2024-08-01 NOTE — PATIENT INSTRUCTIONS
[facility] Triage and after hours / weekends / holidays:  [phone] option 5, option 2    Please call the triage or after hours line if you experience a temperature greater than or equal to 100.4, shaking chills, have uncontrolled nausea, vomiting and/or diarrhea, dizziness, shortness of breath, chest pain, bleeding, unexplained bruising, or if you have any other new/concerning symptoms, questions or concerns.      If you are having any concerning symptoms or wish to speak to a provider before your next infusion visit, please call triage to notify your care team so we can adequately serve you.     If you need a refill on a narcotic prescription or other medication, please call before your infusion appointment.

## 2024-08-13 NOTE — TELEPHONE ENCOUNTER
sildenafil (VIAGRA) 100 MG tablet       Last Office Visit : 2/19/24  Future Office visit:  9/16/24    Routing refill request to provider for review/approval because:  Medication is reported/historical  Erectile Dysfuction Protocol [id number]/2024 05:01 PM   Protocol Details Absence of Alpha Blockers on Med list    Medicatin indicated for associated diagnosis   Shira SOUZA RN  [facility]/Red Flag Triage & Med Refill Team

## 2024-08-17 ENCOUNTER — HEALTH MAINTENANCE LETTER (OUTPATIENT)
Age: 79
End: 2024-08-17

## 2024-08-22 ENCOUNTER — TELEPHONE (OUTPATIENT)
Dept: INTERNAL MEDICINE | Facility: CLINIC | Age: 79
End: 2024-08-22
Payer: COMMERCIAL

## 2024-08-22 DIAGNOSIS — M54.50 ACUTE MIDLINE LOW BACK PAIN WITHOUT SCIATICA: ICD-10-CM

## 2024-08-22 NOTE — TELEPHONE ENCOUNTER
[facility] Call Center    Phone Message    May a detailed message be left on voicemail: yes     Reason for Call: Medication Refill Request    Has the patient contacted the pharmacy for the refill? Yes   Name of medication being requested: oxyCODONE-acetaminophen (PERCOCET) 5-325 MG tablet   Provider who prescribed the medication: DR. Sick  Pharmacy: [facility] [address] [place] [id number] [facility]   Date medication is needed: ASAP

## 2024-08-23 RX ORDER — SILDENAFIL 100 MG/1
TABLET, FILM COATED ORAL
Qty: 30 TABLET | Refills: 0 | OUTPATIENT
Start: 2024-08-23

## 2024-08-23 RX ORDER — OXYCODONE AND ACETAMINOPHEN 5; 325 MG/1; MG/1
1-2 TABLET ORAL EVERY 6 HOURS PRN
Qty: 150 TABLET | Refills: 0 | Status: SHIPPED | OUTPATIENT
Start: 2024-08-23 | End: 2024-09-16

## 2024-09-09 RX ORDER — SILDENAFIL 100 MG/1
TABLET, FILM COATED ORAL
Qty: 30 TABLET | Refills: 0 | OUTPATIENT
Start: 2024-09-09

## 2024-09-16 ENCOUNTER — OFFICE VISIT (OUTPATIENT)
Dept: INTERNAL MEDICINE | Facility: CLINIC | Age: 79
End: 2024-09-16
Payer: COMMERCIAL

## 2024-09-16 VITALS
HEART RATE: 65 BPM | BODY MASS INDEX: 35.58 KG/M2 | SYSTOLIC BLOOD PRESSURE: 147 MMHG | WEIGHT: 198.3 LBS | DIASTOLIC BLOOD PRESSURE: 71 MMHG | OXYGEN SATURATION: 99 %

## 2024-09-16 DIAGNOSIS — M54.50 ACUTE MIDLINE LOW BACK PAIN WITHOUT SCIATICA: ICD-10-CM

## 2024-09-16 DIAGNOSIS — N52.9 ERECTILE DYSFUNCTION, UNSPECIFIED ERECTILE DYSFUNCTION TYPE: ICD-10-CM

## 2024-09-16 DIAGNOSIS — Z00.00 MEDICARE ANNUAL WELLNESS VISIT, SUBSEQUENT: Primary | ICD-10-CM

## 2024-09-16 DIAGNOSIS — Z23 FLU VACCINE NEED: ICD-10-CM

## 2024-09-16 DIAGNOSIS — E11.9 TYPE 2 DIABETES MELLITUS WITHOUT COMPLICATION, WITHOUT LONG-TERM CURRENT USE OF INSULIN (H): ICD-10-CM

## 2024-09-16 DIAGNOSIS — C10.9 SQUAMOUS CELL CARCINOMA OF OROPHARYNX (H): ICD-10-CM

## 2024-09-16 PROCEDURE — G0008 ADMIN INFLUENZA VIRUS VAC: HCPCS | Performed by: INTERNAL MEDICINE

## 2024-09-16 PROCEDURE — 90662 IIV NO PRSV INCREASED AG IM: CPT | Performed by: INTERNAL MEDICINE

## 2024-09-16 PROCEDURE — G0439 PPPS, SUBSEQ VISIT: HCPCS | Performed by: INTERNAL MEDICINE

## 2024-09-16 RX ORDER — OXYCODONE AND ACETAMINOPHEN 5; 325 MG/1; MG/1
1-2 TABLET ORAL EVERY 6 HOURS PRN
Qty: 150 TABLET | Refills: 0 | Status: SHIPPED | OUTPATIENT
Start: 2024-09-23

## 2024-09-16 RX ORDER — SILDENAFIL 100 MG/1
100 TABLET, FILM COATED ORAL DAILY PRN
Qty: 6 TABLET | Refills: 11 | Status: SHIPPED | OUTPATIENT
Start: 2024-09-16

## 2024-09-16 NOTE — PROGRESS NOTES
"Preventive Care Visit  [facility]  Buck Nascimento MD, Internal Medicine - Pediatrics  Sep 16, 2024      Assessment & Plan     Medicare annual wellness visit, subsequent  We talked about doing a colonoscopy and elected that the risk of colon cancer was lower than the risk of the procedure.  Reminded him to get Covid vaccine  He describes strength based training that he is doing  - REVIEW OF HEALTH MAINTENANCE PROTOCOL ORDERS    Squamous cell carcinoma of oropharynx (H)  He was seen August 1, 2024 and has follow-up visits on a regular basis with the oncology team.  He states that the cancer treatment is going well.  A measure of how well he is tolerating it is that his eyebrows are growing back and his appetite is returning.    Flu vaccine need  - INFLUENZA HIGH DOSE, TRIVALENT, PF (FLUZONE)    Type 2 diabetes mellitus without complication, without long-term current use of insulin (H)  - HEMOGLOBIN A1C -this will be done at his next lab draw later this week  - Albumin Random Urine Quantitative with Creat Ratio    Acute midline low back pain without sciatica  He uses oxycodone for this with good success as he has done for years.              BMI  Estimated body mass index is 35.58 kg/m  as calculated from the following:    Height as of 5/7/24: 1.59 m (5' 2.6\").    Weight as of this encounter: 89.9 kg (198 lb 4.8 oz).       Counseling  Appropriate preventive services were addressed with this patient via screening, questionnaire, or discussion as appropriate for fall prevention, nutrition, physical activity, Tobacco-use cessation, social engagement, weight loss and cognition.  Checklist reviewing preventive services available has been given to the patient.  Reviewed patient's diet, addressing concerns and/or questions.   The patient was instructed to see the dentist every 6 months.   The patient was provided with written information regarding signs of hearing loss.   I have " reviewed Opioid Use Disorder and Substance Use Disorder risk factors and made any needed referrals.           Return in about 6 months (around 3/16/2025) for Routine Visit.        Norma Prieto is a 78 year old, presenting for the following:  Medicare Visit        9/16/2024     8:10 AM   Additional Questions   Roomed by [facility] Care Directive  Patient does not have a Health Care Directive or Living Will: This will need to be addressed at a future visit    HPI  He is doing well. The cancer treatment is going well. His eyebrows are starting to grow back for him and he has an appetite which   He has anemia - I explained what this was in terms my oxygen - and does have some fatigue.  He has a new PCA (not Suni, who was the prior PCA and not working) which is working out well. He exercises with the PCA.                   9/16/2024   General Health   How would you rate your overall physical health? (!) FAIR   Feel stress (tense, anxious, or unable to sleep) To some extent      (!) STRESS CONCERN      9/16/2024   Nutrition   Diet: Regular (no restrictions)    Low salt    Low fat/cholesterol       Multiple values from one day are sorted in reverse-chronological order         9/16/2024   Exercise   Days per week of moderate/strenous exercise 4 days   Average minutes spent exercising at this level 10 min            9/16/2024   Social Factors   Frequency of gathering with friends or relatives Three times a week   Worry food won't last until get money to buy more Yes   Food not last or not have enough money for food? Yes   Do you have housing? (Housing is defined as stable permanent housing and does not include staying ouside in a car, in a tent, in an abandoned building, in an overnight shelter, or couch-surfing.) Yes   Are you worried about losing your housing? No   Lack of transportation? No   Unable to get utilities (heat,electricity)? No      (!) FOOD SECURITY CONCERN PRESENT      9/16/2024   Fall Risk    Fallen 2 or more times in the past year? Yes   Trouble with walking or balance? Yes   Reason Gait Speed Test Not Completed Patient does not tolerate an upright or standing position (e.g. wheelchair)             2024   Activities of Daily Living- Home Safety   Needs help with the following daily activites None of the above   Safety concerns in the home None of the above            2024   Dental   Dentist two times every year? (!) NO            2024   Hearing Screening   Hearing concerns? (!) I NEED TO ASK PEOPLE TO SPEAK UP OR REPEAT THEMSELVES.    (!) TROUBLE UNDERSTANDING SOFT OR WHISPERED SPEECH.       Multiple values from one day are sorted in reverse-chronological order         2024   Driving Risk Screening   Patient/family members have concerns about driving No            2024   General Alertness/Fatigue Screening   Have you been more tired than usual lately? No            2024   Urinary Incontinence Screening   Bothered by leaking urine in past 6 months No            2024   TB Screening   Were you born outside of the [place]? No            Today's PHQ-2 Score:       2024     7:40 AM   PHQ-2 (  [employer])   Q1: Little interest or pleasure in doing things 0   Q2: Feeling down, depressed or hopeless 0   PHQ-2 Score 0   Q1: Little interest or pleasure in doing things Not at all   Q2: Feeling down, depressed or hopeless Not at all   PHQ-2 Score 0           2024   Substance Use   Alcohol more than 3/day or more than 7/wk No   Do you have a current opioid prescription? (!) YES   How severe/bad is pain from 1 to 10? 9/10   Do you use any other substances recreationally? No             No data to display              Low Risk (0-3)  Moderate Risk (4-7)  High Risk (>8)  Social History     Tobacco Use    Smoking status: Former     Current packs/day: 0.00     Types: Cigarettes     Quit date: 2005     Years since quittin.7     Passive exposure: Past    Smokeless tobacco:  Never    Tobacco comments:     Non smoker. No 2nd hand exposure. CCX, RMA PCC 7/28/2011   Substance Use Topics    Alcohol use: No    Drug use: No       ASCVD Risk   The ASCVD Risk score (Sania GIBSON, et al., 2019) failed to calculate for the following reasons:    The patient has a prior MI or stroke diagnosis            Reviewed and updated as needed this visit by Provider                      Current providers sharing in care for this patient include:  Patient Care Team:  Buck Nascimento MD as PCP - General (Internal Medicine - Pediatrics)  Pascale Clark MD as MD (Ophthalmology)  Mamadou Gary DPM as MD (Podiatry)  Buck Nascimento MD as MD (Internal Medicine)  Tristen Reed MD as MD (Otolaryngology)  Mamadou Gary DPM as Assigned Musculoskeletal Provider  Buck Nascimento MD as Assigned PCP  Maurice Kilgore OD (Optometry)  Buck Nascimento MD as Assigned Pain Medication Provider  Karishma Eng MD as MD (Otolaryngology)  Jenni Mills MD as MD (Radiation Oncology)  Yoseph Majano MD as MD (Hematology & Oncology)  Hebert Jones MD as MD (Cardiovascular & Thoracic Surgery)  Hebert Jones MD as MD (Cardiovascular & Thoracic Surgery)  Hebert Jones MD as Assigned Heart and Vascular Provider  Olivia Gates, RN as Specialty Care Coordinator  Dominique Mueller MD as MD (Hematology & Oncology)  Vandana Bertrand CNP as Assigned Cancer Care Provider  Rachel Thompson OD as Assigned Surgical Provider    The following health maintenance items are reviewed in Epic and correct as of today:  Health Maintenance   Topic Date Due    HF ACTION PLAN  Never done    MICROALBUMIN  Never done    DIABETIC FOOT EXAM  Never done    ZOSTER IMMUNIZATION (1 of 2) 12/02/2014    MEDICARE ANNUAL WELLNESS VISIT  07/01/2020    RSV VACCINE (1 - 1-dose 75+ series) Never done    COLORECTAL CANCER SCREENING  04/14/2022    A1C  02/22/2024    COVID-19 Vaccine (4 - 2024-25 season) 09/01/2024     "LIPID  11/22/2024    BMP  02/01/2025    EYE EXAM  03/04/2025    URINE DRUG SCREEN  03/13/2025    ALT  08/01/2025    CBC  08/01/2025    ANNUAL REVIEW OF HM ORDERS  09/16/2025    FALL RISK ASSESSMENT  09/16/2025    ADVANCE CARE PLANNING  02/28/2028    DTAP/TDAP/TD IMMUNIZATION (3 - Td or Tdap) 08/01/2032    TSH W/FREE T4 REFLEX  Completed    HEPATITIS C SCREENING  Completed    PHQ-2 (once per calendar year)  Completed    Pneumococcal Vaccine: 65+ Years  Completed    HPV IMMUNIZATION  Aged Out    MENINGITIS IMMUNIZATION  Aged Out    RSV MONOCLONAL ANTIBODY  Aged Out    INFLUENZA VACCINE  Discontinued            Objective    Exam  BP (!) 147/71 (BP Location: Right arm, Patient Position: Sitting, Cuff Size: Adult Large)   Pulse 65   Wt 89.9 kg (198 lb 4.8 oz)   SpO2 99%   BMI 35.58 kg/m     Estimated body mass index is 35.58 kg/m  as calculated from the following:    Height as of 5/7/24: 1.59 m (5' 2.6\").    Weight as of this encounter: 89.9 kg (198 lb 4.8 oz).    Physical Exam  GENERAL: alert and no distress  EYES: Eyes grossly normal to inspection, PERRL and conjunctivae and sclerae normal  HENT: ear canals and TM's normal  NECK: no adenopathy, no asymmetry, masses, or scars  RESP: lungs clear to auscultation - no rales, rhonchi or wheezes  CV: regular rate and rhythm, normal S1 S2, no S3 or S4, Systolic murmur with a louder S2  ABDOMEN: soft, nontender, no hepatosplenomegaly, no masses and bowel sounds normal  MS: no he is using a walker  PSYCH: mentation appears normal, affect normal/bright        Echo 11/21/23:  Interpretation Summary  1. The left ventricle is normal in structure, function and size. The visual  ejection fraction is estimated at 65%.  2. The right ventricle is normal in structure, function and size.  3. There is mild mitral stenosis. The mean mitral valve gradient is 5mmHg.  4. s/p TAVR with 26mm Anton Tawana 3, implanted 2020. Mean 11mmHg, Vmax  2.4m/s. No AI or PVL.          9/16/2024   Mini " Cog   Clock Draw Score 2 Normal   3 Item Recall 1 object recalled   Mini Cog Total Score 3                 Signed Electronically by: Buck Nascimento MD

## 2024-09-19 ENCOUNTER — INFUSION THERAPY VISIT (OUTPATIENT)
Dept: ONCOLOGY | Facility: CLINIC | Age: 79
End: 2024-09-19
Payer: COMMERCIAL

## 2024-09-19 ENCOUNTER — ONCOLOGY VISIT (OUTPATIENT)
Dept: ONCOLOGY | Facility: CLINIC | Age: 79
End: 2024-09-19
Payer: COMMERCIAL

## 2024-09-19 ENCOUNTER — LAB (OUTPATIENT)
Dept: LAB | Facility: CLINIC | Age: 79
End: 2024-09-19
Payer: COMMERCIAL

## 2024-09-19 VITALS
BODY MASS INDEX: 34.99 KG/M2 | TEMPERATURE: 97.8 F | DIASTOLIC BLOOD PRESSURE: 68 MMHG | SYSTOLIC BLOOD PRESSURE: 122 MMHG | RESPIRATION RATE: 18 BRPM | WEIGHT: 195 LBS | HEART RATE: 76 BPM | OXYGEN SATURATION: 97 %

## 2024-09-19 DIAGNOSIS — R60.0 PERIPHERAL EDEMA: ICD-10-CM

## 2024-09-19 DIAGNOSIS — C09.9 TONSIL CANCER (H): ICD-10-CM

## 2024-09-19 DIAGNOSIS — E83.42 HYPOMAGNESEMIA: ICD-10-CM

## 2024-09-19 DIAGNOSIS — C78.00 MALIGNANT NEOPLASM METASTATIC TO LUNG, UNSPECIFIED LATERALITY (H): Primary | ICD-10-CM

## 2024-09-19 DIAGNOSIS — C10.9 SQUAMOUS CELL CARCINOMA OF OROPHARYNX (H): ICD-10-CM

## 2024-09-19 DIAGNOSIS — C78.00 MALIGNANT NEOPLASM METASTATIC TO LUNG, UNSPECIFIED LATERALITY (H): ICD-10-CM

## 2024-09-19 DIAGNOSIS — C10.9 SQUAMOUS CELL CARCINOMA OF OROPHARYNX (H): Primary | ICD-10-CM

## 2024-09-19 DIAGNOSIS — E11.9 TYPE 2 DIABETES MELLITUS WITHOUT COMPLICATION, WITHOUT LONG-TERM CURRENT USE OF INSULIN (H): ICD-10-CM

## 2024-09-19 DIAGNOSIS — Z13.29 SCREENING FOR HYPOTHYROIDISM: ICD-10-CM

## 2024-09-19 LAB
ALBUMIN SERPL BCG-MCNC: 4.1 G/DL (ref 3.5–5.2)
ALP SERPL-CCNC: 116 U/L (ref 40–150)
ALT SERPL W P-5'-P-CCNC: 16 U/L (ref 0–70)
ANION GAP SERPL CALCULATED.3IONS-SCNC: 11 MMOL/L (ref 7–15)
AST SERPL W P-5'-P-CCNC: 24 U/L (ref 0–45)
BASOPHILS # BLD AUTO: 0 10E3/UL (ref 0–0.2)
BASOPHILS NFR BLD AUTO: 0 %
BILIRUB SERPL-MCNC: 0.3 MG/DL
BUN SERPL-MCNC: 15.8 MG/DL (ref 8–23)
CALCIUM SERPL-MCNC: 9.7 MG/DL (ref 8.8–10.4)
CHLORIDE SERPL-SCNC: 106 MMOL/L (ref 98–107)
CREAT SERPL-MCNC: 0.82 MG/DL (ref 0.67–1.17)
CREAT UR-MCNC: 13.2 MG/DL
EGFRCR SERPLBLD CKD-EPI 2021: 90 ML/MIN/1.73M2
EOSINOPHIL # BLD AUTO: 0.3 10E3/UL (ref 0–0.7)
EOSINOPHIL NFR BLD AUTO: 5 %
ERYTHROCYTE [DISTWIDTH] IN BLOOD BY AUTOMATED COUNT: 17.6 % (ref 10–15)
EST. AVERAGE GLUCOSE BLD GHB EST-MCNC: 120 MG/DL
GLUCOSE SERPL-MCNC: 91 MG/DL (ref 70–99)
HBA1C MFR BLD: 5.8 %
HCO3 SERPL-SCNC: 28 MMOL/L (ref 22–29)
HCT VFR BLD AUTO: 31.5 % (ref 40–53)
HGB BLD-MCNC: 9.7 G/DL (ref 13.3–17.7)
IMM GRANULOCYTES # BLD: 0 10E3/UL
IMM GRANULOCYTES NFR BLD: 0 %
LYMPHOCYTES # BLD AUTO: 1.7 10E3/UL (ref 0.8–5.3)
LYMPHOCYTES NFR BLD AUTO: 32 %
MAGNESIUM SERPL-MCNC: 1.7 MG/DL (ref 1.7–2.3)
MCH RBC QN AUTO: 25.9 PG (ref 26.5–33)
MCHC RBC AUTO-ENTMCNC: 30.8 G/DL (ref 31.5–36.5)
MCV RBC AUTO: 84 FL (ref 78–100)
MICROALBUMIN UR-MCNC: <12 MG/L
MICROALBUMIN/CREAT UR: NORMAL MG/G{CREAT}
MONOCYTES # BLD AUTO: 0.5 10E3/UL (ref 0–1.3)
MONOCYTES NFR BLD AUTO: 9 %
NEUTROPHILS # BLD AUTO: 2.8 10E3/UL (ref 1.6–8.3)
NEUTROPHILS NFR BLD AUTO: 54 %
NRBC # BLD AUTO: 0 10E3/UL
NRBC BLD AUTO-RTO: 0 /100
PLATELET # BLD AUTO: 171 10E3/UL (ref 150–450)
POTASSIUM SERPL-SCNC: 3.8 MMOL/L (ref 3.4–5.3)
PROT SERPL-MCNC: 6.9 G/DL (ref 6.4–8.3)
RBC # BLD AUTO: 3.75 10E6/UL (ref 4.4–5.9)
SODIUM SERPL-SCNC: 145 MMOL/L (ref 135–145)
T4 FREE SERPL-MCNC: 1.14 NG/DL (ref 0.9–1.7)
TSH SERPL DL<=0.005 MIU/L-ACNC: 1.44 UIU/ML (ref 0.3–4.2)
WBC # BLD AUTO: 5.3 10E3/UL (ref 4–11)

## 2024-09-19 PROCEDURE — G2211 COMPLEX E/M VISIT ADD ON: HCPCS | Performed by: REGISTERED NURSE

## 2024-09-19 PROCEDURE — 84443 ASSAY THYROID STIM HORMONE: CPT | Performed by: REGISTERED NURSE

## 2024-09-19 PROCEDURE — 83036 HEMOGLOBIN GLYCOSYLATED A1C: CPT

## 2024-09-19 PROCEDURE — G0463 HOSPITAL OUTPT CLINIC VISIT: HCPCS | Performed by: REGISTERED NURSE

## 2024-09-19 PROCEDURE — 99214 OFFICE O/P EST MOD 30 MIN: CPT | Performed by: REGISTERED NURSE

## 2024-09-19 PROCEDURE — 96413 CHEMO IV INFUSION 1 HR: CPT

## 2024-09-19 PROCEDURE — 85025 COMPLETE CBC W/AUTO DIFF WBC: CPT | Performed by: REGISTERED NURSE

## 2024-09-19 PROCEDURE — 96367 TX/PROPH/DG ADDL SEQ IV INF: CPT

## 2024-09-19 PROCEDURE — 96417 CHEMO IV INFUS EACH ADDL SEQ: CPT

## 2024-09-19 PROCEDURE — 84439 ASSAY OF FREE THYROXINE: CPT | Performed by: REGISTERED NURSE

## 2024-09-19 PROCEDURE — 258N000003 HC RX IP 258 OP 636: Performed by: INTERNAL MEDICINE

## 2024-09-19 PROCEDURE — 82043 UR ALBUMIN QUANTITATIVE: CPT

## 2024-09-19 PROCEDURE — 82040 ASSAY OF SERUM ALBUMIN: CPT | Performed by: REGISTERED NURSE

## 2024-09-19 PROCEDURE — 250N000011 HC RX IP 250 OP 636: Performed by: INTERNAL MEDICINE

## 2024-09-19 PROCEDURE — 36415 COLL VENOUS BLD VENIPUNCTURE: CPT | Performed by: REGISTERED NURSE

## 2024-09-19 PROCEDURE — 96375 TX/PRO/DX INJ NEW DRUG ADDON: CPT

## 2024-09-19 PROCEDURE — 83735 ASSAY OF MAGNESIUM: CPT

## 2024-09-19 RX ORDER — HEPARIN SODIUM (PORCINE) LOCK FLUSH IV SOLN 100 UNIT/ML 100 UNIT/ML
5 SOLUTION INTRAVENOUS
Status: DISCONTINUED | OUTPATIENT
Start: 2024-09-19 | End: 2024-09-19 | Stop reason: HOSPADM

## 2024-09-19 RX ADMIN — SODIUM CHLORIDE 250 ML: 9 INJECTION, SOLUTION INTRAVENOUS at 12:54

## 2024-09-19 RX ADMIN — FAMOTIDINE 20 MG: 10 INJECTION INTRAVENOUS at 12:54

## 2024-09-19 RX ADMIN — DEXAMETHASONE SODIUM PHOSPHATE: 10 INJECTION, SOLUTION INTRAMUSCULAR; INTRAVENOUS at 12:58

## 2024-09-19 RX ADMIN — CARBOPLATIN 200 MG: 10 INJECTION, SOLUTION INTRAVENOUS at 14:55

## 2024-09-19 RX ADMIN — PACLITAXEL 94 MG: 6 INJECTION, SOLUTION INTRAVENOUS at 13:23

## 2024-09-19 ASSESSMENT — PAIN SCALES - GENERAL: PAINLEVEL: EXTREME PAIN (9)

## 2024-09-19 NOTE — NURSING NOTE
Chief Complaint   Patient presents with    Blood Draw     Labs drawn via PIV     Labs drawn from PIV placed by RN. Line flushed with saline. Vitals taken. Pt checked in for appointment(s).     Charis BLACK RN PHN BSN  BMT/Oncology Lab

## 2024-09-19 NOTE — PROGRESS NOTES
Infusion Nursing Note:  Jonathan Bishop presents today for cycle 6 day 22 Taxol, Carboplatin.    Patient seen by provider today: Yes: Vandana Bertrand NP   present during visit today: Not Applicable.    Note: Ok to go ahead with treatment today.      Intravenous Access:  Peripheral IV placed.    Treatment Conditions:  Lab Results   Component Value Date    HGB 9.7 (L) 09/19/2024    WBC 5.3 09/19/2024    ANEU 1.0 (L) 03/06/2024    ANEUTAUTO 2.8 09/19/2024     09/19/2024        Lab Results   Component Value Date     09/19/2024    POTASSIUM 3.8 09/19/2024    MAG 1.7 09/19/2024    CR 0.82 09/19/2024    WARREN 9.7 09/19/2024    BILITOTAL 0.3 09/19/2024    ALBUMIN 4.1 09/19/2024    ALT 16 09/19/2024    AST 24 09/19/2024       Results reviewed, labs MET treatment parameters, ok to proceed with treatment.      Post Infusion Assessment:  Patient tolerated infusion without incident.  Blood return noted pre and post infusion.  Site patent and intact, free from redness, edema or discomfort.  No evidence of extravasations.  Access discontinued per protocol.       Discharge Plan:   Patient declined prescription refills.  Discharge instructions reviewed with: Patient.  Patient and/or family verbalized understanding of discharge instructions and all questions answered.  AVS to patient via [employer].  Patient will return 9/26/24 for next appointment.   Patient discharged in stable condition accompanied by: self.  Departure Mode: Ambulatory.      Emani Rosa RN

## 2024-09-19 NOTE — PATIENT INSTRUCTIONS
[facility] Triage and after hours / weekends / holidays:  [phone]    Please call the triage or after hours line if you experience a temperature greater than or equal to 100.4, shaking chills, have uncontrolled nausea, vomiting and/or diarrhea, dizziness, shortness of breath, chest pain, bleeding, unexplained bruising, or if you have any other new/concerning symptoms, questions or concerns.      If you are having any concerning symptoms or wish to speak to a provider before your next infusion visit, please call triage to notify them so we can adequately serve you.     If you need a refill on a narcotic prescription or other medication, please call before your infusion appointment.                September 2024 Sunday Monday Tuesday Wednesday Thursday Friday Saturday   1     2     3     4     5     6     7       8     9     10     11     12     13     14       15     16    [facility] PHYSICAL   8:15 AM   (30 min.)   Buck Nascimento MD   [facility] Internal [facility] [place] 17     18     19    LAB PERIPHERAL  11:00 AM   (15 min.)   [facility] LAB DRAW   [facility]    RETURN CCSL  11:15 AM   (45 min.)   Vandana Bertrand CNP   [facility]    ONC INFUSION 2 HR (120 MIN)  12:00 PM   (120 min.)    ONC INFUSION NURSE   [facility] 20     21       22     23     24     25     26    LAB PERIPHERAL  11:30 AM   (15 min.)   [facility] DRAW   [facility]    ONC INFUSION 2 HR (120 MIN)  12:00 PM   (120 min.)    ONC INFUSION NURSE   [facility] 27     28       29 30 October 2024 Sunday Monday Tuesday Wednesday Thursday Friday Saturday             1     2     3     4     5       6     7     8     9    RETURN FOOT/ANKLE   8:05 AM   (20 min.)   Mamadou Gary DPM   [facility]  10     11     12       13     14     15     16     17     18     19       20     21     22  Happy Birthday!     23     24     25     26       27     28     29     30     31                               Lab Results:  Recent Results (from the past 12 hour(s))   Comprehensive metabolic panel    Collection Time: 09/19/24 11:15 AM   Result Value Ref Range    Sodium 145 135 - 145 mmol/L    Potassium 3.8 3.4 - 5.3 mmol/L    Carbon Dioxide (CO2) 28 22 - 29 mmol/L    Anion Gap 11 7 - 15 mmol/L    Urea Nitrogen 15.8 8.0 - 23.0 mg/dL    Creatinine 0.82 0.67 - 1.17 mg/dL    GFR Estimate 90 >60 mL/min/1.73m2    Calcium 9.7 8.8 - 10.4 mg/dL    Chloride 106 98 - 107 mmol/L    Glucose 91 70 - 99 mg/dL    Alkaline Phosphatase 116 40 - 150 U/L    AST 24 0 - 45 U/L    ALT 16 0 - 70 U/L    Protein Total 6.9 6.4 - 8.3 g/dL    Albumin 4.1 3.5 - 5.2 g/dL    Bilirubin Total 0.3 <=1.2 mg/dL   HEMOGLOBIN A1C    Collection Time: 09/19/24 11:15 AM   Result Value Ref Range    Estimated Average Glucose 120 (H) <117 mg/dL    Hemoglobin A1C 5.8 (H) <5.7 %   TSH    Collection Time: 09/19/24 11:15 AM   Result Value Ref Range    TSH 1.44 0.30 - 4.20 uIU/mL   T4 free    Collection Time: 09/19/24 11:15 AM   Result Value Ref Range    Free T4 1.14 0.90 - 1.70 ng/dL   CBC with platelets and differential    Collection Time: 09/19/24 11:15 AM   Result Value Ref Range    WBC Count 5.3 4.0 - 11.0 10e3/uL    RBC Count 3.75 (L) 4.40 - 5.90 10e6/uL    Hemoglobin 9.7 (L) 13.3 - 17.7 g/dL    Hematocrit 31.5 (L) 40.0 - 53.0 %    MCV 84 78 - 100 fL    MCH 25.9 (L) 26.5 - 33.0 pg    MCHC 30.8 (L) 31.5 - 36.5 g/dL    RDW 17.6 (H) 10.0 - 15.0 %    Platelet Count 171 150 - 450 10e3/uL    % Neutrophils 54 %    % Lymphocytes 32 %    % Monocytes 9 %    % Eosinophils 5 %    % Basophils 0 %    % Immature Granulocytes 0 %    NRBCs per 100 WBC 0 <1 /100    Absolute Neutrophils 2.8 1.6 - 8.3 10e3/uL    Absolute Lymphocytes 1.7 0.8 - 5.3 10e3/uL    Absolute Monocytes 0.5 0.0 -  1.3 10e3/uL    Absolute Eosinophils 0.3 0.0 - 0.7 10e3/uL    Absolute Basophils 0.0 0.0 - 0.2 10e3/uL    Absolute Immature Granulocytes 0.0 <=0.4 10e3/uL    Absolute NRBCs 0.0 10e3/uL   Magnesium    Collection Time: 09/19/24 11:15 AM   Result Value Ref Range    Magnesium 1.7 1.7 - 2.3 mg/dL

## 2024-09-19 NOTE — NURSING NOTE
"Oncology Rooming Note    September 19, 2024 11:34 AM   Jonathan Bishop is a 78 year old male who presents for:    Chief Complaint   Patient presents with    Blood Draw     Labs drawn via PIV    Oncology Clinic Visit     head & neck cancer         Initial Vitals: /68   Pulse 76   Temp 97.8  F (36.6  C) (Oral)   Resp 18   Wt 88.5 kg (195 lb)   SpO2 97%   BMI 34.99 kg/m   Estimated body mass index is 34.99 kg/m  as calculated from the following:    Height as of 5/7/24: 1.59 m (5' 2.6\").    Weight as of this encounter: 88.5 kg (195 lb). Body surface area is 1.98 meters squared.  Extreme Pain (9) Comment: rating at 8.65   No LMP for male patient.  Allergies reviewed: Yes  Medications reviewed: Yes    Medications: Medication refills not needed today.  Pharmacy name entered into [facility]: [id number] - [place], [place] - [address] [address]    Frailty Screening:   Is the patient here for a new oncology consult visit in cancer care? 2. No      Clinical concerns: Pt would like someone to call pharmacy to see when the last day is that they can  viagra that is currently ready (they went yesterday but the pharmacy was closed).      Argenis Campos              "

## 2024-09-19 NOTE — PROGRESS NOTES
[facility] CANCER CLINIC  [address] [address]  Phone: [phone]  Fax: [phone]    PATIENT NAME: Jonathan Bishop  MRN # [id number]   DATE OF VISIT: September 19, 2024  YOB: 1945     Otolaryngology: Dr. Karishma Eng  Radiation Oncology: Dr. Jenni Mills  PCP: Dr. Buck Nascimento    CANCER TYPE: SCC L tonsil, p16 +lety  STAGE: [id number] (IVC)  ECOG PS: 1    PD-L1: TPS 20%, CPS 25% on [id number] tonsil bx  NGS: N/A    SUMMARY  2/26/23 L tonsil mass bx in clinic (Dr. Eng).   2/20/23 PET/CT. 3.7 x 4.0 x 5.1 cm mass L palatine tonsil causing narrowing of the oropharyngeal lumen, L level 2 node, L retropharyngeal nodular density, extension of primary mass vs retropharyngeal node, 0.8 cm RLL nodule concerning for met, mild focal uptake R iliac bone and L1 without CT correlate suspicious for mets.   3/2/23 R VATS, wedge resection. Path: SCC, poorly differentiated, associated with necrosis, 1 cm, negative margins, p16 +lety  3/24/23 MRI L spine and pelvis. Diffusely heterogeneous marrow signal throughout without corresponding abnormal signal on sagittal STIR sequence and no abnormal enhancement. Nonspecific but could be benign process such as red marrow hyperplasia, cannot completely exclude metastatic disease or infiltrative pathologic marrow process. No lesions corresponding to FDG avid spots on PET/CT.   4/19~10/18/23 Pembrolizumab.  10/26/23 CT neck and CAP. Increased L palatine tonsil mass, 3.8 x 2.7 cm --> 4.7 x 3.5 cm. Increased bilateral cervical nodes up to 2.3 x 2.4 cm R level 2 node. New 3 mm RML nodule, no other mets.   11/14/23 PET. 4.6 x 3.3 cm L palatine tonsil mass (SUB 26.8), R level 2A and 2A/3 nodes. Questionable uptake distal R femoral medullary cavity, partially imaged, with questionable subtle corresponding soft tissue attenuation on CT. MRI could be obtained to better evaluate.  11/21~12/2/23 [facility] for weakness/fall.  COVID/paxlovid, MSSA bacteremia (1 of 2 bottles on 1 day), TTE negative, treated with cefazolin but didn't complete 2 week course, episode of unresponsiveness 12/2. Dispo plan was TCU on [place], but left AMA. CTA chest negative for PE, showed grossly unchanged cervical adenopathy. Brain MRI 11/22 for stroke code negative for infarct. Showed SVID, moderate atrophy, suboptimal contrast bolus to examine intracranial structures, 4.5 x 3 x 4.5 cm L nasopharyngeal mass, incompletely visualized. CTA negative.   12/19/23 Quad shot fraction #1. No-showed thereafter.  1/10/24~curr Carboplatin (AUC 2) + paclitaxel 60 mg/m2 weekly. Held 2/13 due to anorexia, fatigue.  3/13~3/17/24 [facility] for acute encephalopathy. Had stopped his car in the middle of the highway after receiving chemo earlier that day. Recommended not to drive   4/30/24 CT CAP and CT neck. Residual bilateral cervical adenopathy.   7/30/24 PET. CT not covered. Significant reduction L palatine tonsil mass - residual focal uptake in the L posterolateral oropharyngeal wall (SUV 9.1, previously 26.8). Previously seen multiple FDG avid L cervical nodes and few enlarged R nodes are completed resolved. Mild diffuse uptake along bilateral lower legs and ankles with associated skin thickening and stranding along the soft tissues; findings can be seen with cellulitis. No metastatic disease.     ASSESSMENT AND PLAN  SCC L tonsil, p16 +lety, CPS 25%/TPS 20%, oligometastatic lung met, +/- bone mets: Great IA on carboplatin paclitaxel and s/p 1 fraction of quad shot. See Dr. Mueller's note on 8/1 discussing possible role of consolidative radiation. He has not seen Dr. Mills in follow-up and isn't sure he'd proceed with RT even if offered. I'll discuss again with Dr. Mueller. In the meantime we will proceed with weekly carbo/paclitaxel today after a 6 week treatment break. MARK visits every 2 weeks with infusion. Repeat CT imaging in 6 weeks.    Neurocognitive changes,  encephalopathy March 2024: Declined cognitive testing. Didn't discuss in detail today but will continue to encourage.    Anorexia, malnutrition, weight loss: Weight loss stabilized. Appetite improving with chemo break.     Hypomag: On mag oxide. Mg not drawn today-will add and adjust dose as appropriate    H/o CVA: Residual L weakness. Uses walker at baseline, motorized scooter for longer distances.    LE edema: Generally better with PCA care. Encouraged regular use of compression    Surrogate decision maker: He would want us to talk with Mariann if he could not speak for himself.    Microcytic anemia: Stable    Peripheral neuropathy: MRI shows a lot of foramenal stenosis and spinal canal stenosis, so more likely related to that than DM2. Doesn't appear to be worsening on paclitaxel    Vandana Bertrand CNP  ---  32 minutes spent on the date of the encounter doing chart review, review of test results, interpretation of tests, patient visit, and documentation.    The longitudinal plan of care for the diagnosis(es)/condition(s) as documented were addressed during this visit. Due to the added complexity in care, I will continue to support Conner in the subsequent management and with ongoing continuity of care.    SUBJECTIVE  Mr. Bishop returns for follow up on weekly carboplatin paclitaxel since 1/10. Recently took a 6 week treatment break.  -Feels well. Went fishing in the [employer] [place] this summer. Had a great time with his brother  -Energy level good  -Can tell he feels better with chemo break-more energy, better appetite, taste has returned to normal, eyebrows growing back  -Has PCA, going well  -Using compression to legs but not regularly. PCA helps with this    PAST MEDICAL HISTORY  SCC as above  Chronic LBP  TAVR 2020  H/o rheumatic carditis 1950  CHF. 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 Nocturia once nightly   ED  Cataracts  Umbilical hernia repair 2011  Knee arthroplasty B  2010  Lumbar spine fusion, laminectomy, sciatic pain R > L  Peripheral neuropathy - from pinched nerves?  Hearing loss    CURRENT OUTPATIENT MEDICATIONS  Reviewed    ALLERGIES  No Known Allergies     PHYSICAL EXAM  /68   Pulse 76   Temp 97.8  F (36.6  C) (Oral)   Resp 18   Wt 88.5 kg (195 lb)   SpO2 97%   BMI 34.99 kg/m    GEN: NAD  HEENT: EOMI, no icterus, injection or pallor  RESP: cta bilaterally  CV: rrr, no m/g/r  EXT: chronic 1-2+ edema  NEURO: alert    LABORATORY AND IMAGING STUDIES   Most Recent 3 CBC's:  Recent Labs   Lab Test 09/19/24  1115 08/01/24  1032 07/24/24  1151   WBC 5.3 4.2 4.7   HGB 9.7* 8.6* 8.8*   MCV 84 86 86    197 149*   ANEUTAUTO 2.8 2.0 2.5    Most Recent 3 BMP's:  Recent Labs   Lab Test 09/19/24  1115 08/01/24  1032 07/24/24  1151    144 144   POTASSIUM 3.8 3.5 3.8   CHLORIDE 106 103 104   CO2 28 30* 30*   BUN 15.8 24.8* 24.2*   CR 0.82 0.97 0.95   ANIONGAP 11 11 10   WARREN 9.7 9.3 9.2   GLC 91 98 73   PROTTOTAL 6.9 6.9 6.7   ALBUMIN 4.1 4.2 4.2    Most Recent 2 LFT's:  Recent Labs   Lab Test 09/19/24  1115 08/01/24  1032   AST 24 21   ALT 16 12   ALKPHOS 116 94   BILITOTAL 0.3 0.3    Most Recent TSH and T4:  Recent Labs   Lab Test 09/19/24  1115   TSH 1.44   T4 1.14     Phos/Mag:  Lab Results   Component Value Date    PHOS 3.0 03/17/2024    PHOS 2.4 (L) 12/09/2020    PHOS 3.9 12/08/2020    MAG 1.6 (L) 08/01/2024    MAG 1.6 (L) 07/24/2024    MAG 1.6 (L) 07/17/2024        I reviewed the above labs today.

## 2024-09-20 DIAGNOSIS — C10.9 SQUAMOUS CELL CARCINOMA OF OROPHARYNX (H): ICD-10-CM

## 2024-09-20 DIAGNOSIS — C78.00 MALIGNANT NEOPLASM METASTATIC TO LUNG, UNSPECIFIED LATERALITY (H): Primary | ICD-10-CM

## 2024-09-20 DIAGNOSIS — C09.9 TONSIL CANCER (H): ICD-10-CM

## 2024-09-23 DIAGNOSIS — C78.00 MALIGNANT NEOPLASM METASTATIC TO LUNG, UNSPECIFIED LATERALITY (H): Primary | ICD-10-CM

## 2024-09-23 DIAGNOSIS — C09.9 TONSIL CANCER (H): ICD-10-CM

## 2024-09-23 DIAGNOSIS — C10.9 SQUAMOUS CELL CARCINOMA OF OROPHARYNX (H): ICD-10-CM

## 2024-09-23 RX ORDER — ALBUTEROL SULFATE 90 UG/1
1-2 AEROSOL, METERED RESPIRATORY (INHALATION)
Status: CANCELLED
Start: 2024-09-26

## 2024-09-23 RX ORDER — DIPHENHYDRAMINE HYDROCHLORIDE 50 MG/ML
50 INJECTION INTRAMUSCULAR; INTRAVENOUS
Status: CANCELLED
Start: 2024-09-26

## 2024-09-23 RX ORDER — MEPERIDINE HYDROCHLORIDE 25 MG/ML
25 INJECTION INTRAMUSCULAR; INTRAVENOUS; SUBCUTANEOUS EVERY 30 MIN PRN
OUTPATIENT
Start: 2024-10-17

## 2024-09-23 RX ORDER — HEPARIN SODIUM (PORCINE) LOCK FLUSH IV SOLN 100 UNIT/ML 100 UNIT/ML
5 SOLUTION INTRAVENOUS
OUTPATIENT
Start: 2024-10-10

## 2024-09-23 RX ORDER — ALBUTEROL SULFATE 90 UG/1
1-2 AEROSOL, METERED RESPIRATORY (INHALATION)
Start: 2024-10-03

## 2024-09-23 RX ORDER — ALBUTEROL SULFATE 0.83 MG/ML
2.5 SOLUTION RESPIRATORY (INHALATION)
OUTPATIENT
Start: 2024-10-03

## 2024-09-23 RX ORDER — LORAZEPAM 2 MG/ML
0.5 INJECTION INTRAMUSCULAR EVERY 4 HOURS PRN
Status: CANCELLED | OUTPATIENT
Start: 2024-09-26

## 2024-09-23 RX ORDER — HEPARIN SODIUM,PORCINE 10 UNIT/ML
5-20 VIAL (ML) INTRAVENOUS DAILY PRN
Status: CANCELLED | OUTPATIENT
Start: 2024-09-26

## 2024-09-23 RX ORDER — ALBUTEROL SULFATE 0.83 MG/ML
2.5 SOLUTION RESPIRATORY (INHALATION)
Status: CANCELLED | OUTPATIENT
Start: 2024-09-26

## 2024-09-23 RX ORDER — LORAZEPAM 2 MG/ML
0.5 INJECTION INTRAMUSCULAR EVERY 4 HOURS PRN
OUTPATIENT
Start: 2024-10-17

## 2024-09-23 RX ORDER — HEPARIN SODIUM (PORCINE) LOCK FLUSH IV SOLN 100 UNIT/ML 100 UNIT/ML
5 SOLUTION INTRAVENOUS
OUTPATIENT
Start: 2024-10-03

## 2024-09-23 RX ORDER — MEPERIDINE HYDROCHLORIDE 25 MG/ML
25 INJECTION INTRAMUSCULAR; INTRAVENOUS; SUBCUTANEOUS EVERY 30 MIN PRN
Status: CANCELLED | OUTPATIENT
Start: 2024-09-26

## 2024-09-23 RX ORDER — LORAZEPAM 2 MG/ML
0.5 INJECTION INTRAMUSCULAR EVERY 4 HOURS PRN
OUTPATIENT
Start: 2024-10-03

## 2024-09-23 RX ORDER — METHYLPREDNISOLONE SODIUM SUCCINATE 125 MG/2ML
125 INJECTION, POWDER, LYOPHILIZED, FOR SOLUTION INTRAMUSCULAR; INTRAVENOUS
Start: 2024-10-03

## 2024-09-23 RX ORDER — DIPHENHYDRAMINE HYDROCHLORIDE 50 MG/ML
50 INJECTION INTRAMUSCULAR; INTRAVENOUS
Start: 2024-10-10

## 2024-09-23 RX ORDER — EPINEPHRINE 1 MG/ML
0.3 INJECTION, SOLUTION INTRAMUSCULAR; SUBCUTANEOUS EVERY 5 MIN PRN
OUTPATIENT
Start: 2024-10-10

## 2024-09-23 RX ORDER — METHYLPREDNISOLONE SODIUM SUCCINATE 125 MG/2ML
125 INJECTION, POWDER, LYOPHILIZED, FOR SOLUTION INTRAMUSCULAR; INTRAVENOUS
Status: CANCELLED
Start: 2024-09-26

## 2024-09-23 RX ORDER — EPINEPHRINE 1 MG/ML
0.3 INJECTION, SOLUTION INTRAMUSCULAR; SUBCUTANEOUS EVERY 5 MIN PRN
OUTPATIENT
Start: 2024-10-03

## 2024-09-23 RX ORDER — ALBUTEROL SULFATE 0.83 MG/ML
2.5 SOLUTION RESPIRATORY (INHALATION)
OUTPATIENT
Start: 2024-10-17

## 2024-09-23 RX ORDER — HEPARIN SODIUM,PORCINE 10 UNIT/ML
5-20 VIAL (ML) INTRAVENOUS DAILY PRN
OUTPATIENT
Start: 2024-10-17

## 2024-09-23 RX ORDER — ALBUTEROL SULFATE 90 UG/1
1-2 AEROSOL, METERED RESPIRATORY (INHALATION)
Start: 2024-10-10

## 2024-09-23 RX ORDER — HEPARIN SODIUM,PORCINE 10 UNIT/ML
5-20 VIAL (ML) INTRAVENOUS DAILY PRN
OUTPATIENT
Start: 2024-10-10

## 2024-09-23 RX ORDER — METHYLPREDNISOLONE SODIUM SUCCINATE 125 MG/2ML
125 INJECTION, POWDER, LYOPHILIZED, FOR SOLUTION INTRAMUSCULAR; INTRAVENOUS
Start: 2024-10-10

## 2024-09-23 RX ORDER — MEPERIDINE HYDROCHLORIDE 25 MG/ML
25 INJECTION INTRAMUSCULAR; INTRAVENOUS; SUBCUTANEOUS EVERY 30 MIN PRN
OUTPATIENT
Start: 2024-10-03

## 2024-09-23 RX ORDER — MEPERIDINE HYDROCHLORIDE 25 MG/ML
25 INJECTION INTRAMUSCULAR; INTRAVENOUS; SUBCUTANEOUS EVERY 30 MIN PRN
OUTPATIENT
Start: 2024-10-10

## 2024-09-23 RX ORDER — HEPARIN SODIUM (PORCINE) LOCK FLUSH IV SOLN 100 UNIT/ML 100 UNIT/ML
5 SOLUTION INTRAVENOUS
OUTPATIENT
Start: 2024-10-17

## 2024-09-23 RX ORDER — HEPARIN SODIUM (PORCINE) LOCK FLUSH IV SOLN 100 UNIT/ML 100 UNIT/ML
5 SOLUTION INTRAVENOUS
Status: CANCELLED | OUTPATIENT
Start: 2024-09-26

## 2024-09-23 RX ORDER — DIPHENHYDRAMINE HYDROCHLORIDE 50 MG/ML
50 INJECTION INTRAMUSCULAR; INTRAVENOUS
Start: 2024-10-17

## 2024-09-23 RX ORDER — HEPARIN SODIUM,PORCINE 10 UNIT/ML
5-20 VIAL (ML) INTRAVENOUS DAILY PRN
OUTPATIENT
Start: 2024-10-03

## 2024-09-23 RX ORDER — EPINEPHRINE 1 MG/ML
0.3 INJECTION, SOLUTION INTRAMUSCULAR; SUBCUTANEOUS EVERY 5 MIN PRN
Status: CANCELLED | OUTPATIENT
Start: 2024-09-26

## 2024-09-23 RX ORDER — ALBUTEROL SULFATE 90 UG/1
1-2 AEROSOL, METERED RESPIRATORY (INHALATION)
Start: 2024-10-17

## 2024-09-23 RX ORDER — DIPHENHYDRAMINE HYDROCHLORIDE 50 MG/ML
50 INJECTION INTRAMUSCULAR; INTRAVENOUS
Start: 2024-10-03

## 2024-09-23 RX ORDER — ALBUTEROL SULFATE 0.83 MG/ML
2.5 SOLUTION RESPIRATORY (INHALATION)
OUTPATIENT
Start: 2024-10-10

## 2024-09-23 RX ORDER — EPINEPHRINE 1 MG/ML
0.3 INJECTION, SOLUTION INTRAMUSCULAR; SUBCUTANEOUS EVERY 5 MIN PRN
OUTPATIENT
Start: 2024-10-17

## 2024-09-23 RX ORDER — METHYLPREDNISOLONE SODIUM SUCCINATE 125 MG/2ML
125 INJECTION, POWDER, LYOPHILIZED, FOR SOLUTION INTRAMUSCULAR; INTRAVENOUS
Start: 2024-10-17

## 2024-09-23 RX ORDER — LORAZEPAM 2 MG/ML
0.5 INJECTION INTRAMUSCULAR EVERY 4 HOURS PRN
OUTPATIENT
Start: 2024-10-10

## 2024-09-26 ENCOUNTER — INFUSION THERAPY VISIT (OUTPATIENT)
Dept: ONCOLOGY | Facility: CLINIC | Age: 79
End: 2024-09-26
Payer: COMMERCIAL

## 2024-09-26 ENCOUNTER — LAB (OUTPATIENT)
Dept: LAB | Facility: CLINIC | Age: 79
End: 2024-09-26
Payer: COMMERCIAL

## 2024-09-26 VITALS
WEIGHT: 193.6 LBS | SYSTOLIC BLOOD PRESSURE: 109 MMHG | TEMPERATURE: 98 F | BODY MASS INDEX: 34.74 KG/M2 | HEART RATE: 64 BPM | RESPIRATION RATE: 16 BRPM | DIASTOLIC BLOOD PRESSURE: 59 MMHG | OXYGEN SATURATION: 99 %

## 2024-09-26 DIAGNOSIS — C09.9 TONSIL CANCER (H): ICD-10-CM

## 2024-09-26 DIAGNOSIS — C78.00 MALIGNANT NEOPLASM METASTATIC TO LUNG, UNSPECIFIED LATERALITY (H): Primary | ICD-10-CM

## 2024-09-26 DIAGNOSIS — C10.9 SQUAMOUS CELL CARCINOMA OF OROPHARYNX (H): ICD-10-CM

## 2024-09-26 LAB
ALBUMIN SERPL BCG-MCNC: 3.9 G/DL (ref 3.5–5.2)
ALP SERPL-CCNC: 95 U/L (ref 40–150)
ALT SERPL W P-5'-P-CCNC: 13 U/L (ref 0–70)
ANION GAP SERPL CALCULATED.3IONS-SCNC: 8 MMOL/L (ref 7–15)
AST SERPL W P-5'-P-CCNC: 19 U/L (ref 0–45)
BASOPHILS # BLD AUTO: 0 10E3/UL (ref 0–0.2)
BASOPHILS NFR BLD AUTO: 0 %
BILIRUB SERPL-MCNC: 0.3 MG/DL
BUN SERPL-MCNC: 22.3 MG/DL (ref 8–23)
CALCIUM SERPL-MCNC: 9.3 MG/DL (ref 8.8–10.4)
CHLORIDE SERPL-SCNC: 108 MMOL/L (ref 98–107)
CREAT SERPL-MCNC: 0.88 MG/DL (ref 0.67–1.17)
EGFRCR SERPLBLD CKD-EPI 2021: 88 ML/MIN/1.73M2
EOSINOPHIL # BLD AUTO: 0.1 10E3/UL (ref 0–0.7)
EOSINOPHIL NFR BLD AUTO: 3 %
ERYTHROCYTE [DISTWIDTH] IN BLOOD BY AUTOMATED COUNT: 17.1 % (ref 10–15)
GLUCOSE SERPL-MCNC: 89 MG/DL (ref 70–99)
HCO3 SERPL-SCNC: 30 MMOL/L (ref 22–29)
HCT VFR BLD AUTO: 29.6 % (ref 40–53)
HGB BLD-MCNC: 9.2 G/DL (ref 13.3–17.7)
IMM GRANULOCYTES # BLD: 0 10E3/UL
IMM GRANULOCYTES NFR BLD: 1 %
LYMPHOCYTES # BLD AUTO: 1.6 10E3/UL (ref 0.8–5.3)
LYMPHOCYTES NFR BLD AUTO: 33 %
MAGNESIUM SERPL-MCNC: 1.7 MG/DL (ref 1.7–2.3)
MCH RBC QN AUTO: 26.1 PG (ref 26.5–33)
MCHC RBC AUTO-ENTMCNC: 31.1 G/DL (ref 31.5–36.5)
MCV RBC AUTO: 84 FL (ref 78–100)
MONOCYTES # BLD AUTO: 0.4 10E3/UL (ref 0–1.3)
MONOCYTES NFR BLD AUTO: 8 %
NEUTROPHILS # BLD AUTO: 2.6 10E3/UL (ref 1.6–8.3)
NEUTROPHILS NFR BLD AUTO: 55 %
NRBC # BLD AUTO: 0 10E3/UL
NRBC BLD AUTO-RTO: 0 /100
PLATELET # BLD AUTO: 142 10E3/UL (ref 150–450)
POTASSIUM SERPL-SCNC: 4 MMOL/L (ref 3.4–5.3)
PROT SERPL-MCNC: 6.6 G/DL (ref 6.4–8.3)
RBC # BLD AUTO: 3.52 10E6/UL (ref 4.4–5.9)
SODIUM SERPL-SCNC: 146 MMOL/L (ref 135–145)
WBC # BLD AUTO: 4.7 10E3/UL (ref 4–11)

## 2024-09-26 PROCEDURE — 96413 CHEMO IV INFUSION 1 HR: CPT

## 2024-09-26 PROCEDURE — 83735 ASSAY OF MAGNESIUM: CPT

## 2024-09-26 PROCEDURE — 96375 TX/PRO/DX INJ NEW DRUG ADDON: CPT

## 2024-09-26 PROCEDURE — 36415 COLL VENOUS BLD VENIPUNCTURE: CPT | Performed by: INTERNAL MEDICINE

## 2024-09-26 PROCEDURE — 84075 ASSAY ALKALINE PHOSPHATASE: CPT | Performed by: INTERNAL MEDICINE

## 2024-09-26 PROCEDURE — 250N000011 HC RX IP 250 OP 636: Performed by: INTERNAL MEDICINE

## 2024-09-26 PROCEDURE — 85041 AUTOMATED RBC COUNT: CPT | Performed by: INTERNAL MEDICINE

## 2024-09-26 PROCEDURE — 84155 ASSAY OF PROTEIN SERUM: CPT | Performed by: INTERNAL MEDICINE

## 2024-09-26 PROCEDURE — 258N000003 HC RX IP 258 OP 636: Performed by: INTERNAL MEDICINE

## 2024-09-26 PROCEDURE — 96417 CHEMO IV INFUS EACH ADDL SEQ: CPT

## 2024-09-26 RX ADMIN — CARBOPLATIN 200 MG: 10 INJECTION, SOLUTION INTRAVENOUS at 13:41

## 2024-09-26 RX ADMIN — SODIUM CHLORIDE 250 ML: 9 INJECTION, SOLUTION INTRAVENOUS at 12:22

## 2024-09-26 RX ADMIN — PACLITAXEL 96 MG: 6 INJECTION, SOLUTION INTRAVENOUS at 12:38

## 2024-09-26 RX ADMIN — FAMOTIDINE 20 MG: 10 INJECTION INTRAVENOUS at 12:22

## 2024-09-26 RX ADMIN — DEXAMETHASONE SODIUM PHOSPHATE: 10 INJECTION, SOLUTION INTRAMUSCULAR; INTRAVENOUS at 12:22

## 2024-09-26 ASSESSMENT — PAIN SCALES - GENERAL: PAINLEVEL: EXTREME PAIN (9)

## 2024-09-26 NOTE — PATIENT INSTRUCTIONS
Contact Numbers    [facility]: [phone]    Call with chills and/or temperature greater than or equal to 100.5, uncontrolled nausea/vomiting, diarrhea, constipation, dizziness, shortness of breath, chest pain, bleeding, unexplained bruising, or any new/concerning symptoms, questions/concerns.     If you are having any concerning symptoms or wish to speak to a provider before your next infusion visit, please call your care coordinator or triage to notify them so we can adequately serve you.       After Hours: [phone]    If after hours, weekends, or holidays, call main hospital  and ask for Oncology doctor on call.      September 2024 Sunday Monday Tuesday Wednesday Thursday Friday Saturday   1     2     3     4     5     6     7       8     9     10     11     12     13     14       15     16    [facility] PHYSICAL   8:15 AM   (30 min.)   Buck Nascimento MD   [facility] Internal Medicine [place] 17     18     19    LAB PERIPHERAL  11:00 AM   (15 min.)   [facility] DRAW   [facility]    RETURN CCSL  11:15 AM   (45 min.)   Vandana Bertrand CNP   [facility]    ONC INFUSION 2 HR (120 MIN)  12:00 PM   (120 min.)    ONC INFUSION NURSE   [facility] 20     21       22     23     24     25     26    LAB PERIPHERAL  11:30 AM   (15 min.)   [facility] DRAW   [facility]    ONC INFUSION 2 HR (120 MIN)  12:00 PM   (120 min.)    ONC INFUSION NURSE   [facility] 27     28       29     30                                          October 2024 Sunday Monday Tuesday Wednesday Thursday Friday Saturday             1     2     3    LAB PERIPHERAL   8:15 AM   (15 min.)   [facility] DRAW   [facility]    RETURN ACTIVE TREATMENT   8:45 AM   (45 min.)   Vandana Bertrand CNP   [facility]    ONC INFUSION 2 HR (120 MIN)  10:00 AM   (120 min.)   [facility] ONC INFUSION NURSE   [facility] 4     5       6     7     8     9    RETURN FOOT/ANKLE   8:05 AM   (20 min.)   Mamadou Gary DPM   [facility] [place] 10    LAB PERIPHERAL   8:15 AM   (15 min.)   [facility] DRAW   [facility]    ONC INFUSION 2 HR (120 MIN)   9:00 AM   (120 min.)   [facility] ONC INFUSION NURSE   [facility] 11     12       13     14     15     16    RETURN CCSL  10:15 AM   (45 min.)   Vandana Bertrand CNP   [facility] 17    LAB PERIPHERAL  10:45 AM   (15 min.)   [facility] DRAW   [facility]    ONC INFUSION 2 HR (120 MIN)  11:30 AM   (120 min.)   [facility] ONC INFUSION NURSE   [facility] 18     19       20     21     22  Happy Birthday!     23     24    LAB PERIPHERAL   9:00 AM   (15 min.)   [facility] DRAW   [facility]    ONC INFUSION 2 HR (120 MIN)   9:30 AM   (120 min.)   [facility] ONC INFUSION NURSE   [facility] 25     26       27     28     29     30     31    LAB PERIPHERAL   9:30 AM   (15 min.)   [facility] DRAW   [facility]    RETURN CCSL   9:45 AM   (45 min.)   Vandana Bertrand, CNP   [facility]    ONC INFUSION 2 HR (120 MIN)  12:00 PM   (120 min.)   [facility] ONC INFUSION NURSE   [facility]                           Lab Results:  Recent Results (from the past 12 hour(s))   Magnesium    Collection Time: 09/26/24 11:00 AM   Result Value Ref Range    Magnesium 1.7 1.7 - 2.3 mg/dL   Comprehensive metabolic panel    Collection Time: 09/26/24 11:00 AM   Result Value Ref Range    Sodium 146 (H) 135 - 145 mmol/L    Potassium 4.0 3.4 - 5.3 mmol/L    Carbon Dioxide (CO2) 30 (H) 22 - 29 mmol/L    Anion Gap  8 7 - 15 mmol/L    Urea Nitrogen 22.3 8.0 - 23.0 mg/dL    Creatinine 0.88 0.67 - 1.17 mg/dL    GFR Estimate 88 >60 mL/min/1.73m2    Calcium 9.3 8.8 - 10.4 mg/dL    Chloride 108 (H) 98 - 107 mmol/L    Glucose 89 70 - 99 mg/dL    Alkaline Phosphatase 95 40 - 150 U/L    AST 19 0 - 45 U/L    ALT 13 0 - 70 U/L    Protein Total 6.6 6.4 - 8.3 g/dL    Albumin 3.9 3.5 - 5.2 g/dL    Bilirubin Total 0.3 <=1.2 mg/dL   CBC with platelets and differential    Collection Time: 09/26/24 11:00 AM   Result Value Ref Range    WBC Count 4.7 4.0 - 11.0 10e3/uL    RBC Count 3.52 (L) 4.40 - 5.90 10e6/uL    Hemoglobin 9.2 (L) 13.3 - 17.7 g/dL    Hematocrit 29.6 (L) 40.0 - 53.0 %    MCV 84 78 - 100 fL    MCH 26.1 (L) 26.5 - 33.0 pg    MCHC 31.1 (L) 31.5 - 36.5 g/dL    RDW 17.1 (H) 10.0 - 15.0 %    Platelet Count 142 (L) 150 - 450 10e3/uL    % Neutrophils 55 %    % Lymphocytes 33 %    % Monocytes 8 %    % Eosinophils 3 %    % Basophils 0 %    % Immature Granulocytes 1 %    NRBCs per 100 WBC 0 <1 /100    Absolute Neutrophils 2.6 1.6 - 8.3 10e3/uL    Absolute Lymphocytes 1.6 0.8 - 5.3 10e3/uL    Absolute Monocytes 0.4 0.0 - 1.3 10e3/uL    Absolute Eosinophils 0.1 0.0 - 0.7 10e3/uL    Absolute Basophils 0.0 0.0 - 0.2 10e3/uL    Absolute Immature Granulocytes 0.0 <=0.4 10e3/uL    Absolute NRBCs 0.0 10e3/uL

## 2024-09-26 NOTE — NURSING NOTE
Chief Complaint   Patient presents with    Blood Draw     Vitals, blood drawn and PIV placed by Jimmy, vascular RN. Pt checked into appt.      BRAD Vasquez LPN

## 2024-09-26 NOTE — PROGRESS NOTES
Infusion Nursing Note:  Jonathan Bishop presents today for Cycle 7, day 1 Taxol and Carboplatin.    Patient seen by provider today: No    Note: Patient presents to clinic today feeling well with no questions.  Pt would like to proceed with therapy today. Pt did not request or require any intervention for pain today.    Intravenous Access:  Peripheral IV placed.    Treatment Conditions:  Lab Results   Component Value Date    HGB 9.2 (L) 09/26/2024    WBC 4.7 09/26/2024    ANEU 1.0 (L) 03/06/2024    ANEUTAUTO 2.6 09/26/2024     (L) 09/26/2024        Lab Results   Component Value Date     (H) 09/26/2024    POTASSIUM 4.0 09/26/2024    MAG 1.7 09/26/2024    CR 0.88 09/26/2024    WARREN 9.3 09/26/2024    BILITOTAL 0.3 09/26/2024    ALBUMIN 3.9 09/26/2024    ALT 13 09/26/2024    AST 19 09/26/2024     Results reviewed, labs MET treatment parameters, ok to proceed with treatment.    Post Infusion Assessment:  Patient tolerated infusion without incident.  Blood return noted pre and post infusion.  Site patent and intact, free from redness, edema or discomfort.  No evidence of extravasations.  Access discontinued per protocol.    Discharge Plan:   Patient declined prescription refills.  Discharge instructions reviewed with: Patient.  Patient and/or family verbalized understanding of discharge instructions and all questions answered.  AVS to patient via [employer].  Patient will return 10/3/2024 for next appointment.   Patient discharged in stable condition accompanied by: self.  Departure Mode: Wheelchair.    Gaviota Mitchell RN

## 2024-10-02 ENCOUNTER — MEDICAL CORRESPONDENCE (OUTPATIENT)
Dept: INTERNAL MEDICINE | Facility: CLINIC | Age: 79
End: 2024-10-02
Payer: COMMERCIAL

## 2024-10-02 NOTE — PROGRESS NOTES
[facility]  [address] [facility] [address]  Phone: [phone]  Fax: [phone]    PATIENT NAME: Jonathan Bishop  MRN # [id number]   DATE OF VISIT: October 3, 2024  YOB: 1945     Otolaryngology: Dr. Karishma Eng  Radiation Oncology: Dr. Jenni Mills  PCP: Dr. Buck Nascimento    CANCER TYPE: SCC L tonsil, p16 +lety  STAGE: [id number] (IVC)  ECOG PS: 1    PD-L1: TPS 20%, CPS 25% on [id number] tonsil bx  NGS: N/A    SUMMARY  2/26/23 L tonsil mass bx in clinic (Dr. Eng).   2/20/23 PET/CT. 3.7 x 4.0 x 5.1 cm mass L palatine tonsil causing narrowing of the oropharyngeal lumen, L level 2 node, L retropharyngeal nodular density, extension of primary mass vs retropharyngeal node, 0.8 cm RLL nodule concerning for met, mild focal uptake R iliac bone and L1 without CT correlate suspicious for mets.   3/2/23 R VATS, wedge resection. Path: SCC, poorly differentiated, associated with necrosis, 1 cm, negative margins, p16 +lety  3/24/23 MRI L spine and pelvis. Diffusely heterogeneous marrow signal throughout without corresponding abnormal signal on sagittal STIR sequence and no abnormal enhancement. Nonspecific but could be benign process such as red marrow hyperplasia, cannot completely exclude metastatic disease or infiltrative pathologic marrow process. No lesions corresponding to FDG avid spots on PET/CT.   4/19~10/18/23 Pembrolizumab.  10/26/23 CT neck and CAP. Increased L palatine tonsil mass, 3.8 x 2.7 cm --> 4.7 x 3.5 cm. Increased bilateral cervical nodes up to 2.3 x 2.4 cm R level 2 node. New 3 mm RML nodule, no other mets.   11/14/23 PET. 4.6 x 3.3 cm L palatine tonsil mass (SUB 26.8), R level 2A and 2A/3 nodes. Questionable uptake distal R femoral medullary cavity, partially imaged, with questionable subtle corresponding soft tissue attenuation on CT. MRI could be obtained to better evaluate.  11/21~12/2/23 [facility] for weakness/fall. COVID/paxlovid,  MSSA bacteremia (1 of 2 bottles on 1 day), TTE negative, treated with cefazolin but didn't complete 2 week course, episode of unresponsiveness 12/2. [place] [facility] [place] CTA chest negative for PE, showed grossly unchanged cervical adenopathy. Brain MRI 11/22 for stroke code negative for infarct. Showed SVID, moderate atrophy, suboptimal contrast bolus to examine intracranial structures, 4.5 x 3 x 4.5 cm L nasopharyngeal mass, incompletely visualized. CTA negative.   12/19/23 Quad shot fraction #1. No-showed thereafter.  1/10/24~curr Carboplatin (AUC 2) + paclitaxel 60 mg/m2 weekly. Held 2/13 due to anorexia, fatigue.  3/13~3/17/24 [facility] for acute encephalopathy. Had [place] his car in the middle of the highway after receiving chemo earlier that day. Recommended not to drive   4/30/24 CT CAP and CT neck. Residual bilateral cervical adenopathy.   7/30/24 PET. CT not covered. Significant reduction L palatine tonsil mass - residual focal uptake in the L posterolateral oropharyngeal wall (SUV 9.1, previously 26.8). Previously seen multiple FDG avid L cervical nodes and few enlarged R nodes are completed resolved. Mild diffuse uptake along bilateral lower legs and ankles with associated skin thickening and stranding along the soft tissues; findings can be seen with cellulitis. No metastatic disease.     ASSESSMENT AND PLAN  SCC L tonsil, p16 +lety, CPS 25%/TPS 20%, oligometastatic lung met, +/- bone mets: [facility] on carboplatin paclitaxel and s/p 1 fraction of quad shot. See Dr. Mueller's note on 8/1 discussing possible role of consolidative radiation. Following a 6 week treatment break, he resumed carbo/taxol on 9/19/24. Conner is minimally interested in radiation although willing to consider if this would be beneficial in preventing recurrence. We'll revisit this after his next scan in about 4 weeks. He is still experiencing lingering abdominal discomfort and nausea following his episode of  abdominal pain and painful BM earlier this week. It's possible that the resumption of chemo is contributing to his symptoms although I suspect it's related to his change in diet. After discussing, he would like to hold chemo today and resume next week. Will request repeat CT imaging in 4 week.     Neurocognitive changes, encephalopathy March 2024: Declined cognitive testing. No recent concerning events but will continue to readdress recommendation for neurocog testing in future.    Anorexia, malnutrition, weight loss: Weight loss stabilized. Appetite improved following chemo break. GI disturbance this week likely related to eating red meat/new foods.     Hypomag: Mg stable    H/o CVA: Residual L weakness. Uses walker at baseline, motorized scooter for longer distances.    LE edema: Generally better with PCA care. Encouraged regular use of compression stockings    Surrogate decision maker: He would want us to talk with Mariann if he could not speak for himself.    Microcytic anemia: Stable    Peripheral neuropathy: MRI shows a lot of foramenal stenosis and spinal canal stenosis, so more likely related to that than DM2. Doesn't appear to be worsening on paclitaxel    Vandana Bertrand CNP  ---  34 minutes spent on the date of the encounter doing chart review, review of test results, interpretation of tests, patient visit, and documentation.    The longitudinal plan of care for the diagnosis(es)/condition(s) as documented were addressed during this visit. Due to the added complexity in care, I will continue to support Conner in the subsequent management and with ongoing continuity of care.    SUBJECTIVE  Conner is seen today prior to weekly carbo/taxol.  - He was able to go fishing in [place] last weekend.  While he was there he went out to breakfast and ate a meal including red meat which he usually avoids.  After he returned home later this day he developed intense abdominal pain and had a large bowel movement.  He did not  feel well after that and admits to falling in his living room.  He laid on the floor for 2 hours before he was able to maneuver to his couch and pull himself up.  He denies hitting his head or any residual injury or deficit following the fall  - He reports 2 to 3 days of abdominal queasiness following this episode.  He is still been able to eat without difficulty.  His bowel movements have been normal and formed.  - He denies fever or chills  - Denies any significant lower extremity edema.  His PCA is still helping with compression stockings  - Denies nausea or vomiting    PAST MEDICAL HISTORY  SCC as above  Chronic LBP  TAVR 2020  H/o rheumatic carditis 1950  CHF. 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 Nocturia once nightly   ED  Cataracts  Umbilical hernia repair 2011  Knee arthroplasty B 2010  Lumbar spine fusion, laminectomy, sciatic pain R > L  Peripheral neuropathy - from pinched nerves?  Hearing loss    CURRENT OUTPATIENT MEDICATIONS  Reviewed    ALLERGIES  No Known Allergies     PHYSICAL EXAM  /72   Pulse 58   Temp 97.3  F (36.3  C) (Oral)   Resp 20   Wt 89.5 kg (197 lb 4.8 oz)   SpO2 100%   BMI 35.40 kg/m      GEN: NAD  HEENT: EOMI, no icterus, injection or pallor  RESP: cta bilaterally  CV: rrr, no m/g/r  EXT: chronic 1-2+ edema  NEURO: alert    LABORATORY AND IMAGING STUDIES   Most Recent 3 CBC's:  Recent Labs   Lab Test 10/03/24  0842 09/26/24  1100 09/19/24  1115   WBC 4.6 4.7 5.3   HGB 9.2* 9.2* 9.7*   MCV 85 84 84    142* 171   ANEUTAUTO 2.5 2.6 2.8    Most Recent 3 BMP's:  Recent Labs   Lab Test 10/03/24  0842 09/26/24  1100 09/19/24  1115    146* 145   POTASSIUM 3.6 4.0 3.8   CHLORIDE 106 108* 106   CO2 27 30* 28   BUN 20.0 22.3 15.8   CR 0.76 0.88 0.82   ANIONGAP 11 8 11   WARREN 9.1 9.3 9.7   * 89 91   PROTTOTAL 6.6 6.6 6.9   ALBUMIN 4.0 3.9 4.1    Most Recent 2 LFT's:  Recent Labs   Lab Test 10/03/24  0842 09/26/24  1100    AST 20 19   ALT 14 13   ALKPHOS 93 95   BILITOTAL 0.2 0.3    Most Recent TSH and T4:  Recent Labs   Lab Test 09/19/24  1115   TSH 1.44   T4 1.14     Phos/Mag:  Lab Results   Component Value Date    PHOS 3.0 03/17/2024    PHOS 2.4 (L) 12/09/2020    PHOS 3.9 12/08/2020    MAG 1.7 09/26/2024    MAG 1.7 09/19/2024    MAG 1.6 (L) 08/01/2024        I reviewed the above labs today.

## 2024-10-03 ENCOUNTER — ONCOLOGY VISIT (OUTPATIENT)
Dept: ONCOLOGY | Facility: CLINIC | Age: 79
End: 2024-10-03
Attending: REGISTERED NURSE
Payer: COMMERCIAL

## 2024-10-03 ENCOUNTER — APPOINTMENT (OUTPATIENT)
Dept: LAB | Facility: CLINIC | Age: 79
End: 2024-10-03
Attending: INTERNAL MEDICINE
Payer: COMMERCIAL

## 2024-10-03 VITALS
HEART RATE: 58 BPM | SYSTOLIC BLOOD PRESSURE: 119 MMHG | TEMPERATURE: 97.3 F | BODY MASS INDEX: 35.4 KG/M2 | OXYGEN SATURATION: 100 % | RESPIRATION RATE: 20 BRPM | DIASTOLIC BLOOD PRESSURE: 72 MMHG | WEIGHT: 197.3 LBS

## 2024-10-03 DIAGNOSIS — C10.9 SQUAMOUS CELL CARCINOMA OF OROPHARYNX (H): Primary | ICD-10-CM

## 2024-10-03 DIAGNOSIS — R41.89 NEUROCOGNITIVE DEFICITS: ICD-10-CM

## 2024-10-03 DIAGNOSIS — E83.42 HYPOMAGNESEMIA: ICD-10-CM

## 2024-10-03 DIAGNOSIS — E11.9 TYPE 2 DIABETES MELLITUS WITHOUT COMPLICATION, WITHOUT LONG-TERM CURRENT USE OF INSULIN (H): ICD-10-CM

## 2024-10-03 DIAGNOSIS — R29.818 NEUROCOGNITIVE DEFICITS: ICD-10-CM

## 2024-10-03 DIAGNOSIS — C78.00 MALIGNANT NEOPLASM METASTATIC TO LUNG, UNSPECIFIED LATERALITY (H): ICD-10-CM

## 2024-10-03 DIAGNOSIS — R60.0 PERIPHERAL EDEMA: ICD-10-CM

## 2024-10-03 PROCEDURE — G0463 HOSPITAL OUTPT CLINIC VISIT: HCPCS | Performed by: REGISTERED NURSE

## 2024-10-03 PROCEDURE — 99214 OFFICE O/P EST MOD 30 MIN: CPT | Performed by: REGISTERED NURSE

## 2024-10-03 PROCEDURE — G2211 COMPLEX E/M VISIT ADD ON: HCPCS | Performed by: REGISTERED NURSE

## 2024-10-03 ASSESSMENT — PAIN SCALES - GENERAL: PAINLEVEL: EXTREME PAIN (9)

## 2024-10-03 NOTE — Clinical Note
10/3/2024      Jonathan Bishop  [address]  [facility] [place] [address]      Dear Colleague,    Thank you for referring your patient, Jonathan Bishop, to the [facility]. Please see a copy of my visit note below.        [facility]  [address] [facility] [address]  Phone: [phone]  Fax: [phone]    PATIENT NAME: Jonathan Bishop  MRN # [id number]   DATE OF VISIT: October 3, 2024  YOB: 1945     Otolaryngology: Dr. Karishma Eng  Radiation Oncology: Dr. Jenni Mills  PCP: Dr. Buck Nascimento    CANCER TYPE: SCC L tonsil, p16 +lety  STAGE: [id number] (IVC)  ECOG PS: 1    PD-L1: TPS 20%, CPS 25% on [id number] tonsil bx  NGS: N/A    SUMMARY  2/26/23 L tonsil mass bx in clinic (Dr. Eng).   2/20/23 PET/CT. 3.7 x 4.0 x 5.1 cm mass L palatine tonsil causing narrowing of the oropharyngeal lumen, L level 2 node, L retropharyngeal nodular density, extension of primary mass vs retropharyngeal node, 0.8 cm RLL nodule concerning for met, mild focal uptake R iliac bone and L1 without CT correlate suspicious for mets.   3/2/23 R VATS, wedge resection. Path: SCC, poorly differentiated, associated with necrosis, 1 cm, negative margins, p16 +lety  3/24/23 MRI L spine and pelvis. Diffusely heterogeneous marrow signal throughout without corresponding abnormal signal on sagittal STIR sequence and no abnormal enhancement. Nonspecific but could be benign process such as red marrow hyperplasia, cannot completely exclude metastatic disease or infiltrative pathologic marrow process. No lesions corresponding to FDG avid spots on PET/CT.   4/19~10/18/23 Pembrolizumab.  10/26/23 CT neck and CAP. Increased L palatine tonsil mass, 3.8 x 2.7 cm --> 4.7 x 3.5 cm. Increased bilateral cervical nodes up to 2.3 x 2.4 cm R level 2 node. New 3 mm RML nodule, no other mets.   11/14/23 PET. 4.6 x 3.3 cm L palatine tonsil mass (SUB 26.8), R level 2A and  2A/3 nodes. Questionable uptake distal R femoral medullary cavity, partially imaged, with questionable subtle corresponding soft tissue attenuation on CT. MRI could be obtained to better evaluate.  11/21~12/2/23 [facility] for weakness/fall. COVID/paxlovid, MSSA bacteremia (1 of 2 bottles on 1 day), TTE negative, treated with cefazolin but didn't complete 2 week course, episode of unresponsiveness 12/2. Dispo plan was TCU on [facility] [place], but left AMA. CTA chest negative for PE, showed grossly unchanged cervical adenopathy. Brain MRI 11/22 for stroke code negative for infarct. Showed SVID, moderate atrophy, suboptimal contrast bolus to examine intracranial structures, 4.5 x 3 x 4.5 cm L nasopharyngeal mass, incompletely visualized. CTA negative.   12/19/23 Quad shot fraction #1. No-showed thereafter.  1/10/24~curr Carboplatin (AUC 2) + paclitaxel 60 mg/m2 weekly. Held 2/13 due to anorexia, fatigue.  3/13~3/17/24 [facility] for acute encephalopathy. Had [place] his car in the middle of the highway after receiving chemo earlier that day. Recommended not to drive   4/30/24 CT CAP and CT neck. Residual bilateral cervical adenopathy.   7/30/24 PET. CT not covered. Significant reduction L palatine tonsil mass - residual focal uptake in the L posterolateral oropharyngeal wall (SUV 9.1, previously 26.8). Previously seen multiple FDG avid L cervical nodes and few enlarged R nodes are completed resolved. Mild diffuse uptake along bilateral lower legs and ankles with associated skin thickening and stranding along the soft tissues; findings can be seen with cellulitis. No metastatic disease.     ASSESSMENT AND PLAN  SCC L tonsil, p16 +lety, CPS 25%/TPS 20%, oligometastatic lung met, +/- bone mets: [facility] on carboplatin paclitaxel and s/p 1 fraction of quad shot. See Dr. Mueller's note on 8/1 discussing possible role of consolidative radiation. He has not seen Dr. Mills in follow-up and isn't sure he'd proceed with RT even  if offered. I'll discuss again with Dr. Mueller. In the meantime we will proceed with weekly carbo/paclitaxel today after a 6 week treatment break. MARK visits every 2 weeks with infusion. Repeat CT imaging in 6 weeks.    Neurocognitive changes, encephalopathy March 2024: Declined cognitive testing. Didn't discuss in detail today but will continue to encourage.    Anorexia, malnutrition, weight loss: Weight loss stabilized. Appetite improving with chemo break.     Hypomag: On mag oxide. Mg not drawn today-will add and adjust dose as appropriate    H/o CVA: Residual L weakness. Uses walker at baseline, motorized scooter for longer distances.    LE edema: Generally better with PCA care. Encouraged regular use of compression    Surrogate decision maker: He would want us to talk with Mariann if he could not speak for himself.    Microcytic anemia: Stable    Peripheral neuropathy: MRI shows a lot of foramenal stenosis and spinal canal stenosis, so more likely related to that than DM2. Doesn't appear to be worsening on paclitaxel    Vandana Bertrand, YOANNA  ---  *** minutes spent on the date of the encounter doing {2021 E&M time in:754988}.    The longitudinal plan of care for the diagnosis(es)/condition(s) as documented were addressed during this visit. Due to the added complexity in care, I will continue to support Conner in the subsequent management and with ongoing continuity of care.    SUBJECTIVE  ***    PAST MEDICAL HISTORY  SCC as above  Chronic LBP  TAVR 2020  H/o rheumatic carditis 1950  CHF. 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 Nocturia once nightly   ED  Cataracts  Umbilical hernia repair 2011  Knee arthroplasty B 2010  Lumbar spine fusion, laminectomy, sciatic pain R > L  Peripheral neuropathy - from pinched nerves?  Hearing loss    CURRENT OUTPATIENT MEDICATIONS  Reviewed    ALLERGIES  No Known Allergies     PHYSICAL EXAM  There were no vitals taken for this visit.  GEN:  NAD  HEENT: EOMI, no icterus, injection or pallor  RESP: cta bilaterally  CV: rrr, no m/g/r  EXT: chronic 1-2+ edema  NEURO: alert    LABORATORY AND IMAGING STUDIES   Most Recent 3 CBC's:  Recent Labs   Lab Test 09/26/24  1100 09/19/24  1115 08/01/24  1032   WBC 4.7 5.3 4.2   HGB 9.2* 9.7* 8.6*   MCV 84 84 86   * 171 197   ANEUTAUTO 2.6 2.8 2.0    Most Recent 3 BMP's:  Recent Labs   Lab Test 09/26/24  1100 09/19/24  1115 08/01/24  1032   * 145 144   POTASSIUM 4.0 3.8 3.5   CHLORIDE 108* 106 103   CO2 30* 28 30*   BUN 22.3 15.8 24.8*   CR 0.88 0.82 0.97   ANIONGAP 8 11 11   WARREN 9.3 9.7 9.3   GLC 89 91 98   PROTTOTAL 6.6 6.9 6.9   ALBUMIN 3.9 4.1 4.2    Most Recent 2 LFT's:  Recent Labs   Lab Test 09/26/24  1100 09/19/24  1115   AST 19 24   ALT 13 16   ALKPHOS 95 116   BILITOTAL 0.3 0.3    Most Recent TSH and T4:  Recent Labs   Lab Test 09/19/24  1115   TSH 1.44   T4 1.14     Phos/Mag:  Lab Results   Component Value Date    PHOS 3.0 03/17/2024    PHOS 2.4 (L) 12/09/2020    PHOS 3.9 12/08/2020    MAG 1.7 09/26/2024    MAG 1.7 09/19/2024    MAG 1.6 (L) 08/01/2024        I reviewed the above labs today.             [facility]  [address] [facility] [address]  Phone: [phone]  Fax: [phone]    PATIENT NAME: Jonathan Bishop  MRN # [id number]   DATE OF VISIT: October 3, 2024  YOB: 1945     Otolaryngology: Dr. Karishma Eng  Radiation Oncology: Dr. Jenni Mills  PCP: Dr. Buck Nascimento    CANCER TYPE: SCC L tonsil, p16 +lety  STAGE: [id number] (IVC)  ECOG PS: 1    PD-L1: TPS 20%, CPS 25% on [id number] tonsil bx  NGS: N/A    SUMMARY  2/26/23 L tonsil mass bx in clinic (Dr. Eng).   2/20/23 PET/CT. 3.7 x 4.0 x 5.1 cm mass L palatine tonsil causing narrowing of the oropharyngeal lumen, L level 2 node, L retropharyngeal nodular density, extension of primary mass vs retropharyngeal node, 0.8 cm RLL nodule concerning for met, mild focal uptake R iliac  bone and L1 without CT correlate suspicious for mets.   3/2/23 R VATS, wedge resection. Path: SCC, poorly differentiated, associated with necrosis, 1 cm, negative margins, p16 +lety  3/24/23 MRI L spine and pelvis. Diffusely heterogeneous marrow signal throughout without corresponding abnormal signal on sagittal STIR sequence and no abnormal enhancement. Nonspecific but could be benign process such as red marrow hyperplasia, cannot completely exclude metastatic disease or infiltrative pathologic marrow process. No lesions corresponding to FDG avid spots on PET/CT.   4/19~10/18/23 Pembrolizumab.  10/26/23 CT neck and CAP. Increased L palatine tonsil mass, 3.8 x 2.7 cm --> 4.7 x 3.5 cm. Increased bilateral cervical nodes up to 2.3 x 2.4 cm R level 2 node. New 3 mm RML nodule, no other mets.   11/14/23 PET. 4.6 x 3.3 cm L palatine tonsil mass (SUB 26.8), R level 2A and 2A/3 nodes. Questionable uptake distal R femoral medullary cavity, partially imaged, with questionable subtle corresponding soft tissue attenuation on CT. MRI could be obtained to better evaluate.  11/21~12/2/23 [facility] for weakness/fall. COVID/paxlovid, MSSA bacteremia (1 of 2 bottles on 1 day), TTE negative, treated with cefazolin but didn't complete 2 week course, episode of unresponsiveness 12/2. Dispo plan was TCU on [facility] [place] [place], but left AMA. CTA chest negative for PE, showed grossly unchanged cervical adenopathy. Brain MRI 11/22 for stroke code negative for infarct. Showed SVID, moderate atrophy, suboptimal contrast bolus to examine intracranial structures, 4.5 x 3 x 4.5 cm L nasopharyngeal mass, incompletely visualized. CTA negative.   12/19/23 Quad shot fraction #1. No-showed thereafter.  1/10/24~curr Carboplatin (AUC 2) + paclitaxel 60 mg/m2 weekly. Held 2/13 due to anorexia, fatigue.  3/13~3/17/24 [facility] for acute encephalopathy. Had [place] his car [place] the middle of the [place] after receiving chemo earlier that day. Recommended not to  drive   4/30/24 CT CAP and CT neck. Residual bilateral cervical adenopathy.   7/30/24 PET. CT not covered. Significant reduction L palatine tonsil mass - residual focal uptake in the L posterolateral oropharyngeal wall (SUV 9.1, previously 26.8). Previously seen multiple FDG avid L cervical nodes and few enlarged R nodes are completed resolved. Mild diffuse uptake along bilateral lower legs and ankles with associated skin thickening and stranding along the soft tissues; findings can be seen with cellulitis. No metastatic disease.     ASSESSMENT AND PLAN  SCC L tonsil, p16 +lety, CPS 25%/TPS 20%, oligometastatic lung met, +/- bone mets: [facility] on carboplatin paclitaxel and s/p 1 fraction of quad shot. See Dr. Mueller's note on 8/1 discussing possible role of consolidative radiation.  Conner is minimally interested in radiation although willing to consider if this would be beneficial in preventing recurrence. We'll revisit this after his next scan in about 4 weeks. He is still experiencing lingering abdominal discomfort and nausea following his episode of abdominal pain and painful BM earlier this week. It's possible that the resumption of chemo is contributing to his symptoms although I suspect it's related to his change in diet.      Neurocognitive changes, encephalopathy March 2024: Declined cognitive testing. Didn't discuss in detail today but will continue to encourage.    Anorexia, malnutrition, weight loss: Weight loss stabilized. Appetite improving with chemo break.     Hypomag: On mag oxide. Mg not drawn today-will add and adjust dose as appropriate    H/o CVA: Residual L weakness. Uses walker at baseline, motorized scooter for longer distances.    LE edema: Generally better with PCA care. Encouraged regular use of compression    Surrogate decision maker: He would want us to talk with Mariann if he could not speak for himself.    Microcytic anemia: Stable    Peripheral neuropathy: MRI shows a lot of foramenal  stenosis and spinal canal stenosis, so more likely related to that than DM2. Doesn't appear to be worsening on paclitaxel    Vandana Bertrand CNP  ---  *** minutes spent on the date of the encounter doing {2021 E&M time in:605561}.    The longitudinal plan of care for the diagnosis(es)/condition(s) as documented were addressed during this visit. Due to the added complexity in care, I will continue to support Conner in the subsequent management and with ongoing continuity of care.    SUBJECTIVE  Conner is seen today prior to weekly carbo/taxol.  - He was able to go fishing in [place] last weekend.  While he was there he went out to breakfast and ate a meal including red meat which he usually avoids.  After he returned home later this day he developed intense abdominal pain and had a large bowel movement.  He did not feel well after that and admits to falling in his living room.  He laid on the floor for 2 hours before he was able to maneuver to his couch and pull himself up.  He denies hitting his head or any residual injury or deficit following the fall  - He reports 2 to 3 days of abdominal queasiness following this episode.  He is still been able to eat without difficulty.  His bowel movements have been normal and formed.  - He denies fever or chills  - Denies any significant lower extremity edema.  His PCA is still helping with compression stockings  - Denies nausea or vomiting    PAST MEDICAL HISTORY  SCC as above  Chronic LBP  TAVR 2020  H/o rheumatic carditis 1950  CHF. 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 Nocturia once nightly   ED  Cataracts  Umbilical hernia repair 2011  Knee arthroplasty B 2010  Lumbar spine fusion, laminectomy, sciatic pain R > L  Peripheral neuropathy - from pinched nerves?  Hearing loss    CURRENT OUTPATIENT MEDICATIONS  Reviewed    ALLERGIES  No Known Allergies     PHYSICAL EXAM  /72   Pulse 58   Temp 97.3  F (36.3  C) (Oral)   Resp 20    Wt 89.5 kg (197 lb 4.8 oz)   SpO2 100%   BMI 35.40 kg/m      GEN: NAD  HEENT: EOMI, no icterus, injection or pallor  RESP: cta bilaterally  CV: rrr, no m/g/r  EXT: chronic 1-2+ edema  NEURO: alert    LABORATORY AND IMAGING STUDIES   Most Recent 3 CBC's:  Recent Labs   Lab Test 10/03/24  0842 09/26/24  1100 09/19/24  1115   WBC 4.6 4.7 5.3   HGB 9.2* 9.2* 9.7*   MCV 85 84 84    142* 171   ANEUTAUTO 2.5 2.6 2.8    Most Recent 3 BMP's:  Recent Labs   Lab Test 10/03/24  0842 09/26/24  1100 09/19/24  1115    146* 145   POTASSIUM 3.6 4.0 3.8   CHLORIDE 106 108* 106   CO2 27 30* 28   BUN 20.0 22.3 15.8   CR 0.76 0.88 0.82   ANIONGAP 11 8 11   WARREN 9.1 9.3 9.7   * 89 91   PROTTOTAL 6.6 6.6 6.9   ALBUMIN 4.0 3.9 4.1    Most Recent 2 LFT's:  Recent Labs   Lab Test 10/03/24  0842 09/26/24  1100   AST 20 19   ALT 14 13   ALKPHOS 93 95   BILITOTAL 0.2 0.3    Most Recent TSH and T4:  Recent Labs   Lab Test 09/19/24  1115   TSH 1.44   T4 1.14     Phos/Mag:  Lab Results   Component Value Date    PHOS 3.0 03/17/2024    PHOS 2.4 (L) 12/09/2020    PHOS 3.9 12/08/2020    MAG 1.7 09/26/2024    MAG 1.7 09/19/2024    MAG 1.6 (L) 08/01/2024        I reviewed the above labs today.           Again, thank you for allowing me to participate in the care of your patient.        Sincerely,        Vandana Bertrand, CNP

## 2024-10-03 NOTE — NURSING NOTE
Chief Complaint   Patient presents with    Blood Draw     Labs drawn via PIV access by VAT       Ultrasound IV placement with blood draw by VAT. Vitals taken and appointment arrived.    Ebony Elizabeth RN

## 2024-10-03 NOTE — NURSING NOTE
"Oncology Rooming Note    October 3, 2024 9:05 AM   Jonathan Bishop is a 78 year old male who presents for:    Chief Complaint   Patient presents with    Blood Draw     Labs drawn via PIV access by VAT    Oncology Clinic Visit     Squamous Cell Carcinoma of Oropharynx      Initial Vitals: /72   Pulse 58   Temp 97.3  F (36.3  C) (Oral)   Resp 20   Wt 89.5 kg (197 lb 4.8 oz)   SpO2 100%   BMI 35.40 kg/m   Estimated body mass index is 35.4 kg/m  as calculated from the following:    Height as of 5/7/24: 1.59 m (5' 2.6\").    Weight as of this encounter: 89.5 kg (197 lb 4.8 oz). Body surface area is 1.99 meters squared.  Extreme Pain (9) Comment: Data Unavailable   No LMP for male patient.  Allergies reviewed: Yes  Medications reviewed: No, patient reports he does not know the name of his medications    Medications: Medication refills not needed today.  Pharmacy name entered into [facility]: [id number] - [place], [place] - [address] [facility]    Frailty Screening:   Is the patient here for a new oncology consult visit in cancer care? 2. No      Clinical concerns:       Marcela Hussein CMA              "

## 2024-10-08 ENCOUNTER — DOCUMENTATION ONLY (OUTPATIENT)
Dept: INTERNAL MEDICINE | Facility: CLINIC | Age: 79
End: 2024-10-08
Payer: COMMERCIAL

## 2024-10-08 NOTE — PROGRESS NOTES
Type of Form Received: [facility] Rx Request [id number]    Form Received (Date) 10/8/24   Form Filled out Yes, faxed 10/14/24 & 10/29   Placed in provider folder Yes

## 2024-10-10 ENCOUNTER — INFUSION THERAPY VISIT (OUTPATIENT)
Dept: ONCOLOGY | Facility: CLINIC | Age: 79
End: 2024-10-10
Attending: INTERNAL MEDICINE
Payer: COMMERCIAL

## 2024-10-10 ENCOUNTER — APPOINTMENT (OUTPATIENT)
Dept: LAB | Facility: CLINIC | Age: 79
End: 2024-10-10
Attending: INTERNAL MEDICINE
Payer: COMMERCIAL

## 2024-10-10 VITALS
DIASTOLIC BLOOD PRESSURE: 60 MMHG | HEART RATE: 58 BPM | TEMPERATURE: 97.4 F | RESPIRATION RATE: 18 BRPM | SYSTOLIC BLOOD PRESSURE: 148 MMHG | WEIGHT: 201 LBS | OXYGEN SATURATION: 99 % | BODY MASS INDEX: 36.06 KG/M2

## 2024-10-10 DIAGNOSIS — C10.9 SQUAMOUS CELL CARCINOMA OF OROPHARYNX (H): ICD-10-CM

## 2024-10-10 DIAGNOSIS — C78.00 MALIGNANT NEOPLASM METASTATIC TO LUNG, UNSPECIFIED LATERALITY (H): Primary | ICD-10-CM

## 2024-10-10 DIAGNOSIS — C09.9 TONSIL CANCER (H): ICD-10-CM

## 2024-10-10 LAB
ALBUMIN SERPL BCG-MCNC: 4 G/DL (ref 3.5–5.2)
ALP SERPL-CCNC: 109 U/L (ref 40–150)
ALT SERPL W P-5'-P-CCNC: 16 U/L (ref 0–70)
ANION GAP SERPL CALCULATED.3IONS-SCNC: 10 MMOL/L (ref 7–15)
AST SERPL W P-5'-P-CCNC: 24 U/L (ref 0–45)
BASOPHILS # BLD AUTO: 0 10E3/UL (ref 0–0.2)
BASOPHILS NFR BLD AUTO: 1 %
BILIRUB SERPL-MCNC: 0.4 MG/DL
BUN SERPL-MCNC: 20 MG/DL (ref 8–23)
CALCIUM SERPL-MCNC: 9.1 MG/DL (ref 8.8–10.4)
CHLORIDE SERPL-SCNC: 108 MMOL/L (ref 98–107)
CREAT SERPL-MCNC: 0.72 MG/DL (ref 0.67–1.17)
EGFRCR SERPLBLD CKD-EPI 2021: >90 ML/MIN/1.73M2
EOSINOPHIL # BLD AUTO: 0.1 10E3/UL (ref 0–0.7)
EOSINOPHIL NFR BLD AUTO: 2 %
ERYTHROCYTE [DISTWIDTH] IN BLOOD BY AUTOMATED COUNT: 17.1 % (ref 10–15)
GLUCOSE SERPL-MCNC: 100 MG/DL (ref 70–99)
HCO3 SERPL-SCNC: 27 MMOL/L (ref 22–29)
HCT VFR BLD AUTO: 29.5 % (ref 40–53)
HGB BLD-MCNC: 9.1 G/DL (ref 13.3–17.7)
IMM GRANULOCYTES # BLD: 0 10E3/UL
IMM GRANULOCYTES NFR BLD: 1 %
LYMPHOCYTES # BLD AUTO: 1.4 10E3/UL (ref 0.8–5.3)
LYMPHOCYTES NFR BLD AUTO: 32 %
MAGNESIUM SERPL-MCNC: 1.9 MG/DL (ref 1.7–2.3)
MCH RBC QN AUTO: 25.9 PG (ref 26.5–33)
MCHC RBC AUTO-ENTMCNC: 30.8 G/DL (ref 31.5–36.5)
MCV RBC AUTO: 84 FL (ref 78–100)
MONOCYTES # BLD AUTO: 0.5 10E3/UL (ref 0–1.3)
MONOCYTES NFR BLD AUTO: 12 %
NEUTROPHILS # BLD AUTO: 2.3 10E3/UL (ref 1.6–8.3)
NEUTROPHILS NFR BLD AUTO: 53 %
NRBC # BLD AUTO: 0 10E3/UL
NRBC BLD AUTO-RTO: 0 /100
PLATELET # BLD AUTO: 158 10E3/UL (ref 150–450)
POTASSIUM SERPL-SCNC: 3.8 MMOL/L (ref 3.4–5.3)
PROT SERPL-MCNC: 6.7 G/DL (ref 6.4–8.3)
RBC # BLD AUTO: 3.52 10E6/UL (ref 4.4–5.9)
SODIUM SERPL-SCNC: 145 MMOL/L (ref 135–145)
WBC # BLD AUTO: 4.3 10E3/UL (ref 4–11)

## 2024-10-10 PROCEDURE — 83735 ASSAY OF MAGNESIUM: CPT

## 2024-10-10 PROCEDURE — 85025 COMPLETE CBC W/AUTO DIFF WBC: CPT | Performed by: INTERNAL MEDICINE

## 2024-10-10 PROCEDURE — 82310 ASSAY OF CALCIUM: CPT | Performed by: INTERNAL MEDICINE

## 2024-10-10 PROCEDURE — 96375 TX/PRO/DX INJ NEW DRUG ADDON: CPT

## 2024-10-10 PROCEDURE — 96417 CHEMO IV INFUS EACH ADDL SEQ: CPT

## 2024-10-10 PROCEDURE — 36415 COLL VENOUS BLD VENIPUNCTURE: CPT | Performed by: INTERNAL MEDICINE

## 2024-10-10 PROCEDURE — 250N000011 HC RX IP 250 OP 636: Performed by: INTERNAL MEDICINE

## 2024-10-10 PROCEDURE — 258N000003 HC RX IP 258 OP 636: Performed by: INTERNAL MEDICINE

## 2024-10-10 PROCEDURE — 96413 CHEMO IV INFUSION 1 HR: CPT

## 2024-10-10 RX ORDER — ONDANSETRON 2 MG/ML
8 INJECTION INTRAMUSCULAR; INTRAVENOUS ONCE
Status: COMPLETED | OUTPATIENT
Start: 2024-10-10 | End: 2024-10-10

## 2024-10-10 RX ORDER — DEXAMETHASONE SODIUM PHOSPHATE 4 MG/ML
12 INJECTION, SOLUTION INTRA-ARTICULAR; INTRALESIONAL; INTRAMUSCULAR; INTRAVENOUS; SOFT TISSUE ONCE
Status: COMPLETED | OUTPATIENT
Start: 2024-10-10 | End: 2024-10-10

## 2024-10-10 RX ADMIN — DEXAMETHASONE SODIUM PHOSPHATE 12 MG: 4 INJECTION, SOLUTION INTRA-ARTICULAR; INTRALESIONAL; INTRAMUSCULAR; INTRAVENOUS; SOFT TISSUE at 09:35

## 2024-10-10 RX ADMIN — PACLITAXEL 94 MG: 6 INJECTION, SOLUTION INTRAVENOUS at 10:05

## 2024-10-10 RX ADMIN — ONDANSETRON 8 MG: 2 INJECTION INTRAMUSCULAR; INTRAVENOUS at 09:39

## 2024-10-10 RX ADMIN — CARBOPLATIN 200 MG: 10 INJECTION, SOLUTION INTRAVENOUS at 11:15

## 2024-10-10 RX ADMIN — FAMOTIDINE 20 MG: 10 INJECTION INTRAVENOUS at 09:44

## 2024-10-10 ASSESSMENT — PAIN SCALES - GENERAL: PAINLEVEL: EXTREME PAIN (9)

## 2024-10-10 NOTE — PATIENT INSTRUCTIONS
[facility] Triage and after hours / weekends / holidays:  [phone]    Please call the triage or after hours line if you experience a temperature greater than or equal to 100.4, shaking chills, have uncontrolled nausea, vomiting and/or diarrhea, dizziness, shortness of breath, chest pain, bleeding, unexplained bruising, or if you have any other new/concerning symptoms, questions or concerns.      If you are having any concerning symptoms or wish to speak to a provider before your next infusion visit, please call triage to notify them so we can adequately serve you.     If you need a refill on a narcotic prescription or other medication, please call before your infusion appointment.       Lab Results:  Recent Results (from the past 12 hour(s))   Comprehensive metabolic panel    Collection Time: 10/10/24  8:49 AM   Result Value Ref Range    Sodium 145 135 - 145 mmol/L    Potassium 3.8 3.4 - 5.3 mmol/L    Carbon Dioxide (CO2) 27 22 - 29 mmol/L    Anion Gap 10 7 - 15 mmol/L    Urea Nitrogen 20.0 8.0 - 23.0 mg/dL    Creatinine 0.72 0.67 - 1.17 mg/dL    GFR Estimate >90 >60 mL/min/1.73m2    Calcium 9.1 8.8 - 10.4 mg/dL    Chloride 108 (H) 98 - 107 mmol/L    Glucose 100 (H) 70 - 99 mg/dL    Alkaline Phosphatase 109 40 - 150 U/L    AST 24 0 - 45 U/L    ALT 16 0 - 70 U/L    Protein Total 6.7 6.4 - 8.3 g/dL    Albumin 4.0 3.5 - 5.2 g/dL    Bilirubin Total 0.4 <=1.2 mg/dL   Magnesium    Collection Time: 10/10/24  8:49 AM   Result Value Ref Range    Magnesium 1.9 1.7 - 2.3 mg/dL   CBC with platelets and differential    Collection Time: 10/10/24  8:49 AM   Result Value Ref Range    WBC Count 4.3 4.0 - 11.0 10e3/uL    RBC Count 3.52 (L) 4.40 - 5.90 10e6/uL    Hemoglobin 9.1 (L) 13.3 - 17.7 g/dL    Hematocrit 29.5 (L) 40.0 - 53.0 %    MCV 84 78 - 100 fL    MCH 25.9 (L) 26.5 - 33.0 pg    MCHC 30.8 (L) 31.5 - 36.5 g/dL    RDW 17.1 (H) 10.0 - 15.0 %    Platelet Count 158 150 - 450 10e3/uL    % Neutrophils 53 %    % Lymphocytes 32 %     % Monocytes 12 %    % Eosinophils 2 %    % Basophils 1 %    % Immature Granulocytes 1 %    NRBCs per 100 WBC 0 <1 /100    Absolute Neutrophils 2.3 1.6 - 8.3 10e3/uL    Absolute Lymphocytes 1.4 0.8 - 5.3 10e3/uL    Absolute Monocytes 0.5 0.0 - 1.3 10e3/uL    Absolute Eosinophils 0.1 0.0 - 0.7 10e3/uL    Absolute Basophils 0.0 0.0 - 0.2 10e3/uL    Absolute Immature Granulocytes 0.0 <=0.4 10e3/uL    Absolute NRBCs 0.0 10e3/uL

## 2024-10-10 NOTE — NURSING NOTE
Chief Complaint   Patient presents with    Blood Draw     Labs drawn via PIV by Vascular Access in lab, vitals taken.      Labs drawn from PIV placed by Vascular Access. Line flushed with saline. Vitals taken. Pt checked in for appointment(s).    Emily Rueda RN

## 2024-10-15 NOTE — PROGRESS NOTES
Virtual Visit Details    Type of service:  Video Visit   Video Start Time:  10:30 AM  Video End Time: 10:37 AM    Originating Location (pt. Location): Home    Distant Location (provider location):  Off-site  Platform used for Video Visit: [facility] CANCER [address] [address]  Phone: [phone]  Fax: [phone]    PATIENT NAME: Jonathan Bishop  MRN # [id number]   DATE OF VISIT: October 16, 2024  YOB: 1945     Otolaryngology: Dr. Karishma Eng  Radiation Oncology: Dr. Jenni Mills  PCP: Dr. Buck Nascimento    CANCER TYPE: SCC L tonsil, p16 +lety  STAGE: [id number] (IVC)  ECOG PS: 1    PD-L1: TPS 20%, CPS 25% on [id number] tonsil bx  NGS: N/A    SUMMARY  2/26/23 L tonsil mass bx in clinic (Dr. Eng).   2/20/23 PET/CT. 3.7 x 4.0 x 5.1 cm mass L palatine tonsil causing narrowing of the oropharyngeal lumen, L level 2 node, L retropharyngeal nodular density, extension of primary mass vs retropharyngeal node, 0.8 cm RLL nodule concerning for met, mild focal uptake R iliac bone and L1 without CT correlate suspicious for mets.   3/2/23 R VATS, wedge resection. Path: SCC, poorly differentiated, associated with necrosis, 1 cm, negative margins, p16 +lety  3/24/23 MRI L spine and pelvis. Diffusely heterogeneous marrow signal throughout without corresponding abnormal signal on sagittal STIR sequence and no abnormal enhancement. Nonspecific but could be benign process such as red marrow hyperplasia, cannot completely exclude metastatic disease or infiltrative pathologic marrow process. No lesions corresponding to FDG avid spots on PET/CT.   4/19~10/18/23 Pembrolizumab.  10/26/23 CT neck and CAP. Increased L palatine tonsil mass, 3.8 x 2.7 cm --> 4.7 x 3.5 cm. Increased bilateral cervical nodes up to 2.3 x 2.4 cm R level 2 node. New 3 mm RML nodule, no other mets.   11/14/23 PET. 4.6 x 3.3 cm L palatine tonsil mass (SUB 26.8), R level 2A and 2A/3  nodes. Questionable uptake distal R femoral medullary cavity, partially imaged, with questionable subtle corresponding soft tissue attenuation on CT. MRI could be obtained to better evaluate.  11/21~12/2/23 [facility] for weakness/fall. COVID/paxlovid, MSSA bacteremia (1 of 2 bottles on 1 day), TTE negative, treated with cefazolin but didn't complete 2 week course, episode of unresponsiveness 12/2. Dispo plan was TCU on [facility], but left AMA. CTA chest negative for PE, showed grossly unchanged cervical adenopathy. Brain MRI 11/22 for stroke code negative for infarct. Showed SVID, moderate atrophy, suboptimal contrast bolus to examine intracranial structures, 4.5 x 3 x 4.5 cm L nasopharyngeal mass, incompletely visualized. CTA negative.   12/19/23 Quad shot fraction #1. No-showed thereafter.  1/10/24~curr Carboplatin (AUC 2) + paclitaxel 60 mg/m2 weekly. Held 2/13 due to anorexia, fatigue.  3/13~3/17/24 [facility] for acute encephalopathy. Had [place] his car in the middle of the highway after receiving chemo earlier that day. Recommended not to drive   4/30/24 CT CAP and CT neck. Residual bilateral cervical adenopathy.   7/30/24 PET. CT not covered. Significant reduction L palatine tonsil mass - residual focal uptake in the L posterolateral oropharyngeal wall (SUV 9.1, previously 26.8). Previously seen multiple FDG avid L cervical nodes and few enlarged R nodes are completed resolved. Mild diffuse uptake along bilateral lower legs and ankles with associated skin thickening and stranding along the soft tissues; findings can be seen with cellulitis. No metastatic disease.     ASSESSMENT AND PLAN  SCC L tonsil, p16 +lety, CPS 25%/TPS 20%, oligometastatic lung met, +/- bone mets: Great IN on carboplatin paclitaxel and s/p 1 fraction of quad shot. See Dr. Mueller's note on 8/1 discussing possible role of consolidative radiation. Following a 6 week treatment break, he resumed carbo/taxol on 9/19/24.  Chemotherapy was  held on 10/3/2024 per his request due to abdominal pain and bowel irregularity.  Conner is minimally interested in radiation although willing to consider if this would be beneficial in preventing recurrence. We'll revisit this after his next scan in about 3 weeks.   Plan:  - Proceed with weekly carboplatin/paclitaxel 10/17/2024 pending labs  - RTC weekly for lab and infusion with MARK visit in 2 weeks    Neurocognitive changes, encephalopathy March 2024: Declined cognitive testing. No recent concerning events but will continue to readdress recommendation for neurocog testing in future.    Headache: New this past week.  No concern for CVA symptoms.  No recurrence after treating with pain medication.  Monitor    Anorexia, malnutrition, weight loss: Weight loss stabilized. Appetite overall improved following chemo break.      Hypomag: Mg stable    H/o CVA: Residual L weakness. Uses walker at baseline, motorized scooter for longer distances.    LE edema: Generally better when he has routine PCA care.  Worse recently with inability to elevate legs d/t broken left chair.  Encouraged routine use of compression stockings    Surrogate decision maker: He would want us to talk with Mariann if he could not speak for himself.    Microcytic anemia: Stable on review of labs.  Weekly CBCs    Peripheral neuropathy: MRI shows a lot of foramenal stenosis and spinal canal stenosis, so more likely related to that than DM2. Doesn't appear to be worsening on paclitaxel    Vandana Bertrand CNP  ---  The longitudinal plan of care for the diagnosis(es)/condition(s) as documented were addressed during this visit. Due to the added complexity in care, I will continue to support Conner in the subsequent management and with ongoing continuity of care.    SUBJECTIVE  Conner is seen today prior to weekly carbo/taxol.  - He reports feeling well after resuming chemotherapy last week.  Denies any residual abdominal pain or bowel dysfunction similar to his symptoms  2 weeks ago.  - Appetite has been excellent  - Denies nausea  - Energy level has been up the last few weeks  - His lift chair is currently broken so he is unable to elevate his legs as often.  His lower extremity edema has increased as result of this.  He does have compression stockings on today at home  - He does not recall the specific day but sometime within the last week he developed a migraine headache at nighttime.  He woke up with this.  Denies any associated visual changes or weakness or increased numbness or tingling in his arms or legs.  He eventually got up and took a pain pill which relieved his symptoms and allowed him to go back to sleep  - Overall neuropathy symptoms have been unchanged on carbo/Taxol  - He feels comfortable moving forward with chemotherapy tomorrow as long as his labs are stable    PAST MEDICAL HISTORY  SCC as above  Chronic LBP  TAVR 2020  H/o rheumatic carditis 1950  CHF. 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 Nocturia once nightly   ED  Cataracts  Umbilical hernia repair 2011  Knee arthroplasty B 2010  Lumbar spine fusion, laminectomy, sciatic pain R > L  Peripheral neuropathy - from pinched nerves?  Hearing loss    CURRENT OUTPATIENT MEDICATIONS  Reviewed    ALLERGIES  No Known Allergies     PHYSICAL EXAM  There were no vitals taken for this visit.    Video physical exam  General: Patient appears well in no acute distress.   Skin: No visualized rash or lesions on visualized skin  Eyes: EOMI, no erythema, sclera icterus or discharge noted  Resp: Appears to be breathing comfortably without accessory muscle usage, speaking in full sentences, no cough  MSK: Appears to have normal range of motion based on visualized movements  Neurologic: No apparent tremors, facial movements symmetric  Psych: affect pleasant, alert and oriented      LABORATORY AND IMAGING STUDIES   Most Recent 3 CBC's:  Recent Labs   Lab Test 10/10/24  0849  10/03/24  0842 09/26/24  1100   WBC 4.3 4.6 4.7   HGB 9.1* 9.2* 9.2*   MCV 84 85 84    171 142*   ANEUTAUTO 2.3 2.5 2.6    Most Recent 3 BMP's:  Recent Labs   Lab Test 10/10/24  0849 10/03/24  0842 09/26/24  1100    144 146*   POTASSIUM 3.8 3.6 4.0   CHLORIDE 108* 106 108*   CO2 27 27 30*   BUN 20.0 20.0 22.3   CR 0.72 0.76 0.88   ANIONGAP 10 11 8   WARREN 9.1 9.1 9.3   * 130* 89   PROTTOTAL 6.7 6.6 6.6   ALBUMIN 4.0 4.0 3.9    Most Recent 2 LFT's:  Recent Labs   Lab Test 10/10/24  0849 10/03/24  0842   AST 24 20   ALT 16 14   ALKPHOS 109 93   BILITOTAL 0.4 0.2    Most Recent TSH and T4:  Recent Labs   Lab Test 09/19/24  1115   TSH 1.44   T4 1.14     Phos/Mag:  Lab Results   Component Value Date    PHOS 3.0 03/17/2024    PHOS 2.4 (L) 12/09/2020    PHOS 3.9 12/08/2020    MAG 1.9 10/10/2024    MAG 1.7 09/26/2024    MAG 1.7 09/19/2024        I reviewed the above labs today.

## 2024-10-16 ENCOUNTER — VIRTUAL VISIT (OUTPATIENT)
Dept: ONCOLOGY | Facility: CLINIC | Age: 79
End: 2024-10-16
Attending: REGISTERED NURSE
Payer: COMMERCIAL

## 2024-10-16 VITALS — HEIGHT: 64 IN | BODY MASS INDEX: 33.12 KG/M2 | WEIGHT: 194 LBS

## 2024-10-16 DIAGNOSIS — R41.89 NEUROCOGNITIVE DEFICITS: ICD-10-CM

## 2024-10-16 DIAGNOSIS — C10.9 SQUAMOUS CELL CARCINOMA OF OROPHARYNX (H): Primary | ICD-10-CM

## 2024-10-16 DIAGNOSIS — R51.9 HEADACHE IN FRONT OF HEAD: ICD-10-CM

## 2024-10-16 DIAGNOSIS — C09.9 TONSIL CANCER (H): ICD-10-CM

## 2024-10-16 DIAGNOSIS — R60.0 PERIPHERAL EDEMA: ICD-10-CM

## 2024-10-16 DIAGNOSIS — R29.818 NEUROCOGNITIVE DEFICITS: ICD-10-CM

## 2024-10-16 DIAGNOSIS — C78.01 MALIGNANT NEOPLASM METASTATIC TO RIGHT LUNG (H): ICD-10-CM

## 2024-10-16 DIAGNOSIS — E83.42 HYPOMAGNESEMIA: ICD-10-CM

## 2024-10-16 PROCEDURE — 99214 OFFICE O/P EST MOD 30 MIN: CPT | Mod: 95 | Performed by: REGISTERED NURSE

## 2024-10-16 PROCEDURE — G2211 COMPLEX E/M VISIT ADD ON: HCPCS | Mod: 95 | Performed by: REGISTERED NURSE

## 2024-10-16 ASSESSMENT — PAIN SCALES - GENERAL: PAINLEVEL: EXTREME PAIN (9)

## 2024-10-16 NOTE — NURSING NOTE
Current patient location: [address] [place] [address] [address]   [facility] [address]    Is the patient currently in the state of [place]? YES    Visit mode:VIDEO    If the visit is dropped, the patient can be reconnected by: VIDEO VISIT: Text to cell phone:   Telephone Information:   Mobile [phone]       Will anyone else be joining the visit? NO  (If patient encounters technical issues they should call [phone]    Are changes needed to the allergy or medication list? Pt stated no changes to allergies and Pt stated no med changes    Are refills needed on medications prescribed by this physician? NO    Rooming Documentation:  Questionnaire(s) completed    Reason for visit: FREDY SALMERONF

## 2024-10-16 NOTE — Clinical Note
"10/16/2024      Jonathan Bishop  [address]  [facility] [address]      Dear Colleague,    Thank you for referring your patient, Jonathan Bishop, to the [facility]. Please see a copy of my visit note below.    Virtual Visit Details    Type of service:  Video Visit   Video Start Time: {video visit start/end time for provider to select:700562}  Video End Time:{video visit start/end time for provider to select:809333}    Originating Location (pt. Location): {video visit patient location:248908::\"Home\"}  {PROVIDER LOCATION On-site should be selected for visits conducted from your clinic location or adjoining [facility] hospital, academic office, or other nearby [facility] building. Off-site should be selected for all other provider locations, including home:414307}  Distant Location (provider location):  {virtual location provider:842560}  Platform used for Video Visit: {Virtual Visit Platforms:850740::\"[employer]\"}            [facility]  [address] [facility] [address]  Phone: [phone]  Fax: [phone]    PATIENT NAME: Jonathan Bishop  MRN # [id number]   DATE OF VISIT: October 16, 2024  YOB: 1945     Otolaryngology: Dr. Karishma Eng  Radiation Oncology: Dr. Jenni Mills  PCP: Dr. Buck Nascimento    CANCER TYPE: SCC L tonsil, p16 +lety  STAGE: [id number] (IVC)  ECOG PS: 1    PD-L1: TPS 20%, CPS 25% on [id number] tonsil bx  NGS: N/A    SUMMARY  2/26/23 L tonsil mass bx in clinic (Dr. Eng).   2/20/23 PET/CT. 3.7 x 4.0 x 5.1 cm mass L palatine tonsil causing narrowing of the oropharyngeal lumen, L level 2 node, L retropharyngeal nodular density, extension of primary mass vs retropharyngeal node, 0.8 cm RLL nodule concerning for met, mild focal uptake R iliac bone and L1 without CT correlate suspicious for mets.   3/2/23 R VATS, wedge resection. Path: SCC, poorly differentiated, associated with necrosis, 1 cm, negative margins, " p16 +lety  3/24/23 MRI L spine and pelvis. Diffusely heterogeneous marrow signal throughout without corresponding abnormal signal on sagittal STIR sequence and no abnormal enhancement. Nonspecific but could be benign process such as red marrow hyperplasia, cannot completely exclude metastatic disease or infiltrative pathologic marrow process. No lesions corresponding to FDG avid spots on PET/CT.   4/19~10/18/23 Pembrolizumab.  10/26/23 CT neck and CAP. Increased L palatine tonsil mass, 3.8 x 2.7 cm --> 4.7 x 3.5 cm. Increased bilateral cervical nodes up to 2.3 x 2.4 cm R level 2 node. New 3 mm RML nodule, no other mets.   11/14/23 PET. 4.6 x 3.3 cm L palatine tonsil mass (SUB 26.8), R level 2A and 2A/3 nodes. Questionable uptake distal R femoral medullary cavity, partially imaged, with questionable subtle corresponding soft tissue attenuation on CT. MRI could be obtained to better evaluate.  11/21~12/2/23 [facility] for weakness/fall. COVID/paxlovid, MSSA bacteremia (1 of 2 bottles on 1 day), TTE negative, treated with cefazolin but didn't complete 2 week course, episode of unresponsiveness 12/2. Dispo plan was TCU on [facility], but left AMA. CTA chest negative for PE, showed grossly unchanged cervical adenopathy. Brain MRI 11/22 for stroke code negative for infarct. Showed SVID, moderate atrophy, suboptimal contrast bolus to examine intracranial structures, 4.5 x 3 x 4.5 cm L nasopharyngeal mass, incompletely visualized. CTA negative.   12/19/23 Quad shot fraction #1. No-showed thereafter.  1/10/24~curr Carboplatin (AUC 2) + paclitaxel 60 mg/m2 weekly. Held 2/13 due to anorexia, fatigue.  3/13~3/17/24 [facility] for acute encephalopathy. Had stopped his car in the middle of the highway after receiving chemo earlier that day. Recommended not to drive   4/30/24 CT CAP and CT neck. Residual bilateral cervical adenopathy.   7/30/24 PET. CT not covered. Significant reduction L palatine tonsil mass - residual focal  uptake in the L posterolateral oropharyngeal wall (SUV 9.1, previously 26.8). Previously seen multiple FDG avid L cervical nodes and few enlarged R nodes are completed resolved. Mild diffuse uptake along bilateral lower legs and ankles with associated skin thickening and stranding along the soft tissues; findings can be seen with cellulitis. No metastatic disease.     ASSESSMENT AND PLAN  SCC L tonsil, p16 +lety, CPS 25%/TPS 20%, oligometastatic lung met, +/- bone mets: Great MS on carboplatin paclitaxel and s/p 1 fraction of quad shot. See Dr. Mueller's note on 8/1 discussing possible role of consolidative radiation. Following a 6 week treatment break, he resumed carbo/taxol on 9/19/24. Conner is minimally interested in radiation although willing to consider if this would be beneficial in preventing recurrence. We'll revisit this after his next scan in about 4 weeks. He is still experiencing lingering abdominal discomfort and nausea following his episode of abdominal pain and painful BM earlier this week. It's possible that the resumption of chemo is contributing to his symptoms although I suspect it's related to his change in diet. After discussing, he would like to hold chemo today and resume next week. Will request repeat CT imaging in 4 week.     Neurocognitive changes, encephalopathy March 2024: Declined cognitive testing. No recent concerning events but will continue to readdress recommendation for neurocog testing in future.    Anorexia, malnutrition, weight loss: Weight loss stabilized. Appetite improved following chemo break. GI disturbance this week likely related to eating red meat/new foods.     Hypomag: Mg stable    H/o CVA: Residual L weakness. Uses walker at baseline, motorized scooter for longer distances.    LE edema: Generally better with PCA care. Encouraged regular use of compression stockings    Surrogate decision maker: He would want us to talk with Mariann if he could not speak for  himself.    Microcytic anemia: Stable    Peripheral neuropathy: MRI shows a lot of foramenal stenosis and spinal canal stenosis, so more likely related to that than DM2. Doesn't appear to be worsening on paclitaxel    Vandana Bertrand CNP  ---  *** minutes spent on the date of the encounter doing {2021 E&M time in:039479}.    The longitudinal plan of care for the diagnosis(es)/condition(s) as documented were addressed during this visit. Due to the added complexity in care, I will continue to support Conner in the subsequent management and with ongoing continuity of care.    SUBJECTIVE  Conner is seen today prior to weekly carbo/taxol.  ***    PAST MEDICAL HISTORY  SCC as above  Chronic LBP  TAVR 2020  H/o rheumatic carditis 1950  CHF. 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 Nocturia once nightly   ED  Cataracts  Umbilical hernia repair 2011  Knee arthroplasty B 2010  Lumbar spine fusion, laminectomy, sciatic pain R > L  Peripheral neuropathy - from pinched nerves?  Hearing loss    CURRENT OUTPATIENT MEDICATIONS  Reviewed    ALLERGIES  No Known Allergies     PHYSICAL EXAM  There were no vitals taken for this visit.    GEN: NAD  HEENT: EOMI, no icterus, injection or pallor  RESP: cta bilaterally  CV: rrr, no m/g/r  EXT: chronic 1-2+ edema  NEURO: alert    LABORATORY AND IMAGING STUDIES   Most Recent 3 CBC's:  Recent Labs   Lab Test 10/10/24  0849 10/03/24  0842 09/26/24  1100   WBC 4.3 4.6 4.7   HGB 9.1* 9.2* 9.2*   MCV 84 85 84    171 142*   ANEUTAUTO 2.3 2.5 2.6    Most Recent 3 BMP's:  Recent Labs   Lab Test 10/10/24  0849 10/03/24  0842 09/26/24  1100    144 146*   POTASSIUM 3.8 3.6 4.0   CHLORIDE 108* 106 108*   CO2 27 27 30*   BUN 20.0 20.0 22.3   CR 0.72 0.76 0.88   ANIONGAP 10 11 8   WARREN 9.1 9.1 9.3   * 130* 89   PROTTOTAL 6.7 6.6 6.6   ALBUMIN 4.0 4.0 3.9    Most Recent 2 LFT's:  Recent Labs   Lab Test 10/10/24  0849 10/03/24  0842   AST 24 20   ALT 16  14   ALKPHOS 109 93   BILITOTAL 0.4 0.2    Most Recent TSH and T4:  Recent Labs   Lab Test 09/19/24  1115   TSH 1.44   T4 1.14     Phos/Mag:  Lab Results   Component Value Date    PHOS 3.0 03/17/2024    PHOS 2.4 (L) 12/09/2020    PHOS 3.9 12/08/2020    MAG 1.9 10/10/2024    MAG 1.7 09/26/2024    MAG 1.7 09/19/2024        I reviewed the above labs today.         Virtual Visit Details    Type of service:  Video Visit   Video Start Time:  10:30 AM  Video End Time: 10:37 AM    Originating Location (pt. Location): Home  {PROVIDER LOCATION On-site should be selected for visits conducted from your clinic location or adjoining [facility] hospital, academic office, or other nearby [facility] building. Off-site should be selected for all other provider locations, including home:962130}  Distant Location (provider location):  Off-site  Platform used for Video Visit: [facility] CANCER [address] [address]  Phone: [phone]  Fax: [phone]    PATIENT NAME: Jonathan Bishop  MRN # [id number]   DATE OF VISIT: October 16, 2024  YOB: 1945     Otolaryngology: Dr. Karishma Eng  Radiation Oncology: Dr. Jenni Mills  PCP: Dr. Buck Nascimento    CANCER TYPE: SCC L tonsil, p16 +lety  STAGE: [id number] (IVC)  ECOG PS: 1    PD-L1: TPS 20%, CPS 25% on [id number] tonsil bx  NGS: N/A    SUMMARY  2/26/23 L tonsil mass bx in clinic (Dr. Eng).   2/20/23 PET/CT. 3.7 x 4.0 x 5.1 cm mass L palatine tonsil causing narrowing of the oropharyngeal lumen, L level 2 node, L retropharyngeal nodular density, extension of primary mass vs retropharyngeal node, 0.8 cm RLL nodule concerning for met, mild focal uptake R iliac bone and L1 without CT correlate suspicious for mets.   3/2/23 R VATS, wedge resection. Path: SCC, poorly differentiated, associated with necrosis, 1 cm, negative margins, p16 +lety  3/24/23 MRI L spine and pelvis. Diffusely heterogeneous marrow signal throughout  without corresponding abnormal signal on sagittal STIR sequence and no abnormal enhancement. Nonspecific but could be benign process such as red marrow hyperplasia, cannot completely exclude metastatic disease or infiltrative pathologic marrow process. No lesions corresponding to FDG avid spots on PET/CT.   4/19~10/18/23 Pembrolizumab.  10/26/23 CT neck and CAP. Increased L palatine tonsil mass, 3.8 x 2.7 cm --> 4.7 x 3.5 cm. Increased bilateral cervical nodes up to 2.3 x 2.4 cm R level 2 node. New 3 mm RML nodule, no other mets.   11/14/23 PET. 4.6 x 3.3 cm L palatine tonsil mass (SUB 26.8), R level 2A and 2A/3 nodes. Questionable uptake distal R femoral medullary cavity, partially imaged, with questionable subtle corresponding soft tissue attenuation on CT. MRI could be obtained to better evaluate.  11/21~12/2/23 [facility] for weakness/fall. COVID/paxlovid, MSSA bacteremia (1 of 2 bottles on 1 day), TTE negative, treated with cefazolin but didn't complete 2 week course, episode of unresponsiveness 12/2. Dispo plan was TCU on [facility], but left AMA. CTA chest negative for PE, showed grossly unchanged cervical adenopathy. Brain MRI 11/22 for stroke code negative for infarct. Showed SVID, moderate atrophy, suboptimal contrast bolus to examine intracranial structures, 4.5 x 3 x 4.5 cm L nasopharyngeal mass, incompletely visualized. CTA negative.   12/19/23 Quad shot fraction #1. No-showed thereafter.  1/10/24~curr Carboplatin (AUC 2) + paclitaxel 60 mg/m2 weekly. Held 2/13 due to anorexia, fatigue.  3/13~3/17/24 [facility] for acute encephalopathy. Had stopped his car in the middle of the highway after receiving chemo earlier that day. Recommended not to drive   4/30/24 CT CAP and CT neck. Residual bilateral cervical adenopathy.   7/30/24 PET. CT not covered. Significant reduction L palatine tonsil mass - residual focal uptake in the L posterolateral oropharyngeal wall (SUV 9.1, previously 26.8). Previously seen  multiple FDG avid L cervical nodes and few enlarged R nodes are completed resolved. Mild diffuse uptake along bilateral lower legs and ankles with associated skin thickening and stranding along the soft tissues; findings can be seen with cellulitis. No metastatic disease.     ASSESSMENT AND PLAN  SCC L tonsil, p16 +lety, CPS 25%/TPS 20%, oligometastatic lung met, +/- bone mets: Great NM on carboplatin paclitaxel and s/p 1 fraction of quad shot. See Dr. Mueller's note on 8/1 discussing possible role of consolidative radiation. Following a 6 week treatment break, he resumed carbo/taxol on 9/19/24.  Chemotherapy was held on 10/3/2024 per his request due to abdominal pain and bowel irregularity.  Conner is minimally interested in radiation although willing to consider if this would be beneficial in preventing recurrence. We'll revisit this after his next scan in about 3 weeks.   Plan:  - Proceed with weekly carboplatin/paclitaxel 10/17/2024 pending labs  - RTC weekly for lab and infusion with MARK visit in 2 weeks      Neurocognitive changes, encephalopathy March 2024: Declined cognitive testing. No recent concerning events but will continue to readdress recommendation for neurocog testing in future.    Anorexia, malnutrition, weight loss: Weight loss stabilized. Appetite improved following chemo break. GI disturbance this week likely related to eating red meat/new foods.     Hypomag: Mg stable    H/o CVA: Residual L weakness. Uses walker at baseline, motorized scooter for longer distances.    LE edema: Generally better with PCA care. Encouraged regular use of compression stockings    Surrogate decision maker: He would want us to talk with Mariann if he could not speak for himself.    Microcytic anemia: Stable    Peripheral neuropathy: MRI shows a lot of foramenal stenosis and spinal canal stenosis, so more likely related to that than DM2. Doesn't appear to be worsening on paclitaxel    Vandana Bertrand, YOANNA  ---  *** minutes  spent on the date of the encounter doing {2021 E&M time in:954362}.    The longitudinal plan of care for the diagnosis(es)/condition(s) as documented were addressed during this visit. Due to the added complexity in care, I will continue to support Conner in the subsequent management and with ongoing continuity of care.    SUBJECTIVE  Conner is seen today prior to weekly carbo/taxol.  - He reports feeling well after resuming chemotherapy last week.  Denies any residual abdominal pain or bowel dysfunction similar to his symptoms 2 weeks ago.  - Appetite has been excellent  - Denies nausea  - Energy level has been up the last few weeks  - His lift chair is currently broken so he is unable to elevate his legs as often.  His lower extremity edema has increased as result of this.  He does have compression stockings on today at home  - He does not recall the specific day but sometime within the last week he developed a migraine headache at nighttime.  He woke up with this.  Denies any associated visual changes or weakness or increased numbness or tingling in his arms or legs.  He eventually got up and took a pain pill which relieved his symptoms and allowed him to go back to sleep  - Overall neuropathy symptoms have been unchanged on carbo/Taxol  - He feels comfortable moving forward with chemotherapy tomorrow as long as his labs are stable    PAST MEDICAL HISTORY  SCC as above  Chronic LBP  TAVR 2020  H/o rheumatic carditis 1950  CHF. 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 Nocturia once nightly   ED  Cataracts  Umbilical hernia repair 2011  Knee arthroplasty B 2010  Lumbar spine fusion, laminectomy, sciatic pain R > L  Peripheral neuropathy - from pinched nerves?  Hearing loss    CURRENT OUTPATIENT MEDICATIONS  Reviewed    ALLERGIES  No Known Allergies     PHYSICAL EXAM  There were no vitals taken for this visit.    Video physical exam  General: Patient appears well in no acute  distress.   Skin: No visualized rash or lesions on visualized skin  Eyes: EOMI, no erythema, sclera icterus or discharge noted  Resp: Appears to be breathing comfortably without accessory muscle usage, speaking in full sentences, no cough  MSK: Appears to have normal range of motion based on visualized movements  Neurologic: No apparent tremors, facial movements symmetric  Psych: affect pleasant, alert and oriented      LABORATORY AND IMAGING STUDIES   Most Recent 3 CBC's:  Recent Labs   Lab Test 10/10/24  0849 10/03/24  0842 09/26/24  1100   WBC 4.3 4.6 4.7   HGB 9.1* 9.2* 9.2*   MCV 84 85 84    171 142*   ANEUTAUTO 2.3 2.5 2.6    Most Recent 3 BMP's:  Recent Labs   Lab Test 10/10/24  0849 10/03/24  0842 09/26/24  1100    144 146*   POTASSIUM 3.8 3.6 4.0   CHLORIDE 108* 106 108*   CO2 27 27 30*   BUN 20.0 20.0 22.3   CR 0.72 0.76 0.88   ANIONGAP 10 11 8   WARREN 9.1 9.1 9.3   * 130* 89   PROTTOTAL 6.7 6.6 6.6   ALBUMIN 4.0 4.0 3.9    Most Recent 2 LFT's:  Recent Labs   Lab Test 10/10/24  0849 10/03/24  0842   AST 24 20   ALT 16 14   ALKPHOS 109 93   BILITOTAL 0.4 0.2    Most Recent TSH and T4:  Recent Labs   Lab Test 09/19/24  1115   TSH 1.44   T4 1.14     Phos/Mag:  Lab Results   Component Value Date    PHOS 3.0 03/17/2024    PHOS 2.4 (L) 12/09/2020    PHOS 3.9 12/08/2020    MAG 1.9 10/10/2024    MAG 1.7 09/26/2024    MAG 1.7 09/19/2024        I reviewed the above labs today.           Again, thank you for allowing me to participate in the care of your patient.        Sincerely,        Vandana Bertrand, CNP

## 2024-10-17 ENCOUNTER — NURSE TRIAGE (OUTPATIENT)
Dept: ONCOLOGY | Facility: CLINIC | Age: 79
End: 2024-10-17
Payer: COMMERCIAL

## 2024-10-17 NOTE — TELEPHONE ENCOUNTER
Reached out to Conner. Discussed recommendation of trying to get today's canceled infusion rescheduled asap versus resuming treatment next Thursday at his already scheduled infusion. He said he would strongly prefer to just proceed with treatment next Thursday and does not want to reschedule. His rationale for canceling today's infusion was that he was feeling anxious last night. Provided words of support and encouraged him to reach out with any questions or concerns.    Olivia Gates, RN, BSN  RN Care Coordinator  [facility]

## 2024-10-17 NOTE — TELEPHONE ENCOUNTER
"Received patient schedule request with pt message \"What should I do when I am not feeling well?\"    Call to pt to discuss. Pt states he feels better today. Provided pt with Triage phone number to call when he has sx concerns. Pt verbalized understanding.   "

## 2024-10-18 ENCOUNTER — NURSE TRIAGE (OUTPATIENT)
Dept: ONCOLOGY | Facility: CLINIC | Age: 79
End: 2024-10-18
Payer: COMMERCIAL

## 2024-10-18 NOTE — TELEPHONE ENCOUNTER
"Oncology Nurse Triage    Situation:   Jonathan with coworker Santhosh (verbal permission given) reporting the following symptoms:  -Pt shakey, pt is still able to speak normally.     Background:   Treating Provider:   Dr. Mueller    Date of last office visit: 10/16/2024 Vandana Bertrand YOANNA    Recent Treatments:10/10/2024 D8C7 of CARBOplatin and PACLitaxel    Assessment:     Onset: Has not been feeling well for past two days.     Today pt reports \"feeling shaking real bad, hands, arms, whole body shakey and difficult to get food and fluids in, keeps spilling before can get food/drink to mouth. Shaking every 5 seconds, then stops then resumes. Happens resting or activity.     Pt admits not drinking enough water/clear liquids. Drinking about 36oz of water.     In last 24hours ate very little. Denies trouble swallowing, nausea/vomiting.     Both arms raise up evenly, denies facial drooping, pt's speech sounds different to friend Santhosh who is present.     Denies history of cardiac/stroke in family or with pt.     Denies history of diabetes or diabetes in the family.     Denies fever, chest pain, SOB, dizziness, fainting, headaches, bowel/bladder issues.     Recommendations:   1100 Per Vandana Bertrand CNP He looked fine when I saw him then canceled his appointment yesterday. When Olivia Salmon called him yesterday he felt fine and now not feeling good again. With speech changes and tremors/shaking I would honestly recommend ED for him. he's been seen for pretty significant neurocognitive changes in the past and I do worry about stroke or TIA potential.    1115 this writer educated on recommendations to go to Emergency Department today, instructed 3x emphasizing concern for stroke or TIA given change in condition in past two days since last provider visit with Vandana. Pt is aware of recommendations,declined 3x to go today, wants to \"see how another day goes and will try and go tomorrow\". This writer informed pt will document our " recommendation to go to ED today and pt choosing to wait and see again provider/care team recommendations.

## 2024-10-21 DIAGNOSIS — M54.50 ACUTE MIDLINE LOW BACK PAIN WITHOUT SCIATICA: ICD-10-CM

## 2024-10-21 NOTE — TELEPHONE ENCOUNTER
oxyCODONE-acetaminophen (PERCOCET) 5-325 MG tablet   Disp-150 tablet, R-0   Start: 09/23/2024 9/16/2024  [facility] Internal Medicine [place]    Buck Nascimento MD  Internal Medicine    Routed because:  request per pt call. Controlled

## 2024-10-22 NOTE — PROGRESS NOTES
Virtual Visit Details    Type of service:  Video Visit   Video Start Time: 12:01 PM  Video End Time:12:08 PM    Originating Location (pt. Location): Home    Distant Location (provider location):  Off-site  Platform used for Video Visit: [facility] CAN[facility] [address] [address]  Phone: [phone]  Fax: [phone]    PATIENT NAME: Jonathan Bishop  MRN # [id number]   DATE OF VISIT: October 22, 2024  YOB: 1945     Otolaryngology: Dr. Karishma Eng  Radiation Oncology: Dr. Jenni Mills  PCP: Dr. Buck Nascimento    CANCER TYPE: SCC L tonsil, p16 +lety  STAGE: [id number] (IVC)  ECOG PS: 1    PD-L1: TPS 20%, CPS 25% on [id number] tonsil bx  NGS: N/A    SUMMARY  2/26/23 L tonsil mass bx in clinic (Dr. Eng).   2/20/23 PET/CT. 3.7 x 4.0 x 5.1 cm mass L palatine tonsil causing narrowing of the oropharyngeal lumen, L level 2 node, L retropharyngeal nodular density, extension of primary mass vs retropharyngeal node, 0.8 cm RLL nodule concerning for met, mild focal uptake R iliac bone and L1 without CT correlate suspicious for mets.   3/2/23 R VATS, wedge resection. Path: SCC, poorly differentiated, associated with necrosis, 1 cm, negative margins, p16 +lety  3/24/23 MRI L spine and pelvis. Diffusely heterogeneous marrow signal throughout without corresponding abnormal signal on sagittal STIR sequence and no abnormal enhancement. Nonspecific but could be benign process such as red marrow hyperplasia, cannot completely exclude metastatic disease or infiltrative pathologic marrow process. No lesions corresponding to FDG avid spots on PET/CT.   4/19~10/18/23 Pembrolizumab.  10/26/23 CT neck and CAP. Increased L palatine tonsil mass, 3.8 x 2.7 cm --> 4.7 x 3.5 cm. Increased bilateral cervical nodes up to 2.3 x 2.4 cm R level 2 node. New 3 mm RML nodule, no other mets.   11/14/23 PET. 4.6 x 3.3 cm L palatine tonsil mass (SUB 26.8), R level 2A and 2A/3  nodes. Questionable uptake distal R femoral medullary cavity, partially imaged, with questionable subtle corresponding soft tissue attenuation on CT. MRI could be obtained to better evaluate.  11/21~12/2/23 [facility] for weakness/fall. COVID/paxlovid, MSSA bacteremia (1 of 2 bottles on 1 day), TTE negative, treated with cefazolin but didn't complete 2 week course, episode of unresponsiveness 12/2. Dispo plan was TCU on [facility], but left AMA. CTA chest negative for PE, showed grossly unchanged cervical adenopathy. Brain MRI 11/22 for stroke code negative for infarct. Showed SVID, moderate atrophy, suboptimal contrast bolus to examine intracranial structures, 4.5 x 3 x 4.5 cm L nasopharyngeal mass, incompletely visualized. CTA negative.   12/19/23 Quad shot fraction #1. No-showed thereafter.  1/10/24~curr Carboplatin (AUC 2) + paclitaxel 60 mg/m2 weekly. Held 2/13 due to anorexia, fatigue.  3/13~3/17/24 [facility] for acute encephalopathy. Had stopped his car in the middle of the highway after receiving chemo earlier that day. Recommended not to drive   4/30/24 CT CAP and CT neck. Residual bilateral cervical adenopathy.   7/30/24 PET. CT not covered. Significant reduction L palatine tonsil mass - residual focal uptake in the L posterolateral oropharyngeal wall (SUV 9.1, previously 26.8). Previously seen multiple FDG avid L cervical nodes and few enlarged R nodes are completed resolved. Mild diffuse uptake along bilateral lower legs and ankles with associated skin thickening and stranding along the soft tissues; findings can be seen with cellulitis. No metastatic disease.     ASSESSMENT AND PLAN  SCC L tonsil, p16 +lety, CPS 25%/TPS 20%, oligometastatic lung met, +/- bone mets: Great VT on carboplatin paclitaxel and s/p 1 fraction of quad shot. See Dr. Mueller's note on 8/1 discussing possible role of consolidative radiation. Following a 6 week treatment break, he resumed carbo/taxol on 9/19/24.  Chemotherapy was  held on 10/3/2024 per his request due to abdominal pain and bowel irregularity.  He also elected to cancel 10/17/24 infusion due to tremoring and weakness.  Today he feels that his symptoms are back to baseline.  He would like to proceed with infusion tomorrow.  Conner is minimally interested in radiation although willing to consider if this would be beneficial in preventing recurrence. We'll revisit this after his next scan in about 2 weeks.   Plan:  - Proceed with weekly carboplatin/paclitaxel 10/24/2024 pending labs  - RTC weekly for lab and infusion with MARK visit in 1 week    Neurocognitive changes, encephalopathy March 2024: Declined cognitive testing. No recent concerning events but will continue to readdress recommendation for neurocog testing in future.  Recent onset of diffuse tremors, weakness and questionable speech changes.  I did verbalize my concern today that his symptoms may have been consistent with TIA or stroke.  Strongly encouraged he seek care in the ED with any recurrent symptoms.    Headache: Resolved, no recurrence with tremoring or weakness as above.    Anorexia, malnutrition, weight loss: Weight loss stabilized. Appetite overall improved following chemo breaks.      Hypomag: Mg stable    H/o CVA: Residual L weakness. Uses walker at baseline, motorized scooter for longer distances.    LE edema: Generally better when he has routine PCA care.  Worse recently with inability to elevate legs d/t broken left chair.  Encouraged routine use of compression stockings    Surrogate decision maker: He would want us to talk with Mariann if he could not speak for himself.    Microcytic anemia: Stable on review of labs.  Weekly CBCs    Peripheral neuropathy: MRI shows a lot of foramenal stenosis and spinal canal stenosis, so more likely related to that than DM2. Doesn't appear to be worsening on paclitaxel    Vandana Bertrand CNP  ---    The longitudinal plan of care for the diagnosis(es)/condition(s) as  documented were addressed during this visit. Due to the added complexity in care, I will continue to support Conner in the subsequent management and with ongoing continuity of care.    SUBJECTIVE  Conner is seen today prior to weekly carbo/taxol. He canceled his appointment for infusion last week following our visit.  - The day following our last appointment Conner reports a sudden onset of tremoring and weakness.  This lasted for a total of about 2 days although progressively improved over the course of those 2 days.  - Reports some difficulty with speech during the time he experienced tremors.  He was aware of what he was saying but could not get the words out correctly.  His friends and PCA also noticed a difference  - He was never evaluated in the ER by his PCP  - Denies any falls during this time.  He denies any unilateral weakness but just generally felt weak and had to hold onto furniture with ambulation  - Denies any nausea, vomiting or bowel changes with the weakness  - Today he feels back to normal.  It was his [birth year] yesterday!  A surprise birthday party was thrown for him which he very much enjoyed    PAST MEDICAL HISTORY  SCC as above  Chronic LBP  TAVR 2020  H/o rheumatic carditis 1950  CHF. 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 Nocturia once nightly   ED  Cataracts  Umbilical hernia repair 2011  Knee arthroplasty B 2010  Lumbar spine fusion, laminectomy, sciatic pain R > L  Peripheral neuropathy - from pinched nerves?  Hearing loss    CURRENT OUTPATIENT MEDICATIONS  Reviewed    ALLERGIES  No Known Allergies     PHYSICAL EXAM  There were no vitals taken for this visit.    Video physical exam  General: Patient appears well in no acute distress.   Skin: No visualized rash or lesions on visualized skin  Eyes: EOMI, no erythema, sclera icterus or discharge noted  Resp: Appears to be breathing comfortably without accessory muscle usage, speaking in full sentences, no  cough  MSK: Appears to have normal range of motion based on visualized movements  Neurologic: No apparent tremors, facial movements symmetric  Psych: affect pleasant, alert and oriented      LABORATORY AND IMAGING STUDIES   Most Recent 3 CBC's:  Recent Labs   Lab Test 10/10/24  0849 10/03/24  0842 09/26/24  1100   WBC 4.3 4.6 4.7   HGB 9.1* 9.2* 9.2*   MCV 84 85 84    171 142*   ANEUTAUTO 2.3 2.5 2.6    Most Recent 3 BMP's:  Recent Labs   Lab Test 10/10/24  0849 10/03/24  0842 09/26/24  1100    144 146*   POTASSIUM 3.8 3.6 4.0   CHLORIDE 108* 106 108*   CO2 27 27 30*   BUN 20.0 20.0 22.3   CR 0.72 0.76 0.88   ANIONGAP 10 11 8   WARREN 9.1 9.1 9.3   * 130* 89   PROTTOTAL 6.7 6.6 6.6   ALBUMIN 4.0 4.0 3.9    Most Recent 2 LFT's:  Recent Labs   Lab Test 10/10/24  0849 10/03/24  0842   AST 24 20   ALT 16 14   ALKPHOS 109 93   BILITOTAL 0.4 0.2    Most Recent TSH and T4:  Recent Labs   Lab Test 09/19/24  1115   TSH 1.44   T4 1.14     Phos/Mag:  Lab Results   Component Value Date    PHOS 3.0 03/17/2024    PHOS 2.4 (L) 12/09/2020    PHOS 3.9 12/08/2020    MAG 1.9 10/10/2024    MAG 1.7 09/26/2024    MAG 1.7 09/19/2024        I reviewed the above labs today.

## 2024-10-22 NOTE — TELEPHONE ENCOUNTER
Patient calling about his medication. Please call him when the Rx has been sent over to the pharmacy. Patient was supposed to get it yesterday. Thank you.

## 2024-10-23 ENCOUNTER — VIRTUAL VISIT (OUTPATIENT)
Dept: ONCOLOGY | Facility: CLINIC | Age: 79
End: 2024-10-23
Attending: REGISTERED NURSE
Payer: COMMERCIAL

## 2024-10-23 VITALS — BODY MASS INDEX: 32.78 KG/M2 | HEIGHT: 64 IN | WEIGHT: 192 LBS

## 2024-10-23 DIAGNOSIS — E83.42 HYPOMAGNESEMIA: ICD-10-CM

## 2024-10-23 DIAGNOSIS — M62.81 GENERALIZED MUSCLE WEAKNESS: ICD-10-CM

## 2024-10-23 DIAGNOSIS — R60.0 PERIPHERAL EDEMA: ICD-10-CM

## 2024-10-23 DIAGNOSIS — R25.1 TREMOR: ICD-10-CM

## 2024-10-23 DIAGNOSIS — C78.00 MALIGNANT NEOPLASM METASTATIC TO LUNG, UNSPECIFIED LATERALITY (H): Primary | ICD-10-CM

## 2024-10-23 PROCEDURE — G2211 COMPLEX E/M VISIT ADD ON: HCPCS | Mod: 95 | Performed by: REGISTERED NURSE

## 2024-10-23 PROCEDURE — 99214 OFFICE O/P EST MOD 30 MIN: CPT | Mod: 95 | Performed by: REGISTERED NURSE

## 2024-10-23 RX ORDER — OXYCODONE AND ACETAMINOPHEN 5; 325 MG/1; MG/1
1-2 TABLET ORAL EVERY 6 HOURS PRN
Qty: 150 TABLET | Refills: 0 | Status: SHIPPED | OUTPATIENT
Start: 2024-10-23

## 2024-10-23 ASSESSMENT — PAIN SCALES - GENERAL: PAINLEVEL_OUTOF10: EXTREME PAIN (8)

## 2024-10-23 NOTE — NURSING NOTE
Current patient location: [address] [place] [address] [address]   [facility] [id number]    Is the patient currently in the state of [place]? YES    Visit mode:VIDEO    If the visit is dropped, the patient can be reconnected by: VIDEO VISIT: Text to cell phone:   Telephone Information:   Mobile [phone]       Will anyone else be joining the visit? NO  (If patient encounters technical issues they should call [phone]    Are changes needed to the allergy or medication list? Pt declined med review    Are refills needed on medications prescribed by this physician? Discuss with provider    Rooming Documentation:  Questionnaire(s) not done per department protocol    Reason for visit: FREDY FABIAN

## 2024-10-23 NOTE — TELEPHONE ENCOUNTER
Patient is upset and out of meds writer did let him know protocol and he should call in earlier he figured it would be sent on day its due.

## 2024-10-23 NOTE — TELEPHONE ENCOUNTER
oxyCODONE-acetaminophen (PERCOCET) 5-325 MG tablet   Disp-150 tablet, R-0   Start: 09/23/2024 9/16/2024  [facility] [place]    Buck Nascimento MD  Internal Medicine      Routed because: controlled

## 2024-10-23 NOTE — Clinical Note
"10/23/2024      Jonathan Bishop  [address]  [facility] [address]      Dear Colleague,    Thank you for referring your patient, Jonathan Bishop, to the [facility]. Please see a copy of my visit note below.    Virtual Visit Details    Type of service:  Video Visit   Video Start Time: {video visit start/end time for provider to select:741131}  Video End Time:{video visit start/end time for provider to select:423408}    Originating Location (pt. Location): {video visit patient location:519001::\"Home\"}  {PROVIDER LOCATION On-site should be selected for visits conducted from your clinic location or adjoining [facility] hospital, academic office, or other nearby [facility] building. Off-site should be selected for all other provider locations, including home:609104}  Distant Location (provider location):  {virtual location provider:653241}  Platform used for Video Visit: {Virtual Visit Platforms:850683::\"[employer]\"}              [facility]  [address] [facility] [address]  Phone: [phone]  Fax: [phone]    PATIENT NAME: Jonathan Bishop  MRN # [id number]   DATE OF VISIT: October 22, 2024  YOB: 1945     Otolaryngology: Dr. Karishma Eng  Radiation Oncology: Dr. Jenni Mills  PCP: Dr. Buck Nascimento    CANCER TYPE: SCC L tonsil, p16 +lety  STAGE: [id number] (IVC)  ECOG PS: 1    PD-L1: TPS 20%, CPS 25% on [id number] tonsil bx  NGS: N/A    SUMMARY  2/26/23 L tonsil mass bx in clinic (Dr. Eng).   2/20/23 PET/CT. 3.7 x 4.0 x 5.1 cm mass L palatine tonsil causing narrowing of the oropharyngeal lumen, L level 2 node, L retropharyngeal nodular density, extension of primary mass vs retropharyngeal node, 0.8 cm RLL nodule concerning for met, mild focal uptake R iliac bone and L1 without CT correlate suspicious for mets.   3/2/23 R VATS, wedge resection. Path: SCC, poorly differentiated, associated with necrosis, 1 cm, negative " margins, p16 +lety  3/24/23 MRI L spine and pelvis. Diffusely heterogeneous marrow signal throughout without corresponding abnormal signal on sagittal STIR sequence and no abnormal enhancement. Nonspecific but could be benign process such as red marrow hyperplasia, cannot completely exclude metastatic disease or infiltrative pathologic marrow process. No lesions corresponding to FDG avid spots on PET/CT.   4/19~10/18/23 Pembrolizumab.  10/26/23 CT neck and CAP. Increased L palatine tonsil mass, 3.8 x 2.7 cm --> 4.7 x 3.5 cm. Increased bilateral cervical nodes up to 2.3 x 2.4 cm R level 2 node. New 3 mm RML nodule, no other mets.   11/14/23 PET. 4.6 x 3.3 cm L palatine tonsil mass (SUB 26.8), R level 2A and 2A/3 nodes. Questionable uptake distal R femoral medullary cavity, partially imaged, with questionable subtle corresponding soft tissue attenuation on CT. MRI could be obtained to better evaluate.  11/21~12/2/23 [facility] for weakness/fall. COVID/paxlovid, MSSA bacteremia (1 of 2 bottles on 1 day), TTE negative, treated with cefazolin but didn't complete 2 week course, episode of unresponsiveness 12/2. Dispo plan was TCU on [facility], but left AMA. CTA chest negative for PE, showed grossly unchanged cervical adenopathy. Brain MRI 11/22 for stroke code negative for infarct. Showed SVID, moderate atrophy, suboptimal contrast bolus to examine intracranial structures, 4.5 x 3 x 4.5 cm L nasopharyngeal mass, incompletely visualized. CTA negative.   12/19/23 Quad shot fraction #1. No-showed thereafter.  1/10/24~curr Carboplatin (AUC 2) + paclitaxel 60 mg/m2 weekly. Held 2/13 due to anorexia, fatigue.  3/13~3/17/24 [facility] for acute encephalopathy. Had [place] his car in the middle of the highway after receiving chemo earlier that day. Recommended not to drive   4/30/24 CT CAP and CT neck. Residual bilateral cervical adenopathy.   7/30/24 PET. CT not covered. Significant reduction L palatine tonsil mass - residual  focal uptake in the L posterolateral oropharyngeal wall (SUV 9.1, previously 26.8). Previously seen multiple FDG avid L cervical nodes and few enlarged R nodes are completed resolved. Mild diffuse uptake along bilateral lower legs and ankles with associated skin thickening and stranding along the soft tissues; findings can be seen with cellulitis. No metastatic disease.     ASSESSMENT AND PLAN  SCC L tonsil, p16 +lety, CPS 25%/TPS 20%, oligometastatic lung met, +/- bone mets: [place] on carboplatin paclitaxel and s/p 1 fraction of quad shot. See Dr. Mueller's note on 8/1 discussing possible role of consolidative radiation. Following a 6 week treatment break, he resumed carbo/taxol on 9/19/24.  Chemotherapy was held on 10/3/2024 per his request due to abdominal pain and bowel irregularity.  Conner is minimally interested in radiation although willing to consider if this would be beneficial in preventing recurrence. We'll revisit this after his next scan in about 3 weeks.   Plan:  - Proceed with weekly carboplatin/paclitaxel 10/17/2024 pending labs  - RTC weekly for lab and infusion with MARK visit in 2 weeks    Neurocognitive changes, encephalopathy March 2024: Declined cognitive testing. No recent concerning events but will continue to readdress recommendation for neurocog testing in future.    Headache: New this past week.  No concern for CVA symptoms.  No recurrence after treating with pain medication.  Monitor    Anorexia, malnutrition, weight loss: Weight loss stabilized. Appetite overall improved following chemo break.      Hypomag: Mg stable    H/o CVA: Residual L weakness. Uses walker at baseline, motorized scooter for longer distances.    LE edema: Generally better when he has routine PCA care.  Worse recently with inability to elevate legs d/t broken left chair.  Encouraged routine use of compression stockings    Surrogate decision maker: He would want us to talk with Mariann if he could not speak for  himself.    Microcytic anemia: Stable on review of labs.  Weekly CBCs    Peripheral neuropathy: MRI shows a lot of foramenal stenosis and spinal canal stenosis, so more likely related to that than DM2. Doesn't appear to be worsening on paclitaxel    Vandana Bertrand CNP  ---  *** minutes spent on the date of the encounter doing {2021 E&M time in:228093}.    The longitudinal plan of care for the diagnosis(es)/condition(s) as documented were addressed during this visit. Due to the added complexity in care, I will continue to support Conner in the subsequent management and with ongoing continuity of care.    SUBJECTIVE  Conner is seen today prior to weekly carbo/taxol. He canceled his appointment for infusion last week following our visit.  ***    PAST MEDICAL HISTORY  SCC as above  Chronic LBP  TAVR 2020  H/o rheumatic carditis 1950  CHF. 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 Nocturia once nightly   ED  Cataracts  Umbilical hernia repair 2011  Knee arthroplasty B 2010  Lumbar spine fusion, laminectomy, sciatic pain R > L  Peripheral neuropathy - from pinched nerves?  Hearing loss    CURRENT OUTPATIENT MEDICATIONS  Reviewed    ALLERGIES  No Known Allergies     PHYSICAL EXAM  There were no vitals taken for this visit.    Video physical exam  General: Patient appears well in no acute distress.   Skin: No visualized rash or lesions on visualized skin  Eyes: EOMI, no erythema, sclera icterus or discharge noted  Resp: Appears to be breathing comfortably without accessory muscle usage, speaking in full sentences, no cough  MSK: Appears to have normal range of motion based on visualized movements  Neurologic: No apparent tremors, facial movements symmetric  Psych: affect pleasant, alert and oriented      LABORATORY AND IMAGING STUDIES   Most Recent 3 CBC's:  Recent Labs   Lab Test 10/10/24  0849 10/03/24  0842 09/26/24  1100   WBC 4.3 4.6 4.7   HGB 9.1* 9.2* 9.2*   MCV 84 85 84     171 142*   ANEUTAUTO 2.3 2.5 2.6    Most Recent 3 BMP's:  Recent Labs   Lab Test 10/10/24  0849 10/03/24  0842 09/26/24  1100    144 146*   POTASSIUM 3.8 3.6 4.0   CHLORIDE 108* 106 108*   CO2 27 27 30*   BUN 20.0 20.0 22.3   CR 0.72 0.76 0.88   ANIONGAP 10 11 8   WARREN 9.1 9.1 9.3   * 130* 89   PROTTOTAL 6.7 6.6 6.6   ALBUMIN 4.0 4.0 3.9    Most Recent 2 LFT's:  Recent Labs   Lab Test 10/10/24  0849 10/03/24  0842   AST 24 20   ALT 16 14   ALKPHOS 109 93   BILITOTAL 0.4 0.2    Most Recent TSH and T4:  Recent Labs   Lab Test 09/19/24  1115   TSH 1.44   T4 1.14     Phos/Mag:  Lab Results   Component Value Date    PHOS 3.0 03/17/2024    PHOS 2.4 (L) 12/09/2020    PHOS 3.9 12/08/2020    MAG 1.9 10/10/2024    MAG 1.7 09/26/2024    MAG 1.7 09/19/2024        I reviewed the above labs today.         Virtual Visit Details    Type of service:  Video Visit   Video Start Time: 12:01 PM  Video End Time:12:08 PM    Originating Location (pt. Location): Home  {PROVIDER LOCATION On-site should be selected for visits conducted from your clinic location or adjoining [facility] hospital, academic office, or other nearby [facility] building. Off-site should be selected for all other provider locations, including home:888647}  Distant Location (provider location):  Off-site  Platform used for Video Visit: [facility]  [address] [facility] [address]  Phone: [phone]  Fax: [phone]    PATIENT NAME: Jonathan Bishop  MRN # [id number]   DATE OF VISIT: October 22, 2024  YOB: 1945     Otolaryngology: Dr. Karishma Eng  Radiation Oncology: Dr. Jenni Mills  PCP: Dr. Buck Nascimento    CANCER TYPE: SCC L tonsil, p16 +lety  STAGE: [id number] (IVC)  ECOG PS: 1    PD-L1: TPS 20%, CPS 25% on [id number] tonsil bx  NGS: N/A    SUMMARY  2/26/23 L tonsil mass bx in clinic (Dr. Eng).   2/20/23 PET/CT. 3.7 x 4.0 x 5.1 cm mass L palatine tonsil causing narrowing of the  oropharyngeal lumen, L level 2 node, L retropharyngeal nodular density, extension of primary mass vs retropharyngeal node, 0.8 cm RLL nodule concerning for met, mild focal uptake R iliac bone and L1 without CT correlate suspicious for mets.   3/2/23 R VATS, wedge resection. Path: SCC, poorly differentiated, associated with necrosis, 1 cm, negative margins, p16 +lety  3/24/23 MRI L spine and pelvis. Diffusely heterogeneous marrow signal throughout without corresponding abnormal signal on sagittal STIR sequence and no abnormal enhancement. Nonspecific but could be benign process such as red marrow hyperplasia, cannot completely exclude metastatic disease or infiltrative pathologic marrow process. No lesions corresponding to FDG avid spots on PET/CT.   4/19~10/18/23 Pembrolizumab.  10/26/23 CT neck and CAP. Increased L palatine tonsil mass, 3.8 x 2.7 cm --> 4.7 x 3.5 cm. Increased bilateral cervical nodes up to 2.3 x 2.4 cm R level 2 node. New 3 mm RML nodule, no other mets.   11/14/23 PET. 4.6 x 3.3 cm L palatine tonsil mass (SUB 26.8), R level 2A and 2A/3 nodes. Questionable uptake distal R femoral medullary cavity, partially imaged, with questionable subtle corresponding soft tissue attenuation on CT. MRI could be obtained to better evaluate.  11/21~12/2/23 [facility] for weakness/fall. COVID/paxlovid, MSSA bacteremia (1 of 2 bottles on 1 day), TTE negative, treated with cefazolin but didn't complete 2 week course, episode of unresponsiveness 12/2. Dispo plan was TCU on [facility], but left AMA. CTA chest negative for PE, showed grossly unchanged cervical adenopathy. Brain MRI 11/22 for stroke code negative for infarct. Showed SVID, moderate atrophy, suboptimal contrast bolus to examine intracranial structures, 4.5 x 3 x 4.5 cm L nasopharyngeal mass, incompletely visualized. CTA negative.   12/19/23 Quad shot fraction #1. No-showed thereafter.  1/10/24~curr Carboplatin (AUC 2) + paclitaxel 60 mg/m2 weekly. Held 2/13  due to anorexia, fatigue.  3/13~3/17/24 [facility] for acute encephalopathy. Had stopped his car in the middle of the highway after receiving chemo earlier that day. Recommended not to drive   4/30/24 CT CAP and CT neck. Residual bilateral cervical adenopathy.   7/30/24 PET. CT not covered. Significant reduction L palatine tonsil mass - residual focal uptake in the L posterolateral oropharyngeal wall (SUV 9.1, previously 26.8). Previously seen multiple FDG avid L cervical nodes and few enlarged R nodes are completed resolved. Mild diffuse uptake along bilateral lower legs and ankles with associated skin thickening and stranding along the soft tissues; findings can be seen with cellulitis. No metastatic disease.     ASSESSMENT AND PLAN  SCC L tonsil, p16 +lety, CPS 25%/TPS 20%, oligometastatic lung met, +/- bone mets: Great [place] on carboplatin paclitaxel and s/p 1 fraction of quad shot. See Dr. Mueller's note on 8/1 discussing possible role of consolidative radiation. Following a 6 week treatment break, he resumed carbo/taxol on 9/19/24.  Chemotherapy was held on 10/3/2024 per his request due to abdominal pain and bowel irregularity.  Conner is minimally interested in radiation although willing to consider if this would be beneficial in preventing recurrence. We'll revisit this after his next scan in about 3 weeks.   Plan:  - Proceed with weekly carboplatin/paclitaxel 10/17/2024 pending labs  - RTC weekly for lab and infusion with MARK visit in 2 weeks    Neurocognitive changes, encephalopathy March 2024: Declined cognitive testing. No recent concerning events but will continue to readdress recommendation for neurocog testing in future.    Headache: New this past week.  No concern for CVA symptoms.  No recurrence after treating with pain medication.  Monitor    Anorexia, malnutrition, weight loss: Weight loss stabilized. Appetite overall improved following chemo break.      Hypomag: Mg stable    H/o CVA: Residual L  weakness. Uses walker at baseline, motorized scooter for longer distances.    LE edema: Generally better when he has routine PCA care.  Worse recently with inability to elevate legs d/t broken left chair.  Encouraged routine use of compression stockings    Surrogate decision maker: He would want us to talk with Mariann if he could not speak for himself.    Microcytic anemia: Stable on review of labs.  Weekly CBCs    Peripheral neuropathy: MRI shows a lot of foramenal stenosis and spinal canal stenosis, so more likely related to that than DM2. Doesn't appear to be worsening on paclitaxel    Vandana Bertrand CNP  ---    The longitudinal plan of care for the diagnosis(es)/condition(s) as documented were addressed during this visit. Due to the added complexity in care, I will continue to support Conner in the subsequent management and with ongoing continuity of care.    SUBJECTIVE  Conner is seen today prior to weekly carbo/taxol. He canceled his appointment for infusion last week following our visit.  - The day following our last appointment Conner reports a sudden onset of tremoring and weakness.  This lasted for a total of about 2 days although progressively improved over the course of those 2 days.  - Reports some difficulty with speech during the time he experienced tremors.  He was aware of what he was saying but could not get the words out correctly.  His friends and PCA also noticed a difference  - He was never evaluated in the ER by his PCP  - Denies any falls during this time    PAST MEDICAL HISTORY  SCC as above  Chronic LBP  TAVR 2020  H/o rheumatic carditis 1950  CHF. 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 Nocturia once nightly   ED  Cataracts  Umbilical hernia repair 2011  Knee arthroplasty B 2010  Lumbar spine fusion, laminectomy, sciatic pain R > L  Peripheral neuropathy - from pinched nerves?  Hearing loss    CURRENT OUTPATIENT MEDICATIONS  Reviewed    ALLERGIES  No  Known Allergies     PHYSICAL EXAM  There were no vitals taken for this visit.    Video physical exam  General: Patient appears well in no acute distress.   Skin: No visualized rash or lesions on visualized skin  Eyes: EOMI, no erythema, sclera icterus or discharge noted  Resp: Appears to be breathing comfortably without accessory muscle usage, speaking in full sentences, no cough  MSK: Appears to have normal range of motion based on visualized movements  Neurologic: No apparent tremors, facial movements symmetric  Psych: affect pleasant, alert and oriented      LABORATORY AND IMAGING STUDIES   Most Recent 3 CBC's:  Recent Labs   Lab Test 10/10/24  0849 10/03/24  0842 09/26/24  1100   WBC 4.3 4.6 4.7   HGB 9.1* 9.2* 9.2*   MCV 84 85 84    171 142*   ANEUTAUTO 2.3 2.5 2.6    Most Recent 3 BMP's:  Recent Labs   Lab Test 10/10/24  0849 10/03/24  0842 09/26/24  1100    144 146*   POTASSIUM 3.8 3.6 4.0   CHLORIDE 108* 106 108*   CO2 27 27 30*   BUN 20.0 20.0 22.3   CR 0.72 0.76 0.88   ANIONGAP 10 11 8   WARREN 9.1 9.1 9.3   * 130* 89   PROTTOTAL 6.7 6.6 6.6   ALBUMIN 4.0 4.0 3.9    Most Recent 2 LFT's:  Recent Labs   Lab Test 10/10/24  0849 10/03/24  0842   AST 24 20   ALT 16 14   ALKPHOS 109 93   BILITOTAL 0.4 0.2    Most Recent TSH and T4:  Recent Labs   Lab Test 09/19/24  1115   TSH 1.44   T4 1.14     Phos/Mag:  Lab Results   Component Value Date    PHOS 3.0 03/17/2024    PHOS 2.4 (L) 12/09/2020    PHOS 3.9 12/08/2020    MAG 1.9 10/10/2024    MAG 1.7 09/26/2024    MAG 1.7 09/19/2024        I reviewed the above labs today.           Again, thank you for allowing me to participate in the care of your patient.        Sincerely,        Vandana Bertrand, CNP

## 2024-10-24 ENCOUNTER — TELEPHONE (OUTPATIENT)
Dept: ONCOLOGY | Facility: CLINIC | Age: 79
End: 2024-10-24

## 2024-10-24 ENCOUNTER — INFUSION THERAPY VISIT (OUTPATIENT)
Dept: ONCOLOGY | Facility: CLINIC | Age: 79
End: 2024-10-24
Attending: INTERNAL MEDICINE
Payer: COMMERCIAL

## 2024-10-24 ENCOUNTER — APPOINTMENT (OUTPATIENT)
Dept: LAB | Facility: CLINIC | Age: 79
End: 2024-10-24
Attending: INTERNAL MEDICINE
Payer: COMMERCIAL

## 2024-10-24 VITALS
SYSTOLIC BLOOD PRESSURE: 147 MMHG | DIASTOLIC BLOOD PRESSURE: 70 MMHG | BODY MASS INDEX: 35.63 KG/M2 | HEART RATE: 58 BPM | OXYGEN SATURATION: 100 % | TEMPERATURE: 97.3 F | WEIGHT: 207.6 LBS | RESPIRATION RATE: 18 BRPM

## 2024-10-24 DIAGNOSIS — C10.9 SQUAMOUS CELL CARCINOMA OF OROPHARYNX (H): ICD-10-CM

## 2024-10-24 DIAGNOSIS — E83.42 HYPOMAGNESEMIA: ICD-10-CM

## 2024-10-24 DIAGNOSIS — C78.00 MALIGNANT NEOPLASM METASTATIC TO LUNG, UNSPECIFIED LATERALITY (H): ICD-10-CM

## 2024-10-24 DIAGNOSIS — C09.9 TONSIL CANCER (H): Primary | ICD-10-CM

## 2024-10-24 LAB
ALBUMIN SERPL BCG-MCNC: 4 G/DL (ref 3.5–5.2)
ALP SERPL-CCNC: 104 U/L (ref 40–150)
ALT SERPL W P-5'-P-CCNC: 16 U/L (ref 0–70)
ANION GAP SERPL CALCULATED.3IONS-SCNC: 10 MMOL/L (ref 7–15)
AST SERPL W P-5'-P-CCNC: 30 U/L (ref 0–45)
BASOPHILS # BLD AUTO: 0 10E3/UL (ref 0–0.2)
BASOPHILS NFR BLD AUTO: 0 %
BILIRUB SERPL-MCNC: 0.3 MG/DL
BUN SERPL-MCNC: 21.3 MG/DL (ref 8–23)
CALCIUM SERPL-MCNC: 8.8 MG/DL (ref 8.8–10.4)
CHLORIDE SERPL-SCNC: 105 MMOL/L (ref 98–107)
CREAT SERPL-MCNC: 0.93 MG/DL (ref 0.67–1.17)
EGFRCR SERPLBLD CKD-EPI 2021: 84 ML/MIN/1.73M2
EOSINOPHIL # BLD AUTO: 0.1 10E3/UL (ref 0–0.7)
EOSINOPHIL NFR BLD AUTO: 3 %
ERYTHROCYTE [DISTWIDTH] IN BLOOD BY AUTOMATED COUNT: 16.9 % (ref 10–15)
GLUCOSE SERPL-MCNC: 113 MG/DL (ref 70–99)
HCO3 SERPL-SCNC: 27 MMOL/L (ref 22–29)
HCT VFR BLD AUTO: 29.5 % (ref 40–53)
HGB BLD-MCNC: 8.8 G/DL (ref 13.3–17.7)
IMM GRANULOCYTES # BLD: 0 10E3/UL
IMM GRANULOCYTES NFR BLD: 0 %
LYMPHOCYTES # BLD AUTO: 1.8 10E3/UL (ref 0.8–5.3)
LYMPHOCYTES NFR BLD AUTO: 38 %
MCH RBC QN AUTO: 25.3 PG (ref 26.5–33)
MCHC RBC AUTO-ENTMCNC: 29.8 G/DL (ref 31.5–36.5)
MCV RBC AUTO: 85 FL (ref 78–100)
MONOCYTES # BLD AUTO: 0.5 10E3/UL (ref 0–1.3)
MONOCYTES NFR BLD AUTO: 11 %
NEUTROPHILS # BLD AUTO: 2.2 10E3/UL (ref 1.6–8.3)
NEUTROPHILS NFR BLD AUTO: 47 %
NRBC # BLD AUTO: 0 10E3/UL
NRBC BLD AUTO-RTO: 0 /100
PLATELET # BLD AUTO: 141 10E3/UL (ref 150–450)
POTASSIUM SERPL-SCNC: 3.9 MMOL/L (ref 3.4–5.3)
PROT SERPL-MCNC: 6.8 G/DL (ref 6.4–8.3)
RBC # BLD AUTO: 3.48 10E6/UL (ref 4.4–5.9)
SODIUM SERPL-SCNC: 142 MMOL/L (ref 135–145)
WBC # BLD AUTO: 4.7 10E3/UL (ref 4–11)

## 2024-10-24 PROCEDURE — 85025 COMPLETE CBC W/AUTO DIFF WBC: CPT

## 2024-10-24 PROCEDURE — 96375 TX/PRO/DX INJ NEW DRUG ADDON: CPT

## 2024-10-24 PROCEDURE — 80053 COMPREHEN METABOLIC PANEL: CPT

## 2024-10-24 PROCEDURE — 250N000011 HC RX IP 250 OP 636: Performed by: INTERNAL MEDICINE

## 2024-10-24 PROCEDURE — 96417 CHEMO IV INFUS EACH ADDL SEQ: CPT

## 2024-10-24 PROCEDURE — 250N000011 HC RX IP 250 OP 636: Performed by: REGISTERED NURSE

## 2024-10-24 PROCEDURE — 258N000003 HC RX IP 258 OP 636: Performed by: INTERNAL MEDICINE

## 2024-10-24 PROCEDURE — 96413 CHEMO IV INFUSION 1 HR: CPT

## 2024-10-24 PROCEDURE — 36415 COLL VENOUS BLD VENIPUNCTURE: CPT

## 2024-10-24 RX ORDER — ONDANSETRON 2 MG/ML
8 INJECTION INTRAMUSCULAR; INTRAVENOUS ONCE
Status: COMPLETED | OUTPATIENT
Start: 2024-10-24 | End: 2024-10-24

## 2024-10-24 RX ORDER — MAGNESIUM OXIDE 400 MG/1
400 TABLET ORAL 2 TIMES DAILY
Qty: 30 TABLET | Refills: 0 | Status: SHIPPED | OUTPATIENT
Start: 2024-10-24

## 2024-10-24 RX ORDER — DEXAMETHASONE SODIUM PHOSPHATE 4 MG/ML
12 INJECTION, SOLUTION INTRA-ARTICULAR; INTRALESIONAL; INTRAMUSCULAR; INTRAVENOUS; SOFT TISSUE ONCE
Status: COMPLETED | OUTPATIENT
Start: 2024-10-24 | End: 2024-10-24

## 2024-10-24 RX ADMIN — CARBOPLATIN 200 MG: 10 INJECTION, SOLUTION INTRAVENOUS at 11:44

## 2024-10-24 RX ADMIN — PACLITAXEL 94 MG: 6 INJECTION, SOLUTION INTRAVENOUS at 10:32

## 2024-10-24 RX ADMIN — FAMOTIDINE 20 MG: 10 INJECTION INTRAVENOUS at 10:14

## 2024-10-24 RX ADMIN — DEXAMETHASONE SODIUM PHOSPHATE 12 MG: 4 INJECTION, SOLUTION INTRA-ARTICULAR; INTRALESIONAL; INTRAMUSCULAR; INTRAVENOUS; SOFT TISSUE at 10:05

## 2024-10-24 RX ADMIN — ONDANSETRON 8 MG: 2 INJECTION INTRAMUSCULAR; INTRAVENOUS at 10:10

## 2024-10-24 ASSESSMENT — PAIN SCALES - GENERAL: PAINLEVEL_OUTOF10: EXTREME PAIN (9)

## 2024-10-24 NOTE — PROGRESS NOTES
CLINICAL NUTRITION SERVICES- ONCOLOGY DISTRESS SCREENING     Identified Concern and Score From Distress Screenin. How concerned are you about your ability to eat? :  10  2. How concerned are you about unintended weight loss or your current weight? : 5     Date of Distress Screening: 10/16     Findings: Phone call with Conner. Reports that his appetite is good and he has no nutrition questions right now.     Follow-up Required: As needed.     Lindy Abdalla RD, LD  [phone]

## 2024-10-24 NOTE — NURSING NOTE
Chief Complaint   Patient presents with    Blood Draw     IV placement with blood draw by lab RN       Labs drawn via IV placed by ultrasound. Vitals taken and appointment arrived.    Vandana Olivier RN

## 2024-10-24 NOTE — PATIENT INSTRUCTIONS
Contact Numbers    [facility] Main Line (for Scheduling/Triage/After Hours Nurse Line): [phone]    Please call the [facility] nurse triage or the after hours nurse line if you experience a temperature greater than or equal to 100.4, shaking chills, have uncontrolled nausea, vomiting and/or diarrhea, dizziness, lightheadedness, shortness of breath, chest pain, bleeding, unexplained bruising, or if you have any other new/concerning symptoms, questions or concerns.     If you are having any concerning symptoms or wish to speak to a provider before your next infusion visit, please call your care coordinator or triage to notify them so we can adequately serve you.     If you need any refills on medications (narcotics or other medications), please call before your infusion appointment.            Lab Results:  Recent Results (from the past 12 hours)   Magnesium    Collection Time: 10/24/24  8:55 AM   Result Value Ref Range    Magnesium 1.6 (L) 1.7 - 2.3 mg/dL   Comprehensive metabolic panel    Collection Time: 10/24/24  8:55 AM   Result Value Ref Range    Sodium 142 135 - 145 mmol/L    Potassium 3.9 3.4 - 5.3 mmol/L    Carbon Dioxide (CO2) 27 22 - 29 mmol/L    Anion Gap 10 7 - 15 mmol/L    Urea Nitrogen 21.3 8.0 - 23.0 mg/dL    Creatinine 0.93 0.67 - 1.17 mg/dL    GFR Estimate 84 >60 mL/min/1.73m2    Calcium 8.8 8.8 - 10.4 mg/dL    Chloride 105 98 - 107 mmol/L    Glucose 113 (H) 70 - 99 mg/dL    Alkaline Phosphatase 104 40 - 150 U/L    AST 30 0 - 45 U/L    ALT 16 0 - 70 U/L    Protein Total 6.8 6.4 - 8.3 g/dL    Albumin 4.0 3.5 - 5.2 g/dL    Bilirubin Total 0.3 <=1.2 mg/dL   CBC with platelets and differential    Collection Time: 10/24/24  8:55 AM   Result Value Ref Range    WBC Count 4.7 4.0 - 11.0 10e3/uL    RBC Count 3.48 (L) 4.40 - 5.90 10e6/uL    Hemoglobin 8.8 (L) 13.3 - 17.7 g/dL    Hematocrit 29.5 (L) 40.0 - 53.0 %    MCV 85 78 - 100 fL    MCH 25.3 (L) 26.5 - 33.0 pg    MCHC 29.8 (L) 31.5 - 36.5 g/dL    RDW 16.9 (H)  10.0 - 15.0 %    Platelet Count 141 (L) 150 - 450 10e3/uL    % Neutrophils 47 %    % Lymphocytes 38 %    % Monocytes 11 %    % Eosinophils 3 %    % Basophils 0 %    % Immature Granulocytes 0 %    NRBCs per 100 WBC 0 <1 /100    Absolute Neutrophils 2.2 1.6 - 8.3 10e3/uL    Absolute Lymphocytes 1.8 0.8 - 5.3 10e3/uL    Absolute Monocytes 0.5 0.0 - 1.3 10e3/uL    Absolute Eosinophils 0.1 0.0 - 0.7 10e3/uL    Absolute Basophils 0.0 0.0 - 0.2 10e3/uL    Absolute Immature Granulocytes 0.0 <=0.4 10e3/uL    Absolute NRBCs 0.0 10e3/uL

## 2024-10-24 NOTE — PROGRESS NOTES
Infusion Nursing Note:  Jonathan Bishop presents today for Cycle 7 Day 15 Taxol/Carboplatin (#30).    Patient seen by provider today: Yes: Vandana Bertrand CNP   present during visit today: Not Applicable.    Note: Pt arrives feeling okay. Pt was really stiff/slow getting up from chair to walker and needed assistance. Missed last week's infusion; confirmed correct cycle number with MARK today.    Mg slightly low. Pt is prescribed to take 800mg in the morning and 400mg in the evening. Pt states he ran out of his Mg oxide a week ago. Notified MARK to get refill.    Per written communication Vandana Bertrand CNP/Aydee Palomino, RN @0957 10/24/24:  - Sent downstairs. I changed it to 400 mg BID     Pt developed mild hand tremors during pre-med administraction, resolved within a couple minutes. Shakiness reported in provider note. Notified MARK.    Per written communication Vandana Bertrand CNP/Aydee Palomino, RN @1034 10/24/24:  Ok. I think the shaking is probably due to lots of different things       Intravenous Access:  Peripheral IV placed.    Treatment Conditions:  Lab Results   Component Value Date    HGB 8.8 (L) 10/24/2024    WBC 4.7 10/24/2024    ANEU 1.0 (L) 03/06/2024    ANEUTAUTO 2.2 10/24/2024     (L) 10/24/2024        Lab Results   Component Value Date     10/24/2024    POTASSIUM 3.9 10/24/2024    MAG 1.6 (L) 10/24/2024    CR 0.93 10/24/2024    WARREN 8.8 10/24/2024    BILITOTAL 0.3 10/24/2024    ALBUMIN 4.0 10/24/2024    ALT 16 10/24/2024    AST 30 10/24/2024       Results reviewed, labs MET treatment parameters, ok to proceed with treatment.      Post Infusion Assessment:  Patient tolerated infusion without incident.  Blood return noted pre and post infusion.  Site patent and intact, free from redness, edema or discomfort.  No evidence of extravasations.  Access discontinued per protocol.       Discharge Plan:   Prescription refills given for Magnesium oxide.  Discharge instructions reviewed  with: Patient.  Patient and/or family verbalized understanding of discharge instructions and all questions answered.  AVS to patient via [employer].  Patient will return 10/31 for next appointment.   Patient discharged in stable condition accompanied by: self.  Departure Mode: Ambulatory with walker.      Aydee Palomino RN

## 2024-10-25 RX ORDER — EPINEPHRINE 1 MG/ML
0.3 INJECTION, SOLUTION, CONCENTRATE INTRAVENOUS EVERY 5 MIN PRN
OUTPATIENT
Start: 2024-10-25

## 2024-10-25 RX ORDER — METHYLPREDNISOLONE SODIUM SUCCINATE 40 MG/ML
40 INJECTION INTRAMUSCULAR; INTRAVENOUS
Start: 2024-10-25

## 2024-10-25 RX ORDER — MEPERIDINE HYDROCHLORIDE 25 MG/ML
25 INJECTION INTRAMUSCULAR; INTRAVENOUS; SUBCUTANEOUS
OUTPATIENT
Start: 2024-10-25

## 2024-10-25 RX ORDER — ALBUTEROL SULFATE 0.83 MG/ML
2.5 SOLUTION RESPIRATORY (INHALATION)
OUTPATIENT
Start: 2024-10-25

## 2024-10-25 RX ORDER — ALBUTEROL SULFATE 0.83 MG/ML
2.5 SOLUTION RESPIRATORY (INHALATION)
Status: CANCELLED | OUTPATIENT
Start: 2024-11-07

## 2024-10-25 RX ORDER — DIPHENHYDRAMINE HYDROCHLORIDE 50 MG/ML
25 INJECTION INTRAMUSCULAR; INTRAVENOUS
Start: 2024-10-25

## 2024-10-25 RX ORDER — ALBUTEROL SULFATE 90 UG/1
1-2 INHALANT RESPIRATORY (INHALATION)
Start: 2024-10-25

## 2024-10-25 RX ORDER — DIPHENHYDRAMINE HYDROCHLORIDE 50 MG/ML
50 INJECTION INTRAMUSCULAR; INTRAVENOUS
Status: CANCELLED
Start: 2024-11-07

## 2024-10-25 RX ORDER — MEPERIDINE HYDROCHLORIDE 25 MG/ML
25 INJECTION INTRAMUSCULAR; INTRAVENOUS; SUBCUTANEOUS
Status: CANCELLED | OUTPATIENT
Start: 2024-11-07

## 2024-10-25 RX ORDER — DIPHENHYDRAMINE HYDROCHLORIDE 50 MG/ML
50 INJECTION INTRAMUSCULAR; INTRAVENOUS
Start: 2024-10-25

## 2024-10-25 RX ORDER — DIPHENHYDRAMINE HYDROCHLORIDE 50 MG/ML
25 INJECTION INTRAMUSCULAR; INTRAVENOUS
Status: CANCELLED
Start: 2024-11-07

## 2024-10-25 RX ORDER — EPINEPHRINE 1 MG/ML
0.3 INJECTION, SOLUTION, CONCENTRATE INTRAVENOUS EVERY 5 MIN PRN
Status: CANCELLED | OUTPATIENT
Start: 2024-11-07

## 2024-10-25 RX ORDER — ALBUTEROL SULFATE 90 UG/1
1-2 INHALANT RESPIRATORY (INHALATION)
Status: CANCELLED
Start: 2024-11-07

## 2024-10-28 ENCOUNTER — MEDICAL CORRESPONDENCE (OUTPATIENT)
Dept: HEALTH INFORMATION MANAGEMENT | Facility: CLINIC | Age: 79
End: 2024-10-28
Payer: COMMERCIAL

## 2024-10-31 ENCOUNTER — PATIENT OUTREACH (OUTPATIENT)
Dept: ONCOLOGY | Facility: CLINIC | Age: 79
End: 2024-10-31
Payer: COMMERCIAL

## 2024-10-31 NOTE — PROGRESS NOTES
[facility]/Voicemail    Clinical Data: Care Coordinator Outreach    Reached out to Conner as he canceled his infusion appointment today via [employer].    Outreach attempted x 1.  Left message on patient's voicemail with call back information and requested return call. We will try to get his infusion rescheduled for tomorrow if he is able to come to this appointment.    Addendum 11/01/24 11:34 AM  Reached out to Conner. He reports that he was having car trouble en route to his appointment so he cancelled. He would not like to reschedule.    Reviewed appointments next week - scans, [place] visit, infusion. He confirmed he will attend all of these visits.    Olivia Gates, RN, BSN  RN Care Coordinator  [facility]

## 2024-11-05 ENCOUNTER — ANCILLARY PROCEDURE (OUTPATIENT)
Dept: CT IMAGING | Facility: CLINIC | Age: 79
End: 2024-11-05
Attending: INTERNAL MEDICINE
Payer: COMMERCIAL

## 2024-11-05 DIAGNOSIS — C78.00 MALIGNANT NEOPLASM METASTATIC TO LUNG, UNSPECIFIED LATERALITY (H): ICD-10-CM

## 2024-11-05 DIAGNOSIS — C10.9 SQUAMOUS CELL CARCINOMA OF OROPHARYNX (H): ICD-10-CM

## 2024-11-05 PROCEDURE — 70491 CT SOFT TISSUE NECK W/DYE: CPT | Performed by: RADIOLOGY

## 2024-11-05 PROCEDURE — 74177 CT ABD & PELVIS W/CONTRAST: CPT | Performed by: RADIOLOGY

## 2024-11-05 PROCEDURE — 71260 CT THORAX DX C+: CPT | Performed by: RADIOLOGY

## 2024-11-05 RX ORDER — IOPAMIDOL 755 MG/ML
102 INJECTION, SOLUTION INTRAVASCULAR ONCE
Status: COMPLETED | OUTPATIENT
Start: 2024-11-05 | End: 2024-11-05

## 2024-11-05 RX ADMIN — IOPAMIDOL 102 ML: 755 INJECTION, SOLUTION INTRAVASCULAR at 07:54

## 2024-11-05 NOTE — DISCHARGE INSTRUCTIONS

## 2024-11-06 ENCOUNTER — ONCOLOGY VISIT (OUTPATIENT)
Dept: ONCOLOGY | Facility: CLINIC | Age: 79
End: 2024-11-06
Attending: INTERNAL MEDICINE
Payer: COMMERCIAL

## 2024-11-06 VITALS
SYSTOLIC BLOOD PRESSURE: 117 MMHG | WEIGHT: 204.7 LBS | TEMPERATURE: 97.6 F | BODY MASS INDEX: 35.14 KG/M2 | OXYGEN SATURATION: 98 % | DIASTOLIC BLOOD PRESSURE: 70 MMHG | HEART RATE: 87 BPM | RESPIRATION RATE: 16 BRPM

## 2024-11-06 DIAGNOSIS — R68.89 SPELLS OF DECREASED ATTENTIVENESS: ICD-10-CM

## 2024-11-06 DIAGNOSIS — C78.00 MALIGNANT NEOPLASM METASTATIC TO LUNG, UNSPECIFIED LATERALITY (H): Primary | ICD-10-CM

## 2024-11-06 DIAGNOSIS — E04.1 THYROID NODULE: ICD-10-CM

## 2024-11-06 DIAGNOSIS — E83.42 HYPOMAGNESEMIA: ICD-10-CM

## 2024-11-06 DIAGNOSIS — Z13.29 SCREENING FOR HYPOTHYROIDISM: ICD-10-CM

## 2024-11-06 DIAGNOSIS — C10.9 SQUAMOUS CELL CARCINOMA OF OROPHARYNX (H): ICD-10-CM

## 2024-11-06 DIAGNOSIS — C09.9 TONSIL CANCER (H): ICD-10-CM

## 2024-11-06 PROCEDURE — G2211 COMPLEX E/M VISIT ADD ON: HCPCS | Performed by: INTERNAL MEDICINE

## 2024-11-06 PROCEDURE — 99215 OFFICE O/P EST HI 40 MIN: CPT | Performed by: INTERNAL MEDICINE

## 2024-11-06 PROCEDURE — G0463 HOSPITAL OUTPT CLINIC VISIT: HCPCS | Performed by: INTERNAL MEDICINE

## 2024-11-06 RX ORDER — HEPARIN SODIUM (PORCINE) LOCK FLUSH IV SOLN 100 UNIT/ML 100 UNIT/ML
5 SOLUTION INTRAVENOUS
OUTPATIENT
Start: 2024-11-14

## 2024-11-06 RX ORDER — LORAZEPAM 2 MG/ML
0.5 INJECTION INTRAMUSCULAR EVERY 4 HOURS PRN
OUTPATIENT
Start: 2024-11-14

## 2024-11-06 RX ORDER — ALBUTEROL SULFATE 0.83 MG/ML
2.5 SOLUTION RESPIRATORY (INHALATION)
OUTPATIENT
Start: 2024-11-14

## 2024-11-06 RX ORDER — EPINEPHRINE 1 MG/ML
0.3 INJECTION, SOLUTION INTRAMUSCULAR; SUBCUTANEOUS EVERY 5 MIN PRN
OUTPATIENT
Start: 2024-11-14

## 2024-11-06 RX ORDER — MEPERIDINE HYDROCHLORIDE 25 MG/ML
25 INJECTION INTRAMUSCULAR; INTRAVENOUS; SUBCUTANEOUS
OUTPATIENT
Start: 2024-11-14

## 2024-11-06 RX ORDER — ALBUTEROL SULFATE 90 UG/1
1-2 INHALANT RESPIRATORY (INHALATION)
Start: 2024-11-14

## 2024-11-06 RX ORDER — METHYLPREDNISOLONE SODIUM SUCCINATE 40 MG/ML
40 INJECTION INTRAMUSCULAR; INTRAVENOUS
Start: 2024-11-14

## 2024-11-06 RX ORDER — DIPHENHYDRAMINE HYDROCHLORIDE 50 MG/ML
25 INJECTION INTRAMUSCULAR; INTRAVENOUS
Start: 2024-11-14

## 2024-11-06 RX ORDER — DIPHENHYDRAMINE HYDROCHLORIDE 50 MG/ML
50 INJECTION INTRAMUSCULAR; INTRAVENOUS
Start: 2024-11-14

## 2024-11-06 RX ORDER — HEPARIN SODIUM,PORCINE 10 UNIT/ML
5-20 VIAL (ML) INTRAVENOUS DAILY PRN
OUTPATIENT
Start: 2024-11-14

## 2024-11-06 ASSESSMENT — PAIN SCALES - GENERAL: PAINLEVEL_OUTOF10: EXTREME PAIN (8)

## 2024-11-06 NOTE — PROGRESS NOTES
[facility]CER CLINIC  [address] [facility] [address]  Phone: [phone]  Fax: [phone]    PATIENT NAME: Jonathan Bishop  MRN # [id number]   DATE OF VISIT: November 6, 2024  YOB: 1945     Otolaryngology: Dr. Karishma Eng  Radiation Oncology: Dr. Jenni Mills  PCP: Dr. 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 Small residual tumor in the L tonsil/soft palate on PET 7/30/24. Referred back to Dr. Mills, but decided to defer radiation for the moment given good response to chemo. Conner felt much better after the treatment break, which is meaningful. I'd like to keep radiation in mind as a consolidative approach, to see if we might be able to get him off systemic therapy for a prolonged period of time. Despite the lung met, he's not had issues with recurrent metastatic disease and so I think this is a good example of an oligometastatic situation and biologically, he's done well. Today is not the day to make that decision, however. Recommended continuing carboplatin paclitaxel weekly for a little longer. We can take breaks here and there. Restage in ~ 2 months.     Thyroid nodule: US     Spell: Seemed to stiffen and then close his eyes for about 30 seconds during our visit. No arrhythmia on exam. EKG. Did not seem like a seizure, not post-ictal, etc., but strange nonetheless. Neurology referral to help me ensure he's not having seizures.     Neurocognitive changes, encephalopathy March 2024: Hasn't pursued cognitive testing yet despite encouragement.      Anorexia, malnutrition, weight loss: Stabilized and doing much better with PCA back  in place and with treatment breaks. Not a current issue.     Hypomag: On mag oxide but remains marginal. Continue checking, replacing IV prn      H/o CVA: Residual L weakness. Uses walker at baseline, motorized scooter out and about. Watched him walk today, no changes in gait, etc.      Screening for hypothyroidism: TFTs 9/19/24 ok, check in ~ March 2025.     LE edema: Better with consistent PCA care.      Surrogate decision maker: He would want us to talk with Mariann if he could not speak for himself. Not discussed again today.      Peripheral neuropathy: MRI shows a lot of foramenal stenosis and spinal canal stenosis, so more likely related to that than DM2. Doesn't appear to be worsening on paclitaxel     The longitudinal plan of care for the condition(s) below were addressed during this visit. Due to the added complexity in care, I will continue to support Conner in the subsequent management of this condition(s) and with the ongoing continuity of care of this condition(s): SCC tonsil      40 minutes spent by me on the date of the encounter doing chart review, review of test results, interpretation of tests, patient visit, documentation, orders, discussion with family.     Dominique Mueller MD  Associate Professor of Medicine  Hematology, Oncology and Transplantation    SUBJECTIVE  Conner returns for routine follow up   Feels ok   No new problems  Treatment break helped with energy, appetite, etc.   [facility] a [facility] with [facility]. Planning another     CANCER SUMMARY  2/26/23              L tonsil mass bx in clinic (Dr. Eng).   2/20/23              PET/CT. 3.7 x 4.0 x 5.1 cm mass L palatine tonsil causing narrowing of the oropharyngeal lumen, L level 2 node, L retropharyngeal nodular density, extension of primary mass vs retropharyngeal node, 0.8 cm RLL nodule concerning for met, mild focal uptake R iliac bone and L1 without CT correlate suspicious for mets.   3/2/23  R VATS, wedge  resection. Path: SCC, poorly differentiated, associated with necrosis, 1 cm, negative margins, p16 +lety  3/24/23              MRI L spine and pelvis. Diffusely heterogeneous marrow signal throughout without corresponding abnormal signal on sagittal STIR sequence and no abnormal enhancement. Nonspecific but could be benign process such as red marrow hyperplasia, cannot completely exclude metastatic disease or infiltrative pathologic marrow process. No lesions corresponding to FDG avid spots on PET/CT.   4/19~10/18/23 Pembrolizumab.  10/26/23            CT neck and CAP. Increased L palatine tonsil mass, 3.8 x 2.7 cm --> 4.7 x 3.5 cm. Increased bilateral cervical nodes up to 2.3 x 2.4 cm R level 2 node. New 3 mm RML nodule, no other mets.   11/14/23 PET. 4.6 x 3.3 cm L palatine tonsil mass (SUB 26.8), R level 2A and 2A/3 nodes. Questionable uptake distal R femoral medullary cavity, partially imaged, with questionable subtle corresponding soft tissue attenuation on CT. MRI could be obtained to better evaluate.  11/21~12/2/23   [facility] for weakness/fall. COVID/paxlovid, MSSA bacteremia (1 of 2 bottles on 1 day), TTE negative, treated with cefazolin but didn't complete 2 week course, episode of unresponsiveness 12/2. Dispo plan was TCU on [facility], but left AMA. CTA chest negative for PE, showed grossly unchanged cervical adenopathy. Brain MRI 11/22 for stroke code negative for infarct. Showed SVID, moderate atrophy, suboptimal contrast bolus to examine intracranial structures, 4.5 x 3 x 4.5 cm L nasopharyngeal mass, incompletely visualized. CTA negative.   12/19/23 Quad shot fraction #1. No-showed thereafter.  1/10/24~curr     Carboplatin (AUC 2) + paclitaxel 60 mg/m2 weekly. Held 2/13 due to anorexia, fatigue. Break between 8/1 and 9/19 doses for trip, fatigue. Missed dose 10/16 due to transportation issues.  3/13~3/17/24     [facility] for acute encephalopathy. Had [place] his car in the middle of the highway  after receiving chemo earlier that day. Recommended not to drive   4/30/24  CT CAP and CT neck. Residual bilateral cervical adenopathy.   7/30/24  PET. CT not covered. Significant reduction L palatine tonsil mass - residual focal uptake in the L posterolateral oropharyngeal wall (SUV 9.1, previously 26.8). Previously seen multiple FDG avid L cervical nodes and few enlarged R nodes are completed resolved. Mild diffuse uptake along bilateral lower legs and ankles with associated skin thickening and stranding along the soft tissues; findings can be seen with cellulitis. No metastatic disease.   11/5/24  CT neck and CT CAP.  Stable appearance L palatine tonsil, no discrete mass. 1.1 cm R thyroid nodule, a little bigger. O/w unremarkable.     PAST MEDICAL HISTORY  SCC as above  Chronic LBP  TAVR 2020  H/o rheumatic carditis 1950  CHF. 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 Nocturia once nightly   ED  Cataracts  Umbilical hernia repair 2011  Knee arthroplasty B 2010  Lumbar spine fusion, laminectomy, sciatic pain R > L  Peripheral neuropathy - from pinched nerves?  Hearing loss    CURRENT OUTPATIENT MEDICATIONS  Reviewed    ALLERGIES  No Known Allergies     PHYSICAL EXAM  /70 (BP Location: Right arm, Patient Position: Sitting, Cuff Size: Adult Large)   Pulse 87   Temp 97.6  F (36.4  C) (Oral)   Resp 16   Wt 92.9 kg (204 lb 11.2 oz)   SpO2 98%   BMI 35.14 kg/m    GEN: NAD  HEENT: EOMI, no icterus, injection or pallor  CV: regular, no m/r/g  LUNGS: clear  EXT: 1+ edema bilaterally   NEURO: alert  SKIN: no rashes    LABORATORY AND IMAGING STUDIES    Labs 10/24/24 were independently reviewed and interpreted by me  Mg a little low at 1.6   Hemoglobin a little lower than usual at 8.8, plt a little low at 141 but acceptable  Cmp normal    ADDENDUM: Labs 11/7/24 also reviewed on 11/7  Mg 1.7  CMP ok   Hemoglobin unchanged at 8.8    CT Soft Tissue Neck w  Contrast  Narrative: EXAM: CT SOFT TISSUE NECK W CONTRAST  11/5/2024 8:17 AM     HISTORY:  restage oropharyngeal carcinoma on carbo paclitaxel;  Malignant neoplasm metastatic to lung, unspecified laterality (H);  Squamous cell carcinoma of oropharynx (H)         COMPARISON:  Neck CT 4/30/2024. Neck CT 2/13/2024.    TECHNIQUE: Following intravenous administration of nonionic iodinated  contrast medium, thin section helical CT images were obtained from the  skull base down to the level of the aortic arch.  Axial, coronal and  sagittal reformations were performed with 2-3 mm slice thickness  reconstruction. Images were reviewed in soft tissue, lung and bone  windows.    Additional information from the electronic medical record: Left  palatine tonsillar mass and metastatic adenopathy identified on  2/20/2023.    CONTRAST: 102 cc Isovue-370 IV.    FINDINGS:   Stable appearance of the left palatine tonsil without a discrete mass.  No nasopharyngeal, oropharyngeal, hypopharyngeal, or glottic mucosal  space abnormality. Tiny calcified right palatine tonsilliths. Normal  tongue base.     Major salivary glands are within normal limits.    No lymphadenopathy.    Low-attenuation 1.1 cm right thyroid nodule, increased in size.    Atherosclerotic ossification of the bilateral carotid bifurcations.    No suspicious osseus lesion. Extensive multilevel cervical  spondylosis.    Minimal paranasal sinus mucosal thickening. Mild dependent right  mastoid effusion. Clear left mastoid air cells. Edentulous. The imaged  skull base, intracranial and orbital structures are within normal  limits.    No suspicious finding in the visualized superior mediastinum/thorax.  Clear lung apices.  Impression: IMPRESSION:   Primary: NI-RADS 1} Expected post-treatment changes in the neck  without evidence of recurrent left palatine tonsillar squamous cell  carcinoma.  Neck: NI-RADS 1}  No abnormal lymph node.   Mild increase in size of a right thyroid  nodule which does not meet  threshold for specific imaging follow-up. As such, this can continue  to be followed with imaging surveillance for the patient's treated  squamous cell carcinoma.    NI-RADS CECT Surveillance Legend:    Primary  1: No evidence of recurrence: routine surveillance  2: Low suspicion    a) Superficial abnormality (skin, mucosal surface): direct visual  inspection    b) Ill-defined deep abnormality: short interval follow-up* or PET  3: High suspicion (new or enlarging discrete nodule/mass): biopsy  4: Definitive recurrence (path proven or clinical progression): no  biopsy needed    Nodes  1: No evidence of recurrence: routine surveillance  2: Low suspicion (ill-defined): short interval follow-up or PET  3: High suspicion (new or enlarging lymph node): biopsy if clinically  needed  4: Definitive recurrence (path proven or clinical progression): no  biopsy needed    *short interval follow-up: 3 months at our institution       ORION ENGLISH MD         SYSTEM ID:  [id number]       CT CAP and CT neck were personally reviewed and interpreted by me  Hard to see anything in the L tonsil .   Lots of scattered tiny pulm nodules, calcified. O/w no mediastinal or hilar adenopathy, and remainder of CT CAP looks ok on my review.

## 2024-11-06 NOTE — NURSING NOTE
"Oncology Rooming Note    November 6, 2024 2:22 PM   Jonathan Bishop is a 79 year old male who presents for:    Chief Complaint   Patient presents with    Oncology Clinic Visit     Malignant neoplasm metastatic to lung, unspecified laterality        Initial Vitals: /70 (BP Location: Right arm, Patient Position: Sitting, Cuff Size: Adult Large)   Pulse 87   Temp 97.6  F (36.4  C) (Oral)   Resp 16   Wt 92.9 kg (204 lb 11.2 oz)   SpO2 98%   BMI 35.14 kg/m   Estimated body mass index is 35.14 kg/m  as calculated from the following:    Height as of 10/23/24: 1.626 m (5' 4\").    Weight as of this encounter: 92.9 kg (204 lb 11.2 oz). Body surface area is 2.05 meters squared.  Extreme Pain (8) Comment: Pt rated it \"8.34\" constant pain   No LMP for male patient.  Allergies reviewed: Yes  Medications reviewed: Yes    Medications: Medication refills not needed today.  Pharmacy name entered into [employer]: [id number] - [place], [place] - [address] [address]    Frailty Screening:   Is the patient here for a new oncology consult visit in cancer care? 2. No      Clinical concerns: none       Argenis Campos"

## 2024-11-06 NOTE — LETTER
11/6/2024      Jonathan Bishop  [address]  [place] [address]      Dear Colleague,    Thank you for referring your patient, Jonathan Bishop, to the [facility] CANCER [facility]. Please see a copy of my visit note below.        [facility]ER [facility]INIC  [address] [facility] [address] [facility] [address]  Phone: [phone]  Fax: [phone]    PATIENT NAME: Jonathan Bishop  MRN # [id number]   DATE OF VISIT: November 6, 2024  YOB: 1945     Otolaryngology: Dr. Karishma Eng  Radiation Oncology: Dr. Jenni Mills  PCP: Dr. 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 Small residual tumor in the L tonsil/soft palate on PET 7/30/24. Referred back to Dr. Mills, but decided to defer radiation for the moment given good response to chemo. Conner felt much better after the treatment break, which is meaningful. I'd like to keep radiation in mind as a consolidative approach, to see if we might be able to get him off systemic therapy for a prolonged period of time. Despite the lung met, he's not had issues with recurrent metastatic disease and so I think this is a good example of an oligometastatic situation and biologically, he's done well. Today is not the day to make that decision, however. Recommended continuing carboplatin paclitaxel weekly for a little longer. We can take breaks here and there. Restage in ~ 2 months.     Thyroid nodule: US     Spell: Seemed to stiffen and then close his eyes for about 30 seconds during our visit. No arrhythmia on exam. EKG. Did not seem like a seizure, not post-ictal, etc., but strange nonetheless. Neurology  referral to help me ensure he's not having seizures.     Neurocognitive changes, encephalopathy March 2024: Hasn't pursued cognitive testing yet despite encouragement.      Anorexia, malnutrition, weight loss: Stabilized and doing much better with PCA back in place and with treatment breaks. Not a current issue.     Hypomag: On mag oxide but remains marginal. Continue checking, replacing IV prn      H/o CVA: Residual L weakness. Uses walker at baseline, motorized scooter out and about. Watched him walk today, no changes in gait, etc.      Screening for hypothyroidism: TFTs 9/19/24 ok, check in ~ March 2025.     LE edema: Better with consistent PCA care.      Surrogate decision maker: He would want us to talk with Mariann if he could not speak for himself. Not discussed again today.      Peripheral neuropathy: MRI shows a lot of foramenal stenosis and spinal canal stenosis, so more likely related to that than DM2. Doesn't appear to be worsening on paclitaxel     The longitudinal plan of care for the condition(s) below were addressed during this visit. Due to the added complexity in care, I will continue to support Conner in the subsequent management of this condition(s) and with the ongoing continuity of care of this condition(s): SCC tonsil      40 minutes spent by me on the date of the encounter doing chart review, review of test results, interpretation of tests, patient visit, documentation, orders, discussion with family.     Dominique Mueller MD  Associate Professor of Medicine  Hematology, Oncology and Transplantation    SUBJECTIVE  Conner returns for routine follow up   Feels ok   No new problems  Treatment break helped with energy, appetite, etc.   Had a great trip with his brother and nephew to the [facility]. Planning another     CANCER SUMMARY  2/26/23              L tonsil mass bx in clinic (Dr. Eng).   2/20/23              PET/CT. 3.7 x 4.0 x 5.1 cm mass L palatine tonsil causing narrowing of the  oropharyngeal lumen, L level 2 node, L retropharyngeal nodular density, extension of primary mass vs retropharyngeal node, 0.8 cm RLL nodule concerning for met, mild focal uptake R iliac bone and L1 without CT correlate suspicious for mets.   3/2/23  R VATS, wedge resection. Path: SCC, poorly differentiated, associated with necrosis, 1 cm, negative margins, p16 +lety  3/24/23              MRI L spine and pelvis. Diffusely heterogeneous marrow signal throughout without corresponding abnormal signal on sagittal STIR sequence and no abnormal enhancement. Nonspecific but could be benign process such as red marrow hyperplasia, cannot completely exclude metastatic disease or infiltrative pathologic marrow process. No lesions corresponding to FDG avid spots on PET/CT.   4/19~10/18/23 Pembrolizumab.  10/26/23            CT neck and CAP. Increased L palatine tonsil mass, 3.8 x 2.7 cm --> 4.7 x 3.5 cm. Increased bilateral cervical nodes up to 2.3 x 2.4 cm R level 2 node. New 3 mm RML nodule, no other mets.   11/14/23 PET. 4.6 x 3.3 cm L palatine tonsil mass (SUB 26.8), R level 2A and 2A/3 nodes. Questionable uptake distal R femoral medullary cavity, partially imaged, with questionable subtle corresponding soft tissue attenuation on CT. MRI could be obtained to better evaluate.  11/21~12/2/23   [facility] for weakness/fall. COVID/paxlovid, MSSA bacteremia (1 of 2 bottles on 1 day), TTE negative, treated with cefazolin but didn't complete 2 week course, episode of unresponsiveness 12/2. Dispo plan was TCU on [facility], but left AMA. CTA chest negative for PE, showed grossly unchanged cervical adenopathy. Brain MRI 11/22 for stroke code negative for infarct. Showed SVID, moderate atrophy, suboptimal contrast bolus to examine intracranial structures, 4.5 x 3 x 4.5 cm L nasopharyngeal mass, incompletely visualized. CTA negative.   12/19/23 Quad shot fraction #1. No-showed thereafter.  1/10/24~curr     Carboplatin (AUC 2) +  paclitaxel 60 mg/m2 weekly. Held 2/13 due to anorexia, fatigue. Break between 8/1 and 9/19 doses for trip, fatigue. Missed dose 10/16 due to transportation issues.  3/13~3/17/24     [facility] for acute encephalopathy. Had [place] his car in the middle of the highway after receiving chemo earlier that day. Recommended not to drive   4/30/24  CT CAP and CT neck. Residual bilateral cervical adenopathy.   7/30/24  PET. CT not covered. Significant reduction L palatine tonsil mass - residual focal uptake in the L posterolateral oropharyngeal wall (SUV 9.1, previously 26.8). Previously seen multiple FDG avid L cervical nodes and few enlarged R nodes are completed resolved. Mild diffuse uptake along bilateral lower legs and ankles with associated skin thickening and stranding along the soft tissues; findings can be seen with cellulitis. No metastatic disease.   11/5/24  CT neck and CT CAP.  Stable appearance L palatine tonsil, no discrete mass. 1.1 cm R thyroid nodule, a little bigger. O/w unremarkable.     PAST MEDICAL HISTORY  SCC as above  Chronic LBP  TAVR 2020  H/o rheumatic carditis 1950  CHF. 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 Nocturia once nightly   ED  Cataracts  Umbilical hernia repair 2011  Knee arthroplasty B 2010  Lumbar spine fusion, laminectomy, sciatic pain R > L  Peripheral neuropathy - from pinched nerves?  Hearing loss    CURRENT OUTPATIENT MEDICATIONS  Reviewed    ALLERGIES  No Known Allergies     PHYSICAL EXAM  /70 (BP Location: Right arm, Patient Position: Sitting, Cuff Size: Adult Large)   Pulse 87   Temp 97.6  F (36.4  C) (Oral)   Resp 16   Wt 92.9 kg (204 lb 11.2 oz)   SpO2 98%   BMI 35.14 kg/m    GEN: NAD  HEENT: EOMI, no icterus, injection or pallor  CV: regular, no m/r/g  LUNGS: clear  EXT: 1+ edema bilaterally   NEURO: alert  SKIN: no rashes    LABORATORY AND IMAGING STUDIES    Labs 10/24/24 were independently reviewed and  interpreted by me  Mg a little low at 1.6   Hemoglobin a little lower than usual at 8.8, plt a little low at 141 but acceptable  Cmp normal    ADDENDUM: Labs 11/7/24 also reviewed on 11/7  Mg 1.7  CMP ok   Hemoglobin unchanged at 8.8    CT Soft Tissue Neck w Contrast  Narrative: EXAM: CT SOFT TISSUE NECK W CONTRAST  11/5/2024 8:17 AM     HISTORY:  restage oropharyngeal carcinoma on carbo paclitaxel;  Malignant neoplasm metastatic to lung, unspecified laterality (H);  Squamous cell carcinoma of oropharynx (H)         COMPARISON:  Neck CT 4/30/2024. Neck CT 2/13/2024.    TECHNIQUE: Following intravenous administration of nonionic iodinated  contrast medium, thin section helical CT images were obtained from the  skull base down to the level of the aortic arch.  Axial, coronal and  sagittal reformations were performed with 2-3 mm slice thickness  reconstruction. Images were reviewed in soft tissue, lung and bone  windows.    Additional information from the electronic medical record: Left  palatine tonsillar mass and metastatic adenopathy identified on  2/20/2023.    CONTRAST: 102 cc Isovue-370 IV.    FINDINGS:   Stable appearance of the left palatine tonsil without a discrete mass.  No nasopharyngeal, oropharyngeal, hypopharyngeal, or glottic mucosal  space abnormality. Tiny calcified right palatine tonsilliths. Normal  tongue base.     Major salivary glands are within normal limits.    No lymphadenopathy.    Low-attenuation 1.1 cm right thyroid nodule, increased in size.    Atherosclerotic ossification of the bilateral carotid bifurcations.    No suspicious osseus lesion. Extensive multilevel cervical  spondylosis.    Minimal paranasal sinus mucosal thickening. Mild dependent right  mastoid effusion. Clear left mastoid air cells. Edentulous. The imaged  skull base, intracranial and orbital structures are within normal  limits.    No suspicious finding in the visualized superior mediastinum/thorax.  Clear lung  apices.  Impression: IMPRESSION:   Primary: NI-RADS 1} Expected post-treatment changes in the neck  without evidence of recurrent left palatine tonsillar squamous cell  carcinoma.  Neck: NI-RADS 1}  No abnormal lymph node.   Mild increase in size of a right thyroid nodule which does not meet  threshold for specific imaging follow-up. As such, this can continue  to be followed with imaging surveillance for the patient's treated  squamous cell carcinoma.    NI-RADS CECT Surveillance Legend:    Primary  1: No evidence of recurrence: routine surveillance  2: Low suspicion    a) Superficial abnormality (skin, mucosal surface): direct visual  inspection    b) Ill-defined deep abnormality: short interval follow-up* or PET  3: High suspicion (new or enlarging discrete nodule/mass): biopsy  4: Definitive recurrence (path proven or clinical progression): no  biopsy needed    Nodes  1: No evidence of recurrence: routine surveillance  2: Low suspicion (ill-defined): short interval follow-up or PET  3: High suspicion (new or enlarging lymph node): biopsy if clinically  needed  4: Definitive recurrence (path proven or clinical progression): no  biopsy needed    *short interval follow-up: 3 months at our institution       ORION ENGLISH MD         SYSTEM ID:  [id number]       CT CAP and CT neck were personally reviewed and interpreted by me  Hard to see anything in the L tonsil .   Lots of scattered tiny pulm nodules, calcified. O/w no mediastinal or hilar adenopathy, and remainder of CT CAP looks ok on my review.                Again, thank you for allowing me to participate in the care of your patient.        Sincerely,        Dominique Mueller MD

## 2024-11-07 ENCOUNTER — APPOINTMENT (OUTPATIENT)
Dept: LAB | Facility: CLINIC | Age: 79
End: 2024-11-07
Attending: INTERNAL MEDICINE
Payer: COMMERCIAL

## 2024-11-07 ENCOUNTER — INFUSION THERAPY VISIT (OUTPATIENT)
Dept: ONCOLOGY | Facility: CLINIC | Age: 79
End: 2024-11-07
Attending: INTERNAL MEDICINE
Payer: COMMERCIAL

## 2024-11-07 VITALS
BODY MASS INDEX: 35.31 KG/M2 | TEMPERATURE: 97.8 F | HEART RATE: 86 BPM | SYSTOLIC BLOOD PRESSURE: 152 MMHG | RESPIRATION RATE: 18 BRPM | WEIGHT: 205.7 LBS | DIASTOLIC BLOOD PRESSURE: 64 MMHG | OXYGEN SATURATION: 99 %

## 2024-11-07 DIAGNOSIS — C09.9 TONSIL CANCER (H): Primary | ICD-10-CM

## 2024-11-07 DIAGNOSIS — C78.00 MALIGNANT NEOPLASM METASTATIC TO LUNG, UNSPECIFIED LATERALITY (H): ICD-10-CM

## 2024-11-07 DIAGNOSIS — C10.9 SQUAMOUS CELL CARCINOMA OF OROPHARYNX (H): ICD-10-CM

## 2024-11-07 LAB
ALBUMIN SERPL BCG-MCNC: 3.9 G/DL (ref 3.5–5.2)
ALP SERPL-CCNC: 95 U/L (ref 40–150)
ALT SERPL W P-5'-P-CCNC: 14 U/L (ref 0–70)
ANION GAP SERPL CALCULATED.3IONS-SCNC: 10 MMOL/L (ref 7–15)
AST SERPL W P-5'-P-CCNC: 24 U/L (ref 0–45)
BASOPHILS # BLD AUTO: 0 10E3/UL (ref 0–0.2)
BASOPHILS NFR BLD AUTO: 1 %
BILIRUB SERPL-MCNC: 0.2 MG/DL
BUN SERPL-MCNC: 22.3 MG/DL (ref 8–23)
CALCIUM SERPL-MCNC: 9.2 MG/DL (ref 8.8–10.4)
CHLORIDE SERPL-SCNC: 108 MMOL/L (ref 98–107)
CREAT SERPL-MCNC: 0.82 MG/DL (ref 0.67–1.17)
EGFRCR SERPLBLD CKD-EPI 2021: 89 ML/MIN/1.73M2
EOSINOPHIL # BLD AUTO: 0.2 10E3/UL (ref 0–0.7)
EOSINOPHIL NFR BLD AUTO: 3 %
ERYTHROCYTE [DISTWIDTH] IN BLOOD BY AUTOMATED COUNT: 17 % (ref 10–15)
GLUCOSE SERPL-MCNC: 144 MG/DL (ref 70–99)
HCO3 SERPL-SCNC: 28 MMOL/L (ref 22–29)
HCT VFR BLD AUTO: 29.7 % (ref 40–53)
HGB BLD-MCNC: 8.8 G/DL (ref 13.3–17.7)
IMM GRANULOCYTES # BLD: 0 10E3/UL
IMM GRANULOCYTES NFR BLD: 0 %
LYMPHOCYTES # BLD AUTO: 1.5 10E3/UL (ref 0.8–5.3)
LYMPHOCYTES NFR BLD AUTO: 32 %
MAGNESIUM SERPL-MCNC: 1.7 MG/DL (ref 1.7–2.3)
MCH RBC QN AUTO: 25.1 PG (ref 26.5–33)
MCHC RBC AUTO-ENTMCNC: 29.6 G/DL (ref 31.5–36.5)
MCV RBC AUTO: 85 FL (ref 78–100)
MONOCYTES # BLD AUTO: 0.5 10E3/UL (ref 0–1.3)
MONOCYTES NFR BLD AUTO: 11 %
NEUTROPHILS # BLD AUTO: 2.5 10E3/UL (ref 1.6–8.3)
NEUTROPHILS NFR BLD AUTO: 54 %
NRBC # BLD AUTO: 0 10E3/UL
NRBC BLD AUTO-RTO: 0 /100
PLATELET # BLD AUTO: 178 10E3/UL (ref 150–450)
POTASSIUM SERPL-SCNC: 3.4 MMOL/L (ref 3.4–5.3)
PROT SERPL-MCNC: 6.5 G/DL (ref 6.4–8.3)
RBC # BLD AUTO: 3.51 10E6/UL (ref 4.4–5.9)
SODIUM SERPL-SCNC: 146 MMOL/L (ref 135–145)
WBC # BLD AUTO: 4.7 10E3/UL (ref 4–11)

## 2024-11-07 PROCEDURE — 83735 ASSAY OF MAGNESIUM: CPT

## 2024-11-07 PROCEDURE — 96375 TX/PRO/DX INJ NEW DRUG ADDON: CPT

## 2024-11-07 PROCEDURE — 36415 COLL VENOUS BLD VENIPUNCTURE: CPT

## 2024-11-07 PROCEDURE — 250N000011 HC RX IP 250 OP 636: Performed by: REGISTERED NURSE

## 2024-11-07 PROCEDURE — 250N000011 HC RX IP 250 OP 636: Performed by: INTERNAL MEDICINE

## 2024-11-07 PROCEDURE — 96417 CHEMO IV INFUS EACH ADDL SEQ: CPT

## 2024-11-07 PROCEDURE — 258N000003 HC RX IP 258 OP 636: Performed by: INTERNAL MEDICINE

## 2024-11-07 PROCEDURE — 85025 COMPLETE CBC W/AUTO DIFF WBC: CPT

## 2024-11-07 PROCEDURE — 96413 CHEMO IV INFUSION 1 HR: CPT

## 2024-11-07 PROCEDURE — 82435 ASSAY OF BLOOD CHLORIDE: CPT

## 2024-11-07 RX ORDER — ONDANSETRON 2 MG/ML
8 INJECTION INTRAMUSCULAR; INTRAVENOUS ONCE
Status: COMPLETED | OUTPATIENT
Start: 2024-11-07 | End: 2024-11-07

## 2024-11-07 RX ORDER — DEXAMETHASONE SODIUM PHOSPHATE 4 MG/ML
8 INJECTION, SOLUTION INTRA-ARTICULAR; INTRALESIONAL; INTRAMUSCULAR; INTRAVENOUS; SOFT TISSUE ONCE
Status: COMPLETED | OUTPATIENT
Start: 2024-11-07 | End: 2024-11-07

## 2024-11-07 RX ADMIN — ONDANSETRON 8 MG: 2 INJECTION INTRAMUSCULAR; INTRAVENOUS at 09:49

## 2024-11-07 RX ADMIN — PACLITAXEL 94 MG: 6 INJECTION, SOLUTION INTRAVENOUS at 10:16

## 2024-11-07 RX ADMIN — DEXAMETHASONE SODIUM PHOSPHATE 8 MG: 4 INJECTION, SOLUTION INTRA-ARTICULAR; INTRALESIONAL; INTRAMUSCULAR; INTRAVENOUS; SOFT TISSUE at 09:45

## 2024-11-07 RX ADMIN — FAMOTIDINE 20 MG: 10 INJECTION INTRAVENOUS at 09:54

## 2024-11-07 RX ADMIN — CARBOPLATIN 200 MG: 10 INJECTION, SOLUTION INTRAVENOUS at 11:29

## 2024-11-07 ASSESSMENT — PAIN SCALES - GENERAL: PAINLEVEL_OUTOF10: EXTREME PAIN (8)

## 2024-11-07 NOTE — PROGRESS NOTES
Infusion Nursing Note:  Jonathan Bishop presents today for Cycle 7 Day 22 Taxol and Carboplatin (dose #25).    Patient was seen and examined in clinic yesterday by Dr Mueller    Note: Conner comes to infusion today with no questions or concerns.  He has back pain that he  rates at 8/10 today. Hot packs were given for comfort.  Intravenous Access:  Peripheral IV placed in lab  Positive blood return pre and post taxol.  Positive blood return pre and post carboplatin.      Treatment Conditions:  Component      Latest Ref Rng 11/7/2024  8:47 AM   WBC      4.0 - 11.0 10e3/uL 4.7    RBC Count      4.40 - 5.90 10e6/uL 3.51 (L)    Hemoglobin      13.3 - 17.7 g/dL 8.8 (L)    Hematocrit      40.0 - 53.0 % 29.7 (L)    MCV      78 - 100 fL 85    MCH      26.5 - 33.0 pg 25.1 (L)    MCHC      31.5 - 36.5 g/dL 29.6 (L)    RDW      10.0 - 15.0 % 17.0 (H)    Platelet Count      150 - 450 10e3/uL 178    % Neutrophils      % 54    % Lymphocytes      % 32    % Monocytes      % 11    % Eosinophils      % 3    % Basophils      % 1    % Immature Granulocytes      % 0    NRBCs per 100 WBC      <1 /100 0    Absolute Neutrophils      1.6 - 8.3 10e3/uL 2.5    Absolute Lymphocytes      0.8 - 5.3 10e3/uL 1.5    Absolute Monocytes      0.0 - 1.3 10e3/uL 0.5    Absolute Eosinophils      0.0 - 0.7 10e3/uL 0.2    Absolute Basophils      0.0 - 0.2 10e3/uL 0.0    Absolute Immature Granulocytes      <=0.4 10e3/uL 0.0    Absolute NRBCs      10e3/uL 0.0    Sodium      135 - 145 mmol/L 146 (H)    Potassium      3.4 - 5.3 mmol/L 3.4    Carbon Dioxide (CO2)      22 - 29 mmol/L 28    Anion Gap      7 - 15 mmol/L 10    Urea Nitrogen      8.0 - 23.0 mg/dL 22.3    Creatinine      0.67 - 1.17 mg/dL 0.82    GFR Estimate      >60 mL/min/1.73m2 89    Calcium      8.8 - 10.4 mg/dL 9.2    Chloride      98 - 107 mmol/L 108 (H)    Glucose      70 - 99 mg/dL 144 (H)    Alkaline Phosphatase      40 - 150 U/L 95    AST      0 - 45 U/L 24    ALT      0 - 70 U/L 14     Protein Total      6.4 - 8.3 g/dL 6.5    Albumin      3.5 - 5.2 g/dL 3.9    Bilirubin Total      <=1.2 mg/dL 0.2      EKG obtained per orders and results reported to Dr Mueller    Results reviewed, labs MET treatment parameters, ok to proceed with treatment.      Post Infusion Assessment:  Patient tolerated infusion without incident.       Discharge Plan:   Patient declined prescription refills.  AVS to patient via [employer]  Patient will return 11/14/24 for cycle 8 day 1  Patient discharged in stable condition accompanied by: self.  Departure Mode: Ambulatory.      July Oliveira RN

## 2024-11-07 NOTE — PATIENT INSTRUCTIONS
[facility] Triage and after hours / weekends / holidays:  [phone]    Please call the triage or after hours line if you experience a temperature greater than or equal to 100.4, shaking chills, have uncontrolled nausea, vomiting and/or diarrhea, dizziness, shortness of breath, chest pain, bleeding, unexplained bruising, or if you have any other new/concerning symptoms, questions or concerns.      If you are having any concerning symptoms or wish to speak to a provider before your next infusion visit, please call triage to notify them so we can adequately serve you.     If you need a refill on a narcotic prescription or other medication, please call before your infusion appointment.

## 2024-11-11 ENCOUNTER — OFFICE VISIT (OUTPATIENT)
Dept: ORTHOPEDICS | Facility: CLINIC | Age: 79
End: 2024-11-11
Payer: COMMERCIAL

## 2024-11-11 DIAGNOSIS — I73.9 PVD (PERIPHERAL VASCULAR DISEASE) (H): Primary | ICD-10-CM

## 2024-11-11 DIAGNOSIS — B35.1 OM (ONYCHOMYCOSIS): ICD-10-CM

## 2024-11-11 DIAGNOSIS — I73.89 OTHER SPECIFIED PERIPHERAL VASCULAR DISEASES (H): ICD-10-CM

## 2024-11-11 DIAGNOSIS — C78.00 MALIGNANT NEOPLASM METASTATIC TO LUNG, UNSPECIFIED LATERALITY (H): ICD-10-CM

## 2024-11-11 LAB
ATRIAL RATE - MUSE: 74 BPM
DIASTOLIC BLOOD PRESSURE - MUSE: NORMAL MMHG
INTERPRETATION ECG - MUSE: NORMAL
P AXIS - MUSE: 48 DEGREES
PR INTERVAL - MUSE: 300 MS
QRS DURATION - MUSE: 86 MS
QT - MUSE: 402 MS
QTC - MUSE: 446 MS
R AXIS - MUSE: 91 DEGREES
SYSTOLIC BLOOD PRESSURE - MUSE: NORMAL MMHG
T AXIS - MUSE: 20 DEGREES
VENTRICULAR RATE- MUSE: 74 BPM

## 2024-11-11 PROCEDURE — 11721 DEBRIDE NAIL 6 OR MORE: CPT | Mod: Q8 | Performed by: PODIATRIST

## 2024-11-11 NOTE — PROGRESS NOTES
Chief Complaint   Patient presents with    Follow Up     Nail trimming.             No Known Allergies      Subjective: Jonathan is a 79 year old male who presents to the clinic today for a follow up of elongated nails. He relates that the nails are long and painful. Edema in the legs and feet continues to improve.  He is now wearing compression socks.  He was last seen 6/7/23.    Objective  PT and DP pulses are non-palpable bilaterally. CRT is >3 seconds. Diminished pedal hair. Mild non-pitting edema noted to BL LE.  Gross sensation is slightly decreased bilaterally.   Nails are thickened, discolored, dystrophic and elongated bilaterally to varying degrees. Nails have subungual debris consistent with onychomycosis. No open lesions are noted. Skin is normal.      Assessment: PVD  Onychomycosis x 10      Plan:  - Pt seen and evaluated.  - Nails were debrided x 10..  -Continue compression socks.  - See again in 3 months.

## 2024-11-11 NOTE — LETTER
11/11/2024      Jonathan Bishop  [address]  [place] [address]      Dear Colleague,    Thank you for referring your patient, Jonathan Bishop, to the [facility] [place]. Please see a copy of my visit note below.    Chief Complaint   Patient presents with     Follow Up     Nail trimming.             No Known Allergies      Subjective: Jonathan is a 79 year old male who presents to the clinic today for a follow up of elongated nails. He relates that the nails are long and painful. Edema in the legs and feet continues to improve.  He is now wearing compression socks.  He was last seen 6/7/23.    Objective  PT and DP pulses are non-palpable bilaterally. CRT is >3 seconds. Diminished pedal hair. Mild non-pitting edema noted to BL LE.  Gross sensation is slightly decreased bilaterally.   Nails are thickened, discolored, dystrophic and elongated bilaterally to varying degrees. Nails have subungual debris consistent with onychomycosis. No open lesions are noted. Skin is normal.      Assessment: PVD  Onychomycosis x 10      Plan:  - Pt seen and evaluated.  - Nails were debrided x 10..  -Continue compression socks.  - See again in 3 months.       Again, thank you for allowing me to participate in the care of your patient.        Sincerely,        Mamadou Gary DPM

## 2024-11-13 ENCOUNTER — MYC MEDICAL ADVICE (OUTPATIENT)
Dept: ONCOLOGY | Facility: CLINIC | Age: 79
End: 2024-11-13
Payer: COMMERCIAL

## 2024-11-18 DIAGNOSIS — M54.50 ACUTE MIDLINE LOW BACK PAIN WITHOUT SCIATICA: ICD-10-CM

## 2024-11-18 NOTE — TELEPHONE ENCOUNTER
oxyCODONE-acetaminophen (PERCOCET) 5-325 MG tablet   Disp-150 tablet, R-0   Start: 10/23/2024     9/16/2024  [facility] [place]      Buck Nascimento MD  Internal Medicine    Routed because: controlled

## 2024-11-18 NOTE — TELEPHONE ENCOUNTER
[facility] Call Center    Phone Message    May a detailed message be left on voicemail: yes     Reason for Call: Medication Refill Request    Has the patient contacted the pharmacy for the refill? Yes   Name of medication being requested: oxyCODONE-acetaminophen (PERCOCET) 5-325 MG tablet [[id number]] (Order [id number])     Provider who prescribed the medication: [facility]  Pharmacy: [facility] [address] [place] [id number] [facility]   Date medication is needed: Week of 11/25   Action Taken: Message routed to:  [facility] pcc    Travel Screening: Not Applicable     Date of Service:

## 2024-11-18 NOTE — TELEPHONE ENCOUNTER
Attempted to contact Conner as he has not read the MyChart I sent. Left  with the neuro scheduling phone number asking him to call them back.   Olivia Gates RN

## 2024-11-19 RX ORDER — OXYCODONE AND ACETAMINOPHEN 5; 325 MG/1; MG/1
1-2 TABLET ORAL EVERY 6 HOURS PRN
Qty: 150 TABLET | Refills: 0 | Status: SHIPPED | OUTPATIENT
Start: 2024-11-19

## 2024-11-19 NOTE — PROGRESS NOTES
Virtual Visit Details    Type of service:  Video Visit   Video Start Time:  8:30 AM  Video End Time: 8:36 AM    Originating Location (pt. Location): Home    Distant Location (provider location):  Off-site  Platform used for Video Visit: [facility]  [address] [facility] [address] [facility] [address]  Phone: [phone]  Fax: [phone]    PATIENT NAME: Jonathan Bishop  MRN # [id number]   DATE OF VISIT: November 20, 2024  YOB: 1945     Otolaryngology: Dr. Karishma Eng  Radiation Oncology: Dr. Jenni Mills  PCP: Dr. 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 Small residual tumor in the L tonsil/soft palate on PET 7/30/24. Referred back to Dr. Mills, but decided to defer radiation for the moment given good response to chemo. He's felt much better with treatment breaks. Currently the plan is to hold off on consolidative radiation in absence of metastatic recurrent disease and continue weekly carbo/taxol. He plans to come to clinic for chemo tomorrow (11/21)-okay to proceed pending labs. Restage with CT-CAP and CT neck in ~2 months.    Thyroid nodule: seen on CT, plan for US. Scheduling requested.    Neurologic change: demonstrated neuro changes during last visit with Dr. Mueller including stiffening and closing eyes. No arrhythmia on exam at that time or on EKG.  Neurology referral not yet scheduled-will request.     Neurocognitive changes, encephalopathy March 2024: Hasn't pursued cognitive testing yet despite encouragement.      Anorexia, malnutrition, weight loss: Stable. Weight better with PCA assistance with meal preparation.     "  Hypomag: On mag oxide but remains marginal. Continue checking, replacing IV prn      H/o CVA: Residual L weakness. Uses walker at baseline, motorized scooter out and about. Gait not observed today d/t virtual visit     Screening for hypothyroidism: TFTs 9/19/24 ok, check in ~ March 2025.     LE edema: Better with consistent PCA care. Continue compression, elevation.     Surrogate decision maker: He would want us to talk with Mariann if he could not speak for himself. Not discussed today      Peripheral neuropathy: MRI shows a lot of foramenal stenosis and spinal canal stenosis, so more likely related to that than DM2. Doesn't appear to be worsening on paclitaxel     Vandana Bertrand CNP  ---  The longitudinal plan of care for the diagnosis(es)/condition(s) as documented were addressed during this visit. Due to the added complexity in care, I will continue to support Conner in the subsequent management and with ongoing continuity of care.      SUBJECTIVE  Conner is seen prior to weekly carbo/taxol.  -Missed appointments last week and sporadically over the past few months for various reasons. Still desires weekly treatment as recommended.  -Reports feeling great today, like \"he could leap over a wall\". No significant fatigue  -No nausea. Appetite slightly decreased lately but also trying to eat more healthy foods  -States lower extremity edema is \"nearly gone\". Wearing compression wraps. PCA helping to apply  -Peripheral neuropathy stable  -Denies any known neurologic changes or spells. No aphasia. No dizziness/lightheadedness or unilateral deficits  -No headaches or vision changes    CANCER SUMMARY  2/26/23              L tonsil mass bx in clinic (Dr. Eng).   2/20/23              PET/CT. 3.7 x 4.0 x 5.1 cm mass L palatine tonsil causing narrowing of the oropharyngeal lumen, L level 2 node, L retropharyngeal nodular density, extension of primary mass vs retropharyngeal node, 0.8 cm RLL nodule concerning for met, mild focal " uptake R iliac bone and L1 without CT correlate suspicious for mets.   3/2/23  R VATS, wedge resection. Path: SCC, poorly differentiated, associated with necrosis, 1 cm, negative margins, p16 +lety  3/24/23              MRI L spine and pelvis. Diffusely heterogeneous marrow signal throughout without corresponding abnormal signal on sagittal STIR sequence and no abnormal enhancement. Nonspecific but could be benign process such as red marrow hyperplasia, cannot completely exclude metastatic disease or infiltrative pathologic marrow process. No lesions corresponding to FDG avid spots on PET/CT.   4/19~10/18/23 Pembrolizumab.  10/26/23            CT neck and CAP. Increased L palatine tonsil mass, 3.8 x 2.7 cm --> 4.7 x 3.5 cm. Increased bilateral cervical nodes up to 2.3 x 2.4 cm R level 2 node. New 3 mm RML nodule, no other mets.   11/14/23 PET. 4.6 x 3.3 cm L palatine tonsil mass (SUB 26.8), R level 2A and 2A/3 nodes. Questionable uptake distal R femoral medullary cavity, partially imaged, with questionable subtle corresponding soft tissue attenuation on CT. MRI could be obtained to better evaluate.  11/21~12/2/23   [facility] for weakness/fall. COVID/paxlovid, MSSA bacteremia (1 of 2 bottles on 1 day), TTE negative, treated with cefazolin but didn't complete 2 week course, episode of unresponsiveness 12/2. Dispo plan was [place] [facility], but left AMA. CTA chest negative for PE, showed grossly unchanged cervical adenopathy. Brain MRI 11/22 for stroke code negative for infarct. Showed SVID, moderate atrophy, suboptimal contrast bolus to examine intracranial structures, 4.5 x 3 x 4.5 cm L nasopharyngeal mass, incompletely visualized. CTA negative.   12/19/23 Quad shot fraction #1. No-showed thereafter.  1/10/24~curr     Carboplatin (AUC 2) + paclitaxel 60 mg/m2 weekly. Held 2/13 due to anorexia, fatigue. Break between 8/1 and 9/19 doses for trip, fatigue. Missed dose 10/16 due to transportation issues.  3/13~3/17/24      [facility] for acute encephalopathy. Had stopped his car in the middle of the highway after receiving chemo earlier that day. Recommended not to drive   4/30/24  CT CAP and CT neck. Residual bilateral cervical adenopathy.   7/30/24  PET. CT not covered. Significant reduction L palatine tonsil mass - residual focal uptake in the L posterolateral oropharyngeal wall (SUV 9.1, previously 26.8). Previously seen multiple FDG avid L cervical nodes and few enlarged R nodes are completed resolved. Mild diffuse uptake along bilateral lower legs and ankles with associated skin thickening and stranding along the soft tissues; findings can be seen with cellulitis. No metastatic disease.   11/5/24  CT neck and CT CAP.  Stable appearance L palatine tonsil, no discrete mass. 1.1 cm R thyroid nodule, a little bigger. O/w unremarkable.     PAST MEDICAL HISTORY  SCC as above  Chronic LBP  TAVR 2020  H/o rheumatic carditis 1950  CHF. 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 Nocturia once nightly   ED  Cataracts  Umbilical hernia repair 2011  Knee arthroplasty B 2010  Lumbar spine fusion, laminectomy, sciatic pain R > L  Peripheral neuropathy - from pinched nerves?  Hearing loss    CURRENT OUTPATIENT MEDICATIONS  Reviewed in Epic    ALLERGIES  No Known Allergies     PHYSICAL EXAM  There were no vitals taken for this visit.    Video physical exam  General: Patient appears well in no acute distress.   Skin: No visualized rash or lesions on visualized skin  Eyes: EOMI, no erythema, sclera icterus or discharge noted  Resp: Appears to be breathing comfortably without accessory muscle usage, speaking in full sentences, no cough  MSK: Appears to have normal range of motion based on visualized movements  Neurologic: No apparent tremors, facial movements symmetric  Psych: affect pleasant, alert and oriented    LABORATORY AND IMAGING STUDIES  Most Recent 3 CBC's:  Recent Labs   Lab Test  11/07/24  0847 10/24/24  0855 10/10/24  0849   WBC 4.7 4.7 4.3   HGB 8.8* 8.8* 9.1*   MCV 85 85 84    141* 158   ANEUTAUTO 2.5 2.2 2.3    Most Recent 3 BMP's:  Recent Labs   Lab Test 11/07/24  0847 10/24/24  0855 10/10/24  0849   * 142 145   POTASSIUM 3.4 3.9 3.8   CHLORIDE 108* 105 108*   CO2 28 27 27   BUN 22.3 21.3 20.0   CR 0.82 0.93 0.72   ANIONGAP 10 10 10   WARREN 9.2 8.8 9.1   * 113* 100*   PROTTOTAL 6.5 6.8 6.7   ALBUMIN 3.9 4.0 4.0    Most Recent 2 LFT's:  Recent Labs   Lab Test 11/07/24  0847 10/24/24  0855   AST 24 30   ALT 14 16   ALKPHOS 95 104   BILITOTAL 0.2 0.3    Most Recent TSH and T4:  Recent Labs   Lab Test 09/19/24  1115   TSH 1.44   T4 1.14     Phos/Mag:  Lab Results   Component Value Date    PHOS 3.0 03/17/2024    PHOS 2.4 (L) 12/09/2020    PHOS 3.9 12/08/2020    MAG 1.7 11/07/2024    MAG 1.6 (L) 10/24/2024    MAG 1.9 10/10/2024      I personally reviewed and interpreted the above labs today.

## 2024-11-20 ENCOUNTER — VIRTUAL VISIT (OUTPATIENT)
Dept: ONCOLOGY | Facility: CLINIC | Age: 79
End: 2024-11-20
Attending: REGISTERED NURSE
Payer: COMMERCIAL

## 2024-11-20 VITALS — HEIGHT: 64 IN | BODY MASS INDEX: 33.8 KG/M2 | WEIGHT: 198 LBS

## 2024-11-20 DIAGNOSIS — C10.9 SQUAMOUS CELL CARCINOMA OF OROPHARYNX (H): Primary | ICD-10-CM

## 2024-11-20 DIAGNOSIS — C78.00 MALIGNANT NEOPLASM METASTATIC TO LUNG, UNSPECIFIED LATERALITY (H): ICD-10-CM

## 2024-11-20 DIAGNOSIS — R60.0 PERIPHERAL EDEMA: ICD-10-CM

## 2024-11-20 DIAGNOSIS — R41.89 NEUROCOGNITIVE DEFICITS: ICD-10-CM

## 2024-11-20 DIAGNOSIS — E83.42 HYPOMAGNESEMIA: ICD-10-CM

## 2024-11-20 DIAGNOSIS — R29.818 NEUROCOGNITIVE DEFICITS: ICD-10-CM

## 2024-11-20 DIAGNOSIS — E04.1 THYROID NODULE: ICD-10-CM

## 2024-11-20 RX ORDER — ALBUTEROL SULFATE 0.83 MG/ML
2.5 SOLUTION RESPIRATORY (INHALATION)
OUTPATIENT
Start: 2024-11-21

## 2024-11-20 RX ORDER — ALBUTEROL SULFATE 90 UG/1
1-2 INHALANT RESPIRATORY (INHALATION)
Start: 2024-11-21

## 2024-11-20 RX ORDER — HEPARIN SODIUM,PORCINE 10 UNIT/ML
5-20 VIAL (ML) INTRAVENOUS DAILY PRN
OUTPATIENT
Start: 2024-11-21

## 2024-11-20 RX ORDER — LORAZEPAM 2 MG/ML
0.5 INJECTION INTRAMUSCULAR EVERY 4 HOURS PRN
OUTPATIENT
Start: 2024-11-21

## 2024-11-20 RX ORDER — DIPHENHYDRAMINE HYDROCHLORIDE 50 MG/ML
50 INJECTION INTRAMUSCULAR; INTRAVENOUS
Start: 2024-11-21

## 2024-11-20 RX ORDER — DIPHENHYDRAMINE HYDROCHLORIDE 50 MG/ML
25 INJECTION INTRAMUSCULAR; INTRAVENOUS
Start: 2024-11-21

## 2024-11-20 RX ORDER — MEPERIDINE HYDROCHLORIDE 25 MG/ML
25 INJECTION INTRAMUSCULAR; INTRAVENOUS; SUBCUTANEOUS
OUTPATIENT
Start: 2024-11-21

## 2024-11-20 RX ORDER — METHYLPREDNISOLONE SODIUM SUCCINATE 40 MG/ML
40 INJECTION INTRAMUSCULAR; INTRAVENOUS
Start: 2024-11-21

## 2024-11-20 RX ORDER — EPINEPHRINE 1 MG/ML
0.3 INJECTION, SOLUTION INTRAMUSCULAR; SUBCUTANEOUS EVERY 5 MIN PRN
OUTPATIENT
Start: 2024-11-21

## 2024-11-20 RX ORDER — HEPARIN SODIUM (PORCINE) LOCK FLUSH IV SOLN 100 UNIT/ML 100 UNIT/ML
5 SOLUTION INTRAVENOUS
OUTPATIENT
Start: 2024-11-21

## 2024-11-20 ASSESSMENT — PAIN SCALES - GENERAL: PAINLEVEL_OUTOF10: EXTREME PAIN (8)

## 2024-11-20 NOTE — Clinical Note
11/20/2024      Jonathan Bishop  [address]  [facility] [address]      Dear Colleague,    Thank you for referring your patient, Jonathan Bishop, to the [facility] CANCER [facility]. Please see a copy of my visit note below.    Virtual Visit Details    Type of service:  Video Visit   Video Start Time:  8:30 AM  Video End Time: 8:36 AM    Originating Location (pt. Location): Home  {PROVIDER LOCATION On-site should be selected for visits conducted from your clinic location or adjoining [facility] hospital, academic office, or other nearby [facility] building. Off-site should be selected for all other provider locations, including home:[id number]}  Distant Location (provider location):  Off-site  Platform used for Video Visit: [facility] CANCER CLINIC  [address] [facility] [address]  Phone: [phone]  Fax: [phone]    PATIENT NAME: Jonathan Bishop  MRN # [id number]   DATE OF VISIT: November 20, 2024  YOB: 1945     Otolaryngology: Dr. Karishma Eng  Radiation Oncology: Dr. Jenni Mills  PCP: Dr. 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 Small residual tumor in the L tonsil/soft palate on PET 7/30/24. Referred back to Dr. Mills, but decided to defer radiation for the moment given good response to chemo. He's felt much better with treatment breaks. Currently the plan is to hold off on consolidative radiation in absence of metastatic recurrent disease and continue weekly carbo/taxol. Restage with CT-CAP and CT neck in ~2 months.    Thyroid nodule: seen on CT, plan for US (***schedule)    Neurologic change:  demonstrated neuro changes during last visit with Dr. Mueller including stiffening and closing eyes. No arrhythmia on exam at that time or on EKG.  Neurology referral  (***schedule)     Neurocognitive changes, encephalopathy March 2024: Hasn't pursued cognitive testing yet despite encouragement.      Anorexia, malnutrition, weight loss: Stabilized and doing much better with PCA back in place and with treatment breaks. Not a current issue.     Hypomag: On mag oxide but remains marginal. Continue checking, replacing IV prn      H/o CVA: Residual L weakness. Uses walker at baseline, motorized scooter out and about. Watched him walk today, no changes in gait, etc.      Screening for hypothyroidism: TFTs 9/19/24 ok, check in ~ March 2025.     LE edema: Better with consistent PCA care.      Surrogate decision maker: He would want us to talk with Mariann if he could not speak for himself. Not discussed again today.      Peripheral neuropathy: MRI shows a lot of foramenal stenosis and spinal canal stenosis, so more likely related to that than DM2. Doesn't appear to be worsening on paclitaxel     Vandana Bretrand, CNP  ---  *** minutes spent on the date of the encounter doing {2021 E&M time in:[id number]}.    The longitudinal plan of care for the diagnosis(es)/condition(s) as documented were addressed during this visit. Due to the added complexity in care, I will continue to support Conner in the subsequent management and with ongoing continuity of care.      SUBJECTIVE  ***    CANCER SUMMARY  2/26/23              L tonsil mass bx in clinic (Dr. Eng).   2/20/23              PET/CT. 3.7 x 4.0 x 5.1 cm mass L palatine tonsil causing narrowing of the oropharyngeal lumen, L level 2 node, L retropharyngeal nodular density, extension of primary mass vs retropharyngeal node, 0.8 cm RLL nodule concerning for met, mild focal uptake R iliac bone and L1 without CT correlate suspicious for mets.   3/2/23  R VATS, wedge resection. Path: SCC,  poorly differentiated, associated with necrosis, 1 cm, negative margins, p16 +lety  3/24/23              MRI L spine and pelvis. Diffusely heterogeneous marrow signal throughout without corresponding abnormal signal on sagittal STIR sequence and no abnormal enhancement. Nonspecific but could be benign process such as red marrow hyperplasia, cannot completely exclude metastatic disease or infiltrative pathologic marrow process. No lesions corresponding to FDG avid spots on PET/CT.   4/19~10/18/23 Pembrolizumab.  10/26/23            CT neck and CAP. Increased L palatine tonsil mass, 3.8 x 2.7 cm --> 4.7 x 3.5 cm. Increased bilateral cervical nodes up to 2.3 x 2.4 cm R level 2 node. New 3 mm RML nodule, no other mets.   11/14/23 PET. 4.6 x 3.3 cm L palatine tonsil mass (SUB 26.8), R level 2A and 2A/3 nodes. Questionable uptake distal R femoral medullary cavity, partially imaged, with questionable subtle corresponding soft tissue attenuation on CT. MRI could be obtained to better evaluate.  11/21~12/2/23   [facility] for weakness/fall. COVID/paxlovid, MSSA bacteremia (1 of 2 bottles on 1 day), TTE negative, treated with cefazolin but didn't complete 2 week course, episode of unresponsiveness 12/2. Dispo plan was TCU on [facility], but left AMA. CTA chest negative for PE, showed grossly unchanged cervical adenopathy. Brain MRI 11/22 for stroke code negative for infarct. Showed SVID, moderate atrophy, suboptimal contrast bolus to examine intracranial structures, 4.5 x 3 x 4.5 cm L nasopharyngeal mass, incompletely visualized. CTA negative.   12/19/23 Quad shot fraction #1. No-showed thereafter.  1/10/24~curr     Carboplatin (AUC 2) + paclitaxel 60 mg/m2 weekly. Held 2/13 due to anorexia, fatigue. Break between 8/1 and 9/19 doses for trip, fatigue. Missed dose 10/16 due to transportation issues.  3/13~3/17/24     [facility] for acute encephalopathy. Had stopped his car in the middle of the highway after receiving chemo  earlier that day. Recommended not to drive   4/30/24  CT CAP and CT neck. Residual bilateral cervical adenopathy.   7/30/24  PET. CT not covered. Significant reduction L palatine tonsil mass - residual focal uptake in the L posterolateral oropharyngeal wall (SUV 9.1, previously 26.8). Previously seen multiple FDG avid L cervical nodes and few enlarged R nodes are completed resolved. Mild diffuse uptake along bilateral lower legs and ankles with associated skin thickening and stranding along the soft tissues; findings can be seen with cellulitis. No metastatic disease.   11/5/24  CT neck and CT CAP.  Stable appearance L palatine tonsil, no discrete mass. 1.1 cm R thyroid nodule, a little bigger. O/w unremarkable.     PAST MEDICAL HISTORY  SCC as above  Chronic LBP  TAVR 2020  H/o rheumatic carditis 1950  CHF. 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 Nocturia once nightly   ED  Cataracts  Umbilical hernia repair 2011  Knee arthroplasty B 2010  Lumbar spine fusion, laminectomy, sciatic pain R > L  Peripheral neuropathy - from pinched nerves?  Hearing loss    CURRENT OUTPATIENT MEDICATIONS  Reviewed in Epic    ALLERGIES  No Known Allergies     PHYSICAL EXAM  There were no vitals taken for this visit.    Video physical exam  General: Patient appears well in no acute distress.   Skin: No visualized rash or lesions on visualized skin  Eyes: EOMI, no erythema, sclera icterus or discharge noted  Resp: Appears to be breathing comfortably without accessory muscle usage, speaking in full sentences, no cough  MSK: Appears to have normal range of motion based on visualized movements  Neurologic: No apparent tremors, facial movements symmetric  Psych: affect ***, alert and oriented    LABORATORY AND IMAGING STUDIES  Most Recent 3 CBC's:  Recent Labs   Lab Test 11/07/24  0847 10/24/24  0855 10/10/24  0849   WBC 4.7 4.7 4.3   HGB 8.8* 8.8* 9.1*   MCV 85 85 84    141* 158    ANEUTAUTO 2.5 2.2 2.3    Most Recent 3 BMP's:  Recent Labs   Lab Test 11/07/24  0847 10/24/24  0855 10/10/24  0849   * 142 145   POTASSIUM 3.4 3.9 3.8   CHLORIDE 108* 105 108*   CO2 28 27 27   BUN 22.3 21.3 20.0   CR 0.82 0.93 0.72   ANIONGAP 10 10 10   WARREN 9.2 8.8 9.1   * 113* 100*   PROTTOTAL 6.5 6.8 6.7   ALBUMIN 3.9 4.0 4.0    Most Recent 2 LFT's:  Recent Labs   Lab Test 11/07/24  0847 10/24/24  0855   AST 24 30   ALT 14 16   ALKPHOS 95 104   BILITOTAL 0.2 0.3    Most Recent TSH and T4:  Recent Labs   Lab Test 09/19/24  1115   TSH 1.44   T4 1.14     Phos/Mag:  Lab Results   Component Value Date    PHOS 3.0 03/17/2024    PHOS 2.4 (L) 12/09/2020    PHOS 3.9 12/08/2020    MAG 1.7 11/07/2024    MAG 1.6 (L) 10/24/2024    MAG 1.9 10/10/2024      I personally reviewed and interpreted the above labs today.             Again, thank you for allowing me to participate in the care of your patient.        Sincerely,        Vandana Bertrand, CNP

## 2024-11-20 NOTE — NURSING NOTE
Current patient location: [address] [place] [address] RD   [facility] [address]    Is the patient currently in the state of [place]? YES    Visit mode:VIDEO    If the visit is dropped, the patient can be reconnected by:VIDEO VISIT: Text to cell phone:   Telephone Information:   Mobile [phone]       Will anyone else be joining the visit? NO  (If patient encounters technical issues they should call [phone])    Are changes needed to the allergy or medication list? Pt stated no changes to allergies and Pt stated no med changes    Are refills needed on medications prescribed by this physician? NO    Rooming Documentation:  Unable to complete questionnaire(s) due to time    Reason for visit: FREDY FABIAN

## 2024-11-21 ENCOUNTER — NURSE TRIAGE (OUTPATIENT)
Dept: ONCOLOGY | Facility: CLINIC | Age: 79
End: 2024-11-21

## 2024-11-21 NOTE — TELEPHONE ENCOUNTER
"Oncology Nurse Triage - Reporting Symptoms  Situation:   Jonathan reporting the following symptoms:chills, productive cough, body aches, diarrhea.    Background:   Treating Provider:   Dominique Mueller MD    Date of last office visit: 11/20/24 with Vandana Bertrand CNP    Recent treatments: Yes:   11/7/24: Cycle 7 Day 22 Taxol and Carboplatin (dose #25).    11/21/24: Pt is supposed to have infusion today but cancelled labs/infusion.    Assessment  Conner is calling to report that he cancelled his lab and infusion apt today d/t symptoms.    Conner had a virtual visit yesterday w/Vandana Bertrand CNP and states he did not report these sx because he thought he was over them.    Today, his sx have resumed.   Conner reports he is taking cough syrup, but it gave him diarrhea.    Other sx:  Chills, he is wrapped up in a blanket. He does not have a thermometer, so he is unable to take his temperature.   Sneezing and coughing--cough is productive of whitish-green grape colored phlegm.  Body aches  Diarrhea x 2 since he woke up.  Some SOB, chest feels \"tight\" and he thinks that's where the phlegm is coming from.  No vomiting or diaphoresis.  No pain w/urination, urine is normal.  Food/Fluids:   Ate soup last night. Has had nothing this morning.  Drinking a lot of gatorade, not poncho how much.    Recommendations:   Informed Conner that this writer will discuss with Care Team and get back to him with their recommendations. They will probably want him to come in for labs.    1020: Secure chat sent to Vandana Bertrand CNP. Pt should follow up with PCP or be seen by MARK here.     There are no MARK apt openings today.    1122: Called and spoke with Conner to advise he be seen in Urgent Care today. Conner states he cannot go to UC because he doesn't have a ride. I asked him if he could arrange for a ride and he responded that he doesn't want to go to UC because he doesn't feel good. This writer restated that our recommendation is that he needs to be evaluated in UC. " "Pt responded, \"Thank you for the information.\"  Care team was updated.    Routed to Care Team.    "

## 2024-11-26 DIAGNOSIS — C78.00 MALIGNANT NEOPLASM METASTATIC TO LUNG, UNSPECIFIED LATERALITY (H): Primary | ICD-10-CM

## 2024-11-26 DIAGNOSIS — C10.9 SQUAMOUS CELL CARCINOMA OF OROPHARYNX (H): ICD-10-CM

## 2024-11-26 DIAGNOSIS — C09.9 TONSIL CANCER (H): ICD-10-CM

## 2024-11-26 RX ORDER — LORAZEPAM 2 MG/ML
0.5 INJECTION INTRAMUSCULAR EVERY 4 HOURS PRN
OUTPATIENT
Start: 2024-12-05

## 2024-11-26 RX ORDER — METHYLPREDNISOLONE SODIUM SUCCINATE 40 MG/ML
40 INJECTION INTRAMUSCULAR; INTRAVENOUS
Start: 2024-12-05

## 2024-11-26 RX ORDER — ALBUTEROL SULFATE 90 UG/1
1-2 INHALANT RESPIRATORY (INHALATION)
Start: 2024-12-05

## 2024-11-26 RX ORDER — ALBUTEROL SULFATE 0.83 MG/ML
2.5 SOLUTION RESPIRATORY (INHALATION)
OUTPATIENT
Start: 2024-12-05

## 2024-11-26 RX ORDER — HEPARIN SODIUM,PORCINE 10 UNIT/ML
5-20 VIAL (ML) INTRAVENOUS DAILY PRN
OUTPATIENT
Start: 2024-12-05

## 2024-11-26 RX ORDER — ALBUTEROL SULFATE 0.83 MG/ML
2.5 SOLUTION RESPIRATORY (INHALATION)
OUTPATIENT
Start: 2024-11-27

## 2024-11-26 RX ORDER — MEPERIDINE HYDROCHLORIDE 25 MG/ML
25 INJECTION INTRAMUSCULAR; INTRAVENOUS; SUBCUTANEOUS
OUTPATIENT
Start: 2024-11-27

## 2024-11-26 RX ORDER — HEPARIN SODIUM,PORCINE 10 UNIT/ML
5-20 VIAL (ML) INTRAVENOUS DAILY PRN
OUTPATIENT
Start: 2024-11-27

## 2024-11-26 RX ORDER — ALBUTEROL SULFATE 90 UG/1
1-2 INHALANT RESPIRATORY (INHALATION)
Start: 2024-11-27

## 2024-11-26 RX ORDER — HEPARIN SODIUM (PORCINE) LOCK FLUSH IV SOLN 100 UNIT/ML 100 UNIT/ML
5 SOLUTION INTRAVENOUS
OUTPATIENT
Start: 2024-12-05

## 2024-11-26 RX ORDER — DIPHENHYDRAMINE HYDROCHLORIDE 50 MG/ML
25 INJECTION INTRAMUSCULAR; INTRAVENOUS
Start: 2024-11-27

## 2024-11-26 RX ORDER — EPINEPHRINE 1 MG/ML
0.3 INJECTION, SOLUTION INTRAMUSCULAR; SUBCUTANEOUS EVERY 5 MIN PRN
OUTPATIENT
Start: 2024-11-27

## 2024-11-26 RX ORDER — METHYLPREDNISOLONE SODIUM SUCCINATE 40 MG/ML
40 INJECTION INTRAMUSCULAR; INTRAVENOUS
Start: 2024-11-27

## 2024-11-26 RX ORDER — DIPHENHYDRAMINE HYDROCHLORIDE 50 MG/ML
25 INJECTION INTRAMUSCULAR; INTRAVENOUS
Start: 2024-12-05

## 2024-11-26 RX ORDER — DIPHENHYDRAMINE HYDROCHLORIDE 50 MG/ML
50 INJECTION INTRAMUSCULAR; INTRAVENOUS
Start: 2024-12-05

## 2024-11-26 RX ORDER — LORAZEPAM 2 MG/ML
0.5 INJECTION INTRAMUSCULAR EVERY 4 HOURS PRN
OUTPATIENT
Start: 2024-11-27

## 2024-11-26 RX ORDER — MEPERIDINE HYDROCHLORIDE 25 MG/ML
25 INJECTION INTRAMUSCULAR; INTRAVENOUS; SUBCUTANEOUS
OUTPATIENT
Start: 2024-12-05

## 2024-11-26 RX ORDER — EPINEPHRINE 1 MG/ML
0.3 INJECTION, SOLUTION INTRAMUSCULAR; SUBCUTANEOUS EVERY 5 MIN PRN
OUTPATIENT
Start: 2024-12-05

## 2024-11-26 RX ORDER — DIPHENHYDRAMINE HYDROCHLORIDE 50 MG/ML
50 INJECTION INTRAMUSCULAR; INTRAVENOUS
Start: 2024-11-27

## 2024-11-26 RX ORDER — HEPARIN SODIUM (PORCINE) LOCK FLUSH IV SOLN 100 UNIT/ML 100 UNIT/ML
5 SOLUTION INTRAVENOUS
OUTPATIENT
Start: 2024-11-27

## 2024-11-27 ENCOUNTER — ANCILLARY PROCEDURE (OUTPATIENT)
Dept: ULTRASOUND IMAGING | Facility: CLINIC | Age: 79
End: 2024-11-27
Attending: INTERNAL MEDICINE
Payer: COMMERCIAL

## 2024-11-27 ENCOUNTER — INFUSION THERAPY VISIT (OUTPATIENT)
Dept: ONCOLOGY | Facility: CLINIC | Age: 79
End: 2024-11-27
Attending: INTERNAL MEDICINE
Payer: COMMERCIAL

## 2024-11-27 ENCOUNTER — APPOINTMENT (OUTPATIENT)
Dept: LAB | Facility: CLINIC | Age: 79
End: 2024-11-27
Attending: INTERNAL MEDICINE
Payer: COMMERCIAL

## 2024-11-27 ENCOUNTER — PATIENT OUTREACH (OUTPATIENT)
Dept: ONCOLOGY | Facility: CLINIC | Age: 79
End: 2024-11-27

## 2024-11-27 DIAGNOSIS — C09.9 TONSIL CANCER (H): ICD-10-CM

## 2024-11-27 DIAGNOSIS — E04.1 THYROID NODULE: ICD-10-CM

## 2024-11-27 DIAGNOSIS — C78.00 MALIGNANT NEOPLASM METASTATIC TO LUNG, UNSPECIFIED LATERALITY (H): Primary | ICD-10-CM

## 2024-11-27 DIAGNOSIS — C10.9 SQUAMOUS CELL CARCINOMA OF OROPHARYNX (H): ICD-10-CM

## 2024-11-27 PROCEDURE — 76536 US EXAM OF HEAD AND NECK: CPT | Mod: GC | Performed by: RADIOLOGY

## 2024-11-27 NOTE — PROGRESS NOTES
Reached out to Conner. He had a large gap between today's ultrasound and his infusion appointment and he ultimately left before getting an infusion.     I offered to see if he would like to reschedule his infusion for Friday and he declined as he will be out of town. He would like to resume next week as scheduled.    Olivia Gates, RN, BSN  RN Care Coordinator  [facility]

## 2024-11-27 NOTE — PROGRESS NOTES
Pt did not show to his infusion appt today. He was here earlier for CT scan and left to go home. RNCC and MD notified.

## 2024-12-02 ENCOUNTER — TELEPHONE (OUTPATIENT)
Dept: INTERNAL MEDICINE | Facility: CLINIC | Age: 79
End: 2024-12-02
Payer: COMMERCIAL

## 2024-12-02 DIAGNOSIS — I63.521 CEREBROVASCULAR ACCIDENT INVOLVING ANTERIOR CIRCULATION, RIGHT (H): ICD-10-CM

## 2024-12-02 DIAGNOSIS — G89.29 CHRONIC BILATERAL LOW BACK PAIN WITHOUT SCIATICA: ICD-10-CM

## 2024-12-02 DIAGNOSIS — M17.0 PRIMARY OSTEOARTHRITIS OF BOTH KNEES: ICD-10-CM

## 2024-12-02 DIAGNOSIS — I50.32 CHRONIC DIASTOLIC HEART FAILURE (H): Primary | ICD-10-CM

## 2024-12-02 DIAGNOSIS — M54.50 CHRONIC BILATERAL LOW BACK PAIN WITHOUT SCIATICA: ICD-10-CM

## 2024-12-02 NOTE — TELEPHONE ENCOUNTER
[facility] Call Center    Phone Message    May a detailed message be left on voicemail: yes     Reason for Call: Other: Pt is requesting a call back as his lift chair is no longer working at this time and he is needing for the clinic to send over new paperwork to get a new lift chair. Please advise.       Action Taken: Other: PCC    Travel Screening: Not Applicable     Date of Service: 12/02/24

## 2024-12-02 NOTE — TELEPHONE ENCOUNTER
Spoke to patient.  Confirm with patient lift chair has been over 5 years old. Patient qualifies a new lift chair. Notified patient lift chair requires face-to-face visit with provider per insurance for coverage.  Since Dr. Nascimento is booked out, we can try the previous appointment with provider in September.  Most insurance accept visit in the past 3 months or future appt within 30 days. Patient agrees with plan.    Confirm with patient to send lift chair order to [facility].      Michael Weldon CMA ([facility]) at 3:44 PM on 12/2/2024

## 2024-12-02 NOTE — TELEPHONE ENCOUNTER
Called patient. No Answer/Busy - No Answer. VM box is full, so unable to leave VM.     Will try to call patient later. Sent a Zify message as well.       Michael Weldon CMA ([facility]) at 1:18 PM on 12/2/2024

## 2024-12-03 ENCOUNTER — PATIENT OUTREACH (OUTPATIENT)
Dept: ONCOLOGY | Facility: CLINIC | Age: 79
End: 2024-12-03
Payer: COMMERCIAL

## 2024-12-03 NOTE — PROGRESS NOTES
[facility]: Cancer Care                                                                                          Received fax from [facility] stating that Conner recently enrolled in their pharmacy as a part of an overall health assessment. They are requesting that a few medications that we have prescribed be sent to their pharmacy.    Attempted to contact Conner to discuss and clarify what he would like our clinic to do. LVM requesting a return call.    Olivia Gates, RN, BSN  RN Care Coordinator  [facility]

## 2024-12-04 ENCOUNTER — VIRTUAL VISIT (OUTPATIENT)
Dept: ONCOLOGY | Facility: CLINIC | Age: 79
End: 2024-12-04
Attending: INTERNAL MEDICINE
Payer: COMMERCIAL

## 2024-12-04 VITALS — WEIGHT: 191.4 LBS | HEIGHT: 64 IN | BODY MASS INDEX: 32.68 KG/M2

## 2024-12-04 DIAGNOSIS — C78.00 MALIGNANT NEOPLASM METASTATIC TO LUNG, UNSPECIFIED LATERALITY (H): ICD-10-CM

## 2024-12-04 DIAGNOSIS — R60.0 BILATERAL LEG EDEMA: ICD-10-CM

## 2024-12-04 DIAGNOSIS — C10.9 SQUAMOUS CELL CARCINOMA OF OROPHARYNX (H): Primary | ICD-10-CM

## 2024-12-04 DIAGNOSIS — E11.9 TYPE 2 DIABETES MELLITUS WITHOUT COMPLICATION, WITHOUT LONG-TERM CURRENT USE OF INSULIN (H): ICD-10-CM

## 2024-12-04 PROCEDURE — 99215 OFFICE O/P EST HI 40 MIN: CPT | Mod: 95 | Performed by: NURSE PRACTITIONER

## 2024-12-04 PROCEDURE — G2211 COMPLEX E/M VISIT ADD ON: HCPCS | Mod: 95 | Performed by: NURSE PRACTITIONER

## 2024-12-04 ASSESSMENT — PAIN SCALES - GENERAL: PAINLEVEL_OUTOF10: SEVERE PAIN (6)

## 2024-12-04 NOTE — NURSING NOTE
Current patient location: [address] [place] [address] RD   [facility] [address]    Is the patient currently in the state of [place]? YES    Visit mode:VIDEO    If the visit is dropped, the patient can be reconnected by:VIDEO VISIT: Text to cell phone:   Telephone Information:   Mobile [phone]       Will anyone else be joining the visit? NO  (If patient encounters technical issues they should call [phone] :[phone])    Are changes needed to the allergy or medication list? Pt stated no changes to allergies and Pt stated no med changes    Are refills needed on medications prescribed by this physician? NO    Rooming Documentation:  Unable to complete questionnaire(s) due to time    Reason for visit: FREDY SALMERONF

## 2024-12-04 NOTE — PROGRESS NOTES
Virtual Visit Details    Type of service:  Video Visit   Video Start Time: 3:24 PM  Video End Time:3:29 PM    Originating Location (pt. Location): Home    Distant Location (provider location):  On-site  Platform used for Video Visit: [facility] CANCER [address] [address]  Phone: [phone]  Fax: [phone]    PATIENT NAME: Jonathan Bishop  MRN # [id number]   DATE OF VISIT: Dec 4, 2024  YOB: 1945     Otolaryngology: Dr. Karishma Eng  Radiation Oncology: Dr. Jenni Mills  PCP: Dr. Buck Nascimento     CANCER TYPE: SCC L tonsil, p16 +lety  STAGE: [id number] (IVC)  ECOG PS: 1-2     PD-L1: TPS 20%, CPS 25% on [id number] tonsil bx  NGS: N/A      CANCER SUMMARY  2/26/23              L tonsil mass bx in clinic (Dr. Eng).   2/20/23              PET/CT. 3.7 x 4.0 x 5.1 cm mass L palatine tonsil causing narrowing of the oropharyngeal lumen, L level 2 node, L retropharyngeal nodular density, extension of primary mass vs retropharyngeal node, 0.8 cm RLL nodule concerning for met, mild focal uptake R iliac bone and L1 without CT correlate suspicious for mets.   3/2/23  R VATS, wedge resection. Path: SCC, poorly differentiated, associated with necrosis, 1 cm, negative margins, p16 +lety  3/24/23              MRI L spine and pelvis. Diffusely heterogeneous marrow signal throughout without corresponding abnormal signal on sagittal STIR sequence and no abnormal enhancement. Nonspecific but could be benign process such as red marrow hyperplasia, cannot completely exclude metastatic disease or infiltrative pathologic marrow process. No lesions corresponding to FDG avid spots on PET/CT.   4/19~10/18/23 Pembrolizumab.  10/26/23            CT neck and CAP. Increased L palatine tonsil mass, 3.8 x 2.7 cm --> 4.7 x 3.5 cm. Increased bilateral cervical nodes up to 2.3 x 2.4 cm R level 2 node. New 3 mm RML nodule, no other mets.   11/14/23 PET. 4.6 x 3.3 cm L  palatine tonsil mass (SUB 26.8), R level 2A and 2A/3 nodes. Questionable uptake distal R femoral medullary cavity, partially imaged, with questionable subtle corresponding soft tissue attenuation on CT. MRI could be obtained to better evaluate.  11/21~12/2/23   [facility] for weakness/fall. COVID/paxlovid, MSSA bacteremia (1 of 2 bottles on 1 day), TTE negative, treated with cefazolin but didn't complete 2 week course, episode of unresponsiveness 12/2. Dispo plan was TCU on [facility], but left AMA. CTA chest negative for PE, showed grossly unchanged cervical adenopathy. Brain MRI 11/22 for stroke code negative for infarct. Showed SVID, moderate atrophy, suboptimal contrast bolus to examine intracranial structures, 4.5 x 3 x 4.5 cm L nasopharyngeal mass, incompletely visualized. CTA negative.   12/19/23 Quad shot fraction #1. No-showed thereafter.  1/10/24~curr     Carboplatin (AUC 2) + paclitaxel 60 mg/m2 weekly. Held 2/13 due to anorexia, fatigue. Break between 8/1 and 9/19 doses for trip, fatigue. Missed dose 10/16 due to transportation issues.  3/13~3/17/24     [facility] for acute encephalopathy. Had stopped his car in the middle of the highway after receiving chemo earlier that day. Recommended not to drive   4/30/24  CT CAP and CT neck. Residual bilateral cervical adenopathy.   7/30/24  PET. CT not covered. Significant reduction L palatine tonsil mass - residual focal uptake in the L posterolateral oropharyngeal wall (SUV 9.1, previously 26.8). Previously seen multiple FDG avid L cervical nodes and few enlarged R nodes are completed resolved. Mild diffuse uptake along bilateral lower legs and ankles with associated skin thickening and stranding along the soft tissues; findings can be seen with cellulitis. No metastatic disease.   11/5/24  CT neck and CT CAP.  Stable appearance L palatine tonsil, no discrete mass. 1.1 cm R thyroid nodule, a little bigger. O/w unremarkable.     SUBJECTIVE  Conner is seen prior  "to weekly carbo/taxol.  -Missed appointments last week and sporadically over the past few weeks for various reasons. Still desires weekly treatment as recommended.  -Reports feeling quite well, no acute issues  -has PCA at home, otherwise getting around OK.  -No nausea. Appetite slightly decreased lately but also trying to eat more healthy foods  -States lower extremity edema is \"nearly gone\". Wearing compression wraps.   -Peripheral neuropathy stable  -Denies any known neurologic changes or spells. No aphasia. No dizziness/lightheadedness or unilateral deficits  -No headaches or vision changes      CURRENT OUTPATIENT MEDICATIONS  Reviewed in Epic    ALLERGIES  No Known Allergies     PHYSICAL EXAM  Ht 1.626 m (5' 4\")   Wt 86.8 kg (191 lb 6.4 oz)   BMI 32.85 kg/m      Video physical exam  Appears well. Voice strong    LABORATORY AND IMAGING STUDIES  Most Recent 3 CBC's:  Recent Labs   Lab Test 11/07/24  0847 10/24/24  0855 10/10/24  0849   WBC 4.7 4.7 4.3   HGB 8.8* 8.8* 9.1*   MCV 85 85 84    141* 158   ANEUTAUTO 2.5 2.2 2.3    Most Recent 3 BMP's:  Recent Labs   Lab Test 11/07/24  0847 10/24/24  0855 10/10/24  0849   * 142 145   POTASSIUM 3.4 3.9 3.8   CHLORIDE 108* 105 108*   CO2 28 27 27   BUN 22.3 21.3 20.0   CR 0.82 0.93 0.72   ANIONGAP 10 10 10   WARREN 9.2 8.8 9.1   * 113* 100*   PROTTOTAL 6.5 6.8 6.7   ALBUMIN 3.9 4.0 4.0    Most Recent 2 LFT's:  Recent Labs   Lab Test 11/07/24  0847 10/24/24  0855   AST 24 30   ALT 14 16   ALKPHOS 95 104   BILITOTAL 0.2 0.3    Most Recent TSH and T4:  Recent Labs   Lab Test 09/19/24  1115   TSH 1.44   T4 1.14     Phos/Mag:  Lab Results   Component Value Date    PHOS 3.0 03/17/2024    PHOS 2.4 (L) 12/09/2020    PHOS 3.9 12/08/2020    MAG 1.7 11/07/2024    MAG 1.6 (L) 10/24/2024    MAG 1.9 10/10/2024      I personally reviewed and interpreted the above labs today.     ASSESSMENT AND PLAN  SCC L tonsil, p16 +lety, CPS 25%/TPS 20%, oligometastatic lung met, " +/- bone mets: Great VA overall on weekly carboplatin paclitaxel and the 1 fraction of quad shot before he stopped showing up. Small residual tumor in the L tonsil/soft palate on PET 7/30/24. Referred back to Dr. Mills, but decided to defer radiation for the moment given good response to chemo. He's felt much better with treatment breaks. Currently the plan is to hold off on consolidative radiation in absence of metastatic recurrent disease and continue weekly carbo/taxol. He plans to come to clinic for chemo tomorrow (12/4)-okay to proceed pending labs. Restage with CT-CAP and CT neck in ~2 months--mid January     Thyroid nodule: seen on CT, US without intervention needed    Neurologic change: demonstrated neuro changes during last visit with Dr. Mueller including stiffening and closing eyes. No arrhythmia on exam at that time or on EKG.  Neurology referral not yet scheduled-will request.     Neurocognitive changes, encephalopathy March 2024: Hasn't pursued cognitive testing yet despite encouragement.      Anorexia, malnutrition, weight loss: Stable. Weight better with PCA assistance with meal preparation.      Hypomag: On mag oxide but remains marginal. Continue checking, replacing IV prn      H/o CVA: Residual L weakness. Uses walker at baseline, motorized scooter out and about. Gait not observed today d/t virtual visit     Screening for hypothyroidism: TFTs 9/19/24 ok, check in ~ March 2025.     LE edema: Better with consistent PCA care. Continue compression, elevation.     Surrogate decision maker: He would want us to talk with Mariann if he could not speak for himself. Not discussed today      Peripheral neuropathy: MRI shows a lot of foramenal stenosis and spinal canal stenosis, so more likely related to that than DM2. Doesn't appear to be worsening on paclitaxel      30 minutes spent on the date of the encounter doing chart review, review of test results, interpretation of tests, patient visit, and  documentation      Olivia Sanchez CNP on 12/4/2024 at 3:31 PM    ---  The longitudinal plan of care for the diagnosis(es)/condition(s) as documented were addressed during this visit. Due to the added complexity in care, I will continue to support Conner in the subsequent management and with ongoing continuity of care.

## 2024-12-05 ENCOUNTER — NURSE TRIAGE (OUTPATIENT)
Dept: ONCOLOGY | Facility: CLINIC | Age: 79
End: 2024-12-05
Payer: COMMERCIAL

## 2024-12-05 NOTE — TELEPHONE ENCOUNTER
Faxed signed DME lift chair order and  9/16/2024 office visit documentation notes to [facility] medical at fax [phone]      Michael Weldon CMA ([facility]) at 7:16 AM on 12/5/2024

## 2024-12-05 NOTE — TELEPHONE ENCOUNTER
Patient cancelled appointments today due to chest pain.   Has had chest pain before. He has had this pains for a long time. He states they have never done anything for the chest pain.   He states it comes and goes. If he is still for a few minutes they go away and then are gone for a while.   Rates chest pain is very slight can just kind of feel it in middle of chest.   States it feels like a burning pain. No arm pain.   States it feels like acid reflux, he has never tried tums or other acid blocker for the pain.   Chest pain happens most at night when laying down.   He is going to try an acid blocker such as omeprazole or tums.   Declines recommendation to go into ER or urgent care.   Went over reasons he should go into ER such as more severe pain, SOB.   He states he will go to ER if pain becomes severe or he has any SOB.   Missed this weeks chemo appt. Is scheduled next week for labs and chemo.

## 2024-12-06 DIAGNOSIS — N52.9 ERECTILE DYSFUNCTION, UNSPECIFIED ERECTILE DYSFUNCTION TYPE: ICD-10-CM

## 2024-12-09 ENCOUNTER — TELEPHONE (OUTPATIENT)
Dept: INTERNAL MEDICINE | Facility: CLINIC | Age: 79
End: 2024-12-09
Payer: COMMERCIAL

## 2024-12-09 ENCOUNTER — NURSE TRIAGE (OUTPATIENT)
Dept: ONCOLOGY | Facility: CLINIC | Age: 79
End: 2024-12-09
Payer: COMMERCIAL

## 2024-12-09 DIAGNOSIS — N52.9 ERECTILE DYSFUNCTION, UNSPECIFIED ERECTILE DYSFUNCTION TYPE: ICD-10-CM

## 2024-12-09 RX ORDER — SILDENAFIL 100 MG/1
100 TABLET, FILM COATED ORAL DAILY PRN
Qty: 30 TABLET | Refills: 0 | OUTPATIENT
Start: 2024-12-09

## 2024-12-09 RX ORDER — SILDENAFIL 100 MG/1
100 TABLET, FILM COATED ORAL DAILY PRN
Qty: 6 TABLET | Refills: 9 | Status: SHIPPED | OUTPATIENT
Start: 2024-12-09

## 2024-12-09 NOTE — TELEPHONE ENCOUNTER
[facility] Call Center    Phone Message    May a detailed message be left on voicemail: yes     Reason for Call: Medication Refill Request    Has the patient contacted the pharmacy for the refill? Yes   Name of medication being requested: sildenafil (VIAGRA) 100 MG tablet [[id number]]   Provider who prescribed the medication: Buck Nascimento MD  Pharmacy: [facility] - [address]  P: [phone]  F: [phone]    Date medication is needed: asap - PHARMACY CHANGE       Action Taken: Other: pcc    Travel Screening: Not Applicable     Date of Service:

## 2024-12-09 NOTE — TELEPHONE ENCOUNTER
"Nabila, from [facility], who states they fax a request on 12/6 for medications below, wanting to know if fax was received and status of e-scripts.   Medications: prochloperazine, potassium chloride, ondansetron, diazepam, and another medication that started with a \"t\" but unable to get clearly from speaker, as she kept going on to her next question and repeating questions.     Confirmed fax number of [phone] for Dr. Mueller and Olivia Sanchez, informed this NOT the same fax for Dr. Nascimento (PCP) as that was one of the questions.     Informed Nabila x2 that writer will send message to care coordinator, as I cannot see if a fax was received from them and will ask RNCC to follow up with pharmacy at [phone].   "

## 2024-12-09 NOTE — TELEPHONE ENCOUNTER
Contacted Nabila at [facility]. I let her know that we did receive their fax.        The fax requests prescriptions of ondansetron, potassium chloride, prochlorperazine, diazeapem, and tamsulosin.  I have left Conner 2 voice messages to see if he indeed would like prescriptions transferred to their pharmacy and to confirm which of these medications he is taking but I have not heard back from him yet. A few of these medications are not on his med list. Will await return call from Conner.

## 2024-12-09 NOTE — TELEPHONE ENCOUNTER
sildenafil (VIAGRA) 100 MG tablet 6 tablet 11 9/16/2024   Remaining refills sent to requested pharmacy.

## 2024-12-10 ENCOUNTER — PATIENT OUTREACH (OUTPATIENT)
Dept: ONCOLOGY | Facility: CLINIC | Age: 79
End: 2024-12-10
Payer: COMMERCIAL

## 2024-12-10 DIAGNOSIS — C09.9 TONSIL CANCER (H): ICD-10-CM

## 2024-12-10 DIAGNOSIS — C10.9 SQUAMOUS CELL CARCINOMA OF OROPHARYNX (H): ICD-10-CM

## 2024-12-10 DIAGNOSIS — C78.00 MALIGNANT NEOPLASM METASTATIC TO LUNG, UNSPECIFIED LATERALITY (H): ICD-10-CM

## 2024-12-11 DIAGNOSIS — N40.1 HYPERPLASIA OF PROSTATE WITH LOWER URINARY TRACT SYMPTOMS (LUTS): ICD-10-CM

## 2024-12-11 DIAGNOSIS — R60.0 PERIPHERAL EDEMA: ICD-10-CM

## 2024-12-11 RX ORDER — PROCHLORPERAZINE MALEATE 10 MG
5 TABLET ORAL EVERY 6 HOURS PRN
Qty: 30 TABLET | Refills: 1 | Status: SHIPPED | OUTPATIENT
Start: 2024-12-11

## 2024-12-11 RX ORDER — TAMSULOSIN HYDROCHLORIDE 0.4 MG/1
CAPSULE ORAL
Qty: 90 CAPSULE | Refills: 3 | Status: SHIPPED | OUTPATIENT
Start: 2024-12-11

## 2024-12-11 RX ORDER — ONDANSETRON 8 MG/1
8 TABLET, FILM COATED ORAL EVERY 8 HOURS PRN
Qty: 30 TABLET | Refills: 1 | Status: SHIPPED | OUTPATIENT
Start: 2024-12-11

## 2024-12-11 RX ORDER — TORSEMIDE 20 MG/1
40 TABLET ORAL DAILY
Qty: 180 TABLET | Refills: 0 | Status: SHIPPED | OUTPATIENT
Start: 2024-12-11

## 2024-12-11 NOTE — PROGRESS NOTES
"[facility]: Cancer Care                                                                                          Received fax from [facility] requesting we transfer some prescriptions to their pharmacy as Conner \"has chosen [facility] to be their primary pharmacy provider for all maintenance medications\"    They're requesting we transfer zofran, potassium chloride, compazine, and tamsulosin. Reviewed with Olivia Sanchez NP who was in agreement to send zofran and compazine.    Potassium chloride was sent on 4/30/24 with 1 refill and has not been filled since so Conner is not currently taking it. Potassium was 3.4 at last check. Per Olivia no need to fill.    Olivia RODRIGUEZ requests we inquire with Dr. Nascimento about filling the tamsulosin.     I did contact Conner and confirmed he does indeed want the antiemetics sent to [facility].    Will ariel up refills of ondansetron and prochlorperazine & route to Olivia RODRIGUEZ to sign. Staff message sent to [facility] pool to inquire with Dr. Nascimento regarding tamsulosin rx.    Olivia Gates, RN, BSN  RN Care Coordinator  [facility]    "

## 2024-12-11 NOTE — TELEPHONE ENCOUNTER
Medication requested: torsemide 20 mg tablet    Last prescribing provider: Olivia Sanchez CNP on 7/24/24    Last clinic visit date: 12/4/24 with Olivia Sanchez CNP    Recommendations for requested medication (if none, N/A): NA    Any other pertinent information (if none, N/A): NA    Refilled: Y/N, if NO, why?    Pended and Routed to Olivia Sanchez CNP

## 2024-12-11 NOTE — TELEPHONE ENCOUNTER
Medication requested: tamsulosin 0.4 mg capsule    Last prescribing provider: Dominique Mueller MD on 8/16/23    Last clinic visit date: 12/4/24 with Olivia Sanchez CNP    Recommendations for requested medication (if none, N/A): NA    Any other pertinent information (if none, N/A): NA    Refilled: Y/N, if NO, why?    Pended and Routed to Olivia Sanchez CNP

## 2024-12-12 ENCOUNTER — APPOINTMENT (OUTPATIENT)
Dept: LAB | Facility: CLINIC | Age: 79
End: 2024-12-12
Attending: INTERNAL MEDICINE
Payer: COMMERCIAL

## 2024-12-12 ENCOUNTER — INFUSION THERAPY VISIT (OUTPATIENT)
Dept: ONCOLOGY | Facility: CLINIC | Age: 79
End: 2024-12-12
Attending: INTERNAL MEDICINE
Payer: COMMERCIAL

## 2024-12-12 VITALS
BODY MASS INDEX: 34.36 KG/M2 | SYSTOLIC BLOOD PRESSURE: 150 MMHG | HEART RATE: 74 BPM | DIASTOLIC BLOOD PRESSURE: 81 MMHG | OXYGEN SATURATION: 97 % | RESPIRATION RATE: 16 BRPM | TEMPERATURE: 98.2 F | WEIGHT: 200.2 LBS

## 2024-12-12 DIAGNOSIS — C10.9 SQUAMOUS CELL CARCINOMA OF OROPHARYNX (H): ICD-10-CM

## 2024-12-12 DIAGNOSIS — C09.9 TONSIL CANCER (H): ICD-10-CM

## 2024-12-12 DIAGNOSIS — C78.00 MALIGNANT NEOPLASM METASTATIC TO LUNG, UNSPECIFIED LATERALITY (H): Primary | ICD-10-CM

## 2024-12-12 LAB
ALBUMIN SERPL BCG-MCNC: 4.1 G/DL (ref 3.5–5.2)
ALP SERPL-CCNC: 116 U/L (ref 40–150)
ALT SERPL W P-5'-P-CCNC: 15 U/L (ref 0–70)
ANION GAP SERPL CALCULATED.3IONS-SCNC: 10 MMOL/L (ref 7–15)
AST SERPL W P-5'-P-CCNC: 26 U/L (ref 0–45)
BASOPHILS # BLD AUTO: 0 10E3/UL (ref 0–0.2)
BASOPHILS NFR BLD AUTO: 1 %
BILIRUB SERPL-MCNC: 0.2 MG/DL
BUN SERPL-MCNC: 22 MG/DL (ref 8–23)
CALCIUM SERPL-MCNC: 9.7 MG/DL (ref 8.8–10.4)
CHLORIDE SERPL-SCNC: 107 MMOL/L (ref 98–107)
CREAT SERPL-MCNC: 0.8 MG/DL (ref 0.67–1.17)
EGFRCR SERPLBLD CKD-EPI 2021: 90 ML/MIN/1.73M2
EOSINOPHIL # BLD AUTO: 0.3 10E3/UL (ref 0–0.7)
EOSINOPHIL NFR BLD AUTO: 5 %
ERYTHROCYTE [DISTWIDTH] IN BLOOD BY AUTOMATED COUNT: 16.9 % (ref 10–15)
GLUCOSE SERPL-MCNC: 100 MG/DL (ref 70–99)
HCO3 SERPL-SCNC: 26 MMOL/L (ref 22–29)
HCT VFR BLD AUTO: 35 % (ref 40–53)
HGB BLD-MCNC: 10.6 G/DL (ref 13.3–17.7)
IMM GRANULOCYTES # BLD: 0 10E3/UL
IMM GRANULOCYTES NFR BLD: 0 %
LYMPHOCYTES # BLD AUTO: 1.8 10E3/UL (ref 0.8–5.3)
LYMPHOCYTES NFR BLD AUTO: 31 %
MCH RBC QN AUTO: 25.2 PG (ref 26.5–33)
MCHC RBC AUTO-ENTMCNC: 30.3 G/DL (ref 31.5–36.5)
MCV RBC AUTO: 83 FL (ref 78–100)
MONOCYTES # BLD AUTO: 0.5 10E3/UL (ref 0–1.3)
MONOCYTES NFR BLD AUTO: 8 %
NEUTROPHILS # BLD AUTO: 3.2 10E3/UL (ref 1.6–8.3)
NEUTROPHILS NFR BLD AUTO: 54 %
NRBC # BLD AUTO: 0 10E3/UL
NRBC BLD AUTO-RTO: 0 /100
PLATELET # BLD AUTO: 211 10E3/UL (ref 150–450)
POTASSIUM SERPL-SCNC: 4.1 MMOL/L (ref 3.4–5.3)
PROT SERPL-MCNC: 7.5 G/DL (ref 6.4–8.3)
RBC # BLD AUTO: 4.2 10E6/UL (ref 4.4–5.9)
SODIUM SERPL-SCNC: 143 MMOL/L (ref 135–145)
WBC # BLD AUTO: 5.8 10E3/UL (ref 4–11)

## 2024-12-12 PROCEDURE — 36415 COLL VENOUS BLD VENIPUNCTURE: CPT | Performed by: REGISTERED NURSE

## 2024-12-12 PROCEDURE — 258N000003 HC RX IP 258 OP 636: Performed by: INTERNAL MEDICINE

## 2024-12-12 PROCEDURE — 85004 AUTOMATED DIFF WBC COUNT: CPT | Performed by: REGISTERED NURSE

## 2024-12-12 PROCEDURE — 250N000011 HC RX IP 250 OP 636: Performed by: REGISTERED NURSE

## 2024-12-12 PROCEDURE — 82040 ASSAY OF SERUM ALBUMIN: CPT | Performed by: REGISTERED NURSE

## 2024-12-12 PROCEDURE — 80053 COMPREHEN METABOLIC PANEL: CPT | Performed by: REGISTERED NURSE

## 2024-12-12 PROCEDURE — 250N000011 HC RX IP 250 OP 636: Performed by: INTERNAL MEDICINE

## 2024-12-12 RX ORDER — ONDANSETRON 2 MG/ML
8 INJECTION INTRAMUSCULAR; INTRAVENOUS ONCE
Status: COMPLETED | OUTPATIENT
Start: 2024-12-12 | End: 2024-12-12

## 2024-12-12 RX ORDER — DEXAMETHASONE SODIUM PHOSPHATE 4 MG/ML
8 INJECTION, SOLUTION INTRA-ARTICULAR; INTRALESIONAL; INTRAMUSCULAR; INTRAVENOUS; SOFT TISSUE ONCE
Status: COMPLETED | OUTPATIENT
Start: 2024-12-12 | End: 2024-12-12

## 2024-12-12 RX ADMIN — PACLITAXEL 96 MG: 6 INJECTION, SOLUTION INTRAVENOUS at 10:13

## 2024-12-12 RX ADMIN — CARBOPLATIN 200 MG: 10 INJECTION, SOLUTION INTRAVENOUS at 11:20

## 2024-12-12 RX ADMIN — DEXAMETHASONE SODIUM PHOSPHATE 8 MG: 4 INJECTION, SOLUTION INTRA-ARTICULAR; INTRALESIONAL; INTRAMUSCULAR; INTRAVENOUS; SOFT TISSUE at 09:40

## 2024-12-12 RX ADMIN — FAMOTIDINE 20 MG: 10 INJECTION INTRAVENOUS at 09:46

## 2024-12-12 RX ADMIN — ONDANSETRON 8 MG: 2 INJECTION INTRAMUSCULAR; INTRAVENOUS at 09:52

## 2024-12-12 ASSESSMENT — PAIN SCALES - GENERAL: PAINLEVEL_OUTOF10: EXTREME PAIN (8)

## 2024-12-12 NOTE — PATIENT INSTRUCTIONS
[facility] Triage and after hours / weekends / holidays:  [phone]    Please call the triage or after hours line if you experience a temperature greater than or equal to 100.4, shaking chills, have uncontrolled nausea, vomiting and/or diarrhea, dizziness, shortness of breath, chest pain, bleeding, unexplained bruising, or if you have any other new/concerning symptoms, questions or concerns.      If you are having any concerning symptoms or wish to speak to a provider before your next infusion visit, please call triage to notify them so we can adequately serve you.     If you need a refill on a narcotic prescription or other medication, please call before your infusion appointment.                December 2024 Sunday Monday Tuesday Wednesday Thursday Friday Saturday   1     2     3     4    RETURN CCSL   3:15 PM   (45 min.)   Olivia Sanchez CNP   [facility] 5     6     7       8     9     10     11     12    LAB PERIPHERAL   8:15 AM   (15 min.)   [facility]   [facility]    ONC INFUSION 2 HR (120 MIN)   9:30 AM   (120 min.)    ONC INFUSION NURSE   [facility] 13     14       15     16     17     18     19    LAB PERIPHERAL   7:30 AM   (15 min.)   [facility]   [facility]    RETURN CCSL   7:45 AM   (45 min.)   Vandana Bertrand CNP   [facility]    ONC INFUSION 2 HR (120 MIN)   9:00 AM   (120 min.)    ONC INFUSION NURSE   [facility] 20     21       22     23     24     25     26    LAB PERIPHERAL  12:00 PM   (15 min.)   [facility]   [facility]    ONC INFUSION 2 HR (120 MIN)  12:30 PM   (120 min.)    ONC INFUSION NURSE   [facility] 27     28       29 30 31 January 2025 Sunday Monday Tuesday Wednesday  Thursday Friday Saturday                  1     2    LAB PERIPHERAL  11:00 AM   (15 min.)    [facility]   [facility]    RETURN CCSL  11:15 AM   (45 min.)   Vandana Bertrand CNP   [facility]    ONC INFUSION 2 HR (120 MIN)  12:30 PM   (120 min.)    ONC INFUSION NURSE   [facility] 3     4       5     6     7     8     9    LAB PERIPHERAL   8:30 AM   (15 min.)    [facility]   [facility]    ONC INFUSION 2 HR (120 MIN)   9:00 AM   (120 min.)    ONC INFUSION NURSE   [facility] 10     11       12     13     14    CT CHEST ABD NECK W CMB   8:50 AM   (40 min.)   [facility]   [facility] 15    RETURN CCSL  11:30 AM   (30 min.)   Dominique Mueller MD   [facility] 16     17     18       19     20     21     22     23     24     25       26     27     28     29     30     31                         Recent Results (from the past 24 hours)   Magnesium    Collection Time: 12/12/24  8:11 AM   Result Value Ref Range    Magnesium 1.9 1.7 - 2.3 mg/dL   Comprehensive metabolic panel    Collection Time: 12/12/24  8:11 AM   Result Value Ref Range    Sodium 143 135 - 145 mmol/L    Potassium 4.1 3.4 - 5.3 mmol/L    Carbon Dioxide (CO2) 26 22 - 29 mmol/L    Anion Gap 10 7 - 15 mmol/L    Urea Nitrogen 22.0 8.0 - 23.0 mg/dL    Creatinine 0.80 0.67 - 1.17 mg/dL    GFR Estimate 90 >60 mL/min/1.73m2    Calcium 9.7 8.8 - 10.4 mg/dL    Chloride 107 98 - 107 mmol/L    Glucose 100 (H) 70 - 99 mg/dL    Alkaline Phosphatase 116 40 - 150 U/L    AST 26 0 - 45 U/L    ALT 15 0 - 70 U/L    Protein Total 7.5 6.4 - 8.3 g/dL    Albumin 4.1 3.5 - 5.2 g/dL    Bilirubin Total 0.2 <=1.2 mg/dL   CBC with platelets and differential    Collection Time: 12/12/24  8:11 AM   Result Value Ref Range    WBC Count 5.8 4.0 - 11.0 10e3/uL    RBC Count  4.20 (L) 4.40 - 5.90 10e6/uL    Hemoglobin 10.6 (L) 13.3 - 17.7 g/dL    Hematocrit 35.0 (L) 40.0 - 53.0 %    MCV 83 78 - 100 fL    MCH 25.2 (L) 26.5 - 33.0 pg    MCHC 30.3 (L) 31.5 - 36.5 g/dL    RDW 16.9 (H) 10.0 - 15.0 %    Platelet Count 211 150 - 450 10e3/uL    % Neutrophils 54 %    % Lymphocytes 31 %    % Monocytes 8 %    % Eosinophils 5 %    % Basophils 1 %    % Immature Granulocytes 0 %    NRBCs per 100 WBC 0 <1 /100    Absolute Neutrophils 3.2 1.6 - 8.3 10e3/uL    Absolute Lymphocytes 1.8 0.8 - 5.3 10e3/uL    Absolute Monocytes 0.5 0.0 - 1.3 10e3/uL    Absolute Eosinophils 0.3 0.0 - 0.7 10e3/uL    Absolute Basophils 0.0 0.0 - 0.2 10e3/uL    Absolute Immature Granulocytes 0.0 <=0.4 10e3/uL    Absolute NRBCs 0.0 10e3/uL

## 2024-12-12 NOTE — NURSING NOTE
Chief Complaint   Patient presents with    Blood Draw     Labs drawn via piv placed by vascular in lab. VS taken.     Labs drawn from PIV placed by vascular. Line flushed with saline. Vitals taken. Pt checked in for appointment(s).    Debbie Cabrera RN

## 2024-12-12 NOTE — PROGRESS NOTES
Infusion Nursing Note:  Jonathan Bishop presents today for C8 D1 Taxol + Carboplatin.    Patient seen by provider today: No   present during visit today: Not Applicable.    Note: Patient presents to the infusion center feeling overall well today. Patient reports on-going fatigue but feels this is manageable. Denies fevers, chills, SOB, or chest pain. Chronic back pain remains at an 8/10. Heat packs applied to low back pain per patients request to help manage the pain. Patient wishes to proceed with treatment today.       Intravenous Access:  Peripheral IV placed.    Treatment Conditions:  Lab Results   Component Value Date    HGB 10.6 (L) 12/12/2024    WBC 5.8 12/12/2024    ANEU 1.0 (L) 03/06/2024    ANEUTAUTO 3.2 12/12/2024     12/12/2024        Lab Results   Component Value Date     12/12/2024    POTASSIUM 4.1 12/12/2024    MAG 1.9 12/12/2024    CR 0.80 12/12/2024    WARREN 9.7 12/12/2024    BILITOTAL 0.2 12/12/2024    ALBUMIN 4.1 12/12/2024    ALT 15 12/12/2024    AST 26 12/12/2024       Results reviewed, labs MET treatment parameters, ok to proceed with treatment.      Post Infusion Assessment:  Patient tolerated infusion without incident.  Blood return noted pre and post infusion.  Site patent and intact, free from redness, edema or discomfort.  No evidence of extravasations.  Access discontinued per protocol.       Discharge Plan:   Patient declined prescription refills. Patient has a refill of tamsulosin at the [facility] pharmacy in [place]. He plans to pick this up today after his infusion.   Discharge instructions reviewed with: Patient and PCA.  Patient and/or family verbalized understanding of discharge instructions and all questions answered.  Copy of AVS reviewed with patient and/or family.  Patient will return 12/19/2024 for next appointment.  Patient discharged in stable condition accompanied by: self and PCA.  Departure Mode: Ambulatory w/ walker.       Belle Singh  RN

## 2024-12-16 DIAGNOSIS — M54.50 ACUTE MIDLINE LOW BACK PAIN WITHOUT SCIATICA: ICD-10-CM

## 2024-12-16 RX ORDER — OXYCODONE AND ACETAMINOPHEN 5; 325 MG/1; MG/1
1-2 TABLET ORAL EVERY 6 HOURS PRN
Qty: 150 TABLET | Refills: 0 | Status: SHIPPED | OUTPATIENT
Start: 2024-12-16

## 2024-12-16 NOTE — TELEPHONE ENCOUNTER
[facility] Call Center    Phone Message    May a detailed message be left on voicemail: yes     Reason for Call: Medication Refill Request    Has the patient contacted the pharmacy for the refill? Yes   Name of medication being requested: oxyCODONE-acetaminophen (PERCOCET) 5-325 MG tablet     Provider who prescribed the medication: Dr Nascimento    Pharmacy:    [facility] [address] [place] [address] [facility]      Date medication is needed: 12/22/2024       Action Taken: Message routed to:  [facility]    Travel Screening: Not Applicable     Date of Service:

## 2024-12-17 DIAGNOSIS — N52.9 ERECTILE DYSFUNCTION, UNSPECIFIED ERECTILE DYSFUNCTION TYPE: ICD-10-CM

## 2024-12-17 NOTE — PROGRESS NOTES
[facility]  [address] [address]  Phone: [phone]  Fax: [phone]    PATIENT NAME: Jonathan Bishop  MRN # [id number]   DATE OF VISIT: Dec 19, 2024  YOB: 1945     Otolaryngology: Dr. Karishma Eng  Radiation Oncology: Dr. Jenni Mills  PCP: Dr. Buck Nascimento     CANCER TYPE: SCC L tonsil, p16 +lety  STAGE: [id number] (IVC)  ECOG PS: 1-2     PD-L1: TPS 20%, CPS 25% on [id number] tonsil bx  NGS: N/A      CANCER SUMMARY  2/26/23              L tonsil mass bx in clinic (Dr. Eng).   2/20/23              PET/CT. 3.7 x 4.0 x 5.1 cm mass L palatine tonsil causing narrowing of the oropharyngeal lumen, L level 2 node, L retropharyngeal nodular density, extension of primary mass vs retropharyngeal node, 0.8 cm RLL nodule concerning for met, mild focal uptake R iliac bone and L1 without CT correlate suspicious for mets.   3/2/23  R VATS, wedge resection. Path: SCC, poorly differentiated, associated with necrosis, 1 cm, negative margins, p16 +lety  3/24/23              MRI L spine and pelvis. Diffusely heterogeneous marrow signal throughout without corresponding abnormal signal on sagittal STIR sequence and no abnormal enhancement. Nonspecific but could be benign process such as red marrow hyperplasia, cannot completely exclude metastatic disease or infiltrative pathologic marrow process. No lesions corresponding to FDG avid spots on PET/CT.   4/19~10/18/23 Pembrolizumab.  10/26/23            CT neck and CAP. Increased L palatine tonsil mass, 3.8 x 2.7 cm --> 4.7 x 3.5 cm. Increased bilateral cervical nodes up to 2.3 x 2.4 cm R level 2 node. New 3 mm RML nodule, no other mets.   11/14/23 PET. 4.6 x 3.3 cm L palatine tonsil mass (SUB 26.8), R level 2A and 2A/3 nodes. Questionable uptake distal R femoral medullary cavity, partially imaged, with questionable subtle corresponding soft tissue attenuation on CT. MRI could be obtained to better  evaluate.  11/21~12/2/23   [facility] for weakness/fall. COVID/paxlovid, MSSA bacteremia (1 of 2 bottles on 1 day), TTE negative, treated with cefazolin but didn't complete 2 week course, episode of unresponsiveness 12/2. Dispo plan was TCU on [facility], but left AMA. CTA chest negative for PE, showed grossly unchanged cervical adenopathy. Brain MRI 11/22 for stroke code negative for infarct. Showed SVID, moderate atrophy, suboptimal contrast bolus to examine intracranial structures, 4.5 x 3 x 4.5 cm L nasopharyngeal mass, incompletely visualized. CTA negative.   12/19/23 Quad shot fraction #1. No-showed thereafter.  1/10/24~curr     Carboplatin (AUC 2) + paclitaxel 60 mg/m2 weekly. Held 2/13 due to anorexia, fatigue. Break between 8/1 and 9/19 doses for trip, fatigue. Missed dose 10/16 due to transportation issues.  3/13~3/17/24     [facility] for acute encephalopathy. Had stopped his car in the middle of the highway after receiving chemo earlier that day. Recommended not to drive   4/30/24  CT CAP and CT neck. Residual bilateral cervical adenopathy.   7/30/24  PET. CT not covered. Significant reduction L palatine tonsil mass - residual focal uptake in the L posterolateral oropharyngeal wall (SUV 9.1, previously 26.8). Previously seen multiple FDG avid L cervical nodes and few enlarged R nodes are completed resolved. Mild diffuse uptake along bilateral lower legs and ankles with associated skin thickening and stranding along the soft tissues; findings can be seen with cellulitis. No metastatic disease.   11/5/24  CT neck and CT CAP.  Stable appearance L palatine tonsil, no discrete mass. 1.1 cm R thyroid nodule, a little bigger. O/w unremarkable.     SUBJECTIVE  Conner is seen prior to weekly carbo/taxol. He is accompanied by his PCA in clinic today  -Feels great this week. Plans to travel to [place] for the holidays. Leaving next Thursday. Will need to skip infusion that day  -Appetite has been good  -Neuropathy  in feet slightly worse lately. Most bothersome at night. No falls or imbalance issues  -No recurrent lower extremity edema. Wearing a pair of shoes today which he hasn't worn for years  -No shortness of breath or cough  -No nausea  -No bowel changes  -No headaches or vision changes    CURRENT OUTPATIENT MEDICATIONS  Reviewed in Epic today    ALLERGIES  No Known Allergies     PHYSICAL EXAM  /70   Pulse 85   Temp 97.7  F (36.5  C) (Oral)   Resp 16   Wt 87.5 kg (192 lb 14.4 oz)   SpO2 99%   BMI 33.11 kg/m      General: Well-appearing male, NAD  Eyes: EOMI, PERRL. No scleral icterus.  ENT: Oral mucosa is moist without lesions or thrush.   Lymphatic: Neck is supple without cervical or supraclavicular lymphadenopathy.   Cardiovascular: RRR, no m/g/r. +1 peripheral edema to bilateral ankles  Respiratory: CTA bilaterally. No wheezes or crackles.   Neurologic: No focal deficits. Steady gait with 4 wheeled walker  Skin: No rashes, petechiae, or bruising noted on exposed skin.    LABORATORY AND IMAGING STUDIES  Most Recent 3 CBC's:  Recent Labs   Lab Test 12/19/24  0735 12/12/24  0811 11/07/24  0847   WBC 5.3 5.8 4.7   HGB 9.8* 10.6* 8.8*   MCV 81 83 85    211 178   ANEUTAUTO 2.9 3.2 2.5    Most Recent 3 BMP's:  Recent Labs   Lab Test 12/19/24  0735 12/12/24  0811 11/07/24  0847    143 146*   POTASSIUM 3.9 4.1 3.4   CHLORIDE 103 107 108*   CO2 30* 26 28   BUN 27.4* 22.0 22.3   CR 0.94 0.80 0.82   ANIONGAP 11 10 10   WARREN 9.1 9.7 9.2   GLC 98 100* 144*   PROTTOTAL 7.3 7.5 6.5   ALBUMIN 4.1 4.1 3.9    Most Recent 2 LFT's:  Recent Labs   Lab Test 12/19/24  0735 12/12/24  0811   AST 20 26   ALT 13 15   ALKPHOS 108 116   BILITOTAL 0.3 0.2    Most Recent TSH and T4:  Recent Labs   Lab Test 09/19/24  1115   TSH 1.44   T4 1.14     Phos/Mag:  Lab Results   Component Value Date    PHOS 3.0 03/17/2024    PHOS 2.4 (L) 12/09/2020    PHOS 3.9 12/08/2020    MAG 1.9 12/12/2024    MAG 1.7 11/07/2024    MAG 1.6 (L)  10/24/2024      I personally reviewed and interpreted the above labs today.     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 Small residual tumor in the L tonsil/soft palate on PET 7/30/24. Referred back to Dr. Mills, but decided to defer radiation for the moment given good response to chemo. Remains on weekly carbo/taxol but with intermittent treatment breaks based on personal preference, tolerance and other factors. Holding off on consolidative radiation in absence of metastatic recurrent disease. Labs look stable today. Proceed with carbo/taxol. Will cancel infusion next week d/t planned travel to [place]. His PCA will be going with. RTC 1/2 for labs, MARK visit with me and infusion.  Restage with CT-CAP and CT neck in ~2 months--mid January     Thyroid nodule: seen on CT, US without intervention needed    Neurologic change: demonstrated neuro changes during last visit with Dr. Mueller including stiffening and closing eyes. No arrhythmia on exam at that time or on EKG.  Neurology consult scheduled in May.      Neurocognitive changes, encephalopathy March 2024: Hasn't pursued cognitive testing yet despite encouragement.      Anorexia, malnutrition, weight loss: Stable. Weight better with PCA assistance with meal prep.      Hypomag: On mag oxide but remains marginal, 1.4 today. Continue oral replacement today     H/o CVA: Residual L weakness. Uses walker at baseline, motorized scooter out and about. Gait steady today     Screening for hypothyroidism: TFTs 9/19/24 ok, check in ~ March 2025.     LE edema: Better with consistent PCA care. Continue compression, elevation.     Surrogate decision maker: He would want us to talk with Mariann if he could not speak for himself. Not discussed today      Peripheral neuropathy: MRI shows a lot of foramenal  stenosis and spinal canal stenosis, so more likely related to that than DM2. Slightly worse lately, potentially r/t paclitaxel. Watch closely. Plan for treatment break next week d/t holidays/travel.    Vandana Bertrand CNP  ---  33 minutes spent on the date of the encounter doing chart review, review of test results, interpretation of tests, patient visit, and documentation.    The longitudinal plan of care for the diagnosis(es)/condition(s) as documented were addressed during this visit. Due to the added complexity in care, I will continue to support Conner in the subsequent management and with ongoing continuity of care.

## 2024-12-18 RX ORDER — SILDENAFIL 100 MG/1
TABLET, FILM COATED ORAL
Qty: 30 TABLET | Refills: 0 | OUTPATIENT
Start: 2024-12-18

## 2024-12-19 ENCOUNTER — INFUSION THERAPY VISIT (OUTPATIENT)
Dept: ONCOLOGY | Facility: CLINIC | Age: 79
End: 2024-12-19
Attending: INTERNAL MEDICINE
Payer: COMMERCIAL

## 2024-12-19 ENCOUNTER — ONCOLOGY VISIT (OUTPATIENT)
Dept: ONCOLOGY | Facility: CLINIC | Age: 79
End: 2024-12-19
Attending: REGISTERED NURSE
Payer: COMMERCIAL

## 2024-12-19 ENCOUNTER — APPOINTMENT (OUTPATIENT)
Dept: LAB | Facility: CLINIC | Age: 79
End: 2024-12-19
Attending: INTERNAL MEDICINE
Payer: COMMERCIAL

## 2024-12-19 VITALS
DIASTOLIC BLOOD PRESSURE: 70 MMHG | TEMPERATURE: 97.7 F | BODY MASS INDEX: 33.11 KG/M2 | HEART RATE: 85 BPM | WEIGHT: 192.9 LBS | RESPIRATION RATE: 16 BRPM | SYSTOLIC BLOOD PRESSURE: 122 MMHG | OXYGEN SATURATION: 99 %

## 2024-12-19 DIAGNOSIS — R41.89 NEUROCOGNITIVE DEFICITS: ICD-10-CM

## 2024-12-19 DIAGNOSIS — E04.1 THYROID NODULE: ICD-10-CM

## 2024-12-19 DIAGNOSIS — C09.9 TONSIL CANCER (H): ICD-10-CM

## 2024-12-19 DIAGNOSIS — C78.00 MALIGNANT NEOPLASM METASTATIC TO LUNG, UNSPECIFIED LATERALITY (H): ICD-10-CM

## 2024-12-19 DIAGNOSIS — E11.9 TYPE 2 DIABETES MELLITUS WITHOUT COMPLICATION, WITHOUT LONG-TERM CURRENT USE OF INSULIN (H): ICD-10-CM

## 2024-12-19 DIAGNOSIS — C10.9 SQUAMOUS CELL CARCINOMA OF OROPHARYNX (H): ICD-10-CM

## 2024-12-19 DIAGNOSIS — R29.818 NEUROCOGNITIVE DEFICITS: ICD-10-CM

## 2024-12-19 DIAGNOSIS — R60.0 BILATERAL LEG EDEMA: ICD-10-CM

## 2024-12-19 DIAGNOSIS — E83.42 HYPOMAGNESEMIA: ICD-10-CM

## 2024-12-19 DIAGNOSIS — C78.00 MALIGNANT NEOPLASM METASTATIC TO LUNG, UNSPECIFIED LATERALITY (H): Primary | ICD-10-CM

## 2024-12-19 DIAGNOSIS — C09.9 TONSIL CANCER (H): Primary | ICD-10-CM

## 2024-12-19 LAB
ALBUMIN SERPL BCG-MCNC: 4.1 G/DL (ref 3.5–5.2)
ALP SERPL-CCNC: 108 U/L (ref 40–150)
ALT SERPL W P-5'-P-CCNC: 13 U/L (ref 0–70)
ANION GAP SERPL CALCULATED.3IONS-SCNC: 11 MMOL/L (ref 7–15)
AST SERPL W P-5'-P-CCNC: 20 U/L (ref 0–45)
BASOPHILS # BLD AUTO: 0 10E3/UL (ref 0–0.2)
BASOPHILS NFR BLD AUTO: 0 %
BILIRUB SERPL-MCNC: 0.3 MG/DL
BUN SERPL-MCNC: 27.4 MG/DL (ref 8–23)
CALCIUM SERPL-MCNC: 9.1 MG/DL (ref 8.8–10.4)
CHLORIDE SERPL-SCNC: 103 MMOL/L (ref 98–107)
CREAT SERPL-MCNC: 0.94 MG/DL (ref 0.67–1.17)
EGFRCR SERPLBLD CKD-EPI 2021: 82 ML/MIN/1.73M2
EOSINOPHIL # BLD AUTO: 0.2 10E3/UL (ref 0–0.7)
EOSINOPHIL NFR BLD AUTO: 4 %
ERYTHROCYTE [DISTWIDTH] IN BLOOD BY AUTOMATED COUNT: 16.5 % (ref 10–15)
GLUCOSE SERPL-MCNC: 98 MG/DL (ref 70–99)
HCO3 SERPL-SCNC: 30 MMOL/L (ref 22–29)
HCT VFR BLD AUTO: 31.9 % (ref 40–53)
HGB BLD-MCNC: 9.8 G/DL (ref 13.3–17.7)
IMM GRANULOCYTES # BLD: 0 10E3/UL
IMM GRANULOCYTES NFR BLD: 0 %
LYMPHOCYTES # BLD AUTO: 1.9 10E3/UL (ref 0.8–5.3)
LYMPHOCYTES NFR BLD AUTO: 35 %
MAGNESIUM SERPL-MCNC: 1.4 MG/DL (ref 1.7–2.3)
MCH RBC QN AUTO: 25 PG (ref 26.5–33)
MCHC RBC AUTO-ENTMCNC: 30.7 G/DL (ref 31.5–36.5)
MCV RBC AUTO: 81 FL (ref 78–100)
MONOCYTES # BLD AUTO: 0.3 10E3/UL (ref 0–1.3)
MONOCYTES NFR BLD AUTO: 6 %
NEUTROPHILS # BLD AUTO: 2.9 10E3/UL (ref 1.6–8.3)
NEUTROPHILS NFR BLD AUTO: 54 %
NRBC # BLD AUTO: 0 10E3/UL
NRBC BLD AUTO-RTO: 0 /100
PLATELET # BLD AUTO: 192 10E3/UL (ref 150–450)
POTASSIUM SERPL-SCNC: 3.9 MMOL/L (ref 3.4–5.3)
PROT SERPL-MCNC: 7.3 G/DL (ref 6.4–8.3)
RBC # BLD AUTO: 3.92 10E6/UL (ref 4.4–5.9)
SODIUM SERPL-SCNC: 144 MMOL/L (ref 135–145)
WBC # BLD AUTO: 5.3 10E3/UL (ref 4–11)

## 2024-12-19 PROCEDURE — 83735 ASSAY OF MAGNESIUM: CPT

## 2024-12-19 PROCEDURE — 250N000011 HC RX IP 250 OP 636: Performed by: REGISTERED NURSE

## 2024-12-19 PROCEDURE — 258N000003 HC RX IP 258 OP 636: Performed by: REGISTERED NURSE

## 2024-12-19 PROCEDURE — G0463 HOSPITAL OUTPT CLINIC VISIT: HCPCS | Performed by: REGISTERED NURSE

## 2024-12-19 PROCEDURE — 82040 ASSAY OF SERUM ALBUMIN: CPT | Performed by: REGISTERED NURSE

## 2024-12-19 PROCEDURE — 85041 AUTOMATED RBC COUNT: CPT | Performed by: REGISTERED NURSE

## 2024-12-19 PROCEDURE — 36415 COLL VENOUS BLD VENIPUNCTURE: CPT | Performed by: REGISTERED NURSE

## 2024-12-19 PROCEDURE — 85004 AUTOMATED DIFF WBC COUNT: CPT | Performed by: REGISTERED NURSE

## 2024-12-19 RX ORDER — DEXAMETHASONE SODIUM PHOSPHATE 4 MG/ML
8 INJECTION, SOLUTION INTRA-ARTICULAR; INTRALESIONAL; INTRAMUSCULAR; INTRAVENOUS; SOFT TISSUE ONCE
Status: COMPLETED | OUTPATIENT
Start: 2024-12-19 | End: 2024-12-19

## 2024-12-19 RX ORDER — ONDANSETRON 2 MG/ML
8 INJECTION INTRAMUSCULAR; INTRAVENOUS ONCE
Status: COMPLETED | OUTPATIENT
Start: 2024-12-19 | End: 2024-12-19

## 2024-12-19 RX ADMIN — FAMOTIDINE 20 MG: 10 INJECTION INTRAVENOUS at 09:04

## 2024-12-19 RX ADMIN — DEXAMETHASONE SODIUM PHOSPHATE 8 MG: 4 INJECTION, SOLUTION INTRA-ARTICULAR; INTRALESIONAL; INTRAMUSCULAR; INTRAVENOUS; SOFT TISSUE at 09:04

## 2024-12-19 RX ADMIN — ONDANSETRON 8 MG: 2 INJECTION INTRAMUSCULAR; INTRAVENOUS at 09:04

## 2024-12-19 RX ADMIN — SODIUM CHLORIDE 50 ML: 9 INJECTION, SOLUTION INTRAVENOUS at 10:37

## 2024-12-19 RX ADMIN — PACLITAXEL 94 MG: 6 INJECTION, SOLUTION INTRAVENOUS at 09:34

## 2024-12-19 RX ADMIN — CARBOPLATIN 175 MG: 10 INJECTION, SOLUTION INTRAVENOUS at 10:37

## 2024-12-19 ASSESSMENT — PAIN SCALES - GENERAL: PAINLEVEL_OUTOF10: NO PAIN (0)

## 2024-12-19 NOTE — Clinical Note
12/19/2024      Jonathan Bishop  [address]  [facility] [address]      Dear Colleague,    Thank you for referring your patient, Jonathan Bishop, to the [facility]. Please see a copy of my visit note below.          [facility]  [address] [facility] [address] [facility] [address]  Phone: [phone]  Fax: [phone]    PATIENT NAME: Jonathan Bishop  MRN # [id number]   DATE OF VISIT: Dec 19, 2024  YOB: 1945     Otolaryngology: Dr. Karishma Eng  Radiation Oncology: Dr. Jenni Mills  PCP: Dr. Buck Nascimento     CANCER TYPE: SCC L tonsil, p16 +lety  STAGE: [id number] (IVC)  ECOG PS: 1-2     PD-L1: TPS 20%, CPS 25% on [id number] tonsil bx  NGS: N/A      CANCER SUMMARY  2/26/23              L tonsil mass bx in clinic (Dr. Eng).   2/20/23              PET/CT. 3.7 x 4.0 x 5.1 cm mass L palatine tonsil causing narrowing of the oropharyngeal lumen, L level 2 node, L retropharyngeal nodular density, extension of primary mass vs retropharyngeal node, 0.8 cm RLL nodule concerning for met, mild focal uptake R iliac bone and L1 without CT correlate suspicious for mets.   3/2/23  R VATS, wedge resection. Path: SCC, poorly differentiated, associated with necrosis, 1 cm, negative margins, p16 +lety  3/24/23              MRI L spine and pelvis. Diffusely heterogeneous marrow signal throughout without corresponding abnormal signal on sagittal STIR sequence and no abnormal enhancement. Nonspecific but could be benign process such as red marrow hyperplasia, cannot completely exclude metastatic disease or infiltrative pathologic marrow process. No lesions corresponding to FDG avid spots on PET/CT.   4/19~10/18/23 Pembrolizumab.  10/26/23            CT neck and CAP. Increased L palatine tonsil mass, 3.8 x 2.7 cm --> 4.7 x 3.5 cm. Increased bilateral cervical nodes up to 2.3 x 2.4 cm R level 2 node. New 3 mm RML nodule, no other mets.   11/14/23 PET.  4.6 x 3.3 cm L palatine tonsil mass (SUB 26.8), R level 2A and 2A/3 nodes. Questionable uptake distal R femoral medullary cavity, partially imaged, with questionable subtle corresponding soft tissue attenuation on CT. MRI could be obtained to better evaluate.  11/21~12/2/23   [facility] for weakness/fall. COVID/paxlovid, MSSA bacteremia (1 of 2 bottles on 1 day), TTE negative, treated with cefazolin but didn't complete 2 week course, episode of unresponsiveness 12/2. Dispo plan was TCU on [facility], but left AMA. CTA chest negative for PE, showed grossly unchanged cervical adenopathy. Brain MRI 11/22 for stroke code negative for infarct. Showed SVID, moderate atrophy, suboptimal contrast bolus to examine intracranial structures, 4.5 x 3 x 4.5 cm L nasopharyngeal mass, incompletely visualized. CTA negative.   12/19/23 Quad shot fraction #1. No-showed thereafter.  1/10/24~curr     Carboplatin (AUC 2) + paclitaxel 60 mg/m2 weekly. Held 2/13 due to anorexia, fatigue. Break between 8/1 and 9/19 doses for trip, fatigue. Missed dose 10/16 due to transportation issues.  3/13~3/17/24     [facility] for acute encephalopathy. Had stopped his car in the middle of the highway after receiving chemo earlier that day. Recommended not to drive   4/30/24  CT CAP and CT neck. Residual bilateral cervical adenopathy.   7/30/24  PET. CT not covered. Significant reduction L palatine tonsil mass - residual focal uptake in the L posterolateral oropharyngeal wall (SUV 9.1, previously 26.8). Previously seen multiple FDG avid L cervical nodes and few enlarged R nodes are completed resolved. Mild diffuse uptake along bilateral lower legs and ankles with associated skin thickening and stranding along the soft tissues; findings can be seen with cellulitis. No metastatic disease.   11/5/24  CT neck and CT CAP.  Stable appearance L palatine tonsil, no discrete mass. 1.1 cm R thyroid nodule, a little bigger. O/w unremarkable.     SUBJECTIVE  Conner  is seen prior to weekly carbo/taxol. He is accompanied by his PCA in clinic today  -Feels great this week. Plans to travel to [place] for the holidays. Leaving next Thursday. Will need to skip infusion that day  -Appetite has been good  -Neuropathy in feet slightly worse lately. Most bothersome at night. No falls or imbalance issues  -No recurrent lower extremity edema. Wearing a pair of shoes today which he hasn't worn for years  -No shortness of breath or cough  -No nausea  -No bowel changes  -No headaches or vision changes    CURRENT OUTPATIENT MEDICATIONS  Reviewed in Epic today    ALLERGIES  No Known Allergies     PHYSICAL EXAM  /70   Pulse 85   Temp 97.7  F (36.5  C) (Oral)   Resp 16   Wt 87.5 kg (192 lb 14.4 oz)   SpO2 99%   BMI 33.11 kg/m      General: Well-appearing male, NAD  Eyes: EOMI, PERRL. No scleral icterus.  ENT: Oral mucosa is moist without lesions or thrush.   Lymphatic: Neck is supple without cervical or supraclavicular lymphadenopathy.   Cardiovascular: RRR, no m/g/r. +1 peripheral edema to bilateral ankles  Respiratory: CTA bilaterally. No wheezes or crackles.   Neurologic: No focal deficits. Steady gait with 4 wheeled walker  Skin: No rashes, petechiae, or bruising noted on exposed skin.    LABORATORY AND IMAGING STUDIES  Most Recent 3 CBC's:  Recent Labs   Lab Test 12/19/24  0735 12/12/24  0811 11/07/24  0847   WBC 5.3 5.8 4.7   HGB 9.8* 10.6* 8.8*   MCV 81 83 85    211 178   ANEUTAUTO 2.9 3.2 2.5    Most Recent 3 BMP's:  Recent Labs   Lab Test 12/19/24  0735 12/12/24  0811 11/07/24  0847    143 146*   POTASSIUM 3.9 4.1 3.4   CHLORIDE 103 107 108*   CO2 30* 26 28   BUN 27.4* 22.0 22.3   CR 0.94 0.80 0.82   ANIONGAP 11 10 10   WARREN 9.1 9.7 9.2   GLC 98 100* 144*   PROTTOTAL 7.3 7.5 6.5   ALBUMIN 4.1 4.1 3.9    Most Recent 2 LFT's:  Recent Labs   Lab Test 12/19/24  0735 12/12/24  0811   AST 20 26   ALT 13 15   ALKPHOS 108 116   BILITOTAL 0.3 0.2    Most Recent TSH and  T4:  Recent Labs   Lab Test 09/19/24  1115   TSH 1.44   T4 1.14     Phos/Mag:  Lab Results   Component Value Date    PHOS 3.0 03/17/2024    PHOS 2.4 (L) 12/09/2020    PHOS 3.9 12/08/2020    MAG 1.9 12/12/2024    MAG 1.7 11/07/2024    MAG 1.6 (L) 10/24/2024      I personally reviewed and interpreted the above labs today.     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 Small residual tumor in the L tonsil/soft palate on PET 7/30/24. Referred back to Dr. Mills, but decided to defer radiation for the moment given good response to chemo. Remains on weekly carbo/taxol but with intermittent treatment breaks based on personal preference, tolerance and other factors. Holding off on consolidative radiation in absence of metastatic recurrent disease. Labs look stable today. Proceed with carbo/taxol. Will cancel infusion next week d/t planned travel to [place]. His PCA will be going with. RTC 1/2 for labs, MARK visit with me and infusion.  Restage with CT-CAP and CT neck in ~2 months--mid January     Thyroid nodule: seen on CT, US without intervention needed    Neurologic change: demonstrated neuro changes during last visit with Dr. Mueller including stiffening and closing eyes. No arrhythmia on exam at that time or on EKG.  Neurology consult scheduled in May.      Neurocognitive changes, encephalopathy March 2024: Hasn't pursued cognitive testing yet despite encouragement.      Anorexia, malnutrition, weight loss: Stable. Weight better with PCA assistance with meal prep.      Hypomag: On mag oxide but remains marginal. Continue checking, replacing IV prn      H/o CVA: Residual L weakness. Uses walker at baseline, motorized scooter out and about. Gait not observed today d/t virtual visit     Screening for hypothyroidism: TFTs 9/19/24 ok, check in ~ March  2025.     LE edema: Better with consistent PCA care. Continue compression, elevation.     Surrogate decision maker: He would want us to talk with Mariann if he could not speak for himself. Not discussed today      Peripheral neuropathy: MRI shows a lot of foramenal stenosis and spinal canal stenosis, so more likely related to that than DM2. Doesn't appear to be worsening on paclitaxel    Vandana Bertrand CNP  ---  *** minutes spent on the date of the encounter doing {2021 E&M time in:722598}.    The longitudinal plan of care for the diagnosis(es)/condition(s) as documented were addressed during this visit. Due to the added complexity in care, I will continue to support Conner in the subsequent management and with ongoing continuity of care.          Again, thank you for allowing me to participate in the care of your patient.        Sincerely,        Vandana Bertrand CNP

## 2024-12-19 NOTE — PROGRESS NOTES
Infusion Nursing Note:  Jonathan Bishop presents today for C8D8 Taxol and Carboplatin.    Patient seen by provider today: Yes: Vandana Bertrand NP   present during visit today: Not Applicable.    Note: Conner presents to infusion today following his clinic visit. He denies any new concerns. Hot packs provided in infusion for his back pain. This was addressed further in his clinic visit. He wishes to proceed today.    Intravenous Access:  Peripheral IV placed.    Treatment Conditions:  Lab Results   Component Value Date    HGB 9.8 (L) 12/19/2024    WBC 5.3 12/19/2024    ANEU 1.0 (L) 03/06/2024    ANEUTAUTO 2.9 12/19/2024     12/19/2024      Lab Results   Component Value Date     12/19/2024    POTASSIUM 3.9 12/19/2024    MAG 1.9 12/12/2024    CR 0.94 12/19/2024    WARREN 9.1 12/19/2024    BILITOTAL 0.3 12/19/2024    ALBUMIN 4.1 12/19/2024    ALT 13 12/19/2024    AST 20 12/19/2024     Results reviewed, labs MET treatment parameters, ok to proceed with treatment.    Post Infusion Assessment:  Patient tolerated infusion without incident.  Blood return noted pre and post infusion.  Site patent and intact, free from redness, edema or discomfort.  No evidence of extravasations.  Access discontinued per protocol.     Discharge Plan:   Patient declined prescription refills.  Discharge instructions reviewed with: Patient.  Patient and/or family verbalized understanding of discharge instructions and all questions answered.  Copy of AVS provided to patient.  Patient will return 12/26 for next appointment.   Patient discharged in stable condition accompanied by: attendant.  Departure Mode: Ambulatory.      Sol Don RN

## 2024-12-19 NOTE — PATIENT INSTRUCTIONS
Contact Numbers  [facility]: [phone] (for symptom and scheduling needs)    Please call the [facility] Triage line if you experience a temperature greater than or equal to 100.4, shaking chills, have uncontrolled nausea, vomiting and/or diarrhea, dizziness, shortness of breath, chest pain, bleeding, unexplained bruising, or if you have any other new/concerning symptoms, questions or concerns.     If you are having any concerning symptoms or wish to speak to a provider before your next infusion visit, please call your care coordinator or triage to notify them so we can adequately serve you.     If you need a refill on a narcotic prescription or other medication, please call triage before your infusion appointment.       Lab Results:  Recent Results (from the past 12 hours)   Comprehensive metabolic panel    Collection Time: 12/19/24  7:35 AM   Result Value Ref Range    Sodium 144 135 - 145 mmol/L    Potassium 3.9 3.4 - 5.3 mmol/L    Carbon Dioxide (CO2) 30 (H) 22 - 29 mmol/L    Anion Gap 11 7 - 15 mmol/L    Urea Nitrogen 27.4 (H) 8.0 - 23.0 mg/dL    Creatinine 0.94 0.67 - 1.17 mg/dL    GFR Estimate 82 >60 mL/min/1.73m2    Calcium 9.1 8.8 - 10.4 mg/dL    Chloride 103 98 - 107 mmol/L    Glucose 98 70 - 99 mg/dL    Alkaline Phosphatase 108 40 - 150 U/L    AST 20 0 - 45 U/L    ALT 13 0 - 70 U/L    Protein Total 7.3 6.4 - 8.3 g/dL    Albumin 4.1 3.5 - 5.2 g/dL    Bilirubin Total 0.3 <=1.2 mg/dL   CBC with platelets and differential    Collection Time: 12/19/24  7:35 AM   Result Value Ref Range    WBC Count 5.3 4.0 - 11.0 10e3/uL    RBC Count 3.92 (L) 4.40 - 5.90 10e6/uL    Hemoglobin 9.8 (L) 13.3 - 17.7 g/dL    Hematocrit 31.9 (L) 40.0 - 53.0 %    MCV 81 78 - 100 fL    MCH 25.0 (L) 26.5 - 33.0 pg    MCHC 30.7 (L) 31.5 - 36.5 g/dL    RDW 16.5 (H) 10.0 - 15.0 %    Platelet Count 192 150 - 450 10e3/uL    % Neutrophils 54 %    % Lymphocytes 35 %    % Monocytes 6 %    % Eosinophils 4 %    % Basophils 0 %    % Immature  Granulocytes 0 %    NRBCs per 100 WBC 0 <1 /100    Absolute Neutrophils 2.9 1.6 - 8.3 10e3/uL    Absolute Lymphocytes 1.9 0.8 - 5.3 10e3/uL    Absolute Monocytes 0.3 0.0 - 1.3 10e3/uL    Absolute Eosinophils 0.2 0.0 - 0.7 10e3/uL    Absolute Basophils 0.0 0.0 - 0.2 10e3/uL    Absolute Immature Granulocytes 0.0 <=0.4 10e3/uL    Absolute NRBCs 0.0 10e3/uL   Magnesium    Collection Time: 12/19/24  7:35 AM   Result Value Ref Range    Magnesium 1.4 (L) 1.7 - 2.3 mg/dL

## 2024-12-19 NOTE — NURSING NOTE
"Oncology Rooming Note    December 19, 2024 7:56 AM   Jonathan Bishop is a 79 year old male who presents for:    Chief Complaint   Patient presents with    Blood Draw     Labs drawn via PIV by RN in lab.  VS taken    Oncology Clinic Visit     Squamous Cell Carcinoma     Initial Vitals: /70   Pulse 85   Temp 97.7  F (36.5  C) (Oral)   Resp 16   Wt 87.5 kg (192 lb 14.4 oz)   SpO2 99%   BMI 33.11 kg/m   Estimated body mass index is 33.11 kg/m  as calculated from the following:    Height as of 12/4/24: 1.626 m (5' 4\").    Weight as of this encounter: 87.5 kg (192 lb 14.4 oz). Body surface area is 1.99 meters squared.  No Pain (0) Comment: Data Unavailable   No LMP for male patient.  Allergies reviewed: Yes  Medications reviewed: Yes    Medications: Medication refills not needed today.  Pharmacy name entered into [employer]:    [facility] - [place], [place] - [address]  [facility] [place] - [facility] [place] - [address]    Frailty Screening:   Is the patient here for a new oncology consult visit in cancer care? 2. No      Clinical concerns: Requested that his PCP send a refill of his Lisinopril      Glenn Rodríguez LPN              "

## 2024-12-28 ENCOUNTER — HEALTH MAINTENANCE LETTER (OUTPATIENT)
Age: 79
End: 2024-12-28

## 2024-12-31 NOTE — PROGRESS NOTES
[facility]  [address] [address]  Phone: [phone]  Fax: [phone]    PATIENT NAME: Jonathan Bishop  MRN # [id number]   DATE OF VISIT: January 2, 2025 YOB: 1945     Otolaryngology: Dr. Karishma Eng  Radiation Oncology: Dr. Jenni Mills  PCP: Dr. Buck Nascimneto     CANCER TYPE: SCC L tonsil, p16 +lety  STAGE: hM2V2N6 (IVC)  ECOG PS: 1-2     PD-L1: TPS 20%, CPS 25% on [id number] tonsil bx  NGS: N/A      CANCER SUMMARY  2/26/23              L tonsil mass bx in clinic (Dr. Eng).   2/20/23              PET/CT. 3.7 x 4.0 x 5.1 cm mass L palatine tonsil causing narrowing of the oropharyngeal lumen, L level 2 node, L retropharyngeal nodular density, extension of primary mass vs retropharyngeal node, 0.8 cm RLL nodule concerning for met, mild focal uptake R iliac bone and L1 without CT correlate suspicious for mets.   3/2/23  R VATS, wedge resection. Path: SCC, poorly differentiated, associated with necrosis, 1 cm, negative margins, p16 +lety  3/24/23              MRI L spine and pelvis. Diffusely heterogeneous marrow signal throughout without corresponding abnormal signal on sagittal STIR sequence and no abnormal enhancement. Nonspecific but could be benign process such as red marrow hyperplasia, cannot completely exclude metastatic disease or infiltrative pathologic marrow process. No lesions corresponding to FDG avid spots on PET/CT.   4/19~10/18/23 Pembrolizumab.  10/26/23            CT neck and CAP. Increased L palatine tonsil mass, 3.8 x 2.7 cm --> 4.7 x 3.5 cm. Increased bilateral cervical nodes up to 2.3 x 2.4 cm R level 2 node. New 3 mm RML nodule, no other mets.   11/14/23 PET. 4.6 x 3.3 cm L palatine tonsil mass (SUB 26.8), R level 2A and 2A/3 nodes. Questionable uptake distal R femoral medullary cavity, partially imaged, with questionable subtle corresponding soft tissue attenuation on CT. MRI could be obtained to better  evaluate.  11/21~12/2/23   [facility] for weakness/fall. COVID/paxlovid, MSSA bacteremia (1 of 2 bottles on 1 day), TTE negative, treated with cefazolin but didn't complete 2 week course, episode of unresponsiveness 12/2. Dispo plan was TCU on [facility], but left AMA. CTA chest negative for PE, showed grossly unchanged cervical adenopathy. Brain MRI 11/22 for stroke code negative for infarct. Showed SVID, moderate atrophy, suboptimal contrast bolus to examine intracranial structures, 4.5 x 3 x 4.5 cm L nasopharyngeal mass, incompletely visualized. CTA negative.   12/19/23 Quad shot fraction #1. No-showed thereafter.  1/10/24~curr     Carboplatin (AUC 2) + paclitaxel 60 mg/m2 weekly. Held 2/13 due to anorexia, fatigue. Break between 8/1 and 9/19 doses for trip, fatigue. Missed dose 10/16 due to transportation issues.  3/13~3/17/24     [facility] for acute encephalopathy. Had stopped his car in the middle of the highway after receiving chemo earlier that day. Recommended not to drive   4/30/24  CT CAP and CT neck. Residual bilateral cervical adenopathy.   7/30/24  PET. CT not covered. Significant reduction L palatine tonsil mass - residual focal uptake in the L posterolateral oropharyngeal wall (SUV 9.1, previously 26.8). Previously seen multiple FDG avid L cervical nodes and few enlarged R nodes are completed resolved. Mild diffuse uptake along bilateral lower legs and ankles with associated skin thickening and stranding along the soft tissues; findings can be seen with cellulitis. No metastatic disease.   11/5/24  CT neck and CT CAP.  Stable appearance L palatine tonsil, no discrete mass. 1.1 cm R thyroid nodule, a little bigger. O/w unremarkable.     SUBJECTIVE  Conner is seen prior to weekly carbo/taxol.  -Elected for treatment break last week. Traveled to [place] for CSDN for 3 days  -Feels great with treatment breaks, notable improvement in fatigue and mood  -Appetite stable  -No nausea  -No diarrhea or  constipation  -Stable neuropathy  -No recurrent LE edema    CURRENT OUTPATIENT MEDICATIONS  Reviewed in Epic today    ALLERGIES  No Known Allergies     PHYSICAL EXAM  BP (!) 145/74 (BP Location: Left arm, Patient Position: Sitting, Cuff Size: Adult Large)   Pulse 80   Temp 98.2  F (36.8  C) (Oral)   Resp 16   Wt 89.4 kg (197 lb 1.6 oz)   SpO2 99%   BMI 33.83 kg/m      General: Well-appearing male, NAD  Eyes: EOMI, PERRL. No scleral icterus.  ENT: Oral mucosa is moist without lesions or thrush.   Cardiovascular: RRR, no m/g/r. +1 peripheral edema to bilateral ankles  Respiratory: CTA bilaterally. No wheezes or crackles.   Neurologic: No focal deficits. Steady gait with 4 wheeled walker  Skin: No rashes, petechiae, or bruising noted on exposed skin.    LABORATORY AND IMAGING STUDIES  Most Recent 3 CBC's:  Recent Labs   Lab Test 01/02/25  1131 12/19/24  0735 12/12/24  0811   WBC 4.8 5.3 5.8   HGB 9.9* 9.8* 10.6*   MCV 84 81 83    192 211   ANEUTAUTO 2.4 2.9 3.2    Most Recent 3 BMP's:  Recent Labs   Lab Test 01/02/25  1131 12/19/24  0735 12/12/24  0811   * 144 143   POTASSIUM 3.4 3.9 4.1   CHLORIDE 106 103 107   CO2 31* 30* 26   BUN 14.5 27.4* 22.0   CR 0.80 0.94 0.80   ANIONGAP 10 11 10   WARREN 8.9 9.1 9.7   * 98 100*   PROTTOTAL 6.8 7.3 7.5   ALBUMIN 3.9 4.1 4.1    Most Recent 2 LFT's:  Recent Labs   Lab Test 01/02/25  1131 12/19/24  0735   AST 23 20   ALT 13 13   ALKPHOS 114 108   BILITOTAL 0.2 0.3    Most Recent TSH and T4:  Recent Labs   Lab Test 09/19/24  1115   TSH 1.44   T4 1.14     Phos/Mag:  Lab Results   Component Value Date    PHOS 3.0 03/17/2024    PHOS 2.4 (L) 12/09/2020    PHOS 3.9 12/08/2020    MAG 1.1 (L) 01/02/2025    MAG 1.4 (L) 12/19/2024    MAG 1.9 12/12/2024      I personally reviewed and interpreted the above labs today.     ASSESSMENT AND PLAN  SCC L tonsil, p16 +lety, CPS 25%/TPS 20%, oligometastatic lung met, +/- bone mets: Great [place] overall on weekly carboplatin  paclitaxel and the 1 fraction of quad shot before he stopped showing up. Small residual tumor in the L tonsil/soft palate on PET 7/30/24. Referred back to Dr. Mills, but decided to defer radiation for the moment given good response to chemo. Remains on weekly carbo/taxol but with intermittent treatment breaks based on personal preference, tolerance and other factors. Holding off on consolidative radiation in absence of metastatic recurrent disease. Labs stable today. Proceed with carbo/taxol.  RTC in 1 week for infusion.  Restage with CT-CAP and CT neck in ~2 months--mid January     Thyroid nodule: seen on CT, US without intervention needed    Neurologic change: demonstrated neuro changes during last visit with Dr. Mueller including stiffening and closing eyes. No arrhythmia on exam at that time or on EKG.  Neurology consult scheduled in May. No recurrent symptoms observed     Neurocognitive changes, encephalopathy March 2024: Hasn't pursued cognitive testing yet despite encouragement.      Anorexia, malnutrition, weight loss: Stable. PCA helping with meal prep     Hypomag: Mg low today, 1.1. Has been out of oral replacement for a while. Prescription sent today for resumption. Replace pp today.    H/o CVA: Residual L weakness. Uses walker at baseline, motorized scooter out and about. Gait steady today     Screening for hypothyroidism: TFTs 9/19/24 ok, check in ~ March 2025.     LE edema: Better with consistent PCA care. Continue compression, elevation.     Surrogate decision maker: He would want us to talk with Mariann if he could not speak for himself. Not discussed today      Peripheral neuropathy: MRI shows a lot of foramenal stenosis and spinal canal stenosis, so more likely related to that than DM2. Stable but continue to observe with paclitaxel.    Vandana Bertrand CNP  ---  31 minutes spent on the date of the encounter doing chart review, review of test results, interpretation of tests, patient visit, and  documentation.    The longitudinal plan of care for the diagnosis(es)/condition(s) as documented were addressed during this visit. Due to the added complexity in care, I will continue to support Conner in the subsequent management and with ongoing continuity of care.

## 2025-01-02 ENCOUNTER — APPOINTMENT (OUTPATIENT)
Dept: LAB | Facility: CLINIC | Age: 80
End: 2025-01-02
Attending: INTERNAL MEDICINE
Payer: COMMERCIAL

## 2025-01-02 ENCOUNTER — ONCOLOGY VISIT (OUTPATIENT)
Dept: ONCOLOGY | Facility: CLINIC | Age: 80
End: 2025-01-02
Attending: REGISTERED NURSE
Payer: COMMERCIAL

## 2025-01-02 ENCOUNTER — INFUSION THERAPY VISIT (OUTPATIENT)
Dept: ONCOLOGY | Facility: CLINIC | Age: 80
End: 2025-01-02
Attending: INTERNAL MEDICINE
Payer: COMMERCIAL

## 2025-01-02 VITALS
SYSTOLIC BLOOD PRESSURE: 145 MMHG | HEART RATE: 80 BPM | OXYGEN SATURATION: 99 % | TEMPERATURE: 98.2 F | DIASTOLIC BLOOD PRESSURE: 74 MMHG | BODY MASS INDEX: 33.83 KG/M2 | RESPIRATION RATE: 16 BRPM | WEIGHT: 197.1 LBS

## 2025-01-02 DIAGNOSIS — C78.00 MALIGNANT NEOPLASM METASTATIC TO LUNG, UNSPECIFIED LATERALITY (H): Primary | ICD-10-CM

## 2025-01-02 DIAGNOSIS — E83.42 HYPOMAGNESEMIA: ICD-10-CM

## 2025-01-02 DIAGNOSIS — R29.818 NEUROCOGNITIVE DEFICITS: ICD-10-CM

## 2025-01-02 DIAGNOSIS — R41.89 NEUROCOGNITIVE DEFICITS: ICD-10-CM

## 2025-01-02 DIAGNOSIS — E11.9 TYPE 2 DIABETES MELLITUS WITHOUT COMPLICATION, WITHOUT LONG-TERM CURRENT USE OF INSULIN (H): ICD-10-CM

## 2025-01-02 DIAGNOSIS — C09.9 TONSIL CANCER (H): ICD-10-CM

## 2025-01-02 DIAGNOSIS — R60.0 BILATERAL LEG EDEMA: ICD-10-CM

## 2025-01-02 DIAGNOSIS — C10.9 SQUAMOUS CELL CARCINOMA OF OROPHARYNX (H): ICD-10-CM

## 2025-01-02 DIAGNOSIS — C10.9 SQUAMOUS CELL CARCINOMA OF OROPHARYNX (H): Primary | ICD-10-CM

## 2025-01-02 DIAGNOSIS — C78.00 MALIGNANT NEOPLASM METASTATIC TO LUNG, UNSPECIFIED LATERALITY (H): ICD-10-CM

## 2025-01-02 LAB
ALBUMIN SERPL BCG-MCNC: 3.9 G/DL (ref 3.5–5.2)
ALP SERPL-CCNC: 114 U/L (ref 40–150)
ALT SERPL W P-5'-P-CCNC: 13 U/L (ref 0–70)
ANION GAP SERPL CALCULATED.3IONS-SCNC: 10 MMOL/L (ref 7–15)
AST SERPL W P-5'-P-CCNC: 23 U/L (ref 0–45)
BASOPHILS # BLD AUTO: 0 10E3/UL (ref 0–0.2)
BASOPHILS NFR BLD AUTO: 1 %
BILIRUB SERPL-MCNC: 0.2 MG/DL
BUN SERPL-MCNC: 14.5 MG/DL (ref 8–23)
CALCIUM SERPL-MCNC: 8.9 MG/DL (ref 8.8–10.4)
CHLORIDE SERPL-SCNC: 106 MMOL/L (ref 98–107)
CREAT SERPL-MCNC: 0.8 MG/DL (ref 0.67–1.17)
EGFRCR SERPLBLD CKD-EPI 2021: 90 ML/MIN/1.73M2
EOSINOPHIL # BLD AUTO: 0.2 10E3/UL (ref 0–0.7)
EOSINOPHIL NFR BLD AUTO: 3 %
ERYTHROCYTE [DISTWIDTH] IN BLOOD BY AUTOMATED COUNT: 17 % (ref 10–15)
GLUCOSE SERPL-MCNC: 106 MG/DL (ref 70–99)
HCO3 SERPL-SCNC: 31 MMOL/L (ref 22–29)
HCT VFR BLD AUTO: 32.7 % (ref 40–53)
HGB BLD-MCNC: 9.9 G/DL (ref 13.3–17.7)
IMM GRANULOCYTES # BLD: 0 10E3/UL
IMM GRANULOCYTES NFR BLD: 1 %
LYMPHOCYTES # BLD AUTO: 1.7 10E3/UL (ref 0.8–5.3)
LYMPHOCYTES NFR BLD AUTO: 35 %
MAGNESIUM SERPL-MCNC: 1.1 MG/DL (ref 1.7–2.3)
MCH RBC QN AUTO: 25.4 PG (ref 26.5–33)
MCHC RBC AUTO-ENTMCNC: 30.3 G/DL (ref 31.5–36.5)
MCV RBC AUTO: 84 FL (ref 78–100)
MONOCYTES # BLD AUTO: 0.6 10E3/UL (ref 0–1.3)
MONOCYTES NFR BLD AUTO: 12 %
NEUTROPHILS # BLD AUTO: 2.4 10E3/UL (ref 1.6–8.3)
NEUTROPHILS NFR BLD AUTO: 49 %
NRBC # BLD AUTO: 0 10E3/UL
NRBC BLD AUTO-RTO: 0 /100
PLATELET # BLD AUTO: 209 10E3/UL (ref 150–450)
POTASSIUM SERPL-SCNC: 3.4 MMOL/L (ref 3.4–5.3)
PROT SERPL-MCNC: 6.8 G/DL (ref 6.4–8.3)
RBC # BLD AUTO: 3.9 10E6/UL (ref 4.4–5.9)
SODIUM SERPL-SCNC: 147 MMOL/L (ref 135–145)
WBC # BLD AUTO: 4.8 10E3/UL (ref 4–11)

## 2025-01-02 PROCEDURE — 250N000011 HC RX IP 250 OP 636: Performed by: INTERNAL MEDICINE

## 2025-01-02 PROCEDURE — 85049 AUTOMATED PLATELET COUNT: CPT | Performed by: INTERNAL MEDICINE

## 2025-01-02 PROCEDURE — 258N000003 HC RX IP 258 OP 636: Performed by: INTERNAL MEDICINE

## 2025-01-02 PROCEDURE — 36415 COLL VENOUS BLD VENIPUNCTURE: CPT | Performed by: INTERNAL MEDICINE

## 2025-01-02 PROCEDURE — G0463 HOSPITAL OUTPT CLINIC VISIT: HCPCS | Performed by: REGISTERED NURSE

## 2025-01-02 PROCEDURE — 83735 ASSAY OF MAGNESIUM: CPT

## 2025-01-02 PROCEDURE — 250N000011 HC RX IP 250 OP 636: Performed by: REGISTERED NURSE

## 2025-01-02 PROCEDURE — 80053 COMPREHEN METABOLIC PANEL: CPT | Performed by: INTERNAL MEDICINE

## 2025-01-02 PROCEDURE — 258N000003 HC RX IP 258 OP 636: Performed by: REGISTERED NURSE

## 2025-01-02 PROCEDURE — 85004 AUTOMATED DIFF WBC COUNT: CPT | Performed by: INTERNAL MEDICINE

## 2025-01-02 RX ORDER — DEXAMETHASONE SODIUM PHOSPHATE 4 MG/ML
8 INJECTION, SOLUTION INTRA-ARTICULAR; INTRALESIONAL; INTRAMUSCULAR; INTRAVENOUS; SOFT TISSUE ONCE
Status: COMPLETED | OUTPATIENT
Start: 2025-01-02 | End: 2025-01-02

## 2025-01-02 RX ORDER — ONDANSETRON 2 MG/ML
8 INJECTION INTRAMUSCULAR; INTRAVENOUS ONCE
Start: 2025-01-09 | End: 2025-01-02

## 2025-01-02 RX ORDER — ONDANSETRON 2 MG/ML
8 INJECTION INTRAMUSCULAR; INTRAVENOUS ONCE
Status: CANCELLED
Start: 2025-01-02 | End: 2025-01-02

## 2025-01-02 RX ORDER — MAGNESIUM SULFATE HEPTAHYDRATE 40 MG/ML
4 INJECTION, SOLUTION INTRAVENOUS ONCE
Status: COMPLETED | OUTPATIENT
Start: 2025-01-02 | End: 2025-01-02

## 2025-01-02 RX ORDER — DEXAMETHASONE SODIUM PHOSPHATE 4 MG/ML
8 INJECTION, SOLUTION INTRA-ARTICULAR; INTRALESIONAL; INTRAMUSCULAR; INTRAVENOUS; SOFT TISSUE ONCE
Status: CANCELLED
Start: 2025-01-02 | End: 2025-01-02

## 2025-01-02 RX ORDER — MAGNESIUM OXIDE 400 MG/1
400 TABLET ORAL 2 TIMES DAILY
Qty: 30 TABLET | Refills: 0 | Status: SHIPPED | OUTPATIENT
Start: 2025-01-02 | End: 2025-01-02

## 2025-01-02 RX ORDER — DEXAMETHASONE SODIUM PHOSPHATE 4 MG/ML
8 INJECTION, SOLUTION INTRA-ARTICULAR; INTRALESIONAL; INTRAMUSCULAR; INTRAVENOUS; SOFT TISSUE ONCE
Start: 2025-01-09 | End: 2025-01-02

## 2025-01-02 RX ORDER — MAGNESIUM OXIDE 400 MG/1
400 TABLET ORAL 2 TIMES DAILY
Qty: 30 TABLET | Refills: 0 | Status: SHIPPED | OUTPATIENT
Start: 2025-01-02

## 2025-01-02 RX ORDER — ONDANSETRON 2 MG/ML
8 INJECTION INTRAMUSCULAR; INTRAVENOUS ONCE
Status: COMPLETED | OUTPATIENT
Start: 2025-01-02 | End: 2025-01-02

## 2025-01-02 RX ADMIN — SODIUM CHLORIDE 50 ML: 9 INJECTION, SOLUTION INTRAVENOUS at 12:51

## 2025-01-02 RX ADMIN — FAMOTIDINE 20 MG: 10 INJECTION INTRAVENOUS at 12:51

## 2025-01-02 RX ADMIN — ONDANSETRON 8 MG: 2 INJECTION INTRAMUSCULAR; INTRAVENOUS at 12:51

## 2025-01-02 RX ADMIN — MAGNESIUM SULFATE 4 G: 4 INJECTION INTRAVENOUS at 13:09

## 2025-01-02 RX ADMIN — CARBOPLATIN 200 MG: 10 INJECTION, SOLUTION INTRAVENOUS at 14:38

## 2025-01-02 RX ADMIN — DEXAMETHASONE SODIUM PHOSPHATE 8 MG: 4 INJECTION, SOLUTION INTRA-ARTICULAR; INTRALESIONAL; INTRAMUSCULAR; INTRAVENOUS; SOFT TISSUE at 12:50

## 2025-01-02 RX ADMIN — PACLITAXEL 96 MG: 6 INJECTION, SOLUTION INTRAVENOUS at 13:33

## 2025-01-02 ASSESSMENT — PAIN SCALES - GENERAL: PAINLEVEL_OUTOF10: SEVERE PAIN (7)

## 2025-01-02 NOTE — PROGRESS NOTES
Infusion Nursing Note:  Jonathan Bishop presents today for Cycle 8 Day 15 Taxol and Carboplatin (dose #29) and IV Mag replacement.    Patient seen by provider today: Yes: Vandana Bertrand CNP   present during visit today: Not Applicable.    Note: Patient presents to the infusion center today after his provider appt. Ongoing back pain, patient requested hot packs while in infusion.     TORB Vandana Bertrand CNP/Dia Poole RN 1/2/25 1232  -mg 1.1; replaced per protocol    Intravenous Access:  Peripheral IV placed.    Treatment Conditions:   Latest Reference Range & Units 01/02/25 11:31   Sodium 135 - 145 mmol/L 147 (H)   Potassium 3.4 - 5.3 mmol/L 3.4   Chloride 98 - 107 mmol/L 106   Carbon Dioxide (CO2) 22 - 29 mmol/L 31 (H)   Urea Nitrogen 8.0 - 23.0 mg/dL 14.5   Creatinine 0.67 - 1.17 mg/dL 0.80   GFR Estimate >60 mL/min/1.73m2 90   Calcium 8.8 - 10.4 mg/dL 8.9   Anion Gap 7 - 15 mmol/L 10   Magnesium 1.7 - 2.3 mg/dL 1.1 (L)   Albumin 3.5 - 5.2 g/dL 3.9   Protein Total 6.4 - 8.3 g/dL 6.8   Alkaline Phosphatase 40 - 150 U/L 114   ALT 0 - 70 U/L 13   AST 0 - 45 U/L 23   Bilirubin Total <=1.2 mg/dL 0.2   Glucose 70 - 99 mg/dL 106 (H)   WBC 4.0 - 11.0 10e3/uL 4.8   Hemoglobin 13.3 - 17.7 g/dL 9.9 (L)   Hematocrit 40.0 - 53.0 % 32.7 (L)   Platelet Count 150 - 450 10e3/uL 209   RBC Count 4.40 - 5.90 10e6/uL 3.90 (L)   MCV 78 - 100 fL 84   MCH 26.5 - 33.0 pg 25.4 (L)   MCHC 31.5 - 36.5 g/dL 30.3 (L)   RDW 10.0 - 15.0 % 17.0 (H)   % Neutrophils % 49   % Lymphocytes % 35   % Monocytes % 12   % Eosinophils % 3   % Basophils % 1   Absolute Basophils 0.0 - 0.2 10e3/uL 0.0   Absolute Eosinophils 0.0 - 0.7 10e3/uL 0.2   Absolute Immature Granulocytes <=0.4 10e3/uL 0.0   Absolute Lymphocytes 0.8 - 5.3 10e3/uL 1.7   Absolute Monocytes 0.0 - 1.3 10e3/uL 0.6   % Immature Granulocytes % 1   Absolute Neutrophils 1.6 - 8.3 10e3/uL 2.4   Absolute NRBCs 10e3/uL 0.0   NRBCs per 100 WBC <1 /100 0     Results reviewed, labs MET  treatment parameters, ok to proceed with treatment.    Post Infusion Assessment:  Patient tolerated infusion without incident.  Blood return noted pre and post infusion.  No evidence of extravasations.  Access discontinued per protocol.     Discharge Plan:   Prescription refills given for Magnesium.  Discharge instructions reviewed with: Patient and PCA.  Patient and/or family verbalized understanding of discharge instructions and all questions answered.  AVS to patient via [employer].  Patient will return 1/9 for next appointment.   Patient discharged in stable condition accompanied by: PCA.  Departure Mode: Ambulatory with walker.      Dia Poole RN

## 2025-01-02 NOTE — NURSING NOTE
Chief Complaint   Patient presents with    Blood Draw     Vitals taken, piv started labs drawn, saline locked, checked into next appt     BP (!) 145/74 (BP Location: Left arm, Patient Position: Sitting, Cuff Size: Adult Large)   Pulse 80   Temp 98.2  F (36.8  C) (Oral)   Resp 16   Wt 89.4 kg (197 lb 1.6 oz)   SpO2 99%   BMI 33.83 kg/m    Lio Vasquez RN on 1/2/2025 at 11:26 AM

## 2025-01-02 NOTE — NURSING NOTE
"Oncology Rooming Note    January 2, 2025 11:39 AM   Jonathan Bishop is a 79 year old male who presents for:    Chief Complaint   Patient presents with    Blood Draw     Vitals taken, piv started labs drawn, saline locked, checked into next appt    Oncology Clinic Visit     Squamous Cell Carcinoma of Oropharynx     Initial Vitals: BP (!) 145/74 (BP Location: Left arm, Patient Position: Sitting, Cuff Size: Adult Large)   Pulse 80   Temp 98.2  F (36.8  C) (Oral)   Resp 16   Wt 89.4 kg (197 lb 1.6 oz)   SpO2 99%   BMI 33.83 kg/m   Estimated body mass index is 33.83 kg/m  as calculated from the following:    Height as of 12/4/24: 1.626 m (5' 4\").    Weight as of this encounter: 89.4 kg (197 lb 1.6 oz). Body surface area is 2.01 meters squared.  Severe Pain (7) Comment: 7.86/10 per pt back   No LMP for male patient.  Allergies reviewed: Yes  Medications reviewed: Yes    Medications: MEDICATION REFILLS NEEDED TODAY. Provider was notified.  Pharmacy name entered into [employer]:    [id number] - [place], [place] - [address] [address]  [facility] [place] - [place], [place] - [address]        Clinical concerns: Needs refills on magnesum and percocet. States that he needs his Rx of Seldenafil 100mg to be sent to the [facility] pharmacy. Roomer gave the patient the number to call for his pharmacy.      Glenn Rodríguez LPN              "

## 2025-01-02 NOTE — PATIENT INSTRUCTIONS
[facility] Triage and after hours / weekends / holidays:  [phone]    Please call the [facility] Triage line if you experience a temperature greater than or equal to 100.4, shaking chills, have uncontrolled nausea, vomiting and/or diarrhea, dizziness, shortness of breath, chest pain, bleeding, unexplained bruising, or if you have any other new/concerning symptoms, questions or concerns.     If you wish to speak to a provider before your next infusion visit, please call your care coordinator to notify them so we can adequately serve you.    If you need a refill on a narcotic prescription or other medication, please call before your infusion appointment.      January 2025 Sunday Monday Tuesday Wednesday Thursday Friday Saturday                  1     2    LAB PERIPHERAL  11:00 AM   (15 min.)   [facility]   [facility]    RETURN CCSL  11:15 AM   (45 min.)   Vandana Bertrand CNP   [facility]    ONC INFUSION 2 HR (120 MIN)  12:30 PM   (120 min.)    ONC INFUSION NURSE   [facility] 3     4       5     6     7     8     9    LAB PERIPHERAL   8:30 AM   (15 min.)   [facility]   [facility]    ONC INFUSION 2 HR (120 MIN)   9:00 AM   (120 min.)    ONC INFUSION NURSE   [facility] 10     11       12     13     14    CT CHEST ABD NECK W CMB   8:50 AM   (40 min.)   [facility]   [facility] [place] 15    RETURN CCSL  11:30 AM   (30 min.)   Dominique Mueller MD   [facility] 16     17     18       19     20     21     22     23     24     25       26     27     28     29     30     31                      February 2025 Sunday Monday Tuesday Wednesday Thursday Friday Saturday                                 1       2     3     4     5     6     7     8       9     10    RETURN FOOT/ANKLE   6:45 AM   (20  min.)   Mamadou Gary DPM   [facility] Orth[facility] 11     12     13     14     15       16     17     18     19     20     21     22       23     24     25     26     27     28                         Recent Results (from the past 24 hours)   Comprehensive metabolic panel    Collection Time: 01/02/25 11:31 AM   Result Value Ref Range    Sodium 147 (H) 135 - 145 mmol/L    Potassium 3.4 3.4 - 5.3 mmol/L    Carbon Dioxide (CO2) 31 (H) 22 - 29 mmol/L    Anion Gap 10 7 - 15 mmol/L    Urea Nitrogen 14.5 8.0 - 23.0 mg/dL    Creatinine 0.80 0.67 - 1.17 mg/dL    GFR Estimate 90 >60 mL/min/1.73m2    Calcium 8.9 8.8 - 10.4 mg/dL    Chloride 106 98 - 107 mmol/L    Glucose 106 (H) 70 - 99 mg/dL    Alkaline Phosphatase 114 40 - 150 U/L    AST 23 0 - 45 U/L    ALT 13 0 - 70 U/L    Protein Total 6.8 6.4 - 8.3 g/dL    Albumin 3.9 3.5 - 5.2 g/dL    Bilirubin Total 0.2 <=1.2 mg/dL   CBC with platelets and differential    Collection Time: 01/02/25 11:31 AM   Result Value Ref Range    WBC Count 4.8 4.0 - 11.0 10e3/uL    RBC Count 3.90 (L) 4.40 - 5.90 10e6/uL    Hemoglobin 9.9 (L) 13.3 - 17.7 g/dL    Hematocrit 32.7 (L) 40.0 - 53.0 %    MCV 84 78 - 100 fL    MCH 25.4 (L) 26.5 - 33.0 pg    MCHC 30.3 (L) 31.5 - 36.5 g/dL    RDW 17.0 (H) 10.0 - 15.0 %    Platelet Count 209 150 - 450 10e3/uL    % Neutrophils 49 %    % Lymphocytes 35 %    % Monocytes 12 %    % Eosinophils 3 %    % Basophils 1 %    % Immature Granulocytes 1 %    NRBCs per 100 WBC 0 <1 /100    Absolute Neutrophils 2.4 1.6 - 8.3 10e3/uL    Absolute Lymphocytes 1.7 0.8 - 5.3 10e3/uL    Absolute Monocytes 0.6 0.0 - 1.3 10e3/uL    Absolute Eosinophils 0.2 0.0 - 0.7 10e3/uL    Absolute Basophils 0.0 0.0 - 0.2 10e3/uL    Absolute Immature Granulocytes 0.0 <=0.4 10e3/uL    Absolute NRBCs 0.0 10e3/uL   Magnesium    Collection Time: 01/02/25 11:31 AM   Result Value Ref Range    Magnesium 1.1 (L) 1.7 - 2.3 mg/dL

## 2025-01-02 NOTE — Clinical Note
1/2/2025      Jonathan Bishop  [address]  [place] [address]      Dear Colleague,    Thank you for referring your patient, Jonathan Bishop, to the [facility]. Please see a copy of my visit note below.          [facility]  [address] [facility] [address]  Phone: [phone]  Fax: [phone]    PATIENT NAME: Jonathan Bishop  MRN # [id number]   DATE OF VISIT: January 2, 2025 YOB: 1945     Otolaryngology: Dr. Karismha Eng  Radiation Oncology: Dr. Jenni Mills  PCP: Dr. Buck Nascimento     CANCER TYPE: SCC L tonsil, p16 +lety  STAGE: [id number] (IVC)  ECOG PS: 1-2     PD-L1: TPS 20%, CPS 25% on [id number] tonsil bx  NGS: N/A      CANCER SUMMARY  2/26/23              L tonsil mass bx in clinic (Dr. Eng).   2/20/23              PET/CT. 3.7 x 4.0 x 5.1 cm mass L palatine tonsil causing narrowing of the oropharyngeal lumen, L level 2 node, L retropharyngeal nodular density, extension of primary mass vs retropharyngeal node, 0.8 cm RLL nodule concerning for met, mild focal uptake R iliac bone and L1 without CT correlate suspicious for mets.   3/2/23  R VATS, wedge resection. Path: SCC, poorly differentiated, associated with necrosis, 1 cm, negative margins, p16 +lety  3/24/23              MRI L spine and pelvis. Diffusely heterogeneous marrow signal throughout without corresponding abnormal signal on sagittal STIR sequence and no abnormal enhancement. Nonspecific but could be benign process such as red marrow hyperplasia, cannot completely exclude metastatic disease or infiltrative pathologic marrow process. No lesions corresponding to FDG avid spots on PET/CT.   4/19~10/18/23 Pembrolizumab.  10/26/23            CT neck and CAP. Increased L palatine tonsil mass, 3.8 x 2.7 cm --> 4.7 x 3.5 cm. Increased bilateral cervical nodes up to 2.3 x 2.4 cm R level 2 node. New 3 mm RML nodule, no other mets.   11/14/23 PET.  4.6 x 3.3 cm L palatine tonsil mass (SUB 26.8), R level 2A and 2A/3 nodes. Questionable uptake distal R femoral medullary cavity, partially imaged, with questionable subtle corresponding soft tissue attenuation on CT. MRI could be obtained to better evaluate.  11/21~12/2/23   [facility] for weakness/fall. COVID/paxlovid, MSSA bacteremia (1 of 2 bottles on 1 day), TTE negative, treated with cefazolin but didn't complete 2 week course, episode of unresponsiveness 12/2. Dispo plan was TCU on [facility], but left AMA. CTA chest negative for PE, showed grossly unchanged cervical adenopathy. Brain MRI 11/22 for stroke code negative for infarct. Showed SVID, moderate atrophy, suboptimal contrast bolus to examine intracranial structures, 4.5 x 3 x 4.5 cm L nasopharyngeal mass, incompletely visualized. CTA negative.   12/19/23 Quad shot fraction #1. No-showed thereafter.  1/10/24~curr     Carboplatin (AUC 2) + paclitaxel 60 mg/m2 weekly. Held 2/13 due to anorexia, fatigue. Break between 8/1 and 9/19 doses for trip, fatigue. Missed dose 10/16 due to transportation issues.  3/13~3/17/24     [facility] for acute encephalopathy. Had stopped his car in the middle of the highway after receiving chemo earlier that day. Recommended not to drive   4/30/24  CT CAP and CT neck. Residual bilateral cervical adenopathy.   7/30/24  PET. CT not covered. Significant reduction L palatine tonsil mass - residual focal uptake in the L posterolateral oropharyngeal wall (SUV 9.1, previously 26.8). Previously seen multiple FDG avid L cervical nodes and few enlarged R nodes are completed resolved. Mild diffuse uptake along bilateral lower legs and ankles with associated skin thickening and stranding along the soft tissues; findings can be seen with cellulitis. No metastatic disease.   11/5/24  CT neck and CT CAP.  Stable appearance L palatine tonsil, no discrete mass. 1.1 cm R thyroid nodule, a little bigger. O/w unremarkable.     SUBJECTIVE  Conner  is seen prior to weekly carbo/taxol.  ***    CURRENT OUTPATIENT MEDICATIONS  Reviewed in Epic today    ALLERGIES  No Known Allergies     PHYSICAL EXAM  There were no vitals taken for this visit.    General: Well-appearing male, NAD  Eyes: EOMI, PERRL. No scleral icterus.  ENT: Oral mucosa is moist without lesions or thrush.   Lymphatic: Neck is supple without cervical or supraclavicular lymphadenopathy.   Cardiovascular: RRR, no m/g/r. +1 peripheral edema to bilateral ankles  Respiratory: CTA bilaterally. No wheezes or crackles.   Neurologic: No focal deficits. Steady gait with 4 wheeled walker  Skin: No rashes, petechiae, or bruising noted on exposed skin.    LABORATORY AND IMAGING STUDIES  Most Recent 3 CBC's:  Recent Labs   Lab Test 12/19/24  0735 12/12/24  0811 11/07/24  0847   WBC 5.3 5.8 4.7   HGB 9.8* 10.6* 8.8*   MCV 81 83 85    211 178   ANEUTAUTO 2.9 3.2 2.5    Most Recent 3 BMP's:  Recent Labs   Lab Test 12/19/24  0735 12/12/24  0811 11/07/24  0847    143 146*   POTASSIUM 3.9 4.1 3.4   CHLORIDE 103 107 108*   CO2 30* 26 28   BUN 27.4* 22.0 22.3   CR 0.94 0.80 0.82   ANIONGAP 11 10 10   WARREN 9.1 9.7 9.2   GLC 98 100* 144*   PROTTOTAL 7.3 7.5 6.5   ALBUMIN 4.1 4.1 3.9    Most Recent 2 LFT's:  Recent Labs   Lab Test 12/19/24  0735 12/12/24  0811   AST 20 26   ALT 13 15   ALKPHOS 108 116   BILITOTAL 0.3 0.2    Most Recent TSH and T4:  Recent Labs   Lab Test 09/19/24  1115   TSH 1.44   T4 1.14     Phos/Mag:  Lab Results   Component Value Date    PHOS 3.0 03/17/2024    PHOS 2.4 (L) 12/09/2020    PHOS 3.9 12/08/2020    MAG 1.4 (L) 12/19/2024    MAG 1.9 12/12/2024    MAG 1.7 11/07/2024      I personally reviewed and interpreted the above labs today.     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 Small residual tumor in the L  tonsil/soft palate on PET 7/30/24. Referred back to Dr. Mills, but decided to defer radiation for the moment given good response to chemo. Remains on weekly carbo/taxol but with intermittent treatment breaks based on personal preference, tolerance and other factors. Holding off on consolidative radiation in absence of metastatic recurrent disease. Labs look stable today. Proceed with carbo/taxol. Will cancel infusion next week d/t planned travel to [place]. His PCA will be going with. RTC 1/2 for labs, MARK visit with me and infusion.  Restage with CT-CAP and CT neck in ~2 months--mid January     Thyroid nodule: seen on CT, US without intervention needed    Neurologic change: demonstrated neuro changes during last visit with Dr. Mueller including stiffening and closing eyes. No arrhythmia on exam at that time or on EKG.  Neurology consult scheduled in May.      Neurocognitive changes, encephalopathy March 2024: Hasn't pursued cognitive testing yet despite encouragement.      Anorexia, malnutrition, weight loss: Stable. Weight better with PCA assistance with meal prep.      Hypomag: On mag oxide but remains marginal, 1.4 today. Continue oral replacement today     H/o CVA: Residual L weakness. Uses walker at baseline, motorized scooter out and about. Gait steady today     Screening for hypothyroidism: TFTs 9/19/24 ok, check in ~ March 2025.     LE edema: Better with consistent PCA care. Continue compression, elevation.     Surrogate decision maker: He would want us to talk with Mariann if he could not speak for himself. Not discussed today      Peripheral neuropathy: MRI shows a lot of foramenal stenosis and spinal canal stenosis, so more likely related to that than DM2. Slightly worse lately, potentially r/t paclitaxel. Watch closely. Plan for treatment break next week d/t holidays/travel.    Vandana Bertrand, YOANNA  ---  *** minutes spent on the date of the encounter doing {2021 E&M time in:530147}.    The longitudinal  plan of care for the diagnosis(es)/condition(s) as documented were addressed during this visit. Due to the added complexity in care, I will continue to support Conner in the subsequent management and with ongoing continuity of care.          Again, thank you for allowing me to participate in the care of your patient.        Sincerely,        Vandana Bertrand CNP    Electronically signed

## 2025-01-07 ENCOUNTER — DOCUMENTATION ONLY (OUTPATIENT)
Dept: INTERNAL MEDICINE | Facility: CLINIC | Age: 80
End: 2025-01-07
Payer: COMMERCIAL

## 2025-01-07 RX ORDER — DIPHENHYDRAMINE HYDROCHLORIDE 50 MG/ML
50 INJECTION INTRAMUSCULAR; INTRAVENOUS
Start: 2025-01-16

## 2025-01-07 RX ORDER — ONDANSETRON 2 MG/ML
8 INJECTION INTRAMUSCULAR; INTRAVENOUS ONCE
Start: 2025-01-16 | End: 2025-01-16

## 2025-01-07 RX ORDER — MEPERIDINE HYDROCHLORIDE 25 MG/ML
25 INJECTION INTRAMUSCULAR; INTRAVENOUS; SUBCUTANEOUS
OUTPATIENT
Start: 2025-01-16

## 2025-01-07 RX ORDER — LORAZEPAM 2 MG/ML
0.5 INJECTION INTRAMUSCULAR EVERY 4 HOURS PRN
OUTPATIENT
Start: 2025-01-16

## 2025-01-07 RX ORDER — EPINEPHRINE 1 MG/ML
0.3 INJECTION, SOLUTION INTRAMUSCULAR; SUBCUTANEOUS EVERY 5 MIN PRN
OUTPATIENT
Start: 2025-01-16

## 2025-01-07 RX ORDER — DEXAMETHASONE SODIUM PHOSPHATE 4 MG/ML
8 INJECTION, SOLUTION INTRA-ARTICULAR; INTRALESIONAL; INTRAMUSCULAR; INTRAVENOUS; SOFT TISSUE ONCE
Start: 2025-01-16 | End: 2025-01-16

## 2025-01-07 RX ORDER — DIPHENHYDRAMINE HYDROCHLORIDE 50 MG/ML
25 INJECTION INTRAMUSCULAR; INTRAVENOUS
Start: 2025-01-16

## 2025-01-07 RX ORDER — HEPARIN SODIUM,PORCINE 10 UNIT/ML
5-20 VIAL (ML) INTRAVENOUS DAILY PRN
OUTPATIENT
Start: 2025-01-16

## 2025-01-07 RX ORDER — ALBUTEROL SULFATE 0.83 MG/ML
2.5 SOLUTION RESPIRATORY (INHALATION)
OUTPATIENT
Start: 2025-01-16

## 2025-01-07 RX ORDER — METHYLPREDNISOLONE SODIUM SUCCINATE 40 MG/ML
40 INJECTION INTRAMUSCULAR; INTRAVENOUS
Start: 2025-01-16

## 2025-01-07 RX ORDER — HEPARIN SODIUM (PORCINE) LOCK FLUSH IV SOLN 100 UNIT/ML 100 UNIT/ML
5 SOLUTION INTRAVENOUS
OUTPATIENT
Start: 2025-01-16

## 2025-01-07 RX ORDER — ALBUTEROL SULFATE 90 UG/1
1-2 INHALANT RESPIRATORY (INHALATION)
Start: 2025-01-16

## 2025-01-07 NOTE — PROGRESS NOTES
Type of Form Received: [employer] [place] ICD-10 Request    Form Received (Date) 1/7/25   Form Filled out Yes, faxed 1/7/25   Placed in provider folder Yes

## 2025-01-09 ENCOUNTER — APPOINTMENT (OUTPATIENT)
Dept: LAB | Facility: CLINIC | Age: 80
End: 2025-01-09
Attending: INTERNAL MEDICINE
Payer: COMMERCIAL

## 2025-01-09 ENCOUNTER — INFUSION THERAPY VISIT (OUTPATIENT)
Dept: ONCOLOGY | Facility: CLINIC | Age: 80
End: 2025-01-09
Attending: INTERNAL MEDICINE
Payer: COMMERCIAL

## 2025-01-09 VITALS
BODY MASS INDEX: 34.55 KG/M2 | SYSTOLIC BLOOD PRESSURE: 148 MMHG | WEIGHT: 201.3 LBS | RESPIRATION RATE: 16 BRPM | OXYGEN SATURATION: 97 % | HEART RATE: 82 BPM | TEMPERATURE: 97.6 F | DIASTOLIC BLOOD PRESSURE: 78 MMHG

## 2025-01-09 DIAGNOSIS — C10.9 SQUAMOUS CELL CARCINOMA OF OROPHARYNX (H): ICD-10-CM

## 2025-01-09 DIAGNOSIS — E83.42 HYPOMAGNESEMIA: ICD-10-CM

## 2025-01-09 DIAGNOSIS — N52.9 ERECTILE DYSFUNCTION, UNSPECIFIED ERECTILE DYSFUNCTION TYPE: ICD-10-CM

## 2025-01-09 DIAGNOSIS — C78.00 MALIGNANT NEOPLASM METASTATIC TO LUNG, UNSPECIFIED LATERALITY (H): Primary | ICD-10-CM

## 2025-01-09 DIAGNOSIS — C09.9 TONSIL CANCER (H): ICD-10-CM

## 2025-01-09 DIAGNOSIS — Z13.29 SCREENING FOR HYPOTHYROIDISM: ICD-10-CM

## 2025-01-09 LAB
ALBUMIN SERPL BCG-MCNC: 3.6 G/DL (ref 3.5–5.2)
ALBUMIN SERPL BCG-MCNC: 3.9 G/DL (ref 3.5–5.2)
ALP SERPL-CCNC: 106 U/L (ref 40–150)
ALP SERPL-CCNC: 98 U/L (ref 40–150)
ALT SERPL W P-5'-P-CCNC: 13 U/L (ref 0–70)
ALT SERPL W P-5'-P-CCNC: ABNORMAL U/L
ANION GAP SERPL CALCULATED.3IONS-SCNC: 10 MMOL/L (ref 7–15)
ANION GAP SERPL CALCULATED.3IONS-SCNC: 9 MMOL/L (ref 7–15)
AST SERPL W P-5'-P-CCNC: 20 U/L (ref 0–45)
AST SERPL W P-5'-P-CCNC: 32 U/L (ref 0–45)
BASOPHILS # BLD AUTO: 0 10E3/UL (ref 0–0.2)
BASOPHILS NFR BLD AUTO: 1 %
BILIRUB SERPL-MCNC: 0.2 MG/DL
BILIRUB SERPL-MCNC: 0.2 MG/DL
BUN SERPL-MCNC: 15.7 MG/DL (ref 8–23)
BUN SERPL-MCNC: 15.8 MG/DL (ref 8–23)
CALCIUM SERPL-MCNC: 8.6 MG/DL (ref 8.8–10.4)
CALCIUM SERPL-MCNC: 8.8 MG/DL (ref 8.8–10.4)
CHLORIDE SERPL-SCNC: 106 MMOL/L (ref 98–107)
CHLORIDE SERPL-SCNC: 107 MMOL/L (ref 98–107)
CREAT SERPL-MCNC: 0.8 MG/DL (ref 0.67–1.17)
CREAT SERPL-MCNC: 0.82 MG/DL (ref 0.67–1.17)
EGFRCR SERPLBLD CKD-EPI 2021: 89 ML/MIN/1.73M2
EGFRCR SERPLBLD CKD-EPI 2021: 90 ML/MIN/1.73M2
EOSINOPHIL # BLD AUTO: 0.2 10E3/UL (ref 0–0.7)
EOSINOPHIL NFR BLD AUTO: 4 %
ERYTHROCYTE [DISTWIDTH] IN BLOOD BY AUTOMATED COUNT: 17 % (ref 10–15)
GLUCOSE SERPL-MCNC: 121 MG/DL (ref 70–99)
GLUCOSE SERPL-MCNC: 126 MG/DL (ref 70–99)
HCO3 SERPL-SCNC: 27 MMOL/L (ref 22–29)
HCO3 SERPL-SCNC: 28 MMOL/L (ref 22–29)
HCT VFR BLD AUTO: 31.5 % (ref 40–53)
HGB BLD-MCNC: 9.5 G/DL (ref 13.3–17.7)
IMM GRANULOCYTES # BLD: 0 10E3/UL
IMM GRANULOCYTES NFR BLD: 1 %
LYMPHOCYTES # BLD AUTO: 1.8 10E3/UL (ref 0.8–5.3)
LYMPHOCYTES NFR BLD AUTO: 32 %
MAGNESIUM SERPL-MCNC: 1.4 MG/DL (ref 1.7–2.3)
MCH RBC QN AUTO: 24.8 PG (ref 26.5–33)
MCHC RBC AUTO-ENTMCNC: 30.2 G/DL (ref 31.5–36.5)
MCV RBC AUTO: 82 FL (ref 78–100)
MONOCYTES # BLD AUTO: 0.3 10E3/UL (ref 0–1.3)
MONOCYTES NFR BLD AUTO: 5 %
NEUTROPHILS # BLD AUTO: 3.3 10E3/UL (ref 1.6–8.3)
NEUTROPHILS NFR BLD AUTO: 58 %
NRBC # BLD AUTO: 0 10E3/UL
NRBC BLD AUTO-RTO: 0 /100
PLATELET # BLD AUTO: 189 10E3/UL (ref 150–450)
POTASSIUM SERPL-SCNC: 3.5 MMOL/L (ref 3.4–5.3)
POTASSIUM SERPL-SCNC: 6.1 MMOL/L (ref 3.4–5.3)
PROT SERPL-MCNC: 6.3 G/DL (ref 6.4–8.3)
PROT SERPL-MCNC: 6.9 G/DL (ref 6.4–8.3)
RBC # BLD AUTO: 3.83 10E6/UL (ref 4.4–5.9)
SODIUM SERPL-SCNC: 143 MMOL/L (ref 135–145)
SODIUM SERPL-SCNC: 144 MMOL/L (ref 135–145)
T4 FREE SERPL-MCNC: 1.18 NG/DL (ref 0.9–1.7)
TSH SERPL DL<=0.005 MIU/L-ACNC: 1.17 UIU/ML (ref 0.3–4.2)
WBC # BLD AUTO: 5.7 10E3/UL (ref 4–11)

## 2025-01-09 PROCEDURE — 82310 ASSAY OF CALCIUM: CPT

## 2025-01-09 PROCEDURE — 258N000003 HC RX IP 258 OP 636: Performed by: REGISTERED NURSE

## 2025-01-09 PROCEDURE — 82310 ASSAY OF CALCIUM: CPT | Performed by: INTERNAL MEDICINE

## 2025-01-09 PROCEDURE — 84155 ASSAY OF PROTEIN SERUM: CPT | Performed by: INTERNAL MEDICINE

## 2025-01-09 PROCEDURE — 84155 ASSAY OF PROTEIN SERUM: CPT

## 2025-01-09 PROCEDURE — 85048 AUTOMATED LEUKOCYTE COUNT: CPT | Performed by: INTERNAL MEDICINE

## 2025-01-09 PROCEDURE — 84439 ASSAY OF FREE THYROXINE: CPT

## 2025-01-09 PROCEDURE — 83735 ASSAY OF MAGNESIUM: CPT

## 2025-01-09 PROCEDURE — 84443 ASSAY THYROID STIM HORMONE: CPT

## 2025-01-09 PROCEDURE — 36415 COLL VENOUS BLD VENIPUNCTURE: CPT

## 2025-01-09 PROCEDURE — 84075 ASSAY ALKALINE PHOSPHATASE: CPT | Performed by: INTERNAL MEDICINE

## 2025-01-09 PROCEDURE — 250N000011 HC RX IP 250 OP 636: Performed by: REGISTERED NURSE

## 2025-01-09 PROCEDURE — 36415 COLL VENOUS BLD VENIPUNCTURE: CPT | Performed by: INTERNAL MEDICINE

## 2025-01-09 PROCEDURE — 250N000011 HC RX IP 250 OP 636: Performed by: INTERNAL MEDICINE

## 2025-01-09 PROCEDURE — 85004 AUTOMATED DIFF WBC COUNT: CPT | Performed by: INTERNAL MEDICINE

## 2025-01-09 RX ORDER — MAGNESIUM OXIDE 400 MG/1
400 TABLET ORAL 2 TIMES DAILY
Qty: 4 TABLET | Refills: 0 | Status: SHIPPED | OUTPATIENT
Start: 2025-01-09 | End: 2025-01-11

## 2025-01-09 RX ORDER — DEXAMETHASONE SODIUM PHOSPHATE 4 MG/ML
8 INJECTION, SOLUTION INTRA-ARTICULAR; INTRALESIONAL; INTRAMUSCULAR; INTRAVENOUS; SOFT TISSUE ONCE
Status: COMPLETED | OUTPATIENT
Start: 2025-01-09 | End: 2025-01-09

## 2025-01-09 RX ORDER — ONDANSETRON 2 MG/ML
8 INJECTION INTRAMUSCULAR; INTRAVENOUS ONCE
Status: COMPLETED | OUTPATIENT
Start: 2025-01-09 | End: 2025-01-09

## 2025-01-09 RX ORDER — SILDENAFIL 100 MG/1
100 TABLET, FILM COATED ORAL DAILY PRN
Qty: 6 TABLET | Refills: 9 | Status: SHIPPED | OUTPATIENT
Start: 2025-01-09

## 2025-01-09 RX ORDER — MAGNESIUM SULFATE HEPTAHYDRATE 40 MG/ML
2 INJECTION, SOLUTION INTRAVENOUS ONCE
Status: COMPLETED | OUTPATIENT
Start: 2025-01-09 | End: 2025-01-09

## 2025-01-09 RX ADMIN — FAMOTIDINE 20 MG: 10 INJECTION INTRAVENOUS at 09:38

## 2025-01-09 RX ADMIN — DEXAMETHASONE SODIUM PHOSPHATE 8 MG: 4 INJECTION, SOLUTION INTRA-ARTICULAR; INTRALESIONAL; INTRAMUSCULAR; INTRAVENOUS; SOFT TISSUE at 09:38

## 2025-01-09 RX ADMIN — CARBOPLATIN 200 MG: 10 INJECTION, SOLUTION INTRAVENOUS at 11:15

## 2025-01-09 RX ADMIN — ONDANSETRON 8 MG: 2 INJECTION INTRAMUSCULAR; INTRAVENOUS at 09:37

## 2025-01-09 RX ADMIN — PACLITAXEL 96 MG: 6 INJECTION, SOLUTION INTRAVENOUS at 09:56

## 2025-01-09 ASSESSMENT — PAIN SCALES - GENERAL: PAINLEVEL_OUTOF10: SEVERE PAIN (7)

## 2025-01-09 NOTE — PROGRESS NOTES
Infusion Nursing Note:  Jonathan Bishop presents today for Cycle 8 Day 22 Carboplatin, Paclitaxel, and magnesium replacement.    Patient seen by provider today: No   present during visit today: Not Applicable.    Note: Conner reports to infusion today feeling generally well. He continues to have his chronic low back pain that he rates a 7 out of 10. He requests heat packs but otherwise declines intervention for his pain this visit. He also endorses baseline neuropathy in his bilateral feet and generalized weakness. Otherwise denies fevers, new/worsening cough, shortness of breath, N/V/D/C, urinary symptoms or rash. Wishes to proceed with treatment today.       Intravenous Access:  Peripheral IV placed.    At 1420 Ron called stating his arm hurt near his PIV while his carboplatin was infusing. Infusion stopped. Edema noted around PIV site, and it was painful and cool to touch. Attempted to draw back on PIV, removed 1 ml of fluid. PIV removed. Elevated and ice pack applied per pharmacy. Edema measured 6 cm by 4 cm. After icing the site, patient states his pain had resolved. Patient instructed to call infusion triage line if edema does not resolve within a few days.     Treatment Conditions:  Lab Results   Component Value Date    HGB 9.5 (L) 01/09/2025    WBC 5.7 01/09/2025    ANEU 1.0 (L) 03/06/2024    ANEUTAUTO 3.3 01/09/2025     01/09/2025        Lab Results   Component Value Date     01/09/2025    POTASSIUM 3.5 01/09/2025    MAG 1.4 (L) 01/09/2025    CR 0.82 01/09/2025    WARREN 8.6 (L) 01/09/2025    BILITOTAL 0.2 01/09/2025    ALBUMIN 3.6 01/09/2025    ALT 13 01/09/2025    AST 20 01/09/2025       Results reviewed, labs MET treatment parameters, ok to proceed with treatment.      Post Infusion Assessment:  Patient tolerated infusion without incident.  Blood return noted pre and post infusion.  Second IV site patent and intact, free from redness, edema or discomfort.  No evidence of  extravasations.  Access discontinued per protocol.       Discharge Plan:   Patient sent home with magnesium oxide - did not want to stay for IV replacement, so sent home with PO replacement with instructions to take 2 tablets twice a day for 2 days, then resume 1 tablet twice a day.  Discharge instructions reviewed with: Patient.  Patient and/or family verbalized understanding of discharge instructions and all questions answered.  Copy of AVS reviewed with patient and/or family.    Patient will return around 1/16 per infusion appointment request.  Patient discharged in stable condition accompanied by: self and attendant.  Departure Mode: ambulatory with walker.      Haley Arauz RN

## 2025-01-09 NOTE — PATIENT INSTRUCTIONS
[facility] Triage and after hours / weekends / holidays:  [phone] option 5, option 2    Please call the triage or after hours line if you experience a temperature greater than or equal to 100.4, shaking chills, have uncontrolled nausea, vomiting and/or diarrhea, dizziness, shortness of breath, chest pain, bleeding, unexplained bruising, or if you have any other new/concerning symptoms, questions or concerns.      If you are having any concerning symptoms or wish to speak to a provider before your next infusion visit, please call triage to notify your care team so we can adequately serve you.     If you need a refill on a narcotic prescription or other medication, please call before your infusion appointment.

## 2025-01-14 ENCOUNTER — ANCILLARY PROCEDURE (OUTPATIENT)
Dept: CT IMAGING | Facility: CLINIC | Age: 80
End: 2025-01-14
Attending: INTERNAL MEDICINE
Payer: COMMERCIAL

## 2025-01-14 DIAGNOSIS — C10.9 SQUAMOUS CELL CARCINOMA OF OROPHARYNX (H): ICD-10-CM

## 2025-01-14 PROCEDURE — 70491 CT SOFT TISSUE NECK W/DYE: CPT | Mod: GC | Performed by: RADIOLOGY

## 2025-01-14 PROCEDURE — 71260 CT THORAX DX C+: CPT | Mod: GC | Performed by: RADIOLOGY

## 2025-01-14 PROCEDURE — 74177 CT ABD & PELVIS W/CONTRAST: CPT | Mod: GC | Performed by: RADIOLOGY

## 2025-01-14 RX ORDER — IOPAMIDOL 755 MG/ML
98 INJECTION, SOLUTION INTRAVASCULAR ONCE
Status: COMPLETED | OUTPATIENT
Start: 2025-01-14 | End: 2025-01-14

## 2025-01-14 RX ADMIN — IOPAMIDOL 98 ML: 755 INJECTION, SOLUTION INTRAVASCULAR at 09:10

## 2025-01-14 NOTE — DISCHARGE INSTRUCTIONS

## 2025-01-15 ENCOUNTER — ONCOLOGY VISIT (OUTPATIENT)
Dept: ONCOLOGY | Facility: CLINIC | Age: 80
End: 2025-01-15
Attending: INTERNAL MEDICINE
Payer: COMMERCIAL

## 2025-01-15 VITALS
HEART RATE: 60 BPM | BODY MASS INDEX: 35.5 KG/M2 | RESPIRATION RATE: 20 BRPM | OXYGEN SATURATION: 98 % | SYSTOLIC BLOOD PRESSURE: 148 MMHG | WEIGHT: 206.8 LBS | DIASTOLIC BLOOD PRESSURE: 57 MMHG | TEMPERATURE: 97.8 F

## 2025-01-15 DIAGNOSIS — C78.00 MALIGNANT NEOPLASM METASTATIC TO LUNG, UNSPECIFIED LATERALITY (H): Primary | ICD-10-CM

## 2025-01-15 DIAGNOSIS — E83.42 HYPOMAGNESEMIA: ICD-10-CM

## 2025-01-15 DIAGNOSIS — C10.9 SQUAMOUS CELL CARCINOMA OF OROPHARYNX (H): ICD-10-CM

## 2025-01-15 DIAGNOSIS — C09.9 TONSIL CANCER (H): ICD-10-CM

## 2025-01-15 DIAGNOSIS — E03.4 HYPOTHYROIDISM DUE TO ACQUIRED ATROPHY OF THYROID: ICD-10-CM

## 2025-01-15 PROCEDURE — G0463 HOSPITAL OUTPT CLINIC VISIT: HCPCS | Performed by: INTERNAL MEDICINE

## 2025-01-15 RX ORDER — ALBUTEROL SULFATE 90 UG/1
1-2 INHALANT RESPIRATORY (INHALATION)
Start: 2025-02-10

## 2025-01-15 RX ORDER — DIPHENHYDRAMINE HYDROCHLORIDE 50 MG/ML
50 INJECTION INTRAMUSCULAR; INTRAVENOUS
Start: 2025-02-16

## 2025-01-15 RX ORDER — DIPHENHYDRAMINE HYDROCHLORIDE 50 MG/ML
25 INJECTION INTRAMUSCULAR; INTRAVENOUS
Start: 2025-02-10

## 2025-01-15 RX ORDER — DIPHENHYDRAMINE HYDROCHLORIDE 50 MG/ML
50 INJECTION INTRAMUSCULAR; INTRAVENOUS
Start: 2025-02-24

## 2025-01-15 RX ORDER — DEXAMETHASONE SODIUM PHOSPHATE 4 MG/ML
8 INJECTION, SOLUTION INTRA-ARTICULAR; INTRALESIONAL; INTRAMUSCULAR; INTRAVENOUS; SOFT TISSUE ONCE
Start: 2025-02-10 | End: 2025-02-17

## 2025-01-15 RX ORDER — EPINEPHRINE 1 MG/ML
0.3 INJECTION, SOLUTION INTRAMUSCULAR; SUBCUTANEOUS EVERY 5 MIN PRN
OUTPATIENT
Start: 2025-02-16

## 2025-01-15 RX ORDER — ONDANSETRON 2 MG/ML
8 INJECTION INTRAMUSCULAR; INTRAVENOUS ONCE
Start: 2025-02-10 | End: 2025-02-17

## 2025-01-15 RX ORDER — DEXAMETHASONE SODIUM PHOSPHATE 4 MG/ML
8 INJECTION, SOLUTION INTRA-ARTICULAR; INTRALESIONAL; INTRAMUSCULAR; INTRAVENOUS; SOFT TISSUE ONCE
Start: 2025-02-16 | End: 2025-02-23

## 2025-01-15 RX ORDER — METHYLPREDNISOLONE SODIUM SUCCINATE 40 MG/ML
40 INJECTION INTRAMUSCULAR; INTRAVENOUS
Start: 2025-02-10

## 2025-01-15 RX ORDER — MEPERIDINE HYDROCHLORIDE 25 MG/ML
25 INJECTION INTRAMUSCULAR; INTRAVENOUS; SUBCUTANEOUS
OUTPATIENT
Start: 2025-02-10

## 2025-01-15 RX ORDER — MEPERIDINE HYDROCHLORIDE 25 MG/ML
25 INJECTION INTRAMUSCULAR; INTRAVENOUS; SUBCUTANEOUS
OUTPATIENT
Start: 2025-02-16

## 2025-01-15 RX ORDER — HEPARIN SODIUM (PORCINE) LOCK FLUSH IV SOLN 100 UNIT/ML 100 UNIT/ML
5 SOLUTION INTRAVENOUS
OUTPATIENT
Start: 2025-02-16

## 2025-01-15 RX ORDER — DIPHENHYDRAMINE HYDROCHLORIDE 50 MG/ML
25 INJECTION INTRAMUSCULAR; INTRAVENOUS
Start: 2025-02-16

## 2025-01-15 RX ORDER — LORAZEPAM 2 MG/ML
0.5 INJECTION INTRAMUSCULAR EVERY 4 HOURS PRN
OUTPATIENT
Start: 2025-02-16

## 2025-01-15 RX ORDER — ALBUTEROL SULFATE 0.83 MG/ML
2.5 SOLUTION RESPIRATORY (INHALATION)
OUTPATIENT
Start: 2025-02-24

## 2025-01-15 RX ORDER — MEPERIDINE HYDROCHLORIDE 25 MG/ML
25 INJECTION INTRAMUSCULAR; INTRAVENOUS; SUBCUTANEOUS
OUTPATIENT
Start: 2025-02-24

## 2025-01-15 RX ORDER — ALBUTEROL SULFATE 90 UG/1
1-2 INHALANT RESPIRATORY (INHALATION)
Start: 2025-02-16

## 2025-01-15 RX ORDER — HEPARIN SODIUM (PORCINE) LOCK FLUSH IV SOLN 100 UNIT/ML 100 UNIT/ML
5 SOLUTION INTRAVENOUS
OUTPATIENT
Start: 2025-02-10

## 2025-01-15 RX ORDER — DIPHENHYDRAMINE HYDROCHLORIDE 50 MG/ML
50 INJECTION INTRAMUSCULAR; INTRAVENOUS
Start: 2025-02-10

## 2025-01-15 RX ORDER — ALBUTEROL SULFATE 0.83 MG/ML
2.5 SOLUTION RESPIRATORY (INHALATION)
OUTPATIENT
Start: 2025-02-16

## 2025-01-15 RX ORDER — HEPARIN SODIUM,PORCINE 10 UNIT/ML
5-20 VIAL (ML) INTRAVENOUS DAILY PRN
OUTPATIENT
Start: 2025-02-16

## 2025-01-15 RX ORDER — HEPARIN SODIUM,PORCINE 10 UNIT/ML
5-20 VIAL (ML) INTRAVENOUS DAILY PRN
OUTPATIENT
Start: 2025-02-10

## 2025-01-15 RX ORDER — LORAZEPAM 2 MG/ML
0.5 INJECTION INTRAMUSCULAR EVERY 4 HOURS PRN
OUTPATIENT
Start: 2025-02-10

## 2025-01-15 RX ORDER — ONDANSETRON 2 MG/ML
8 INJECTION INTRAMUSCULAR; INTRAVENOUS ONCE
Start: 2025-02-16 | End: 2025-02-23

## 2025-01-15 RX ORDER — EPINEPHRINE 1 MG/ML
0.3 INJECTION, SOLUTION INTRAMUSCULAR; SUBCUTANEOUS EVERY 5 MIN PRN
OUTPATIENT
Start: 2025-02-24

## 2025-01-15 RX ORDER — METHYLPREDNISOLONE SODIUM SUCCINATE 40 MG/ML
40 INJECTION INTRAMUSCULAR; INTRAVENOUS
Start: 2025-02-24

## 2025-01-15 RX ORDER — HEPARIN SODIUM (PORCINE) LOCK FLUSH IV SOLN 100 UNIT/ML 100 UNIT/ML
5 SOLUTION INTRAVENOUS
OUTPATIENT
Start: 2025-02-24

## 2025-01-15 RX ORDER — ONDANSETRON 2 MG/ML
8 INJECTION INTRAMUSCULAR; INTRAVENOUS ONCE
Start: 2025-02-24 | End: 2025-03-03

## 2025-01-15 RX ORDER — LORAZEPAM 2 MG/ML
0.5 INJECTION INTRAMUSCULAR EVERY 4 HOURS PRN
OUTPATIENT
Start: 2025-02-24

## 2025-01-15 RX ORDER — HEPARIN SODIUM,PORCINE 10 UNIT/ML
5-20 VIAL (ML) INTRAVENOUS DAILY PRN
OUTPATIENT
Start: 2025-02-24

## 2025-01-15 RX ORDER — DEXAMETHASONE SODIUM PHOSPHATE 4 MG/ML
8 INJECTION, SOLUTION INTRA-ARTICULAR; INTRALESIONAL; INTRAMUSCULAR; INTRAVENOUS; SOFT TISSUE ONCE
Start: 2025-02-24 | End: 2025-03-03

## 2025-01-15 RX ORDER — DIPHENHYDRAMINE HYDROCHLORIDE 50 MG/ML
25 INJECTION INTRAMUSCULAR; INTRAVENOUS
Start: 2025-02-24

## 2025-01-15 RX ORDER — ALBUTEROL SULFATE 90 UG/1
1-2 INHALANT RESPIRATORY (INHALATION)
Start: 2025-02-24

## 2025-01-15 RX ORDER — METHYLPREDNISOLONE SODIUM SUCCINATE 40 MG/ML
40 INJECTION INTRAMUSCULAR; INTRAVENOUS
Start: 2025-02-16

## 2025-01-15 RX ORDER — ALBUTEROL SULFATE 0.83 MG/ML
2.5 SOLUTION RESPIRATORY (INHALATION)
OUTPATIENT
Start: 2025-02-10

## 2025-01-15 RX ORDER — EPINEPHRINE 1 MG/ML
0.3 INJECTION, SOLUTION INTRAMUSCULAR; SUBCUTANEOUS EVERY 5 MIN PRN
OUTPATIENT
Start: 2025-02-10

## 2025-01-15 ASSESSMENT — PAIN SCALES - GENERAL: PAINLEVEL_OUTOF10: EXTREME PAIN (8)

## 2025-01-15 NOTE — LETTER
1/15/2025      Jonathan Bishop  [address]  [facility] [address]      Dear Colleague,    Thank you for referring your patient, Jonathan Bishop, to the [facility]. Please see a copy of my visit note below.        [facility]  [address] [facility] [address]  Phone: [phone]  Fax: [phone]    PATIENT NAME: Jonathan Bishop  MRN # [id number]   DATE OF VISIT: January 15, 2025  YOB: 1945     Otolaryngology: Dr. Karishma Eng  Radiation Oncology: Dr. Jenni Mills  PCP: Dr. 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 Small residual tumor in the L tonsil/soft palate on PET 7/30/24. Feels better with treatment breaks. He thought maybe we were going to do a prolonged treatment break, but I explained that we could do short breaks here and there. With the slightly larger L level 2 node, I'd like to start now. To make it easier, we can do 2 weeks on, 1 week off. Alternative is drop carbo - I think we should do that after the next scan if needed, if we see SD or response.   - restage after 3 more cycles. Keep in mind interval between current CT and resuming therapy ~ 2/10 when interpreting next CT    Spells: Seeing Dr. Perry after EEG 3/24    Thyroid nodule: US 11/27/24 ok - no follow up needed.     Neurocognitive changes, encephalopathy March 2024: Hasn't pursued cognitive testing yet despite encouragement. Seems to be doing better to me in recent months, much improved after new PCA in place.     Anorexia, malnutrition, weight loss: Stabilized and doing much better with PCA  back in place and with treatment breaks. Not a current issue.     Hypomag: On mag oxide but remains marginal. Continue checking, replacing IV prn      H/o CVA: Residual L weakness. Uses walker at baseline, motorized scooter out and about. Watched him walk today, no changes in gait, etc.      Screening for hypothyroidism: TFTs 9/19/24 ok, check next visit      LE edema: Better with consistent PCA care.      Surrogate decision maker: He would want us to talk with Mariann if he could not speak for himself. Not discussed again today.      Peripheral neuropathy: MRI shows a lot of foramenal stenosis and spinal canal stenosis, so more likely related to that than DM2. Not worsening on paclitaxel     The longitudinal plan of care for the condition(s) below were addressed during this visit. Due to the added complexity in care, I will continue to support Conner in the subsequent management of this condition(s) and with the ongoing continuity of care of this condition(s): SCC tonsil      30 minutes spent by me on the date of the encounter doing chart review, review of test results, interpretation of tests, patient visit, documentation, orders     Dominique Mueller MD  Associate Professor of Medicine  Hematology, Oncology and Transplantation    SUBJECTIVE  Conner returns for routine follow up   Doing well   Had a good holiday with his family in [place]   No change in numbness/tingling. Swelling slightly worse     CANCER SUMMARY  2/26/23              L tonsil mass bx in clinic (Dr. Eng).   2/20/23              PET/CT. 3.7 x 4.0 x 5.1 cm mass L palatine tonsil causing narrowing of the oropharyngeal lumen, L level 2 node, L retropharyngeal nodular density, extension of primary mass vs retropharyngeal node, 0.8 cm RLL nodule concerning for met, mild focal uptake R iliac bone and L1 without CT correlate suspicious for mets.   3/2/23  R VATS, wedge resection. Path: SCC, poorly differentiated, associated with necrosis, 1 cm, negative margins,  p16 +lety  3/24/23              MRI L spine and pelvis. Diffusely heterogeneous marrow signal throughout without corresponding abnormal signal on sagittal STIR sequence and no abnormal enhancement. Nonspecific but could be benign process such as red marrow hyperplasia, cannot completely exclude metastatic disease or infiltrative pathologic marrow process. No lesions corresponding to FDG avid spots on PET/CT.   4/19~10/18/23 Pembrolizumab.  10/26/23            CT neck and CAP. Increased L palatine tonsil mass, 3.8 x 2.7 cm --> 4.7 x 3.5 cm. Increased bilateral cervical nodes up to 2.3 x 2.4 cm R level 2 node. New 3 mm RML nodule, no other mets.   11/14/23 PET. 4.6 x 3.3 cm L palatine tonsil mass (SUB 26.8), R level 2A and 2A/3 nodes. Questionable uptake distal R femoral medullary cavity, partially imaged, with questionable subtle corresponding soft tissue attenuation on CT. MRI could be obtained to better evaluate.  11/21~12/2/23   [facility] for weakness/fall. COVID/paxlovid, MSSA bacteremia (1 of 2 bottles on 1 day), TTE negative, treated with cefazolin but didn't complete 2 week course, episode of unresponsiveness 12/2. [place] [facility] [place]. CTA chest negative for PE, showed grossly unchanged cervical adenopathy. Brain MRI 11/22 for stroke code negative for infarct. Showed SVID, moderate atrophy, suboptimal contrast bolus to examine intracranial structures, 4.5 x 3 x 4.5 cm L nasopharyngeal mass, incompletely visualized. CTA negative.   12/19/23 Quad shot fraction #1. No-showed thereafter.  1/10/24~curr     Carboplatin (AUC 2) + paclitaxel 60 mg/m2 weekly. Held 2/13 due to anorexia, fatigue. Break between 8/1 and 9/19 doses for trip, fatigue. Missed dose 10/16 due to transportation issues.  3/13~3/17/24     [facility] for acute encephalopathy. Had [place] his car in the middle of the highway after receiving chemo earlier that day. Recommended not to drive   4/30/24  CT CAP and CT neck.  Residual bilateral cervical adenopathy.   7/30/24  PET. CT not covered. Significant reduction L palatine tonsil mass - residual focal uptake in the L posterolateral oropharyngeal wall (SUV 9.1, previously 26.8). Previously seen multiple FDG avid L cervical nodes and few enlarged R nodes are completed resolved. Mild diffuse uptake along bilateral lower legs and ankles with associated skin thickening and stranding along the soft tissues; findings can be seen with cellulitis. No metastatic disease.   11/5/24  CT neck and CT CAP.  Stable appearance L palatine tonsil, no discrete mass. 1.1 cm R thyroid nodule, a little bigger. O/w unremarkable.     PAST MEDICAL HISTORY  SCC as above  Chronic LBP  TAVR 2020  H/o rheumatic carditis 1950  CHF. 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 Nocturia once nightly   ED  Cataracts  Umbilical hernia repair 2011  Knee arthroplasty B 2010  Lumbar spine fusion, laminectomy, sciatic pain R > L  Peripheral neuropathy - from pinched nerves?  Hearing loss    CURRENT OUTPATIENT MEDICATIONS  Reviewed    ALLERGIES  No Known Allergies     PHYSICAL EXAM  BP (!) 148/57 (BP Location: Right arm, Patient Position: Sitting, Cuff Size: Adult Large)   Pulse 60   Temp 97.8  F (36.6  C) (Oral)   Resp 20   Wt 93.8 kg (206 lb 12.8 oz)   SpO2 98%   BMI 35.50 kg/m    GEN: NAD  HEENT: EOMI, no icterus, injection or pallor.  EXT: warm, well perfused, 1+ edema bilaterally   NEURO: alert    LABORATORY AND IMAGING STUDIES  Labs were independently reviewed and interpreted by me    1/9/24  CMP acceptable   Mg still low at 1.4 but better than 1.1 1/2/25  TSH and FT4 acceptable  CBC pd - stable anemia, o/w ok   MCV 81-84  (Ferritin, iron studies 4/2/24  - no LEW)    CT Chest/Abdomen/Pelvis w Contrast  Narrative: EXAMINATION: CT CHEST/ABDOMEN/PELVIS W CONTRAST 1/14/2025 9:25 AM    INDICATION: Restage SCC L tonsil; residual FDG uptake in July 2024 PET  L palatine  tonsil. Weekly carboplatin paclitaxel. One fraction of quad  shot in Dec 2023.    COMPARISON STUDY: CT chest/abdomen/pelvis 11/5/2024, thyroid  ultrasound 11/27/2024, PET 7/30/2024    TECHNIQUE: CT scan of the chest, abdomen and pelvis was performed on  multidetector CT scanner using volumetric acquisition technique and  images were reconstructed in multiple planes with variable thickness  and reviewed on dedicated workstations. CONTRAST: Isovue 370 98 mL.  Without oral contrast. CT scan radiation dose is optimized to minimum  requisite dose using automated dose modulation techniques.    FINDINGS:    Chest:   Mediastinum: Normal heart size. No pericardial effusion. TAVR. Normal  caliber of the ascending thoracic aorta and main pulmonary artery. No  mediastinal lymphadenopathy other than stable partially calcified  nonenlarged mediastinal and hilar lymph nodes, likely sequela of  granulomatous disease. Normal esophagus. 5 mm hypoattenuating right  thyroid nodule, previously 11 mm. Additional small subcentimeter  thyroid nodules are unchanged.    Pleura: No pneumothorax or pleural effusion.    Lungs: Central airways patent. Stable postsurgical changes of right  lower lobe wedge resection with scarring along the resection margin.  No new or enlarging pulmonary nodules. Scattered small calcified  granulomas.    Abdomen and Pelvis:  Liver: No suspicious mass. No intrahepatic biliary ductal dilation.    Biliary System: Normal gallbladder. No extrahepatic biliary ductal  dilation.    Pancreas: No mass or pancreatic ductal dilation. Mild fatty atrophy.    Adrenal glands: No mass or nodules.    Spleen: Normal.     Kidneys: No obstructing calculus or hydronephrosis. Bilateral cysts  are unchanged. Stable intermediate density in the mid left kidney  measuring 1.6 cm, unchanged since 2011. Bilateral cortical scarring,  stable.    Gastrointestinal tract: No abnormally dilated loops of bowel.  Normal  appendix.    Mesentery/peritoneum/retroperitoneum: No mass. No free fluid or air.     Lymph nodes: Prominent bilateral inguinal lymph nodes are unchanged  since 2011. Otherwise no lymphadenopathy in the abdomen or pelvis.    Vasculature: Patent major abdominal vasculature. Scattered  atherosclerotic calcification of abdominal aorta with no aneurysmal  dilation.    Pelvis: Urinary bladder is normal. The prostate is enlarged.    Osseous structures: No aggressive or acute osseous lesion. Stable  posterior L4-S1 instrumentation without evidence of hardware  complication. Multilevel degenerative changes of the spine.      Soft tissues: Bilateral gynecomastia.  Impression: IMPRESSION:   No significant change from 11/5/2024. No evidence of metastatic  disease in the chest, abdomen, or pelvis.    I have personally reviewed the examination and initial interpretation  and I agree with the findings.    ORION ABREU MD         SYSTEM ID:  [id number]  CT Soft Tissue Neck w Contrast  EXAM: CT SOFT TISSUE NECK W CONTRAST  1/14/2025 9:25 AM     HISTORY: restage SCC L tonsil; residual FDG uptake in July 2024 PET L  palatine tonsil. Weekly carboplatin paclitaxel. One fraction of quad  shot in Dec 2023; Squamous cell carcinoma of oropharynx (H)       Additional history from the EMR: 79M with PMH left tonsillar palatine  mass pathology proven squamous cell carcinoma. Has underwent treatment  with right lung wedge resection, Pembrolizumab, currently on weekly  carboplatin/paclitaxel.      COMPARISON: Neck CT 11/5/2024, same day CT CAP, PET CT 7/30/2024     TECHNIQUE: Following intravenous administration of nonionic iodinated  contrast medium, thin section helical CT images were obtained from the  skull base down to the level of the aortic arch.  Axial, coronal and  sagittal reformations were performed with 2-3 mm slice thickness  reconstruction. Images were reviewed in soft tissue, lung and bone  windows.    CONTRAST: 98 mL  Isovue-370    FINDINGS:   No discrete mass seen within the region of the left palatine tonsil.  Incidental right palatine tonsilliths.    Normal tongue base. Salivary glands are within normal limits.    No definite lymphadenopathy. However, there is a 9 mm left level 2A  lymph node (series 3, image 55), previously 5 mm.    Subcentimeter right thyroid hypodense nodule, decreased in size.    Mild bilateral carotid bifurcation atherosclerosis.    Extensive multilevel cervical degenerative changes without high-grade  spinal canal stenosis. Incidental ligamentum nuchae ossifications.    Trace left maxillary mucosal thickening. Edentulous. The imaged skull  base, intracranial and orbital structures are within normal limits.    Please see same-day CT CAP for thoracic findings.    IMPRESSION  Primary: 1} Expected post-treatment changes in the neck without  evidence of recurrent disease at the primary site.  Nodes: 2} Low suspicion: short interval follow-up or PET. Increased  but non-enlarged left level 2A lymph node.  Other: Decreased size of the right thyroid hypodense nodule relative  to 11/5/2024, also demonstrated on thyroid ultrasound 11/27/2024,  unlikely to represent malignancy.    CECT Surveillance Legend:    Primary  1: No evidence of recurrence: routine surveillance  2: Low suspicion    a) Superficial abnormality (skin, mucosal surface): direct visual  inspection    b) Ill-defined deep abnormality: short interval follow-up* or PET  3: High suspicion (new or enlarging discrete nodule/mass): biopsy  4: Definitive recurrence (path proven or clinical progression): no  biopsy needed    Nodes  1: No evidence of recurrence: routine surveillance  2: Low suspicion (ill-defined): short interval follow-up or PET  3: HIgh suspicion (new or enlarging lymph node): biopsy if clinically  needed  4: Definitive recurrence (path proven or clinical progression): no  biopsy needed    *short interval follow-up: 3 months at our  institution  .     I have personally reviewed the examination and initial interpretation  and I agree with the findings.    ORION ENGLISH MD         SYSTEM ID:  [id number]     CT neck and CT CAP were personally reviewed and interpreted by me as above. We reviewed the node together - heterogeneously enhancing, indicative more likely of active cancer as opposed to reactive.               Again, thank you for allowing me to participate in the care of your patient.        Sincerely,        Dominique Mueller MD    Electronically signed

## 2025-01-15 NOTE — NURSING NOTE
"Oncology Rooming Note    January 15, 2025 11:57 AM   Jonathan Bishop is a 79 year old male who presents for:    Chief Complaint   Patient presents with    Oncology Clinic Visit     Squamous cell carcinoma of oropharynx     Initial Vitals: BP (!) 148/57 (BP Location: Right arm, Patient Position: Sitting, Cuff Size: Adult Large)   Pulse 60   Temp 97.8  F (36.6  C) (Oral)   Resp 20   Wt 93.8 kg (206 lb 12.8 oz)   SpO2 98%   BMI 35.50 kg/m   Estimated body mass index is 35.5 kg/m  as calculated from the following:    Height as of 12/4/24: 1.626 m (5' 4\").    Weight as of this encounter: 93.8 kg (206 lb 12.8 oz). Body surface area is 2.06 meters squared.  Extreme Pain (8) Comment: 8.36/10   No LMP for male patient.  Allergies reviewed: Yes  Medications reviewed: Yes    Medications: Medication refills not needed today.  Pharmacy name entered into [employer]:    [id number] - [place], [place] - [address] [facility]  [facility] [place] - [facility] [place] - [address] [facility]    Frailty Screening:   Is the patient here for a new oncology consult visit in cancer care? 2. No      Clinical concerns:  none      Kym Cortez              "

## 2025-01-15 NOTE — PROGRESS NOTES
[facility] CANCER CLINIC  [address] [facility] [address]  Phone: [phone]  Fax: [phone]    PATIENT NAME: Jonathan Bishop  MRN # [id number]   DATE OF VISIT: January 15, 2025  YOB: 1945     Otolaryngology: Dr. Karishma Eng  Radiation Oncology: Dr. Jenni Mills  PCP: Dr. 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 Small residual tumor in the L tonsil/soft palate on PET 7/30/24. Feels better with treatment breaks. He thought maybe we were going to do a prolonged treatment break, but I explained that we could do short breaks here and there. With the slightly larger L level 2 node, I'd like to start now. To make it easier, we can do 2 weeks on, 1 week off. Alternative is drop carbo - I think we should do that after the next scan if needed, if we see SD or response.   - restage after 3 more cycles. Keep in mind interval between current CT and resuming therapy ~ 2/10 when interpreting next CT    Spells: Seeing Dr. Perry after EEG 3/24    Thyroid nodule: US 11/27/24 ok - no follow up needed.     Neurocognitive changes, encephalopathy March 2024: Hasn't pursued cognitive testing yet despite encouragement. Seems to be doing better to me in recent months, much improved after new PCA in place.     Anorexia, malnutrition, weight loss: Stabilized and doing much better with PCA back in place and with treatment breaks. Not a current issue.     Hypomag: On mag oxide but remains marginal. Continue checking, replacing IV prn      H/o CVA: Residual L weakness. Uses walker at baseline, motorized scooter out and about. Watched him walk  today, no changes in gait, etc.      Screening for hypothyroidism: TFTs 9/19/24 ok, check next visit      LE edema: Better with consistent PCA care.      Surrogate decision maker: He would want us to talk with Mariann if he could not speak for himself. Not discussed again today.      Peripheral neuropathy: MRI shows a lot of foramenal stenosis and spinal canal stenosis, so more likely related to that than DM2. Not worsening on paclitaxel     The longitudinal plan of care for the condition(s) below were addressed during this visit. Due to the added complexity in care, I will continue to support Conner in the subsequent management of this condition(s) and with the ongoing continuity of care of this condition(s): SCC tonsil      30 minutes spent by me on the date of the encounter doing chart review, review of test results, interpretation of tests, patient visit, documentation, orders     Dominique Mueller MD  Associate Professor of Medicine  Hematology, Oncology and Transplantation    SUBJECTIVE  Conner returns for routine follow up   Doing well   Had a good holiday with his family in [place]   No change in numbness/tingling. Swelling slightly worse     CANCER SUMMARY  2/26/23              L tonsil mass bx in clinic (Dr. Eng).   2/20/23              PET/CT. 3.7 x 4.0 x 5.1 cm mass L palatine tonsil causing narrowing of the oropharyngeal lumen, L level 2 node, L retropharyngeal nodular density, extension of primary mass vs retropharyngeal node, 0.8 cm RLL nodule concerning for met, mild focal uptake R iliac bone and L1 without CT correlate suspicious for mets.   3/2/23  R VATS, wedge resection. Path: SCC, poorly differentiated, associated with necrosis, 1 cm, negative margins, p16 +lety  3/24/23              MRI L spine and pelvis. Diffusely heterogeneous marrow signal throughout without corresponding abnormal signal on sagittal STIR sequence and no abnormal enhancement. Nonspecific but could be benign process such as red marrow  hyperplasia, cannot completely exclude metastatic disease or infiltrative pathologic marrow process. No lesions corresponding to FDG avid spots on PET/CT.   4/19~10/18/23 Pembrolizumab.  10/26/23            CT neck and CAP. Increased L palatine tonsil mass, 3.8 x 2.7 cm --> 4.7 x 3.5 cm. Increased bilateral cervical nodes up to 2.3 x 2.4 cm R level 2 node. New 3 mm RML nodule, no other mets.   11/14/23 PET. 4.6 x 3.3 cm L palatine tonsil mass (SUB 26.8), R level 2A and 2A/3 nodes. Questionable uptake distal R femoral medullary cavity, partially imaged, with questionable subtle corresponding soft tissue attenuation on CT. MRI could be obtained to better evaluate.  11/21~12/2/23   [facility] for weakness/fall. COVID/paxlovid, MSSA bacteremia (1 of 2 bottles on 1 day), TTE negative, treated with cefazolin but didn't complete 2 week course, episode of unresponsiveness 12/2. Dispo plan was TCU on [facility], but left AMA. CTA chest negative for PE, showed grossly unchanged cervical adenopathy. Brain MRI 11/22 for stroke code negative for infarct. Showed SVID, moderate atrophy, suboptimal contrast bolus to examine intracranial structures, 4.5 x 3 x 4.5 cm L nasopharyngeal mass, incompletely visualized. CTA negative.   12/19/23 Quad shot fraction #1. No-showed thereafter.  1/10/24~curr     Carboplatin (AUC 2) + paclitaxel 60 mg/m2 weekly. Held 2/13 due to anorexia, fatigue. Break between 8/1 and 9/19 doses for trip, fatigue. Missed dose 10/16 due to transportation issues.  3/13~3/17/24     [facility] for acute encephalopathy. Had stopped his car in the middle of the highway after receiving chemo earlier that day. Recommended not to drive   4/30/24  CT CAP and CT neck. Residual bilateral cervical adenopathy.   7/30/24  PET. CT not covered. Significant reduction L palatine tonsil mass - residual focal uptake in the L posterolateral oropharyngeal wall (SUV 9.1, previously 26.8). Previously seen multiple FDG avid L cervical  nodes and few enlarged R nodes are completed resolved. Mild diffuse uptake along bilateral lower legs and ankles with associated skin thickening and stranding along the soft tissues; findings can be seen with cellulitis. No metastatic disease.   11/5/24  CT neck and CT CAP.  Stable appearance L palatine tonsil, no discrete mass. 1.1 cm R thyroid nodule, a little bigger. O/w unremarkable.     PAST MEDICAL HISTORY  SCC as above  Chronic LBP  TAVR 2020  H/o rheumatic carditis 1950  CHF. 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 Nocturia once nightly   ED  Cataracts  Umbilical hernia repair 2011  Knee arthroplasty B 2010  Lumbar spine fusion, laminectomy, sciatic pain R > L  Peripheral neuropathy - from pinched nerves?  Hearing loss    CURRENT OUTPATIENT MEDICATIONS  Reviewed    ALLERGIES  No Known Allergies     PHYSICAL EXAM  BP (!) 148/57 (BP Location: Right arm, Patient Position: Sitting, Cuff Size: Adult Large)   Pulse 60   Temp 97.8  F (36.6  C) (Oral)   Resp 20   Wt 93.8 kg (206 lb 12.8 oz)   SpO2 98%   BMI 35.50 kg/m    GEN: NAD  HEENT: EOMI, no icterus, injection or pallor.  EXT: warm, well perfused, 1+ edema bilaterally   NEURO: alert    LABORATORY AND IMAGING STUDIES  Labs were independently reviewed and interpreted by me    1/9/24  CMP acceptable   Mg still low at 1.4 but better than 1.1 1/2/25  TSH and FT4 acceptable  CBC pd - stable anemia, o/w ok   MCV 81-84  (Ferritin, iron studies 4/2/24  - no LEW)    CT Chest/Abdomen/Pelvis w Contrast  Narrative: EXAMINATION: CT CHEST/ABDOMEN/PELVIS W CONTRAST 1/14/2025 9:25 AM    INDICATION: Restage SCC L tonsil; residual FDG uptake in July 2024 PET  L palatine tonsil. Weekly carboplatin paclitaxel. One fraction of quad  shot in Dec 2023.    COMPARISON STUDY: CT chest/abdomen/pelvis 11/5/2024, thyroid  ultrasound 11/27/2024, PET 7/30/2024    TECHNIQUE: CT scan of the chest, abdomen and pelvis was performed  on  multidetector CT scanner using volumetric acquisition technique and  images were reconstructed in multiple planes with variable thickness  and reviewed on dedicated workstations. CONTRAST: Isovue 370 98 mL.  Without oral contrast. CT scan radiation dose is optimized to minimum  requisite dose using automated dose modulation techniques.    FINDINGS:    Chest:   Mediastinum: Normal heart size. No pericardial effusion. TAVR. Normal  caliber of the ascending thoracic aorta and main pulmonary artery. No  mediastinal lymphadenopathy other than stable partially calcified  nonenlarged mediastinal and hilar lymph nodes, likely sequela of  granulomatous disease. Normal esophagus. 5 mm hypoattenuating right  thyroid nodule, previously 11 mm. Additional small subcentimeter  thyroid nodules are unchanged.    Pleura: No pneumothorax or pleural effusion.    Lungs: Central airways patent. Stable postsurgical changes of right  lower lobe wedge resection with scarring along the resection margin.  No new or enlarging pulmonary nodules. Scattered small calcified  granulomas.    Abdomen and Pelvis:  Liver: No suspicious mass. No intrahepatic biliary ductal dilation.    Biliary System: Normal gallbladder. No extrahepatic biliary ductal  dilation.    Pancreas: No mass or pancreatic ductal dilation. Mild fatty atrophy.    Adrenal glands: No mass or nodules.    Spleen: Normal.     Kidneys: No obstructing calculus or hydronephrosis. Bilateral cysts  are unchanged. Stable intermediate density in the mid left kidney  measuring 1.6 cm, unchanged since 2011. Bilateral cortical scarring,  stable.    Gastrointestinal tract: No abnormally dilated loops of bowel. Normal  appendix.    Mesentery/peritoneum/retroperitoneum: No mass. No free fluid or air.     Lymph nodes: Prominent bilateral inguinal lymph nodes are unchanged  since 2011. Otherwise no lymphadenopathy in the abdomen or pelvis.    Vasculature: Patent major abdominal vasculature.  Scattered  atherosclerotic calcification of abdominal aorta with no aneurysmal  dilation.    Pelvis: Urinary bladder is normal. The prostate is enlarged.    Osseous structures: No aggressive or acute osseous lesion. Stable  posterior L4-S1 instrumentation without evidence of hardware  complication. Multilevel degenerative changes of the spine.      Soft tissues: Bilateral gynecomastia.  Impression: IMPRESSION:   No significant change from 11/5/2024. No evidence of metastatic  disease in the chest, abdomen, or pelvis.    I have personally reviewed the examination and initial interpretation  and I agree with the findings.    ORION ABREU MD         SYSTEM ID:  [id number]  CT Soft Tissue Neck w Contrast  EXAM: CT SOFT TISSUE NECK W CONTRAST  1/14/2025 9:25 AM     HISTORY: restage SCC L tonsil; residual FDG uptake in July 2024 PET L  palatine tonsil. Weekly carboplatin paclitaxel. One fraction of quad  shot in Dec 2023; Squamous cell carcinoma of oropharynx (H)       Additional history from the EMR: 79M with PMH left tonsillar palatine  mass pathology proven squamous cell carcinoma. Has underwent treatment  with right lung wedge resection, Pembrolizumab, currently on weekly  carboplatin/paclitaxel.      COMPARISON: Neck CT 11/5/2024, same day CT CAP, PET CT 7/30/2024     TECHNIQUE: Following intravenous administration of nonionic iodinated  contrast medium, thin section helical CT images were obtained from the  skull base down to the level of the aortic arch.  Axial, coronal and  sagittal reformations were performed with 2-3 mm slice thickness  reconstruction. Images were reviewed in soft tissue, lung and bone  windows.    CONTRAST: 98 mL Isovue-370    FINDINGS:   No discrete mass seen within the region of the left palatine tonsil.  Incidental right palatine tonsilliths.    Normal tongue base. Salivary glands are within normal limits.    No definite lymphadenopathy. However, there is a 9 mm left level 2A  lymph node  (series 3, image 55), previously 5 mm.    Subcentimeter right thyroid hypodense nodule, decreased in size.    Mild bilateral carotid bifurcation atherosclerosis.    Extensive multilevel cervical degenerative changes without high-grade  spinal canal stenosis. Incidental ligamentum nuchae ossifications.    Trace left maxillary mucosal thickening. Edentulous. The imaged skull  base, intracranial and orbital structures are within normal limits.    Please see same-day CT CAP for thoracic findings.    IMPRESSION  Primary: 1} Expected post-treatment changes in the neck without  evidence of recurrent disease at the primary site.  Nodes: 2} Low suspicion: short interval follow-up or PET. Increased  but non-enlarged left level 2A lymph node.  Other: Decreased size of the right thyroid hypodense nodule relative  to 11/5/2024, also demonstrated on thyroid ultrasound 11/27/2024,  unlikely to represent malignancy.    CECT Surveillance Legend:    Primary  1: No evidence of recurrence: routine surveillance  2: Low suspicion    a) Superficial abnormality (skin, mucosal surface): direct visual  inspection    b) Ill-defined deep abnormality: short interval follow-up* or PET  3: High suspicion (new or enlarging discrete nodule/mass): biopsy  4: Definitive recurrence (path proven or clinical progression): no  biopsy needed    Nodes  1: No evidence of recurrence: routine surveillance  2: Low suspicion (ill-defined): short interval follow-up or PET  3: HIgh suspicion (new or enlarging lymph node): biopsy if clinically  needed  4: Definitive recurrence (path proven or clinical progression): no  biopsy needed    *short interval follow-up: 3 months at our institution  .     I have personally reviewed the examination and initial interpretation  and I agree with the findings.    ORION ENGLISH MD         SYSTEM ID:  [id number]     CT neck and CT CAP were personally reviewed and interpreted by me as above. We reviewed the node together -  heterogeneously enhancing, indicative more likely of active cancer as opposed to reactive.

## 2025-01-16 DIAGNOSIS — M54.50 ACUTE MIDLINE LOW BACK PAIN WITHOUT SCIATICA: ICD-10-CM

## 2025-01-16 DIAGNOSIS — C78.00 MALIGNANT NEOPLASM METASTATIC TO LUNG, UNSPECIFIED LATERALITY (H): Primary | ICD-10-CM

## 2025-01-16 DIAGNOSIS — C09.9 TONSIL CANCER (H): ICD-10-CM

## 2025-01-16 DIAGNOSIS — C10.9 SQUAMOUS CELL CARCINOMA OF OROPHARYNX (H): ICD-10-CM

## 2025-01-16 RX ORDER — OXYCODONE AND ACETAMINOPHEN 5; 325 MG/1; MG/1
1-2 TABLET ORAL EVERY 6 HOURS PRN
Qty: 150 TABLET | Refills: 0 | Status: SHIPPED | OUTPATIENT
Start: 2025-01-16

## 2025-01-16 NOTE — TELEPHONE ENCOUNTER
[facility] Call Center    Phone Message    May a detailed message be left on voicemail: yes     Reason for Call: Medication Refill Request    Has the patient contacted the pharmacy for the refill? No Controlled   Name of medication being requested: oxyCODONE-acetaminophen (PERCOCET)  Provider who prescribed the medication: Buck Nascimento MD  Pharmacy:    [facility] [address] [place], [place] - [address] [facility]  [facility] [place] [facility] [place] - [address] [place] [address]   Date medication is needed: ASAP. Pt states he is ordering it late this month. Please advise.    Action Taken: Other: PCC    Travel Screening: Not Applicable     Date of Service:

## 2025-01-23 ENCOUNTER — DOCUMENTATION ONLY (OUTPATIENT)
Dept: INTERNAL MEDICINE | Facility: CLINIC | Age: 80
End: 2025-01-23
Payer: COMMERCIAL

## 2025-01-23 NOTE — PROGRESS NOTES
Type of Form Received: [facility] Chronic Condition Release Form    Form Received (Date) 1/23/25   Form Filled out No   Placed in provider folder Yes

## 2025-02-04 DIAGNOSIS — M54.50 ACUTE MIDLINE LOW BACK PAIN WITHOUT SCIATICA: ICD-10-CM

## 2025-02-04 RX ORDER — OXYCODONE AND ACETAMINOPHEN 5; 325 MG/1; MG/1
1-2 TABLET ORAL EVERY 6 HOURS PRN
Qty: 150 TABLET | Refills: 0 | Status: SHIPPED | OUTPATIENT
Start: 2025-02-15

## 2025-02-08 NOTE — PROGRESS NOTES
"[facility] CANCER CLINIC  [address] [facility] [address]  Phone: [phone]  Fax: [phone]    PATIENT NAME: Jonathan Bishop  MRN # [id number]   DATE OF VISIT: February 10, 2025  YOB: 1945     Otolaryngology: Dr. Karishma Eng  Radiation Oncology: Dr. Jenni Mills  PCP: Dr. Buck Nascimento     CANCER TYPE: SCC L tonsil, p16 +lety  STAGE: [id number] (IVC)  ECOG PS: 1-2     PD-L1: TPS 20%, CPS 25% on [id number] tonsil bx  NGS: N/A    ASSESSMENT AND PLAN  SCC L tonsil, p16 +lety, CPS 25%/TPS 20%, oligometastatic lung met, +/- bone mets: Great [place] overall on weekly carboplatin paclitaxel and the 1 fraction of quad shot before he stopped attending those appts in 2024. Small residual tumor in the L tonsil/soft palate on PET 7/30/24. CT 1/14/25 with heterogenously enhancing left level 2A node. Has tolerated treatment well with intermittent breaks, therefore we are now switching to a 2 week on, 1 week off scheduled. Alternatively may consider dropping carboplatin after next scan if there is response or stable disease.  -Proceed with carboplatin + paclitaxel today  -RTC in 1 week for lab/infusion  -MARK visit every 3 weeks with infusion  -Restage after 3 more cycles. Keep in mind interval between current CT and resuming therapy ~ 2/10 when interpreting next CT    Spells: Seeing Dr. Perry after EEG 3/24. No notable cognitive \"spells\" recently    Thyroid nodule: US 11/27/24 with small nodules, no follow-up needed      Neurocognitive changes, encephalopathy March 2024: Hasn't pursued cognitive testing yet despite encouragement. Subjectively short-term memory worse but overall seems to be doing better with stable PCA support.     Anorexia, malnutrition, weight loss: Improving with PCA. Eating regularly. Gaining some weight over the past 1-2 months. Monitor     Hypomag: On mag oxide but remains marginal. Continue checking, replacing IV prn. Oral Mg refilled today     H/o CVA: " Residual L weakness. Uses walker at baseline, motorized scooter out and about. Gait stable with WW     Screening for hypothyroidism: TFTs 9/19/24 ok, check next visit      LE edema: Improved. Continue compression stockings and leg elevation.    Surrogate decision maker: He would want us to talk with Mariann if he could not speak for himself. Not discussed again today.      Peripheral neuropathy: MRI shows a lot of foramenal stenosis and spinal canal stenosis, so more likely related to that than DM2. Notably seemed to be flaring around Carolyn (2024) but he notes improvement with recent treatment break.    Vandana Bertrand CNP  ---  30 minutes spent on the date of the encounter doing chart review, review of test results, interpretation of tests, patient visit, and documentation.    The longitudinal plan of care for the diagnosis(es)/condition(s) as documented were addressed during this visit. Due to the added complexity in care, I will continue to support Conner in the subsequent management and with ongoing continuity of care.    SUBJECTIVE  Conner is seen prior to routine carbo/taxol in clinic today. Accompanied by PCA.  -Energy level great  -Appetite improving-gained some weight  -Neuropathy stable  -Feels unsteady at times. Reports 2 falls in the last 2 months. Denies hitting head or any serious injury. Feels this occurred with position changes  -Admits to more short term memory loss  -Denies shortness of breath or cough  -Lower extremity edema remains minimal. Wearing new shoes today that he's able to lace up today    CANCER SUMMARY  2/26/23              L tonsil mass bx in clinic (Dr. Eng).   2/20/23              PET/CT. 3.7 x 4.0 x 5.1 cm mass L palatine tonsil causing narrowing of the oropharyngeal lumen, L level 2 node, L retropharyngeal nodular density, extension of primary mass vs retropharyngeal node, 0.8 cm RLL nodule concerning for met, mild focal uptake R iliac bone and L1 without CT correlate suspicious for  mets.   3/2/23  R VATS, wedge resection. Path: SCC, poorly differentiated, associated with necrosis, 1 cm, negative margins, p16 +lety  3/24/23              MRI L spine and pelvis. Diffusely heterogeneous marrow signal throughout without corresponding abnormal signal on sagittal STIR sequence and no abnormal enhancement. Nonspecific but could be benign process such as red marrow hyperplasia, cannot completely exclude metastatic disease or infiltrative pathologic marrow process. No lesions corresponding to FDG avid spots on PET/CT.   4/19~10/18/23 Pembrolizumab.  10/26/23            CT neck and CAP. Increased L palatine tonsil mass, 3.8 x 2.7 cm --> 4.7 x 3.5 cm. Increased bilateral cervical nodes up to 2.3 x 2.4 cm R level 2 node. New 3 mm RML nodule, no other mets.   11/14/23 PET. 4.6 x 3.3 cm L palatine tonsil mass (SUB 26.8), R level 2A and 2A/3 nodes. Questionable uptake distal R femoral medullary cavity, partially imaged, with questionable subtle corresponding soft tissue attenuation on CT. MRI could be obtained to better evaluate.  11/21~12/2/23   [facility] for weakness/fall. COVID/paxlovid, MSSA bacteremia (1 of 2 bottles on 1 day), TTE negative, treated with cefazolin but didn't complete 2 week course, episode of unresponsiveness 12/2. Dispo plan was TCU on [facility], but left AMA. CTA chest negative for PE, showed grossly unchanged cervical adenopathy. Brain MRI 11/22 for stroke code negative for infarct. Showed SVID, moderate atrophy, suboptimal contrast bolus to examine intracranial structures, 4.5 x 3 x 4.5 cm L nasopharyngeal mass, incompletely visualized. CTA negative.   12/19/23 Quad shot fraction #1. No-showed thereafter.  1/10/24~curr     Carboplatin (AUC 2) + paclitaxel 60 mg/m2 weekly. Held 2/13 due to anorexia, fatigue. Break between 8/1 and 9/19 doses for trip, fatigue. Missed dose 10/16 due to transportation issues.  3/13~3/17/24     [facility] for acute encephalopathy. Had [place] his car  in the middle of the highway after receiving chemo earlier that day. Recommended not to drive   4/30/24  CT CAP and CT neck. Residual bilateral cervical adenopathy.   7/30/24  PET. CT not covered. Significant reduction L palatine tonsil mass - residual focal uptake in the L posterolateral oropharyngeal wall (SUV 9.1, previously 26.8). Previously seen multiple FDG avid L cervical nodes and few enlarged R nodes are completed resolved. Mild diffuse uptake along bilateral lower legs and ankles with associated skin thickening and stranding along the soft tissues; findings can be seen with cellulitis. No metastatic disease.   11/5/24  CT neck and CT CAP.  Stable appearance L palatine tonsil, no discrete mass. 1.1 cm R thyroid nodule, a little bigger. O/w unremarkable.     PAST MEDICAL HISTORY  SCC as above  Chronic LBP  TAVR 2020  H/o rheumatic carditis 1950  CHF. 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 Nocturia once nightly   ED  Cataracts  Umbilical hernia repair 2011  Knee arthroplasty B 2010  Lumbar spine fusion, laminectomy, sciatic pain R > L  Peripheral neuropathy - from pinched nerves?  Hearing loss    CURRENT OUTPATIENT MEDICATIONS  Reviewed    ALLERGIES  No Known Allergies     PHYSICAL EXAM  BP (!) 151/68   Pulse 62   Temp 97.4  F (36.3  C) (Oral)   Resp 18   Wt 93.8 kg (206 lb 11.2 oz)   SpO2 100%   BMI 35.48 kg/m      General: Well-appearing male, NAD  Eyes: EOMI, PERRL. No scleral icterus.  ENT: Oral mucosa is moist without lesions or thrush.   Lymphatic: Neck is supple without cervical or supraclavicular lymphadenopathy.   Cardiovascular: RRR No murmurs, gallops, or rubs. Trace bilateral LE edema  Respiratory: CTA bilaterally. No wheezes or crackles.  Gastrointestinal: BS +. Abdomen soft, non-tender. No palpable hepatosplenomegaly or masses.   Neurologic: No focal deficits   Skin: No rashes, petechiae, or bruising noted on exposed skin.    LABORATORY AND  IMAGING STUDIES  Most Recent 3 CBC's:  Recent Labs   Lab Test 02/10/25  0951 01/09/25  0821 01/02/25  1131   WBC 5.7 5.7 4.8   HGB 9.9* 9.5* 9.9*   MCV 85 82 84    189 209   ANEUTAUTO 2.7 3.3 2.4    Most Recent 3 BMP's:  Recent Labs   Lab Test 02/10/25  0951 01/09/25  0925 01/09/25  0821    144 143   POTASSIUM 4.1 3.5 6.1*   CHLORIDE 107 107 106   CO2 29 28 27   BUN 29.9* 15.7 15.8   CR 0.84 0.82 0.80   ANIONGAP 8 9 10   WARREN 9.5 8.6* 8.8   * 126* 121*   PROTTOTAL 6.9 6.3* 6.9   ALBUMIN 4.1 3.6 3.9    Most Recent 2 LFT's:  Recent Labs   Lab Test 02/10/25  0951 01/09/25  0925   AST 20 20   ALT 13 13   ALKPHOS 109 98   BILITOTAL 0.2 0.2    Most Recent TSH and T4:  Recent Labs   Lab Test 01/09/25  0821   TSH 1.17   T4 1.18     Phos/Mag:  Lab Results   Component Value Date    PHOS 3.0 03/17/2024    PHOS 2.4 (L) 12/09/2020    PHOS 3.9 12/08/2020    MAG 1.9 02/10/2025    MAG 1.4 (L) 01/09/2025    MAG 1.1 (L) 01/02/2025      I reviewed the above labs today.

## 2025-02-10 ENCOUNTER — INFUSION THERAPY VISIT (OUTPATIENT)
Dept: ONCOLOGY | Facility: CLINIC | Age: 80
End: 2025-02-10
Attending: INTERNAL MEDICINE
Payer: COMMERCIAL

## 2025-02-10 ENCOUNTER — OFFICE VISIT (OUTPATIENT)
Dept: ORTHOPEDICS | Facility: CLINIC | Age: 80
End: 2025-02-10
Payer: COMMERCIAL

## 2025-02-10 ENCOUNTER — ONCOLOGY VISIT (OUTPATIENT)
Dept: ONCOLOGY | Facility: CLINIC | Age: 80
End: 2025-02-10
Attending: REGISTERED NURSE
Payer: COMMERCIAL

## 2025-02-10 VITALS
SYSTOLIC BLOOD PRESSURE: 151 MMHG | RESPIRATION RATE: 18 BRPM | TEMPERATURE: 97.4 F | DIASTOLIC BLOOD PRESSURE: 68 MMHG | HEART RATE: 62 BPM | BODY MASS INDEX: 35.48 KG/M2 | OXYGEN SATURATION: 100 % | WEIGHT: 206.7 LBS

## 2025-02-10 DIAGNOSIS — N40.1 HYPERPLASIA OF PROSTATE WITH LOWER URINARY TRACT SYMPTOMS (LUTS): ICD-10-CM

## 2025-02-10 DIAGNOSIS — C78.00 MALIGNANT NEOPLASM METASTATIC TO LUNG, UNSPECIFIED LATERALITY (H): Primary | ICD-10-CM

## 2025-02-10 DIAGNOSIS — R60.0 BILATERAL LEG EDEMA: ICD-10-CM

## 2025-02-10 DIAGNOSIS — R29.818 NEUROCOGNITIVE DEFICITS: ICD-10-CM

## 2025-02-10 DIAGNOSIS — B35.1 OM (ONYCHOMYCOSIS): ICD-10-CM

## 2025-02-10 DIAGNOSIS — R41.89 NEUROCOGNITIVE DEFICITS: ICD-10-CM

## 2025-02-10 DIAGNOSIS — Z77.21 EXPOSURE TO BLOOD OR BODY FLUID: ICD-10-CM

## 2025-02-10 DIAGNOSIS — C09.9 TONSIL CANCER (H): ICD-10-CM

## 2025-02-10 DIAGNOSIS — C78.00 MALIGNANT NEOPLASM METASTATIC TO LUNG, UNSPECIFIED LATERALITY (H): ICD-10-CM

## 2025-02-10 DIAGNOSIS — I73.89 OTHER SPECIFIED PERIPHERAL VASCULAR DISEASES: ICD-10-CM

## 2025-02-10 DIAGNOSIS — C10.9 SQUAMOUS CELL CARCINOMA OF OROPHARYNX (H): Primary | ICD-10-CM

## 2025-02-10 DIAGNOSIS — I73.9 PVD (PERIPHERAL VASCULAR DISEASE): Primary | ICD-10-CM

## 2025-02-10 DIAGNOSIS — C10.9 SQUAMOUS CELL CARCINOMA OF OROPHARYNX (H): ICD-10-CM

## 2025-02-10 DIAGNOSIS — E83.42 HYPOMAGNESEMIA: ICD-10-CM

## 2025-02-10 DIAGNOSIS — Z77.21 EXPOSURE TO BLOOD OR BODY FLUID: Primary | ICD-10-CM

## 2025-02-10 LAB
ALBUMIN SERPL BCG-MCNC: 4.1 G/DL (ref 3.5–5.2)
ALP SERPL-CCNC: 109 U/L (ref 40–150)
ALT SERPL W P-5'-P-CCNC: 13 U/L (ref 0–70)
ANION GAP SERPL CALCULATED.3IONS-SCNC: 8 MMOL/L (ref 7–15)
AST SERPL W P-5'-P-CCNC: 20 U/L (ref 0–45)
BASOPHILS # BLD AUTO: 0 10E3/UL (ref 0–0.2)
BASOPHILS NFR BLD AUTO: 1 %
BILIRUB SERPL-MCNC: 0.2 MG/DL
BUN SERPL-MCNC: 29.9 MG/DL (ref 8–23)
CALCIUM SERPL-MCNC: 9.5 MG/DL (ref 8.8–10.4)
CHLORIDE SERPL-SCNC: 107 MMOL/L (ref 98–107)
CREAT SERPL-MCNC: 0.84 MG/DL (ref 0.67–1.17)
EGFRCR SERPLBLD CKD-EPI 2021: 89 ML/MIN/1.73M2
EOSINOPHIL # BLD AUTO: 0.3 10E3/UL (ref 0–0.7)
EOSINOPHIL NFR BLD AUTO: 5 %
ERYTHROCYTE [DISTWIDTH] IN BLOOD BY AUTOMATED COUNT: 17.7 % (ref 10–15)
GLUCOSE SERPL-MCNC: 104 MG/DL (ref 70–99)
HBV SURFACE AG SERPL QL IA: NONREACTIVE
HCO3 SERPL-SCNC: 29 MMOL/L (ref 22–29)
HCT VFR BLD AUTO: 33.3 % (ref 40–53)
HGB BLD-MCNC: 9.9 G/DL (ref 13.3–17.7)
HIV 1+2 AB+HIV1 P24 AG SERPL QL IA: NONREACTIVE
IMM GRANULOCYTES # BLD: 0 10E3/UL
IMM GRANULOCYTES NFR BLD: 0 %
LYMPHOCYTES # BLD AUTO: 2 10E3/UL (ref 0.8–5.3)
LYMPHOCYTES NFR BLD AUTO: 35 %
MAGNESIUM SERPL-MCNC: 1.9 MG/DL (ref 1.7–2.3)
MCH RBC QN AUTO: 25.2 PG (ref 26.5–33)
MCHC RBC AUTO-ENTMCNC: 29.7 G/DL (ref 31.5–36.5)
MCV RBC AUTO: 85 FL (ref 78–100)
MONOCYTES # BLD AUTO: 0.6 10E3/UL (ref 0–1.3)
MONOCYTES NFR BLD AUTO: 11 %
NEUTROPHILS # BLD AUTO: 2.7 10E3/UL (ref 1.6–8.3)
NEUTROPHILS NFR BLD AUTO: 48 %
NRBC # BLD AUTO: 0 10E3/UL
NRBC BLD AUTO-RTO: 0 /100
PLATELET # BLD AUTO: 189 10E3/UL (ref 150–450)
POTASSIUM SERPL-SCNC: 4.1 MMOL/L (ref 3.4–5.3)
PROT SERPL-MCNC: 6.9 G/DL (ref 6.4–8.3)
RBC # BLD AUTO: 3.93 10E6/UL (ref 4.4–5.9)
SODIUM SERPL-SCNC: 144 MMOL/L (ref 135–145)
WBC # BLD AUTO: 5.7 10E3/UL (ref 4–11)

## 2025-02-10 PROCEDURE — 85048 AUTOMATED LEUKOCYTE COUNT: CPT | Performed by: INTERNAL MEDICINE

## 2025-02-10 PROCEDURE — 250N000011 HC RX IP 250 OP 636: Performed by: INTERNAL MEDICINE

## 2025-02-10 PROCEDURE — 99207 PR DROP WITH A PROCEDURE: CPT | Performed by: PODIATRIST

## 2025-02-10 PROCEDURE — 84155 ASSAY OF PROTEIN SERUM: CPT | Performed by: INTERNAL MEDICINE

## 2025-02-10 PROCEDURE — G2211 COMPLEX E/M VISIT ADD ON: HCPCS | Performed by: REGISTERED NURSE

## 2025-02-10 PROCEDURE — 85004 AUTOMATED DIFF WBC COUNT: CPT | Performed by: INTERNAL MEDICINE

## 2025-02-10 PROCEDURE — 11721 DEBRIDE NAIL 6 OR MORE: CPT | Mod: Q8 | Performed by: PODIATRIST

## 2025-02-10 PROCEDURE — 96375 TX/PRO/DX INJ NEW DRUG ADDON: CPT

## 2025-02-10 PROCEDURE — 96417 CHEMO IV INFUS EACH ADDL SEQ: CPT

## 2025-02-10 PROCEDURE — 80053 COMPREHEN METABOLIC PANEL: CPT | Performed by: INTERNAL MEDICINE

## 2025-02-10 PROCEDURE — 258N000003 HC RX IP 258 OP 636: Performed by: INTERNAL MEDICINE

## 2025-02-10 PROCEDURE — 99214 OFFICE O/P EST MOD 30 MIN: CPT | Performed by: REGISTERED NURSE

## 2025-02-10 PROCEDURE — 999N000104 HC STATISTIC NO CHARGE

## 2025-02-10 PROCEDURE — 83735 ASSAY OF MAGNESIUM: CPT | Performed by: REGISTERED NURSE

## 2025-02-10 PROCEDURE — 96413 CHEMO IV INFUSION 1 HR: CPT

## 2025-02-10 PROCEDURE — G0463 HOSPITAL OUTPT CLINIC VISIT: HCPCS | Performed by: REGISTERED NURSE

## 2025-02-10 PROCEDURE — 85018 HEMOGLOBIN: CPT | Performed by: INTERNAL MEDICINE

## 2025-02-10 RX ORDER — MAGNESIUM OXIDE 400 MG/1
400 TABLET ORAL 2 TIMES DAILY
Qty: 30 TABLET | Refills: 0 | Status: SHIPPED | OUTPATIENT
Start: 2025-02-10

## 2025-02-10 RX ORDER — ONDANSETRON 2 MG/ML
8 INJECTION INTRAMUSCULAR; INTRAVENOUS ONCE
Status: COMPLETED | OUTPATIENT
Start: 2025-02-10 | End: 2025-02-10

## 2025-02-10 RX ORDER — DEXAMETHASONE SODIUM PHOSPHATE 4 MG/ML
8 INJECTION, SOLUTION INTRA-ARTICULAR; INTRALESIONAL; INTRAMUSCULAR; INTRAVENOUS; SOFT TISSUE ONCE
Status: COMPLETED | OUTPATIENT
Start: 2025-02-10 | End: 2025-02-10

## 2025-02-10 RX ORDER — TAMSULOSIN HYDROCHLORIDE 0.4 MG/1
CAPSULE ORAL
Qty: 90 CAPSULE | Refills: 3 | Status: SHIPPED | OUTPATIENT
Start: 2025-02-10

## 2025-02-10 RX ADMIN — PACLITAXEL 96 MG: 6 INJECTION, SOLUTION INTRAVENOUS at 12:01

## 2025-02-10 RX ADMIN — ONDANSETRON 8 MG: 2 INJECTION INTRAMUSCULAR; INTRAVENOUS at 11:28

## 2025-02-10 RX ADMIN — FAMOTIDINE 20 MG: 10 INJECTION INTRAVENOUS at 11:28

## 2025-02-10 RX ADMIN — CARBOPLATIN 200 MG: 10 INJECTION, SOLUTION INTRAVENOUS at 13:05

## 2025-02-10 RX ADMIN — DEXAMETHASONE SODIUM PHOSPHATE 8 MG: 4 INJECTION, SOLUTION INTRA-ARTICULAR; INTRALESIONAL; INTRAMUSCULAR; INTRAVENOUS; SOFT TISSUE at 11:28

## 2025-02-10 ASSESSMENT — PAIN SCALES - GENERAL: PAINLEVEL_OUTOF10: SEVERE PAIN (9)

## 2025-02-10 NOTE — PATIENT INSTRUCTIONS
[facility] Triage and after hours / weekends / holidays:  [phone]    Please call the triage or after hours line if you experience a temperature greater than or equal to 100.4, shaking chills, have uncontrolled nausea, vomiting and/or diarrhea, dizziness, shortness of breath, chest pain, bleeding, unexplained bruising, or if you have any other new/concerning symptoms, questions or concerns.      If you are having any concerning symptoms or wish to speak to a provider before your next infusion visit, please call triage to notify them so we can adequately serve you.     If you need a refill on a narcotic prescription or other medication, please call before your infusion appointment.                February 2025 Sunday Monday Tuesday Wednesday Thursday Friday Saturday                                 1       2     3     4     5     6     7     8       9     10    RETURN FOOT/ANKLE   6:45 AM   (20 min.)   Mamadou Gary DPM   [facility]    LAB PERIPHERAL   9:30 AM   (15 min.)   [facility] LAB DRAW   [facility]    RETURN CCSL  10:00 AM   (45 min.)   Vandana Bertrand CNP   [facility]    ONC INFUSION 2 HR (120 MIN)  12:30 PM   (120 min.)    ONC INFUSION NURSE   [facility] 11     12     13     14     15       16     17    LAB PERIPHERAL  12:30 PM   (15 min.)   [facility] LAB DRAW   [facility]    ONC INFUSION 2 HR (120 MIN)   1:00 PM   (120 min.)    ONC INFUSION NURSE   [facility] 18     19     20     21     22       23     24     25     26     27     28                      March 2025 Sunday Monday Tuesday Wednesday Thursday Friday Saturday                                 1       2     3    LAB PERIPHERAL  11:15 AM   (15 min.)   [facility] LAB DRAW   [facility]    RETURN CCSL  11:30 AM   (45  min.)   Vandana Bertrand CNP   [facility]    ONC INFUSION 2 HR (120 MIN)   1:00 PM   (120 min.)   [facility] ONC INFUSION NURSE   [facility] 4     5     6     7     8       9     10     11    LAB PERIPHERAL  12:00 PM   (15 min.)    [facility] LAB DRAW   [facility]    ONC INFUSION 2 HR (120 MIN)  12:30 PM   (120 min.)   [facility] ONC INFUSION NURSE   [facility] 12     13     14     15       16     17    [facility] RETURN   6:45 AM   (30 min.)   Buck Nascimento MD   [facility] [place] 18     19     20     21     22       23     24    EEG 3 HOUR VIDEO  11:30 AM   (180 min.)   [facility] EEG    NEW SEIZURE   3:15 PM   (60 min.)   Anita Lopez MD    [facility] 25    LAB PERIPHERAL  11:30 AM   (15 min.)   [facility] LAB DRAW   [facility]    RETURN CCSL  11:45 AM   (45 min.)   Vandana Bertrand CNP   [facility]    ONC INFUSION 2 HR (120 MIN)   1:00 PM   (120 min.)    ONC INFUSION NURSE   [facility] 26     27     28     29       30     31                                             Recent Results (from the past 24 hours)   Comprehensive metabolic panel    Collection Time: 02/10/25  9:51 AM   Result Value Ref Range    Sodium 144 135 - 145 mmol/L    Potassium 4.1 3.4 - 5.3 mmol/L    Carbon Dioxide (CO2) 29 22 - 29 mmol/L    Anion Gap 8 7 - 15 mmol/L    Urea Nitrogen 29.9 (H) 8.0 - 23.0 mg/dL    Creatinine 0.84 0.67 - 1.17 mg/dL    GFR Estimate 89 >60 mL/min/1.73m2    Calcium 9.5 8.8 - 10.4 mg/dL    Chloride 107 98 - 107 mmol/L    Glucose 104 (H) 70 - 99 mg/dL    Alkaline Phosphatase 109 40 - 150 U/L    AST 20 0 - 45 U/L    ALT 13 0 - 70 U/L    Protein Total 6.9 6.4 - 8.3 g/dL    Albumin 4.1 3.5 - 5.2 g/dL    Bilirubin Total 0.2 <=1.2 mg/dL   Magnesium    Collection  Time: 02/10/25  9:51 AM   Result Value Ref Range    Magnesium 1.9 1.7 - 2.3 mg/dL   CBC with platelets and differential    Collection Time: 02/10/25  9:51 AM   Result Value Ref Range    WBC Count 5.7 4.0 - 11.0 10e3/uL    RBC Count 3.93 (L) 4.40 - 5.90 10e6/uL    Hemoglobin 9.9 (L) 13.3 - 17.7 g/dL    Hematocrit 33.3 (L) 40.0 - 53.0 %    MCV 85 78 - 100 fL    MCH 25.2 (L) 26.5 - 33.0 pg    MCHC 29.7 (L) 31.5 - 36.5 g/dL    RDW 17.7 (H) 10.0 - 15.0 %    Platelet Count 189 150 - 450 10e3/uL    % Neutrophils 48 %    % Lymphocytes 35 %    % Monocytes 11 %    % Eosinophils 5 %    % Basophils 1 %    % Immature Granulocytes 0 %    NRBCs per 100 WBC 0 <1 /100    Absolute Neutrophils 2.7 1.6 - 8.3 10e3/uL    Absolute Lymphocytes 2.0 0.8 - 5.3 10e3/uL    Absolute Monocytes 0.6 0.0 - 1.3 10e3/uL    Absolute Eosinophils 0.3 0.0 - 0.7 10e3/uL    Absolute Basophils 0.0 0.0 - 0.2 10e3/uL    Absolute Immature Granulocytes 0.0 <=0.4 10e3/uL    Absolute NRBCs 0.0 10e3/uL   HIV Antigen Antibody Combo Cascade    Collection Time: 02/10/25  9:51 AM   Result Value Ref Range    HIV Antigen Antibody Combo Nonreactive Nonreactive

## 2025-02-10 NOTE — PROGRESS NOTES
Infusion Nursing Note:  Jonathan Bishop presents today for Cycle 9 Day 1 paclitaxel, carboplatin.    Patient seen by provider today: Yes: Vandana Bertrand CNP   present during visit today: Not Applicable.    Note: Pt presents to infusion today feeling well. Pt offers no new concerns following visit with provider today.      Intravenous Access:  Peripheral IV placed.    Treatment Conditions:  Lab Results   Component Value Date    HGB 9.9 (L) 02/10/2025    WBC 5.7 02/10/2025    ANEU 1.0 (L) 03/06/2024    ANEUTAUTO 2.7 02/10/2025     02/10/2025        Lab Results   Component Value Date     02/10/2025    POTASSIUM 4.1 02/10/2025    MAG 1.9 02/10/2025    CR 0.84 02/10/2025    WARREN 9.5 02/10/2025    BILITOTAL 0.2 02/10/2025    ALBUMIN 4.1 02/10/2025    ALT 13 02/10/2025    AST 20 02/10/2025       Results reviewed, labs MET treatment parameters, ok to proceed with treatment.      Post Infusion Assessment:  Patient tolerated infusion without incident.  Blood return noted pre and post infusion.  Site patent and intact, free from redness, edema or discomfort.  No evidence of extravasations.  Access discontinued per protocol.       Discharge Plan:   Prescription refills given for magnesium and viagra.  Discharge instructions reviewed with: Patient and attendant.  Patient and/or family verbalized understanding of discharge instructions and all questions answered.  AVS to patient via [employer].  Patient will return 2/17 for next appointment.   Patient discharged in stable condition accompanied by: self and attendant.  Departure Mode: Ambulatory.      Zoila Garcia RN

## 2025-02-10 NOTE — TELEPHONE ENCOUNTER
[facility] Call Center    Phone Message    May a detailed message be left on voicemail: yes     Reason for Call: Medication Refill Request    Has the patient contacted the pharmacy for the refill? Yes   Name of medication being requested: tamsulosin (FLOMAX) 0.4 MG capsule [[id number]]  Provider who prescribed the medication: Sick  Pharmacy: [facility] [place] [place] [address]  Date medication is needed: asap       Action Taken: Other: pcc    Travel Screening: Not Applicable     Date of Service:

## 2025-02-10 NOTE — NURSING NOTE
Chief Complaint   Patient presents with    Oncology Clinic Visit     Squamous cell carcinoma of oropharynx    Blood Draw     Vitals, blood drawn and PIV placed by Evelia vascular RN. Pt checked into jessicat.     BRAD Vasquez LPN

## 2025-02-10 NOTE — LETTER
2/10/2025      Jonathan Bishop  [address]  [place] [address]      Dear Colleague,    Thank you for referring your patient, Jonathan Bishop, to the [facility] [place]. Please see a copy of my visit note below.    Chief Complaint   Patient presents with     Follow Up     Nails.             No Known Allergies      Subjective: Jonathan is a 79 year old male who presents to the clinic today for a follow up of elongated nails. He relates that the nails are long and painful. Edema in the legs and feet continues to improve.  He is now wearing compression socks.  He has new diabetic shoes.     Objective  PT and DP pulses are non-palpable bilaterally. CRT is >3 seconds. Diminished pedal hair. Mild non-pitting edema noted to BL LE.  Gross sensation is slightly decreased bilaterally.   Nails are thickened, discolored, dystrophic and elongated bilaterally to varying degrees. Nails have subungual debris consistent with onychomycosis. No open lesions are noted. Skin is normal.      Assessment: PVD  Onychomycosis x 10      Plan:  - Pt seen and evaluated.  - Nails were debrided x 10..  -Continue compression socks.  - See again in 3 months.       Again, thank you for allowing me to participate in the care of your patient.        Sincerely,        Mamadou Gary DPM    Electronically signed

## 2025-02-10 NOTE — PROGRESS NOTES
Chief Complaint   Patient presents with    Follow Up     Nails.             No Known Allergies      Subjective: Jonathan is a 79 year old male who presents to the clinic today for a follow up of elongated nails. He relates that the nails are long and painful. Edema in the legs and feet continues to improve.  He is now wearing compression socks.  He has new diabetic shoes.     Objective  PT and DP pulses are non-palpable bilaterally. CRT is >3 seconds. Diminished pedal hair. Mild non-pitting edema noted to BL LE.  Gross sensation is slightly decreased bilaterally.   Nails are thickened, discolored, dystrophic and elongated bilaterally to varying degrees. Nails have subungual debris consistent with onychomycosis. No open lesions are noted. Skin is normal.      Assessment: PVD  Onychomycosis x 10      Plan:  - Pt seen and evaluated.  - Nails were debrided x 10..  -Continue compression socks.  - See again in 3 months.

## 2025-02-10 NOTE — Clinical Note
"2/10/2025      Jonathan Bishop  [address]  [place] [address]      Dear Colleague,    Thank you for referring your patient, Jonathan Bishop, to the [facility]. Please see a copy of my visit note below.        [facility]  [address] [facility] [address]  Phone: [phone]  Fax: [phone]    PATIENT NAME: Jonathan Bishop  MRN # [id number]   DATE OF VISIT: February 10, 2025  YOB: 1945     Otolaryngology: Dr. Karishma Eng  Radiation Oncology: Dr. Jenni Mills  PCP: Dr. 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 Small residual tumor in the L tonsil/soft palate on PET 7/30/24. CT 1/14/25 with heterogenously enhancing left level 2A node. Has tolerated treatment well with intermittent breaks, therefore we are now switching to a 2 week on, 1 week off scheduled. Alternatively may consider dropping carboplatin after next scan if there is response or stable disease.  -Proceed with carboplatin + paclitaxel today  -RTC in 1 week for lab/infusion  -MARK visit every 3 weeks with infusion  -Restage after 3 more cycles. Keep in mind interval between current CT and resuming therapy ~ 2/10 when interpreting next CT    Spells: Seeing Dr. Perry after EEG 3/24. No notable cognitive \"spells\" recently    Thyroid nodule: US 11/27/24 with small nodules, no follow-up needed      Neurocognitive changes, encephalopathy March 2024: Hasn't pursued cognitive testing yet despite encouragement. Subjectively short-term memory worse but overall seems to be doing better with stable PCA support.   "   Anorexia, malnutrition, weight loss: Improving with PCA. Eating regularly. Gaining some weight over the past 1-2 months. Monitor     Hypomag: On mag oxide but remains marginal. Continue checking, replacing IV prn. Oral Mg refilled today     H/o CVA: Residual L weakness. Uses walker at baseline, motorized scooter out and about. Gait stable with WW     Screening for hypothyroidism: TFTs 9/19/24 ok, check next visit      LE edema: Improved. Continue compression stockings and leg elevation.    Surrogate decision maker: He would want us to talk with Mariann if he could not speak for himself. Not discussed again today.      Peripheral neuropathy: MRI shows a lot of foramenal stenosis and spinal canal stenosis, so more likely related to that than DM2. Notably seemed to be flaring around Carolyn (2024) but he notes improvement with recent treatment break.    Vandana Bertrand CNP  ---  30 minutes spent on the date of the encounter doing chart review, review of test results, interpretation of tests, patient visit, and documentation.    The longitudinal plan of care for the diagnosis(es)/condition(s) as documented were addressed during this visit. Due to the added complexity in care, I will continue to support Conner in the subsequent management and with ongoing continuity of care.    SUBJECTIVE  Conner is seen prior to routine carbo/taxol in clinic today. Accompanied by PCA.  -Energy level great  -Appetite improving-gained some weight  -Neuropathy stable  -Feels unsteady at times. Reports 2 falls in the last 2 months. Denies hitting head or any serious injury. Feels this occurred with position changes  -Admits to more short term memory loss  -Denies shortness of breath or cough  -Lower extremity edema remains minimal. Wearing new shoes today that he's able to lace up today    CANCER SUMMARY  2/26/23              L tonsil mass bx in clinic (Dr. Eng).   2/20/23              PET/CT. 3.7 x 4.0 x 5.1 cm mass L palatine tonsil causing  narrowing of the oropharyngeal lumen, L level 2 node, L retropharyngeal nodular density, extension of primary mass vs retropharyngeal node, 0.8 cm RLL nodule concerning for met, mild focal uptake R iliac bone and L1 without CT correlate suspicious for mets.   3/2/23  R VATS, wedge resection. Path: SCC, poorly differentiated, associated with necrosis, 1 cm, negative margins, p16 +lety  3/24/23              MRI L spine and pelvis. Diffusely heterogeneous marrow signal throughout without corresponding abnormal signal on sagittal STIR sequence and no abnormal enhancement. Nonspecific but could be benign process such as red marrow hyperplasia, cannot completely exclude metastatic disease or infiltrative pathologic marrow process. No lesions corresponding to FDG avid spots on PET/CT.   4/19~10/18/23 Pembrolizumab.  10/26/23            CT neck and CAP. Increased L palatine tonsil mass, 3.8 x 2.7 cm --> 4.7 x 3.5 cm. Increased bilateral cervical nodes up to 2.3 x 2.4 cm R level 2 node. New 3 mm RML nodule, no other mets.   11/14/23 PET. 4.6 x 3.3 cm L palatine tonsil mass (SUB 26.8), R level 2A and 2A/3 nodes. Questionable uptake distal R femoral medullary cavity, partially imaged, with questionable subtle corresponding soft tissue attenuation on CT. MRI could be obtained to better evaluate.  11/21~12/2/23   [facility] for weakness/fall. COVID/paxlovid, MSSA bacteremia (1 of 2 bottles on 1 day), TTE negative, treated with cefazolin but didn't complete 2 week course, episode of unresponsiveness 12/2. Dispo plan was TCU on [facility], but left AMA. CTA chest negative for PE, showed grossly unchanged cervical adenopathy. Brain MRI 11/22 for stroke code negative for infarct. Showed SVID, moderate atrophy, suboptimal contrast bolus to examine intracranial structures, 4.5 x 3 x 4.5 cm L nasopharyngeal mass, incompletely visualized. CTA negative.   12/19/23 Quad shot fraction #1. No-showed thereafter.  1/10/24~curr     Carboplatin  (AUC 2) + paclitaxel 60 mg/m2 weekly. Held 2/13 due to anorexia, fatigue. Break between 8/1 and 9/19 doses for trip, fatigue. Missed dose 10/16 due to transportation issues.  3/13~3/17/24     [facility] for acute encephalopathy. Had stopped his car in the middle of the highway after receiving chemo earlier that day. Recommended not to drive   4/30/24  CT CAP and CT neck. Residual bilateral cervical adenopathy.   7/30/24  PET. CT not covered. Significant reduction L palatine tonsil mass - residual focal uptake in the L posterolateral oropharyngeal wall (SUV 9.1, previously 26.8). Previously seen multiple FDG avid L cervical nodes and few enlarged R nodes are completed resolved. Mild diffuse uptake along bilateral lower legs and ankles with associated skin thickening and stranding along the soft tissues; findings can be seen with cellulitis. No metastatic disease.   11/5/24  CT neck and CT CAP.  Stable appearance L palatine tonsil, no discrete mass. 1.1 cm R thyroid nodule, a little bigger. O/w unremarkable.     PAST MEDICAL HISTORY  SCC as above  Chronic LBP  TAVR 2020  H/o rheumatic carditis 1950  CHF. 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 Nocturia once nightly   ED  Cataracts  Umbilical hernia repair 2011  Knee arthroplasty B 2010  Lumbar spine fusion, laminectomy, sciatic pain R > L  Peripheral neuropathy - from pinched nerves?  Hearing loss    CURRENT OUTPATIENT MEDICATIONS  Reviewed    ALLERGIES  No Known Allergies     PHYSICAL EXAM  BP (!) 151/68   Pulse 62   Temp 97.4  F (36.3  C) (Oral)   Resp 18   Wt 93.8 kg (206 lb 11.2 oz)   SpO2 100%   BMI 35.48 kg/m      General: Well-appearing male, NAD  Eyes: EOMI, PERRL. No scleral icterus.  ENT: Oral mucosa is moist without lesions or thrush.   Lymphatic: Neck is supple without cervical or supraclavicular lymphadenopathy.   Cardiovascular: RRR No murmurs, gallops, or rubs. Trace bilateral LE edema  Respiratory:  CTA bilaterally. No wheezes or crackles.  Gastrointestinal: BS +. Abdomen soft, non-tender. No palpable hepatosplenomegaly or masses.   Neurologic: No focal deficits   Skin: No rashes, petechiae, or bruising noted on exposed skin.    LABORATORY AND IMAGING STUDIES  Most Recent 3 CBC's:  Recent Labs   Lab Test 02/10/25  0951 01/09/25  0821 01/02/25  1131   WBC 5.7 5.7 4.8   HGB 9.9* 9.5* 9.9*   MCV 85 82 84    189 209   ANEUTAUTO 2.7 3.3 2.4    Most Recent 3 BMP's:  Recent Labs   Lab Test 02/10/25  0951 01/09/25  0925 01/09/25  0821    144 143   POTASSIUM 4.1 3.5 6.1*   CHLORIDE 107 107 106   CO2 29 28 27   BUN 29.9* 15.7 15.8   CR 0.84 0.82 0.80   ANIONGAP 8 9 10   WARREN 9.5 8.6* 8.8   * 126* 121*   PROTTOTAL 6.9 6.3* 6.9   ALBUMIN 4.1 3.6 3.9    Most Recent 2 LFT's:  Recent Labs   Lab Test 02/10/25  0951 01/09/25  0925   AST 20 20   ALT 13 13   ALKPHOS 109 98   BILITOTAL 0.2 0.2    Most Recent TSH and T4:  Recent Labs   Lab Test 01/09/25  0821   TSH 1.17   T4 1.18     Phos/Mag:  Lab Results   Component Value Date    PHOS 3.0 03/17/2024    PHOS 2.4 (L) 12/09/2020    PHOS 3.9 12/08/2020    MAG 1.9 02/10/2025    MAG 1.4 (L) 01/09/2025    MAG 1.1 (L) 01/02/2025      I reviewed the above labs today.         Again, thank you for allowing me to participate in the care of your patient.        Sincerely,        Vandana Bertrand CNP    Electronically signed

## 2025-02-10 NOTE — NURSING NOTE
"Oncology Rooming Note    February 10, 2025 10:01 AM   Jonathan Bishop is a 79 year old male who presents for:    Chief Complaint   Patient presents with    Oncology Clinic Visit     Squamous cell carcinoma of oropharynx    Blood Draw     Vitals, blood drawn and PIV placed by Evelia vascular RN. Pt checked into appt.     Initial Vitals: BP (!) 151/68   Pulse 62   Temp 97.4  F (36.3  C) (Oral)   Resp 18   Wt 93.8 kg (206 lb 11.2 oz)   SpO2 100%   BMI 35.48 kg/m   Estimated body mass index is 35.48 kg/m  as calculated from the following:    Height as of 12/4/24: 1.626 m (5' 4\").    Weight as of this encounter: 93.8 kg (206 lb 11.2 oz). Body surface area is 2.06 meters squared.  Severe Pain (9) Comment: Data Unavailable   No LMP for male patient.  Allergies reviewed: Yes  Medications reviewed: Yes    Medications: MEDICATION REFILLS NEEDED TODAY. Provider was notified.  Pharmacy name entered into [facility]:    [id number] - [place], [place] - [address] [facility]  [facility] [place] - [facility] [place] - [address] [facility]    Frailty Screening:   Is the patient here for a new oncology consult visit in cancer care? 2. No      Clinical concerns: Magnesium and sildenafil refills needed        Kym Cortez              "

## 2025-02-10 NOTE — TELEPHONE ENCOUNTER
tamsulosin (FLOMAX) 0.4 MG capsule   Start: 12/11/2024   Disp-90 capsule, R-3       Buck Nascimento MD  Internal Medicine  Lv  9/16/24  Nv  3/17/25    Routed because:  request per pt call.   lpha Blockers Jiojwx27/10/2025 09:40 AM   Protocol Details Most recent blood pressure under 140/90 in past 12 months    Patient does not have Tadalafil, Vardenafil, or Sildenafil on their medication list

## 2025-02-11 LAB — HCV RNA SERPL NAA+PROBE-ACNC: NOT DETECTED IU/ML

## 2025-02-17 ENCOUNTER — INFUSION THERAPY VISIT (OUTPATIENT)
Dept: ONCOLOGY | Facility: CLINIC | Age: 80
End: 2025-02-17
Attending: INTERNAL MEDICINE
Payer: COMMERCIAL

## 2025-02-17 VITALS
SYSTOLIC BLOOD PRESSURE: 121 MMHG | TEMPERATURE: 97.4 F | DIASTOLIC BLOOD PRESSURE: 58 MMHG | RESPIRATION RATE: 18 BRPM | BODY MASS INDEX: 34.55 KG/M2 | WEIGHT: 201.3 LBS | HEART RATE: 83 BPM

## 2025-02-17 DIAGNOSIS — C78.00 MALIGNANT NEOPLASM METASTATIC TO LUNG, UNSPECIFIED LATERALITY (H): Primary | ICD-10-CM

## 2025-02-17 DIAGNOSIS — C10.9 SQUAMOUS CELL CARCINOMA OF OROPHARYNX (H): ICD-10-CM

## 2025-02-17 DIAGNOSIS — E83.42 HYPOMAGNESEMIA: ICD-10-CM

## 2025-02-17 DIAGNOSIS — C09.9 TONSIL CANCER (H): ICD-10-CM

## 2025-02-17 LAB
ALBUMIN SERPL BCG-MCNC: 4 G/DL (ref 3.5–5.2)
ALP SERPL-CCNC: 93 U/L (ref 40–150)
ALT SERPL W P-5'-P-CCNC: 19 U/L (ref 0–70)
ANION GAP SERPL CALCULATED.3IONS-SCNC: 10 MMOL/L (ref 7–15)
AST SERPL W P-5'-P-CCNC: 66 U/L (ref 0–45)
BASOPHILS # BLD AUTO: 0 10E3/UL (ref 0–0.2)
BASOPHILS NFR BLD AUTO: 0 %
BILIRUB SERPL-MCNC: 0.3 MG/DL
BUN SERPL-MCNC: 25.2 MG/DL (ref 8–23)
CALCIUM SERPL-MCNC: 9.2 MG/DL (ref 8.8–10.4)
CHLORIDE SERPL-SCNC: 105 MMOL/L (ref 98–107)
CREAT SERPL-MCNC: 0.9 MG/DL (ref 0.67–1.17)
EGFRCR SERPLBLD CKD-EPI 2021: 87 ML/MIN/1.73M2
EOSINOPHIL # BLD AUTO: 0.3 10E3/UL (ref 0–0.7)
EOSINOPHIL NFR BLD AUTO: 4 %
ERYTHROCYTE [DISTWIDTH] IN BLOOD BY AUTOMATED COUNT: 17.4 % (ref 10–15)
GLUCOSE SERPL-MCNC: 110 MG/DL (ref 70–99)
HCO3 SERPL-SCNC: 30 MMOL/L (ref 22–29)
HCT VFR BLD AUTO: 32.1 % (ref 40–53)
HGB BLD-MCNC: 9.7 G/DL (ref 13.3–17.7)
IMM GRANULOCYTES # BLD: 0.1 10E3/UL
IMM GRANULOCYTES NFR BLD: 1 %
LYMPHOCYTES # BLD AUTO: 1.3 10E3/UL (ref 0.8–5.3)
LYMPHOCYTES NFR BLD AUTO: 22 %
MAGNESIUM SERPL-MCNC: 1.7 MG/DL (ref 1.7–2.3)
MCH RBC QN AUTO: 25 PG (ref 26.5–33)
MCHC RBC AUTO-ENTMCNC: 30.2 G/DL (ref 31.5–36.5)
MCV RBC AUTO: 83 FL (ref 78–100)
MONOCYTES # BLD AUTO: 0.5 10E3/UL (ref 0–1.3)
MONOCYTES NFR BLD AUTO: 8 %
NEUTROPHILS # BLD AUTO: 3.8 10E3/UL (ref 1.6–8.3)
NEUTROPHILS NFR BLD AUTO: 64 %
NRBC # BLD AUTO: 0 10E3/UL
NRBC BLD AUTO-RTO: 0 /100
PLATELET # BLD AUTO: 176 10E3/UL (ref 150–450)
POTASSIUM SERPL-SCNC: 3.7 MMOL/L (ref 3.4–5.3)
PROT SERPL-MCNC: 7 G/DL (ref 6.4–8.3)
RBC # BLD AUTO: 3.88 10E6/UL (ref 4.4–5.9)
SODIUM SERPL-SCNC: 145 MMOL/L (ref 135–145)
WBC # BLD AUTO: 5.9 10E3/UL (ref 4–11)

## 2025-02-17 PROCEDURE — 36415 COLL VENOUS BLD VENIPUNCTURE: CPT | Performed by: INTERNAL MEDICINE

## 2025-02-17 PROCEDURE — 82565 ASSAY OF CREATININE: CPT | Performed by: INTERNAL MEDICINE

## 2025-02-17 PROCEDURE — 258N000003 HC RX IP 258 OP 636: Performed by: INTERNAL MEDICINE

## 2025-02-17 PROCEDURE — 83735 ASSAY OF MAGNESIUM: CPT

## 2025-02-17 PROCEDURE — 96413 CHEMO IV INFUSION 1 HR: CPT

## 2025-02-17 PROCEDURE — 250N000011 HC RX IP 250 OP 636: Performed by: INTERNAL MEDICINE

## 2025-02-17 PROCEDURE — 96375 TX/PRO/DX INJ NEW DRUG ADDON: CPT

## 2025-02-17 PROCEDURE — 85049 AUTOMATED PLATELET COUNT: CPT | Performed by: INTERNAL MEDICINE

## 2025-02-17 PROCEDURE — 96417 CHEMO IV INFUS EACH ADDL SEQ: CPT

## 2025-02-17 RX ORDER — DEXAMETHASONE SODIUM PHOSPHATE 4 MG/ML
8 INJECTION, SOLUTION INTRA-ARTICULAR; INTRALESIONAL; INTRAMUSCULAR; INTRAVENOUS; SOFT TISSUE ONCE
Status: COMPLETED | OUTPATIENT
Start: 2025-02-17 | End: 2025-02-17

## 2025-02-17 RX ORDER — ONDANSETRON 2 MG/ML
8 INJECTION INTRAMUSCULAR; INTRAVENOUS ONCE
Status: COMPLETED | OUTPATIENT
Start: 2025-02-17 | End: 2025-02-17

## 2025-02-17 RX ADMIN — ONDANSETRON 8 MG: 2 INJECTION INTRAMUSCULAR; INTRAVENOUS at 14:09

## 2025-02-17 RX ADMIN — SODIUM CHLORIDE 250 ML: 9 INJECTION, SOLUTION INTRAVENOUS at 14:21

## 2025-02-17 RX ADMIN — DEXAMETHASONE SODIUM PHOSPHATE 8 MG: 4 INJECTION, SOLUTION INTRA-ARTICULAR; INTRALESIONAL; INTRAMUSCULAR; INTRAVENOUS; SOFT TISSUE at 14:09

## 2025-02-17 RX ADMIN — CARBOPLATIN 200 MG: 10 INJECTION, SOLUTION INTRAVENOUS at 15:31

## 2025-02-17 RX ADMIN — PACLITAXEL 96 MG: 6 INJECTION, SOLUTION INTRAVENOUS at 14:24

## 2025-02-17 RX ADMIN — FAMOTIDINE 20 MG: 10 INJECTION, SOLUTION INTRAVENOUS at 14:09

## 2025-02-17 ASSESSMENT — PAIN SCALES - GENERAL: PAINLEVEL_OUTOF10: SEVERE PAIN (9)

## 2025-02-17 NOTE — PATIENT INSTRUCTIONS
[facility] Triage and after hours / weekends / holidays:  [phone] option 5, option 2    Please call the triage or after hours line if you experience a temperature greater than or equal to 100.4, shaking chills, have uncontrolled nausea, vomiting and/or diarrhea, dizziness, shortness of breath, chest pain, bleeding, unexplained bruising, or if you have any other new/concerning symptoms, questions or concerns.      If you are having any concerning symptoms or wish to speak to a provider before your next infusion visit, please call triage to notify your care team so we can adequately serve you.     If you need a refill on a narcotic prescription or other medication, please call before your infusion appointment.

## 2025-02-17 NOTE — PROGRESS NOTES
"Infusion Nursing Note:  Jonathan Bishop presents today for C9D8 Taxol/Carboplatin.    Patient seen by provider today: No   present during visit today: Not Applicable.    Note: Denied fevers, chills, cough, SOB, and chest pain. Eating and drinking well. No nausea/vomiting. Constipation is improving. Reported neuropathy in his hands and feet is improving.    Feels he is getting more fatigued and not bouncing back from infusions as quickly as he has.    Fall yesterday. His mattress overhangs the edge of his bed. He was sitting on his mattress and slid to the floor. He was then unable to pull himself up. He remained on the floor for close to 2.5 hours as he slowly crawled to his phone. He called the paramedics who assisted him up.    He reports left lower back and him pain as a result of this episode. Denied hitting his head. No bruising or swelling noted.     Denied neuropathy or dizziness causing the fall.    Vandana Bertrand CNP notified via secure chat. Acknowledged above.     Patient had \"additional day\" on treatment plan after C9 D8.    Per written communication with Vandana Bertrand CNP/Holly Dudley RN 02/17/25 @ 1436  - not supposed to get treatment next week [2/24] so really we shouldn't need that additional day     Orders deleted per above    Intravenous Access:  Peripheral IV placed.    Treatment Conditions:   Latest Reference Range & Units 02/17/25 12:20   Sodium 135 - 145 mmol/L 145   Potassium 3.4 - 5.3 mmol/L 3.7   Chloride 98 - 107 mmol/L 105   Carbon Dioxide (CO2) 22 - 29 mmol/L 30 (H)   Urea Nitrogen 8.0 - 23.0 mg/dL 25.2 (H)   Creatinine 0.67 - 1.17 mg/dL 0.90   GFR Estimate >60 mL/min/1.73m2 87   Calcium 8.8 - 10.4 mg/dL 9.2   Anion Gap 7 - 15 mmol/L 10   Magnesium 1.7 - 2.3 mg/dL 1.7   Albumin 3.5 - 5.2 g/dL 4.0   Protein Total 6.4 - 8.3 g/dL 7.0   Alkaline Phosphatase 40 - 150 U/L 93   ALT 0 - 70 U/L 19   AST 0 - 45 U/L 66 (H)   Bilirubin Total <=1.2 mg/dL 0.3   Glucose 70 - 99 mg/dL 110 (H) "   WBC 4.0 - 11.0 10e3/uL 5.9   Hemoglobin 13.3 - 17.7 g/dL 9.7 (L)   Hematocrit 40.0 - 53.0 % 32.1 (L)   Platelet Count 150 - 450 10e3/uL 176   RBC Count 4.40 - 5.90 10e6/uL 3.88 (L)   MCV 78 - 100 fL 83   MCH 26.5 - 33.0 pg 25.0 (L)   MCHC 31.5 - 36.5 g/dL 30.2 (L)   RDW 10.0 - 15.0 % 17.4 (H)   % Neutrophils % 64   % Lymphocytes % 22   % Monocytes % 8   % Eosinophils % 4   % Basophils % 0   Absolute Basophils 0.0 - 0.2 10e3/uL 0.0   Absolute Eosinophils 0.0 - 0.7 10e3/uL 0.3   Absolute Immature Granulocytes <=0.4 10e3/uL 0.1   Absolute Lymphocytes 0.8 - 5.3 10e3/uL 1.3   Absolute Monocytes 0.0 - 1.3 10e3/uL 0.5   % Immature Granulocytes % 1   Absolute Neutrophils 1.6 - 8.3 10e3/uL 3.8   Absolute NRBCs 10e3/uL 0.0   NRBCs per 100 WBC <1 /100 0     Results reviewed, labs MET treatment parameters, ok to proceed with treatment.      Post Infusion Assessment:  Patient tolerated infusion without incident.  Blood return noted pre and post infusion.  Site patent and intact, free from redness, edema or discomfort.  No evidence of extravasations.  Access discontinued per protocol.       Discharge Plan:   Discharge instructions reviewed with: Patient.  Patient and/or family verbalized understanding of discharge instructions and all questions answered.  AVS to patient via Dist[employer].  Patient will return 3/3 for next appointment.   Patient discharged in stable condition accompanied by: self and PCA.  Departure Mode: Ambulatory with walker.      Mary Dudley RN

## 2025-02-17 NOTE — NURSING NOTE
Chief Complaint   Patient presents with    Blood Draw     Labs drawn via PIV by Vascular Access in lab, vitals taken.      Labs drawn from PIV placed by Vascular Access. Line flushed with saline. Vitals taken. Pt reports new 9/10 pain to lower back, reports had fall yesterday requiring paramedics to come to house and assist, message sent to team. Pt checked in for appointment(s).    Emily Rueda RN

## 2025-02-28 NOTE — PROGRESS NOTES
"[facility]  [address] [address]  Phone: [phone]  Fax: [phone]    PATIENT NAME: Jonathan Bishop  MRN # [id number]   DATE OF VISIT: March 3, 2025  YOB: 1945     Otolaryngology: Dr. Karishma Eng  Radiation Oncology: Dr. Jenni Mills  PCP: Dr. Buck Nascimento     CANCER TYPE: SCC L tonsil, p16 +lety  STAGE: [id number] (IVC)  ECOG PS: 1-2     PD-L1: TPS 20%, CPS 25% on [id number] tonsil bx  NGS: N/A    ASSESSMENT AND PLAN  SCC L tonsil, p16 +lety, CPS 25%/TPS 20%, oligometastatic lung met, +/- bone mets: Great AK overall on weekly carboplatin paclitaxel and the 1 fraction of quad shot before he stopped attending those appts in 2024. Small residual tumor in the L tonsil/soft palate on PET 7/30/24. CT 1/14/25 with heterogenously enhancing left level 2A node. Has tolerated treatment well with intermittent breaks therefore we switched to a 2 week on, 1 week off schedule. He's tolerating this well with minimal side effects and some weight gain. May consider dropping carboplatin after next scan if there is response or stable disease.  -Proceed with carboplatin + paclitaxel today  -RTC in 1 week for lab/infusion  -MARK visit every 3 weeks with infusion  -Restage after 3 more cycles. Keep in mind interval between current CT and resuming therapy ~ 2/10 when interpreting next CT    Spells: Seeing Dr. Perry after EEG 3/24. No notable cognitive \"spells\" recently    Thyroid nodule: US 11/27/24 with small nodules, no follow-up needed      Neurocognitive changes, encephalopathy March 2024: Hasn't pursued cognitive testing yet despite encouragement. Subjectively short-term memory worse but overall seems to be doing better with stable PCA support.     Anorexia, malnutrition, weight loss: Improving with PCA helping with food prep. Gaining some weight over the past 1-2 months. Monitor     Hypomag: On mag oxide but remains marginal. Continue checking, replacing " IV prn.      H/o CVA: Residual L weakness. Uses walker at baseline, motorized scooter out and about. Gait stable with WW     Screening for hypothyroidism: TFTs 9/19/24 ok, check next visit      LE edema: Worse lately since running out of torsemide. Dr. Nascimento's RN notified about need for refills. Continue compression stockings and leg elevation.    Surrogate decision maker: He would want us to talk with Mariann if he could not speak for himself. Not discussed today      Peripheral neuropathy: MRI shows a lot of foramenal stenosis and spinal canal stenosis, so more likely related to that than DM2. Notably seemed to be flaring around [place] (2024) but he notes improvement with recent treatment break.    Vandana Bertrand CNP  ---  31 minutes spent on the date of the encounter doing chart review, review of test results, interpretation of tests, patient visit, and documentation.    The longitudinal plan of care for the diagnosis(es)/condition(s) as documented were addressed during this visit. Due to the added complexity in care, I will continue to support Conner in the subsequent management and with ongoing continuity of care.    SUBJECTIVE  Conner is seen prior to routine carbo/taxol in clinic today.   -Appetite excellent. Gaining some weight. PCA has been cooking for him. Enjoying pancakes lately  -Ran out of diuretics so legs have been more swollen. Still wearing compression stocking and elevating when he can. Denies shortness of breath  -Using wheeled walker more often and able to get outside more now that the weather is nicer  -Reports one fall last week. Did not hit his head or sustain injuries. His PCA was with him. Feels his foot got caught. Recently purchased new shoes which are a size too big so he's been getting used to these   -Neuropathy unchanged  -No nausea  -No diarrhea or constipation     CANCER SUMMARY  2/26/23              L tonsil mass bx in clinic (Dr. Eng).   2/20/23              PET/CT. 3.7 x 4.0 x 5.1 cm  mass L palatine tonsil causing narrowing of the oropharyngeal lumen, L level 2 node, L retropharyngeal nodular density, extension of primary mass vs retropharyngeal node, 0.8 cm RLL nodule concerning for met, mild focal uptake R iliac bone and L1 without CT correlate suspicious for mets.   3/2/23  R VATS, wedge resection. Path: SCC, poorly differentiated, associated with necrosis, 1 cm, negative margins, p16 +lety  3/24/23              MRI L spine and pelvis. Diffusely heterogeneous marrow signal throughout without corresponding abnormal signal on sagittal STIR sequence and no abnormal enhancement. Nonspecific but could be benign process such as red marrow hyperplasia, cannot completely exclude metastatic disease or infiltrative pathologic marrow process. No lesions corresponding to FDG avid spots on PET/CT.   4/19~10/18/23 Pembrolizumab.  10/26/23            CT neck and CAP. Increased L palatine tonsil mass, 3.8 x 2.7 cm --> 4.7 x 3.5 cm. Increased bilateral cervical nodes up to 2.3 x 2.4 cm R level 2 node. New 3 mm RML nodule, no other mets.   11/14/23 PET. 4.6 x 3.3 cm L palatine tonsil mass (SUB 26.8), R level 2A and 2A/3 nodes. Questionable uptake distal R femoral medullary cavity, partially imaged, with questionable subtle corresponding soft tissue attenuation on CT. MRI could be obtained to better evaluate.  11/21~12/2/23   [facility] for weakness/fall. COVID/paxlovid, MSSA bacteremia (1 of 2 bottles on 1 day), TTE negative, treated with cefazolin but didn't complete 2 week course, episode of unresponsiveness 12/2. Dispo plan was TCU on [facility], but left AMA. CTA chest negative for PE, showed grossly unchanged cervical adenopathy. Brain MRI 11/22 for stroke code negative for infarct. Showed SVID, moderate atrophy, suboptimal contrast bolus to examine intracranial structures, 4.5 x 3 x 4.5 cm L nasopharyngeal mass, incompletely visualized. CTA negative.   12/19/23 Quad shot fraction #1. No-showed  thereafter.  1/10/24~curr     Carboplatin (AUC 2) + paclitaxel 60 mg/m2 weekly. Held 2/13 due to anorexia, fatigue. Break between 8/1 and 9/19 doses for trip, fatigue. Missed dose 10/16 due to transportation issues.  3/13~3/17/24     [facility] for acute encephalopathy. Had stopped his car in the middle of the highway after receiving chemo earlier that day. Recommended not to drive   4/30/24  CT CAP and CT neck. Residual bilateral cervical adenopathy.   7/30/24  PET. CT not covered. Significant reduction L palatine tonsil mass - residual focal uptake in the L posterolateral oropharyngeal wall (SUV 9.1, previously 26.8). Previously seen multiple FDG avid L cervical nodes and few enlarged R nodes are completed resolved. Mild diffuse uptake along bilateral lower legs and ankles with associated skin thickening and stranding along the soft tissues; findings can be seen with cellulitis. No metastatic disease.   11/5/24  CT neck and CT CAP.  Stable appearance L palatine tonsil, no discrete mass. 1.1 cm R thyroid nodule, a little bigger. O/w unremarkable.     PAST MEDICAL HISTORY  SCC as above  Chronic LBP  TAVR 2020  H/o rheumatic carditis 1950  CHF. 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 Nocturia once nightly   ED  Cataracts  Umbilical hernia repair 2011  Knee arthroplasty B 2010  Lumbar spine fusion, laminectomy, sciatic pain R > L  Peripheral neuropathy - from pinched nerves?  Hearing loss    CURRENT OUTPATIENT MEDICATIONS  Reviewed    ALLERGIES  No Known Allergies     PHYSICAL EXAM  /87   Pulse 74   Temp 97.5  F (36.4  C) (Oral)   Resp 16   Wt 95.5 kg (210 lb 8 oz)   SpO2 98%   BMI 36.13 kg/m      General: Well-appearing male, NAD  Eyes: EOMI, PERRL. No scleral icterus.  ENT: Oral mucosa is moist without lesions or thrush.   Lymphatic: Neck is supple without cervical or supraclavicular lymphadenopathy.   Cardiovascular: RRR No murmurs, gallops, or rubs. +2  edema in bilateral feet, +1 in bilateral legs  Respiratory: CTA bilaterally. No wheezes or crackles.  Neurologic: No focal deficits   Skin: No rashes, petechiae, or bruising noted on exposed skin.    LABORATORY AND IMAGING STUDIES  Most Recent 3 CBC's:  Recent Labs   Lab Test 03/03/25  1204 02/17/25  1220 02/10/25  0951   WBC 5.3 5.9 5.7   HGB 9.8* 9.7* 9.9*   MCV 83 83 85    176 189   ANEUTAUTO 2.6 3.8 2.7    Most Recent 3 BMP's:  Recent Labs   Lab Test 03/03/25  1204 02/17/25  1220 02/10/25  0951    145 144   POTASSIUM 4.2 3.7 4.1   CHLORIDE 105 105 107   CO2 27 30* 29   BUN 19.1 25.2* 29.9*   CR 0.82 0.90 0.84   ANIONGAP 9 10 8   WARREN 9.4 9.2 9.5   GLC 87 110* 104*   PROTTOTAL 7.0 7.0 6.9   ALBUMIN 4.0 4.0 4.1    Most Recent 2 LFT's:  Recent Labs   Lab Test 03/03/25  1204 02/17/25  1220   AST 23 66*   ALT 18 19   ALKPHOS 109 93   BILITOTAL 0.3 0.3    Most Recent TSH and T4:  Recent Labs   Lab Test 01/09/25  0821   TSH 1.17   T4 1.18     Phos/Mag:  Lab Results   Component Value Date    PHOS 3.0 03/17/2024    PHOS 2.4 (L) 12/09/2020    PHOS 3.9 12/08/2020    MAG 1.8 03/03/2025    MAG 1.7 02/17/2025    MAG 1.9 02/10/2025      I reviewed the above labs today.

## 2025-03-03 ENCOUNTER — ONCOLOGY VISIT (OUTPATIENT)
Dept: ONCOLOGY | Facility: CLINIC | Age: 80
End: 2025-03-03
Attending: REGISTERED NURSE
Payer: COMMERCIAL

## 2025-03-03 ENCOUNTER — INFUSION THERAPY VISIT (OUTPATIENT)
Dept: ONCOLOGY | Facility: CLINIC | Age: 80
End: 2025-03-03
Attending: INTERNAL MEDICINE
Payer: COMMERCIAL

## 2025-03-03 VITALS
TEMPERATURE: 97.5 F | SYSTOLIC BLOOD PRESSURE: 135 MMHG | RESPIRATION RATE: 16 BRPM | WEIGHT: 210.5 LBS | HEART RATE: 74 BPM | BODY MASS INDEX: 36.13 KG/M2 | OXYGEN SATURATION: 98 % | DIASTOLIC BLOOD PRESSURE: 87 MMHG

## 2025-03-03 DIAGNOSIS — C09.9 TONSIL CANCER (H): ICD-10-CM

## 2025-03-03 DIAGNOSIS — N40.1 HYPERPLASIA OF PROSTATE WITH LOWER URINARY TRACT SYMPTOMS (LUTS): ICD-10-CM

## 2025-03-03 DIAGNOSIS — I50.32 CHRONIC DIASTOLIC HEART FAILURE (H): ICD-10-CM

## 2025-03-03 DIAGNOSIS — E87.6 HYPOKALEMIA: ICD-10-CM

## 2025-03-03 DIAGNOSIS — R60.0 PERIPHERAL EDEMA: ICD-10-CM

## 2025-03-03 DIAGNOSIS — E55.9 VITAMIN D DEFICIENCY: Primary | ICD-10-CM

## 2025-03-03 DIAGNOSIS — E83.42 HYPOMAGNESEMIA: ICD-10-CM

## 2025-03-03 DIAGNOSIS — R60.0 BILATERAL LEG EDEMA: ICD-10-CM

## 2025-03-03 DIAGNOSIS — C10.9 SQUAMOUS CELL CARCINOMA OF OROPHARYNX (H): ICD-10-CM

## 2025-03-03 DIAGNOSIS — C09.9 TONSIL CANCER (H): Primary | ICD-10-CM

## 2025-03-03 DIAGNOSIS — C78.00 MALIGNANT NEOPLASM METASTATIC TO LUNG, UNSPECIFIED LATERALITY (H): Primary | ICD-10-CM

## 2025-03-03 LAB
ALBUMIN SERPL BCG-MCNC: 4 G/DL (ref 3.5–5.2)
ALP SERPL-CCNC: 109 U/L (ref 40–150)
ALT SERPL W P-5'-P-CCNC: 18 U/L (ref 0–70)
ANION GAP SERPL CALCULATED.3IONS-SCNC: 9 MMOL/L (ref 7–15)
AST SERPL W P-5'-P-CCNC: 23 U/L (ref 0–45)
BASOPHILS # BLD AUTO: 0 10E3/UL (ref 0–0.2)
BASOPHILS NFR BLD AUTO: 1 %
BILIRUB SERPL-MCNC: 0.3 MG/DL
BUN SERPL-MCNC: 19.1 MG/DL (ref 8–23)
CALCIUM SERPL-MCNC: 9.4 MG/DL (ref 8.8–10.4)
CHLORIDE SERPL-SCNC: 105 MMOL/L (ref 98–107)
CREAT SERPL-MCNC: 0.82 MG/DL (ref 0.67–1.17)
EGFRCR SERPLBLD CKD-EPI 2021: 89 ML/MIN/1.73M2
EOSINOPHIL # BLD AUTO: 0.1 10E3/UL (ref 0–0.7)
EOSINOPHIL NFR BLD AUTO: 2 %
ERYTHROCYTE [DISTWIDTH] IN BLOOD BY AUTOMATED COUNT: 17.3 % (ref 10–15)
GLUCOSE SERPL-MCNC: 87 MG/DL (ref 70–99)
HCO3 SERPL-SCNC: 27 MMOL/L (ref 22–29)
HCT VFR BLD AUTO: 32.7 % (ref 40–53)
HGB BLD-MCNC: 9.8 G/DL (ref 13.3–17.7)
IMM GRANULOCYTES # BLD: 0 10E3/UL
IMM GRANULOCYTES NFR BLD: 1 %
LYMPHOCYTES # BLD AUTO: 1.9 10E3/UL (ref 0.8–5.3)
LYMPHOCYTES NFR BLD AUTO: 35 %
MAGNESIUM SERPL-MCNC: 1.8 MG/DL (ref 1.7–2.3)
MCH RBC QN AUTO: 25 PG (ref 26.5–33)
MCHC RBC AUTO-ENTMCNC: 30 G/DL (ref 31.5–36.5)
MCV RBC AUTO: 83 FL (ref 78–100)
MONOCYTES # BLD AUTO: 0.6 10E3/UL (ref 0–1.3)
MONOCYTES NFR BLD AUTO: 12 %
NEUTROPHILS # BLD AUTO: 2.6 10E3/UL (ref 1.6–8.3)
NEUTROPHILS NFR BLD AUTO: 50 %
NRBC # BLD AUTO: 0 10E3/UL
NRBC BLD AUTO-RTO: 0 /100
PLATELET # BLD AUTO: 219 10E3/UL (ref 150–450)
POTASSIUM SERPL-SCNC: 4.2 MMOL/L (ref 3.4–5.3)
PROT SERPL-MCNC: 7 G/DL (ref 6.4–8.3)
RBC # BLD AUTO: 3.92 10E6/UL (ref 4.4–5.9)
SODIUM SERPL-SCNC: 141 MMOL/L (ref 135–145)
WBC # BLD AUTO: 5.3 10E3/UL (ref 4–11)

## 2025-03-03 PROCEDURE — 250N000011 HC RX IP 250 OP 636: Performed by: REGISTERED NURSE

## 2025-03-03 PROCEDURE — 99214 OFFICE O/P EST MOD 30 MIN: CPT | Performed by: REGISTERED NURSE

## 2025-03-03 PROCEDURE — G2211 COMPLEX E/M VISIT ADD ON: HCPCS | Performed by: REGISTERED NURSE

## 2025-03-03 PROCEDURE — 85004 AUTOMATED DIFF WBC COUNT: CPT | Performed by: INTERNAL MEDICINE

## 2025-03-03 PROCEDURE — 96367 TX/PROPH/DG ADDL SEQ IV INF: CPT

## 2025-03-03 PROCEDURE — 83735 ASSAY OF MAGNESIUM: CPT

## 2025-03-03 PROCEDURE — G0463 HOSPITAL OUTPT CLINIC VISIT: HCPCS | Performed by: REGISTERED NURSE

## 2025-03-03 PROCEDURE — 96375 TX/PRO/DX INJ NEW DRUG ADDON: CPT

## 2025-03-03 PROCEDURE — 96413 CHEMO IV INFUSION 1 HR: CPT

## 2025-03-03 PROCEDURE — 96417 CHEMO IV INFUS EACH ADDL SEQ: CPT

## 2025-03-03 PROCEDURE — 258N000003 HC RX IP 258 OP 636: Performed by: REGISTERED NURSE

## 2025-03-03 PROCEDURE — 80053 COMPREHEN METABOLIC PANEL: CPT | Performed by: INTERNAL MEDICINE

## 2025-03-03 PROCEDURE — 36415 COLL VENOUS BLD VENIPUNCTURE: CPT | Performed by: INTERNAL MEDICINE

## 2025-03-03 PROCEDURE — 85048 AUTOMATED LEUKOCYTE COUNT: CPT | Performed by: INTERNAL MEDICINE

## 2025-03-03 RX ORDER — ONDANSETRON 2 MG/ML
8 INJECTION INTRAMUSCULAR; INTRAVENOUS ONCE
Status: CANCELLED
Start: 2025-03-10 | End: 2025-03-10

## 2025-03-03 RX ORDER — DIPHENHYDRAMINE HYDROCHLORIDE 50 MG/ML
50 INJECTION, SOLUTION INTRAMUSCULAR; INTRAVENOUS
Start: 2025-03-10

## 2025-03-03 RX ORDER — DEXAMETHASONE SODIUM PHOSPHATE 4 MG/ML
8 INJECTION, SOLUTION INTRA-ARTICULAR; INTRALESIONAL; INTRAMUSCULAR; INTRAVENOUS; SOFT TISSUE ONCE
Status: DISCONTINUED | OUTPATIENT
Start: 2025-03-03 | End: 2025-03-03

## 2025-03-03 RX ORDER — LORAZEPAM 2 MG/ML
0.5 INJECTION INTRAMUSCULAR EVERY 4 HOURS PRN
OUTPATIENT
Start: 2025-03-10

## 2025-03-03 RX ORDER — ALBUTEROL SULFATE 90 UG/1
1-2 INHALANT RESPIRATORY (INHALATION)
Start: 2025-03-10

## 2025-03-03 RX ORDER — ONDANSETRON 2 MG/ML
8 INJECTION INTRAMUSCULAR; INTRAVENOUS ONCE
Status: CANCELLED
Start: 2025-03-03 | End: 2025-03-03

## 2025-03-03 RX ORDER — DIPHENHYDRAMINE HYDROCHLORIDE 50 MG/ML
25 INJECTION, SOLUTION INTRAMUSCULAR; INTRAVENOUS
Status: CANCELLED
Start: 2025-03-03

## 2025-03-03 RX ORDER — ALBUTEROL SULFATE 0.83 MG/ML
2.5 SOLUTION RESPIRATORY (INHALATION)
Status: CANCELLED | OUTPATIENT
Start: 2025-03-03

## 2025-03-03 RX ORDER — TAMSULOSIN HYDROCHLORIDE 0.4 MG/1
CAPSULE ORAL
Qty: 90 CAPSULE | Refills: 3 | Status: CANCELLED | OUTPATIENT
Start: 2025-03-03

## 2025-03-03 RX ORDER — LORAZEPAM 2 MG/ML
0.5 INJECTION INTRAMUSCULAR EVERY 4 HOURS PRN
Status: CANCELLED | OUTPATIENT
Start: 2025-03-03

## 2025-03-03 RX ORDER — DEXAMETHASONE SODIUM PHOSPHATE 4 MG/ML
8 INJECTION, SOLUTION INTRA-ARTICULAR; INTRALESIONAL; INTRAMUSCULAR; INTRAVENOUS; SOFT TISSUE ONCE
Status: CANCELLED
Start: 2025-03-10 | End: 2025-03-10

## 2025-03-03 RX ORDER — DIPHENHYDRAMINE HYDROCHLORIDE 50 MG/ML
50 INJECTION, SOLUTION INTRAMUSCULAR; INTRAVENOUS
Status: CANCELLED
Start: 2025-03-03

## 2025-03-03 RX ORDER — HEPARIN SODIUM,PORCINE 10 UNIT/ML
5-20 VIAL (ML) INTRAVENOUS DAILY PRN
OUTPATIENT
Start: 2025-03-10

## 2025-03-03 RX ORDER — DIPHENHYDRAMINE HYDROCHLORIDE 50 MG/ML
25 INJECTION, SOLUTION INTRAMUSCULAR; INTRAVENOUS
Start: 2025-03-10

## 2025-03-03 RX ORDER — EPINEPHRINE 1 MG/ML
0.3 INJECTION, SOLUTION INTRAMUSCULAR; SUBCUTANEOUS EVERY 5 MIN PRN
Status: CANCELLED | OUTPATIENT
Start: 2025-03-03

## 2025-03-03 RX ORDER — HEPARIN SODIUM (PORCINE) LOCK FLUSH IV SOLN 100 UNIT/ML 100 UNIT/ML
5 SOLUTION INTRAVENOUS
OUTPATIENT
Start: 2025-03-10

## 2025-03-03 RX ORDER — HEPARIN SODIUM (PORCINE) LOCK FLUSH IV SOLN 100 UNIT/ML 100 UNIT/ML
5 SOLUTION INTRAVENOUS
Status: CANCELLED | OUTPATIENT
Start: 2025-03-03

## 2025-03-03 RX ORDER — MAGNESIUM OXIDE 400 MG/1
400 TABLET ORAL 2 TIMES DAILY
Qty: 30 TABLET | Refills: 0 | Status: SHIPPED | OUTPATIENT
Start: 2025-03-03

## 2025-03-03 RX ORDER — METHYLPREDNISOLONE SODIUM SUCCINATE 40 MG/ML
40 INJECTION INTRAMUSCULAR; INTRAVENOUS
Status: CANCELLED
Start: 2025-03-03

## 2025-03-03 RX ORDER — ONDANSETRON 2 MG/ML
8 INJECTION INTRAMUSCULAR; INTRAVENOUS ONCE
Status: COMPLETED | OUTPATIENT
Start: 2025-03-03 | End: 2025-03-03

## 2025-03-03 RX ORDER — EPINEPHRINE 1 MG/ML
0.3 INJECTION, SOLUTION INTRAMUSCULAR; SUBCUTANEOUS EVERY 5 MIN PRN
OUTPATIENT
Start: 2025-03-10

## 2025-03-03 RX ORDER — HEPARIN SODIUM,PORCINE 10 UNIT/ML
5-20 VIAL (ML) INTRAVENOUS DAILY PRN
Status: CANCELLED | OUTPATIENT
Start: 2025-03-03

## 2025-03-03 RX ORDER — MEPERIDINE HYDROCHLORIDE 25 MG/ML
25 INJECTION INTRAMUSCULAR; INTRAVENOUS; SUBCUTANEOUS
OUTPATIENT
Start: 2025-03-10

## 2025-03-03 RX ORDER — ALBUTEROL SULFATE 0.83 MG/ML
2.5 SOLUTION RESPIRATORY (INHALATION)
OUTPATIENT
Start: 2025-03-10

## 2025-03-03 RX ORDER — MEPERIDINE HYDROCHLORIDE 25 MG/ML
25 INJECTION INTRAMUSCULAR; INTRAVENOUS; SUBCUTANEOUS
Status: CANCELLED | OUTPATIENT
Start: 2025-03-03

## 2025-03-03 RX ORDER — METHYLPREDNISOLONE SODIUM SUCCINATE 40 MG/ML
40 INJECTION INTRAMUSCULAR; INTRAVENOUS
Start: 2025-03-10

## 2025-03-03 RX ORDER — DEXAMETHASONE SODIUM PHOSPHATE 4 MG/ML
8 INJECTION, SOLUTION INTRA-ARTICULAR; INTRALESIONAL; INTRAMUSCULAR; INTRAVENOUS; SOFT TISSUE ONCE
Status: CANCELLED
Start: 2025-03-03 | End: 2025-03-03

## 2025-03-03 RX ORDER — ALBUTEROL SULFATE 90 UG/1
1-2 INHALANT RESPIRATORY (INHALATION)
Status: CANCELLED
Start: 2025-03-03

## 2025-03-03 RX ADMIN — FAMOTIDINE 20 MG: 10 INJECTION, SOLUTION INTRAVENOUS at 13:11

## 2025-03-03 RX ADMIN — ONDANSETRON 8 MG: 2 INJECTION INTRAMUSCULAR; INTRAVENOUS at 13:08

## 2025-03-03 RX ADMIN — CARBOPLATIN 200 MG: 10 INJECTION, SOLUTION INTRAVENOUS at 14:43

## 2025-03-03 RX ADMIN — DEXAMETHASONE SODIUM PHOSPHATE: 10 INJECTION, SOLUTION INTRAMUSCULAR; INTRAVENOUS at 13:19

## 2025-03-03 RX ADMIN — PACLITAXEL 96 MG: 6 INJECTION, SOLUTION INTRAVENOUS at 13:40

## 2025-03-03 ASSESSMENT — PAIN SCALES - GENERAL: PAINLEVEL_OUTOF10: SEVERE PAIN (7)

## 2025-03-03 NOTE — PROGRESS NOTES
Infusion Nursing Note:  Jonathan Bishop presents today for C10D1 Paclitaxel/Carboplatin (lifetime dose #33).    Patient seen by provider today: Yes: Vandana Bertrand CNP   present during visit today: Not Applicable.    Note: Patient presents to Infusion Clinic feeling well today. Denies fever, chills, pain, or any other symptoms of infection/illness. Reported baseline neuropathy in hands and feet. Denied any recent falls. Patient wishes to proceed with treatment today.    Intravenous Access:  Peripheral IV placed.    Treatment Conditions:  Lab Results   Component Value Date    HGB 9.8 (L) 03/03/2025    WBC 5.3 03/03/2025    ANEU 1.0 (L) 03/06/2024    ANEUTAUTO 2.6 03/03/2025     03/03/2025     Lab Results   Component Value Date     03/03/2025    POTASSIUM 4.2 03/03/2025    MAG 1.8 03/03/2025    CR 0.82 03/03/2025    WARREN 9.4 03/03/2025    BILITOTAL 0.3 03/03/2025    ALBUMIN 4.0 03/03/2025    ALT 18 03/03/2025    AST 23 03/03/2025   Results reviewed, labs MET treatment parameters, ok to proceed with treatment.    Post Infusion Assessment:  Patient tolerated infusion without incident.  Blood return noted pre and post infusion.  Site patent and intact, free from redness, edema or discomfort.  No evidence of extravasations.  Access discontinued per protocol.       Discharge Plan:   Prescription refills given for Magnesium.  Discharge instructions reviewed with: Patient.  Patient and/or family verbalized understanding of discharge instructions and all questions answered.  Copy of AVS reviewed with patient and/or family.  Patient will return 3/11 for next appointment.  Patient discharged in stable condition accompanied by: self.  Departure Mode: Ambulatory with walker.      Katarzyna Manning, TRAE

## 2025-03-03 NOTE — TELEPHONE ENCOUNTER
Received following message:  Vandana Bertrand CNP [place], Zach, Conner Chahal was seen in oncology today but is requesting refills for multiple drugs managed by Dr. Nascimento:    aldactone, tamsulosin, torsemide, vitamin d3    Are you able to queue up these refills for him?    Thanks!    Vandana    Tamsulosin has refills available. Ivan was recommended to be restarted but he has not filled this in awhile. Vitamin D has not been rx'd, but is otc and can send an rx.    Darien Jang RN on 3/3/2025 at 2:13 PM

## 2025-03-03 NOTE — NURSING NOTE
Chief Complaint   Patient presents with    Oncology Clinic Visit     Squamous Cell Carcinoma of Oropharynx    Blood Draw     Vitals, blood drawn and PIV placed by ABHISHEK, vascular RN. Pt checked into appt.      BRAD Vasquez LPN

## 2025-03-03 NOTE — Clinical Note
"3/3/2025      Jonathan Bishop  [address]  [facility] [address]      Dear Colleague,    Thank you for referring your patient, Jonathan Bishop, to the [facility]. Please see a copy of my visit note below.        [facility]  [address] [facility] [address]  Phone: [phone]  Fax: [phone]    PATIENT NAME: Jonathan Bishop  MRN # [id number]   DATE OF VISIT: March 3, 2025  YOB: 1945     Otolaryngology: Dr. Karishma Eng  Radiation Oncology: Dr. Jenni Mills  PCP: Dr. 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 Small residual tumor in the L tonsil/soft palate on PET 7/30/24. CT 1/14/25 with heterogenously enhancing left level 2A node. Has tolerated treatment well with intermittent breaks, therefore we are now switching to a 2 week on, 1 week off scheduled. Alternatively may consider dropping carboplatin after next scan if there is response or stable disease.  -Proceed with carboplatin + paclitaxel today  -RTC in 1 week for lab/infusion  -MARK visit every 3 weeks with infusion  -Restage after 3 more cycles. Keep in mind interval between current CT and resuming therapy ~ 2/10 when interpreting next CT    Spells: Seeing Dr. Perry after EEG 3/24. No notable cognitive \"spells\" recently    Thyroid nodule: US 11/27/24 with small nodules, no follow-up needed      Neurocognitive changes, encephalopathy March 2024: Hasn't pursued cognitive testing yet despite encouragement. Subjectively short-term memory worse but overall seems to be doing better with stable PCA support.   "   Anorexia, malnutrition, weight loss: Improving with PCA. Eating regularly. Gaining some weight over the past 1-2 months. Monitor     Hypomag: On mag oxide but remains marginal. Continue checking, replacing IV prn. Oral Mg refilled today     H/o CVA: Residual L weakness. Uses walker at baseline, motorized scooter out and about. Gait stable with WW     Screening for hypothyroidism: TFTs 9/19/24 ok, check next visit      LE edema: Improved. Continue compression stockings and leg elevation.    Surrogate decision maker: He would want us to talk with Mariann if he could not speak for himself. Not discussed again today.      Peripheral neuropathy: MRI shows a lot of foramenal stenosis and spinal canal stenosis, so more likely related to that than DM2. Notably seemed to be flaring around [place] (2024) but he notes improvement with recent treatment break.    Vandana Bertrand, YOANNA  ---  *** minutes spent on the date of the encounter doing {2021 E&M time in:328589}.    The longitudinal plan of care for the diagnosis(es)/condition(s) as documented were addressed during this visit. Due to the added complexity in care, I will continue to support Conner in the subsequent management and with ongoing continuity of care.    SUBJECTIVE  Conner is seen prior to routine carbo/taxol in clinic today.   ***    CANCER SUMMARY  2/26/23              L tonsil mass bx in clinic (Dr. Eng).   2/20/23              PET/CT. 3.7 x 4.0 x 5.1 cm mass L palatine tonsil causing narrowing of the oropharyngeal lumen, L level 2 node, L retropharyngeal nodular density, extension of primary mass vs retropharyngeal node, 0.8 cm RLL nodule concerning for met, mild focal uptake R iliac bone and L1 without CT correlate suspicious for mets.   3/2/23  R VATS, wedge resection. Path: SCC, poorly differentiated, associated with necrosis, 1 cm, negative margins, p16 +lety  3/24/23              MRI L spine and pelvis. Diffusely heterogeneous marrow signal throughout without  corresponding abnormal signal on sagittal STIR sequence and no abnormal enhancement. Nonspecific but could be benign process such as red marrow hyperplasia, cannot completely exclude metastatic disease or infiltrative pathologic marrow process. No lesions corresponding to FDG avid spots on PET/CT.   4/19~10/18/23 Pembrolizumab.  10/26/23            CT neck and CAP. Increased L palatine tonsil mass, 3.8 x 2.7 cm --> 4.7 x 3.5 cm. Increased bilateral cervical nodes up to 2.3 x 2.4 cm R level 2 node. New 3 mm RML nodule, no other mets.   11/14/23 PET. 4.6 x 3.3 cm L palatine tonsil mass (SUB 26.8), R level 2A and 2A/3 nodes. Questionable uptake distal R femoral medullary cavity, partially imaged, with questionable subtle corresponding soft tissue attenuation on CT. MRI could be obtained to better evaluate.  11/21~12/2/23   [facility] for weakness/fall. COVID/paxlovid, MSSA bacteremia (1 of 2 bottles on 1 day), TTE negative, treated with cefazolin but didn't complete 2 week course, episode of unresponsiveness 12/2. Dispo plan was [place]CU [place] [facility], but left AMA. CTA chest negative for PE, showed grossly unchanged cervical adenopathy. Brain MRI 11/22 for stroke code negative for infarct. Showed SVID, moderate atrophy, suboptimal contrast bolus to examine intracranial structures, 4.5 x 3 x 4.5 cm L nasopharyngeal mass, incompletely visualized. CTA negative.   12/19/23 Quad shot fraction #1. No-showed thereafter.  1/10/24~curr     Carboplatin (AUC 2) + paclitaxel 60 mg/m2 weekly. Held 2/13 due to anorexia, fatigue. Break between 8/1 and 9/19 doses for trip, fatigue. Missed dose 10/16 due to transportation issues.  3/13~3/17/24     [facility] for acute encephalopathy. Had stopped his car in the middle of the highway after receiving chemo earlier that day. Recommended not to drive   4/30/24  CT CAP and CT neck. Residual bilateral cervical adenopathy.   7/30/24  PET. CT not covered. Significant reduction L palatine tonsil  mass - residual focal uptake in the L posterolateral oropharyngeal wall (SUV 9.1, previously 26.8). Previously seen multiple FDG avid L cervical nodes and few enlarged R nodes are completed resolved. Mild diffuse uptake along bilateral lower legs and ankles with associated skin thickening and stranding along the soft tissues; findings can be seen with cellulitis. No metastatic disease.   11/5/24  CT neck and CT CAP.  Stable appearance L palatine tonsil, no discrete mass. 1.1 cm R thyroid nodule, a little bigger. O/w unremarkable.     PAST MEDICAL HISTORY  SCC as above  Chronic LBP  TAVR 2020  H/o rheumatic carditis 1950  CHF. 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 Nocturia once nightly   ED  Cataracts  Umbilical hernia repair 2011  Knee arthroplasty B 2010  Lumbar spine fusion, laminectomy, sciatic pain R > L  Peripheral neuropathy - from pinched nerves?  Hearing loss    CURRENT OUTPATIENT MEDICATIONS  Reviewed    ALLERGIES  No Known Allergies     PHYSICAL EXAM  /87   Pulse 74   Temp 97.5  F (36.4  C) (Oral)   Resp 16   Wt 95.5 kg (210 lb 8 oz)   SpO2 98%   BMI 36.13 kg/m      General: Well-appearing male, NAD  Eyes: EOMI, PERRL. No scleral icterus.  ENT: Oral mucosa is moist without lesions or thrush.   Lymphatic: Neck is supple without cervical or supraclavicular lymphadenopathy.   Cardiovascular: RRR No murmurs, gallops, or rubs. Trace bilateral LE edema  Respiratory: CTA bilaterally. No wheezes or crackles.  Gastrointestinal: BS +. Abdomen soft, non-tender. No palpable hepatosplenomegaly or masses.   Neurologic: No focal deficits   Skin: No rashes, petechiae, or bruising noted on exposed skin.    LABORATORY AND IMAGING STUDIES  Most Recent 3 CBC's:  Recent Labs   Lab Test 03/03/25  1204 02/17/25  1220 02/10/25  0951   WBC 5.3 5.9 5.7   HGB 9.8* 9.7* 9.9*   MCV 83 83 85    176 189   ANEUTAUTO 2.6 3.8 2.7    Most Recent 3 BMP's:  Recent Labs   Lab  Test 02/17/25  1220 02/10/25  0951 01/09/25  0925    144 144   POTASSIUM 3.7 4.1 3.5   CHLORIDE 105 107 107   CO2 30* 29 28   BUN 25.2* 29.9* 15.7   CR 0.90 0.84 0.82   ANIONGAP 10 8 9   WARREN 9.2 9.5 8.6*   * 104* 126*   PROTTOTAL 7.0 6.9 6.3*   ALBUMIN 4.0 4.1 3.6    Most Recent 2 LFT's:  Recent Labs   Lab Test 02/17/25  1220 02/10/25  0951   AST 66* 20   ALT 19 13   ALKPHOS 93 109   BILITOTAL 0.3 0.2    Most Recent TSH and T4:  Recent Labs   Lab Test 01/09/25  0821   TSH 1.17   T4 1.18     Phos/Mag:  Lab Results   Component Value Date    PHOS 3.0 03/17/2024    PHOS 2.4 (L) 12/09/2020    PHOS 3.9 12/08/2020    MAG 1.7 02/17/2025    MAG 1.9 02/10/2025    MAG 1.4 (L) 01/09/2025      I reviewed the above labs today.           [facility] [place] [address] [facility] [address]  Phone: [phone]  Fax: [phone]    PATIENT NAME: Jonathan Bishop  MRN # [id number]   DATE OF VISIT: March 3, 2025  YOB: 1945     Otolaryngology: Dr. Karishma Eng  Radiation Oncology: Dr. Jenni Mills  PCP: Dr. 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 Small residual tumor in the L tonsil/soft palate on PET 7/30/24. CT 1/14/25 with heterogenously enhancing left level 2A node. Has tolerated treatment well with intermittent breaks therefore we switched to a 2 week on, 1 week off schedule. He's tolerating this well with minimal side effects and some weight gain. May consider dropping carboplatin after next scan if there is response or stable disease.  -Proceed with carboplatin + paclitaxel today  -RTC in 1 week for lab/infusion  -MARK  "visit every 3 weeks with infusion  -Restage after 3 more cycles. Keep in mind interval between current CT and resuming therapy ~ 2/10 when interpreting next CT    Spells: Seeing Dr. Perry after EEG 3/24. No notable cognitive \"spells\" recently    Thyroid nodule: US 11/27/24 with small nodules, no follow-up needed      Neurocognitive changes, encephalopathy March 2024: Hasn't pursued cognitive testing yet despite encouragement. Subjectively short-term memory worse but overall seems to be doing better with stable PCA support.     Anorexia, malnutrition, weight loss: Improving with PCA helping with food prep. Gaining some weight over the past 1-2 months. Monitor     Hypomag: On mag oxide but remains marginal. Continue checking, replacing IV prn.      H/o CVA: Residual L weakness. Uses walker at baseline, motorized scooter out and about. Gait stable with WW     Screening for hypothyroidism: TFTs 9/19/24 ok, check next visit      LE edema: Worse lately since running out of torsemide. Dr. Nascimento's RN notified about need for refills. Continue compression stockings and leg elevation.    Surrogate decision maker: He would want us to talk with Mariann if he could not speak for himself. Not discussed today      Peripheral neuropathy: MRI shows a lot of foramenal stenosis and spinal canal stenosis, so more likely related to that than DM2. Notably seemed to be flaring around [place] (2024) but he notes improvement with recent treatment break.    Vandana Bertrand, YOANNA  ---  *** minutes spent on the date of the encounter doing {2021 E&M time in:194387}.    The longitudinal plan of care for the diagnosis(es)/condition(s) as documented were addressed during this visit. Due to the added complexity in care, I will continue to support Conner in the subsequent management and with ongoing continuity of care.    SUBJECTIVE  Conner is seen prior to routine carbo/taxol in clinic today.   -Appetite excellent. Gaining some weight. PCA has been cooking for " him. Enjoying pancakes lately  -Ran out of diuretics so legs have been more swollen. Still wearing compression stocking and elevating when he can. Denies shortness of breath  -Using wheeled walker more often and able to get outside more now that the weather is nicer  -Reports one fall last week. Did not hit his head or sustain injuries. His PCA was with him. Feels his foot got caught. Recently purchased new shoes which are a size too big so he's been getting used to these   -Neuropathy unchanged  -No nausea  -No diarrhea or constipation     CANCER SUMMARY  2/26/23              L tonsil mass bx in clinic (Dr. Eng).   2/20/23              PET/CT. 3.7 x 4.0 x 5.1 cm mass L palatine tonsil causing narrowing of the oropharyngeal lumen, L level 2 node, L retropharyngeal nodular density, extension of primary mass vs retropharyngeal node, 0.8 cm RLL nodule concerning for met, mild focal uptake R iliac bone and L1 without CT correlate suspicious for mets.   3/2/23  R VATS, wedge resection. Path: SCC, poorly differentiated, associated with necrosis, 1 cm, negative margins, p16 +lety  3/24/23              MRI L spine and pelvis. Diffusely heterogeneous marrow signal throughout without corresponding abnormal signal on sagittal STIR sequence and no abnormal enhancement. Nonspecific but could be benign process such as red marrow hyperplasia, cannot completely exclude metastatic disease or infiltrative pathologic marrow process. No lesions corresponding to FDG avid spots on PET/CT.   4/19~10/18/23 Pembrolizumab.  10/26/23            CT neck and CAP. Increased L palatine tonsil mass, 3.8 x 2.7 cm --> 4.7 x 3.5 cm. Increased bilateral cervical nodes up to 2.3 x 2.4 cm R level 2 node. New 3 mm RML nodule, no other mets.   11/14/23 PET. 4.6 x 3.3 cm L palatine tonsil mass (SUB 26.8), R level 2A and 2A/3 nodes. Questionable uptake distal R femoral medullary cavity, partially imaged, with questionable subtle corresponding soft tissue  attenuation on CT. MRI could be obtained to better evaluate.  11/21~12/2/23   [facility] for weakness/fall. COVID/paxlovid, MSSA bacteremia (1 of 2 bottles on 1 day), TTE negative, treated with cefazolin but didn't complete 2 week course, episode of unresponsiveness 12/2. Dispo plan was TCU on [facility], but left AMA. CTA chest negative for PE, showed grossly unchanged cervical adenopathy. Brain MRI 11/22 for stroke code negative for infarct. Showed SVID, moderate atrophy, suboptimal contrast bolus to examine intracranial structures, 4.5 x 3 x 4.5 cm L nasopharyngeal mass, incompletely visualized. CTA negative.   12/19/23 Quad shot fraction #1. No-showed thereafter.  1/10/24~curr     Carboplatin (AUC 2) + paclitaxel 60 mg/m2 weekly. Held 2/13 due to anorexia, fatigue. Break between 8/1 and 9/19 doses for trip, fatigue. Missed dose 10/16 due to transportation issues.  3/13~3/17/24     [facility] for acute encephalopathy. Had stopped his car in the middle of the highway after receiving chemo earlier that day. Recommended not to drive   4/30/24  CT CAP and CT neck. Residual bilateral cervical adenopathy.   7/30/24  PET. CT not covered. Significant reduction L palatine tonsil mass - residual focal uptake in the L posterolateral oropharyngeal wall (SUV 9.1, previously 26.8). Previously seen multiple FDG avid L cervical nodes and few enlarged R nodes are completed resolved. Mild diffuse uptake along bilateral lower legs and ankles with associated skin thickening and stranding along the soft tissues; findings can be seen with cellulitis. No metastatic disease.   11/5/24  CT neck and CT CAP.  Stable appearance L palatine tonsil, no discrete mass. 1.1 cm R thyroid nodule, a little bigger. O/w unremarkable.     PAST MEDICAL HISTORY  SCC as above  Chronic LBP  TAVR 2020  H/o rheumatic carditis 1950  CHF. 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 Nocturia once nightly    ED  Cataracts  Umbilical hernia repair 2011  Knee arthroplasty B 2010  Lumbar spine fusion, laminectomy, sciatic pain R > L  Peripheral neuropathy - from pinched nerves?  Hearing loss    CURRENT OUTPATIENT MEDICATIONS  Reviewed    ALLERGIES  No Known Allergies     PHYSICAL EXAM  /87   Pulse 74   Temp 97.5  F (36.4  C) (Oral)   Resp 16   Wt 95.5 kg (210 lb 8 oz)   SpO2 98%   BMI 36.13 kg/m      General: Well-appearing male, NAD  Eyes: EOMI, PERRL. No scleral icterus.  ENT: Oral mucosa is moist without lesions or thrush.   Lymphatic: Neck is supple without cervical or supraclavicular lymphadenopathy.   Cardiovascular: RRR No murmurs, gallops, or rubs. +2 edema in bilateral feet, +1 in bilateral legs  Respiratory: CTA bilaterally. No wheezes or crackles.  Neurologic: No focal deficits   Skin: No rashes, petechiae, or bruising noted on exposed skin.    LABORATORY AND IMAGING STUDIES  Most Recent 3 CBC's:  Recent Labs   Lab Test 03/03/25  1204 02/17/25  1220 02/10/25  0951   WBC 5.3 5.9 5.7   HGB 9.8* 9.7* 9.9*   MCV 83 83 85    176 189   ANEUTAUTO 2.6 3.8 2.7    Most Recent 3 BMP's:  Recent Labs   Lab Test 03/03/25  1204 02/17/25  1220 02/10/25  0951    145 144   POTASSIUM 4.2 3.7 4.1   CHLORIDE 105 105 107   CO2 27 30* 29   BUN 19.1 25.2* 29.9*   CR 0.82 0.90 0.84   ANIONGAP 9 10 8   WARREN 9.4 9.2 9.5   GLC 87 110* 104*   PROTTOTAL 7.0 7.0 6.9   ALBUMIN 4.0 4.0 4.1    Most Recent 2 LFT's:  Recent Labs   Lab Test 03/03/25  1204 02/17/25  1220   AST 23 66*   ALT 18 19   ALKPHOS 109 93   BILITOTAL 0.3 0.3    Most Recent TSH and T4:  Recent Labs   Lab Test 01/09/25  0821   TSH 1.17   T4 1.18     Phos/Mag:  Lab Results   Component Value Date    PHOS 3.0 03/17/2024    PHOS 2.4 (L) 12/09/2020    PHOS 3.9 12/08/2020    MAG 1.8 03/03/2025    MAG 1.7 02/17/2025    MAG 1.9 02/10/2025      I reviewed the above labs today.         Again, thank you for allowing me to participate in the care of your patient.         Sincerely,        Vandana Bertrand CNP    Electronically signed

## 2025-03-03 NOTE — NURSING NOTE
"Oncology Rooming Note    March 3, 2025 12:16 PM   Jonathan Bishop is a 79 year old male who presents for:    Chief Complaint   Patient presents with    Oncology Clinic Visit     Squamous Cell Carcinoma of Oropharynx    Blood Draw     Vitals, blood drawn and PIV placed by ABHISHEK, vascular RN. Pt checked into appt.      Initial Vitals: /87   Pulse 74   Temp 97.5  F (36.4  C) (Oral)   Resp 16   Wt 95.5 kg (210 lb 8 oz)   SpO2 98%   BMI 36.13 kg/m   Estimated body mass index is 36.13 kg/m  as calculated from the following:    Height as of 12/4/24: 1.626 m (5' 4\").    Weight as of this encounter: 95.5 kg (210 lb 8 oz). Body surface area is 2.08 meters squared.  Severe Pain (7) Comment: Data Unavailable   No LMP for male patient.  Allergies reviewed: Yes  Medications reviewed: Yes    Medications: MEDICATION REFILLS NEEDED TODAY. Provider was notified.  Pharmacy name entered into [employer]:    [id number] - [place], [place] - [address] [facility]  [facility] [place] - [facility] [place] - [address] [facility]    Frailty Screening:   Is the patient here for a new oncology consult visit in cancer care? 2. No    PHQ9:  Did this patient require a PHQ9?: No      Clinical concerns: Refill on Mag-Ox, aldactone, tamsulosin, torsemide, vitamin d3.       Janey Duenas              "

## 2025-03-04 RX ORDER — TORSEMIDE 20 MG/1
40 TABLET ORAL DAILY
Qty: 180 TABLET | Refills: 0 | Status: SHIPPED | OUTPATIENT
Start: 2025-03-04 | End: 2025-03-05

## 2025-03-04 RX ORDER — CHOLECALCIFEROL (VITAMIN D3) 50 MCG
1 TABLET ORAL DAILY
Qty: 90 TABLET | Refills: 3 | Status: SHIPPED | OUTPATIENT
Start: 2025-03-04 | End: 2025-03-05

## 2025-03-04 RX ORDER — SPIRONOLACTONE 50 MG/1
50 TABLET, FILM COATED ORAL DAILY
Qty: 90 TABLET | Refills: 2 | Status: SHIPPED | OUTPATIENT
Start: 2025-03-04 | End: 2025-03-05

## 2025-03-04 NOTE — TELEPHONE ENCOUNTER
Given he has been off of the spironolactone, he should have a BMP checked 2 weeks after starting it. I have added an order. Please assist him in making an appointment to come into the lab. Also, please confirm that he is not taking the potassium chloride tablets. It says that he reported he was not, but I want to ensure that he is not taking them when he is looking at the pills he actually takes.   Buck Nascimento MD, FACP

## 2025-03-05 RX ORDER — TAMSULOSIN HYDROCHLORIDE 0.4 MG/1
CAPSULE ORAL
Qty: 90 CAPSULE | Refills: 3 | Status: SHIPPED | OUTPATIENT
Start: 2025-03-05

## 2025-03-05 RX ORDER — TORSEMIDE 20 MG/1
40 TABLET ORAL DAILY
Qty: 180 TABLET | Refills: 0 | Status: SHIPPED | OUTPATIENT
Start: 2025-03-05

## 2025-03-05 RX ORDER — CHOLECALCIFEROL (VITAMIN D3) 50 MCG
1 TABLET ORAL DAILY
Qty: 90 TABLET | Refills: 3 | Status: SHIPPED | OUTPATIENT
Start: 2025-03-05

## 2025-03-05 RX ORDER — SPIRONOLACTONE 50 MG/1
50 TABLET, FILM COATED ORAL DAILY
Qty: 90 TABLET | Refills: 2 | Status: SHIPPED | OUTPATIENT
Start: 2025-03-05

## 2025-03-05 NOTE — TELEPHONE ENCOUNTER
Called patient- he is taking potassium, so will have him discontinue. He needs all refills sent to [facility] in [place].     Darien Jang RN on 3/5/2025 at 10:47 AM

## 2025-03-10 DIAGNOSIS — M54.50 ACUTE MIDLINE LOW BACK PAIN WITHOUT SCIATICA: ICD-10-CM

## 2025-03-10 NOTE — TELEPHONE ENCOUNTER
Controlled substance refill request notes    Refill request received for: Oxycodone-Acetaminophen 5-325  [place]  data reviewed 03/10/25:  Medication last refill: 150 tab, 30 day supply, filled/sold to patient on 02/15/2025  Pended order: Oxycodone-Acetaminophen 5-325, 150 tab, 30 day supply, fill on or after 03/17/2025     Primary care provider: Buck Nascimento  Last office visit this department: 9/16/2024  Next appointment with PCP: None     Refill request forwarded to provider for review.     Glory BLACK LPN  [facility]

## 2025-03-10 NOTE — TELEPHONE ENCOUNTER
Health Call Center    Phone Message    May a detailed message be left on voicemail: yes     Reason for Call: Medication Refill Request    Has the patient contacted the pharmacy for the refill? Yes   Name of medication being requested: oxyCODONE-acetaminophen (PERCOCET) 5-325 MG tablet   Provider who prescribed the medication: Dr.Sick  Pharmacy: [facility] [address] [place] [id number] [facility]   Date medication is needed: ASAP       Action Taken: Message routed to:  [facility] ([facility]    Travel Screening: Not Applicable     Date of Service:

## 2025-03-11 ENCOUNTER — INFUSION THERAPY VISIT (OUTPATIENT)
Dept: ONCOLOGY | Facility: CLINIC | Age: 80
End: 2025-03-11
Attending: INTERNAL MEDICINE
Payer: COMMERCIAL

## 2025-03-11 DIAGNOSIS — N52.9 ERECTILE DYSFUNCTION, UNSPECIFIED ERECTILE DYSFUNCTION TYPE: ICD-10-CM

## 2025-03-11 DIAGNOSIS — C78.00 MALIGNANT NEOPLASM METASTATIC TO LUNG, UNSPECIFIED LATERALITY (H): Primary | ICD-10-CM

## 2025-03-11 DIAGNOSIS — C10.9 SQUAMOUS CELL CARCINOMA OF OROPHARYNX (H): ICD-10-CM

## 2025-03-11 DIAGNOSIS — C09.9 TONSIL CANCER (H): ICD-10-CM

## 2025-03-11 NOTE — PROGRESS NOTES
"Patient did not arrive for infusion today. Cancelled his lab appointment via Mychart due to a \"severe sore throat.\"  RNCC messaged to follow up.    "

## 2025-03-12 RX ORDER — OXYCODONE AND ACETAMINOPHEN 5; 325 MG/1; MG/1
1-2 TABLET ORAL EVERY 6 HOURS PRN
Qty: 150 TABLET | Refills: 0 | Status: SHIPPED | OUTPATIENT
Start: 2025-03-17

## 2025-03-17 ENCOUNTER — OFFICE VISIT (OUTPATIENT)
Dept: INTERNAL MEDICINE | Facility: CLINIC | Age: 80
End: 2025-03-17
Payer: COMMERCIAL

## 2025-03-17 VITALS — DIASTOLIC BLOOD PRESSURE: 69 MMHG | OXYGEN SATURATION: 98 % | SYSTOLIC BLOOD PRESSURE: 114 MMHG | HEART RATE: 62 BPM

## 2025-03-17 DIAGNOSIS — G89.29 CHRONIC BILATERAL LOW BACK PAIN WITHOUT SCIATICA: ICD-10-CM

## 2025-03-17 DIAGNOSIS — M54.50 CHRONIC BILATERAL LOW BACK PAIN WITHOUT SCIATICA: ICD-10-CM

## 2025-03-17 DIAGNOSIS — R73.03 PREDIABETES: ICD-10-CM

## 2025-03-17 DIAGNOSIS — I50.32 CHRONIC DIASTOLIC HEART FAILURE (H): ICD-10-CM

## 2025-03-17 DIAGNOSIS — C78.00 MALIGNANT NEOPLASM METASTATIC TO LUNG, UNSPECIFIED LATERALITY (H): ICD-10-CM

## 2025-03-17 DIAGNOSIS — F11.90 CHRONIC, CONTINUOUS USE OF OPIOIDS: ICD-10-CM

## 2025-03-17 DIAGNOSIS — R60.0 PERIPHERAL EDEMA: Primary | ICD-10-CM

## 2025-03-17 DIAGNOSIS — E66.01 MORBID OBESITY (H): ICD-10-CM

## 2025-03-17 DIAGNOSIS — N52.9 ERECTILE DYSFUNCTION, UNSPECIFIED ERECTILE DYSFUNCTION TYPE: ICD-10-CM

## 2025-03-17 PROCEDURE — 3078F DIAST BP <80 MM HG: CPT | Performed by: INTERNAL MEDICINE

## 2025-03-17 PROCEDURE — 99214 OFFICE O/P EST MOD 30 MIN: CPT | Performed by: INTERNAL MEDICINE

## 2025-03-17 PROCEDURE — 3074F SYST BP LT 130 MM HG: CPT | Performed by: INTERNAL MEDICINE

## 2025-03-17 RX ORDER — SILDENAFIL 100 MG/1
TABLET, FILM COATED ORAL
Qty: 6 TABLET | Refills: 0 | OUTPATIENT
Start: 2025-03-17

## 2025-03-17 RX ORDER — TORSEMIDE 20 MG/1
40 TABLET ORAL DAILY
Qty: 180 TABLET | Refills: 3 | Status: SHIPPED | OUTPATIENT
Start: 2025-03-17

## 2025-03-17 RX ORDER — SILDENAFIL 100 MG/1
100 TABLET, FILM COATED ORAL DAILY PRN
Qty: 12 TABLET | Refills: 11 | Status: SHIPPED | OUTPATIENT
Start: 2025-03-17

## 2025-03-17 NOTE — LETTER

## 2025-03-17 NOTE — TELEPHONE ENCOUNTER
Airway  Date/Time: 3/17/2025 7:50 AM  Urgency: elective    Airway not difficult    Staffing  Performed: attending   Authorized by: Sharif Langford MD    Performed by: Sharif Langford MD  Patient location during procedure: OR    Indications and Patient Condition  Indications for airway management: anesthesia  Spontaneous Ventilation: absent  Sedation level: deep  Preoxygenated: yes  Patient position: sniffing  MILS not maintained throughout  Mask difficulty assessment: 1 - vent by mask  Planned trial extubation    Final Airway Details  Final airway type: endotracheal airway      Successful airway: ETT  Cuffed: yes   Successful intubation technique: direct laryngoscopy  Facilitating devices/methods: intubating stylet  Blade: Matthew  Blade size: #3  ETT size (mm): 7.5  Cormack-Lehane Classification: grade IIa - partial view of glottis  Placement verified by: chest auscultation   Inital cuff pressure (cm H2O): 23  Measured from: teeth  Number of attempts at approach: 2    Additional Comments  Firt attempt by IM-Resident but unable to get clear view of glottis.         [facility] Call Center    Phone Message    May a detailed message be left on voicemail: yes     Reason for Call: Medication Refill Request    Has the patient contacted the pharmacy for the refill? Yes   Name of medication being requested: Percocet  Provider who prescribed the medication: Dr Buck Nascimento  Pharmacy: [facility] in [place]  Date medication is needed: pt has 1 day supply left, please fill as soon as possible, thanks!         Action Taken: Message routed to:  [facility] ([facility]): Refill team for Dr Pily Hicks Screening: Not Applicable

## 2025-03-17 NOTE — PROGRESS NOTES
Assessment & Plan     Conner was seen today for follow up.    Diagnoses and all orders for this visit:    Chronic diastolic heart failure (H)  Peripheral edema - he is having worsening of these since he was off of torsemide and because he needs a new recliner and can't elevated his legs.  Edema- Counseled to elevate feet as much as he can. He is wearing compression stockings but enc to wear some that have more compression. Educated on the importance of taking medications appropriately. Enc to weigh self daily and update provider with increases of 3 pounds/day. Counseled on healthy eating and exercise.   -     torsemide (DEMADEX) 20 MG tablet; Take 2 tablets (40 mg) by mouth daily.    Erectile dysfunction, unspecified erectile dysfunction type  -     sildenafil (VIAGRA) 100 MG tablet; Take 1 tablet (100 mg) by mouth daily as needed (ED).    Malignant neoplasm metastatic to lung  Seen by Oncology on 3/3/25 - on carboplatin and paclitaxel; has PCA support at home   Anemia chronic - baseline hgb 10-12 dropped to 8-10 when diagnosed with cancer, normal iron  Pt states he has been tolerating the chemotherapy well.     Morbid obesity (H)  Prediabetes  -     Lipid panel reflex to direct LDL Fasting; Future  -     Hemoglobin A1c; Future    Chronic bilateral low back pain without sciatica  -     Urine Drug Screen; Future  neuropathy plus foraminal stenosis and spinal canal sten - on percocet 5/day       Medications reordered. Encouraged to follow up with meds. Pt has a ride coming at 8:00 am so will likely get meds at a future date.     Vaccinations- states he got all of his vaccines at pharmacy and declined any today.     Eye exam- reports he will make an appointment. States his last eye doctor has left.     Colonoscopy- states he's not doing this anymore. Denies any issues with constipation or nausea.     Signed by Suzette Kilpatrick RN, NP student     I, Buck Nascimento, was present with the NP student who participated in the  "service and in the documentation of the note.  I have verified the history and personally performed the physical exam and medical decision making.  I agree with the assessment and plan of care as documented in the note.    Buck Nascimento MD, FACP    BMI  Estimated body mass index is 36.13 kg/m  as calculated from the following:    Height as of 12/4/24: 1.626 m (5' 4\").    Weight as of 3/3/25: 95.5 kg (210 lb 8 oz).             No follow-ups on file.    Norma Prieto is a 79 year old, presenting for the following health issues:    Follow Up        3/17/2025     6:45 AM   Additional Questions   Roomed by Kobi CLARK    Pt stated that he lost his PCA last month and wasn't able to take Torsemide and Spirolactone for 6 weeks. His insurance was also changed at this time. He states he has been taking meds per order for the past two weeks. Reports increase in bilateral leg swelling which has been slow to improve since taking meds again.     Takes Percocet daily for back pain.     Utilizing Sildenafil as needed. Concerned that there are only 6 pills filled at a time.            Review of Systems  Constitutional, HEENT, cardiovascular, pulmonary, gi and gu systems are negative, except as otherwise noted. Denies chest pain or SOB. Denies nausea. Denies any recent falls.       Objective    /69 (BP Location: Right arm, Patient Position: Sitting, Cuff Size: Adult Regular)   Pulse 62   SpO2 98%   There is no height or weight on file to calculate BMI.  Physical Exam   GENERAL: alert and no distress  RESP: lungs clear to auscultation - no rales, rhonchi or wheezes  CV: regular rate and rhythm, normal S1 S2, no S3 or S4, no murmur, click or rub, +2 pitting edema in bilateral extremities  ABDOMEN: soft, nontender, no hepatosplenomegaly, no masses and bowel sounds normal  MS: no gross musculoskeletal defects noted, no edema  SKIN: no suspicious lesions or rashes  NEURO: Normal strength and tone, mentation intact and speech " normal  PSYCH: mentation appears normal, affect normal/bright  Diabetic foot exam: normal DP and PT pulses, no trophic changes or ulcerative lesions, and normal sensory exam            Signed Electronically by: Buck Nascimento MD

## 2025-03-18 PROBLEM — F11.90 CHRONIC, CONTINUOUS USE OF OPIOIDS: Status: ACTIVE | Noted: 2025-03-18

## 2025-03-23 NOTE — PROGRESS NOTES
[facility]  [address] [place] [address]  Phone: [phone]  Fax: [phone]    PATIENT NAME: Jonathan Bishop  MRN # [id number]   DATE OF VISIT: March 25, 2025  YOB: 1945     Otolaryngology: Dr. Karishma Eng  Radiation Oncology: Dr. Jenni Mills  PCP: Dr. 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 Small residual tumor in the L tonsil/soft palate on PET 7/30/24. CT 1/14/25 with heterogenously enhancing left level 2A node. Has tolerated treatment well with intermittent breaks therefore we switched to a 2 week on, 1 week off schedule although missed last infusion d/t reported fever and pharyngitis. He's tolerating this well with minimal side effects. Consider dropping carboplatin after next scan if there is response or stable disease.  -Proceed with carboplatin + paclitaxel today  -RTC in 1 week for lab/infusion  -MARK/MD visit every 3 weeks with infusion  -Restage in 1 month. Keep in mind interval between current CT and resuming therapy ~ 2/10 when interpreting next CT    Spells: None recently. Was scheduled to see Dr. Perry after EEG 3/24 but canceled    Thyroid nodule: US 11/27/24 with small nodules, no follow-up needed      Neurocognitive changes, encephalopathy March 2024: Hasn't pursued cognitive testing yet despite encouragement. Subjectively short-term memory worse but overall seems to be doing better with stable PCA support.     Anorexia, malnutrition, weight loss: Improving with PCA helping with food prep. Gaining some weight over the past 1-2 months. Monitor     Hypomag: On mag oxide but remains  marginal. Continue checking, replacing IV prn.      H/o CVA: Residual L weakness. Uses walker at baseline, motorized scooter out and about. Gait stable with WW     Screening for hypothyroidism: TFTs stable    LE edema: Improved since torsemide refilled Continue compression stockings and leg elevation.    Surrogate decision maker: He would want us to talk with Mariann if he could not speak for himself. Not discussed today      Peripheral neuropathy: MRI shows a lot of foramenal stenosis and spinal canal stenosis, so more likely related to that than DM2. Notably seemed to be flaring around [place] (2024) but he notes improvement with recent treatment break.    Vandana Bertrand CNP  ---  30 minutes spent on the date of the encounter doing chart review, review of test results, interpretation of tests, patient visit, and documentation.    The longitudinal plan of care for the diagnosis(es)/condition(s) as documented were addressed during this visit. Due to the added complexity in care, I will continue to support Conner in the subsequent management and with ongoing continuity of care.    SUBJECTIVE  Conner is seen prior to routine carbo/taxol in clinic today.   -Notes a sore throat and fever 2 weeks ago. Fever resolved. Having some residual throat irritation now. Requests magic mouthwash. Worse with certain crunchy foods.   -Energy levels great  -Currently doesn't have a PCA. The last one went to be with family in [place]  -No recurrent edema. Wearing stockings  -Neuropathy stable  -Denies falls or cognitive changes    CANCER SUMMARY  2/26/23              L tonsil mass bx in clinic (Dr. Eng).   2/20/23              PET/CT. 3.7 x 4.0 x 5.1 cm mass L palatine tonsil causing narrowing of the oropharyngeal lumen, L level 2 node, L retropharyngeal nodular density, extension of primary mass vs retropharyngeal node, 0.8 cm RLL nodule concerning for met, mild focal uptake R iliac bone and L1 without CT correlate suspicious for mets.    3/2/23  R VATS, wedge resection. Path: SCC, poorly differentiated, associated with necrosis, 1 cm, negative margins, p16 +lety  3/24/23              MRI L spine and pelvis. Diffusely heterogeneous marrow signal throughout without corresponding abnormal signal on sagittal STIR sequence and no abnormal enhancement. Nonspecific but could be benign process such as red marrow hyperplasia, cannot completely exclude metastatic disease or infiltrative pathologic marrow process. No lesions corresponding to FDG avid spots on PET/CT.   4/19~10/18/23 Pembrolizumab.  10/26/23            CT neck and CAP. Increased L palatine tonsil mass, 3.8 x 2.7 cm --> 4.7 x 3.5 cm. Increased bilateral cervical nodes up to 2.3 x 2.4 cm R level 2 node. New 3 mm RML nodule, no other mets.   11/14/23 PET. 4.6 x 3.3 cm L palatine tonsil mass (SUB 26.8), R level 2A and 2A/3 nodes. Questionable uptake distal R femoral medullary cavity, partially imaged, with questionable subtle corresponding soft tissue attenuation on CT. MRI could be obtained to better evaluate.  11/21~12/2/23   [facility] for weakness/fall. COVID/paxlovid, MSSA bacteremia (1 of 2 bottles on 1 day), TTE negative, treated with cefazolin but didn't complete 2 week course, episode of unresponsiveness 12/2. Dispo plan was TCU on [facility], but left AMA. CTA chest negative for PE, showed grossly unchanged cervical adenopathy. Brain MRI 11/22 for stroke code negative for infarct. Showed SVID, moderate atrophy, suboptimal contrast bolus to examine intracranial structures, 4.5 x 3 x 4.5 cm L nasopharyngeal mass, incompletely visualized. CTA negative.   12/19/23 Quad shot fraction #1. No-showed thereafter.  1/10/24~curr     Carboplatin (AUC 2) + paclitaxel 60 mg/m2 weekly. Held 2/13 due to anorexia, fatigue. Break between 8/1 and 9/19 doses for trip, fatigue. Missed dose 10/16 due to transportation issues.  3/13~3/17/24     [facility] for acute encephalopathy. Had [place] his car in the  middle of the highway after receiving chemo earlier that day. Recommended not to drive   4/30/24  CT CAP and CT neck. Residual bilateral cervical adenopathy.   7/30/24  PET. CT not covered. Significant reduction L palatine tonsil mass - residual focal uptake in the L posterolateral oropharyngeal wall (SUV 9.1, previously 26.8). Previously seen multiple FDG avid L cervical nodes and few enlarged R nodes are completed resolved. Mild diffuse uptake along bilateral lower legs and ankles with associated skin thickening and stranding along the soft tissues; findings can be seen with cellulitis. No metastatic disease.   11/5/24  CT neck and CT CAP.  Stable appearance L palatine tonsil, no discrete mass. 1.1 cm R thyroid nodule, a little bigger. O/w unremarkable.     PAST MEDICAL HISTORY  SCC as above  Chronic LBP  TAVR 2020  H/o rheumatic carditis 1950  CHF. 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 Nocturia once nightly   ED  Cataracts  Umbilical hernia repair 2011  Knee arthroplasty B 2010  Lumbar spine fusion, laminectomy, sciatic pain R > L  Peripheral neuropathy - from pinched nerves?  Hearing loss    CURRENT OUTPATIENT MEDICATIONS  Reviewed    ALLERGIES  No Known Allergies     PHYSICAL EXAM  /83 (BP Location: Right arm, Patient Position: Sitting, Cuff Size: Adult Large)   Pulse 76   Temp 97.9  F (36.6  C) (Oral)   Resp 16   Wt 89.8 kg (198 lb)   SpO2 100%   BMI 33.99 kg/m      General: Well-appearing male, NAD  Eyes: EOMI, PERRL. No scleral icterus.  ENT: Oral mucosa is moist. Erythema to left posterior OP without lesion or mass. Level IIA node on left is soft and mobile  Cardiovascular: RRR No murmurs, gallops, or rubs. +1 bilateral edema in lower extremities  Respiratory: CTA bilaterally. No wheezes or crackles.  Neurologic: No focal deficits   Skin: No rashes, petechiae, or bruising noted on exposed skin.    LABORATORY AND IMAGING STUDIES  Most Recent 3  CBC's:  Recent Labs   Lab Test 03/25/25  1207 03/03/25  1204 02/17/25  1220   WBC 6.2 5.3 5.9   HGB 10.3* 9.8* 9.7*   MCV 82 83 83    219 176   ANEUTAUTO 3.4 2.6 3.8    Most Recent 3 BMP's:  Recent Labs   Lab Test 03/25/25  1207 03/03/25  1204 02/17/25  1220    141 145   POTASSIUM 4.3 4.2 3.7   CHLORIDE 101 105 105   CO2 27 27 30*   BUN 27.6* 19.1 25.2*   CR 0.93 0.82 0.90   ANIONGAP 11 9 10   WARREN 9.9 9.4 9.2   * 87 110*   PROTTOTAL 7.3 7.0 7.0   ALBUMIN 4.1 4.0 4.0    Most Recent 2 LFT's:  Recent Labs   Lab Test 03/25/25  1207 03/03/25  1204   AST 28 23   ALT 13 18   ALKPHOS 118 109   BILITOTAL 0.3 0.3    Most Recent TSH and T4:  Recent Labs   Lab Test 03/25/25  1207   TSH 1.02   T4 1.30     Phos/Mag:  Lab Results   Component Value Date    PHOS 3.0 03/17/2024    PHOS 2.4 (L) 12/09/2020    PHOS 3.9 12/08/2020    MAG 1.7 03/25/2025    MAG 1.8 03/03/2025    MAG 1.7 02/17/2025      I reviewed the above labs today.

## 2025-03-25 ENCOUNTER — INFUSION THERAPY VISIT (OUTPATIENT)
Dept: ONCOLOGY | Facility: CLINIC | Age: 80
End: 2025-03-25
Attending: INTERNAL MEDICINE
Payer: COMMERCIAL

## 2025-03-25 ENCOUNTER — LAB (OUTPATIENT)
Dept: LAB | Facility: CLINIC | Age: 80
End: 2025-03-25
Attending: INTERNAL MEDICINE
Payer: COMMERCIAL

## 2025-03-25 ENCOUNTER — ONCOLOGY VISIT (OUTPATIENT)
Dept: ONCOLOGY | Facility: CLINIC | Age: 80
End: 2025-03-25
Attending: REGISTERED NURSE
Payer: COMMERCIAL

## 2025-03-25 VITALS
OXYGEN SATURATION: 100 % | HEART RATE: 76 BPM | WEIGHT: 198 LBS | RESPIRATION RATE: 16 BRPM | BODY MASS INDEX: 33.99 KG/M2 | SYSTOLIC BLOOD PRESSURE: 129 MMHG | TEMPERATURE: 97.9 F | DIASTOLIC BLOOD PRESSURE: 83 MMHG

## 2025-03-25 DIAGNOSIS — I50.32 CHRONIC DIASTOLIC HEART FAILURE (H): ICD-10-CM

## 2025-03-25 DIAGNOSIS — C09.9 TONSIL CANCER (H): ICD-10-CM

## 2025-03-25 DIAGNOSIS — M54.50 CHRONIC BILATERAL LOW BACK PAIN WITHOUT SCIATICA: ICD-10-CM

## 2025-03-25 DIAGNOSIS — C78.00 MALIGNANT NEOPLASM METASTATIC TO LUNG, UNSPECIFIED LATERALITY (H): Primary | ICD-10-CM

## 2025-03-25 DIAGNOSIS — G89.29 CHRONIC BILATERAL LOW BACK PAIN WITHOUT SCIATICA: ICD-10-CM

## 2025-03-25 DIAGNOSIS — C10.9 SQUAMOUS CELL CARCINOMA OF OROPHARYNX (H): ICD-10-CM

## 2025-03-25 DIAGNOSIS — R13.10 ODYNOPHAGIA: Primary | ICD-10-CM

## 2025-03-25 DIAGNOSIS — R73.03 PREDIABETES: ICD-10-CM

## 2025-03-25 DIAGNOSIS — E03.4 HYPOTHYROIDISM DUE TO ACQUIRED ATROPHY OF THYROID: ICD-10-CM

## 2025-03-25 LAB
ALBUMIN SERPL BCG-MCNC: 4.1 G/DL (ref 3.5–5.2)
ALP SERPL-CCNC: 118 U/L (ref 40–150)
ALT SERPL W P-5'-P-CCNC: 13 U/L (ref 0–70)
AMPHETAMINES UR QL SCN: NORMAL
ANION GAP SERPL CALCULATED.3IONS-SCNC: 11 MMOL/L (ref 7–15)
AST SERPL W P-5'-P-CCNC: 28 U/L (ref 0–45)
BARBITURATES UR QL SCN: NORMAL
BASOPHILS # BLD AUTO: 0 10E3/UL (ref 0–0.2)
BASOPHILS NFR BLD AUTO: 0 %
BENZODIAZ UR QL SCN: NORMAL
BILIRUB SERPL-MCNC: 0.3 MG/DL
BUN SERPL-MCNC: 27.6 MG/DL (ref 8–23)
BZE UR QL SCN: NORMAL
CALCIUM SERPL-MCNC: 9.9 MG/DL (ref 8.8–10.4)
CANNABINOIDS UR QL SCN: NORMAL
CHLORIDE SERPL-SCNC: 101 MMOL/L (ref 98–107)
CREAT SERPL-MCNC: 0.93 MG/DL (ref 0.67–1.17)
EGFRCR SERPLBLD CKD-EPI 2021: 84 ML/MIN/1.73M2
EOSINOPHIL # BLD AUTO: 0.2 10E3/UL (ref 0–0.7)
EOSINOPHIL NFR BLD AUTO: 2 %
ERYTHROCYTE [DISTWIDTH] IN BLOOD BY AUTOMATED COUNT: 16.6 % (ref 10–15)
EST. AVERAGE GLUCOSE BLD GHB EST-MCNC: 126 MG/DL
FENTANYL UR QL: NORMAL
GLUCOSE SERPL-MCNC: 118 MG/DL (ref 70–99)
HBA1C MFR BLD: 6 %
HCO3 SERPL-SCNC: 27 MMOL/L (ref 22–29)
HCT VFR BLD AUTO: 34 % (ref 40–53)
HGB BLD-MCNC: 10.3 G/DL (ref 13.3–17.7)
IMM GRANULOCYTES # BLD: 0 10E3/UL
IMM GRANULOCYTES NFR BLD: 0 %
LYMPHOCYTES # BLD AUTO: 2 10E3/UL (ref 0.8–5.3)
LYMPHOCYTES NFR BLD AUTO: 32 %
MAGNESIUM SERPL-MCNC: 1.7 MG/DL (ref 1.7–2.3)
MCH RBC QN AUTO: 24.9 PG (ref 26.5–33)
MCHC RBC AUTO-ENTMCNC: 30.3 G/DL (ref 31.5–36.5)
MCV RBC AUTO: 82 FL (ref 78–100)
MONOCYTES # BLD AUTO: 0.6 10E3/UL (ref 0–1.3)
MONOCYTES NFR BLD AUTO: 10 %
NEUTROPHILS # BLD AUTO: 3.4 10E3/UL (ref 1.6–8.3)
NEUTROPHILS NFR BLD AUTO: 56 %
NRBC # BLD AUTO: 0 10E3/UL
NRBC BLD AUTO-RTO: 0 /100
OPIATES UR QL SCN: NORMAL
PCP QUAL URINE (ROCHE): NORMAL
PLATELET # BLD AUTO: 215 10E3/UL (ref 150–450)
POTASSIUM SERPL-SCNC: 4.3 MMOL/L (ref 3.4–5.3)
PROT SERPL-MCNC: 7.3 G/DL (ref 6.4–8.3)
RBC # BLD AUTO: 4.14 10E6/UL (ref 4.4–5.9)
SODIUM SERPL-SCNC: 139 MMOL/L (ref 135–145)
T4 FREE SERPL-MCNC: 1.3 NG/DL (ref 0.9–1.7)
TSH SERPL DL<=0.005 MIU/L-ACNC: 1.02 UIU/ML (ref 0.3–4.2)
WBC # BLD AUTO: 6.2 10E3/UL (ref 4–11)

## 2025-03-25 PROCEDURE — 84439 ASSAY OF FREE THYROXINE: CPT

## 2025-03-25 PROCEDURE — 96367 TX/PROPH/DG ADDL SEQ IV INF: CPT

## 2025-03-25 PROCEDURE — G0463 HOSPITAL OUTPT CLINIC VISIT: HCPCS | Performed by: REGISTERED NURSE

## 2025-03-25 PROCEDURE — 96413 CHEMO IV INFUSION 1 HR: CPT

## 2025-03-25 PROCEDURE — G2211 COMPLEX E/M VISIT ADD ON: HCPCS | Performed by: REGISTERED NURSE

## 2025-03-25 PROCEDURE — 96417 CHEMO IV INFUS EACH ADDL SEQ: CPT

## 2025-03-25 PROCEDURE — 258N000003 HC RX IP 258 OP 636: Performed by: REGISTERED NURSE

## 2025-03-25 PROCEDURE — 80307 DRUG TEST PRSMV CHEM ANLYZR: CPT

## 2025-03-25 PROCEDURE — 83036 HEMOGLOBIN GLYCOSYLATED A1C: CPT | Performed by: REGISTERED NURSE

## 2025-03-25 PROCEDURE — 99214 OFFICE O/P EST MOD 30 MIN: CPT | Performed by: REGISTERED NURSE

## 2025-03-25 PROCEDURE — 80053 COMPREHEN METABOLIC PANEL: CPT

## 2025-03-25 PROCEDURE — 84443 ASSAY THYROID STIM HORMONE: CPT

## 2025-03-25 PROCEDURE — 36415 COLL VENOUS BLD VENIPUNCTURE: CPT | Performed by: REGISTERED NURSE

## 2025-03-25 PROCEDURE — 250N000011 HC RX IP 250 OP 636: Performed by: REGISTERED NURSE

## 2025-03-25 PROCEDURE — 83735 ASSAY OF MAGNESIUM: CPT

## 2025-03-25 PROCEDURE — 96375 TX/PRO/DX INJ NEW DRUG ADDON: CPT

## 2025-03-25 PROCEDURE — 85025 COMPLETE CBC W/AUTO DIFF WBC: CPT

## 2025-03-25 RX ADMIN — PACLITAXEL 96 MG: 6 INJECTION, SOLUTION INTRAVENOUS at 14:27

## 2025-03-25 RX ADMIN — FAMOTIDINE 20 MG: 10 INJECTION, SOLUTION INTRAVENOUS at 13:57

## 2025-03-25 RX ADMIN — CARBOPLATIN 180 MG: 10 INJECTION, SOLUTION INTRAVENOUS at 15:32

## 2025-03-25 RX ADMIN — SODIUM CHLORIDE 250 ML: 0.9 INJECTION, SOLUTION INTRAVENOUS at 13:58

## 2025-03-25 RX ADMIN — DEXAMETHASONE SODIUM PHOSPHATE: 10 INJECTION, SOLUTION INTRAMUSCULAR; INTRAVENOUS at 13:58

## 2025-03-25 ASSESSMENT — PAIN SCALES - GENERAL: PAINLEVEL_OUTOF10: SEVERE PAIN (9)

## 2025-03-25 NOTE — NURSING NOTE
Chief Complaint   Patient presents with    Blood Draw     Labs drawn with piv start.  VS taken.     Labs drawn with PIV start.  Pt tolerated well.  VS taken and pt checked in for next appt.    (Pt unable to void while in lab appt.  He wasn't fasting so lipid panel not drawn).    Modesta Turk RN

## 2025-03-25 NOTE — Clinical Note
"3/25/2025      Jonathan Bishop  [address]  [place] [address]      Dear Colleague,    Thank you for referring your patient, Jonathan Bishop, to the [facility]. Please see a copy of my visit note below.        [facility]  [address] [address]  Phone: [phone]  Fax: [phone]    PATIENT NAME: Jonathan Bishop  MRN # [id number]   DATE OF VISIT: March 25, 2025  YOB: 1945     Otolaryngology: Dr. Karishma Eng  Radiation Oncology: Dr. Jenni Mills  PCP: Dr. Buck Nascimento     CANCER TYPE: SCC L tonsil, p16 +lety  STAGE: [id number] (IVC)  ECOG PS: 1-2     PD-L1: TPS 20%, CPS 25% on [id number] tonsil bx  NGS: N/A    ASSESSMENT AND PLAN  SCC L tonsil, p16 +lety, CPS 25%/TPS 20%, oligometastatic lung met, +/- bone mets: Great [place] overall on weekly carboplatin paclitaxel and the 1 fraction of quad shot before he stopped attending those appts in 2024. Small residual tumor in the L tonsil/soft palate on PET 7/30/24. CT 1/14/25 with heterogenously enhancing left level 2A node. Has tolerated treatment well with intermittent breaks therefore we switched to a 2 week on, 1 week off schedule. He's tolerating this well with minimal side effects and some weight gain. May consider dropping carboplatin after next scan if there is response or stable disease.  -Proceed with carboplatin + paclitaxel today  -RTC in 1 week for lab/infusion  -MARK visit every 3 weeks with infusion  -Restage after 3 more cycles. Keep in mind interval between current CT and resuming therapy ~ 2/10 when interpreting next CT    Spells: Seeing Dr. Perry after EEG 3/24. No notable cognitive \"spells\" recently    Thyroid nodule: US 11/27/24 with small nodules, no follow-up needed      Neurocognitive changes, encephalopathy March 2024: Hasn't pursued cognitive testing yet despite encouragement. Subjectively short-term memory worse but overall seems " to be doing better with stable PCA support.     Anorexia, malnutrition, weight loss: Improving with PCA helping with food prep. Gaining some weight over the past 1-2 months. Monitor     Hypomag: On mag oxide but remains marginal. Continue checking, replacing IV prn.      H/o CVA: Residual L weakness. Uses walker at baseline, motorized scooter out and about. Gait stable with WW     Screening for hypothyroidism: TFTs 9/19/24 ok, check next visit      LE edema: Worse lately since running out of torsemide. Dr. Nascimento's RN notified about need for refills. Continue compression stockings and leg elevation.    Surrogate decision maker: He would want us to talk with Mariann if he could not speak for himself. Not discussed today      Peripheral neuropathy: MRI shows a lot of foramenal stenosis and spinal canal stenosis, so more likely related to that than DM2. Notably seemed to be flaring around [place] (2024) but he notes improvement with recent treatment break.    Vandana Bertrand, YOANNA  ---  *** minutes spent on the date of the encounter doing {2021 E&M time in:[id number]}.    The longitudinal plan of care for the diagnosis(es)/condition(s) as documented were addressed during this visit. Due to the added complexity in care, I will continue to support Conner in the subsequent management and with ongoing continuity of care.    SUBJECTIVE  Conner is seen prior to routine carbo/taxol in clinic today.   ***    CANCER SUMMARY  2/26/23              L tonsil mass bx in clinic (Dr. Eng).   2/20/23              PET/CT. 3.7 x 4.0 x 5.1 cm mass L palatine tonsil causing narrowing of the oropharyngeal lumen, L level 2 node, L retropharyngeal nodular density, extension of primary mass vs retropharyngeal node, 0.8 cm RLL nodule concerning for met, mild focal uptake R iliac bone and L1 without CT correlate suspicious for mets.   3/2/23  R VATS, wedge resection. Path: SCC, poorly differentiated, associated with necrosis, 1 cm, negative margins, p16  +lety  3/24/23              MRI L spine and pelvis. Diffusely heterogeneous marrow signal throughout without corresponding abnormal signal on sagittal STIR sequence and no abnormal enhancement. Nonspecific but could be benign process such as red marrow hyperplasia, cannot completely exclude metastatic disease or infiltrative pathologic marrow process. No lesions corresponding to FDG avid spots on PET/CT.   4/19~10/18/23 Pembrolizumab.  10/26/23            CT neck and CAP. Increased L palatine tonsil mass, 3.8 x 2.7 cm --> 4.7 x 3.5 cm. Increased bilateral cervical nodes up to 2.3 x 2.4 cm R level 2 node. New 3 mm RML nodule, no other mets.   11/14/23 PET. 4.6 x 3.3 cm L palatine tonsil mass (SUB 26.8), R level 2A and 2A/3 nodes. Questionable uptake distal R femoral medullary cavity, partially imaged, with questionable subtle corresponding soft tissue attenuation on CT. MRI could be obtained to better evaluate.  11/21~12/2/23   [facility] for weakness/fall. COVID/paxlovid, MSSA bacteremia (1 of 2 bottles on 1 day), TTE negative, treated with cefazolin but didn't complete 2 week course, episode of unresponsiveness 12/2. Dispo plan was TCU on [facility], but left AMA. CTA chest negative for PE, showed grossly unchanged cervical adenopathy. Brain MRI 11/22 for stroke code negative for infarct. Showed SVID, moderate atrophy, suboptimal contrast bolus to examine intracranial structures, 4.5 x 3 x 4.5 cm L nasopharyngeal mass, incompletely visualized. CTA negative.   12/19/23 Quad shot fraction #1. No-showed thereafter.  1/10/24~curr     Carboplatin (AUC 2) + paclitaxel 60 mg/m2 weekly. Held 2/13 due to anorexia, fatigue. Break between 8/1 and 9/19 doses for trip, fatigue. Missed dose 10/16 due to transportation issues.  3/13~3/17/24     [facility] for acute encephalopathy. Had stopped his car in the middle of the highway after receiving chemo earlier that day. Recommended not to drive   4/30/24  CT CAP and CT neck.  Residual bilateral cervical adenopathy.   7/30/24  PET. CT not covered. Significant reduction L palatine tonsil mass - residual focal uptake in the L posterolateral oropharyngeal wall (SUV 9.1, previously 26.8). Previously seen multiple FDG avid L cervical nodes and few enlarged R nodes are completed resolved. Mild diffuse uptake along bilateral lower legs and ankles with associated skin thickening and stranding along the soft tissues; findings can be seen with cellulitis. No metastatic disease.   11/5/24  CT neck and CT CAP.  Stable appearance L palatine tonsil, no discrete mass. 1.1 cm R thyroid nodule, a little bigger. O/w unremarkable.     PAST MEDICAL HISTORY  SCC as above  Chronic LBP  TAVR 2020  H/o rheumatic carditis 1950  CHF. 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 Nocturia once nightly   ED  Cataracts  Umbilical hernia repair 2011  Knee arthroplasty B 2010  Lumbar spine fusion, laminectomy, sciatic pain R > L  Peripheral neuropathy - from pinched nerves?  Hearing loss    CURRENT OUTPATIENT MEDICATIONS  Reviewed    ALLERGIES  No Known Allergies     PHYSICAL EXAM  There were no vitals taken for this visit.    General: Well-appearing male, NAD  Eyes: EOMI, PERRL. No scleral icterus.  ENT: Oral mucosa is moist without lesions or thrush.   Lymphatic: Neck is supple without cervical or supraclavicular lymphadenopathy.   Cardiovascular: RRR No murmurs, gallops, or rubs. +2 edema in bilateral feet, +1 in bilateral legs  Respiratory: CTA bilaterally. No wheezes or crackles.  Neurologic: No focal deficits   Skin: No rashes, petechiae, or bruising noted on exposed skin.    LABORATORY AND IMAGING STUDIES  Most Recent 3 CBC's:  Recent Labs   Lab Test 03/03/25  1204 02/17/25  1220 02/10/25  0951   WBC 5.3 5.9 5.7   HGB 9.8* 9.7* 9.9*   MCV 83 83 85    176 189   ANEUTAUTO 2.6 3.8 2.7    Most Recent 3 BMP's:  Recent Labs   Lab Test 03/03/25  1204 02/17/25  1220  02/10/25  0951    145 144   POTASSIUM 4.2 3.7 4.1   CHLORIDE 105 105 107   CO2 27 30* 29   BUN 19.1 25.2* 29.9*   CR 0.82 0.90 0.84   ANIONGAP 9 10 8   WARREN 9.4 9.2 9.5   GLC 87 110* 104*   PROTTOTAL 7.0 7.0 6.9   ALBUMIN 4.0 4.0 4.1    Most Recent 2 LFT's:  Recent Labs   Lab Test 03/03/25  1204 02/17/25  1220   AST 23 66*   ALT 18 19   ALKPHOS 109 93   BILITOTAL 0.3 0.3    Most Recent TSH and T4:  Recent Labs   Lab Test 01/09/25  0821   TSH 1.17   T4 1.18     Phos/Mag:  Lab Results   Component Value Date    PHOS 3.0 03/17/2024    PHOS 2.4 (L) 12/09/2020    PHOS 3.9 12/08/2020    MAG 1.8 03/03/2025    MAG 1.7 02/17/2025    MAG 1.9 02/10/2025      I reviewed the above labs today.         Again, thank you for allowing me to participate in the care of your patient.        Sincerely,        Vandana Bertrand, CNP    Electronically signed

## 2025-03-25 NOTE — NURSING NOTE
"Oncology Rooming Note    March 25, 2025 12:33 PM   Jonathan Bishop is a 79 year old male who presents for:    Chief Complaint   Patient presents with    Blood Draw     Labs drawn with piv start.  VS taken.    Oncology Clinic Visit     Squamous cell carcinoma of oropharynx      Initial Vitals: /83 (BP Location: Right arm, Patient Position: Sitting, Cuff Size: Adult Large)   Pulse 76   Temp 97.9  F (36.6  C) (Oral)   Resp 16   Wt 89.8 kg (198 lb)   SpO2 100%   BMI 33.99 kg/m   Estimated body mass index is 33.99 kg/m  as calculated from the following:    Height as of 12/4/24: 1.626 m (5' 4\").    Weight as of this encounter: 89.8 kg (198 lb). Body surface area is 2.01 meters squared.  Severe Pain (9) Comment: Data Unavailable   No LMP for male patient.  Allergies reviewed: Yes  Medications reviewed: Yes    Medications: Medication refills not needed today.  Pharmacy name entered into [employer]:    [id number] - [place], [place] - [address] [facility]  [facility] [place] [facility] [place] - [address] [place] [address]    Frailty Screening:   Is the patient here for a new oncology consult visit in cancer care? 2. No    PHQ9:  Did this patient require a PHQ9?: No      Clinical concerns:  Patient states there are no new concerns to discuss with provider.  Vandana was not notified.        Maranda Graf CMA              "

## 2025-03-25 NOTE — PATIENT INSTRUCTIONS
[facility] Triage and after hours / weekends / holidays:  [phone]    Please call the triage or after hours line if you experience a temperature greater than or equal to 100.4, shaking chills, have uncontrolled nausea, vomiting and/or diarrhea, dizziness, shortness of breath, chest pain, bleeding, unexplained bruising, or if you have any other new/concerning symptoms, questions or concerns.      If you are having any concerning symptoms or wish to speak to a provider before your next infusion visit, please call triage to notify them so we can adequately serve you.     If you need a refill on a narcotic prescription or other medication, please call before your infusion appointment.       Lab Results:  Recent Results (from the past 12 hours)   Comprehensive metabolic panel    Collection Time: 03/25/25 12:07 PM   Result Value Ref Range    Sodium 139 135 - 145 mmol/L    Potassium 4.3 3.4 - 5.3 mmol/L    Carbon Dioxide (CO2) 27 22 - 29 mmol/L    Anion Gap 11 7 - 15 mmol/L    Urea Nitrogen 27.6 (H) 8.0 - 23.0 mg/dL    Creatinine 0.93 0.67 - 1.17 mg/dL    GFR Estimate 84 >60 mL/min/1.73m2    Calcium 9.9 8.8 - 10.4 mg/dL    Chloride 101 98 - 107 mmol/L    Glucose 118 (H) 70 - 99 mg/dL    Alkaline Phosphatase 118 40 - 150 U/L    AST 28 0 - 45 U/L    ALT 13 0 - 70 U/L    Protein Total 7.3 6.4 - 8.3 g/dL    Albumin 4.1 3.5 - 5.2 g/dL    Bilirubin Total 0.3 <=1.2 mg/dL   TSH    Collection Time: 03/25/25 12:07 PM   Result Value Ref Range    TSH 1.02 0.30 - 4.20 uIU/mL   T4 free    Collection Time: 03/25/25 12:07 PM   Result Value Ref Range    Free T4 1.30 0.90 - 1.70 ng/dL   Hemoglobin A1c    Collection Time: 03/25/25 12:07 PM   Result Value Ref Range    Estimated Average Glucose 126 (H) <117 mg/dL    Hemoglobin A1C 6.0 (H) <5.7 %   CBC with platelets and differential    Collection Time: 03/25/25 12:07 PM   Result Value Ref Range    WBC Count 6.2 4.0 - 11.0 10e3/uL    RBC Count 4.14 (L) 4.40 - 5.90 10e6/uL    Hemoglobin 10.3  (L) 13.3 - 17.7 g/dL    Hematocrit 34.0 (L) 40.0 - 53.0 %    MCV 82 78 - 100 fL    MCH 24.9 (L) 26.5 - 33.0 pg    MCHC 30.3 (L) 31.5 - 36.5 g/dL    RDW 16.6 (H) 10.0 - 15.0 %    Platelet Count 215 150 - 450 10e3/uL    % Neutrophils 56 %    % Lymphocytes 32 %    % Monocytes 10 %    % Eosinophils 2 %    % Basophils 0 %    % Immature Granulocytes 0 %    NRBCs per 100 WBC 0 <1 /100    Absolute Neutrophils 3.4 1.6 - 8.3 10e3/uL    Absolute Lymphocytes 2.0 0.8 - 5.3 10e3/uL    Absolute Monocytes 0.6 0.0 - 1.3 10e3/uL    Absolute Eosinophils 0.2 0.0 - 0.7 10e3/uL    Absolute Basophils 0.0 0.0 - 0.2 10e3/uL    Absolute Immature Granulocytes 0.0 <=0.4 10e3/uL    Absolute NRBCs 0.0 10e3/uL   Magnesium    Collection Time: 03/25/25 12:07 PM   Result Value Ref Range    Magnesium 1.7 1.7 - 2.3 mg/dL   Urine Drug Screen Panel    Collection Time: 03/25/25  1:46 PM   Result Value Ref Range    Amphetamines Urine Screen Negative Screen Negative    Barbituates Urine Screen Negative Screen Negative    Benzodiazepine Urine Screen Negative Screen Negative    Cannabinoids Urine Screen Negative Screen Negative    Cocaine Urine Screen Negative Screen Negative    Fentanyl Qual Urine Screen Negative Screen Negative    Opiates Urine Screen Negative Screen Negative    PCP Urine Screen Negative Screen Negative

## 2025-03-25 NOTE — PROGRESS NOTES
Infusion Nursing Note:  Jonathan Bishop presents today for Day 8 Cycle 10 Carboplatin and Paclitaxel (LIFETIME DOSE #34).    Patient seen by provider today: Yes: Vandana Bertrand NP   present during visit today: Not Applicable.    Note: Conner presents to the clinic today for his Carbo/Taxol infusion. He was seen in clinic and has no new concerns since his appointment. Wishes to proceed with infusion.     Intravenous Access:  Peripheral IV placed- in lab    Treatment Conditions:  Lab Results   Component Value Date    HGB 10.3 (L) 03/25/2025    WBC 6.2 03/25/2025    ANEU 1.0 (L) 03/06/2024    ANEUTAUTO 3.4 03/25/2025     03/25/2025        Lab Results   Component Value Date     03/25/2025    POTASSIUM 4.3 03/25/2025    MAG 1.7 03/25/2025    CR 0.93 03/25/2025    WARREN 9.9 03/25/2025    BILITOTAL 0.3 03/25/2025    ALBUMIN 4.1 03/25/2025    ALT 13 03/25/2025    AST 28 03/25/2025       Results reviewed, labs MET treatment parameters, ok to proceed with treatment.      Post Infusion Assessment:  Patient tolerated infusion without incident.  Blood return noted pre and post infusion.  Site patent and intact, free from redness, edema or discomfort.  No evidence of extravasations.  Access discontinued per protocol.       Discharge Plan:   Prescription refills given for Magic Mouth Wash.  Discharge instructions reviewed with: Patient.  Patient and/or family verbalized understanding of discharge instructions and all questions answered.  AVS to patient via [employer].  Patient will return 4/1/25 for next appointment.   Patient discharged in stable condition accompanied by: self.  Departure Mode: Motorizes Scooter.      Mari Colón RN

## 2025-03-31 RX ORDER — HEPARIN SODIUM (PORCINE) LOCK FLUSH IV SOLN 100 UNIT/ML 100 UNIT/ML
5 SOLUTION INTRAVENOUS
OUTPATIENT
Start: 2025-03-31

## 2025-03-31 RX ORDER — HEPARIN SODIUM (PORCINE) LOCK FLUSH IV SOLN 100 UNIT/ML 100 UNIT/ML
5 SOLUTION INTRAVENOUS
OUTPATIENT
Start: 2025-04-23

## 2025-03-31 RX ORDER — LORAZEPAM 2 MG/ML
0.5 INJECTION INTRAMUSCULAR EVERY 4 HOURS PRN
OUTPATIENT
Start: 2025-04-16

## 2025-03-31 RX ORDER — ALBUTEROL SULFATE 0.83 MG/ML
2.5 SOLUTION RESPIRATORY (INHALATION)
OUTPATIENT
Start: 2025-04-23

## 2025-03-31 RX ORDER — METHYLPREDNISOLONE SODIUM SUCCINATE 40 MG/ML
40 INJECTION INTRAMUSCULAR; INTRAVENOUS
Start: 2025-04-02

## 2025-03-31 RX ORDER — METHYLPREDNISOLONE SODIUM SUCCINATE 40 MG/ML
40 INJECTION INTRAMUSCULAR; INTRAVENOUS
Start: 2025-04-16

## 2025-03-31 RX ORDER — EPINEPHRINE 1 MG/ML
0.3 INJECTION, SOLUTION, CONCENTRATE INTRAVENOUS EVERY 5 MIN PRN
OUTPATIENT
Start: 2025-04-02

## 2025-03-31 RX ORDER — HEPARIN SODIUM,PORCINE 10 UNIT/ML
5-20 VIAL (ML) INTRAVENOUS DAILY PRN
OUTPATIENT
Start: 2025-04-02

## 2025-03-31 RX ORDER — EPINEPHRINE 1 MG/ML
0.3 INJECTION, SOLUTION, CONCENTRATE INTRAVENOUS EVERY 5 MIN PRN
OUTPATIENT
Start: 2025-04-23

## 2025-03-31 RX ORDER — DIPHENHYDRAMINE HYDROCHLORIDE 50 MG/ML
50 INJECTION, SOLUTION INTRAMUSCULAR; INTRAVENOUS
Start: 2025-04-23

## 2025-03-31 RX ORDER — METHYLPREDNISOLONE SODIUM SUCCINATE 40 MG/ML
40 INJECTION INTRAMUSCULAR; INTRAVENOUS
Start: 2025-03-31

## 2025-03-31 RX ORDER — MEPERIDINE HYDROCHLORIDE 25 MG/ML
25 INJECTION INTRAMUSCULAR; INTRAVENOUS; SUBCUTANEOUS
OUTPATIENT
Start: 2025-04-02

## 2025-03-31 RX ORDER — EPINEPHRINE 1 MG/ML
0.3 INJECTION, SOLUTION, CONCENTRATE INTRAVENOUS EVERY 5 MIN PRN
OUTPATIENT
Start: 2025-03-31

## 2025-03-31 RX ORDER — DIPHENHYDRAMINE HYDROCHLORIDE 50 MG/ML
25 INJECTION, SOLUTION INTRAMUSCULAR; INTRAVENOUS
Start: 2025-04-02

## 2025-03-31 RX ORDER — METHYLPREDNISOLONE SODIUM SUCCINATE 40 MG/ML
40 INJECTION INTRAMUSCULAR; INTRAVENOUS
Start: 2025-04-23

## 2025-03-31 RX ORDER — HEPARIN SODIUM,PORCINE 10 UNIT/ML
5-20 VIAL (ML) INTRAVENOUS DAILY PRN
OUTPATIENT
Start: 2025-03-31

## 2025-03-31 RX ORDER — HEPARIN SODIUM,PORCINE 10 UNIT/ML
5-20 VIAL (ML) INTRAVENOUS DAILY PRN
OUTPATIENT
Start: 2025-04-16

## 2025-03-31 RX ORDER — LORAZEPAM 2 MG/ML
0.5 INJECTION INTRAMUSCULAR EVERY 4 HOURS PRN
OUTPATIENT
Start: 2025-04-02

## 2025-03-31 RX ORDER — ALBUTEROL SULFATE 90 UG/1
1-2 INHALANT RESPIRATORY (INHALATION)
Start: 2025-04-23

## 2025-03-31 RX ORDER — DIPHENHYDRAMINE HYDROCHLORIDE 50 MG/ML
25 INJECTION, SOLUTION INTRAMUSCULAR; INTRAVENOUS
Start: 2025-03-31

## 2025-03-31 RX ORDER — DIPHENHYDRAMINE HYDROCHLORIDE 50 MG/ML
50 INJECTION, SOLUTION INTRAMUSCULAR; INTRAVENOUS
Start: 2025-04-16

## 2025-03-31 RX ORDER — ALBUTEROL SULFATE 90 UG/1
1-2 INHALANT RESPIRATORY (INHALATION)
Start: 2025-04-02

## 2025-03-31 RX ORDER — LORAZEPAM 2 MG/ML
0.5 INJECTION INTRAMUSCULAR EVERY 4 HOURS PRN
OUTPATIENT
Start: 2025-03-31

## 2025-03-31 RX ORDER — ALBUTEROL SULFATE 0.83 MG/ML
2.5 SOLUTION RESPIRATORY (INHALATION)
OUTPATIENT
Start: 2025-04-16

## 2025-03-31 RX ORDER — MEPERIDINE HYDROCHLORIDE 25 MG/ML
25 INJECTION INTRAMUSCULAR; INTRAVENOUS; SUBCUTANEOUS
OUTPATIENT
Start: 2025-04-23

## 2025-03-31 RX ORDER — HEPARIN SODIUM (PORCINE) LOCK FLUSH IV SOLN 100 UNIT/ML 100 UNIT/ML
5 SOLUTION INTRAVENOUS
OUTPATIENT
Start: 2025-04-16

## 2025-03-31 RX ORDER — MEPERIDINE HYDROCHLORIDE 25 MG/ML
25 INJECTION INTRAMUSCULAR; INTRAVENOUS; SUBCUTANEOUS
OUTPATIENT
Start: 2025-03-31

## 2025-03-31 RX ORDER — DIPHENHYDRAMINE HYDROCHLORIDE 50 MG/ML
50 INJECTION, SOLUTION INTRAMUSCULAR; INTRAVENOUS
Start: 2025-03-31

## 2025-03-31 RX ORDER — HEPARIN SODIUM,PORCINE 10 UNIT/ML
5-20 VIAL (ML) INTRAVENOUS DAILY PRN
OUTPATIENT
Start: 2025-04-23

## 2025-03-31 RX ORDER — HEPARIN SODIUM (PORCINE) LOCK FLUSH IV SOLN 100 UNIT/ML 100 UNIT/ML
5 SOLUTION INTRAVENOUS
OUTPATIENT
Start: 2025-04-02

## 2025-03-31 RX ORDER — ALBUTEROL SULFATE 90 UG/1
1-2 INHALANT RESPIRATORY (INHALATION)
Start: 2025-03-31

## 2025-03-31 RX ORDER — ALBUTEROL SULFATE 0.83 MG/ML
2.5 SOLUTION RESPIRATORY (INHALATION)
OUTPATIENT
Start: 2025-04-02

## 2025-03-31 RX ORDER — ALBUTEROL SULFATE 0.83 MG/ML
2.5 SOLUTION RESPIRATORY (INHALATION)
OUTPATIENT
Start: 2025-03-31

## 2025-03-31 RX ORDER — DIPHENHYDRAMINE HYDROCHLORIDE 50 MG/ML
25 INJECTION, SOLUTION INTRAMUSCULAR; INTRAVENOUS
Start: 2025-04-23

## 2025-03-31 RX ORDER — EPINEPHRINE 1 MG/ML
0.3 INJECTION, SOLUTION, CONCENTRATE INTRAVENOUS EVERY 5 MIN PRN
OUTPATIENT
Start: 2025-04-16

## 2025-03-31 RX ORDER — DIPHENHYDRAMINE HYDROCHLORIDE 50 MG/ML
50 INJECTION, SOLUTION INTRAMUSCULAR; INTRAVENOUS
Start: 2025-04-02

## 2025-03-31 RX ORDER — DIPHENHYDRAMINE HYDROCHLORIDE 50 MG/ML
25 INJECTION, SOLUTION INTRAMUSCULAR; INTRAVENOUS
Start: 2025-04-16

## 2025-03-31 RX ORDER — LORAZEPAM 2 MG/ML
0.5 INJECTION INTRAMUSCULAR EVERY 4 HOURS PRN
OUTPATIENT
Start: 2025-04-23

## 2025-03-31 RX ORDER — ALBUTEROL SULFATE 90 UG/1
1-2 INHALANT RESPIRATORY (INHALATION)
Start: 2025-04-16

## 2025-03-31 RX ORDER — MEPERIDINE HYDROCHLORIDE 25 MG/ML
25 INJECTION INTRAMUSCULAR; INTRAVENOUS; SUBCUTANEOUS
OUTPATIENT
Start: 2025-04-16

## 2025-04-11 DIAGNOSIS — M54.50 ACUTE MIDLINE LOW BACK PAIN WITHOUT SCIATICA: ICD-10-CM

## 2025-04-11 NOTE — TELEPHONE ENCOUNTER
[facility] Call Center    Phone Message    May a detailed message be left on voicemail: yes     Reason for Call: Medication Refill Request    Has the patient contacted the pharmacy for the refill? Yes   Name of medication being requested: oxyCODONE-acetaminophen (PERCOCET) 5-325 MG tablet   Provider who prescribed the medication: Dr. Sick  Pharmacy:   [facility] [address] [place] [id number] [facility]     Date medication is needed: ASAP        Action Taken: Other: PCC    Travel Screening: Not Applicable     Date of Service:

## 2025-04-13 RX ORDER — OXYCODONE AND ACETAMINOPHEN 5; 325 MG/1; MG/1
1-2 TABLET ORAL EVERY 6 HOURS PRN
Qty: 150 TABLET | Refills: 0 | Status: SHIPPED | OUTPATIENT
Start: 2025-04-15

## 2025-04-14 ENCOUNTER — ANCILLARY PROCEDURE (OUTPATIENT)
Dept: CT IMAGING | Facility: CLINIC | Age: 80
End: 2025-04-14
Attending: INTERNAL MEDICINE
Payer: COMMERCIAL

## 2025-04-14 DIAGNOSIS — C78.00 MALIGNANT NEOPLASM METASTATIC TO LUNG, UNSPECIFIED LATERALITY (H): ICD-10-CM

## 2025-04-14 DIAGNOSIS — C10.9 SQUAMOUS CELL CARCINOMA OF OROPHARYNX (H): ICD-10-CM

## 2025-04-14 PROCEDURE — 70491 CT SOFT TISSUE NECK W/DYE: CPT | Performed by: RADIOLOGY

## 2025-04-14 PROCEDURE — 71260 CT THORAX DX C+: CPT | Performed by: RADIOLOGY

## 2025-04-14 PROCEDURE — 74177 CT ABD & PELVIS W/CONTRAST: CPT | Performed by: RADIOLOGY

## 2025-04-14 RX ORDER — IOPAMIDOL 755 MG/ML
97 INJECTION, SOLUTION INTRAVASCULAR ONCE
Status: COMPLETED | OUTPATIENT
Start: 2025-04-14 | End: 2025-04-14

## 2025-04-14 RX ADMIN — IOPAMIDOL 97 ML: 755 INJECTION, SOLUTION INTRAVASCULAR at 12:17

## 2025-04-14 NOTE — DISCHARGE INSTRUCTIONS

## 2025-04-16 ENCOUNTER — LAB (OUTPATIENT)
Dept: LAB | Facility: CLINIC | Age: 80
End: 2025-04-16
Attending: INTERNAL MEDICINE
Payer: COMMERCIAL

## 2025-04-16 ENCOUNTER — ONCOLOGY VISIT (OUTPATIENT)
Dept: ONCOLOGY | Facility: CLINIC | Age: 80
End: 2025-04-16
Attending: INTERNAL MEDICINE
Payer: COMMERCIAL

## 2025-04-16 VITALS
BODY MASS INDEX: 35.87 KG/M2 | TEMPERATURE: 97 F | HEART RATE: 89 BPM | RESPIRATION RATE: 20 BRPM | WEIGHT: 209 LBS | SYSTOLIC BLOOD PRESSURE: 132 MMHG | DIASTOLIC BLOOD PRESSURE: 59 MMHG | OXYGEN SATURATION: 98 %

## 2025-04-16 DIAGNOSIS — B37.0 THRUSH: Primary | ICD-10-CM

## 2025-04-16 DIAGNOSIS — C10.9 SQUAMOUS CELL CARCINOMA OF OROPHARYNX (H): ICD-10-CM

## 2025-04-16 DIAGNOSIS — C10.2 MALIGNANT NEOPLASM OF LATERAL WALL OF OROPHARYNX (H): ICD-10-CM

## 2025-04-16 DIAGNOSIS — R73.03 PREDIABETES: ICD-10-CM

## 2025-04-16 LAB
CHOLEST SERPL-MCNC: 213 MG/DL
FASTING STATUS PATIENT QL REPORTED: ABNORMAL
HDLC SERPL-MCNC: 43 MG/DL
LDLC SERPL CALC-MCNC: 154 MG/DL
NONHDLC SERPL-MCNC: 170 MG/DL
TRIGL SERPL-MCNC: 82 MG/DL

## 2025-04-16 PROCEDURE — 82465 ASSAY BLD/SERUM CHOLESTEROL: CPT | Performed by: INTERNAL MEDICINE

## 2025-04-16 PROCEDURE — G0463 HOSPITAL OUTPT CLINIC VISIT: HCPCS | Performed by: INTERNAL MEDICINE

## 2025-04-16 RX ORDER — NYSTATIN 100000 [USP'U]/ML
500000 SUSPENSION ORAL 4 TIMES DAILY
Qty: 60 ML | Refills: 1 | Status: SHIPPED | OUTPATIENT
Start: 2025-04-16

## 2025-04-16 ASSESSMENT — PAIN SCALES - GENERAL: PAINLEVEL_OUTOF10: SEVERE PAIN (8)

## 2025-04-16 NOTE — PROGRESS NOTES
[facility]  [address] [facility] [address]  Phone: [phone]  Fax: [phone]    PATIENT NAME: Jonathan Bishop  MRN # [id number]   DATE OF VISIT: April 16, 2025  YOB: 1945     Otolaryngology: Dr. Karishma Eng  Radiation Oncology: Dr. Jenni Mills  PCP: Dr. 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 Small residual tumor in the L tonsil/soft palate on PET 7/30/24. On carboplatin paclitaxel more or less weekly since Jan 2024. CT showing some new L tonsil area thickening, worrisome for progression. He also has thrush, so we'll treat that and get PET scan. I'd also like him to see Dr. Eng for a scope exam if she thinks it's worthwhile and depending on what the PET shows. We'll regroup afterward to review results and finalize a plan to move forward. Ok to cancel chemo today. Fortunately, he feels good and hasn't had chronic medical issues decompensate since last summer. One potential is to explore the possible role of radiation now that we now the oligmetastatic disease hasn't progressed. Need to keep in mind the interval between the prior CT and starting treatment in interpreting but the new sore throat just in the last 2 weeks argues against that time interval playing a major role.      Neurocognitive changes, encephalopathy March 2024: Didn't pursue cognitive testing, improved slowly in the middle to later part of 2024 associated with new PCA      Hypomag: On mag oxide but remains marginal. Replacing IV prn      H/o CVA: Residual L weakness. Uses walker at baseline, motorized scooter out and about.      Screening for hypothyroidism: TFTs 3/2025 ok     LE edema: Better with consistent PCA care and ability to put his stockings on      Surrogate decision maker: He would want us to talk with Mariann if he could not speak for himself. Not discussed again today.      Peripheral neuropathy: MRI shows a lot of foramenal stenosis and spinal canal stenosis, so more likely related to that. Not worsening on paclitaxel     Fishing trip mid Aug to [place]    The longitudinal plan of care for the condition(s) below were addressed during this visit. Due to the added complexity in care, I will continue to support Conner in the subsequent management of this condition(s) and with the ongoing continuity of care of this condition(s): SCC tonsil      30 minutes spent by me on the date of the encounter doing chart review, review of test results, interpretation of tests, patient visit, documentation, orders     Dominique Mueller MD  Associate Professor of Medicine  Hematology, Oncology and Transplantation    SUBJECTIVE  Conner returns for routine follow up   Some sore throat x 2 weeks on the L   O/w doing fine, no swallowing issues, breathing ok     CANCER SUMMARY  2/26/23              L tonsil mass bx in clinic (Dr. Eng).   2/20/23              PET/CT. 3.7 x 4.0 x 5.1 cm mass L palatine tonsil causing narrowing of the oropharyngeal lumen, L level 2 node, L retropharyngeal nodular density, extension of primary mass vs retropharyngeal node, 0.8 cm RLL nodule concerning for met, mild focal uptake R iliac bone and L1 without CT correlate suspicious for mets.   3/2/23  R VATS, wedge resection. Path: SCC, poorly differentiated, associated with necrosis, 1 cm, negative margins, p16 +lety  3/24/23              MRI L spine and pelvis. Diffusely heterogeneous marrow signal throughout without corresponding abnormal signal on sagittal STIR sequence and no abnormal enhancement. Nonspecific but could be benign process such as red marrow hyperplasia, cannot  completely exclude metastatic disease or infiltrative pathologic marrow process. No lesions corresponding to FDG avid spots on PET/CT.   4/19~10/18/23 Pembrolizumab.  10/26/23            CT neck and CAP. Increased L palatine tonsil mass, 3.8 x 2.7 cm --> 4.7 x 3.5 cm. Increased bilateral cervical nodes up to 2.3 x 2.4 cm R level 2 node. New 3 mm RML nodule, no other mets.   11/14/23 PET. 4.6 x 3.3 cm L palatine tonsil mass (SUB 26.8), R level 2A and 2A/3 nodes. Questionable uptake distal R femoral medullary cavity, partially imaged, with questionable subtle corresponding soft tissue attenuation on CT. MRI could be obtained to better evaluate.  11/21~12/2/23   [facility]. COVID/paxlovid, MSSA bacteremia (1 of 2 bottles on 1 day), TTE negative, treated with cefazolin but didn't complete 2 week course, episode of unresponsiveness 12/2. [facility], but left AMA. CTA chest negative for PE, showed grossly unchanged cervical adenopathy. Brain MRI 11/22 for stroke code negative for infarct. Showed SVID, moderate atrophy, suboptimal contrast bolus to examine intracranial structures, 4.5 x 3 x 4.5 cm L nasopharyngeal mass, incompletely visualized. CTA negative.   12/19/23 Quad shot fraction #1. No-showed thereafter.  1/10/24~curr     Carboplatin (AUC 2) + paclitaxel 60 mg/m2 weekly. Held 2/13 due to anorexia, fatigue. Break between 8/1 and 9/19 doses for trip, fatigue. Missed dose 10/16 due to transportation issues.  3/13~3/17/24     [facility] for acute encephalopathy. Had stopped his car in the middle of the highway after receiving chemo earlier that day. Recommended not to drive   4/30/24  CT CAP and CT neck. Residual bilateral cervical adenopathy.   7/30/24  PET. CT not covered. Significant reduction L palatine tonsil mass - residual focal uptake in the L posterolateral oropharyngeal wall (SUV 9.1, previously 26.8). Previously seen multiple FDG avid L cervical nodes and few  enlarged R nodes are completed resolved. Mild diffuse uptake along bilateral lower legs and ankles with associated skin thickening and stranding along the soft tissues; findings can be seen with cellulitis. No metastatic disease.   11/5/24  CT neck and CT CAP.  Stable appearance L palatine tonsil, no discrete mass. 1.1 cm R thyroid nodule, a little bigger. O/w unremarkable.     PAST MEDICAL HISTORY  SCC as above  Chronic LBP  TAVR 2020  Spells spring 2024; saw Dr. Perry after EEG 3/24/24  Thyroid nodule   H/o rheumatic carditis 1950  CHF. 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 Nocturia once nightly   ED  Cataracts  Umbilical hernia repair 2011  Knee arthroplasty B 2010  Lumbar spine fusion, laminectomy, sciatic pain R > L  Peripheral neuropathy - from pinched nerves?  Hearing loss    CURRENT OUTPATIENT MEDICATIONS  Reviewed    ALLERGIES  No Known Allergies     PHYSICAL EXAM  /59   Pulse 89   Temp 97  F (36.1  C)   Resp 20   Wt 94.8 kg (209 lb)   SpO2 98%   BMI 35.87 kg/m    GEN: NAD  HEENT: EOMI, no icterus, injection or pallor. Oropharynx - tongue has some thrush  EXT: warm, well perfused, no edema  NEURO: alert  SKIN: no rashes    LABORATORY AND IMAGING STUDIES    Labs today independently reviewed and interpreted by me  CMP and CBC pd - acceptable  Mg ok    CT Chest/Abdomen/Pelvis w Contrast  Narrative: EXAM: CT CHEST/ABDOMEN/PELVIS W CONTRAST  LOCATION: [facility]  DATE: 4/14/2025    INDICATION: Restage SCC tonsil, on carboplatin paclitaxel  COMPARISON: 1/14/2025  TECHNIQUE: CT scan of the chest, abdomen, and pelvis was performed following injection of IV contrast. Multiplanar reformats were obtained. Dose reduction techniques were used.   CONTRAST: Yes    FINDINGS:   LUNGS AND PLEURA: Scattered calcified granulomas. No new nodules. Right lower lobe wedge resection with adjacent scarring, stable in  appearance.    MEDIASTINUM/AXILLAE: TAVR. No lymphadenopathy.    CORONARY ARTERY CALCIFICATION: Mild.    HEPATOBILIARY: Normal.    PANCREAS: Normal.    SPLEEN: Normal.    ADRENAL GLANDS: Normal.    KIDNEYS/BLADDER: Stable renal cysts.    BOWEL: Normal.    LYMPH NODES: Normal.    VASCULATURE: Normal.    PELVIC ORGANS: Normal.    MUSCULOSKELETAL: Lumbosacral fusion.  Impression: IMPRESSION:  1.  No change from previous. No evidence of metastatic disease in the chest, abdomen, or pelvis.            CT CAP and CT neck personally reviewed and interpreted by me. Agree there's some subtle thickening there. No adenopathy, no lung nodules.

## 2025-04-16 NOTE — NURSING NOTE
"Oncology Rooming Note    April 16, 2025 11:46 AM   Jonathan Bishop is a 79 year old male who presents for:    Chief Complaint   Patient presents with    Oncology Clinic Visit     Malignant neoplasm metastatic to lung, unspecified laterality; Squamous cell carcinoma of oropharynx      Initial Vitals: /59   Pulse 89   Temp 97  F (36.1  C)   Resp 20   Wt 94.8 kg (209 lb)   SpO2 98%   BMI 35.87 kg/m   Estimated body mass index is 35.87 kg/m  as calculated from the following:    Height as of 12/4/24: 1.626 m (5' 4\").    Weight as of this encounter: 94.8 kg (209 lb). Body surface area is 2.07 meters squared.  Severe Pain (8) Comment: Data Unavailable   No LMP for male patient.  Allergies reviewed: Yes  Medications reviewed: Yes    Medications: Medication refills not needed today.  Pharmacy name entered into [employer]:    [id number] - [place], [place] - [address] [facility]  [facility] [place] - [facility] [place] - [address] [place] [address]    Frailty Screening:   Is the patient here for a new oncology consult visit in cancer care? 2. No    PHQ9:  Did this patient require a PHQ9?: No      Clinical concerns:        Emani Deluna              "

## 2025-04-16 NOTE — NURSING NOTE
Chief Complaint   Patient presents with    Oncology Clinic Visit     Malignant neoplasm metastatic to lung, unspecified laterality; Squamous cell carcinoma of oropharynx      Labs drawn from PIV placed by RN. Line flushed with saline. Vitals taken. Pt checked in for appointment(s).     Radha Sanchez RN

## 2025-04-16 NOTE — LETTER
4/16/2025      Jonathan Bishop  [address]  [facility] [address]      Dear Colleague,    Thank you for referring your patient, Jonathan Bishop, to the [facility]. Please see a copy of my visit note below.        [facility]  [address] [facility] [address]  Phone: [phone]  Fax: [phone]    PATIENT NAME: Jonathan Bishop  MRN # [id number]   DATE OF VISIT: April 16, 2025  YOB: 1945     Otolaryngology: Dr. Karishma Eng  Radiation Oncology: Dr. Jenni Mills  PCP: Dr. 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 Small residual tumor in the L tonsil/soft palate on PET 7/30/24. On carboplatin paclitaxel more or less weekly since Jan 2024. CT showing some new L tonsil area thickening, worrisome for progression. He also has thrush, so we'll treat that and get PET scan. I'd also like him to see Dr. Eng for a scope exam if she thinks it's worthwhile and depending on what the PET shows. We'll regroup afterward to review results and finalize a plan to move forward. Ok to cancel chemo today. Fortunately, he feels good and hasn't had chronic medical issues decompensate since last summer. One potential is to explore the possible role of radiation now that we now the oligmetastatic disease hasn't progressed. Need to keep in mind the interval between the prior CT and starting treatment in interpreting but the new sore throat just in the last 2 weeks argues against that time interval playing a major role.      Neurocognitive changes, encephalopathy March 2024: Didn't pursue  cognitive testing, improved slowly in the middle to later part of 2024 associated with new PCA      Hypomag: On mag oxide but remains marginal. Replacing IV prn      H/o CVA: Residual L weakness. Uses walker at baseline, motorized scooter out and about.     Screening for hypothyroidism: TFTs 3/2025 ok     LE edema: Better with consistent PCA care and ability to put his stockings on      Surrogate decision maker: He would want us to talk with Mariann if he could not speak for himself. Not discussed again today.      Peripheral neuropathy: MRI shows a lot of foramenal stenosis and spinal canal stenosis, so more likely related to that. Not worsening on paclitaxel     Fishing trip mid Aug to [place]    The longitudinal plan of care for the condition(s) below were addressed during this visit. Due to the added complexity in care, I will continue to support Conner in the subsequent management of this condition(s) and with the ongoing continuity of care of this condition(s): SCC tonsil      30 minutes spent by me on the date of the encounter doing chart review, review of test results, interpretation of tests, patient visit, documentation, orders     Dominique Mueller MD  Associate Professor of Medicine  Hematology, Oncology and Transplantation    SUBJECTIVE  Conner returns for routine follow up   Some sore throat x 2 weeks on the L   O/w doing fine, no swallowing issues, breathing ok     CANCER SUMMARY  2/26/23              L tonsil mass bx in clinic (Dr. Eng).   2/20/23              PET/CT. 3.7 x 4.0 x 5.1 cm mass L palatine tonsil causing narrowing of the oropharyngeal lumen, L level 2 node, L retropharyngeal nodular density, extension of primary mass vs retropharyngeal node, 0.8 cm RLL nodule concerning for met, mild focal uptake R iliac bone and L1 without CT correlate suspicious for mets.   3/2/23  R VATS, wedge resection. Path: SCC, poorly differentiated, associated with necrosis, 1 cm, negative margins, p16 +lety  3/24/23               MRI L spine and pelvis. Diffusely heterogeneous marrow signal throughout without corresponding abnormal signal on sagittal STIR sequence and no abnormal enhancement. Nonspecific but could be benign process such as red marrow hyperplasia, cannot completely exclude metastatic disease or infiltrative pathologic marrow process. No lesions corresponding to FDG avid spots on PET/CT.   4/19~10/18/23 Pembrolizumab.  10/26/23            CT neck and CAP. Increased L palatine tonsil mass, 3.8 x 2.7 cm --> 4.7 x 3.5 cm. Increased bilateral cervical nodes up to 2.3 x 2.4 cm R level 2 node. New 3 mm RML nodule, no other mets.   11/14/23 PET. 4.6 x 3.3 cm L palatine tonsil mass (SUB 26.8), R level 2A and 2A/3 nodes. Questionable uptake distal R femoral medullary cavity, partially imaged, with questionable subtle corresponding soft tissue attenuation on CT. MRI could be obtained to better evaluate.  11/21~12/2/23   [facility] for weakness/fall. COVID/paxlovid, MSSA bacteremia (1 of 2 bottles on 1 day), TTE negative, treated with cefazolin but didn't complete 2 week course, episode of unresponsiveness 12/2. Dispo plan was TCU on [facility] [place], but left AMA. CTA chest negative for PE, showed grossly unchanged cervical adenopathy. Brain MRI 11/22 for stroke code negative for infarct. Showed SVID, moderate atrophy, suboptimal contrast bolus to examine intracranial structures, 4.5 x 3 x 4.5 cm L nasopharyngeal mass, incompletely visualized. CTA negative.   12/19/23 Quad shot fraction #1. No-showed thereafter.  1/10/24~curr     Carboplatin (AUC 2) + paclitaxel 60 mg/m2 weekly. Held 2/13 due to anorexia, fatigue. Break between 8/1 and 9/19 doses for trip, fatigue. Missed dose 10/16 due to transportation issues.  3/13~3/17/24     [facility] for acute encephalopathy. Had stopped his car in the middle of the highway after receiving chemo earlier that day. Recommended not to drive   4/30/24  CT CAP and CT neck. Residual bilateral  cervical adenopathy.   7/30/24  PET. CT not covered. Significant reduction L palatine tonsil mass - residual focal uptake in the L posterolateral oropharyngeal wall (SUV 9.1, previously 26.8). Previously seen multiple FDG avid L cervical nodes and few enlarged R nodes are completed resolved. Mild diffuse uptake along bilateral lower legs and ankles with associated skin thickening and stranding along the soft tissues; findings can be seen with cellulitis. No metastatic disease.   11/5/24  CT neck and CT CAP.  Stable appearance L palatine tonsil, no discrete mass. 1.1 cm R thyroid nodule, a little bigger. O/w unremarkable.     PAST MEDICAL HISTORY  SCC as above  Chronic LBP  TAVR 2020  Spells spring 2024; saw Dr. Perry after EEG 3/24/24  Thyroid nodule   H/o rheumatic carditis 1950  CHF. 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 Nocturia once nightly   ED  Cataracts  Umbilical hernia repair 2011  Knee arthroplasty B 2010  Lumbar spine fusion, laminectomy, sciatic pain R > L  Peripheral neuropathy - from pinched nerves?  Hearing loss    CURRENT OUTPATIENT MEDICATIONS  Reviewed    ALLERGIES  No Known Allergies     PHYSICAL EXAM  /59   Pulse 89   Temp 97  F (36.1  C)   Resp 20   Wt 94.8 kg (209 lb)   SpO2 98%   BMI 35.87 kg/m    GEN: NAD  HEENT: EOMI, no icterus, injection or pallor. Oropharynx - tongue has some thrush  EXT: warm, well perfused, no edema  NEURO: alert  SKIN: no rashes    LABORATORY AND IMAGING STUDIES    Labs today independently reviewed and interpreted by me  CMP and CBC pd - acceptable  Mg ok    CT Chest/Abdomen/Pelvis w Contrast  Narrative: EXAM: CT CHEST/ABDOMEN/PELVIS W CONTRAST  LOCATION: [facility]  DATE: 4/14/2025    INDICATION: Restage SCC tonsil, on carboplatin paclitaxel  COMPARISON: 1/14/2025  TECHNIQUE: CT scan of the chest, abdomen, and pelvis was performed following injection of IV contrast.  Multiplanar reformats were obtained. Dose reduction techniques were used.   CONTRAST: Yes    FINDINGS:   LUNGS AND PLEURA: Scattered calcified granulomas. No new nodules. Right lower lobe wedge resection with adjacent scarring, stable in appearance.    MEDIASTINUM/AXILLAE: TAVR. No lymphadenopathy.    CORONARY ARTERY CALCIFICATION: Mild.    HEPATOBILIARY: Normal.    PANCREAS: Normal.    SPLEEN: Normal.    ADRENAL GLANDS: Normal.    KIDNEYS/BLADDER: Stable renal cysts.    BOWEL: Normal.    LYMPH NODES: Normal.    VASCULATURE: Normal.    PELVIC ORGANS: Normal.    MUSCULOSKELETAL: Lumbosacral fusion.  Impression: IMPRESSION:  1.  No change from previous. No evidence of metastatic disease in the chest, abdomen, or pelvis.            CT CAP and CT neck personally reviewed and interpreted by me. Agree there's some subtle thickening there. No adenopathy, no lung nodules.                Again, thank you for allowing me to participate in the care of your patient.        Sincerely,        Dominique Mueller MD    Electronically signed

## 2025-05-02 ENCOUNTER — HOSPITAL ENCOUNTER (OUTPATIENT)
Dept: PET IMAGING | Facility: CLINIC | Age: 80
Discharge: HOME | End: 2025-05-02
Attending: INTERNAL MEDICINE | Admitting: INTERNAL MEDICINE
Payer: COMMERCIAL

## 2025-05-02 DIAGNOSIS — C10.2 MALIGNANT NEOPLASM OF LATERAL WALL OF OROPHARYNX (H): ICD-10-CM

## 2025-05-02 DIAGNOSIS — C10.9 SQUAMOUS CELL CARCINOMA OF OROPHARYNX (H): ICD-10-CM

## 2025-05-02 PROCEDURE — A9552 F18 FDG: HCPCS | Performed by: INTERNAL MEDICINE

## 2025-05-02 PROCEDURE — 78815 PET IMAGE W/CT SKULL-THIGH: CPT | Mod: PS

## 2025-05-02 PROCEDURE — 78815 PET IMAGE W/CT SKULL-THIGH: CPT | Mod: 26 | Performed by: RADIOLOGY

## 2025-05-02 PROCEDURE — 343N000001 HC RX 343 MED OP 636: Performed by: INTERNAL MEDICINE

## 2025-05-02 RX ORDER — FLUDEOXYGLUCOSE F 18 200 MCI/ML
10-18 INJECTION, SOLUTION INTRAVENOUS ONCE
Status: COMPLETED | OUTPATIENT
Start: 2025-05-02 | End: 2025-05-02

## 2025-05-02 RX ADMIN — FLUDEOXYGLUCOSE F 18 12.46 MILLICURIE: 200 INJECTION, SOLUTION INTRAVENOUS at 07:45

## 2025-05-12 ENCOUNTER — DOCUMENTATION ONLY (OUTPATIENT)
Dept: ONCOLOGY | Facility: CLINIC | Age: 80
End: 2025-05-12

## 2025-05-12 ENCOUNTER — TELEPHONE (OUTPATIENT)
Dept: ONCOLOGY | Facility: CLINIC | Age: 80
End: 2025-05-12

## 2025-05-12 ENCOUNTER — OFFICE VISIT (OUTPATIENT)
Dept: ORTHOPEDICS | Facility: CLINIC | Age: 80
End: 2025-05-12
Payer: COMMERCIAL

## 2025-05-12 DIAGNOSIS — I73.89 OTHER SPECIFIED PERIPHERAL VASCULAR DISEASES: ICD-10-CM

## 2025-05-12 DIAGNOSIS — M54.50 ACUTE MIDLINE LOW BACK PAIN WITHOUT SCIATICA: ICD-10-CM

## 2025-05-12 DIAGNOSIS — B35.1 OM (ONYCHOMYCOSIS): ICD-10-CM

## 2025-05-12 DIAGNOSIS — I73.9 PVD (PERIPHERAL VASCULAR DISEASE): Primary | ICD-10-CM

## 2025-05-12 PROCEDURE — 11721 DEBRIDE NAIL 6 OR MORE: CPT | Mod: Q8 | Performed by: PODIATRIST

## 2025-05-12 NOTE — TELEPHONE ENCOUNTER
Controlled substance refill request notes    Refill request received for: Oxycodone-Acetaminophen 5-325  [place]  data reviewed 05/12/25:  Medication last refill: qty 150, 30 day supply, filled/sold to patient on 04/15/2025  Pended order: Oxycodone-Acetaminophen 5-325, qty 150, 30 day supply, fill on or after 05/15/2025     Primary care provider: Buck Nascimento  Last office visit with this department: 3/17/2025  Next appointment with PCP: None    Refill request forwarded to provider for review.     Glory BLACK LPN  [facility]

## 2025-05-12 NOTE — TELEPHONE ENCOUNTER
"Oral Chemotherapy Monitoring Program    Subjective/Objective:  Jonathan Bishop is a 79 year old male contacted by phone for an initial visit for oral chemotherapy education.  He felt a bit rushed after the teaching phone call today. Asked for a call back in one week after he has had time to review education materials in [employer] and think about his treatment plan.        5/12/2025     8:00 AM   ORAL CHEMOTHERAPY   Assessment Type Initial Work up   Diagnosis Code Other   Other Squamous Cell   Providers Dr. Mueller   Clinic Name/Location [facility]   Is this patient followed by the [facility] OC team? Yes   Drug Name Xeloda (capecitabine)   Dose 1,500 mg   Current Schedule BID   Cycle Details 2 weeks on, 1 week off       Last PHQ-2 Score on record:       3/25/2025    12:31 PM 9/16/2024     7:40 AM   PHQ-2 ( 1999 Pfizer)   Q1: Little interest or pleasure in doing things 1 0   Q2: Feeling down, depressed or hopeless 0 0   PHQ-2 Score 1 0   Q1: Little interest or pleasure in doing things  Not at all   Q2: Feeling down, depressed or hopeless  Not at all   PHQ-2 Score  0       Vitals:  BP:   BP Readings from Last 1 Encounters:   04/16/25 132/59     Wt Readings from Last 1 Encounters:   05/06/25 89.4 kg (197 lb)     Estimated body surface area is 2.01 meters squared as calculated from the following:    Height as of 5/6/25: 1.626 m (5' 4\").    Weight as of 5/6/25: 89.4 kg (197 lb).    Labs:  _  Result Component Current Result Ref Range   Sodium 144 (4/16/2025) 135 - 145 mmol/L     _  Result Component Current Result Ref Range   Potassium 3.8 (4/16/2025) 3.4 - 5.3 mmol/L     _  Result Component Current Result Ref Range   Calcium 9.3 (4/16/2025) 8.8 - 10.4 mg/dL     _  Result Component Current Result Ref Range   Magnesium 1.7 (4/16/2025) 1.7 - 2.3 mg/dL     No results found for Phos within last 30 days.     _  Result Component Current Result Ref Range   Albumin 4.0 (4/16/2025) 3.5 - 5.2 g/dL     _  Result Component Current Result Ref " Range   Urea Nitrogen 17.9 (4/16/2025) 8.0 - 23.0 mg/dL     _  Result Component Current Result Ref Range   Creatinine 0.88 (4/16/2025) 0.67 - 1.17 mg/dL     _  Result Component Current Result Ref Range   AST 24 (4/16/2025) 0 - 45 U/L     _  Result Component Current Result Ref Range   ALT 12 (4/16/2025) 0 - 70 U/L     _  Result Component Current Result Ref Range   Bilirubin Total 0.3 (4/16/2025) <=1.2 mg/dL     _  Result Component Current Result Ref Range   WBC Count 6.3 (4/16/2025) 4.0 - 11.0 10e3/uL     _  Result Component Current Result Ref Range   Hemoglobin 10.1 (L) (4/16/2025) 13.3 - 17.7 g/dL     _  Result Component Current Result Ref Range   Platelet Count 213 (4/16/2025) 150 - 450 10e3/uL     _  Result Component Current Result Ref Range   Absolute Neutrophils 3.7 (4/16/2025) 1.6 - 8.3 10e3/uL     No results found for ANC within last 30 days.        Assessment:  Patient is appropriate to start therapy pending baseline labs (TBD)    Plan:  Basic chemotherapy teaching was reviewed with the patient including indication, start date of therapy, dose, administration, adverse effects, missed doses, food and drug interactions, monitoring, side effect management, office contact information, and safe handling. Written materials were provided and all questions answered.    Follow-Up:  1 week to assess readiness to start.     Time Spent: 25 minutes    Diony Oconnor, PharmD, BCACP  Hematology/Oncology Clinical Pharmacist  [facility]  [phone]

## 2025-05-12 NOTE — TELEPHONE ENCOUNTER
[facility] Call Center    Phone Message    May a detailed message be left on voicemail: yes     Reason for Call: Medication Refill Request    Has the patient contacted the pharmacy for the refill? Yes   Name of medication being requested: oxyCODONE-acetaminophen (PERCOCET) 5-325 MG tablet   Provider who prescribed the medication: Dr.Sick  Pharmacy:   [facility] [address] [place] [address] [facility]    Date medication is needed: ASAP    Action Taken: PCC    Travel Screening: Not Applicable     Date of Service: 5/12/25

## 2025-05-12 NOTE — PROGRESS NOTES
"Oral Chemotherapy Monitoring Program    Lab Monitoring Plan        5/12/2025     8:00 AM   ORAL CHEMOTHERAPY   Assessment Type Initial Work up   Diagnosis Code Other   Other Squamous Cell   Providers Dr. Mueller   Clinic Name/Location [facility]   Is this patient followed by the [facility] OC team? Yes   Drug Name Xeloda (capecitabine)   Dose 1,500 mg   Current Schedule BID   Cycle Details 2 weeks on, 1 week off       Last PHQ-2 Score on record:       3/25/2025    12:31 PM 9/16/2024     7:40 AM   PHQ-2 ( 1999 Pfizer)   Q1: Little interest or pleasure in doing things 1 0   Q2: Feeling down, depressed or hopeless 0 0   PHQ-2 Score 1 0   Q1: Little interest or pleasure in doing things  Not at all   Q2: Feeling down, depressed or hopeless  Not at all   PHQ-2 Score  0       Vitals:  BP:   BP Readings from Last 1 Encounters:   04/16/25 132/59     Wt Readings from Last 1 Encounters:   05/06/25 89.4 kg (197 lb)     Estimated body surface area is 2.01 meters squared as calculated from the following:    Height as of 5/6/25: 1.626 m (5' 4\").    Weight as of 5/6/25: 89.4 kg (197 lb).    Labs:  _  Result Component Current Result Ref Range   Sodium 144 (4/16/2025) 135 - 145 mmol/L     _  Result Component Current Result Ref Range   Potassium 3.8 (4/16/2025) 3.4 - 5.3 mmol/L     _  Result Component Current Result Ref Range   Calcium 9.3 (4/16/2025) 8.8 - 10.4 mg/dL     _  Result Component Current Result Ref Range   Magnesium 1.7 (4/16/2025) 1.7 - 2.3 mg/dL     No results found for Phos within last 30 days.     _  Result Component Current Result Ref Range   Albumin 4.0 (4/16/2025) 3.5 - 5.2 g/dL     _  Result Component Current Result Ref Range   Urea Nitrogen 17.9 (4/16/2025) 8.0 - 23.0 mg/dL     _  Result Component Current Result Ref Range   Creatinine 0.88 (4/16/2025) 0.67 - 1.17 mg/dL     _  Result Component Current Result Ref Range   AST 24 (4/16/2025) 0 - 45 U/L     _  Result Component Current Result Ref Range   ALT 12 (4/16/2025) 0 - " 70 U/L     _  Result Component Current Result Ref Range   Bilirubin Total 0.3 (4/16/2025) <=1.2 mg/dL     _  Result Component Current Result Ref Range   WBC Count 6.3 (4/16/2025) 4.0 - 11.0 10e3/uL     _  Result Component Current Result Ref Range   Hemoglobin 10.1 (L) (4/16/2025) 13.3 - 17.7 g/dL     _  Result Component Current Result Ref Range   Platelet Count 213 (4/16/2025) 150 - 450 10e3/uL     _  Result Component Current Result Ref Range   Absolute Neutrophils 3.7 (4/16/2025) 1.6 - 8.3 10e3/uL     No results found for ANC within last 30 days.      Starting initial work-up    Diony Oconnor, PharmD, BCACP  Hematology/Oncology Clinical Pharmacist  [facility]  [phone]

## 2025-05-12 NOTE — LETTER
5/12/2025      Jonathan Bishop  [address]  [facility] [address]      Dear Colleague,    Thank you for referring your patient, Jonathan Bishop, to the [facility] [place]. Please see a copy of my visit note below.    Chief Complaint   Patient presents with     Follow Up     Diabetic foot care.             No Known Allergies      Subjective: Jonathan is a 79 year old male who presents to the clinic today for a follow up of elongated nails. He relates that the nails are long and painful. Edema in the legs and feet continues to improve. No new LE complaints.     Objective  PT and DP pulses are non-palpable bilaterally. CRT is >3 seconds. Diminished pedal hair. Mild non-pitting edema noted to BL LE.  Gross sensation is slightly decreased bilaterally.   Nails are thickened, discolored, dystrophic and elongated bilaterally to varying degrees. Nails have subungual debris consistent with onychomycosis. No open lesions are noted. Skin is normal.      Assessment: PVD  Onychomycosis x 10      Plan:  - Pt seen and evaluated.  - Nails were debrided x 10..  -Continue compression socks.  - See again in 3 months.       Again, thank you for allowing me to participate in the care of your patient.        Sincerely,        Mamadou Gary DPM    Electronically signed

## 2025-05-12 NOTE — PROGRESS NOTES
Chief Complaint   Patient presents with    Follow Up     Diabetic foot care.             No Known Allergies      Subjective: Jonathan is a 79 year old male who presents to the clinic today for a follow up of elongated nails. He relates that the nails are long and painful. Edema in the legs and feet continues to improve. No new LE complaints.     Objective  PT and DP pulses are non-palpable bilaterally. CRT is >3 seconds. Diminished pedal hair. Mild non-pitting edema noted to BL LE.  Gross sensation is slightly decreased bilaterally.   Nails are thickened, discolored, dystrophic and elongated bilaterally to varying degrees. Nails have subungual debris consistent with onychomycosis. No open lesions are noted. Skin is normal.      Assessment: PVD  Onychomycosis x 10      Plan:  - Pt seen and evaluated.  - Nails were debrided x 10..  -Continue compression socks.  - See again in 3 months.

## 2025-05-14 RX ORDER — OXYCODONE AND ACETAMINOPHEN 5; 325 MG/1; MG/1
1-2 TABLET ORAL EVERY 6 HOURS PRN
Qty: 150 TABLET | Refills: 0 | Status: SHIPPED | OUTPATIENT
Start: 2025-05-15

## 2025-05-15 NOTE — TELEPHONE ENCOUNTER
Pt would like an update on where this medication request stands. Please reach out to pt to discuss.

## 2025-05-19 ENCOUNTER — OFFICE VISIT (OUTPATIENT)
Dept: INTERNAL MEDICINE | Facility: CLINIC | Age: 80
End: 2025-05-19
Payer: COMMERCIAL

## 2025-05-19 VITALS
DIASTOLIC BLOOD PRESSURE: 62 MMHG | RESPIRATION RATE: 16 BRPM | OXYGEN SATURATION: 99 % | BODY MASS INDEX: 34.13 KG/M2 | SYSTOLIC BLOOD PRESSURE: 100 MMHG | TEMPERATURE: 97.6 F | HEIGHT: 64 IN | WEIGHT: 199.9 LBS | HEART RATE: 58 BPM

## 2025-05-19 DIAGNOSIS — M54.50 ACUTE MIDLINE LOW BACK PAIN WITHOUT SCIATICA: ICD-10-CM

## 2025-05-19 DIAGNOSIS — E11.9 TYPE 2 DIABETES MELLITUS WITHOUT COMPLICATION, WITHOUT LONG-TERM CURRENT USE OF INSULIN (H): Primary | ICD-10-CM

## 2025-05-19 DIAGNOSIS — N52.9 ERECTILE DYSFUNCTION, UNSPECIFIED ERECTILE DYSFUNCTION TYPE: ICD-10-CM

## 2025-05-19 DIAGNOSIS — R60.0 BILATERAL LEG EDEMA: ICD-10-CM

## 2025-05-19 DIAGNOSIS — R13.10 ODYNOPHAGIA: ICD-10-CM

## 2025-05-19 DIAGNOSIS — H43.391 FLOATERS IN VISUAL FIELD, RIGHT: ICD-10-CM

## 2025-05-19 DIAGNOSIS — E66.01 MORBID OBESITY (H): ICD-10-CM

## 2025-05-19 DIAGNOSIS — C10.9 SQUAMOUS CELL CARCINOMA OF OROPHARYNX (H): ICD-10-CM

## 2025-05-19 DIAGNOSIS — K12.30 MUCOSITIS: ICD-10-CM

## 2025-05-19 PROCEDURE — 99214 OFFICE O/P EST MOD 30 MIN: CPT | Performed by: INTERNAL MEDICINE

## 2025-05-19 PROCEDURE — 1125F AMNT PAIN NOTED PAIN PRSNT: CPT | Performed by: INTERNAL MEDICINE

## 2025-05-19 PROCEDURE — 3074F SYST BP LT 130 MM HG: CPT | Performed by: INTERNAL MEDICINE

## 2025-05-19 PROCEDURE — 3078F DIAST BP <80 MM HG: CPT | Performed by: INTERNAL MEDICINE

## 2025-05-19 RX ORDER — OXYCODONE AND ACETAMINOPHEN 5; 325 MG/1; MG/1
1-2 TABLET ORAL EVERY 6 HOURS PRN
Qty: 150 TABLET | Refills: 0 | Status: CANCELLED | OUTPATIENT
Start: 2025-05-19

## 2025-05-19 RX ORDER — SILDENAFIL 100 MG/1
100 TABLET, FILM COATED ORAL DAILY PRN
Qty: 12 TABLET | Refills: 11 | Status: SHIPPED | OUTPATIENT
Start: 2025-05-19

## 2025-05-19 ASSESSMENT — PAIN SCALES - GENERAL: PAINLEVEL_OUTOF10: SEVERE PAIN (9)

## 2025-05-19 NOTE — LETTER

## 2025-05-19 NOTE — PROGRESS NOTES
"  Assessment & Plan     Type 2 diabetes mellitus without complication, without long-term current use of insulin (H)  Doing well. No labs needed.    Acute midline low back pain without sciatica  Morbid obesity (H)  Bilateral leg edema  These are all better with better fluid control with stockings and diuretics.    Squamous cell carcinoma of oropharynx (H)  Odynophagia  Mucositis  Cancer treatment is going well. He wonders why he needs treatment if they can't find signs of it. He does recognize that cells may be present that is not visible. We talked about side effects and he wonders what to do if they are bad. I advised to try the meds and see how he does. If the side effects are bad then the choice is manage the side effects or stop the med with the chance that this may impact his cancer prognosis. He will like proceed with treatment.   - magic mouthwash suspension (diphenhydrAMINE, lidocaine, aluminum-magnesium & simethicone)  Dispense: 200 mL; Refill: 3    Floaters in visual field, right  - Adult Eye  Referral    Erectile dysfunction, unspecified erectile dysfunction type  - sildenafil (VIAGRA) 100 MG tablet  Dispense: 12 tablet; Refill: 11        BMI  Estimated body mass index is 34.31 kg/m  as calculated from the following:    Height as of this encounter: 1.626 m (5' 4\").    Weight as of this encounter: 90.7 kg (199 lb 14.4 oz).                     Norma Prieto is a 79 year old, presenting for the following health issues:  Follow Up (17 Mar 2025), Cancer, and Medication Question (Capecitabine and its side effects)        5/19/2025     6:56 AM   Additional Questions   Roomed by edouard Nix        He has been doing well.    His edema is much better in his feet - he can get his shoes on. The compression stockings and the diuretics help a lot. He sleeps in a recliner which his legs up.    His back pain is better after getting a new mattress.     He is fishing a lot - [employer] and [employer]. " "He is now able to walk from the dock to the truck whereas he used to have to stop twice. The swelling and edema weight made a big difference.                   Objective    /62 (BP Location: Right arm, Patient Position: Sitting, Cuff Size: Adult Regular)   Pulse 58   Temp 97.6  F (36.4  C) (Oral)   Resp 16   Ht 1.626 m (5' 4\")   Wt 90.7 kg (199 lb 14.4 oz)   SpO2 99%   BMI 34.31 kg/m    Body mass index is 34.31 kg/m .  Physical Exam   GENERAL: alert and no distress  CV: regular rate and rhythm, normal S1 S2, no S3 or S4, no murmur, click or rub, no peripheral edema  MS: no gross musculoskeletal defects noted, no edema  PSYCH: mentation appears normal, affect normal/bright            Signed Electronically by: Buck Nascimento MD    "

## 2025-05-20 ENCOUNTER — PATIENT OUTREACH (OUTPATIENT)
Dept: CARE COORDINATION | Facility: CLINIC | Age: 80
End: 2025-05-20
Payer: COMMERCIAL

## 2025-05-20 ENCOUNTER — TELEPHONE (OUTPATIENT)
Dept: ONCOLOGY | Facility: CLINIC | Age: 80
End: 2025-05-20
Payer: COMMERCIAL

## 2025-05-20 DIAGNOSIS — C78.00 MALIGNANT NEOPLASM METASTATIC TO LUNG, UNSPECIFIED LATERALITY (H): ICD-10-CM

## 2025-05-20 DIAGNOSIS — C10.9 SQUAMOUS CELL CARCINOMA OF OROPHARYNX (H): ICD-10-CM

## 2025-05-20 DIAGNOSIS — C09.9 TONSIL CANCER (H): ICD-10-CM

## 2025-05-20 DIAGNOSIS — Z79.899 ENCOUNTER FOR LONG-TERM (CURRENT) USE OF MEDICATIONS: Primary | ICD-10-CM

## 2025-05-20 RX ORDER — PROCHLORPERAZINE MALEATE 10 MG
5 TABLET ORAL EVERY 6 HOURS PRN
Qty: 30 TABLET | Refills: 2 | Status: SHIPPED | OUTPATIENT
Start: 2025-05-20

## 2025-05-20 RX ORDER — CAPECITABINE 500 MG/1
800 TABLET, FILM COATED ORAL 2 TIMES DAILY
Qty: 84 TABLET | Refills: 0 | Status: SHIPPED | OUTPATIENT
Start: 2025-05-20 | End: 2025-06-03

## 2025-05-20 NOTE — ORAL ONC MGMT
Oral Chemotherapy Monitoring Program     Placed call to Jonathan Washington in follow up of Xeloda oral chemotherapy. He has decided to start Xeloda treatment.  We did a mini-teach on the Xeloda today. Went over that he will be taking Xeloda 1,500mg ([id number] tablets) twice daily with water within 30 minutes of a meal at the same time each day.  He will need baseline labs.  He prefers to have labs at the [facility].  I let him now the scheduling desk will be reaching out to him.  We went over that if he receives his Xeloda prior to his labs, he should not start taking it until after our team has time to review his labs and hears from us that it is ok to start.  No other questions or concerns at this time.      Will follow up with Conner after review labs.      Melba Vega, PharmD  Oral Chemotherapy Monitoring Program  [facility]  [phone]  May 20, 2025

## 2025-05-22 ENCOUNTER — PATIENT OUTREACH (OUTPATIENT)
Dept: CARE COORDINATION | Facility: CLINIC | Age: 80
End: 2025-05-22
Payer: COMMERCIAL

## 2025-06-03 ENCOUNTER — TELEPHONE (OUTPATIENT)
Dept: ONCOLOGY | Facility: CLINIC | Age: 80
End: 2025-06-03
Payer: COMMERCIAL

## 2025-06-03 NOTE — TELEPHONE ENCOUNTER
Oral Chemotherapy Monitoring Program     Placed call to patient in follow up of oral chemotherapy. Left message requesting call back. No drug names were mentioned. Will update when response received.     Yany Garza  Hematology/Oncology Clinical Pharmacist  [facility]  [facility]      This is in regards to pt cancelling 5/29 appt and labs. Pt we're assuming has not started capecitabine.

## 2025-06-09 NOTE — TELEPHONE ENCOUNTER
CC/Indication for Procedure: 50 y.o. female with Primary osteoarthritis of left knee [M17.12]  Pre-op testing [Z01.818].    Patient scheduled for AR PLASTY KNEE,MED OR LAT COMPARTMT [16692] (ARTHROPLASTY, KNEE, UNICOMPARTMENTAL, LEFT).    Past Medical History:   Diagnosis Date    Anxiety     Breast cyst     4 mm cyst according to mammo, repeat test 5/2016    Diabetes mellitus, type 2     High calcium levels     Hypertension     Hypertriglyceridemia     Hypothyroidism     Slow to wake up after anesthesia     Umbilical hernia     since 2009, worse after giving birth     Past Surgical History:   Procedure Laterality Date    ADENOIDECTOMY  1985    HERNIA REPAIR  03/21/2016    INSERTION OF CONTRACEPTIVE CAPSULE  2012    TONSILLECTOMY  1985    TYMPANOSTOMY TUBE PLACEMENT  1985     Family History   Problem Relation Name Age of Onset    Hypertension Mother      Stroke Mother      Heart disease Mother      Diabetes Mother      Diabetes Father      Stroke Father      Heart disease Father      Depression Father       Social History     Socioeconomic History    Marital status:    Occupational History    Occupation: business owner   Tobacco Use    Smoking status: Never     Passive exposure: Never    Smokeless tobacco: Never   Vaping Use    Vaping status: Never Used   Substance and Sexual Activity    Alcohol use: No    Drug use: No       Review of patient's allergies indicates:  No Known Allergies    Current Medications[1]    ROS:    Denies chest pain or palpitations  Denies shortness of breath  Denies fevers or chills  Denies chest pain  Denies abdominal pain    PE:    General Appearance: Well nourished  Orientation: Oriented to time, place, person  Mental Status: Alert  Heart: RRR  Lungs: CTA  Abdomen: Soft and non-tender    Anesthesia/Surgery risks, benefits and alternative options discussed and understood by patient/family.    This note was created using Dragon voice recognition software that occasionally  [facility]    Phone Message    May a detailed message be left on voicemail: yes     Reason for Call: Medication Refill Request    Has the patient contacted the pharmacy for the refill? Yes   Name of medication being requested: oxyCODONE   Provider who prescribed the medication: [facility] - [place], [place] - [address] [facility]     Date medication is needed: 10/22/24   Action Taken: Message routed to:  [facility]    Travel Screening: Not Applicable     Date of Service:                                                                      misinterpreted phrases or words.       [1]   Current Facility-Administered Medications:     0.9% NaCl 49.3 mL with ROPIvacaine 0.5% (PF) (NAROPIN) 49.3 mL, ketorolac (TORADOL) 30 mg, EPINEPHrine (ADRENALINE) 0.5 mg injection, , Other, Once, Arjun Jean Baptiste MD    ceFAZolin 2 g, 2 g, Intravenous, On Call Procedure, Arjun Jean Baptiste MD    electrolyte-S (ISOLYTE), , Intravenous, Continuous, Zeus Rubio MD, Last Rate: 10 mL/hr at 06/09/25 0928, New Bag at 06/09/25 0928    LIDOcaine (PF) 10 mg/ml (1%) injection 10 mg, 1 mL, Intradermal, Once, Zeus Rubio MD    sodium chloride 0.9% bolus 1,000 mL 1,000 mL, 1,000 mL, Intravenous, Once, Zeus Rubio MD    tranexamic acid (CYKLOKAPRON) 1,000 mg in 0.9% NaCl 100 mL IVPB (MB+), 1,000 mg, Intravenous, On Call Procedure, Arjun Jean Baptiste MD    Facility-Administered Medications Ordered in Other Encounters:     fentaNYL 50 mcg/mL injection, , Intravenous, PRN, Thanh Rausch RN, 50 mcg at 06/09/25 1016    midazolam (PF) injection, , Intravenous, PRN, Thanh Rausch RN, 2 mg at 06/09/25 1016

## 2025-06-11 DIAGNOSIS — M54.50 ACUTE MIDLINE LOW BACK PAIN WITHOUT SCIATICA: ICD-10-CM

## 2025-06-11 NOTE — TELEPHONE ENCOUNTER
Controlled substance refill request notes    Refill request received for: Oxycodone-Acetaminophen 5-325  [place]  data reviewed 06/11/25:  Medication last refill: qty 150, 30 day supply, filled/sold to patient on 05/15/2025  Pended order: Oxycodone-Acetaminophen 5-325, qty 150, 30 day supply, fill on or after 06/14/2025     Primary care provider: Buck Nascimento  Last office visit with this department: 5/19/2025  Next appointment with PCP: none    Refill request forwarded to provider for review.     Glory BLACK LPN  [facility]

## 2025-06-11 NOTE — TELEPHONE ENCOUNTER
Health Call Center    Phone Message    May a detailed message be left on voicemail: yes     Reason for Call: Medication Refill Request    Has the patient contacted the pharmacy for the refill? Yes   Name of medication being requested: oxyCODONE-acetaminophen (PERCOCET) 5-325 MG tablet   Provider who prescribed the medication: Dr.Sick  Pharmacy:   [facility] [address] [place] [id number] [facility]     Date medication is needed: ASAP       Action Taken: Message routed to:  [facility]    Travel Screening: Not Applicable     Date of Service:

## 2025-06-12 RX ORDER — OXYCODONE AND ACETAMINOPHEN 5; 325 MG/1; MG/1
1-2 TABLET ORAL EVERY 6 HOURS PRN
Qty: 150 TABLET | Refills: 0 | Status: SHIPPED | OUTPATIENT
Start: 2025-06-14

## 2025-06-21 NOTE — PLAN OF CARE
Summary: Right thoracoscopic wedge resection  Date & Time: 3/2 5150-2676  Orientation: A&O x4  POD#0  Surgeon:Dr. Jones  Activity Level: Not out of bed   Fall Risk: Yes  Behavior & Aggression: Green  Pain Management: PRN oxy   ABNL VS/O2: BP: 155/83  ABNL Lab/BG: WNL  Diet: Regular diet  Bowel/Bladder: Using urinal   Drains/Devices: R chest tube on water seal  Anticipated  DC Date: Pending   Never

## 2025-06-26 NOTE — PROGRESS NOTES
[facility]  [address] [facility] [address]  Phone: [phone]  Fax: [phone]    PATIENT NAME: Jonathan Bishop  MRN # [id number]   DATE OF VISIT: June 27, 2025  YOB: 1945     Otolaryngology: Dr. Karishma Eng  Radiation Oncology: Dr. Jenni Mills  PCP: Dr. Buck Nascimento     CANCER TYPE: SCC L tonsil, p16 +lety  STAGE: [id number] (IVC)  ECOG PS: 1-2     PD-L1: TPS 20%, CPS 25% on [id number] tonsil bx  NGS: N/A    ASSESSMENT AND PLAN  SCC L tonsil, p16 +lety, CPS 25%/TPS 20%, oligometastatic lung met, +/- bone mets: most recent PET still showing localized disease without distant mets. Remains asymptomatic. Discussed options including definitive radiation +/- chemo or capecitabine. Ultimately treatment with capecitabine was favored. Following a short break for travel, missed clinic calls, etc he began cycle 1 on 6/7/25 at 800 mg/m2 BID x 14 days followed by 7 days off. So far he has tolerated treatment very well with minimal diarrhea and no other concerning side effects. As such, we will increase dose to 1000 mg/m2 with C2 due to start tomorrow. Advised he contact us with any concerns.  Plan:  -Increase capecitabine to 1000 mg/m2 PO BID days 1-14 followed by 7 days off  -Labs and MARK visit in 2 weeks     Neurocognitive changes, encephalopathy March 2024: Didn't pursue cognitive testing, improved slowly in the middle to later part of 2024 associated with new PCA and back to his usual baseline. Now with new falls within the past two weeks ( 3 falls in 2 days). No recurrent episodes since but I did recommend brain imaging given stroke in the past and neuro history. Currently he declines. I encouraged him to report new falls or cognitive changes to us ASAP.    Hypomag, hypokalemia: Resume mag oxide 400 mg daily and potassium chloride 20 mEq daily.     H/o CVA: Residual L weakness. Uses walker at baseline, motorized scooter out and about.     Screening for  hypothyroidism: TFTs 3/2025 ok     LE edema: Better with consistent PCA care and ability to put his stockings on      Surrogate decision maker: He would want us to talk with Mariann if he could not speak for himself. Not discussed again today.      Peripheral neuropathy: MRI shows a lot of foramenal stenosis and spinal canal stenosis, so more likely related to that. Did not worsen on paclitaxel    Vandana Bertrand CNP  ---  45 minutes spent on the date of the encounter doing chart review, review of test results, interpretation of tests, patient visit, and documentation.    The longitudinal plan of care for the diagnosis(es)/condition(s) as documented were addressed during this visit. Due to the added complexity in care, I will continue to support Conner in the subsequent management and with ongoing continuity of care.    SUBJECTIVE  Conner is seen today for oncology follow-up and toxicity monitoring on Xeloda.  -He confirms he started Xeloda 1500 mg BID x 14 days on 6/7. Only missed one or two doses, doesn't quite remember. Pharmacy had difficulty in reaching him prior to this  -His new PCA is present with him in clinic today. They both fill me on a recent history of falls within the past two weeks. He fell twice within 2 days a few weeks ago. Once he blacked out and fell out of his motorized scooter. During a separate occurrence he fell backward and hit his head. EMTs were called but he was never seen in the ED  -Notes throat irritation intermittently, improves with tea  -Feels he tolerated Xeloda well  -Denies rash or HFS  -Denies mucositis  -Had 2 episodes of diarrhea in the 2 weeks he took the Xeloda  -Denies nausea  -No lower extremity edema  -Confirms he is NOT taking oral potassium or magnesium      CANCER SUMMARY  2/26/23              L tonsil mass bx in clinic (Dr. Eng).   2/20/23              PET/CT. 3.7 x 4.0 x 5.1 cm mass L palatine tonsil causing narrowing of the oropharyngeal lumen, L level 2 node, L  retropharyngeal nodular density, extension of primary mass vs retropharyngeal node, 0.8 cm RLL nodule concerning for met, mild focal uptake R iliac bone and L1 without CT correlate suspicious for mets.   3/2/23  R VATS, wedge resection. Path: SCC, poorly differentiated, associated with necrosis, 1 cm, negative margins, p16 +lety  3/24/23              MRI L spine and pelvis. Diffusely heterogeneous marrow signal throughout without corresponding abnormal signal on sagittal STIR sequence and no abnormal enhancement. Nonspecific but could be benign process such as red marrow hyperplasia, cannot completely exclude metastatic disease or infiltrative pathologic marrow process. No lesions corresponding to FDG avid spots on PET/CT.   4/19~10/18/23 Pembrolizumab.  10/26/23            CT neck and CAP. Increased L palatine tonsil mass, 3.8 x 2.7 cm --> 4.7 x 3.5 cm. Increased bilateral cervical nodes up to 2.3 x 2.4 cm R level 2 node. New 3 mm RML nodule, no other mets.   11/14/23 PET. 4.6 x 3.3 cm L palatine tonsil mass (SUB 26.8), R level 2A and 2A/3 nodes. Questionable uptake distal R femoral medullary cavity, partially imaged, with questionable subtle corresponding soft tissue attenuation on CT. MRI could be obtained to better evaluate.  11/21~12/2/23   [facility] for weakness/fall. COVID/paxlovid, MSSA bacteremia (1 of 2 bottles on 1 day), TTE negative, treated with cefazolin but didn't complete 2 week course, episode of unresponsiveness 12/2. Dispo plan was TCU on [facility], but left AMA. CTA chest negative for PE, showed grossly unchanged cervical adenopathy. Brain MRI 11/22 for stroke code negative for infarct. Showed SVID, moderate atrophy, suboptimal contrast bolus to examine intracranial structures, 4.5 x 3 x 4.5 cm L nasopharyngeal mass, incompletely visualized. CTA negative.   12/19/23 Quad shot fraction #1. No-showed thereafter.  1/10/24~3/25/25  Carboplatin (AUC 2) + paclitaxel 60 mg/m2 weekly. Held 2/13 due to  anorexia, fatigue. Break between 8/1 and 9/19 doses for trip, fatigue. Missed dose 10/16 due to transportation issues.  3/13~3/17/24     [facility] for acute encephalopathy. Had stopped his car in the middle of the highway after receiving chemo earlier that day. Recommended not to drive   4/30/24  CT CAP and CT neck. Residual bilateral cervical adenopathy.   7/30/24  PET. CT not covered. Significant reduction L palatine tonsil mass - residual focal uptake in the L posterolateral oropharyngeal wall (SUV 9.1, previously 26.8). Previously seen multiple FDG avid L cervical nodes and few enlarged R nodes are completed resolved. Mild diffuse uptake along bilateral lower legs and ankles with associated skin thickening and stranding along the soft tissues; findings can be seen with cellulitis. No metastatic disease.   11/5/24  CT neck and CT CAP.  Stable appearance L palatine tonsil, no discrete mass. 1.1 cm R thyroid nodule, a little bigger. O/w unremarkable.   4/14/25  CT CAP and CT neck. L posterolateral oropharyngeal wall mass extending L posterior lateral tongue base, new since last CT, inseparable from     PAST MEDICAL HISTORY  SCC as above  Chronic LBP  TAVR 2020  Spells spring 2024; saw Dr. Perry after EEG 3/24/24  Thyroid nodule   H/o rheumatic carditis 1950  CHF. 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 Nocturia once nightly   ED  Cataracts  Umbilical hernia repair 2011  Knee arthroplasty B 2010  Lumbar spine fusion, laminectomy, sciatic pain R > L  Peripheral neuropathy - from pinched nerves?  Hearing loss    CURRENT OUTPATIENT MEDICATIONS  Reviewed    ALLERGIES  No Known Allergies     PHYSICAL EXAM  /61   Pulse 58   Temp 97.5  F (36.4  C) (Oral)   Resp 18   Wt 92 kg (202 lb 12.8 oz)   SpO2 100%   BMI 34.81 kg/m      General: Well-appearing male, NAD, accompanied by PCA  Eyes: EOMI, PERRL. No scleral icterus.  ENT: Oral mucosa is moist without lesions or  thrush.   Lymphatic: Neck is supple without cervical or supraclavicular lymphadenopathy.   Cardiovascular: RRR, no m/g/r. Trace bilateral LE edema.  Respiratory: CTA bilaterally. No wheezes or crackles.  Neurologic: No focal deficits. Gait not observed-using motorized WC.  Skin: No rashes, petechiae, or bruising noted on exposed skin.      LABORATORY AND IMAGING STUDIES  Most Recent 3 CBC's:  Recent Labs   Lab Test 06/27/25  0911 04/16/25  1139 03/25/25  1207   WBC 5.6 6.3 6.2   HGB 9.7* 10.1* 10.3*   MCV 82 82 82    213 215    Most Recent 3 BMP's:  Recent Labs   Lab Test 06/27/25  0911 04/16/25  1139 03/25/25  1207    144 139   POTASSIUM 3.2* 3.8 4.3   CHLORIDE 109* 104 101   CO2 25 29 27   BUN 14.0 17.9 27.6*   CR 0.69 0.88 0.93   ANIONGAP 9 11 11   WARREN 8.9 9.3 9.9   GLC 97 96 118*   PROTTOTAL 6.3* 6.8 7.3   ALBUMIN 3.6 4.0 4.1    Most Recent 2 LFT's:  Recent Labs   Lab Test 06/27/25  0911 04/16/25  1139   AST 19 24   ALT 8 12   ALKPHOS 99 99   BILITOTAL 0.3 0.3    Most Recent TSH and T4:  Recent Labs   Lab Test 03/25/25  1207   TSH 1.02   T4 1.30     Phos/Mag:  Lab Results   Component Value Date    PHOS 3.0 03/17/2024    PHOS 2.4 (L) 12/09/2020    PHOS 3.9 12/08/2020    MAG 1.6 (L) 06/27/2025    MAG 1.7 04/16/2025    MAG 1.7 03/25/2025      I reviewed the above labs today.

## 2025-06-27 ENCOUNTER — ONCOLOGY VISIT (OUTPATIENT)
Dept: ONCOLOGY | Facility: CLINIC | Age: 80
End: 2025-06-27
Attending: REGISTERED NURSE
Payer: COMMERCIAL

## 2025-06-27 ENCOUNTER — APPOINTMENT (OUTPATIENT)
Dept: LAB | Facility: CLINIC | Age: 80
End: 2025-06-27
Attending: INTERNAL MEDICINE
Payer: COMMERCIAL

## 2025-06-27 VITALS
HEART RATE: 58 BPM | BODY MASS INDEX: 34.81 KG/M2 | TEMPERATURE: 97.5 F | OXYGEN SATURATION: 100 % | RESPIRATION RATE: 18 BRPM | WEIGHT: 202.8 LBS | SYSTOLIC BLOOD PRESSURE: 138 MMHG | DIASTOLIC BLOOD PRESSURE: 61 MMHG

## 2025-06-27 DIAGNOSIS — C78.00 MALIGNANT NEOPLASM METASTATIC TO LUNG, UNSPECIFIED LATERALITY (H): ICD-10-CM

## 2025-06-27 DIAGNOSIS — E83.42 HYPOMAGNESEMIA: ICD-10-CM

## 2025-06-27 DIAGNOSIS — R29.6 RECURRENT FALLS: ICD-10-CM

## 2025-06-27 DIAGNOSIS — C10.9 SQUAMOUS CELL CARCINOMA OF OROPHARYNX (H): ICD-10-CM

## 2025-06-27 DIAGNOSIS — E87.6 HYPOKALEMIA: ICD-10-CM

## 2025-06-27 DIAGNOSIS — C09.9 TONSIL CANCER (H): Primary | ICD-10-CM

## 2025-06-27 LAB
ALBUMIN SERPL BCG-MCNC: 3.6 G/DL (ref 3.5–5.2)
ALP SERPL-CCNC: 99 U/L (ref 40–150)
ALT SERPL W P-5'-P-CCNC: 8 U/L (ref 0–70)
ANION GAP SERPL CALCULATED.3IONS-SCNC: 9 MMOL/L (ref 7–15)
AST SERPL W P-5'-P-CCNC: 19 U/L (ref 0–45)
BASOPHILS # BLD AUTO: 0 10E3/UL (ref 0–0.2)
BASOPHILS NFR BLD AUTO: 1 %
BILIRUB SERPL-MCNC: 0.3 MG/DL
BUN SERPL-MCNC: 14 MG/DL (ref 8–23)
CALCIUM SERPL-MCNC: 8.9 MG/DL (ref 8.8–10.4)
CHLORIDE SERPL-SCNC: 109 MMOL/L (ref 98–107)
CREAT SERPL-MCNC: 0.69 MG/DL (ref 0.67–1.17)
EGFRCR SERPLBLD CKD-EPI 2021: >90 ML/MIN/1.73M2
EOSINOPHIL # BLD AUTO: 0.2 10E3/UL (ref 0–0.7)
EOSINOPHIL NFR BLD AUTO: 3 %
ERYTHROCYTE [DISTWIDTH] IN BLOOD BY AUTOMATED COUNT: 17.5 % (ref 10–15)
GLUCOSE SERPL-MCNC: 97 MG/DL (ref 70–99)
HCO3 SERPL-SCNC: 25 MMOL/L (ref 22–29)
HCT VFR BLD AUTO: 31.6 % (ref 40–53)
HGB BLD-MCNC: 9.7 G/DL (ref 13.3–17.7)
IMM GRANULOCYTES # BLD: 0 10E3/UL
IMM GRANULOCYTES NFR BLD: 0 %
LYMPHOCYTES # BLD AUTO: 1.4 10E3/UL (ref 0.8–5.3)
LYMPHOCYTES NFR BLD AUTO: 26 %
MAGNESIUM SERPL-MCNC: 1.6 MG/DL (ref 1.7–2.3)
MCH RBC QN AUTO: 25.2 PG (ref 26.5–33)
MCHC RBC AUTO-ENTMCNC: 30.7 G/DL (ref 31.5–36.5)
MCV RBC AUTO: 82 FL (ref 78–100)
MONOCYTES # BLD AUTO: 0.5 10E3/UL (ref 0–1.3)
MONOCYTES NFR BLD AUTO: 9 %
NEUTROPHILS # BLD AUTO: 3.4 10E3/UL (ref 1.6–8.3)
NEUTROPHILS NFR BLD AUTO: 61 %
NRBC # BLD AUTO: 0 10E3/UL
NRBC BLD AUTO-RTO: 0 /100
PLATELET # BLD AUTO: 184 10E3/UL (ref 150–450)
POTASSIUM SERPL-SCNC: 3.2 MMOL/L (ref 3.4–5.3)
PROT SERPL-MCNC: 6.3 G/DL (ref 6.4–8.3)
RBC # BLD AUTO: 3.85 10E6/UL (ref 4.4–5.9)
SODIUM SERPL-SCNC: 143 MMOL/L (ref 135–145)
WBC # BLD AUTO: 5.6 10E3/UL (ref 4–11)

## 2025-06-27 PROCEDURE — 85004 AUTOMATED DIFF WBC COUNT: CPT | Performed by: REGISTERED NURSE

## 2025-06-27 PROCEDURE — 99215 OFFICE O/P EST HI 40 MIN: CPT | Performed by: REGISTERED NURSE

## 2025-06-27 PROCEDURE — 36415 COLL VENOUS BLD VENIPUNCTURE: CPT | Performed by: REGISTERED NURSE

## 2025-06-27 PROCEDURE — G2211 COMPLEX E/M VISIT ADD ON: HCPCS | Performed by: REGISTERED NURSE

## 2025-06-27 PROCEDURE — 83735 ASSAY OF MAGNESIUM: CPT | Performed by: REGISTERED NURSE

## 2025-06-27 PROCEDURE — G0463 HOSPITAL OUTPT CLINIC VISIT: HCPCS | Performed by: REGISTERED NURSE

## 2025-06-27 PROCEDURE — 82040 ASSAY OF SERUM ALBUMIN: CPT | Performed by: REGISTERED NURSE

## 2025-06-27 RX ORDER — MAGNESIUM OXIDE 400 MG/1
400 TABLET ORAL DAILY
Status: SHIPPED
Start: 2025-06-27

## 2025-06-27 ASSESSMENT — PAIN SCALES - GENERAL: PAINLEVEL_OUTOF10: SEVERE PAIN (9)

## 2025-06-27 NOTE — NURSING NOTE
"Oncology Rooming Note    June 27, 2025 9:20 AM   Jonathan Bishop is a 79 year old male who presents for:    Chief Complaint   Patient presents with    Oncology Clinic Visit     Squamous Cell Carcinoma of Oropharynx    Blood Draw     Labs drawn via VPT by lab RN     Initial Vitals: /61   Pulse 58   Temp 97.5  F (36.4  C) (Oral)   Resp 18   Wt 92 kg (202 lb 12.8 oz)   SpO2 100%   BMI 34.81 kg/m   Estimated body mass index is 34.81 kg/m  as calculated from the following:    Height as of 5/19/25: 1.626 m (5' 4\").    Weight as of this encounter: 92 kg (202 lb 12.8 oz). Body surface area is 2.04 meters squared.  Severe Pain (9) Comment: Data Unavailable   No LMP for male patient.  Allergies reviewed: Yes  Medications reviewed: Yes    Medications: MEDICATION REFILLS NEEDED TODAY. Provider was notified.  Pharmacy name entered into Zoomingo:    [facility] - [place], [place] - [address] [facility]  [facility] [place] - [facility] [place] - [address] [facility] - [place], [place] - [address]    Frailty Screening:   Is the patient here for a new oncology consult visit in cancer care? 2. No    PHQ9:  Did this patient require a PHQ9?: No      Clinical concerns: Refill on magic mouthwash.       Janey Duenas              "

## 2025-06-27 NOTE — NURSING NOTE
Chief Complaint   Patient presents with    Oncology Clinic Visit     Squamous Cell Carcinoma of Oropharynx    Blood Draw     Labs drawn via VPT by lab RN     Venipuncture labs drawn by lab RN, vitals taken and appointment arrived.    Ebony Elizabeth RN

## 2025-06-27 NOTE — Clinical Note
6/27/2025      Jonathan Bishop  [address]  [facility] [address]      Dear Colleague,    Thank you for referring your patient, Jonathan Bishop, to the [facility]. Please see a copy of my visit note below.        [facility]  [address] [address]  Phone: [phone]  Fax: [phone]    PATIENT NAME: Jonathan Bishop  MRN # [id number]   DATE OF VISIT: June 27, 2025  YOB: 1945     Otolaryngology: Dr. Karishma Eng  Radiation Oncology: Dr. Jenni Mills  PCP: Dr. 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 Fortunately he's asymptomatic. Reviewed options. One is to consider again a definitive course of radiation +/- chemo, now that we have a long track record of the cancer remaining localized and without new metastatic disease. The lung met was wedged out 3/2/23, so over 2 years ago, supporting that this is truly oligometastatic disease. We reviewed the issues that arose when he tried quad shot back in Dec 2023 and what might be anticipated with radiation, pros and cons. Second option is to continue systemic therapy, in this case with capecitabine, given orally BID 14 days on, 7 days off, with response rates in the range of ~ 10-20% and median duration of disease control ranging from 4-7 months or so. Discussed potential side effects and pros and cons of that option. We also discussed choosing not to treat the cancer any longer, but that gets tricky because it will be a very short period before he starts having symptoms which can really impact QOL such as dysphagia, odynophagia, pain, bleeding, etc. At the end of  our discussion, he's pretty clear that he's still not interested in radiation. He does prioritize QOL and fortunately, has been feeling pretty well this year. He's on a trip and we'll plan on starting capecitabine when he's back in a couple of weeks.    NOTE: will start C1 at 800 mg/m2 of capecitabine po BID; if well tolerated, can go to 1000 mg/m2 BID for C2.      Neurocognitive changes, encephalopathy March 2024: Didn't pursue cognitive testing, improved slowly in the middle to later part of 2024 associated with new PCA and back to his usual baseline.      Hypomag: On mag oxide, replacing IV prn      H/o CVA: Residual L weakness. Uses walker at baseline, motorized scooter out and about.     Screening for hypothyroidism: TFTs 3/2025 ok     LE edema: Better with consistent PCA care and ability to put his stockings on      Surrogate decision maker: He would want us to talk with Mariann if he could not speak for himself. Not discussed again today.      Peripheral neuropathy: MRI shows a lot of foramenal stenosis and spinal canal stenosis, so more likely related to that. Did not worsen on paclitaxel    Vandana eBrtrand, YOANNA  ---  *** minutes spent on the date of the encounter doing {2021 E&M time in:444251}.    The longitudinal plan of care for the diagnosis(es)/condition(s) as documented were addressed during this visit. Due to the added complexity in care, I will continue to support Conner in the subsequent management and with ongoing continuity of care.    SUBJECTIVE  Fell 3x consecutively. Hit head on wall. EMT. 2 weeks ago  HA  Throat irritation  June 7 start Xeloda-took 2 weeks        CANCER SUMMARY  2/26/23              L tonsil mass bx in clinic (Dr. Eng).   2/20/23              PET/CT. 3.7 x 4.0 x 5.1 cm mass L palatine tonsil causing narrowing of the oropharyngeal lumen, L level 2 node, L retropharyngeal nodular density, extension of primary mass vs retropharyngeal node, 0.8 cm RLL nodule concerning for met, mild  focal uptake R iliac bone and L1 without CT correlate suspicious for mets.   3/2/23  R VATS, wedge resection. Path: SCC, poorly differentiated, associated with necrosis, 1 cm, negative margins, p16 +lety  3/24/23              MRI L spine and pelvis. Diffusely heterogeneous marrow signal throughout without corresponding abnormal signal on sagittal STIR sequence and no abnormal enhancement. Nonspecific but could be benign process such as red marrow hyperplasia, cannot completely exclude metastatic disease or infiltrative pathologic marrow process. No lesions corresponding to FDG avid spots on PET/CT.   4/19~10/18/23 Pembrolizumab.  10/26/23            CT neck and CAP. Increased L palatine tonsil mass, 3.8 x 2.7 cm --> 4.7 x 3.5 cm. Increased bilateral cervical nodes up to 2.3 x 2.4 cm R level 2 node. New 3 mm RML nodule, no other mets.   11/14/23 PET. 4.6 x 3.3 cm L palatine tonsil mass (SUB 26.8), R level 2A and 2A/3 nodes. Questionable uptake distal R femoral medullary cavity, partially imaged, with questionable subtle corresponding soft tissue attenuation on CT. MRI could be obtained to better evaluate.  11/21~12/2/23   [facility] for weakness/fall. COVID/paxlovid, MSSA bacteremia (1 of 2 bottles on 1 day), TTE negative, treated with cefazolin but didn't complete 2 week course, episode of unresponsiveness 12/2. Dispo plan was TCU on [facility], but left AMA. CTA chest negative for PE, showed grossly unchanged cervical adenopathy. Brain MRI 11/22 for stroke code negative for infarct. Showed SVID, moderate atrophy, suboptimal contrast bolus to examine intracranial structures, 4.5 x 3 x 4.5 cm L nasopharyngeal mass, incompletely visualized. CTA negative.   12/19/23 Quad shot fraction #1. No-showed thereafter.  1/10/24~3/25/25  Carboplatin (AUC 2) + paclitaxel 60 mg/m2 weekly. Held 2/13 due to anorexia, fatigue. Break between 8/1 and 9/19 doses for trip, fatigue. Missed dose 10/16 due to transportation  issues.  3/13~3/17/24     [facility] for acute encephalopathy. Had stopped his car in the middle of the highway after receiving chemo earlier that day. Recommended not to drive   4/30/24  CT CAP and CT neck. Residual bilateral cervical adenopathy.   7/30/24  PET. CT not covered. Significant reduction L palatine tonsil mass - residual focal uptake in the L posterolateral oropharyngeal wall (SUV 9.1, previously 26.8). Previously seen multiple FDG avid L cervical nodes and few enlarged R nodes are completed resolved. Mild diffuse uptake along bilateral lower legs and ankles with associated skin thickening and stranding along the soft tissues; findings can be seen with cellulitis. No metastatic disease.   11/5/24  CT neck and CT CAP.  Stable appearance L palatine tonsil, no discrete mass. 1.1 cm R thyroid nodule, a little bigger. O/w unremarkable.   4/14/25  CT CAP and CT neck. L posterolateral oropharyngeal wall mass extending L posterior lateral tongue base, new since last CT, inseparable from     PAST MEDICAL HISTORY  SCC as above  Chronic LBP  TAVR 2020  Spells spring 2024; saw Dr. Perry after EEG 3/24/24  Thyroid nodule   H/o rheumatic carditis 1950  CHF. 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 Nocturia once nightly   ED  Cataracts  Umbilical hernia repair 2011  Knee arthroplasty B 2010  Lumbar spine fusion, laminectomy, sciatic pain R > L  Peripheral neuropathy - from pinched nerves?  Hearing loss    CURRENT OUTPATIENT MEDICATIONS  Reviewed    ALLERGIES  No Known Allergies     PHYSICAL EXAM  No vitals due to virtual visit     General: Well-appearing male in no acute distress.  Eyes: EOMI, PERRL. No scleral icterus.  ENT: Oral mucosa is moist without lesions or thrush.   Lymphatic: Neck is supple without cervical or supraclavicular lymphadenopathy.   Cardiovascular: RRR No murmurs, gallops, or rubs. No peripheral edema.  Respiratory: CTA bilaterally. No wheezes or  crackles.  Gastrointestinal: BS +. Abdomen soft, non-tender. No palpable hepatosplenomegaly or masses.   Neurologic: Cranial nerves II through XII are grossly intact.  Skin: No rashes, petechiae, or bruising noted on exposed skin.      LABORATORY AND IMAGING STUDIES  Most Recent 3 CBC's:  Recent Labs   Lab Test 04/16/25  1139 03/25/25  1207 03/03/25  1204   WBC 6.3 6.2 5.3   HGB 10.1* 10.3* 9.8*   MCV 82 82 83    215 219    Most Recent 3 BMP's:  Recent Labs   Lab Test 04/16/25  1139 03/25/25  1207 03/03/25  1204    139 141   POTASSIUM 3.8 4.3 4.2   CHLORIDE 104 101 105   CO2 29 27 27   BUN 17.9 27.6* 19.1   CR 0.88 0.93 0.82   ANIONGAP 11 11 9   WARREN 9.3 9.9 9.4   GLC 96 118* 87   PROTTOTAL 6.8 7.3 7.0   ALBUMIN 4.0 4.1 4.0    Most Recent 2 LFT's:  Recent Labs   Lab Test 04/16/25  1139 03/25/25  1207   AST 24 28   ALT 12 13   ALKPHOS 99 118   BILITOTAL 0.3 0.3    Most Recent TSH and T4:  Recent Labs   Lab Test 03/25/25  1207   TSH 1.02   T4 1.30     Phos/Mag:  Lab Results   Component Value Date    PHOS 3.0 03/17/2024    PHOS 2.4 (L) 12/09/2020    PHOS 3.9 12/08/2020    MAG 1.7 04/16/2025    MAG 1.7 03/25/2025    MAG 1.8 03/03/2025      I reviewed the above labs today.               Again, thank you for allowing me to participate in the care of your patient.        Sincerely,        Vandana Bertrand CNP    Electronically signed Clofazimine Counseling:  I discussed with the patient the risks of clofazimine including but not limited to skin and eye pigmentation, liver damage, nausea/vomiting, gastrointestinal bleeding and allergy.

## 2025-07-03 ENCOUNTER — TELEPHONE (OUTPATIENT)
Dept: ONCOLOGY | Facility: CLINIC | Age: 80
End: 2025-07-03
Payer: COMMERCIAL

## 2025-07-03 NOTE — TELEPHONE ENCOUNTER
Placed a call to Conner to do a 1 week check in following dose increase of capecitabine from 1500mg twice daily X14 days on, 7 days off to 2000mg ([id number]) twice daily X14 days on 7 days off. [facility] 6/27.    Conner forgot his dose was increased and has been taking 1500mg twice daily. Pt forgot last nights' dose. Pt will start taking 2000mg tonight. I let Conner know we'll check in on him a week from today. I also sent a message to Vandana LENZ to alert. Pt has an appt with Vandana on 7/10.    Pt verbalized understanding of plan. He also asked for a refill on his magic mouthwash. I reached out to [facility] who has an active order and they'll call pt to set up delivery of that order.    Yany Garza PharmD  Hematology/Oncology Clinical Pharmacist  [facility]  [phone]    **The oral chemotherapy pharmacists are now working on provider-specific teams. Your pharmacist team can be reached at [facility] ORAL CHEMO H-ONC2 or [phone].**

## 2025-07-06 ENCOUNTER — HEALTH MAINTENANCE LETTER (OUTPATIENT)
Age: 80
End: 2025-07-06

## 2025-07-06 DIAGNOSIS — C09.9 TONSIL CANCER (H): ICD-10-CM

## 2025-07-06 DIAGNOSIS — C10.9 SQUAMOUS CELL CARCINOMA OF OROPHARYNX (H): ICD-10-CM

## 2025-07-06 DIAGNOSIS — C78.00 MALIGNANT NEOPLASM METASTATIC TO LUNG, UNSPECIFIED LATERALITY (H): Primary | ICD-10-CM

## 2025-07-09 DIAGNOSIS — M54.50 ACUTE MIDLINE LOW BACK PAIN WITHOUT SCIATICA: ICD-10-CM

## 2025-07-09 RX ORDER — OXYCODONE AND ACETAMINOPHEN 5; 325 MG/1; MG/1
1-2 TABLET ORAL EVERY 6 HOURS PRN
Qty: 150 TABLET | Refills: 0 | Status: CANCELLED | OUTPATIENT
Start: 2025-07-14

## 2025-07-09 NOTE — TELEPHONE ENCOUNTER
Controlled substance refill request notes    Refill request received for: Oxycodone-Acetaminophen 5-325  [place]  data reviewed 07/09/25:  Medication last refill: qty 150, 30 day supply, filled/sold to patient on 06/14/2025  Pended order: Oxycodone-Acetaminophen 5-325, qty 150, 30 day supply, fill on or after 07/14/2025     Primary care provider: Buck Nascimento  Last office visit with this department: 5/19/2025  Next appointment with PCP: none    Refill request forwarded to provider for review.     Glory BLACK LPN  [facility]

## 2025-07-09 NOTE — TELEPHONE ENCOUNTER
Last Written Prescription:   oxyCODONE-acetaminophen (PERCOCET) 5-325 MG [id number]/14/2025   Sig - Route: Take 1-2 tablets by mouth every 6 hours as needed for pain. Max 5 per day - 30 day supply    ----------------------  Last Visit Date: 5/19/25 Dr Nascimento  [facility] Visit Date: None  ----------------------      Refill decision:      [x] Medication unable to be refilled by RN due to: Controlled medication oxyCODONE-acetaminophen (PERCOCET) 5-325 MG table        Request from pharmacy:  Requested Prescriptions   Pending Prescriptions Disp Refills    oxyCODONE-acetaminophen (PERCOCET) 5-325 MG tablet 150 tablet 0     Sig: Take 1-2 tablets by mouth every 6 hours as needed for pain. Max 5 per day - 30 day supply       There is no refill protocol information for this order

## 2025-07-09 NOTE — TELEPHONE ENCOUNTER
[facility] Call Center    Phone Message    May a detailed message be left on voicemail: yes     Reason for Call: Medication Refill Request    Has the patient contacted the pharmacy for the refill? Yes     Name of medication being requested:oxyCODONE-acetaminophen (PERCOCET) 5-325 MG tablet [16530] (Order [id number])     Provider who prescribed the medication: [facility]    Pharmacy:  [facility] [address] [place] [id number] [facility]    Date medication is needed: asap       Action Taken: Message routed to:  [facility]    Travel Screening: Not Applicable     Date of Service:

## 2025-07-10 ENCOUNTER — TELEPHONE (OUTPATIENT)
Dept: ONCOLOGY | Facility: CLINIC | Age: 80
End: 2025-07-10
Payer: COMMERCIAL

## 2025-07-10 NOTE — TELEPHONE ENCOUNTER
Oral Chemotherapy Monitoring Program    Subjective/Objective:  Jonathan Bishop is a 79 year old male contacted by phone for a follow-up visit for oral chemotherapy.  He is doing okay of late. He has noticed a little bit of constipation lately though not overly bothersome. He is not drinking much water, rather focuses on tea with honey. He endorses mild difficult swallowing his new dose - feels like pins and needles in his throat.     Conner confirms taking the appropriate dose of capecitabine 2000 mg twice daily. Denies other new or worsening side effects (nausea, vomiting, rash, oral sores), missed doses, and recent hospital or ED visits. Patient has not had any recent medication changes.             5/12/2025     8:00 AM 5/20/2025     3:00 PM 5/30/2025     2:00 PM 6/3/2025    10:00 AM 6/27/2025     9:00 AM 7/3/2025    12:00 PM 7/10/2025    11:00 AM   ORAL CHEMOTHERAPY   Assessment Type Initial Work up New Teach Left Voicemail Left Voicemail Chart Review;Refill Other Initial Follow up   Diagnosis Code Other Other Other Other Other Other Other   Other Squamous Cell Squamous Cell Squamous Cell Squamous Cell Squamous Cell Squamous Cell Squamous Cell   Providers Dr. Ervin Mueller   [facility]   Is this patient followed by the [facility] team? Yes Yes Yes Yes Yes No No   Drug Name Xeloda (capecitabine)  Xeloda (capecitabine)  Xeloda (capecitabine)  Xeloda (capecitabine)  Xeloda (capecitabine) Xeloda (capecitabine) Xeloda (capecitabine)   Dose 1,500 mg  1,500 mg  1,500 mg  1,500 mg  2,000 mg 2,000 mg 2,000 mg   Current Schedule BID  BID  BID  BID  BID BID BID   Cycle Details 2 weeks on, 1 week off 2 weeks on, 1 week off 2 weeks on, 1 week off 2 weeks on, 1 week off 2 weeks on, 1 week off 2 weeks on, 1 week off 2 weeks on, 1 week off   Start Date of Last Cycle     6/7/2025 6/28/2025 6/28/2025  "  Planned next cycle start date     6/28/2025 7/19/2025 7/19/2025   Doses missed in last 2 weeks      1    Adherence Assessment      Non-adherent Adherent   Reason for Non-adherence      Patient forgets    Adherence Intervention Recommended      Other    Adverse Effects       Constipation   Constipation       Grade 1   Pharmacist Intervention(constipation)       Yes   Any new drug interactions?  No        Is the dose as ordered appropriate for the patient?  Yes        Since the last time we talked, have you been hospitalized or used the emergency room?  No            Data saved with a previous flowsheet row definition       Last PHQ-2 Score on record:       3/25/2025    12:31 PM 9/16/2024     7:40 AM   PHQ-2 ( 1999 [employer])   Q1: Little interest or pleasure in doing things 1 0   Q2: Feeling down, depressed or hopeless 0 0   PHQ-2 Score 1 0   Q1: Little interest or pleasure in doing things  Not at all   Q2: Feeling down, depressed or hopeless  Not at all   PHQ-2 Score  0       Vitals:  BP:   BP Readings from Last 1 Encounters:   06/27/25 138/61     Wt Readings from Last 1 Encounters:   06/27/25 92 kg (202 lb 12.8 oz)     Estimated body surface area is 2.04 meters squared as calculated from the following:    Height as of 5/19/25: 1.626 m (5' 4\").    Weight as of 6/27/25: 92 kg (202 lb 12.8 oz).    Labs:  _  Result Component Current Result Ref Range   Sodium 143 (6/27/2025) 135 - 145 mmol/L     _  Result Component Current Result Ref Range   Potassium 3.2 (L) (6/27/2025) 3.4 - 5.3 mmol/L     _  Result Component Current Result Ref Range   Calcium 8.9 (6/27/2025) 8.8 - 10.4 mg/dL     _  Result Component Current Result Ref Range   Magnesium 1.6 (L) (6/27/2025) 1.7 - 2.3 mg/dL     No results found for Phos within last 30 days.     _  Result Component Current Result Ref Range   Albumin 3.6 (6/27/2025) 3.5 - 5.2 g/dL     _  Result Component Current Result Ref Range   Urea Nitrogen 14.0 (6/27/2025) 8.0 - 23.0 mg/dL "     _  Result Component Current Result Ref Range   Creatinine 0.69 (6/27/2025) 0.67 - 1.17 mg/dL     _  Result Component Current Result Ref Range   AST 19 (6/27/2025) 0 - 45 U/L     _  Result Component Current Result Ref Range   ALT 8 (6/27/2025) 0 - 70 U/L     _  Result Component Current Result Ref Range   Bilirubin Total 0.3 (6/27/2025) <=1.2 mg/dL     _  Result Component Current Result Ref Range   WBC Count 5.6 (6/27/2025) 4.0 - 11.0 10e3/uL     _  Result Component Current Result Ref Range   Hemoglobin 9.7 (L) (6/27/2025) 13.3 - 17.7 g/dL     _  Result Component Current Result Ref Range   Platelet Count 184 (6/27/2025) 150 - 450 10e3/uL     _  Result Component Current Result Ref Range   Absolute Neutrophils 3.4 (6/27/2025) 1.6 - 8.3 10e3/uL     No results found for ANC within last 30 days.          Assessment/Plan:  Labs due on off week of 7/14, sent message to scheduling.     Tolerating oral chemotherapy, appropriate to continue. Encouraged to push hydration when able - will help with constipation to start. Can consider oral rinses, patient opting to continue with honey intake for oral soothing.     Follow-Up:  7/14 week for labs.   8/11 labs  8/13 Appt    Refill Due:  7/11 to [facility]    Diony Oconnor, PharmD, BCACP  Hematology/Oncology Clinical Pharmacist  [facility]  [phone]

## 2025-07-11 RX ORDER — OXYCODONE AND ACETAMINOPHEN 5; 325 MG/1; MG/1
1-2 TABLET ORAL EVERY 6 HOURS PRN
Qty: 150 TABLET | Refills: 0 | Status: SHIPPED | OUTPATIENT
Start: 2025-07-14

## 2025-07-15 ENCOUNTER — LAB (OUTPATIENT)
Dept: LAB | Facility: CLINIC | Age: 80
End: 2025-07-15
Payer: COMMERCIAL

## 2025-07-15 DIAGNOSIS — E11.9 TYPE 2 DIABETES MELLITUS WITHOUT COMPLICATION, WITHOUT LONG-TERM CURRENT USE OF INSULIN (H): Primary | ICD-10-CM

## 2025-07-15 DIAGNOSIS — C09.9 TONSIL CANCER (H): ICD-10-CM

## 2025-07-15 DIAGNOSIS — C78.00 MALIGNANT NEOPLASM METASTATIC TO LUNG, UNSPECIFIED LATERALITY (H): ICD-10-CM

## 2025-07-15 DIAGNOSIS — Z79.899 ENCOUNTER FOR LONG-TERM (CURRENT) USE OF MEDICATIONS: ICD-10-CM

## 2025-07-15 DIAGNOSIS — C10.9 SQUAMOUS CELL CARCINOMA OF OROPHARYNX (H): ICD-10-CM

## 2025-07-15 LAB
ALBUMIN SERPL BCG-MCNC: 4.1 G/DL (ref 3.5–5.2)
ALP SERPL-CCNC: 120 U/L (ref 40–150)
ALT SERPL W P-5'-P-CCNC: 11 U/L (ref 0–70)
ANION GAP SERPL CALCULATED.3IONS-SCNC: 13 MMOL/L (ref 7–15)
AST SERPL W P-5'-P-CCNC: 24 U/L (ref 0–45)
BASOPHILS # BLD AUTO: 0 10E3/UL (ref 0–0.2)
BASOPHILS NFR BLD AUTO: 1 %
BILIRUB SERPL-MCNC: 0.6 MG/DL
BUN SERPL-MCNC: 27.5 MG/DL (ref 8–23)
CALCIUM SERPL-MCNC: 9.6 MG/DL (ref 8.8–10.4)
CHLORIDE SERPL-SCNC: 102 MMOL/L (ref 98–107)
CREAT SERPL-MCNC: 1.16 MG/DL (ref 0.67–1.17)
EGFRCR SERPLBLD CKD-EPI 2021: 64 ML/MIN/1.73M2
EOSINOPHIL # BLD AUTO: 0.2 10E3/UL (ref 0–0.7)
EOSINOPHIL NFR BLD AUTO: 4 %
ERYTHROCYTE [DISTWIDTH] IN BLOOD BY AUTOMATED COUNT: 17.9 % (ref 10–15)
EST. AVERAGE GLUCOSE BLD GHB EST-MCNC: 123 MG/DL
GLUCOSE SERPL-MCNC: 103 MG/DL (ref 70–99)
HBA1C MFR BLD: 5.9 %
HCO3 SERPL-SCNC: 28 MMOL/L (ref 22–29)
HCT VFR BLD AUTO: 36.1 % (ref 40–53)
HGB BLD-MCNC: 11.1 G/DL (ref 13.3–17.7)
IMM GRANULOCYTES # BLD: 0 10E3/UL
IMM GRANULOCYTES NFR BLD: 0 %
LYMPHOCYTES # BLD AUTO: 2.1 10E3/UL (ref 0.8–5.3)
LYMPHOCYTES NFR BLD AUTO: 34 %
MAGNESIUM SERPL-MCNC: 1.7 MG/DL (ref 1.7–2.3)
MCH RBC QN AUTO: 25.5 PG (ref 26.5–33)
MCHC RBC AUTO-ENTMCNC: 30.7 G/DL (ref 31.5–36.5)
MCV RBC AUTO: 83 FL (ref 78–100)
MONOCYTES # BLD AUTO: 0.6 10E3/UL (ref 0–1.3)
MONOCYTES NFR BLD AUTO: 10 %
NEUTROPHILS # BLD AUTO: 3.3 10E3/UL (ref 1.6–8.3)
NEUTROPHILS NFR BLD AUTO: 52 %
NRBC # BLD AUTO: 0 10E3/UL
NRBC BLD AUTO-RTO: 0 /100
PLATELET # BLD AUTO: 207 10E3/UL (ref 150–450)
POTASSIUM SERPL-SCNC: 3.5 MMOL/L (ref 3.4–5.3)
PROT SERPL-MCNC: 7.4 G/DL (ref 6.4–8.3)
RBC # BLD AUTO: 4.36 10E6/UL (ref 4.4–5.9)
SODIUM SERPL-SCNC: 143 MMOL/L (ref 135–145)
WBC # BLD AUTO: 6.3 10E3/UL (ref 4–11)

## 2025-07-15 PROCEDURE — 85025 COMPLETE CBC W/AUTO DIFF WBC: CPT | Performed by: PATHOLOGY

## 2025-07-15 PROCEDURE — 83036 HEMOGLOBIN GLYCOSYLATED A1C: CPT | Performed by: INTERNAL MEDICINE

## 2025-07-15 PROCEDURE — 83735 ASSAY OF MAGNESIUM: CPT | Performed by: PATHOLOGY

## 2025-07-15 PROCEDURE — 99000 SPECIMEN HANDLING OFFICE-LAB: CPT | Performed by: PATHOLOGY

## 2025-07-15 PROCEDURE — 36415 COLL VENOUS BLD VENIPUNCTURE: CPT | Performed by: PATHOLOGY

## 2025-07-15 PROCEDURE — 80053 COMPREHEN METABOLIC PANEL: CPT | Performed by: PATHOLOGY

## 2025-07-20 DIAGNOSIS — C10.9 SQUAMOUS CELL CARCINOMA OF OROPHARYNX (H): ICD-10-CM

## 2025-07-20 DIAGNOSIS — C09.9 TONSIL CANCER (H): ICD-10-CM

## 2025-07-20 DIAGNOSIS — C78.00 MALIGNANT NEOPLASM METASTATIC TO LUNG, UNSPECIFIED LATERALITY (H): Primary | ICD-10-CM

## 2025-07-31 DIAGNOSIS — C10.9 SQUAMOUS CELL CARCINOMA OF OROPHARYNX (H): ICD-10-CM

## 2025-07-31 DIAGNOSIS — C09.9 TONSIL CANCER (H): ICD-10-CM

## 2025-07-31 DIAGNOSIS — C78.00 MALIGNANT NEOPLASM METASTATIC TO LUNG, UNSPECIFIED LATERALITY (H): Primary | ICD-10-CM

## 2025-07-31 RX ORDER — CAPECITABINE 500 MG/1
1000 TABLET, FILM COATED ORAL 2 TIMES DAILY
Qty: 112 TABLET | Refills: 0 | Status: SHIPPED | OUTPATIENT
Start: 2025-08-09 | End: 2025-08-23

## 2025-08-11 ENCOUNTER — LAB (OUTPATIENT)
Dept: LAB | Facility: CLINIC | Age: 80
End: 2025-08-11
Payer: COMMERCIAL

## 2025-08-11 ENCOUNTER — ANCILLARY PROCEDURE (OUTPATIENT)
Dept: CT IMAGING | Facility: CLINIC | Age: 80
End: 2025-08-11
Attending: INTERNAL MEDICINE
Payer: COMMERCIAL

## 2025-08-11 DIAGNOSIS — C10.9 SQUAMOUS CELL CARCINOMA OF OROPHARYNX (H): ICD-10-CM

## 2025-08-11 DIAGNOSIS — C09.9 TONSIL CANCER (H): ICD-10-CM

## 2025-08-11 DIAGNOSIS — C78.00 MALIGNANT NEOPLASM METASTATIC TO LUNG, UNSPECIFIED LATERALITY (H): ICD-10-CM

## 2025-08-11 DIAGNOSIS — M54.50 ACUTE MIDLINE LOW BACK PAIN WITHOUT SCIATICA: ICD-10-CM

## 2025-08-11 DIAGNOSIS — E03.4 HYPOTHYROIDISM DUE TO ACQUIRED ATROPHY OF THYROID: ICD-10-CM

## 2025-08-11 DIAGNOSIS — E83.42 HYPOMAGNESEMIA: ICD-10-CM

## 2025-08-11 LAB
ALBUMIN SERPL BCG-MCNC: 3.5 G/DL (ref 3.5–5.2)
ALP SERPL-CCNC: ABNORMAL U/L
ALT SERPL W P-5'-P-CCNC: ABNORMAL U/L
ANION GAP SERPL CALCULATED.3IONS-SCNC: 11 MMOL/L (ref 7–15)
AST SERPL W P-5'-P-CCNC: ABNORMAL U/L
BASOPHILS # BLD AUTO: 0 10E3/UL (ref 0–0.2)
BASOPHILS NFR BLD AUTO: 1 %
BILIRUB SERPL-MCNC: 0.5 MG/DL
BUN SERPL-MCNC: 23.9 MG/DL (ref 8–23)
CALCIUM SERPL-MCNC: 8.6 MG/DL (ref 8.8–10.4)
CHLORIDE SERPL-SCNC: 104 MMOL/L (ref 98–107)
CREAT SERPL-MCNC: 1.01 MG/DL (ref 0.67–1.17)
EGFRCR SERPLBLD CKD-EPI 2021: 76 ML/MIN/1.73M2
EOSINOPHIL # BLD AUTO: 0.2 10E3/UL (ref 0–0.7)
EOSINOPHIL NFR BLD AUTO: 3 %
ERYTHROCYTE [DISTWIDTH] IN BLOOD BY AUTOMATED COUNT: 18.8 % (ref 10–15)
GLUCOSE SERPL-MCNC: 99 MG/DL (ref 70–99)
HCO3 SERPL-SCNC: 23 MMOL/L (ref 22–29)
HCT VFR BLD AUTO: 35.1 % (ref 40–53)
HGB BLD-MCNC: 11 G/DL (ref 13.3–17.7)
IMM GRANULOCYTES # BLD: 0 10E3/UL
IMM GRANULOCYTES NFR BLD: 0 %
LYMPHOCYTES # BLD AUTO: 1.7 10E3/UL (ref 0.8–5.3)
LYMPHOCYTES NFR BLD AUTO: 27 %
MAGNESIUM SERPL-MCNC: 1.5 MG/DL (ref 1.7–2.3)
MCH RBC QN AUTO: 25.7 PG (ref 26.5–33)
MCHC RBC AUTO-ENTMCNC: 31.3 G/DL (ref 31.5–36.5)
MCV RBC AUTO: 82 FL (ref 78–100)
MONOCYTES # BLD AUTO: 0.7 10E3/UL (ref 0–1.3)
MONOCYTES NFR BLD AUTO: 10 %
NEUTROPHILS # BLD AUTO: 3.8 10E3/UL (ref 1.6–8.3)
NEUTROPHILS NFR BLD AUTO: 60 %
NRBC # BLD AUTO: 0 10E3/UL
NRBC BLD AUTO-RTO: 0 /100
PLATELET # BLD AUTO: 197 10E3/UL (ref 150–450)
POTASSIUM SERPL-SCNC: 6.1 MMOL/L (ref 3.4–5.3)
PROT SERPL-MCNC: 6.7 G/DL (ref 6.4–8.3)
RBC # BLD AUTO: 4.28 10E6/UL (ref 4.4–5.9)
SODIUM SERPL-SCNC: 138 MMOL/L (ref 135–145)
T4 FREE SERPL-MCNC: 1.47 NG/DL (ref 0.9–1.7)
TSH SERPL DL<=0.005 MIU/L-ACNC: 0.76 UIU/ML (ref 0.3–4.2)
WBC # BLD AUTO: 6.5 10E3/UL (ref 4–11)

## 2025-08-11 PROCEDURE — 84439 ASSAY OF FREE THYROXINE: CPT | Performed by: PATHOLOGY

## 2025-08-11 PROCEDURE — 70491 CT SOFT TISSUE NECK W/DYE: CPT | Performed by: RADIOLOGY

## 2025-08-11 PROCEDURE — 83735 ASSAY OF MAGNESIUM: CPT | Performed by: PATHOLOGY

## 2025-08-11 PROCEDURE — 84443 ASSAY THYROID STIM HORMONE: CPT | Performed by: PATHOLOGY

## 2025-08-11 PROCEDURE — 82247 BILIRUBIN TOTAL: CPT | Performed by: PATHOLOGY

## 2025-08-11 PROCEDURE — 74177 CT ABD & PELVIS W/CONTRAST: CPT | Performed by: STUDENT IN AN ORGANIZED HEALTH CARE EDUCATION/TRAINING PROGRAM

## 2025-08-11 PROCEDURE — 84155 ASSAY OF PROTEIN SERUM: CPT | Performed by: PATHOLOGY

## 2025-08-11 PROCEDURE — 36415 COLL VENOUS BLD VENIPUNCTURE: CPT | Performed by: PATHOLOGY

## 2025-08-11 PROCEDURE — 85025 COMPLETE CBC W/AUTO DIFF WBC: CPT | Performed by: PATHOLOGY

## 2025-08-11 PROCEDURE — 80048 BASIC METABOLIC PNL TOTAL CA: CPT | Performed by: PATHOLOGY

## 2025-08-11 PROCEDURE — 71260 CT THORAX DX C+: CPT | Performed by: STUDENT IN AN ORGANIZED HEALTH CARE EDUCATION/TRAINING PROGRAM

## 2025-08-11 PROCEDURE — 82040 ASSAY OF SERUM ALBUMIN: CPT | Performed by: PATHOLOGY

## 2025-08-11 RX ORDER — OXYCODONE AND ACETAMINOPHEN 5; 325 MG/1; MG/1
1-2 TABLET ORAL EVERY 6 HOURS PRN
Qty: 150 TABLET | Refills: 0 | Status: SHIPPED | OUTPATIENT
Start: 2025-08-14

## 2025-08-11 RX ORDER — IOPAMIDOL 755 MG/ML
99 INJECTION, SOLUTION INTRAVASCULAR ONCE
Status: COMPLETED | OUTPATIENT
Start: 2025-08-11 | End: 2025-08-11

## 2025-08-11 RX ADMIN — IOPAMIDOL 99 ML: 755 INJECTION, SOLUTION INTRAVASCULAR at 10:10

## 2025-08-13 ENCOUNTER — ONCOLOGY VISIT (OUTPATIENT)
Dept: ONCOLOGY | Facility: CLINIC | Age: 80
End: 2025-08-13
Attending: INTERNAL MEDICINE
Payer: COMMERCIAL

## 2025-08-13 VITALS
RESPIRATION RATE: 16 BRPM | SYSTOLIC BLOOD PRESSURE: 152 MMHG | BODY MASS INDEX: 31.17 KG/M2 | HEART RATE: 62 BPM | WEIGHT: 181.6 LBS | DIASTOLIC BLOOD PRESSURE: 74 MMHG | OXYGEN SATURATION: 100 %

## 2025-08-13 DIAGNOSIS — E83.42 HYPOMAGNESEMIA: ICD-10-CM

## 2025-08-13 DIAGNOSIS — C78.00 MALIGNANT NEOPLASM METASTATIC TO LUNG, UNSPECIFIED LATERALITY (H): Primary | ICD-10-CM

## 2025-08-13 DIAGNOSIS — C10.9 SQUAMOUS CELL CARCINOMA OF OROPHARYNX (H): ICD-10-CM

## 2025-08-13 DIAGNOSIS — R13.10 ODYNOPHAGIA: ICD-10-CM

## 2025-08-13 PROCEDURE — G2211 COMPLEX E/M VISIT ADD ON: HCPCS | Performed by: INTERNAL MEDICINE

## 2025-08-13 PROCEDURE — G0463 HOSPITAL OUTPT CLINIC VISIT: HCPCS | Performed by: INTERNAL MEDICINE

## 2025-08-13 PROCEDURE — 99215 OFFICE O/P EST HI 40 MIN: CPT | Performed by: INTERNAL MEDICINE

## 2025-08-13 RX ORDER — IBUPROFEN 200 MG
200 TABLET ORAL PRN
COMMUNITY

## 2025-08-13 ASSESSMENT — PAIN SCALES - GENERAL: PAINLEVEL_OUTOF10: SEVERE PAIN (9)

## 2025-08-14 ENCOUNTER — DOCUMENTATION ONLY (OUTPATIENT)
Dept: ONCOLOGY | Facility: CLINIC | Age: 80
End: 2025-08-14
Payer: COMMERCIAL

## 2025-08-14 ENCOUNTER — PATIENT OUTREACH (OUTPATIENT)
Dept: ONCOLOGY | Facility: CLINIC | Age: 80
End: 2025-08-14
Payer: COMMERCIAL

## 2025-08-20 ENCOUNTER — DOCUMENTATION ONLY (OUTPATIENT)
Dept: INTERNAL MEDICINE | Facility: CLINIC | Age: 80
End: 2025-08-20
Payer: COMMERCIAL

## 2025-08-25 ENCOUNTER — TELEPHONE (OUTPATIENT)
Dept: INTERNAL MEDICINE | Facility: CLINIC | Age: 80
End: 2025-08-25
Payer: COMMERCIAL

## 2025-08-27 ENCOUNTER — PRE VISIT (OUTPATIENT)
Dept: RADIATION THERAPY | Facility: OUTPATIENT CENTER | Age: 80
End: 2025-08-27

## (undated) DEVICE — 26MM SAPIEN 3 ULTRA TRANSCATHETER HEART VALVE

## (undated) DEVICE — INTRO GLIDESHEATH SLENDER 6FR 10X45CM 60-1060

## (undated) DEVICE — Device

## (undated) DEVICE — STPL RELOAD REG TISSUE ECHELON 45 X 3.6MM BLUE GST45B

## (undated) DEVICE — ESU GROUND PAD ADULT W/CORD E7507

## (undated) DEVICE — SYR INJ LOWER VISCOSITY ACIST KIT 016605

## (undated) DEVICE — WIRE GUIDE 0.035"X260CM SAFE-T-J EXCHANGE G00517

## (undated) DEVICE — SU VICRYL 3-0 SH 27" J316H

## (undated) DEVICE — DRAIN CHEST TUBE 28FR STR 8028

## (undated) DEVICE — SUCTION DRY CHEST DRAIN OASIS 3600-100

## (undated) DEVICE — WIRE GUIDE LUNDERQUIST 0.035"X260CM DBL CVD

## (undated) DEVICE — SUCTION TIP YANKAUER STR K87

## (undated) DEVICE — DRSG GAUZE 2X2" 8042

## (undated) DEVICE — SU VICRYL 0 UR-6 27" J603H

## (undated) DEVICE — INTRO SHEATH 7FRX10CM PINNACLE RSS702

## (undated) DEVICE — RAD INTRODUCER KIT MICRO 5FRX10CM .018 NITINOL G/W

## (undated) DEVICE — CATH SYSTEM SENTINEL CEREBRAL PROTECTION 6FR CMS15-10C-US

## (undated) DEVICE — DEFIB PRO-PADZ LVP LQD GEL ADULT 8900-2105-01

## (undated) DEVICE — SU VICRYL 4-0 PS-2 18" UND J496H

## (undated) DEVICE — LINEN TOWEL PACK X5 5464

## (undated) DEVICE — MANIFOLD KIT ANGIO AUTOMATED 014613

## (undated) DEVICE — GUIDEWIRE HI-TORQUE VERSACORE MOD J 1

## (undated) DEVICE — CATH JACKY 5FR 3.5 CURVE 40-5023

## (undated) DEVICE — ENDO TROCAR FIRST ENTRY KII FIOS ADV FIX 12X100MM CFF73

## (undated) DEVICE — DRSG TEGADERM 2 1/2X 2 3/4"

## (undated) DEVICE — GUIDEWIRE VASC 300CM .014IN CHC PT I H7491215501J2

## (undated) DEVICE — SOL ADH LIQUID BENZOIN SWAB 0.6ML C1544

## (undated) DEVICE — SLEEVE TR BAND RADIAL COMPRESSION DEVICE 24CM TRB24-REG

## (undated) DEVICE — STPL ENDO ARTICULATING 45MM EC45A

## (undated) DEVICE — DRSG STERI STRIP 1/2X4" R1547

## (undated) DEVICE — CATH BALLOON TRANSFEMORAL 23MM 9350BC23A

## (undated) DEVICE — CATH ANGIO INFINITI PIGTAIL 145 6 SH 6FRX110CM  534-652S

## (undated) DEVICE — KIT HAND CONTROL ANGIOTOUCH ACIST 65CM AT-P65

## (undated) DEVICE — LINEN GOWN XLG 5407

## (undated) DEVICE — NDL PERC ENTRY THINWALL 18GA 7.0" G00166

## (undated) DEVICE — CATH ANGIO SUPERTORQUE AL1 6FRX100CM 532-645

## (undated) DEVICE — ADH LIQUID MASTISOL TOPICAL VIAL 2-3ML 0523-48

## (undated) DEVICE — GUIDEWIRE VASC SAFARI2 0.035X275CM H74939406XS1

## (undated) DEVICE — ESU ELEC BLADE 6" COATED/INSULATED E1455-6

## (undated) DEVICE — SUCTION TIP POOLE K770

## (undated) DEVICE — GUIDEWIRE VASC 0.035INX150CM INQWIRE J TIP IQ35F150J3F/A

## (undated) DEVICE — RAD CLOSURE ANGIOSEAL 8FR  610131

## (undated) DEVICE — CATH ANGIO JUDKINS R4 6FRX100CM INFINITI 534621T

## (undated) DEVICE — INTRO TERUMO 7FRX25CM W/MARKER RSB703

## (undated) DEVICE — CATH ANGIO INFINITI 3DRC 6FRX100CM 534676T

## (undated) DEVICE — ENDO TROCAR OPTICAL 12X100MM THRD C0R28

## (undated) DEVICE — LINEN TOWEL PACK X6 WHITE 5487

## (undated) DEVICE — ENDO TROCAR FIRST ENTRY KII FIOS Z-THRD 05X100MM CTF03

## (undated) DEVICE — INFLATION DEVICE ATRION QL2530

## (undated) DEVICE — SAPIEN 3 ULTRA VALVE 26 WITH COMMANDER DELIVERY SYSTEM

## (undated) DEVICE — TOTE ANGIO CORP PC15AT SAN32CC83O

## (undated) DEVICE — SHEATH INTRODUCER SET 20-26MM HEART VALV

## (undated) DEVICE — INTRO TERUMO 6FRX25CM W/MARKER RSB603

## (undated) DEVICE — DRAPE IOBAN INCISE 23X17" 6650EZ

## (undated) DEVICE — WIRE GUIDE 0.035"X150CM EMERALD STR 502542

## (undated) DEVICE — SU SILK 0 SH 30" K834H

## (undated) DEVICE — CATH EP PACEL 5FRX110CM 1MM RIGHT HEART CURVE 401763

## (undated) DEVICE — CLOSURE DEVICE 6FR VASC PROGLIDE MEDICATED SUTURE 12673-03

## (undated) DEVICE — PREP CHLORAPREP 26ML TINTED HI-LITE ORANGE 930815

## (undated) DEVICE — SYR LOCKING ATRION QL38

## (undated) DEVICE — PACK MINOR SBA15MIFSE

## (undated) DEVICE — INTRODUCER CATH VASC 5FRX10CM  MPIS-501-NT-U-SST

## (undated) RX ORDER — ASPIRIN 81 MG/1
TABLET ORAL
Status: DISPENSED
Start: 2020-12-08

## (undated) RX ORDER — ONDANSETRON 2 MG/ML
INJECTION INTRAMUSCULAR; INTRAVENOUS
Status: DISPENSED
Start: 2023-03-02

## (undated) RX ORDER — HEPARIN SODIUM 1000 [USP'U]/ML
INJECTION, SOLUTION INTRAVENOUS; SUBCUTANEOUS
Status: DISPENSED
Start: 2020-12-08

## (undated) RX ORDER — CEFAZOLIN SODIUM 2 G/100ML
INJECTION, SOLUTION INTRAVENOUS
Status: DISPENSED
Start: 2020-12-08

## (undated) RX ORDER — HYDROMORPHONE HYDROCHLORIDE 1 MG/ML
INJECTION, SOLUTION INTRAMUSCULAR; INTRAVENOUS; SUBCUTANEOUS
Status: DISPENSED
Start: 2020-12-08

## (undated) RX ORDER — FENTANYL CITRATE 50 UG/ML
INJECTION, SOLUTION INTRAMUSCULAR; INTRAVENOUS
Status: DISPENSED
Start: 2023-03-02

## (undated) RX ORDER — VERAPAMIL HYDROCHLORIDE 2.5 MG/ML
INJECTION, SOLUTION INTRAVENOUS
Status: DISPENSED
Start: 2019-10-28

## (undated) RX ORDER — HEPARIN SODIUM 200 [USP'U]/100ML
INJECTION, SOLUTION INTRAVENOUS
Status: DISPENSED
Start: 2020-12-08

## (undated) RX ORDER — HEPARIN SODIUM 5000 [USP'U]/.5ML
INJECTION, SOLUTION INTRAVENOUS; SUBCUTANEOUS
Status: DISPENSED
Start: 2023-03-02

## (undated) RX ORDER — VERAPAMIL HYDROCHLORIDE 2.5 MG/ML
INJECTION, SOLUTION INTRAVENOUS
Status: DISPENSED
Start: 2020-12-08

## (undated) RX ORDER — HEPARIN SODIUM 200 [USP'U]/100ML
INJECTION, SOLUTION INTRAVENOUS
Status: DISPENSED
Start: 2019-10-28

## (undated) RX ORDER — DEXAMETHASONE SODIUM PHOSPHATE 4 MG/ML
INJECTION, SOLUTION INTRA-ARTICULAR; INTRALESIONAL; INTRAMUSCULAR; INTRAVENOUS; SOFT TISSUE
Status: DISPENSED
Start: 2023-03-02

## (undated) RX ORDER — PROPOFOL 10 MG/ML
INJECTION, EMULSION INTRAVENOUS
Status: DISPENSED
Start: 2023-03-02

## (undated) RX ORDER — LIDOCAINE HYDROCHLORIDE 10 MG/ML
INJECTION, SOLUTION EPIDURAL; INFILTRATION; INTRACAUDAL; PERINEURAL
Status: DISPENSED
Start: 2019-10-28

## (undated) RX ORDER — HYDRALAZINE HYDROCHLORIDE 20 MG/ML
INJECTION INTRAMUSCULAR; INTRAVENOUS
Status: DISPENSED
Start: 2019-10-28

## (undated) RX ORDER — LORAZEPAM 0.5 MG/1
TABLET ORAL
Status: DISPENSED
Start: 2023-12-19

## (undated) RX ORDER — LIDOCAINE HYDROCHLORIDE 10 MG/ML
INJECTION, SOLUTION EPIDURAL; INFILTRATION; INTRACAUDAL; PERINEURAL
Status: DISPENSED
Start: 2023-03-02

## (undated) RX ORDER — BUPIVACAINE HYDROCHLORIDE 2.5 MG/ML
INJECTION, SOLUTION EPIDURAL; INFILTRATION; INTRACAUDAL
Status: DISPENSED
Start: 2023-03-02

## (undated) RX ORDER — FENTANYL CITRATE 50 UG/ML
INJECTION, SOLUTION INTRAMUSCULAR; INTRAVENOUS
Status: DISPENSED
Start: 2020-12-08

## (undated) RX ORDER — METOPROLOL TARTRATE 1 MG/ML
INJECTION, SOLUTION INTRAVENOUS
Status: DISPENSED
Start: 2019-10-28

## (undated) RX ORDER — PROTAMINE SULFATE 10 MG/ML
INJECTION, SOLUTION INTRAVENOUS
Status: DISPENSED
Start: 2020-12-08

## (undated) RX ORDER — FENTANYL CITRATE 50 UG/ML
INJECTION, SOLUTION INTRAMUSCULAR; INTRAVENOUS
Status: DISPENSED
Start: 2017-04-14

## (undated) RX ORDER — LIDOCAINE HYDROCHLORIDE AND EPINEPHRINE 10; 10 MG/ML; UG/ML
INJECTION, SOLUTION INFILTRATION; PERINEURAL
Status: DISPENSED
Start: 2023-02-06

## (undated) RX ORDER — FENTANYL CITRATE 50 UG/ML
INJECTION, SOLUTION INTRAMUSCULAR; INTRAVENOUS
Status: DISPENSED
Start: 2019-10-28

## (undated) RX ORDER — LIDOCAINE HYDROCHLORIDE 10 MG/ML
INJECTION, SOLUTION EPIDURAL; INFILTRATION; INTRACAUDAL; PERINEURAL
Status: DISPENSED
Start: 2020-12-08

## (undated) RX ORDER — DIPHENHYDRAMINE HYDROCHLORIDE 50 MG/ML
INJECTION INTRAMUSCULAR; INTRAVENOUS
Status: DISPENSED
Start: 2017-04-14

## (undated) RX ORDER — HEPARIN SODIUM 1000 [USP'U]/ML
INJECTION, SOLUTION INTRAVENOUS; SUBCUTANEOUS
Status: DISPENSED
Start: 2019-10-28

## (undated) RX ORDER — POTASSIUM CHLORIDE 1500 MG/1
TABLET, EXTENDED RELEASE ORAL
Status: DISPENSED
Start: 2019-10-28

## (undated) RX ORDER — NITROGLYCERIN 5 MG/ML
VIAL (ML) INTRAVENOUS
Status: DISPENSED
Start: 2020-12-08